# Patient Record
Sex: FEMALE | Race: BLACK OR AFRICAN AMERICAN | NOT HISPANIC OR LATINO | Employment: UNEMPLOYED | URBAN - METROPOLITAN AREA
[De-identification: names, ages, dates, MRNs, and addresses within clinical notes are randomized per-mention and may not be internally consistent; named-entity substitution may affect disease eponyms.]

---

## 2017-02-03 ENCOUNTER — OFFICE VISIT (OUTPATIENT)
Dept: URGENT CARE | Facility: CLINIC | Age: 72
End: 2017-02-03
Payer: MEDICARE

## 2017-02-03 ENCOUNTER — LAB VISIT (OUTPATIENT)
Dept: LAB | Facility: HOSPITAL | Age: 72
End: 2017-02-03
Attending: NURSE PRACTITIONER
Payer: MEDICARE

## 2017-02-03 VITALS
WEIGHT: 179.69 LBS | BODY MASS INDEX: 30.68 KG/M2 | OXYGEN SATURATION: 97 % | HEART RATE: 89 BPM | HEIGHT: 64 IN | DIASTOLIC BLOOD PRESSURE: 86 MMHG | TEMPERATURE: 98 F | SYSTOLIC BLOOD PRESSURE: 146 MMHG

## 2017-02-03 DIAGNOSIS — M79.604 BILATERAL LEG PAIN: ICD-10-CM

## 2017-02-03 DIAGNOSIS — M79.604 BILATERAL LEG PAIN: Primary | ICD-10-CM

## 2017-02-03 DIAGNOSIS — M79.605 BILATERAL LEG PAIN: ICD-10-CM

## 2017-02-03 DIAGNOSIS — M79.605 BILATERAL LEG PAIN: Primary | ICD-10-CM

## 2017-02-03 LAB
ALBUMIN SERPL BCP-MCNC: 3.7 G/DL
ALP SERPL-CCNC: 80 U/L
ALT SERPL W/O P-5'-P-CCNC: 15 U/L
ANION GAP SERPL CALC-SCNC: 11 MMOL/L
AST SERPL-CCNC: 26 U/L
BASOPHILS # BLD AUTO: 0.03 K/UL
BASOPHILS NFR BLD: 0.5 %
BILIRUB SERPL-MCNC: 0.4 MG/DL
BUN SERPL-MCNC: 33 MG/DL
CALCIUM SERPL-MCNC: 10.1 MG/DL
CHLORIDE SERPL-SCNC: 109 MMOL/L
CO2 SERPL-SCNC: 25 MMOL/L
CREAT SERPL-MCNC: 1 MG/DL
DIFFERENTIAL METHOD: ABNORMAL
EOSINOPHIL # BLD AUTO: 0.3 K/UL
EOSINOPHIL NFR BLD: 4.7 %
ERYTHROCYTE [DISTWIDTH] IN BLOOD BY AUTOMATED COUNT: 18.5 %
EST. GFR  (AFRICAN AMERICAN): >60 ML/MIN/1.73 M^2
EST. GFR  (NON AFRICAN AMERICAN): 57 ML/MIN/1.73 M^2
GLUCOSE SERPL-MCNC: 84 MG/DL
HCT VFR BLD AUTO: 41.5 %
HGB BLD-MCNC: 12.8 G/DL
LYMPHOCYTES # BLD AUTO: 1.9 K/UL
LYMPHOCYTES NFR BLD: 30.1 %
MCH RBC QN AUTO: 26.8 PG
MCHC RBC AUTO-ENTMCNC: 30.8 %
MCV RBC AUTO: 87 FL
MONOCYTES # BLD AUTO: 0.4 K/UL
MONOCYTES NFR BLD: 5.7 %
NEUTROPHILS # BLD AUTO: 3.6 K/UL
NEUTROPHILS NFR BLD: 59 %
PLATELET # BLD AUTO: 163 K/UL
PMV BLD AUTO: 11 FL
POTASSIUM SERPL-SCNC: 4.2 MMOL/L
PROT SERPL-MCNC: 7.1 G/DL
RBC # BLD AUTO: 4.77 M/UL
SODIUM SERPL-SCNC: 145 MMOL/L
WBC # BLD AUTO: 6.14 K/UL

## 2017-02-03 PROCEDURE — 80053 COMPREHEN METABOLIC PANEL: CPT | Mod: PO

## 2017-02-03 PROCEDURE — 99999 PR PBB SHADOW E&M-EST. PATIENT-LVL IV: CPT | Mod: PBBFAC,,, | Performed by: NURSE PRACTITIONER

## 2017-02-03 PROCEDURE — 99213 OFFICE O/P EST LOW 20 MIN: CPT | Mod: S$PBB,,, | Performed by: NURSE PRACTITIONER

## 2017-02-03 PROCEDURE — 85025 COMPLETE CBC W/AUTO DIFF WBC: CPT | Mod: PO

## 2017-02-03 NOTE — PROGRESS NOTES
Subjective:       Patient ID: Carlie Valdez is a 71 y.o. female.    Chief Complaint: Leg Pain    HPI Comments: Pt is a 71 year old female to clinic today with bilateral lower extremity pain that began 4-5 weeks ago. Pt states she was in hospital for lower extremity swelling. She states once swelling resolved, she had blisters on legs and she wants to make sure she does not have infection in her legs.     Leg Pain    The incident occurred more than 1 week ago (4-5 weeks). Injury mechanism: pt states she had blisters from swelling  The pain is present in the left leg and right leg. The quality of the pain is described as stabbing. The pain is at a severity of 10/10. The pain is severe. The pain has been fluctuating since onset. Associated symptoms include tingling. Pertinent negatives include no inability to bear weight, loss of motion, loss of sensation, muscle weakness or numbness. Nothing aggravates the symptoms. She has tried nothing for the symptoms.     Review of Systems   Constitutional: Negative for chills, fatigue and fever.   HENT: Negative for congestion and sore throat.    Eyes: Negative for pain.   Respiratory: Negative for cough, chest tightness, shortness of breath and wheezing.    Cardiovascular: Positive for leg swelling. Negative for chest pain and palpitations.   Gastrointestinal: Negative for abdominal pain, diarrhea, nausea and vomiting.   Genitourinary: Negative for dyspareunia.   Musculoskeletal: Negative for back pain, myalgias and neck pain.   Skin: Negative for rash.   Neurological: Positive for tingling. Negative for dizziness, light-headedness, numbness and headaches.       Objective:      Physical Exam   Constitutional: She is oriented to person, place, and time. She appears well-developed and well-nourished. No distress.   HENT:   Head: Normocephalic.   Right Ear: External ear normal.   Left Ear: External ear normal.   Nose: Nose normal.   Eyes: Pupils are equal, round, and  reactive to light.   Neck: Normal range of motion. Neck supple.   Cardiovascular: Normal rate, regular rhythm, S1 normal, S2 normal and normal heart sounds.  Exam reveals no gallop and no friction rub.    No murmur heard.  Pulses:       Dorsalis pedis pulses are 1+ on the right side, and 1+ on the left side.   Pulmonary/Chest: Effort normal and breath sounds normal. No accessory muscle usage. No apnea, no tachypnea and no bradypnea. No respiratory distress. She has no decreased breath sounds. She has no wheezes. She has no rhonchi. She has no rales.   Abdominal: Bowel sounds are normal.   Musculoskeletal: Normal range of motion. She exhibits edema and tenderness.        Right ankle: She exhibits swelling. She exhibits normal range of motion. No tenderness.        Left ankle: She exhibits swelling. She exhibits normal range of motion. No tenderness.        Right lower leg: She exhibits tenderness and edema. She exhibits no bony tenderness, no swelling, no deformity and no laceration.        Left lower leg: She exhibits tenderness and edema. She exhibits no bony tenderness, no swelling, no deformity and no laceration.   Lymphadenopathy:     She has no cervical adenopathy.   Neurological: She is alert and oriented to person, place, and time.   Skin: Skin is warm and dry. No rash noted. She is not diaphoretic.   Psychiatric: She has a normal mood and affect. Her speech is normal and behavior is normal.   Nursing note and vitals reviewed.      Assessment:       1. Bilateral leg pain        Plan:   Bilateral leg pain  -     CBC auto differential; Future; Expected date: 2/3/17  -     Comprehensive metabolic panel; Future; Expected date: 2/3/17      Recommend F/U with PCP to discuss lab results and further tx options.  Pt refused venous US to rule out DVT at this time.  Will notify of abnormal lab results.    Follow prescribed treatment plan as directed.  Stay hydrated and rest.  Report to ER if symptoms worsen.  Follow up  with PCP in 2-3 days or sooner if symptoms do not improve.

## 2017-02-03 NOTE — MR AVS SNAPSHOT
[facility]  [address] [place] [address]  Phone: [phone]  Fax: [phone]                  Carlie Valdez   2/3/2017 10:40 AM   Office Visit    Description:  Female : 1945   Provider:  Dougie Braun NP   Department:  [facility]           Reason for Visit     Leg Pain           Diagnoses this Visit        Comments    Bilateral leg pain    -  Primary            To Do List           Goals (5 Years of Data)     None      Follow-Up and Disposition     Return if symptoms worsen or fail to improve.      [facility]     [facility] -  Assistance  Registered nurses in the [facility] provide clinical advisement, health education, appointment booking, and other advisory services.  Call for this free service at [phone].             Medications           Message regarding Medications     Verify the changes and/or additions to your medication regime listed below are the same as discussed with your clinician today.  If any of these changes or additions are incorrect, please notify your healthcare provider.             Verify that the below list of medications is an accurate representation of the medications you are currently taking.  If none reported, the list may be blank. If incorrect, please contact your healthcare provider. Carry this list with you in case of emergency.           Current Medications     artificial tears,hypromellose, 0.3 % Gel 1 drop as needed.    bumetanide (BUMEX) 2 MG tablet Take 1 tablet (2 mg total) by mouth 2 (two) times daily. 1 Tablet Oral Every day    calcium-Mg-zinc-hc #122-vit D3 250-125-3.7-100 wx-fv-th-unit Tab Take 1 tablet by mouth Daily.    cholecalciferol, vitamin D3, 1,000 unit capsule Take 1 capsule (1,000 Units total) by mouth once daily.    gatifloxacin (ZYMAR) 0.5 % Drop drops Apply 1 drop to eye 2 (two) times daily. Start one day before surgery (right eye)    ketorolac 0.5%  "(ACULAR) 0.5 % Drop Place 1 drop into the right eye 4 (four) times daily. Eyedrops to start one day before surgery    nebivolol (BYSTOLIC) 2.5 MG Tab Take 10 mg by mouth once daily.    omeprazole (PRILOSEC) 20 MG capsule Take 1 capsule (20 mg total) by mouth once daily.    potassium chloride (KLOR-CON) 10 MEQ TbSR Take 2 tablets (20 mEq total) by mouth once daily. 1 Tablet Extended Release Oral Every day    prednisoLONE acetate (PRED FORTE) 1 % DrpS Place 1 drop into the right eye 4 (four) times daily. Start one day before surgery    rivaroxaban (XARELTO) 15 mg Tab Take 1 tablet (15 mg total) by mouth daily with dinner or evening meal.    diltiazem (CARDIZEM) 30 MG tablet Take 240 mg by mouth once daily.     fluticasone (FLONASE) 50 mcg/actuation nasal spray 2 sprays by Each Nare route once daily.    gabapentin (NEURONTIN) 100 MG capsule Take 100 mg by mouth 3 (three) times daily.    metoprolol tartrate (LOPRESSOR) 25 MG tablet Take 1 tablet (25 mg total) by mouth 2 (two) times daily.    rivaroxaban (XARELTO) 10 mg Tab Take 15 mg by mouth daily with dinner or evening meal.     sodium chloride 5% (AMALIA 128) 5 % ophthalmic solution Place 1 drop into both eyes 4 (four) times daily.           Clinical Reference Information           Your Vitals Were     BP Pulse Temp Height Weight SpO2    146/86 (BP Location: Right arm, Patient Position: Sitting) 89 97.5 °F (36.4 °C) 5' 4" (1.626 m) 81.5 kg (179 lb 10.8 oz) 97%    BMI                30.84 kg/m2          Blood Pressure          Most Recent Value    BP  (!)  146/86      Allergies as of 2/3/2017     Atorvastatin    Codeine    Digoxin    Diovan [Valsartan]    Eggs [Egg Derived]    Morphine    Naproxen    Peanut    Propofol    Sulfa (Sulfonamide Antibiotics)      Immunizations Administered on Date of Encounter - 2/3/2017     None      Orders Placed During Today's Visit     Future Labs/Procedures Expected by Expires    CBC auto differential  2/3/2017 4/4/2018    Comprehensive " metabolic panel  2/3/2017 4/4/2018      Instructions      Leg Swelling in Both Legs    Swelling of the feet, ankles, and legs is called edema. It is caused by excess fluid that has collected in the tissues. Extra fluid in the body settles in the lowest part because of gravity. This is why the legs and feet are most affected.  Some of the causes for edema include:  · Disease of the heart like congestive heart failure  · Standing or sitting for long periods of time  · Infection of the feet or legs  · Blood pooling in the veins of your legs (venous insufficiency)  · Dilated veins in your lower leg (varicose veins)  · Garters or other clothing that is tight on your legs. This will cause blood to pool in your legs because the clothing limits blood flow.  · Some medicines such as hormones like birth control pills, some blood pressure medicines like calcium channel blockers (amlodipine) and steroids, some antidepressants like MAO inhibitors and tricyclics  · Menstrual periods that cause you to retain fluids  · Many types of renal disease  · Liver failure or cirrhosis  · Pregnancy, some swelling is normal, but a sudden increase in leg swelling or weight gain can be a sign of a dangerous complication of pregnancy  · Poor nutrition  · Thyroid disease  Medical treatment will depend on what is causing the swelling in your legs. Your healthcare provider may prescribe water pills (diuretics) to get rid of the extra fluid.  Home care  Follow these guidelines when caring for yourself at home:  · Don't wear clothing like garters that is tight on your legs.  · Keep your legs up while lying or sitting.  · If infection, injury, or recent surgery is causing the swelling, stay off your legs as much as possible until symptoms get better.  · If your healthcare provider says that your leg swelling is caused by venous insufficiency or varicose veins, don't sit or  one place for long periods of time. Take breaks and walk about every  few hours. Brisk walking is a good exercise. It helps circulate the blood that has collected in your leg. Talk with your provider about using support stockings to stop daytime leg swelling.  · If your provider says that heart disease is causing your leg swelling, follow a low-salt diet to stop extra fluid from staying in your body. You may also need medicine.  Follow-up care  Follow up with your healthcare provider, or as advised.  When to seek medical advice  Call your healthcare provider right away if any of these occur:  · New shortness of breath or chest pain  · Shortness of breath or chest pain that gets worse  · Swelling in both legs or ankles that gets worse  · Swelling of the abdomen  · Redness, warmth, or swelling in one leg  · Fever of 100.4ºF (38ºC) or higher, or as directed by your healthcare provider  · Yellow color to your skin or eyes  · Rapid, unexplained weight gain  · Having to sleep upright or use an increased number of pillows  Date Last Reviewed: 3/31/2016  © 0347-2787 [employer]. [address] [place] [address]. All rights reserved. This information is not intended as a substitute for professional medical care. Always follow your healthcare professional's instructions.             Language Assistance Services     ATTENTION: Language assistance services are available, free of charge. Please call [phone].      ATENCIÓN: Si habla joshua, tiene a rodrigues disposición servicios gratuitos de asistencia lingüística. Llame al [phone].     [facility] Ý: N?u b?n nói Ti?ng Vi?t, có các d?ch v? h? tr? ngôn ng? mi?n phí dành cho b?n. G?i s? [phone].         [facility] complies with applicable Federal civil rights laws and does not discriminate on the basis of race, color, national origin, age, disability, or sex.

## 2017-02-03 NOTE — PATIENT INSTRUCTIONS
Leg Swelling in Both Legs    Swelling of the feet, ankles, and legs is called edema. It is caused by excess fluid that has collected in the tissues. Extra fluid in the body settles in the lowest part because of gravity. This is why the legs and feet are most affected.  Some of the causes for edema include:  · Disease of the heart like congestive heart failure  · Standing or sitting for long periods of time  · Infection of the feet or legs  · Blood pooling in the veins of your legs (venous insufficiency)  · Dilated veins in your lower leg (varicose veins)  · Garters or other clothing that is tight on your legs. This will cause blood to pool in your legs because the clothing limits blood flow.  · Some medicines such as hormones like birth control pills, some blood pressure medicines like calcium channel blockers (amlodipine) and steroids, some antidepressants like MAO inhibitors and tricyclics  · Menstrual periods that cause you to retain fluids  · Many types of renal disease  · Liver failure or cirrhosis  · Pregnancy, some swelling is normal, but a sudden increase in leg swelling or weight gain can be a sign of a dangerous complication of pregnancy  · Poor nutrition  · Thyroid disease  Medical treatment will depend on what is causing the swelling in your legs. Your healthcare provider may prescribe water pills (diuretics) to get rid of the extra fluid.  Home care  Follow these guidelines when caring for yourself at home:  · Don't wear clothing like garters that is tight on your legs.  · Keep your legs up while lying or sitting.  · If infection, injury, or recent surgery is causing the swelling, stay off your legs as much as possible until symptoms get better.  · If your healthcare provider says that your leg swelling is caused by venous insufficiency or varicose veins, don't sit or  one place for long periods of time. Take breaks and walk about every few hours. Brisk walking is a good exercise. It helps  circulate the blood that has collected in your leg. Talk with your provider about using support stockings to stop daytime leg swelling.  · If your provider says that heart disease is causing your leg swelling, follow a low-salt diet to stop extra fluid from staying in your body. You may also need medicine.  Follow-up care  Follow up with your healthcare provider, or as advised.  When to seek medical advice  Call your healthcare provider right away if any of these occur:  · New shortness of breath or chest pain  · Shortness of breath or chest pain that gets worse  · Swelling in both legs or ankles that gets worse  · Swelling of the abdomen  · Redness, warmth, or swelling in one leg  · Fever of 100.4ºF (38ºC) or higher, or as directed by your healthcare provider  · Yellow color to your skin or eyes  · Rapid, unexplained weight gain  · Having to sleep upright or use an increased number of pillows  Date Last Reviewed: 3/31/2016  © 2880-3819 [employer]. [address] [place] [address], [place] [address]. All rights reserved. This information is not intended as a substitute for professional medical care. Always follow your healthcare professional's instructions.

## 2017-02-28 ENCOUNTER — OFFICE VISIT (OUTPATIENT)
Dept: INTERNAL MEDICINE | Facility: CLINIC | Age: 72
End: 2017-02-28
Payer: MEDICARE

## 2017-02-28 VITALS
TEMPERATURE: 96 F | DIASTOLIC BLOOD PRESSURE: 106 MMHG | BODY MASS INDEX: 32.63 KG/M2 | HEIGHT: 64 IN | HEART RATE: 121 BPM | WEIGHT: 191.13 LBS | SYSTOLIC BLOOD PRESSURE: 152 MMHG

## 2017-02-28 DIAGNOSIS — Z12.31 ENCOUNTER FOR SCREENING MAMMOGRAM FOR MALIGNANT NEOPLASM OF BREAST: ICD-10-CM

## 2017-02-28 DIAGNOSIS — E55.9 VITAMIN D DEFICIENCY: Primary | ICD-10-CM

## 2017-02-28 DIAGNOSIS — I48.91 ATRIAL FIBRILLATION, UNSPECIFIED TYPE: Chronic | ICD-10-CM

## 2017-02-28 DIAGNOSIS — I10 ESSENTIAL HYPERTENSION: ICD-10-CM

## 2017-02-28 DIAGNOSIS — N18.30 CHRONIC KIDNEY DISEASE (CKD), STAGE III (MODERATE): ICD-10-CM

## 2017-02-28 DIAGNOSIS — Z29.9 PREVENTIVE MEASURE: ICD-10-CM

## 2017-02-28 DIAGNOSIS — M89.9 BONE DISORDER: ICD-10-CM

## 2017-02-28 PROCEDURE — 99999 PR PBB SHADOW E&M-EST. PATIENT-LVL IV: CPT | Mod: PBBFAC,,, | Performed by: INTERNAL MEDICINE

## 2017-02-28 PROCEDURE — 99214 OFFICE O/P EST MOD 30 MIN: CPT | Mod: PBBFAC,PO | Performed by: INTERNAL MEDICINE

## 2017-02-28 PROCEDURE — 99214 OFFICE O/P EST MOD 30 MIN: CPT | Mod: S$PBB,,, | Performed by: INTERNAL MEDICINE

## 2017-02-28 NOTE — MR AVS SNAPSHOT
[facility]  [address] [place] [address]  Phone: [phone]  Fax: [phone]                  Carlie Valdez   2017 9:40 AM   Office Visit    Description:  Female : 1945   Provider:  Johanna Dennison MD   Department:  [facility]           Reason for Visit     Follow-up           Diagnoses this Visit        Comments    Vitamin D deficiency    -  Primary     Chronic kidney disease (CKD), stage III (moderate)         Essential hypertension         Atrial fibrillation, unspecified type         Preventive measure         Encounter for screening mammogram for malignant neoplasm of breast         Bone disorder                To Do List           Future Appointments        Provider Department Dept Phone    2017 9:30 AM [facility] [id number] [facility] [phone]    2017 10:00 AM SUM BMD1 [facility] [phone]    2017 9:30 AM Leisa Andraed MD [place] [facility]/ -[phone]    2017 7:50 AM LABORATORY, [facility] [phone]    2017 10:00 AM Johanna Dennison MD [facility] [phone]      Goals (5 Years of Data)     None      Follow-Up and Disposition     Return in about 3 months (around 2017).    Follow-up and Disposition History      [facility]     [facility] -  Assistance  Registered nurses in the [facility] provide clinical advisement, health education, appointment booking, and other advisory services.  Call for this free service at [phone].             Medications           Message regarding Medications     Verify the changes and/or additions to your medication regime listed below are the same as discussed with your clinician today.  If any of these changes or additions are incorrect, please notify your healthcare provider.        STOP taking these medications     sodium chloride 5%  (AMALIA 128) 5 % ophthalmic solution Place 1 drop into both eyes 4 (four) times daily.    gabapentin (NEURONTIN) 100 MG capsule Take 100 mg by mouth 3 (three) times daily.           Verify that the below list of medications is an accurate representation of the medications you are currently taking.  If none reported, the list may be blank. If incorrect, please contact your healthcare provider. Carry this list with you in case of emergency.           Current Medications     artificial tears,hypromellose, 0.3 % Gel 1 drop as needed.    bumetanide (BUMEX) 2 MG tablet Take 1 tablet (2 mg total) by mouth 2 (two) times daily. 1 Tablet Oral Every day    calcium-Mg-zinc-hc #122-vit D3 250-125-3.7-100 qc-ml-oo-unit Tab Take 1 tablet by mouth Daily.    cholecalciferol, vitamin D3, 1,000 unit capsule Take 1 capsule (1,000 Units total) by mouth once daily.    diltiazem (CARDIZEM) 30 MG tablet Take 240 mg by mouth once daily.     fluticasone (FLONASE) 50 mcg/actuation nasal spray 2 sprays by Each Nare route once daily.    gatifloxacin (ZYMAR) 0.5 % Drop drops Apply 1 drop to eye 2 (two) times daily. Start one day before surgery (right eye)    ketorolac 0.5% (ACULAR) 0.5 % Drop Place 1 drop into the right eye 4 (four) times daily. Eyedrops to start one day before surgery    metoprolol tartrate (LOPRESSOR) 25 MG tablet Take 1 tablet (25 mg total) by mouth 2 (two) times daily.    nebivolol (BYSTOLIC) 2.5 MG Tab Take 10 mg by mouth once daily.    omeprazole (PRILOSEC) 20 MG capsule Take 1 capsule (20 mg total) by mouth once daily.    potassium chloride (KLOR-CON) 10 MEQ TbSR Take 2 tablets (20 mEq total) by mouth once daily. 1 Tablet Extended Release Oral Every day    prednisoLONE acetate (PRED FORTE) 1 % DrpS Place 1 drop into the right eye 4 (four) times daily. Start one day before surgery    rivaroxaban (XARELTO) 15 mg Tab Take 1 tablet (15 mg total) by mouth daily with dinner or evening meal.           Clinical Reference  Information           Your Vitals Were     BP                   152/106           Blood Pressure          Most Recent Value    BP  (!)  152/106      Allergies as of 2/28/2017     Atorvastatin    Codeine    Digoxin    Diovan [Valsartan]    Eggs [Egg Derived]    Morphine    Naproxen    Peanut    Propofol    Sulfa (Sulfonamide Antibiotics)      Immunizations Administered on Date of Encounter - 2/28/2017     None      Orders Placed During Today's Visit      Normal Orders This Visit    Ambulatory consult to Gynecology     Future Labs/Procedures Expected by Expires    CBC auto differential  2/28/2017 2/28/2018    Comprehensive metabolic panel  2/28/2017 2/28/2018    DXA Bone Density Spine And Hip_Axial Skeleton  2/28/2017 2/28/2018    Hepatitis C antibody  2/28/2017 4/29/2018    Lipid panel  2/28/2017 2/28/2018    Mammo Digital Screening Bilat with CAD  2/28/2017 2/28/2018    TSH  2/28/2017 2/28/2018    Vitamin D  As directed 2/28/2018      Language Assistance Services     ATTENTION: Language assistance services are available, free of charge. Please call [phone].      ATENCIÓN: Si habla joshua, tiene a rodrigues disposición servicios gratuitos de asistencia lingüística. Llame al [phone].     CHÚ Ý: N?u b?n nói Ti?ng Vi?t, có các d?ch v? h? tr? ngôn ng? mi?n phí dành cho b?n. G?i s? [phone].         [facility] complies with applicable Federal civil rights laws and does not discriminate on the basis of race, color, national origin, age, disability, or sex.

## 2017-02-28 NOTE — PROGRESS NOTES
"Subjective:       Patient ID: Carlie Valdez is a 71 y.o. female.    Chief Complaint: Follow-up    HPI Comments: Here for follow up of medical problems.  BP at home 120s/ 70s.  Taking vit D3 1K daily.  No f/c/sw/cough.  No cp/sob.  Occas palp with going into afib.  Takes flecainide prn.  BMs normal.  Dr. Baker following for LBP since fall 4/16.  Taking pain med per Dr. Macdonald, Pain Management.  Uses cane now.    Updated/annual due 4/17:  HM:  12/05 pneumo with bad rxn, 7/14 TDaP, 9/15 Cscope rep 7y, no BMD, 3/13 Gyn, 7/14 MMG, 12/16 Eye Dr. Cadet, Cards Dr. Hernández, Nephr Dr. Yan.            Review of Systems   Constitutional: Negative for chills, diaphoresis and fever.   Respiratory: Negative for cough and shortness of breath.    Cardiovascular: Negative for chest pain, palpitations and leg swelling.   Gastrointestinal: Negative for blood in stool, constipation, diarrhea, nausea and vomiting.   Genitourinary: Negative for dysuria, frequency and hematuria.   Psychiatric/Behavioral: The patient is not nervous/anxious.        Objective:   BP (!) 152/106  Pulse (!) 121  Temp (!) 95.8 °F (35.4 °C) (Tympanic)   Ht 5' 4" (1.626 m)  Wt 86.7 kg (191 lb 2.2 oz)  BMI 32.81 kg/m2    Physical Exam   Constitutional: She is oriented to person, place, and time. She appears well-developed.   HENT:   Mouth/Throat: Oropharynx is clear and moist.   Neck: Neck supple. Carotid bruit is not present. No thyroid mass present.   Cardiovascular: Normal rate, regular rhythm and intact distal pulses.  Exam reveals no gallop and no friction rub.    No murmur heard.  Pulmonary/Chest: Effort normal and breath sounds normal. She has no wheezes. She has no rales.   Abdominal: Soft. Bowel sounds are normal. She exhibits no mass. There is no hepatosplenomegaly. There is no tenderness.   Musculoskeletal: She exhibits edema (trace bilat.).   Lymphadenopathy:     She has no cervical adenopathy.   Neurological: She is alert and " oriented to person, place, and time.   Psychiatric: She has a normal mood and affect.     Results for DARNELL PALM (MRN [id number]) as of 2/28/2017 10:03   Ref. Range 11/13/2016 03:30 11/13/2016 06:50 11/13/2016 10:00 11/13/2016 11:25 2/3/2017 12:01   WBC Latest Ref Range: 3.90 - 12.70 K/uL 4.59    6.14   RBC Latest Ref Range: 4.00 - 5.40 M/uL 4.35    4.77   Hemoglobin Latest Ref Range: 12.0 - 16.0 g/dL 11.5 (L)    12.8   Hematocrit Latest Ref Range: 37.0 - 48.5 % 37.0    41.5   MCV Latest Ref Range: 82 - 98 fL 85    87   MCH Latest Ref Range: 27.0 - 31.0 pg 26.4 (L)    26.8 (L)   MCHC Latest Ref Range: 32.0 - 36.0 % 31.1 (L)    30.8 (L)   RDW Latest Ref Range: 11.5 - 14.5 % 17.0 (H)    18.5 (H)   Platelets Latest Ref Range: 150 - 350 K/uL 168    163   MPV Latest Ref Range: 9.2 - 12.9 fL 10.4    11.0   Gran% Latest Ref Range: 38.0 - 73.0 % 88.9 (H)    59.0   Gran # Latest Ref Range: 1.8 - 7.7 K/uL 4.1    3.6   Lymph% Latest Ref Range: 18.0 - 48.0 % 9.6 (L)    30.1   Lymph # Latest Ref Range: 1.0 - 4.8 K/uL 0.4 (L)    1.9   Mono% Latest Ref Range: 4.0 - 15.0 % 1.5 (L)    5.7   Mono # Latest Ref Range: 0.3 - 1.0 K/uL 0.1 (L)    0.4   Eosinophil% Latest Ref Range: 0.0 - 8.0 % 0.0    4.7   Eos # Latest Ref Range: 0.0 - 0.5 K/uL 0.0    0.3   Basophil% Latest Ref Range: 0.0 - 1.9 % 0.0    0.5   Baso # Latest Ref Range: 0.00 - 0.20 K/uL 0.00    0.03   Sodium Latest Ref Range: 136 - 145 mmol/L 147 (H)    145   Potassium Latest Ref Range: 3.5 - 5.1 mmol/L 2.9 (L)    4.2   Chloride Latest Ref Range: 95 - 110 mmol/L 109    109   CO2 Latest Ref Range: 23 - 29 mmol/L 24    25   Anion Gap Latest Ref Range: 8 - 16 mmol/L 14    11   BUN, Bld Latest Ref Range: 8 - 23 mg/dL 45 (H)    33 (H)   Creatinine Latest Ref Range: 0.5 - 1.4 mg/dL 1.3    1.0   eGFR if non African American Latest Ref Range: >60 mL/min/1.73 m^2 41 (A)    57 (A)   eGFR if African American Latest Ref Range: >60 mL/min/1.73 m^2 48 (A)    >60   Glucose  Latest Ref Range: 70 - 110 mg/dL 166 (H)    84   Calcium Latest Ref Range: 8.7 - 10.5 mg/dL 8.9    10.1   Alkaline Phosphatase Latest Ref Range: 55 - 135 U/L 80    80   Total Protein Latest Ref Range: 6.0 - 8.4 g/dL 6.4    7.1   Albumin Latest Ref Range: 3.5 - 5.2 g/dL 3.1 (L)    3.7   Total Bilirubin Latest Ref Range: 0.1 - 1.0 mg/dL 1.1 (H)    0.4   AST Latest Ref Range: 10 - 40 U/L 36    26   ALT Latest Ref Range: 10 - 44 U/L 31    15     Assessment:       1. Vitamin D deficiency    2. Chronic kidney disease (CKD), stage III (moderate)    3. Essential hypertension    4. Atrial fibrillation, unspecified type    5. Preventive measure    6. Encounter for screening mammogram for malignant neoplasm of breast     7. Bone disorder        Plan:       Carlie was seen today for follow-up.    Diagnoses and all orders for this visit:    Vitamin D deficiency- check lab.  -     Vitamin D; Future    Chronic kidney disease (CKD), stage III (moderate)- stable.    Essential hypertension- monitor at home for next visit.    Atrial fibrillation, unspecified type- stable on rx.    Preventive measure- will do in 3mo:  -     Comprehensive metabolic panel; Future  -     Lipid panel; Future  -     Mammo Digital Screening Bilat with CAD; Future  -     Ambulatory consult to Gynecology  -     CBC auto differential; Future  -     TSH; Future  -     Vitamin D; Future  -     Hepatitis C antibody; Future  -     DXA Bone Density Spine And Hip_Axial Skeleton; Future    RTC 3 mo.  MMG and gyn prior.

## 2017-03-25 ENCOUNTER — OFFICE VISIT (OUTPATIENT)
Dept: URGENT CARE | Facility: CLINIC | Age: 72
End: 2017-03-25
Payer: MEDICARE

## 2017-03-25 VITALS
SYSTOLIC BLOOD PRESSURE: 138 MMHG | TEMPERATURE: 99 F | OXYGEN SATURATION: 98 % | DIASTOLIC BLOOD PRESSURE: 80 MMHG | HEART RATE: 62 BPM | WEIGHT: 199.31 LBS | BODY MASS INDEX: 35.32 KG/M2 | RESPIRATION RATE: 18 BRPM | HEIGHT: 63 IN

## 2017-03-25 DIAGNOSIS — R22.0 FACIAL SWELLING: ICD-10-CM

## 2017-03-25 DIAGNOSIS — H65.93 BILATERAL NON-SUPPURATIVE OTITIS MEDIA: Primary | ICD-10-CM

## 2017-03-25 DIAGNOSIS — J32.0 MAXILLARY SINUSITIS, UNSPECIFIED CHRONICITY: ICD-10-CM

## 2017-03-25 PROCEDURE — 99214 OFFICE O/P EST MOD 30 MIN: CPT | Mod: PBBFAC,PO | Performed by: PHYSICIAN ASSISTANT

## 2017-03-25 PROCEDURE — 99999 PR PBB SHADOW E&M-EST. PATIENT-LVL IV: CPT | Mod: PBBFAC,,, | Performed by: PHYSICIAN ASSISTANT

## 2017-03-25 PROCEDURE — 99213 OFFICE O/P EST LOW 20 MIN: CPT | Mod: S$PBB,,, | Performed by: PHYSICIAN ASSISTANT

## 2017-03-25 RX ORDER — MONTELUKAST SODIUM 10 MG/1
10 TABLET ORAL NIGHTLY
Qty: 30 TABLET | Refills: 0 | Status: SHIPPED | OUTPATIENT
Start: 2017-03-25 | End: 2017-04-04

## 2017-03-25 RX ORDER — METHYLPREDNISOLONE 4 MG/1
TABLET ORAL
Qty: 1 PACKAGE | Refills: 0 | Status: SHIPPED | OUTPATIENT
Start: 2017-03-25 | End: 2017-04-04

## 2017-03-25 RX ORDER — AMOXICILLIN AND CLAVULANATE POTASSIUM 500; 125 MG/1; MG/1
1 TABLET, FILM COATED ORAL 2 TIMES DAILY
Qty: 20 TABLET | Refills: 0 | Status: SHIPPED | OUTPATIENT
Start: 2017-03-25 | End: 2017-04-04

## 2017-03-25 NOTE — PROGRESS NOTES
Subjective:      Patient ID: Carlie Valdez is a 71 y.o. female.    Chief Complaint: Cough; Nasal Congestion; and Sore Throat    Cough   This is a new problem. The current episode started 1 to 4 weeks ago. The problem has been gradually worsening. The problem occurs every few minutes. The cough is productive of purulent sputum. Associated symptoms include ear congestion, ear pain, nasal congestion, postnasal drip, rhinorrhea, a sore throat, shortness of breath and wheezing. Pertinent negatives include no chest pain, chills, fever, headaches, heartburn, hemoptysis, myalgias, rash, sweats or weight loss. The symptoms are aggravated by lying down. She has tried nothing for the symptoms. The treatment provided no relief. Her past medical history is significant for asthma and pneumonia. There is no history of bronchiectasis, bronchitis, COPD, emphysema or environmental allergies. CHF   Sore Throat    This is a new problem. The current episode started 1 to 4 weeks ago. The problem has been waxing and waning. Neither side of throat is experiencing more pain than the other. There has been no fever. The pain is at a severity of 2/10. The patient is experiencing no pain. Associated symptoms include congestion, coughing, ear pain, a plugged ear sensation and shortness of breath. Pertinent negatives include no abdominal pain, diarrhea, drooling, ear discharge, headaches, hoarse voice, neck pain, stridor, swollen glands, trouble swallowing or vomiting. She has had no exposure to strep or mono. She has tried nothing for the symptoms. The treatment provided no relief.       Review of Systems   Constitutional: Negative for chills, fever and weight loss.   HENT: Positive for congestion, ear pain, postnasal drip, rhinorrhea and sore throat. Negative for drooling, ear discharge, hoarse voice and trouble swallowing.    Respiratory: Positive for cough, shortness of breath and wheezing. Negative for hemoptysis and stridor.   "  Cardiovascular: Negative for chest pain.   Gastrointestinal: Negative for abdominal pain, diarrhea, heartburn and vomiting.   Musculoskeletal: Negative for myalgias and neck pain.   Skin: Negative for rash.   Allergic/Immunologic: Negative for environmental allergies.   Neurological: Negative for headaches.        Review of patient's allergies indicates:   Allergen Reactions    Atorvastatin      Other reaction(s): Muscle pain    Codeine      Other reaction(s): Unknown    Digoxin      Other reaction(s): Unknown    Diovan [valsartan] Other (See Comments)     Upset stomach, weakness    Eggs [egg derived]     Morphine      Other reaction(s): Unknown    Naproxen      Other reaction(s): Nausea    Peanut     Propofol      Other reaction(s): delirious    Sulfa (sulfonamide antibiotics)        Past Medical History:   Diagnosis Date    Asthma     Atrial fibrillation     Blood clot of vein in shoulder area     Cardiac defibrillator in place     Chronic knee pain     Diabetes mellitus type 2 without retinopathy 12/19/2016    Diaphragmatic hernia without obstruction and without gangrene 9/14/2015    GERD (gastroesophageal reflux disease)     Hypertension        Objective:   /80 (BP Location: Right arm, Patient Position: Sitting, BP Method: Manual)  Pulse 62  Temp 98.6 °F (37 °C) (Tympanic)   Resp 18  Ht 5' 3" (1.6 m)  Wt 90.4 kg (199 lb 4.7 oz)  SpO2 98%  BMI 35.3 kg/m2    Physical Exam   Constitutional: She is oriented to person, place, and time. She appears well-developed and well-nourished.   HENT:   Head: Normocephalic and atraumatic.   Right Ear: External ear normal.   Left Ear: External ear normal.   Nose: Nose normal.   Mouth/Throat: Oropharynx is clear and moist. No oropharyngeal exudate.   There is facial edema in the periorbital areas and maxillary facial area which is also tender to palpate.   Eyes: Conjunctivae and EOM are normal. Pupils are equal, round, and reactive to light. Right " eye exhibits no discharge. Left eye exhibits no discharge. No scleral icterus.   Neck: Normal range of motion. Neck supple. No JVD present. No tracheal deviation present. No thyromegaly present.   Cardiovascular: Normal rate, regular rhythm and intact distal pulses.  Exam reveals no gallop and no friction rub.    Murmur heard.  Pulmonary/Chest: No stridor. No respiratory distress. She has wheezes. She has no rales. She exhibits no tenderness.   Abdominal: Soft. Bowel sounds are normal. She exhibits no distension and no mass. There is no tenderness. There is no rebound and no guarding.   Musculoskeletal: Normal range of motion. She exhibits no edema or tenderness.   Lymphadenopathy:     She has no cervical adenopathy.   Neurological: She is alert and oriented to person, place, and time. She has normal reflexes. She displays normal reflexes. No cranial nerve deficit. She exhibits normal muscle tone. Coordination normal.   Skin: Skin is warm and dry. No rash noted. No erythema. No pallor.   Psychiatric: She has a normal mood and affect. Her behavior is normal. Judgment and thought content normal.   Nursing note and vitals reviewed.    Assessment:     1. Bilateral non-suppurative otitis media    2. Maxillary sinusitis, unspecified chronicity    3. Facial swelling       Plan:   Carlie Valdez is seen today for   1. Bilateral non-suppurative otitis media    2. Maxillary sinusitis, unspecified chronicity    3. Facial swelling      We have discussed the etiology and treatment options associated with the diagnosis as well as alternatives.  She was also advised that she should return home and take her Bumex as soon as possible to help alleviate some of the edema which in turn will help her breathing.  She was advised that if her symptoms were to persist or worsen she was to be seen in the emergency room.  She has elected the following treatments.     Bilateral non-suppurative otitis media  -     amoxicillin-clavulanate  500-125mg (AUGMENTIN) 500-125 mg Tab; Take 1 tablet (500 mg total) by mouth 2 (two) times daily.  Dispense: 20 tablet; Refill: 0  -     methylPREDNISolone (MEDROL DOSEPACK) 4 mg tablet; As directed  Dispense: 1 Package; Refill: 0  -     montelukast (SINGULAIR) 10 mg tablet; Take 1 tablet (10 mg total) by mouth every evening.  Dispense: 30 tablet; Refill: 0    Maxillary sinusitis, unspecified chronicity  -     amoxicillin-clavulanate 500-125mg (AUGMENTIN) 500-125 mg Tab; Take 1 tablet (500 mg total) by mouth 2 (two) times daily.  Dispense: 20 tablet; Refill: 0  -     methylPREDNISolone (MEDROL DOSEPACK) 4 mg tablet; As directed  Dispense: 1 Package; Refill: 0  -     montelukast (SINGULAIR) 10 mg tablet; Take 1 tablet (10 mg total) by mouth every evening.  Dispense: 30 tablet; Refill: 0    Facial swelling  -     amoxicillin-clavulanate 500-125mg (AUGMENTIN) 500-125 mg Tab; Take 1 tablet (500 mg total) by mouth 2 (two) times daily.  Dispense: 20 tablet; Refill: 0  -     methylPREDNISolone (MEDROL DOSEPACK) 4 mg tablet; As directed  Dispense: 1 Package; Refill: 0  -     montelukast (SINGULAIR) 10 mg tablet; Take 1 tablet (10 mg total) by mouth every evening.  Dispense: 30 tablet; Refill: 0    The patient ambulated from the clinic with the assistance of a cane and she was in no respiratory distress nor did she have to stop to rest.    The patient was explained the above plan and given opportunity to ask questions. She understands, chooses and consents to this plan and accepts all the risks, which include but are not limited to the risks mentioned above. She understands the alternative of having no testing, interventions or treatments at this time. She left content and without further questions.

## 2017-03-30 ENCOUNTER — HOSPITAL ENCOUNTER (EMERGENCY)
Facility: HOSPITAL | Age: 72
Discharge: HOME | End: 2017-03-31
Attending: EMERGENCY MEDICINE
Payer: MEDICARE

## 2017-03-30 DIAGNOSIS — I48.91 ATRIAL FIBRILLATION WITH RVR: Primary | ICD-10-CM

## 2017-03-30 DIAGNOSIS — J40 BRONCHITIS: ICD-10-CM

## 2017-03-30 DIAGNOSIS — R07.9 CHEST PAIN: ICD-10-CM

## 2017-03-30 LAB
ALBUMIN SERPL BCP-MCNC: 3.7 G/DL
ALP SERPL-CCNC: 82 U/L
ALT SERPL W/O P-5'-P-CCNC: 41 U/L
ANION GAP SERPL CALC-SCNC: 12 MMOL/L
APTT BLDCRRT: 28.8 SEC
AST SERPL-CCNC: 37 U/L
BACTERIA #/AREA URNS HPF: NORMAL /HPF
BASOPHILS # BLD AUTO: 0.05 K/UL
BASOPHILS NFR BLD: 0.6 %
BILIRUB SERPL-MCNC: 0.5 MG/DL
BILIRUB UR QL STRIP: NEGATIVE
BUN SERPL-MCNC: 41 MG/DL
CALCIUM SERPL-MCNC: 9.3 MG/DL
CHLORIDE SERPL-SCNC: 110 MMOL/L
CLARITY UR: CLEAR
CO2 SERPL-SCNC: 24 MMOL/L
COLOR UR: YELLOW
CREAT SERPL-MCNC: 1.1 MG/DL
DIFFERENTIAL METHOD: ABNORMAL
EOSINOPHIL # BLD AUTO: 0.1 K/UL
EOSINOPHIL NFR BLD: 1.8 %
ERYTHROCYTE [DISTWIDTH] IN BLOOD BY AUTOMATED COUNT: 18.3 %
EST. GFR  (AFRICAN AMERICAN): 58 ML/MIN/1.73 M^2
EST. GFR  (NON AFRICAN AMERICAN): 51 ML/MIN/1.73 M^2
GLUCOSE SERPL-MCNC: 127 MG/DL
GLUCOSE UR QL STRIP: NEGATIVE
HCT VFR BLD AUTO: 35.7 %
HGB BLD-MCNC: 11.1 G/DL
HGB UR QL STRIP: ABNORMAL
HYALINE CASTS #/AREA URNS LPF: 0 /LPF
INR PPP: 1.4
KETONES UR QL STRIP: NEGATIVE
LACTATE SERPL-SCNC: 1.7 MMOL/L
LEUKOCYTE ESTERASE UR QL STRIP: NEGATIVE
LIPASE SERPL-CCNC: 8 U/L
LYMPHOCYTES # BLD AUTO: 1.1 K/UL
LYMPHOCYTES NFR BLD: 13.6 %
MAGNESIUM SERPL-MCNC: 2 MG/DL
MCH RBC QN AUTO: 27.5 PG
MCHC RBC AUTO-ENTMCNC: 31.1 %
MCV RBC AUTO: 88 FL
MICROSCOPIC COMMENT: NORMAL
MONOCYTES # BLD AUTO: 0.7 K/UL
MONOCYTES NFR BLD: 9.6 %
NEUTROPHILS # BLD AUTO: 5.7 K/UL
NEUTROPHILS NFR BLD: 74.4 %
NITRITE UR QL STRIP: NEGATIVE
PH UR STRIP: 6 [PH] (ref 5–8)
PLATELET # BLD AUTO: 158 K/UL
PMV BLD AUTO: 10.5 FL
POTASSIUM SERPL-SCNC: 3.9 MMOL/L
PROT SERPL-MCNC: 6.9 G/DL
PROT UR QL STRIP: ABNORMAL
PROTHROMBIN TIME: 14.4 SEC
RBC # BLD AUTO: 4.04 M/UL
RBC #/AREA URNS HPF: 2 /HPF (ref 0–4)
SODIUM SERPL-SCNC: 146 MMOL/L
SP GR UR STRIP: 1.01 (ref 1–1.03)
TROPONIN I SERPL DL<=0.01 NG/ML-MCNC: 0.03 NG/ML
TROPONIN I SERPL DL<=0.01 NG/ML-MCNC: 0.03 NG/ML
URN SPEC COLLECT METH UR: ABNORMAL
UROBILINOGEN UR STRIP-ACNC: NEGATIVE EU/DL
WBC # BLD AUTO: 7.72 K/UL
WBC #/AREA URNS HPF: 2 /HPF (ref 0–5)

## 2017-03-30 PROCEDURE — 96372 THER/PROPH/DIAG INJ SC/IM: CPT

## 2017-03-30 PROCEDURE — 85610 PROTHROMBIN TIME: CPT

## 2017-03-30 PROCEDURE — 83735 ASSAY OF MAGNESIUM: CPT

## 2017-03-30 PROCEDURE — 63600175 PHARM REV CODE 636 W HCPCS: Performed by: EMERGENCY MEDICINE

## 2017-03-30 PROCEDURE — 94640 AIRWAY INHALATION TREATMENT: CPT

## 2017-03-30 PROCEDURE — 96376 TX/PRO/DX INJ SAME DRUG ADON: CPT

## 2017-03-30 PROCEDURE — 83605 ASSAY OF LACTIC ACID: CPT

## 2017-03-30 PROCEDURE — 93005 ELECTROCARDIOGRAM TRACING: CPT

## 2017-03-30 PROCEDURE — 96375 TX/PRO/DX INJ NEW DRUG ADDON: CPT

## 2017-03-30 PROCEDURE — 25000003 PHARM REV CODE 250: Performed by: EMERGENCY MEDICINE

## 2017-03-30 PROCEDURE — 93010 ELECTROCARDIOGRAM REPORT: CPT | Mod: ,,, | Performed by: INTERNAL MEDICINE

## 2017-03-30 PROCEDURE — 84484 ASSAY OF TROPONIN QUANT: CPT

## 2017-03-30 PROCEDURE — 85730 THROMBOPLASTIN TIME PARTIAL: CPT

## 2017-03-30 PROCEDURE — 25500020 PHARM REV CODE 255: Performed by: EMERGENCY MEDICINE

## 2017-03-30 PROCEDURE — 96374 THER/PROPH/DIAG INJ IV PUSH: CPT

## 2017-03-30 PROCEDURE — 84484 ASSAY OF TROPONIN QUANT: CPT | Mod: 91

## 2017-03-30 PROCEDURE — 85025 COMPLETE CBC W/AUTO DIFF WBC: CPT

## 2017-03-30 PROCEDURE — 99285 EMERGENCY DEPT VISIT HI MDM: CPT | Mod: 25

## 2017-03-30 PROCEDURE — 80053 COMPREHEN METABOLIC PANEL: CPT

## 2017-03-30 PROCEDURE — 81000 URINALYSIS NONAUTO W/SCOPE: CPT

## 2017-03-30 PROCEDURE — 83690 ASSAY OF LIPASE: CPT

## 2017-03-30 RX ORDER — LEVALBUTEROL INHALATION SOLUTION 0.63 MG/3ML
0.63 SOLUTION RESPIRATORY (INHALATION)
Status: COMPLETED | OUTPATIENT
Start: 2017-03-30 | End: 2017-03-30

## 2017-03-30 RX ORDER — DILTIAZEM HYDROCHLORIDE 5 MG/ML
20 INJECTION INTRAVENOUS
Status: COMPLETED | OUTPATIENT
Start: 2017-03-30 | End: 2017-03-30

## 2017-03-30 RX ORDER — ONDANSETRON 2 MG/ML
4 INJECTION INTRAMUSCULAR; INTRAVENOUS
Status: COMPLETED | OUTPATIENT
Start: 2017-03-30 | End: 2017-03-30

## 2017-03-30 RX ORDER — IPRATROPIUM BROMIDE AND ALBUTEROL SULFATE 2.5; .5 MG/3ML; MG/3ML
3 SOLUTION RESPIRATORY (INHALATION)
Status: DISCONTINUED | OUTPATIENT
Start: 2017-03-30 | End: 2017-03-30

## 2017-03-30 RX ORDER — DILTIAZEM HYDROCHLORIDE 5 MG/ML
10 INJECTION INTRAVENOUS
Status: COMPLETED | OUTPATIENT
Start: 2017-03-30 | End: 2017-03-30

## 2017-03-30 RX ORDER — DICYCLOMINE HYDROCHLORIDE 10 MG/ML
20 INJECTION INTRAMUSCULAR
Status: COMPLETED | OUTPATIENT
Start: 2017-03-30 | End: 2017-03-30

## 2017-03-30 RX ADMIN — LEVALBUTEROL 0.63 MG: 0.63 SOLUTION RESPIRATORY (INHALATION) at 07:03

## 2017-03-30 RX ADMIN — DILTIAZEM HYDROCHLORIDE 10 MG: 5 INJECTION INTRAVENOUS at 11:03

## 2017-03-30 RX ADMIN — DICYCLOMINE HYDROCHLORIDE 20 MG: 20 INJECTION, SOLUTION INTRAMUSCULAR at 07:03

## 2017-03-30 RX ADMIN — METHYLPREDNISOLONE SODIUM SUCCINATE 125 MG: 125 INJECTION, POWDER, FOR SOLUTION INTRAMUSCULAR; INTRAVENOUS at 07:03

## 2017-03-30 RX ADMIN — LEVALBUTEROL 0.63 MG: 0.63 SOLUTION RESPIRATORY (INHALATION) at 10:03

## 2017-03-30 RX ADMIN — DILTIAZEM HYDROCHLORIDE 20 MG: 5 INJECTION INTRAVENOUS at 07:03

## 2017-03-30 RX ADMIN — IOHEXOL 100 ML: 350 INJECTION, SOLUTION INTRAVENOUS at 08:03

## 2017-03-30 RX ADMIN — ONDANSETRON 4 MG: 2 INJECTION INTRAMUSCULAR; INTRAVENOUS at 07:03

## 2017-03-30 NOTE — ED AVS SNAPSHOT
[facility] [address] [place] [address]               Carlie Valdez   3/30/2017  6:40 PM   ED    Description:  Female : 1945   Department:  [facility]           Your Care was Coordinated By:     Provider Role From To    Kari Florentino MD Attending Provider 17 1843 17    Pavel Erwin MD Attending Provider 17 --      Reason for Visit     Abdominal Pain           Diagnoses this Visit        Comments    Atrial fibrillation with RVR    -  Primary     Chest pain         Bronchitis           ED Disposition     None           To Do List           Follow-up Information     Follow up with Johanna Guzman MD In 3 days.    Specialty:  Internal Medicine    Contact information:    [address] [facility] [address] [phone]          Follow up with Israel David MD In 2 weeks.    Specialties:  Cardiology, Cardiovascular Disease    Contact information:    [facility] [address] [phone]          Follow up with [facility].    Specialty:  Emergency Medicine    Why:  If symptoms worsen, Or worsening condition or any other major concern    Contact information:    [address] [place] [address] [phone]       These Medications        Disp Refills Start End    digoxin (LANOXIN) 125 mcg tablet 30 tablet 1 3/31/2017 3/31/2018    Take 1 tablet (125 mcg total) by mouth once daily. - Oral    Pharmacy: [id number] - [facility] [address] [facility] #: [phone]         [facility]     [facility] Care [facility] - 24/ Assistance  Unless otherwise directed by your provider, please contact [facility], our nurse care line that is available for 24/7 assistance.     Registered nurses in the [facility] provide: appointment scheduling, clinical advisement, health education,  and other advisory services.  Call: [phone] (toll free)               Medications           Message regarding Medications     Verify the changes and/or additions to your medication regime listed below are the same as discussed with your clinician today.  If any of these changes or additions are incorrect, please notify your healthcare provider.        START taking these NEW medications        Refills    digoxin (LANOXIN) 125 mcg tablet 1    Sig: Take 1 tablet (125 mcg total) by mouth once daily.    Class: Normal    Route: Oral      These medications were administered today        Dose Freq    dicyclomine injection 20 mg 20 mg ED 1 Time    Sig: Inject 2 mLs (20 mg total) into the muscle ED 1 Time.    Class: Normal    Route: Intramuscular    ondansetron injection 4 mg 4 mg ED 1 Time    Sig: Inject 4 mg into the vein ED 1 Time.    Class: Normal    Route: Intravenous    diltiaZEM injection 20 mg 20 mg ED 1 Time    Sig: Inject 4 mLs (20 mg total) into the vein ED 1 Time.    Class: Normal    Route: Intravenous    methylPREDNISolone sod suc(PF) 125 mg/2 mL injection 125 mg 125 mg ED 1 Time    Sig: Inject 125 mg into the vein ED 1 Time.    Class: Normal    Route: Intravenous    levalbuterol nebulizer solution 0.63 mg 0.63 mg ED 1 Time    Sig: Take 3 mLs (0.63 mg total) by nebulization ED 1 Time.    Class: Normal    Route: Nebulization    omnipaque 350 iohexol 100 mL 100 mL IMG once as needed    Sig: Inject 100 mLs into the vein ONCE PRN for contrast.    Class: Normal    Route: Intravenous    levalbuterol nebulizer solution 0.63 mg 0.63 mg ED 1 Time    Sig: Take 3 mLs (0.63 mg total) by nebulization ED 1 Time.    Class: Normal    Route: Nebulization    diltiaZEM injection 10 mg 10 mg ED 1 Time    Sig: Inject 2 mLs (10 mg total) into the vein ED 1 Time.    Class: Normal    Route: Intravenous    diltiaZEM injection 10 mg 10 mg ED 1 Time    Sig: Inject 2 mLs (10 mg total) into the vein ED 1 Time.    Class: Normal     Route: Intravenous      STOP taking these medications     metoprolol tartrate (LOPRESSOR) 25 MG tablet Take 1 tablet (25 mg total) by mouth 2 (two) times daily.    diltiazem (CARDIZEM) 30 MG tablet Take 240 mg by mouth once daily.            Verify that the below list of medications is an accurate representation of the medications you are currently taking.  If none reported, the list may be blank. If incorrect, please contact your healthcare provider. Carry this list with you in case of emergency.           Current Medications     amoxicillin-clavulanate 500-125mg (AUGMENTIN) 500-125 mg Tab Take 1 tablet (500 mg total) by mouth 2 (two) times daily.    artificial tears,hypromellose, 0.3 % Gel 1 drop as needed.    bumetanide (BUMEX) 2 MG tablet Take 1 tablet (2 mg total) by mouth 2 (two) times daily. 1 Tablet Oral Every day    calcium-Mg-zinc-hc #122-vit D3 250-125-3.7-100 ow-za-dr-unit Tab Take 1 tablet by mouth Daily.    cholecalciferol, vitamin D3, 1,000 unit capsule Take 1 capsule (1,000 Units total) by mouth once daily.    digoxin (LANOXIN) 125 mcg tablet Take 1 tablet (125 mcg total) by mouth once daily.    fluticasone (FLONASE) 50 mcg/actuation nasal spray 2 sprays by Each Nare route once daily.    gatifloxacin (ZYMAR) 0.5 % Drop drops Apply 1 drop to eye 2 (two) times daily. Start one day before surgery (right eye)    ketorolac 0.5% (ACULAR) 0.5 % Drop Place 1 drop into the right eye 4 (four) times daily. Eyedrops to start one day before surgery    methylPREDNISolone (MEDROL DOSEPACK) 4 mg tablet As directed    montelukast (SINGULAIR) 10 mg tablet Take 1 tablet (10 mg total) by mouth every evening.    nebivolol (BYSTOLIC) 2.5 MG Tab Take 10 mg by mouth once daily.    omeprazole (PRILOSEC) 20 MG capsule Take 1 capsule (20 mg total) by mouth once daily.    potassium chloride (KLOR-CON) 10 MEQ TbSR Take 2 tablets (20 mEq total) by mouth once daily. 1 Tablet Extended Release Oral Every day    prednisoLONE acetate  "(PRED FORTE) 1 % DrpS Place 1 drop into the right eye 4 (four) times daily. Start one day before surgery    rivaroxaban (XARELTO) 15 mg Tab Take 1 tablet (15 mg total) by mouth daily with dinner or evening meal.           Clinical Reference Information           Your Vitals Were     BP Pulse Temp Resp Height Weight    159/115 (BP Location: Right arm, Patient Position: Lying) 90 97.5 °F (36.4 °C) (Oral) 23 5' 4" (1.626 m) 83.9 kg (185 lb)    SpO2 BMI             96% 31.76 kg/m2         Allergies as of 3/31/2017        Reactions    Atorvastatin     Other reaction(s): Muscle pain    Codeine     Other reaction(s): Unknown    Digoxin     Other reaction(s): Unknown    Diovan [Valsartan] Other (See Comments)    Upset stomach, weakness    Eggs [Egg Derived]     Morphine     Other reaction(s): Unknown    Naproxen     Other reaction(s): Nausea    Peanut     Propofol     Other reaction(s): delirious    Sulfa (Sulfonamide Antibiotics)       Immunizations Administered on Date of Encounter - 3/31/2017     None      ED Micro, Lab, POCT     Start Ordered       Status Ordering Provider    03/30/17 2315 03/30/17 2307  Troponin I  STAT      Final result     03/30/17 1903 03/30/17 1903  Lactic acid, plasma  STAT      Final result     03/30/17 1902 03/30/17 1901  CBC W/ AUTO DIFFERENTIAL  STAT      Final result     03/30/17 1902 03/30/17 1901  Comp. Metabolic Panel  STAT      Final result     03/30/17 1902 03/30/17 1901  Lipase  Once      Final result     03/30/17 1902 03/30/17 1901  Urinalysis - Clean Catch  STAT      Final result     03/30/17 1902 03/30/17 1901  Troponin I  STAT      Final result     03/30/17 1902 03/30/17 1901  Protime-INR  STAT      Final result     03/30/17 1902 03/30/17 1901  PTT  STAT      Final result     03/30/17 1902 03/30/17 1901  Magnesium  STAT      Final result     03/30/17 1901 03/30/17 1901  Urinalysis Microscopic  Once      Final result       ED Imaging Orders     Start Ordered       Status Ordering " Provider    03/30/17 1902 03/30/17 1901  X-Ray Abdomen Flat And Erect  1 time imaging      Final result     03/30/17 1902 03/30/17 1901  X-ray chest AP portable  1 time imaging     Comments:  Abdominal pain    Final result     03/30/17 1902 03/30/17 1903  CTA Abdomen and Pelvis  1 time imaging      Final result         Discharge Instructions         Understanding Atrial Fibrillation    An arrhythmia is any problem with the speed or pattern of the heartbeat. Atrial fibrillation (AFib) is a common type of arrhythmia. It causes fast, chaotic electrical signals in the atria. This leads to poor functioning of the heart. It also affects how much blood your heart can pump out to the body.  Afib may occur once in a while and go away on its own. Or it may continue for longer periods and need treatment.  AFib can lead to serious problems, such as stroke. Your healthcare provider will need to monitor and manage it.  What happens during atrial fibrillation?   The heart has an electrical system that sends signals to control the heartbeat. As signals move through the heart, they tell the hearts upper chambers (atria) and lower chambers (ventricles) when to squeeze (contract) and relax. This lets blood move through the heart and out to the body and lungs.  With AFib, the atria receive abnormal signals. This causes them to contract in a fast and irregular way, and out of sync with the ventricles. When this happens, the atria also have a harder time moving blood into the ventricles. Blood may then pool in the atria, which increases the risk for blood clots and stroke. The ventricles also may contract too quickly and irregularly. As a result, they may not pump blood to the body and lungs as well as they should. This can weaken the heart muscle over time and cause heart failure.  What causes atrial fibrillation?  AFib is more common in older adults. It has many possible causes including:  · Coronary artery disease  · Heart valve  disease  · Heart attack  · Heart surgery  · High blood pressure  · Thyroid disease  · Diabetes  · Lung disease  · Sleep apnea  · Heavy alcohol use  In some cases of AFib, doctors do not know the cause.  What are the symptoms of atrial fibrillation?  AFib may or may not cause symptoms. If symptoms do occur, they may include:  · A fast, pounding, irregular heartbeat  · Shortness of breath  · Tiredness  · Dizziness or fainting  · Chest pain  How is atrial fibrillation treated?  Treatments for AFib can include any of the options below.  · Medicines. You may be prescribed:  ¨ Heart rate medicines to help slow down the heartbeat  ¨ Heart rhythm medicines to help the heart beat more regularly  ¨ Anti-clotting medicines to help reduce the risk for blood clots and stroke  · Electrical cardioversion. Your healthcare provider uses special pads or paddles to send one or more brief electrical shocks to the heart. This can help reset the heartbeat to normal.  · Ablation. Long, thin tubes called catheters are threaded through a blood vessel to the heart. There, the catheters send out hot or cold energy to the areas causing the abnormal signals. This energy destroys the problem tissue or cells. This improves the chances that your heart will stay in normal rhythm without using medicines. If your heart rate and rhythm cant be controlled, you may need ablation and a pacemaker. These will help control the heart rate and regularity of the heartbeat.  · Surgery. During surgery, your healthcare provider may use different methods to create scar tissue in the areas of the heart causing the abnormal signals. The scar tissue disrupts the abnormal signals and may stop AFib from occurring.  What are the complications of atrial fibrillation?  These can include:  · Blood clots  · Stroke  · Heart failure. This problem occurs when the heart muscle weakens so much that it can no longer pump blood well.  When should I call my healthcare  provider?  Call your healthcare provider right away if you have any of these:  · Symptoms that dont get better with treatment, or get worse  · New symptoms   Date Last Reviewed: 5/1/2016 © 2000-2016 [employer]. [address] [place] [address], [place] [address]. All rights reserved. This information is not intended as a substitute for professional medical care. Always follow your healthcare professional's instructions.          What Is Acute Bronchitis?  Acute or short-term bronchitis last for days or weeks. It occurs when the bronchial tubes (airways in the lungs) are irritated by a virus, bacteria, or allergen. This causes a cough that produces yellow or greenish mucus.  Inside healthy lungs    Air travels in and out of the lungs through the airways. The linings of these airways produce sticky mucus. This mucus traps particles that enter the lungs. Tiny structures called cilia then sweep the particles out of the airways.     Healthy airway: Airways are normally open. Air moves in and out easily.      Healthy cilia: Tiny, hairlike cilia sweep mucus and particles up and out of the airways.   Lungs with bronchitis  Bronchitis often occurs with a cold or the flu virus. The airways become inflamed (red and swollen). There is a deep hacking cough from the extra mucus. Other symptoms may include:  · Wheezing  · Chest discomfort  · Shortness of breath  · Mild fever  A second infection, this time due to bacteria, may then occur. And airways irritated by allergens or smoke are more likely to get infected.        Inflamed airway: Inflammation and extra mucus narrow the airway, causing shortness of breath.      Impaired cilia: Extra mucus impairs cilia, causing congestion and wheezing. Smoking makes the problem worse.   Making a diagnosis  A physical exam, health history, and certain tests help your healthcare provider make the diagnosis.  Health history  Your healthcare provider will ask you about your  symptoms.  The exam  Your provider listens to your chest for signs of congestion. He or she may also check your ears, nose, and throat.  Possible tests  · A sputum test for bacteria. This requires a sample of mucus from the lungs.  · A nasal or throat swab for bacterial infection.  · A chest X-ray if your healthcare provider thinks you have pneumonia.  · Tests to check for an underlying condition, such as allergies, asthma, or COPD. You may need to see a specialist for more lung function testing.  Treating a cough  The main treatment for bronchitis is easing symptoms. Avoiding smoke, allergens, and other things that trigger coughing can often help. If the infection is bacterial, you may be given antibiotics. During the illness, it's important to get plenty of sleep. To ease symptoms:  · Dont smoke, and avoid secondhand smoke.  · Use a humidifier, or breathe in steam from a hot shower. This may help loosen mucus.  · Drink a lot of water and juice. They can soothe the throat and may help thin mucus.  · Sit up or use extra pillows when in bed to help lessen coughing and congestion.  · Ask your provider about using cough medicine, pain and fever medicine, or a decongestant.  Antibiotics  Most cases of bronchitis are caused by cold or flu viruses. Antibiotics dont treat viral illness. Taking antibiotics when they are not needed increases your risk of getting an infection later that is antibiotic-resistant. Your provider will prescribe antibiotics if the infection is caused by bacteria. If they are prescribed:  · Take the medicine until it is used up, even if symptoms have improved. If you dont, the bronchitis may come back.  · Take them as directed. For instance, some medicines should be taken with food.  · Ask your provider or pharmacist what side effects are common, and what to do about them.  Follow-up care  You should see your provider again in 2 to 3 weeks. By this time, symptoms should have improved. An  infection that lasts longer may mean you have a more serious problem.  Prevention  · Avoid tobacco smoke. If you smoke, quit. Stay away from smoky places. Ask friends and family not to smoke around you, or in your home or car.  · Get checked for allergies.  · Ask your provider about getting a yearly flu shot, and pneumococcal or pneumonia shots.  · Wash your hands often. This helps reduce the chance of picking up viruses that cause colds and flu.  Call your healthcare provider if:  · Symptoms worsen, or new symptoms develop.  · Breathing problems worsen or  become severe.  · Symptoms dont get better within a week, or within 3 days of taking antibiotics.   Date Last Reviewed: 6/18/2014  © 6071-9113 [employer]. [address] [place] [address], [place] [address]. All rights reserved. This information is not intended as a substitute for professional medical care. Always follow your healthcare professional's instructions.          Discharge Instructions for Atrial Fibrillation  You have been diagnosed with an abnormal heart rhythm called atrial fibrillation. With this condition, your hearts 2 upper chambers quiver rather than squeeze the blood out in a normal pattern. This leads to an irregular and sometimes rapid heartbeat. Some people will develop associated symptoms such as a flip-flopping heartbeat, chest pain, lightheadedness, or shortness of breath. Other people may have no symptoms at all. Atrial fibrillation is serious because it affects the hearts ability to fill with blood as it should. Blood clots may form. This increases the risk for stroke. Untreated atrial fibrillation can also lead to heart failure. Atrial fibrillation can be controlled. With treatment, most people with atrial fibrillation lead normal lives.  Treatment options  Recommended treatment for atrial fibrillation depends on your age, symptoms, how long you have had atrial fibrillation, and other factors. You will have a complete  evaluation to find out if you have any abnormalities that caused your heart to go into atrial fibrillation. This might be blocked heart arteries or a thyroid problem. Your doctor will assess your particular case and discuss choices with you.  Treatment choices may include:  · Treating an underlying disorder that puts you at risk for atrial fibrillation. For example, correcting an abnormal thyroid or electrolyte problem, or treating a blocked heart artery.  · Restoring a normal heart rhythm with an electrical shock (cardioversion) or with an antiarrhythmic medicine (chemical cardioversion)  · Using medicine to control your heart rate in atrial fibrillation.  · Preventing the risk for blood clot and stroke using blood-thinning medicines. Your doctor will tell you what he or she recommends. Choices may include aspirin, clopidogrel, warfarin, dabigatran, rivaroxaban, apixaban, and edoxaban.  · Doing catheter ablation or a surgical maze procedure. These use different methods to destroy certain areas of heart tissue. This interrupts the electrical signals causing atrial fibrillation. One of these procedures may be a choice when medicines do not work, or as an alternative to long-term medicine.  · Other treatment choices may be recommended for you by your doctor.  Managing risk factors for stroke and preventing heart failure are important parts of any treatment plan for atrial fibrillation.  Home care  · Take your medicines exactly as directed. Dont skip doses.  · Work with your doctor to find the right medicines and doses for you.  · Learn to take your own pulse. Keep a record of your results. Ask your doctor which pulse rates mean that you need medical attention. Slowing your pulse is often the goal of treatment. Ask your doctor if its OK for you to use an automatic machine to check your pulse at home. Sometimes these machines dont count the pulse correctly when you have atrial fibrillation.  · Limit your intake of  coffee, tea, cola, and other beverages with caffeine. Talk with your doctor about whether you should eliminate caffeine.  · Avoid over-the-counter medicines that have caffeine in them.  · Let your doctor know what medicines you take, including prescription and over-the-counter medicines, as well as any supplements. They interfere with some medicines given for atrial fibrillation.  · Ask your doctor about whether you can drink alcohol. Some people need to avoid alcohol to better treat atrial fibrillation. If you are taking blood-thinner medicines, alcohol may interfere with them by increasing their effect.  · Never take stimulants such as amphetamines or cocaine. These drugs can speed up your heart rate and trigger atrial fibrillation.  Follow-up care  Follow up with your doctor, or as advised.     When should I call my healthcare provider  Call your healthcare provider right away if you have any of the following:  · Weakness  · Dizziness  · Fainting  · Fatigue  · Shortness of breath  · Chest pain with increased activity  · A change in the usual regularity of your heartbeat, or an unusually fast heartbeat   Date Last Reviewed: 4/23/2016  © 1960-0890 [employer]. [address] [place] [address]. All rights reserved. This information is not intended as a substitute for professional medical care. Always follow your healthcare professional's instructions.          Bronchitis, Viral (Adult)    You have a viral bronchitis. Bronchitis is inflammation and swelling of the lining of the lungs. This is often caused by an infection. Symptoms include a dry, hacking cough that is worse at night. The cough may bring up yellow-green mucus. You may also feel short of breath or wheeze. Other symptoms may include tiredness, chest discomfort, and chills.  Bronchitis that is caused by a virus is not treated with antibiotics. Instead, medicines may be given to help relieve symptoms. Symptoms can last up to 2 weeks,  although the cough may last much longer.  This illness is contagious during the first few days and is spread through the air by coughing and sneezing, or by direct contact (touching the sick person and then touching your own eyes, nose, or mouth).  Most viral illnesses resolve within 10 to 14 days with rest and simple home remedies, although they may sometimes last for several weeks.  Home care  · If symptoms are severe, rest at home for the first 2 to 3 days. When you go back to your usual activities, don't let yourself get too tired.  · Do not smoke. Also avoid being exposed to secondhand smoke.  · You may use over-the-counter medicine to control fever or pain, unless another pain medicine was prescribed. (Note: If you have chronic liver or kidney disease or have ever had a stomach ulcer or gastrointestinal bleeding, talk with your healthcare provider before using these medicines. Also talk to your provider if you are taking medicine to prevent blood clots.) Aspirin should never be given to anyone younger than 18 years of age who is ill with a viral infection or fever. It may cause severe liver or brain damage.  · Your appetite may be poor, so a light diet is fine. Avoid dehydration by drinking 6 to 8 glasses of fluids per day (such as water, soft drinks, sports drinks, juices, tea, or soup). Extra fluids will help loosen secretions in the nose and lungs.  · Over-the-counter cough, cold, and sore-throat medicines will not shorten the length of the illness, but they may help to reduce symptoms. (Note: Do not use decongestants if you have high blood pressure.)  Follow-up care  Follow up with your healthcare provider, or as advised. If you had an X-ray or ECG (electrocardiogram), a specialist will review it. You will be notified of any new findings that may affect your care.  Note: If you are age 65 or older, or if you have a chronic lung disease or condition that affects your immune system, or you smoke, talk to  your healthcare provider about having pneumococcal vaccinations and a yearly influenza vaccination (flu shot).  When to seek medical advice  Call your healthcare provider right away if any of these occur:  · Fever of 100.4°F (38°C) or higher  · Coughing up increased amounts of colored sputum  · Weakness, drowsiness, headache, facial pain, ear pain, or a stiff neck  Call 911, or get immediate medical care  Contact emergency services right away if any of these occur:  · Coughing up blood  · Worsening weakness, drowsiness, headache, or stiff neck  · Trouble breathing, wheezing, or pain with breathing  Date Last Reviewed: 9/13/2015 © 2000-2016 [employer]. [address] [place] [address], [place] [address]. All rights reserved. This information is not intended as a substitute for professional medical care. Always follow your healthcare professional's instructions.          Uncertain Causes of Chest Pain    Chest pain can happen for a number of reasons. Sometimes the cause can't be determined. If your condition does not seem serious, and your pain does not appear to be coming from your heart, your healthcare provider may recommend watching it closely. Sometimes the signs of a serious problem take more time to appear. Many problems not related to your heart can cause chest pain.These include:  · Musculoskeletal. Costochondritis, an inflammation of the tissues around the ribs that can occur from trauma or overuse injuries  · Respiratory. Pneumonia, pneumothorax, or pneumonitis (inflammation of the lining of the chest and lungs)  · Gastrointestinal. Esophageal reflux, heartburn, or gallbladder disease  · Anxiety and panic disorders  · Nerve compression and neuritis  · Miscellaneous problems such as aortic aneurysm or pulmonary embolism (a blood clot in the lungs)  Home care  After your visit, follow these recommendations:  · Rest today and avoid strenuous activity.  · Take any prescribed medicine as directed.  · Be  aware of any recurrent chest pain and notice any changes  Follow-up care  Follow up with your healthcare provider if you do not start to feel better within 24 hours, or as advised.  Call 911  Call 911 if any of these occur:  · A change in the type of pain: if it feels different, becomes more severe, lasts longer, or begins to spread into your shoulder, arm, neck, jaw or back  · Shortness of breath or increased pain with breathing  · Weakness, dizziness, or fainting  · Rapid heart beat  · Crushing sensation in your chest  When to seek medical advice  Call your healthcare provider right away if any of the following occur:  · Cough with dark colored sputum (phlegm) or blood  · Fever of 100.4ºF (38ºC) or higher, or as directed by your healthcare provider  · Swelling, pain or redness in one leg  · Shortness of breath  Date Last Reviewed: 12/30/2015  © 1010-6765 [employer]. [address] [place] [address]. All rights reserved. This information is not intended as a substitute for professional medical care. Always follow your healthcare professional's instructions.          Your Scheduled Appointments     Apr 25, 2017  9:30 AM CDT   Mammo Screening with [facility] [id number]   [facility]    [address] [facility] [address] [phone]            Apr 25, 2017 10:00 AM CDT   Bone Density with [facility] BMD1   [facility]    [address] [facility] [address]   [phone]            May 02, 2017  9:30 AM CDT   Consult with Leisa Andrade MD   [facility] ([facility]    [address] [facility] [id number] [phone]            May 23, 2017  7:50 AM CDT   Fasting Lab with [facility] ([facility]    [address] [facility] [address]   [phone]            May 30, 2017 10:00 AM CDT   Established Patient Visit with Johanna Dennison MD   [facility] -  Internal Medicine ([facility])    [address] [place] [address]   [phone]               [facility] complies with applicable Federal civil rights laws and does not discriminate on the basis of race, color, national origin, age, disability, or sex.        Language Assistance Services     ATTENTION: Language assistance services are available, free of charge. Please call [phone].      ATENCIÓN: Si habla español, tiene a rodrigues disposición servicios gratuitos de asistencia lingüística. Llame al [phone].     CHÚ Ý: N?u b?n nói Ti?ng Vi?t, có các d?ch v? h? tr? ngôn ng? mi?n phí dành cho b?n. G?i s? [phone].

## 2017-03-31 VITALS
SYSTOLIC BLOOD PRESSURE: 142 MMHG | OXYGEN SATURATION: 98 % | RESPIRATION RATE: 20 BRPM | HEIGHT: 64 IN | HEART RATE: 92 BPM | TEMPERATURE: 98 F | WEIGHT: 185 LBS | BODY MASS INDEX: 31.58 KG/M2 | DIASTOLIC BLOOD PRESSURE: 89 MMHG

## 2017-03-31 PROCEDURE — 25000003 PHARM REV CODE 250: Performed by: EMERGENCY MEDICINE

## 2017-03-31 RX ORDER — DILTIAZEM HYDROCHLORIDE 5 MG/ML
10 INJECTION INTRAVENOUS
Status: COMPLETED | OUTPATIENT
Start: 2017-03-31 | End: 2017-03-31

## 2017-03-31 RX ORDER — DIGOXIN 125 MCG
125 TABLET ORAL DAILY
Qty: 30 TABLET | Refills: 1 | Status: SHIPPED | OUTPATIENT
Start: 2017-03-31 | End: 2017-04-04

## 2017-03-31 RX ADMIN — DILTIAZEM HYDROCHLORIDE 10 MG: 5 INJECTION INTRAVENOUS at 12:03

## 2017-03-31 NOTE — ED PROVIDER NOTES
SCRIBE #1 NOTE: I, Angela Castano, am scribing for, and in the presence of, Kari Florentino MD. I have scribed the HPI, ROS, and PE.     SCRIBE #2 NOTE: I, Zohaib Salinas, am scribing for, and in the presence of,  Pavel Erwin MD. I have scribed the remaining portions of the note not scribed by Scribe #1.     History      Chief Complaint   Patient presents with    Abdominal Pain     pt is complainin of a stomach ache       Review of patient's allergies indicates:   Allergen Reactions    Atorvastatin      Other reaction(s): Muscle pain    Codeine      Other reaction(s): Unknown    Digoxin      Other reaction(s): Unknown    Diovan [valsartan] Other (See Comments)     Upset stomach, weakness    Eggs [egg derived]     Morphine      Other reaction(s): Unknown    Naproxen      Other reaction(s): Nausea    Peanut     Propofol      Other reaction(s): delirious    Sulfa (sulfonamide antibiotics)         HPI   HPI    3/30/2017, 6:54 PM   History obtained from the patient      History of Present Illness: Carlie Valdez is a 71 y.o. female patient, with a PMHx of CHF, AFib, DM, and HTN, who presents to the Emergency Department for abdominal pain which onset gradually yesterday. Pain is located to the diffuse abdomen. Sx have been constant and moderate in severity. No modifying factors noted. Pt notes that she has been having diarrhea intermittently for 3 months. Associated sx include dark stools. Pt is also complaining of a productive cough with clear sputum and SOB. Pt denies any fever, chills, diaphoresis, CP, calf pain, nausea, vomiting, abdominal distension, constipation, dysuria, hematuria, dizziness, or weakness/numbness. No further complaints at this time.       Arrival mode: Personal vehicle      PCP: Johanna Guzman MD       Past Medical History:  Past Medical History:   Diagnosis Date    Asthma     Atrial fibrillation     Blood clot of vein in shoulder area     Cardiac defibrillator  "in place     Chronic knee pain     Diabetes mellitus type 2 without retinopathy 2016    Diaphragmatic hernia without obstruction and without gangrene 2015    GERD (gastroesophageal reflux disease)     Hypertension        Past Surgical History:  Past Surgical History:   Procedure Laterality Date     SECTION      COLONOSCOPY      KNEE ARTHROSCOPY      UPPER GASTROINTESTINAL ENDOSCOPY           Family History:  Family History   Problem Relation Age of Onset    Hypertension Mother     Hypertension Father     Colon cancer Neg Hx     Stomach cancer Neg Hx        Social History:  Social History     Social History Main Topics    Smoking status: Never Smoker    Smokeless tobacco: Never Used    Alcohol use No    Drug use: No    Sexual activity: Yes     Partners: Male       ROS   Review of Systems   Constitutional: Negative for chills, diaphoresis and fever.   HENT: Negative for sore throat.    Respiratory: Positive for cough and shortness of breath.    Cardiovascular: Negative for chest pain.   Gastrointestinal: Positive for abdominal pain and diarrhea. Negative for constipation, nausea and vomiting.        (+) "dark stools"   Genitourinary: Negative for dysuria.   Musculoskeletal: Negative for back pain.        (-) calf pain   Skin: Negative for rash.   Neurological: Negative for dizziness, weakness, light-headedness, numbness and headaches.   Hematological: Does not bruise/bleed easily.     Physical Exam    Initial Vitals   BP Pulse Resp Temp SpO2   17 1819 17 1819 17 1819 17 1819 17 1819   159/78 132 16 97.5 °F (36.4 °C) 96 %      Physical Exam  Nursing Notes and Vital Signs Reviewed.  Constitutional: Patient is in no acute distress. Awake and alert. Well-developed and well-nourished.  Head: Atraumatic. Normocephalic.  Eyes: PERRL. EOM intact. Conjunctivae are not pale. No scleral icterus.  ENT: Mucous membranes are moist. Oropharynx is clear and symmetric. " "   Neck: Supple. Full ROM. No lymphadenopathy.  Cardiovascular: Tachycardic. Irregularly irregular rhythm. No murmurs, rubs, or gallops. Distal pulses are 2+ and symmetric.  Pulmonary/Chest: Tachypneic. Diffuse wheezing. No rales or rhonchi.    Abdominal: Soft and non-distended. Mid right sided abdominal tenderness. No rebound, guarding, or rigidity.   Musculoskeletal: Moves all extremities. No obvious deformities. 1+ BLE edema. No calf tenderness.  Skin: Warm and dry.  Neurological:  Alert, awake, and appropriate.  Normal speech.  No acute focal neurological deficits are appreciated.  Psychiatric: Normal affect. Good eye contact. Appropriate in content.    ED Course    Procedures  ED Vital Signs:  Vitals:    03/30/17 0000 03/30/17 1819 03/30/17 1909 03/30/17 1931   BP:  (!) 159/78     Pulse: (!) 117 (!) 132 100 75   Resp: 20 16 20 20   Temp:  97.5 °F (36.4 °C)     TempSrc:  Oral     SpO2:  96% (!) 94% 99%   Weight:  83.9 kg (185 lb)     Height:  5' 4" (1.626 m)      03/30/17 2003 03/30/17 2300 03/31/17 0034 03/31/17 0046   BP: (!) 137/93  (!) 159/115    Pulse: 71  92 (!) 122   Resp: (!) 22  (!) 29 (!) 29   Temp:       TempSrc:       SpO2: 98% 95% 99% 95%   Weight:       Height:        03/31/17 0102 03/31/17 0156   BP:  (!) 142/89   Pulse: 90 92   Resp: (!) 23 20   Temp:  98.4 °F (36.9 °C)   TempSrc:  Oral   SpO2: 96% 98%   Weight:     Height:         Abnormal Lab Results:  Labs Reviewed   CBC W/ AUTO DIFFERENTIAL - Abnormal; Notable for the following:        Result Value    Hemoglobin 11.1 (*)     Hematocrit 35.7 (*)     MCHC 31.1 (*)     RDW 18.3 (*)     Gran% 74.4 (*)     Lymph% 13.6 (*)     All other components within normal limits   COMPREHENSIVE METABOLIC PANEL - Abnormal; Notable for the following:     Sodium 146 (*)     Glucose 127 (*)     BUN, Bld 41 (*)     eGFR if  58 (*)     eGFR if non  51 (*)     All other components within normal limits   URINALYSIS - Abnormal; Notable " for the following:     Protein, UA 2+ (*)     Occult Blood UA Trace (*)     All other components within normal limits   TROPONIN I - Abnormal; Notable for the following:     Troponin I 0.033 (*)     All other components within normal limits   PROTIME-INR - Abnormal; Notable for the following:     Prothrombin Time 14.4 (*)     INR 1.4 (*)     All other components within normal limits   TROPONIN I - Abnormal; Notable for the following:     Troponin I 0.028 (*)     All other components within normal limits   LIPASE   APTT   MAGNESIUM   LACTIC ACID, PLASMA   URINALYSIS MICROSCOPIC        All Lab Results:  Results for orders placed or performed during the hospital encounter of 03/30/17   CBC W/ AUTO DIFFERENTIAL   Result Value Ref Range    WBC 7.72 3.90 - 12.70 K/uL    RBC 4.04 4.00 - 5.40 M/uL    Hemoglobin 11.1 (L) 12.0 - 16.0 g/dL    Hematocrit 35.7 (L) 37.0 - 48.5 %    MCV 88 82 - 98 fL    MCH 27.5 27.0 - 31.0 pg    MCHC 31.1 (L) 32.0 - 36.0 %    RDW 18.3 (H) 11.5 - 14.5 %    Platelets 158 150 - 350 K/uL    MPV 10.5 9.2 - 12.9 fL    Gran # 5.7 1.8 - 7.7 K/uL    Lymph # 1.1 1.0 - 4.8 K/uL    Mono # 0.7 0.3 - 1.0 K/uL    Eos # 0.1 0.0 - 0.5 K/uL    Baso # 0.05 0.00 - 0.20 K/uL    Gran% 74.4 (H) 38.0 - 73.0 %    Lymph% 13.6 (L) 18.0 - 48.0 %    Mono% 9.6 4.0 - 15.0 %    Eosinophil% 1.8 0.0 - 8.0 %    Basophil% 0.6 0.0 - 1.9 %    Differential Method Automated    Comp. Metabolic Panel   Result Value Ref Range    Sodium 146 (H) 136 - 145 mmol/L    Potassium 3.9 3.5 - 5.1 mmol/L    Chloride 110 95 - 110 mmol/L    CO2 24 23 - 29 mmol/L    Glucose 127 (H) 70 - 110 mg/dL    BUN, Bld 41 (H) 8 - 23 mg/dL    Creatinine 1.1 0.5 - 1.4 mg/dL    Calcium 9.3 8.7 - 10.5 mg/dL    Total Protein 6.9 6.0 - 8.4 g/dL    Albumin 3.7 3.5 - 5.2 g/dL    Total Bilirubin 0.5 0.1 - 1.0 mg/dL    Alkaline Phosphatase 82 55 - 135 U/L    AST 37 10 - 40 U/L    ALT 41 10 - 44 U/L    Anion Gap 12 8 - 16 mmol/L    eGFR if African American 58 (A) >60  mL/min/1.73 m^2    eGFR if non African American 51 (A) >60 mL/min/1.73 m^2   Lipase   Result Value Ref Range    Lipase 8 4 - 60 U/L   Urinalysis - Clean Catch   Result Value Ref Range    Specimen UA Urine, Clean Catch     Color, UA Yellow Yellow, Straw, Samra    Appearance, UA Clear Clear    pH, UA 6.0 5.0 - 8.0    Specific Gravity, UA 1.010 1.005 - 1.030    Protein, UA 2+ (A) Negative    Glucose, UA Negative Negative    Ketones, UA Negative Negative    Bilirubin (UA) Negative Negative    Occult Blood UA Trace (A) Negative    Nitrite, UA Negative Negative    Urobilinogen, UA Negative <2.0 EU/dL    Leukocytes, UA Negative Negative   Troponin I   Result Value Ref Range    Troponin I 0.033 (H) 0.000 - 0.026 ng/mL   Protime-INR   Result Value Ref Range    Prothrombin Time 14.4 (H) 9.0 - 12.5 sec    INR 1.4 (H) 0.8 - 1.2   PTT   Result Value Ref Range    aPTT 28.8 21.0 - 32.0 sec   Magnesium   Result Value Ref Range    Magnesium 2.0 1.6 - 2.6 mg/dL   Lactic acid, plasma   Result Value Ref Range    Lactate (Lactic Acid) 1.7 0.5 - 2.2 mmol/L   Urinalysis Microscopic   Result Value Ref Range    RBC, UA 2 0 - 4 /hpf    WBC, UA 2 0 - 5 /hpf    Bacteria, UA Occasional None-Occ /hpf    Hyaline Casts, UA 0 0-1/lpf /lpf    Microscopic Comment SEE COMMENT    Troponin I   Result Value Ref Range    Troponin I 0.028 (H) 0.000 - 0.026 ng/mL         Imaging Results:  Imaging Results         CTA Abdomen and Pelvis (Final result) Result time:  03/30/17 21:10:01    Final result by Atif Solis III, MD (03/30/17 21:10:01)    Impression:        1. Atheromatous changes along the aorta. There is what appears to be calcified plaque along the origin of the celiac artery and probably inferior mesenteric artery as well. Proximal celiac is not well delineated and may be narrowed.    2. Moderately severe degenerative change lumbosacral spine at L5-S1.    3. Moderate volume of stool in the colon.    4. Small right pleural effusion with  minimal fluid at the left base. Suspicion of mild congestion. Generalized cardiomegaly with probable element of right heart failure.    5. Suspect diverticulosis without gross evidence of diverticulitis. Detail grossly limited without oral contrast. No evidence of abdominal aortic aneurysm. No other significant findings          Electronically signed by: ATIF RUSSELL MD  Date:     03/30/17  Time:    21:10     Narrative:    CT angiogram of the abdomen and pelvis.    Clinical indication: Abdominal pain.    Standard and MIPS/3-D images submitted.    The heart size is enlarged. There is a small right pleural effusion with minimal fluid at the left base. There is mild adjacent infiltrate and/or atelectatic change. Cannot exclude mild congestion. There is no bowel obstruction. No free intraperitoneal air. Bony windows show degenerative change, greatest on the elbow cervical spine. No acute bony abnormality suggested.    The liver and spleen are not enlarged. Cannot exclude an element of right heart failure. There is no adrenal or pancreatic mass or gross enlargement. Detail is grossly limited however without oral contrast. There is no solid renal mass or obstruction. L stool in the colon. Suspect diverticula in the distal colon without gross evidence of diverticulitis. Bladder is unremarkable. No abnormality in the right lower quadrant that would suggest acute appendicitis although the appendix cannot be seen with certainty.    The abdominal aorta shows no evidence of aneurysm formation or dissection. There is calcification along the origin of the celiac artery with poor definition of the origin suggesting a plaque and perhaps mild narrowing. SMA is grossly patent. GLADIS is also adjacent to calcified plaque and is suboptimally seen. There is no evidence of iliac artery aneurysm.            X-Ray Abdomen Flat And Erect (Final result) Result time:  03/30/17 19:44:12    Final result by Atif Russell III, MD  (03/30/17 19:44:12)    Impression:     As above. Nonspecific gas pattern with no acute abnormality suggested.      Electronically signed by: ATIF RUSSELL MD  Date:     03/30/17  Time:    19:44     Narrative:    Abdomen, 2 views.    Clinical indication: Abdominal pain.    Nonspecific abdominal gas pattern without mass, obstruction, or free air. No acute abnormality is suggested. Pacing device/defibrillator noted incidentally.            X-ray chest AP portable (Final result) Result time:  03/30/17 19:45:36    Final result by Atif Russell III, MD (03/30/17 19:45:36)    Impression:     Grade megaly. Slight vascular prominence. No overt failure.      Electronically signed by: ATIF RUSSELL MD  Date:     03/30/17  Time:    19:45     Narrative:    Chest x-ray, single AP view.    Clinical indication: Abdominal pain. Chest pain.    Compared to November.    Permanent pacing device/defibrillator again noted in position. Cardiomegaly. There is mild coarsening of the interstitial markings which may be chronic in nature. There is slight vascular prominence but no overt congestion. No consolidation or effusion.             The EKG was ordered, reviewed, and independently interpreted by the ED provider.  Interpretation time: 18:34  Rate: 133 BPM  Rhythm: atrial fibrillation with RVR with occasionals atrial-paced complexes  Interpretation: LAD. LBBB. No STEMI.  When compared to EKG performed 11/12/16, there are no significant changes.           The Emergency Provider reviewed the vital signs and test results, which are outlined above.    ED Discussion     7:55 PM: Dr. Mcdowell transfers care of pt to Dr. Erwin, pending lab results.    ED Medication(s):  Medications   dicyclomine injection 20 mg (20 mg Intramuscular Given 3/30/17 1925)   ondansetron injection 4 mg (4 mg Intravenous Given 3/30/17 1919)   diltiaZEM injection 20 mg (20 mg Intravenous Given 3/30/17 1919)   methylPREDNISolone sod suc(PF) 125 mg/2  mL injection 125 mg (125 mg Intravenous Given 3/30/17 1919)   levalbuterol nebulizer solution 0.63 mg (0.63 mg Nebulization Given 3/30/17 1909)   omnipaque 350 iohexol 100 mL (100 mLs Intravenous Given 3/30/17 2053)   levalbuterol nebulizer solution 0.63 mg (0.63 mg Nebulization Given 3/30/17 2259)   diltiaZEM injection 10 mg (10 mg Intravenous Given 3/30/17 2336)   diltiaZEM injection 10 mg (10 mg Intravenous Given 3/31/17 0048)     10:36 PM: Re-evaluated pt. Pt is resting comfortably and is in no acute distress.  Pt states she is still suffering from some wheezing.  D/w pt all pertinent results. D/w pt any concerns expressed at this time. Answered all questions. Pt expresses understanding at this time.    1:10 AM: Dr. Erwin discussed the pt's case with Dr. David (Cardiology) who recommends putting pt on Lasix 40 mg daily and digoxin 125 mg daily. Have pt f/u with cardiology as an out pt in x2 weeks.     1:50 AM: Reassessed pt at this time. Pt is awake, alert, and in NAD. Pt states her condition has improved at this time. Discussed with pt all pertinent ED information and results. Discussed pt dx of chest pain and plan of tx. Gave pt all f/u and return to the ED instructions. All questions and concerns were addressed at this time. Pt expresses understanding of information and instructions, and is comfortable with plan to discharge. Pt is stable for discharge.    I discussed with patient and/or family/caretaker that evaluation in the ED does not suggest any emergent or life threatening medical conditions requiring immediate intervention beyond what was provided in the ED, and I believe patient is safe for discharge.  Regardless, an unremarkable evaluation in the ED does not preclude the development or presence of a serious of life threatening condition. As such, patient was instructed to return immediately for any worsening or change in current symptoms.    Pre-hypertension/Hypertension: The pt has been informed  that they may have pre-hypertension or hypertension based on a blood pressure reading in the ED. I recommend that the pt call the PCP listed on their discharge instructions or a physician of their choice this week to arrange f/u for further evaluation of possible pre-hypertension or hypertension.         Discharge Medication List as of 3/31/2017  1:49 AM      START taking these medications    Details   digoxin (LANOXIN) 125 mcg tablet Take 1 tablet (125 mcg total) by mouth once daily., Starting 3/31/2017, Until Sat 3/31/18, Normal             Follow-up Information     Follow up with Johanna Guzman MD In 3 days.    Specialty:  Internal Medicine    Contact information:    [address] [facility] [address] [phone]          Follow up with Israel David MD In 2 weeks.    Specialties:  Cardiology, Cardiovascular Disease    Contact information:    [address] [place] [address] [phone]          Follow up with [facility]    Specialty:  Emergency Medicine    Why:  If symptoms worsen, Or worsening condition or any other major concern    Contact information:    [address] [place] [address] [phone]            Medical Decision Making    Medical Decision Making:   Clinical Tests:   Lab Tests: Reviewed and Ordered  Radiological Study: Ordered and Reviewed  Medical Tests: Reviewed and Ordered           Scribe Attestation:   Scribe #1: I performed the above scribed service and the documentation accurately describes the services I performed. I attest to the accuracy of the note.    Attending:   Physician Attestation Statement for Scribe #1: I, Kari Florentino MD, personally performed the services described in this documentation, as scribed by Angela Castano, in my presence, and it is both accurate and complete.       Scribe Attestation:   Scribe #2: I performed the above scribed service and the documentation accurately describes the services I  performed. I attest to the accuracy of the note.    Attending Attestation:           Physician Attestation for Scribe:    Physician Attestation Statement for Scribe #2: I, Pavel Erwin MD, reviewed documentation, as scribed by Zohaib Salinas in my presence, and it is both accurate and complete. I also acknowledge and confirm the content of the note done by Marcusibe #1.          Clinical Impression       ICD-10-CM ICD-9-CM   1. Atrial fibrillation with RVR I48.91 427.31   2. Chest pain R07.9 786.50   3. Bronchitis J40 490       Disposition:   Disposition: Discharged  Condition: Stable         Pavel Erwin MD  03/31/17 6318

## 2017-03-31 NOTE — ED NOTES
Pt in NAD,VSS, RR equal and non labored. Pt remains in Afib. HR in the 90s after 10 mg of Cardizem. Pt wheezing bilaterally. No improvement after breathing treatment.  notified. Will continue to monitor pt.

## 2017-03-31 NOTE — DISCHARGE INSTRUCTIONS
Understanding Atrial Fibrillation    An arrhythmia is any problem with the speed or pattern of the heartbeat. Atrial fibrillation (AFib) is a common type of arrhythmia. It causes fast, chaotic electrical signals in the atria. This leads to poor functioning of the heart. It also affects how much blood your heart can pump out to the body.  Afib may occur once in a while and go away on its own. Or it may continue for longer periods and need treatment.  AFib can lead to serious problems, such as stroke. Your healthcare provider will need to monitor and manage it.  What happens during atrial fibrillation?   The heart has an electrical system that sends signals to control the heartbeat. As signals move through the heart, they tell the hearts upper chambers (atria) and lower chambers (ventricles) when to squeeze (contract) and relax. This lets blood move through the heart and out to the body and lungs.  With AFib, the atria receive abnormal signals. This causes them to contract in a fast and irregular way, and out of sync with the ventricles. When this happens, the atria also have a harder time moving blood into the ventricles. Blood may then pool in the atria, which increases the risk for blood clots and stroke. The ventricles also may contract too quickly and irregularly. As a result, they may not pump blood to the body and lungs as well as they should. This can weaken the heart muscle over time and cause heart failure.  What causes atrial fibrillation?  AFib is more common in older adults. It has many possible causes including:  · Coronary artery disease  · Heart valve disease  · Heart attack  · Heart surgery  · High blood pressure  · Thyroid disease  · Diabetes  · Lung disease  · Sleep apnea  · Heavy alcohol use  In some cases of AFib, doctors do not know the cause.  What are the symptoms of atrial fibrillation?  AFib may or may not cause symptoms. If symptoms do occur, they may include:  · A fast, pounding,  irregular heartbeat  · Shortness of breath  · Tiredness  · Dizziness or fainting  · Chest pain  How is atrial fibrillation treated?  Treatments for AFib can include any of the options below.  · Medicines. You may be prescribed:  ¨ Heart rate medicines to help slow down the heartbeat  ¨ Heart rhythm medicines to help the heart beat more regularly  ¨ Anti-clotting medicines to help reduce the risk for blood clots and stroke  · Electrical cardioversion. Your healthcare provider uses special pads or paddles to send one or more brief electrical shocks to the heart. This can help reset the heartbeat to normal.  · Ablation. Long, thin tubes called catheters are threaded through a blood vessel to the heart. There, the catheters send out hot or cold energy to the areas causing the abnormal signals. This energy destroys the problem tissue or cells. This improves the chances that your heart will stay in normal rhythm without using medicines. If your heart rate and rhythm cant be controlled, you may need ablation and a pacemaker. These will help control the heart rate and regularity of the heartbeat.  · Surgery. During surgery, your healthcare provider may use different methods to create scar tissue in the areas of the heart causing the abnormal signals. The scar tissue disrupts the abnormal signals and may stop AFib from occurring.  What are the complications of atrial fibrillation?  These can include:  · Blood clots  · Stroke  · Heart failure. This problem occurs when the heart muscle weakens so much that it can no longer pump blood well.  When should I call my healthcare provider?  Call your healthcare provider right away if you have any of these:  · Symptoms that dont get better with treatment, or get worse  · New symptoms   Date Last Reviewed: 5/1/2016  © 4044-1725 [employer]. [address] [place] [address], [place] [address]. All rights reserved. This information is not intended as a substitute for professional  medical care. Always follow your healthcare professional's instructions.          What Is Acute Bronchitis?  Acute or short-term bronchitis last for days or weeks. It occurs when the bronchial tubes (airways in the lungs) are irritated by a virus, bacteria, or allergen. This causes a cough that produces yellow or greenish mucus.  Inside healthy lungs    Air travels in and out of the lungs through the airways. The linings of these airways produce sticky mucus. This mucus traps particles that enter the lungs. Tiny structures called cilia then sweep the particles out of the airways.     Healthy airway: Airways are normally open. Air moves in and out easily.      Healthy cilia: Tiny, hairlike cilia sweep mucus and particles up and out of the airways.   Lungs with bronchitis  Bronchitis often occurs with a cold or the flu virus. The airways become inflamed (red and swollen). There is a deep hacking cough from the extra mucus. Other symptoms may include:  · Wheezing  · Chest discomfort  · Shortness of breath  · Mild fever  A second infection, this time due to bacteria, may then occur. And airways irritated by allergens or smoke are more likely to get infected.        Inflamed airway: Inflammation and extra mucus narrow the airway, causing shortness of breath.      Impaired cilia: Extra mucus impairs cilia, causing congestion and wheezing. Smoking makes the problem worse.   Making a diagnosis  A physical exam, health history, and certain tests help your healthcare provider make the diagnosis.  Health history  Your healthcare provider will ask you about your symptoms.  The exam  Your provider listens to your chest for signs of congestion. He or she may also check your ears, nose, and throat.  Possible tests  · A sputum test for bacteria. This requires a sample of mucus from the lungs.  · A nasal or throat swab for bacterial infection.  · A chest X-ray if your healthcare provider thinks you have pneumonia.  · Tests to  check for an underlying condition, such as allergies, asthma, or COPD. You may need to see a specialist for more lung function testing.  Treating a cough  The main treatment for bronchitis is easing symptoms. Avoiding smoke, allergens, and other things that trigger coughing can often help. If the infection is bacterial, you may be given antibiotics. During the illness, it's important to get plenty of sleep. To ease symptoms:  · Dont smoke, and avoid secondhand smoke.  · Use a humidifier, or breathe in steam from a hot shower. This may help loosen mucus.  · Drink a lot of water and juice. They can soothe the throat and may help thin mucus.  · Sit up or use extra pillows when in bed to help lessen coughing and congestion.  · Ask your provider about using cough medicine, pain and fever medicine, or a decongestant.  Antibiotics  Most cases of bronchitis are caused by cold or flu viruses. Antibiotics dont treat viral illness. Taking antibiotics when they are not needed increases your risk of getting an infection later that is antibiotic-resistant. Your provider will prescribe antibiotics if the infection is caused by bacteria. If they are prescribed:  · Take the medicine until it is used up, even if symptoms have improved. If you dont, the bronchitis may come back.  · Take them as directed. For instance, some medicines should be taken with food.  · Ask your provider or pharmacist what side effects are common, and what to do about them.  Follow-up care  You should see your provider again in 2 to 3 weeks. By this time, symptoms should have improved. An infection that lasts longer may mean you have a more serious problem.  Prevention  · Avoid tobacco smoke. If you smoke, quit. Stay away from smoky places. Ask friends and family not to smoke around you, or in your home or car.  · Get checked for allergies.  · Ask your provider about getting a yearly flu shot, and pneumococcal or pneumonia shots.  · Wash your hands often.  This helps reduce the chance of picking up viruses that cause colds and flu.  Call your healthcare provider if:  · Symptoms worsen, or new symptoms develop.  · Breathing problems worsen or  become severe.  · Symptoms dont get better within a week, or within 3 days of taking antibiotics.   Date Last Reviewed: 6/18/2014  © 6914-8948 [employer]. [address] [place] [address], [place] [address]. All rights reserved. This information is not intended as a substitute for professional medical care. Always follow your healthcare professional's instructions.          Discharge Instructions for Atrial Fibrillation  You have been diagnosed with an abnormal heart rhythm called atrial fibrillation. With this condition, your hearts 2 upper chambers quiver rather than squeeze the blood out in a normal pattern. This leads to an irregular and sometimes rapid heartbeat. Some people will develop associated symptoms such as a flip-flopping heartbeat, chest pain, lightheadedness, or shortness of breath. Other people may have no symptoms at all. Atrial fibrillation is serious because it affects the hearts ability to fill with blood as it should. Blood clots may form. This increases the risk for stroke. Untreated atrial fibrillation can also lead to heart failure. Atrial fibrillation can be controlled. With treatment, most people with atrial fibrillation lead normal lives.  Treatment options  Recommended treatment for atrial fibrillation depends on your age, symptoms, how long you have had atrial fibrillation, and other factors. You will have a complete evaluation to find out if you have any abnormalities that caused your heart to go into atrial fibrillation. This might be blocked heart arteries or a thyroid problem. Your doctor will assess your particular case and discuss choices with you.  Treatment choices may include:  · Treating an underlying disorder that puts you at risk for atrial fibrillation. For example, correcting an  abnormal thyroid or electrolyte problem, or treating a blocked heart artery.  · Restoring a normal heart rhythm with an electrical shock (cardioversion) or with an antiarrhythmic medicine (chemical cardioversion)  · Using medicine to control your heart rate in atrial fibrillation.  · Preventing the risk for blood clot and stroke using blood-thinning medicines. Your doctor will tell you what he or she recommends. Choices may include aspirin, clopidogrel, warfarin, dabigatran, rivaroxaban, apixaban, and edoxaban.  · Doing catheter ablation or a surgical maze procedure. These use different methods to destroy certain areas of heart tissue. This interrupts the electrical signals causing atrial fibrillation. One of these procedures may be a choice when medicines do not work, or as an alternative to long-term medicine.  · Other treatment choices may be recommended for you by your doctor.  Managing risk factors for stroke and preventing heart failure are important parts of any treatment plan for atrial fibrillation.  Home care  · Take your medicines exactly as directed. Dont skip doses.  · Work with your doctor to find the right medicines and doses for you.  · Learn to take your own pulse. Keep a record of your results. Ask your doctor which pulse rates mean that you need medical attention. Slowing your pulse is often the goal of treatment. Ask your doctor if its OK for you to use an automatic machine to check your pulse at home. Sometimes these machines dont count the pulse correctly when you have atrial fibrillation.  · Limit your intake of coffee, tea, cola, and other beverages with caffeine. Talk with your doctor about whether you should eliminate caffeine.  · Avoid over-the-counter medicines that have caffeine in them.  · Let your doctor know what medicines you take, including prescription and over-the-counter medicines, as well as any supplements. They interfere with some medicines given for atrial  fibrillation.  · Ask your doctor about whether you can drink alcohol. Some people need to avoid alcohol to better treat atrial fibrillation. If you are taking blood-thinner medicines, alcohol may interfere with them by increasing their effect.  · Never take stimulants such as amphetamines or cocaine. These drugs can speed up your heart rate and trigger atrial fibrillation.  Follow-up care  Follow up with your doctor, or as advised.     When should I call my healthcare provider  Call your healthcare provider right away if you have any of the following:  · Weakness  · Dizziness  · Fainting  · Fatigue  · Shortness of breath  · Chest pain with increased activity  · A change in the usual regularity of your heartbeat, or an unusually fast heartbeat   Date Last Reviewed: 4/23/2016 © 2000-2016 [employer]. [address] [place] [address], [place] [address]. All rights reserved. This information is not intended as a substitute for professional medical care. Always follow your healthcare professional's instructions.          Bronchitis, Viral (Adult)    You have a viral bronchitis. Bronchitis is inflammation and swelling of the lining of the lungs. This is often caused by an infection. Symptoms include a dry, hacking cough that is worse at night. The cough may bring up yellow-green mucus. You may also feel short of breath or wheeze. Other symptoms may include tiredness, chest discomfort, and chills.  Bronchitis that is caused by a virus is not treated with antibiotics. Instead, medicines may be given to help relieve symptoms. Symptoms can last up to 2 weeks, although the cough may last much longer.  This illness is contagious during the first few days and is spread through the air by coughing and sneezing, or by direct contact (touching the sick person and then touching your own eyes, nose, or mouth).  Most viral illnesses resolve within 10 to 14 days with rest and simple home remedies, although they may sometimes last  for several weeks.  Home care  · If symptoms are severe, rest at home for the first 2 to 3 days. When you go back to your usual activities, don't let yourself get too tired.  · Do not smoke. Also avoid being exposed to secondhand smoke.  · You may use over-the-counter medicine to control fever or pain, unless another pain medicine was prescribed. (Note: If you have chronic liver or kidney disease or have ever had a stomach ulcer or gastrointestinal bleeding, talk with your healthcare provider before using these medicines. Also talk to your provider if you are taking medicine to prevent blood clots.) Aspirin should never be given to anyone younger than 18 years of age who is ill with a viral infection or fever. It may cause severe liver or brain damage.  · Your appetite may be poor, so a light diet is fine. Avoid dehydration by drinking 6 to 8 glasses of fluids per day (such as water, soft drinks, sports drinks, juices, tea, or soup). Extra fluids will help loosen secretions in the nose and lungs.  · Over-the-counter cough, cold, and sore-throat medicines will not shorten the length of the illness, but they may help to reduce symptoms. (Note: Do not use decongestants if you have high blood pressure.)  Follow-up care  Follow up with your healthcare provider, or as advised. If you had an X-ray or ECG (electrocardiogram), a specialist will review it. You will be notified of any new findings that may affect your care.  Note: If you are age 65 or older, or if you have a chronic lung disease or condition that affects your immune system, or you smoke, talk to your healthcare provider about having pneumococcal vaccinations and a yearly influenza vaccination (flu shot).  When to seek medical advice  Call your healthcare provider right away if any of these occur:  · Fever of 100.4°F (38°C) or higher  · Coughing up increased amounts of colored sputum  · Weakness, drowsiness, headache, facial pain, ear pain, or a stiff  neck  Call 911, or get immediate medical care  Contact emergency services right away if any of these occur:  · Coughing up blood  · Worsening weakness, drowsiness, headache, or stiff neck  · Trouble breathing, wheezing, or pain with breathing  Date Last Reviewed: 9/13/2015 © 2000-2016 [employer]. [address] [place] [address]. All rights reserved. This information is not intended as a substitute for professional medical care. Always follow your healthcare professional's instructions.          Uncertain Causes of Chest Pain    Chest pain can happen for a number of reasons. Sometimes the cause can't be determined. If your condition does not seem serious, and your pain does not appear to be coming from your heart, your healthcare provider may recommend watching it closely. Sometimes the signs of a serious problem take more time to appear. Many problems not related to your heart can cause chest pain.These include:  · Musculoskeletal. Costochondritis, an inflammation of the tissues around the ribs that can occur from trauma or overuse injuries  · Respiratory. Pneumonia, pneumothorax, or pneumonitis (inflammation of the lining of the chest and lungs)  · Gastrointestinal. Esophageal reflux, heartburn, or gallbladder disease  · Anxiety and panic disorders  · Nerve compression and neuritis  · Miscellaneous problems such as aortic aneurysm or pulmonary embolism (a blood clot in the lungs)  Home care  After your visit, follow these recommendations:  · Rest today and avoid strenuous activity.  · Take any prescribed medicine as directed.  · Be aware of any recurrent chest pain and notice any changes  Follow-up care  Follow up with your healthcare provider if you do not start to feel better within 24 hours, or as advised.  Call 911  Call 911 if any of these occur:  · A change in the type of pain: if it feels different, becomes more severe, lasts longer, or begins to spread into your shoulder, arm,  neck, jaw or back  · Shortness of breath or increased pain with breathing  · Weakness, dizziness, or fainting  · Rapid heart beat  · Crushing sensation in your chest  When to seek medical advice  Call your healthcare provider right away if any of the following occur:  · Cough with dark colored sputum (phlegm) or blood  · Fever of 100.4ºF (38ºC) or higher, or as directed by your healthcare provider  · Swelling, pain or redness in one leg  · Shortness of breath  Date Last Reviewed: 12/30/2015  © 6445-8994 [employer]. [address] [place] [address]. All rights reserved. This information is not intended as a substitute for professional medical care. Always follow your healthcare professional's instructions.

## 2017-04-04 ENCOUNTER — OFFICE VISIT (OUTPATIENT)
Dept: OPHTHALMOLOGY | Facility: CLINIC | Age: 72
End: 2017-04-04
Payer: MEDICARE

## 2017-04-04 ENCOUNTER — OFFICE VISIT (OUTPATIENT)
Dept: OTOLARYNGOLOGY | Facility: CLINIC | Age: 72
End: 2017-04-04
Payer: MEDICARE

## 2017-04-04 VITALS — TEMPERATURE: 98 F | SYSTOLIC BLOOD PRESSURE: 133 MMHG | DIASTOLIC BLOOD PRESSURE: 84 MMHG | HEART RATE: 144 BPM

## 2017-04-04 DIAGNOSIS — R09.82 POSTNASAL DRIP: ICD-10-CM

## 2017-04-04 DIAGNOSIS — H57.13 PERIORBITAL PAIN, BILATERAL: Primary | ICD-10-CM

## 2017-04-04 DIAGNOSIS — J30.89 NON-SEASONAL ALLERGIC RHINITIS, UNSPECIFIED ALLERGIC RHINITIS TRIGGER: Primary | ICD-10-CM

## 2017-04-04 DIAGNOSIS — Z13.5 GLAUCOMA SCREENING: ICD-10-CM

## 2017-04-04 PROCEDURE — 92012 INTRM OPH EXAM EST PATIENT: CPT | Mod: S$PBB,,, | Performed by: OPTOMETRIST

## 2017-04-04 PROCEDURE — 99999 PR PBB SHADOW E&M-EST. PATIENT-LVL III: CPT | Mod: PBBFAC,,, | Performed by: ORTHOPAEDIC SURGERY

## 2017-04-04 PROCEDURE — 99999 PR PBB SHADOW E&M-EST. PATIENT-LVL III: CPT | Mod: PBBFAC,,, | Performed by: OPTOMETRIST

## 2017-04-04 PROCEDURE — 99203 OFFICE O/P NEW LOW 30 MIN: CPT | Mod: S$PBB,,, | Performed by: ORTHOPAEDIC SURGERY

## 2017-04-04 PROCEDURE — 99213 OFFICE O/P EST LOW 20 MIN: CPT | Mod: PBBFAC,27,PO | Performed by: ORTHOPAEDIC SURGERY

## 2017-04-04 RX ORDER — FLUTICASONE PROPIONATE 50 MCG
2 SPRAY, SUSPENSION (ML) NASAL DAILY
Qty: 1 BOTTLE | Refills: 12 | Status: SHIPPED | OUTPATIENT
Start: 2017-04-04

## 2017-04-04 NOTE — PROGRESS NOTES
Subjective:       Patient ID: Carlie Valdez is a 71 y.o. female.    Chief Complaint: Dry throat; Hoarse; Cough; and Ear Fullness    HPI Comments: Patient is a very pleasant 71 year old female here to see me today for the first time for evaluation of nasal congestion and a burning sensation in her throat. She does have a thick postnasal drainage, but does not blow her nose often.  She says that she had been on Flonase in the past and she did find it to be helpful, but she has not yet tried with this episode.  She has been taking Mucinex, only since yesterday.  She also describes a burning sensation in the back of her throat to her ear.  She has noted some hoarseness, and has been hoarse for the last several days.  She also has been SOB for several days, worse with exertion.  She denies any chest pain.    Review of Systems   Constitutional: Negative for chills, fatigue, fever and unexpected weight change.   HENT: Positive for congestion and postnasal drip. Negative for dental problem, ear discharge, ear pain, facial swelling, hearing loss, nosebleeds, rhinorrhea, sinus pressure, sneezing, sore throat, tinnitus, trouble swallowing and voice change.    Eyes: Negative for redness, itching and visual disturbance.   Respiratory: Positive for shortness of breath. Negative for cough (resolved), choking and wheezing.    Cardiovascular: Positive for palpitations (has atrial fibrillation). Negative for chest pain.   Gastrointestinal: Negative for abdominal pain.        No reflux.   Musculoskeletal: Negative for gait problem.   Skin: Negative for rash.   Neurological: Negative for dizziness, light-headedness and headaches.       Objective:      Physical Exam   Constitutional: She is oriented to person, place, and time. She appears well-developed and well-nourished. No distress.   HENT:   Head: Normocephalic and atraumatic.   Right Ear: Tympanic membrane, external ear and ear canal normal.   Left Ear: Tympanic  membrane, external ear and ear canal normal.   Nose: Mucosal edema and rhinorrhea present. No nasal deformity or septal deviation. No epistaxis. Right sinus exhibits no maxillary sinus tenderness and no frontal sinus tenderness. Left sinus exhibits no maxillary sinus tenderness and no frontal sinus tenderness.   Mouth/Throat: Uvula is midline, oropharynx is clear and moist and mucous membranes are normal. Mucous membranes are not pale and not dry. Dental caries present. No oropharyngeal exudate or posterior oropharyngeal erythema.   Eyes: Conjunctivae, EOM and lids are normal. Pupils are equal, round, and reactive to light. Right eye exhibits no chemosis. Left eye exhibits no chemosis. Right conjunctiva is not injected. Left conjunctiva is not injected. No scleral icterus. Right eye exhibits normal extraocular motion and no nystagmus. Left eye exhibits normal extraocular motion and no nystagmus.   Neck: Trachea normal and phonation normal. No tracheal tenderness present. No tracheal deviation present. No thyroid mass and no thyromegaly present.   Cardiovascular: Intact distal pulses.    Pulmonary/Chest: Effort normal. No stridor. No respiratory distress.   Abdominal: She exhibits no distension.   Lymphadenopathy:        Head (right side): No submental, no submandibular, no preauricular, no posterior auricular and no occipital adenopathy present.        Head (left side): No submental, no submandibular, no preauricular, no posterior auricular and no occipital adenopathy present.     She has no cervical adenopathy.   Neurological: She is alert and oriented to person, place, and time. No cranial nerve deficit.   Skin: Skin is warm and dry. No rash noted. No erythema.   Psychiatric: She has a normal mood and affect. Her behavior is normal.       Assessment:       1. Non-seasonal allergic rhinitis, unspecified allergic rhinitis trigger    2. Postnasal drip        Plan:       1.  AR:   The patient was given a prescription  for a steroid nasal spray, and we discussed in detail the proper mechanism of use directing the spray away from the nasal septum.  In addition, we also discussed that it will take two to three weeks of daily use to achieve maximal effectiveness.  The patient will please call in 2-3 weeks with their progress.   I would also recommend Mucinex 1-2 times daily as needed for thickened mucus. I would also recommend a humidifier at night.  We did discuss that the nasal spray is safe for her with her other medications and medical diagnoses.  2.  PND:  As above.

## 2017-04-04 NOTE — PROGRESS NOTES
HPI     Blurred Vision    Additional comments: x2wks           Eye Problem    Additional comments: excess tearing, swelling around eyes           Comments   Pt was last seen 12/19/16 by cpg; first time seeing slc. Here today for   swelling around eyes, excess tearing and blurred va that began about 2   weeks ago. Pt was having trouble getting over sinus infection around   December, started feeling pressure and swelling in face. About 2 weeks ago   eyes began to bother her. Not using any gtts. May need a refill on her   gtts.  Has not used Nabor 128 for months.  Pain in throat, in front of ears, and overall congestion.  Periorbital   swelling 2 days ago - better today.  NO DM  OLD BILAT. ION WITH PALE DISCS  NS OD  PCIOL OS  DRY EYES  Optic neuritis  --------------------------------------  NS (nuclear sclerosis), right   Fuch's endothelial dystrophy   Pseudophakia   Visual field constriction, bilateral   Dryness, eye, bilateral   ..old ION with vf loss  Fuchs  OD 2+ cortical and NS  Increased c:d  IOP 13 OD, 16 OS       Last edited by Tonia Luong, OD on 4/4/2017  2:27 PM. (History)            Assessment /Plan     For exam results, see Encounter Report.    Periorbital pain, bilateral    Glaucoma screening      Periorbital pain and mild lid swelling secondary to persistent sinus infection.  Intraocular pressure normal.  No evidence of conjunctivitis.  No treatment indicated.  Patient to see ENT today.

## 2017-04-04 NOTE — MR AVS SNAPSHOT
[facility]hthalmology  [address] [place] [address]  Phone: [phone]  Fax: [phone]                  Carlie Valdez   2017 1:45 PM   Office Visit    Description:  Female : 1945   Provider:  Tonia Luong, OD   Department:  [facility] - Ophthalmology           Reason for Visit     Blurred Vision     Eye Problem           Diagnoses this Visit        Comments    Periorbital pain, bilateral    -  Primary     Glaucoma screening                To Do List           Future Appointments        Provider Department Dept Phone    2017 3:00 PM Debbie Gar MD [facility] [phone]    2017 9:30 AM [facility] [id number] [facility] [phone]    2017 10:00 AM [facility] BMD1 [facility] [phone]    2017 9:30 AM Leisa Andrade MD [facility]/ -[phone]    2017 7:50 AM [facility] [phone]      Goals (5 Years of Data)     None      Follow-Up and Disposition     Return if symptoms worsen or fail to improve.      [facility]     [facility] Care [facility] - 24/ Assistance  Unless otherwise directed by your provider, please contact [facility], our nurse care line that is available for  assistance.     Registered nurses in the [facility] provide: appointment scheduling, clinical advisement, health education, and other advisory services.  Call: [phone] (toll free)               Medications           Message regarding Medications     Verify the changes and/or additions to your medication regime listed below are the same as discussed with your clinician today.  If any of these changes or additions are incorrect, please notify your healthcare provider.             Verify that the below list of medications is an accurate representation of the medications you are currently taking.  If none reported, the list may be blank. If incorrect, please  contact your healthcare provider. Carry this list with you in case of emergency.           Current Medications     amoxicillin-clavulanate 500-125mg (AUGMENTIN) 500-125 mg Tab Take 1 tablet (500 mg total) by mouth 2 (two) times daily.    artificial tears,hypromellose, 0.3 % Gel 1 drop as needed.    bumetanide (BUMEX) 2 MG tablet Take 1 tablet (2 mg total) by mouth 2 (two) times daily. 1 Tablet Oral Every day    calcium-Mg-zinc-hc #122-vit D3 250-125-3.7-100 dr-wx-mp-unit Tab Take 1 tablet by mouth Daily.    cholecalciferol, vitamin D3, 1,000 unit capsule Take 1 capsule (1,000 Units total) by mouth once daily.    digoxin (LANOXIN) 125 mcg tablet Take 1 tablet (125 mcg total) by mouth once daily.    fluticasone (FLONASE) 50 mcg/actuation nasal spray 2 sprays by Each Nare route once daily.    gatifloxacin (ZYMAR) 0.5 % Drop drops Apply 1 drop to eye 2 (two) times daily. Start one day before surgery (right eye)    ketorolac 0.5% (ACULAR) 0.5 % Drop Place 1 drop into the right eye 4 (four) times daily. Eyedrops to start one day before surgery    methylPREDNISolone (MEDROL DOSEPACK) 4 mg tablet As directed    montelukast (SINGULAIR) 10 mg tablet Take 1 tablet (10 mg total) by mouth every evening.    nebivolol (BYSTOLIC) 2.5 MG Tab Take 10 mg by mouth once daily.    omeprazole (PRILOSEC) 20 MG capsule Take 1 capsule (20 mg total) by mouth once daily.    potassium chloride (KLOR-CON) 10 MEQ TbSR Take 2 tablets (20 mEq total) by mouth once daily. 1 Tablet Extended Release Oral Every day    prednisoLONE acetate (PRED FORTE) 1 % DrpS Place 1 drop into the right eye 4 (four) times daily. Start one day before surgery    rivaroxaban (XARELTO) 15 mg Tab Take 1 tablet (15 mg total) by mouth daily with dinner or evening meal.           Clinical Reference Information           Allergies as of 4/4/2017     Atorvastatin    Codeine    Digoxin    Diovan [Valsartan]    Eggs [Egg Derived]    Morphine    Naproxen    Peanut    Propofol     Sulfa (Sulfonamide Antibiotics)      Immunizations Administered on Date of Encounter - 4/4/2017     None      Language Assistance Services     ATTENTION: Language assistance services are available, free of charge. Please call [phone].      ATENCIÓN: Si jackeline lewis, tiene a rodrigues disposición servicios gratuitos de asistencia lingüística. Llame al [phone].     CHÚ Ý: N?u b?n nói Ti?ng Vi?t, có các d?ch v? h? tr? ngôn ng? mi?n phí dành cho b?n. G?i s? [phone].         [facility] complies with applicable Federal civil rights laws and does not discriminate on the basis of race, color, national origin, age, disability, or sex.

## 2017-05-04 ENCOUNTER — OFFICE VISIT (OUTPATIENT)
Dept: PULMONOLOGY | Facility: CLINIC | Age: 72
End: 2017-05-04
Payer: MEDICARE

## 2017-05-04 ENCOUNTER — TELEPHONE (OUTPATIENT)
Dept: PULMONOLOGY | Facility: CLINIC | Age: 72
End: 2017-05-04

## 2017-05-04 ENCOUNTER — PROCEDURE VISIT (OUTPATIENT)
Dept: PULMONOLOGY | Facility: CLINIC | Age: 72
End: 2017-05-04
Payer: MEDICARE

## 2017-05-04 ENCOUNTER — HOSPITAL ENCOUNTER (OUTPATIENT)
Dept: RADIOLOGY | Facility: HOSPITAL | Age: 72
Discharge: HOME | End: 2017-05-04
Attending: NURSE PRACTITIONER
Payer: MEDICARE

## 2017-05-04 VITALS
WEIGHT: 203 LBS | OXYGEN SATURATION: 99 % | BODY MASS INDEX: 34.66 KG/M2 | DIASTOLIC BLOOD PRESSURE: 90 MMHG | RESPIRATION RATE: 16 BRPM | HEART RATE: 116 BPM | HEIGHT: 64 IN | SYSTOLIC BLOOD PRESSURE: 148 MMHG

## 2017-05-04 DIAGNOSIS — R05.9 COUGH: ICD-10-CM

## 2017-05-04 DIAGNOSIS — J45.51 SEVERE PERSISTENT ASTHMA WITH ACUTE EXACERBATION: Primary | ICD-10-CM

## 2017-05-04 DIAGNOSIS — R05.9 COUGH: Primary | ICD-10-CM

## 2017-05-04 DIAGNOSIS — R91.8 PULMONARY INFILTRATE IN RIGHT LUNG ON CHEST X-RAY: ICD-10-CM

## 2017-05-04 LAB
POST FEF 25 75: 0.59 L/S (ref 0.98–2.36)
POST FET 100: 7.74 S
POST FEV1 FVC: 78 %
POST FEV1: 0.73 L (ref 1.52–2.13)
POST FIF 50: 1.52 L/S
POST FVC: 0.93 L (ref 2–2.71)
POST PEF: 2.46 L/S (ref 3.78–5.8)
PRE FEF 25 75: 0.69 L/S (ref 0.98–2.36)
PRE FET 100: 7.69 S
PRE FEV1 FVC: 78 %
PRE FEV1: 0.8 L (ref 1.52–2.13)
PRE FIF 50: 1.19 L/S
PRE FVC: 1.03 L (ref 2–2.71)
PRE PEF: 2.46 L/S (ref 3.78–5.8)
PREDICTED FEV1 FVC: 75.72 % (ref 70.82–80.62)
PREDICTED FEV1: 1.83 L (ref 1.52–2.13)
PREDICTED FVC: 2.36 L (ref 2–2.71)
PROVOCATION PROTOCOL: ABNORMAL

## 2017-05-04 PROCEDURE — 99999 PR PBB SHADOW E&M-EST. PATIENT-LVL III: CPT | Mod: 25,PBBFAC,, | Performed by: NURSE PRACTITIONER

## 2017-05-04 PROCEDURE — 99213 OFFICE O/P EST LOW 20 MIN: CPT | Mod: 25,PBBFAC,PO | Performed by: NURSE PRACTITIONER

## 2017-05-04 PROCEDURE — 94640 AIRWAY INHALATION TREATMENT: CPT | Mod: PBBFAC,PO

## 2017-05-04 PROCEDURE — 71020 XR CHEST PA AND LATERAL: CPT | Mod: 26,,, | Performed by: RADIOLOGY

## 2017-05-04 PROCEDURE — 99214 OFFICE O/P EST MOD 30 MIN: CPT | Mod: 25,S$PBB,, | Performed by: NURSE PRACTITIONER

## 2017-05-04 PROCEDURE — 94060 EVALUATION OF WHEEZING: CPT | Mod: 26,S$PBB,, | Performed by: INTERNAL MEDICINE

## 2017-05-04 RX ORDER — ALBUTEROL SULFATE 0.83 MG/ML
2.5 SOLUTION RESPIRATORY (INHALATION) EVERY 4 HOURS PRN
Qty: 360 ML | Refills: 12 | Status: SHIPPED | OUTPATIENT
Start: 2017-05-04 | End: 2017-05-04 | Stop reason: ALTCHOICE

## 2017-05-04 RX ORDER — ALBUTEROL SULFATE 0.83 MG/ML
2.5 SOLUTION RESPIRATORY (INHALATION)
Status: COMPLETED | OUTPATIENT
Start: 2017-05-04 | End: 2017-05-04

## 2017-05-04 RX ORDER — BUDESONIDE AND FORMOTEROL FUMARATE DIHYDRATE 160; 4.5 UG/1; UG/1
2 AEROSOL RESPIRATORY (INHALATION) EVERY 12 HOURS
Qty: 1 INHALER | Refills: 12 | Status: SHIPPED | OUTPATIENT
Start: 2017-05-04 | End: 2018-01-01

## 2017-05-04 RX ORDER — NEBULIZER AND COMPRESSOR
1 EACH MISCELLANEOUS EVERY 4 HOURS PRN
Qty: 1 EACH | Refills: 0 | Status: SHIPPED | OUTPATIENT
Start: 2017-05-04 | End: 2018-05-04

## 2017-05-04 RX ORDER — IPRATROPIUM BROMIDE AND ALBUTEROL SULFATE 2.5; .5 MG/3ML; MG/3ML
3 SOLUTION RESPIRATORY (INHALATION) EVERY 6 HOURS PRN
Qty: 120 VIAL | Refills: 5 | Status: ON HOLD | OUTPATIENT
Start: 2017-05-04 | End: 2017-07-18

## 2017-05-04 RX ORDER — ALBUTEROL SULFATE 90 UG/1
2 AEROSOL, METERED RESPIRATORY (INHALATION) EVERY 4 HOURS PRN
Qty: 18 G | Refills: 11 | Status: SHIPPED | OUTPATIENT
Start: 2017-05-04 | End: 2018-03-09

## 2017-05-04 RX ORDER — PREDNISONE 20 MG/1
TABLET ORAL
Qty: 20 TABLET | Refills: 0 | Status: ON HOLD | OUTPATIENT
Start: 2017-05-04 | End: 2017-07-18

## 2017-05-04 RX ORDER — LEVOFLOXACIN 500 MG/1
500 TABLET, FILM COATED ORAL DAILY
Qty: 10 TABLET | Refills: 0 | Status: SHIPPED | OUTPATIENT
Start: 2017-05-04 | End: 2017-05-14

## 2017-05-04 RX ADMIN — ALBUTEROL SULFATE 2.5 MG: 2.5 SOLUTION INTRABRONCHIAL at 05:05

## 2017-05-04 NOTE — PROGRESS NOTES
Subjective:      Patient ID: Carlie Valdez is a 71 y.o. female.    I have reviewed the patient's medical history in detail and updated the computerized patient record.    Problem list has been reviewed.    she has been referred by [facility] for evaluation and management for   Chief Complaint   Patient presents with    Asthma       Chief Complaint: Asthma      HPI:  71-year-old female reports to pulmonary clinic for acute pulmonary visit for evaluation of wheezing off and on since 2017.    She reports she has seen her cardiologist Dr. Hernández 2 weeks ago and he advised that her heart was stable.    She has found her wheezing has waxed and waned over the past month slight worsening over the past 2 weeks.    She completed a course of Augmentin last month without significant improvement in her symptoms.  She finds that she has tightness of the upper chest with cough and wheezing sensation.  She had asthma as a child no significant flares as an adult.    She recalls trying to obtain a nebulizer that was prescribed by Dr. Meier in the past per her report and was unable to obtain the medication at her pharmacy.  She has no rescue inhaler or other pulmonary medication upon presenting to this visit.    She is having frequent daytime symptoms of wheezing and awakening at night at least once a night with sensation of wheezing over the past 2 weeks.      Previous Report Reviewed: lab reports, office notes and radiology reports.     Past Medical History: The following portions of the patient's history were reviewed and updated as appropriate:   She  has a past surgical history that includes Knee arthroscopy;  section; Colonoscopy; and Upper gastrointestinal endoscopy.  Her family history includes Hypertension in her father and mother. There is no history of Colon cancer or Stomach cancer.  She  reports that she has never smoked. She has never used smokeless tobacco. She reports that  she does not drink alcohol or use illicit drugs.  She has a current medication list which includes the following prescription(s): bumetanide, calcium-mg-zinc- #122-vit d3, cholecalciferol (vitamin d3), fluticasone, nebivolol, omeprazole, potassium chloride, rivaroxaban, albuterol, albuterol-ipratropium 2.5mg-0.5mg/3ml, budesonide-formoterol 160-4.5 mcg, inhalation spacing device, levofloxacin, nebulizer and compressor, and prednisone.  She is allergic to atorvastatin; codeine; digoxin; diovan [valsartan]; eggs [egg derived]; morphine; naproxen; peanut; propofol; and sulfa (sulfonamide antibiotics)..    Review of Systems   Constitutional: Positive for activity change (over the past 2 weeks with increased wheezing). Negative for fever, chills, weight loss, weight gain, appetite change, fatigue and night sweats.   HENT: Negative for postnasal drip, rhinorrhea, sinus pressure, voice change and congestion.    Eyes: Negative for redness and itching.   Respiratory: Positive for cough, chest tightness, shortness of breath, wheezing and asthma nighttime symptoms. Negative for snoring, sputum production, orthopnea, dyspnea on extertion, use of rescue inhaler and somnolence.    Cardiovascular: Negative for chest pain, palpitations and leg swelling.   Genitourinary: Negative for difficulty urinating and hematuria.   Endocrine: Negative for polydipsia, polyphagia, cold intolerance, heat intolerance and polyuria.    Musculoskeletal: Negative for arthralgias, gait problem, joint swelling and myalgias.   Skin: Negative.    Gastrointestinal: Negative for nausea, vomiting, abdominal pain and acid reflux.   Neurological: Negative for dizziness, weakness, light-headedness and headaches.   Hematological: Negative for adenopathy. No excessive bruising.   Psychiatric/Behavioral: Negative for sleep disturbance. The patient is not nervous/anxious.         Objective:     Physical Exam   Constitutional: She is oriented to person, place, and  "time. Vital signs are normal. She appears well-developed and well-nourished. She is active and cooperative.  Non-toxic appearance. She does not have a sickly appearance. She does not appear ill. No distress.   HENT:   Head: Normocephalic.   Right Ear: External ear normal.   Left Ear: External ear normal.   Nose: Nose normal.   Mouth/Throat: Oropharynx is clear and moist. No oropharyngeal exudate.   Eyes: Conjunctivae are normal.   Neck: Normal range of motion. Neck supple.   Cardiovascular: Normal rate, regular rhythm, normal heart sounds and intact distal pulses.    Pulmonary/Chest: Effort normal. She has decreased breath sounds (posterior bases). She has wheezes ( improved post albuterol, not resolved. pt declined in clinic duo neb, opts to take at home) in the right upper field and the left upper field. She has no rhonchi. She has no rales.   Abdominal: Soft. Bowel sounds are normal.   Musculoskeletal: Normal range of motion. She exhibits edema (trace bilateral).   Neurological: She is alert and oriented to person, place, and time.   Skin: Skin is warm and dry.   Psychiatric: She has a normal mood and affect. Her behavior is normal. Judgment and thought content normal.   Vitals reviewed.    Vitals:    05/04/17 1617   BP: (!) 148/90   Pulse: (!) 116   Resp: 16   SpO2: 99%   Weight: 92.1 kg (203 lb)   Height: 5' 4" (1.626 m)   PainSc:   4   PainLoc: Throat     Estimated body mass index is 34.84 kg/(m^2) as calculated from the following:    Height as of this encounter: 5' 4" (1.626 m).    Weight as of this encounter: 92.1 kg (203 lb).    Personal Diagnostic Review  Pulmonary function tests: 5/4/2017 FEV1: 0.80  (44 % predicted), FVC:  1.03 (44 % predicted), FEV1/FVC:  77 (102 % predicted).  Post bronchodilator: FEV1: 0.73  (40 % predicted), FVC:  0.93 (40 % predicted), FEV1/FVC:  78 (103 % predicted).   Combined restrictive obstructive defect. No bronchodilator response.     Chest xray 5/4/2017  2 view " chest  Comparison: 03/30/2017  Findings: There is some parenchymal opacification within the right lung base which may be representative of edema or infiltrate with atelectasis thought less likely.    The left hemithorax is generally clear.  The heart is enlarged.  A multilead pacing device is in place.    Assessment:     1. Severe persistent asthma with acute exacerbation    2. Pulmonary infiltrate in right lung on chest x-ray    Carlie was seen today for asthma.    Diagnoses and all orders for this visit:    Severe persistent asthma with acute exacerbation  -     Discontinue: albuterol (PROVENTIL) 2.5 mg /3 mL (0.083 %) nebulizer solution; Take 3 mLs (2.5 mg total) by nebulization every 4 (four) hours as needed for Shortness of Breath.  -     albuterol nebulizer solution 2.5 mg; Take 3 mLs (2.5 mg total) by nebulization one time.  -     nebulizer and compressor Tesha; 1 Device by Misc.(Non-Drug; Combo Route) route every 4 (four) hours as needed.  -     predniSONE (DELTASONE) 20 MG tablet; 3 daily x 3 days, 2 daily x 3 days, 1 daily x 3 days, 1/2 daily x 4 days.  -     inhalation spacing device; Use as directed for inhalation.  -     budesonide-formoterol 160-4.5 mcg (SYMBICORT) 160-4.5 mcg/actuation HFAA; Inhale 2 puffs into the lungs every 12 (twelve) hours. Wash out mouth after using  -     albuterol-ipratropium 2.5mg-0.5mg/3mL (DUO-NEB) 0.5 mg-3 mg(2.5 mg base)/3 mL nebulizer solution; Take 3 mLs by nebulization every 6 (six) hours as needed for Wheezing. Rescue  -     albuterol 90 mcg/actuation inhaler; Inhale 2 puffs into the lungs every 4 (four) hours as needed for Wheezing. Rescue    Pulmonary infiltrate in right lung on chest x-ray  -     levoFLOXacin (LEVAQUIN) 500 MG tablet; Take 1 tablet (500 mg total) by mouth once daily. 1 hour AC or 2 hours PC          Plan:     Discussed diagnosis, its evaluation, treatment and usual course. All questions answered.  Severe persistent asthma with acute  exacerbation  Stable improved wheezing after neb treatment in clinic  Begin prednisone taper, symbicort with chamber, duo neb 4 times a day, albuterol inhaler prn.   Follow up in 5-7 days  Report to nearest ER if not continuing to improve or new onset of symptoms or worsening.     Pulmonary infiltrate in right lung on chest x-ray  Begin treatment with Levaquin 500mg x 10 days.  Follow up next week for check on asthma flare.  Follow up in 4 weeks for repeat chest xray.     Patient provided instruction on inhaler use with chamber and instruction on neb treatment use. And importance for follow up.      Risk and benefits of steroid therapy were reviewed in detail and include, but not limited to, elevated blood sugars, joint avascular necrosis, necrosis of tissue or lipodystrophy or infection of the injection site, hallucinations, depression or worsening depression, insomnia, sweating, hyperactivity, tremors, suppression of one's own cortisone production, delayed wound healing and GI bleeding. The patient would like to proceed with steroid treatment knowing and verbally accepting the risks.     Return in about 1 week (around 5/11/2017) for asthma follow up.  Report to the nearest emergency room for any worsening or new onset of symptoms .

## 2017-05-04 NOTE — ASSESSMENT & PLAN NOTE
Begin treatment with Levaquin 500mg x 10 days.  Follow up next week for check on asthma flare.  Follow up in 4 weeks for repeat chest xray.

## 2017-05-04 NOTE — TELEPHONE ENCOUNTER
----- Message from Rebecca Magaña sent at 5/4/2017  8:11 AM CDT -----  Pt at [phone]//states she is having a problem with Upper respiratory//would like to have an appt with Dr Meier or need to know what to do//please call//thanks/[facility]

## 2017-05-04 NOTE — ASSESSMENT & PLAN NOTE
Stable improved wheezing after neb treatment in clinic  Begin prednisone taper, symbicort with chamber, duo neb 4 times a day, albuterol inhaler prn.   Follow up in 5-7 days  Report to nearest ER if not continuing to improve or new onset of symptoms or worsening.

## 2017-05-05 ENCOUNTER — TELEPHONE (OUTPATIENT)
Dept: PULMONOLOGY | Facility: CLINIC | Age: 72
End: 2017-05-05

## 2017-05-05 NOTE — TELEPHONE ENCOUNTER
----- Message from Bree Phippsza sent at 5/5/2017  9:26 AM CDT -----  Contact: self [phone]  She is calling to let you know that she is feeling better.  She also wants to know if she should continue taking the antibiotics? Thank you!

## 2017-05-05 NOTE — TELEPHONE ENCOUNTER
Patient called stated that she was feel better. I also notified patient that even though she is feeling better she should continue her medication. Patient verbally understand.

## 2017-05-16 ENCOUNTER — TELEPHONE (OUTPATIENT)
Dept: PULMONOLOGY | Facility: CLINIC | Age: 72
End: 2017-05-16

## 2017-05-18 ENCOUNTER — OFFICE VISIT (OUTPATIENT)
Dept: PULMONOLOGY | Facility: CLINIC | Age: 72
End: 2017-05-18
Payer: MEDICARE

## 2017-05-18 ENCOUNTER — HOSPITAL ENCOUNTER (OUTPATIENT)
Dept: RADIOLOGY | Facility: HOSPITAL | Age: 72
Discharge: HOME | End: 2017-05-18
Attending: NURSE PRACTITIONER
Payer: MEDICARE

## 2017-05-18 VITALS
OXYGEN SATURATION: 96 % | DIASTOLIC BLOOD PRESSURE: 88 MMHG | SYSTOLIC BLOOD PRESSURE: 140 MMHG | WEIGHT: 201.06 LBS | HEIGHT: 66 IN | BODY MASS INDEX: 32.31 KG/M2 | HEART RATE: 100 BPM | RESPIRATION RATE: 26 BRPM

## 2017-05-18 DIAGNOSIS — R91.8 PULMONARY INFILTRATE IN RIGHT LUNG ON CHEST X-RAY: ICD-10-CM

## 2017-05-18 DIAGNOSIS — J45.51 SEVERE PERSISTENT ASTHMA WITH ACUTE EXACERBATION: ICD-10-CM

## 2017-05-18 DIAGNOSIS — J45.50 UNCOMPLICATED SEVERE PERSISTENT ASTHMA: Primary | ICD-10-CM

## 2017-05-18 DIAGNOSIS — J01.80 OTHER ACUTE SINUSITIS: ICD-10-CM

## 2017-05-18 PROBLEM — J01.90 ACUTE SINUSITIS: Status: ACTIVE | Noted: 2017-05-18

## 2017-05-18 PROCEDURE — 70220 X-RAY EXAM OF SINUSES: CPT | Mod: 26,,, | Performed by: RADIOLOGY

## 2017-05-18 PROCEDURE — 99214 OFFICE O/P EST MOD 30 MIN: CPT | Mod: S$PBB,,, | Performed by: NURSE PRACTITIONER

## 2017-05-18 PROCEDURE — 99999 PR PBB SHADOW E&M-EST. PATIENT-LVL IV: CPT | Mod: PBBFAC,,, | Performed by: NURSE PRACTITIONER

## 2017-05-18 PROCEDURE — 70220 X-RAY EXAM OF SINUSES: CPT | Mod: TC,PO

## 2017-05-18 RX ORDER — MONTELUKAST SODIUM 10 MG/1
10 TABLET ORAL NIGHTLY
Qty: 90 TABLET | Refills: 4 | Status: SHIPPED | OUTPATIENT
Start: 2017-05-18 | End: 2017-08-16

## 2017-05-18 NOTE — MR AVS SNAPSHOT
[facility] - Pulmonary Services  [address] [facility] [place] [address]  Phone: [phone]  Fax: [phone]                  Carlie Valdez   2017 9:00 AM   Office Visit    Description:  Female : 1945   Provider:  Francine Wing NP   Department:  [facility]monary Services           Reason for Visit     Asthma           Diagnoses this Visit        Comments    Uncomplicated severe persistent asthma    -  Primary     Other acute sinusitis                To Do List           Future Appointments        Provider Department Dept Phone    2017 7:50 AM LAB[facility]ATORY, [facility] [phone]    2017 8:40 AM Francine Wing NP [facility] Pulmonary Services [phone]    2017 9:15 AM Leisa Andrade MD [facility]/ -[phone]    2017 10:00 AM Johanna Dennison MD [facility] - Internal Medicine [phone]      Goals (5 Years of Data)     None      Follow-Up and Disposition     Return in about 4 weeks (around 6/15/2017) for asthma fu and pul infiltrate w/review cxr. .       These Medications        Disp Refills Start End    montelukast (SINGULAIR) 10 mg tablet 90 tablet 4 2017    Take 1 tablet (10 mg total) by mouth every evening. - Oral    Pharmacy: [facility] [address] [place] [address] Ph #: [phone]         PURCHASE these Medications (No prescription required)        Start End    sodium bicarb-sodium chloride Pack 2017     Sig - Route: 1 packet by Nasal route 2 (two) times daily. - Nasal    Class: OTC      [facility]     [facility] Care [facility] - 24/ Assistance  Unless otherwise directed by your provider, please contact [facility], our nurse care line that is available for  assistance.     Registered nurses in the [facility] provide: appointment scheduling, clinical advisement, health education, and other advisory services.  Call: [phone]  (toll free)               Medications           Message regarding Medications     Verify the changes and/or additions to your medication regime listed below are the same as discussed with your clinician today.  If any of these changes or additions are incorrect, please notify your healthcare provider.        START taking these NEW medications        Refills    montelukast (SINGULAIR) 10 mg tablet 4    Sig: Take 1 tablet (10 mg total) by mouth every evening.    Class: Normal    Route: Oral    sodium bicarb-sodium chloride Pack 0    Si packet by Nasal route 2 (two) times daily.    Class: OTC    Route: Nasal           Verify that the below list of medications is an accurate representation of the medications you are currently taking.  If none reported, the list may be blank. If incorrect, please contact your healthcare provider. Carry this list with you in case of emergency.           Current Medications     albuterol 90 mcg/actuation inhaler Inhale 2 puffs into the lungs every 4 (four) hours as needed for Wheezing. Rescue    albuterol-ipratropium 2.5mg-0.5mg/3mL (DUO-NEB) 0.5 mg-3 mg(2.5 mg base)/3 mL nebulizer solution Take 3 mLs by nebulization every 6 (six) hours as needed for Wheezing. Rescue    budesonide-formoterol 160-4.5 mcg (SYMBICORT) 160-4.5 mcg/actuation HFAA Inhale 2 puffs into the lungs every 12 (twelve) hours. Wash out mouth after using    bumetanide (BUMEX) 2 MG tablet Take 1 tablet (2 mg total) by mouth 2 (two) times daily. 1 Tablet Oral Every day    calcium-Mg-zinc- #122-vit D3 250-125-3.7-100 jo-nu-cm-unit Tab Take 1 tablet by mouth Daily.    cholecalciferol, vitamin D3, 1,000 unit capsule Take 1 capsule (1,000 Units total) by mouth once daily.    fluticasone (FLONASE) 50 mcg/actuation nasal spray 2 sprays by Each Nare route once daily.    inhalation spacing device Use as directed for inhalation.    nebivolol (BYSTOLIC) 2.5 MG Tab Take 10 mg by mouth once daily.    nebulizer and compressor Tesha  "1 Device by Misc.(Non-Drug; Combo Route) route every 4 (four) hours as needed.    potassium chloride (KLOR-CON) 10 MEQ TbSR Take 2 tablets (20 mEq total) by mouth once daily. 1 Tablet Extended Release Oral Every day    rivaroxaban (XARELTO) 15 mg Tab Take 1 tablet (15 mg total) by mouth daily with dinner or evening meal.    montelukast (SINGULAIR) 10 mg tablet Take 1 tablet (10 mg total) by mouth every evening.    omeprazole (PRILOSEC) 20 MG capsule Take 1 capsule (20 mg total) by mouth once daily.    predniSONE (DELTASONE) 20 MG tablet 3 daily x 3 days, 2 daily x 3 days, 1 daily x 3 days, 1/2 daily x 4 days.    sodium bicarb-sodium chloride Pack 1 packet by Nasal route 2 (two) times daily.           Clinical Reference Information           Your Vitals Were     BP Pulse Resp Height Weight SpO2    140/88 (BP Location: Left arm, Patient Position: Sitting, BP Method: Manual) 100 26 5' 6" (1.676 m) 91.2 kg (201 lb 1 oz) 96%    BMI                32.45 kg/m2          Blood Pressure          Most Recent Value    BP  (!)  140/88      Allergies as of 5/18/2017     Atorvastatin    Codeine    Digoxin    Diovan [Valsartan]    Eggs [Egg Derived]    Morphine    Naproxen    Peanut    Propofol    Sulfa (Sulfonamide Antibiotics)      Immunizations Administered on Date of Encounter - 5/18/2017     None      Orders Placed During Today's Visit     Future Labs/Procedures Expected by Expires    X-Ray Sinuses Min 3 Views  5/18/2017 5/18/2018      [facility] Sign-Up     Activating your [facility] account is as easy as 1-2-3!     1) Visit [email or web address], select Sign Up Now, enter this activation code and your date of birth, then select Next.  Activation code not generated  Current Patient Portal Status: Account disabled      2) Create a username and password to use when you visit [facility] in the future and select a security question in case you lose your password and select Next.    3) Enter your e-mail address and click Sign " Up!    Additional Information  If you have questions, please e-mail [email or web address] or call [phone] to talk to our [facility] staff. Remember, [facility] is NOT to be used for urgent needs. For medical emergencies, dial 911.         Language Assistance Services     ATTENTION: Language assistance services are available, free of charge. Please call [phone].      ATENCIÓN: Si habla español, tiene a rodrigues disposición servicios gratuitos de asistencia lingüística. Llame al [phone].     CHÚ Ý: N?u b?n nói Ti?ng Vi?t, có các d?ch v? h? tr? ngôn ng? mi?n phí dành cho b?n. G?i s? [phone].         [facility] complies with applicable Federal civil rights laws and does not discriminate on the basis of race, color, national origin, age, disability, or sex.

## 2017-05-18 NOTE — ASSESSMENT & PLAN NOTE
Misplaced prednisone after 3 days of taper, advised complete prednisone taper,   Continue symbicort with chamber, duo neb 4 times a day, albuterol inhaler prn.   Report to nearest ER if not continuing to improve or new onset of symptoms or worsening.

## 2017-05-18 NOTE — ASSESSMENT & PLAN NOTE
inus xray today. cont flonase, begin singulair and saline deepika pot nasal rinse twice daily. fu w/ent.

## 2017-05-18 NOTE — PROGRESS NOTES
Subjective:      Patient ID: Carlie Valdez is a 71 y.o. female.    I have reviewed the patient's medical history in detail and updated the computerized patient record.    Problem list has been reviewed.    she has been referred by No ref. provider found for evaluation and management for   Chief Complaint   Patient presents with    Asthma       Chief Complaint: Asthma      HPI:  71-year-old female reports to pulmonary clinic for follow up visit as scheduled follow up visit after asthma flare visit on 2017, she had had wheezing off and on since 2017.    She reports improved since her last visit, but continues with mild intermittent wheezing.  She reports misplacing her prednisone bottle of med after day 3. She found bottle since, but did not resume treatment.   She also has separate complaint of sinus pressure and full of ears over past 3 weeks persisting.   She had evaluation with ENT on 2017 has not followed up since.   Cough is intermittent, non productive.     Previous Report Reviewed: lab reports, office notes and radiology reports.     Past Medical History: The following portions of the patient's history were reviewed and updated as appropriate:   She  has a past surgical history that includes Knee arthroscopy;  section; Colonoscopy; and Upper gastrointestinal endoscopy.  Her family history includes Hypertension in her father and mother. There is no history of Colon cancer or Stomach cancer.  She  reports that she has never smoked. She has never used smokeless tobacco. She reports that she does not drink alcohol or use illicit drugs.  She has a current medication list which includes the following prescription(s): albuterol, albuterol-ipratropium 2.5mg-0.5mg/3ml, budesonide-formoterol 160-4.5 mcg, bumetanide, calcium-mg-zinc- #122-vit d3, cholecalciferol (vitamin d3), fluticasone, inhalation spacing device, nebivolol, nebulizer and compressor, potassium chloride,  rivaroxaban, montelukast, omeprazole, prednisone, and sodium bicarb-sodium chloride.  She is allergic to atorvastatin; codeine; digoxin; diovan [valsartan]; eggs [egg derived]; morphine; naproxen; peanut; propofol; and sulfa (sulfonamide antibiotics)..    Review of Systems   Constitutional: Negative for fever, chills, weight loss, weight gain, activity change, appetite change, fatigue and night sweats.   HENT: Positive for postnasal drip, sinus pressure, sore throat, voice change and congestion. Negative for rhinorrhea.    Eyes: Negative for redness and itching.   Respiratory: Positive for cough, wheezing, asthma nighttime symptoms (taking one neb tx at bedtime) and use of rescue inhaler (twice a day). Negative for snoring, sputum production, chest tightness, shortness of breath, orthopnea, dyspnea on extertion and somnolence.    Cardiovascular: Negative for chest pain, palpitations and leg swelling.   Genitourinary: Negative for difficulty urinating and hematuria.   Endocrine: Negative for polydipsia, polyphagia, cold intolerance, heat intolerance and polyuria.    Musculoskeletal: Negative for arthralgias, gait problem, joint swelling and myalgias.   Skin: Negative.    Gastrointestinal: Negative for nausea, vomiting, abdominal pain and acid reflux.   Neurological: Negative for dizziness, weakness, light-headedness and headaches.   Hematological: Negative for adenopathy. No excessive bruising.   Psychiatric/Behavioral: Negative for sleep disturbance. The patient is not nervous/anxious.         Objective:     Physical Exam   Constitutional: She is oriented to person, place, and time. Vital signs are normal. She appears well-developed and well-nourished. She is active and cooperative.  Non-toxic appearance. She does not have a sickly appearance. She does not appear ill. No distress.   HENT:   Head: Normocephalic.   Right Ear: External ear normal.   Left Ear: External ear normal.   Nose: Nose normal.   Mouth/Throat:  "Oropharynx is clear and moist. No oropharyngeal exudate.   Eyes: Conjunctivae are normal.   Cardiovascular: Normal rate, regular rhythm, normal heart sounds and intact distal pulses.    Pulmonary/Chest: Effort normal. No stridor. She has decreased breath sounds (posterior bases). She has no wheezes. She has no rhonchi. She has no rales.   Abdominal: Soft.   Musculoskeletal: Normal range of motion. She exhibits edema (trace bilateral).   Lymphadenopathy:     She has no cervical adenopathy.   Neurological: She is alert and oriented to person, place, and time.   Skin: Skin is warm and dry.   Psychiatric: She has a normal mood and affect. Her behavior is normal. Judgment and thought content normal.   Vitals reviewed.    Vitals:    05/18/17 0940   BP: (!) 140/88   Pulse: 100   Resp: (!) 26   SpO2: 96%   Weight: 91.2 kg (201 lb 1 oz)   Height: 5' 6" (1.676 m)     Estimated body mass index is 32.45 kg/(m^2) as calculated from the following:    Height as of this encounter: 5' 6" (1.676 m).    Weight as of this encounter: 91.2 kg (201 lb 1 oz).    Personal Diagnostic Review  Sinus xray 5/18/2017  Sinuses appear symmetrically well-developed and aerated  For positioning.  No opacification or air-fluid level identified.  Midline bony septum.  Calcification identified along the midline falx.   Impression      No definite evidence of sinus disease.       Pulmonary function tests: 5/4/2017 FEV1: 0.80  (44 % predicted), FVC:  1.03 (44 % predicted), FEV1/FVC:  77 (102 % predicted).  Post bronchodilator: FEV1: 0.73  (40 % predicted), FVC:  0.93 (40 % predicted), FEV1/FVC:  78 (103 % predicted).   Combined restrictive obstructive defect. No bronchodilator response.     Chest xray 5/4/2017  2 view chest  Comparison: 03/30/2017  Findings: There is some parenchymal opacification within the right lung base which may be representative of edema or infiltrate with atelectasis thought less likely.    The left hemithorax is generally clear. "  The heart is enlarged.  A multilead pacing device is in place.    Assessment:     1. Uncomplicated severe persistent asthma    2. Other acute sinusitis    3. Pulmonary infiltrate in right lung on chest x-ray    4. Severe persistent asthma with acute exacerbation      Problem List Items Addressed This Visit     Acute sinusitis     inus xray today. cont flonase, begin singulair and saline deepika pot nasal rinse twice daily. fu w/ent.          Relevant Medications    montelukast (SINGULAIR) 10 mg tablet    sodium bicarb-sodium chloride Pack    Other Relevant Orders    X-Ray Sinuses Min 3 Views (Completed)    Pulmonary infiltrate in right lung on chest x-ray     Completed levaquin, symptomatically improved.  Follow up in 3-4 weeks for repeat xray.          Relevant Orders    X-Ray Chest PA And Lateral    Severe persistent asthma with acute exacerbation    Uncomplicated severe persistent asthma - Primary     Misplaced prednisone after 3 days of taper, advised complete prednisone taper,   Continue symbicort with chamber, duo neb 4 times a day, albuterol inhaler prn.   Report to nearest ER if not continuing to improve or new onset of symptoms or worsening.            Relevant Medications    montelukast (SINGULAIR) 10 mg tablet          Plan:     Discussed diagnosis, its evaluation, treatment and usual course. All questions answered.  Pulmonary infiltrate in right lung on chest x-ray  Completed levaquin, symptomatically improved.  Follow up in 3-4 weeks for repeat xray.     Acute sinusitis  inus xray today. cont flonase, begin singulair and saline deepika pot nasal rinse twice daily. fu w/ent.     Uncomplicated severe persistent asthma  Misplaced prednisone after 3 days of taper, advised complete prednisone taper,   Continue symbicort with chamber, duo neb 4 times a day, albuterol inhaler prn.   Report to nearest ER if not continuing to improve or new onset of symptoms or worsening.       Risk and benefits of steroid  therapy were reviewed in detail and include, but not limited to, elevated blood sugars, joint avascular necrosis, necrosis of tissue or lipodystrophy or infection of the injection site, hallucinations, depression or worsening depression, insomnia, sweating, hyperactivity, tremors, suppression of one's own cortisone production, delayed wound healing and GI bleeding. The patient would like to proceed with steroid treatment knowing and verbally accepting the risks.     Return in about 4 weeks (around 6/15/2017) for fu in 1 week for sleep evaluation. fu in 4 weeks for asthma fu and pul infiltrate w/review cxr. .

## 2017-05-24 ENCOUNTER — TELEPHONE (OUTPATIENT)
Dept: PULMONOLOGY | Facility: CLINIC | Age: 72
End: 2017-05-24

## 2017-06-27 ENCOUNTER — TELEPHONE (OUTPATIENT)
Dept: INTERNAL MEDICINE | Facility: CLINIC | Age: 72
End: 2017-06-27

## 2017-06-27 NOTE — TELEPHONE ENCOUNTER
----- Message from Tiana Josue sent at 6/27/2017 12:04 PM CDT -----  Contact: Haley Valdez/daughter  States her health has declined and she not sure if she has followed up with Dr Dennison. States she had a fall the week of Mother's Day in [employer] on [facility] and its been down every since, during the fall she had head trauma. Please call Haley José Miguel at [phone]. Thank you

## 2017-06-27 NOTE — TELEPHONE ENCOUNTER
----- Message from Lis Castillo sent at 6/27/2017  2:46 PM CDT -----  Contact: daughter/Haley  Daughter returning nurse call , please call pt @ [phone].

## 2017-07-15 ENCOUNTER — OFFICE VISIT (OUTPATIENT)
Dept: URGENT CARE | Facility: CLINIC | Age: 72
DRG: 190 | End: 2017-07-15
Payer: MEDICARE

## 2017-07-15 ENCOUNTER — HOSPITAL ENCOUNTER (INPATIENT)
Facility: HOSPITAL | Age: 72
LOS: 3 days | Discharge: HOME HEALTH | DRG: 190 | End: 2017-07-18
Attending: EMERGENCY MEDICINE | Admitting: INTERNAL MEDICINE
Payer: MEDICARE

## 2017-07-15 VITALS
SYSTOLIC BLOOD PRESSURE: 110 MMHG | HEART RATE: 116 BPM | TEMPERATURE: 99 F | WEIGHT: 218.5 LBS | BODY MASS INDEX: 35.26 KG/M2 | OXYGEN SATURATION: 96 % | DIASTOLIC BLOOD PRESSURE: 84 MMHG

## 2017-07-15 DIAGNOSIS — R60.0 BILATERAL LEG EDEMA: ICD-10-CM

## 2017-07-15 DIAGNOSIS — I48.91 ATRIAL FIBRILLATION, UNSPECIFIED TYPE: Chronic | ICD-10-CM

## 2017-07-15 DIAGNOSIS — K21.9 GASTROESOPHAGEAL REFLUX DISEASE WITHOUT ESOPHAGITIS: ICD-10-CM

## 2017-07-15 DIAGNOSIS — I73.9 PVD (PERIPHERAL VASCULAR DISEASE): ICD-10-CM

## 2017-07-15 DIAGNOSIS — J45.51 ASTHMA EXACERBATION, NON-ALLERGIC, SEVERE PERSISTENT: ICD-10-CM

## 2017-07-15 DIAGNOSIS — J45.909 CHILDHOOD ASTHMA WITHOUT COMPLICATION: ICD-10-CM

## 2017-07-15 DIAGNOSIS — I48.20 CHRONIC ATRIAL FIBRILLATION WITH RVR: Primary | ICD-10-CM

## 2017-07-15 DIAGNOSIS — Z79.01 CHRONIC ANTICOAGULATION: ICD-10-CM

## 2017-07-15 DIAGNOSIS — E55.9 VITAMIN D DEFICIENCY: ICD-10-CM

## 2017-07-15 DIAGNOSIS — Z91.148 NON COMPLIANCE W MEDICATION REGIMEN: ICD-10-CM

## 2017-07-15 DIAGNOSIS — J45.51 SEVERE PERSISTENT ASTHMA WITH ACUTE EXACERBATION: ICD-10-CM

## 2017-07-15 DIAGNOSIS — R79.89 ELEVATED TROPONIN: ICD-10-CM

## 2017-07-15 DIAGNOSIS — R93.3 ABNORMAL CT SCAN, GASTROINTESTINAL TRACT: ICD-10-CM

## 2017-07-15 DIAGNOSIS — I50.41 ACUTE COMBINED SYSTOLIC AND DIASTOLIC CONGESTIVE HEART FAILURE: ICD-10-CM

## 2017-07-15 DIAGNOSIS — I50.9 CHF (CONGESTIVE HEART FAILURE): ICD-10-CM

## 2017-07-15 DIAGNOSIS — J40 BRONCHITIS: ICD-10-CM

## 2017-07-15 DIAGNOSIS — I10 ESSENTIAL HYPERTENSION: ICD-10-CM

## 2017-07-15 DIAGNOSIS — R00.0 TACHYCARDIA: ICD-10-CM

## 2017-07-15 DIAGNOSIS — I44.7 LBBB (LEFT BUNDLE BRANCH BLOCK): ICD-10-CM

## 2017-07-15 DIAGNOSIS — Z95.810 CARDIAC DEFIBRILLATOR IN PLACE: Chronic | ICD-10-CM

## 2017-07-15 DIAGNOSIS — G89.29 CHRONIC KNEE PAIN, UNSPECIFIED LATERALITY: ICD-10-CM

## 2017-07-15 DIAGNOSIS — M25.569 CHRONIC KNEE PAIN, UNSPECIFIED LATERALITY: ICD-10-CM

## 2017-07-15 DIAGNOSIS — K44.9 DIAPHRAGMATIC HERNIA WITHOUT OBSTRUCTION AND WITHOUT GANGRENE: ICD-10-CM

## 2017-07-15 DIAGNOSIS — R06.02 SOB (SHORTNESS OF BREATH): Primary | ICD-10-CM

## 2017-07-15 DIAGNOSIS — E66.9 OBESITY, CLASS I, BMI 30-34.9: Chronic | ICD-10-CM

## 2017-07-15 PROBLEM — J01.90 ACUTE SINUSITIS: Status: RESOLVED | Noted: 2017-05-18 | Resolved: 2017-07-15

## 2017-07-15 LAB
ALBUMIN SERPL BCP-MCNC: 3.2 G/DL
ALP SERPL-CCNC: 94 U/L
ALT SERPL W/O P-5'-P-CCNC: 18 U/L
ANION GAP SERPL CALC-SCNC: 6 MMOL/L
APTT BLDCRRT: 28.5 SEC
AST SERPL-CCNC: 27 U/L
BACTERIA #/AREA URNS HPF: ABNORMAL /HPF
BASOPHILS # BLD AUTO: 0.02 K/UL
BASOPHILS NFR BLD: 0.4 %
BILIRUB SERPL-MCNC: 0.8 MG/DL
BILIRUB UR QL STRIP: NEGATIVE
BNP SERPL-MCNC: 587 PG/ML
BUN SERPL-MCNC: 22 MG/DL
CALCIUM SERPL-MCNC: 9.2 MG/DL
CHLORIDE SERPL-SCNC: 111 MMOL/L
CLARITY UR: CLEAR
CO2 SERPL-SCNC: 26 MMOL/L
COLOR UR: YELLOW
CREAT SERPL-MCNC: 0.9 MG/DL
DIFFERENTIAL METHOD: ABNORMAL
EOSINOPHIL # BLD AUTO: 0.3 K/UL
EOSINOPHIL NFR BLD: 5.8 %
ERYTHROCYTE [DISTWIDTH] IN BLOOD BY AUTOMATED COUNT: 19.1 %
EST. GFR  (AFRICAN AMERICAN): >60 ML/MIN/1.73 M^2
EST. GFR  (NON AFRICAN AMERICAN): >60 ML/MIN/1.73 M^2
GLUCOSE SERPL-MCNC: 86 MG/DL
GLUCOSE UR QL STRIP: NEGATIVE
HCT VFR BLD AUTO: 34.8 %
HGB BLD-MCNC: 10.6 G/DL
HGB UR QL STRIP: NEGATIVE
HYALINE CASTS #/AREA URNS LPF: 0 /LPF
INR PPP: 1.2
KETONES UR QL STRIP: NEGATIVE
LACTATE SERPL-SCNC: 1.2 MMOL/L
LEUKOCYTE ESTERASE UR QL STRIP: NEGATIVE
LYMPHOCYTES # BLD AUTO: 0.8 K/UL
LYMPHOCYTES NFR BLD: 16.3 %
MAGNESIUM SERPL-MCNC: 1.8 MG/DL
MCH RBC QN AUTO: 25.5 PG
MCHC RBC AUTO-ENTMCNC: 30.5 %
MCV RBC AUTO: 84 FL
MICROSCOPIC COMMENT: ABNORMAL
MONOCYTES # BLD AUTO: 0.6 K/UL
MONOCYTES NFR BLD: 13.1 %
NEUTROPHILS # BLD AUTO: 3 K/UL
NEUTROPHILS NFR BLD: 64.4 %
NITRITE UR QL STRIP: NEGATIVE
PH UR STRIP: 6 [PH] (ref 5–8)
PHOSPHATE SERPL-MCNC: 3.5 MG/DL
PLATELET # BLD AUTO: 117 K/UL
PMV BLD AUTO: 9.9 FL
POTASSIUM SERPL-SCNC: 4.3 MMOL/L
PROT SERPL-MCNC: 6.6 G/DL
PROT UR QL STRIP: ABNORMAL
PROTHROMBIN TIME: 12 SEC
RBC # BLD AUTO: 4.15 M/UL
RBC #/AREA URNS HPF: 0 /HPF (ref 0–4)
SODIUM SERPL-SCNC: 143 MMOL/L
SP GR UR STRIP: >=1.03 (ref 1–1.03)
SQUAMOUS #/AREA URNS HPF: 3 /HPF
TROPONIN I SERPL DL<=0.01 NG/ML-MCNC: 0.02 NG/ML
TROPONIN I SERPL DL<=0.01 NG/ML-MCNC: 0.04 NG/ML
URN SPEC COLLECT METH UR: ABNORMAL
UROBILINOGEN UR STRIP-ACNC: NEGATIVE EU/DL
WBC # BLD AUTO: 4.67 K/UL
WBC #/AREA URNS HPF: 8 /HPF (ref 0–5)

## 2017-07-15 PROCEDURE — 87040 BLOOD CULTURE FOR BACTERIA: CPT

## 2017-07-15 PROCEDURE — 25000242 PHARM REV CODE 250 ALT 637 W/ HCPCS: Performed by: NURSE PRACTITIONER

## 2017-07-15 PROCEDURE — 83880 ASSAY OF NATRIURETIC PEPTIDE: CPT

## 2017-07-15 PROCEDURE — 25000003 PHARM REV CODE 250: Performed by: EMERGENCY MEDICINE

## 2017-07-15 PROCEDURE — 96365 THER/PROPH/DIAG IV INF INIT: CPT

## 2017-07-15 PROCEDURE — 1159F MED LIST DOCD IN RCRD: CPT | Mod: ,,, | Performed by: FAMILY MEDICINE

## 2017-07-15 PROCEDURE — 99999 PR PBB SHADOW E&M-EST. PATIENT-LVL IV: CPT | Mod: PBBFAC,,, | Performed by: FAMILY MEDICINE

## 2017-07-15 PROCEDURE — 63600175 PHARM REV CODE 636 W HCPCS: Performed by: NURSE PRACTITIONER

## 2017-07-15 PROCEDURE — 83735 ASSAY OF MAGNESIUM: CPT

## 2017-07-15 PROCEDURE — 80061 LIPID PANEL: CPT

## 2017-07-15 PROCEDURE — 93010 ELECTROCARDIOGRAM REPORT: CPT | Mod: ,,, | Performed by: INTERNAL MEDICINE

## 2017-07-15 PROCEDURE — 85610 PROTHROMBIN TIME: CPT

## 2017-07-15 PROCEDURE — 84100 ASSAY OF PHOSPHORUS: CPT

## 2017-07-15 PROCEDURE — 84484 ASSAY OF TROPONIN QUANT: CPT

## 2017-07-15 PROCEDURE — 25000242 PHARM REV CODE 250 ALT 637 W/ HCPCS: Performed by: EMERGENCY MEDICINE

## 2017-07-15 PROCEDURE — 63600175 PHARM REV CODE 636 W HCPCS: Performed by: EMERGENCY MEDICINE

## 2017-07-15 PROCEDURE — 81000 URINALYSIS NONAUTO W/SCOPE: CPT

## 2017-07-15 PROCEDURE — 87070 CULTURE OTHR SPECIMN AEROBIC: CPT

## 2017-07-15 PROCEDURE — 80053 COMPREHEN METABOLIC PANEL: CPT

## 2017-07-15 PROCEDURE — 96375 TX/PRO/DX INJ NEW DRUG ADDON: CPT

## 2017-07-15 PROCEDURE — 87205 SMEAR GRAM STAIN: CPT

## 2017-07-15 PROCEDURE — 21400001 HC TELEMETRY ROOM

## 2017-07-15 PROCEDURE — 85730 THROMBOPLASTIN TIME PARTIAL: CPT

## 2017-07-15 PROCEDURE — 99215 OFFICE O/P EST HI 40 MIN: CPT | Mod: S$PBB,,, | Performed by: FAMILY MEDICINE

## 2017-07-15 PROCEDURE — 83605 ASSAY OF LACTIC ACID: CPT

## 2017-07-15 PROCEDURE — 36415 COLL VENOUS BLD VENIPUNCTURE: CPT

## 2017-07-15 PROCEDURE — 94640 AIRWAY INHALATION TREATMENT: CPT

## 2017-07-15 PROCEDURE — 85025 COMPLETE CBC W/AUTO DIFF WBC: CPT

## 2017-07-15 PROCEDURE — 96366 THER/PROPH/DIAG IV INF ADDON: CPT

## 2017-07-15 PROCEDURE — 99285 EMERGENCY DEPT VISIT HI MDM: CPT | Mod: 25,27

## 2017-07-15 PROCEDURE — 96368 THER/DIAG CONCURRENT INF: CPT

## 2017-07-15 PROCEDURE — 1126F AMNT PAIN NOTED NONE PRSNT: CPT | Mod: ,,, | Performed by: FAMILY MEDICINE

## 2017-07-15 PROCEDURE — 25000003 PHARM REV CODE 250: Performed by: NURSE PRACTITIONER

## 2017-07-15 RX ORDER — LISINOPRIL 5 MG/1
5 TABLET ORAL DAILY
Status: DISCONTINUED | OUTPATIENT
Start: 2017-07-16 | End: 2017-07-18 | Stop reason: HOSPADM

## 2017-07-15 RX ORDER — MOXIFLOXACIN HYDROCHLORIDE 400 MG/250ML
400 INJECTION, SOLUTION INTRAVENOUS
Status: COMPLETED | OUTPATIENT
Start: 2017-07-15 | End: 2017-07-15

## 2017-07-15 RX ORDER — DILTIAZEM HCL/D5W 125 MG/125
10 PLASTIC BAG, INJECTION (ML) INTRAVENOUS CONTINUOUS
Status: DISCONTINUED | OUTPATIENT
Start: 2017-07-15 | End: 2017-07-16

## 2017-07-15 RX ORDER — ARFORMOTEROL TARTRATE 15 UG/2ML
15 SOLUTION RESPIRATORY (INHALATION) 2 TIMES DAILY
Status: DISCONTINUED | OUTPATIENT
Start: 2017-07-15 | End: 2017-07-18 | Stop reason: HOSPADM

## 2017-07-15 RX ORDER — IPRATROPIUM BROMIDE AND ALBUTEROL SULFATE 2.5; .5 MG/3ML; MG/3ML
3 SOLUTION RESPIRATORY (INHALATION) EVERY 6 HOURS
Status: DISCONTINUED | OUTPATIENT
Start: 2017-07-15 | End: 2017-07-16

## 2017-07-15 RX ORDER — ONDANSETRON 8 MG/1
8 TABLET, ORALLY DISINTEGRATING ORAL EVERY 8 HOURS PRN
Status: DISCONTINUED | OUTPATIENT
Start: 2017-07-15 | End: 2017-07-18 | Stop reason: HOSPADM

## 2017-07-15 RX ORDER — POLYETHYLENE GLYCOL 3350 17 G/17G
17 POWDER, FOR SOLUTION ORAL DAILY
Status: DISCONTINUED | OUTPATIENT
Start: 2017-07-16 | End: 2017-07-18 | Stop reason: HOSPADM

## 2017-07-15 RX ORDER — ACETAMINOPHEN 325 MG/1
650 TABLET ORAL EVERY 4 HOURS PRN
Status: DISCONTINUED | OUTPATIENT
Start: 2017-07-15 | End: 2017-07-18 | Stop reason: HOSPADM

## 2017-07-15 RX ORDER — IPRATROPIUM BROMIDE AND ALBUTEROL SULFATE 2.5; .5 MG/3ML; MG/3ML
3 SOLUTION RESPIRATORY (INHALATION)
Status: DISPENSED | OUTPATIENT
Start: 2017-07-15 | End: 2017-07-15

## 2017-07-15 RX ORDER — DILTIAZEM HCL/D5W 125 MG/125
5 PLASTIC BAG, INJECTION (ML) INTRAVENOUS CONTINUOUS
Status: DISCONTINUED | OUTPATIENT
Start: 2017-07-15 | End: 2017-07-15

## 2017-07-15 RX ORDER — GUAIFENESIN 600 MG/1
600 TABLET, EXTENDED RELEASE ORAL 2 TIMES DAILY
Status: DISCONTINUED | OUTPATIENT
Start: 2017-07-15 | End: 2017-07-18 | Stop reason: HOSPADM

## 2017-07-15 RX ORDER — BUMETANIDE 0.25 MG/ML
2 INJECTION INTRAMUSCULAR; INTRAVENOUS 2 TIMES DAILY
Status: DISCONTINUED | OUTPATIENT
Start: 2017-07-15 | End: 2017-07-18 | Stop reason: HOSPADM

## 2017-07-15 RX ORDER — PANTOPRAZOLE SODIUM 40 MG/1
40 TABLET, DELAYED RELEASE ORAL DAILY
Status: DISCONTINUED | OUTPATIENT
Start: 2017-07-16 | End: 2017-07-18 | Stop reason: HOSPADM

## 2017-07-15 RX ORDER — DILTIAZEM HYDROCHLORIDE 5 MG/ML
20 INJECTION INTRAVENOUS
Status: COMPLETED | OUTPATIENT
Start: 2017-07-15 | End: 2017-07-15

## 2017-07-15 RX ORDER — NEBIVOLOL 10 MG/1
10 TABLET ORAL DAILY
Status: DISCONTINUED | OUTPATIENT
Start: 2017-07-16 | End: 2017-07-16

## 2017-07-15 RX ORDER — LEVALBUTEROL INHALATION SOLUTION 0.63 MG/3ML
0.63 SOLUTION RESPIRATORY (INHALATION)
Status: DISCONTINUED | OUTPATIENT
Start: 2017-07-15 | End: 2017-07-15

## 2017-07-15 RX ORDER — BUMETANIDE 1 MG/1
2 TABLET ORAL 2 TIMES DAILY
Status: DISCONTINUED | OUTPATIENT
Start: 2017-07-15 | End: 2017-07-15

## 2017-07-15 RX ORDER — POTASSIUM CHLORIDE 20 MEQ/1
20 TABLET, EXTENDED RELEASE ORAL DAILY
Status: DISCONTINUED | OUTPATIENT
Start: 2017-07-16 | End: 2017-07-18 | Stop reason: HOSPADM

## 2017-07-15 RX ADMIN — IPRATROPIUM BROMIDE AND ALBUTEROL SULFATE 3 ML: .5; 3 SOLUTION RESPIRATORY (INHALATION) at 12:07

## 2017-07-15 RX ADMIN — METHYLPREDNISOLONE SODIUM SUCCINATE 125 MG: 125 INJECTION, POWDER, FOR SOLUTION INTRAMUSCULAR; INTRAVENOUS at 12:07

## 2017-07-15 RX ADMIN — ARFORMOTEROL TARTRATE 15 MCG: 15 SOLUTION RESPIRATORY (INHALATION) at 07:07

## 2017-07-15 RX ADMIN — Medication 5 MG/HR: at 01:07

## 2017-07-15 RX ADMIN — BUMETANIDE 2 MG: 0.25 INJECTION INTRAMUSCULAR; INTRAVENOUS at 08:07

## 2017-07-15 RX ADMIN — IPRATROPIUM BROMIDE AND ALBUTEROL SULFATE 3 ML: .5; 3 SOLUTION RESPIRATORY (INHALATION) at 07:07

## 2017-07-15 RX ADMIN — RIVAROXABAN 20 MG: 20 TABLET, FILM COATED ORAL at 03:07

## 2017-07-15 RX ADMIN — DILTIAZEM HYDROCHLORIDE 20 MG: 5 INJECTION INTRAVENOUS at 12:07

## 2017-07-15 RX ADMIN — MOXIFLOXACIN HYDROCHLORIDE 400 MG: 400 INJECTION, SOLUTION INTRAVENOUS at 01:07

## 2017-07-15 RX ADMIN — METHYLPREDNISOLONE SODIUM SUCCINATE 80 MG: 125 INJECTION, POWDER, FOR SOLUTION INTRAMUSCULAR; INTRAVENOUS at 08:07

## 2017-07-15 RX ADMIN — PANTOPRAZOLE SODIUM 600 MG: 40 TABLET, DELAYED RELEASE ORAL at 08:07

## 2017-07-15 RX ADMIN — Medication 10 MG/HR: at 01:07

## 2017-07-15 NOTE — H&P
[facility] Hospital Medicine  History & Physical    Patient Name: Carlie Valdez  MRN: [id number]  Admission Date: 7/15/2017  Attending Physician: Subhash Ayala MD   Primary Care Provider: Johanna Guzman MD         Patient information was obtained from patient, spouse/SO, past medical records and ER records.     Subjective:     Principal Problem:Chronic atrial fibrillation with RVR    Chief Complaint:   Chief Complaint   Patient presents with    Cough     sent from clinic for workup. Reports cough-clear sputum, wheezing, sob last night, congestion.         HPI: 70 y/o female presents to ED with c/o SOB. PMH includes CHF, Asthma, A-fib, and HTN. Patient is non-compliant with medications, has not filled any medications since May, and has not filled heart medications since February. Associated symptoms include cough, clear sputum, and wheezing. Pt has tried nebulizer tx and inhalers with no relief. Denies any exacerbating or mitigating factors. Patient denies any fever, n/v/d, abd pain, HA, dizziness, weakness, lightheadedness, chills, leg swelling, and Cp. Pt was hospitalized at [facility] for same sxs three weeks ago and reports she believes she has bronchitis. No further complaints or concerns at this time. She will be admitted to Medicine for IV abx, steroids, cards consult and symptom management under the care of Hospital Medicine.       Past Medical History:   Diagnosis Date    Asthma     Atrial fibrillation     Blood clot of vein in shoulder area     Cardiac defibrillator in place     Chronic knee pain     Diabetes mellitus type 2 without retinopathy 2016    Diaphragmatic hernia without obstruction and without gangrene 2015    GERD (gastroesophageal reflux disease)     Hypertension        Past Surgical History:   Procedure Laterality Date     SECTION      COLONOSCOPY      KNEE ARTHROSCOPY      UPPER GASTROINTESTINAL ENDOSCOPY         Review of  patient's allergies indicates:   Allergen Reactions    Atorvastatin      Other reaction(s): Muscle pain    Codeine      Other reaction(s): Unknown    Digoxin      Other reaction(s): Unknown    Diovan [valsartan] Other (See Comments)     Upset stomach, weakness    Eggs [egg derived]     Morphine      Other reaction(s): Unknown    Naproxen      Other reaction(s): Nausea    Peanut     Propofol      Other reaction(s): delirious    Sulfa (sulfonamide antibiotics)        No current facility-administered medications on file prior to encounter.      Current Outpatient Prescriptions on File Prior to Encounter   Medication Sig    albuterol 90 mcg/actuation inhaler Inhale 2 puffs into the lungs every 4 (four) hours as needed for Wheezing. Rescue    albuterol-ipratropium 2.5mg-0.5mg/3mL (DUO-NEB) 0.5 mg-3 mg(2.5 mg base)/3 mL nebulizer solution Take 3 mLs by nebulization every 6 (six) hours as needed for Wheezing. Rescue    budesonide-formoterol 160-4.5 mcg (SYMBICORT) 160-4.5 mcg/actuation HFAA Inhale 2 puffs into the lungs every 12 (twelve) hours. Wash out mouth after using    bumetanide (BUMEX) 2 MG tablet Take 1 tablet (2 mg total) by mouth 2 (two) times daily. 1 Tablet Oral Every day (Patient taking differently: Take 4 mg by mouth 2 (two) times daily. 1 Tablet Oral Every day)    calcium-Mg-zinc- #122-vit D3 250-125-3.7-100 lg-lx-ct-unit Tab Take 1 tablet by mouth Daily.    cholecalciferol, vitamin D3, 1,000 unit capsule Take 1 capsule (1,000 Units total) by mouth once daily.    fluticasone (FLONASE) 50 mcg/actuation nasal spray 2 sprays by Each Nare route once daily.    inhalation spacing device Use as directed for inhalation.    montelukast (SINGULAIR) 10 mg tablet Take 1 tablet (10 mg total) by mouth every evening.    nebivolol (BYSTOLIC) 2.5 MG Tab Take 10 mg by mouth once daily.    nebulizer and compressor Tesha 1 Device by Misc.(Non-Drug; Combo Route) route every 4 (four) hours as needed.     omeprazole (PRILOSEC) 20 MG capsule Take 1 capsule (20 mg total) by mouth once daily.    potassium chloride (KLOR-CON) 10 MEQ TbSR Take 2 tablets (20 mEq total) by mouth once daily. 1 Tablet Extended Release Oral Every day    predniSONE (DELTASONE) 20 MG tablet 3 daily x 3 days, 2 daily x 3 days, 1 daily x 3 days, 1/2 daily x 4 days.    rivaroxaban (XARELTO) 15 mg Tab Take 1 tablet (15 mg total) by mouth daily with dinner or evening meal.    sodium bicarb-sodium chloride Pack 1 packet by Nasal route 2 (two) times daily.     Family History     Problem Relation (Age of Onset)    Hypertension Mother, Father        Social History Main Topics    Smoking status: Never Smoker    Smokeless tobacco: Never Used    Alcohol use No    Drug use: No    Sexual activity: Yes     Partners: Male     Review of Systems   Constitutional: Negative.    HENT: Negative.    Eyes: Negative.    Respiratory: Positive for cough, shortness of breath and wheezing.    Cardiovascular: Negative.  Negative for chest pain, palpitations and leg swelling.   Gastrointestinal: Negative.    Endocrine: Negative.    Genitourinary: Negative.    Musculoskeletal: Negative.    Skin: Negative.    Allergic/Immunologic: Negative.    Neurological: Negative.  Negative for dizziness and weakness.   Hematological: Negative.    Psychiatric/Behavioral: Negative.    All other systems reviewed and are negative.    Objective:     Vital Signs (Most Recent):  Temp: 98.1 °F (36.7 °C) (07/15/17 1503)  Pulse: 109 (07/15/17 1503)  Resp: (!) 28 (07/15/17 1503)  BP: 127/88 (07/15/17 1503)  SpO2: 100 % (07/15/17 1503) Vital Signs (24h Range):  Temp:  [98.1 °F (36.7 °C)-99.1 °F (37.3 °C)] 98.1 °F (36.7 °C)  Pulse:  [109-116] 109  Resp:  [17-28] 28  SpO2:  [96 %-100 %] 100 %  BP: (110-148)/() 127/88     Weight: 84.4 kg (186 lb)  Body mass index is 31.93 kg/m².    Physical Exam   Constitutional: She is oriented to person, place, and time. She appears well-developed and  well-nourished.   HENT:   Head: Normocephalic and atraumatic.   Nose: Nose normal.   Mouth/Throat: Oropharynx is clear and moist.   Eyes: EOM are normal. Pupils are equal, round, and reactive to light.   Neck: Normal range of motion. Neck supple. No JVD present.   Cardiovascular: Normal heart sounds and intact distal pulses.  An irregularly irregular rhythm present. Exam reveals no gallop and no friction rub.    No murmur heard.  Pulmonary/Chest: She is in respiratory distress (mild). She has wheezes (scattered wheezes throughout). She has rales (rales to bilat bases).   Abdominal: Soft. Bowel sounds are normal. There is no tenderness.   Musculoskeletal: Normal range of motion. She exhibits edema (1+ to BLE).   Neurological: She is alert and oriented to person, place, and time.   Skin: Skin is warm and dry. Capillary refill takes less than 2 seconds.   Psychiatric: She has a normal mood and affect. Her behavior is normal.   Nursing note and vitals reviewed.       Significant Labs:   Results for orders placed or performed during the hospital encounter of 07/15/17   CBC auto differential   Result Value Ref Range    WBC 4.67 3.90 - 12.70 K/uL    RBC 4.15 4.00 - 5.40 M/uL    Hemoglobin 10.6 (L) 12.0 - 16.0 g/dL    Hematocrit 34.8 (L) 37.0 - 48.5 %    MCV 84 82 - 98 fL    MCH 25.5 (L) 27.0 - 31.0 pg    MCHC 30.5 (L) 32.0 - 36.0 %    RDW 19.1 (H) 11.5 - 14.5 %    Platelets 117 (L) 150 - 350 K/uL    MPV 9.9 9.2 - 12.9 fL    Gran # 3.0 1.8 - 7.7 K/uL    Lymph # 0.8 (L) 1.0 - 4.8 K/uL    Mono # 0.6 0.3 - 1.0 K/uL    Eos # 0.3 0.0 - 0.5 K/uL    Baso # 0.02 0.00 - 0.20 K/uL    Gran% 64.4 38.0 - 73.0 %    Lymph% 16.3 (L) 18.0 - 48.0 %    Mono% 13.1 4.0 - 15.0 %    Eosinophil% 5.8 0.0 - 8.0 %    Basophil% 0.4 0.0 - 1.9 %    Differential Method Automated    Comprehensive metabolic panel   Result Value Ref Range    Sodium 143 136 - 145 mmol/L    Potassium 4.3 3.5 - 5.1 mmol/L    Chloride 111 (H) 95 - 110 mmol/L    CO2 26 23 - 29  mmol/L    Glucose 86 70 - 110 mg/dL    BUN, Bld 22 8 - 23 mg/dL    Creatinine 0.9 0.5 - 1.4 mg/dL    Calcium 9.2 8.7 - 10.5 mg/dL    Total Protein 6.6 6.0 - 8.4 g/dL    Albumin 3.2 (L) 3.5 - 5.2 g/dL    Total Bilirubin 0.8 0.1 - 1.0 mg/dL    Alkaline Phosphatase 94 55 - 135 U/L    AST 27 10 - 40 U/L    ALT 18 10 - 44 U/L    Anion Gap 6 (L) 8 - 16 mmol/L    eGFR if African American >60 >60 mL/min/1.73 m^2    eGFR if non African American >60 >60 mL/min/1.73 m^2   Troponin I   Result Value Ref Range    Troponin I 0.040 (H) 0.000 - 0.026 ng/mL   Brain natriuretic peptide   Result Value Ref Range     (H) 0 - 99 pg/mL   Protime-INR   Result Value Ref Range    Prothrombin Time 12.0 9.0 - 12.5 sec    INR 1.2 0.8 - 1.2   APTT   Result Value Ref Range    aPTT 28.5 21.0 - 32.0 sec   Lactic acid, plasma   Result Value Ref Range    Lactate (Lactic Acid) 1.2 0.5 - 2.2 mmol/L   Urinalysis Catheterized   Result Value Ref Range    Specimen UA Urine, Catheterized     Color, UA Yellow Yellow, Straw, Samra    Appearance, UA Clear Clear    pH, UA 6.0 5.0 - 8.0    Specific Gravity, UA >=1.030 (A) 1.005 - 1.030    Protein, UA 2+ (A) Negative    Glucose, UA Negative Negative    Ketones, UA Negative Negative    Bilirubin (UA) Negative Negative    Occult Blood UA Negative Negative    Nitrite, UA Negative Negative    Urobilinogen, UA Negative <2.0 EU/dL    Leukocytes, UA Negative Negative   Urinalysis Microscopic   Result Value Ref Range    RBC, UA 0 0 - 4 /hpf    WBC, UA 8 (H) 0 - 5 /hpf    Bacteria, UA Occasional None-Occ /hpf    Squam Epithel, UA 3 /hpf    Hyaline Casts, UA 0 0-1/lpf /lpf    Microscopic Comment SEE COMMENT      All pertinent labs within the past 24 hours have been reviewed.    Significant Imaging:   Imaging Results          X-Ray Chest AP Portable (Final result)  Result time 07/15/17 12:38:24    Final result by Atif Solis III, MD (07/15/17 12:38:24)                 Impression:     As  above      Electronically signed by: TIN RUSSELL MD  Date:     07/15/17  Time:    12:38              Narrative:    AP chest.        Clinical indication: Congestive heart failure.    Compared to May.    Heart size remains enlarged with a permanent pacing device/defibrillator again noted in position on the right. The lung fields are clear. Minimal vascular prominence without overt congestion/failure.                            I have reviewed all pertinent imaging results/findings within the past 24 hours.    Assessment/Plan:     * Chronic atrial fibrillation with RVR    Cardiology consulted  ACID  cardizem gtt  Cardiac monitoring  bystolic & Xarelto daily - pt last filled both in February - reports she has samples from cardiology        Severe persistent asthma with acute exacerbation    duonebs & Brovana  IV steroids  IV abx initiated in ED  Supplemental oxygen to maintain sats  Last refill of duonebs and symbicort in May            Bronchitis    IV steroids  duonebs and brovana  guaifenesin  Supplemental oxygen to maintain sats            CHF (congestive heart failure)    Last echo 11/2016 shows EF 25-30%  Echo pending  IV bumex, bystolic, lisinopril  Daily weights  Cardiac monitoring  Supplemental oxygen to maintain sats  Cardiology consulted            Non compliance w medication regimen    Patient is unable to verbalize her medications - when I called pharmacy, she has not filled any meds since May - last fill on xarelto and bystolic was February, 2017. Last fill on Bumex was December 2016. When Pt questioned, she reported she has samples and does not need the refills yet.    Patient has home health & 24 hour sitters - be sure we coordinate to facilitate medication compliance upon discharge  Consult to  for d/c planning          Cardiac defibrillator in place    Cardiology consulted  Pt reports she sees Dr. Hernández - unsure of last appt            Essential hypertension    Continue bystolic  - lisinopril added  Low sodium diet            VTE Risk Mitigation         Ordered     rivaroxaban tablet 20 mg  With dinner     Route:  Oral        07/15/17 1357     High Risk of VTE  Once      07/15/17 1459     Place sequential compression device  Until discontinued      07/15/17 1459     Reason for No Pharmacological VTE Prophylaxis  Once      07/15/17 1459        CHEO Horn-MAVERICK  Department of Hospital Medicine   [facility]

## 2017-07-15 NOTE — ASSESSMENT & PLAN NOTE
Last echo 11/2016 shows EF 25-30%  Echo pending  IV bumex, bystolic, lisinopril  Daily weights  Cardiac monitoring  Supplemental oxygen to maintain sats  Cardiology consulted

## 2017-07-15 NOTE — ASSESSMENT & PLAN NOTE
Cardiology consulted  ACID  cardizem gtt  Cardiac monitoring  bystolic & Xarelto daily - pt last filled both in February - reports she has samples from cardiology

## 2017-07-15 NOTE — ASSESSMENT & PLAN NOTE
Patient is unable to verbalize her medications - when I called pharmacy, she has not filled any meds since May - last fill on xarelto and bystolic was February, 2017. Last fill on Bumex was December 2016. When Pt questioned, she reported she has samples and does not need the refills yet.    Patient has home health & 24 hour sitters - be sure we coordinate to facilitate medication compliance upon discharge  Consult to  for d/c planning

## 2017-07-15 NOTE — SUBJECTIVE & OBJECTIVE
Past Medical History:   Diagnosis Date    Asthma     Atrial fibrillation     Blood clot of vein in shoulder area     Cardiac defibrillator in place     Chronic knee pain     Diabetes mellitus type 2 without retinopathy 2016    Diaphragmatic hernia without obstruction and without gangrene 2015    GERD (gastroesophageal reflux disease)     Hypertension        Past Surgical History:   Procedure Laterality Date     SECTION      COLONOSCOPY      KNEE ARTHROSCOPY      UPPER GASTROINTESTINAL ENDOSCOPY         Review of patient's allergies indicates:   Allergen Reactions    Atorvastatin      Other reaction(s): Muscle pain    Codeine      Other reaction(s): Unknown    Digoxin      Other reaction(s): Unknown    Diovan [valsartan] Other (See Comments)     Upset stomach, weakness    Eggs [egg derived]     Morphine      Other reaction(s): Unknown    Naproxen      Other reaction(s): Nausea    Peanut     Propofol      Other reaction(s): delirious    Sulfa (sulfonamide antibiotics)        No current facility-administered medications on file prior to encounter.      Current Outpatient Prescriptions on File Prior to Encounter   Medication Sig    albuterol 90 mcg/actuation inhaler Inhale 2 puffs into the lungs every 4 (four) hours as needed for Wheezing. Rescue    albuterol-ipratropium 2.5mg-0.5mg/3mL (DUO-NEB) 0.5 mg-3 mg(2.5 mg base)/3 mL nebulizer solution Take 3 mLs by nebulization every 6 (six) hours as needed for Wheezing. Rescue    budesonide-formoterol 160-4.5 mcg (SYMBICORT) 160-4.5 mcg/actuation HFAA Inhale 2 puffs into the lungs every 12 (twelve) hours. Wash out mouth after using    bumetanide (BUMEX) 2 MG tablet Take 1 tablet (2 mg total) by mouth 2 (two) times daily. 1 Tablet Oral Every day (Patient taking differently: Take 4 mg by mouth 2 (two) times daily. 1 Tablet Oral Every day)    calcium-Mg-zinc-hc #122-vit D3 250-125-3.7-100 ui-vv-ep-unit Tab Take 1 tablet by mouth  Daily.    cholecalciferol, vitamin D3, 1,000 unit capsule Take 1 capsule (1,000 Units total) by mouth once daily.    fluticasone (FLONASE) 50 mcg/actuation nasal spray 2 sprays by Each Nare route once daily.    inhalation spacing device Use as directed for inhalation.    montelukast (SINGULAIR) 10 mg tablet Take 1 tablet (10 mg total) by mouth every evening.    nebivolol (BYSTOLIC) 2.5 MG Tab Take 10 mg by mouth once daily.    nebulizer and compressor Tesha 1 Device by Misc.(Non-Drug; Combo Route) route every 4 (four) hours as needed.    omeprazole (PRILOSEC) 20 MG capsule Take 1 capsule (20 mg total) by mouth once daily.    potassium chloride (KLOR-CON) 10 MEQ TbSR Take 2 tablets (20 mEq total) by mouth once daily. 1 Tablet Extended Release Oral Every day    predniSONE (DELTASONE) 20 MG tablet 3 daily x 3 days, 2 daily x 3 days, 1 daily x 3 days, 1/2 daily x 4 days.    rivaroxaban (XARELTO) 15 mg Tab Take 1 tablet (15 mg total) by mouth daily with dinner or evening meal.    sodium bicarb-sodium chloride Pack 1 packet by Nasal route 2 (two) times daily.     Family History     Problem Relation (Age of Onset)    Hypertension Mother, Father        Social History Main Topics    Smoking status: Never Smoker    Smokeless tobacco: Never Used    Alcohol use No    Drug use: No    Sexual activity: Yes     Partners: Male     Review of Systems   Constitutional: Negative.    HENT: Negative.    Eyes: Negative.    Respiratory: Positive for cough, shortness of breath and wheezing.    Cardiovascular: Negative.  Negative for chest pain, palpitations and leg swelling.   Gastrointestinal: Negative.    Endocrine: Negative.    Genitourinary: Negative.    Musculoskeletal: Negative.    Skin: Negative.    Allergic/Immunologic: Negative.    Neurological: Negative.  Negative for dizziness and weakness.   Hematological: Negative.    Psychiatric/Behavioral: Negative.    All other systems reviewed and are negative.    Objective:      Vital Signs (Most Recent):  Temp: 98.1 °F (36.7 °C) (07/15/17 1503)  Pulse: 109 (07/15/17 1503)  Resp: (!) 28 (07/15/17 1503)  BP: 127/88 (07/15/17 1503)  SpO2: 100 % (07/15/17 1503) Vital Signs (24h Range):  Temp:  [98.1 °F (36.7 °C)-99.1 °F (37.3 °C)] 98.1 °F (36.7 °C)  Pulse:  [109-116] 109  Resp:  [17-28] 28  SpO2:  [96 %-100 %] 100 %  BP: (110-148)/() 127/88     Weight: 84.4 kg (186 lb)  Body mass index is 31.93 kg/m².    Physical Exam   Constitutional: She is oriented to person, place, and time. She appears well-developed and well-nourished.   HENT:   Head: Normocephalic and atraumatic.   Nose: Nose normal.   Mouth/Throat: Oropharynx is clear and moist.   Eyes: EOM are normal. Pupils are equal, round, and reactive to light.   Neck: Normal range of motion. Neck supple. No JVD present.   Cardiovascular: Normal heart sounds and intact distal pulses.  An irregularly irregular rhythm present. Exam reveals no gallop and no friction rub.    No murmur heard.  Pulmonary/Chest: She is in respiratory distress (mild). She has wheezes (scattered wheezes throughout). She has rales (rales to bilat bases).   Abdominal: Soft. Bowel sounds are normal. There is no tenderness.   Musculoskeletal: Normal range of motion. She exhibits edema (1+ to BLE).   Neurological: She is alert and oriented to person, place, and time.   Skin: Skin is warm and dry. Capillary refill takes less than 2 seconds.   Psychiatric: She has a normal mood and affect. Her behavior is normal.   Nursing note and vitals reviewed.       Significant Labs:   Results for orders placed or performed during the hospital encounter of 07/15/17   CBC auto differential   Result Value Ref Range    WBC 4.67 3.90 - 12.70 K/uL    RBC 4.15 4.00 - 5.40 M/uL    Hemoglobin 10.6 (L) 12.0 - 16.0 g/dL    Hematocrit 34.8 (L) 37.0 - 48.5 %    MCV 84 82 - 98 fL    MCH 25.5 (L) 27.0 - 31.0 pg    MCHC 30.5 (L) 32.0 - 36.0 %    RDW 19.1 (H) 11.5 - 14.5 %    Platelets 117 (L) 150  - 350 K/uL    MPV 9.9 9.2 - 12.9 fL    Gran # 3.0 1.8 - 7.7 K/uL    Lymph # 0.8 (L) 1.0 - 4.8 K/uL    Mono # 0.6 0.3 - 1.0 K/uL    Eos # 0.3 0.0 - 0.5 K/uL    Baso # 0.02 0.00 - 0.20 K/uL    Gran% 64.4 38.0 - 73.0 %    Lymph% 16.3 (L) 18.0 - 48.0 %    Mono% 13.1 4.0 - 15.0 %    Eosinophil% 5.8 0.0 - 8.0 %    Basophil% 0.4 0.0 - 1.9 %    Differential Method Automated    Comprehensive metabolic panel   Result Value Ref Range    Sodium 143 136 - 145 mmol/L    Potassium 4.3 3.5 - 5.1 mmol/L    Chloride 111 (H) 95 - 110 mmol/L    CO2 26 23 - 29 mmol/L    Glucose 86 70 - 110 mg/dL    BUN, Bld 22 8 - 23 mg/dL    Creatinine 0.9 0.5 - 1.4 mg/dL    Calcium 9.2 8.7 - 10.5 mg/dL    Total Protein 6.6 6.0 - 8.4 g/dL    Albumin 3.2 (L) 3.5 - 5.2 g/dL    Total Bilirubin 0.8 0.1 - 1.0 mg/dL    Alkaline Phosphatase 94 55 - 135 U/L    AST 27 10 - 40 U/L    ALT 18 10 - 44 U/L    Anion Gap 6 (L) 8 - 16 mmol/L    eGFR if African American >60 >60 mL/min/1.73 m^2    eGFR if non African American >60 >60 mL/min/1.73 m^2   Troponin I   Result Value Ref Range    Troponin I 0.040 (H) 0.000 - 0.026 ng/mL   Brain natriuretic peptide   Result Value Ref Range     (H) 0 - 99 pg/mL   Protime-INR   Result Value Ref Range    Prothrombin Time 12.0 9.0 - 12.5 sec    INR 1.2 0.8 - 1.2   APTT   Result Value Ref Range    aPTT 28.5 21.0 - 32.0 sec   Lactic acid, plasma   Result Value Ref Range    Lactate (Lactic Acid) 1.2 0.5 - 2.2 mmol/L   Urinalysis Catheterized   Result Value Ref Range    Specimen UA Urine, Catheterized     Color, UA Yellow Yellow, Straw, Samra    Appearance, UA Clear Clear    pH, UA 6.0 5.0 - 8.0    Specific Gravity, UA >=1.030 (A) 1.005 - 1.030    Protein, UA 2+ (A) Negative    Glucose, UA Negative Negative    Ketones, UA Negative Negative    Bilirubin (UA) Negative Negative    Occult Blood UA Negative Negative    Nitrite, UA Negative Negative    Urobilinogen, UA Negative <2.0 EU/dL    Leukocytes, UA Negative Negative    Urinalysis Microscopic   Result Value Ref Range    RBC, UA 0 0 - 4 /hpf    WBC, UA 8 (H) 0 - 5 /hpf    Bacteria, UA Occasional None-Occ /hpf    Squam Epithel, UA 3 /hpf    Hyaline Casts, UA 0 0-1/lpf /lpf    Microscopic Comment SEE COMMENT      All pertinent labs within the past 24 hours have been reviewed.    Significant Imaging:   Imaging Results          X-Ray Chest AP Portable (Final result)  Result time 07/15/17 12:38:24    Final result by Atif Solis III, MD (07/15/17 12:38:24)                 Impression:     As above      Electronically signed by: ATIF SOLIS MD  Date:     07/15/17  Time:    12:38              Narrative:    AP chest.        Clinical indication: Congestive heart failure.    Compared to May.    Heart size remains enlarged with a permanent pacing device/defibrillator again noted in position on the right. The lung fields are clear. Minimal vascular prominence without overt congestion/failure.                            I have reviewed all pertinent imaging results/findings within the past 24 hours.

## 2017-07-15 NOTE — ED PROVIDER NOTES
SCRIBE #1 NOTE: I, Lelia Chopra, am scribing for, and in the presence of, Kari Florentino MD. I have scribed the entire note.      History      Chief Complaint   Patient presents with    Cough     sent from clinic for workup. Reports cough-clear sputum, wheezing, sob last night, congestion.        Review of patient's allergies indicates:   Allergen Reactions    Atorvastatin      Other reaction(s): Muscle pain    Codeine      Other reaction(s): Unknown    Digoxin      Other reaction(s): Unknown    Diovan [valsartan] Other (See Comments)     Upset stomach, weakness    Eggs [egg derived]     Morphine      Other reaction(s): Unknown    Naproxen      Other reaction(s): Nausea    Peanut     Propofol      Other reaction(s): delirious    Sulfa (sulfonamide antibiotics)         HPI   HPI    7/15/2017, 12:04 PM   History obtained from the patient      History of Present Illness: Carlie Valdez is a 71 y.o. female patient with PMHx of Asthma who presents to the Emergency Department for productive cough. Symptoms are constant and moderate in severity. Pt notes rough, clear sputum with cough. Pt was sent to ED from [facility] clinic today for work up. No mitigating or exacerbating factors reported. Associated sxs include SOB. Patient denies any fever, n/v/d, abd pain, wheezing, HA, dizziness, weakness, lightheadedness, chills, leg swelling CP, and all other sxs at this time. Pt was hospitalized at [facility] for same sxs three weeks ago. No further complaints or concerns at this time.       Arrival mode: Personal vehicle    PCP: Johanna Guzman MD       Past Medical History:  Past Medical History:   Diagnosis Date    Asthma     Atrial fibrillation     Blood clot of vein in shoulder area     Cardiac defibrillator in place     Chronic knee pain     Diabetes mellitus type 2 without retinopathy 12/19/2016    Diaphragmatic hernia without obstruction and without gangrene 9/14/2015    GERD  (gastroesophageal reflux disease)     Hypertension        Past Surgical History:  Past Surgical History:   Procedure Laterality Date     SECTION      COLONOSCOPY      KNEE ARTHROSCOPY      UPPER GASTROINTESTINAL ENDOSCOPY           Family History:  Family History   Problem Relation Age of Onset    Hypertension Mother     Hypertension Father     Colon cancer Neg Hx     Stomach cancer Neg Hx        Social History:  Social History     Social History Main Topics    Smoking status: Never Smoker    Smokeless tobacco: Never Used    Alcohol use No    Drug use: No    Sexual activity: Yes     Partners: Male       ROS   Review of Systems   Constitutional: Negative for fever.   HENT: Negative for sore throat.    Respiratory: Positive for cough (productive) and shortness of breath. Negative for wheezing.    Cardiovascular: Negative for chest pain.   Gastrointestinal: Negative for abdominal pain, blood in stool, constipation, diarrhea, nausea and vomiting.   Genitourinary: Negative for decreased urine volume, difficulty urinating, dysuria, flank pain and hematuria.   Musculoskeletal: Negative for back pain.   Skin: Negative for rash.   Neurological: Negative for dizziness, weakness, light-headedness and headaches.   Hematological: Does not bruise/bleed easily.       Physical Exam      Initial Vitals [07/15/17 1158]   BP Pulse Resp Temp SpO2   (!) 145/90 (!) 115 20 98.2 °F (36.8 °C) 99 %      MAP       108.33          Physical Exam  Nursing Notes and Vital Signs Reviewed.  Constitutional: Patient is in mild distress. Well-developed and well-nourished.  Head: Atraumatic. Normocephalic.  Eyes: PERRL. EOM intact. Conjunctivae are not pale. No scleral icterus.  ENT: Mucous membranes are moist. Oropharynx is clear and symmetric.    Neck: Supple. Full ROM. No lymphadenopathy.  Cardiovascular: Tachycardia rate. Irregularly irregular rhythm. No murmurs, rubs, or gallops. Distal pulses are 2+ and  "symmetric.  Pulmonary/Chest: No respiratory distress. Diffuse bilateral wheezing and rhonchi. No rales.  Abdominal: Soft and non-distended.  There is no tenderness.  No rebound, guarding, or rigidity. Good bowel sounds.  Musculoskeletal: Moves all extremities. No obvious deformities. BLE trace edema. No calf tenderness.  Skin: Warm and dry.  Neurological:  Alert, awake, and appropriate.  Normal speech.  No acute focal neurological deficits are appreciated.  Psychiatric: Normal affect. Good eye contact. Appropriate in content.    ED Course    Procedures  ED Vital Signs:  Vitals:    07/15/17 1158 07/15/17 1216 07/15/17 1219 07/15/17 1224   BP: (!) 145/90      Pulse: (!) 115 (!) 114 (!) 115 (!) 114   Resp: 20 (!) 23 17 18   Temp: 98.2 °F (36.8 °C)      TempSrc: Oral      SpO2: 99% 98% 98% 97%   Weight: 84.4 kg (186 lb)      Height: 5' 4" (1.626 m)       07/15/17 1227 07/15/17 1229 07/15/17 1233 07/15/17 1236   BP: (!) 147/98  114/66    Pulse: (!) 114 (!) 114 (!) 114 (!) 114   Resp: (!) 22 19 (!) 24    Temp:       TempSrc:       SpO2: 100% 98% 99%    Weight:       Height:        07/15/17 1332 07/15/17 1345 07/15/17 1403   BP: (!) 139/101 (!) 146/88 (!) 148/70   Pulse: (!) 114 (!) 114 (!) 114   Resp: (!) 27 20 (!) 25   Temp:      TempSrc:      SpO2: 98% 98% 100%   Weight:      Height:          Abnormal Lab Results:  Labs Reviewed   CBC W/ AUTO DIFFERENTIAL - Abnormal; Notable for the following:        Result Value    Hemoglobin 10.6 (*)     Hematocrit 34.8 (*)     MCH 25.5 (*)     MCHC 30.5 (*)     RDW 19.1 (*)     Platelets 117 (*)     Lymph # 0.8 (*)     Lymph% 16.3 (*)     All other components within normal limits   COMPREHENSIVE METABOLIC PANEL - Abnormal; Notable for the following:     Chloride 111 (*)     Albumin 3.2 (*)     Anion Gap 6 (*)     All other components within normal limits   TROPONIN I - Abnormal; Notable for the following:     Troponin I 0.040 (*)     All other components within normal limits   B-TYPE " NATRIURETIC PEPTIDE - Abnormal; Notable for the following:      (*)     All other components within normal limits   URINALYSIS - Abnormal; Notable for the following:     Specific Gravity, UA >=1.030 (*)     Protein, UA 2+ (*)     All other components within normal limits   URINALYSIS MICROSCOPIC - Abnormal; Notable for the following:     WBC, UA 8 (*)     All other components within normal limits   CULTURE, BLOOD   CULTURE, BLOOD   CULTURE, RESPIRATORY   PROTIME-INR   APTT   LACTIC ACID, PLASMA        All Lab Results:  Results for orders placed or performed during the hospital encounter of 07/15/17   CBC auto differential   Result Value Ref Range    WBC 4.67 3.90 - 12.70 K/uL    RBC 4.15 4.00 - 5.40 M/uL    Hemoglobin 10.6 (L) 12.0 - 16.0 g/dL    Hematocrit 34.8 (L) 37.0 - 48.5 %    MCV 84 82 - 98 fL    MCH 25.5 (L) 27.0 - 31.0 pg    MCHC 30.5 (L) 32.0 - 36.0 %    RDW 19.1 (H) 11.5 - 14.5 %    Platelets 117 (L) 150 - 350 K/uL    MPV 9.9 9.2 - 12.9 fL    Gran # 3.0 1.8 - 7.7 K/uL    Lymph # 0.8 (L) 1.0 - 4.8 K/uL    Mono # 0.6 0.3 - 1.0 K/uL    Eos # 0.3 0.0 - 0.5 K/uL    Baso # 0.02 0.00 - 0.20 K/uL    Gran% 64.4 38.0 - 73.0 %    Lymph% 16.3 (L) 18.0 - 48.0 %    Mono% 13.1 4.0 - 15.0 %    Eosinophil% 5.8 0.0 - 8.0 %    Basophil% 0.4 0.0 - 1.9 %    Differential Method Automated    Comprehensive metabolic panel   Result Value Ref Range    Sodium 143 136 - 145 mmol/L    Potassium 4.3 3.5 - 5.1 mmol/L    Chloride 111 (H) 95 - 110 mmol/L    CO2 26 23 - 29 mmol/L    Glucose 86 70 - 110 mg/dL    BUN, Bld 22 8 - 23 mg/dL    Creatinine 0.9 0.5 - 1.4 mg/dL    Calcium 9.2 8.7 - 10.5 mg/dL    Total Protein 6.6 6.0 - 8.4 g/dL    Albumin 3.2 (L) 3.5 - 5.2 g/dL    Total Bilirubin 0.8 0.1 - 1.0 mg/dL    Alkaline Phosphatase 94 55 - 135 U/L    AST 27 10 - 40 U/L    ALT 18 10 - 44 U/L    Anion Gap 6 (L) 8 - 16 mmol/L    eGFR if African American >60 >60 mL/min/1.73 m^2    eGFR if non African American >60 >60 mL/min/1.73 m^2    Troponin I   Result Value Ref Range    Troponin I 0.040 (H) 0.000 - 0.026 ng/mL   Brain natriuretic peptide   Result Value Ref Range     (H) 0 - 99 pg/mL   Protime-INR   Result Value Ref Range    Prothrombin Time 12.0 9.0 - 12.5 sec    INR 1.2 0.8 - 1.2   APTT   Result Value Ref Range    aPTT 28.5 21.0 - 32.0 sec   Lactic acid, plasma   Result Value Ref Range    Lactate (Lactic Acid) 1.2 0.5 - 2.2 mmol/L   Urinalysis Catheterized   Result Value Ref Range    Specimen UA Urine, Catheterized     Color, UA Yellow Yellow, Straw, Samra    Appearance, UA Clear Clear    pH, UA 6.0 5.0 - 8.0    Specific Gravity, UA >=1.030 (A) 1.005 - 1.030    Protein, UA 2+ (A) Negative    Glucose, UA Negative Negative    Ketones, UA Negative Negative    Bilirubin (UA) Negative Negative    Occult Blood UA Negative Negative    Nitrite, UA Negative Negative    Urobilinogen, UA Negative <2.0 EU/dL    Leukocytes, UA Negative Negative   Urinalysis Microscopic   Result Value Ref Range    RBC, UA 0 0 - 4 /hpf    WBC, UA 8 (H) 0 - 5 /hpf    Bacteria, UA Occasional None-Occ /hpf    Squam Epithel, UA 3 /hpf    Hyaline Casts, UA 0 0-1/lpf /lpf    Microscopic Comment SEE COMMENT        Imaging Results:  Imaging Results          X-Ray Chest AP Portable (Final result)  Result time 07/15/17 12:38:24    Final result by Atif Solis III, MD (07/15/17 12:38:24)                 Impression:     As above      Electronically signed by: ATIF SOLIS MD  Date:     07/15/17  Time:    12:38              Narrative:    AP chest.        Clinical indication: Congestive heart failure.    Compared to May.    Heart size remains enlarged with a permanent pacing device/defibrillator again noted in position on the right. The lung fields are clear. Minimal vascular prominence without overt congestion/failure.                             The EKG was ordered, reviewed, and independently interpreted by the ED provider.  Interpretation time: 12:10  Rate: 114  BPM  Rhythm: Atrial flutter with 2:1 AV conduction  Interpretation: Left axis deviation. Nonspecific intraventricular block. Cannot rule out septal infarct. T wave abnormality. No STEMI.         The Emergency Provider reviewed the vital signs and test results, which are outlined above.    ED Discussion     1:45 PM: Re-evaluated pt. I have discussed test results, shared treatment plan, and the need for admission with patient and family at bedside. Pt and family express understanding at this time and agree with all information. All questions answered. Pt and family have no further questions or concerns at this time. Pt is ready for admit.    1:48 PM: Discussed case with RIGOBERTO Dodge ([facility]). Echo agrees with current care and management of pt and accepts admission.   Admitting Service: Hospital medicine   Admitting Physician: Dr. Ayala  Admit to: Telemetry    ED Medication(s):  Medications   albuterol-ipratropium 2.5mg-0.5mg/3mL nebulizer solution 3 mL (3 mLs Nebulization Given 7/15/17 1229)   moxifloxacin 400 mg/250 mL IVPB 400 mg (400 mg Intravenous New Bag 7/15/17 1352)   diltiaZEM 125 mg in dextrose 5% 125 mL infusion (non-titrating) (10 mg/hr Intravenous New Bag 7/15/17 1352)   rivaroxaban tablet 20 mg (not administered)   rivaroxaban tablet 20 mg (not administered)   methylPREDNISolone sod suc(PF) injection 125 mg (125 mg Intravenous Given 7/15/17 1223)   diltiaZEM injection 20 mg (20 mg Intravenous Given 7/15/17 1226)       New Prescriptions    No medications on file             Medical Decision Making    Medical Decision Making:   Clinical Tests:   Lab Tests: Reviewed and Ordered  Radiological Study: Reviewed and Ordered  Medical Tests: Reviewed and Ordered           Scribe Attestation:   Scribe #1: I performed the above scribed service and the documentation accurately describes the services I performed. I attest to the accuracy of the note.    Attending:   Physician Attestation Statement for Scribe  #1: I, Kari Florentino MD, personally performed the services described in this documentation, as scribed by Lelia Chopra, in my presence, and it is both accurate and complete.          Clinical Impression       ICD-10-CM ICD-9-CM   1. Chronic atrial fibrillation with RVR I48.2 427.31   2. Tachycardia R00.0 785.0   3. Asthma exacerbation, non-allergic, severe persistent J45.51 493.12   4. Bronchitis J40 490   5. Elevated troponin R74.8 790.6   6. Chronic anticoagulation Z79.01 V58.61       Disposition:   Disposition: Admitted  Condition: Fair         Kari Florentino MD  07/15/17 4168

## 2017-07-15 NOTE — ASSESSMENT & PLAN NOTE
Last echo 11/2016 shows EF 25-30%  Echo pending  IV bumex, bystolic, lisinopril  Daily weights  Cardiac diet  Cardiac monitoring  Supplemental oxygen to maintain sats  Cardiology consulted

## 2017-07-15 NOTE — HPI
70 y/o female presents to ED with c/o SOB. PMH includes CHF-EF 25%, Severe, persistent Asthma, A-fib, and HTN. Patient is non-compliant with medications, has not filled any medications since May, and has not filled heart medications since February. Associated symptoms include cough, clear sputum, and wheezing. Pt has tried nebulizer tx and inhalers with no relief. Denies any exacerbating or mitigating factors. Patient denies any fever, n/v/d, abd pain, HA, dizziness, weakness, lightheadedness, chills, leg swelling, and Cp. Pt was hospitalized at [facility] for same sxs three weeks ago and reports she believes she has bronchitis. No further complaints or concerns at this time. She will be admitted to Medicine for IV abx, steroids, cards consult and symptom management under the care of Hospital Medicine.

## 2017-07-15 NOTE — ASSESSMENT & PLAN NOTE
duonebs & Brovana  IV steroids  IV abx initiated in ED  Supplemental oxygen to maintain sats  Last refill of duonebs and symbicort in May

## 2017-07-15 NOTE — PROGRESS NOTES
Subjective:       Patient ID: Carlie Valdez is a 71 y.o. female.    Chief Complaint: Wheezing      HPI   Ms. Valdez is here today for complaints of congestion.   She has  A significant medical history of asthma.  Her sitter states she did not sleep well last night as she was coughing. She is followed by pulmonology at [facility] for her chronic asthma.   She states she is coughing constantly. She states she feels short of breath.   She states the cough is productive of clear sputum.   She was admitted 3 weeks ago for similar complaint.   She denies any fever, abdominal pain, nausea, vomiting, diarrhea.   She feels that she just need antibiotics.   Her sitter states she had a breathing treatment this morning.     Review of Systems   Respiratory: Positive for cough and wheezing.    Cardiovascular: Negative for chest pain.   Gastrointestinal: Negative for abdominal pain and nausea.       Medication List with Changes/Refills   Current Medications    ALBUTEROL 90 MCG/ACTUATION INHALER    Inhale 2 puffs into the lungs every 4 (four) hours as needed for Wheezing. Rescue    ALBUTEROL-IPRATROPIUM 2.5MG-0.5MG/3ML (DUO-NEB) 0.5 MG-3 MG(2.5 MG BASE)/3 ML NEBULIZER SOLUTION    Take 3 mLs by nebulization every 6 (six) hours as needed for Wheezing. Rescue    BUDESONIDE-FORMOTEROL 160-4.5 MCG (SYMBICORT) 160-4.5 MCG/ACTUATION HFAA    Inhale 2 puffs into the lungs every 12 (twelve) hours. Wash out mouth after using    BUMETANIDE (BUMEX) 2 MG TABLET    Take 1 tablet (2 mg total) by mouth 2 (two) times daily. 1 Tablet Oral Every day    CALCIUM-MG-ZINC-HC #122-VIT D3 250-125-3.7-100 OA-VQ-YV-UNIT TAB    Take 1 tablet by mouth Daily.    CHOLECALCIFEROL, VITAMIN D3, 1,000 UNIT CAPSULE    Take 1 capsule (1,000 Units total) by mouth once daily.    FLUTICASONE (FLONASE) 50 MCG/ACTUATION NASAL SPRAY    2 sprays by Each Nare route once daily.    INHALATION SPACING DEVICE    Use as directed for inhalation.    MONTELUKAST  (SINGULAIR) 10 MG TABLET    Take 1 tablet (10 mg total) by mouth every evening.    NEBIVOLOL (BYSTOLIC) 2.5 MG TAB    Take 10 mg by mouth once daily.    NEBULIZER AND COMPRESSOR CHADD    1 Device by Misc.(Non-Drug; Combo Route) route every 4 (four) hours as needed.    OMEPRAZOLE (PRILOSEC) 20 MG CAPSULE    Take 1 capsule (20 mg total) by mouth once daily.    POTASSIUM CHLORIDE (KLOR-CON) 10 MEQ TBSR    Take 2 tablets (20 mEq total) by mouth once daily. 1 Tablet Extended Release Oral Every day    PREDNISONE (DELTASONE) 20 MG TABLET    3 daily x 3 days, 2 daily x 3 days, 1 daily x 3 days, 1/2 daily x 4 days.    RIVAROXABAN (XARELTO) 15 MG TAB    Take 1 tablet (15 mg total) by mouth daily with dinner or evening meal.    SODIUM BICARB-SODIUM CHLORIDE PACK    1 packet by Nasal route 2 (two) times daily.       Patient Active Problem List   Diagnosis    Atrial fibrillation    GERD (gastroesophageal reflux disease)    Essential hypertension    Childhood asthma without complication    Cardiac defibrillator in place    Abnormal CT scan, gastrointestinal tract    Diaphragmatic hernia without obstruction and without gangrene    Chronic knee pain    Obesity, Class I, BMI 30-34.9    Vitamin D deficiency    PVD (peripheral vascular disease)    LBBB (left bundle branch block)    Bilateral leg edema    ARF (acute renal failure)    Pneumonia due to infectious organism    Cough    Chronic kidney disease (CKD), stage III (moderate)    Nuclear sclerosis of right eye    Pseudophakia of left eye    Severe persistent asthma with acute exacerbation    Pulmonary infiltrate in right lung on chest x-ray    Acute sinusitis    Uncomplicated severe persistent asthma         Objective:     Physical Exam   Constitutional: She is oriented to person, place, and time. She appears well-developed and well-nourished. No distress.   HENT:   Head: Normocephalic and atraumatic.   Right Ear: External ear normal.   Left Ear: External ear  normal.   Eyes: EOM are normal. Right eye exhibits no discharge. Left eye exhibits no discharge.   Cardiovascular:   Tachycardia   Irregular      Pulmonary/Chest: She has wheezes.   Coarse breath sounds   Musculoskeletal: She exhibits edema.   Neurological: She is alert and oriented to person, place, and time.   Skin: Skin is warm and dry. She is not diaphoretic. No erythema.   Psychiatric: She has a normal mood and affect.   Vitals reviewed.    Not able to complete a full sentence without appearing short of breath     Vitals:    07/15/17 1019   BP: 110/84   Pulse: (!) 116   Temp: 99.1 °F (37.3 °C)   02 sat 96%     Assessment/  PLAN     SOB (shortness of breath)  - due to tachycardia, wheezing and coarse breath sound , advised pt to go to ER for full workup and possible admission       Odette Lowe MD  [facility]

## 2017-07-16 LAB
ANION GAP SERPL CALC-SCNC: 12 MMOL/L
BASOPHILS # BLD AUTO: 0 K/UL
BASOPHILS NFR BLD: 0 %
BUN SERPL-MCNC: 29 MG/DL
CALCIUM SERPL-MCNC: 9.5 MG/DL
CHLORIDE SERPL-SCNC: 110 MMOL/L
CHOLEST/HDLC SERPL: 3.2 {RATIO}
CO2 SERPL-SCNC: 22 MMOL/L
CREAT SERPL-MCNC: 1.2 MG/DL
DIFFERENTIAL METHOD: ABNORMAL
EOSINOPHIL # BLD AUTO: 0 K/UL
EOSINOPHIL NFR BLD: 0 %
ERYTHROCYTE [DISTWIDTH] IN BLOOD BY AUTOMATED COUNT: 19 %
EST. GFR  (AFRICAN AMERICAN): 53 ML/MIN/1.73 M^2
EST. GFR  (NON AFRICAN AMERICAN): 46 ML/MIN/1.73 M^2
ESTIMATED PA SYSTOLIC PRESSURE: 48.87
GLUCOSE SERPL-MCNC: 163 MG/DL
HCT VFR BLD AUTO: 33.9 %
HDL/CHOLESTEROL RATIO: 30.9 %
HDLC SERPL-MCNC: 204 MG/DL
HDLC SERPL-MCNC: 63 MG/DL
HGB BLD-MCNC: 10.4 G/DL
LDLC SERPL CALC-MCNC: 130.2 MG/DL
LYMPHOCYTES # BLD AUTO: 0.4 K/UL
LYMPHOCYTES NFR BLD: 9.4 %
MCH RBC QN AUTO: 25.6 PG
MCHC RBC AUTO-ENTMCNC: 30.7 %
MCV RBC AUTO: 83 FL
MITRAL VALVE REGURGITATION: ABNORMAL
MONOCYTES # BLD AUTO: 0.1 K/UL
MONOCYTES NFR BLD: 2.7 %
NEUTROPHILS # BLD AUTO: 3.6 K/UL
NEUTROPHILS NFR BLD: 87.9 %
NONHDLC SERPL-MCNC: 141 MG/DL
PLATELET # BLD AUTO: 132 K/UL
PMV BLD AUTO: 10.2 FL
POTASSIUM SERPL-SCNC: 4.2 MMOL/L
RBC # BLD AUTO: 4.07 M/UL
RETIRED EF AND QEF - SEE NOTES: 25 (ref 55–65)
SODIUM SERPL-SCNC: 144 MMOL/L
TRICUSPID VALVE REGURGITATION: ABNORMAL
TRIGL SERPL-MCNC: 54 MG/DL
TROPONIN I SERPL DL<=0.01 NG/ML-MCNC: 0.02 NG/ML
WBC # BLD AUTO: 4.14 K/UL

## 2017-07-16 PROCEDURE — 97535 SELF CARE MNGMENT TRAINING: CPT

## 2017-07-16 PROCEDURE — 63600175 PHARM REV CODE 636 W HCPCS: Performed by: NURSE PRACTITIONER

## 2017-07-16 PROCEDURE — 85025 COMPLETE CBC W/AUTO DIFF WBC: CPT

## 2017-07-16 PROCEDURE — 99223 1ST HOSP IP/OBS HIGH 75: CPT | Mod: ,,, | Performed by: INTERNAL MEDICINE

## 2017-07-16 PROCEDURE — G8987 SELF CARE CURRENT STATUS: HCPCS | Mod: CJ

## 2017-07-16 PROCEDURE — G8988 SELF CARE GOAL STATUS: HCPCS | Mod: CI

## 2017-07-16 PROCEDURE — 63600175 PHARM REV CODE 636 W HCPCS: Performed by: EMERGENCY MEDICINE

## 2017-07-16 PROCEDURE — 97165 OT EVAL LOW COMPLEX 30 MIN: CPT

## 2017-07-16 PROCEDURE — 25000003 PHARM REV CODE 250: Performed by: EMERGENCY MEDICINE

## 2017-07-16 PROCEDURE — 93306 TTE W/DOPPLER COMPLETE: CPT

## 2017-07-16 PROCEDURE — 84484 ASSAY OF TROPONIN QUANT: CPT

## 2017-07-16 PROCEDURE — 36415 COLL VENOUS BLD VENIPUNCTURE: CPT

## 2017-07-16 PROCEDURE — 27000221 HC OXYGEN, UP TO 24 HOURS

## 2017-07-16 PROCEDURE — 25000003 PHARM REV CODE 250: Performed by: NURSE PRACTITIONER

## 2017-07-16 PROCEDURE — 93306 TTE W/DOPPLER COMPLETE: CPT | Mod: 26,,, | Performed by: INTERNAL MEDICINE

## 2017-07-16 PROCEDURE — 25000242 PHARM REV CODE 250 ALT 637 W/ HCPCS: Performed by: INTERNAL MEDICINE

## 2017-07-16 PROCEDURE — G8989 SELF CARE D/C STATUS: HCPCS | Mod: CI

## 2017-07-16 PROCEDURE — 80048 BASIC METABOLIC PNL TOTAL CA: CPT

## 2017-07-16 PROCEDURE — 21400001 HC TELEMETRY ROOM

## 2017-07-16 PROCEDURE — 94640 AIRWAY INHALATION TREATMENT: CPT

## 2017-07-16 RX ORDER — DILTIAZEM HCL/D5W 125 MG/125
5 PLASTIC BAG, INJECTION (ML) INTRAVENOUS CONTINUOUS
Status: DISCONTINUED | OUTPATIENT
Start: 2017-07-16 | End: 2017-07-16

## 2017-07-16 RX ORDER — CARVEDILOL 12.5 MG/1
12.5 TABLET ORAL 2 TIMES DAILY
Status: DISCONTINUED | OUTPATIENT
Start: 2017-07-16 | End: 2017-07-16

## 2017-07-16 RX ORDER — MOXIFLOXACIN HYDROCHLORIDE 400 MG/250ML
400 INJECTION, SOLUTION INTRAVENOUS
Status: DISCONTINUED | OUTPATIENT
Start: 2017-07-16 | End: 2017-07-18

## 2017-07-16 RX ORDER — BUDESONIDE 0.25 MG/2ML
0.25 INHALANT ORAL DAILY
Status: DISCONTINUED | OUTPATIENT
Start: 2017-07-17 | End: 2017-07-17

## 2017-07-16 RX ORDER — IPRATROPIUM BROMIDE AND ALBUTEROL SULFATE 2.5; .5 MG/3ML; MG/3ML
3 SOLUTION RESPIRATORY (INHALATION) EVERY 4 HOURS PRN
Status: DISCONTINUED | OUTPATIENT
Start: 2017-07-16 | End: 2017-07-17

## 2017-07-16 RX ORDER — CARVEDILOL 12.5 MG/1
12.5 TABLET ORAL 2 TIMES DAILY
Status: DISCONTINUED | OUTPATIENT
Start: 2017-07-16 | End: 2017-07-18 | Stop reason: HOSPADM

## 2017-07-16 RX ADMIN — POLYETHYLENE GLYCOL 3350 17 G: 17 POWDER, FOR SOLUTION ORAL at 08:07

## 2017-07-16 RX ADMIN — ARFORMOTEROL TARTRATE 15 MCG: 15 SOLUTION RESPIRATORY (INHALATION) at 07:07

## 2017-07-16 RX ADMIN — PANTOPRAZOLE SODIUM 600 MG: 40 TABLET, DELAYED RELEASE ORAL at 08:07

## 2017-07-16 RX ADMIN — METHYLPREDNISOLONE SODIUM SUCCINATE 80 MG: 125 INJECTION, POWDER, FOR SOLUTION INTRAMUSCULAR; INTRAVENOUS at 09:07

## 2017-07-16 RX ADMIN — PANTOPRAZOLE SODIUM 600 MG: 40 TABLET, DELAYED RELEASE ORAL at 09:07

## 2017-07-16 RX ADMIN — BUMETANIDE 2 MG: 0.25 INJECTION INTRAMUSCULAR; INTRAVENOUS at 09:07

## 2017-07-16 RX ADMIN — POTASSIUM CHLORIDE 20 MEQ: 1500 TABLET, EXTENDED RELEASE ORAL at 08:07

## 2017-07-16 RX ADMIN — MOXIFLOXACIN HYDROCHLORIDE 400 MG: 400 INJECTION, SOLUTION INTRAVENOUS at 04:07

## 2017-07-16 RX ADMIN — PANTOPRAZOLE SODIUM 40 MG: 40 TABLET, DELAYED RELEASE ORAL at 08:07

## 2017-07-16 RX ADMIN — IPRATROPIUM BROMIDE AND ALBUTEROL SULFATE 3 ML: .5; 3 SOLUTION RESPIRATORY (INHALATION) at 12:07

## 2017-07-16 RX ADMIN — METHYLPREDNISOLONE SODIUM SUCCINATE 80 MG: 125 INJECTION, POWDER, FOR SOLUTION INTRAMUSCULAR; INTRAVENOUS at 04:07

## 2017-07-16 RX ADMIN — Medication 10 MG/HR: at 12:07

## 2017-07-16 RX ADMIN — IPRATROPIUM BROMIDE AND ALBUTEROL SULFATE 3 ML: .5; 3 SOLUTION RESPIRATORY (INHALATION) at 06:07

## 2017-07-16 RX ADMIN — RIVAROXABAN 20 MG: 20 TABLET, FILM COATED ORAL at 04:07

## 2017-07-16 RX ADMIN — IPRATROPIUM BROMIDE AND ALBUTEROL SULFATE 3 ML: .5; 3 SOLUTION RESPIRATORY (INHALATION) at 07:07

## 2017-07-16 RX ADMIN — BUMETANIDE 2 MG: 0.25 INJECTION INTRAMUSCULAR; INTRAVENOUS at 08:07

## 2017-07-16 RX ADMIN — METHYLPREDNISOLONE SODIUM SUCCINATE 80 MG: 125 INJECTION, POWDER, FOR SOLUTION INTRAMUSCULAR; INTRAVENOUS at 05:07

## 2017-07-16 RX ADMIN — NEBIVOLOL HYDROCHLORIDE 10 MG: 10 TABLET ORAL at 08:07

## 2017-07-16 RX ADMIN — ARFORMOTEROL TARTRATE 15 MCG: 15 SOLUTION RESPIRATORY (INHALATION) at 08:07

## 2017-07-16 RX ADMIN — IPRATROPIUM BROMIDE AND ALBUTEROL SULFATE 3 ML: .5; 3 SOLUTION RESPIRATORY (INHALATION) at 01:07

## 2017-07-16 RX ADMIN — CARVEDILOL 12.5 MG: 12.5 TABLET, FILM COATED ORAL at 04:07

## 2017-07-16 NOTE — CONSULTS
Consult Note  Cardiology    Consult Requested By:  JANET Page  Reason for Consult:  A-fib     SUBJECTIVE:     History of Present Illness:  Patient is a 71 y.o. female with PMH of HTN, ? DM, ICD implant, CHF, A-fib and Asthma. Patient presented to the ED with complaints of worsening SOB and wheezing. Patient admitted with acute bronchitis. She is followed by Cardiologist, Dr. Hernández. Was seen by Dr. Bermeo in the past. She has chronic A-fib that is suboptimally controlled. Has chronic mild troponin leak. She has questionable compliance with medications. Pharmacy reports that patient hasn't filled her Bystolic or Xarelto since Feb 2017.  She states that she has gotten samples from her Cardiologist. She states that she stopped Digoxin due to nausea and stopped metoprolol due to diarrhea. She has no CNS complaints to suggest TIA or CVA. She has been started on Cardizem gtt for rate control. HR currently 100's.  not open to any medication changes to control her A-fib. Previous 2D echo shows chronic combined systolic and diastolic HF with EF of 25% and moderate MVR. She no acute EKG changes.     Review of patient's allergies indicates:   Allergen Reactions    Atorvastatin      Other reaction(s): Muscle pain    Codeine      Other reaction(s): Unknown    Digoxin      Other reaction(s): Unknown    Diovan [valsartan] Other (See Comments)     Upset stomach, weakness    Eggs [egg derived]     Morphine      Other reaction(s): Unknown    Naproxen      Other reaction(s): Nausea    Peanut     Propofol      Other reaction(s): delirious    Sulfa (sulfonamide antibiotics)        Past Medical History:   Diagnosis Date    Asthma     Atrial fibrillation     Blood clot of vein in shoulder area     Cardiac defibrillator in place     Chronic knee pain     Diabetes mellitus type 2 without retinopathy 12/19/2016    Diaphragmatic hernia without obstruction and without gangrene 9/14/2015    GERD  (gastroesophageal reflux disease)     Hypertension      Past Surgical History:   Procedure Laterality Date     SECTION      COLONOSCOPY      KNEE ARTHROSCOPY      UPPER GASTROINTESTINAL ENDOSCOPY       Family History   Problem Relation Age of Onset    Hypertension Mother     Hypertension Father     Colon cancer Neg Hx     Stomach cancer Neg Hx      Social History   Substance Use Topics    Smoking status: Never Smoker    Smokeless tobacco: Never Used    Alcohol use No        Review of Systems:  Constitutional: negative  Eyes: negative  Ears, nose, mouth, throat, and face: negative  Respiratory: negative  Cardiovascular: negative chest pain , palpitations, orthopnea, pnd, dizziness,   Gastrointestinal: negative  Genitourinary:negative  Hematologic/lymphatic: negative  Musculoskeletal:negative,   Neurological: negative  Behavioral/Psych: negative  Endocrine: negative  Allergic/Immunologic: negative    OBJECTIVE:     Vital Signs (Most Recent)  Temp: 98.3 °F (36.8 °C) (17)  Pulse: (!) 116 (17)  Resp: (!) 22 (17)  BP: (!) 134/93 (17)  SpO2: 98 % (17)    Vital Signs Range (Last 24H):  Temp:  [97.4 °F (36.3 °C)-99.1 °F (37.3 °C)]   Pulse:  [100-116]   Resp:  [17-28]   BP: (110-148)/()   SpO2:  [96 %-100 %]     Current Facility-Administered Medications   Medication    acetaminophen tablet 650 mg    albuterol-ipratropium 2.5mg-0.5mg/3mL nebulizer solution 3 mL    arformoterol nebulizer solution 15 mcg    bumetanide injection 2 mg    diltiaZEM 125 mg in dextrose 5% 125 mL infusion (non-titrating)    guaifenesin 12 hr tablet 600 mg    lisinopril tablet 5 mg    methylPREDNISolone sod suc(PF) injection 80 mg    nebivolol tablet 10 mg    ondansetron disintegrating tablet 8 mg    pantoprazole EC tablet 40 mg    polyethylene glycol packet 17 g    potassium chloride SA CR tablet 20 mEq    rivaroxaban tablet 20 mg     No current  facility-administered medications on file prior to encounter.      Current Outpatient Prescriptions on File Prior to Encounter   Medication Sig    albuterol 90 mcg/actuation inhaler Inhale 2 puffs into the lungs every 4 (four) hours as needed for Wheezing. Rescue    albuterol-ipratropium 2.5mg-0.5mg/3mL (DUO-NEB) 0.5 mg-3 mg(2.5 mg base)/3 mL nebulizer solution Take 3 mLs by nebulization every 6 (six) hours as needed for Wheezing. Rescue    budesonide-formoterol 160-4.5 mcg (SYMBICORT) 160-4.5 mcg/actuation HFAA Inhale 2 puffs into the lungs every 12 (twelve) hours. Wash out mouth after using    bumetanide (BUMEX) 2 MG tablet Take 1 tablet (2 mg total) by mouth 2 (two) times daily. 1 Tablet Oral Every day (Patient taking differently: Take 4 mg by mouth 2 (two) times daily. 1 Tablet Oral Every day)    calcium-Mg-zinc- #122-vit D3 250-125-3.7-100 ax-we-mf-unit Tab Take 1 tablet by mouth Daily.    cholecalciferol, vitamin D3, 1,000 unit capsule Take 1 capsule (1,000 Units total) by mouth once daily.    fluticasone (FLONASE) 50 mcg/actuation nasal spray 2 sprays by Each Nare route once daily.    inhalation spacing device Use as directed for inhalation.    montelukast (SINGULAIR) 10 mg tablet Take 1 tablet (10 mg total) by mouth every evening.    nebivolol (BYSTOLIC) 2.5 MG Tab Take 10 mg by mouth once daily.    nebulizer and compressor Tesha 1 Device by Misc.(Non-Drug; Combo Route) route every 4 (four) hours as needed.    omeprazole (PRILOSEC) 20 MG capsule Take 1 capsule (20 mg total) by mouth once daily.    potassium chloride (KLOR-CON) 10 MEQ TbSR Take 2 tablets (20 mEq total) by mouth once daily. 1 Tablet Extended Release Oral Every day    predniSONE (DELTASONE) 20 MG tablet 3 daily x 3 days, 2 daily x 3 days, 1 daily x 3 days, 1/2 daily x 4 days.    rivaroxaban (XARELTO) 15 mg Tab Take 1 tablet (15 mg total) by mouth daily with dinner or evening meal.    sodium bicarb-sodium chloride Pack 1 packet  by Nasal route 2 (two) times daily.       Physical Exam:  General appearance: alert, appears stated age and cooperative  Head: Normocephalic, without obvious abnormality, atraumatic  Eyes:  conjunctivae/corneas clear. PERRL, EOM's intact. Fundi benign.  Nose: no discharge  Throat: normal findings: tongue midline and normal  Neck: no adenopathy, no carotid bruit, no JVD, supple, symmetrical, trachea midline and thyroid not enlarged, symmetric, no tenderness/mass/nodules  Back:  no skin lesions, erythema, or scars  Lungs:  clear to auscultation bilaterally  Chest wall: no tenderness  Heart: regular rate and rhythm, S1, S2 normal, no murmur, click, rub or gallop  Abdomen: soft, non-tender; bowel sounds normal; no masses,  no organomegaly  Extremities: extremities normal, atraumatic, no cyanosis or edema  Pulses: 2+ and symmetric  Skin: Skin color, texture, turgor normal. No rashes or lesions  Neurologic: Grossly normal    Laboratory:  Chemistry:   Lab Results   Component Value Date     07/16/2017    K 4.2 07/16/2017     07/16/2017    CO2 22 (L) 07/16/2017    BUN 29 (H) 07/16/2017    CREATININE 1.2 07/16/2017    CALCIUM 9.5 07/16/2017     Cardiac Markers:   Lab Results   Component Value Date    TROPONINI 0.018 07/16/2017     Cardiac Markers (Last 3):   Lab Results   Component Value Date    TROPONINI 0.018 07/16/2017    TROPONINI 0.017 07/15/2017    TROPONINI 0.040 (H) 07/15/2017     CBC:   Lab Results   Component Value Date    WBC 4.14 07/16/2017    HGB 10.4 (L) 07/16/2017    HCT 33.9 (L) 07/16/2017    MCV 83 07/16/2017     (L) 07/16/2017     Lipids:   Lab Results   Component Value Date    CHOL 240 (H) 04/13/2016    TRIG 111 04/13/2016    HDL 53 04/13/2016     Coagulation:   Lab Results   Component Value Date    INR 1.2 07/15/2017    APTT 28.5 07/15/2017       Diagnostic Results:  ECG: Reviewed  X-Ray: Reviewed  US: Reviewed  CT: Reviewed  Echo: Reviewed      ASSESSMENT/PLAN:     Patient Active  Problem List   Diagnosis    Atrial fibrillation    GERD (gastroesophageal reflux disease)    Essential hypertension    Childhood asthma without complication    Cardiac defibrillator in place    Abnormal CT scan, gastrointestinal tract    Diaphragmatic hernia without obstruction and without gangrene    Chronic knee pain    Obesity, Class I, BMI 30-34.9    Vitamin D deficiency    PVD (peripheral vascular disease)    LBBB (left bundle branch block)    Bilateral leg edema    ARF (acute renal failure)    Pneumonia due to infectious organism    Cough    Chronic kidney disease (CKD), stage III (moderate)    Bronchitis    Nuclear sclerosis of right eye    Pseudophakia of left eye    Severe persistent asthma with acute exacerbation    Pulmonary infiltrate in right lung on chest x-ray    Uncomplicated severe persistent asthma    Chronic atrial fibrillation with RVR    CHF (congestive heart failure)    Non compliance w medication regimen        Plan:   Patient admitted with acute bronchitis and A-fib with RVR. Has active wheezing. Recommend continuing current medical management to treat URi with nebulizer and steroids. Consider abx. She has chronic combined CHF. No ICD firing.  She has mild troponin leak that is likely from CHF and/or current resp status. She has no acute EKG changes or evidence of ACS. Old ECGs reviewed, chronic A-fib is suboptimally controlled.  Patient and  don't want any medication changes to control A-fib. Would like recommendations from Dr. Hernández as an OP. Will continue current medical therapy. Will follow as needed.     Chart reviewed. Patient examined by Dr. Craig and agrees with plan that has been outlined.

## 2017-07-16 NOTE — SUBJECTIVE & OBJECTIVE
Interval History: Seen and examined at bedside. Hospital chart reviewed. No acute interval detrimental events noted. she reports that she  has slightly improved. Pateint and spouse refused medication changes/adjustments by cardiology.    Review of Systems   Constitutional: Negative.    HENT: Negative.    Eyes: Negative.    Respiratory: Positive for cough, shortness of breath and wheezing.    Cardiovascular: Negative.  Negative for chest pain, palpitations and leg swelling.   Gastrointestinal: Negative.    Endocrine: Negative.    Genitourinary: Negative.    Musculoskeletal: Negative.    Skin: Negative.    Allergic/Immunologic: Negative.    Neurological: Negative.  Negative for dizziness and weakness.   Hematological: Negative.    Psychiatric/Behavioral: Negative.    All other systems reviewed and are negative.    Objective:     Vital Signs (Most Recent):  Temp: 98.1 °F (36.7 °C) (07/16/17 1200)  Pulse: 78 (07/16/17 1222)  Resp: (!) 24 (07/16/17 1222)  BP: 128/84 (07/16/17 1200)  SpO2: 96 % (07/16/17 1222) Vital Signs (24h Range):  Temp:  [97.4 °F (36.3 °C)-98.3 °F (36.8 °C)] 98.1 °F (36.7 °C)  Pulse:  [] 78  Resp:  [20-28] 24  SpO2:  [96 %-100 %] 96 %  BP: (117-148)/() 128/84     Weight: 86.2 kg (190 lb)  Body mass index is 32.61 kg/m².    Intake/Output Summary (Last 24 hours) at 07/16/17 1313  Last data filed at 07/15/17 1800   Gross per 24 hour   Intake           774.16 ml   Output                0 ml   Net           774.16 ml      Physical Exam   Constitutional: She is oriented to person, place, and time. She appears well-developed and well-nourished.   HENT:   Head: Normocephalic and atraumatic.   Nose: Nose normal.   Mouth/Throat: Oropharynx is clear and moist.   Eyes: EOM are normal. Pupils are equal, round, and reactive to light.   Neck: Normal range of motion. Neck supple. No JVD present.   Cardiovascular: Normal heart sounds and intact distal pulses.  An irregularly irregular rhythm present. Exam  reveals no gallop and no friction rub.    No murmur heard.  Pulmonary/Chest: She is in respiratory distress (mild). She has wheezes (scattered wheezes throughout). She has rales (rales to bilat bases).   Abdominal: Soft. Bowel sounds are normal. There is no tenderness.   Musculoskeletal: Normal range of motion. She exhibits edema (1+ to BLE).   Neurological: She is alert and oriented to person, place, and time.   Skin: Skin is warm and dry. Capillary refill takes less than 2 seconds.   Psychiatric: She has a normal mood and affect. Her behavior is normal.   Nursing note and vitals reviewed.      Significant Labs: All pertinent labs within the past 24 hours have been reviewed.    Significant Imaging: I have reviewed all pertinent imaging results/findings within the past 24 hours.

## 2017-07-16 NOTE — PLAN OF CARE
Problem: Patient Care Overview  Goal: Plan of Care Review  Outcome: Ongoing (interventions implemented as appropriate)  Patient remains free from falls. Fall precautions remain in place. No c/o pain. Pt remains afib/aflutter on cardiac monitor, rate . Cardizem gtt infusing at 10mg/hr. VS are stable. No other c/o at this time. Call bell within reach. Reminded to call for assistance.

## 2017-07-16 NOTE — ASSESSMENT & PLAN NOTE
a.  Continue Avelox 400 mg PO QD X 5 days.                                       b.  Continue with LABA, SHARYN and ICS.          c.  Continue IV glucocorticoids                                          d.  Should discharge the patient on an extended dose of antibiotics and   steroids to taper over 2 weeks.

## 2017-07-16 NOTE — HOSPITAL COURSE
The pt was admitted to telemetry on IV abx, neb txs, IV Bumex, and IV steroids for decompensated systolic and diastolic CHF and severe Asthma exacerbation. Pt also had Afib with RVR. Pt was continued on Coreg and Xarelto. Care discussed with Cardiology and Pulmonary. Pt/spouse refused any medication changes to control A-fib. Would like recommendations from Dr. Hernández as an OP. Pt was very noncompliant with home medications- she has not filled any meds since May - last fill on xarelto was February, 2017. Last fill on Bumex was December 2016. Pt and family instructed on importance of home medication compliance. Pt and family provided CHF education. Pt became more confused during hospitalization- likely secondary to Dementia and IV steroids. IV Steroids were transitioned to prednisone taper. Patient has home health & 24 hour sitters. Home health was resumed. Home health reported that they are often not allowed to complete visits and/or not allowed in the house. Outpatient  will be consulted. The pt will be discharged on Coreg and Xarelto, Lisinopril and Bumex, oral Avelox, steroid taper and Neb txs. The pt was seen and examined today and determined to be stable for discharge.

## 2017-07-16 NOTE — NURSING
Pt received from ED via stretcher on Cardizem drip and heart monitor. Bedside report completed with Cathie NOBLES.

## 2017-07-16 NOTE — PT/OT/SLP EVAL
Occupational Therapy  Evaluation    Carlie Valdez   MRN: [id number]   Admitting Diagnosis: Bronchitis    OT Date of Treatment: 17   OT Start Time: 1142  OT Stop Time: 1205  OT Total Time (min): 23 min    Billable Minutes:  Evaluation  x 15 min  Self Care/Home Management  x 8 min    Diagnosis: Bronchitis   O.T. tx dx: debility and impaired balance/coordination    Past Medical History:   Diagnosis Date    Asthma     Atrial fibrillation     Blood clot of vein in shoulder area     Cardiac defibrillator in place     Chronic knee pain     Diabetes mellitus type 2 without retinopathy 2016    Diaphragmatic hernia without obstruction and without gangrene 2015    GERD (gastroesophageal reflux disease)     Hypertension       Past Surgical History:   Procedure Laterality Date     SECTION      COLONOSCOPY      KNEE ARTHROSCOPY      UPPER GASTROINTESTINAL ENDOSCOPY         Referring physician:   Date referred to OT:     General Precautions: Standard, fall, respiratory  Orthopedic Precautions: N/A  Braces:            Patient History:  Living Environment  Lives With: spouse  Living Arrangements: house  Home Layout: Able to live on 1st floor  Stair Railings at Home: none  Transportation Available: family or friend will provide  Living Environment Comment: PT LIVES WITH SPOUSE, REPORTS FUNCTIONALLY INDEP PTA, HAS SITTERS 12 HOURS/DAY X 7 DAYS TO ASSIST AS NEEDED. BATHING IS DONE AT [facility] DUE TO PT CAN'T STEP INTO HER JACCUZZI TUB.   Equipment Currently Used at Home: walker, rolling, cane, straight    Prior level of function:   Bed Mobility/Transfers: needs device and assist  Grooming: independent  Bathing: independent  Upper Body Dressing: independent  Lower Body Dressing: independent  Toileting: independent        Dominant hand: right    Subjective:  Communicated with pt, spouse, and lisa Oakley prior to session.    Chief Complaint: none   Patient/Family stated goals: to  return home    Pain/Comfort  Pain Rating 1: 0/10    Objective:  Patient found with: telemetry, oxygen, peripheral IV    Cognitive Exam:  Oriented to: Person, Place, Time and Situation  Follows Commands/attention: Follows one-step commands  Communication: clear/fluent  Memory:  No Deficits noted  Safety awareness/insight to disability: intact  Coping skills/emotional control: Appropriate to situation    Visual/perceptual:  Intact    Physical Exam:  Postural examination/scapula alignment: Rounded shoulder and Head forward  Skin integrity: Visible skin intact  Edema: Mild BLE    Sensation:   Intact    Upper Extremity Range of Motion:  Right Upper Extremity: Deficits: shoulder approp 80 flexion, elbow/wrist WFL  Left Upper Extremity: shoulder flexion approp 75 degree, elbow/wrist WFL    Upper Extremity Strength:  Right Upper Extremity: grossly shoulder 3/5, elbow/wrist 3+/5  Left Upper Extremity: shoulder grossly 3/5, elbow/wrist grossly 3+/5   Strength: WFL    Fine motor coordination:   Intact    Gross motor coordination: WFL    Functional Mobility:  Bed Mobility:       Transfers:  Sit <> Stand Assistance: Minimum Assistance  Sit <> Stand Assistive Device: No Assistive Device  Bed <> Chair Technique: Stand Pivot  Bed <> Chair Transfer Assistance: Minimum Assistance  Bed <> Chair Assistive Device: No Assistive Device    Functional Ambulation: fx ambulation without AD, min A 5ft x 2, minimal SOB noted with minimal exertion.     Activities of Daily Living:     Feeding adaptive equipment:   UE Dressing Level of Assistance: Stand by assistance  UE adaptive equipment:   LE Dressing Level of Assistance: Minimum assistance  LE adaptive equipment:                     Bathing adaptive equipment:     Balance:   Static Sit: FAIR+: Able to take MINIMAL challenges from all directions  Dynamic Sit: FAIR: Cannot move trunk without losing balance  Static Stand: POOR+: Needs MINIMAL assist to maintain  Dynamic stand: POOR:  "N/A    Therapeutic Activities and Exercises:      AM-PAC 6 CLICK ADL  How much help from another person does this patient currently need?  1 = Unable, Total/Dependent Assistance  2 = A lot, Maximum/Moderate Assistance  3 = A little, Minimum/Contact Guard/Supervision  4 = None, Modified [place]/Independent    Putting on and taking off regular lower body clothing? : 3  Bathing (including washing, rinsing, drying)?: 1 (NOT TESTED)  Toileting, which includes using toilet, bedpan, or urinal? : 1 (NOT TESTED)  Putting on and taking off regular upper body clothing?: 3  Taking care of personal grooming such as brushing teeth?: 4  Eating meals?: 4  Total Score: 16    AM-PAC Raw Score CMS "G-Code Modifier Level of Impairment Assistance   6 % Total / Unable   7 - 9 CM 80 - 100% Maximal Assist   10-14 CL 60 - 80% Moderate Assist   15 - 19 CK 40 - 60% Moderate Assist   20 - 22 CJ 20 - 40% Minimal Assist   23 CI 1-20% SBA / CGA   24 CH 0% Independent/ Mod I       Patient left up in chair with all lines intact, call button in reach and spouse present    Assessment:  Carlie Valdez is a 71 y.o. female with a medical diagnosis of Bronchitis and presents with impaired self care skills and fx mobility.    Rehab identified problem list/impairments: Rehab identified problem list/impairments: weakness, impaired endurance, gait instability, impaired self care skills, impaired balance, impaired functional mobilty, decreased coordination    Rehab potential is good.    Activity tolerance: Fair    Discharge recommendations: Discharge Facility/Level Of Care Needs: home health OT     Barriers to discharge: Barriers to Discharge: None    Equipment recommendations: bedside commode     GOALS:    Occupational Therapy Goals        Problem: Occupational Therapy Goal    Goal Priority Disciplines Outcome Interventions   Occupational Therapy Goal     OT, PT/OT     Description:  Goals to be met by: 7/23/17     Patient will " increase functional independence with ADLs by performing:    UE Dressing with Stand-by Assistance.  LE Dressing with Contact Guard Assistance.  Toileting from toilet with Contact Guard Assistance for hygiene and clothing management.   Toilet transfer to toilet with Contact Guard Assistance.  Upper extremity exercise program x10 reps per handout, with supervision for minimal resistive ex to BUE.                      PLAN:  Patient to be seen 3 x/week to address the above listed problems via therapeutic activities, therapeutic exercises  Plan of Care expires: 07/23/17  Plan of Care reviewed with: patient, spouse    OT G-codes  Functional Assessment Tool Used: FM-ADL  Score: 4  Functional Limitation: Self care  Self Care Current Status (): FERDINAND  Self Care Goal Status (): CELESTINO  Self Care Discharge Status (): CELESTINO Cox OT  07/16/2017

## 2017-07-16 NOTE — ASSESSMENT & PLAN NOTE
duonebs & Brovana  IV steroids  IV abx initiated in ED  Supplemental oxygen to maintain sats  Last refill of duonebs and symbicort in May  Pulmonology consulted

## 2017-07-16 NOTE — PLAN OF CARE
Problem: Patient Care Overview  Goal: Plan of Care Review  Outcome: Ongoing (interventions implemented as appropriate)  Pt has bilateral wheezes; tolerates txs well; o2 sat on nc 2l/m=98%; added prn txs.

## 2017-07-16 NOTE — SUBJECTIVE & OBJECTIVE
Interval History: Wheezing is improving slightly. Cardiology and pulmology saw patient today. Pt and spouse refused medication changes/adjustments by cards. No acute events overnight.    Review of Systems   Constitutional: Negative.    HENT: Negative.    Eyes: Negative.    Respiratory: Positive for cough, shortness of breath and wheezing.    Cardiovascular: Negative.  Negative for chest pain, palpitations and leg swelling.   Gastrointestinal: Negative.    Endocrine: Negative.    Genitourinary: Negative.    Musculoskeletal: Negative.    Skin: Negative.    Allergic/Immunologic: Negative.    Neurological: Negative.  Negative for dizziness and weakness.   Hematological: Negative.    Psychiatric/Behavioral: Negative.    All other systems reviewed and are negative.    Objective:     Vital Signs (Most Recent):  Temp: 98.1 °F (36.7 °C) (07/16/17 1200)  Pulse: 78 (07/16/17 1222)  Resp: (!) 24 (07/16/17 1222)  BP: 128/84 (07/16/17 1200)  SpO2: 96 % (07/16/17 1222) Vital Signs (24h Range):  Temp:  [97.4 °F (36.3 °C)-98.3 °F (36.8 °C)] 98.1 °F (36.7 °C)  Pulse:  [] 78  Resp:  [20-28] 24  SpO2:  [96 %-100 %] 96 %  BP: (117-148)/() 128/84     Weight: 86.2 kg (190 lb)  Body mass index is 32.61 kg/m².    Intake/Output Summary (Last 24 hours) at 07/16/17 1255  Last data filed at 07/15/17 1800   Gross per 24 hour   Intake           774.16 ml   Output                0 ml   Net           774.16 ml      Physical Exam   Constitutional: She is oriented to person, place, and time. She appears well-developed and well-nourished.   HENT:   Head: Normocephalic and atraumatic.   Nose: Nose normal.   Mouth/Throat: Oropharynx is clear and moist.   Eyes: EOM are normal. Pupils are equal, round, and reactive to light.   Neck: Normal range of motion. Neck supple. No JVD present.   Cardiovascular: Normal heart sounds and intact distal pulses.  An irregularly irregular rhythm present. Exam reveals no gallop and no friction rub.    No  murmur heard.  Pulmonary/Chest: She is in respiratory distress (mild). She has wheezes (scattered wheezes throughout). She has rales (rales to bilat bases).   Abdominal: Soft. Bowel sounds are normal. There is no tenderness.   Musculoskeletal: Normal range of motion. She exhibits edema (1+ to BLE).   Neurological: She is alert and oriented to person, place, and time.   Skin: Skin is warm and dry. Capillary refill takes less than 2 seconds.   Psychiatric: She has a normal mood and affect. Her behavior is normal.   Nursing note and vitals reviewed.      Significant Labs: All pertinent labs within the past 24 hours have been reviewed.    Significant Imaging: I have reviewed all pertinent imaging results/findings within the past 24 hours.

## 2017-07-16 NOTE — CONSULTS
[facility] -   Pulmonology  Consult Note    Patient Name: Carlie Valdez  MRN: [id number]  Admission Date: 7/15/2017  Hospital Length of Stay: 1 days  Code Status: Full Code  Attending Physician: Subhash Ayala MD  Primary Care Provider: Johanna Guzman MD   Principal Problem: Bronchitis    Inpatient consult to Pulmonology  Consult performed by: ESME RICHTER  Consult ordered by: KRISTY TORO  Reason for consult: Acute exacerbation of asthma.        Subjective:     HPI:  70 y/o female presents to ED with c/o SOB. PMH includes CHF, Asthma with COPD, A-fib, and HTN. Patient is non-compliant with medications, has not filled any medications since May, and has not filled heart medications since February. Associated symptoms include cough, clear sputum, and wheezing. Pt has tried nebulizer tx and inhalers with no relief. Denies any exacerbating or mitigating factors. Patient denies any fever, n/v/d, abd pain, HA, dizziness, weakness, lightheadedness, chills, leg swelling, and chest pain. Pt was hospitalized at [facility] for same sxs three weeks ago and reports she believes she has bronchitis. No further complaints or concerns at this time. She will be admitted to Medicine for IV abx, steroids, cards consult and symptom management under the care of Hospital Medicine.    Pulmonology has been consulted to assist in management of asthma    Past Medical History:   Diagnosis Date    Asthma     Atrial fibrillation     Blood clot of vein in shoulder area     Cardiac defibrillator in place     Chronic knee pain     Diabetes mellitus type 2 without retinopathy 2016    Diaphragmatic hernia without obstruction and without gangrene 2015    GERD (gastroesophageal reflux disease)     Hypertension      Past Surgical History:   Procedure Laterality Date     SECTION      COLONOSCOPY      KNEE ARTHROSCOPY      UPPER GASTROINTESTINAL ENDOSCOPY         Family History   Problem  Relation Age of Onset    Hypertension Mother     Hypertension Father     Colon cancer Neg Hx     Stomach cancer Neg Hx      Social History     Social History    Marital status:      Spouse name: N/A    Number of children: N/A    Years of education: N/A     Social History Main Topics    Smoking status: Never Smoker    Smokeless tobacco: Never Used    Alcohol use No    Drug use: No    Sexual activity: Yes     Partners: Male     Other Topics Concern    None     Social History Narrative    None     Review of patient's allergies indicates:   Allergen Reactions    Atorvastatin      Other reaction(s): Muscle pain    Codeine      Other reaction(s): Unknown    Digoxin      Other reaction(s): Unknown    Diovan [valsartan] Other (See Comments)     Upset stomach, weakness    Eggs [egg derived]     Morphine      Other reaction(s): Unknown    Naproxen      Other reaction(s): Nausea    Peanut     Propofol      Other reaction(s): delirious    Sulfa (sulfonamide antibiotics)      No current facility-administered medications on file prior to encounter.      Current Outpatient Prescriptions on File Prior to Encounter   Medication Sig Dispense Refill    albuterol 90 mcg/actuation inhaler Inhale 2 puffs into the lungs every 4 (four) hours as needed for Wheezing. Rescue 18 g 11    albuterol-ipratropium 2.5mg-0.5mg/3mL (DUO-NEB) 0.5 mg-3 mg(2.5 mg base)/3 mL nebulizer solution Take 3 mLs by nebulization every 6 (six) hours as needed for Wheezing. Rescue 120 vial 5    budesonide-formoterol 160-4.5 mcg (SYMBICORT) 160-4.5 mcg/actuation HFAA Inhale 2 puffs into the lungs every 12 (twelve) hours. Wash out mouth after using 1 Inhaler 12    bumetanide (BUMEX) 2 MG tablet Take 1 tablet (2 mg total) by mouth 2 (two) times daily. 1 Tablet Oral Every day (Patient taking differently: Take 4 mg by mouth 2 (two) times daily. 1 Tablet Oral Every day) 60 tablet 11    calcium-Mg-zinc-hc #122-vit D3 250-125-3.7-100  et-et-py-unit Tab Take 1 tablet by mouth Daily.      cholecalciferol, vitamin D3, 1,000 unit capsule Take 1 capsule (1,000 Units total) by mouth once daily. 100 capsule 0    fluticasone (FLONASE) 50 mcg/actuation nasal spray 2 sprays by Each Nare route once daily. 1 Bottle 12    inhalation spacing device Use as directed for inhalation. 1 Device 0    montelukast (SINGULAIR) 10 mg tablet Take 1 tablet (10 mg total) by mouth every evening. 90 tablet 4    nebivolol (BYSTOLIC) 2.5 MG Tab Take 10 mg by mouth once daily.      nebulizer and compressor Tesha 1 Device by Misc.(Non-Drug; Combo Route) route every 4 (four) hours as needed. 1 each 0    omeprazole (PRILOSEC) 20 MG capsule Take 1 capsule (20 mg total) by mouth once daily. 30 capsule 11    potassium chloride (KLOR-CON) 10 MEQ TbSR Take 2 tablets (20 mEq total) by mouth once daily. 1 Tablet Extended Release Oral Every day 60 tablet 0    predniSONE (DELTASONE) 20 MG tablet 3 daily x 3 days, 2 daily x 3 days, 1 daily x 3 days, 1/2 daily x 4 days. 20 tablet 0    rivaroxaban (XARELTO) 15 mg Tab Take 1 tablet (15 mg total) by mouth daily with dinner or evening meal. 60 tablet 0    sodium bicarb-sodium chloride Pack 1 packet by Nasal route 2 (two) times daily. 120 each 0     Interval History: Seen and examined at bedside. Hospital chart reviewed. No acute interval detrimental events noted. she reports that she  has slightly improved. Pateint and spouse refused medication changes/adjustments by cardiology.    Review of Systems   Constitutional: Negative.    HENT: Negative.    Eyes: Negative.    Respiratory: Positive for cough, shortness of breath and wheezing.    Cardiovascular: Negative.  Negative for chest pain, palpitations and leg swelling.   Gastrointestinal: Negative.    Endocrine: Negative.    Genitourinary: Negative.    Musculoskeletal: Negative.    Skin: Negative.    Allergic/Immunologic: Negative.    Neurological: Negative.  Negative for dizziness and  weakness.   Hematological: Negative.    Psychiatric/Behavioral: Negative.    All other systems reviewed and are negative.    Objective:     Vital Signs (Most Recent):  Temp: 98.1 °F (36.7 °C) (07/16/17 1200)  Pulse: 78 (07/16/17 1222)  Resp: (!) 24 (07/16/17 1222)  BP: 128/84 (07/16/17 1200)  SpO2: 96 % (07/16/17 1222) Vital Signs (24h Range):  Temp:  [97.4 °F (36.3 °C)-98.3 °F (36.8 °C)] 98.1 °F (36.7 °C)  Pulse:  [] 78  Resp:  [20-28] 24  SpO2:  [96 %-100 %] 96 %  BP: (117-148)/() 128/84     Weight: 86.2 kg (190 lb)  Body mass index is 32.61 kg/m².    Intake/Output Summary (Last 24 hours) at 07/16/17 1313  Last data filed at 07/15/17 1800   Gross per 24 hour   Intake           774.16 ml   Output                0 ml   Net           774.16 ml      Physical Exam   Constitutional: She is oriented to person, place, and time. She appears well-developed and well-nourished.   HENT:   Head: Normocephalic and atraumatic.   Nose: Nose normal.   Mouth/Throat: Oropharynx is clear and moist.   Eyes: EOM are normal. Pupils are equal, round, and reactive to light.   Neck: Normal range of motion. Neck supple. No JVD present.   Cardiovascular: Normal heart sounds and intact distal pulses.  An irregularly irregular rhythm present. Exam reveals no gallop and no friction rub.    No murmur heard.  Pulmonary/Chest: She is in respiratory distress (mild). She has wheezes (scattered wheezes throughout). She has rales (rales to bilat bases).   Abdominal: Soft. Bowel sounds are normal. There is no tenderness.   Musculoskeletal: Normal range of motion. She exhibits edema (1+ to BLE).   Neurological: She is alert and oriented to person, place, and time.   Skin: Skin is warm and dry. Capillary refill takes less than 2 seconds.   Psychiatric: She has a normal mood and affect. Her behavior is normal.   Nursing note and vitals reviewed.      Significant Labs: All pertinent labs within the past 24 hours have been  reviewed.    Significant Imaging: I have reviewed all pertinent imaging results/findings within the past 24 hours.    Assessment/Plan:     Severe persistent asthma with acute exacerbation        a.  Continue Avelox 400 mg PO QD X 5 days.                                       b.  Continue with LABA, SHARYN and ICS.          c.  Continue IV glucocorticoids                                          d.  Should discharge the patient on an extended dose of antibiotics and   steroids to taper over 2 weeks.          Counseling/Consultation:I discussed the case with Dr. Ayala. I discussed the patient's condition/prognosis with the family.          Thank you for your consult. I will follow-up with patient. Please contact us if you have any additional questions.     Bradley Amaral MD  Pulmonology  [facility] -

## 2017-07-16 NOTE — PROGRESS NOTES
[facility] Hospital Medicine  Progress Note    Patient Name: Carlie Valdez  MRN: [id number]  Patient Class: IP- Inpatient   Admission Date: 7/15/2017  Length of Stay: 1 days  Attending Physician: Subhash Ayala MD  Primary Care Provider: Johanna Guzman MD        Subjective:     Principal Problem:Bronchitis    HPI:  72 y/o female presents to ED with c/o SOB. PMH includes CHF, Asthma, A-fib, and HTN. Patient is non-compliant with medications, has not filled any medications since May, and has not filled heart medications since February. Associated symptoms include cough, clear sputum, and wheezing. Pt has tried nebulizer tx and inhalers with no relief. Denies any exacerbating or mitigating factors. Patient denies any fever, n/v/d, abd pain, HA, dizziness, weakness, lightheadedness, chills, leg swelling, and Cp. Pt was hospitalized at [facility] for same sxs three weeks ago and reports she believes she has bronchitis. No further complaints or concerns at this time. She will be admitted to Medicine for IV abx, steroids, cards consult and symptom management under the care of Hospital Medicine.       Hospital Course:  Admitted to telemetry and given IV abx, duonebs, brovana, and IV steroids. Cardiology and pulmonary consulted.     Interval History: Wheezing is improving slightly. Cardiology and pulmology saw patient today. Pt and spouse refused medication changes/adjustments by cards. No acute events overnight. 7/16: cardizem gtt decreased to 5, coreg 12.5mg PO BID initiated. Cardizem gtt to be stopped in AM after 2nd dose of Coreg. Okay for pulse to run 120-125 as this is pts baseline.    Review of Systems   Constitutional: Negative.    HENT: Negative.    Eyes: Negative.    Respiratory: Positive for cough, shortness of breath and wheezing.    Cardiovascular: Negative.  Negative for chest pain, palpitations and leg swelling.   Gastrointestinal: Negative.    Endocrine: Negative.     Genitourinary: Negative.    Musculoskeletal: Negative.    Skin: Negative.    Allergic/Immunologic: Negative.    Neurological: Negative.  Negative for dizziness and weakness.   Hematological: Negative.    Psychiatric/Behavioral: Negative.    All other systems reviewed and are negative.    Objective:     Vital Signs (Most Recent):  Temp: 98.1 °F (36.7 °C) (07/16/17 1200)  Pulse: 78 (07/16/17 1222)  Resp: (!) 24 (07/16/17 1222)  BP: 128/84 (07/16/17 1200)  SpO2: 96 % (07/16/17 1222) Vital Signs (24h Range):  Temp:  [97.4 °F (36.3 °C)-98.3 °F (36.8 °C)] 98.1 °F (36.7 °C)  Pulse:  [] 78  Resp:  [20-28] 24  SpO2:  [96 %-100 %] 96 %  BP: (117-148)/() 128/84     Weight: 86.2 kg (190 lb)  Body mass index is 32.61 kg/m².    Intake/Output Summary (Last 24 hours) at 07/16/17 1255  Last data filed at 07/15/17 1800   Gross per 24 hour   Intake           774.16 ml   Output                0 ml   Net           774.16 ml      Physical Exam   Constitutional: She is oriented to person, place, and time. She appears well-developed and well-nourished.   HENT:   Head: Normocephalic and atraumatic.   Nose: Nose normal.   Mouth/Throat: Oropharynx is clear and moist.   Eyes: EOM are normal. Pupils are equal, round, and reactive to light.   Neck: Normal range of motion. Neck supple. No JVD present.   Cardiovascular: Normal heart sounds and intact distal pulses.  An irregularly irregular rhythm present. Exam reveals no gallop and no friction rub.    No murmur heard.  Pulmonary/Chest: She is in respiratory distress (mild). She has wheezes (scattered wheezes throughout). She has rales (rales to bilat bases).   Abdominal: Soft. Bowel sounds are normal. There is no tenderness.   Musculoskeletal: Normal range of motion. She exhibits edema (1+ to BLE).   Neurological: She is alert and oriented to person, place, and time.   Skin: Skin is warm and dry. Capillary refill takes less than 2 seconds.   Psychiatric: She has a normal mood and  affect. Her behavior is normal.   Nursing note and vitals reviewed.      Significant Labs: All pertinent labs within the past 24 hours have been reviewed.    Significant Imaging: I have reviewed all pertinent imaging results/findings within the past 24 hours.    Assessment/Plan:      * Bronchitis    IV steroids  duonebs and brovana  guaifenesin  Supplemental oxygen to maintain sats              Chronic atrial fibrillation with RVR    Cardiology consulted  ACID  cardizem gtt  Cardiac monitoring  bystolic & Xarelto daily - pt last filled both in February - reports she has samples from cardiology          Severe persistent asthma with acute exacerbation    duonebs & Brovana  IV steroids  IV abx initiated in ED  Supplemental oxygen to maintain sats  Last refill of duonebs and symbicort in May  Pulmonology consulted          CHF (congestive heart failure)    Last echo 11/2016 shows EF 25-30%  Echo pending  IV bumex, bystolic, lisinopril  Daily weights  Cardiac diet  Cardiac monitoring  Supplemental oxygen to maintain sats  Cardiology consulted            Non compliance w medication regimen    Patient is unable to verbalize her medications - when I called pharmacy, she has not filled any meds since May - last fill on xarelto and bystolic was February, 2017. Last fill on Bumex was December 2016. When Pt questioned, she reported she has samples and does not need the refills yet.    Patient has home health & 24 hour sitters - be sure we coordinate to facilitate medication compliance upon discharge  Consult to  for d/c planning          Cardiac defibrillator in place    Cardiology consulted  Pt reports she sees Dr. Hernández - unsure of last appt            Essential hypertension    Continue bystolic - lisinopril added  Low sodium diet            VTE Risk Mitigation         Ordered     rivaroxaban tablet 20 mg  With dinner     Route:  Oral        07/15/17 1357     High Risk of VTE  Once      07/15/17 1459     Place  sequential compression device  Until discontinued      07/15/17 1459     Reason for No Pharmacological VTE Prophylaxis  Once      07/15/17 1459          CHEO Horn-C  Department of Hospital Medicine   [facility]

## 2017-07-16 NOTE — HPI
70 y/o female presents to ED with c/o SOB. PMH includes CHF, Asthma with COPD, A-fib, and HTN. Patient is non-compliant with medications, has not filled any medications since May, and has not filled heart medications since February. Associated symptoms include cough, clear sputum, and wheezing. Pt has tried nebulizer tx and inhalers with no relief. Denies any exacerbating or mitigating factors. Patient denies any fever, n/v/d, abd pain, HA, dizziness, weakness, lightheadedness, chills, leg swelling, and chest pain. Pt was hospitalized at [facility] for same sxs three weeks ago and reports she believes she has bronchitis. No further complaints or concerns at this time. She will be admitted to Medicine for IV abx, steroids, cards consult and symptom management under the care of Hospital Medicine.    Pulmonology has been consulted to assist in management of asthma

## 2017-07-17 PROBLEM — I50.41 ACUTE COMBINED SYSTOLIC AND DIASTOLIC CONGESTIVE HEART FAILURE: Status: ACTIVE | Noted: 2017-07-15

## 2017-07-17 LAB
ANION GAP SERPL CALC-SCNC: 14 MMOL/L
BACTERIA SPEC AEROBE CULT: NORMAL
BASOPHILS # BLD AUTO: 0.01 K/UL
BASOPHILS NFR BLD: 0.1 %
BNP SERPL-MCNC: 890 PG/ML
BUN SERPL-MCNC: 48 MG/DL
CALCIUM SERPL-MCNC: 9.7 MG/DL
CHLORIDE SERPL-SCNC: 108 MMOL/L
CO2 SERPL-SCNC: 20 MMOL/L
CREAT SERPL-MCNC: 1.4 MG/DL
DIFFERENTIAL METHOD: ABNORMAL
EOSINOPHIL # BLD AUTO: 0 K/UL
EOSINOPHIL NFR BLD: 0 %
ERYTHROCYTE [DISTWIDTH] IN BLOOD BY AUTOMATED COUNT: 19 %
EST. GFR  (AFRICAN AMERICAN): 44 ML/MIN/1.73 M^2
EST. GFR  (NON AFRICAN AMERICAN): 38 ML/MIN/1.73 M^2
GLUCOSE SERPL-MCNC: 120 MG/DL
GRAM STN SPEC: NORMAL
HCT VFR BLD AUTO: 34.2 %
HGB BLD-MCNC: 10.8 G/DL
LYMPHOCYTES # BLD AUTO: 0.6 K/UL
LYMPHOCYTES NFR BLD: 5.2 %
MCH RBC QN AUTO: 26 PG
MCHC RBC AUTO-ENTMCNC: 31.6 %
MCV RBC AUTO: 82 FL
MONOCYTES # BLD AUTO: 0.2 K/UL
MONOCYTES NFR BLD: 2.1 %
NEUTROPHILS # BLD AUTO: 9.8 K/UL
NEUTROPHILS NFR BLD: 92.6 %
PLATELET # BLD AUTO: 138 K/UL
PMV BLD AUTO: 10.4 FL
POTASSIUM SERPL-SCNC: 4.3 MMOL/L
RBC # BLD AUTO: 4.16 M/UL
SODIUM SERPL-SCNC: 142 MMOL/L
WBC # BLD AUTO: 10.58 K/UL

## 2017-07-17 PROCEDURE — 25000242 PHARM REV CODE 250 ALT 637 W/ HCPCS: Performed by: NURSE PRACTITIONER

## 2017-07-17 PROCEDURE — 25000242 PHARM REV CODE 250 ALT 637 W/ HCPCS: Performed by: INTERNAL MEDICINE

## 2017-07-17 PROCEDURE — 85025 COMPLETE CBC W/AUTO DIFF WBC: CPT

## 2017-07-17 PROCEDURE — 25000003 PHARM REV CODE 250: Performed by: NURSE PRACTITIONER

## 2017-07-17 PROCEDURE — 94640 AIRWAY INHALATION TREATMENT: CPT

## 2017-07-17 PROCEDURE — 63600175 PHARM REV CODE 636 W HCPCS: Performed by: NURSE PRACTITIONER

## 2017-07-17 PROCEDURE — 97110 THERAPEUTIC EXERCISES: CPT

## 2017-07-17 PROCEDURE — 83880 ASSAY OF NATRIURETIC PEPTIDE: CPT

## 2017-07-17 PROCEDURE — 99232 SBSQ HOSP IP/OBS MODERATE 35: CPT | Mod: ,,, | Performed by: INTERNAL MEDICINE

## 2017-07-17 PROCEDURE — 25000003 PHARM REV CODE 250: Performed by: INTERNAL MEDICINE

## 2017-07-17 PROCEDURE — 97162 PT EVAL MOD COMPLEX 30 MIN: CPT

## 2017-07-17 PROCEDURE — 21400001 HC TELEMETRY ROOM

## 2017-07-17 PROCEDURE — 36415 COLL VENOUS BLD VENIPUNCTURE: CPT

## 2017-07-17 PROCEDURE — 97116 GAIT TRAINING THERAPY: CPT

## 2017-07-17 PROCEDURE — 80048 BASIC METABOLIC PNL TOTAL CA: CPT

## 2017-07-17 PROCEDURE — 27000221 HC OXYGEN, UP TO 24 HOURS

## 2017-07-17 PROCEDURE — 97530 THERAPEUTIC ACTIVITIES: CPT

## 2017-07-17 PROCEDURE — 99233 SBSQ HOSP IP/OBS HIGH 50: CPT | Mod: ,,, | Performed by: INTERNAL MEDICINE

## 2017-07-17 RX ORDER — IPRATROPIUM BROMIDE AND ALBUTEROL SULFATE 2.5; .5 MG/3ML; MG/3ML
3 SOLUTION RESPIRATORY (INHALATION) EVERY 4 HOURS PRN
Status: DISCONTINUED | OUTPATIENT
Start: 2017-07-17 | End: 2017-07-18 | Stop reason: HOSPADM

## 2017-07-17 RX ORDER — BUDESONIDE 0.25 MG/2ML
0.25 INHALANT ORAL EVERY 12 HOURS
Status: DISCONTINUED | OUTPATIENT
Start: 2017-07-17 | End: 2017-07-18 | Stop reason: HOSPADM

## 2017-07-17 RX ORDER — SPIRONOLACTONE 25 MG/1
25 TABLET ORAL DAILY
Status: DISCONTINUED | OUTPATIENT
Start: 2017-07-18 | End: 2017-07-18 | Stop reason: HOSPADM

## 2017-07-17 RX ORDER — PREDNISONE 20 MG/1
40 TABLET ORAL DAILY
Status: DISCONTINUED | OUTPATIENT
Start: 2017-07-17 | End: 2017-07-18

## 2017-07-17 RX ORDER — ALPRAZOLAM 0.25 MG/1
0.25 TABLET ORAL 2 TIMES DAILY PRN
Status: DISCONTINUED | OUTPATIENT
Start: 2017-07-17 | End: 2017-07-18 | Stop reason: HOSPADM

## 2017-07-17 RX ORDER — IPRATROPIUM BROMIDE AND ALBUTEROL SULFATE 2.5; .5 MG/3ML; MG/3ML
3 SOLUTION RESPIRATORY (INHALATION) EVERY 6 HOURS
Status: DISCONTINUED | OUTPATIENT
Start: 2017-07-17 | End: 2017-07-18 | Stop reason: HOSPADM

## 2017-07-17 RX ADMIN — PANTOPRAZOLE SODIUM 600 MG: 40 TABLET, DELAYED RELEASE ORAL at 08:07

## 2017-07-17 RX ADMIN — MOXIFLOXACIN HYDROCHLORIDE 400 MG: 400 INJECTION, SOLUTION INTRAVENOUS at 01:07

## 2017-07-17 RX ADMIN — IPRATROPIUM BROMIDE AND ALBUTEROL SULFATE 3 ML: .5; 3 SOLUTION RESPIRATORY (INHALATION) at 11:07

## 2017-07-17 RX ADMIN — LISINOPRIL 5 MG: 5 TABLET ORAL at 08:07

## 2017-07-17 RX ADMIN — IPRATROPIUM BROMIDE AND ALBUTEROL SULFATE 3 ML: .5; 3 SOLUTION RESPIRATORY (INHALATION) at 04:07

## 2017-07-17 RX ADMIN — POTASSIUM CHLORIDE 20 MEQ: 1500 TABLET, EXTENDED RELEASE ORAL at 08:07

## 2017-07-17 RX ADMIN — BUMETANIDE 2 MG: 0.25 INJECTION INTRAMUSCULAR; INTRAVENOUS at 08:07

## 2017-07-17 RX ADMIN — PANTOPRAZOLE SODIUM 40 MG: 40 TABLET, DELAYED RELEASE ORAL at 08:07

## 2017-07-17 RX ADMIN — CARVEDILOL 12.5 MG: 12.5 TABLET, FILM COATED ORAL at 08:07

## 2017-07-17 RX ADMIN — PREDNISONE 40 MG: 20 TABLET ORAL at 11:07

## 2017-07-17 RX ADMIN — BUDESONIDE 0.25 MG: 0.25 SUSPENSION RESPIRATORY (INHALATION) at 08:07

## 2017-07-17 RX ADMIN — ALPRAZOLAM 0.25 MG: 0.25 TABLET ORAL at 11:07

## 2017-07-17 RX ADMIN — ARFORMOTEROL TARTRATE 15 MCG: 15 SOLUTION RESPIRATORY (INHALATION) at 08:07

## 2017-07-17 RX ADMIN — IPRATROPIUM BROMIDE AND ALBUTEROL SULFATE 3 ML: .5; 3 SOLUTION RESPIRATORY (INHALATION) at 12:07

## 2017-07-17 RX ADMIN — IPRATROPIUM BROMIDE AND ALBUTEROL SULFATE 3 ML: .5; 3 SOLUTION RESPIRATORY (INHALATION) at 08:07

## 2017-07-17 NOTE — ASSESSMENT & PLAN NOTE
Patient is unable to verbalize her medications - when pharmacy called, she has not filled any meds since May - last fill on xarelto and bystolic was February, 2017. Last fill on Bumex was December 2016. When pt questioned, she reported she has samples and does not need the refills yet.    Patient has home health & 24 hour sitters - be sure we coordinate to facilitate medication compliance upon discharge  Consult to  for d/c planning

## 2017-07-17 NOTE — PLAN OF CARE
Problem: Patient Care Overview  Goal: Plan of Care Review  Outcome: Ongoing (interventions implemented as appropriate)  Pt becoming more confused throughout shift. Solu-medrol discont. Xanax x1 administered. Pt resting upon reassessment. Daughter at bedside. Sitter and FPP in place. NADN. VSS. Will continue to monitor until report is given.

## 2017-07-17 NOTE — PROGRESS NOTES
Cardiology Progress Note        SUBJECTIVE:   Patient continues to have active wheezing and SOB.  Still in A-fib. Rate control sub-optimal. Patient and family were not interested in recommendations from Cardiology in regards to rate control and management of patient's cardiomyopathy. She was treated on yesterday withy steroids, but  became disoriented. She has no chest pain or angina. No CNS complaints to suggest TIA or CVA. No abnormal bleeding on Xarelto.       OBJECTIVE:     Vital Signs (Most Recent)  Temp: 98.8 °F (37.1 °C) (07/17/17 1230)  Pulse: (!) 115 (07/17/17 1230)  Resp: 20 (07/17/17 1230)  BP: 121/87 (07/17/17 1230)  SpO2: 99 % (07/17/17 1230)    Vital Signs Range (Last 24H):  Temp:  [97.5 °F (36.4 °C)-98.8 °F (37.1 °C)]   Pulse:  [110-122]   Resp:  [20-24]   BP: (121-146)/(71-97)   SpO2:  [98 %-100 %]     Intake/Output last 3 shifts:  I/O last 3 completed shifts:  In: 600 [P.O.:600]  Out: -     Intake/Output this shift:  I/O this shift:  In: 240 [P.O.:240]  Out: -     Review of patient's allergies indicates:   Allergen Reactions    Atorvastatin      Other reaction(s): Muscle pain    Codeine      Other reaction(s): Unknown    Digoxin      Other reaction(s): Unknown    Diovan [valsartan] Other (See Comments)     Upset stomach, weakness    Eggs [egg derived]     Morphine      Other reaction(s): Unknown    Naproxen      Other reaction(s): Nausea    Peanut     Propofol      Other reaction(s): delirious    Sulfa (sulfonamide antibiotics)        Current Facility-Administered Medications   Medication    acetaminophen tablet 650 mg    albuterol-ipratropium 2.5mg-0.5mg/3mL nebulizer solution 3 mL    alprazolam tablet 0.25 mg    arformoterol nebulizer solution 15 mcg    budesonide nebulizer solution 0.25 mg    bumetanide injection 2 mg    carvedilol tablet 12.5 mg    guaifenesin 12 hr tablet 600 mg    lisinopril tablet 5 mg    moxifloxacin 400 mg/250 mL IVPB 400 mg    ondansetron  disintegrating tablet 8 mg    pantoprazole EC tablet 40 mg    polyethylene glycol packet 17 g    potassium chloride SA CR tablet 20 mEq    predniSONE tablet 40 mg    rivaroxaban tablet 20 mg     No current facility-administered medications on file prior to encounter.      Current Outpatient Prescriptions on File Prior to Encounter   Medication Sig    albuterol 90 mcg/actuation inhaler Inhale 2 puffs into the lungs every 4 (four) hours as needed for Wheezing. Rescue    albuterol-ipratropium 2.5mg-0.5mg/3mL (DUO-NEB) 0.5 mg-3 mg(2.5 mg base)/3 mL nebulizer solution Take 3 mLs by nebulization every 6 (six) hours as needed for Wheezing. Rescue    budesonide-formoterol 160-4.5 mcg (SYMBICORT) 160-4.5 mcg/actuation HFAA Inhale 2 puffs into the lungs every 12 (twelve) hours. Wash out mouth after using    bumetanide (BUMEX) 2 MG tablet Take 1 tablet (2 mg total) by mouth 2 (two) times daily. 1 Tablet Oral Every day (Patient taking differently: Take 4 mg by mouth 2 (two) times daily. 1 Tablet Oral Every day)    calcium-Mg-zinc-hc #122-vit D3 250-125-3.7-100 mb-yo-fw-unit Tab Take 1 tablet by mouth Daily.    cholecalciferol, vitamin D3, 1,000 unit capsule Take 1 capsule (1,000 Units total) by mouth once daily.    fluticasone (FLONASE) 50 mcg/actuation nasal spray 2 sprays by Each Nare route once daily.    inhalation spacing device Use as directed for inhalation.    montelukast (SINGULAIR) 10 mg tablet Take 1 tablet (10 mg total) by mouth every evening.    nebivolol (BYSTOLIC) 2.5 MG Tab Take 10 mg by mouth once daily.    nebulizer and compressor Tesha 1 Device by Misc.(Non-Drug; Combo Route) route every 4 (four) hours as needed.    omeprazole (PRILOSEC) 20 MG capsule Take 1 capsule (20 mg total) by mouth once daily.    potassium chloride (KLOR-CON) 10 MEQ TbSR Take 2 tablets (20 mEq total) by mouth once daily. 1 Tablet Extended Release Oral Every day    predniSONE (DELTASONE) 20 MG tablet 3 daily x 3 days, 2  daily x 3 days, 1 daily x 3 days, 1/2 daily x 4 days.    rivaroxaban (XARELTO) 15 mg Tab Take 1 tablet (15 mg total) by mouth daily with dinner or evening meal.    sodium bicarb-sodium chloride Pack 1 packet by Nasal route 2 (two) times daily.       Physical Exam:  General appearance: alert, appears stated age and cooperative  Head: Normocephalic, without obvious abnormality, atraumatic  Neck: no adenopathy, no carotid bruit, no JVD, supple  Back:  no skin lesions, erythema, or scars  Lungs:  Wheezing to auscultation bilaterally  Chest wall: no tenderness. Left pocket ICD   Heart: irregular rate and rhythm, S1, S2 normal, no murmur, click, rub or gallop  Abdomen: soft, non-tender; bowel sounds normal; no masses,  no organomegaly  Extremities: extremities normal, atraumatic, no cyanosis or edema  Pulses: 2+ and symmetric  Skin: Skin color, texture, turgor normal. No rashes or lesions  Neurologic: Grossly normal    Laboratory:  Chemistry:   Lab Results   Component Value Date     07/17/2017    K 4.3 07/17/2017     07/17/2017    CO2 20 (L) 07/17/2017    BUN 48 (H) 07/17/2017    CREATININE 1.4 07/17/2017    CALCIUM 9.7 07/17/2017     Cardiac Markers:   Lab Results   Component Value Date    TROPONINI 0.018 07/16/2017     Cardiac Markers (Last 3):   Lab Results   Component Value Date    TROPONINI 0.018 07/16/2017    TROPONINI 0.017 07/15/2017    TROPONINI 0.040 (H) 07/15/2017     CBC:   Lab Results   Component Value Date    WBC 10.58 07/17/2017    HGB 10.8 (L) 07/17/2017    HCT 34.2 (L) 07/17/2017    MCV 82 07/17/2017     (L) 07/17/2017     Lipids:   Lab Results   Component Value Date    CHOL 204 (H) 07/15/2017    TRIG 54 07/15/2017    HDL 63 07/15/2017     Coagulation:   Lab Results   Component Value Date    INR 1.2 07/15/2017    APTT 28.5 07/15/2017       Diagnostic Results:  ECG: Reviewed  X-Ray: Reviewed  Echo: Reviewed      ASSESSMENT/PLAN:     Patient Active Problem List   Diagnosis    Atrial  fibrillation    GERD (gastroesophageal reflux disease)    Essential hypertension    Childhood asthma without complication    Cardiac defibrillator in place    Abnormal CT scan, gastrointestinal tract    Diaphragmatic hernia without obstruction and without gangrene    Chronic knee pain    Obesity, Class I, BMI 30-34.9    Vitamin D deficiency    PVD (peripheral vascular disease)    LBBB (left bundle branch block)    Bilateral leg edema    ARF (acute renal failure)    Pneumonia due to infectious organism    Cough    Chronic kidney disease (CKD), stage III (moderate)    Bronchitis    Nuclear sclerosis of right eye    Pseudophakia of left eye    Severe persistent asthma with acute exacerbation    Pulmonary infiltrate in right lung on chest x-ray    Uncomplicated severe persistent asthma    Chronic atrial fibrillation with RVR    Acute combined systolic and diastolic congestive heart failure    Non compliance w medication regimen        Plan:   Continue current medical management with Coreg and Lisinopril for management of CMPY. Recommend starting Aldaactone 25mg daily. She is diuresing well. Change IV Bumex to 2mg PO BID. Continue to anticoagulate with Xarelto.     Chart reviewed. Patient examined by Dr. Qureshi and agrees with plan that has been outlined.

## 2017-07-17 NOTE — PLAN OF CARE
"Problem: Patient Care Overview  Goal: Plan of Care Review  Outcome: Ongoing (interventions implemented as appropriate)  Patient remains free from falls. Fall precautions remain in place. No c/o pain. Pt remains confused to situation. Is able to answer questions appropriately, but has periods of confusion and forgetfulness.  and daughter at bedside state the patient had two "traumatic head injuries" recently and the confusion started after the injury occurred. Family is requesting a neurology consult. MD notified. VS are stable. No other c/o at this time. Call bell within reach. Reminded to call for assistance.       "

## 2017-07-17 NOTE — PT/OT/SLP PROGRESS
Physical Therapy  Treatment    Carlie Valdez   MRN: [id number]   Admitting Diagnosis: Severe persistent asthma with acute exacerbation    PT Received On: 07/17/17  PT Start Time: 0820     PT Stop Time: 0845    PT Total Time (min): 25 min       Billable Minutes:  Gait [id number] and Therapeutic Exercise 10    Treatment Type: Treatment  PT/PTA: PT             General Precautions: Standard, fall  Orthopedic Precautions:     Braces:           Subjective:  Communicated with NURSE AND EPIC CHART REVIEW  prior to session.   PT AGREED TO TX     Pain/Comfort  Pain Rating 1: 4/10  Location 1: back  Pain Rating Post-Intervention 1: 4/10    Objective:   Patient found with: peripheral IV, telemetry, oxygen    Functional Mobility:  Bed Mobility:   Rolling/Turning to Left: Stand by assistance  Scooting/Bridging: Stand by Assistance  Supine to Sit: Stand by Assistance  Sit to Supine: Stand by Assistance    Transfers:  Sit <> Stand Assistance: Minimum Assistance  Sit <> Stand Assistive Device: Rolling Walker  Bed <> Chair Technique: Stand Pivot  Bed <> Chair Assistance: Minimum Assistance  Bed <> Chair Assistive Device: Rolling Walker    Gait:   Gait Distance: PT GT TRAINED X 190' WITH RW AND O2 IN TOW. PT WITH DEC SAFETY WITH RW USE.   Assistance 1: Minimum assistance  Gait Assistive Device: Rolling walker  Gait Pattern: swing-to gait  Gait Deviation(s): decreased keesha      Therapeutic Activities and Exercises:  PT SEATED EOB WITH SBA. PT STOOD WITH RW AND MIN A FOR GT WITH 02 IN TOW. PT WITH INC WHEEZING. PT RETURNED TO RM SEATED EOB FOR B LE TE X 10 REPS OF AP ,TKE, AND MIP. PT SUP IN BED WITH SBA. PT LEFT WITH ALL NEEDS MET.      AM-PAC 6 CLICK MOBILITY  How much help from another person does this patient currently need?   1 = Unable, Total/Dependent Assistance  2 = A lot, Maximum/Moderate Assistance  3 = A little, Minimum/Contact Guard/Supervision  4 = None, Modified [place]/Independent    Turning over in  bed (including adjusting bedclothes, sheets and blankets)?: 3  Sitting down on and standing up from a chair with arms (e.g., wheelchair, bedside commode, etc.): 3  Moving from lying on back to sitting on the side of the bed?: 3  Moving to and from a bed to a chair (including a wheelchair)?: 3  Need to walk in hospital room?: 3  Climbing 3-5 steps with a railing?: 1 (NT)  Total Score: 16    AM-PAC Raw Score CMS G-Code Modifier Level of Impairment Assistance   6 % Total / Unable   7 - 9 CM 80 - 100% Maximal Assist   10 - 14 CL 60 - 80% Moderate Assist   15 - 19 CK 40 - 60% Moderate Assist   20 - 22 CJ 20 - 40% Minimal Assist   23 CI 1-20% SBA / CGA   24 CH 0% Independent/ Mod I     Patient left supine with call button in reach and bed alarm on.    Assessment:  PT PROGRESSING WITH GT TRAINING.     Rehab identified problem list/impairments: Rehab identified problem list/impairments: weakness, impaired endurance, gait instability, impaired functional mobilty, pain, decreased lower extremity function, impaired balance, impaired self care skills, impaired cognition    Rehab potential is good.    Activity tolerance: Good    Discharge recommendations: Discharge Facility/Level Of Care Needs: home health PT     Barriers to discharge: Barriers to Discharge: None    Equipment recommendations:       GOALS:    Physical Therapy Goals     Not on file                PLAN:    Patient to be seen    to address the above listed problems via gait training, therapeutic activities, therapeutic exercises  Plan of Care expires: 07/23/17  Plan of Care reviewed with: patient         Ladan Driscoll, PT  07/17/2017

## 2017-07-17 NOTE — PROGRESS NOTES
"Pt continues to try to get out of bed. Shouts at sitter, nurses, and  to "leave her alone". Racial derogatory remarks made to staff and family members. Patient also has periods of hallucinations stating "my chair is moving, put my chair back where it goes!".     Patient reoriented to room and situation. Sitter and  remains at bedside. Will continue to monitor.  "

## 2017-07-17 NOTE — SUBJECTIVE & OBJECTIVE
Review of Systems   Constitutional: Negative for appetite change, chills, diaphoresis, fatigue, fever and unexpected weight change.   HENT: Negative for congestion, nosebleeds, sinus pressure and sore throat.    Eyes: Negative for pain, discharge and visual disturbance.   Respiratory: Positive for shortness of breath and wheezing. Negative for cough, chest tightness and stridor.    Cardiovascular: Negative for chest pain, palpitations and leg swelling.   Gastrointestinal: Negative for abdominal distention, abdominal pain, blood in stool, constipation, diarrhea, nausea and vomiting.   Endocrine: Negative for cold intolerance and heat intolerance.   Genitourinary: Negative for difficulty urinating, dysuria, flank pain, frequency and urgency.   Musculoskeletal: Negative for arthralgias, back pain, joint swelling, myalgias, neck pain and neck stiffness.   Skin: Negative for rash and wound.   Allergic/Immunologic: Negative for food allergies and immunocompromised state.   Neurological: Negative for dizziness, seizures, syncope, facial asymmetry, speech difficulty, weakness, light-headedness, numbness and headaches.   Hematological: Negative for adenopathy.   Psychiatric/Behavioral: Negative for agitation, confusion and hallucinations.     Objective:     Vital Signs (Most Recent):  Temp: 97.6 °F (36.4 °C) (07/17/17 0701)  Pulse: (!) 112 (07/17/17 0813)  Resp: (!) 22 (07/17/17 0813)  BP: (!) 146/97 (07/17/17 0701)  SpO2: 98 % (07/17/17 0813) Vital Signs (24h Range):  Temp:  [97.5 °F (36.4 °C)-98.1 °F (36.7 °C)] 97.6 °F (36.4 °C)  Pulse:  [] 112  Resp:  [20-24] 22  SpO2:  [96 %-100 %] 98 %  BP: (125-146)/(71-97) 146/97     Weight: 86.2 kg (190 lb)  Body mass index is 32.61 kg/m².    Intake/Output Summary (Last 24 hours) at 07/17/17 1044  Last data filed at 07/17/17 0748   Gross per 24 hour   Intake              480 ml   Output                0 ml   Net              480 ml      Physical Exam   Constitutional: She  is oriented to person, place, and time. She appears well-developed and well-nourished. No distress.   HENT:   Head: Normocephalic and atraumatic.   Nose: Nose normal.   Mouth/Throat: Oropharynx is clear and moist.   Eyes: Conjunctivae and EOM are normal. No scleral icterus.   Neck: Normal range of motion. Neck supple. No tracheal deviation present.   Cardiovascular: Normal heart sounds and intact distal pulses.  Exam reveals no gallop and no friction rub.    No murmur heard.  Irregular rate and rhythm    Pulmonary/Chest: Effort normal. No stridor. No respiratory distress. She has wheezes. She has no rales. She exhibits no tenderness.   Abdominal: Soft. Bowel sounds are normal. She exhibits no distension and no mass. There is no tenderness. There is no rebound and no guarding.   Musculoskeletal: Normal range of motion. She exhibits edema (trace). She exhibits no tenderness or deformity.   Neurological: She is alert and oriented to person, place, and time. No cranial nerve deficit. She exhibits normal muscle tone. Coordination normal.   Skin: Skin is warm and dry. Capillary refill takes less than 2 seconds. No rash noted. She is not diaphoretic. No erythema. No pallor.   Psychiatric: She has a normal mood and affect. Her behavior is normal. Thought content normal.   Nursing note and vitals reviewed.      Significant Labs:   BMP:   Recent Labs  Lab 07/15/17  1550  07/17/17  0424   GLU  --   < > 120*   NA  --   < > 142   K  --   < > 4.3   CL  --   < > 108   CO2  --   < > 20*   BUN  --   < > 48*   CREATININE  --   < > 1.4   CALCIUM  --   < > 9.7   MG 1.8  --   --    < > = values in this interval not displayed.  CBC:   Recent Labs  Lab 07/15/17  1230 07/16/17  0524 07/17/17  0424   WBC 4.67 4.14 10.58   HGB 10.6* 10.4* 10.8*   HCT 34.8* 33.9* 34.2*   * 132* 138*     CMP:   Recent Labs  Lab 07/15/17  1230 07/16/17  0524 07/17/17  0424    144 142   K 4.3 4.2 4.3   * 110 108   CO2 26 22* 20*   GLU 86 163*  120*   BUN 22 29* 48*   CREATININE 0.9 1.2 1.4   CALCIUM 9.2 9.5 9.7   PROT 6.6  --   --    ALBUMIN 3.2*  --   --    BILITOT 0.8  --   --    ALKPHOS 94  --   --    AST 27  --   --    ALT 18  --   --    ANIONGAP 6* 12 14   EGFRNONAA >60 46* 38*     All pertinent labs within the past 24 hours have been reviewed.    Significant Imaging:   Imaging Results          X-Ray Chest AP Portable (Final result)  Result time 07/15/17 12:38:24    Final result by Atif Solis III, MD (07/15/17 12:38:24)                 Impression:     As above      Electronically signed by: ATIF SOLIS MD  Date:     07/15/17  Time:    12:38              Narrative:    AP chest.        Clinical indication: Congestive heart failure.    Compared to May.    Heart size remains enlarged with a permanent pacing device/defibrillator again noted in position on the right. The lung fields are clear. Minimal vascular prominence without overt congestion/failure.

## 2017-07-17 NOTE — PT/OT/SLP EVAL
Physical Therapy  Evaluation    Carlie Valdez   MRN: [id number]   Admitting Diagnosis: Severe persistent asthma with acute exacerbation    PT Received On: 17  PT Start Time: 1222     PT Stop Time: 1315    PT Total Time (min): 53 min       Billable Minutes:  Evaluation 15, Therapeutic Activity 25 and Therapeutic Exercise 13    Diagnosis: Severe persistent asthma with acute exacerbation    Past Medical History:   Diagnosis Date    Asthma     Atrial fibrillation     Blood clot of vein in shoulder area     Cardiac defibrillator in place     Chronic knee pain     Diabetes mellitus type 2 without retinopathy 2016    Diaphragmatic hernia without obstruction and without gangrene 2015    GERD (gastroesophageal reflux disease)     Hypertension       Past Surgical History:   Procedure Laterality Date     SECTION      COLONOSCOPY      KNEE ARTHROSCOPY      UPPER GASTROINTESTINAL ENDOSCOPY         Referring physician: BRAXTON   Date referred to PT: 17    General Precautions: Standard, fall  Orthopedic Precautions: N/A   Braces:              Patient History:  Lives With: spouse  Living Arrangements: house  Home Layout: Able to live on 1st floor  Stair Railings at Home: none  Transportation Available: family or friend will provide  Equipment Currently Used at Home: walker, rolling, cane, straight  DME owned (not currently used): rolling walker and single point cane    Previous Level of Function:  Ambulation Skills: needs device  Transfer Skills: independent    Subjective:  Communicated with SN JURADO prior to session.  Chief Complaint: PAIN AND WEAKNESS; INC FALL RISK  Patient goals: TO GO HOME AND DEC FALL RISK           Objective:         Cognitive Exam:  Oriented to: Person and Place    Follows Commands/attention: Easily distracted and Follows one-step commands  Communication: clear/fluent  Safety awareness/insight to disability: impaired    Physical Exam:  Postural  examination/scapula alignment: Rounded shoulder    Skin integrity: Visible skin intact  Edema: None noted    Sensation:   Intact    Upper Extremity Range of Motion:  Right Upper Extremity: WFL  Left Upper Extremity: WFL    Upper Extremity Strength:  Right Upper Extremity: Deficits: SEE OT NOTE  Left Upper Extremity: Deficits: SEE OT NOTE    Lower Extremity Range of Motion:  Right Lower Extremity: WFL  Left Lower Extremity: WFL    Lower Extremity Strength:  Right Lower Extremity: Deficits: 4-/5  Left Lower Extremity: Deficits: 4-/5     Fine motor coordination:  Intact    Gross motor coordination: WFL EXCEPT SUPINE TO SIT LOG-ROLLING TECHNIQUE    Functional Mobility:  Bed Mobility:  Rolling/Turning to Left: Stand by assistance  Scooting/Bridging: Stand by Assistance  Supine to Sit: Stand by Assistance  Sit to Supine: Stand by Assistance    Transfers:  Sit <> Stand Assistance: Minimum Assistance  Sit <> Stand Assistive Device: Rolling Walker  Bed <> Chair Technique: Stand Pivot  Bed <> Chair Assistance: Minimum Assistance  Bed <> Chair Assistive Device: Rolling Walker    Gait:   Gait Distance: PT GT TRAINED X 190' WITH RW AND O2 IN TOW. PT WITH DEC SAFETY WITH RW USE.   Assistance 1: Minimum assistance  Gait Assistive Device: Rolling walker  Gait Pattern: swing-to gait  Gait Deviation(s): decreased keesha    Balance: SIT BAL - FAIR + AND STAND BAL FAIR-/FAIR UE SUPPORT    Therapeutic Activities and Exercises:  P.T. EDUCATED Pt./CG ON PROPER BREATHING EXS/TECHNIQUE, POC/GOALS/INITIAL EX PROGRAM, AND ORTHOSTATIC HYPOTENSION PREC/SAFETY/POSTURE AND FALL PREVENTION    AM-PAC 6 CLICK MOBILITY  How much help from another person does this patient currently need?   1 = Unable, Total/Dependent Assistance  2 = A lot, Maximum/Moderate Assistance  3 = A little, Minimum/Contact Guard/Supervision  4 = None, Modified [place]/Independent          AM-PAC Raw Score CMS G-Code Modifier Level of Impairment Assistance   6 %  Total / Unable   7 - 9 CM 80 - 100% Maximal Assist   10 - 14 CL 60 - 80% Moderate Assist   15 - 19 CK 40 - 60% Moderate Assist   20 - 22 CJ 20 - 40% Minimal Assist   23 CI 1-20% SBA / CGA   24 CH 0% Independent/ Mod I     Patient left supine with all lines intact, SN notified and  present.    Assessment:   Carlie Valdez is a 71 y.o. female with a medical diagnosis of Severe persistent asthma with acute exacerbation and presents with WEAKNESS, FUNCTIONAL MOBILITY, PAIN, AND DEC ACTOL.    Rehab identified problem list/impairments: Rehab identified problem list/impairments: weakness, impaired endurance, impaired functional mobilty, impaired balance, gait instability, pain, decreased coordination, decreased safety awareness, impaired cognition    Rehab potential is good.    Activity tolerance: Fair    Discharge recommendations: Discharge Facility/Level Of Care Needs: home health PT, home health OT     Barriers to discharge:      Equipment recommendations: Equipment Needed After Discharge: bedside commode, bath bench     GOALS:    Physical Therapy Goals        Problem: Physical Therapy Goal    Goal Priority Disciplines Outcome Goal Variances Interventions   Physical Therapy Goal     PT/OT, PT      Description:  STG'S FOR P.T. 7/23/17  1. PATIENT WILL PERFORM SUPINE TO SIT MOD INDEP TO PREP OOB  2. PATIENT WILL PERFORM SIT TO STAND MOD INDEP WITH AD TO PREP AMB  3. PATIENT WILL AMB APPROP AD X 150FT MOD INDEP FOR SAFE HH MOBILITY  4. PATIENT/CG WILL BE INDEP WITH HEP TO IMPROVE ACT SIM                     PLAN:    Patient to be seen 5 x/week to address the above listed problems via gait training, therapeutic activities, therapeutic exercises  Plan of Care expires: 07/23/17  Plan of Care reviewed with: patient          Carlos Hill, PT  07/17/2017

## 2017-07-17 NOTE — PLAN OF CARE
CM met with patient at the bedside to assess for discharge needs.  Patient able to answer questions appropriately (with the exception of phone numbers which she could not verify)  but seemed confused at times.  She states that she lives at home with her  and he attends to all her needs.   She states that she gets her medications through Medicare and [employer].  CM placed pharmacy assistance order per consult for medication assistance.  CM provided a transitional care folder, information on pharmacy bedside delivery, information on advanced directives, and discharge planning begins on admission with contact information for CARLOS Alberto.    KARINE handed off to CARLOS Alberto.     07/17/17 3703   Discharge Assessment   Assessment Type Discharge Planning Assessment   Confirmed/corrected address and phone number on facesheet? Yes   Assessment information obtained from? Patient;Medical Record   Expected Length of Stay (days) (TBD)   Communicated expected length of stay with patient/caregiver yes   Prior to hospitilization cognitive status: Alert/Oriented   Prior to hospitalization functional status: Assistive Equipment   Current cognitive status: Unable to Assess   Current Functional Status: Assistive Equipment;Needs Assistance   Arrived From admitted as an inpatient;home or self-care   Lives With spouse   Able to Return to Prior Arrangements yes   Is patient able to care for self after discharge? Unable to determine at this time (comments)   How many people do you have in your home that can help with your care after discharge? 1   Who are your caregiver(s) and their phone number(s)? Dr Celso Valdez, spouse [phone]   Patient's perception of discharge disposition home or selfcare   Readmission Within The Last 30 Days no previous admission in last 30 days   Patient currently being followed by outpatient case management? No   Patient currently receives home health services? No   Does the patient currently use HME? Yes    Patient currently receives private duty nursing? No   Patient currently receives any other outside agency services? No   Equipment Currently Used at Home walker, rolling;cane, straight;other (see comments);shower chair;wheelchair  (nebulizer)   Do you have any problems affording any of your prescribed medications? No   Is the patient taking medications as prescribed? yes   Do you have any financial concerns preventing you from receiving the healthcare you need? No   Does the patient have transportation to healthcare appointments? Yes   Transportation Available family or friend will provide   On Dialysis? No   Does the patient receive services at the [facility]? No   Are there any open cases? No   Discharge Plan A Home with family;Home Health   Discharge Plan B Home with family   Patient/Family In Agreement With Plan yes

## 2017-07-17 NOTE — PLAN OF CARE
Problem: Patient Care Overview  Goal: Plan of Care Review  Outcome: Ongoing (interventions implemented as appropriate)  PT REQUIRES MIN A FOR GT X 190' WITH RW AND O2 IN TOW

## 2017-07-17 NOTE — PROGRESS NOTES
[facility] Hospital Medicine  Progress Note    Patient Name: Carlie Valdez  MRN: [id number]  Patient Class: IP- Inpatient   Admission Date: 7/15/2017  Length of Stay: 2 days  Attending Physician: Subhash Ayala MD  Primary Care Provider: Johanna Guzman MD        Subjective:     Principal Problem:Severe persistent asthma with acute exacerbation    HPI:  70 y/o female presents to ED with c/o SOB. PMH includes CHF, Asthma, A-fib, and HTN. Patient is non-compliant with medications, has not filled any medications since May, and has not filled heart medications since February. Associated symptoms include cough, clear sputum, and wheezing. Pt has tried nebulizer tx and inhalers with no relief. Denies any exacerbating or mitigating factors. Patient denies any fever, n/v/d, abd pain, HA, dizziness, weakness, lightheadedness, chills, leg swelling, and Cp. Pt was hospitalized at [facility] for same sxs three weeks ago and reports she believes she has bronchitis. No further complaints or concerns at this time. She will be admitted to Medicine for IV abx, steroids, cards consult and symptom management under the care of Hospital Medicine.       Hospital Course:  The pt was admitted to telemetry on IV abx, duonebs, brovana, IV Bumex, and IV steroids. Care discussed with Cardiology and pulmonary. Pt continues to wheeze, however, she was also very confused last night. Today, pt's confusion has resolved. Will restart neb txs due to persistent wheezing and change IV steroids to po.         Review of Systems   Constitutional: Negative for appetite change, chills, diaphoresis, fatigue, fever and unexpected weight change.   HENT: Negative for congestion, nosebleeds, sinus pressure and sore throat.    Eyes: Negative for pain, discharge and visual disturbance.   Respiratory: Positive for shortness of breath and wheezing. Negative for cough, chest tightness and stridor.    Cardiovascular: Negative for chest  pain, palpitations and leg swelling.   Gastrointestinal: Negative for abdominal distention, abdominal pain, blood in stool, constipation, diarrhea, nausea and vomiting.   Endocrine: Negative for cold intolerance and heat intolerance.   Genitourinary: Negative for difficulty urinating, dysuria, flank pain, frequency and urgency.   Musculoskeletal: Negative for arthralgias, back pain, joint swelling, myalgias, neck pain and neck stiffness.   Skin: Negative for rash and wound.   Allergic/Immunologic: Negative for food allergies and immunocompromised state.   Neurological: Negative for dizziness, seizures, syncope, facial asymmetry, speech difficulty, weakness, light-headedness, numbness and headaches.   Hematological: Negative for adenopathy.   Psychiatric/Behavioral: Negative for agitation, confusion and hallucinations.     Objective:     Vital Signs (Most Recent):  Temp: 97.6 °F (36.4 °C) (07/17/17 0701)  Pulse: (!) 112 (07/17/17 0813)  Resp: (!) 22 (07/17/17 0813)  BP: (!) 146/97 (07/17/17 0701)  SpO2: 98 % (07/17/17 0813) Vital Signs (24h Range):  Temp:  [97.5 °F (36.4 °C)-98.1 °F (36.7 °C)] 97.6 °F (36.4 °C)  Pulse:  [] 112  Resp:  [20-24] 22  SpO2:  [96 %-100 %] 98 %  BP: (125-146)/(71-97) 146/97     Weight: 86.2 kg (190 lb)  Body mass index is 32.61 kg/m².    Intake/Output Summary (Last 24 hours) at 07/17/17 1044  Last data filed at 07/17/17 0748   Gross per 24 hour   Intake              480 ml   Output                0 ml   Net              480 ml      Physical Exam   Constitutional: She is oriented to person, place, and time. She appears well-developed and well-nourished. No distress.   HENT:   Head: Normocephalic and atraumatic.   Nose: Nose normal.   Mouth/Throat: Oropharynx is clear and moist.   Eyes: Conjunctivae and EOM are normal. No scleral icterus.   Neck: Normal range of motion. Neck supple. No tracheal deviation present.   Cardiovascular: Normal heart sounds and intact distal pulses.  Exam  reveals no gallop and no friction rub.    No murmur heard.  Irregular rate and rhythm    Pulmonary/Chest: Effort normal. No stridor. No respiratory distress. She has wheezes. She has no rales. She exhibits no tenderness.   Abdominal: Soft. Bowel sounds are normal. She exhibits no distension and no mass. There is no tenderness. There is no rebound and no guarding.   Musculoskeletal: Normal range of motion. She exhibits edema (trace). She exhibits no tenderness or deformity.   Neurological: She is alert and oriented to person, place, and time. No cranial nerve deficit. She exhibits normal muscle tone. Coordination normal.   Skin: Skin is warm and dry. Capillary refill takes less than 2 seconds. No rash noted. She is not diaphoretic. No erythema. No pallor.   Psychiatric: She has a normal mood and affect. Her behavior is normal. Thought content normal.   Nursing note and vitals reviewed.      Significant Labs:   BMP:   Recent Labs  Lab 07/15/17  1550  07/17/17  0424   GLU  --   < > 120*   NA  --   < > 142   K  --   < > 4.3   CL  --   < > 108   CO2  --   < > 20*   BUN  --   < > 48*   CREATININE  --   < > 1.4   CALCIUM  --   < > 9.7   MG 1.8  --   --    < > = values in this interval not displayed.  CBC:   Recent Labs  Lab 07/15/17  1230 07/16/17  0524 07/17/17  0424   WBC 4.67 4.14 10.58   HGB 10.6* 10.4* 10.8*   HCT 34.8* 33.9* 34.2*   * 132* 138*     CMP:   Recent Labs  Lab 07/15/17  1230 07/16/17  0524 07/17/17  0424    144 142   K 4.3 4.2 4.3   * 110 108   CO2 26 22* 20*   GLU 86 163* 120*   BUN 22 29* 48*   CREATININE 0.9 1.2 1.4   CALCIUM 9.2 9.5 9.7   PROT 6.6  --   --    ALBUMIN 3.2*  --   --    BILITOT 0.8  --   --    ALKPHOS 94  --   --    AST 27  --   --    ALT 18  --   --    ANIONGAP 6* 12 14   EGFRNONAA >60 46* 38*     All pertinent labs within the past 24 hours have been reviewed.    Significant Imaging:   Imaging Results          X-Ray Chest AP Portable (Final result)  Result time  07/15/17 12:38:24    Final result by Atif Solis III, MD (07/15/17 12:38:24)                 Impression:     As above      Electronically signed by: ATIF SOLIS MD  Date:     07/15/17  Time:    12:38              Narrative:    AP chest.        Clinical indication: Congestive heart failure.    Compared to May.    Heart size remains enlarged with a permanent pacing device/defibrillator again noted in position on the right. The lung fields are clear. Minimal vascular prominence without overt congestion/failure.                            Assessment/Plan:      * Severe persistent asthma with acute exacerbation    Cont Avelox  Cont LABA, SHARYN, and ICS   Change IV steroids to po due to nighttime agitation/confusion  Supplemental oxygen to maintain sats  Care discussed with Pulmonology   Should discharge the patient on an extended dose of antibiotics and  steroids to taper over 2 weeks  Pt is noncompliant with medications- counseled           Acute combined systolic and diastolic congestive heart failure    EF 25-30%  Cont IV bumex, bystolic, lisinopril  Daily weights  Fluid restriction  I/O            Chronic atrial fibrillation with RVR    Care discussed with Cardiology   ACID  Cardiac monitoring  Cont Coreg & Xarelto daily - pt last filled both in February - reports she has samples from cardiology  Pt/spouse refused any medication changes to control A-fib. Would like recommendations from Dr. Hernández as an OP. Will continue current medical therapy           Non compliance w medication regimen    Patient is unable to verbalize her medications - when pharmacy called, she has not filled any meds since May - last fill on xarelto and bystolic was February, 2017. Last fill on Bumex was December 2016. When pt questioned, she reported she has samples and does not need the refills yet.    Patient has home health & 24 hour sitters - be sure we coordinate to facilitate medication compliance upon discharge  Consult to   for d/c planning          Bronchitis    IV steroids  duonebs and brovana  guaifenesin  Supplemental oxygen to maintain sats              Cardiac defibrillator in place    Cardiology consulted  Pt reports she sees Dr. Hernández - unsure of last appt            Essential hypertension    Continue Coreg, Bumex- lisinopril added  Low sodium diet            VTE Risk Mitigation         Ordered     rivaroxaban tablet 20 mg  With dinner     Route:  Oral        07/15/17 1357     High Risk of VTE  Once      07/15/17 1459     Place sequential compression device  Until discontinued      07/15/17 1459     Reason for No Pharmacological VTE Prophylaxis  Once      07/15/17 1459          Hilary Grimes NP  Department of Hospital Medicine   [facility]

## 2017-07-17 NOTE — PLAN OF CARE
Problem: Patient Care Overview  Goal: Plan of Care Review  Outcome: Ongoing (interventions implemented as appropriate)  Pt oob and figiting all day with audible wheezes. Neb treatments scheduled and prn with unchanged effects,

## 2017-07-17 NOTE — PROGRESS NOTES
Pt continues to move about in the bed incessantly. Audible wheezes heard with ambulation. Minimal air movement. Prn bronchodilator given. Pt denies SOB.

## 2017-07-17 NOTE — ASSESSMENT & PLAN NOTE
Cont Avelox  Cont LABA, SHARYN, and ICS   Change IV steroids to po due to nighttime agitation/confusion  Supplemental oxygen to maintain sats  Care discussed with Pulmonology   Should discharge the patient on an extended dose of antibiotics and  steroids to taper over 2 weeks  Pt is noncompliant with medications- counseled

## 2017-07-17 NOTE — PROGRESS NOTES
MD notified of patient increase in agitation. Refuses to stay in bed and continues to yell at staff and . It appears patient is disoriented to time, place, and situation, but insists she is not confused. Unable to be redirected or calmed down. Dr. Stout to bedside to assess patient. No new orders at this time. Sitter remains in hallway with door open to monitor patient as she will not allow anyone in the room. Will continue to monitor.

## 2017-07-17 NOTE — PLAN OF CARE
Problem: Patient Care Overview  Goal: Plan of Care Review  Outcome: Ongoing (interventions implemented as appropriate)  o2 sat on nc 2l/m=98%; tolerates txs well; bilateral wheezes continue.

## 2017-07-17 NOTE — PROGRESS NOTES
[facility] -   Pulmonology  Progress Note    Patient Name: Carlie Valdez  MRN: [id number]  Admission Date: 7/15/2017  Hospital Length of Stay: 2 days  Code Status: Full Code  Attending Provider: Subhash Ayala MD  Primary Care Provider: Johanna Guzman MD   Principal Problem: Severe persistent asthma with acute exacerbation    Subjective: still wheezing and SOB     Interval History: Still dyspnic with getting out of bed into chair. History of Congestive Heart failure . Now with paroxysmal nocturnal dyspnea and edema.  Developed side effects with steroids.  Now tapered to prednisone 40 mg/d.      Objective:     Vital Signs (Most Recent):  Temp: 98.8 °F (37.1 °C) (07/17/17 1230)  Pulse: (!) 115 (07/17/17 1230)  Resp: 20 (07/17/17 1230)  BP: 121/87 (07/17/17 1230)  SpO2: 99 % (07/17/17 1230) Vital Signs (24h Range):  Temp:  [97.5 °F (36.4 °C)-98.8 °F (37.1 °C)] 98.8 °F (37.1 °C)  Pulse:  [110-122] 115  Resp:  [20-24] 20  SpO2:  [98 %-100 %] 99 %  BP: (121-146)/(71-97) 121/87     Weight: 86.2 kg (190 lb)  Body mass index is 32.61 kg/m².      Intake/Output Summary (Last 24 hours) at 07/17/17 1508  Last data filed at 07/17/17 0748   Gross per 24 hour   Intake              240 ml   Output                0 ml   Net              240 ml       Physical Exam   Constitutional: She is oriented to person, place, and time. She appears well-developed and well-nourished.   HENT:   Head: Normocephalic and atraumatic.   Mouth/Throat: Oropharyngeal exudate present.   Eyes: Conjunctivae are normal. Pupils are equal, round, and reactive to light.   Neck: Neck supple. No JVD present. No tracheal deviation present. No thyromegaly present.   Cardiovascular: An irregularly irregular rhythm present. Tachycardia present.    Murmur heard.   Systolic murmur is present with a grade of 2/6   Pulmonary/Chest: Effort normal. No respiratory distress. She has decreased breath sounds. She has no wheezes. She has rales in  the right lower field and the left lower field. She exhibits no tenderness.   Abdominal: Soft. Bowel sounds are normal.   Musculoskeletal: Normal range of motion. She exhibits edema.   Lymphadenopathy:     She has no cervical adenopathy.   Neurological: She is alert and oriented to person, place, and time.   Skin: Skin is warm and dry.   Nursing note and vitals reviewed.      Vents:  Oxygen Concentration (%): 28 (07/17/17 1101)    Lines/Drains/Airways     Peripheral Intravenous Line                 Peripheral IV - Single Lumen 07/16/17 1704 Left Forearm less than 1 day                Significant Labs:    CBC/Anemia Profile:    Recent Labs  Lab 07/16/17  0524 07/17/17  0424   WBC 4.14 10.58   HGB 10.4* 10.8*   HCT 33.9* 34.2*   * 138*   MCV 83 82   RDW 19.0* 19.0*        Chemistries:    Recent Labs  Lab 07/15/17  1550 07/16/17  0524 07/17/17  0424   NA  --  144 142   K  --  4.2 4.3   CL  --  110 108   CO2  --  22* 20*   BUN  --  29* 48*   CREATININE  --  1.2 1.4   CALCIUM  --  9.5 9.7   MG 1.8  --   --    PHOS 3.5  --   --      Elevated BNP    Cardiac Markers: No results for input(s): CKMB, TROPONINT, MYOGLOBIN in the last 48 hours.  Troponin:   Recent Labs  Lab 07/15/17  1903 07/16/17  0002   TROPONINI 0.017 0.018     All pertinent labs within the past 24 hours have been reviewed.    Significant Imaging:  CXR: I have reviewed all pertinent results/findings within the past 24 hours and my personal findings are:  cardiomegaly and pulmonary edema    Assessment/Plan:     Active Diagnoses:    Diagnosis Date Noted POA    PRINCIPAL PROBLEM:  Severe persistent asthma with acute exacerbation [J45.51] 05/04/2017 Yes    Chronic atrial fibrillation with RVR [I48.2] 07/15/2017 Yes    Acute combined systolic and diastolic congestive heart failure [I50.41] 07/15/2017 Yes    Non compliance w medication regimen [Z91.14] 07/15/2017 Not Applicable    Cardiac defibrillator in place [Z95.810] 07/31/2015 Yes     Chronic     Essential hypertension [I10]  Yes      Problems Resolved During this Admission:    Diagnosis Date Noted Date Resolved POA       Continue diuresis, treatment for Congestive Heart failure  Taper off steroids     James Meier MD  Pulmonology  [facility]

## 2017-07-17 NOTE — PLAN OF CARE
Problem: Fall Risk (Adult)  Intervention: Safety Precautions  Bed alarm set. Pt requires frequent assistance in response to bed alarm. Pt did not demonstrate call light use when she needs to get OOB. No falls or injuries this shift.

## 2017-07-17 NOTE — PLAN OF CARE
Received verbal and written hand-off from [facility] Ella [facility]ANCE.    Discussed case with hospitalist, reviewed documentation in Epic and met with patient's daughter, Haley Valdez (Ph#: [phone]) in conference room on 2nd floor as [facility] bedside nurse attempting to assist patient in going to sleep.  Daughter indicates that patient was discharged from another local hospital about 3 weeks ago was discharged home with [facility] SN services.  Daughter states that patient was not moving around well upon most recent discharge and therefore family arranged 24-hour sitters for patient in addition to HH SN services.  Daughter indicates that prior to recent/previous hospital admission to another facility, patient was independent with ADLs and no cognitive issues.  Daughter indicates that after recent discharge from previous hospital, patient was initially not walking and soon after began walking at home with a rolling walker (which patient currently has at home).      Discussed with daughter PT's recommendations (HH with no HME needs identified) for patient.  Daughter states being receptive to HH SN, PT, OT services being arranged for patient post-hospitalization and wants to know whether patient may benefit from having a rollator.  Daughter indicates that they do not want [facility] resumed for patient and instead indicate HH preference as [facility].  Patient Preference Form signed reflecting HH preference as [facility]; and signed form placed in patient's blue folder.  Daughter further stating that 24/7 sitters will be resumed for patient upon discharge as well.    Contacted PT here who states that a rollator is not beneficial for patient post-hospitalization.  Daughter notified and provided with rationale (too easy to get away from patient).  Daughter verbalized understanding.  Provided daughter with this 's contact phone number should any other needs or concerns arise.    Requested NP  for hospitalist to write HH SN, PT, OT order.    D/C PLAN:  Home with family and 24/7 sitters, with [facility] SN, PT, OT via [facility]

## 2017-07-17 NOTE — ASSESSMENT & PLAN NOTE
Care discussed with Cardiology   ACID  Cardiac monitoring  Cont Coreg & Xarelto daily - pt last filled both in February - reports she has samples from cardiology  Pt/spouse refused any medication changes to control A-fib. Would like recommendations from Dr. Hernández as an OP. Will continue current medical therapy

## 2017-07-18 ENCOUNTER — TELEPHONE (OUTPATIENT)
Dept: PULMONOLOGY | Facility: CLINIC | Age: 72
End: 2017-07-18

## 2017-07-18 VITALS
HEIGHT: 64 IN | DIASTOLIC BLOOD PRESSURE: 93 MMHG | RESPIRATION RATE: 20 BRPM | OXYGEN SATURATION: 99 % | WEIGHT: 201.13 LBS | HEART RATE: 115 BPM | SYSTOLIC BLOOD PRESSURE: 154 MMHG | TEMPERATURE: 98 F | BODY MASS INDEX: 34.34 KG/M2

## 2017-07-18 DIAGNOSIS — J45.901 ASTHMA EXACERBATION: Primary | ICD-10-CM

## 2017-07-18 LAB
ANION GAP SERPL CALC-SCNC: 13 MMOL/L
BASOPHILS # BLD AUTO: 0.01 K/UL
BASOPHILS NFR BLD: 0.1 %
BUN SERPL-MCNC: 63 MG/DL
CALCIUM SERPL-MCNC: 9.5 MG/DL
CHLORIDE SERPL-SCNC: 107 MMOL/L
CO2 SERPL-SCNC: 25 MMOL/L
CREAT SERPL-MCNC: 1.5 MG/DL
DIFFERENTIAL METHOD: ABNORMAL
EOSINOPHIL # BLD AUTO: 0 K/UL
EOSINOPHIL NFR BLD: 0 %
ERYTHROCYTE [DISTWIDTH] IN BLOOD BY AUTOMATED COUNT: 18.8 %
EST. GFR  (AFRICAN AMERICAN): 40 ML/MIN/1.73 M^2
EST. GFR  (NON AFRICAN AMERICAN): 35 ML/MIN/1.73 M^2
GLUCOSE SERPL-MCNC: 88 MG/DL
HCT VFR BLD AUTO: 35.1 %
HGB BLD-MCNC: 10.8 G/DL
LYMPHOCYTES # BLD AUTO: 0.7 K/UL
LYMPHOCYTES NFR BLD: 6.9 %
MCH RBC QN AUTO: 25.2 PG
MCHC RBC AUTO-ENTMCNC: 30.8 %
MCV RBC AUTO: 82 FL
MONOCYTES # BLD AUTO: 0.4 K/UL
MONOCYTES NFR BLD: 4.3 %
NEUTROPHILS # BLD AUTO: 9 K/UL
NEUTROPHILS NFR BLD: 89 %
PLATELET # BLD AUTO: 158 K/UL
PMV BLD AUTO: 10.6 FL
POCT GLUCOSE: 86 MG/DL (ref 70–110)
POTASSIUM SERPL-SCNC: 4 MMOL/L
RBC # BLD AUTO: 4.29 M/UL
SODIUM SERPL-SCNC: 145 MMOL/L
WBC # BLD AUTO: 10.1 K/UL

## 2017-07-18 PROCEDURE — 80048 BASIC METABOLIC PNL TOTAL CA: CPT

## 2017-07-18 PROCEDURE — 36415 COLL VENOUS BLD VENIPUNCTURE: CPT

## 2017-07-18 PROCEDURE — 99233 SBSQ HOSP IP/OBS HIGH 50: CPT | Mod: ,,, | Performed by: INTERNAL MEDICINE

## 2017-07-18 PROCEDURE — 85025 COMPLETE CBC W/AUTO DIFF WBC: CPT

## 2017-07-18 PROCEDURE — 27000221 HC OXYGEN, UP TO 24 HOURS

## 2017-07-18 PROCEDURE — 25000003 PHARM REV CODE 250: Performed by: NURSE PRACTITIONER

## 2017-07-18 PROCEDURE — 63600175 PHARM REV CODE 636 W HCPCS: Performed by: NURSE PRACTITIONER

## 2017-07-18 PROCEDURE — 94640 AIRWAY INHALATION TREATMENT: CPT

## 2017-07-18 PROCEDURE — 25000242 PHARM REV CODE 250 ALT 637 W/ HCPCS: Performed by: NURSE PRACTITIONER

## 2017-07-18 RX ORDER — POLYETHYLENE GLYCOL 3350 17 G/17G
17 POWDER, FOR SOLUTION ORAL DAILY
Qty: 30 PACKET | Refills: 0 | Status: ON HOLD | OUTPATIENT
Start: 2017-07-18 | End: 2019-01-01 | Stop reason: SDUPTHER

## 2017-07-18 RX ORDER — LISINOPRIL 5 MG/1
5 TABLET ORAL DAILY
Qty: 30 TABLET | Refills: 0 | Status: SHIPPED | OUTPATIENT
Start: 2017-07-18 | End: 2018-03-09

## 2017-07-18 RX ORDER — MOXIFLOXACIN HYDROCHLORIDE 400 MG/1
400 TABLET ORAL DAILY
Qty: 3 TABLET | Refills: 0 | Status: SHIPPED | OUTPATIENT
Start: 2017-07-18 | End: 2017-07-21

## 2017-07-18 RX ORDER — PREDNISONE 10 MG/1
10 TABLET ORAL DAILY
Status: DISCONTINUED | OUTPATIENT
Start: 2017-07-18 | End: 2017-07-18 | Stop reason: HOSPADM

## 2017-07-18 RX ORDER — IPRATROPIUM BROMIDE AND ALBUTEROL SULFATE 2.5; .5 MG/3ML; MG/3ML
3 SOLUTION RESPIRATORY (INHALATION)
Qty: 120 VIAL | Refills: 5 | Status: ON HOLD | OUTPATIENT
Start: 2017-07-18 | End: 2018-01-01 | Stop reason: HOSPADM

## 2017-07-18 RX ORDER — PREDNISONE 20 MG/1
TABLET ORAL
Qty: 24 TABLET | Refills: 0 | Status: SHIPPED | OUTPATIENT
Start: 2017-07-18 | End: 2018-04-06

## 2017-07-18 RX ORDER — PANTOPRAZOLE SODIUM 40 MG/1
40 TABLET, DELAYED RELEASE ORAL DAILY
Qty: 30 TABLET | Refills: 0 | Status: SHIPPED | OUTPATIENT
Start: 2017-07-18 | End: 2017-08-28 | Stop reason: SDUPTHER

## 2017-07-18 RX ORDER — CARVEDILOL 12.5 MG/1
12.5 TABLET ORAL 2 TIMES DAILY
Qty: 60 TABLET | Refills: 0 | Status: SHIPPED | OUTPATIENT
Start: 2017-07-18 | End: 2018-03-09

## 2017-07-18 RX ORDER — GUAIFENESIN 600 MG/1
600 TABLET, EXTENDED RELEASE ORAL 2 TIMES DAILY
COMMUNITY
Start: 2017-07-18 | End: 2018-03-09

## 2017-07-18 RX ADMIN — BUDESONIDE 0.25 MG: 0.25 SUSPENSION RESPIRATORY (INHALATION) at 07:07

## 2017-07-18 RX ADMIN — IPRATROPIUM BROMIDE AND ALBUTEROL SULFATE 3 ML: .5; 3 SOLUTION RESPIRATORY (INHALATION) at 07:07

## 2017-07-18 RX ADMIN — ARFORMOTEROL TARTRATE 15 MCG: 15 SOLUTION RESPIRATORY (INHALATION) at 07:07

## 2017-07-18 NOTE — TELEPHONE ENCOUNTER
----- Message from Ginna Terrell LPN sent at 7/18/2017  4:16 PM CDT -----  Contact: Dr ValdezAmwqjlmn-[phone]      ----- Message -----  From: Abhijit Camargo  Sent: 7/18/2017   4:08 PM  To: Jesi KINSEY Staff    Pt retunring nurse call. Please call back at [phone].thx.

## 2017-07-18 NOTE — PT/OT/SLP PROGRESS
"Physical Therapy      Carlie Valdez  MRN: [id number]    PT MET IN  WITH [facility]. PT HALLUCINATING AND BEING COMBATIVE THIS AM. PER [facility]. PT REFUSED TO GET OOB. PT REPORTED , "I WANT THAT MAN OUT OF MY ROOM."      Ladan Driscoll, PT 7/18/2017      "

## 2017-07-18 NOTE — PROGRESS NOTES
[facility] -   Pulmonology  Progress Note    Patient Name: Carlie Valdez  MRN: [id number]  Admission Date: 7/15/2017  Hospital Length of Stay: 3 days  Code Status: Full Code  Attending Provider: Subhash Ayala MD  Primary Care Provider: Johanna Guzman MD   Principal Problem: Severe persistent asthma with acute exacerbation    Subjective: Confused, less dyspnea     Interval History: Dyspnea improved. History of Congestive Heart failure . Now with paroxysmal nocturnal dyspnea and edema. Developed side effects with steroids. Disorientated, intermittent hallucinations.  Steroids tapered      Objective:     Vital Signs (Most Recent):  Temp: 97.9 °F (36.6 °C) (07/18/17 0701)  Pulse: (!) 118 (07/18/17 0728)  Resp: (!) 24 (07/18/17 0728)  BP: (!) 143/87 (07/18/17 0701)  SpO2: (S)  (unable to obtain) (07/18/17 0723) Vital Signs (24h Range):  Temp:  [97.6 °F (36.4 °C)-98.8 °F (37.1 °C)] 97.9 °F (36.6 °C)  Pulse:  [110-118] 118  Resp:  [20-25] 24  SpO2:  [96 %-100 %] 99 %  BP: (121-143)/(86-99) 143/87     Weight: 91.2 kg (201 lb 1.6 oz)  Body mass index is 34.52 kg/m².      Intake/Output Summary (Last 24 hours) at 07/18/17 1121  Last data filed at 07/17/17 1700   Gross per 24 hour   Intake              240 ml   Output                0 ml   Net              240 ml       Physical Exam   Constitutional: She is oriented to person, place, and time. She appears well-developed and well-nourished.   HENT:   Head: Normocephalic and atraumatic.   Mouth/Throat: Oropharyngeal exudate present.   Eyes: Conjunctivae are normal. Pupils are equal, round, and reactive to light.   Neck: Neck supple. No JVD present. No tracheal deviation present. No thyromegaly present.   Cardiovascular: Normal rate and normal heart sounds.  An irregularly irregular rhythm present.   Pulmonary/Chest: Effort normal. No respiratory distress. She has decreased breath sounds. She has no wheezes. She has rales in the right lower field  and the left lower field. She exhibits no tenderness.   Abdominal: Soft. Bowel sounds are normal.   Musculoskeletal: Normal range of motion. She exhibits no edema.   Lymphadenopathy:     She has no cervical adenopathy.   Neurological: She is alert and oriented to person, place, and time.   Skin: Skin is warm and dry.   Nursing note and vitals reviewed.      Vents:  Oxygen Concentration (%): 28 (07/18/17 0723)    Lines/Drains/Airways          No matching active lines, drains, or airways          Significant Labs:    CBC/Anemia Profile:    Recent Labs  Lab 07/17/17 0424 07/18/17 0434   WBC 10.58 10.10   HGB 10.8* 10.8*   HCT 34.2* 35.1*   * 158   MCV 82 82   RDW 19.0* 18.8*        Chemistries:    Recent Labs  Lab 07/17/17 0424 07/18/17  0434    145   K 4.3 4.0    107   CO2 20* 25   BUN 48* 63*   CREATININE 1.4 1.5*   CALCIUM 9.7 9.5       BMP:   Recent Labs  Lab 07/18/17  0434   GLU 88      K 4.0      CO2 25   BUN 63*   CREATININE 1.5*   CALCIUM 9.5     CMP:   Recent Labs  Lab 07/17/17 0424 07/18/17  0434    145   K 4.3 4.0    107   CO2 20* 25   * 88   BUN 48* 63*   CREATININE 1.4 1.5*   CALCIUM 9.7 9.5   ANIONGAP 14 13   EGFRNONAA 38* 35*     Troponin: No results for input(s): TROPONINI in the last 48 hours.  All pertinent labs within the past 24 hours have been reviewed.    Significant Imaging:  CXR: I have reviewed all pertinent results/findings within the past 24 hours and my personal findings are:  cardiomegaly     Assessment/Plan:     Active Diagnoses:    Diagnosis Date Noted POA    PRINCIPAL PROBLEM:  Severe persistent asthma with acute exacerbation [J45.51] 05/04/2017 Yes    Chronic atrial fibrillation with RVR [I48.2] 07/15/2017 Yes    Acute combined systolic and diastolic congestive heart failure [I50.41] 07/15/2017 Yes    Non compliance w medication regimen [Z91.14] 07/15/2017 Not Applicable    Cardiac defibrillator in place [Z95.810] 07/31/2015 Yes      Chronic    Essential hypertension [I10]  Yes      Problems Resolved During this Admission:    Diagnosis Date Noted Date Resolved POA       Follow up with cardiologist Dr. Hernández  Rapid steroid taper  D/c torito Meier MD  Pulmonology  [facility]

## 2017-07-18 NOTE — TELEPHONE ENCOUNTER
Spoke with patient care giver Rolanda Walton and she stated that she will give me a call back when the other nurse comes back.

## 2017-07-18 NOTE — DISCHARGE SUMMARY
[facility] Hospital Medicine  Discharge Summary      Patient Name: Carlie Valdez  MRN: [id number]  Admission Date: 7/15/2017  Hospital Length of Stay: 3 days  Discharge Date and Time:  07/18/2017 2:31 PM  Attending Physician:Dr. Ayala   Discharging Provider: Hilary Grimes NP  Primary Care Provider: Johanna Guzman MD      HPI:   72 y/o female presents to ED with c/o SOB. PMH includes CHF-EF 25%, Severe, persistent Asthma, A-fib, and HTN. Patient is non-compliant with medications, has not filled any medications since May, and has not filled heart medications since February. Associated symptoms include cough, clear sputum, and wheezing. Pt has tried nebulizer tx and inhalers with no relief. Denies any exacerbating or mitigating factors. Patient denies any fever, n/v/d, abd pain, HA, dizziness, weakness, lightheadedness, chills, leg swelling, and Cp. Pt was hospitalized at [facility] for same sxs three weeks ago and reports she believes she has bronchitis. No further complaints or concerns at this time. She will be admitted to Medicine for IV abx, steroids, cards consult and symptom management under the care of Hospital Medicine.       * No surgery found *      Indwelling Lines/Drains at time of discharge:   Lines/Drains/Airways          No matching active lines, drains, or airways        Hospital Course:   The pt was admitted to telemetry on IV abx, neb txs, IV Bumex, and IV steroids for decompensated systolic and diastolic CHF and severe Asthma exacerbation. Pt also had Afib with RVR. Pt was continued on Coreg and Xarelto. Care discussed with Cardiology and Pulmonary. Pt/spouse refused any medication changes to control A-fib. Would like recommendations from Dr. Hernández as an OP. Pt was very noncompliant with home medications- she has not filled any meds since May - last fill on xarelto was February, 2017. Last fill on Bumex was December 2016. Pt and family instructed on importance of home  medication compliance. Pt and family provided CHF education. Pt became more confused during hospitalization- likely secondary to Dementia and IV steroids. IV Steroids were transitioned to prednisone taper. Patient has home health & 24 hour sitters. Home health was resumed. Home health reported that they are often not allowed to complete visits and/or not allowed in the house. Outpatient  will be consulted. The pt will be discharged on Coreg and Xarelto, Lisinopril and Bumex, oral Avelox, steroid taper and Neb txs. The pt was seen and examined today and determined to be stable for discharge.      Consults:   Consults         Status Ordering Provider     Inpatient consult to Cardiology  Once     Provider:  CHEO Iraheta    Completed KRISTY TORO     Inpatient consult to Cardiology  Once     Specialty:  Cardiology  Provider:  Joey Craig MD    Completed KRISTY TORO     Inpatient consult to Pulmonology  Once     Provider:  Bradley Amaral MD    Completed KRISTY TORO     Inpatient consult to Social Work  Once     Provider:  (Not yet assigned)    Completed KRISTY TORO          Significant Diagnostic Studies:   Imaging Results          X-Ray Chest AP Portable (Final result)  Result time 07/15/17 12:38:24    Final result by Tin Russell III, MD (07/15/17 12:38:24)                 Impression:     As above      Electronically signed by: TIN RUSSELL MD  Date:     07/15/17  Time:    12:38              Narrative:    AP chest.        Clinical indication: Congestive heart failure.    Compared to May.    Heart size remains enlarged with a permanent pacing device/defibrillator again noted in position on the right. The lung fields are clear. Minimal vascular prominence without overt congestion/failure.                              Pending Diagnostic Studies:     None        Final Active Diagnoses:    Diagnosis Date Noted POA    PRINCIPAL PROBLEM:  Severe  persistent asthma with acute exacerbation [J45.51] 05/04/2017 Yes    Acute combined systolic and diastolic congestive heart failure [I50.41] 07/15/2017 Yes    Chronic atrial fibrillation with RVR [I48.2] 07/15/2017 Yes    Non compliance w medication regimen [Z91.14] 07/15/2017 Not Applicable    Cardiac defibrillator in place [Z95.810] 07/31/2015 Yes     Chronic    Essential hypertension [I10]  Yes      Problems Resolved During this Admission:    Diagnosis Date Noted Date Resolved POA          Discharged Condition: stable    Disposition: Home-Health Care Svc    Follow Up:  Follow-up Information     [facility] - .    Specialty:  Home Health Services  Why:  Home Health:  SN, PT, OT  Contact information:  [address]  [place] [facility] [address] [phone]             Johanna Guzman MD In 3 days.    Specialty:  Internal Medicine  Contact information:  [address]  [place] [address] [phone]             Oswaldo Hernández MD. Call today.    Specialty:  Cardiology           [facility] - Pulmonary Services. Call today.    Specialty:  Pulmonology  Contact information:  [address] [facility] [address] [phone]  Additional information:  3rd Floor               Patient Instructions:     Ambulatory referral to Outpatient Case Management   Referral Priority: Routine Referral Type: Consultation   Referral Reason: Specialty Services Required    Number of Visits Requested: 1      Ambulatory consult to Pulmonology   Referral Priority: Routine Referral Type: Consultation   Referral Reason: Specialty Services Required    Requested Specialty: Pulmonary Disease    Number of Visits Requested: 1      Diet general   Order Specific Question Answer Comments   Na restriction, if any: 2gNa    Fluid restriction: Fluid - 1500mL    Additional restrictions: Cardiac (Low Na/Chol)      Activity as tolerated       Medications:  Reconciled Home Medications:   Discharge  Medication List as of 7/18/2017 10:12 AM      START taking these medications    Details   carvedilol (COREG) 12.5 MG tablet Take 1 tablet (12.5 mg total) by mouth 2 (two) times daily., Starting Tue 7/18/2017, Until Wed 7/18/2018, Normal      guaifenesin (MUCINEX) 600 mg 12 hr tablet Take 1 tablet (600 mg total) by mouth 2 (two) times daily., Starting Tue 7/18/2017, OTC      lisinopril (PRINIVIL,ZESTRIL) 5 MG tablet Take 1 tablet (5 mg total) by mouth once daily., Starting Tue 7/18/2017, Until Wed 7/18/2018, Normal      moxifloxacin (AVELOX) 400 mg tablet Take 1 tablet (400 mg total) by mouth once daily., Starting Tue 7/18/2017, Until Fri 7/21/2017, Normal      pantoprazole (PROTONIX) 40 MG tablet Take 1 tablet (40 mg total) by mouth once daily., Starting Tue 7/18/2017, Until Wed 7/18/2018, Normal      polyethylene glycol (GLYCOLAX) 17 gram PwPk Take 17 g by mouth once daily., Starting Tue 7/18/2017, Normal         CONTINUE these medications which have CHANGED    Details   albuterol-ipratropium 2.5mg-0.5mg/3mL (DUO-NEB) 0.5 mg-3 mg(2.5 mg base)/3 mL nebulizer solution Take 3 mLs by nebulization every 6 (six) hours while awake. Rescue, Starting Tue 7/18/2017, Until Wed 7/18/2018, Normal      predniSONE (DELTASONE) 20 MG tablet 3 daily x 3 days, 2 daily x 3 days, 1 daily x 3 days, 1/2 daily x 4 days., Normal         CONTINUE these medications which have NOT CHANGED    Details   albuterol 90 mcg/actuation inhaler Inhale 2 puffs into the lungs every 4 (four) hours as needed for Wheezing. Rescue, Starting 5/4/2017, Until Discontinued, Normal      budesonide-formoterol 160-4.5 mcg (SYMBICORT) 160-4.5 mcg/actuation HFAA Inhale 2 puffs into the lungs every 12 (twelve) hours. Wash out mouth after using, Starting 5/4/2017, Until Fri 5/4/18, Normal      bumetanide (BUMEX) 2 MG tablet Take 1 tablet (2 mg total) by mouth 2 (two) times daily. 1 Tablet Oral Every day, Starting 4/26/2016, Until Discontinued, Normal       calcium-Mg-zinc- #122-vit D3 250-125-3.7-100 zw-uq-bb-unit Tab Take 1 tablet by mouth Daily., Starting 6/12/2012, Until Discontinued, Historical Med      cholecalciferol, vitamin D3, 1,000 unit capsule Take 1 capsule (1,000 Units total) by mouth once daily., Starting 8/19/2014, Until Discontinued, Print      fluticasone (FLONASE) 50 mcg/actuation nasal spray 2 sprays by Each Nare route once daily., Starting 4/4/2017, Until Discontinued, Normal      inhalation spacing device Use as directed for inhalation., Normal      montelukast (SINGULAIR) 10 mg tablet Take 1 tablet (10 mg total) by mouth every evening., Starting 5/18/2017, Until Wed 8/16/17, Normal      nebulizer and compressor Tesha 1 Device by Misc.(Non-Drug; Combo Route) route every 4 (four) hours as needed., Starting 5/4/2017, Until Fri 5/4/18, Print      omeprazole (PRILOSEC) 20 MG capsule Take 1 capsule (20 mg total) by mouth once daily., Starting 4/30/2016, Until Thu 5/4/17, Print      rivaroxaban (XARELTO) 15 mg Tab Take 1 tablet (15 mg total) by mouth daily with dinner or evening meal., Starting 11/13/2016, Until Discontinued, Print      sodium bicarb-sodium chloride Pack 1 packet by Nasal route 2 (two) times daily., Starting 5/18/2017, Until Discontinued, OTC         STOP taking these medications       nebivolol (BYSTOLIC) 2.5 MG Tab Comments:   Reason for Stopping:         potassium chloride (KLOR-CON) 10 MEQ TbSR Comments:   Reason for Stopping:             Time spent on the discharge of patient: 45 minutes    Hilary Grimes NP  Department of Hospital Medicine  [facility]

## 2017-07-18 NOTE — PLAN OF CARE
Problem: Patient Care Overview  Goal: Plan of Care Review  Outcome: Ongoing (interventions implemented as appropriate)  Plan of care reviewed with patient. Pt only oriented to self. Attempting to get out of bed several times despite reorientation. Sitter at bedside. V/S stable. Patient on telemetry a fib rhythm noted. On 2 LNC. Bed alarm on, fall precautions in place, patient free from fall/injury. Patient has no questions at this time. Will continue to monitor.

## 2017-07-18 NOTE — TELEPHONE ENCOUNTER
Spoke with karin who is caring for the patient and she stated that she will give the office a call back if an appointment is needed.

## 2017-07-18 NOTE — PLAN OF CARE
CM gave copy of IMM to pt and explained content.  CM contacted pt daughter Haley Valdez via phone and explained content of IMM form to her.  She verbalized understanding and acknowledged form was explained to her.       07/18/17 1056   Medicare Message   Important Message from Medicare regarding Discharge Appeal Rights Given to patient/caregiver;Explained to patient/caregiver;Signed/date by patient/caregiver   Date IMM was signed 07/18/17   Time IMM was signed 0030

## 2017-07-18 NOTE — PLAN OF CARE
Patient discharging home today.  Met with patient and patient's  due to  stating that he wants patient to remain with [facility].  Patient argumentative.   contacted his daughter, Haley Valdez, and following conversation with her, indicates that he is in agreement with patient being discharged with  services via [facility].  Also, spoke with patient's daughter via phone and requested that she arrange for sitter to be present once patient returns home to assist  with [facility] getting [facility].  Notified Lu with Critical access hospital that family has selected a different HH agency for patient post-hospitalization.  Notified Janneth with [facility] that patient will need  visit for assessment of patient's home environment and possible assistance with alternative placement of patient.  Outpatient follow-up appointments scheduled for patient with PCP, Cardiologist, and Pulmonologist.  Contacted patient's daughter and notified her of dates and times of these appointments, as patient was discharged prior to these appointments being scheduled and noted on AVS.          D/C PLAN:  Home with ,  with [facility] SN, PT, OT, SW services and 24/7 sitters.         07/18/17 1522   Final Note   Assessment Type Final Discharge Note   Discharge Disposition Home-Health  ( SN, PT, OT, SW)   What phone number can be called within the next 1-3 days to see how you are doing after discharge? [phone]   Referral to Outpatient Case Management complete? n/a   Referral to / orders for Home Health Complete? Yes   Did you assess the readiness or willingness of the family or caregiver to support self management of care? Yes   Right Care Referral Info   Post Acute Recommendation Home-care   Referral Type  SN, PT, OT, SW   Facility Name [facility] [address], [place] [address]

## 2017-07-19 ENCOUNTER — TELEPHONE (OUTPATIENT)
Dept: INTERNAL MEDICINE | Facility: CLINIC | Age: 72
End: 2017-07-19

## 2017-07-19 ENCOUNTER — TELEPHONE (OUTPATIENT)
Dept: ADMINISTRATIVE | Facility: CLINIC | Age: 72
End: 2017-07-19

## 2017-07-19 ENCOUNTER — OUTPATIENT CASE MANAGEMENT (OUTPATIENT)
Dept: ADMINISTRATIVE | Facility: OTHER | Age: 72
End: 2017-07-19

## 2017-07-19 NOTE — TELEPHONE ENCOUNTER
[facility] requesting a w/c for pt.  Please write rx for lite weight wheel chair with removable arm rest and removable leg rest.  States that pt is immobile at this time.  Please let send back to fax to [facility]./elian

## 2017-07-19 NOTE — TELEPHONE ENCOUNTER
----- Message from Josh Camara sent at 7/19/2017  2:55 PM CDT -----  Contact: [facility])RN  Caller states pt isn't compliant with medication.Please give Jacquelyn a call at [phone]

## 2017-07-19 NOTE — TELEPHONE ENCOUNTER
"Shanthi, RN reported "Patient's  is refusing to administer any medications to patient. He said he will give her something after she visits Dr. Patel." There are no medications in the home besides Albuterol and Bumex, which he allowed RN to administer due to wheezing. However, Dr. Bateman office contacted RN stating Dr. Valdez had become angry about not receiving antibiotics and cancelled that appointment. RN stated that patient is not doing well and caregiver, pt.'s , is not properly caring for her. Xarelto has not been filled since February. Please advise.    Bring to [facility] this week or me next week if poss.  SM    "

## 2017-07-20 LAB
BACTERIA BLD CULT: NORMAL
BACTERIA BLD CULT: NORMAL

## 2017-07-21 ENCOUNTER — TELEPHONE (OUTPATIENT)
Dept: PEDIATRICS | Facility: CLINIC | Age: 72
End: 2017-07-21

## 2017-07-21 ENCOUNTER — TELEPHONE (OUTPATIENT)
Dept: INTERNAL MEDICINE | Facility: CLINIC | Age: 72
End: 2017-07-21

## 2017-07-21 NOTE — TELEPHONE ENCOUNTER
Called and spoke with patient. Patient verbalized she is ok and has taken her needed medications for the day. Her nurse is there with her now helping with her needs. Patient verbalized she wanted to let us know she is ok. I informed patient if she needed anything to call us back. Patient verbalized understanding.

## 2017-07-21 NOTE — TELEPHONE ENCOUNTER
I was told to call the patient and notify her that Beena Castro would like her to see a doctor in regards to the hospital f/u. Patient stated she was ok with that, but in the process of talking to her Mr. Valdez picked up the other phone line up and started to tell me the patient wasn't going anywhere and that she could not walk. The patient told her  that she can walk and he was not stopping her from coming to the doctor. Mr. Valdez began to over talk her and say she is not coming, she can't walk and how would she get there. Patient and Mr. Valdez started to argue back and forth about her coming to the doctor appointment. Mr. Valdez told me he will call the fire department and they will tell her she can't come. I ask Mr. Valdez to let me just talk to the patient because he was over talking her and he told me she was not competent and didn't understand. I told Mr. Valdez that she was and that I understood everything she was saying and that she wants to come to the doctor for care, but he was refusing to bring her. I also ask the patient if their was someone I could call that may be able to bring her in for her appointment, but Mr. Valdez took the phone from the patient and proceeded to tell me that he was going to file a civil rights report and take me to court. I told the Mr. Valdez that the call was being recorded and he stated he didn't care that he ws her . I told Mr. Valdez that I would document and report this conversation to the provider. Patient  told to do what I want but the patient was coming to the appointment and hung the phone up.

## 2017-07-27 ENCOUNTER — OFFICE VISIT (OUTPATIENT)
Dept: URGENT CARE | Facility: CLINIC | Age: 72
End: 2017-07-27
Payer: MEDICARE

## 2017-07-27 ENCOUNTER — HOSPITAL ENCOUNTER (OUTPATIENT)
Dept: RADIOLOGY | Facility: HOSPITAL | Age: 72
Discharge: HOME | End: 2017-07-27
Attending: NURSE PRACTITIONER
Payer: MEDICARE

## 2017-07-27 VITALS
BODY MASS INDEX: 34.59 KG/M2 | HEIGHT: 64 IN | TEMPERATURE: 99 F | SYSTOLIC BLOOD PRESSURE: 128 MMHG | HEART RATE: 119 BPM | DIASTOLIC BLOOD PRESSURE: 80 MMHG | WEIGHT: 202.63 LBS | OXYGEN SATURATION: 98 %

## 2017-07-27 DIAGNOSIS — R06.02 SHORTNESS OF BREATH: Primary | ICD-10-CM

## 2017-07-27 DIAGNOSIS — R06.02 SHORTNESS OF BREATH: ICD-10-CM

## 2017-07-27 PROCEDURE — 99214 OFFICE O/P EST MOD 30 MIN: CPT | Mod: S$PBB,,, | Performed by: NURSE PRACTITIONER

## 2017-07-27 PROCEDURE — 1159F MED LIST DOCD IN RCRD: CPT | Mod: ,,, | Performed by: NURSE PRACTITIONER

## 2017-07-27 PROCEDURE — 99999 PR PBB SHADOW E&M-EST. PATIENT-LVL V: CPT | Mod: PBBFAC,,, | Performed by: NURSE PRACTITIONER

## 2017-07-27 PROCEDURE — 71020 XR CHEST PA AND LATERAL: CPT | Mod: TC,PO

## 2017-07-27 PROCEDURE — 1125F AMNT PAIN NOTED PAIN PRSNT: CPT | Mod: ,,, | Performed by: NURSE PRACTITIONER

## 2017-07-27 PROCEDURE — 71020 XR CHEST PA AND LATERAL: CPT | Mod: 26,,, | Performed by: RADIOLOGY

## 2017-07-27 RX ORDER — LEVALBUTEROL INHALATION SOLUTION 1.25 MG/3ML
1.25 SOLUTION RESPIRATORY (INHALATION)
Status: COMPLETED | OUTPATIENT
Start: 2017-07-27 | End: 2017-07-27

## 2017-07-27 RX ORDER — PREDNISOLONE 15 MG/5ML
30 SOLUTION ORAL
Status: COMPLETED | OUTPATIENT
Start: 2017-07-27 | End: 2017-07-27

## 2017-07-27 RX ADMIN — PREDNISOLONE 30 MG: 15 SOLUTION ORAL at 04:07

## 2017-07-27 RX ADMIN — LEVALBUTEROL HYDROCHLORIDE 1.25 MG: 1.25 SOLUTION RESPIRATORY (INHALATION) at 04:07

## 2017-07-27 RX ADMIN — LEVALBUTEROL HYDROCHLORIDE 1.25 MG: 1.25 SOLUTION RESPIRATORY (INHALATION) at 06:07

## 2017-07-28 ENCOUNTER — OUTPATIENT CASE MANAGEMENT (OUTPATIENT)
Dept: ADMINISTRATIVE | Facility: OTHER | Age: 72
End: 2017-07-28

## 2017-07-28 NOTE — PROGRESS NOTES
7/28/17 - Phone contact with pt today for completion of Initial Screen for OPCM. She advised she is unable to complete screen at this time and requested a return call next week. Advised CM will attempt to call her back on Mon or Tues of next week per her request. Sophia Real RN

## 2017-07-31 ENCOUNTER — OUTPATIENT CASE MANAGEMENT (OUTPATIENT)
Dept: ADMINISTRATIVE | Facility: OTHER | Age: 72
End: 2017-07-31

## 2017-07-31 NOTE — PROGRESS NOTES
7/31/17 - Attempted phone contact with no answer. Voice mail left requesting return call. Will attempt to follow up at a later time. Sophia Real RN

## 2017-08-01 ENCOUNTER — OUTPATIENT CASE MANAGEMENT (OUTPATIENT)
Dept: ADMINISTRATIVE | Facility: OTHER | Age: 72
End: 2017-08-01

## 2017-08-01 NOTE — LETTER
August 1, 2017    Carlie Valdez  [address]  [place] [address]             [facility] [address] [place] [address] Dear Mrs Valdez,        I work with [facility] Outpatient Case Management Department. We received a referral to call you to discuss your medical history. This is a free service and is offered to [facility] patients who have recently been discharged from any of our facilities or who have complex medical conditions that may require the skill of a nurse to assist with management.    I am a Registered Nurse who specializes in connecting patients with available medical and financial resources as well as addressing any educational needs that may be identified.     The Outpatient Case Management Department can be reached at [phone] from 8:00 AM to 4:30 PM Monday thru Friday. [facility] also has a program where a nurse is available 24/7 to answer questions or provide medical advice. Their number is [phone]. Thank you.    Kindest Regards,    Sophia Real RN  [facility]  [phone]  [email or web address]

## 2017-08-28 ENCOUNTER — OFFICE VISIT (OUTPATIENT)
Dept: URGENT CARE | Facility: CLINIC | Age: 72
End: 2017-08-28
Payer: MEDICARE

## 2017-08-28 VITALS
WEIGHT: 219.44 LBS | DIASTOLIC BLOOD PRESSURE: 96 MMHG | HEART RATE: 79 BPM | SYSTOLIC BLOOD PRESSURE: 132 MMHG | OXYGEN SATURATION: 99 % | HEIGHT: 65 IN | BODY MASS INDEX: 36.56 KG/M2 | TEMPERATURE: 98 F

## 2017-08-28 DIAGNOSIS — K21.9 GASTROESOPHAGEAL REFLUX DISEASE, ESOPHAGITIS PRESENCE NOT SPECIFIED: ICD-10-CM

## 2017-08-28 DIAGNOSIS — H60.92 OTITIS EXTERNA OF LEFT EAR, UNSPECIFIED CHRONICITY, UNSPECIFIED TYPE: ICD-10-CM

## 2017-08-28 DIAGNOSIS — H66.92 LEFT OTITIS MEDIA, UNSPECIFIED CHRONICITY, UNSPECIFIED OTITIS MEDIA TYPE: Primary | ICD-10-CM

## 2017-08-28 PROCEDURE — 3075F SYST BP GE 130 - 139MM HG: CPT | Mod: ,,, | Performed by: NURSE PRACTITIONER

## 2017-08-28 PROCEDURE — 99213 OFFICE O/P EST LOW 20 MIN: CPT | Mod: S$PBB,,, | Performed by: NURSE PRACTITIONER

## 2017-08-28 PROCEDURE — 1125F AMNT PAIN NOTED PAIN PRSNT: CPT | Mod: ,,, | Performed by: NURSE PRACTITIONER

## 2017-08-28 PROCEDURE — 99215 OFFICE O/P EST HI 40 MIN: CPT | Mod: PBBFAC,PO | Performed by: NURSE PRACTITIONER

## 2017-08-28 PROCEDURE — 1159F MED LIST DOCD IN RCRD: CPT | Mod: ,,, | Performed by: NURSE PRACTITIONER

## 2017-08-28 PROCEDURE — 99999 PR PBB SHADOW E&M-EST. PATIENT-LVL V: CPT | Mod: PBBFAC,,, | Performed by: NURSE PRACTITIONER

## 2017-08-28 PROCEDURE — 3080F DIAST BP >= 90 MM HG: CPT | Mod: ,,, | Performed by: NURSE PRACTITIONER

## 2017-08-28 RX ORDER — PANTOPRAZOLE SODIUM 40 MG/1
40 TABLET, DELAYED RELEASE ORAL DAILY
Qty: 30 TABLET | Refills: 0 | Status: SHIPPED | OUTPATIENT
Start: 2017-08-28 | End: 2018-01-01

## 2017-08-28 RX ORDER — AMOXICILLIN 875 MG/1
875 TABLET, FILM COATED ORAL 2 TIMES DAILY
Qty: 20 TABLET | Refills: 0 | Status: SHIPPED | OUTPATIENT
Start: 2017-08-28 | End: 2017-09-07

## 2017-08-28 RX ORDER — OFLOXACIN 3 MG/ML
5 SOLUTION AURICULAR (OTIC) DAILY
Qty: 50 DROP | Refills: 0 | Status: SHIPPED | OUTPATIENT
Start: 2017-08-28 | End: 2017-09-07

## 2017-08-28 NOTE — PROGRESS NOTES
"Subjective:       Patient ID: Carlie Valdez is a 71 y.o. female.    Chief Complaint: Otalgia ("both ears feel dry, ear, neck, shoulder and back pain")    Pt is a 71 year old female to clinic today with complaints of bilateral otalgia and congestion that began 1 month ago. Also, request refill on protonix,      Otalgia    There is pain in both ears. This is a new problem. The current episode started more than 1 month ago. The problem occurs constantly. The problem has been unchanged. Maximum temperature: subjective. The pain is at a severity of 7/10. The pain is moderate. Associated symptoms include coughing. Pertinent negatives include no abdominal pain, diarrhea, ear discharge, headaches, hearing loss, neck pain, rash, rhinorrhea, sore throat or vomiting. She has tried nothing for the symptoms.     Review of Systems   Constitutional: Positive for fever. Negative for chills, diaphoresis and fatigue.   HENT: Positive for congestion and ear pain. Negative for ear discharge, hearing loss, postnasal drip, rhinorrhea, sinus pressure, sneezing, sore throat and trouble swallowing.    Eyes: Negative for pain.   Respiratory: Positive for cough and wheezing. Negative for chest tightness and shortness of breath.    Cardiovascular: Negative for chest pain and palpitations.   Gastrointestinal: Negative for abdominal pain, diarrhea, nausea and vomiting.   Genitourinary: Negative for dysuria.   Musculoskeletal: Negative for back pain, myalgias and neck pain.   Skin: Negative for rash.   Neurological: Negative for dizziness, light-headedness and headaches.       Objective:      Physical Exam   Constitutional: She is oriented to person, place, and time. She appears well-developed and well-nourished. No distress.   HENT:   Head: Normocephalic.   Right Ear: External ear and ear canal normal. No tenderness. Tympanic membrane is not erythematous and not bulging. A middle ear effusion is present.   Left Ear: External ear " and ear canal normal. There is tenderness. Tympanic membrane is erythematous and bulging. A middle ear effusion is present.   Nose: Mucosal edema present. No rhinorrhea. Right sinus exhibits maxillary sinus tenderness. Right sinus exhibits no frontal sinus tenderness. Left sinus exhibits maxillary sinus tenderness. Left sinus exhibits no frontal sinus tenderness.   Mouth/Throat: Uvula is midline, oropharynx is clear and moist and mucous membranes are normal. No oropharyngeal exudate, posterior oropharyngeal edema or posterior oropharyngeal erythema.   Eyes: Conjunctivae and EOM are normal. Pupils are equal, round, and reactive to light.   Neck: Normal range of motion. Neck supple.   Cardiovascular: Normal rate, regular rhythm and normal heart sounds.  Exam reveals no gallop and no friction rub.    No murmur heard.  Pulmonary/Chest: Effort normal. No accessory muscle usage. No apnea, no tachypnea and no bradypnea. No respiratory distress. She has no decreased breath sounds. She has wheezes in the right upper field, the right lower field, the left upper field and the left lower field. She has no rhonchi. She has no rales.   Lymphadenopathy:        Head (right side): No submental, no submandibular and no tonsillar adenopathy present.        Head (left side): No submental, no submandibular and no tonsillar adenopathy present.     She has no cervical adenopathy.   Neurological: She is alert and oriented to person, place, and time.   Skin: Skin is warm and dry. No rash noted. She is not diaphoretic.   Psychiatric: She has a normal mood and affect. Her speech is normal and behavior is normal.   Nursing note and vitals reviewed.      Assessment:       1. Left otitis media, unspecified chronicity, unspecified otitis media type    2. Otitis externa of left ear, unspecified chronicity, unspecified type    3. Gastroesophageal reflux disease, esophagitis presence not specified        Plan:   Left otitis media, unspecified  chronicity, unspecified otitis media type  -     amoxicillin (AMOXIL) 875 MG tablet; Take 1 tablet (875 mg total) by mouth 2 (two) times daily.  Dispense: 20 tablet; Refill: 0    Otitis externa of left ear, unspecified chronicity, unspecified type  -     ofloxacin (FLOXIN) 0.3 % otic solution; Place 5 drops into the left ear once daily.  Dispense: 50 drop; Refill: 0    Gastroesophageal reflux disease, esophagitis presence not specified  -     pantoprazole (PROTONIX) 40 MG tablet; Take 1 tablet (40 mg total) by mouth once daily.  Dispense: 30 tablet; Refill: 0      Pt states she has neb tx at home.     Antibiotic Therapy  Take antibiotics for entire course.  Do not save medications for later, all medications must be taken for full regimen.    Follow prescribed treatment plan as directed.  Stay hydrated and rest.  Report to ER if symptoms worsen.  Follow up with PCP in 2-3 days or sooner if symptoms do not improve.

## 2017-08-28 NOTE — PATIENT INSTRUCTIONS
Middle Ear Infection (Adult)  You have an infection of the middle ear, the space behind the eardrum. This is also called acute otitis media (AOM). Sometimes it is caused by the common cold. This is because congestion can block the internal passage (eustachian tube) that drains fluid from the middle ear. When the middle ear fills with fluid, bacteria can grow there and cause an infection. Oral antibiotics are used to treat this illness, not ear drops. Symptoms usually start to improve within 1 to 2 days of treatment.    Home care  The following are general care guidelines:  · Finish all of the antibiotic medicine given, even though you may feel better after the first few days.  · You may use over-the-counter medicine, such as acetaminophen or ibuprofen, to control pain and fever, unless something else was prescribed. If you have chronic liver or kidney disease or have ever had a stomach ulcer or gastrointestinal bleeding, talk with your healthcare provider before using these medicines. Do not give aspirin to anyone under 18 years of age who has a fever. It may cause severe illness or death.  Follow-up care  Follow up with your healthcare provider, or as advised, in 2 weeks if all symptoms have not gotten better, or if hearing doesn't go back to normal within 1 month.  When to seek medical advice  Call your healthcare provider right away if any of these occur:  · Ear pain gets worse or does not improve after 3 days of treatment  · Unusual drowsiness or confusion  · Neck pain, stiff neck, or headache  · Fluid or blood draining from the ear canal  · Fever of 100.4°F (38°C) or as advised   · Seizure  Date Last Reviewed: 6/1/2016  © 5739-0690 [employer]. [address] [place] [address], [place] [address]. All rights reserved. This information is not intended as a substitute for professional medical care. Always follow your healthcare professional's instructions.

## 2017-09-11 ENCOUNTER — TELEPHONE (OUTPATIENT)
Dept: INTERNAL MEDICINE | Facility: CLINIC | Age: 72
End: 2017-09-11

## 2017-09-11 DIAGNOSIS — R53.1 WEAKNESS: Primary | ICD-10-CM

## 2017-09-11 NOTE — TELEPHONE ENCOUNTER
----- Message from Supriya Reynolds sent at 9/11/2017 12:55 PM CDT -----  Contact: [facility]   Caller states that she want to see if pt can get orders for a  evaluation.  [phone]

## 2017-09-11 NOTE — TELEPHONE ENCOUNTER
PC with Consuelo @ [facility] .  Requesting an order for KEAGAN armstrong.  Pt is no longer able to afford private sitters and needs to be evaluated for what assistance she qualifies for.  Please advise./rpr    Please order.  SM

## 2017-09-22 ENCOUNTER — TELEPHONE (OUTPATIENT)
Dept: INTERNAL MEDICINE | Facility: CLINIC | Age: 72
End: 2017-09-22

## 2017-09-22 NOTE — TELEPHONE ENCOUNTER
----- Message from Debbie Jordan sent at 9/22/2017  9:09 AM CDT -----  Contact: aaron/daughter [phone]  States that she is calling to see if pt can continue with home health. Please call back at [phone]//thank you acc

## 2017-10-11 ENCOUNTER — TELEPHONE (OUTPATIENT)
Dept: INTERNAL MEDICINE | Facility: CLINIC | Age: 72
End: 2017-10-11

## 2017-10-11 NOTE — TELEPHONE ENCOUNTER
----- Message from Char Franco sent at 10/11/2017 10:15 AM CDT -----  Contact: Haley Augustus - daughter  Daughter wanted to let Dr Dennison know the the patient has been in the hospital at [facility], but they discharged her to [facility] on [address].  Call her at [phone].                                rodriguez

## 2017-12-01 ENCOUNTER — OFFICE VISIT (OUTPATIENT)
Dept: URGENT CARE | Facility: CLINIC | Age: 72
End: 2017-12-01
Payer: MEDICARE

## 2017-12-01 ENCOUNTER — HOSPITAL ENCOUNTER (OUTPATIENT)
Dept: RADIOLOGY | Facility: HOSPITAL | Age: 72
Discharge: HOME | End: 2017-12-01
Attending: PHYSICIAN ASSISTANT
Payer: MEDICARE

## 2017-12-01 VITALS
BODY MASS INDEX: 37.22 KG/M2 | SYSTOLIC BLOOD PRESSURE: 150 MMHG | HEART RATE: 122 BPM | DIASTOLIC BLOOD PRESSURE: 80 MMHG | OXYGEN SATURATION: 95 % | TEMPERATURE: 99 F | WEIGHT: 218 LBS | HEIGHT: 64 IN

## 2017-12-01 DIAGNOSIS — R06.02 SHORTNESS OF BREATH: ICD-10-CM

## 2017-12-01 DIAGNOSIS — I50.9 CONGESTIVE HEART FAILURE, UNSPECIFIED CONGESTIVE HEART FAILURE CHRONICITY, UNSPECIFIED CONGESTIVE HEART FAILURE TYPE: ICD-10-CM

## 2017-12-01 DIAGNOSIS — R06.02 SHORTNESS OF BREATH: Primary | ICD-10-CM

## 2017-12-01 PROCEDURE — 99214 OFFICE O/P EST MOD 30 MIN: CPT | Mod: PBBFAC,25,PO | Performed by: PHYSICIAN ASSISTANT

## 2017-12-01 PROCEDURE — 71020 XR CHEST PA AND LATERAL: CPT | Mod: 26,,, | Performed by: RADIOLOGY

## 2017-12-01 PROCEDURE — 99214 OFFICE O/P EST MOD 30 MIN: CPT | Mod: S$PBB,,, | Performed by: PHYSICIAN ASSISTANT

## 2017-12-01 PROCEDURE — 99999 PR PBB SHADOW E&M-EST. PATIENT-LVL IV: CPT | Mod: PBBFAC,,, | Performed by: PHYSICIAN ASSISTANT

## 2017-12-01 PROCEDURE — 71020 XR CHEST PA AND LATERAL: CPT | Mod: TC,PO

## 2017-12-01 NOTE — PROGRESS NOTES
"Subjective:      Patient ID: Carlie Valdez is a 72 y.o. female.    Chief Complaint: Sore Throat; Otalgia (Brayden. ear pain); and Shortness of Breath    Ms. Valdez is a 71yo female that presents to Urgent Care for shortness of breath, otalgia and cough.  Patient states the symptoms have been present on an off for several months.  Patient unclear as to when this current episode began.  She admits she has been in the hospital 3-4 times in the past few months d/t falls, pain from falls and CHF per patient.  She was last discharged last Sat (11/27).  Patient admits that her current symptoms were evaluated and she was sent home with medications.  Patient unsure of exact diagnosis or what medication she was given and has taken.  In her possession she has prescriptions for Azithromycin (completed), steroids (remaining) and Singulair (remaining).  Patient states she has also taken Sudafed and Tylenol.  Patient poor historian with limited knowledge of recent medical history and poor history of current illness.  Patient has not had any follow ups with PCP since hospitalizations.        Sore Throat    This is a new problem. The current episode started more than 1 month ago (since Sept). The problem has been waxing and waning. Associated symptoms include congestion, coughing (productive), ear pain (pain and "popping"), headaches and shortness of breath. Pertinent negatives include no abdominal pain, diarrhea or vomiting.   Otalgia    There is pain in both ears. The current episode started more than 1 month ago. The problem has been waxing and waning. Associated symptoms include coughing (productive), headaches and a sore throat. Pertinent negatives include no abdominal pain, diarrhea, rash, rhinorrhea or vomiting.     Review of Systems   Constitutional: Negative for chills, diaphoresis and fever.   HENT: Positive for congestion, ear pain (pain and "popping"), sinus pain, sinus pressure and sore throat. Negative for " "rhinorrhea.    Respiratory: Positive for cough (productive), shortness of breath and wheezing.    Gastrointestinal: Negative for abdominal pain, constipation, diarrhea, nausea and vomiting.   Skin: Negative for rash.   Neurological: Positive for headaches. Negative for dizziness and light-headedness.       Objective:   BP (!) 150/80 (BP Location: Right arm, Patient Position: Sitting, BP Method: Large (Manual))   Pulse (!) 122   Temp 98.7 °F (37.1 °C) (Tympanic)   Ht 5' 4" (1.626 m)   Wt 98.9 kg (218 lb)   SpO2 95%   BMI 37.42 kg/m²   Physical Exam   Constitutional: She appears well-developed and well-nourished. She does not appear ill. No distress.   HENT:   Head: Normocephalic and atraumatic.   Right Ear: Tympanic membrane and ear canal normal. Tympanic membrane is not erythematous. No middle ear effusion.   Left Ear: Tympanic membrane and ear canal normal. Tympanic membrane is not erythematous.  No middle ear effusion.   Nose: Nose normal. Right sinus exhibits no maxillary sinus tenderness and no frontal sinus tenderness. Left sinus exhibits no maxillary sinus tenderness and no frontal sinus tenderness.   Mouth/Throat: Uvula is midline and oropharynx is clear and moist.   Cardiovascular: Normal rate, regular rhythm and normal heart sounds.    No murmur heard.  Pulmonary/Chest: Breath sounds normal. Tachypnea noted. No respiratory distress. She has no decreased breath sounds. She has no wheezes. She has no rhonchi. She has no rales.   Patient with increased work of breathing   Skin: Skin is warm and dry. No rash noted. She is not diaphoretic.   Psychiatric: She has a normal mood and affect. Her speech is normal and behavior is normal. Thought content normal. She exhibits abnormal recent memory.     Assessment:      1. Shortness of breath    2. Congestive heart failure, unspecified congestive heart failure chronicity, unspecified congestive heart failure type       Plan:   Shortness of breath  -     X-Ray " Chest PA And Lateral; Future; Expected date: 12/01/2017    Congestive heart failure, unspecified congestive heart failure chronicity, unspecified congestive heart failure type    Discussed with patient x-ray results.   Explained based on x-ray, obtained history and clinical presentation, I feel as though continued monitoring and possible IV diuretic in the ER may be necessary.  Patient agrees and states her sister will bring her.     Addend: Upon leaving the room (while I was with another patient) patient told nurse she would like EMS transport and nurses called [facility].  Gave report to EMS upon arrival and patient was discharged in stable condition.

## 2018-01-01 ENCOUNTER — NURSE TRIAGE (OUTPATIENT)
Dept: ADMINISTRATIVE | Facility: CLINIC | Age: 73
End: 2018-01-01

## 2018-01-01 ENCOUNTER — TELEPHONE (OUTPATIENT)
Dept: INTERNAL MEDICINE | Facility: CLINIC | Age: 73
End: 2018-01-01

## 2018-01-01 ENCOUNTER — TELEPHONE (OUTPATIENT)
Dept: OPHTHALMOLOGY | Facility: CLINIC | Age: 73
End: 2018-01-01

## 2018-01-01 ENCOUNTER — ANESTHESIA EVENT (OUTPATIENT)
Dept: SURGERY | Facility: HOSPITAL | Age: 73
DRG: 004 | End: 2018-01-01
Payer: MEDICARE

## 2018-01-01 ENCOUNTER — OFFICE VISIT (OUTPATIENT)
Dept: OPHTHALMOLOGY | Facility: CLINIC | Age: 73
DRG: 308 | End: 2018-01-01
Payer: MEDICARE

## 2018-01-01 ENCOUNTER — TELEPHONE (OUTPATIENT)
Dept: SURGERY | Facility: CLINIC | Age: 73
End: 2018-01-01

## 2018-01-01 ENCOUNTER — ANESTHESIA (OUTPATIENT)
Dept: SURGERY | Facility: HOSPITAL | Age: 73
DRG: 004 | End: 2018-01-01
Payer: MEDICARE

## 2018-01-01 ENCOUNTER — HOSPITAL ENCOUNTER (INPATIENT)
Facility: HOSPITAL | Age: 73
LOS: 20 days | Discharge: NURSING, REHAB OR LONG-TERM CARE | DRG: 004 | End: 2018-07-20
Attending: EMERGENCY MEDICINE | Admitting: INTERNAL MEDICINE
Payer: MEDICARE

## 2018-01-01 ENCOUNTER — HOSPITAL ENCOUNTER (INPATIENT)
Facility: HOSPITAL | Age: 73
LOS: 9 days | Discharge: NURSING, REHAB OR LONG-TERM CARE | DRG: 870 | End: 2018-11-02
Attending: EMERGENCY MEDICINE | Admitting: INTERNAL MEDICINE
Payer: MEDICARE

## 2018-01-01 ENCOUNTER — HOSPITAL ENCOUNTER (EMERGENCY)
Facility: HOSPITAL | Age: 73
Discharge: HOME | End: 2018-05-27
Attending: INTERNAL MEDICINE
Payer: MEDICARE

## 2018-01-01 ENCOUNTER — TELEPHONE (OUTPATIENT)
Dept: PULMONOLOGY | Facility: CLINIC | Age: 73
End: 2018-01-01

## 2018-01-01 ENCOUNTER — HOSPITAL ENCOUNTER (INPATIENT)
Facility: HOSPITAL | Age: 73
LOS: 4 days | Discharge: NURSING, REHAB OR LONG-TERM CARE | DRG: 870 | End: 2018-10-22
Attending: FAMILY MEDICINE | Admitting: INTERNAL MEDICINE
Payer: MEDICARE

## 2018-01-01 ENCOUNTER — HOSPITAL ENCOUNTER (INPATIENT)
Facility: HOSPITAL | Age: 73
LOS: 6 days | Discharge: NURSING, REHAB OR LONG-TERM CARE | DRG: 308 | End: 2018-06-08
Attending: EMERGENCY MEDICINE | Admitting: INTERNAL MEDICINE
Payer: MEDICARE

## 2018-01-01 VITALS
DIASTOLIC BLOOD PRESSURE: 85 MMHG | OXYGEN SATURATION: 97 % | HEIGHT: 64 IN | TEMPERATURE: 98 F | WEIGHT: 210.31 LBS | BODY MASS INDEX: 35.9 KG/M2 | RESPIRATION RATE: 18 BRPM | HEART RATE: 114 BPM | SYSTOLIC BLOOD PRESSURE: 119 MMHG

## 2018-01-01 VITALS
RESPIRATION RATE: 17 BRPM | OXYGEN SATURATION: 100 % | WEIGHT: 167.56 LBS | HEIGHT: 70 IN | HEART RATE: 70 BPM | BODY MASS INDEX: 23.99 KG/M2 | TEMPERATURE: 98 F | SYSTOLIC BLOOD PRESSURE: 169 MMHG | DIASTOLIC BLOOD PRESSURE: 94 MMHG

## 2018-01-01 VITALS
RESPIRATION RATE: 24 BRPM | HEART RATE: 70 BPM | OXYGEN SATURATION: 97 % | TEMPERATURE: 98 F | WEIGHT: 188.06 LBS | BODY MASS INDEX: 32.11 KG/M2 | HEIGHT: 64 IN | SYSTOLIC BLOOD PRESSURE: 100 MMHG | DIASTOLIC BLOOD PRESSURE: 68 MMHG

## 2018-01-01 VITALS
RESPIRATION RATE: 5 BRPM | HEIGHT: 64 IN | TEMPERATURE: 98 F | SYSTOLIC BLOOD PRESSURE: 101 MMHG | DIASTOLIC BLOOD PRESSURE: 51 MMHG | HEART RATE: 70 BPM | OXYGEN SATURATION: 100 % | BODY MASS INDEX: 28.04 KG/M2 | WEIGHT: 164.25 LBS

## 2018-01-01 VITALS
SYSTOLIC BLOOD PRESSURE: 130 MMHG | HEART RATE: 69 BPM | DIASTOLIC BLOOD PRESSURE: 62 MMHG | BODY MASS INDEX: 29.37 KG/M2 | WEIGHT: 171.06 LBS | RESPIRATION RATE: 9 BRPM | OXYGEN SATURATION: 98 % | TEMPERATURE: 98 F

## 2018-01-01 DIAGNOSIS — T83.511A URINARY TRACT INFECTION ASSOCIATED WITH INDWELLING URETHRAL CATHETER, INITIAL ENCOUNTER: ICD-10-CM

## 2018-01-01 DIAGNOSIS — J90 PLEURAL EFFUSION ON RIGHT: ICD-10-CM

## 2018-01-01 DIAGNOSIS — G93.40 ENCEPHALOPATHY: ICD-10-CM

## 2018-01-01 DIAGNOSIS — R06.02 SHORTNESS OF BREATH: ICD-10-CM

## 2018-01-01 DIAGNOSIS — D69.6 THROMBOCYTOPENIA: ICD-10-CM

## 2018-01-01 DIAGNOSIS — L89.150 PRESSURE INJURY OF COCCYGEAL REGION, UNSTAGEABLE: Chronic | ICD-10-CM

## 2018-01-01 DIAGNOSIS — R04.2 HEMOPTYSIS: ICD-10-CM

## 2018-01-01 DIAGNOSIS — J96.02 ACUTE RESPIRATORY FAILURE WITH HYPOXIA AND HYPERCARBIA: ICD-10-CM

## 2018-01-01 DIAGNOSIS — E03.9 HYPOTHYROIDISM, UNSPECIFIED TYPE: Chronic | ICD-10-CM

## 2018-01-01 DIAGNOSIS — A41.9 SEPSIS, DUE TO UNSPECIFIED ORGANISM: Primary | ICD-10-CM

## 2018-01-01 DIAGNOSIS — E11.9 TYPE 2 DIABETES MELLITUS WITHOUT COMPLICATION: ICD-10-CM

## 2018-01-01 DIAGNOSIS — R23.8 SKIN BREAKDOWN: ICD-10-CM

## 2018-01-01 DIAGNOSIS — R09.89 LABILE BLOOD PRESSURE: ICD-10-CM

## 2018-01-01 DIAGNOSIS — R60.9 1+ PITTING EDEMA: ICD-10-CM

## 2018-01-01 DIAGNOSIS — R23.8 BLISTERS OF MULTIPLE SITES: ICD-10-CM

## 2018-01-01 DIAGNOSIS — Z99.2 TYPE 2 DIABETES MELLITUS WITH CHRONIC KIDNEY DISEASE ON CHRONIC DIALYSIS, WITHOUT LONG-TERM CURRENT USE OF INSULIN: ICD-10-CM

## 2018-01-01 DIAGNOSIS — E55.9 VITAMIN D DEFICIENCY: ICD-10-CM

## 2018-01-01 DIAGNOSIS — N30.01 ACUTE CYSTITIS WITH HEMATURIA: ICD-10-CM

## 2018-01-01 DIAGNOSIS — I50.9 CHF (CONGESTIVE HEART FAILURE): ICD-10-CM

## 2018-01-01 DIAGNOSIS — J90 BILATERAL PLEURAL EFFUSION: ICD-10-CM

## 2018-01-01 DIAGNOSIS — R79.89 ELEVATED TROPONIN: ICD-10-CM

## 2018-01-01 DIAGNOSIS — S80.822A: ICD-10-CM

## 2018-01-01 DIAGNOSIS — N39.0 URINARY TRACT INFECTION ASSOCIATED WITH INDWELLING URETHRAL CATHETER, INITIAL ENCOUNTER: ICD-10-CM

## 2018-01-01 DIAGNOSIS — J96.22 ACUTE ON CHRONIC RESPIRATORY FAILURE WITH HYPOXIA AND HYPERCAPNIA: ICD-10-CM

## 2018-01-01 DIAGNOSIS — E87.8 ELECTROLYTE IMBALANCE: ICD-10-CM

## 2018-01-01 DIAGNOSIS — H47.013: ICD-10-CM

## 2018-01-01 DIAGNOSIS — J45.50 UNCOMPLICATED SEVERE PERSISTENT ASTHMA: Chronic | ICD-10-CM

## 2018-01-01 DIAGNOSIS — R65.20 SEVERE SEPSIS: ICD-10-CM

## 2018-01-01 DIAGNOSIS — J98.11 ATELECTASIS OF LEFT LUNG: ICD-10-CM

## 2018-01-01 DIAGNOSIS — K59.00 CONSTIPATION: ICD-10-CM

## 2018-01-01 DIAGNOSIS — J18.9 PNEUMONIA OF LEFT LOWER LOBE DUE TO INFECTIOUS ORGANISM: ICD-10-CM

## 2018-01-01 DIAGNOSIS — A41.9 SEVERE SEPSIS: ICD-10-CM

## 2018-01-01 DIAGNOSIS — R41.82 ALTERED MENTAL STATUS: ICD-10-CM

## 2018-01-01 DIAGNOSIS — Z92.89 HISTORY OF EKG: ICD-10-CM

## 2018-01-01 DIAGNOSIS — E03.9 HYPOTHYROIDISM, UNSPECIFIED TYPE: ICD-10-CM

## 2018-01-01 DIAGNOSIS — R07.9 CHEST PAIN: ICD-10-CM

## 2018-01-01 DIAGNOSIS — J15.212 PNEUMONIA DUE TO METHICILLIN RESISTANT STAPHYLOCOCCUS AUREUS, UNSPECIFIED LATERALITY, UNSPECIFIED PART OF LUNG: ICD-10-CM

## 2018-01-01 DIAGNOSIS — E11.22 TYPE 2 DIABETES MELLITUS WITH CHRONIC KIDNEY DISEASE ON CHRONIC DIALYSIS, WITHOUT LONG-TERM CURRENT USE OF INSULIN: Chronic | ICD-10-CM

## 2018-01-01 DIAGNOSIS — I38 ENDOCARDITIS: ICD-10-CM

## 2018-01-01 DIAGNOSIS — I49.9 CARDIAC RHYTHM DISORDER OR DISTURBANCE OR CHANGE: ICD-10-CM

## 2018-01-01 DIAGNOSIS — E11.22 TYPE 2 DIABETES MELLITUS WITH CHRONIC KIDNEY DISEASE ON CHRONIC DIALYSIS, WITHOUT LONG-TERM CURRENT USE OF INSULIN: ICD-10-CM

## 2018-01-01 DIAGNOSIS — I95.9 HYPOTENSION, UNSPECIFIED HYPOTENSION TYPE: ICD-10-CM

## 2018-01-01 DIAGNOSIS — J96.90 RESPIRATORY FAILURE, UNSPECIFIED CHRONICITY, UNSPECIFIED WHETHER WITH HYPOXIA OR HYPERCAPNIA: Primary | ICD-10-CM

## 2018-01-01 DIAGNOSIS — Z99.2 TYPE 2 DIABETES MELLITUS WITH CHRONIC KIDNEY DISEASE ON CHRONIC DIALYSIS, WITHOUT LONG-TERM CURRENT USE OF INSULIN: Chronic | ICD-10-CM

## 2018-01-01 DIAGNOSIS — G36.0 NEUROMYELITIS OPTICA SPECTRUM DISORDER: ICD-10-CM

## 2018-01-01 DIAGNOSIS — I50.23 ACUTE ON CHRONIC SYSTOLIC HEART FAILURE: ICD-10-CM

## 2018-01-01 DIAGNOSIS — H47.013: Primary | ICD-10-CM

## 2018-01-01 DIAGNOSIS — R53.81 DEBILITY: ICD-10-CM

## 2018-01-01 DIAGNOSIS — I50.23 ACUTE ON CHRONIC SYSTOLIC CONGESTIVE HEART FAILURE: ICD-10-CM

## 2018-01-01 DIAGNOSIS — R79.89 ABNORMAL LIVER FUNCTION TESTS: ICD-10-CM

## 2018-01-01 DIAGNOSIS — H40.1133 PRIMARY OPEN ANGLE GLAUCOMA (POAG) OF BOTH EYES, SEVERE STAGE: ICD-10-CM

## 2018-01-01 DIAGNOSIS — D72.829 LEUKOCYTOSIS, UNSPECIFIED TYPE: ICD-10-CM

## 2018-01-01 DIAGNOSIS — N18.30 CHRONIC KIDNEY DISEASE (CKD), STAGE III (MODERATE): Chronic | ICD-10-CM

## 2018-01-01 DIAGNOSIS — I48.20 CHRONIC ATRIAL FIBRILLATION: Chronic | ICD-10-CM

## 2018-01-01 DIAGNOSIS — R53.1 WEAKNESS GENERALIZED: ICD-10-CM

## 2018-01-01 DIAGNOSIS — Z99.11 VENTILATOR DEPENDENCE: ICD-10-CM

## 2018-01-01 DIAGNOSIS — A41.9 SEPSIS: ICD-10-CM

## 2018-01-01 DIAGNOSIS — B95.62 MRSA BACTEREMIA: ICD-10-CM

## 2018-01-01 DIAGNOSIS — R60.1 ANASARCA: ICD-10-CM

## 2018-01-01 DIAGNOSIS — R29.898 SEVERE MUSCLE DECONDITIONING: ICD-10-CM

## 2018-01-01 DIAGNOSIS — L89.612 DECUBITUS ULCER OF RIGHT HEEL, STAGE 2: Primary | ICD-10-CM

## 2018-01-01 DIAGNOSIS — J96.01 ACUTE RESPIRATORY FAILURE WITH HYPOXIA AND HYPERCARBIA: ICD-10-CM

## 2018-01-01 DIAGNOSIS — R78.81 MRSA BACTEREMIA: ICD-10-CM

## 2018-01-01 DIAGNOSIS — I49.9 ARRHYTHMIA: ICD-10-CM

## 2018-01-01 DIAGNOSIS — N18.6 TYPE 2 DIABETES MELLITUS WITH CHRONIC KIDNEY DISEASE ON CHRONIC DIALYSIS, WITHOUT LONG-TERM CURRENT USE OF INSULIN: Chronic | ICD-10-CM

## 2018-01-01 DIAGNOSIS — T68.XXXA HYPOTHERMIA, INITIAL ENCOUNTER: ICD-10-CM

## 2018-01-01 DIAGNOSIS — I95.0 IDIOPATHIC HYPOTENSION: ICD-10-CM

## 2018-01-01 DIAGNOSIS — J96.21 ACUTE ON CHRONIC RESPIRATORY FAILURE WITH HYPOXIA AND HYPERCAPNIA: ICD-10-CM

## 2018-01-01 DIAGNOSIS — J96.90 RESPIRATORY FAILURE: ICD-10-CM

## 2018-01-01 DIAGNOSIS — I48.91 ATRIAL FIBRILLATION, UNSPECIFIED TYPE: Chronic | ICD-10-CM

## 2018-01-01 DIAGNOSIS — R06.02 SOB (SHORTNESS OF BREATH): Primary | ICD-10-CM

## 2018-01-01 DIAGNOSIS — H47.619 DISORDER OF VISUAL CORTEX ASSOCIATED WITH CORTICAL BLINDNESS, UNSPECIFIED LATERALITY: ICD-10-CM

## 2018-01-01 DIAGNOSIS — N18.6 TYPE 2 DIABETES MELLITUS WITH CHRONIC KIDNEY DISEASE ON CHRONIC DIALYSIS, WITHOUT LONG-TERM CURRENT USE OF INSULIN: ICD-10-CM

## 2018-01-01 DIAGNOSIS — L89.159 DECUBITUS ULCER OF SACRAL REGION, UNSPECIFIED ULCER STAGE: ICD-10-CM

## 2018-01-01 DIAGNOSIS — J95.09: ICD-10-CM

## 2018-01-01 DIAGNOSIS — I48.91 ATRIAL FIBRILLATION WITH RVR: Primary | ICD-10-CM

## 2018-01-01 LAB
25(OH)D3+25(OH)D2 SERPL-MCNC: 27 NG/ML
ABO + RH BLD: NORMAL
ABO + RH BLD: NORMAL
ACHR BIND AB SER-SCNC: 0.1 NMOL/L (ref 0–0.4)
ACHR BIND AB SER-SCNC: NORMAL NMOL/L
ACHR MOD AB/ACHR TOTAL SFR SER: 0 %
ACID FAST MOD KINY STN SPEC: NORMAL
ALBUMIN CSF-MCNC: 63.6 MG/DL
ALBUMIN SERPL BCP-MCNC: 2.1 G/DL
ALBUMIN SERPL BCP-MCNC: 2.1 G/DL
ALBUMIN SERPL BCP-MCNC: 2.3 G/DL
ALBUMIN SERPL BCP-MCNC: 2.3 G/DL
ALBUMIN SERPL BCP-MCNC: 2.4 G/DL
ALBUMIN SERPL BCP-MCNC: 2.6 G/DL
ALBUMIN SERPL BCP-MCNC: 2.7 G/DL
ALBUMIN SERPL BCP-MCNC: 2.9 G/DL
ALBUMIN SERPL BCP-MCNC: 2.9 G/DL
ALBUMIN SERPL BCP-MCNC: 3.7 G/DL
ALBUMIN SERPL BCP-MCNC: 3.7 G/DL
ALBUMIN SERPL BCP-MCNC: 4 G/DL
ALBUMIN SERPL BCP-MCNC: 4.1 G/DL
ALBUMIN SERPL BCP-MCNC: 4.2 G/DL
ALBUMIN SERPL-MCNC: 3140 MG/DL
ALLENS TEST: ABNORMAL
ALP SERPL-CCNC: 105 U/L
ALP SERPL-CCNC: 110 U/L
ALP SERPL-CCNC: 118 U/L
ALP SERPL-CCNC: 123 U/L
ALP SERPL-CCNC: 133 U/L
ALP SERPL-CCNC: 139 U/L
ALP SERPL-CCNC: 143 U/L
ALP SERPL-CCNC: 151 U/L
ALP SERPL-CCNC: 152 U/L
ALP SERPL-CCNC: 152 U/L
ALP SERPL-CCNC: 167 U/L
ALP SERPL-CCNC: 60 U/L
ALP SERPL-CCNC: 66 U/L
ALP SERPL-CCNC: 83 U/L
ALT SERPL W/O P-5'-P-CCNC: 11 U/L
ALT SERPL W/O P-5'-P-CCNC: 13 U/L
ALT SERPL W/O P-5'-P-CCNC: 13 U/L
ALT SERPL W/O P-5'-P-CCNC: 19 U/L
ALT SERPL W/O P-5'-P-CCNC: 20 U/L
ALT SERPL W/O P-5'-P-CCNC: 23 U/L
ALT SERPL W/O P-5'-P-CCNC: 26 U/L
ALT SERPL W/O P-5'-P-CCNC: 29 U/L
ALT SERPL W/O P-5'-P-CCNC: 30 U/L
ALT SERPL W/O P-5'-P-CCNC: 32 U/L
ALT SERPL W/O P-5'-P-CCNC: 36 U/L
ALT SERPL W/O P-5'-P-CCNC: 36 U/L
ALT SERPL W/O P-5'-P-CCNC: 45 U/L
ALT SERPL W/O P-5'-P-CCNC: 47 U/L
AMMONIA PLAS-SCNC: 47 UMOL/L
AMMONIA PLAS-SCNC: 61 UMOL/L
AMPA-R AB CBA, SERUM: NEGATIVE
AMPHIPHYSIN AB TITR SER: NEGATIVE TITER
ANION GAP SERPL CALC-SCNC: 10 MMOL/L
ANION GAP SERPL CALC-SCNC: 11 MMOL/L
ANION GAP SERPL CALC-SCNC: 12 MMOL/L
ANION GAP SERPL CALC-SCNC: 13 MMOL/L
ANION GAP SERPL CALC-SCNC: 14 MMOL/L
ANION GAP SERPL CALC-SCNC: 16 MMOL/L
ANION GAP SERPL CALC-SCNC: 5 MMOL/L
ANION GAP SERPL CALC-SCNC: 5 MMOL/L
ANION GAP SERPL CALC-SCNC: 6 MMOL/L
ANION GAP SERPL CALC-SCNC: 7 MMOL/L
ANION GAP SERPL CALC-SCNC: 8 MMOL/L
ANION GAP SERPL CALC-SCNC: 9 MMOL/L
ANISOCYTOSIS BLD QL SMEAR: SLIGHT
AORTIC VALVE REGURGITATION: ABNORMAL
AORTIC VALVE REGURGITATION: ABNORMAL
APPEARANCE FLD: NORMAL
APTT BLDCRRT: 27.3 SEC
APTT BLDCRRT: 36.5 SEC
APTT BLDCRRT: 45.3 SEC
AST SERPL-CCNC: 20 U/L
AST SERPL-CCNC: 21 U/L
AST SERPL-CCNC: 23 U/L
AST SERPL-CCNC: 25 U/L
AST SERPL-CCNC: 27 U/L
AST SERPL-CCNC: 29 U/L
AST SERPL-CCNC: 31 U/L
AST SERPL-CCNC: 33 U/L
AST SERPL-CCNC: 35 U/L
AST SERPL-CCNC: 50 U/L
AST SERPL-CCNC: 53 U/L
BACTERIA #/AREA URNS HPF: ABNORMAL /HPF
BACTERIA #/AREA URNS HPF: NORMAL /HPF
BACTERIA BLD CULT: NORMAL
BACTERIA CSF CULT: NO GROWTH
BACTERIA GENITAL QL WET PREP: ABNORMAL
BACTERIA SPEC AEROBE CULT: NORMAL
BACTERIA UR CULT: NORMAL
BASO STIPL BLD QL SMEAR: ABNORMAL
BASOPHILS # BLD AUTO: 0 K/UL
BASOPHILS # BLD AUTO: 0.01 K/UL
BASOPHILS # BLD AUTO: 0.02 K/UL
BASOPHILS # BLD AUTO: 0.03 K/UL
BASOPHILS # BLD AUTO: 0.04 K/UL
BASOPHILS # BLD AUTO: 0.04 K/UL
BASOPHILS # BLD AUTO: 0.05 K/UL
BASOPHILS # BLD AUTO: 0.05 K/UL
BASOPHILS # BLD AUTO: 0.09 K/UL
BASOPHILS # BLD AUTO: ABNORMAL K/UL
BASOPHILS NFR BLD: 0 %
BASOPHILS NFR BLD: 0.1 %
BASOPHILS NFR BLD: 0.2 %
BASOPHILS NFR BLD: 0.3 %
BASOPHILS NFR BLD: 0.4 %
BASOPHILS NFR BLD: 0.5 %
BASOPHILS NFR BLD: 0.7 %
BASOPHILS NFR BLD: 1 %
BILIRUB DIRECT SERPL-MCNC: 0.1 MG/DL
BILIRUB SERPL-MCNC: 0.2 MG/DL
BILIRUB SERPL-MCNC: 0.3 MG/DL
BILIRUB SERPL-MCNC: 0.4 MG/DL
BILIRUB SERPL-MCNC: 0.4 MG/DL
BILIRUB SERPL-MCNC: 0.5 MG/DL
BILIRUB SERPL-MCNC: 0.5 MG/DL
BILIRUB SERPL-MCNC: 0.6 MG/DL
BILIRUB SERPL-MCNC: 0.9 MG/DL
BILIRUB SERPL-MCNC: 1.3 MG/DL
BILIRUB SERPL-MCNC: 1.5 MG/DL
BILIRUB SERPL-MCNC: 1.6 MG/DL
BILIRUB UR QL STRIP: ABNORMAL
BILIRUB UR QL STRIP: NEGATIVE
BLD GP AB SCN CELLS X3 SERPL QL: NORMAL
BLD GP AB SCN CELLS X3 SERPL QL: NORMAL
BLD PROD TYP BPU: NORMAL
BLOOD UNIT EXPIRATION DATE: NORMAL
BLOOD UNIT TYPE CODE: 5100
BLOOD UNIT TYPE CODE: 5100
BLOOD UNIT TYPE CODE: 9500
BLOOD UNIT TYPE: NORMAL
BNP SERPL-MCNC: 1100 PG/ML
BNP SERPL-MCNC: 266 PG/ML
BNP SERPL-MCNC: 316 PG/ML
BNP SERPL-MCNC: 565 PG/ML
BNP SERPL-MCNC: 669 PG/ML
BNP SERPL-MCNC: 993 PG/ML
BODY FLD TYPE: NORMAL
BUN SERPL-MCNC: 13 MG/DL
BUN SERPL-MCNC: 19 MG/DL
BUN SERPL-MCNC: 24 MG/DL
BUN SERPL-MCNC: 25 MG/DL
BUN SERPL-MCNC: 26 MG/DL
BUN SERPL-MCNC: 28 MG/DL
BUN SERPL-MCNC: 29 MG/DL
BUN SERPL-MCNC: 29 MG/DL
BUN SERPL-MCNC: 30 MG/DL
BUN SERPL-MCNC: 31 MG/DL
BUN SERPL-MCNC: 32 MG/DL
BUN SERPL-MCNC: 32 MG/DL
BUN SERPL-MCNC: 33 MG/DL
BUN SERPL-MCNC: 35 MG/DL
BUN SERPL-MCNC: 36 MG/DL
BUN SERPL-MCNC: 37 MG/DL
BUN SERPL-MCNC: 38 MG/DL
BUN SERPL-MCNC: 38 MG/DL
BUN SERPL-MCNC: 39 MG/DL
BUN SERPL-MCNC: 40 MG/DL
BUN SERPL-MCNC: 42 MG/DL
BUN SERPL-MCNC: 44 MG/DL
BUN SERPL-MCNC: 46 MG/DL
BUN SERPL-MCNC: 49 MG/DL
BUN SERPL-MCNC: 49 MG/DL
BUN SERPL-MCNC: 51 MG/DL
BUN SERPL-MCNC: 58 MG/DL
BUN SERPL-MCNC: 64 MG/DL
BUN SERPL-MCNC: 77 MG/DL
BUN SERPL-MCNC: 86 MG/DL
BUN SERPL-MCNC: 94 MG/DL
BURR CELLS BLD QL SMEAR: ABNORMAL
C DIFF GDH STL QL: NEGATIVE
C DIFF TOX A+B STL QL IA: NEGATIVE
CALCIUM SERPL-MCNC: 10.2 MG/DL
CALCIUM SERPL-MCNC: 10.2 MG/DL
CALCIUM SERPL-MCNC: 10.5 MG/DL
CALCIUM SERPL-MCNC: 8.1 MG/DL
CALCIUM SERPL-MCNC: 8.2 MG/DL
CALCIUM SERPL-MCNC: 8.3 MG/DL
CALCIUM SERPL-MCNC: 8.4 MG/DL
CALCIUM SERPL-MCNC: 8.5 MG/DL
CALCIUM SERPL-MCNC: 8.5 MG/DL
CALCIUM SERPL-MCNC: 8.6 MG/DL
CALCIUM SERPL-MCNC: 8.7 MG/DL
CALCIUM SERPL-MCNC: 8.7 MG/DL
CALCIUM SERPL-MCNC: 8.8 MG/DL
CALCIUM SERPL-MCNC: 8.9 MG/DL
CALCIUM SERPL-MCNC: 9 MG/DL
CALCIUM SERPL-MCNC: 9.1 MG/DL
CALCIUM SERPL-MCNC: 9.2 MG/DL
CALCIUM SERPL-MCNC: 9.2 MG/DL
CALCIUM SERPL-MCNC: 9.3 MG/DL
CALCIUM SERPL-MCNC: 9.4 MG/DL
CALCIUM SERPL-MCNC: 9.4 MG/DL
CALCIUM SERPL-MCNC: 9.5 MG/DL
CALCIUM SERPL-MCNC: 9.5 MG/DL
CALCIUM SERPL-MCNC: 9.6 MG/DL
CALCIUM SERPL-MCNC: 9.7 MG/DL
CALCIUM SERPL-MCNC: 9.8 MG/DL
CALCIUM SERPL-MCNC: 9.8 MG/DL
CALCIUM SERPL-MCNC: 9.9 MG/DL
CALCIUM SERPL-MCNC: 9.9 MG/DL
CASPR2-IGG CBA: NEGATIVE
CHLORIDE SERPL-SCNC: 100 MMOL/L
CHLORIDE SERPL-SCNC: 100 MMOL/L
CHLORIDE SERPL-SCNC: 101 MMOL/L
CHLORIDE SERPL-SCNC: 102 MMOL/L
CHLORIDE SERPL-SCNC: 104 MMOL/L
CHLORIDE SERPL-SCNC: 105 MMOL/L
CHLORIDE SERPL-SCNC: 106 MMOL/L
CHLORIDE SERPL-SCNC: 107 MMOL/L
CHLORIDE SERPL-SCNC: 108 MMOL/L
CHLORIDE SERPL-SCNC: 108 MMOL/L
CHLORIDE SERPL-SCNC: 109 MMOL/L
CHLORIDE SERPL-SCNC: 111 MMOL/L
CHLORIDE SERPL-SCNC: 111 MMOL/L
CHLORIDE SERPL-SCNC: 94 MMOL/L
CHLORIDE SERPL-SCNC: 97 MMOL/L
CHLORIDE SERPL-SCNC: 99 MMOL/L
CHLORIDE SERPL-SCNC: 99 MMOL/L
CHOLEST SERPL-MCNC: 158 MG/DL
CHOLEST SERPL-MCNC: 166 MG/DL
CHOLEST/HDLC SERPL: 4.4 {RATIO}
CHOLEST/HDLC SERPL: 4.7 {RATIO}
CK MB SERPL-MCNC: 4.3 NG/ML
CK MB SERPL-RTO: 6.4 %
CK SERPL-CCNC: 28 U/L
CK SERPL-CCNC: 67 U/L
CK SERPL-CCNC: 67 U/L
CLARITY CSF: CLEAR
CLARITY UR: CLEAR
CLUE CELLS VAG QL WET PREP: ABNORMAL
CO2 SERPL-SCNC: 22 MMOL/L
CO2 SERPL-SCNC: 22 MMOL/L
CO2 SERPL-SCNC: 23 MMOL/L
CO2 SERPL-SCNC: 23 MMOL/L
CO2 SERPL-SCNC: 24 MMOL/L
CO2 SERPL-SCNC: 25 MMOL/L
CO2 SERPL-SCNC: 26 MMOL/L
CO2 SERPL-SCNC: 26 MMOL/L
CO2 SERPL-SCNC: 27 MMOL/L
CO2 SERPL-SCNC: 27 MMOL/L
CO2 SERPL-SCNC: 28 MMOL/L
CO2 SERPL-SCNC: 29 MMOL/L
CO2 SERPL-SCNC: 29 MMOL/L
CO2 SERPL-SCNC: 30 MMOL/L
CO2 SERPL-SCNC: 31 MMOL/L
CO2 SERPL-SCNC: 32 MMOL/L
CO2 SERPL-SCNC: 33 MMOL/L
CO2 SERPL-SCNC: 34 MMOL/L
CO2 SERPL-SCNC: 35 MMOL/L
CO2 SERPL-SCNC: 36 MMOL/L
CO2 SERPL-SCNC: 36 MMOL/L
CO2 SERPL-SCNC: 37 MMOL/L
CO2 SERPL-SCNC: 37 MMOL/L
CODING SYSTEM: NORMAL
COLOR CSF: COLORLESS
COLOR FLD: NORMAL
COLOR UR: YELLOW
CORTIS SERPL-MCNC: 13.9 UG/DL
CREAT SERPL-MCNC: 0.6 MG/DL
CREAT SERPL-MCNC: 0.6 MG/DL
CREAT SERPL-MCNC: 0.7 MG/DL
CREAT SERPL-MCNC: 0.8 MG/DL
CREAT SERPL-MCNC: 0.9 MG/DL
CREAT SERPL-MCNC: 1 MG/DL
CREAT SERPL-MCNC: 1.1 MG/DL
CREAT SERPL-MCNC: 1.3 MG/DL
CREAT SERPL-MCNC: 1.3 MG/DL
CREAT SERPL-MCNC: 1.4 MG/DL
CRP SERPL-MCNC: 65.12 MG/L
CV2 IGG TITR SER: NEGATIVE TITER
D DIMER PPP IA.FEU-MCNC: 2.44 MG/L FEU
DACRYOCYTES BLD QL SMEAR: ABNORMAL
DELSYS: ABNORMAL
DIASTOLIC DYSFUNCTION: YES
DIFFERENTIAL METHOD: ABNORMAL
DISPENSE STATUS: NORMAL
EOSINOPHIL # BLD AUTO: 0 K/UL
EOSINOPHIL # BLD AUTO: 0.1 K/UL
EOSINOPHIL # BLD AUTO: 0.2 K/UL
EOSINOPHIL # BLD AUTO: 0.3 K/UL
EOSINOPHIL # BLD AUTO: 0.4 K/UL
EOSINOPHIL # BLD AUTO: 0.5 K/UL
EOSINOPHIL # BLD AUTO: 0.6 K/UL
EOSINOPHIL # BLD AUTO: 0.8 K/UL
EOSINOPHIL # BLD AUTO: 0.8 K/UL
EOSINOPHIL # BLD AUTO: ABNORMAL K/UL
EOSINOPHIL NFR BLD: 0 %
EOSINOPHIL NFR BLD: 0.3 %
EOSINOPHIL NFR BLD: 0.5 %
EOSINOPHIL NFR BLD: 1 %
EOSINOPHIL NFR BLD: 1.1 %
EOSINOPHIL NFR BLD: 1.3 %
EOSINOPHIL NFR BLD: 1.4 %
EOSINOPHIL NFR BLD: 1.6 %
EOSINOPHIL NFR BLD: 1.6 %
EOSINOPHIL NFR BLD: 1.7 %
EOSINOPHIL NFR BLD: 1.9 %
EOSINOPHIL NFR BLD: 2 %
EOSINOPHIL NFR BLD: 2.1 %
EOSINOPHIL NFR BLD: 2.4 %
EOSINOPHIL NFR BLD: 2.4 %
EOSINOPHIL NFR BLD: 2.6 %
EOSINOPHIL NFR BLD: 2.8 %
EOSINOPHIL NFR BLD: 3 %
EOSINOPHIL NFR BLD: 3.1 %
EOSINOPHIL NFR BLD: 3.7 %
EOSINOPHIL NFR BLD: 3.9 %
EOSINOPHIL NFR BLD: 3.9 %
EOSINOPHIL NFR BLD: 4.2 %
EOSINOPHIL NFR BLD: 4.4 %
EOSINOPHIL NFR BLD: 4.8 %
EOSINOPHIL NFR BLD: 5 %
EOSINOPHIL NFR BLD: 5.1 %
EOSINOPHIL NFR BLD: 5.3 %
EOSINOPHIL NFR BLD: 6.4 %
EOSINOPHIL NFR BLD: 6.5 %
EOSINOPHIL NFR FLD MANUAL: 3 %
EP: 6
EP: 6
ERYTHROCYTE [DISTWIDTH] IN BLOOD BY AUTOMATED COUNT: 17 %
ERYTHROCYTE [DISTWIDTH] IN BLOOD BY AUTOMATED COUNT: 17.2 %
ERYTHROCYTE [DISTWIDTH] IN BLOOD BY AUTOMATED COUNT: 17.3 %
ERYTHROCYTE [DISTWIDTH] IN BLOOD BY AUTOMATED COUNT: 17.4 %
ERYTHROCYTE [DISTWIDTH] IN BLOOD BY AUTOMATED COUNT: 17.4 %
ERYTHROCYTE [DISTWIDTH] IN BLOOD BY AUTOMATED COUNT: 17.5 %
ERYTHROCYTE [DISTWIDTH] IN BLOOD BY AUTOMATED COUNT: 17.6 %
ERYTHROCYTE [DISTWIDTH] IN BLOOD BY AUTOMATED COUNT: 17.6 %
ERYTHROCYTE [DISTWIDTH] IN BLOOD BY AUTOMATED COUNT: 17.7 %
ERYTHROCYTE [DISTWIDTH] IN BLOOD BY AUTOMATED COUNT: 17.8 %
ERYTHROCYTE [DISTWIDTH] IN BLOOD BY AUTOMATED COUNT: 17.8 %
ERYTHROCYTE [DISTWIDTH] IN BLOOD BY AUTOMATED COUNT: 18.1 %
ERYTHROCYTE [DISTWIDTH] IN BLOOD BY AUTOMATED COUNT: 18.1 %
ERYTHROCYTE [DISTWIDTH] IN BLOOD BY AUTOMATED COUNT: 18.2 %
ERYTHROCYTE [DISTWIDTH] IN BLOOD BY AUTOMATED COUNT: 18.3 %
ERYTHROCYTE [DISTWIDTH] IN BLOOD BY AUTOMATED COUNT: 18.4 %
ERYTHROCYTE [DISTWIDTH] IN BLOOD BY AUTOMATED COUNT: 18.5 %
ERYTHROCYTE [DISTWIDTH] IN BLOOD BY AUTOMATED COUNT: 18.5 %
ERYTHROCYTE [DISTWIDTH] IN BLOOD BY AUTOMATED COUNT: 18.6 %
ERYTHROCYTE [DISTWIDTH] IN BLOOD BY AUTOMATED COUNT: 18.7 %
ERYTHROCYTE [DISTWIDTH] IN BLOOD BY AUTOMATED COUNT: 18.7 %
ERYTHROCYTE [DISTWIDTH] IN BLOOD BY AUTOMATED COUNT: 18.8 %
ERYTHROCYTE [DISTWIDTH] IN BLOOD BY AUTOMATED COUNT: 18.8 %
ERYTHROCYTE [DISTWIDTH] IN BLOOD BY AUTOMATED COUNT: 18.9 %
ERYTHROCYTE [DISTWIDTH] IN BLOOD BY AUTOMATED COUNT: 19.1 %
ERYTHROCYTE [DISTWIDTH] IN BLOOD BY AUTOMATED COUNT: 19.2 %
ERYTHROCYTE [DISTWIDTH] IN BLOOD BY AUTOMATED COUNT: 19.2 %
ERYTHROCYTE [DISTWIDTH] IN BLOOD BY AUTOMATED COUNT: 19.3 %
ERYTHROCYTE [DISTWIDTH] IN BLOOD BY AUTOMATED COUNT: 19.5 %
ERYTHROCYTE [DISTWIDTH] IN BLOOD BY AUTOMATED COUNT: 19.6 %
ERYTHROCYTE [DISTWIDTH] IN BLOOD BY AUTOMATED COUNT: 19.8 %
ERYTHROCYTE [DISTWIDTH] IN BLOOD BY AUTOMATED COUNT: 19.9 %
ERYTHROCYTE [SEDIMENTATION RATE] IN BLOOD BY WESTERGREN METHOD: 12 MM/H
ERYTHROCYTE [SEDIMENTATION RATE] IN BLOOD BY WESTERGREN METHOD: 14 MM/H
ERYTHROCYTE [SEDIMENTATION RATE] IN BLOOD BY WESTERGREN METHOD: 16 MM/H
ERYTHROCYTE [SEDIMENTATION RATE] IN BLOOD BY WESTERGREN METHOD: 16 MM/H
ERYTHROCYTE [SEDIMENTATION RATE] IN BLOOD BY WESTERGREN METHOD: 18 MM/H
ERYTHROCYTE [SEDIMENTATION RATE] IN BLOOD BY WESTERGREN METHOD: 20 MM/H
ERYTHROCYTE [SEDIMENTATION RATE] IN BLOOD BY WESTERGREN METHOD: 62 MM/HR
EST. GFR  (AFRICAN AMERICAN): 43 ML/MIN/1.73 M^2
EST. GFR  (AFRICAN AMERICAN): 47 ML/MIN/1.73 M^2
EST. GFR  (AFRICAN AMERICAN): 47 ML/MIN/1.73 M^2
EST. GFR  (AFRICAN AMERICAN): 58 ML/MIN/1.73 M^2
EST. GFR  (AFRICAN AMERICAN): >60 ML/MIN/1.73 M^2
EST. GFR  (NON AFRICAN AMERICAN): 38 ML/MIN/1.73 M^2
EST. GFR  (NON AFRICAN AMERICAN): 41 ML/MIN/1.73 M^2
EST. GFR  (NON AFRICAN AMERICAN): 41 ML/MIN/1.73 M^2
EST. GFR  (NON AFRICAN AMERICAN): 50 ML/MIN/1.73 M^2
EST. GFR  (NON AFRICAN AMERICAN): 56 ML/MIN/1.73 M^2
EST. GFR  (NON AFRICAN AMERICAN): >60 ML/MIN/1.73 M^2
ESTIMATED AVG GLUCOSE: 105 MG/DL
ESTIMATED AVG GLUCOSE: 94 MG/DL
ESTIMATED PA SYSTOLIC PRESSURE: 54.19
ESTIMATED PA SYSTOLIC PRESSURE: 59.91
FIBRINOGEN PPP-MCNC: 636 MG/DL
FILAMENT FUNGI VAG WET PREP-#/AREA: ABNORMAL
FIO2: 100
FIO2: 100
FIO2: 30
FIO2: 40
FIO2: 45
FIO2: 50
FIO2: 55
FIO2: 60
FIO2: 65
FIO2: 65
FIO2: 70
FIO2: 75
FLOW: 2.5
FUNGUS SPEC CULT: NORMAL
GABA-B-R AB CBA, SERUM: NEGATIVE
GAD65 AB SER-SCNC: 0 NMOL/L
GIANT PLATELETS BLD QL SMEAR: PRESENT
GIANT PLATELETS BLD QL SMEAR: PRESENT
GLIAL NUC TYPE 1 AB TITR SER: NEGATIVE TITER
GLUCOSE CSF-MCNC: 84 MG/DL
GLUCOSE FLD-MCNC: 115 MG/DL
GLUCOSE SERPL-MCNC: 102 MG/DL
GLUCOSE SERPL-MCNC: 102 MG/DL
GLUCOSE SERPL-MCNC: 103 MG/DL
GLUCOSE SERPL-MCNC: 104 MG/DL
GLUCOSE SERPL-MCNC: 105 MG/DL
GLUCOSE SERPL-MCNC: 106 MG/DL
GLUCOSE SERPL-MCNC: 108 MG/DL
GLUCOSE SERPL-MCNC: 108 MG/DL
GLUCOSE SERPL-MCNC: 111 MG/DL
GLUCOSE SERPL-MCNC: 113 MG/DL
GLUCOSE SERPL-MCNC: 113 MG/DL
GLUCOSE SERPL-MCNC: 120 MG/DL
GLUCOSE SERPL-MCNC: 122 MG/DL
GLUCOSE SERPL-MCNC: 123 MG/DL
GLUCOSE SERPL-MCNC: 124 MG/DL
GLUCOSE SERPL-MCNC: 131 MG/DL
GLUCOSE SERPL-MCNC: 142 MG/DL
GLUCOSE SERPL-MCNC: 143 MG/DL
GLUCOSE SERPL-MCNC: 152 MG/DL
GLUCOSE SERPL-MCNC: 153 MG/DL
GLUCOSE SERPL-MCNC: 155 MG/DL
GLUCOSE SERPL-MCNC: 336 MG/DL
GLUCOSE SERPL-MCNC: 68 MG/DL
GLUCOSE SERPL-MCNC: 75 MG/DL
GLUCOSE SERPL-MCNC: 76 MG/DL
GLUCOSE SERPL-MCNC: 84 MG/DL
GLUCOSE SERPL-MCNC: 85 MG/DL
GLUCOSE SERPL-MCNC: 85 MG/DL
GLUCOSE SERPL-MCNC: 86 MG/DL
GLUCOSE SERPL-MCNC: 87 MG/DL
GLUCOSE SERPL-MCNC: 87 MG/DL
GLUCOSE SERPL-MCNC: 88 MG/DL
GLUCOSE SERPL-MCNC: 91 MG/DL
GLUCOSE SERPL-MCNC: 93 MG/DL
GLUCOSE SERPL-MCNC: 95 MG/DL
GLUCOSE SERPL-MCNC: 97 MG/DL
GLUCOSE SERPL-MCNC: 97 MG/DL
GLUCOSE SERPL-MCNC: 99 MG/DL
GLUCOSE UR QL STRIP: NEGATIVE
GRAM STN SPEC: NORMAL
HBA1C MFR BLD HPLC: 4.9 %
HBA1C MFR BLD HPLC: 5.3 %
HBV SURFACE AB SER-ACNC: NEGATIVE M[IU]/ML
HCO3 UR-SCNC: 23.8 MMOL/L (ref 24–28)
HCO3 UR-SCNC: 24.4 MMOL/L (ref 24–28)
HCO3 UR-SCNC: 25.2 MMOL/L (ref 24–28)
HCO3 UR-SCNC: 26.3 MMOL/L (ref 24–28)
HCO3 UR-SCNC: 27.3 MMOL/L (ref 24–28)
HCO3 UR-SCNC: 29.1 MMOL/L (ref 24–28)
HCO3 UR-SCNC: 29.4 MMOL/L (ref 24–28)
HCO3 UR-SCNC: 30.6 MMOL/L (ref 24–28)
HCO3 UR-SCNC: 31.1 MMOL/L (ref 24–28)
HCO3 UR-SCNC: 31.7 MMOL/L (ref 24–28)
HCO3 UR-SCNC: 31.7 MMOL/L (ref 24–28)
HCO3 UR-SCNC: 32.6 MMOL/L (ref 24–28)
HCO3 UR-SCNC: 33.4 MMOL/L (ref 24–28)
HCO3 UR-SCNC: 34 MMOL/L (ref 24–28)
HCO3 UR-SCNC: 34.6 MMOL/L (ref 24–28)
HCO3 UR-SCNC: 36.5 MMOL/L (ref 24–28)
HCO3 UR-SCNC: 37.5 MMOL/L (ref 24–28)
HCO3 UR-SCNC: 38.5 MMOL/L (ref 24–28)
HCO3 UR-SCNC: 38.6 MMOL/L (ref 24–28)
HCO3 UR-SCNC: 39 MMOL/L (ref 24–28)
HCO3 UR-SCNC: 39.1 MMOL/L (ref 24–28)
HCO3 UR-SCNC: 39.6 MMOL/L (ref 24–28)
HCO3 UR-SCNC: 40.3 MMOL/L (ref 24–28)
HCO3 UR-SCNC: 40.7 MMOL/L (ref 24–28)
HCO3 UR-SCNC: 40.8 MMOL/L (ref 24–28)
HCO3 UR-SCNC: 40.9 MMOL/L (ref 24–28)
HCO3 UR-SCNC: 41.5 MMOL/L (ref 24–28)
HCO3 UR-SCNC: 41.7 MMOL/L (ref 24–28)
HCO3 UR-SCNC: 42.2 MMOL/L (ref 24–28)
HCT VFR BLD AUTO: 21 %
HCT VFR BLD AUTO: 24.1 %
HCT VFR BLD AUTO: 24.3 %
HCT VFR BLD AUTO: 25.2 %
HCT VFR BLD AUTO: 25.4 %
HCT VFR BLD AUTO: 25.5 %
HCT VFR BLD AUTO: 26.7 %
HCT VFR BLD AUTO: 26.8 %
HCT VFR BLD AUTO: 26.8 %
HCT VFR BLD AUTO: 27.4 %
HCT VFR BLD AUTO: 27.5 %
HCT VFR BLD AUTO: 27.7 %
HCT VFR BLD AUTO: 27.8 %
HCT VFR BLD AUTO: 27.8 %
HCT VFR BLD AUTO: 28.2 %
HCT VFR BLD AUTO: 28.5 %
HCT VFR BLD AUTO: 28.7 %
HCT VFR BLD AUTO: 29 %
HCT VFR BLD AUTO: 30 %
HCT VFR BLD AUTO: 30.3 %
HCT VFR BLD AUTO: 32.5 %
HCT VFR BLD AUTO: 32.6 %
HCT VFR BLD AUTO: 32.9 %
HCT VFR BLD AUTO: 33 %
HCT VFR BLD AUTO: 34.7 %
HCT VFR BLD AUTO: 34.9 %
HCT VFR BLD AUTO: 35.2 %
HCT VFR BLD AUTO: 35.4 %
HCT VFR BLD AUTO: 35.4 %
HCT VFR BLD AUTO: 35.6 %
HCT VFR BLD AUTO: 35.6 %
HCT VFR BLD AUTO: 35.7 %
HCT VFR BLD AUTO: 35.8 %
HCT VFR BLD AUTO: 35.9 %
HCT VFR BLD AUTO: 36.4 %
HCT VFR BLD AUTO: 37.4 %
HCT VFR BLD AUTO: 38.1 %
HCT VFR BLD AUTO: 38.4 %
HCT VFR BLD AUTO: 39 %
HCT VFR BLD AUTO: 39.7 %
HCV AB SERPL QL IA: NEGATIVE
HDLC SERPL-MCNC: 35 MG/DL
HDLC SERPL-MCNC: 36 MG/DL
HDLC SERPL: 21.1 %
HDLC SERPL: 22.8 %
HDV AB SER QL: NEGATIVE
HEPATITIS A ANTIBODY, IGG: NEGATIVE
HGB BLD-MCNC: 10 G/DL
HGB BLD-MCNC: 10 G/DL
HGB BLD-MCNC: 10.1 G/DL
HGB BLD-MCNC: 10.1 G/DL
HGB BLD-MCNC: 10.6 G/DL
HGB BLD-MCNC: 10.7 G/DL
HGB BLD-MCNC: 10.7 G/DL
HGB BLD-MCNC: 10.8 G/DL
HGB BLD-MCNC: 10.9 G/DL
HGB BLD-MCNC: 11 G/DL
HGB BLD-MCNC: 11.1 G/DL
HGB BLD-MCNC: 11.1 G/DL
HGB BLD-MCNC: 11.2 G/DL
HGB BLD-MCNC: 11.4 G/DL
HGB BLD-MCNC: 11.4 G/DL
HGB BLD-MCNC: 11.5 G/DL
HGB BLD-MCNC: 12 G/DL
HGB BLD-MCNC: 12.1 G/DL
HGB BLD-MCNC: 6.7 G/DL
HGB BLD-MCNC: 7.6 G/DL
HGB BLD-MCNC: 7.6 G/DL
HGB BLD-MCNC: 8 G/DL
HGB BLD-MCNC: 8.1 G/DL
HGB BLD-MCNC: 8.1 G/DL
HGB BLD-MCNC: 8.2 G/DL
HGB BLD-MCNC: 8.4 G/DL
HGB BLD-MCNC: 8.4 G/DL
HGB BLD-MCNC: 8.5 G/DL
HGB BLD-MCNC: 8.5 G/DL
HGB BLD-MCNC: 8.6 G/DL
HGB BLD-MCNC: 8.7 G/DL
HGB BLD-MCNC: 8.7 G/DL
HGB BLD-MCNC: 8.8 G/DL
HGB BLD-MCNC: 8.9 G/DL
HGB BLD-MCNC: 9.1 G/DL
HGB BLD-MCNC: 9.2 G/DL
HGB BLD-MCNC: 9.2 G/DL
HGB BLD-MCNC: 9.4 G/DL
HGB UR QL STRIP: ABNORMAL
HGB UR QL STRIP: NEGATIVE
HIV1+2 IGG SERPL QL IA.RAPID: NEGATIVE
HU1 AB TITR SER: NEGATIVE TITER
HU2 AB TITR SER IF: NEGATIVE TITER
HU3 AB TITR SER: NEGATIVE TITER
HYALINE CASTS #/AREA URNS LPF: 0 /LPF
HYALINE CASTS #/AREA URNS LPF: 6 /LPF
HYPOCHROMIA BLD QL SMEAR: ABNORMAL
IGG CSF-MCNC: 8.9 MG/DL
IGG SERPL-MCNC: 1060 MG/DL
IGG SYNTH RATE SER+CSF CALC-MRATE: 0 MG/24 H
IGG/ALB CLEAR SER+CSF-RTO: 0.41
IGG/ALB CSF: 0.14 {RATIO}
IGG/ALB SER: 0.34 {RATIO}
IMMUNOLOGIST REVIEW: NORMAL
INFLUENZA A, MOLECULAR: NEGATIVE
INFLUENZA B, MOLECULAR: NEGATIVE
INR PPP: 1
INR PPP: 1.2
INR PPP: 1.3
INR PPP: 1.3
IP: 12
IP: 16
IP: 18
IP: 20
IP: 24
IT: 0.86
IT: 0.86
IT: 0.92
IT: 0.96
IT: 0.96
IT: 1
IT: 1.2
KETONES UR QL STRIP: ABNORMAL
KETONES UR QL STRIP: ABNORMAL
KETONES UR QL STRIP: NEGATIVE
LACTATE SERPL-SCNC: 1.1 MMOL/L
LACTATE SERPL-SCNC: 1.8 MMOL/L
LACTATE SERPL-SCNC: 2.6 MMOL/L
LDLC SERPL CALC-MCNC: 103.4 MG/DL
LDLC SERPL CALC-MCNC: 112.2 MG/DL
LEUKOCYTE ESTERASE UR QL STRIP: ABNORMAL
LEUKOCYTE ESTERASE UR QL STRIP: NEGATIVE
LEUKOCYTE ESTERASE UR QL STRIP: NEGATIVE
LGI1-IGG CBA: NEGATIVE
LYMPHOCYTES # BLD AUTO: 0.3 K/UL
LYMPHOCYTES # BLD AUTO: 0.3 K/UL
LYMPHOCYTES # BLD AUTO: 0.4 K/UL
LYMPHOCYTES # BLD AUTO: 0.5 K/UL
LYMPHOCYTES # BLD AUTO: 0.6 K/UL
LYMPHOCYTES # BLD AUTO: 0.6 K/UL
LYMPHOCYTES # BLD AUTO: 0.7 K/UL
LYMPHOCYTES # BLD AUTO: 0.8 K/UL
LYMPHOCYTES # BLD AUTO: 0.9 K/UL
LYMPHOCYTES # BLD AUTO: 1 K/UL
LYMPHOCYTES # BLD AUTO: 1.1 K/UL
LYMPHOCYTES # BLD AUTO: 1.2 K/UL
LYMPHOCYTES # BLD AUTO: 1.3 K/UL
LYMPHOCYTES # BLD AUTO: 1.4 K/UL
LYMPHOCYTES # BLD AUTO: 1.5 K/UL
LYMPHOCYTES # BLD AUTO: 1.6 K/UL
LYMPHOCYTES # BLD AUTO: 1.7 K/UL
LYMPHOCYTES # BLD AUTO: ABNORMAL K/UL
LYMPHOCYTES NFR BLD: 11.3 %
LYMPHOCYTES NFR BLD: 11.4 %
LYMPHOCYTES NFR BLD: 12 %
LYMPHOCYTES NFR BLD: 12.1 %
LYMPHOCYTES NFR BLD: 12.6 %
LYMPHOCYTES NFR BLD: 12.8 %
LYMPHOCYTES NFR BLD: 13.3 %
LYMPHOCYTES NFR BLD: 13.8 %
LYMPHOCYTES NFR BLD: 14.3 %
LYMPHOCYTES NFR BLD: 14.5 %
LYMPHOCYTES NFR BLD: 16.3 %
LYMPHOCYTES NFR BLD: 16.8 %
LYMPHOCYTES NFR BLD: 18.6 %
LYMPHOCYTES NFR BLD: 20 %
LYMPHOCYTES NFR BLD: 21.2 %
LYMPHOCYTES NFR BLD: 3.6 %
LYMPHOCYTES NFR BLD: 3.7 %
LYMPHOCYTES NFR BLD: 3.9 %
LYMPHOCYTES NFR BLD: 4.2 %
LYMPHOCYTES NFR BLD: 4.5 %
LYMPHOCYTES NFR BLD: 4.5 %
LYMPHOCYTES NFR BLD: 4.6 %
LYMPHOCYTES NFR BLD: 4.7 %
LYMPHOCYTES NFR BLD: 5.5 %
LYMPHOCYTES NFR BLD: 5.9 %
LYMPHOCYTES NFR BLD: 5.9 %
LYMPHOCYTES NFR BLD: 6 %
LYMPHOCYTES NFR BLD: 6.2 %
LYMPHOCYTES NFR BLD: 6.4 %
LYMPHOCYTES NFR BLD: 6.7 %
LYMPHOCYTES NFR BLD: 6.7 %
LYMPHOCYTES NFR BLD: 6.8 %
LYMPHOCYTES NFR BLD: 7.5 %
LYMPHOCYTES NFR BLD: 8.5 %
LYMPHOCYTES NFR BLD: 8.8 %
LYMPHOCYTES NFR BLD: 8.9 %
LYMPHOCYTES NFR BLD: 9.1 %
LYMPHOCYTES NFR BLD: 9.3 %
LYMPHOCYTES NFR BLD: 9.4 %
LYMPHOCYTES NFR BLD: 9.7 %
LYMPHOCYTES NFR CSF MANUAL: 36 %
LYMPHOCYTES NFR FLD MANUAL: 10 %
Lab: ABNORMAL TITER
MAGNESIUM SERPL-MCNC: 1.6 MG/DL
MAGNESIUM SERPL-MCNC: 1.6 MG/DL
MAGNESIUM SERPL-MCNC: 1.7 MG/DL
MAGNESIUM SERPL-MCNC: 1.8 MG/DL
MAGNESIUM SERPL-MCNC: 1.9 MG/DL
MAGNESIUM SERPL-MCNC: 2 MG/DL
MAGNESIUM SERPL-MCNC: 2.1 MG/DL
MAGNESIUM SERPL-MCNC: 2.1 MG/DL
MAGNESIUM SERPL-MCNC: 2.2 MG/DL
MAGNESIUM SERPL-MCNC: 2.3 MG/DL
MAGNESIUM SERPL-MCNC: 2.3 MG/DL
MAGNESIUM SERPL-MCNC: 2.4 MG/DL
MAGNESIUM SERPL-MCNC: 2.5 MG/DL
MAGNESIUM SERPL-MCNC: 2.7 MG/DL
MCH RBC QN AUTO: 25.2 PG
MCH RBC QN AUTO: 25.3 PG
MCH RBC QN AUTO: 25.3 PG
MCH RBC QN AUTO: 25.4 PG
MCH RBC QN AUTO: 25.5 PG
MCH RBC QN AUTO: 25.5 PG
MCH RBC QN AUTO: 25.6 PG
MCH RBC QN AUTO: 25.7 PG
MCH RBC QN AUTO: 25.8 PG
MCH RBC QN AUTO: 25.9 PG
MCH RBC QN AUTO: 26 PG
MCH RBC QN AUTO: 26.1 PG
MCH RBC QN AUTO: 26.2 PG
MCH RBC QN AUTO: 26.2 PG
MCH RBC QN AUTO: 26.5 PG
MCH RBC QN AUTO: 26.5 PG
MCH RBC QN AUTO: 26.8 PG
MCH RBC QN AUTO: 26.9 PG
MCH RBC QN AUTO: 27 PG
MCH RBC QN AUTO: 27.2 PG
MCH RBC QN AUTO: 27.3 PG
MCH RBC QN AUTO: 27.4 PG
MCH RBC QN AUTO: 27.4 PG
MCH RBC QN AUTO: 28.2 PG
MCHC RBC AUTO-ENTMCNC: 29.2 G/DL
MCHC RBC AUTO-ENTMCNC: 29.6 G/DL
MCHC RBC AUTO-ENTMCNC: 29.7 G/DL
MCHC RBC AUTO-ENTMCNC: 29.9 G/DL
MCHC RBC AUTO-ENTMCNC: 29.9 G/DL
MCHC RBC AUTO-ENTMCNC: 30 G/DL
MCHC RBC AUTO-ENTMCNC: 30.1 G/DL
MCHC RBC AUTO-ENTMCNC: 30.2 G/DL
MCHC RBC AUTO-ENTMCNC: 30.3 G/DL
MCHC RBC AUTO-ENTMCNC: 30.3 G/DL
MCHC RBC AUTO-ENTMCNC: 30.4 G/DL
MCHC RBC AUTO-ENTMCNC: 30.5 G/DL
MCHC RBC AUTO-ENTMCNC: 30.6 G/DL
MCHC RBC AUTO-ENTMCNC: 30.6 G/DL
MCHC RBC AUTO-ENTMCNC: 30.7 G/DL
MCHC RBC AUTO-ENTMCNC: 30.8 G/DL
MCHC RBC AUTO-ENTMCNC: 30.9 G/DL
MCHC RBC AUTO-ENTMCNC: 30.9 G/DL
MCHC RBC AUTO-ENTMCNC: 31.3 G/DL
MCHC RBC AUTO-ENTMCNC: 31.3 G/DL
MCHC RBC AUTO-ENTMCNC: 31.4 G/DL
MCHC RBC AUTO-ENTMCNC: 31.5 G/DL
MCHC RBC AUTO-ENTMCNC: 31.7 G/DL
MCHC RBC AUTO-ENTMCNC: 31.8 G/DL
MCHC RBC AUTO-ENTMCNC: 31.9 G/DL
MCHC RBC AUTO-ENTMCNC: 32.1 G/DL
MCHC RBC AUTO-ENTMCNC: 32.3 G/DL
MCHC RBC AUTO-ENTMCNC: 32.3 G/DL
MCHC RBC AUTO-ENTMCNC: 32.4 G/DL
MCHC RBC AUTO-ENTMCNC: 32.6 G/DL
MCHC RBC AUTO-ENTMCNC: 32.6 G/DL
MCV RBC AUTO: 80 FL
MCV RBC AUTO: 81 FL
MCV RBC AUTO: 82 FL
MCV RBC AUTO: 83 FL
MCV RBC AUTO: 84 FL
MCV RBC AUTO: 85 FL
MCV RBC AUTO: 86 FL
MCV RBC AUTO: 86 FL
MCV RBC AUTO: 87 FL
MCV RBC AUTO: 88 FL
MCV RBC AUTO: 89 FL
MCV RBC AUTO: 90 FL
METAMYELOCYTES NFR BLD MANUAL: 14 %
MICROSCOPIC COMMENT: ABNORMAL
MICROSCOPIC COMMENT: NORMAL
MITRAL VALVE MOBILITY: NORMAL
MITRAL VALVE REGURGITATION: ABNORMAL
MITRAL VALVE REGURGITATION: ABNORMAL
MODE: ABNORMAL
MONOCYTES # BLD AUTO: 0.1 K/UL
MONOCYTES # BLD AUTO: 0.2 K/UL
MONOCYTES # BLD AUTO: 0.3 K/UL
MONOCYTES # BLD AUTO: 0.4 K/UL
MONOCYTES # BLD AUTO: 0.5 K/UL
MONOCYTES # BLD AUTO: 0.6 K/UL
MONOCYTES # BLD AUTO: 0.6 K/UL
MONOCYTES # BLD AUTO: 0.7 K/UL
MONOCYTES # BLD AUTO: 0.7 K/UL
MONOCYTES # BLD AUTO: 0.8 K/UL
MONOCYTES # BLD AUTO: 0.9 K/UL
MONOCYTES # BLD AUTO: 1 K/UL
MONOCYTES # BLD AUTO: 1.1 K/UL
MONOCYTES # BLD AUTO: 1.2 K/UL
MONOCYTES # BLD AUTO: 1.2 K/UL
MONOCYTES # BLD AUTO: 1.3 K/UL
MONOCYTES # BLD AUTO: ABNORMAL K/UL
MONOCYTES NFR BLD: 0 %
MONOCYTES NFR BLD: 0.8 %
MONOCYTES NFR BLD: 0.9 %
MONOCYTES NFR BLD: 1.6 %
MONOCYTES NFR BLD: 1.7 %
MONOCYTES NFR BLD: 1.9 %
MONOCYTES NFR BLD: 12.1 %
MONOCYTES NFR BLD: 2.1 %
MONOCYTES NFR BLD: 2.6 %
MONOCYTES NFR BLD: 2.6 %
MONOCYTES NFR BLD: 2.7 %
MONOCYTES NFR BLD: 2.7 %
MONOCYTES NFR BLD: 3.2 %
MONOCYTES NFR BLD: 3.3 %
MONOCYTES NFR BLD: 3.5 %
MONOCYTES NFR BLD: 3.6 %
MONOCYTES NFR BLD: 3.7 %
MONOCYTES NFR BLD: 4.2 %
MONOCYTES NFR BLD: 4.4 %
MONOCYTES NFR BLD: 4.4 %
MONOCYTES NFR BLD: 4.5 %
MONOCYTES NFR BLD: 4.6 %
MONOCYTES NFR BLD: 4.6 %
MONOCYTES NFR BLD: 4.7 %
MONOCYTES NFR BLD: 4.8 %
MONOCYTES NFR BLD: 5 %
MONOCYTES NFR BLD: 5.6 %
MONOCYTES NFR BLD: 5.7 %
MONOCYTES NFR BLD: 6 %
MONOCYTES NFR BLD: 6 %
MONOCYTES NFR BLD: 6.4 %
MONOCYTES NFR BLD: 6.6 %
MONOCYTES NFR BLD: 6.7 %
MONOCYTES NFR BLD: 6.9 %
MONOCYTES NFR BLD: 6.9 %
MONOCYTES NFR BLD: 7.2 %
MONOCYTES NFR BLD: 7.2 %
MONOCYTES NFR BLD: 7.6 %
MONOCYTES NFR BLD: 7.9 %
MONOCYTES NFR BLD: 9.4 %
MONOS+MACROS NFR CSF MANUAL: 41 %
MONOS+MACROS NFR FLD MANUAL: 43 %
MYCOBACTERIUM SPEC QL CULT: NORMAL
MYELOCYTES NFR BLD MANUAL: 2 %
NACHR AB SER-SCNC: 0 NMOL/L
NEUTROPHILS # BLD AUTO: 10 K/UL
NEUTROPHILS # BLD AUTO: 10.2 K/UL
NEUTROPHILS # BLD AUTO: 10.3 K/UL
NEUTROPHILS # BLD AUTO: 10.5 K/UL
NEUTROPHILS # BLD AUTO: 11.1 K/UL
NEUTROPHILS # BLD AUTO: 12.2 K/UL
NEUTROPHILS # BLD AUTO: 12.8 K/UL
NEUTROPHILS # BLD AUTO: 13 K/UL
NEUTROPHILS # BLD AUTO: 13.3 K/UL
NEUTROPHILS # BLD AUTO: 14.1 K/UL
NEUTROPHILS # BLD AUTO: 14.1 K/UL
NEUTROPHILS # BLD AUTO: 14.9 K/UL
NEUTROPHILS # BLD AUTO: 16.3 K/UL
NEUTROPHILS # BLD AUTO: 16.5 K/UL
NEUTROPHILS # BLD AUTO: 16.7 K/UL
NEUTROPHILS # BLD AUTO: 3.8 K/UL
NEUTROPHILS # BLD AUTO: 3.8 K/UL
NEUTROPHILS # BLD AUTO: 4 K/UL
NEUTROPHILS # BLD AUTO: 4.2 K/UL
NEUTROPHILS # BLD AUTO: 5.2 K/UL
NEUTROPHILS # BLD AUTO: 5.4 K/UL
NEUTROPHILS # BLD AUTO: 5.5 K/UL
NEUTROPHILS # BLD AUTO: 5.5 K/UL
NEUTROPHILS # BLD AUTO: 5.8 K/UL
NEUTROPHILS # BLD AUTO: 6 K/UL
NEUTROPHILS # BLD AUTO: 6.5 K/UL
NEUTROPHILS # BLD AUTO: 6.6 K/UL
NEUTROPHILS # BLD AUTO: 6.6 K/UL
NEUTROPHILS # BLD AUTO: 6.7 K/UL
NEUTROPHILS # BLD AUTO: 6.8 K/UL
NEUTROPHILS # BLD AUTO: 6.9 K/UL
NEUTROPHILS # BLD AUTO: 7.5 K/UL
NEUTROPHILS # BLD AUTO: 8 K/UL
NEUTROPHILS # BLD AUTO: 8.4 K/UL
NEUTROPHILS # BLD AUTO: 8.7 K/UL
NEUTROPHILS # BLD AUTO: 9.6 K/UL
NEUTROPHILS # BLD AUTO: 9.9 K/UL
NEUTROPHILS NFR BLD: 66.9 %
NEUTROPHILS NFR BLD: 67.9 %
NEUTROPHILS NFR BLD: 68.9 %
NEUTROPHILS NFR BLD: 69.3 %
NEUTROPHILS NFR BLD: 69.8 %
NEUTROPHILS NFR BLD: 74 %
NEUTROPHILS NFR BLD: 74.6 %
NEUTROPHILS NFR BLD: 74.7 %
NEUTROPHILS NFR BLD: 74.9 %
NEUTROPHILS NFR BLD: 77.8 %
NEUTROPHILS NFR BLD: 78.1 %
NEUTROPHILS NFR BLD: 8 %
NEUTROPHILS NFR BLD: 80.9 %
NEUTROPHILS NFR BLD: 82.7 %
NEUTROPHILS NFR BLD: 83.2 %
NEUTROPHILS NFR BLD: 83.4 %
NEUTROPHILS NFR BLD: 83.8 %
NEUTROPHILS NFR BLD: 84 %
NEUTROPHILS NFR BLD: 84.5 %
NEUTROPHILS NFR BLD: 84.5 %
NEUTROPHILS NFR BLD: 85.6 %
NEUTROPHILS NFR BLD: 86.2 %
NEUTROPHILS NFR BLD: 86.6 %
NEUTROPHILS NFR BLD: 86.8 %
NEUTROPHILS NFR BLD: 87.5 %
NEUTROPHILS NFR BLD: 87.7 %
NEUTROPHILS NFR BLD: 87.9 %
NEUTROPHILS NFR BLD: 88 %
NEUTROPHILS NFR BLD: 88.1 %
NEUTROPHILS NFR BLD: 88.4 %
NEUTROPHILS NFR BLD: 88.5 %
NEUTROPHILS NFR BLD: 88.9 %
NEUTROPHILS NFR BLD: 88.9 %
NEUTROPHILS NFR BLD: 90.2 %
NEUTROPHILS NFR BLD: 90.5 %
NEUTROPHILS NFR BLD: 90.6 %
NEUTROPHILS NFR BLD: 91.2 %
NEUTROPHILS NFR BLD: 91.7 %
NEUTROPHILS NFR BLD: 91.9 %
NEUTROPHILS NFR BLD: 92.4 %
NEUTROPHILS NFR BLD: 92.9 %
NEUTROPHILS NFR BLD: 95.3 %
NEUTROPHILS NFR CSF MANUAL: 23 %
NEUTROPHILS NFR FLD MANUAL: 44 %
NEUTS BAND NFR BLD MANUAL: 69 %
NITRITE UR QL STRIP: NEGATIVE
NITRITE UR QL STRIP: POSITIVE
NITRITE UR QL STRIP: POSITIVE
NMDA-R AB CBA, SERUM: NEGATIVE
NMO/AQP4 FACS,CSF: NORMAL
NMO/AQP4 FACS,S: NORMAL
NMO/AQP4 FACS,S: NORMAL
NONHDLC SERPL-MCNC: 122 MG/DL
NONHDLC SERPL-MCNC: 131 MG/DL
NUM UNITS TRANS PACKED RBC: NORMAL
OLIGOCLONAL BANDS CSF ELPH-IMP: 0 BANDS
OLIGOCLONAL BANDS CSF ELPH-IMP: 6 BANDS
OLIGOCLONAL BANDS SERPL: 6 BANDS
OVALOCYTES BLD QL SMEAR: ABNORMAL
PATH INTERP FLD-IMP: NORMAL
PATH INTERP FLD-IMP: NORMAL
PATH REV BLD -IMP: NORMAL
PAVAL REFLEX TEST ADDED: NORMAL
PCA-1 AB TITR SER: NEGATIVE TITER
PCA-2 AB TITR SER: NEGATIVE TITER
PCA-TR AB TITR SER: NEGATIVE TITER
PCO2 BLDA: 117.6 MMHG (ref 35–45)
PCO2 BLDA: 32.2 MMHG (ref 35–45)
PCO2 BLDA: 33.5 MMHG (ref 35–45)
PCO2 BLDA: 34.4 MMHG (ref 35–45)
PCO2 BLDA: 34.4 MMHG (ref 35–45)
PCO2 BLDA: 36.3 MMHG (ref 35–45)
PCO2 BLDA: 36.5 MMHG (ref 35–45)
PCO2 BLDA: 37.4 MMHG (ref 35–45)
PCO2 BLDA: 38.3 MMHG (ref 35–45)
PCO2 BLDA: 43 MMHG (ref 35–45)
PCO2 BLDA: 43 MMHG (ref 35–45)
PCO2 BLDA: 44.7 MMHG (ref 35–45)
PCO2 BLDA: 45.5 MMHG (ref 35–45)
PCO2 BLDA: 47.2 MMHG (ref 35–45)
PCO2 BLDA: 47.5 MMHG (ref 35–45)
PCO2 BLDA: 48.5 MMHG (ref 35–45)
PCO2 BLDA: 50.5 MMHG (ref 35–45)
PCO2 BLDA: 51.7 MMHG (ref 35–45)
PCO2 BLDA: 52.8 MMHG (ref 35–45)
PCO2 BLDA: 53.2 MMHG (ref 35–45)
PCO2 BLDA: 56.2 MMHG (ref 35–45)
PCO2 BLDA: 56.3 MMHG (ref 35–45)
PCO2 BLDA: 56.4 MMHG (ref 35–45)
PCO2 BLDA: 56.5 MMHG (ref 35–45)
PCO2 BLDA: 57.3 MMHG (ref 35–45)
PCO2 BLDA: 59.4 MMHG (ref 35–45)
PCO2 BLDA: 62.5 MMHG (ref 35–45)
PCO2 BLDA: 68.3 MMHG (ref 35–45)
PCO2 BLDA: 68.6 MMHG (ref 35–45)
PCO2 BLDA: 69.4 MMHG (ref 35–45)
PCO2 BLDA: 69.9 MMHG (ref 35–45)
PEEP: 10
PEEP: 12
PEEP: 15
PEEP: 18
PEEP: 5
PEEP: 7
PEEP: 7
PH SMN: 7.13 [PH] (ref 7.35–7.45)
PH SMN: 7.29 [PH] (ref 7.35–7.45)
PH SMN: 7.35 [PH] (ref 7.35–7.45)
PH SMN: 7.35 [PH] (ref 7.35–7.45)
PH SMN: 7.38 [PH] (ref 7.35–7.45)
PH SMN: 7.4 [PH] (ref 7.35–7.45)
PH SMN: 7.41 [PH] (ref 7.35–7.45)
PH SMN: 7.42 [PH] (ref 7.35–7.45)
PH SMN: 7.42 [PH] (ref 7.35–7.45)
PH SMN: 7.43 [PH] (ref 7.35–7.45)
PH SMN: 7.44 [PH] (ref 7.35–7.45)
PH SMN: 7.45 [PH] (ref 7.35–7.45)
PH SMN: 7.46 [PH] (ref 7.35–7.45)
PH SMN: 7.47 [PH] (ref 7.35–7.45)
PH SMN: 7.49 [PH] (ref 7.35–7.45)
PH SMN: 7.5 [PH] (ref 7.35–7.45)
PH SMN: 7.51 [PH] (ref 7.35–7.45)
PH SMN: 7.57 [PH] (ref 7.35–7.45)
PH SMN: 7.57 [PH] (ref 7.35–7.45)
PH SMN: 7.61 [PH] (ref 7.35–7.45)
PH SMN: 7.61 [PH] (ref 7.35–7.45)
PH UR STRIP: 5 [PH] (ref 5–8)
PH UR STRIP: 5 [PH] (ref 5–8)
PH UR STRIP: 6 [PH] (ref 5–8)
PH UR STRIP: 7 [PH] (ref 5–8)
PHOSPHATE SERPL-MCNC: 1.4 MG/DL
PHOSPHATE SERPL-MCNC: 1.8 MG/DL
PHOSPHATE SERPL-MCNC: 1.9 MG/DL
PHOSPHATE SERPL-MCNC: 2.1 MG/DL
PHOSPHATE SERPL-MCNC: 2.2 MG/DL
PHOSPHATE SERPL-MCNC: 2.3 MG/DL
PHOSPHATE SERPL-MCNC: 2.4 MG/DL
PHOSPHATE SERPL-MCNC: 2.4 MG/DL
PHOSPHATE SERPL-MCNC: 2.5 MG/DL
PHOSPHATE SERPL-MCNC: 2.6 MG/DL
PHOSPHATE SERPL-MCNC: 2.7 MG/DL
PHOSPHATE SERPL-MCNC: 2.8 MG/DL
PHOSPHATE SERPL-MCNC: 2.8 MG/DL
PHOSPHATE SERPL-MCNC: 2.9 MG/DL
PHOSPHATE SERPL-MCNC: 3 MG/DL
PHOSPHATE SERPL-MCNC: 3 MG/DL
PHOSPHATE SERPL-MCNC: 3.2 MG/DL
PHOSPHATE SERPL-MCNC: 3.6 MG/DL
PHOSPHATE SERPL-MCNC: 3.7 MG/DL
PHOSPHATE SERPL-MCNC: 3.9 MG/DL
PHOSPHATE SERPL-MCNC: 3.9 MG/DL
PHOSPHATE SERPL-MCNC: 4.1 MG/DL
PIP: 12
PIP: 17
PIP: 30
PIP: 31
PLATELET # BLD AUTO: 100 K/UL
PLATELET # BLD AUTO: 104 K/UL
PLATELET # BLD AUTO: 104 K/UL
PLATELET # BLD AUTO: 105 K/UL
PLATELET # BLD AUTO: 112 K/UL
PLATELET # BLD AUTO: 124 K/UL
PLATELET # BLD AUTO: 125 K/UL
PLATELET # BLD AUTO: 128 K/UL
PLATELET # BLD AUTO: 142 K/UL
PLATELET # BLD AUTO: 144 K/UL
PLATELET # BLD AUTO: 155 K/UL
PLATELET # BLD AUTO: 156 K/UL
PLATELET # BLD AUTO: 175 K/UL
PLATELET # BLD AUTO: 203 K/UL
PLATELET # BLD AUTO: 248 K/UL
PLATELET # BLD AUTO: 255 K/UL
PLATELET # BLD AUTO: 262 K/UL
PLATELET # BLD AUTO: 271 K/UL
PLATELET # BLD AUTO: 288 K/UL
PLATELET # BLD AUTO: 298 K/UL
PLATELET # BLD AUTO: 306 K/UL
PLATELET # BLD AUTO: 310 K/UL
PLATELET # BLD AUTO: 319 K/UL
PLATELET # BLD AUTO: 338 K/UL
PLATELET # BLD AUTO: 344 K/UL
PLATELET # BLD AUTO: 359 K/UL
PLATELET # BLD AUTO: 371 K/UL
PLATELET # BLD AUTO: 38 K/UL
PLATELET # BLD AUTO: 386 K/UL
PLATELET # BLD AUTO: 389 K/UL
PLATELET # BLD AUTO: 405 K/UL
PLATELET # BLD AUTO: 422 K/UL
PLATELET # BLD AUTO: 439 K/UL
PLATELET # BLD AUTO: 454 K/UL
PLATELET # BLD AUTO: 51 K/UL
PLATELET # BLD AUTO: 64 K/UL
PLATELET # BLD AUTO: 69 K/UL
PLATELET # BLD AUTO: 71 K/UL
PLATELET # BLD AUTO: 74 K/UL
PLATELET # BLD AUTO: 92 K/UL
PLATELET # BLD AUTO: 92 K/UL
PLATELET # BLD AUTO: 95 K/UL
PLATELET BLD QL SMEAR: ABNORMAL
PMV BLD AUTO: 10 FL
PMV BLD AUTO: 10.2 FL
PMV BLD AUTO: 10.3 FL
PMV BLD AUTO: 10.6 FL
PMV BLD AUTO: 10.8 FL
PMV BLD AUTO: 10.8 FL
PMV BLD AUTO: 10.9 FL
PMV BLD AUTO: 11.1 FL
PMV BLD AUTO: 11.2 FL
PMV BLD AUTO: 11.3 FL
PMV BLD AUTO: 11.3 FL
PMV BLD AUTO: 11.4 FL
PMV BLD AUTO: 11.6 FL
PMV BLD AUTO: 11.6 FL
PMV BLD AUTO: 11.7 FL
PMV BLD AUTO: 8.8 FL
PMV BLD AUTO: 8.9 FL
PMV BLD AUTO: 9 FL
PMV BLD AUTO: 9 FL
PMV BLD AUTO: 9.1 FL
PMV BLD AUTO: 9.2 FL
PMV BLD AUTO: 9.2 FL
PMV BLD AUTO: 9.3 FL
PMV BLD AUTO: 9.4 FL
PMV BLD AUTO: 9.6 FL
PMV BLD AUTO: 9.6 FL
PMV BLD AUTO: 9.7 FL
PMV BLD AUTO: 9.7 FL
PMV BLD AUTO: 9.8 FL
PMV BLD AUTO: ABNORMAL FL
PO2 BLDA: 101 MMHG (ref 80–100)
PO2 BLDA: 104 MMHG (ref 80–100)
PO2 BLDA: 105 MMHG (ref 80–100)
PO2 BLDA: 114 MMHG (ref 80–100)
PO2 BLDA: 114 MMHG (ref 80–100)
PO2 BLDA: 121 MMHG (ref 80–100)
PO2 BLDA: 121 MMHG (ref 80–100)
PO2 BLDA: 131 MMHG (ref 80–100)
PO2 BLDA: 144 MMHG (ref 80–100)
PO2 BLDA: 148 MMHG (ref 80–100)
PO2 BLDA: 175 MMHG (ref 80–100)
PO2 BLDA: 187 MMHG (ref 80–100)
PO2 BLDA: 211 MMHG (ref 80–100)
PO2 BLDA: 42 MMHG (ref 80–100)
PO2 BLDA: 48 MMHG (ref 80–100)
PO2 BLDA: 53 MMHG (ref 80–100)
PO2 BLDA: 56 MMHG (ref 80–100)
PO2 BLDA: 57 MMHG (ref 80–100)
PO2 BLDA: 66 MMHG (ref 80–100)
PO2 BLDA: 68 MMHG (ref 80–100)
PO2 BLDA: 69 MMHG (ref 80–100)
PO2 BLDA: 72 MMHG (ref 80–100)
PO2 BLDA: 74 MMHG (ref 80–100)
PO2 BLDA: 75 MMHG (ref 80–100)
PO2 BLDA: 79 MMHG (ref 80–100)
PO2 BLDA: 80 MMHG (ref 80–100)
PO2 BLDA: 86 MMHG (ref 80–100)
PO2 BLDA: 88 MMHG (ref 80–100)
PO2 BLDA: 90 MMHG (ref 80–100)
PO2 BLDA: 95 MMHG (ref 80–100)
PO2 BLDA: 96 MMHG (ref 80–100)
POC BE: -1 MMOL/L
POC BE: 0 MMOL/L
POC BE: 1 MMOL/L
POC BE: 1 MMOL/L
POC BE: 10 MMOL/L
POC BE: 10 MMOL/L
POC BE: 11 MMOL/L
POC BE: 11 MMOL/L
POC BE: 12 MMOL/L
POC BE: 13 MMOL/L
POC BE: 13 MMOL/L
POC BE: 14 MMOL/L
POC BE: 14 MMOL/L
POC BE: 15 MMOL/L
POC BE: 16 MMOL/L
POC BE: 17 MMOL/L
POC BE: 19 MMOL/L
POC BE: 2 MMOL/L
POC BE: 6 MMOL/L
POC BE: 6 MMOL/L
POC BE: 7 MMOL/L
POC BE: 7 MMOL/L
POC BE: 8 MMOL/L
POC BE: 8 MMOL/L
POC SATURATED O2: 100 % (ref 95–100)
POC SATURATED O2: 70 % (ref 95–100)
POC SATURATED O2: 85 % (ref 95–100)
POC SATURATED O2: 89 % (ref 95–100)
POC SATURATED O2: 90 % (ref 95–100)
POC SATURATED O2: 91 % (ref 95–100)
POC SATURATED O2: 91 % (ref 95–100)
POC SATURATED O2: 94 % (ref 95–100)
POC SATURATED O2: 95 % (ref 95–100)
POC SATURATED O2: 96 % (ref 95–100)
POC SATURATED O2: 96 % (ref 95–100)
POC SATURATED O2: 97 % (ref 95–100)
POC SATURATED O2: 98 % (ref 95–100)
POC SATURATED O2: 99 % (ref 95–100)
POCT GLUCOSE: 100 MG/DL (ref 70–110)
POCT GLUCOSE: 100 MG/DL (ref 70–110)
POCT GLUCOSE: 101 MG/DL (ref 70–110)
POCT GLUCOSE: 101 MG/DL (ref 70–110)
POCT GLUCOSE: 102 MG/DL (ref 70–110)
POCT GLUCOSE: 103 MG/DL (ref 70–110)
POCT GLUCOSE: 104 MG/DL (ref 70–110)
POCT GLUCOSE: 104 MG/DL (ref 70–110)
POCT GLUCOSE: 105 MG/DL (ref 70–110)
POCT GLUCOSE: 105 MG/DL (ref 70–110)
POCT GLUCOSE: 106 MG/DL (ref 70–110)
POCT GLUCOSE: 107 MG/DL (ref 70–110)
POCT GLUCOSE: 108 MG/DL (ref 70–110)
POCT GLUCOSE: 108 MG/DL (ref 70–110)
POCT GLUCOSE: 109 MG/DL (ref 70–110)
POCT GLUCOSE: 109 MG/DL (ref 70–110)
POCT GLUCOSE: 110 MG/DL (ref 70–110)
POCT GLUCOSE: 110 MG/DL (ref 70–110)
POCT GLUCOSE: 112 MG/DL (ref 70–110)
POCT GLUCOSE: 113 MG/DL (ref 70–110)
POCT GLUCOSE: 113 MG/DL (ref 70–110)
POCT GLUCOSE: 115 MG/DL (ref 70–110)
POCT GLUCOSE: 116 MG/DL (ref 70–110)
POCT GLUCOSE: 117 MG/DL (ref 70–110)
POCT GLUCOSE: 118 MG/DL (ref 70–110)
POCT GLUCOSE: 121 MG/DL (ref 70–110)
POCT GLUCOSE: 122 MG/DL (ref 70–110)
POCT GLUCOSE: 122 MG/DL (ref 70–110)
POCT GLUCOSE: 123 MG/DL (ref 70–110)
POCT GLUCOSE: 125 MG/DL (ref 70–110)
POCT GLUCOSE: 127 MG/DL (ref 70–110)
POCT GLUCOSE: 128 MG/DL (ref 70–110)
POCT GLUCOSE: 129 MG/DL (ref 70–110)
POCT GLUCOSE: 130 MG/DL (ref 70–110)
POCT GLUCOSE: 131 MG/DL (ref 70–110)
POCT GLUCOSE: 132 MG/DL (ref 70–110)
POCT GLUCOSE: 133 MG/DL (ref 70–110)
POCT GLUCOSE: 134 MG/DL (ref 70–110)
POCT GLUCOSE: 134 MG/DL (ref 70–110)
POCT GLUCOSE: 136 MG/DL (ref 70–110)
POCT GLUCOSE: 136 MG/DL (ref 70–110)
POCT GLUCOSE: 137 MG/DL (ref 70–110)
POCT GLUCOSE: 137 MG/DL (ref 70–110)
POCT GLUCOSE: 138 MG/DL (ref 70–110)
POCT GLUCOSE: 138 MG/DL (ref 70–110)
POCT GLUCOSE: 139 MG/DL (ref 70–110)
POCT GLUCOSE: 139 MG/DL (ref 70–110)
POCT GLUCOSE: 140 MG/DL (ref 70–110)
POCT GLUCOSE: 142 MG/DL (ref 70–110)
POCT GLUCOSE: 143 MG/DL (ref 70–110)
POCT GLUCOSE: 144 MG/DL (ref 70–110)
POCT GLUCOSE: 153 MG/DL (ref 70–110)
POCT GLUCOSE: 158 MG/DL (ref 70–110)
POCT GLUCOSE: 162 MG/DL (ref 70–110)
POCT GLUCOSE: 168 MG/DL (ref 70–110)
POCT GLUCOSE: 171 MG/DL (ref 70–110)
POCT GLUCOSE: 200 MG/DL (ref 70–110)
POCT GLUCOSE: 202 MG/DL (ref 70–110)
POCT GLUCOSE: 209 MG/DL (ref 70–110)
POCT GLUCOSE: 259 MG/DL (ref 70–110)
POCT GLUCOSE: 49 MG/DL (ref 70–110)
POCT GLUCOSE: 54 MG/DL (ref 70–110)
POCT GLUCOSE: 60 MG/DL (ref 70–110)
POCT GLUCOSE: 65 MG/DL (ref 70–110)
POCT GLUCOSE: 68 MG/DL (ref 70–110)
POCT GLUCOSE: 69 MG/DL (ref 70–110)
POCT GLUCOSE: 70 MG/DL (ref 70–110)
POCT GLUCOSE: 70 MG/DL (ref 70–110)
POCT GLUCOSE: 71 MG/DL (ref 70–110)
POCT GLUCOSE: 75 MG/DL (ref 70–110)
POCT GLUCOSE: 76 MG/DL (ref 70–110)
POCT GLUCOSE: 79 MG/DL (ref 70–110)
POCT GLUCOSE: 79 MG/DL (ref 70–110)
POCT GLUCOSE: 80 MG/DL (ref 70–110)
POCT GLUCOSE: 82 MG/DL (ref 70–110)
POCT GLUCOSE: 83 MG/DL (ref 70–110)
POCT GLUCOSE: 83 MG/DL (ref 70–110)
POCT GLUCOSE: 84 MG/DL (ref 70–110)
POCT GLUCOSE: 84 MG/DL (ref 70–110)
POCT GLUCOSE: 85 MG/DL (ref 70–110)
POCT GLUCOSE: 87 MG/DL (ref 70–110)
POCT GLUCOSE: 88 MG/DL (ref 70–110)
POCT GLUCOSE: 88 MG/DL (ref 70–110)
POCT GLUCOSE: 89 MG/DL (ref 70–110)
POCT GLUCOSE: 89 MG/DL (ref 70–110)
POCT GLUCOSE: 90 MG/DL (ref 70–110)
POCT GLUCOSE: 91 MG/DL (ref 70–110)
POCT GLUCOSE: 91 MG/DL (ref 70–110)
POCT GLUCOSE: 92 MG/DL (ref 70–110)
POCT GLUCOSE: 92 MG/DL (ref 70–110)
POCT GLUCOSE: 93 MG/DL (ref 70–110)
POCT GLUCOSE: 93 MG/DL (ref 70–110)
POCT GLUCOSE: 94 MG/DL (ref 70–110)
POCT GLUCOSE: 95 MG/DL (ref 70–110)
POCT GLUCOSE: 96 MG/DL (ref 70–110)
POCT GLUCOSE: 97 MG/DL (ref 70–110)
POCT GLUCOSE: 98 MG/DL (ref 70–110)
POCT GLUCOSE: 99 MG/DL (ref 70–110)
POIKILOCYTOSIS BLD QL SMEAR: ABNORMAL
POIKILOCYTOSIS BLD QL SMEAR: SLIGHT
POLYCHROMASIA BLD QL SMEAR: ABNORMAL
POTASSIUM SERPL-SCNC: 3.1 MMOL/L
POTASSIUM SERPL-SCNC: 3.2 MMOL/L
POTASSIUM SERPL-SCNC: 3.2 MMOL/L
POTASSIUM SERPL-SCNC: 3.3 MMOL/L
POTASSIUM SERPL-SCNC: 3.4 MMOL/L
POTASSIUM SERPL-SCNC: 3.5 MMOL/L
POTASSIUM SERPL-SCNC: 3.6 MMOL/L
POTASSIUM SERPL-SCNC: 3.6 MMOL/L
POTASSIUM SERPL-SCNC: 3.7 MMOL/L
POTASSIUM SERPL-SCNC: 3.8 MMOL/L
POTASSIUM SERPL-SCNC: 3.9 MMOL/L
POTASSIUM SERPL-SCNC: 4 MMOL/L
POTASSIUM SERPL-SCNC: 4.1 MMOL/L
POTASSIUM SERPL-SCNC: 4.2 MMOL/L
POTASSIUM SERPL-SCNC: 4.2 MMOL/L
POTASSIUM SERPL-SCNC: 4.3 MMOL/L
POTASSIUM SERPL-SCNC: 4.4 MMOL/L
POTASSIUM SERPL-SCNC: 4.5 MMOL/L
POTASSIUM SERPL-SCNC: 4.7 MMOL/L
POTASSIUM SERPL-SCNC: 4.7 MMOL/L
POTASSIUM SERPL-SCNC: 4.9 MMOL/L
POTASSIUM SERPL-SCNC: 5.2 MMOL/L
PROT CSF-MCNC: 123 MG/DL
PROT FLD-MCNC: 2.3 G/DL
PROT SERPL-MCNC: 4.8 G/DL
PROT SERPL-MCNC: 4.9 G/DL
PROT SERPL-MCNC: 4.9 G/DL
PROT SERPL-MCNC: 5.5 G/DL
PROT SERPL-MCNC: 5.6 G/DL
PROT SERPL-MCNC: 5.7 G/DL
PROT SERPL-MCNC: 5.7 G/DL
PROT SERPL-MCNC: 6.1 G/DL
PROT SERPL-MCNC: 6.2 G/DL
PROT SERPL-MCNC: 6.6 G/DL
PROT SERPL-MCNC: 6.6 G/DL
PROT SERPL-MCNC: 6.8 G/DL
PROT SERPL-MCNC: 7.3 G/DL
PROT SERPL-MCNC: 7.5 G/DL
PROT UR QL STRIP: ABNORMAL
PROT UR QL STRIP: ABNORMAL
PROT UR QL STRIP: NEGATIVE
PROTHROMBIN TIME: 10.6 SEC
PROTHROMBIN TIME: 12.2 SEC
PROTHROMBIN TIME: 13.1 SEC
PROTHROMBIN TIME: 13.7 SEC
RBC # BLD AUTO: 2.38 M/UL
RBC # BLD AUTO: 2.81 M/UL
RBC # BLD AUTO: 2.99 M/UL
RBC # BLD AUTO: 3.02 M/UL
RBC # BLD AUTO: 3.07 M/UL
RBC # BLD AUTO: 3.09 M/UL
RBC # BLD AUTO: 3.12 M/UL
RBC # BLD AUTO: 3.13 M/UL
RBC # BLD AUTO: 3.14 M/UL
RBC # BLD AUTO: 3.15 M/UL
RBC # BLD AUTO: 3.16 M/UL
RBC # BLD AUTO: 3.16 M/UL
RBC # BLD AUTO: 3.17 M/UL
RBC # BLD AUTO: 3.19 M/UL
RBC # BLD AUTO: 3.2 M/UL
RBC # BLD AUTO: 3.22 M/UL
RBC # BLD AUTO: 3.25 M/UL
RBC # BLD AUTO: 3.28 M/UL
RBC # BLD AUTO: 3.37 M/UL
RBC # BLD AUTO: 3.47 M/UL
RBC # BLD AUTO: 3.47 M/UL
RBC # BLD AUTO: 3.65 M/UL
RBC # BLD AUTO: 3.92 M/UL
RBC # BLD AUTO: 3.94 M/UL
RBC # BLD AUTO: 3.95 M/UL
RBC # BLD AUTO: 3.98 M/UL
RBC # BLD AUTO: 4.12 M/UL
RBC # BLD AUTO: 4.18 M/UL
RBC # BLD AUTO: 4.2 M/UL
RBC # BLD AUTO: 4.23 M/UL
RBC # BLD AUTO: 4.24 M/UL
RBC # BLD AUTO: 4.26 M/UL
RBC # BLD AUTO: 4.27 M/UL
RBC # BLD AUTO: 4.29 M/UL
RBC # BLD AUTO: 4.29 M/UL
RBC # BLD AUTO: 4.31 M/UL
RBC # BLD AUTO: 4.36 M/UL
RBC # BLD AUTO: 4.39 M/UL
RBC # BLD AUTO: 4.42 M/UL
RBC # BLD AUTO: 4.42 M/UL
RBC # BLD AUTO: 4.68 M/UL
RBC # BLD AUTO: 4.68 M/UL
RBC # CSF: 31 /CU MM
RBC #/AREA URNS HPF: 0 /HPF (ref 0–4)
RBC #/AREA URNS HPF: 0 /HPF (ref 0–4)
RBC #/AREA URNS HPF: 1 /HPF (ref 0–4)
RBC #/AREA URNS HPF: 2 /HPF (ref 0–4)
RBC #/AREA URNS HPF: >100 /HPF (ref 0–4)
RETIRED EF AND QEF - SEE NOTES: 35 (ref 55–65)
RETIRED EF AND QEF - SEE NOTES: 40 (ref 55–65)
SAMPLE: ABNORMAL
SCHISTOCYTES BLD QL SMEAR: PRESENT
SITE: ABNORMAL
SODIUM SERPL-SCNC: 132 MMOL/L
SODIUM SERPL-SCNC: 134 MMOL/L
SODIUM SERPL-SCNC: 134 MMOL/L
SODIUM SERPL-SCNC: 135 MMOL/L
SODIUM SERPL-SCNC: 141 MMOL/L
SODIUM SERPL-SCNC: 142 MMOL/L
SODIUM SERPL-SCNC: 143 MMOL/L
SODIUM SERPL-SCNC: 144 MMOL/L
SODIUM SERPL-SCNC: 145 MMOL/L
SODIUM SERPL-SCNC: 146 MMOL/L
SODIUM SERPL-SCNC: 147 MMOL/L
SODIUM SERPL-SCNC: 149 MMOL/L
SODIUM SERPL-SCNC: 152 MMOL/L
SODIUM SERPL-SCNC: 152 MMOL/L
SP GR UR STRIP: 1.01 (ref 1–1.03)
SP GR UR STRIP: 1.02 (ref 1–1.03)
SP GR UR STRIP: 1.02 (ref 1–1.03)
SP GR UR STRIP: >=1.03 (ref 1–1.03)
SPECIMEN SOURCE: ABNORMAL
SPECIMEN SOURCE: NORMAL
SPECIMEN VOL CSF: 4 ML
SPHEROCYTES BLD QL SMEAR: ABNORMAL
SQUAMOUS #/AREA URNS HPF: 0 /HPF
SQUAMOUS #/AREA URNS HPF: 2 /HPF
SQUAMOUS #/AREA URNS HPF: 25 /HPF
SQUAMOUS #/AREA URNS HPF: 48 /HPF
STOMATOCYTES BLD QL SMEAR: PRESENT
STRIA MUS AB TITR SER: NEGATIVE TITER
T PALLIDUM AB SER QL IF: NORMAL
T VAGINALIS GENITAL QL WET PREP: ABNORMAL
T3FREE SERPL-MCNC: 1 PG/ML
T3FREE SERPL-MCNC: 1.2 PG/ML
T4 FREE SERPL-MCNC: 0.89 NG/DL
T4 FREE SERPL-MCNC: 1.24 NG/DL
TARGETS BLD QL SMEAR: ABNORMAL
THYROPEROXIDASE IGG SERPL-ACNC: <6 IU/ML
TRICUSPID VALVE REGURGITATION: ABNORMAL
TRICUSPID VALVE REGURGITATION: ABNORMAL
TRIGL SERPL-MCNC: 93 MG/DL
TRIGL SERPL-MCNC: 94 MG/DL
TROPONIN I SERPL DL<=0.01 NG/ML-MCNC: 0.03 NG/ML
TROPONIN I SERPL DL<=0.01 NG/ML-MCNC: 0.04 NG/ML
TROPONIN I SERPL DL<=0.01 NG/ML-MCNC: 0.05 NG/ML
TROPONIN I SERPL DL<=0.01 NG/ML-MCNC: 0.06 NG/ML
TROPONIN I SERPL DL<=0.01 NG/ML-MCNC: 0.06 NG/ML
TROPONIN I SERPL DL<=0.01 NG/ML-MCNC: 0.07 NG/ML
TROPONIN I SERPL DL<=0.01 NG/ML-MCNC: 0.09 NG/ML
TROPONIN I SERPL DL<=0.01 NG/ML-MCNC: 0.17 NG/ML
TSH SERPL DL<=0.005 MIU/L-ACNC: 0.27 UIU/ML
TSH SERPL DL<=0.005 MIU/L-ACNC: 0.67 UIU/ML
TSH SERPL DL<=0.005 MIU/L-ACNC: 1.26 UIU/ML
TSH SERPL DL<=0.005 MIU/L-ACNC: 1.26 UIU/ML
URN SPEC COLLECT METH UR: ABNORMAL
URN SPEC COLLECT METH UR: NORMAL
UROBILINOGEN UR STRIP-ACNC: 1 EU/DL
UROBILINOGEN UR STRIP-ACNC: NEGATIVE EU/DL
VANCOMYCIN SERPL-MCNC: 12.9 UG/ML
VANCOMYCIN SERPL-MCNC: 16.8 UG/ML
VANCOMYCIN SERPL-MCNC: 17.5 UG/ML
VANCOMYCIN SERPL-MCNC: 17.5 UG/ML
VANCOMYCIN SERPL-MCNC: 19.2 UG/ML
VANCOMYCIN SERPL-MCNC: 19.3 UG/ML
VANCOMYCIN SERPL-MCNC: 20.2 UG/ML
VANCOMYCIN SERPL-MCNC: 20.8 UG/ML
VANCOMYCIN SERPL-MCNC: 21.7 UG/ML
VANCOMYCIN SERPL-MCNC: 24.4 UG/ML
VANCOMYCIN SERPL-MCNC: 25.7 UG/ML
VANCOMYCIN SERPL-MCNC: 28.6 UG/ML
VANCOMYCIN TROUGH SERPL-MCNC: 13.6 UG/ML
VANCOMYCIN TROUGH SERPL-MCNC: 36.4 UG/ML
VGCC-N BIND AB SER-SCNC: 0 NMOL/L
VGCC-N BIND AB SER-SCNC: NORMAL PMOL/L
VGCC-P/Q BIND AB SER-SCNC: 0 NMOL/L
VGKC AB SER-SCNC: 0 NMOL/L
VIT B12 SERPL-MCNC: 1441 PG/ML
VT: 350
VT: 350
VT: 380
VT: 400
VT: 450
VT: 500
VT: 500
WBC # BLD AUTO: 10.03 K/UL
WBC # BLD AUTO: 10.92 K/UL
WBC # BLD AUTO: 10.93 K/UL
WBC # BLD AUTO: 11.34 K/UL
WBC # BLD AUTO: 11.45 K/UL
WBC # BLD AUTO: 11.51 K/UL
WBC # BLD AUTO: 11.53 K/UL
WBC # BLD AUTO: 11.79 K/UL
WBC # BLD AUTO: 12.14 K/UL
WBC # BLD AUTO: 12.68 K/UL
WBC # BLD AUTO: 13.17 K/UL
WBC # BLD AUTO: 13.24 K/UL
WBC # BLD AUTO: 14.55 K/UL
WBC # BLD AUTO: 15.16 K/UL
WBC # BLD AUTO: 15.2 K/UL
WBC # BLD AUTO: 16.03 K/UL
WBC # BLD AUTO: 16.03 K/UL
WBC # BLD AUTO: 17.1 K/UL
WBC # BLD AUTO: 18.75 K/UL
WBC # BLD AUTO: 18.81 K/UL
WBC # BLD AUTO: 19.1 K/UL
WBC # BLD AUTO: 4.2 K/UL
WBC # BLD AUTO: 4.4 K/UL
WBC # BLD AUTO: 4.81 K/UL
WBC # BLD AUTO: 5.16 K/UL
WBC # BLD AUTO: 5.21 K/UL
WBC # BLD AUTO: 5.76 K/UL
WBC # BLD AUTO: 5.96 K/UL
WBC # BLD AUTO: 6.74 K/UL
WBC # BLD AUTO: 6.93 K/UL
WBC # BLD AUTO: 7.45 K/UL
WBC # BLD AUTO: 7.68 K/UL
WBC # BLD AUTO: 7.78 K/UL
WBC # BLD AUTO: 7.9 K/UL
WBC # BLD AUTO: 7.96 K/UL
WBC # BLD AUTO: 7.97 K/UL
WBC # BLD AUTO: 8.43 K/UL
WBC # BLD AUTO: 8.75 K/UL
WBC # BLD AUTO: 8.94 K/UL
WBC # BLD AUTO: 9.06 K/UL
WBC # BLD AUTO: 9.77 K/UL
WBC # BLD AUTO: 9.92 K/UL
WBC # CSF: 38 /CU MM
WBC # FLD: 1391 /CU MM
WBC #/AREA URNS HPF: 1 /HPF (ref 0–5)
WBC #/AREA URNS HPF: 23 /HPF (ref 0–5)
WBC #/AREA URNS HPF: 4 /HPF (ref 0–5)
WBC #/AREA URNS HPF: >100 /HPF (ref 0–5)
WBC #/AREA URNS HPF: >100 /HPF (ref 0–5)
WBC #/AREA VAG WET PREP: ABNORMAL
WBC CLUMPS URNS QL MICRO: ABNORMAL
WNV RNA CSF QL NAA+PROBE: NEGATIVE
YEAST GENITAL QL WET PREP: ABNORMAL
YEAST URNS QL MICRO: ABNORMAL

## 2018-01-01 PROCEDURE — 83519 RIA NONANTIBODY: CPT | Mod: 59

## 2018-01-01 PROCEDURE — 80053 COMPREHEN METABOLIC PANEL: CPT

## 2018-01-01 PROCEDURE — 94668 MNPJ CHEST WALL SBSQ: CPT

## 2018-01-01 PROCEDURE — 83735 ASSAY OF MAGNESIUM: CPT

## 2018-01-01 PROCEDURE — 25000003 PHARM REV CODE 250: Performed by: PATHOLOGY

## 2018-01-01 PROCEDURE — 25000003 PHARM REV CODE 250: Performed by: NURSE PRACTITIONER

## 2018-01-01 PROCEDURE — 99291 CRITICAL CARE FIRST HOUR: CPT | Mod: ,,, | Performed by: INTERNAL MEDICINE

## 2018-01-01 PROCEDURE — 80202 ASSAY OF VANCOMYCIN: CPT

## 2018-01-01 PROCEDURE — 99232 SBSQ HOSP IP/OBS MODERATE 35: CPT | Mod: ,,, | Performed by: INTERNAL MEDICINE

## 2018-01-01 PROCEDURE — 20000000 HC ICU ROOM

## 2018-01-01 PROCEDURE — 83036 HEMOGLOBIN GLYCOSYLATED A1C: CPT

## 2018-01-01 PROCEDURE — 94003 VENT MGMT INPAT SUBQ DAY: CPT

## 2018-01-01 PROCEDURE — 6A551Z3 PHERESIS OF PLASMA, MULTIPLE: ICD-10-PCS | Performed by: PATHOLOGY

## 2018-01-01 PROCEDURE — 84443 ASSAY THYROID STIM HORMONE: CPT

## 2018-01-01 PROCEDURE — 93005 ELECTROCARDIOGRAM TRACING: CPT

## 2018-01-01 PROCEDURE — S0028 INJECTION, FAMOTIDINE, 20 MG: HCPCS | Performed by: NURSE PRACTITIONER

## 2018-01-01 PROCEDURE — 99231 SBSQ HOSP IP/OBS SF/LOW 25: CPT | Mod: ,,, | Performed by: SURGERY

## 2018-01-01 PROCEDURE — 94640 AIRWAY INHALATION TREATMENT: CPT

## 2018-01-01 PROCEDURE — 80048 BASIC METABOLIC PNL TOTAL CA: CPT

## 2018-01-01 PROCEDURE — 80202 ASSAY OF VANCOMYCIN: CPT | Mod: 91

## 2018-01-01 PROCEDURE — 87088 URINE BACTERIA CULTURE: CPT

## 2018-01-01 PROCEDURE — 97530 THERAPEUTIC ACTIVITIES: CPT

## 2018-01-01 PROCEDURE — 85025 COMPLETE CBC W/AUTO DIFF WBC: CPT

## 2018-01-01 PROCEDURE — 36415 COLL VENOUS BLD VENIPUNCTURE: CPT

## 2018-01-01 PROCEDURE — 99900035 HC TECH TIME PER 15 MIN (STAT)

## 2018-01-01 PROCEDURE — 63600175 PHARM REV CODE 636 W HCPCS: Performed by: NURSE PRACTITIONER

## 2018-01-01 PROCEDURE — 85379 FIBRIN DEGRADATION QUANT: CPT

## 2018-01-01 PROCEDURE — 31502 CHANGE OF WINDPIPE AIRWAY: CPT | Mod: ,,, | Performed by: SURGERY

## 2018-01-01 PROCEDURE — 36514 APHERESIS PLASMA: CPT

## 2018-01-01 PROCEDURE — 97802 MEDICAL NUTRITION INDIV IN: CPT

## 2018-01-01 PROCEDURE — 89051 BODY FLUID CELL COUNT: CPT

## 2018-01-01 PROCEDURE — 0JH63XZ INSERTION OF TUNNELED VASCULAR ACCESS DEVICE INTO CHEST SUBCUTANEOUS TISSUE AND FASCIA, PERCUTANEOUS APPROACH: ICD-10-PCS | Performed by: SURGERY

## 2018-01-01 PROCEDURE — 87040 BLOOD CULTURE FOR BACTERIA: CPT

## 2018-01-01 PROCEDURE — 97167 OT EVAL HIGH COMPLEX 60 MIN: CPT

## 2018-01-01 PROCEDURE — 25000003 PHARM REV CODE 250: Performed by: INTERNAL MEDICINE

## 2018-01-01 PROCEDURE — 96365 THER/PROPH/DIAG IV INF INIT: CPT

## 2018-01-01 PROCEDURE — 25000003 PHARM REV CODE 250: Performed by: PSYCHIATRY & NEUROLOGY

## 2018-01-01 PROCEDURE — 63600175 PHARM REV CODE 636 W HCPCS: Performed by: INTERNAL MEDICINE

## 2018-01-01 PROCEDURE — 63600175 PHARM REV CODE 636 W HCPCS: Performed by: PATHOLOGY

## 2018-01-01 PROCEDURE — 99900026 HC AIRWAY MAINTENANCE (STAT)

## 2018-01-01 PROCEDURE — 99291 CRITICAL CARE FIRST HOUR: CPT | Mod: 25,,, | Performed by: INTERNAL MEDICINE

## 2018-01-01 PROCEDURE — 93010 ELECTROCARDIOGRAM REPORT: CPT | Mod: ,,, | Performed by: INTERNAL MEDICINE

## 2018-01-01 PROCEDURE — 82550 ASSAY OF CK (CPK): CPT

## 2018-01-01 PROCEDURE — 85651 RBC SED RATE NONAUTOMATED: CPT

## 2018-01-01 PROCEDURE — 27200966 HC CLOSED SUCTION SYSTEM

## 2018-01-01 PROCEDURE — 84100 ASSAY OF PHOSPHORUS: CPT

## 2018-01-01 PROCEDURE — 99285 EMERGENCY DEPT VISIT HI MDM: CPT | Mod: 25

## 2018-01-01 PROCEDURE — G8981 BODY POS CURRENT STATUS: HCPCS | Mod: CM

## 2018-01-01 PROCEDURE — 21400001 HC TELEMETRY ROOM

## 2018-01-01 PROCEDURE — 99291 CRITICAL CARE FIRST HOUR: CPT | Mod: ,,, | Performed by: NURSE PRACTITIONER

## 2018-01-01 PROCEDURE — 82803 BLOOD GASES ANY COMBINATION: CPT

## 2018-01-01 PROCEDURE — 85060 BLOOD SMEAR INTERPRETATION: CPT | Mod: ,,, | Performed by: PATHOLOGY

## 2018-01-01 PROCEDURE — 25500020 PHARM REV CODE 255: Performed by: INTERNAL MEDICINE

## 2018-01-01 PROCEDURE — G8988 SELF CARE GOAL STATUS: HCPCS | Mod: CL

## 2018-01-01 PROCEDURE — 99291 CRITICAL CARE FIRST HOUR: CPT | Mod: ,,, | Performed by: PSYCHIATRY & NEUROLOGY

## 2018-01-01 PROCEDURE — 83880 ASSAY OF NATRIURETIC PEPTIDE: CPT

## 2018-01-01 PROCEDURE — 25000003 PHARM REV CODE 250: Performed by: ANESTHESIOLOGY

## 2018-01-01 PROCEDURE — 36600 WITHDRAWAL OF ARTERIAL BLOOD: CPT

## 2018-01-01 PROCEDURE — 63600175 PHARM REV CODE 636 W HCPCS: Performed by: FAMILY MEDICINE

## 2018-01-01 PROCEDURE — C9113 INJ PANTOPRAZOLE SODIUM, VIA: HCPCS | Performed by: INTERNAL MEDICINE

## 2018-01-01 PROCEDURE — 99900025 HC BRONCHOSCOPY-ASST (STAT)

## 2018-01-01 PROCEDURE — 87086 URINE CULTURE/COLONY COUNT: CPT

## 2018-01-01 PROCEDURE — 86706 HEP B SURFACE ANTIBODY: CPT

## 2018-01-01 PROCEDURE — P9045 ALBUMIN (HUMAN), 5%, 250 ML: HCPCS | Mod: JG | Performed by: PATHOLOGY

## 2018-01-01 PROCEDURE — 82607 VITAMIN B-12: CPT

## 2018-01-01 PROCEDURE — 84481 FREE ASSAY (FT-3): CPT

## 2018-01-01 PROCEDURE — 96365 THER/PROPH/DIAG IV INF INIT: CPT | Mod: 59

## 2018-01-01 PROCEDURE — 25000242 PHARM REV CODE 250 ALT 637 W/ HCPCS: Performed by: NURSE PRACTITIONER

## 2018-01-01 PROCEDURE — 36514 APHERESIS PLASMA: CPT | Mod: ,,, | Performed by: PATHOLOGY

## 2018-01-01 PROCEDURE — 36430 TRANSFUSION BLD/BLD COMPNT: CPT

## 2018-01-01 PROCEDURE — 87116 MYCOBACTERIA CULTURE: CPT

## 2018-01-01 PROCEDURE — 27201112

## 2018-01-01 PROCEDURE — 25000003 PHARM REV CODE 250: Performed by: EMERGENCY MEDICINE

## 2018-01-01 PROCEDURE — 84439 ASSAY OF FREE THYROXINE: CPT

## 2018-01-01 PROCEDURE — 92610 EVALUATE SWALLOWING FUNCTION: CPT

## 2018-01-01 PROCEDURE — 96361 HYDRATE IV INFUSION ADD-ON: CPT

## 2018-01-01 PROCEDURE — 87070 CULTURE OTHR SPECIMN AEROBIC: CPT

## 2018-01-01 PROCEDURE — 25000003 PHARM REV CODE 250: Performed by: PHYSICIAN ASSISTANT

## 2018-01-01 PROCEDURE — 82962 GLUCOSE BLOOD TEST: CPT

## 2018-01-01 PROCEDURE — 25000242 PHARM REV CODE 250 ALT 637 W/ HCPCS: Performed by: INTERNAL MEDICINE

## 2018-01-01 PROCEDURE — 85730 THROMBOPLASTIN TIME PARTIAL: CPT

## 2018-01-01 PROCEDURE — 009U3ZX DRAINAGE OF SPINAL CANAL, PERCUTANEOUS APPROACH, DIAGNOSTIC: ICD-10-PCS | Performed by: RADIOLOGY

## 2018-01-01 PROCEDURE — 96366 THER/PROPH/DIAG IV INF ADDON: CPT | Mod: 59

## 2018-01-01 PROCEDURE — 85610 PROTHROMBIN TIME: CPT

## 2018-01-01 PROCEDURE — 25000003 PHARM REV CODE 250: Performed by: FAMILY MEDICINE

## 2018-01-01 PROCEDURE — 84157 ASSAY OF PROTEIN OTHER: CPT

## 2018-01-01 PROCEDURE — 88112 CYTOPATH CELL ENHANCE TECH: CPT | Performed by: PATHOLOGY

## 2018-01-01 PROCEDURE — 02HV33Z INSERTION OF INFUSION DEVICE INTO SUPERIOR VENA CAVA, PERCUTANEOUS APPROACH: ICD-10-PCS | Performed by: INTERNAL MEDICINE

## 2018-01-01 PROCEDURE — 99233 SBSQ HOSP IP/OBS HIGH 50: CPT | Mod: ,,, | Performed by: INTERNAL MEDICINE

## 2018-01-01 PROCEDURE — 80061 LIPID PANEL: CPT

## 2018-01-01 PROCEDURE — 11000001 HC ACUTE MED/SURG PRIVATE ROOM

## 2018-01-01 PROCEDURE — 99212 OFFICE O/P EST SF 10 MIN: CPT | Mod: PBBFAC,PO | Performed by: OPHTHALMOLOGY

## 2018-01-01 PROCEDURE — 87186 SC STD MICRODIL/AGAR DIL: CPT

## 2018-01-01 PROCEDURE — 87186 SC STD MICRODIL/AGAR DIL: CPT | Mod: 59

## 2018-01-01 PROCEDURE — P9016 RBC LEUKOCYTES REDUCED: HCPCS

## 2018-01-01 PROCEDURE — 93306 TTE W/DOPPLER COMPLETE: CPT

## 2018-01-01 PROCEDURE — 86692 HEPATITIS DELTA AGENT ANTBDY: CPT

## 2018-01-01 PROCEDURE — 99233 SBSQ HOSP IP/OBS HIGH 50: CPT | Mod: ,,, | Performed by: PSYCHIATRY & NEUROLOGY

## 2018-01-01 PROCEDURE — 83605 ASSAY OF LACTIC ACID: CPT

## 2018-01-01 PROCEDURE — G8982 BODY POS GOAL STATUS: HCPCS | Mod: CL

## 2018-01-01 PROCEDURE — 0W993ZZ DRAINAGE OF RIGHT PLEURAL CAVITY, PERCUTANEOUS APPROACH: ICD-10-PCS | Performed by: INTERNAL MEDICINE

## 2018-01-01 PROCEDURE — 87015 SPECIMEN INFECT AGNT CONCNTJ: CPT

## 2018-01-01 PROCEDURE — 94660 CPAP INITIATION&MGMT: CPT

## 2018-01-01 PROCEDURE — 94002 VENT MGMT INPAT INIT DAY: CPT

## 2018-01-01 PROCEDURE — G8987 SELF CARE CURRENT STATUS: HCPCS | Mod: CM

## 2018-01-01 PROCEDURE — 96374 THER/PROPH/DIAG INJ IV PUSH: CPT

## 2018-01-01 PROCEDURE — 25000003 PHARM REV CODE 250

## 2018-01-01 PROCEDURE — 84484 ASSAY OF TROPONIN QUANT: CPT

## 2018-01-01 PROCEDURE — 5A1955Z RESPIRATORY VENTILATION, GREATER THAN 96 CONSECUTIVE HOURS: ICD-10-PCS | Performed by: INTERNAL MEDICINE

## 2018-01-01 PROCEDURE — 97535 SELF CARE MNGMENT TRAINING: CPT

## 2018-01-01 PROCEDURE — 86255 FLUORESCENT ANTIBODY SCREEN: CPT

## 2018-01-01 PROCEDURE — 84484 ASSAY OF TROPONIN QUANT: CPT | Mod: 91

## 2018-01-01 PROCEDURE — 86850 RBC ANTIBODY SCREEN: CPT

## 2018-01-01 PROCEDURE — 83520 IMMUNOASSAY QUANT NOS NONAB: CPT

## 2018-01-01 PROCEDURE — 36556 INSERT NON-TUNNEL CV CATH: CPT | Mod: ,,, | Performed by: NURSE PRACTITIONER

## 2018-01-01 PROCEDURE — 82945 GLUCOSE OTHER FLUID: CPT

## 2018-01-01 PROCEDURE — 97803 MED NUTRITION INDIV SUBSEQ: CPT

## 2018-01-01 PROCEDURE — 94761 N-INVAS EAR/PLS OXIMETRY MLT: CPT

## 2018-01-01 PROCEDURE — 81000 URINALYSIS NONAUTO W/SCOPE: CPT

## 2018-01-01 PROCEDURE — 99223 1ST HOSP IP/OBS HIGH 75: CPT | Mod: ,,, | Performed by: INTERNAL MEDICINE

## 2018-01-01 PROCEDURE — 86255 FLUORESCENT ANTIBODY SCREEN: CPT | Mod: 59

## 2018-01-01 PROCEDURE — 63600175 PHARM REV CODE 636 W HCPCS: Performed by: EMERGENCY MEDICINE

## 2018-01-01 PROCEDURE — 05HY33Z INSERTION OF INFUSION DEVICE INTO UPPER VEIN, PERCUTANEOUS APPROACH: ICD-10-PCS | Performed by: FAMILY MEDICINE

## 2018-01-01 PROCEDURE — 25000003 PHARM REV CODE 250: Performed by: NURSE ANESTHETIST, CERTIFIED REGISTERED

## 2018-01-01 PROCEDURE — 93306 TTE W/DOPPLER COMPLETE: CPT | Mod: 26,,, | Performed by: INTERNAL MEDICINE

## 2018-01-01 PROCEDURE — 94010 BREATHING CAPACITY TEST: CPT

## 2018-01-01 PROCEDURE — 82533 TOTAL CORTISOL: CPT

## 2018-01-01 PROCEDURE — S0171 BUMETANIDE 0.5 MG: HCPCS | Performed by: EMERGENCY MEDICINE

## 2018-01-01 PROCEDURE — S0073 INJECTION, AZTREONAM, 500 MG: HCPCS | Performed by: NURSE PRACTITIONER

## 2018-01-01 PROCEDURE — 86920 COMPATIBILITY TEST SPIN: CPT

## 2018-01-01 PROCEDURE — 99221 1ST HOSP IP/OBS SF/LOW 40: CPT | Mod: ,,, | Performed by: SURGERY

## 2018-01-01 PROCEDURE — 0B113F4 BYPASS TRACHEA TO CUTANEOUS WITH TRACHEOSTOMY DEVICE, PERCUTANEOUS APPROACH: ICD-10-PCS | Performed by: SURGERY

## 2018-01-01 PROCEDURE — 82306 VITAMIN D 25 HYDROXY: CPT

## 2018-01-01 PROCEDURE — 87502 INFLUENZA DNA AMP PROBE: CPT

## 2018-01-01 PROCEDURE — 99291 CRITICAL CARE FIRST HOUR: CPT | Mod: 25

## 2018-01-01 PROCEDURE — 99999 PR PBB SHADOW E&M-EST. PATIENT-LVL II: CPT | Mod: PBBFAC,,, | Performed by: OPHTHALMOLOGY

## 2018-01-01 PROCEDURE — 36569 INSJ PICC 5 YR+ W/O IMAGING: CPT

## 2018-01-01 PROCEDURE — 27800903 OPTIME MED/SURG SUP & DEVICES OTHER IMPLANTS: Performed by: SURGERY

## 2018-01-01 PROCEDURE — 5A1955Z RESPIRATORY VENTILATION, GREATER THAN 96 CONSECUTIVE HOURS: ICD-10-PCS | Performed by: FAMILY MEDICINE

## 2018-01-01 PROCEDURE — C1751 CATH, INF, PER/CENT/MIDLINE: HCPCS

## 2018-01-01 PROCEDURE — 80500 PR  LAB PATHOLOGY CONSULT-LTD: CPT | Mod: ,,, | Performed by: PATHOLOGY

## 2018-01-01 PROCEDURE — 37000009 HC ANESTHESIA EA ADD 15 MINS: Performed by: SURGERY

## 2018-01-01 PROCEDURE — 93010 ELECTROCARDIOGRAM REPORT: CPT | Mod: 76,,, | Performed by: INTERNAL MEDICINE

## 2018-01-01 PROCEDURE — G8982 BODY POS GOAL STATUS: HCPCS | Mod: CM

## 2018-01-01 PROCEDURE — 43246 EGD PLACE GASTROSTOMY TUBE: CPT | Mod: 51,,, | Performed by: SURGERY

## 2018-01-01 PROCEDURE — 86376 MICROSOMAL ANTIBODY EACH: CPT

## 2018-01-01 PROCEDURE — 0BH18EZ INSERTION OF ENDOTRACHEAL AIRWAY INTO TRACHEA, VIA NATURAL OR ARTIFICIAL OPENING ENDOSCOPIC: ICD-10-PCS | Performed by: INTERNAL MEDICINE

## 2018-01-01 PROCEDURE — 99292 CRITICAL CARE ADDL 30 MIN: CPT | Mod: ,,, | Performed by: NURSE PRACTITIONER

## 2018-01-01 PROCEDURE — 85384 FIBRINOGEN ACTIVITY: CPT

## 2018-01-01 PROCEDURE — 36000707: Performed by: SURGERY

## 2018-01-01 PROCEDURE — 0BC78ZZ EXTIRPATION OF MATTER FROM LEFT MAIN BRONCHUS, VIA NATURAL OR ARTIFICIAL OPENING ENDOSCOPIC: ICD-10-PCS | Performed by: INTERNAL MEDICINE

## 2018-01-01 PROCEDURE — 82784 ASSAY IGA/IGD/IGG/IGM EACH: CPT

## 2018-01-01 PROCEDURE — 87205 SMEAR GRAM STAIN: CPT

## 2018-01-01 PROCEDURE — 0BJ08ZZ INSPECTION OF TRACHEOBRONCHIAL TREE, VIA NATURAL OR ARTIFICIAL OPENING ENDOSCOPIC: ICD-10-PCS | Performed by: SURGERY

## 2018-01-01 PROCEDURE — 37000008 HC ANESTHESIA 1ST 15 MINUTES: Performed by: SURGERY

## 2018-01-01 PROCEDURE — 88305 TISSUE EXAM BY PATHOLOGIST: CPT | Mod: 26,,, | Performed by: PATHOLOGY

## 2018-01-01 PROCEDURE — 87040 BLOOD CULTURE FOR BACTERIA: CPT | Mod: 59

## 2018-01-01 PROCEDURE — G0378 HOSPITAL OBSERVATION PER HR: HCPCS

## 2018-01-01 PROCEDURE — 87324 CLOSTRIDIUM AG IA: CPT

## 2018-01-01 PROCEDURE — P9612 CATHETERIZE FOR URINE SPEC: HCPCS

## 2018-01-01 PROCEDURE — 31500 INSERT EMERGENCY AIRWAY: CPT | Mod: ,,, | Performed by: INTERNAL MEDICINE

## 2018-01-01 PROCEDURE — 87206 SMEAR FLUORESCENT/ACID STAI: CPT

## 2018-01-01 PROCEDURE — 27000190 HC CPAP FULL FACE MASK W/VALVE

## 2018-01-01 PROCEDURE — 31600 PLANNED TRACHEOSTOMY: CPT | Mod: ,,, | Performed by: SURGERY

## 2018-01-01 PROCEDURE — 31622 DX BRONCHOSCOPE/WASH: CPT

## 2018-01-01 PROCEDURE — 96366 THER/PROPH/DIAG IV INF ADDON: CPT

## 2018-01-01 PROCEDURE — 85007 BL SMEAR W/DIFF WBC COUNT: CPT

## 2018-01-01 PROCEDURE — 88112 CYTOPATH CELL ENHANCE TECH: CPT | Mod: 26,,, | Performed by: PATHOLOGY

## 2018-01-01 PROCEDURE — 87077 CULTURE AEROBIC IDENTIFY: CPT

## 2018-01-01 PROCEDURE — 99233 SBSQ HOSP IP/OBS HIGH 50: CPT | Mod: 25,,, | Performed by: SURGERY

## 2018-01-01 PROCEDURE — 63600175 PHARM REV CODE 636 W HCPCS: Mod: JG | Performed by: PATHOLOGY

## 2018-01-01 PROCEDURE — 87106 FUNGI IDENTIFICATION YEAST: CPT

## 2018-01-01 PROCEDURE — 87798 DETECT AGENT NOS DNA AMP: CPT

## 2018-01-01 PROCEDURE — 82553 CREATINE MB FRACTION: CPT

## 2018-01-01 PROCEDURE — 95816 EEG AWAKE AND DROWSY: CPT

## 2018-01-01 PROCEDURE — G8981 BODY POS CURRENT STATUS: HCPCS | Mod: CN

## 2018-01-01 PROCEDURE — 27201423 OPTIME MED/SURG SUP & DEVICES STERILE SUPPLY: Performed by: SURGERY

## 2018-01-01 PROCEDURE — 96375 TX/PRO/DX INJ NEW DRUG ADDON: CPT

## 2018-01-01 PROCEDURE — 96367 TX/PROPH/DG ADDL SEQ IV INF: CPT

## 2018-01-01 PROCEDURE — 31622 DX BRONCHOSCOPE/WASH: CPT | Mod: ,,, | Performed by: INTERNAL MEDICINE

## 2018-01-01 PROCEDURE — 99292 CRITICAL CARE ADDL 30 MIN: CPT | Mod: 25,,, | Performed by: INTERNAL MEDICINE

## 2018-01-01 PROCEDURE — 25000003 PHARM REV CODE 250: Performed by: STUDENT IN AN ORGANIZED HEALTH CARE EDUCATION/TRAINING PROGRAM

## 2018-01-01 PROCEDURE — 92014 COMPRE OPH EXAM EST PT 1/>: CPT | Mod: S$PBB,,, | Performed by: OPHTHALMOLOGY

## 2018-01-01 PROCEDURE — 36000706: Performed by: SURGERY

## 2018-01-01 PROCEDURE — 80048 BASIC METABOLIC PNL TOTAL CA: CPT | Mod: 91

## 2018-01-01 PROCEDURE — 51702 INSERT TEMP BLADDER CATH: CPT

## 2018-01-01 PROCEDURE — 99223 1ST HOSP IP/OBS HIGH 75: CPT | Mod: ,,, | Performed by: PSYCHIATRY & NEUROLOGY

## 2018-01-01 PROCEDURE — S0171 BUMETANIDE 0.5 MG: HCPCS | Performed by: NURSE PRACTITIONER

## 2018-01-01 PROCEDURE — 0B21XFZ CHANGE TRACHEOSTOMY DEVICE IN TRACHEA, EXTERNAL APPROACH: ICD-10-PCS | Performed by: SURGERY

## 2018-01-01 PROCEDURE — 27000221 HC OXYGEN, UP TO 24 HOURS

## 2018-01-01 PROCEDURE — P9045 ALBUMIN (HUMAN), 5%, 250 ML: HCPCS | Mod: JG | Performed by: INTERNAL MEDICINE

## 2018-01-01 PROCEDURE — 97162 PT EVAL MOD COMPLEX 30 MIN: CPT

## 2018-01-01 PROCEDURE — 88305 TISSUE EXAM BY PATHOLOGIST: CPT | Performed by: PATHOLOGY

## 2018-01-01 PROCEDURE — 86256 FLUORESCENT ANTIBODY TITER: CPT

## 2018-01-01 PROCEDURE — 82140 ASSAY OF AMMONIA: CPT

## 2018-01-01 PROCEDURE — 36620 INSERTION CATHETER ARTERY: CPT

## 2018-01-01 PROCEDURE — 80076 HEPATIC FUNCTION PANEL: CPT

## 2018-01-01 PROCEDURE — 25000242 PHARM REV CODE 250 ALT 637 W/ HCPCS: Performed by: FAMILY MEDICINE

## 2018-01-01 PROCEDURE — 36620 INSERTION CATHETER ARTERY: CPT | Mod: ,,, | Performed by: NURSE PRACTITIONER

## 2018-01-01 PROCEDURE — 97163 PT EVAL HIGH COMPLEX 45 MIN: CPT

## 2018-01-01 PROCEDURE — 37799 UNLISTED PX VASCULAR SURGERY: CPT

## 2018-01-01 PROCEDURE — 99024 POSTOP FOLLOW-UP VISIT: CPT | Mod: POP,,, | Performed by: SURGERY

## 2018-01-01 PROCEDURE — 32555 ASPIRATE PLEURA W/ IMAGING: CPT

## 2018-01-01 PROCEDURE — 36556 INSERT NON-TUNNEL CV CATH: CPT

## 2018-01-01 PROCEDURE — 81003 URINALYSIS AUTO W/O SCOPE: CPT

## 2018-01-01 PROCEDURE — 99499 UNLISTED E&M SERVICE: CPT | Mod: ,,, | Performed by: PSYCHIATRY & NEUROLOGY

## 2018-01-01 PROCEDURE — 86790 VIRUS ANTIBODY NOS: CPT

## 2018-01-01 PROCEDURE — 0DH63UZ INSERTION OF FEEDING DEVICE INTO STOMACH, PERCUTANEOUS APPROACH: ICD-10-PCS | Performed by: SURGERY

## 2018-01-01 PROCEDURE — 86141 C-REACTIVE PROTEIN HS: CPT

## 2018-01-01 PROCEDURE — 84132 ASSAY OF SERUM POTASSIUM: CPT

## 2018-01-01 PROCEDURE — G8987 SELF CARE CURRENT STATUS: HCPCS | Mod: CN

## 2018-01-01 PROCEDURE — A4216 STERILE WATER/SALINE, 10 ML: HCPCS | Performed by: ANESTHESIOLOGY

## 2018-01-01 PROCEDURE — 99222 1ST HOSP IP/OBS MODERATE 55: CPT | Mod: ,,, | Performed by: PHYSICIAN ASSISTANT

## 2018-01-01 PROCEDURE — 96375 TX/PRO/DX INJ NEW DRUG ADDON: CPT | Mod: 59

## 2018-01-01 PROCEDURE — 86780 TREPONEMA PALLIDUM: CPT

## 2018-01-01 PROCEDURE — 32555 ASPIRATE PLEURA W/ IMAGING: CPT | Mod: RT,,, | Performed by: INTERNAL MEDICINE

## 2018-01-01 PROCEDURE — 63600175 PHARM REV CODE 636 W HCPCS: Performed by: NURSE ANESTHETIST, CERTIFIED REGISTERED

## 2018-01-01 PROCEDURE — 02HV33Z INSERTION OF INFUSION DEVICE INTO SUPERIOR VENA CAVA, PERCUTANEOUS APPROACH: ICD-10-PCS | Performed by: SURGERY

## 2018-01-01 PROCEDURE — 87102 FUNGUS ISOLATION CULTURE: CPT

## 2018-01-01 PROCEDURE — 36592 COLLECT BLOOD FROM PICC: CPT

## 2018-01-01 PROCEDURE — 86703 HIV-1/HIV-2 1 RESULT ANTBDY: CPT

## 2018-01-01 PROCEDURE — 31720 CLEARANCE OF AIRWAYS: CPT

## 2018-01-01 PROCEDURE — 85027 COMPLETE CBC AUTOMATED: CPT

## 2018-01-01 PROCEDURE — 87210 SMEAR WET MOUNT SALINE/INK: CPT

## 2018-01-01 PROCEDURE — 63600175 PHARM REV CODE 636 W HCPCS

## 2018-01-01 PROCEDURE — 86803 HEPATITIS C AB TEST: CPT

## 2018-01-01 PROCEDURE — 36556 INSERT NON-TUNNEL CV CATH: CPT | Mod: 52

## 2018-01-01 PROCEDURE — 94667 MNPJ CHEST WALL 1ST: CPT

## 2018-01-01 DEVICE — TRAY TRACH BLUE RHINO 8: Type: IMPLANTABLE DEVICE | Site: NECK | Status: FUNCTIONAL

## 2018-01-01 RX ORDER — FUROSEMIDE 10 MG/ML
40 INJECTION INTRAMUSCULAR; INTRAVENOUS 2 TIMES DAILY
Status: DISCONTINUED | OUTPATIENT
Start: 2018-01-01 | End: 2018-01-01

## 2018-01-01 RX ORDER — DILTIAZEM HYDROCHLORIDE 5 MG/ML
10 INJECTION INTRAVENOUS
Status: DISCONTINUED | OUTPATIENT
Start: 2018-01-01 | End: 2018-01-01

## 2018-01-01 RX ORDER — NOREPINEPHRINE BITARTRATE/D5W 4MG/250ML
PLASTIC BAG, INJECTION (ML) INTRAVENOUS
Status: COMPLETED
Start: 2018-01-01 | End: 2018-01-01

## 2018-01-01 RX ORDER — IPRATROPIUM BROMIDE AND ALBUTEROL SULFATE 2.5; .5 MG/3ML; MG/3ML
3 SOLUTION RESPIRATORY (INHALATION)
Status: COMPLETED | OUTPATIENT
Start: 2018-01-01 | End: 2018-01-01

## 2018-01-01 RX ORDER — MEPERIDINE HYDROCHLORIDE 50 MG/ML
12.5 INJECTION INTRAMUSCULAR; INTRAVENOUS; SUBCUTANEOUS ONCE AS NEEDED
Status: ACTIVE | OUTPATIENT
Start: 2018-01-01 | End: 2018-01-01

## 2018-01-01 RX ORDER — FUROSEMIDE 10 MG/ML
40 INJECTION INTRAMUSCULAR; INTRAVENOUS 2 TIMES DAILY
Status: DISCONTINUED | OUTPATIENT
Start: 2018-01-01 | End: 2018-01-01 | Stop reason: HOSPADM

## 2018-01-01 RX ORDER — ACETAMINOPHEN 650 MG/1
650 SUPPOSITORY RECTAL EVERY 6 HOURS PRN
Status: DISCONTINUED | OUTPATIENT
Start: 2018-01-01 | End: 2018-01-01 | Stop reason: SDUPTHER

## 2018-01-01 RX ORDER — GLUCAGON 1 MG
1 KIT INJECTION
Status: DISCONTINUED | OUTPATIENT
Start: 2018-01-01 | End: 2018-01-01 | Stop reason: HOSPADM

## 2018-01-01 RX ORDER — MIDODRINE HYDROCHLORIDE 5 MG/1
5 TABLET ORAL 3 TIMES DAILY
Status: DISCONTINUED | OUTPATIENT
Start: 2018-01-01 | End: 2018-01-01 | Stop reason: HOSPADM

## 2018-01-01 RX ORDER — HYDROCODONE BITARTRATE AND ACETAMINOPHEN 500; 5 MG/1; MG/1
TABLET ORAL
Status: DISCONTINUED | OUTPATIENT
Start: 2018-01-01 | End: 2018-01-01

## 2018-01-01 RX ORDER — FORMOTEROL FUMARATE DIHYDRATE 20 UG/2ML
20 SOLUTION RESPIRATORY (INHALATION) EVERY 12 HOURS
Status: DISCONTINUED | OUTPATIENT
Start: 2018-01-01 | End: 2018-01-01 | Stop reason: CLARIF

## 2018-01-01 RX ORDER — FUROSEMIDE 40 MG/1
40 TABLET ORAL 2 TIMES DAILY
Status: DISCONTINUED | OUTPATIENT
Start: 2018-01-01 | End: 2018-01-01 | Stop reason: HOSPADM

## 2018-01-01 RX ORDER — NOREPINEPHRINE BITARTRATE/D5W 4MG/250ML
0.02 PLASTIC BAG, INJECTION (ML) INTRAVENOUS CONTINUOUS
Qty: 1 ML | Refills: 1 | Status: SHIPPED | OUTPATIENT
Start: 2018-01-01 | End: 2018-01-01

## 2018-01-01 RX ORDER — ZINC SULFATE 50(220)MG
220 CAPSULE ORAL DAILY
Status: DISCONTINUED | OUTPATIENT
Start: 2018-01-01 | End: 2018-01-01 | Stop reason: HOSPADM

## 2018-01-01 RX ORDER — SODIUM CHLORIDE 9 MG/ML
INJECTION, SOLUTION INTRAVENOUS CONTINUOUS
Status: ACTIVE | OUTPATIENT
Start: 2018-01-01 | End: 2018-01-01

## 2018-01-01 RX ORDER — FAMOTIDINE 10 MG/ML
20 INJECTION INTRAVENOUS DAILY
Status: DISCONTINUED | OUTPATIENT
Start: 2018-01-01 | End: 2018-01-01

## 2018-01-01 RX ORDER — POTASSIUM CHLORIDE 20 MEQ/15ML
40 SOLUTION ORAL ONCE
Status: COMPLETED | OUTPATIENT
Start: 2018-01-01 | End: 2018-01-01

## 2018-01-01 RX ORDER — IPRATROPIUM BROMIDE AND ALBUTEROL SULFATE 2.5; .5 MG/3ML; MG/3ML
3 SOLUTION RESPIRATORY (INHALATION) EVERY 6 HOURS
Status: DISCONTINUED | OUTPATIENT
Start: 2018-01-01 | End: 2018-01-01 | Stop reason: HOSPADM

## 2018-01-01 RX ORDER — MYCOPHENOLATE MOFETIL 500 MG/1
500 TABLET ORAL 2 TIMES DAILY
Status: DISCONTINUED | OUTPATIENT
Start: 2018-01-01 | End: 2018-01-01

## 2018-01-01 RX ORDER — FAMOTIDINE 20 MG/1
20 TABLET, FILM COATED ORAL 2 TIMES DAILY
Status: DISCONTINUED | OUTPATIENT
Start: 2018-01-01 | End: 2018-01-01 | Stop reason: HOSPADM

## 2018-01-01 RX ORDER — MIDODRINE HYDROCHLORIDE 5 MG/1
10 TABLET ORAL 3 TIMES DAILY
Status: DISCONTINUED | OUTPATIENT
Start: 2018-01-01 | End: 2018-01-01 | Stop reason: HOSPADM

## 2018-01-01 RX ORDER — MIDAZOLAM HYDROCHLORIDE 1 MG/ML
INJECTION, SOLUTION INTRAMUSCULAR; INTRAVENOUS
Status: DISCONTINUED | OUTPATIENT
Start: 2018-01-01 | End: 2018-01-01

## 2018-01-01 RX ORDER — BISACODYL 10 MG
10 SUPPOSITORY, RECTAL RECTAL DAILY PRN
Status: DISCONTINUED | OUTPATIENT
Start: 2018-01-01 | End: 2018-01-01 | Stop reason: HOSPADM

## 2018-01-01 RX ORDER — IBUPROFEN 200 MG
24 TABLET ORAL
Status: DISCONTINUED | OUTPATIENT
Start: 2018-01-01 | End: 2018-01-01 | Stop reason: HOSPADM

## 2018-01-01 RX ORDER — HEPARIN SODIUM 5000 [USP'U]/ML
5000 INJECTION, SOLUTION INTRAVENOUS; SUBCUTANEOUS EVERY 8 HOURS
Status: DISPENSED | OUTPATIENT
Start: 2018-01-01 | End: 2018-01-01

## 2018-01-01 RX ORDER — METOPROLOL TARTRATE 1 MG/ML
2.5 INJECTION, SOLUTION INTRAVENOUS ONCE
Status: COMPLETED | OUTPATIENT
Start: 2018-01-01 | End: 2018-01-01

## 2018-01-01 RX ORDER — BACITRACIN 500 [USP'U]/G
OINTMENT TOPICAL DAILY
Status: ON HOLD | COMMUNITY
End: 2019-01-01 | Stop reason: HOSPADM

## 2018-01-01 RX ORDER — FENTANYL CITRATE 50 UG/ML
25 INJECTION, SOLUTION INTRAMUSCULAR; INTRAVENOUS EVERY 5 MIN PRN
Status: DISCONTINUED | OUTPATIENT
Start: 2018-01-01 | End: 2018-01-01

## 2018-01-01 RX ORDER — CISATRACURIUM BESYLATE 2 MG/ML
INJECTION, SOLUTION INTRAVENOUS
Status: DISCONTINUED | OUTPATIENT
Start: 2018-01-01 | End: 2018-01-01

## 2018-01-01 RX ORDER — PANTOPRAZOLE SODIUM 40 MG/1
40 TABLET, DELAYED RELEASE ORAL DAILY
Status: DISCONTINUED | OUTPATIENT
Start: 2018-01-01 | End: 2018-01-01

## 2018-01-01 RX ORDER — HEPARIN SODIUM 1000 [USP'U]/ML
3800 INJECTION INTRAVENOUS; SUBCUTANEOUS
Status: DISCONTINUED | OUTPATIENT
Start: 2018-01-01 | End: 2018-01-01 | Stop reason: HOSPADM

## 2018-01-01 RX ORDER — ACETAMINOPHEN 650 MG/20.3ML
650 LIQUID ORAL EVERY 6 HOURS PRN
Status: DISCONTINUED | OUTPATIENT
Start: 2018-01-01 | End: 2018-01-01 | Stop reason: HOSPADM

## 2018-01-01 RX ORDER — POTASSIUM CHLORIDE 20 MEQ/15ML
40 SOLUTION ORAL
Status: COMPLETED | OUTPATIENT
Start: 2018-01-01 | End: 2018-01-01

## 2018-01-01 RX ORDER — IBUPROFEN 200 MG
24 TABLET ORAL
Status: DISCONTINUED | OUTPATIENT
Start: 2018-01-01 | End: 2018-01-01

## 2018-01-01 RX ORDER — PHENYLEPHRINE HCL IN 0.9% NACL 40MG/250ML
0.5 PLASTIC BAG, INJECTION (ML) INTRAVENOUS CONTINUOUS
Status: DISCONTINUED | OUTPATIENT
Start: 2018-01-01 | End: 2018-01-01

## 2018-01-01 RX ORDER — MYCOPHENOLATE MOFETIL 250 MG/1
500 CAPSULE ORAL 2 TIMES DAILY
Status: DISCONTINUED | OUTPATIENT
Start: 2018-01-01 | End: 2018-01-01 | Stop reason: HOSPADM

## 2018-01-01 RX ORDER — TRAMADOL HYDROCHLORIDE 50 MG/1
50 TABLET ORAL EVERY 6 HOURS PRN
Status: DISCONTINUED | OUTPATIENT
Start: 2018-01-01 | End: 2018-01-01 | Stop reason: HOSPADM

## 2018-01-01 RX ORDER — BRIMONIDINE TARTRATE 2 MG/ML
1 SOLUTION/ DROPS OPHTHALMIC EVERY 12 HOURS
Status: DISCONTINUED | OUTPATIENT
Start: 2018-01-01 | End: 2018-01-01 | Stop reason: HOSPADM

## 2018-01-01 RX ORDER — LANOLIN ALCOHOL/MO/W.PET/CERES
400 CREAM (GRAM) TOPICAL 2 TIMES DAILY
COMMUNITY

## 2018-01-01 RX ORDER — POTASSIUM CHLORIDE, DEXTROSE MONOHYDRATE 150; 5 MG/100ML; G/100ML
INJECTION, SOLUTION INTRAVENOUS CONTINUOUS
Status: DISCONTINUED | OUTPATIENT
Start: 2018-01-01 | End: 2018-01-01

## 2018-01-01 RX ORDER — ALBUMIN HUMAN 50 G/1000ML
175 SOLUTION INTRAVENOUS ONCE
Status: COMPLETED | OUTPATIENT
Start: 2018-01-01 | End: 2018-01-01

## 2018-01-01 RX ORDER — FUROSEMIDE 40 MG/1
40 TABLET ORAL 2 TIMES DAILY
Qty: 60 TABLET | Refills: 11 | Status: SHIPPED | OUTPATIENT
Start: 2018-01-01 | End: 2018-01-01 | Stop reason: HOSPADM

## 2018-01-01 RX ORDER — CIPROFLOXACIN 500 MG/1
500 TABLET ORAL EVERY 12 HOURS
Status: DISCONTINUED | OUTPATIENT
Start: 2018-01-01 | End: 2018-01-01

## 2018-01-01 RX ORDER — FUROSEMIDE 10 MG/ML
40 INJECTION INTRAMUSCULAR; INTRAVENOUS DAILY
Status: DISCONTINUED | OUTPATIENT
Start: 2018-01-01 | End: 2018-01-01

## 2018-01-01 RX ORDER — BUMETANIDE 0.25 MG/ML
2 INJECTION INTRAMUSCULAR; INTRAVENOUS DAILY
Status: DISCONTINUED | OUTPATIENT
Start: 2018-01-01 | End: 2018-01-01

## 2018-01-01 RX ORDER — LISINOPRIL 5 MG/1
5 TABLET ORAL DAILY
Status: DISCONTINUED | OUTPATIENT
Start: 2018-01-01 | End: 2018-01-01

## 2018-01-01 RX ORDER — FUROSEMIDE 10 MG/ML
20 INJECTION INTRAMUSCULAR; INTRAVENOUS ONCE
Status: COMPLETED | OUTPATIENT
Start: 2018-01-01 | End: 2018-01-01

## 2018-01-01 RX ORDER — TRAMADOL HYDROCHLORIDE 50 MG/1
50 TABLET ORAL
COMMUNITY
End: 2018-01-01

## 2018-01-01 RX ORDER — ONDANSETRON 2 MG/ML
4 INJECTION INTRAMUSCULAR; INTRAVENOUS DAILY PRN
Status: DISCONTINUED | OUTPATIENT
Start: 2018-01-01 | End: 2018-01-01 | Stop reason: HOSPADM

## 2018-01-01 RX ORDER — VANCOMYCIN HCL IN 5 % DEXTROSE 1G/250ML
PLASTIC BAG, INJECTION (ML) INTRAVENOUS
Status: ON HOLD
Start: 2018-01-01 | End: 2018-01-01 | Stop reason: HOSPADM

## 2018-01-01 RX ORDER — INSULIN ASPART 100 [IU]/ML
0-5 INJECTION, SOLUTION INTRAVENOUS; SUBCUTANEOUS
Status: DISCONTINUED | OUTPATIENT
Start: 2018-01-01 | End: 2018-01-01

## 2018-01-01 RX ORDER — METOPROLOL SUCCINATE 50 MG/1
100 TABLET, EXTENDED RELEASE ORAL DAILY
Status: DISCONTINUED | OUTPATIENT
Start: 2018-01-01 | End: 2018-01-01

## 2018-01-01 RX ORDER — ONDANSETRON 4 MG/1
4 TABLET, FILM COATED ORAL EVERY 6 HOURS PRN
COMMUNITY

## 2018-01-01 RX ORDER — METOPROLOL SUCCINATE 50 MG/1
100 TABLET, EXTENDED RELEASE ORAL 2 TIMES DAILY
Status: DISCONTINUED | OUTPATIENT
Start: 2018-01-01 | End: 2018-01-01

## 2018-01-01 RX ORDER — PREDNISONE 20 MG/1
20 TABLET ORAL DAILY
Status: ON HOLD | COMMUNITY
End: 2018-01-01 | Stop reason: HOSPADM

## 2018-01-01 RX ORDER — SODIUM CHLORIDE 0.9 % (FLUSH) 0.9 %
3 SYRINGE (ML) INJECTION EVERY 8 HOURS
Status: DISCONTINUED | OUTPATIENT
Start: 2018-01-01 | End: 2018-01-01 | Stop reason: HOSPADM

## 2018-01-01 RX ORDER — ACETYLCYSTEINE 100 MG/ML
4 SOLUTION ORAL; RESPIRATORY (INHALATION) EVERY 12 HOURS
Status: DISCONTINUED | OUTPATIENT
Start: 2018-01-01 | End: 2018-01-01 | Stop reason: HOSPADM

## 2018-01-01 RX ORDER — IBUPROFEN 200 MG
16 TABLET ORAL
Status: DISCONTINUED | OUTPATIENT
Start: 2018-01-01 | End: 2018-01-01

## 2018-01-01 RX ORDER — ACETAMINOPHEN 10 MG/ML
INJECTION, SOLUTION INTRAVENOUS
Status: DISCONTINUED | OUTPATIENT
Start: 2018-01-01 | End: 2018-01-01

## 2018-01-01 RX ORDER — PANTOPRAZOLE SODIUM 40 MG/1
40 TABLET, DELAYED RELEASE ORAL DAILY
COMMUNITY

## 2018-01-01 RX ORDER — ACETAMINOPHEN 325 MG/1
650 TABLET ORAL EVERY 6 HOURS PRN
Status: DISCONTINUED | OUTPATIENT
Start: 2018-01-01 | End: 2018-01-01

## 2018-01-01 RX ORDER — FUROSEMIDE 20 MG/1
20 TABLET ORAL 2 TIMES DAILY
Status: ON HOLD | COMMUNITY
End: 2019-01-01 | Stop reason: HOSPADM

## 2018-01-01 RX ORDER — IPRATROPIUM BROMIDE AND ALBUTEROL SULFATE 2.5; .5 MG/3ML; MG/3ML
3 SOLUTION RESPIRATORY (INHALATION) EVERY 4 HOURS PRN
Status: DISCONTINUED | OUTPATIENT
Start: 2018-01-01 | End: 2018-01-01 | Stop reason: HOSPADM

## 2018-01-01 RX ORDER — AMIODARONE HYDROCHLORIDE 200 MG/1
200 TABLET ORAL 2 TIMES DAILY
Qty: 60 TABLET | Refills: 1 | Status: SHIPPED | OUTPATIENT
Start: 2018-01-01 | End: 2019-06-08

## 2018-01-01 RX ORDER — ONDANSETRON 2 MG/ML
4 INJECTION INTRAMUSCULAR; INTRAVENOUS EVERY 12 HOURS PRN
Status: DISCONTINUED | OUTPATIENT
Start: 2018-01-01 | End: 2018-01-01 | Stop reason: HOSPADM

## 2018-01-01 RX ORDER — ACETAMINOPHEN 325 MG/1
650 TABLET ORAL EVERY 8 HOURS PRN
Status: DISCONTINUED | OUTPATIENT
Start: 2018-01-01 | End: 2018-01-01 | Stop reason: HOSPADM

## 2018-01-01 RX ORDER — POTASSIUM CHLORIDE 20 MEQ/1
20 TABLET, EXTENDED RELEASE ORAL 2 TIMES DAILY
Status: DISCONTINUED | OUTPATIENT
Start: 2018-01-01 | End: 2018-01-01

## 2018-01-01 RX ORDER — BACLOFEN 10 MG/1
10 TABLET ORAL 3 TIMES DAILY
Status: DISCONTINUED | OUTPATIENT
Start: 2018-01-01 | End: 2018-01-01

## 2018-01-01 RX ORDER — IPRATROPIUM BROMIDE AND ALBUTEROL SULFATE 2.5; .5 MG/3ML; MG/3ML
3 SOLUTION RESPIRATORY (INHALATION) EVERY 4 HOURS PRN
Qty: 1 BOX | Refills: 0 | Status: SHIPPED | OUTPATIENT
Start: 2018-01-01 | End: 2019-01-01

## 2018-01-01 RX ORDER — BACLOFEN 10 MG/1
10 TABLET ORAL
COMMUNITY
Start: 2018-01-01 | End: 2018-01-01

## 2018-01-01 RX ORDER — METOPROLOL SUCCINATE 25 MG/1
25 TABLET, EXTENDED RELEASE ORAL 2 TIMES DAILY
Status: ON HOLD | COMMUNITY
End: 2018-01-01 | Stop reason: HOSPADM

## 2018-01-01 RX ORDER — ACETYLCYSTEINE 100 MG/ML
4 SOLUTION ORAL; RESPIRATORY (INHALATION) EVERY 12 HOURS
Status: DISCONTINUED | OUTPATIENT
Start: 2018-01-01 | End: 2018-01-01

## 2018-01-01 RX ORDER — NOREPINEPHRINE BITARTRATE/D5W 4MG/250ML
0.04 PLASTIC BAG, INJECTION (ML) INTRAVENOUS CONTINUOUS
Status: DISCONTINUED | OUTPATIENT
Start: 2018-01-01 | End: 2018-01-01

## 2018-01-01 RX ORDER — POTASSIUM CHLORIDE 14.9 MG/ML
40 INJECTION INTRAVENOUS ONCE
Status: COMPLETED | OUTPATIENT
Start: 2018-01-01 | End: 2018-01-01

## 2018-01-01 RX ORDER — MIDODRINE HYDROCHLORIDE 5 MG/1
5 TABLET ORAL 3 TIMES DAILY
Qty: 90 TABLET | Refills: 11 | Status: SHIPPED | OUTPATIENT
Start: 2018-01-01 | End: 2019-07-19

## 2018-01-01 RX ORDER — IBUPROFEN 200 MG
16 TABLET ORAL
Status: DISCONTINUED | OUTPATIENT
Start: 2018-01-01 | End: 2018-01-01 | Stop reason: HOSPADM

## 2018-01-01 RX ORDER — SODIUM CHLORIDE 9 MG/ML
INJECTION, SOLUTION INTRAVENOUS CONTINUOUS
Status: DISCONTINUED | OUTPATIENT
Start: 2018-01-01 | End: 2018-01-01

## 2018-01-01 RX ORDER — LANOLIN ALCOHOL/MO/W.PET/CERES
400 CREAM (GRAM) TOPICAL 2 TIMES DAILY
Status: DISCONTINUED | OUTPATIENT
Start: 2018-01-01 | End: 2018-01-01

## 2018-01-01 RX ORDER — LANOLIN ALCOHOL/MO/W.PET/CERES
400 CREAM (GRAM) TOPICAL DAILY
Status: DISCONTINUED | OUTPATIENT
Start: 2018-01-01 | End: 2018-01-01 | Stop reason: HOSPADM

## 2018-01-01 RX ORDER — VANCOMYCIN HCL IN 5 % DEXTROSE 1G/250ML
1000 PLASTIC BAG, INJECTION (ML) INTRAVENOUS
Status: DISCONTINUED | OUTPATIENT
Start: 2018-01-01 | End: 2018-01-01

## 2018-01-01 RX ORDER — HYDRALAZINE HYDROCHLORIDE 20 MG/ML
10 INJECTION INTRAMUSCULAR; INTRAVENOUS EVERY 8 HOURS PRN
Status: DISCONTINUED | OUTPATIENT
Start: 2018-01-01 | End: 2018-01-01 | Stop reason: SDUPTHER

## 2018-01-01 RX ORDER — MYCOPHENOLATE MOFETIL 200 MG/ML
500 POWDER, FOR SUSPENSION ORAL 2 TIMES DAILY
Status: DISCONTINUED | OUTPATIENT
Start: 2018-01-01 | End: 2018-01-01

## 2018-01-01 RX ORDER — DILTIAZEM HCL 1 MG/ML
7.5 INJECTION, SOLUTION INTRAVENOUS CONTINUOUS
Status: DISCONTINUED | OUTPATIENT
Start: 2018-01-01 | End: 2018-01-01

## 2018-01-01 RX ORDER — BACLOFEN 10 MG/1
10 TABLET ORAL 3 TIMES DAILY PRN
COMMUNITY

## 2018-01-01 RX ORDER — ERGOCALCIFEROL 1.25 MG/1
50000 CAPSULE ORAL
Qty: 1 CAPSULE | Refills: 1 | Status: SHIPPED | OUTPATIENT
Start: 2018-01-01

## 2018-01-01 RX ORDER — CIPROFLOXACIN 500 MG/1
500 TABLET ORAL EVERY 12 HOURS
Start: 2018-01-01 | End: 2018-01-01

## 2018-01-01 RX ORDER — FUROSEMIDE 20 MG/1
20 TABLET ORAL 2 TIMES DAILY
Status: DISCONTINUED | OUTPATIENT
Start: 2018-01-01 | End: 2018-01-01

## 2018-01-01 RX ORDER — POLYETHYLENE GLYCOL 3350 17 G/17G
17 POWDER, FOR SOLUTION ORAL DAILY
Status: DISCONTINUED | OUTPATIENT
Start: 2018-01-01 | End: 2018-01-01 | Stop reason: HOSPADM

## 2018-01-01 RX ORDER — BRIMONIDINE TARTRATE AND TIMOLOL MALEATE 2; 5 MG/ML; MG/ML
1 SOLUTION OPHTHALMIC EVERY 12 HOURS
Qty: 5 ML | Refills: 6 | Status: SHIPPED | OUTPATIENT
Start: 2018-01-01 | End: 2018-01-01 | Stop reason: SDUPTHER

## 2018-01-01 RX ORDER — BACLOFEN 10 MG/1
10 TABLET ORAL 3 TIMES DAILY PRN
Status: DISCONTINUED | OUTPATIENT
Start: 2018-01-01 | End: 2018-01-01 | Stop reason: HOSPADM

## 2018-01-01 RX ORDER — AMIODARONE HYDROCHLORIDE 200 MG/1
200 TABLET ORAL 2 TIMES DAILY
Status: DISCONTINUED | OUTPATIENT
Start: 2018-01-01 | End: 2018-01-01 | Stop reason: HOSPADM

## 2018-01-01 RX ORDER — NOREPINEPHRINE BITARTRATE/D5W 4MG/250ML
0.02 PLASTIC BAG, INJECTION (ML) INTRAVENOUS CONTINUOUS
Status: DISCONTINUED | OUTPATIENT
Start: 2018-01-01 | End: 2018-01-01

## 2018-01-01 RX ORDER — SODIUM CHLORIDE 0.9 % (FLUSH) 0.9 %
5 SYRINGE (ML) INJECTION
Status: DISCONTINUED | OUTPATIENT
Start: 2018-01-01 | End: 2018-01-01 | Stop reason: HOSPADM

## 2018-01-01 RX ORDER — LEVOTHYROXINE SODIUM 100 UG/1
100 TABLET ORAL
Status: DISCONTINUED | OUTPATIENT
Start: 2018-01-01 | End: 2018-01-01 | Stop reason: HOSPADM

## 2018-01-01 RX ORDER — TRAMADOL HYDROCHLORIDE 50 MG/1
50 TABLET ORAL EVERY 6 HOURS PRN
COMMUNITY

## 2018-01-01 RX ORDER — MYCOPHENOLATE MOFETIL 250 MG/1
500 CAPSULE ORAL 2 TIMES DAILY
Qty: 120 CAPSULE | Refills: 11 | Status: SHIPPED | OUTPATIENT
Start: 2018-01-01 | End: 2019-07-20

## 2018-01-01 RX ORDER — POTASSIUM CHLORIDE 20 MEQ/15ML
60 SOLUTION ORAL ONCE
Status: COMPLETED | OUTPATIENT
Start: 2018-01-01 | End: 2018-01-01

## 2018-01-01 RX ORDER — METOPROLOL TARTRATE 1 MG/ML
5 INJECTION, SOLUTION INTRAVENOUS ONCE
Status: DISCONTINUED | OUTPATIENT
Start: 2018-01-01 | End: 2018-01-01

## 2018-01-01 RX ORDER — IPRATROPIUM BROMIDE AND ALBUTEROL SULFATE 2.5; .5 MG/3ML; MG/3ML
3 SOLUTION RESPIRATORY (INHALATION) EVERY 6 HOURS PRN
Status: DISCONTINUED | OUTPATIENT
Start: 2018-01-01 | End: 2018-01-01

## 2018-01-01 RX ORDER — BUDESONIDE 0.5 MG/2ML
0.5 INHALANT ORAL EVERY 12 HOURS
Status: DISCONTINUED | OUTPATIENT
Start: 2018-01-01 | End: 2018-01-01 | Stop reason: HOSPADM

## 2018-01-01 RX ORDER — POTASSIUM CHLORIDE 7.45 MG/ML
10 INJECTION INTRAVENOUS
Status: DISCONTINUED | OUTPATIENT
Start: 2018-01-01 | End: 2018-01-01

## 2018-01-01 RX ORDER — METOPROLOL TARTRATE 1 MG/ML
5 INJECTION, SOLUTION INTRAVENOUS ONCE
Status: COMPLETED | OUTPATIENT
Start: 2018-01-01 | End: 2018-01-01

## 2018-01-01 RX ORDER — HYDROCODONE BITARTRATE AND ACETAMINOPHEN 500; 5 MG/1; MG/1
TABLET ORAL
Status: ACTIVE | OUTPATIENT
Start: 2018-01-01 | End: 2018-01-01

## 2018-01-01 RX ORDER — SODIUM CHLORIDE FOR INHALATION 3 %
4 VIAL, NEBULIZER (ML) INHALATION EVERY 12 HOURS
Status: DISCONTINUED | OUTPATIENT
Start: 2018-01-01 | End: 2018-01-01 | Stop reason: HOSPADM

## 2018-01-01 RX ORDER — MEROPENEM AND SODIUM CHLORIDE 500 MG/50ML
500 INJECTION, SOLUTION INTRAVENOUS
Status: DISCONTINUED | OUTPATIENT
Start: 2018-01-01 | End: 2018-01-01

## 2018-01-01 RX ORDER — LIDOCAINE HYDROCHLORIDE 10 MG/ML
1 INJECTION, SOLUTION EPIDURAL; INFILTRATION; INTRACAUDAL; PERINEURAL ONCE
Status: DISCONTINUED | OUTPATIENT
Start: 2018-01-01 | End: 2018-01-01

## 2018-01-01 RX ORDER — MIDODRINE HYDROCHLORIDE 5 MG/1
5 TABLET ORAL 3 TIMES DAILY
Status: DISCONTINUED | OUTPATIENT
Start: 2018-01-01 | End: 2018-01-01

## 2018-01-01 RX ORDER — BUMETANIDE 0.25 MG/ML
2 INJECTION INTRAMUSCULAR; INTRAVENOUS ONCE
Status: COMPLETED | OUTPATIENT
Start: 2018-01-01 | End: 2018-01-01

## 2018-01-01 RX ORDER — LINEZOLID 600 MG/1
600 TABLET, FILM COATED ORAL EVERY 12 HOURS
Status: DISCONTINUED | OUTPATIENT
Start: 2018-01-01 | End: 2018-01-01 | Stop reason: HOSPADM

## 2018-01-01 RX ORDER — VANCOMYCIN HCL IN 5 % DEXTROSE 1G/250ML
1000 PLASTIC BAG, INJECTION (ML) INTRAVENOUS
Status: DISCONTINUED | OUTPATIENT
Start: 2018-01-01 | End: 2018-01-01 | Stop reason: HOSPADM

## 2018-01-01 RX ORDER — CLONIDINE 0.1 MG/24H
PATCH, EXTENDED RELEASE TRANSDERMAL
COMMUNITY
Start: 2018-01-01 | End: 2018-01-01

## 2018-01-01 RX ORDER — LACTULOSE 10 G/15ML
10 SOLUTION ORAL 2 TIMES DAILY PRN
Status: DISCONTINUED | OUTPATIENT
Start: 2018-01-01 | End: 2018-01-01 | Stop reason: HOSPADM

## 2018-01-01 RX ORDER — METOPROLOL SUCCINATE 50 MG/1
50 TABLET, EXTENDED RELEASE ORAL DAILY
Status: DISCONTINUED | OUTPATIENT
Start: 2018-01-01 | End: 2018-01-01

## 2018-01-01 RX ORDER — POTASSIUM CHLORIDE 14.9 MG/ML
20 INJECTION INTRAVENOUS ONCE
Status: COMPLETED | OUTPATIENT
Start: 2018-01-01 | End: 2018-01-01

## 2018-01-01 RX ORDER — METOPROLOL TARTRATE 1 MG/ML
2.5 INJECTION, SOLUTION INTRAVENOUS EVERY 6 HOURS
Status: DISCONTINUED | OUTPATIENT
Start: 2018-01-01 | End: 2018-01-01

## 2018-01-01 RX ORDER — GLUCAGON 1 MG
1 KIT INJECTION
Status: DISCONTINUED | OUTPATIENT
Start: 2018-01-01 | End: 2018-01-01

## 2018-01-01 RX ORDER — FAMOTIDINE 10 MG/ML
20 INJECTION INTRAVENOUS 2 TIMES DAILY
Status: DISCONTINUED | OUTPATIENT
Start: 2018-01-01 | End: 2018-01-01

## 2018-01-01 RX ORDER — NITROGLYCERIN 0.4 MG/1
0.4 TABLET SUBLINGUAL EVERY 5 MIN PRN
Status: DISCONTINUED | OUTPATIENT
Start: 2018-01-01 | End: 2018-01-01 | Stop reason: HOSPADM

## 2018-01-01 RX ORDER — BUMETANIDE 1 MG/1
2 TABLET ORAL 2 TIMES DAILY
Status: DISCONTINUED | OUTPATIENT
Start: 2018-01-01 | End: 2018-01-01

## 2018-01-01 RX ORDER — METOPROLOL SUCCINATE 50 MG/1
50 TABLET, EXTENDED RELEASE ORAL 2 TIMES DAILY
Status: DISCONTINUED | OUTPATIENT
Start: 2018-01-01 | End: 2018-01-01 | Stop reason: HOSPADM

## 2018-01-01 RX ORDER — POTASSIUM CHLORIDE 20 MEQ/15ML
20 SOLUTION ORAL ONCE
Status: COMPLETED | OUTPATIENT
Start: 2018-01-01 | End: 2018-01-01

## 2018-01-01 RX ORDER — BRIMONIDINE TARTRATE AND TIMOLOL MALEATE 2; 5 MG/ML; MG/ML
1 SOLUTION OPHTHALMIC EVERY 12 HOURS
Qty: 5 ML | Refills: 6 | Status: SHIPPED | OUTPATIENT
Start: 2018-01-01 | End: 2019-06-28

## 2018-01-01 RX ORDER — METOPROLOL SUCCINATE 50 MG/1
50 TABLET, EXTENDED RELEASE ORAL 2 TIMES DAILY
Status: DISCONTINUED | OUTPATIENT
Start: 2018-01-01 | End: 2018-01-01 | Stop reason: ALTCHOICE

## 2018-01-01 RX ORDER — ALPRAZOLAM 0.25 MG/1
0.25 TABLET ORAL ONCE
Status: COMPLETED | OUTPATIENT
Start: 2018-01-01 | End: 2018-01-01

## 2018-01-01 RX ORDER — LISINOPRIL 2.5 MG/1
2.5 TABLET ORAL DAILY
Status: DISCONTINUED | OUTPATIENT
Start: 2018-01-01 | End: 2018-01-01

## 2018-01-01 RX ORDER — AMIODARONE HYDROCHLORIDE 100 MG/1
100 TABLET ORAL 2 TIMES DAILY
Status: DISCONTINUED | OUTPATIENT
Start: 2018-01-01 | End: 2018-01-01 | Stop reason: HOSPADM

## 2018-01-01 RX ORDER — MULTIVITAMIN
1 TABLET ORAL DAILY
COMMUNITY

## 2018-01-01 RX ORDER — SILVER SULFADIAZINE 10 G/1000G
CREAM TOPICAL 2 TIMES DAILY
Qty: 50 G | Refills: 0 | Status: SHIPPED | OUTPATIENT
Start: 2018-01-01 | End: 2018-01-01

## 2018-01-01 RX ORDER — PANTOPRAZOLE SODIUM 40 MG/10ML
40 INJECTION, POWDER, LYOPHILIZED, FOR SOLUTION INTRAVENOUS DAILY
Status: DISCONTINUED | OUTPATIENT
Start: 2018-01-01 | End: 2018-01-01

## 2018-01-01 RX ORDER — LIDOCAINE HYDROCHLORIDE 10 MG/ML
1 INJECTION INFILTRATION; PERINEURAL ONCE
Status: DISCONTINUED | OUTPATIENT
Start: 2018-01-01 | End: 2018-01-01 | Stop reason: HOSPADM

## 2018-01-01 RX ORDER — SODIUM CHLORIDE, SODIUM LACTATE, POTASSIUM CHLORIDE, CALCIUM CHLORIDE 600; 310; 30; 20 MG/100ML; MG/100ML; MG/100ML; MG/100ML
INJECTION, SOLUTION INTRAVENOUS CONTINUOUS PRN
Status: DISCONTINUED | OUTPATIENT
Start: 2018-01-01 | End: 2018-01-01

## 2018-01-01 RX ORDER — ACETAMINOPHEN 650 MG/20.3ML
650 LIQUID ORAL EVERY 4 HOURS PRN
Status: DISCONTINUED | OUTPATIENT
Start: 2018-01-01 | End: 2018-01-01 | Stop reason: HOSPADM

## 2018-01-01 RX ORDER — METOPROLOL SUCCINATE 50 MG/1
50 TABLET, EXTENDED RELEASE ORAL 2 TIMES DAILY
Qty: 60 TABLET | Refills: 1 | Status: SHIPPED | OUTPATIENT
Start: 2018-01-01 | End: 2018-01-01

## 2018-01-01 RX ORDER — FENTANYL CITRATE 50 UG/ML
INJECTION, SOLUTION INTRAMUSCULAR; INTRAVENOUS
Status: DISCONTINUED | OUTPATIENT
Start: 2018-01-01 | End: 2018-01-01

## 2018-01-01 RX ORDER — DEXTROSE MONOHYDRATE 50 MG/ML
INJECTION, SOLUTION INTRAVENOUS CONTINUOUS
Status: DISCONTINUED | OUTPATIENT
Start: 2018-01-01 | End: 2018-01-01

## 2018-01-01 RX ORDER — FENTANYL CITRAT/DEXTROSE 5%/PF 100 MCG/10
PATIENT CONTROLLED ANALGESIA SYRINGE INTRAVENOUS CONTINUOUS
Status: DISCONTINUED | OUTPATIENT
Start: 2018-01-01 | End: 2018-01-01

## 2018-01-01 RX ORDER — CIPROFLOXACIN 500 MG/1
500 TABLET ORAL EVERY 12 HOURS
Status: DISCONTINUED | OUTPATIENT
Start: 2018-01-01 | End: 2018-01-01 | Stop reason: HOSPADM

## 2018-01-01 RX ORDER — ONDANSETRON 2 MG/ML
4 INJECTION INTRAMUSCULAR; INTRAVENOUS EVERY 8 HOURS PRN
Status: DISCONTINUED | OUTPATIENT
Start: 2018-01-01 | End: 2018-01-01 | Stop reason: HOSPADM

## 2018-01-01 RX ORDER — METOPROLOL SUCCINATE 50 MG/1
50 TABLET, EXTENDED RELEASE ORAL 2 TIMES DAILY
Status: DISCONTINUED | OUTPATIENT
Start: 2018-01-01 | End: 2018-01-01

## 2018-01-01 RX ORDER — CHLORHEXIDINE GLUCONATE ORAL RINSE 1.2 MG/ML
15 SOLUTION DENTAL 2 TIMES DAILY
Status: DISCONTINUED | OUTPATIENT
Start: 2018-01-01 | End: 2018-01-01 | Stop reason: HOSPADM

## 2018-01-01 RX ORDER — BRIMONIDINE TARTRATE AND TIMOLOL MALEATE 2; 5 MG/ML; MG/ML
1 SOLUTION OPHTHALMIC EVERY 12 HOURS
Status: DISCONTINUED | OUTPATIENT
Start: 2018-01-01 | End: 2018-01-01

## 2018-01-01 RX ORDER — LACTULOSE 10 G/15ML
10 SOLUTION ORAL 2 TIMES DAILY PRN
Qty: 300 ML | Refills: 0 | Status: ON HOLD | OUTPATIENT
Start: 2018-01-01 | End: 2018-01-01 | Stop reason: HOSPADM

## 2018-01-01 RX ORDER — METHYLPREDNISOLONE SOD SUCC 125 MG
250 VIAL (EA) INJECTION EVERY 6 HOURS
Status: DISCONTINUED | OUTPATIENT
Start: 2018-01-01 | End: 2018-01-01

## 2018-01-01 RX ORDER — SODIUM CHLORIDE, SODIUM LACTATE, POTASSIUM CHLORIDE, CALCIUM CHLORIDE 600; 310; 30; 20 MG/100ML; MG/100ML; MG/100ML; MG/100ML
INJECTION, SOLUTION INTRAVENOUS CONTINUOUS
Status: DISCONTINUED | OUTPATIENT
Start: 2018-01-01 | End: 2018-01-01

## 2018-01-01 RX ORDER — ARFORMOTEROL TARTRATE 15 UG/2ML
15 SOLUTION RESPIRATORY (INHALATION) EVERY 12 HOURS
Status: DISCONTINUED | OUTPATIENT
Start: 2018-01-01 | End: 2018-01-01 | Stop reason: HOSPADM

## 2018-01-01 RX ORDER — SODIUM CHLORIDE 0.9 % (FLUSH) 0.9 %
3 SYRINGE (ML) INJECTION
Status: DISCONTINUED | OUTPATIENT
Start: 2018-01-01 | End: 2018-01-01 | Stop reason: HOSPADM

## 2018-01-01 RX ORDER — METOPROLOL TARTRATE 1 MG/ML
5 INJECTION, SOLUTION INTRAVENOUS EVERY 6 HOURS
Status: DISCONTINUED | OUTPATIENT
Start: 2018-01-01 | End: 2018-01-01 | Stop reason: HOSPADM

## 2018-01-01 RX ORDER — ACETAMINOPHEN 500 MG
500 TABLET ORAL ONCE
Status: COMPLETED | OUTPATIENT
Start: 2018-01-01 | End: 2018-01-01

## 2018-01-01 RX ORDER — FUROSEMIDE 10 MG/ML
40 INJECTION INTRAMUSCULAR; INTRAVENOUS ONCE
Status: COMPLETED | OUTPATIENT
Start: 2018-01-01 | End: 2018-01-01

## 2018-01-01 RX ORDER — METOPROLOL TARTRATE 25 MG/1
25 TABLET, FILM COATED ORAL 2 TIMES DAILY
Status: DISCONTINUED | OUTPATIENT
Start: 2018-01-01 | End: 2018-01-01

## 2018-01-01 RX ORDER — DIPHENHYDRAMINE HYDROCHLORIDE 50 MG/ML
25 INJECTION INTRAMUSCULAR; INTRAVENOUS EVERY 6 HOURS PRN
Status: DISCONTINUED | OUTPATIENT
Start: 2018-01-01 | End: 2018-01-01 | Stop reason: HOSPADM

## 2018-01-01 RX ORDER — LACTULOSE 10 G/15ML
20 SOLUTION ORAL
Status: COMPLETED | OUTPATIENT
Start: 2018-01-01 | End: 2018-01-01

## 2018-01-01 RX ORDER — TIMOLOL MALEATE 5 MG/ML
1 SOLUTION/ DROPS OPHTHALMIC 2 TIMES DAILY
Status: DISCONTINUED | OUTPATIENT
Start: 2018-01-01 | End: 2018-01-01 | Stop reason: HOSPADM

## 2018-01-01 RX ORDER — ONDANSETRON 4 MG/1
4 TABLET, FILM COATED ORAL EVERY 6 HOURS PRN
Status: DISCONTINUED | OUTPATIENT
Start: 2018-01-01 | End: 2018-01-01

## 2018-01-01 RX ORDER — ALBUMIN HUMAN 50 G/1000ML
175 SOLUTION INTRAVENOUS ONCE
Status: DISCONTINUED | OUTPATIENT
Start: 2018-01-01 | End: 2018-01-01

## 2018-01-01 RX ORDER — METOPROLOL TARTRATE 25 MG/1
25 TABLET, FILM COATED ORAL ONCE
Status: COMPLETED | OUTPATIENT
Start: 2018-01-01 | End: 2018-01-01

## 2018-01-01 RX ORDER — DILTIAZEM HYDROCHLORIDE 300 MG/1
300 CAPSULE, COATED, EXTENDED RELEASE ORAL DAILY
Status: ON HOLD | COMMUNITY
End: 2018-01-01 | Stop reason: HOSPADM

## 2018-01-01 RX ORDER — HEPARIN SODIUM 1000 [USP'U]/ML
4000 INJECTION, SOLUTION INTRAVENOUS; SUBCUTANEOUS
Status: DISCONTINUED | OUTPATIENT
Start: 2018-01-01 | End: 2018-01-01 | Stop reason: HOSPADM

## 2018-01-01 RX ORDER — INSULIN ASPART 100 [IU]/ML
0-5 INJECTION, SOLUTION INTRAVENOUS; SUBCUTANEOUS
Status: DISCONTINUED | OUTPATIENT
Start: 2018-01-01 | End: 2018-01-01 | Stop reason: HOSPADM

## 2018-01-01 RX ORDER — INSULIN ASPART 100 [IU]/ML
0-5 INJECTION, SOLUTION INTRAVENOUS; SUBCUTANEOUS EVERY 6 HOURS PRN
Status: DISCONTINUED | OUTPATIENT
Start: 2018-01-01 | End: 2018-01-01

## 2018-01-01 RX ORDER — BUMETANIDE 0.25 MG/ML
2 INJECTION INTRAMUSCULAR; INTRAVENOUS 2 TIMES DAILY
Status: DISCONTINUED | OUTPATIENT
Start: 2018-01-01 | End: 2018-01-01

## 2018-01-01 RX ORDER — ERGOCALCIFEROL 1.25 MG/1
50000 CAPSULE ORAL
Status: DISCONTINUED | OUTPATIENT
Start: 2018-01-01 | End: 2018-01-01 | Stop reason: HOSPADM

## 2018-01-01 RX ORDER — CIPROFLOXACIN 2 MG/ML
400 INJECTION, SOLUTION INTRAVENOUS
Status: DISCONTINUED | OUTPATIENT
Start: 2018-01-01 | End: 2018-01-01

## 2018-01-01 RX ORDER — CIPROFLOXACIN 500 MG/1
TABLET ORAL
Refills: 0 | Status: ON HOLD | COMMUNITY
Start: 2018-01-01 | End: 2018-01-01 | Stop reason: HOSPADM

## 2018-01-01 RX ORDER — SODIUM CHLORIDE 9 MG/ML
1000 INJECTION, SOLUTION INTRAVENOUS
Status: COMPLETED | OUTPATIENT
Start: 2018-01-01 | End: 2018-01-01

## 2018-01-01 RX ORDER — ZINC SULFATE 50(220)MG
220 CAPSULE ORAL DAILY
COMMUNITY
Start: 2018-01-01 | End: 2019-05-20

## 2018-01-01 RX ORDER — FUROSEMIDE 10 MG/ML
20 INJECTION INTRAMUSCULAR; INTRAVENOUS ONCE
Status: DISCONTINUED | OUTPATIENT
Start: 2018-01-01 | End: 2018-01-01

## 2018-01-01 RX ORDER — BUDESONIDE 0.5 MG/2ML
0.5 INHALANT ORAL EVERY 12 HOURS
Qty: 2 ML | Refills: 2 | Status: SHIPPED | OUTPATIENT
Start: 2018-01-01 | End: 2019-01-01

## 2018-01-01 RX ORDER — FUROSEMIDE 10 MG/ML
40 INJECTION INTRAMUSCULAR; INTRAVENOUS DAILY
Status: DISCONTINUED | OUTPATIENT
Start: 2018-01-01 | End: 2018-01-01 | Stop reason: HOSPADM

## 2018-01-01 RX ORDER — IPRATROPIUM BROMIDE AND ALBUTEROL SULFATE 2.5; .5 MG/3ML; MG/3ML
3 SOLUTION RESPIRATORY (INHALATION) EVERY 6 HOURS PRN
Status: DISCONTINUED | OUTPATIENT
Start: 2018-01-01 | End: 2018-01-01 | Stop reason: HOSPADM

## 2018-01-01 RX ORDER — LINEZOLID 600 MG/1
600 TABLET, FILM COATED ORAL EVERY 12 HOURS
Qty: 7 TABLET | Refills: 0 | Status: ON HOLD | OUTPATIENT
Start: 2018-01-01 | End: 2019-01-01 | Stop reason: HOSPADM

## 2018-01-01 RX ORDER — POTASSIUM CHLORIDE 29.8 MG/ML
40 INJECTION INTRAVENOUS ONCE
Status: COMPLETED | OUTPATIENT
Start: 2018-01-01 | End: 2018-01-01

## 2018-01-01 RX ORDER — CIPROFLOXACIN 500 MG/1
500 TABLET ORAL 2 TIMES DAILY
Qty: 20 TABLET | Refills: 0 | Status: SHIPPED | OUTPATIENT
Start: 2018-01-01 | End: 2018-01-01

## 2018-01-01 RX ORDER — BRIMONIDINE TARTRATE AND TIMOLOL MALEATE 2; 5 MG/ML; MG/ML
1 SOLUTION OPHTHALMIC EVERY 12 HOURS
Status: DISCONTINUED | OUTPATIENT
Start: 2018-01-01 | End: 2018-01-01 | Stop reason: CLARIF

## 2018-01-01 RX ORDER — INSULIN ASPART 100 [IU]/ML
1-10 INJECTION, SOLUTION INTRAVENOUS; SUBCUTANEOUS EVERY 6 HOURS PRN
Status: DISCONTINUED | OUTPATIENT
Start: 2018-01-01 | End: 2018-01-01

## 2018-01-01 RX ORDER — HYDRALAZINE HYDROCHLORIDE 20 MG/ML
10 INJECTION INTRAMUSCULAR; INTRAVENOUS
Status: DISCONTINUED | OUTPATIENT
Start: 2018-01-01 | End: 2018-01-01 | Stop reason: SDUPTHER

## 2018-01-01 RX ORDER — HYDRALAZINE HYDROCHLORIDE 20 MG/ML
10 INJECTION INTRAMUSCULAR; INTRAVENOUS EVERY 6 HOURS PRN
Status: DISCONTINUED | OUTPATIENT
Start: 2018-01-01 | End: 2018-01-01 | Stop reason: HOSPADM

## 2018-01-01 RX ADMIN — BUDESONIDE 0.5 MG: 0.5 SUSPENSION RESPIRATORY (INHALATION) at 07:10

## 2018-01-01 RX ADMIN — SODIUM CHLORIDE 30 MG/ML INHALATION SOLUTION 4 ML: 30 SOLUTION INHALANT at 07:10

## 2018-01-01 RX ADMIN — AMIODARONE HYDROCHLORIDE 0.5 MG/MIN: 1.8 INJECTION, SOLUTION INTRAVENOUS at 06:06

## 2018-01-01 RX ADMIN — PIPERACILLIN SODIUM AND TAZOBACTAM SODIUM 4.5 G: 4; .5 INJECTION, POWDER, LYOPHILIZED, FOR SOLUTION INTRAVENOUS at 09:11

## 2018-01-01 RX ADMIN — METHYLPREDNISOLONE SODIUM SUCCINATE 250 MG: 125 INJECTION, POWDER, FOR SOLUTION INTRAMUSCULAR; INTRAVENOUS at 11:07

## 2018-01-01 RX ADMIN — HEPARIN SODIUM 5000 UNITS: 5000 INJECTION, SOLUTION INTRAVENOUS; SUBCUTANEOUS at 09:07

## 2018-01-01 RX ADMIN — PANTOPRAZOLE SODIUM 40 MG: 40 TABLET, DELAYED RELEASE ORAL at 09:07

## 2018-01-01 RX ADMIN — MIDODRINE HYDROCHLORIDE 10 MG: 5 TABLET ORAL at 09:10

## 2018-01-01 RX ADMIN — ZINC SULFATE 220 MG (50 MG) CAPSULE 220 MG: CAPSULE at 08:10

## 2018-01-01 RX ADMIN — AMIODARONE HYDROCHLORIDE 200 MG: 200 TABLET ORAL at 09:07

## 2018-01-01 RX ADMIN — PIPERACILLIN SODIUM AND TAZOBACTAM SODIUM 4.5 G: 4; .5 INJECTION, POWDER, LYOPHILIZED, FOR SOLUTION INTRAVENOUS at 11:10

## 2018-01-01 RX ADMIN — IPRATROPIUM BROMIDE AND ALBUTEROL SULFATE 3 ML: .5; 3 SOLUTION RESPIRATORY (INHALATION) at 12:10

## 2018-01-01 RX ADMIN — FUROSEMIDE 40 MG: 40 TABLET ORAL at 07:07

## 2018-01-01 RX ADMIN — ARFORMOTEROL TARTRATE 15 MCG: 15 SOLUTION RESPIRATORY (INHALATION) at 07:10

## 2018-01-01 RX ADMIN — BRIMONIDINE TARTRATE 1 DROP: 2 SOLUTION OPHTHALMIC at 09:07

## 2018-01-01 RX ADMIN — LEVOTHYROXINE SODIUM 100 MCG: 100 TABLET ORAL at 05:10

## 2018-01-01 RX ADMIN — LEVOTHYROXINE SODIUM 100 MCG: 100 TABLET ORAL at 05:07

## 2018-01-01 RX ADMIN — AMIODARONE HYDROCHLORIDE 200 MG: 200 TABLET ORAL at 08:11

## 2018-01-01 RX ADMIN — POTASSIUM CHLORIDE 20 MEQ: 200 INJECTION, SOLUTION INTRAVENOUS at 09:07

## 2018-01-01 RX ADMIN — MAGNESIUM OXIDE TAB 400 MG (241.3 MG ELEMENTAL MG) 400 MG: 400 (241.3 MG) TAB at 09:10

## 2018-01-01 RX ADMIN — TIMOLOL MALEATE 1 DROP: 5 SOLUTION OPHTHALMIC at 09:07

## 2018-01-01 RX ADMIN — BACLOFEN 5 MG: 10 TABLET ORAL at 09:10

## 2018-01-01 RX ADMIN — IPRATROPIUM BROMIDE AND ALBUTEROL SULFATE 3 ML: .5; 3 SOLUTION RESPIRATORY (INHALATION) at 12:07

## 2018-01-01 RX ADMIN — IPRATROPIUM BROMIDE AND ALBUTEROL SULFATE 3 ML: .5; 3 SOLUTION RESPIRATORY (INHALATION) at 07:11

## 2018-01-01 RX ADMIN — FAMOTIDINE 20 MG: 20 TABLET ORAL at 08:10

## 2018-01-01 RX ADMIN — MIDODRINE HYDROCHLORIDE 5 MG: 5 TABLET ORAL at 09:10

## 2018-01-01 RX ADMIN — TIMOLOL MALEATE 1 DROP: 5 SOLUTION/ DROPS OPHTHALMIC at 09:10

## 2018-01-01 RX ADMIN — METHYLPREDNISOLONE SODIUM SUCCINATE 250 MG: 125 INJECTION, POWDER, FOR SOLUTION INTRAMUSCULAR; INTRAVENOUS at 05:07

## 2018-01-01 RX ADMIN — LISINOPRIL 5 MG: 5 TABLET ORAL at 09:07

## 2018-01-01 RX ADMIN — MIDODRINE HYDROCHLORIDE 5 MG: 5 TABLET ORAL at 04:10

## 2018-01-01 RX ADMIN — IPRATROPIUM BROMIDE AND ALBUTEROL SULFATE 3 ML: .5; 3 SOLUTION RESPIRATORY (INHALATION) at 07:10

## 2018-01-01 RX ADMIN — IPRATROPIUM BROMIDE AND ALBUTEROL SULFATE 3 ML: .5; 3 SOLUTION RESPIRATORY (INHALATION) at 07:07

## 2018-01-01 RX ADMIN — CIPROFLOXACIN 400 MG: 2 INJECTION, SOLUTION INTRAVENOUS at 12:10

## 2018-01-01 RX ADMIN — METHYLPREDNISOLONE SODIUM SUCCINATE 80 MG: 40 INJECTION, POWDER, FOR SOLUTION INTRAMUSCULAR; INTRAVENOUS at 03:07

## 2018-01-01 RX ADMIN — HYDRALAZINE HYDROCHLORIDE 10 MG: 20 INJECTION INTRAMUSCULAR; INTRAVENOUS at 05:07

## 2018-01-01 RX ADMIN — IPRATROPIUM BROMIDE AND ALBUTEROL SULFATE 3 ML: .5; 3 SOLUTION RESPIRATORY (INHALATION) at 10:10

## 2018-01-01 RX ADMIN — Medication 0.02 MCG/KG/MIN: at 01:10

## 2018-01-01 RX ADMIN — BUMETANIDE 2 MG: 1 TABLET ORAL at 09:07

## 2018-01-01 RX ADMIN — IPRATROPIUM BROMIDE AND ALBUTEROL SULFATE 3 ML: .5; 3 SOLUTION RESPIRATORY (INHALATION) at 01:10

## 2018-01-01 RX ADMIN — HEPARIN SODIUM 5000 UNITS: 5000 INJECTION, SOLUTION INTRAVENOUS; SUBCUTANEOUS at 03:07

## 2018-01-01 RX ADMIN — BRIMONIDINE TARTRATE 1 DROP: 2 SOLUTION OPHTHALMIC at 08:10

## 2018-01-01 RX ADMIN — AMIODARONE HYDROCHLORIDE 100 MG: 100 TABLET ORAL at 09:10

## 2018-01-01 RX ADMIN — BUDESONIDE 0.5 MG: 0.5 SUSPENSION RESPIRATORY (INHALATION) at 07:11

## 2018-01-01 RX ADMIN — ACETAMINOPHEN 650 MG: 325 TABLET, FILM COATED ORAL at 08:06

## 2018-01-01 RX ADMIN — MEROPENEM AND SODIUM CHLORIDE 500 MG: 500 INJECTION, SOLUTION INTRAVENOUS at 12:10

## 2018-01-01 RX ADMIN — POTASSIUM CHLORIDE 20 MEQ: 1500 TABLET, EXTENDED RELEASE ORAL at 09:07

## 2018-01-01 RX ADMIN — MEROPENEM AND SODIUM CHLORIDE 500 MG: 500 INJECTION, SOLUTION INTRAVENOUS at 06:10

## 2018-01-01 RX ADMIN — SODIUM CHLORIDE 250 ML: 0.9 INJECTION, SOLUTION INTRAVENOUS at 04:07

## 2018-01-01 RX ADMIN — AMIODARONE HYDROCHLORIDE 200 MG: 200 TABLET ORAL at 08:10

## 2018-01-01 RX ADMIN — BRIMONIDINE TARTRATE 1 DROP: 2 SOLUTION OPHTHALMIC at 10:11

## 2018-01-01 RX ADMIN — TIMOLOL MALEATE 1 DROP: 5 SOLUTION OPHTHALMIC at 08:07

## 2018-01-01 RX ADMIN — MIDODRINE HYDROCHLORIDE 10 MG: 5 TABLET ORAL at 08:10

## 2018-01-01 RX ADMIN — MEROPENEM AND SODIUM CHLORIDE 500 MG: 500 INJECTION, SOLUTION INTRAVENOUS at 03:10

## 2018-01-01 RX ADMIN — ACETYLCYSTEINE 4 ML: 100 INHALANT RESPIRATORY (INHALATION) at 07:11

## 2018-01-01 RX ADMIN — ZINC SULFATE 220 MG (50 MG) CAPSULE 220 MG: CAPSULE at 09:10

## 2018-01-01 RX ADMIN — FUROSEMIDE 20 MG: 20 TABLET ORAL at 11:07

## 2018-01-01 RX ADMIN — ACETYLCYSTEINE 4 ML: 100 SOLUTION ORAL; RESPIRATORY (INHALATION) at 07:10

## 2018-01-01 RX ADMIN — FENTANYL CITRATE 25 MCG: 50 INJECTION, SOLUTION INTRAMUSCULAR; INTRAVENOUS at 05:07

## 2018-01-01 RX ADMIN — SODIUM CHLORIDE, SODIUM LACTATE, POTASSIUM CHLORIDE, AND CALCIUM CHLORIDE: .6; .31; .03; .02 INJECTION, SOLUTION INTRAVENOUS at 03:07

## 2018-01-01 RX ADMIN — CHLORHEXIDINE GLUCONATE 15 ML: 1.2 RINSE ORAL at 09:07

## 2018-01-01 RX ADMIN — MIDODRINE HYDROCHLORIDE 5 MG: 5 TABLET ORAL at 08:10

## 2018-01-01 RX ADMIN — POTASSIUM CHLORIDE 40 MEQ: 14.9 INJECTION, SOLUTION INTRAVENOUS at 07:10

## 2018-01-01 RX ADMIN — AMIODARONE HYDROCHLORIDE 100 MG: 100 TABLET ORAL at 03:10

## 2018-01-01 RX ADMIN — IPRATROPIUM BROMIDE AND ALBUTEROL SULFATE 3 ML: .5; 3 SOLUTION RESPIRATORY (INHALATION) at 01:07

## 2018-01-01 RX ADMIN — POTASSIUM CHLORIDE 20 MEQ: 1500 TABLET, EXTENDED RELEASE ORAL at 08:07

## 2018-01-01 RX ADMIN — METHYLPREDNISOLONE SODIUM SUCCINATE 250 MG: 125 INJECTION, POWDER, FOR SOLUTION INTRAMUSCULAR; INTRAVENOUS at 12:07

## 2018-01-01 RX ADMIN — METOPROLOL SUCCINATE 50 MG: 50 TABLET, EXTENDED RELEASE ORAL at 09:07

## 2018-01-01 RX ADMIN — ALPRAZOLAM 0.25 MG: 0.25 TABLET ORAL at 09:07

## 2018-01-01 RX ADMIN — FUROSEMIDE 40 MG: 40 TABLET ORAL at 06:07

## 2018-01-01 RX ADMIN — HEPARIN SODIUM 3800 UNITS: 1000 INJECTION, SOLUTION INTRAVENOUS; SUBCUTANEOUS at 04:07

## 2018-01-01 RX ADMIN — FAMOTIDINE 20 MG: 10 INJECTION INTRAVENOUS at 09:10

## 2018-01-01 RX ADMIN — AMIODARONE HYDROCHLORIDE 200 MG: 200 TABLET ORAL at 09:06

## 2018-01-01 RX ADMIN — CHLORHEXIDINE GLUCONATE 15 ML: 1.2 RINSE ORAL at 08:07

## 2018-01-01 RX ADMIN — AMIODARONE HYDROCHLORIDE 200 MG: 200 TABLET ORAL at 08:07

## 2018-01-01 RX ADMIN — METOPROLOL TARTRATE 2.5 MG: 5 INJECTION, SOLUTION INTRAVENOUS at 09:07

## 2018-01-01 RX ADMIN — FUROSEMIDE 40 MG: 10 INJECTION, SOLUTION INTRAMUSCULAR; INTRAVENOUS at 09:07

## 2018-01-01 RX ADMIN — BRIMONIDINE TARTRATE 1 DROP: 2 SOLUTION OPHTHALMIC at 09:10

## 2018-01-01 RX ADMIN — BACLOFEN 10 MG: 10 TABLET ORAL at 08:07

## 2018-01-01 RX ADMIN — LISINOPRIL 5 MG: 5 TABLET ORAL at 08:07

## 2018-01-01 RX ADMIN — IPRATROPIUM BROMIDE AND ALBUTEROL SULFATE 3 ML: .5; 3 SOLUTION RESPIRATORY (INHALATION) at 12:11

## 2018-01-01 RX ADMIN — CALCIUM GLUCONATE 2000 MG: 98 INJECTION, SOLUTION INTRAVENOUS at 03:07

## 2018-01-01 RX ADMIN — HEPARIN SODIUM 3800 UNITS: 1000 INJECTION, SOLUTION INTRAVENOUS; SUBCUTANEOUS at 11:07

## 2018-01-01 RX ADMIN — BACLOFEN 5 MG: 10 TABLET ORAL at 10:06

## 2018-01-01 RX ADMIN — MIDODRINE HYDROCHLORIDE 5 MG: 5 TABLET ORAL at 02:10

## 2018-01-01 RX ADMIN — ACETAMINOPHEN 500 MG: 500 TABLET, FILM COATED ORAL at 11:10

## 2018-01-01 RX ADMIN — DEXTROSE: 5 SOLUTION INTRAVENOUS at 05:07

## 2018-01-01 RX ADMIN — MAGNESIUM OXIDE TAB 400 MG (241.3 MG ELEMENTAL MG) 400 MG: 400 (241.3 MG) TAB at 08:10

## 2018-01-01 RX ADMIN — POTASSIUM CHLORIDE 40 MEQ: 20 SOLUTION ORAL at 07:07

## 2018-01-01 RX ADMIN — FENTANYL CITRATE 25 MCG: 50 INJECTION, SOLUTION INTRAMUSCULAR; INTRAVENOUS at 03:07

## 2018-01-01 RX ADMIN — LEVOTHYROXINE SODIUM 100 MCG: 100 TABLET ORAL at 06:07

## 2018-01-01 RX ADMIN — DEXTROSE 40 MG: 50 INJECTION, SOLUTION INTRAVENOUS at 09:07

## 2018-01-01 RX ADMIN — METOPROLOL SUCCINATE 50 MG: 50 TABLET, EXTENDED RELEASE ORAL at 11:07

## 2018-01-01 RX ADMIN — METHYLPREDNISOLONE SODIUM SUCCINATE 80 MG: 40 INJECTION, POWDER, FOR SOLUTION INTRAMUSCULAR; INTRAVENOUS at 05:07

## 2018-01-01 RX ADMIN — ERGOCALCIFEROL 50000 UNITS: 1.25 CAPSULE ORAL at 03:07

## 2018-01-01 RX ADMIN — LISINOPRIL 2.5 MG: 2.5 TABLET ORAL at 05:06

## 2018-01-01 RX ADMIN — CEFTRIAXONE 1 G: 1 INJECTION, SOLUTION INTRAVENOUS at 02:07

## 2018-01-01 RX ADMIN — MIDAZOLAM 2 MG: 1 INJECTION INTRAMUSCULAR; INTRAVENOUS at 03:07

## 2018-01-01 RX ADMIN — METOPROLOL SUCCINATE 12.5 MG: 25 TABLET, EXTENDED RELEASE ORAL at 07:06

## 2018-01-01 RX ADMIN — CHLORHEXIDINE GLUCONATE 15 ML: 1.2 RINSE ORAL at 11:07

## 2018-01-01 RX ADMIN — BACLOFEN 5 MG: 10 TABLET ORAL at 08:11

## 2018-01-01 RX ADMIN — SODIUM CHLORIDE, PRESERVATIVE FREE 3 ML: 5 INJECTION INTRAVENOUS at 02:07

## 2018-01-01 RX ADMIN — ZINC SULFATE 220 MG (50 MG) CAPSULE 220 MG: CAPSULE at 10:10

## 2018-01-01 RX ADMIN — ACETYLCYSTEINE 4 ML: 100 INHALANT RESPIRATORY (INHALATION) at 07:10

## 2018-01-01 RX ADMIN — BRIMONIDINE TARTRATE 1 DROP: 2 SOLUTION OPHTHALMIC at 08:07

## 2018-01-01 RX ADMIN — ARFORMOTEROL TARTRATE 15 MCG: 15 SOLUTION RESPIRATORY (INHALATION) at 07:11

## 2018-01-01 RX ADMIN — BACLOFEN 10 MG: 10 TABLET ORAL at 09:07

## 2018-01-01 RX ADMIN — BACLOFEN 5 MG: 10 TABLET ORAL at 08:10

## 2018-01-01 RX ADMIN — AMIODARONE HYDROCHLORIDE 200 MG: 200 TABLET ORAL at 11:07

## 2018-01-01 RX ADMIN — FUROSEMIDE 40 MG: 10 INJECTION, SOLUTION INTRAMUSCULAR; INTRAVENOUS at 10:10

## 2018-01-01 RX ADMIN — LACTULOSE 20 G: 20 SOLUTION ORAL at 05:05

## 2018-01-01 RX ADMIN — FUROSEMIDE 40 MG: 10 INJECTION, SOLUTION INTRAMUSCULAR; INTRAVENOUS at 08:10

## 2018-01-01 RX ADMIN — RIVAROXABAN 15 MG: 15 TABLET, FILM COATED ORAL at 04:06

## 2018-01-01 RX ADMIN — MIDODRINE HYDROCHLORIDE 10 MG: 5 TABLET ORAL at 02:10

## 2018-01-01 RX ADMIN — VANCOMYCIN HYDROCHLORIDE 750 MG: 750 INJECTION, POWDER, LYOPHILIZED, FOR SOLUTION INTRAVENOUS at 04:10

## 2018-01-01 RX ADMIN — METOPROLOL SUCCINATE 50 MG: 50 TABLET, EXTENDED RELEASE ORAL at 08:07

## 2018-01-01 RX ADMIN — SODIUM CHLORIDE 1000 ML: 0.9 INJECTION, SOLUTION INTRAVENOUS at 11:06

## 2018-01-01 RX ADMIN — MIDODRINE HYDROCHLORIDE 5 MG: 5 TABLET ORAL at 09:07

## 2018-01-01 RX ADMIN — HEPARIN SODIUM 5000 UNITS: 5000 INJECTION, SOLUTION INTRAVENOUS; SUBCUTANEOUS at 05:07

## 2018-01-01 RX ADMIN — CEFAZOLIN 2 G: 1 INJECTION, POWDER, FOR SOLUTION INTRAMUSCULAR; INTRAVENOUS at 04:07

## 2018-01-01 RX ADMIN — SODIUM CHLORIDE 1000 ML: 0.9 INJECTION, SOLUTION INTRAVENOUS at 10:07

## 2018-01-01 RX ADMIN — RIVAROXABAN 15 MG: 15 TABLET, FILM COATED ORAL at 08:10

## 2018-01-01 RX ADMIN — ACETAMINOPHEN 650 MG: 160 SOLUTION ORAL at 06:11

## 2018-01-01 RX ADMIN — SODIUM CHLORIDE 30 MG/ML INHALATION SOLUTION 4 ML: 30 SOLUTION INHALANT at 07:11

## 2018-01-01 RX ADMIN — SODIUM CHLORIDE, SODIUM LACTATE, POTASSIUM CHLORIDE, AND CALCIUM CHLORIDE: .6; .31; .03; .02 INJECTION, SOLUTION INTRAVENOUS at 04:07

## 2018-01-01 RX ADMIN — METOPROLOL SUCCINATE 50 MG: 50 TABLET, EXTENDED RELEASE ORAL at 11:06

## 2018-01-01 RX ADMIN — FUROSEMIDE 40 MG: 10 INJECTION, SOLUTION INTRAMUSCULAR; INTRAVENOUS at 05:11

## 2018-01-01 RX ADMIN — BACLOFEN 10 MG: 10 TABLET ORAL at 05:06

## 2018-01-01 RX ADMIN — HEPARIN SODIUM 5000 UNITS: 5000 INJECTION, SOLUTION INTRAVENOUS; SUBCUTANEOUS at 02:07

## 2018-01-01 RX ADMIN — METOPROLOL TARTRATE 2.5 MG: 5 INJECTION, SOLUTION INTRAVENOUS at 11:07

## 2018-01-01 RX ADMIN — Medication 16 G: at 11:06

## 2018-01-01 RX ADMIN — METOPROLOL SUCCINATE 12.5 MG: 25 TABLET, EXTENDED RELEASE ORAL at 06:06

## 2018-01-01 RX ADMIN — FENTANYL CITRATE 25 MCG: 50 INJECTION, SOLUTION INTRAMUSCULAR; INTRAVENOUS at 04:07

## 2018-01-01 RX ADMIN — ALBUMIN HUMAN 175 G: 0.05 INJECTION, SOLUTION INTRAVENOUS at 09:07

## 2018-01-01 RX ADMIN — AMIODARONE HYDROCHLORIDE 200 MG: 200 TABLET ORAL at 09:10

## 2018-01-01 RX ADMIN — BUMETANIDE 2 MG: 0.25 INJECTION INTRAMUSCULAR; INTRAVENOUS at 09:06

## 2018-01-01 RX ADMIN — FUROSEMIDE 40 MG: 10 INJECTION, SOLUTION INTRAMUSCULAR; INTRAVENOUS at 05:10

## 2018-01-01 RX ADMIN — POTASSIUM CHLORIDE 40 MEQ: 20 SOLUTION ORAL at 08:10

## 2018-01-01 RX ADMIN — LEVOTHYROXINE SODIUM 100 MCG: 100 TABLET ORAL at 07:07

## 2018-01-01 RX ADMIN — IPRATROPIUM BROMIDE AND ALBUTEROL SULFATE 3 ML: .5; 3 SOLUTION RESPIRATORY (INHALATION) at 08:10

## 2018-01-01 RX ADMIN — IPRATROPIUM BROMIDE AND ALBUTEROL SULFATE 3 ML: .5; 3 SOLUTION RESPIRATORY (INHALATION) at 08:07

## 2018-01-01 RX ADMIN — LEVOTHYROXINE SODIUM 100 MCG: 100 TABLET ORAL at 05:11

## 2018-01-01 RX ADMIN — CIPROFLOXACIN 400 MG: 2 INJECTION, SOLUTION INTRAVENOUS at 11:10

## 2018-01-01 RX ADMIN — IPRATROPIUM BROMIDE AND ALBUTEROL SULFATE 3 ML: .5; 3 SOLUTION RESPIRATORY (INHALATION) at 10:07

## 2018-01-01 RX ADMIN — RIVAROXABAN 15 MG: 15 TABLET, FILM COATED ORAL at 09:10

## 2018-01-01 RX ADMIN — FUROSEMIDE 40 MG: 10 INJECTION, SOLUTION INTRAMUSCULAR; INTRAVENOUS at 06:10

## 2018-01-01 RX ADMIN — TIMOLOL MALEATE 1 DROP: 5 SOLUTION/ DROPS OPHTHALMIC at 03:10

## 2018-01-01 RX ADMIN — ACETAMINOPHEN 650 MG: 325 TABLET ORAL at 01:07

## 2018-01-01 RX ADMIN — ALBUMIN HUMAN 175 G: 0.05 INJECTION, SOLUTION INTRAVENOUS at 03:07

## 2018-01-01 RX ADMIN — CEFTRIAXONE 1 G: 1 INJECTION, SOLUTION INTRAVENOUS at 03:07

## 2018-01-01 RX ADMIN — AMIODARONE HYDROCHLORIDE 0.5 MG/MIN: 1.8 INJECTION, SOLUTION INTRAVENOUS at 10:06

## 2018-01-01 RX ADMIN — POTASSIUM CHLORIDE 40 MEQ: 400 INJECTION, SOLUTION INTRAVENOUS at 08:07

## 2018-01-01 RX ADMIN — PANTOPRAZOLE SODIUM 40 MG: 40 TABLET, DELAYED RELEASE ORAL at 08:07

## 2018-01-01 RX ADMIN — DEXTROSE: 5 SOLUTION INTRAVENOUS at 01:07

## 2018-01-01 RX ADMIN — BUMETANIDE 2 MG: 1 TABLET ORAL at 08:07

## 2018-01-01 RX ADMIN — METHYLPREDNISOLONE SODIUM SUCCINATE 250 MG: 125 INJECTION, POWDER, FOR SOLUTION INTRAMUSCULAR; INTRAVENOUS at 06:07

## 2018-01-01 RX ADMIN — MICAFUNGIN SODIUM 100 MG: 20 INJECTION, POWDER, LYOPHILIZED, FOR SOLUTION INTRAVENOUS at 02:11

## 2018-01-01 RX ADMIN — AMIODARONE HYDROCHLORIDE 200 MG: 200 TABLET ORAL at 08:06

## 2018-01-01 RX ADMIN — POTASSIUM CHLORIDE 20 MEQ: 20 SOLUTION ORAL at 08:07

## 2018-01-01 RX ADMIN — POLYETHYLENE GLYCOL (3350) 17 G: 17 POWDER, FOR SOLUTION ORAL at 08:06

## 2018-01-01 RX ADMIN — TIMOLOL MALEATE 1 DROP: 5 SOLUTION/ DROPS OPHTHALMIC at 08:10

## 2018-01-01 RX ADMIN — FUROSEMIDE 40 MG: 10 INJECTION, SOLUTION INTRAMUSCULAR; INTRAVENOUS at 06:07

## 2018-01-01 RX ADMIN — DEXTROSE 40 MG: 50 INJECTION, SOLUTION INTRAVENOUS at 08:07

## 2018-01-01 RX ADMIN — BRIMONIDINE TARTRATE 1 DROP: 2 SOLUTION OPHTHALMIC at 08:11

## 2018-01-01 RX ADMIN — POTASSIUM CHLORIDE 10 MEQ: 7.46 INJECTION, SOLUTION INTRAVENOUS at 06:07

## 2018-01-01 RX ADMIN — Medication 0.06 MCG/KG/MIN: at 09:07

## 2018-01-01 RX ADMIN — FAMOTIDINE 20 MG: 20 TABLET ORAL at 08:11

## 2018-01-01 RX ADMIN — VANCOMYCIN HYDROCHLORIDE 1000 MG: 1 INJECTION, POWDER, LYOPHILIZED, FOR SOLUTION INTRAVENOUS at 03:10

## 2018-01-01 RX ADMIN — TRAMADOL HYDROCHLORIDE 50 MG: 50 TABLET, FILM COATED ORAL at 10:10

## 2018-01-01 RX ADMIN — BACLOFEN 10 MG: 10 TABLET ORAL at 03:10

## 2018-01-01 RX ADMIN — HYDRALAZINE HYDROCHLORIDE 5 MG: 20 INJECTION INTRAMUSCULAR; INTRAVENOUS at 06:07

## 2018-01-01 RX ADMIN — LEVOTHYROXINE SODIUM 100 MCG: 100 TABLET ORAL at 06:10

## 2018-01-01 RX ADMIN — MYCOPHENOLATE MOFETIL 500 MG: 250 CAPSULE ORAL at 11:07

## 2018-01-01 RX ADMIN — METOPROLOL TARTRATE 5 MG: 5 INJECTION, SOLUTION INTRAVENOUS at 12:07

## 2018-01-01 RX ADMIN — CALCIUM GLUCONATE 2000 MG: 98 INJECTION, SOLUTION INTRAVENOUS at 09:07

## 2018-01-01 RX ADMIN — PIPERACILLIN AND TAZOBACTAM 4.5 G: 4; .5 INJECTION, POWDER, LYOPHILIZED, FOR SOLUTION INTRAVENOUS; PARENTERAL at 05:10

## 2018-01-01 RX ADMIN — INSULIN ASPART 2 UNITS: 100 INJECTION, SOLUTION INTRAVENOUS; SUBCUTANEOUS at 01:07

## 2018-01-01 RX ADMIN — LORAZEPAM 4 MG: 2 INJECTION INTRAMUSCULAR; INTRAVENOUS at 06:11

## 2018-01-01 RX ADMIN — IPRATROPIUM BROMIDE AND ALBUTEROL SULFATE 3 ML: .5; 3 SOLUTION RESPIRATORY (INHALATION) at 03:07

## 2018-01-01 RX ADMIN — FUROSEMIDE 40 MG: 40 TABLET ORAL at 09:07

## 2018-01-01 RX ADMIN — MIDODRINE HYDROCHLORIDE 10 MG: 5 TABLET ORAL at 03:10

## 2018-01-01 RX ADMIN — CIPROFLOXACIN 400 MG: 2 INJECTION, SOLUTION INTRAVENOUS at 10:10

## 2018-01-01 RX ADMIN — HEPARIN SODIUM 5000 UNITS: 5000 INJECTION, SOLUTION INTRAVENOUS; SUBCUTANEOUS at 01:07

## 2018-01-01 RX ADMIN — TRAMADOL HYDROCHLORIDE 50 MG: 50 TABLET, FILM COATED ORAL at 09:10

## 2018-01-01 RX ADMIN — INSULIN ASPART 5 UNITS: 100 INJECTION, SOLUTION INTRAVENOUS; SUBCUTANEOUS at 11:07

## 2018-01-01 RX ADMIN — Medication 5 MG/HR: at 11:06

## 2018-01-01 RX ADMIN — MIDODRINE HYDROCHLORIDE 5 MG: 5 TABLET ORAL at 03:10

## 2018-01-01 RX ADMIN — BUMETANIDE 2 MG: 0.25 INJECTION INTRAMUSCULAR; INTRAVENOUS at 09:07

## 2018-01-01 RX ADMIN — HYDRALAZINE HYDROCHLORIDE 10 MG: 20 INJECTION INTRAMUSCULAR; INTRAVENOUS at 04:07

## 2018-01-01 RX ADMIN — FUROSEMIDE 40 MG: 10 INJECTION, SOLUTION INTRAMUSCULAR; INTRAVENOUS at 08:11

## 2018-01-01 RX ADMIN — MAGNESIUM OXIDE TAB 400 MG (241.3 MG ELEMENTAL MG) 400 MG: 400 (241.3 MG) TAB at 08:11

## 2018-01-01 RX ADMIN — DEXTROSE 40 MG: 50 INJECTION, SOLUTION INTRAVENOUS at 11:07

## 2018-01-01 RX ADMIN — ACETAZOLAMIDE 250 MG: 500 INJECTION, POWDER, LYOPHILIZED, FOR SOLUTION INTRAVENOUS at 04:07

## 2018-01-01 RX ADMIN — FUROSEMIDE 20 MG: 10 INJECTION, SOLUTION INTRAVENOUS at 12:07

## 2018-01-01 RX ADMIN — METOPROLOL SUCCINATE 50 MG: 50 TABLET, EXTENDED RELEASE ORAL at 09:06

## 2018-01-01 RX ADMIN — PIPERACILLIN AND TAZOBACTAM 4.5 G: 4; .5 INJECTION, POWDER, LYOPHILIZED, FOR SOLUTION INTRAVENOUS; PARENTERAL at 09:10

## 2018-01-01 RX ADMIN — DEXTROSE MONOHYDRATE 12.5 G: 25 INJECTION, SOLUTION INTRAVENOUS at 12:10

## 2018-01-01 RX ADMIN — SODIUM CHLORIDE: 0.9 INJECTION, SOLUTION INTRAVENOUS at 01:10

## 2018-01-01 RX ADMIN — Medication 0.02 MCG/KG/MIN: at 04:10

## 2018-01-01 RX ADMIN — FUROSEMIDE 40 MG: 10 INJECTION, SOLUTION INTRAMUSCULAR; INTRAVENOUS at 07:07

## 2018-01-01 RX ADMIN — RIVAROXABAN 15 MG: 15 TABLET, FILM COATED ORAL at 05:06

## 2018-01-01 RX ADMIN — PIPERACILLIN SODIUM AND TAZOBACTAM SODIUM 4.5 G: 4; .5 INJECTION, POWDER, LYOPHILIZED, FOR SOLUTION INTRAVENOUS at 12:11

## 2018-01-01 RX ADMIN — CISATRACURIUM BESYLATE 6 MG: 2 INJECTION INTRAVENOUS at 03:07

## 2018-01-01 RX ADMIN — FUROSEMIDE 40 MG: 10 INJECTION, SOLUTION INTRAMUSCULAR; INTRAVENOUS at 08:07

## 2018-01-01 RX ADMIN — FAMOTIDINE 20 MG: 20 TABLET ORAL at 09:10

## 2018-01-01 RX ADMIN — ACETAZOLAMIDE 500 MG: 500 INJECTION, POWDER, LYOPHILIZED, FOR SOLUTION INTRAVENOUS at 11:10

## 2018-01-01 RX ADMIN — POTASSIUM CHLORIDE 40 MEQ: 20 SOLUTION ORAL at 01:10

## 2018-01-01 RX ADMIN — CEFTRIAXONE 1 G: 1 INJECTION, SOLUTION INTRAVENOUS at 06:07

## 2018-01-01 RX ADMIN — Medication 0.02 MCG/KG/MIN: at 04:07

## 2018-01-01 RX ADMIN — SODIUM CHLORIDE 250 ML: 0.9 INJECTION, SOLUTION INTRAVENOUS at 12:07

## 2018-01-01 RX ADMIN — VANCOMYCIN HYDROCHLORIDE 500 MG: 500 INJECTION, POWDER, LYOPHILIZED, FOR SOLUTION INTRAVENOUS at 03:10

## 2018-01-01 RX ADMIN — HYDRALAZINE HYDROCHLORIDE 10 MG: 20 INJECTION INTRAMUSCULAR; INTRAVENOUS at 01:07

## 2018-01-01 RX ADMIN — SODIUM CHLORIDE 30 MG/ML INHALATION SOLUTION 4 ML: 30 SOLUTION INHALANT at 08:10

## 2018-01-01 RX ADMIN — HEPARIN SODIUM 5000 UNITS: 5000 INJECTION, SOLUTION INTRAVENOUS; SUBCUTANEOUS at 06:07

## 2018-01-01 RX ADMIN — FUROSEMIDE 40 MG: 10 INJECTION, SOLUTION INTRAMUSCULAR; INTRAVENOUS at 05:07

## 2018-01-01 RX ADMIN — AMIODARONE HYDROCHLORIDE 100 MG: 100 TABLET ORAL at 08:10

## 2018-01-01 RX ADMIN — ACETAMINOPHEN 650 MG: 650 SOLUTION ORAL at 08:07

## 2018-01-01 RX ADMIN — POTASSIUM CHLORIDE 40 MEQ: 20 SOLUTION ORAL at 09:10

## 2018-01-01 RX ADMIN — HEPARIN SODIUM 4000 UNITS: 1000 INJECTION, SOLUTION INTRAVENOUS; SUBCUTANEOUS at 09:07

## 2018-01-01 RX ADMIN — PIPERACILLIN SODIUM AND TAZOBACTAM SODIUM 4.5 G: 4; .5 INJECTION, POWDER, LYOPHILIZED, FOR SOLUTION INTRAVENOUS at 03:11

## 2018-01-01 RX ADMIN — ERGOCALCIFEROL 50000 UNITS: 1.25 CAPSULE ORAL at 09:07

## 2018-01-01 RX ADMIN — MIDODRINE HYDROCHLORIDE 5 MG: 5 TABLET ORAL at 10:07

## 2018-01-01 RX ADMIN — METOPROLOL TARTRATE 25 MG: 25 TABLET ORAL at 09:07

## 2018-01-01 RX ADMIN — PIPERACILLIN SODIUM AND TAZOBACTAM SODIUM 4.5 G: 4; .5 INJECTION, POWDER, LYOPHILIZED, FOR SOLUTION INTRAVENOUS at 04:10

## 2018-01-01 RX ADMIN — PANTOPRAZOLE SODIUM 40 MG: 40 TABLET, DELAYED RELEASE ORAL at 05:06

## 2018-01-01 RX ADMIN — DEXTROSE MONOHYDRATE 12.5 G: 25 INJECTION, SOLUTION INTRAVENOUS at 10:10

## 2018-01-01 RX ADMIN — CALCIUM GLUCONATE 2000 MG: 94 INJECTION, SOLUTION INTRAVENOUS at 10:07

## 2018-01-01 RX ADMIN — FUROSEMIDE 40 MG: 10 INJECTION, SOLUTION INTRAMUSCULAR; INTRAVENOUS at 11:07

## 2018-01-01 RX ADMIN — BRIMONIDINE TARTRATE 1 DROP: 2 SOLUTION OPHTHALMIC at 12:10

## 2018-01-01 RX ADMIN — INSULIN ASPART 2 UNITS: 100 INJECTION, SOLUTION INTRAVENOUS; SUBCUTANEOUS at 11:07

## 2018-01-01 RX ADMIN — MIDODRINE HYDROCHLORIDE 5 MG: 5 TABLET ORAL at 08:07

## 2018-01-01 RX ADMIN — CEFTRIAXONE 1 G: 1 INJECTION, SOLUTION INTRAVENOUS at 05:07

## 2018-01-01 RX ADMIN — SODIUM CHLORIDE 30 MG/ML INHALATION SOLUTION: 30 SOLUTION INHALANT at 07:10

## 2018-01-01 RX ADMIN — METOPROLOL TARTRATE 2.5 MG: 5 INJECTION, SOLUTION INTRAVENOUS at 07:07

## 2018-01-01 RX ADMIN — FUROSEMIDE 20 MG: 10 INJECTION, SOLUTION INTRAMUSCULAR; INTRAVENOUS at 11:10

## 2018-01-01 RX ADMIN — MEROPENEM AND SODIUM CHLORIDE 500 MG: 500 INJECTION, SOLUTION INTRAVENOUS at 11:10

## 2018-01-01 RX ADMIN — FUROSEMIDE 40 MG: 40 TABLET ORAL at 11:07

## 2018-01-01 RX ADMIN — SODIUM CHLORIDE 1000 ML: 0.9 INJECTION, SOLUTION INTRAVENOUS at 09:10

## 2018-01-01 RX ADMIN — DEXTROSE 40 MG: 50 INJECTION, SOLUTION INTRAVENOUS at 06:07

## 2018-01-01 RX ADMIN — MIDODRINE HYDROCHLORIDE 5 MG: 5 TABLET ORAL at 04:07

## 2018-01-01 RX ADMIN — Medication 0.08 MCG/KG/MIN: at 11:07

## 2018-01-01 RX ADMIN — ACETAZOLAMIDE 250 MG: 500 INJECTION, POWDER, LYOPHILIZED, FOR SOLUTION INTRAVENOUS at 10:10

## 2018-01-01 RX ADMIN — BUMETANIDE 2 MG: 0.25 INJECTION INTRAMUSCULAR; INTRAVENOUS at 12:07

## 2018-01-01 RX ADMIN — FUROSEMIDE 20 MG: 10 INJECTION, SOLUTION INTRAVENOUS at 09:10

## 2018-01-01 RX ADMIN — INSULIN DETEMIR 10 UNITS: 100 INJECTION, SOLUTION SUBCUTANEOUS at 05:10

## 2018-01-01 RX ADMIN — BACLOFEN 10 MG: 10 TABLET ORAL at 09:10

## 2018-01-01 RX ADMIN — POTASSIUM PHOSPHATE, MONOBASIC AND POTASSIUM PHOSPHATE, DIBASIC 15 MMOL: 224; 236 INJECTION, SOLUTION, CONCENTRATE INTRAVENOUS at 06:07

## 2018-01-01 RX ADMIN — SODIUM PHOSPHATE, MONOBASIC, MONOHYDRATE 15 MMOL: 276; 142 INJECTION, SOLUTION INTRAVENOUS at 11:07

## 2018-01-01 RX ADMIN — TRAMADOL HYDROCHLORIDE 50 MG: 50 TABLET, FILM COATED ORAL at 08:10

## 2018-01-01 RX ADMIN — PIPERACILLIN SODIUM AND TAZOBACTAM SODIUM 4.5 G: 4; .5 INJECTION, POWDER, LYOPHILIZED, FOR SOLUTION INTRAVENOUS at 07:10

## 2018-01-01 RX ADMIN — DEXTROSE MONOHYDRATE 12.5 G: 25 INJECTION, SOLUTION INTRAVENOUS at 06:10

## 2018-01-01 RX ADMIN — MIDAZOLAM 2 MG: 1 INJECTION INTRAMUSCULAR; INTRAVENOUS at 05:07

## 2018-01-01 RX ADMIN — Medication 0.02 MCG/KG/MIN: at 11:07

## 2018-01-01 RX ADMIN — SODIUM CHLORIDE 500 ML: 0.45 INJECTION, SOLUTION INTRAVENOUS at 09:07

## 2018-01-01 RX ADMIN — PIPERACILLIN SODIUM AND TAZOBACTAM SODIUM 4.5 G: 4; .5 INJECTION, POWDER, LYOPHILIZED, FOR SOLUTION INTRAVENOUS at 03:10

## 2018-01-01 RX ADMIN — INSULIN ASPART 2 UNITS: 100 INJECTION, SOLUTION INTRAVENOUS; SUBCUTANEOUS at 05:07

## 2018-01-01 RX ADMIN — MAGNESIUM OXIDE TAB 400 MG (241.3 MG ELEMENTAL MG) 400 MG: 400 (241.3 MG) TAB at 10:11

## 2018-01-01 RX ADMIN — VANCOMYCIN HYDROCHLORIDE 1000 MG: 1 INJECTION, POWDER, LYOPHILIZED, FOR SOLUTION INTRAVENOUS at 10:10

## 2018-01-01 RX ADMIN — TRAMADOL HYDROCHLORIDE 50 MG: 50 TABLET, COATED ORAL at 12:07

## 2018-01-01 RX ADMIN — PIPERACILLIN SODIUM AND TAZOBACTAM SODIUM 4.5 G: 4; .5 INJECTION, POWDER, LYOPHILIZED, FOR SOLUTION INTRAVENOUS at 09:10

## 2018-01-01 RX ADMIN — CIPROFLOXACIN HYDROCHLORIDE 500 MG: 500 TABLET, FILM COATED ORAL at 10:10

## 2018-01-01 RX ADMIN — AMIODARONE HYDROCHLORIDE 1 MG/MIN: 1.8 INJECTION, SOLUTION INTRAVENOUS at 01:06

## 2018-01-01 RX ADMIN — ZINC SULFATE 220 MG (50 MG) CAPSULE 220 MG: CAPSULE at 08:11

## 2018-01-01 RX ADMIN — BACLOFEN 10 MG: 10 TABLET ORAL at 10:10

## 2018-01-01 RX ADMIN — LINEZOLID 600 MG: 600 TABLET, FILM COATED ORAL at 08:11

## 2018-01-01 RX ADMIN — MICAFUNGIN SODIUM 100 MG: 20 INJECTION, POWDER, LYOPHILIZED, FOR SOLUTION INTRAVENOUS at 03:10

## 2018-01-01 RX ADMIN — ACETAMINOPHEN 650 MG: 650 SOLUTION ORAL at 04:07

## 2018-01-01 RX ADMIN — SODIUM CHLORIDE: 0.9 INJECTION, SOLUTION INTRAVENOUS at 10:10

## 2018-01-01 RX ADMIN — PIPERACILLIN SODIUM AND TAZOBACTAM SODIUM 4.5 G: 4; .5 INJECTION, POWDER, LYOPHILIZED, FOR SOLUTION INTRAVENOUS at 12:10

## 2018-01-01 RX ADMIN — SODIUM CHLORIDE 1000 ML: 0.9 INJECTION, SOLUTION INTRAVENOUS at 07:10

## 2018-01-01 RX ADMIN — POTASSIUM CHLORIDE 40 MEQ: 20 SOLUTION ORAL at 08:11

## 2018-01-01 RX ADMIN — SODIUM CHLORIDE, PRESERVATIVE FREE 3 ML: 5 INJECTION INTRAVENOUS at 09:07

## 2018-01-01 RX ADMIN — Medication 2000 MG: at 04:10

## 2018-01-01 RX ADMIN — FUROSEMIDE 40 MG: 40 TABLET ORAL at 05:07

## 2018-01-01 RX ADMIN — LISINOPRIL 5 MG: 5 TABLET ORAL at 11:07

## 2018-01-01 RX ADMIN — POLYETHYLENE GLYCOL 3350 17 G: 17 POWDER, FOR SOLUTION ORAL at 09:10

## 2018-01-01 RX ADMIN — VANCOMYCIN HYDROCHLORIDE 1750 MG: 1 INJECTION, POWDER, LYOPHILIZED, FOR SOLUTION INTRAVENOUS at 08:10

## 2018-01-01 RX ADMIN — HYDRALAZINE HYDROCHLORIDE 10 MG: 20 INJECTION INTRAMUSCULAR; INTRAVENOUS at 03:07

## 2018-01-01 RX ADMIN — CEFTRIAXONE 1 G: 1 INJECTION, SOLUTION INTRAVENOUS at 05:06

## 2018-01-01 RX ADMIN — INSULIN DETEMIR 10 UNITS: 100 INJECTION, SOLUTION SUBCUTANEOUS at 08:07

## 2018-01-01 RX ADMIN — MIDODRINE HYDROCHLORIDE 5 MG: 5 TABLET ORAL at 06:10

## 2018-01-01 RX ADMIN — IOHEXOL 30 ML: 350 INJECTION, SOLUTION INTRAVENOUS at 12:10

## 2018-01-01 RX ADMIN — FAMOTIDINE 20 MG: 20 TABLET ORAL at 11:10

## 2018-01-01 RX ADMIN — FUROSEMIDE 20 MG: 10 INJECTION, SOLUTION INTRAMUSCULAR; INTRAVENOUS at 03:10

## 2018-01-01 RX ADMIN — VANCOMYCIN HYDROCHLORIDE 1000 MG: 1 INJECTION, POWDER, LYOPHILIZED, FOR SOLUTION INTRAVENOUS at 12:10

## 2018-01-01 RX ADMIN — FUROSEMIDE 20 MG: 20 TABLET ORAL at 06:07

## 2018-01-01 RX ADMIN — BACLOFEN 5 MG: 10 TABLET ORAL at 10:11

## 2018-01-01 RX ADMIN — SODIUM CHLORIDE 1412 ML: 0.9 INJECTION, SOLUTION INTRAVENOUS at 10:10

## 2018-01-01 RX ADMIN — HYDRALAZINE HYDROCHLORIDE 5 MG: 20 INJECTION INTRAMUSCULAR; INTRAVENOUS at 01:07

## 2018-01-01 RX ADMIN — HEPARIN SODIUM 5000 UNITS: 5000 INJECTION, SOLUTION INTRAVENOUS; SUBCUTANEOUS at 11:07

## 2018-01-01 RX ADMIN — MICAFUNGIN SODIUM 100 MG: 20 INJECTION, POWDER, LYOPHILIZED, FOR SOLUTION INTRAVENOUS at 02:10

## 2018-01-01 RX ADMIN — SODIUM CHLORIDE 500 ML: 0.9 INJECTION, SOLUTION INTRAVENOUS at 07:07

## 2018-01-01 RX ADMIN — RIVAROXABAN 15 MG: 15 TABLET, FILM COATED ORAL at 05:07

## 2018-01-01 RX ADMIN — MEROPENEM AND SODIUM CHLORIDE 500 MG: 500 INJECTION, SOLUTION INTRAVENOUS at 07:10

## 2018-01-01 RX ADMIN — METOPROLOL SUCCINATE 100 MG: 50 TABLET, EXTENDED RELEASE ORAL at 08:06

## 2018-01-01 RX ADMIN — TRAMADOL HYDROCHLORIDE 50 MG: 50 TABLET, FILM COATED ORAL at 04:10

## 2018-01-01 RX ADMIN — PIPERACILLIN SODIUM AND TAZOBACTAM SODIUM 4.5 G: 4; .5 INJECTION, POWDER, LYOPHILIZED, FOR SOLUTION INTRAVENOUS at 08:11

## 2018-01-01 RX ADMIN — SODIUM CHLORIDE, PRESERVATIVE FREE 3 ML: 5 INJECTION INTRAVENOUS at 05:07

## 2018-01-01 RX ADMIN — SODIUM CHLORIDE, PRESERVATIVE FREE 3 ML: 5 INJECTION INTRAVENOUS at 10:07

## 2018-01-01 RX ADMIN — PIPERACILLIN AND TAZOBACTAM 4.5 G: 4; .5 INJECTION, POWDER, LYOPHILIZED, FOR SOLUTION INTRAVENOUS; PARENTERAL at 01:10

## 2018-01-01 RX ADMIN — ZINC SULFATE 220 MG (50 MG) CAPSULE 220 MG: CAPSULE at 12:10

## 2018-01-01 RX ADMIN — SODIUM CHLORIDE, SODIUM LACTATE, POTASSIUM CHLORIDE, AND CALCIUM CHLORIDE 250 ML: .6; .31; .03; .02 INJECTION, SOLUTION INTRAVENOUS at 09:10

## 2018-01-01 RX ADMIN — BACLOFEN 10 MG: 10 TABLET ORAL at 08:10

## 2018-01-01 RX ADMIN — FAMOTIDINE 20 MG: 20 TABLET ORAL at 10:11

## 2018-01-01 RX ADMIN — POTASSIUM CHLORIDE 40 MEQ: 20 SOLUTION ORAL at 11:10

## 2018-01-01 RX ADMIN — METOPROLOL TARTRATE 5 MG: 5 INJECTION, SOLUTION INTRAVENOUS at 06:07

## 2018-01-01 RX ADMIN — MIDODRINE HYDROCHLORIDE 5 MG: 5 TABLET ORAL at 02:07

## 2018-01-01 RX ADMIN — FUROSEMIDE 40 MG: 10 INJECTION, SOLUTION INTRAMUSCULAR; INTRAVENOUS at 09:10

## 2018-01-01 RX ADMIN — AMIODARONE HYDROCHLORIDE 200 MG: 200 TABLET ORAL at 10:06

## 2018-01-01 RX ADMIN — AMIODARONE HYDROCHLORIDE 200 MG: 200 TABLET ORAL at 11:10

## 2018-01-01 RX ADMIN — VANCOMYCIN HYDROCHLORIDE 1000 MG: 1 INJECTION, POWDER, LYOPHILIZED, FOR SOLUTION INTRAVENOUS at 01:10

## 2018-01-01 RX ADMIN — TRAMADOL HYDROCHLORIDE 50 MG: 50 TABLET, FILM COATED ORAL at 01:10

## 2018-01-01 RX ADMIN — AMIODARONE HYDROCHLORIDE 200 MG: 200 TABLET ORAL at 01:06

## 2018-01-01 RX ADMIN — IOHEXOL 75 ML: 350 INJECTION, SOLUTION INTRAVENOUS at 03:10

## 2018-01-01 RX ADMIN — LISINOPRIL 2.5 MG: 2.5 TABLET ORAL at 08:07

## 2018-01-01 RX ADMIN — PIPERACILLIN SODIUM AND TAZOBACTAM SODIUM 4.5 G: 4; .5 INJECTION, POWDER, LYOPHILIZED, FOR SOLUTION INTRAVENOUS at 08:10

## 2018-01-01 RX ADMIN — SODIUM CHLORIDE 125 ML/HR: 0.9 INJECTION, SOLUTION INTRAVENOUS at 11:10

## 2018-01-01 RX ADMIN — SODIUM PHOSPHATE, MONOBASIC, MONOHYDRATE 30 MMOL: 276; 142 INJECTION, SOLUTION INTRAVENOUS at 10:07

## 2018-01-01 RX ADMIN — POTASSIUM CHLORIDE 60 MEQ: 20 SOLUTION ORAL at 09:10

## 2018-01-01 RX ADMIN — BRIMONIDINE TARTRATE 1 DROP: 2 SOLUTION OPHTHALMIC at 03:10

## 2018-01-01 RX ADMIN — MYCOPHENOLATE MOFETIL 500 MG: 250 CAPSULE ORAL at 09:07

## 2018-01-01 RX ADMIN — METOPROLOL TARTRATE 2.5 MG: 5 INJECTION, SOLUTION INTRAVENOUS at 05:07

## 2018-01-01 RX ADMIN — POTASSIUM CHLORIDE: 149 INJECTION, SOLUTION, CONCENTRATE INTRAVENOUS at 06:07

## 2018-01-01 RX ADMIN — POLYETHYLENE GLYCOL 3350 17 G: 17 POWDER, FOR SOLUTION ORAL at 08:10

## 2018-01-01 RX ADMIN — RIVAROXABAN 15 MG: 15 TABLET, FILM COATED ORAL at 06:06

## 2018-01-01 RX ADMIN — SODIUM CHLORIDE, PRESERVATIVE FREE 3 ML: 5 INJECTION INTRAVENOUS at 06:07

## 2018-01-01 RX ADMIN — CEFTRIAXONE 2 G: 2 INJECTION, POWDER, FOR SOLUTION INTRAMUSCULAR; INTRAVENOUS at 10:10

## 2018-01-01 RX ADMIN — RIVAROXABAN 15 MG: 15 TABLET, FILM COATED ORAL at 08:11

## 2018-01-01 RX ADMIN — AMIODARONE HYDROCHLORIDE 200 MG: 200 TABLET ORAL at 10:11

## 2018-01-01 RX ADMIN — FUROSEMIDE 20 MG: 20 TABLET ORAL at 09:07

## 2018-01-01 RX ADMIN — MAGNESIUM OXIDE TAB 400 MG (241.3 MG ELEMENTAL MG) 400 MG: 400 (241.3 MG) TAB at 11:10

## 2018-01-01 RX ADMIN — SODIUM CHLORIDE 500 ML: 0.9 INJECTION, SOLUTION INTRAVENOUS at 01:06

## 2018-01-01 RX ADMIN — SODIUM CHLORIDE, SODIUM LACTATE, POTASSIUM CHLORIDE, AND CALCIUM CHLORIDE: .6; .31; .03; .02 INJECTION, SOLUTION INTRAVENOUS at 10:10

## 2018-01-01 RX ADMIN — METHYLPREDNISOLONE SODIUM SUCCINATE 80 MG: 40 INJECTION, POWDER, FOR SOLUTION INTRAMUSCULAR; INTRAVENOUS at 09:07

## 2018-01-01 RX ADMIN — METOPROLOL TARTRATE 5 MG: 5 INJECTION, SOLUTION INTRAVENOUS at 09:06

## 2018-01-01 RX ADMIN — VANCOMYCIN HYDROCHLORIDE 1250 MG: 10 INJECTION, POWDER, LYOPHILIZED, FOR SOLUTION INTRAVENOUS at 02:10

## 2018-01-01 RX ADMIN — ACETAMINOPHEN 650 MG: 325 TABLET, FILM COATED ORAL at 11:06

## 2018-01-01 RX ADMIN — Medication 25 MCG/HR: at 12:07

## 2018-01-01 RX ADMIN — ZINC SULFATE 220 MG (50 MG) CAPSULE 220 MG: CAPSULE at 10:11

## 2018-01-01 RX ADMIN — ALBUMIN HUMAN 175 G: 0.05 INJECTION, SOLUTION INTRAVENOUS at 10:07

## 2018-01-01 RX ADMIN — SODIUM CHLORIDE: 0.9 INJECTION, SOLUTION INTRAVENOUS at 03:10

## 2018-01-01 RX ADMIN — MEROPENEM AND SODIUM CHLORIDE 500 MG: 500 INJECTION, SOLUTION INTRAVENOUS at 04:10

## 2018-01-01 RX ADMIN — ACETAMINOPHEN 1000 MG: 10 INJECTION, SOLUTION INTRAVENOUS at 04:07

## 2018-01-01 RX ADMIN — SODIUM CHLORIDE 500 ML: 0.9 INJECTION, SOLUTION INTRAVENOUS at 07:10

## 2018-01-01 RX ADMIN — SODIUM CHLORIDE 500 ML: 0.9 INJECTION, SOLUTION INTRAVENOUS at 08:07

## 2018-01-01 RX ADMIN — BACLOFEN 10 MG: 10 TABLET ORAL at 05:07

## 2018-01-01 RX ADMIN — AMIODARONE HYDROCHLORIDE 150 MG: 1.5 INJECTION, SOLUTION INTRAVENOUS at 01:06

## 2018-01-01 RX ADMIN — ALUMINUM HYDROXIDE, MAGNESIUM HYDROXIDE, AND SIMETHICONE: 200; 200; 20 SUSPENSION ORAL at 07:06

## 2018-01-01 RX ADMIN — METOPROLOL TARTRATE 5 MG: 5 INJECTION, SOLUTION INTRAVENOUS at 05:07

## 2018-01-01 RX ADMIN — PIPERACILLIN AND TAZOBACTAM 4.5 G: 4; .5 INJECTION, POWDER, LYOPHILIZED, FOR SOLUTION INTRAVENOUS; PARENTERAL at 07:10

## 2018-01-15 DIAGNOSIS — K21.9 GASTROESOPHAGEAL REFLUX DISEASE, ESOPHAGITIS PRESENCE NOT SPECIFIED: ICD-10-CM

## 2018-01-15 RX ORDER — PANTOPRAZOLE SODIUM 40 MG/1
TABLET, DELAYED RELEASE ORAL
Qty: 30 TABLET | Refills: 0 | OUTPATIENT
Start: 2018-01-15

## 2018-03-09 ENCOUNTER — OFFICE VISIT (OUTPATIENT)
Dept: OPHTHALMOLOGY | Facility: CLINIC | Age: 73
End: 2018-03-09
Payer: MEDICARE

## 2018-03-09 DIAGNOSIS — H25.013 CORTICAL AGE-RELATED CATARACT OF BOTH EYES: ICD-10-CM

## 2018-03-09 DIAGNOSIS — E11.9 TYPE 2 DIABETES MELLITUS WITHOUT COMPLICATION, WITHOUT LONG-TERM CURRENT USE OF INSULIN: Primary | ICD-10-CM

## 2018-03-09 PROBLEM — R65.10 SIRS (SYSTEMIC INFLAMMATORY RESPONSE SYNDROME): Status: ACTIVE | Noted: 2017-09-23

## 2018-03-09 PROCEDURE — 92014 COMPRE OPH EXAM EST PT 1/>: CPT | Mod: S$PBB,,, | Performed by: OPHTHALMOLOGY

## 2018-03-09 PROCEDURE — 99212 OFFICE O/P EST SF 10 MIN: CPT | Mod: PBBFAC,PO | Performed by: OPHTHALMOLOGY

## 2018-03-09 PROCEDURE — 99999 PR PBB SHADOW E&M-EST. PATIENT-LVL II: CPT | Mod: PBBFAC,,, | Performed by: OPHTHALMOLOGY

## 2018-03-09 RX ORDER — MONTELUKAST SODIUM 10 MG/1
10 TABLET ORAL DAILY
COMMUNITY

## 2018-03-09 RX ORDER — ALBUTEROL SULFATE 90 UG/1
2 AEROSOL, METERED RESPIRATORY (INHALATION) EVERY 4 HOURS PRN
COMMUNITY
Start: 2017-05-04 | End: 2019-01-01

## 2018-03-09 RX ORDER — POTASSIUM CHLORIDE 20 MEQ/1
20 TABLET, EXTENDED RELEASE ORAL DAILY
Status: ON HOLD | COMMUNITY
Start: 2017-12-21 | End: 2018-01-01 | Stop reason: HOSPADM

## 2018-03-09 RX ORDER — LEVOTHYROXINE SODIUM 100 UG/1
100 TABLET ORAL
COMMUNITY

## 2018-03-09 RX ORDER — HYDROCHLOROTHIAZIDE 12.5 MG/1
12.5 CAPSULE ORAL
COMMUNITY
End: 2018-01-01

## 2018-03-09 NOTE — PROGRESS NOTES
"    ===============================  03/09/2018   Carlie Valdez,   72 y.o. female   Last visit [facility]: :10/25/2016   Last visit eye dept. Visit date not found  [facility]:  Uncorrected distance visual acuity was 20/400 in the right eye and CF at 3' in the left eye.  Tonometry     Tonometry (Applanation, 9:49 AM)       Right Left    Pressure 16 16               Not recorded         Not recorded        Chief Complaint   Patient presents with    OPTIC NEURITIS     YEARLY EXAM    Dry Eye        HPI     OPTIC NEURITIS    Additional comments: YEARLY EXAM           Comments   "Dr Valdez's wife" (  is [facility] cat pt)    NO DM  OLD BILAT. ION WITH PALE DISCS  NS OD  PCIOL OS  DRY EYES  Optic neuritis  --------------------------------------  NS (nuclear sclerosis), right   Fuch's endothelial dystrophy   Pseudophakia   Visual field constriction, bilateral   Dryness, eye, bilateral   ..old ION with vf loss  Fuchs  OD 2+ cortical and NS  Increased c:d  IOP 13 OD, 16 OS       Last edited by Alisha Arriaga on 3/9/2018  9:32 AM. (History)          ________________  3/9/2018  Problem List Items Addressed This Visit        Eye/Vision problems    Cortical age-related cataract of both eyes    Type 2 diabetes mellitus without complication, without long-term current use of insulin - Primary        bilat optic neurtitis   bc va od today 20/80-  Prev rec cat ext - no not occur  rec CE OD with Dr. Cadet  Guarded prognosis  No DR    .       ===========================    "

## 2018-03-13 ENCOUNTER — TELEPHONE (OUTPATIENT)
Dept: INTERNAL MEDICINE | Facility: CLINIC | Age: 73
End: 2018-03-13

## 2018-03-13 NOTE — TELEPHONE ENCOUNTER
S/w pt.  Scheduled appt for evaluation of being housebound for the [facility] form that was left at the clinic./rpr

## 2018-03-13 NOTE — TELEPHONE ENCOUNTER
----- Message from Julianna Meza sent at 3/13/2018  2:47 PM CDT -----  Contact: PT   Pt request call back to see if her paperwork is ready for pick. ..[phone] (home)

## 2018-03-16 ENCOUNTER — TELEPHONE (OUTPATIENT)
Dept: INTERNAL MEDICINE | Facility: CLINIC | Age: 73
End: 2018-03-16

## 2018-03-16 DIAGNOSIS — E11.9 TYPE 2 DIABETES MELLITUS WITHOUT COMPLICATION: ICD-10-CM

## 2018-03-16 NOTE — TELEPHONE ENCOUNTER
----- Message from Amy Salinas sent at 3/16/2018  1:05 PM CDT -----  pls r/s appt, transportation issues..[phone] ([place])

## 2018-03-19 ENCOUNTER — OFFICE VISIT (OUTPATIENT)
Dept: OPHTHALMOLOGY | Facility: CLINIC | Age: 73
End: 2018-03-19
Payer: MEDICARE

## 2018-03-19 DIAGNOSIS — H10.11 ALLERGIC CONJUNCTIVITIS OF RIGHT EYE: Primary | ICD-10-CM

## 2018-03-19 PROCEDURE — 99999 PR PBB SHADOW E&M-EST. PATIENT-LVL II: CPT | Mod: PBBFAC,,, | Performed by: OPHTHALMOLOGY

## 2018-03-19 PROCEDURE — 99212 OFFICE O/P EST SF 10 MIN: CPT | Mod: S$PBB,,, | Performed by: OPHTHALMOLOGY

## 2018-03-19 PROCEDURE — 99212 OFFICE O/P EST SF 10 MIN: CPT | Mod: PBBFAC,PO | Performed by: OPHTHALMOLOGY

## 2018-03-19 RX ORDER — TOBRAMYCIN AND DEXAMETHASONE 3; 1 MG/ML; MG/ML
1 SUSPENSION/ DROPS OPHTHALMIC 3 TIMES DAILY
Qty: 5 ML | Refills: 0 | Status: SHIPPED | OUTPATIENT
Start: 2018-03-19 | End: 2018-03-26

## 2018-03-19 NOTE — PROGRESS NOTES
===============================  03/19/2018   Carlie Valdez,   72 y.o. female   Last visit [facility]: :3/9/2018   Last visit eye dept. 3/9/2018  [facility]:  Uncorrected distance visual acuity was 20/400 in the right eye and CF at 3' in the left eye.  Tonometry     Tonometry (Applanation, 12:19 PM)       Right Left    Pressure 15 15               Not recorded        Manifest Refraction     Manifest Refraction       Sphere Cylinder [place] Dist [facility]    Right +1.00 +0.75 176 20/40    Left -0.75 +0.75 050 NI              Chief Complaint   Patient presents with    Eye Pain     pt states that her od has been red and painful for 10 or more days and also when she was in the hospital last january her left eye was hurting and a dr there treated it.        HPI     Eye Pain    Additional comments: pt states that her od has been red and painful for   10 or more days and also when she was in the hospital last january her   left eye was hurting and a dr there treated it.           Comments   OLD BILAT. ION WITH PALE DISCS  NS OD  PCIOL OS  DRY EYES  Optic neuritis         Last edited by LATRELL Araiza MD on 3/19/2018 12:15 PM. (History)          ________________  3/19/2018  Problem List Items Addressed This Visit     None      Visit Diagnoses     Allergic conjunctivitis of right eye    -  Primary    Relevant Medications    tobramycin-dexamethasone 0.3-0.1% (TOBRADEX) 0.3-0.1 % DrpS      urgent care today    Irritant allergic conjunctivitis OD x 1 week  tobradex tid x 1 week OD    [employer] [address] 18 OU  Full d&V     ============================

## 2018-03-26 ENCOUNTER — TELEPHONE (OUTPATIENT)
Dept: INTERNAL MEDICINE | Facility: CLINIC | Age: 73
End: 2018-03-26

## 2018-03-26 NOTE — TELEPHONE ENCOUNTER
----- Message from Rebecca Magaña sent at 3/26/2018  4:01 PM CDT -----  Pt at [phone]//states she is calling regarding a form that needs to be filled out for her//her appt with Dr Dennison is not until 4-6-18//and she is needing it before that date//she can have someone else fill it out//so she would like for your office to mail it back to her//please call/thanks/sanjuanita

## 2018-04-05 PROBLEM — E11.29 TYPE 2 DIABETES MELLITUS WITH KIDNEY COMPLICATION, WITHOUT LONG-TERM CURRENT USE OF INSULIN: Status: ACTIVE | Noted: 2018-03-09

## 2018-04-05 PROBLEM — E11.69 HYPERLIPIDEMIA ASSOCIATED WITH TYPE 2 DIABETES MELLITUS: Status: ACTIVE | Noted: 2018-04-05

## 2018-04-05 PROBLEM — E78.5 HYPERLIPIDEMIA ASSOCIATED WITH TYPE 2 DIABETES MELLITUS: Status: ACTIVE | Noted: 2018-04-05

## 2018-04-05 NOTE — PROGRESS NOTES
"Subjective:      Patient ID: Carlie Valdez is a 72 y.o. female.    Chief Complaint: Evaluation for [facility] status for [facility]  Here for f/u medical problems and preventive exam.  Energy ok.  Hospitalized in the past fall for CHF/pneumonia.  Has had 2 falls, once 2016, then 8/17.  Now with chronic LBP- Seeing Pain Dr. Macdonald for back pain.  No f/c/sw/cough.  No cp/sob/palp.  BMs normal.  Urine normal.  Not taking vit D but says she was supposed to be taking.    HM:  12/05 pneumo with bad rxn, 7/14 TDaP, 9/15 Cscope rep 7y, no BMD, 3/13 Gyn, 7/14 MMG, 12/16 Eye Dr. Cadet, Cards Dr. Hernández, Nephr Dr. Yan, Pain Dr. Macdonald.      Review of Systems   Constitutional: Negative for appetite change, chills, diaphoresis and fever.   HENT: Negative for congestion, ear pain, rhinorrhea, sinus pressure and sore throat.    Respiratory: Negative for cough, chest tightness and shortness of breath.    Cardiovascular: Negative for chest pain and palpitations.   Gastrointestinal: Negative for blood in stool, constipation, diarrhea, nausea and vomiting.   Genitourinary: Negative for dysuria, frequency, hematuria, menstrual problem, urgency and vaginal discharge.   Musculoskeletal: Negative for arthralgias.   Skin: Negative for rash.   Neurological: Negative for dizziness and headaches.   Psychiatric/Behavioral: Negative for sleep disturbance. The patient is not nervous/anxious.          Objective:   /75   Pulse 69   Temp 96.7 °F (35.9 °C) (Tympanic)   Ht 5' 4" (1.626 m)   Wt 83.5 kg (184 lb 1.4 oz)   SpO2 98%   BMI 31.60 kg/m²     Physical Exam   Constitutional: She is oriented to person, place, and time. She appears well-developed and well-nourished.   HENT:   Right Ear: External ear normal. Tympanic membrane is not injected.   Left Ear: External ear normal. Tympanic membrane is not injected.   Mouth/Throat: Oropharynx is clear and moist.   Eyes: Conjunctivae are normal.   Neck: Normal range of motion. " Neck supple. No thyromegaly present.   Cardiovascular: Normal rate, regular rhythm and intact distal pulses.  Exam reveals no gallop and no friction rub.    No murmur heard.  Pulmonary/Chest: Effort normal and breath sounds normal. She has no wheezes. She has no rales. She exhibits no mass. Right breast exhibits no mass, no nipple discharge, no skin change and no tenderness. Left breast exhibits no mass, no nipple discharge, no skin change and no tenderness.   Right breast induration surrounding nipple about 2cm.   Abdominal: Soft. Bowel sounds are normal. She exhibits no mass. There is no tenderness.   Musculoskeletal: She exhibits edema (trace left pretib, 1+ right pretib.).   Lymphadenopathy:     She has no cervical adenopathy.        Right axillary: No lateral adenopathy present.        Left axillary: No lateral adenopathy present.  Neurological: She is alert and oriented to person, place, and time.   Skin: Skin is warm. No rash noted.   Psychiatric: She has a normal mood and affect.           Assessment:       1. Essential hypertension    2. Uncomplicated severe persistent asthma    3. Hyperlipidemia associated with type 2 diabetes mellitus    4. PVD (peripheral vascular disease)    5. Gastroesophageal reflux disease without esophagitis    6. Chronic atrial fibrillation with RVR    7. Acute combined systolic and diastolic congestive heart failure    8. Encounter for preventive health examination    9. Vitamin D deficiency    10. Encounter for screening mammogram for malignant neoplasm of breast     11. Asymptomatic postmenopausal state          Plan:     Essential hypertension- stable at home.    Uncomplicated severe persistent asthma-s table on inhalers.    Hyperlipidemia associated with type 2 diabetes mellitus- check lab.    Gastroesophageal reflux disease without esophagitis- stable on PPI.    PVD (peripheral vascular disease), Chronic atrial fibrillation with RVR, Acute combined systolic and diastolic  congestive heart failure- per Cards.    Encounter for preventive health examination- tests now:  -     CBC auto differential; Future; Expected date: 04/06/2018  -     Comprehensive metabolic panel; Future; Expected date: 04/06/2018  -     Lipid panel; Future; Expected date: 04/06/2018  -     TSH; Future; Expected date: 04/06/2018  -     Vitamin D; Future  -     Mammo Digital Screening Bilat with CAD; Future; Expected date: 04/06/2018  -     DXA Bone Density Spine And Hip; Future; Expected date: 04/06/2018  -     Hepatitis C antibody; Future; Expected date: 04/06/2018    Vitamin D deficiency- check lab. Now.  -     Vitamin D; Future

## 2018-04-06 ENCOUNTER — LAB VISIT (OUTPATIENT)
Dept: LAB | Facility: HOSPITAL | Age: 73
End: 2018-04-06
Attending: INTERNAL MEDICINE
Payer: MEDICARE

## 2018-04-06 ENCOUNTER — OFFICE VISIT (OUTPATIENT)
Dept: INTERNAL MEDICINE | Facility: CLINIC | Age: 73
End: 2018-04-06
Payer: MEDICARE

## 2018-04-06 VITALS
WEIGHT: 184.06 LBS | HEART RATE: 69 BPM | BODY MASS INDEX: 31.42 KG/M2 | DIASTOLIC BLOOD PRESSURE: 75 MMHG | TEMPERATURE: 97 F | SYSTOLIC BLOOD PRESSURE: 125 MMHG | HEIGHT: 64 IN | OXYGEN SATURATION: 98 %

## 2018-04-06 DIAGNOSIS — E11.9 TYPE 2 DIABETES MELLITUS WITHOUT COMPLICATION: ICD-10-CM

## 2018-04-06 DIAGNOSIS — E78.5 HYPERLIPIDEMIA ASSOCIATED WITH TYPE 2 DIABETES MELLITUS: ICD-10-CM

## 2018-04-06 DIAGNOSIS — E11.69 HYPERLIPIDEMIA ASSOCIATED WITH TYPE 2 DIABETES MELLITUS: ICD-10-CM

## 2018-04-06 DIAGNOSIS — K21.9 GASTROESOPHAGEAL REFLUX DISEASE WITHOUT ESOPHAGITIS: ICD-10-CM

## 2018-04-06 DIAGNOSIS — E55.9 VITAMIN D DEFICIENCY: ICD-10-CM

## 2018-04-06 DIAGNOSIS — Z12.31 ENCOUNTER FOR SCREENING MAMMOGRAM FOR MALIGNANT NEOPLASM OF BREAST: ICD-10-CM

## 2018-04-06 DIAGNOSIS — I48.20 CHRONIC ATRIAL FIBRILLATION WITH RVR: ICD-10-CM

## 2018-04-06 DIAGNOSIS — I73.9 PVD (PERIPHERAL VASCULAR DISEASE): ICD-10-CM

## 2018-04-06 DIAGNOSIS — I10 ESSENTIAL HYPERTENSION: Primary | ICD-10-CM

## 2018-04-06 DIAGNOSIS — Z00.00 ENCOUNTER FOR PREVENTIVE HEALTH EXAMINATION: ICD-10-CM

## 2018-04-06 DIAGNOSIS — I50.41 ACUTE COMBINED SYSTOLIC AND DIASTOLIC CONGESTIVE HEART FAILURE: ICD-10-CM

## 2018-04-06 DIAGNOSIS — I10 ESSENTIAL HYPERTENSION: ICD-10-CM

## 2018-04-06 DIAGNOSIS — J45.50 UNCOMPLICATED SEVERE PERSISTENT ASTHMA: ICD-10-CM

## 2018-04-06 DIAGNOSIS — Z78.0 ASYMPTOMATIC POSTMENOPAUSAL STATE: ICD-10-CM

## 2018-04-06 LAB
25(OH)D3+25(OH)D2 SERPL-MCNC: 23 NG/ML
ALBUMIN SERPL BCP-MCNC: 3.5 G/DL
ALP SERPL-CCNC: 142 U/L
ALT SERPL W/O P-5'-P-CCNC: 17 U/L
ANION GAP SERPL CALC-SCNC: 9 MMOL/L
AST SERPL-CCNC: 24 U/L
BASOPHILS # BLD AUTO: 0.05 K/UL
BASOPHILS NFR BLD: 0.7 %
BILIRUB SERPL-MCNC: 0.6 MG/DL
BUN SERPL-MCNC: 20 MG/DL
CALCIUM SERPL-MCNC: 9.5 MG/DL
CHLORIDE SERPL-SCNC: 111 MMOL/L
CHOLEST SERPL-MCNC: 207 MG/DL
CHOLEST/HDLC SERPL: 3.6 {RATIO}
CO2 SERPL-SCNC: 27 MMOL/L
CREAT SERPL-MCNC: 0.7 MG/DL
DIFFERENTIAL METHOD: ABNORMAL
EOSINOPHIL # BLD AUTO: 0.3 K/UL
EOSINOPHIL NFR BLD: 4.2 %
ERYTHROCYTE [DISTWIDTH] IN BLOOD BY AUTOMATED COUNT: 18.7 %
EST. GFR  (AFRICAN AMERICAN): >60 ML/MIN/1.73 M^2
EST. GFR  (NON AFRICAN AMERICAN): >60 ML/MIN/1.73 M^2
ESTIMATED AVG GLUCOSE: 100 MG/DL
GLUCOSE SERPL-MCNC: 78 MG/DL
HBA1C MFR BLD HPLC: 5.1 %
HCT VFR BLD AUTO: 32.6 %
HDLC SERPL-MCNC: 58 MG/DL
HDLC SERPL: 28 %
HGB BLD-MCNC: 9.4 G/DL
IMM GRANULOCYTES # BLD AUTO: 0.03 K/UL
IMM GRANULOCYTES NFR BLD AUTO: 0.4 %
LDLC SERPL CALC-MCNC: 135.4 MG/DL
LYMPHOCYTES # BLD AUTO: 1.1 K/UL
LYMPHOCYTES NFR BLD: 16.1 %
MCH RBC QN AUTO: 24.7 PG
MCHC RBC AUTO-ENTMCNC: 28.8 G/DL
MCV RBC AUTO: 86 FL
MONOCYTES # BLD AUTO: 0.7 K/UL
MONOCYTES NFR BLD: 10.3 %
NEUTROPHILS # BLD AUTO: 4.6 K/UL
NEUTROPHILS NFR BLD: 68.3 %
NONHDLC SERPL-MCNC: 149 MG/DL
NRBC BLD-RTO: 0 /100 WBC
PLATELET # BLD AUTO: 279 K/UL
PMV BLD AUTO: 9.6 FL
POTASSIUM SERPL-SCNC: 3.7 MMOL/L
PROT SERPL-MCNC: 7.2 G/DL
RBC # BLD AUTO: 3.8 M/UL
SODIUM SERPL-SCNC: 147 MMOL/L
TRIGL SERPL-MCNC: 68 MG/DL
TSH SERPL DL<=0.005 MIU/L-ACNC: 0.73 UIU/ML
WBC # BLD AUTO: 6.72 K/UL

## 2018-04-06 PROCEDURE — 83036 HEMOGLOBIN GLYCOSYLATED A1C: CPT

## 2018-04-06 PROCEDURE — 84443 ASSAY THYROID STIM HORMONE: CPT

## 2018-04-06 PROCEDURE — 85025 COMPLETE CBC W/AUTO DIFF WBC: CPT

## 2018-04-06 PROCEDURE — 80061 LIPID PANEL: CPT

## 2018-04-06 PROCEDURE — 80053 COMPREHEN METABOLIC PANEL: CPT

## 2018-04-06 PROCEDURE — 82306 VITAMIN D 25 HYDROXY: CPT

## 2018-04-06 PROCEDURE — 99213 OFFICE O/P EST LOW 20 MIN: CPT | Mod: PBBFAC,PO | Performed by: INTERNAL MEDICINE

## 2018-04-06 PROCEDURE — 86803 HEPATITIS C AB TEST: CPT

## 2018-04-06 PROCEDURE — 99214 OFFICE O/P EST MOD 30 MIN: CPT | Mod: S$PBB,,, | Performed by: INTERNAL MEDICINE

## 2018-04-06 PROCEDURE — 36415 COLL VENOUS BLD VENIPUNCTURE: CPT | Mod: PO

## 2018-04-06 PROCEDURE — 99999 PR PBB SHADOW E&M-EST. PATIENT-LVL III: CPT | Mod: PBBFAC,,, | Performed by: INTERNAL MEDICINE

## 2018-04-09 LAB — HCV AB SERPL QL IA: NEGATIVE

## 2018-04-11 ENCOUNTER — TELEPHONE (OUTPATIENT)
Dept: INTERNAL MEDICINE | Facility: CLINIC | Age: 73
End: 2018-04-11

## 2018-04-11 NOTE — TELEPHONE ENCOUNTER
----- Message from Johanna Dennison MD sent at 4/9/2018  2:54 PM CDT -----  Please inform pt that labs look good overall except vitamin D level is low.  Please start daily D3 1000units.  SM

## 2018-04-11 NOTE — TELEPHONE ENCOUNTER
Informed pt that labs look good overall except vitamin D level is low.  Please start daily D3 1000units.  Pt verbalized understanding./rpr

## 2018-05-09 NOTE — TELEPHONE ENCOUNTER
----- Message from Lis Castillo sent at 5/9/2018  2:41 PM CDT -----  Contact: daughter/Abhijeet  Please call pt daughter @ [phone] regarding order for .

## 2018-05-21 NOTE — TELEPHONE ENCOUNTER
----- Message from Yeni Ramon sent at 5/21/2018 11:48 AM CDT -----  Contact: MARCOS   Made the first available hospital appt for pt next week but would like to see if pt can be worked into schedule this week.  Please call pt at [phone]

## 2018-05-23 NOTE — TELEPHONE ENCOUNTER
----- Message from Marilyn Rodolfo sent at 5/23/2018 12:51 PM CDT -----  Contact: Rivas/edwina  Daughter stated that EMS is at the patient house and she refusing to leave with them and they advise her to call patient PCP for orders to take her anyway to the hospital, Please call her at   [phone]. She stated that she is not with patient she is in [place] and the patient is at home.    Thanks  Td

## 2018-05-23 NOTE — TELEPHONE ENCOUNTER
Pt daughter stated pt refused to go to the hospital and want PCP to write a order for pt to be evaluated.  Pt daughter aware pt can refuse to go to the ER via EMS regarding pt rights.  Pt daughter aware if she would like pt to see her PCP I can make her a appt.  Pt daughter declined and stated she will contact pt spouse regarding pt condition.  freddie

## 2018-05-27 NOTE — ED NOTES
Patient and patient's home sitter c/o abdominal pain, constipation, and some vaginal discharge. Patient reports symptoms have been present greater than one week.    Patient moved to ED room 13 via ambulance, patient assisted onto stretcher and changed into a gown. Patient placed on cardiac monitor, continuous pulse oximetry and automatic blood pressure cuff. Bed placed in low locked position, side rails up x 2, call light is within reach of patient or family, orientation to room and explanation of wait provided to family and patient, alarms set and turned on for monitor and pulse ox, awaiting MD evaluation and orders, will continue to monitor.    Patient identifies self as Carlie Valdez      LOC: The patient is awake, alert and aware of environment with an appropriate affect, the patient is oriented x 3 and speaking appropriately. Patient is blind.  APPEARANCE: Patient resting comfortably and in no acute distress, patient is clean and well groomed, patient's clothing is properly fastened.  SKIN: The skin is warm and dry, color consistent with ethnicity, patient has normal skin turgor and moist mucus membranes.  MUSCULOSKELETAL: Patient moving upper extremities well, no obvious swelling noted. Patient reports inability to move bilateral legs after last hospitalization when she was instructed not to get out of bed alone. No deformities noted.  RESPIRATORY: Airway is open and patent, respirations are spontaneous, patient has a normal effort and rate, no accessory muscle use noted.  CARDIAC: Patient has a normal rate and rhythm, no periphreal edema noted, capillary refill < 3 seconds.  ABDOMEN: Soft and non tender to palpation, no distention noted.  NEUROLOGIC: PERRL, 3 mm bilaterally, eyes open spontaneously, behavior appropriate to situation, follows commands, facial expression symmetrical, bilateral hand grasp equal and even, purposeful motor response noted, normal sensation in all extremities when  touched with a finger. Patient reports she has been losing her vision in bilateral eyes since Mother's Day.

## 2018-05-27 NOTE — ED PROVIDER NOTES
SCRIBE #1 NOTE: I, Yola Cruz, am scribing for, and in the presence of, Huy Fields MD. I have scribed the entire note.      History      Chief Complaint   Patient presents with    Abdominal Pain     RLQ pain. EMS reports pt has been transported several ED for misc complaints over past 2 wks.        Review of patient's allergies indicates:   Allergen Reactions    Morphine Hives     Other reaction(s): Unknown  Other reaction(s): Unknown    Atorvastatin      Other reaction(s): Muscle pain    Codeine      Other reaction(s): Unknown    Digoxin      Other reaction(s): Unknown    Diovan [valsartan] Other (See Comments)     Upset stomach, weakness    Eggs [egg derived]     Naproxen      Other reaction(s): Nausea    Peanut     Propofol      Other reaction(s): delirious    Sulfa (sulfonamide antibiotics)         HPI   HPI    5/27/2018, 12:59 PM   History obtained from the patient and sitter  HPI limited, pt is a poor historian      History of Present Illness: Carlie Valdez is a 72 y.o. female patient with PMHx of Afib, DM, GERD, HTN, who presents to the Emergency Department for lower abd pain which onset gradually today. Symptoms are constant and moderate in severity. No mitigating or exacerbating factors reported. Associated sxs include constipation x2-3days, decreased urine output, and vaginal discharge. Patient/sitter denies any fever, chills, n/v/d, hematochezia, melena, dysuria, hematuria, frequency, vaginal bleeding, SOB, CP, cough, and all other sxs at this time. Prior Tx includes Dulcolax today. No further complaints or concerns at this time.       Arrival mode: EMS    PCP: Johanna Guzman MD       Past Medical History:  Past Medical History:   Diagnosis Date    Asthma     Atrial fibrillation     Blood clot of vein in shoulder area     Cardiac defibrillator in place     Chronic knee pain     Diabetes mellitus type 2 without retinopathy 12/19/2016    Diaphragmatic  hernia without obstruction and without gangrene 2015    GERD (gastroesophageal reflux disease)     Hypertension        Past Surgical History:  Past Surgical History:   Procedure Laterality Date    CARDIAC DEFIBRILLATOR PLACEMENT      , .    CATARACT EXTRACTION       SECTION      COLONOSCOPY      ashley Macdonald    KNEE ARTHROSCOPY      UPPER GASTROINTESTINAL ENDOSCOPY           Family History:  Family History   Problem Relation Age of Onset    Hypertension Mother     Hypertension Father     Colon cancer Neg Hx     Stomach cancer Neg Hx        Social History:  Social History     Social History Main Topics    Smoking status: Never Smoker    Smokeless tobacco: Never Used    Alcohol use No    Drug use: No    Sexual activity: Yes     Partners: Male       ROS   Review of Systems   Constitutional: Negative for chills and fever.   HENT: Negative for sore throat.    Respiratory: Negative for cough and shortness of breath.    Cardiovascular: Negative for chest pain.   Gastrointestinal: Positive for abdominal pain and constipation. Negative for blood in stool, diarrhea, nausea and vomiting.   Genitourinary: Positive for decreased urine volume and vaginal discharge. Negative for dysuria, frequency and hematuria.   Musculoskeletal: Negative for back pain.   Skin: Negative for rash.   Neurological: Negative for weakness.   Hematological: Does not bruise/bleed easily.     Physical Exam      Initial Vitals   BP Pulse Resp Temp SpO2   18 1258 18 1253 18 1253 18 1253 18 1253   136/61 108 18 98.4 °F (36.9 °C) 100 %      MAP       18 1258       86          Physical Exam  Nursing Notes and Vital Signs Reviewed.  Constitutional: Patient is in no acute distress. Well-developed and well-nourished.  Head: Atraumatic. Normocephalic.  Eyes: PERRL. EOM intact. Conjunctivae are not pale. No scleral icterus. Cataract to R eye. Evidence of previous surgery to L  eye.  ENT: Mucous membranes are moist. Oropharynx is clear and symmetric.    Neck: Supple. Full ROM. No lymphadenopathy.  Cardiovascular: Regular rate. Regular rhythm. Grade 2 systolic murmur. No rubs or gallops.   Pulmonary/Chest: No respiratory distress. Clear to auscultation bilaterally. No wheezing or rales.  Abdominal: Soft and non-distended.  There is no tenderness.  No rebound, guarding, or rigidity. Good bowel sounds.  Genitourinary: Hyperpigmented lesions to groin.   Pelvic: A female chaperone was present for this examination. Nl external inspection. No lesions or abnormalities were visible on the labia majora or minora. Unable to visualize cervix. There is no blood in the vaginal vault. No discharge.   Musculoskeletal: Moves all extremities. No obvious deformities. No calf tenderness.  Right Knee:  No obvious deformity. There is swelling.  There is no tenderness.  No increased warmth, erythema, induration or fluctuance. DP pulses are 1+ bilaterally. Bilateral feet are warm. Normal capillary refill.   Skin: Warm and dry. 1.5 cm blister to medial aspect of R thigh. Stage 2 decubitus ulcer to R heel.   Neurological:  Alert, awake, and appropriate.  Normal speech.  No acute focal neurological deficits are appreciated.  Psychiatric: Normal affect. Good eye contact. Appropriate in content.    ED Course    Procedures  ED Vital Signs:  Vitals:    05/27/18 1253 05/27/18 1258 05/27/18 1259 05/27/18 1300   BP:  136/61  (!) 129/58   Pulse: 108   74   Resp: 18   (!) 23   Temp: 98.4 °F (36.9 °C)      TempSrc: Oral      SpO2: 100%      Weight:   77.6 kg (171 lb 1.2 oz)     05/27/18 1330 05/27/18 1410 05/27/18 1430 05/27/18 1500   BP: (!) 120/55 139/65 (!) 146/69 (!) 116/58   Pulse: 70 69 69 69   Resp: 20 17 (!) 22 (!) 21   Temp:       TempSrc:       SpO2:  95% 99% 99%   Weight:        05/27/18 1533   BP: (!) 131/94   Pulse: 69   Resp: 20   Temp:    TempSrc:    SpO2: 97%   Weight:        Abnormal Lab Results:  Labs  Reviewed   CBC W/ AUTO DIFFERENTIAL - Abnormal; Notable for the following:        Result Value    MCV 81 (*)     MCH 25.6 (*)     MCHC 31.5 (*)     RDW 17.7 (*)     Gran # (ANC) 10.3 (*)     Gran% 80.9 (*)     Lymph% 9.7 (*)     All other components within normal limits   COMPREHENSIVE METABOLIC PANEL - Abnormal; Notable for the following:     BUN, Bld 51 (*)     Albumin 2.9 (*)     Alkaline Phosphatase 167 (*)     AST 50 (*)     ALT 47 (*)     All other components within normal limits   URINALYSIS - Abnormal; Notable for the following:     Occult Blood UA 1+ (*)     Nitrite, UA Positive (*)     Leukocytes, UA 3+ (*)     All other components within normal limits   VAGINAL SCREEN - Abnormal; Notable for the following:     WBC - Vaginal Screen Rare (*)     Bacteria - Vaginal Screen Rare (*)     All other components within normal limits   URINALYSIS MICROSCOPIC - Abnormal; Notable for the following:     WBC, UA >100 (*)     WBC Clumps, UA Many (*)     Bacteria, UA Few (*)     All other components within normal limits        All Lab Results:  Results for orders placed or performed during the hospital encounter of 05/27/18   CBC auto differential   Result Value Ref Range    WBC 12.68 3.90 - 12.70 K/uL    RBC 4.68 4.00 - 5.40 M/uL    Hemoglobin 12.0 12.0 - 16.0 g/dL    Hematocrit 38.1 37.0 - 48.5 %    MCV 81 (L) 82 - 98 fL    MCH 25.6 (L) 27.0 - 31.0 pg    MCHC 31.5 (L) 32.0 - 36.0 g/dL    RDW 17.7 (H) 11.5 - 14.5 %    Platelets 262 150 - 350 K/uL    MPV 9.4 9.2 - 12.9 fL    Gran # (ANC) 10.3 (H) 1.8 - 7.7 K/uL    Lymph # 1.2 1.0 - 4.8 K/uL    Mono # 0.9 0.3 - 1.0 K/uL    Eos # 0.3 0.0 - 0.5 K/uL    Baso # 0.01 0.00 - 0.20 K/uL    Gran% 80.9 (H) 38.0 - 73.0 %    Lymph% 9.7 (L) 18.0 - 48.0 %    Mono% 6.9 4.0 - 15.0 %    Eosinophil% 2.4 0.0 - 8.0 %    Basophil% 0.1 0.0 - 1.9 %    Differential Method Automated    Comprehensive metabolic panel   Result Value Ref Range    Sodium 142 136 - 145 mmol/L    Potassium 4.2 3.5 - 5.1  mmol/L    Chloride 108 95 - 110 mmol/L    CO2 24 23 - 29 mmol/L    Glucose 88 70 - 110 mg/dL    BUN, Bld 51 (H) 8 - 23 mg/dL    Creatinine 0.9 0.5 - 1.4 mg/dL    Calcium 9.7 8.7 - 10.5 mg/dL    Total Protein 6.8 6.0 - 8.4 g/dL    Albumin 2.9 (L) 3.5 - 5.2 g/dL    Total Bilirubin 0.5 0.1 - 1.0 mg/dL    Alkaline Phosphatase 167 (H) 55 - 135 U/L    AST 50 (H) 10 - 40 U/L    ALT 47 (H) 10 - 44 U/L    Anion Gap 10 8 - 16 mmol/L    eGFR if African American >60 >60 mL/min/1.73 m^2    eGFR if non African American >60 >60 mL/min/1.73 m^2   Urinalysis Catheterized   Result Value Ref Range    Specimen UA Urine, Catheterized     Color, UA Yellow Yellow, Straw, Samra    Appearance, UA Clear Clear    pH, UA 6.0 5.0 - 8.0    Specific Gravity, UA 1.010 1.005 - 1.030    Protein, UA Negative Negative    Glucose, UA Negative Negative    Ketones, UA Negative Negative    Bilirubin (UA) Negative Negative    Occult Blood UA 1+ (A) Negative    Nitrite, UA Positive (A) Negative    Urobilinogen, UA Negative <2.0 EU/dL    Leukocytes, UA 3+ (A) Negative   Vaginal Screen Vagina   Result Value Ref Range    Trichomonas None None    Clue Cells, Wet Prep None None    Budding Yeast None None    Fungal Hyphae None None    WBC - Vaginal Screen Rare (A) None    Bacteria - Vaginal Screen Rare (A) None    Wet Prep Source Vagina None   Urinalysis Microscopic   Result Value Ref Range    RBC, UA 0 0 - 4 /hpf    WBC, UA >100 (H) 0 - 5 /hpf    WBC Clumps, UA Many (A) None-Rare    Bacteria, UA Few (A) None-Occ /hpf    Microscopic Comment SEE COMMENT        Imaging Results:  Imaging Results          X-Ray Abdomen AP 1 View (KUB) (Final result)  Result time 05/27/18 14:27:11    Final result by Subhash Singletary III, MD (05/27/18 14:27:11)                 Impression:      Significant constipation.      Electronically signed by: Subhash Singletary MD  Date:    05/27/2018  Time:    14:27             Narrative:    EXAMINATION:  XR ABDOMEN AP 1 VIEW    CLINICAL  HISTORY:  Constipation, unspecified    TECHNIQUE:  Single AP View of the abdomen was performed.    COMPARISON:  03/30/2017    FINDINGS:  There is a large amount of retained stool within a distended colon.  No significant small bowel dilation is identified.  No free air is identified.                                        The Emergency Provider reviewed the vital signs and test results, which are outlined above.    ED Discussion     3:43 PM: Reassessed pt at this time.  Pt states her condition has improved at this time. Discussed with pt/sitter all pertinent ED information and results. Discussed pt dx and plan of tx. Gave pt/sitter all f/u and return to the ED instructions. All questions and concerns were addressed at this time. Pt/sitter expresses understanding of information and instructions, and is comfortable with plan to discharge. Pt is stable for discharge.    I discussed with patient and/or family/caretaker that evaluation in the ED does not suggest any emergent or life threatening medical conditions requiring immediate intervention beyond what was provided in the ED, and I believe patient is safe for discharge.  Regardless, an unremarkable evaluation in the ED does not preclude the development or presence of a serious of life threatening condition. As such, patient was instructed to return immediately for any worsening or change in current symptoms.      ED Medication(s):  Medications   lactulose 20 gram/30 mL solution Soln 20 g (not administered)       New Prescriptions    CIPROFLOXACIN HCL (CIPRO) 500 MG TABLET    Take 1 tablet (500 mg total) by mouth 2 (two) times daily.    LACTULOSE (CHRONULAC) 20 GRAM/30 ML SOLN    Take 15 mLs (10 g total) by mouth 2 (two) times daily as needed (Constipation).    SILVER SULFADIAZINE 1% (SILVADENE) 1 % CREAM    Apply topically 2 (two) times daily. Apply to bliter areas BID             Medical Decision Making    Medical Decision Making:   Clinical Tests:   Lab Tests:  Ordered and Reviewed  Radiological Study: Reviewed and Ordered           Scribe Attestation:   Scribe #1: I performed the above scribed service and the documentation accurately describes the services I performed. I attest to the accuracy of the note.    Attending:   Physician Attestation Statement for Scribe #1: I, Huy Fields MD, personally performed the services described in this documentation, as scribed by Yola Cruz, in my presence, and it is both accurate and complete.          Clinical Impression       ICD-10-CM ICD-9-CM   1. Decubitus ulcer of right heel, stage 2 L89.612 707.07     707.22   2. Constipation K59.00 564.00   3. Friction blister of left leg, initial encounter S80.822A 916.2   4. Acute cystitis with hematuria N30.01 595.0       Disposition:   Disposition: Discharged  Condition: Stable         Huy Fields MD  05/27/18 [id number]

## 2018-05-27 NOTE — ED NOTES
[facility] contacted for patient transport home. Patient made aware. Patient's sitter left ER to meet ambulance at home.

## 2018-05-28 NOTE — ED NOTES
Discharge instructions explained to patient. Patient verbalizes understanding.  [facility] at bedside for transport.

## 2018-05-28 NOTE — TELEPHONE ENCOUNTER
----- Message from Viktoria Monroe sent at 5/28/2018  8:56 AM CDT -----  Contact: pt daughter-Haley De Leon state the patient was recently discharged from the hospital and state she need to give an update. Please adv/call [phone].//cw

## 2018-05-28 NOTE — TELEPHONE ENCOUNTER
S/w pt.  States she isn't sure if she will have transportation to appt 5/29 would like to be seen next week for hosp f/u.  Scheduled appt next week.  Instructed pt to call back tomorrow to confirm or cancel appt when she finds out about transportation.  Verbalized understanding./rpr

## 2018-05-28 NOTE — TELEPHONE ENCOUNTER
----- Message from Debbie Jordan sent at 5/28/2018  8:38 AM CDT -----  Contact: self [phone]  States that she needs to reschedule her appt for tomorrow to next week. States that she does not have transportation and would like to be worked in for an appt. Please call back at [phone]//thank you acc

## 2018-05-29 NOTE — TELEPHONE ENCOUNTER
----- Message from Marilyn Reynolds sent at 5/29/2018 11:59 AM CDT -----  Contact: Haley/daughter   Patient needs to reschedule, Please call her at [phone].    Thanks  Td

## 2018-05-30 NOTE — TELEPHONE ENCOUNTER
"Called to the number listed on [facility], [phone], it was out of service, so I attempted to reach the patient on other listed numbers,  was very rude, when I called him Mr. Valdez, he said, "No I aint no Mr. Valdez, I'm Dr. 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 Michael on the phone, pt's care giver, who explained that the ambulance had already been called to bring her to the hospital, for open sores on her feet.  Reason for Disposition   Already left for the hospital/clinic.    Protocols used: ST NO CONTACT OR DUPLICATE CONTACT CALL-A-AH    "

## 2018-05-31 NOTE — TELEPHONE ENCOUNTER
----- Message from Rachael Pastor sent at 5/30/2018  5:03 PM CDT -----  Contact: Pt's daughter (Arin)   Pt's daughter is returning a call they missed, in regards to an appt.    Pt's daughter can be reached at [phone].    Thank you     I spoke with Patient daughter and rescheduled her appt.  She has missed 2 appt. This week.  Daughter stated they have a transportation service for her and they will bring her in tomorrow morning.

## 2018-06-01 PROBLEM — I50.23 ACUTE ON CHRONIC SYSTOLIC CONGESTIVE HEART FAILURE: Status: ACTIVE | Noted: 2017-09-23

## 2018-06-01 PROBLEM — I95.0 IDIOPATHIC HYPOTENSION: Status: ACTIVE | Noted: 2018-01-01

## 2018-06-01 PROBLEM — H40.1133 PRIMARY OPEN ANGLE GLAUCOMA (POAG) OF BOTH EYES, SEVERE STAGE: Status: ACTIVE | Noted: 2018-01-01

## 2018-06-01 PROBLEM — H47.013: Status: ACTIVE | Noted: 2018-01-01

## 2018-06-01 PROBLEM — I16.1 HYPERTENSIVE EMERGENCY: Status: ACTIVE | Noted: 2018-01-01

## 2018-06-01 PROBLEM — I63.9 CEREBROVASCULAR ACCIDENT (CVA) DUE TO EMBOLISM: Status: ACTIVE | Noted: 2018-01-01

## 2018-06-01 PROBLEM — H47.619 DISORDER OF VISUAL CORTEX ASSOCIATED WITH CORTICAL BLINDNESS: Status: ACTIVE | Noted: 2018-01-01

## 2018-06-01 NOTE — PROGRESS NOTES
"    ===============================  06/01/2018   Carlie Valdez,   72 y.o. female   Last visit [facility]: :3/19/2018   Last visit eye dept. 3/19/2018  [facility]:  Uncorrected distance visual acuity was LP in the right eye and LP in the left eye.  Tonometry     Tonometry (Applanation, 11:00 AM)       Right Left    Pressure 21 21          Tonometry #2 (Applanation, 11:39 AM)       Right Left    Pressure 19 19               Not recorded         Not recorded        Chief Complaint   Patient presents with    Diabetes     pt states that she lost vision in both eyes, started right before mothers day. She went to the ER and they ran alot of test and could not find out why she lost vision in her eyes.                                                         HPI     Diabetes    Additional comments: pt states that she lost vision in both eyes, started   right before mothers day. She went to the ER and they ran alot of test and   could not find out why she lost vision in her eyes.                                                              Comments   "Dr Valdez's wife" (  is [facility] cat pt)    States that she lost vision recently while in hospital    OLD BILAT. ION WITH PALE DISCS and vf constriction  NS OD  PCIOL OS  Fuchs  DRY EYES  Optic neuritis         Last edited by LATRELL Araiza MD on 6/1/2018 11:04 AM. 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 - Primary    Relevant Medications    brimonidine-timolol (COMBIGAN) 0.2-0.5 % Drop    Idiopathic hypotension          .Recently hospitalized for hypertensive urgency  Hospitalized May 9 neuro eval, retina eval, CT negative  Exam " negative, known previous optic neuritis  Was 20/400 and FC, no LP OU    Bilateral NAAION  Worse after hypertensive crisis seen in ER  Recent ESR 26  No temporal arteritis symptoms (right ear paining)  No disc edema, no retinal hemorrhage, no macular hemorrhage  Differential  Myasthenia,   Episodic multifocal neurologic motor deficits  Previous negative w/u for MS   Patient reports episodes of very low BP    Also, COAG, IOP ok, would like to see around 16  She has not been taking drops  Recommend Combigan OU BID    To me NAAION worsening secondary to low BP - visual cortical ischemia  Discussed with patient and caretaker today  I recommend she return to Dr. Amaya - I will call him  rtc with me in 1 mo to recheck  She has my home phone number and has been instructed to call right  Away with any worsening           ===========================

## 2018-06-02 PROBLEM — I48.91 ATRIAL FIBRILLATION WITH RVR: Status: ACTIVE | Noted: 2018-01-01

## 2018-06-02 NOTE — ED NOTES
Provider entered room, pt heart rate increased. Pt appeared agitated and anxious, heart rate increased to 120s-130s. Will continue to monitor heart rate

## 2018-06-02 NOTE — SUBJECTIVE & OBJECTIVE
Past Medical History:   Diagnosis Date    Arthritis     Asthma     Atrial fibrillation     Blood clot of vein in shoulder area     Cardiac defibrillator in place     Chronic knee pain     Diabetes mellitus type 2 without retinopathy 2016    Diaphragmatic hernia without obstruction and without gangrene 2015    GERD (gastroesophageal reflux disease)     Hypertension     Primary open angle glaucoma (POAG) of both eyes, severe stage 2018       Past Surgical History:   Procedure Laterality Date    CARDIAC DEFIBRILLATOR PLACEMENT      , .    CATARACT EXTRACTION       SECTION      COLONOSCOPY      ashley      Dr. Macdonald    KNEE ARTHROSCOPY      UPPER GASTROINTESTINAL ENDOSCOPY         Review of patient's allergies indicates:   Allergen Reactions    Morphine Hives     Other reaction(s): Unknown  Other reaction(s): Unknown    Atorvastatin      Other reaction(s): Muscle pain    Codeine      Other reaction(s): Unknown    Digoxin      Other reaction(s): Unknown    Diovan [valsartan] Other (See Comments)     Upset stomach, weakness    Eggs [egg derived]     Naproxen      Other reaction(s): Nausea    Peanut     Propofol      Other reaction(s): delirious    Sulfa (sulfonamide antibiotics)        No current facility-administered medications on file prior to encounter.      Current Outpatient Prescriptions on File Prior to Encounter   Medication Sig    albuterol 90 mcg/actuation inhaler Inhale into the lungs.    albuterol-ipratropium 2.5mg-0.5mg/3mL (DUO-NEB) 0.5 mg-3 mg(2.5 mg base)/3 mL nebulizer solution Take 3 mLs by nebulization every 6 (six) hours while awake. Rescue    brimonidine-timolol (COMBIGAN) 0.2-0.5 % Drop Place 1 drop into both eyes every 12 (twelve) hours.    bumetanide (BUMEX) 2 MG tablet Take 1 tablet (2 mg total) by mouth 2 (two) times daily. 1 Tablet Oral Every day (Patient taking differently: Take 4 mg by mouth 2 (two) times daily. 1 Tablet Oral Every  day)    ciprofloxacin HCl (CIPRO) 500 MG tablet Take 1 tablet (500 mg total) by mouth 2 (two) times daily.    fluticasone (FLONASE) 50 mcg/actuation nasal spray 2 sprays by Each Nare route once daily.    lactulose (CHRONULAC) 20 gram/30 mL Soln Take 15 mLs (10 g total) by mouth 2 (two) times daily as needed (Constipation).    levothyroxine (SYNTHROID) 100 MCG tablet Take 100 mcg by mouth.    montelukast (SINGULAIR) 10 mg tablet Take 10 mg by mouth.    multivitamin (THERAGRAN) per tablet Take by mouth.    polyethylene glycol (GLYCOLAX) 17 gram PwPk Take 17 g by mouth once daily.    potassium chloride SA (K-DUR,KLOR-CON) 20 MEQ tablet Take 20 mEq by mouth.    rivaroxaban (XARELTO) 15 mg Tab Take 15 mg by mouth.    silver sulfADIAZINE 1% (SILVADENE) 1 % cream Apply topically 2 (two) times daily. Apply to bliter areas BID    sodium bicarb-sodium chloride Pack 1 packet by Nasal route 2 (two) times daily.    zinc sulfate (ZINCATE) 220 (50) mg capsule Take 220 mg by mouth.     Family History     Problem Relation (Age of Onset)    Hypertension Mother, Father        Social History Main Topics    Smoking status: Never Smoker    Smokeless tobacco: Never Used    Alcohol use No    Drug use: No    Sexual activity: Yes     Partners: Male     Review of Systems   Constitutional: Positive for fatigue.   HENT: Negative.    Eyes: Positive for visual disturbance.   Respiratory: Negative.  Negative for cough and shortness of breath.    Cardiovascular: Positive for palpitations. Negative for chest pain.   Gastrointestinal: Negative.    Endocrine: Negative.    Genitourinary: Negative.    Musculoskeletal: Negative.    Skin: Negative.    Allergic/Immunologic: Negative.    Neurological: Negative.    Hematological: Negative.    Psychiatric/Behavioral: Negative.    All other systems reviewed and are negative.    Objective:     Vital Signs (Most Recent):  Temp: 97.8 °F (36.6 °C) (06/02/18 1024)  Pulse: (!) 115 (06/02/18  1359)  Resp: 18 (06/02/18 1359)  BP: 117/87 (06/02/18 1348)  SpO2: 97 % (06/02/18 1359) Vital Signs (24h Range):  Temp:  [97.8 °F (36.6 °C)] 97.8 °F (36.6 °C)  Pulse:  [] 115  Resp:  [15-22] 18  SpO2:  [95 %-99 %] 97 %  BP: ()/(55-87) 117/87     Weight: 74.8 kg (165 lb)  Body mass index is 28.32 kg/m².    Physical Exam   Constitutional: She is oriented to person, place, and time. She appears well-developed and well-nourished.   HENT:   Head: Normocephalic and atraumatic.   Eyes: Conjunctivae and EOM are normal. Pupils are equal, round, and reactive to light. No scleral icterus.   Blind from NA-AION   Neck: Neck supple. No JVD present. No thyromegaly present.   Cardiovascular: Intact distal pulses.  Exam reveals no gallop and no friction rub.    Murmur heard.  Irregular regular rhtym, A. Fib on RVR   Pulmonary/Chest: Effort normal and breath sounds normal.   Abdominal: Soft. Bowel sounds are normal. She exhibits no distension and no mass. There is no tenderness.   Musculoskeletal: Normal range of motion.   Neurological: She is alert and oriented to person, place, and time. She has normal reflexes. No cranial nerve deficit. Coordination normal.   Skin: No rash noted. No erythema.   Psychiatric: She has a normal mood and affect. Her behavior is normal.   Nursing note and vitals reviewed.        CRANIAL NERVES     CN III, IV, VI   Pupils are equal, round, and reactive to light.  Extraocular motions are normal.        Significant Labs: All pertinent labs within the past 24 hours have been reviewed.    Significant Imaging: I have reviewed and interpreted all pertinent imaging results/findings within the past 24 hours.

## 2018-06-02 NOTE — H&P
[facility] Hospital Medicine  History & Physical    Patient Name: Carlie Valdez  MRN: [id number]  Admission Date: 2018  Attending Physician: Rod Carrion MD   Primary Care Provider: Johanna Guzman MD         Patient information was obtained from patient and ER records.     Subjective:     Principal Problem: A. Fib with RVR    Chief Complaint:   Chief Complaint   Patient presents with    Fatigue        HPI: 79 yo F with PMHx of combined systolic/diastolic CHF, s/p pacemaker [id number], Chronic A. Fib on Xarelto, Non Arteritic Ischemic Optic Neuropathy (NA-AION); who presents to the Emergency Department for weakness which onset gradually today.  Patient was sent to rehab after recent discharge for episode of HTN emergency.  Was hypotensive at her rehab facility,  BP medication was held, developed RVR.  Patient's main complaint is fatigue.  Denies any CP, dyspnea, cough, /GI complaints.  No further complaints or concerns at this time            Past Medical History:   Diagnosis Date    Arthritis     Asthma     Atrial fibrillation     Blood clot of vein in shoulder area     Cardiac defibrillator in place     Chronic knee pain     Diabetes mellitus type 2 without retinopathy 2016    Diaphragmatic hernia without obstruction and without gangrene 2015    GERD (gastroesophageal reflux disease)     Hypertension     Primary open angle glaucoma (POAG) of both eyes, severe stage 2018       Past Surgical History:   Procedure Laterality Date    CARDIAC DEFIBRILLATOR PLACEMENT      , .    CATARACT EXTRACTION       SECTION      COLONOSCOPY      ashley      Dr. Macdonald    KNEE ARTHROSCOPY      UPPER GASTROINTESTINAL ENDOSCOPY         Review of patient's allergies indicates:   Allergen Reactions    Morphine Hives     Other reaction(s): Unknown  Other reaction(s): Unknown    Atorvastatin      Other reaction(s): Muscle pain    Codeine      Other  reaction(s): Unknown    Digoxin      Other reaction(s): Unknown    Diovan [valsartan] Other (See Comments)     Upset stomach, weakness    Eggs [egg derived]     Naproxen      Other reaction(s): Nausea    Peanut     Propofol      Other reaction(s): delirious    Sulfa (sulfonamide antibiotics)        No current facility-administered medications on file prior to encounter.      Current Outpatient Prescriptions on File Prior to Encounter   Medication Sig    albuterol 90 mcg/actuation inhaler Inhale into the lungs.    albuterol-ipratropium 2.5mg-0.5mg/3mL (DUO-NEB) 0.5 mg-3 mg(2.5 mg base)/3 mL nebulizer solution Take 3 mLs by nebulization every 6 (six) hours while awake. Rescue    brimonidine-timolol (COMBIGAN) 0.2-0.5 % Drop Place 1 drop into both eyes every 12 (twelve) hours.    bumetanide (BUMEX) 2 MG tablet Take 1 tablet (2 mg total) by mouth 2 (two) times daily. 1 Tablet Oral Every day (Patient taking differently: Take 4 mg by mouth 2 (two) times daily. 1 Tablet Oral Every day)    ciprofloxacin HCl (CIPRO) 500 MG tablet Take 1 tablet (500 mg total) by mouth 2 (two) times daily.    fluticasone (FLONASE) 50 mcg/actuation nasal spray 2 sprays by Each Nare route once daily.    lactulose (CHRONULAC) 20 gram/30 mL Soln Take 15 mLs (10 g total) by mouth 2 (two) times daily as needed (Constipation).    levothyroxine (SYNTHROID) 100 MCG tablet Take 100 mcg by mouth.    montelukast (SINGULAIR) 10 mg tablet Take 10 mg by mouth.    multivitamin (THERAGRAN) per tablet Take by mouth.    polyethylene glycol (GLYCOLAX) 17 gram PwPk Take 17 g by mouth once daily.    potassium chloride SA (K-DUR,KLOR-CON) 20 MEQ tablet Take 20 mEq by mouth.    rivaroxaban (XARELTO) 15 mg Tab Take 15 mg by mouth.    silver sulfADIAZINE 1% (SILVADENE) 1 % cream Apply topically 2 (two) times daily. Apply to bliter areas BID    sodium bicarb-sodium chloride Pack 1 packet by Nasal route 2 (two) times daily.    zinc sulfate  (ZINCATE) 220 (50) mg capsule Take 220 mg by mouth.     Family History     Problem Relation (Age of Onset)    Hypertension Mother, Father        Social History Main Topics    Smoking status: Never Smoker    Smokeless tobacco: Never Used    Alcohol use No    Drug use: No    Sexual activity: Yes     Partners: Male     Review of Systems   Constitutional: Positive for fatigue.   HENT: Negative.    Eyes: Positive for visual disturbance.   Respiratory: Negative.  Negative for cough and shortness of breath.    Cardiovascular: Positive for palpitations. Negative for chest pain.   Gastrointestinal: Negative.    Endocrine: Negative.    Genitourinary: Negative.    Musculoskeletal: Negative.    Skin: Negative.    Allergic/Immunologic: Negative.    Neurological: Negative.    Hematological: Negative.    Psychiatric/Behavioral: Negative.    All other systems reviewed and are negative.    Objective:     Vital Signs (Most Recent):  Temp: 97.8 °F (36.6 °C) (06/02/18 1024)  Pulse: (!) 115 (06/02/18 1359)  Resp: 18 (06/02/18 1359)  BP: 117/87 (06/02/18 1348)  SpO2: 97 % (06/02/18 1359) Vital Signs (24h Range):  Temp:  [97.8 °F (36.6 °C)] 97.8 °F (36.6 °C)  Pulse:  [] 115  Resp:  [15-22] 18  SpO2:  [95 %-99 %] 97 %  BP: ()/(55-87) 117/87     Weight: 74.8 kg (165 lb)  Body mass index is 28.32 kg/m².    Physical Exam   Constitutional: She is oriented to person, place, and time. She appears well-developed and well-nourished.   HENT:   Head: Normocephalic and atraumatic.   Eyes: Conjunctivae and EOM are normal. Pupils are equal, round, and reactive to light. No scleral icterus.   Blind from NA-AION   Neck: Neck supple. No JVD present. No thyromegaly present.   Cardiovascular: Intact distal pulses.  Exam reveals no gallop and no friction rub.    Murmur heard.  Irregular regular rhtym, A. Fib on RVR   Pulmonary/Chest: Effort normal and breath sounds normal.   Abdominal: Soft. Bowel sounds are normal. She exhibits no  distension and no mass. There is no tenderness.   Musculoskeletal: Normal range of motion.   Neurological: She is alert and oriented to person, place, and time. She has normal reflexes. No cranial nerve deficit. Coordination normal.   Skin: No rash noted. No erythema.   Psychiatric: She has a normal mood and affect. Her behavior is normal.   Nursing note and vitals reviewed.        CRANIAL NERVES     CN III, IV, VI   Pupils are equal, round, and reactive to light.  Extraocular motions are normal.        Significant Labs: All pertinent labs within the past 24 hours have been reviewed.    Significant Imaging: I have reviewed and interpreted all pertinent imaging results/findings within the past 24 hours.    Assessment/Plan:     Atrial fibrillation with RVR    - patient with recent HTN emergency (mid may), medications adjusted  - noted hypotensive at rehab facility, medications held.  Then developed RVR  - Currently on Cardizem drip.    - telemetry monitoring  - TTE in AM  - Increased troponin probably demand ischemia.  Will trend troponin  - will switch to PO Cardizem when clinically stable  - will resume rest of home medications          VTE Risk Mitigation     None      On AC with Xarelto.       Rod Carrion MD  Department of Hospital Medicine   [facility]

## 2018-06-02 NOTE — HOSPITAL COURSE
Carlie Valdez is a 72 year old female placed in Observation with complaints of weakness as well as findings of hypotension and atrial fibrillation with RVR. The patient was initially started on a Cardizem drip which was weaned off due to the patient's significant history of CHF. Oral metoprolol was started, but the patient's RVR persisted. Cardiology was consulted and the patient was started on an Amiodarone drip and transitioned to an inpatient admission. She continued to have hypotension requiring fluid boluses. She reports weakness, but states that she does not want to return to the rehab facility that transferred her here. She states that she wants to go home with home health. Nursing reported systolic BP 78 this morning. Pt asymptomatic. Pt denies chest pain, SOB, dizziness, abdominal pain, N/V/D. Repeat manual /68 from left arm. AM medications including Amiodarone and Metoprolol held. Discussed with Cardiology -- will reduce Metoprolol to 50 mg PO BID and continue PO Amiodarone. Right sided weakness noted upon assessment. Pt reports she has been having right side weakness for the past 3 weeks, right side weakness unchanged per pt. Pt denies hx of CVA. CT head without contrast obtained negative for acute findings, chronic microvascular ischemic changes. Unable to obtain MRI due to PPM. PT/OT following. PT recommended SNF placement. Discussed case with Dr. Licona (Neurology) -- did not recommend repeating CT due to timing of symptoms (3 weeks ago), would have shown on original CT. Recommended obtaining carotid US and getting lipid panel. Carotid US showed no significant stenosis. Lipid panel reviewed. Currently has no chest pain. SOB improved. A-fib with controlled rate. Metoprolol decreased on 06/06/18 to 50 mg BID to avoid hypotension. Pt refusing SNF but is willing to go to Rehab facility -- she states either [facility] or [facility]. Consult placed to social work for rehab  placement. [facility] was declined at [facility]. [facility] wanted a full neurological workup, inclusive of an LP, to evaluate right sided weakness prior to acceptance. Pt was at [facility] 3 weeks ago when right side weakness and right eye blindness started. During that admission she was found to have persistent weakness in the right upper extremity and also bilateral lower extremities. She does have a history of a CVA seen on multiple CTs in the past but no acute changes at that time. [facility] was consulted during stay at [facility] and recommended lumbar puncture with reservation. Patient refused lumbar puncture at that time and she was discharged home with home health due to her refusal to go to SNF. Discussed case with [facility], Dr. Mancuso, who did not recommend an LP at this time due to subacute symptoms. [facility] informed and declined patient for acceptance. Pt states she is willing to go back to [facility]. She has been accepted back to [facility]. [facility] here to pick patient up, she did not want to go. I phoned her daughter, Haley, of her refusal to go to rehab after agreeing to go back. Informed Haley that her mother is medically stable for discharge and if she did not want to go to rehab she would be discharged home with home health. Social work discussed appeal process. She stated that she did not want to appeal the discharge. Pt has agreed to go to [facility] after speaking with her daughter. Hemdynmically stable. Pt to follow up with PCP within 3-5 days after discharge for hospital follow up. She will also follow up with Dr. Hernández (Cardiology) within 1 week after discharge for hospital follow up. This patient was seen and examined on the date of discharge and determined suitable for discharge.

## 2018-06-02 NOTE — ED PROVIDER NOTES
SCRIBE #1 NOTE: I, Corinne Mack, am scribing for, and in the presence of, Gamaliel Claros MD. I have scribed the entire note.      History      Chief Complaint   Patient presents with    Headache       Review of patient's allergies indicates:   Allergen Reactions    Morphine Hives     Other reaction(s): Unknown  Other reaction(s): Unknown    Atorvastatin      Other reaction(s): Muscle pain    Codeine      Other reaction(s): Unknown    Digoxin      Other reaction(s): Unknown    Diovan [valsartan] Other (See Comments)     Upset stomach, weakness    Eggs [egg derived]     Naproxen      Other reaction(s): Nausea    Peanut     Propofol      Other reaction(s): delirious    Sulfa (sulfonamide antibiotics)         HPI   HPI    6/2/2018, 10:20 AM   History obtained from the patient      History of Present Illness: Carlie Valdez is a 72 y.o. female patient with PMHx of Afib, DM, HTN, and GERD who presents to the Emergency Department for weakness which onset gradually today. Per [facility], pt was hypotensive at her rehab facility and they called the ambulance. Pt was in Afib when [facility] arrived on scene. Pt c/o fatigue. Symptoms are constant and moderate in severity. No mitigating or exacerbating factors reported. No other associated sxs reported. Patient denies any fever, chills, CP, SOB, N/V/D, back pain, neck pain, dizziness, and all other sxs at this time. No prior Tx reported. No further complaints or concerns at this time.         Arrival mode: [facility]    PCP: Johanna Guzman MD       Past Medical History:  Past Medical History:   Diagnosis Date    Arthritis     Asthma     Atrial fibrillation     Blood clot of vein in shoulder area     Cardiac defibrillator in place     Chronic knee pain     Diabetes mellitus type 2 without retinopathy 12/19/2016    Diaphragmatic hernia without obstruction and without gangrene 9/14/2015    GERD (gastroesophageal reflux disease)     Hypertension     Primary  open angle glaucoma (POAG) of both eyes, severe stage 2018       Past Surgical History:  Past Surgical History:   Procedure Laterality Date    CARDIAC DEFIBRILLATOR PLACEMENT      , .    CATARACT EXTRACTION       SECTION      COLONOSCOPY      ashley      Dr. Macdonald    KNEE ARTHROSCOPY      UPPER GASTROINTESTINAL ENDOSCOPY           Family History:  Family History   Problem Relation Age of Onset    Hypertension Mother     Hypertension Father     Colon cancer Neg Hx     Stomach cancer Neg Hx        Social History:  Social History     Social History Main Topics    Smoking status: Never Smoker    Smokeless tobacco: Never Used    Alcohol use No    Drug use: No    Sexual activity: Yes     Partners: Male       ROS   Review of Systems   Constitutional: Positive for fatigue. Negative for chills and fever.   Respiratory: Negative for cough and shortness of breath.    Cardiovascular: Negative for chest pain and leg swelling.        (+) hypotension   Gastrointestinal: Negative for abdominal pain, diarrhea, nausea and vomiting.   Musculoskeletal: Negative for back pain, neck pain and neck stiffness.   Skin: Negative for rash and wound.   Neurological: Positive for headaches. Negative for dizziness and light-headedness.   All other systems reviewed and are negative.    Physical Exam      Initial Vitals [18 1024]   BP Pulse Resp Temp SpO2   132/77 (!) 120 16 97.8 °F (36.6 °C) 98 %      MAP       95.33          Physical Exam  Nursing Notes and Vital Signs Reviewed.  Constitutional: Patient is in no apparent distress. Well-developed and well-nourished.  Head: Atraumatic. Normocephalic.  Eyes: PERRL. EOM intact. Conjunctivae are not pale. No scleral icterus.  ENT: Mucous membranes are moist. Oropharynx is clear and symmetric.    Neck: Supple. Full ROM. No lymphadenopathy.  Cardiovascular: Regular rate. Regular rhythm. No murmurs, rubs, or gallops. Distal pulses are 2+ and  "symmetric.  Pulmonary/Chest: No respiratory distress. Clear to auscultation bilaterally. No wheezing or rales.  Abdominal: Soft and non-distended.  There is no tenderness.  No rebound, guarding, or rigidity.   Musculoskeletal: No obvious deformities. No edema.   Skin: Warm and dry.  Neurological:  Alert, awake, and appropriate.  Normal speech. 5/5 strength to the LUE. 1/5 strength to the RUE. 2/5 strength to the BLE. No acute focal neurological deficits are appreciated.  Psychiatric: Normal affect. Good eye contact. Appropriate in content.    ED Course    Procedures  ED Vital Signs:  Vitals:    06/02/18 1024 06/02/18 1108 06/02/18 1142 06/02/18 1148   BP: 132/77 (!) 92/55 114/76 (!) 92/57   Pulse: (!) 120 (!) 144 (!) 143 (!) 141   Resp: 16 16 18 (!) 22   Temp: 97.8 °F (36.6 °C)      TempSrc: Oral      SpO2: 98% 97% 98% 99%   Weight: 74.8 kg (165 lb)      Height: 5' 4" (1.626 m)       06/02/18 1152 06/02/18 1158 06/02/18 1202 06/02/18 1207   BP: 97/73 92/71 101/84 118/71   Pulse: (!) 139 (!) 138 100 85   Resp: (!) 22 18 16 20   Temp:       TempSrc:       SpO2: 99% 99% 98% 97%   Weight:       Height:        06/02/18 1228 06/02/18 1237   BP: 123/68 130/67   Pulse: 88 92   Resp: 17 15   Temp:     TempSrc:     SpO2: 95% 95%   Weight:     Height:         Abnormal Lab Results:  Labs Reviewed   CBC W/ AUTO DIFFERENTIAL - Abnormal; Notable for the following:        Result Value    Hemoglobin 11.1 (*)     Hematocrit 35.4 (*)     MCV 81 (*)     MCH 25.3 (*)     MCHC 31.4 (*)     RDW 17.2 (*)     Gran # (ANC) 10.0 (*)     Gran% 86.8 (*)     Lymph% 8.8 (*)     All other components within normal limits   COMPREHENSIVE METABOLIC PANEL - Abnormal; Notable for the following:     BUN, Bld 46 (*)     Albumin 2.6 (*)     Alkaline Phosphatase 152 (*)     AST 53 (*)     ALT 45 (*)     All other components within normal limits   B-TYPE NATRIURETIC PEPTIDE - Abnormal; Notable for the following:      (*)     All other components " within normal limits   TROPONIN I - Abnormal; Notable for the following:     Troponin I 0.057 (*)     All other components within normal limits   CK   URINALYSIS        All Lab Results:  Results for orders placed or performed during the hospital encounter of 06/02/18   CBC auto differential   Result Value Ref Range    WBC 11.51 3.90 - 12.70 K/uL    RBC 4.39 4.00 - 5.40 M/uL    Hemoglobin 11.1 (L) 12.0 - 16.0 g/dL    Hematocrit 35.4 (L) 37.0 - 48.5 %    MCV 81 (L) 82 - 98 fL    MCH 25.3 (L) 27.0 - 31.0 pg    MCHC 31.4 (L) 32.0 - 36.0 g/dL    RDW 17.2 (H) 11.5 - 14.5 %    Platelets 306 150 - 350 K/uL    MPV 9.7 9.2 - 12.9 fL    Gran # (ANC) 10.0 (H) 1.8 - 7.7 K/uL    Lymph # 1.0 1.0 - 4.8 K/uL    Mono # 0.5 0.3 - 1.0 K/uL    Eos # 0.0 0.0 - 0.5 K/uL    Baso # 0.00 0.00 - 0.20 K/uL    Gran% 86.8 (H) 38.0 - 73.0 %    Lymph% 8.8 (L) 18.0 - 48.0 %    Mono% 4.4 4.0 - 15.0 %    Eosinophil% 0.0 0.0 - 8.0 %    Basophil% 0.0 0.0 - 1.9 %    Differential Method Automated    Comprehensive metabolic panel   Result Value Ref Range    Sodium 143 136 - 145 mmol/L    Potassium 3.5 3.5 - 5.1 mmol/L    Chloride 107 95 - 110 mmol/L    CO2 24 23 - 29 mmol/L    Glucose 108 70 - 110 mg/dL    BUN, Bld 46 (H) 8 - 23 mg/dL    Creatinine 0.9 0.5 - 1.4 mg/dL    Calcium 8.9 8.7 - 10.5 mg/dL    Total Protein 6.1 6.0 - 8.4 g/dL    Albumin 2.6 (L) 3.5 - 5.2 g/dL    Total Bilirubin 0.2 0.1 - 1.0 mg/dL    Alkaline Phosphatase 152 (H) 55 - 135 U/L    AST 53 (H) 10 - 40 U/L    ALT 45 (H) 10 - 44 U/L    Anion Gap 12 8 - 16 mmol/L    eGFR if African American >60 >60 mL/min/1.73 m^2    eGFR if non African American >60 >60 mL/min/1.73 m^2   Brain natriuretic peptide   Result Value Ref Range     (H) 0 - 99 pg/mL   CK   Result Value Ref Range    CPK 28 20 - 180 U/L   Troponin I   Result Value Ref Range    Troponin I 0.057 (H) 0.000 - 0.026 ng/mL         Imaging Results:  Imaging Results          X-Ray Chest AP Portable (Final result)  Result time  06/02/18 11:06:06    Final result by Philip Damian Jr., MD (06/02/18 11:06:06)                 Impression:      Cardiomegaly.      Electronically signed by: Philip Damian Jr., MD  Date:    06/02/2018  Time:    11:06             Narrative:    EXAMINATION:  XR CHEST AP PORTABLE    CLINICAL HISTORY:  weakness;    COMPARISON:  12/01/2017.    FINDINGS:  The lungs are clear. The cardiac silhouette remains enlarged.  No effusion or pneumothorax.  Calcified aortic knob.  Degenerative changes both shoulders.  The remaining osseous structures and soft tissues are within normal limits.  Pacemaker remains in place.                                 The EKG was ordered, reviewed, and independently interpreted by the ED provider.  Interpretation time: 1031  Rate: 117 BPM  Rhythm: atrial fibrillation and RVR  Interpretation: R superior axis deviation. Nonspecific intraventricular block. No STEMI.             The Emergency Provider reviewed the vital signs and test results, which are outlined above.    ED Discussion     12:18 PM: Re-evaluated pt. I have discussed test results, shared treatment plan, and the need for admission with patient and family at bedside. Pt and family express understanding at this time and agree with all information. All questions answered. Pt and family have no further questions or concerns at this time. Pt is ready for admit.    12:48 PM: Discussed case with Hilary Grimes NP (Hospital Medicine). Dr. Carrion agrees with current care and management of pt and accepts admission.   Admitting Service: Hospital medicine   Admitting Physician: Dr. Carrion  Admit to: Telemetry        ED Medication(s):  Medications   diltiaZEM 125 mg in D5W 125 mL infusion (5 mg/hr Intravenous New Bag 6/2/18 1146)   ondansetron injection 4 mg (not administered)   promethazine (PHENERGAN) 6.25 mg in dextrose 5 % 50 mL IVPB (not administered)   acetaminophen tablet 650 mg (not administered)   sodium chloride 0.9% bolus 1,000 mL (1,000  mLs Intravenous New Bag 6/2/18 1108)       New Prescriptions    No medications on file             Medical Decision Making    Medical Decision Making:   Clinical Tests:   Lab Tests: Ordered and Reviewed  Radiological Study: Ordered and Reviewed  Medical Tests: Ordered and Reviewed           Scribe Attestation:   Scribe #1: I performed the above scribed service and the documentation accurately describes the services I performed. I attest to the accuracy of the note.    Attending:   Physician Attestation Statement for Scribe #1: I, Gamaliel Claros MD, personally performed the services described in this documentation, as scribed by Corinne Mack, in my presence, and it is both accurate and complete.          Clinical Impression       ICD-10-CM ICD-9-CM   1. Atrial fibrillation with RVR I48.91 427.31   2. Elevated troponin R74.8 790.6       Disposition:   Disposition: Admitted  Condition: Fair           Gamaliel Claros MD  06/02/18 6302

## 2018-06-02 NOTE — ASSESSMENT & PLAN NOTE
- patient with recent HTN emergency (mid may), medications adjusted  - noted hypotensive at rehab facility, medications held.  Then developed RVR  - Currently on Cardizem drip.    - telemetry monitoring  - TTE in AM  - Increased troponin probably demand ischemia.  Will trend troponin  - will titrate to PO Cardizem when clinically stable  - will resume rest of home medications

## 2018-06-02 NOTE — HPI
Carlie Valdez is a 78 year old female with combined systolic and diastolic CHF, pacemaker placement, chronic atrial fibrillation on Xarelto, non arteritic ischemic optic neuropathy (NA-AION) who presented to the Emergency Department with complaints of weakness which onset gradually today. The patient was sent to a rehab facility in [place] after a recent hospital discharge following an episode of HTN emergency. The patient was reportedly hypotensive at her rehab facility. Blood pressure medications were held and the patient developed atrial fibrillation with RVR. The patient's main complaint is fatigue. She denies any chest pain, dyspnea, cough,  complaints and GI complaints. No further complaints or concerns at this time

## 2018-06-03 PROBLEM — E03.9 HYPOTHYROIDISM: Status: ACTIVE | Noted: 2018-01-01

## 2018-06-03 PROBLEM — I50.42 CHRONIC COMBINED SYSTOLIC AND DIASTOLIC CONGESTIVE HEART FAILURE: Status: ACTIVE | Noted: 2017-07-15

## 2018-06-03 NOTE — HPI
This is a 77 yo F with PMHx of chronic CHF EF 25-30%, s/p ICD Chronic A. Fib on Xarelto, Non Arteritic Ischemic Optic Neuropathy (NA-AION); who presents to the Emergency Department for weakness which onset gradually today.  Patient was sent to rehab after recent discharge for episode of HTN emergency.  Was hypotensive at her rehab facility,  BP medication was held, developed RVR.  Patient's main complaint is fatigue.  Denies any CP, dyspnea, cough, /GI complaints.   Cardiology consulted for rapid AF.   Discussed she had side effects in past with metoprolol causing diarrhea and also could not tolerate cardizem. Has not been on amiodarone in the past or cannot remember being on it.

## 2018-06-03 NOTE — SUBJECTIVE & OBJECTIVE
Past Medical History:   Diagnosis Date    Arthritis     Asthma     Atrial fibrillation     Blood clot of vein in shoulder area     Cardiac defibrillator in place     Chronic knee pain     Diabetes mellitus type 2 without retinopathy 2016    Diaphragmatic hernia without obstruction and without gangrene 2015    GERD (gastroesophageal reflux disease)     Hypertension     Primary open angle glaucoma (POAG) of both eyes, severe stage 2018       Past Surgical History:   Procedure Laterality Date    CARDIAC DEFIBRILLATOR PLACEMENT      , .    CATARACT EXTRACTION       SECTION      COLONOSCOPY      ashley      Dr. Macdonald    KNEE ARTHROSCOPY      UPPER GASTROINTESTINAL ENDOSCOPY         Review of patient's allergies indicates:   Allergen Reactions    Morphine Hives     Other reaction(s): Unknown  Other reaction(s): Unknown    Atorvastatin      Other reaction(s): Muscle pain    Codeine      Other reaction(s): Unknown    Digoxin      Other reaction(s): Unknown    Diovan [valsartan] Other (See Comments)     Upset stomach, weakness    Eggs [egg derived]     Naproxen      Other reaction(s): Nausea    Peanut     Propofol      Other reaction(s): delirious    Sulfa (sulfonamide antibiotics)        No current facility-administered medications on file prior to encounter.      Current Outpatient Prescriptions on File Prior to Encounter   Medication Sig    albuterol 90 mcg/actuation inhaler Inhale into the lungs.    albuterol-ipratropium 2.5mg-0.5mg/3mL (DUO-NEB) 0.5 mg-3 mg(2.5 mg base)/3 mL nebulizer solution Take 3 mLs by nebulization every 6 (six) hours while awake. Rescue    brimonidine-timolol (COMBIGAN) 0.2-0.5 % Drop Place 1 drop into both eyes every 12 (twelve) hours.    lactulose (CHRONULAC) 20 gram/30 mL Soln Take 15 mLs (10 g total) by mouth 2 (two) times daily as needed (Constipation).    levothyroxine (SYNTHROID) 100 MCG tablet Take 100 mcg by mouth.     montelukast (SINGULAIR) 10 mg tablet Take 10 mg by mouth.    multivitamin (THERAGRAN) per tablet Take by mouth.    polyethylene glycol (GLYCOLAX) 17 gram PwPk Take 17 g by mouth once daily.    potassium chloride SA (K-DUR,KLOR-CON) 20 MEQ tablet Take 20 mEq by mouth.    rivaroxaban (XARELTO) 15 mg Tab Take 15 mg by mouth.    sodium bicarb-sodium chloride Pack 1 packet by Nasal route 2 (two) times daily.    zinc sulfate (ZINCATE) 220 (50) mg capsule Take 220 mg by mouth.    bumetanide (BUMEX) 2 MG tablet Take 1 tablet (2 mg total) by mouth 2 (two) times daily. 1 Tablet Oral Every day (Patient taking differently: Take 4 mg by mouth 2 (two) times daily. 1 Tablet Oral Every day)    fluticasone (FLONASE) 50 mcg/actuation nasal spray 2 sprays by Each Nare route once daily.    silver sulfADIAZINE 1% (SILVADENE) 1 % cream Apply topically 2 (two) times daily. Apply to bliter areas BID     Family History     Problem Relation (Age of Onset)    Hypertension Mother, Father        Social History Main Topics    Smoking status: Never Smoker    Smokeless tobacco: Never Used    Alcohol use No    Drug use: No    Sexual activity: Yes     Partners: Male     Review of Systems   Constitution: Positive for weakness and malaise/fatigue.   Eyes: Negative.    Cardiovascular: Positive for irregular heartbeat and palpitations.   Endocrine: Negative.    Hematologic/Lymphatic: Negative.    Skin: Negative.    Musculoskeletal: Negative.    Gastrointestinal: Negative.    Genitourinary: Negative.    Psychiatric/Behavioral: Negative.      Objective:     Vital Signs (Most Recent):  Temp: 97.5 °F (36.4 °C) (06/03/18 1116)  Pulse: 80 (06/03/18 1413)  Resp: 17 (06/03/18 1308)  BP: 107/68 (06/03/18 1413)  SpO2: 99 % (06/03/18 1413) Vital Signs (24h Range):  Temp:  [96.2 °F (35.7 °C)-98.3 °F (36.8 °C)] 97.5 °F (36.4 °C)  Pulse:  [] 80  Resp:  [16-22] 17  SpO2:  [95 %-99 %] 99 %  BP: ()/(55-90) 107/68     Weight: 74.3 kg (163 lb  12.8 oz)  Body mass index is 28.12 kg/m².    SpO2: 99 %  O2 Device (Oxygen Therapy): room air      Intake/Output Summary (Last 24 hours) at 06/03/18 1416  Last data filed at 06/03/18 0835   Gross per 24 hour   Intake              120 ml   Output                0 ml   Net              120 ml       Lines/Drains/Airways     Pressure Ulcer                 Pressure Injury 06/02/18 1657 Right Buttocks Stage 2 less than 1 day         Pressure Injury 06/02/18 1659 Left Buttocks Stage 2 less than 1 day         Pressure Injury 06/02/18 1659 Right anterior Thigh Stage 2 less than 1 day         Pressure Injury 06/02/18 1902 Right Heel Deep tissue injury less than 1 day          Peripheral Intravenous Line                 Peripheral IV - Single Lumen 06/02/18 1032 Left Forearm 1 day         Peripheral IV - Single Lumen 06/03/18 1330 Left Forearm less than 1 day                Physical Exam   Constitutional: She is oriented to person, place, and time. She appears well-developed and well-nourished. No distress.   HENT:   Head: Normocephalic and atraumatic.   Nose: Nose normal.   Mouth/Throat: Oropharynx is clear and moist.   Eyes: Conjunctivae and EOM are normal. No scleral icterus.   Neck: Normal range of motion. Neck supple. No JVD present. No thyromegaly present.   Cardiovascular: S1 normal and S2 normal.  An irregularly irregular rhythm present. Tachycardia present.  Exam reveals no gallop, no S3, no S4 and no friction rub.    Murmur heard.  Pulmonary/Chest: Effort normal and breath sounds normal. No stridor. No respiratory distress. She has no wheezes. She has no rales. She exhibits no tenderness.   Abdominal: Soft. Bowel sounds are normal. She exhibits no distension and no mass. There is no tenderness. There is no rebound.   Genitourinary:   Genitourinary Comments: Deferred   Musculoskeletal: Normal range of motion. She exhibits no edema, tenderness or deformity.   Lymphadenopathy:     She has no cervical adenopathy.    Neurological: She is alert and oriented to person, place, and time. She exhibits normal muscle tone. Coordination normal.   Skin: Skin is warm and dry. No rash noted. She is not diaphoretic. No erythema. No pallor.   Psychiatric: She has a normal mood and affect. Her behavior is normal. Judgment and thought content normal.   Nursing note and vitals reviewed.      Significant Labs:   Troponin   Recent Labs  Lab 06/02/18  1043 06/03/18  0016 06/03/18  0732   TROPONINI 0.057* 0.091* 0.075*   , All pertinent lab results from the last 24 hours have been reviewed. and   Recent Lab Results       06/03/18  0732 06/03/18  0016 06/02/18  1755      Aortic Valve Regurgitation   MILD     EF   35(A)     Mitral Valve Mobility   NORMAL     Mitral Valve Regurgitation   MILD TO MODERATE     Est. PA Systolic Pressure   54.19(A)     Troponin I 0.075  Comment:  The reference interval for Troponin I represents the 99th percentile   cutoff   for our facility and is consistent with 3rd generation assay   performance.  (H) 0.091  Comment:  The reference interval for Troponin I represents the 99th percentile   cutoff   for our facility and is consistent with 3rd generation assay   performance.  (H)      Tricuspid Valve Regurgitation   MODERATE TO SEVERE(A)           Significant Imaging: Echocardiogram:   2D echo with color flow doppler:   Results for orders placed or performed during the hospital encounter of 06/02/18   2D echo with color flow doppler   Result Value Ref Range    EF 35 (A) 55 - 65    Mitral Valve Regurgitation MILD TO MODERATE     Aortic Valve Regurgitation MILD     Est. PA Systolic Pressure 54.19 (A)     Mitral Valve Mobility NORMAL     Tricuspid Valve Regurgitation MODERATE TO SEVERE (A)     and X-Ray: CXR: X-Ray Chest 1 View (CXR): No results found for this visit on 06/02/18.

## 2018-06-03 NOTE — HOSPITAL COURSE
6/3/18 - Admitted with rapid AFib, started on Amio drip    6/4/18- A-fib rate still not controlled on Amio gtt. BP stable on current medical management. Has no CNS complaints since admission to suggest TIA or CVA. Complains of pain to BLE and burning.     6/5/18:  She seems to feel a bit better today.  A fib rate variable, but some improvement.  Had some heartburn earlier this am, now resolved.  No typical angina sxs.  Denies dyspnea.  Has deconditioning, some inability to move legs for some time now.  Labs stable.  Troponin leak stable.  Brief NSVT overnight, ICD did not fire.  BP variable.    6/6/18- Patient states that she feels good this morning. Has no chest pain. SOB improved. A-fib with controlled rate on tele. Metoprolol decreased on yesterday to 50mg BID to avoid hypotension. Denies any CNS complaints to suggest TIA or  CVA. No abnormal bleeding on OAC.

## 2018-06-03 NOTE — NURSING
New pt to [facility] from ER. Pt with several wounds to buttocks and thighs. Diltiazem drip discontinued. MD saw pt. Afib on the tele monitor, 80-90's. Turned every two hours, will continue to monitor.

## 2018-06-03 NOTE — SUBJECTIVE & OBJECTIVE
Interval History: The patient continue to complain of fatigue. Atrial fibrillation with RVR noted.     Review of Systems   Constitutional: Positive for fatigue. Negative for appetite change, chills, diaphoresis and fever.   HENT: Negative.  Negative for congestion, ear pain, mouth sores, sore throat and trouble swallowing.    Eyes: Positive for visual disturbance. Negative for pain.   Respiratory: Negative.  Negative for cough, chest tightness and shortness of breath.    Cardiovascular: Positive for palpitations. Negative for chest pain and leg swelling.   Gastrointestinal: Negative.  Negative for abdominal pain, constipation and nausea.   Endocrine: Negative.  Negative for cold intolerance, heat intolerance, polydipsia and polyuria.   Genitourinary: Negative.  Negative for dysuria, frequency and hematuria.   Musculoskeletal: Negative.  Negative for arthralgias, back pain, myalgias and neck pain.   Skin: Negative.  Negative for pallor, rash and wound.   Allergic/Immunologic: Negative.  Negative for environmental allergies and immunocompromised state.   Neurological: Negative.  Negative for dizziness, seizures, syncope, weakness, numbness and headaches.   Hematological: Negative.  Negative for adenopathy. Does not bruise/bleed easily.   Psychiatric/Behavioral: Negative.  Negative for agitation, confusion and sleep disturbance.   All other systems reviewed and are negative.    Objective:     Vital Signs (Most Recent):  Temp: 97.5 °F (36.4 °C) (06/03/18 1116)  Pulse: 89 (06/03/18 1431)  Resp: 18 (06/03/18 1431)  BP: 112/71 (06/03/18 1431)  SpO2: 99 % (06/03/18 1431) Vital Signs (24h Range):  Temp:  [96.2 °F (35.7 °C)-98.3 °F (36.8 °C)] 97.5 °F (36.4 °C)  Pulse:  [] 89  Resp:  [16-21] 18  SpO2:  [96 %-99 %] 99 %  BP: ()/(55-90) 112/71     Weight: 74.3 kg (163 lb 12.8 oz)  Body mass index is 28.12 kg/m².    Intake/Output Summary (Last 24 hours) at 06/03/18 1504  Last data filed at 06/03/18 1353   Gross per 24  hour   Intake              960 ml   Output                0 ml   Net              960 ml      Physical Exam   Constitutional: She is oriented to person, place, and time. She appears well-developed and well-nourished. No distress.   HENT:   Head: Normocephalic and atraumatic.   Eyes: Conjunctivae are normal.   Patient is blind.   Neck: Neck supple. No JVD present.   Cardiovascular: Normal heart sounds.  An irregularly irregular rhythm present. Tachycardia present.    Pulmonary/Chest: Effort normal and breath sounds normal.   Abdominal: Soft. Bowel sounds are normal. She exhibits no distension. There is no tenderness.   Musculoskeletal: Normal range of motion.   Neurological: She is alert and oriented to person, place, and time.   Skin: Skin is warm and dry.   Psychiatric: She has a normal mood and affect. Her behavior is normal. Thought content normal.   Nursing note and vitals reviewed.      Significant Labs:   CBC:   Recent Labs  Lab 06/02/18  1043   WBC 11.51   HGB 11.1*   HCT 35.4*        CMP:   Recent Labs  Lab 06/02/18  1043      K 3.5      CO2 24      BUN 46*   CREATININE 0.9   CALCIUM 8.9   PROT 6.1   ALBUMIN 2.6*   BILITOT 0.2   ALKPHOS 152*   AST 53*   ALT 45*   ANIONGAP 12   EGFRNONAA >60     Troponin:   Recent Labs  Lab 06/02/18  1043 06/03/18  0016 06/03/18  0732   TROPONINI 0.057* 0.091* 0.075*     All pertinent labs within the past 24 hours have been reviewed.    Significant Imaging: I have reviewed all pertinent imaging results/findings within the past 24 hours.

## 2018-06-03 NOTE — PLAN OF CARE
Problem: Patient Care Overview  Goal: Plan of Care Review  Outcome: Ongoing (interventions implemented as appropriate)  Pt stable resting through shift. A fib 's on cardiac monitor.PIV intact. Pt has multiple wounds and is incontinent to bowel and bladder. Pt being changed frequently and Q2 Hour turn schedule to help prevent further breakdown. Heels floated.Pillows in use to help with support and relieving pressure to the wounds. Wound care down and barrier creams applied. Wound care has also been consulted.  Bed in low position, nonskid socks, personal items and call light within reach. Remains free from injury or falls this shift. Instructed to call for assistance. POC reviewed with pt, pt verbalized understanding.

## 2018-06-03 NOTE — PLAN OF CARE
Problem: Patient Care Overview  Goal: Plan of Care Review  Outcome: Outcome(s) achieved Date Met: 06/03/18  Pt AAO x4.  VSS.  Pt remained afebrile throughout this shift.   IV fluids administered per order.   Pt remained free of falls this shift.   Pt no complaints of pain this shift.  Plan of care reviewed. Patient verbalizes understanding.   Pain meds administered as ordered.   Pt moving/turing every 2 hours. WOC consult for wounds.   Patient afib on monitor, amiodarone drip started.  Bed low, side rails up x 2, wheels locked, call light in reach.   Patient instructed to call for assistance.   Hourly rounding completed.   Will continue to monitor.

## 2018-06-03 NOTE — ASSESSMENT & PLAN NOTE
Increase Toprol  NO cardizem due to systolic dysfunction  Started Amiodarone for rate/rhythm control  Elevated troponin due to demand ischemia in setting of rapid AF  Cont Xarelto  Keep K > 4, Mg > 2

## 2018-06-03 NOTE — ASSESSMENT & PLAN NOTE
-Appears compensated.   -Continue metoprolol.   -Bumex on hold due to hypotension.   -No ACE or ARB due to hypotension.   -Monitor strict intake and output as well as daily weights.

## 2018-06-03 NOTE — NURSING
DTI to bilateral heels, pillows under heels and between knees and pressure points. Wound consult ordered.

## 2018-06-03 NOTE — PT/OT/SLP EVAL
Physical Therapy Evaluation    Patient Name:  Carlie Valdez   MRN:  [id number]    Recommendations:     Discharge Recommendations:  nursing facility, skilled   Discharge Equipment Recommendations:     Barriers to discharge: Decreased caregiver support and NOT A SANITARY LIVING ENVIRONMENT    Assessment:     Carlie Valdez is a 72 y.o. female admitted with a medical diagnosis of Atrial fibrillation with RVR.  She presents with the following impairments/functional limitations:  impaired endurance, impaired functional mobilty, visual deficits, decreased upper extremity function, decreased coordination, decreased safety awareness, decreased ROM, impaired balance, weakness, impaired self care skills, gait instability, impaired sensation, impaired cognition, abnormal tone, edema.    Rehab Prognosis:  FAIR; patient would benefit from acute skilled PT services to address these deficits and reach maximum level of function.      Recent Surgery: * No surgery found *      Plan:     During this hospitalization, patient to be seen 5 x/week to address the above listed problems via therapeutic activities, therapeutic exercises, therapeutic groups, gait training  · Plan of Care Expires:  06/10/18   Plan of Care Reviewed with: patient    Subjective     Communicated with MICHELINE prior to session.  Patient found LYING SUPINE WITH HOB ELEVATED AND SLEEPY; SON-IN-LAW PRESENT upon PT entry to room, agreeable to evaluation.      Chief Complaint: WEAKNESS IN LEGS AND R ARM  Patient comments/goals: TO WALK  Pain/Comfort:  · Pain Rating 1: 0/10    Patients cultural, spiritual, Lutheran conflicts given the current situation:      Living Environment:  WAS LIVING WITH  BUT NOT A SAFE SITUATION; WENT TO IP REHAB THEN AFIB/LOW BP SENT HERE  Prior to admission, patients level of function was PT. CLAIMS IN April 2018 AMB WITH RW.  Patient has the following equipment:  .  DME owned (not currently used): none.  Upon  discharge, patient will have assistance from FAMILY  AND NOT JUST  B/C HE CAN'T ASSIST HER ON HIS OWN..    Objective:     Patient found with: peripheral IV, telemetry, oxygen     General Precautions: Standard, fall   Orthopedic Precautions:N/A   Braces: N/A     Exams:  · B LE 1+/5 AT MOST - INC FLEXOR TONE IN B LE; MIN DEC ROM R/T INC TONE/DEC FLEXIBILITY    Functional Mobility:  · BED MOBILITY - TOTAL A X 1-2  · T/F - TOTAL A - PARTIAL STAND AT BEST - KNEES REMAINED BENT - L>R    AM-PAC 6 CLICK MOBILITY  Total Score:8       Therapeutic Activities and Exercises:   P.T. INSTRUCTED PATIENT ON POC, ROLE OF P.T. IN ACUTE CARE, D/C REC FOR CONTINUED P.T. IN REHAB. PATIENT WILL REFUSE BUT HOME IS NOT A SAFE OPTION AT THIS POINT - MSW NEEDS TO SPEAK WITH CHILDREN RE: D/C DESTINATION    Patient left HOB elevated with all lines intact, call button in reach and NURSE notified.    GOALS:    Physical Therapy Goals        Problem: Physical Therapy Goal    Goal Priority Disciplines Outcome Goal Variances Interventions   Physical Therapy Goal     PT/OT, PT      Description:  LTG'S FOR PT BY 6/10/18  1. PATIENT WILL PERFORM SUP TO/FROM SIT MAX X 1  2. PATIENT WILL PERFORM SQUAT PIVOT TRANSFER TO CHAIR MAX X 1  3. PATIENT WILL PERFORM B LE AAROM/ISOMETRICS MIN A X 20 REPS                    History:     Past Medical History:   Diagnosis Date    Arthritis     Asthma     Atrial fibrillation     Blood clot of vein in shoulder area     Cardiac defibrillator in place     Chronic knee pain     Diabetes mellitus type 2 without retinopathy 2016    Diaphragmatic hernia without obstruction and without gangrene 2015    GERD (gastroesophageal reflux disease)     Hypertension     Primary open angle glaucoma (POAG) of both eyes, severe stage 2018       Past Surgical History:   Procedure Laterality Date    CARDIAC DEFIBRILLATOR PLACEMENT      , .    CATARACT EXTRACTION       SECTION       COLONOSCOPY      ashley Macdonald    KNEE ARTHROSCOPY      UPPER GASTROINTESTINAL ENDOSCOPY         Clinical Decision Making:     History  Co-morbidities and personal factors that may impact the plan of care Examination  Body Structures and Functions, activity limitations and participation restrictions that may impact the plan of care Clinical Presentation   Decision Making/ Complexity Score   Co-morbidities:   [] Time since onset of injury / illness / exacerbation  [] Status of current condition  []Patient's cognitive status and safety concerns    [] Multiple Medical Problems (see med hx)  Personal Factors:   [] Patient's age  [] Prior Level of function   [] Patient's home situation (environment and family support)  [] Patient's level of motivation  [] Expected progression of patient      HISTORY:(criteria)    [] 48210 - no personal factors/history    [] 66319 - has 1-2 personal factor/comorbidity     [] 43987 - has >3 personal factor/comorbidity     Body Regions:  [] Objective examination findings  [] Head     []  Neck  [] Trunk   [] Upper Extremity  [] Lower Extremity    Body Systems:  [] For communication ability, affect, cognition, language, and learning style: the assessment of the ability to make needs known, consciousness, orientation (person, place, and time), expected emotional /behavioral responses, and learning preferences (eg, learning barriers, education  needs)  [] For the neuromuscular system: a general assessment of gross coordinated movement (eg, balance, gait, locomotion, transfers, and transitions) and motor function  (motor control and motor learning)  [] For the musculoskeletal system: the assessment of gross symmetry, gross range of motion, gross strength, height, and weight  [] For the integumentary system: the assessment of pliability(texture), presence of scar formation, skin color, and skin integrity  [] For cardiovascular/pulmonary system: the assessment of heart rate, respiratory  rate, blood pressure, and edema     Activity limitations:    [] Patient's cognitive status and saf ety concerns          [] Status of current condition      [] Weight bearing restriction  [] Cardiopulmunary Restriction    Participation Restrictions:   [] Goals and goal agreement with the patient     [] Rehab potential (prognosis) and probable outcome      Examination of Body System: (criteria)    [] 23969 - addressing 1-2 elements    [] 51128 - addressing a total of 3 or more elements     [] 95414 -  Addressing a total of 4 or more elements         Clinical Presentation: (criteria)  Choose one     On examination of body system using standardized tests and measures patient presents with (CHOOSE ONE) elements from any of the following: body structures and functions, activity limitations, and/or participation restrictions.  Leading to a clinical presentation that is considered (CHOOSE ONE)                              Clinical Decision Making  (Eval Complexity):  Choose One     Time Tracking:     PT Received On: 06/03/18  PT Start Time: 1315     PT Stop Time: 1345  PT Total Time (min): 30 min     Billable Minutes: Evaluation 15 and Therapeutic Activity 10      Carlos Hill, PT  06/03/2018

## 2018-06-03 NOTE — CONSULTS
[facility] -   Cardiology  Consult Note    Patient Name: Carlie Valdez  MRN: [id number]  Admission Date: 6/2/2018  Hospital Length of Stay: 1 days  Code Status: Full Code   Attending Provider: Rod Hoang MD   Consulting Provider: Robinson Freed Md, MD  Primary Care Physician: Jhoanna Guzman MD  Principal Problem:Atrial fibrillation with RVR    Patient information was obtained from patient, past medical records and ER records.     Inpatient consult to Cardiology  Consult performed by: ROBINSON FREED  Consult ordered by: ROD HOANG  Reason for consult: Afib v RVR        Subjective:     Chief Complaint:  Weakness, AF v RVR     HPI:   This is a 79 yo F with PMHx of chronic CHF EF 25-30%, s/p ICD Chronic A. Fib on Xarelto, Non Arteritic Ischemic Optic Neuropathy (NA-AION); who presents to the Emergency Department for weakness which onset gradually today.  Patient was sent to rehab after recent discharge for episode of HTN emergency.  Was hypotensive at her rehab facility,  BP medication was held, developed RVR.  Patient's main complaint is fatigue.  Denies any CP, dyspnea, cough, /GI complaints.   Cardiology consulted for rapid AF.   Discussed she had side effects in past with metoprolol causing diarrhea and also could not tolerate cardizem. Has not been on amiodarone in the past or cannot remember being on it.     Past Medical History:   Diagnosis Date    Arthritis     Asthma     Atrial fibrillation     Blood clot of vein in shoulder area     Cardiac defibrillator in place     Chronic knee pain     Diabetes mellitus type 2 without retinopathy 12/19/2016    Diaphragmatic hernia without obstruction and without gangrene 9/14/2015    GERD (gastroesophageal reflux disease)     Hypertension     Primary open angle glaucoma (POAG) of both eyes, severe stage 6/1/2018       Past Surgical History:   Procedure Laterality Date    CARDIAC DEFIBRILLATOR PLACEMENT      2004, 2012.     CATARACT EXTRACTION       SECTION      COLONOSCOPY      ashley Macdonald    KNEE ARTHROSCOPY      UPPER GASTROINTESTINAL ENDOSCOPY         Review of patient's allergies indicates:   Allergen Reactions    Morphine Hives     Other reaction(s): Unknown  Other reaction(s): Unknown    Atorvastatin      Other reaction(s): Muscle pain    Codeine      Other reaction(s): Unknown    Digoxin      Other reaction(s): Unknown    Diovan [valsartan] Other (See Comments)     Upset stomach, weakness    Eggs [egg derived]     Naproxen      Other reaction(s): Nausea    Peanut     Propofol      Other reaction(s): delirious    Sulfa (sulfonamide antibiotics)        No current facility-administered medications on file prior to encounter.      Current Outpatient Prescriptions on File Prior to Encounter   Medication Sig    albuterol 90 mcg/actuation inhaler Inhale into the lungs.    albuterol-ipratropium 2.5mg-0.5mg/3mL (DUO-NEB) 0.5 mg-3 mg(2.5 mg base)/3 mL nebulizer solution Take 3 mLs by nebulization every 6 (six) hours while awake. Rescue    brimonidine-timolol (COMBIGAN) 0.2-0.5 % Drop Place 1 drop into both eyes every 12 (twelve) hours.    lactulose (CHRONULAC) 20 gram/30 mL Soln Take 15 mLs (10 g total) by mouth 2 (two) times daily as needed (Constipation).    levothyroxine (SYNTHROID) 100 MCG tablet Take 100 mcg by mouth.    montelukast (SINGULAIR) 10 mg tablet Take 10 mg by mouth.    multivitamin (THERAGRAN) per tablet Take by mouth.    polyethylene glycol (GLYCOLAX) 17 gram PwPk Take 17 g by mouth once daily.    potassium chloride SA (K-DUR,KLOR-CON) 20 MEQ tablet Take 20 mEq by mouth.    rivaroxaban (XARELTO) 15 mg Tab Take 15 mg by mouth.    sodium bicarb-sodium chloride Pack 1 packet by Nasal route 2 (two) times daily.    zinc sulfate (ZINCATE) 220 (50) mg capsule Take 220 mg by mouth.    bumetanide (BUMEX) 2 MG tablet Take 1 tablet (2 mg total) by mouth 2 (two) times daily. 1 Tablet Oral  Every day (Patient taking differently: Take 4 mg by mouth 2 (two) times daily. 1 Tablet Oral Every day)    fluticasone (FLONASE) 50 mcg/actuation nasal spray 2 sprays by Each Nare route once daily.    silver sulfADIAZINE 1% (SILVADENE) 1 % cream Apply topically 2 (two) times daily. Apply to bliter areas BID     Family History     Problem Relation (Age of Onset)    Hypertension Mother, Father        Social History Main Topics    Smoking status: Never Smoker    Smokeless tobacco: Never Used    Alcohol use No    Drug use: No    Sexual activity: Yes     Partners: Male     Review of Systems   Constitution: Positive for weakness and malaise/fatigue.   Eyes: Negative.    Cardiovascular: Positive for irregular heartbeat and palpitations.   Endocrine: Negative.    Hematologic/Lymphatic: Negative.    Skin: Negative.    Musculoskeletal: Negative.    Gastrointestinal: Negative.    Genitourinary: Negative.    Psychiatric/Behavioral: Negative.      Objective:     Vital Signs (Most Recent):  Temp: 97.5 °F (36.4 °C) (06/03/18 1116)  Pulse: 80 (06/03/18 1413)  Resp: 17 (06/03/18 1308)  BP: 107/68 (06/03/18 1413)  SpO2: 99 % (06/03/18 1413) Vital Signs (24h Range):  Temp:  [96.2 °F (35.7 °C)-98.3 °F (36.8 °C)] 97.5 °F (36.4 °C)  Pulse:  [] 80  Resp:  [16-22] 17  SpO2:  [95 %-99 %] 99 %  BP: ()/(55-90) 107/68     Weight: 74.3 kg (163 lb 12.8 oz)  Body mass index is 28.12 kg/m².    SpO2: 99 %  O2 Device (Oxygen Therapy): room air      Intake/Output Summary (Last 24 hours) at 06/03/18 1416  Last data filed at 06/03/18 0835   Gross per 24 hour   Intake              120 ml   Output                0 ml   Net              120 ml       Lines/Drains/Airways     Pressure Ulcer                 Pressure Injury 06/02/18 1657 Right Buttocks Stage 2 less than 1 day         Pressure Injury 06/02/18 1659 Left Buttocks Stage 2 less than 1 day         Pressure Injury 06/02/18 1659 Right anterior Thigh Stage 2 less than 1 day          Pressure Injury 06/02/18 1902 Right Heel Deep tissue injury less than 1 day          Peripheral Intravenous Line                 Peripheral IV - Single Lumen 06/02/18 1032 Left Forearm 1 day         Peripheral IV - Single Lumen 06/03/18 1330 Left Forearm less than 1 day                Physical Exam   Constitutional: She is oriented to person, place, and time. She appears well-developed and well-nourished. No distress.   HENT:   Head: Normocephalic and atraumatic.   Nose: Nose normal.   Mouth/Throat: Oropharynx is clear and moist.   Eyes: Conjunctivae and EOM are normal. No scleral icterus.   Neck: Normal range of motion. Neck supple. No JVD present. No thyromegaly present.   Cardiovascular: S1 normal and S2 normal.  An irregularly irregular rhythm present. Tachycardia present.  Exam reveals no gallop, no S3, no S4 and no friction rub.    Murmur heard.  Pulmonary/Chest: Effort normal and breath sounds normal. No stridor. No respiratory distress. She has no wheezes. She has no rales. She exhibits no tenderness.   Abdominal: Soft. Bowel sounds are normal. She exhibits no distension and no mass. There is no tenderness. There is no rebound.   Genitourinary:   Genitourinary Comments: Deferred   Musculoskeletal: Normal range of motion. She exhibits no edema, tenderness or deformity.   Lymphadenopathy:     She has no cervical adenopathy.   Neurological: She is alert and oriented to person, place, and time. She exhibits normal muscle tone. Coordination normal.   Skin: Skin is warm and dry. No rash noted. She is not diaphoretic. No erythema. No pallor.   Psychiatric: She has a normal mood and affect. Her behavior is normal. Judgment and thought content normal.   Nursing note and vitals reviewed.      Significant Labs:   Troponin   Recent Labs  Lab 06/02/18  1043 06/03/18  0016 06/03/18  0732   TROPONINI 0.057* 0.091* 0.075*   , All pertinent lab results from the last 24 hours have been reviewed. and   Recent Lab Results        06/03/18  0732 06/03/18  0016 06/02/18  1755      Aortic Valve Regurgitation   MILD     EF   35(A)     Mitral Valve Mobility   NORMAL     Mitral Valve Regurgitation   MILD TO MODERATE     Est. PA Systolic Pressure   54.19(A)     Troponin I 0.075  Comment:  The reference interval for Troponin I represents the 99th percentile   cutoff   for our facility and is consistent with 3rd generation assay   performance.  (H) 0.091  Comment:  The reference interval for Troponin I represents the 99th percentile   cutoff   for our facility and is consistent with 3rd generation assay   performance.  (H)      Tricuspid Valve Regurgitation   MODERATE TO SEVERE(A)           Significant Imaging: Echocardiogram:   2D echo with color flow doppler:   Results for orders placed or performed during the hospital encounter of 06/02/18   2D echo with color flow doppler   Result Value Ref Range    EF 35 (A) 55 - 65    Mitral Valve Regurgitation MILD TO MODERATE     Aortic Valve Regurgitation MILD     Est. PA Systolic Pressure 54.19 (A)     Mitral Valve Mobility NORMAL     Tricuspid Valve Regurgitation MODERATE TO SEVERE (A)     and X-Ray: CXR: X-Ray Chest 1 View (CXR): No results found for this visit on 06/02/18.    Assessment and Plan:     * Atrial fibrillation with RVR    Increase Toprol  NO cardizem due to systolic dysfunction  Started Amiodarone for rate/rhythm control  Elevated troponin due to demand ischemia in setting of rapid AF  Cont Xarelto  Keep K > 4, Mg > 2          Acute combined systolic and diastolic congestive heart failure    Due to AF v RVR  Rate control  Low salt diet/fluid restrict         Hypertension associated with diabetes    Hold BP meds due to hypotension            VTE Risk Mitigation         Ordered     rivaroxaban tablet 15 mg  With dinner      06/02/18 5661          Thank you for your consult. I will follow-up with patient. Please contact us if you have any additional questions.    Renny Delarosa Md, MD  Cardiology    [facility] -

## 2018-06-03 NOTE — PROGRESS NOTES
[facility] Hospital Medicine  Progress Note    Patient Name: Carlie Valdez  MRN: [id number]  Patient Class: IP- Inpatient   Admission Date: 6/2/2018  Length of Stay: 1 days  Attending Physician: Rod Carrion MD  Primary Care Provider: Johanna Guzman MD        Subjective:     Principal Problem:Atrial fibrillation with RVR    HPI:  Carlie Valdez is a 78 year old female with combined systolic and diastolic CHF, pacemaker placement, chronic atrial fibrillation on Xarelto, non arteritic ischemic optic neuropathy (NA-AION) who presented to the Emergency Department with complaints of weakness which onset gradually today. The patient was sent to a rehab facility in [place] after a recent hospital discharge following an episode of HTN emergency. The patient was reportedly hypotensive at her rehab facility. Blood pressure medications were held and the patient developed atrial fibrillation with RVR. The patient's main complaint is fatigue. She denies any chest pain, dyspnea, cough,  complaints and GI complaints. No further complaints or concerns at this time            Hospital Course:  The patient was placed in Observation with complaints of weakness as well as findings of hypotension and atrial fibrillation with RVR. The patient was initially started on a Cardizem drip which was weaned off due to the patient's significant history of CHF. Oral metoprolol was started, but the patient's RVR persisted. Cardiology was consulted and the patient was started on an Amiodarone drip and transitioned to an inpatient admission. She continues to have hypotension requiring fluid boluses. She reports weakness, but states that she does not want to return to the SNF facility that transferred her here. She states that she wants to go home with home health.       Interval History: The patient continue to complain of fatigue. Atrial fibrillation with RVR noted.     Review of Systems   Constitutional:  Positive for fatigue. Negative for appetite change, chills, diaphoresis and fever.   HENT: Negative.  Negative for congestion, ear pain, mouth sores, sore throat and trouble swallowing.    Eyes: Positive for visual disturbance. Negative for pain.   Respiratory: Negative.  Negative for cough, chest tightness and shortness of breath.    Cardiovascular: Positive for palpitations. Negative for chest pain and leg swelling.   Gastrointestinal: Negative.  Negative for abdominal pain, constipation and nausea.   Endocrine: Negative.  Negative for cold intolerance, heat intolerance, polydipsia and polyuria.   Genitourinary: Negative.  Negative for dysuria, frequency and hematuria.   Musculoskeletal: Negative.  Negative for arthralgias, back pain, myalgias and neck pain.   Skin: Negative.  Negative for pallor, rash and wound.   Allergic/Immunologic: Negative.  Negative for environmental allergies and immunocompromised state.   Neurological: Negative.  Negative for dizziness, seizures, syncope, weakness, numbness and headaches.   Hematological: Negative.  Negative for adenopathy. Does not bruise/bleed easily.   Psychiatric/Behavioral: Negative.  Negative for agitation, confusion and sleep disturbance.   All other systems reviewed and are negative.    Objective:     Vital Signs (Most Recent):  Temp: 97.5 °F (36.4 °C) (06/03/18 1116)  Pulse: 89 (06/03/18 1431)  Resp: 18 (06/03/18 1431)  BP: 112/71 (06/03/18 1431)  SpO2: 99 % (06/03/18 1431) Vital Signs (24h Range):  Temp:  [96.2 °F (35.7 °C)-98.3 °F (36.8 °C)] 97.5 °F (36.4 °C)  Pulse:  [] 89  Resp:  [16-21] 18  SpO2:  [96 %-99 %] 99 %  BP: ()/(55-90) 112/71     Weight: 74.3 kg (163 lb 12.8 oz)  Body mass index is 28.12 kg/m².    Intake/Output Summary (Last 24 hours) at 06/03/18 1504  Last data filed at 06/03/18 1353   Gross per 24 hour   Intake              960 ml   Output                0 ml   Net              960 ml      Physical Exam   Constitutional: She is  oriented to person, place, and time. She appears well-developed and well-nourished. No distress.   HENT:   Head: Normocephalic and atraumatic.   Eyes: Conjunctivae are normal.   Patient is blind.   Neck: Neck supple. No JVD present.   Cardiovascular: Normal heart sounds.  An irregularly irregular rhythm present. Tachycardia present.    Pulmonary/Chest: Effort normal and breath sounds normal.   Abdominal: Soft. Bowel sounds are normal. She exhibits no distension. There is no tenderness.   Musculoskeletal: Normal range of motion.   Neurological: She is alert and oriented to person, place, and time.   Skin: Skin is warm and dry.   Psychiatric: She has a normal mood and affect. Her behavior is normal. Thought content normal.   Nursing note and vitals reviewed.      Significant Labs:   CBC:   Recent Labs  Lab 06/02/18  1043   WBC 11.51   HGB 11.1*   HCT 35.4*        CMP:   Recent Labs  Lab 06/02/18  1043      K 3.5      CO2 24      BUN 46*   CREATININE 0.9   CALCIUM 8.9   PROT 6.1   ALBUMIN 2.6*   BILITOT 0.2   ALKPHOS 152*   AST 53*   ALT 45*   ANIONGAP 12   EGFRNONAA >60     Troponin:   Recent Labs  Lab 06/02/18  1043 06/03/18  0016 06/03/18  0732   TROPONINI 0.057* 0.091* 0.075*     All pertinent labs within the past 24 hours have been reviewed.    Significant Imaging: I have reviewed all pertinent imaging results/findings within the past 24 hours.    Assessment/Plan:      * Atrial fibrillation with RVR    -Continue oral metoprolol and Xarelto.   -Amiodarone drip per Cardiology.   -Monitor.         Elevated troponin    ACS ruled out as there was no upward trend.              Type 2 diabetes mellitus with kidney complication, without long-term current use of insulin    -NISS.   -Check Hemoglobin A1C.   -ADA diet.           Chronic combined systolic and diastolic congestive heart failure    -Appears compensated.   -Continue metoprolol.   -Bumex on hold due to hypotension.   -No ACE or ARB due  to hypotension.   -Monitor strict intake and output as well as daily weights.           Chronic kidney disease (CKD), stage III (moderate)    -Creatinine at baseline.   -Monitor.           Hypothyroidism    -Continue Synthroid.   -Check TSH due to RVR.           VTE Risk Mitigation         Ordered     rivaroxaban tablet 15 mg  With dinner      06/02/18 7901              KADI Koenig  Department of Hospital Medicine   [facility]

## 2018-06-03 NOTE — PROGRESS NOTES
Dr. Funk has been told that the pts HR is creeping back up from low 100's  And jumping up to 140s. Order to give metoprolol early.

## 2018-06-04 PROBLEM — L89.202: Status: ACTIVE | Noted: 2018-01-01

## 2018-06-04 PROBLEM — L89.323: Status: ACTIVE | Noted: 2018-01-01

## 2018-06-04 PROBLEM — T14.8XXA DEEP TISSUE INJURY: Status: ACTIVE | Noted: 2018-01-01

## 2018-06-04 NOTE — ASSESSMENT & PLAN NOTE
-Troponin leak likely secondary to demand ischemia given her uncontrolled A-fib   -continue current medical management

## 2018-06-04 NOTE — CONSULTS
06/04/18 1020   Handoff Report   Given To GIULIANO Barbosa   Pain/Comfort Assessments   Pain Assessment Performed Yes       Number Scale   Presence of Pain denies   Skin   Skin WDL ex   Skin Temperature warm   Skin Moisture Moist   Skin Elasticity Sluggish   Beka Risk Assessment   Sensory Perception 3-->slightly limited   Moisture 2-->very moist   Activity 2-->chairfast   Mobility 2-->very limited   Nutrition 2-->probably inadequate   Friction and Shear 1-->problem   Beka Score 12       Pressure Injury 06/02/18 1657 Right Buttocks Stage 2   Date First Assessed/Time First Assessed: 06/02/18 1657   Pressure Injury Present on Admission: yes  Side: Right  Location: Buttocks  Is this injury device related?: No  Staging: Stage 2   Staging 2  (healing - scar tissue)   Dressing Appearance Dry;Intact   Drainage Amount None   Appearance [place]   Periwound Area Dry;Intact   Care Applied:;Skin Barrier       Pressure Injury 06/02/18 1659 Left proximal Ischial tuberosity Stage 3   Date First Assessed/Time First Assessed: 06/02/18 1659   Pressure Injury Present on Admission: yes  Side: Left  Orientation: proximal  Location: Ischial tuberosity  Is this injury device related?: (c) Yes  Staging: Stage 3   Wound Image    Staging 3   Dressing Appearance Moist drainage   Drainage Amount Scant   Drainage Characteristics/Odor Serous   Appearance Red;Yellow;Slough;Moist   Tissue loss description Full thickness   Red (%), Wound Tissue Color 50 %   Yellow (%), Wound Tissue Color 50 %   Periwound Area Dry;Intact   Wound Edges Irregular   Wound Length (cm) 3 cm   Wound Width (cm) 4 cm   Wound Depth (cm) 0.2 cm   Wound Volume (cm^3) 2.4 cm^3   Care Cleansed with:;Soap and water;Applied:;Skin Barrier   Dressing Other (see comments)  (TRIAD )   Periwound Care Cleansed with pH balanced cleanser;Dry periwound area maintained;Topical treatment applied  (TRIAD)       Pressure Injury 06/02/18 1659 Right anterior Thigh Stage 2   Date First Assessed/Time  First Assessed: 06/02/18 1659   Pressure Injury Present on Admission: yes  Side: Right  Orientation: anterior  Location: Thigh  Staging: Stage 2   Staging 2  (healing)   Dressing Appearance Dry;Intact   Drainage Amount None   Appearance Red   Tissue loss description Partial thickness   Red (%), Wound Tissue Color 100 %   Periwound Area Dry;Intact   Wound Edges Open   Wound Length (cm) 1.5 cm   Wound Width (cm) 1.5 cm   Care Cleansed with:;Sterile normal saline;Applied:;Skin Barrier   Dressing Foam   Dressing Change Due 06/05/18       Pressure Injury 06/02/18 1902 Right medial Heel Deep tissue injury   Date First Assessed/Time First Assessed: 06/02/18 1902   Pressure Injury Present on Admission: yes  Side: Right  Orientation: medial  Location: Heel  Staging: Deep tissue injury   Staging D   Dressing Appearance Open to air   Drainage Amount None   Appearance Maroon;Purple   Periwound Area Dry;Intact   Wound Length (cm) 4 cm   Wound Width (cm) 5 cm   Care Cleansed with:;Sterile normal saline;Applied:;Skin Barrier   Periwound Care Cleansed with pH balanced cleanser;Dry periwound area maintained;Moisturizer applied;Skin barrier film applied       Pressure Injury 06/02/18 Right lateral Heel Deep tissue injury   Date First Assessed: 06/02/18   Pressure Injury Present on Admission: yes  Side: Right  Orientation: lateral  Location: Heel  Staging: Deep tissue injury   Staging D   Dressing Appearance Open to air   Drainage Amount None   Appearance Purple;Blistered;Dry   Periwound Area Dry;Intact   Wound Length (cm) 3 cm   Wound Width (cm) 4 cm   Care Cleansed with:;Sterile normal saline;Applied:;Skin Barrier       Pressure Injury 06/02/18 Left distal Ischial tuberosity Stage 3   Date First Assessed: 06/02/18   Pressure Injury Present on Admission: yes  Side: Left  Orientation: distal  Location: Ischial tuberosity  Is this injury device related?: (c) Yes  Staging: Stage 3   Wound Image (See picture attached to proximal ischial  "LDA)   Staging 3   Dressing Appearance Moist drainage   Drainage Amount Scant   Drainage Characteristics/Odor Serous   Appearance Red;Yellow;Slough;Moist   Tissue loss description Full thickness   Red (%), Wound Tissue Color 50 %   Yellow (%), Wound Tissue Color 50 %   Periwound Area Dry;Intact   Wound Edges Irregular   Wound Length (cm) 3 cm   Wound Width (cm) 2 cm   Wound Depth (cm) 0.2 cm   Wound Volume (cm^3) 1.2 cm^3   Care Cleansed with:;Soap and water;Applied:;Skin Barrier  (TRIAD)   Dressing Other (see comments)  (TRIAD hydrophillic wound dressing paste)   Periwound Care Cleansed with pH balanced cleanser;Dry periwound area maintained;Topical treatment applied  (TRIAD)   Skin Interventions   Pressure Reduction Devices Pressure-redistributing mattress utilized   Pressure Reduction Techniques frequent weight shift encouraged;heels elevated off bed;positioned off wounds;pressure points protected   Skin Protection Adhesive use limited;Incontinence pads;Skin sealant/moisture barrier   Positioning   Body Position side-lying, left   Positioning/Transfer Devices pillows;applied;wedge       Consulted on this 73 y/o F patient due to multiple present on admission pressure injuries. Patient admitted from "rehab facility". She is wheelchair bound, but does not have a cushion/pad for the chair.  She has PMH significant for AFib, AICD, DM, GERD, HTN, BLE contractures, and BUE weakness. Patient c/o pain to right heel.   Right medial heel DTI noted measuring 4x5cm with maroon and purple discoloration. Painted with cavilon. Right lateral heel DTI noted measuring 3x4cm with purple firm discoloration over resolving/dry firm intact blister. Painted with cavilon.  Left heel intact with dry flaky skin and mottling noted to foot, no pressure injury.  Medial Right thigh stage 2 pressure injury noted measuring 1.5x1.5cm with moist red wound bed, healing.  Patient reports if from Flores catheter that is no longer present.  Cleansed " with saline and patted dry. Painted with cavilon and foam dressing applied.  Patient then turned to left side.  Brief removed, soiled with urine. Liz care provided with easi cleanse foam wipes.  Right buttock healing stage 2 pressure injugy noted with pink scar tissue, resurfacing.  Painted with cavilon. Patient then turned to right side. Sacrum and coccyx intact. Foam dressing removed from left lower buttock, soiled with stool and urine. Left Ischium Stage 3 pressure injuries x2 noted. Proximal measures 3x4cm with moist red and yellow wound bed with some slough. Distal measures 3x2cm with  Moist red and yellow wound bed with some slough.  Cleansed both with easi cleanse foam wipes and patted dry.  TRIAD hydrophillic wound dressing paste applied in nickel thick layer to coat affected areas and surrounding skin.  Discussed findings with patient including stage 3 pressure injuries to ischial region resulting from pressure from wheelchair.  Discussed with patient need for cushion/pad for wheelchair. Will order specialty ANGIE Immersion bed for patient while hospitalized.  Please see below for wound care recommendations:    Left Ischial Tuberosity Stage 3 pressure injury x2:  1. Cleanse with easi cleanse foam wipes after each episode of incontinence  2. Pat dry  3. Apply Nickel thick layer of TRIAD hydrophillic wound dressing paste (at bedside or from wound care) BID and prn  4. Obtain foam wedge from materials management to assist with maintaining proper position changes at least q 2 hours and document actual position in EPIC q 2 hours    Right Medial and Lateral Heel DTIs:  1. Paint with Cavilon every shift  2. Elevate heels off mattress on 2 separate pillows placed lengthwise under each leg supporting the leg from knee to ankle.  Document in EPIC flow sheet every 2 hours.    Right medial thigh Stage 2 pressure injury:  1. Cleanse with saline  2. Pat dry  3. Apply foam dressing  4. Change every 3 days      Skin Care  "Precautions / Pressure Injury Prevention:  1. Follow "Guidelines for Prevention of Pressure Ulcers in At Risk Patients"  These guidelines can be found on the [facility] Intr[employer] by searching "Wound Care / Ostomy Resources"  2. Document wound assessment in [facility] using guidelines in Girma's "Assessment : Wound" procedure  3. Limit the amount of linen/underpad between patient and mattress surface to ONE fitted sheet and ONE covidien underpad - NO DIAPERS  4. Obtain Easi Cleans Foam Wipes for providing milagros care - avoid the use of wash cloths to areas affected by IAD.  5. Apply Clear Barrier Ointment to perineal / perirectal areas in a thin even layer to clean dry skin BID and after each episode of pericare  6. Apply sween 24 moisturizer cream to all dry skin after daily bath and prn  7. Obtain foam wedge from materials management to assist with maintaining proper position changes at least q 2hours and document actual position in EPIC q 2hours  8. Elevate heels off mattress on 2 separate pillows placed lengthwise under each leg supporting the leg from knee to ankle.  Document in EPIC flow sheet every 2 hours.  9. .Do NOT elevate HOB greater than 30 degrees unless contraindicated.  10. Remove SCD/Plexi Pulses/TESSA's every 12 hours for 30 minutes and assess skin underneath these devices for breakdown          "

## 2018-06-04 NOTE — ASSESSMENT & PLAN NOTE
-Likely due to chronic  AF and now with RVR  -2D echo shows moderately depressed left ventricular systolic function (EF 35-40%) and low normal right ventricular systolic function with AR and MR  -Will increase dose of Toprol to 100mg BID for rate control  -BP unable to tolerate addition of ACEI  -Heart healthy diet  -Limit fluid intake 50-60 oz   -Will need to follow up with her primary Cardiologist after DC

## 2018-06-04 NOTE — PROGRESS NOTES
Clinical Pharmacy Progress Note: Discharge Medication Education     Patient was counseled by pharmacist on new medication amiodarone and its indications, side effects, and reasons for taking this medication.   Patient was given educational drug card/handout.   Patient/caregiver expressed understanding and had no further questions.  Offered bedside delivery of medications to patient. She accepted.      Thank you for allowing us to participate in this patient's care.    Holly Baker, PharmD 6/4/2018 11:13 AM

## 2018-06-04 NOTE — PROGRESS NOTES
Clinical Pharmacy Progress Note: Telemetry Pharmacist Rounding     Patient was counseled by pharmacist on the telemetry pharmacist availability to discuss new medications, side effects, and reasons for changes in medication during admission.   Asked if patient had any medication questions at this time.   Instructed patient to use call light with any questions or concerns to discuss with pharmacy during the admission.   Offered bedside medication delivery to patient.   Patient expressed understanding and had no further questions.    Thank you for allowing us to participate in this patient's care.    Holly Baker, PharmD 6/4/2018 11:14 AM

## 2018-06-04 NOTE — PT/OT/SLP PROGRESS
Physical Therapy  Treatment    Carlie Valdez   MRN: [id number]   Admitting Diagnosis: Atrial fibrillation with RVR    PT Received On: 06/04/18  PT Start Time: 1110     PT Stop Time: 1135    PT Total Time (min): 25 min       Billable Minutes:  Therapeutic Activity 25    Treatment Type: Treatment  PT/PTA: PTA     PTA Visit Number: 1       General Precautions: Standard, fall  Orthopedic Precautions: N/A   Braces: N/A         Subjective:  Communicated with NURSE, ESME AND DUNIA prior to session.  PATIENT AGREE TO TX NOW.    Pain/Comfort  Pain Rating 1: 5/10  Location - Side 1: Right  Location - Orientation 1: lower  Location 1: leg  Pain Addressed 1: Pre-medicate for activity, Reposition, Distraction, Cessation of Activity  Pain Rating Post-Intervention 1: 5/10    Objective:   Patient found with: peripheral IV, oxygen    Functional Mobility:  Bed Mobility:    SUPINE TO SIT, SIT TO SUPINE AT MAX ASSIST X2    Transfers:   SHORTSEATED EOB ACTIVITY WITH  FOCUS ON MIDLINE CONTROL, WEIGHTBEARING THROUGHOUT UPPER AND LOWER EXTREMITIESS AT MAX ASSIST X2.    Gait:    UN ABLE AT PRESENT TIME.    Stairs:  N/A    Balance:   Static Sit: 0: Needs MAXIMAL assist to maintain sitting without back support  Dynamic Sit: POOR: N/A      Therapeutic Activities and Exercises:  MADDI LE EXERCISES PROM , SHORTSEATED BAL ANCE ACITIVITY EOB AT MAX ASSIST X2, MADDI HEEL S FLOATES AS WELL AS PILLOWS POSITIONED BETWEEN KNEES AND ANKLES FOR SKIN BREAKDOWN PREVENTION.    AM-PAC 6 CLICK MOBILITY  How much help from another person does this patient currently need?   1 = Unable, Total/Dependent Assistance  2 = A lot, Maximum/Moderate Assistance  3 = A little, Minimum/Contact Guard/Supervision  4 = None, Modified [place]/Independent    Turning over in bed (including adjusting bedclothes, sheets and blankets)?: 2  Sitting down on and standing up from a chair with arms (e.g., wheelchair, bedside commode, etc.): 2  Moving from lying on back to  sitting on the side of the bed?: 2  Moving to and from a bed to a chair (including a wheelchair)?: 2  Need to walk in hospital room?: 1  Climbing 3-5 steps with a railing?: 1  Total Score: 10    AM-PAC Raw Score CMS G-Code Modifier Level of Impairment Assistance   6 % Total / Unable   7 - 9 CM 80 - 100% Maximal Assist   10 - 14 CL 60 - 80% Moderate Assist   15 - 19 CK 40 - 60% Moderate Assist   20 - 22 CJ 20 - 40% Minimal Assist   23 CI 1-20% SBA / CGA   24 CH 0% Independent/ Mod I     Patient left left sidelying with all lines intact, call button in reach and bed alarm on.    Assessment:  Carlie Valdez is a 72 y.o. female with a medical diagnosis of Atrial fibrillation with RVR .    Rehab identified problem list/impairments: Rehab identified problem list/impairments: weakness, impaired endurance, impaired sensation, impaired self care skills, impaired balance, visual deficits, decreased coordination, decreased lower extremity function, decreased safety awareness, decreased upper extremity function, pain    Rehab potential is good.    Activity tolerance: Fair    Discharge recommendations: Discharge Facility/Level Of Care Needs: nursing facility, skilled     Barriers to discharge:      Equipment recommendations:       GOALS:    Physical Therapy Goals        Problem: Physical Therapy Goal    Goal Priority Disciplines Outcome Goal Variances Interventions   Physical Therapy Goal     PT/OT, PT Ongoing (interventions implemented as appropriate)     Description:  LTG'S FOR PT BY 6/10/18  1. PATIENT WILL PERFORM SUP TO/FROM SIT MAX X 1  2. PATIENT WILL PERFORM SQUAT PIVOT TRANSFER TO CHAIR MAX X 1  3. PATIENT WILL PERFORM B LE AAROM/ISOMETRICS MIN A X 20 REPS                    PLAN:    Patient to be seen 5 x/week  to address the above listed problems via therapeutic activities, therapeutic exercises  Plan of Care expires: 06/04/18  Plan of Care reviewed with: patient         Rebecca Simpson,  PTA  06/04/2018

## 2018-06-04 NOTE — SUBJECTIVE & OBJECTIVE
Interval History: Patient still in A-Fib on amiodarone gtt. Cardiology following. Will need rate control before dc. Pt will dc home with HH when stable.     Review of Systems   Constitutional: Positive for fatigue. Negative for appetite change, chills, diaphoresis and fever.   HENT: Negative.  Negative for congestion, ear pain, mouth sores, sore throat and trouble swallowing.    Eyes: Positive for visual disturbance. Negative for pain.   Respiratory: Negative.  Negative for cough, chest tightness and shortness of breath.    Cardiovascular: Positive for palpitations. Negative for chest pain and leg swelling.   Gastrointestinal: Negative.  Negative for abdominal pain, constipation and nausea.   Endocrine: Negative.  Negative for cold intolerance, heat intolerance, polydipsia and polyuria.   Genitourinary: Negative.  Negative for dysuria, frequency and hematuria.   Musculoskeletal: Negative.  Negative for arthralgias, back pain, myalgias and neck pain.   Skin: Negative.  Negative for pallor, rash and wound.   Allergic/Immunologic: Negative.  Negative for environmental allergies and immunocompromised state.   Neurological: Negative.  Negative for dizziness, seizures, syncope, weakness, numbness and headaches.   Hematological: Negative.  Negative for adenopathy. Does not bruise/bleed easily.   Psychiatric/Behavioral: Negative.  Negative for agitation, confusion and sleep disturbance.   All other systems reviewed and are negative.    Objective:     Vital Signs (Most Recent):  Temp: 97.8 °F (36.6 °C) (06/04/18 1513)  Pulse: 75 (06/04/18 1513)  Resp: 16 (06/04/18 0839)  BP: (!) 101/57 (06/04/18 1513)  SpO2: (!) 94 % (06/04/18 1513) Vital Signs (24h Range):  Temp:  [97.4 °F (36.3 °C)-97.9 °F (36.6 °C)] 97.8 °F (36.6 °C)  Pulse:  [] 75  Resp:  [16-20] 16  SpO2:  [94 %-99 %] 94 %  BP: ()/(52-97) 101/57     Weight: 95.4 kg (210 lb 5.1 oz)  Body mass index is 36.1 kg/m².    Intake/Output Summary (Last 24 hours) at  06/04/18 1558  Last data filed at 06/04/18 0800   Gross per 24 hour   Intake              240 ml   Output                0 ml   Net              240 ml      Physical Exam   Constitutional: She is oriented to person, place, and time. She appears well-developed and well-nourished. No distress.   HENT:   Head: Normocephalic and atraumatic.   Nose: Nose normal.   Mouth/Throat: Oropharynx is clear and moist.   Eyes: Conjunctivae are normal.   Patient is blind.   Neck: Normal range of motion. Neck supple. No JVD present.   Cardiovascular: Normal heart sounds.  An irregularly irregular rhythm present.   Pulmonary/Chest: Effort normal and breath sounds normal. No respiratory distress. She has no wheezes.   Abdominal: Soft. Bowel sounds are normal. She exhibits no distension. There is no tenderness.   Genitourinary:   Genitourinary Comments: deferred   Musculoskeletal: Normal range of motion.   Neurological: She is alert and oriented to person, place, and time.   Skin: Skin is warm and dry. Capillary refill takes 2 to 3 seconds.   Psychiatric: She has a normal mood and affect. Her behavior is normal. Thought content normal.   Nursing note and vitals reviewed.      Significant Labs:   Recent Lab Results       06/04/18  1046 06/04/18  0556 06/04/18  0426 06/03/18  2112 06/03/18  1645      Anion Gap   11       Baso #   0.01       Basophil%   0.1       BUN, Bld   38(H)       Calcium   8.3(L)       Chloride   108       CO2   23       Creatinine   0.9       Differential Method   Automated       eGFR if    >60       eGFR if non    >60  Comment:  Calculation used to obtain the estimated glomerular filtration  rate (eGFR) is the CKD-EPI equation.          Eos #   0.4       Eosinophil%   4.8       Estimated Avg Glucose          Glucose   87       Gran # (ANC)   5.4       Gran%   67.9       Hematocrit   35.4(L)       Hemoglobin   10.8(L)       Hemoglobin A1C          Lymph #   1.7       Lymph%   21.2        Magnesium   1.8       MCH   25.2(L)       MCHC   30.5(L)       MCV   83       Mono #   0.5       Mono%   6.0       MPV   9.7       Phosphorus   2.3(L)       Platelets   310       POCT Glucose 107 85  90 102     Potassium   3.5       RBC   4.29       RDW   17.4(H)       Sodium   142       TSH          WBC   7.96                   06/03/18  1626      Anion Gap      Baso #      Basophil%      BUN, Bld      Calcium      Chloride      CO2      Creatinine      Differential Method      eGFR if       eGFR if non       Eos #      Eosinophil%      Estimated Avg Glucose 105     Glucose      Gran # (ANC)      Gran%      Hematocrit      Hemoglobin      Hemoglobin A1C 5.3  Comment:  ADA Screening Guidelines:  5.7-6.4%  Consistent with prediabetes  >or=6.5%  Consistent with diabetes  High levels of fetal hemoglobin interfere with the HbA1C  assay. Heterozygous hemoglobin variants (HbS, HgC, etc)do  not significantly interfere with this assay.   However, presence of multiple variants may affect accuracy.       Lymph #      Lymph%      Magnesium      MCH      MCHC      MCV      Mono #      Mono%      MPV      Phosphorus      Platelets      POCT Glucose      Potassium      RBC      RDW      Sodium      TSH 1.265     WBC          All pertinent labs within the past 24 hours have been reviewed.    Significant Imaging: I have reviewed all pertinent imaging results/findings within the past 24 hours.

## 2018-06-04 NOTE — PLAN OF CARE
Problem: Patient Care Overview  Goal: Plan of Care Review  Plan of care reviewed with patient and daughter at bedside. All questions answered. Patient states she has been blind for the past three weeks. Spoke with daughter and she believes it has to do with patient's floaters and cataracts. No complaints throughout the night. aquacel placed on patient's left buttocks. piv intact infusing amiodarone at ordered rate. Patient turned q2 hours. A-fib on the tele monitor. Will continue to monitor.

## 2018-06-04 NOTE — PLAN OF CARE
"Met with patient. Patient requested [facility]. She stated that she had been sent to [facility] when [facility] refused to see her without 24 hour care in the home. She asked that I call her  and her daughter, Francine, cardiologist in [place], [phone]. Phoned , he stated that he did not want her to return to [facility], asked that she come home. Requested that their daughter, Haley, be called instead. She is an oncologist and not as busy as Francine, the cardiac surgeon. He verified her number is [phone]. Phoned daughter, discussed recommendation of 24 hour care. She stated that her mother had had [facility] then [facility]. Both "dropped" her without providing the items ordered by the doctor according to daughter. She stated that she has a wheelchair [facility] by [facility] which is broken and that the hospital bed ordered was never delivered. Emailed list of sitter agencies to the daughter at [email or web address].   Family requests that post DC needs be ordered through the [facility].   Provided Discharge Planning Begins upon Admission, Discharge Planning Packet including Advance Directive Admission, [facility] Delivery information and contact information for . Explained role of case management in the transition of care.      [facility] [id number] [address] [facility]  [address] [facility]  [place] [address]  Phone: [phone] Fax: [phone]    [facility] [address] [place] [address] [address]  [address] [facility] [address] [facility] [address] [address]  [address]  [place] [address]  Phone: [phone] Fax: [phone]    [id number] - [place] - [address]  [address] [place] [address]  Phone: [phone] Fax: [phone]    Johanna Guzman MD  Payor: MEDICARE / Plan: MEDICARE PART A & B / Product Type: Government /       " 06/04/18 1046   Discharge Assessment   Assessment Type Discharge Planning Assessment   Confirmed/corrected address and phone number on facesheet? Yes   Assessment information obtained from? Patient;Medical Record;Other  (Spouse and daughter via phone)   Expected Length of Stay (days) (TBD)   Prior to hospitilization cognitive status: Alert/Oriented   Prior to hospitalization functional status: Wheelchair Bound;Completely Dependent   Current cognitive status: Alert/Oriented   Current Functional Status: Wheelchair Bound;Completely Dependent   Facility Arrived From: ([facility])   Lives With spouse   Able to Return to Prior Arrangements unable to determine at this time (comments)   Is patient able to care for self after discharge? No   Who are your caregiver(s) and their phone number(s)? (Needs 24 hour care)   Patient's perception of discharge disposition home health;other (comments)  (24 hour care)   Readmission Within The Last 30 Days no previous admission in last 30 days   Patient currently being followed by outpatient case management? No  (Attempted to admit her to outpatient CM, could not reach by phone)   Equipment Currently Used at Home wheelchair;bedside commode  (Per daughter, wheelchair is broken. It [facility]. She has a bedside commode and a hospital bed was ordered but never delivered. )   Do you have any problems affording any of your prescribed medications? TBD   Is the patient taking medications as prescribed? yes   Discharge Plan A Private Duty Nursing;Home Health   Discharge Plan B Skilled Nursing Facility   Patient/Family In Agreement With Plan no

## 2018-06-04 NOTE — PROGRESS NOTES
Patient seen and examined. She reports she is concerned that her cardiologist does not know what changes we are making to her medications. I assured her she will follow up with her Cardiologist once she is discharged. She said that made her feel better. She also told me she wanted to have HH with 24/7 sitters upon discharge. Case Management notified of pt requests.

## 2018-06-04 NOTE — PROGRESS NOTES
[facility] -   Cardiology  Progress Note    Patient Name: Carlie Valdez  MRN: [id number]  Admission Date: 6/2/2018  Hospital Length of Stay: 2 days  Code Status: Full Code   Attending Physician: Philip Ceja MD   Primary Care Physician: Johanna Guzman MD  Expected Discharge Date:   Principal Problem:Atrial fibrillation with RVR    Subjective:   Brief HPI:  This is a 79 yo F with PMHx of chronic CHF EF 25-30%, s/p ICD Chronic A. Fib on Xarelto, Non Arteritic Ischemic Optic Neuropathy (NA-AION); who presents to the Emergency Department for weakness which onset gradually today.  Patient was sent to rehab after recent discharge for episode of HTN emergency.  Was hypotensive at her rehab facility,  BP medication was held, developed RVR.  Patient's main complaint is fatigue.  Denies any CP, dyspnea, cough, /GI complaints.   Cardiology consulted for rapid AF.   Discussed she had side effects in past with metoprolol causing diarrhea and also could not tolerate cardizem. Has not been on amiodarone in the past or cannot remember being on it.     Hospital Course:   6/3/18 - Admitted with rapid AFib, started on Amio drip    6/4/18- A-fib rate still not controlled on Amio gtt. BP stable on current medical management. Has no CNS complaints since admission to suggest TIA or CVA. Complains of pain to BLE and burning.         Review of Systems   Constitution: Positive for weakness and malaise/fatigue. Negative for diaphoresis, weight gain and weight loss.   HENT: Negative for congestion and nosebleeds.    Cardiovascular: Positive for irregular heartbeat. Negative for chest pain, claudication, cyanosis, dyspnea on exertion, leg swelling, near-syncope, orthopnea, palpitations, paroxysmal nocturnal dyspnea and syncope.   Respiratory: Negative for cough, hemoptysis, shortness of breath, sleep disturbances due to breathing, snoring, sputum production and wheezing.    Hematologic/Lymphatic: Negative for  bleeding problem. Does not bruise/bleed easily.   Skin: Negative for rash.   Musculoskeletal: Negative for arthritis, back pain, falls, joint pain, muscle cramps and muscle weakness.   Gastrointestinal: Negative for abdominal pain, constipation, diarrhea, heartburn, hematemesis, hematochezia, melena and nausea.   Genitourinary: Negative for dysuria, hematuria and nocturia.   Neurological: Negative for excessive daytime sleepiness, dizziness, headaches, light-headedness, loss of balance, numbness and vertigo.     Objective:     Vital Signs (Most Recent):  Temp: 97.6 °F (36.4 °C) (06/04/18 1146)  Pulse: (!) 117 (06/04/18 1146)  Resp: 16 (06/04/18 0839)  BP: 106/68 (06/04/18 1146)  SpO2: 97 % (06/04/18 1146) Vital Signs (24h Range):  Temp:  [97.4 °F (36.3 °C)-97.9 °F (36.6 °C)] 97.6 °F (36.4 °C)  Pulse:  [] 117  Resp:  [16-20] 16  SpO2:  [96 %-99 %] 97 %  BP: ()/(52-97) 106/68     Weight: 95.4 kg (210 lb 5.1 oz)  Body mass index is 36.1 kg/m².     SpO2: 97 %  O2 Device (Oxygen Therapy): room air      Intake/Output Summary (Last 24 hours) at 06/04/18 1221  Last data filed at 06/03/18 1555   Gross per 24 hour   Intake           927.14 ml   Output                0 ml   Net           927.14 ml       Lines/Drains/Airways     Pressure Ulcer                 Pressure Injury 06/02/18 1657 Right Buttocks Stage 2 1 day         Pressure Injury 06/02/18 1659 Left Buttocks Stage 2 1 day         Pressure Injury 06/02/18 1659 Right anterior Thigh Stage 2 1 day         Pressure Injury 06/02/18 1902 Right Heel Deep tissue injury 1 day          Peripheral Intravenous Line                 Peripheral IV - Single Lumen 06/02/18 1032 Left Forearm 2 days         Peripheral IV - Single Lumen 06/03/18 1330 Left Forearm less than 1 day                Physical Exam   Constitutional: She is oriented to person, place, and time. She appears well-developed and well-nourished.   Neck: Neck supple. No JVD present.   Cardiovascular: Normal  pulses.  An irregularly irregular rhythm present. Tachycardia present.  Exam reveals no friction rub.    Murmur heard.  High-pitched blowing holosystolic murmur is present  at the apex  High-pitched blowing decrescendo early diastolic murmur is present  at the upper right sternal border radiating to the apex  Pulmonary/Chest: Effort normal and breath sounds normal. No respiratory distress. She has no wheezes. She has no rales.   Right device pocket WNL    Abdominal: Soft. Bowel sounds are normal. She exhibits no distension.   Musculoskeletal: She exhibits deformity ( BLE contracted ). She exhibits no edema or tenderness.   Neurological: She is alert and oriented to person, place, and time.   Skin: Skin is warm and dry. No rash noted.   Psychiatric: She has a normal mood and affect. Her behavior is normal.   Nursing note and vitals reviewed.      Significant Labs:   All pertinent lab results from the last 24 hours have been reviewed. and   Recent Lab Results       06/04/18  1046 06/04/18  0556 06/04/18  0426 06/03/18  2112 06/03/18  1645      Anion Gap   11       Baso #   0.01       Basophil%   0.1       BUN, Bld   38(H)       Calcium   8.3(L)       Chloride   108       CO2   23       Creatinine   0.9       Differential Method   Automated       eGFR if    >60       eGFR if non    >60  Comment:  Calculation used to obtain the estimated glomerular filtration  rate (eGFR) is the CKD-EPI equation.          Eos #   0.4       Eosinophil%   4.8       Estimated Avg Glucose          Glucose   87       Gran # (ANC)   5.4       Gran%   67.9       Hematocrit   35.4(L)       Hemoglobin   10.8(L)       Hemoglobin A1C          Lymph #   1.7       Lymph%   21.2       Magnesium   1.8       MCH   25.2(L)       MCHC   30.5(L)       MCV   83       Mono #   0.5       Mono%   6.0       MPV   9.7       Phosphorus   2.3(L)       Platelets   310       POCT Glucose 107 85  90 102     Potassium   3.5       RBC    4.29       RDW   17.4(H)       Sodium   142       TSH          WBC   7.96                   06/03/18  1626      Anion Gap      Baso #      Basophil%      BUN, Bld      Calcium      Chloride      CO2      Creatinine      Differential Method      eGFR if       eGFR if non       Eos #      Eosinophil%      Estimated Avg Glucose 105     Glucose      Gran # (ANC)      Gran%      Hematocrit      Hemoglobin      Hemoglobin A1C 5.3  Comment:  ADA Screening Guidelines:  5.7-6.4%  Consistent with prediabetes  >or=6.5%  Consistent with diabetes  High levels of fetal hemoglobin interfere with the HbA1C  assay. Heterozygous hemoglobin variants (HbS, HgC, etc)do  not significantly interfere with this assay.   However, presence of multiple variants may affect accuracy.       Lymph #      Lymph%      Magnesium      MCH      MCHC      MCV      Mono #      Mono%      MPV      Phosphorus      Platelets      POCT Glucose      Potassium      RBC      RDW      Sodium      TSH 1.265     WBC            Significant Imaging: Echocardiogram:   2D echo with color flow doppler:   Results for orders placed or performed during the hospital encounter of 06/02/18   2D echo with color flow doppler   Result Value Ref Range    EF 35 (A) 55 - 65    Mitral Valve Regurgitation MILD TO MODERATE     Aortic Valve Regurgitation MILD     Est. PA Systolic Pressure 54.19 (A)     Mitral Valve Mobility NORMAL     Tricuspid Valve Regurgitation MODERATE TO SEVERE (A)    .     Assessment and Plan:       * Atrial fibrillation with RVR    -Increase Toprol to 100mg BID  -Will hold off on cardizem due to systolic dysfunction  -Switch IV Amio to PO 200mg BID  -TSH will need to be monitored   -Will consider Digoxin if still having issues with rate control   -Cont Xarelto for CVA prophylaxis           Chronic combined systolic and diastolic congestive heart failure    -Likely due to chronic  AF and now with RVR  -2D echo shows moderately  depressed left ventricular systolic function (EF 35-40%) and low normal right ventricular systolic function with AR and MR  -Will increase dose of Toprol to 100mg BID for rate control  -BP unable to tolerate addition of ACEI  -Heart healthy diet  -Limit fluid intake 50-60 oz   -Will need to follow up with her primary Cardiologist after DC                 Elevated troponin    -Troponin leak likely secondary to demand ischemia given her uncontrolled A-fib   -continue current medical management         Hypertension associated with diabetes    Hold BP meds due to hypotension            VTE Risk Mitigation         Ordered     rivaroxaban tablet 15 mg  With dinner      06/02/18 9729        Chart reviewed. Patient examined by Dr. Craig and agrees with plan that has been outlined.     CHEO Iraheta  Cardiology  [facility]

## 2018-06-04 NOTE — SUBJECTIVE & OBJECTIVE
Review of Systems   Constitution: Positive for weakness and malaise/fatigue. Negative for diaphoresis, weight gain and weight loss.   HENT: Negative for congestion and nosebleeds.    Cardiovascular: Positive for irregular heartbeat. Negative for chest pain, claudication, cyanosis, dyspnea on exertion, leg swelling, near-syncope, orthopnea, palpitations, paroxysmal nocturnal dyspnea and syncope.   Respiratory: Negative for cough, hemoptysis, shortness of breath, sleep disturbances due to breathing, snoring, sputum production and wheezing.    Hematologic/Lymphatic: Negative for bleeding problem. Does not bruise/bleed easily.   Skin: Negative for rash.   Musculoskeletal: Negative for arthritis, back pain, falls, joint pain, muscle cramps and muscle weakness.   Gastrointestinal: Negative for abdominal pain, constipation, diarrhea, heartburn, hematemesis, hematochezia, melena and nausea.   Genitourinary: Negative for dysuria, hematuria and nocturia.   Neurological: Negative for excessive daytime sleepiness, dizziness, headaches, light-headedness, loss of balance, numbness and vertigo.     Objective:     Vital Signs (Most Recent):  Temp: 97.6 °F (36.4 °C) (06/04/18 1146)  Pulse: (!) 117 (06/04/18 1146)  Resp: 16 (06/04/18 0839)  BP: 106/68 (06/04/18 1146)  SpO2: 97 % (06/04/18 1146) Vital Signs (24h Range):  Temp:  [97.4 °F (36.3 °C)-97.9 °F (36.6 °C)] 97.6 °F (36.4 °C)  Pulse:  [] 117  Resp:  [16-20] 16  SpO2:  [96 %-99 %] 97 %  BP: ()/(52-97) 106/68     Weight: 95.4 kg (210 lb 5.1 oz)  Body mass index is 36.1 kg/m².     SpO2: 97 %  O2 Device (Oxygen Therapy): room air      Intake/Output Summary (Last 24 hours) at 06/04/18 1221  Last data filed at 06/03/18 1555   Gross per 24 hour   Intake           927.14 ml   Output                0 ml   Net           927.14 ml       Lines/Drains/Airways     Pressure Ulcer                 Pressure Injury 06/02/18 1657 Right Buttocks Stage 2 1 day         Pressure Injury  06/02/18 1659 Left Buttocks Stage 2 1 day         Pressure Injury 06/02/18 1659 Right anterior Thigh Stage 2 1 day         Pressure Injury 06/02/18 1902 Right Heel Deep tissue injury 1 day          Peripheral Intravenous Line                 Peripheral IV - Single Lumen 06/02/18 1032 Left Forearm 2 days         Peripheral IV - Single Lumen 06/03/18 1330 Left Forearm less than 1 day                Physical Exam   Constitutional: She is oriented to person, place, and time. She appears well-developed and well-nourished.   Neck: Neck supple. No JVD present.   Cardiovascular: Normal pulses.  An irregularly irregular rhythm present. Tachycardia present.  Exam reveals no friction rub.    Murmur heard.  High-pitched blowing holosystolic murmur is present  at the apex  High-pitched blowing decrescendo early diastolic murmur is present  at the upper right sternal border radiating to the apex  Pulmonary/Chest: Effort normal and breath sounds normal. No respiratory distress. She has no wheezes. She has no rales.   Right device pocket WNL    Abdominal: Soft. Bowel sounds are normal. She exhibits no distension.   Musculoskeletal: She exhibits deformity ( BLE contracted ). She exhibits no edema or tenderness.   Neurological: She is alert and oriented to person, place, and time.   Skin: Skin is warm and dry. No rash noted.   Psychiatric: She has a normal mood and affect. Her behavior is normal.   Nursing note and vitals reviewed.      Significant Labs:   All pertinent lab results from the last 24 hours have been reviewed. and   Recent Lab Results       06/04/18  1046 06/04/18  0556 06/04/18  0426 06/03/18  2112 06/03/18  1645      Anion Gap   11       Baso #   0.01       Basophil%   0.1       BUN, Bld   38(H)       Calcium   8.3(L)       Chloride   108       CO2   23       Creatinine   0.9       Differential Method   Automated       eGFR if    >60       eGFR if non    >60  Comment:  Calculation used  to obtain the estimated glomerular filtration  rate (eGFR) is the CKD-EPI equation.          Eos #   0.4       Eosinophil%   4.8       Estimated Avg Glucose          Glucose   87       Gran # (ANC)   5.4       Gran%   67.9       Hematocrit   35.4(L)       Hemoglobin   10.8(L)       Hemoglobin A1C          Lymph #   1.7       Lymph%   21.2       Magnesium   1.8       MCH   25.2(L)       MCHC   30.5(L)       MCV   83       Mono #   0.5       Mono%   6.0       MPV   9.7       Phosphorus   2.3(L)       Platelets   310       POCT Glucose 107 85  90 102     Potassium   3.5       RBC   4.29       RDW   17.4(H)       Sodium   142       TSH          WBC   7.96                   06/03/18  1626      Anion Gap      Baso #      Basophil%      BUN, Bld      Calcium      Chloride      CO2      Creatinine      Differential Method      eGFR if       eGFR if non       Eos #      Eosinophil%      Estimated Avg Glucose 105     Glucose      Gran # (ANC)      Gran%      Hematocrit      Hemoglobin      Hemoglobin A1C 5.3  Comment:  ADA Screening Guidelines:  5.7-6.4%  Consistent with prediabetes  >or=6.5%  Consistent with diabetes  High levels of fetal hemoglobin interfere with the HbA1C  assay. Heterozygous hemoglobin variants (HbS, HgC, etc)do  not significantly interfere with this assay.   However, presence of multiple variants may affect accuracy.       Lymph #      Lymph%      Magnesium      MCH      MCHC      MCV      Mono #      Mono%      MPV      Phosphorus      Platelets      POCT Glucose      Potassium      RBC      RDW      Sodium      TSH 1.265     WBC            Significant Imaging: Echocardiogram:   2D echo with color flow doppler:   Results for orders placed or performed during the hospital encounter of 06/02/18   2D echo with color flow doppler   Result Value Ref Range    EF 35 (A) 55 - 65    Mitral Valve Regurgitation MILD TO MODERATE     Aortic Valve Regurgitation MILD     Est. PA  Systolic Pressure 54.19 (A)     Mitral Valve Mobility NORMAL     Tricuspid Valve Regurgitation MODERATE TO SEVERE (A)    .

## 2018-06-04 NOTE — ASSESSMENT & PLAN NOTE
-Continue oral metoprolol and Xarelto.   -Amiodarone drip being transitioned to PO amiodarone  --cardiac monitoring

## 2018-06-04 NOTE — PLAN OF CARE
Problem: Physical Therapy Goal  Goal: Physical Therapy Goal  LTG'S FOR PT BY 6/10/18  1. PATIENT WILL PERFORM SUP TO/FROM SIT MAX X 1  2. PATIENT WILL PERFORM SQUAT PIVOT TRANSFER TO CHAIR MAX X 1  3. PATIENT WILL PERFORM B LE AAROM/ISOMETRICS MIN A X 20 REPS   Outcome: Ongoing (interventions implemented as appropriate)  PATIENT WITH GREAT EFFORTS FOR ALL ACTIVITIES, SHORTSEATED BALANCE ACTIVITY AT MAX ASSIST X2 , FOCUS ON MIDLINE CONTROL, WEIGHTBEARING THROUGH OUT UPPER AND LOWER EXTREMITIES AT MAX ASSISTX2.

## 2018-06-04 NOTE — PROGRESS NOTES
[facility] Hospital Medicine  Progress Note    Patient Name: Carlie Valdez  MRN: [id number]  Patient Class: IP- Inpatient   Admission Date: 6/2/2018  Length of Stay: 2 days  Attending Physician: Philip Ceja MD  Primary Care Provider: Johanna Guzman MD        Subjective:     Principal Problem:Atrial fibrillation with RVR    HPI:  Carlie Valdez is a 78 year old female with combined systolic and diastolic CHF, pacemaker placement, chronic atrial fibrillation on Xarelto, non arteritic ischemic optic neuropathy (NA-AION) who presented to the Emergency Department with complaints of weakness which onset gradually today. The patient was sent to a rehab facility in [place] after a recent hospital discharge following an episode of HTN emergency. The patient was reportedly hypotensive at her rehab facility. Blood pressure medications were held and the patient developed atrial fibrillation with RVR. The patient's main complaint is fatigue. She denies any chest pain, dyspnea, cough,  complaints and GI complaints. No further complaints or concerns at this time        Hospital Course:  The patient was placed in Observation with complaints of weakness as well as findings of hypotension and atrial fibrillation with RVR. The patient was initially started on a Cardizem drip which was weaned off due to the patient's significant history of CHF. Oral metoprolol was started, but the patient's RVR persisted. Cardiology was consulted and the patient was started on an Amiodarone drip and transitioned to an inpatient admission. She continues to have hypotension requiring fluid boluses. She reports weakness, but states that she does not want to return to the SNF facility that transferred her here. She states that she wants to go home with home health.       Interval History: Patient still in A-Fib on amiodarone gtt. Cardiology following. Will need rate control before dc. Pt will dc home with HH when  stable.     Review of Systems   Constitutional: Positive for fatigue. Negative for appetite change, chills, diaphoresis and fever.   HENT: Negative.  Negative for congestion, ear pain, mouth sores, sore throat and trouble swallowing.    Eyes: Positive for visual disturbance. Negative for pain.   Respiratory: Negative.  Negative for cough, chest tightness and shortness of breath.    Cardiovascular: Positive for palpitations. Negative for chest pain and leg swelling.   Gastrointestinal: Negative.  Negative for abdominal pain, constipation and nausea.   Endocrine: Negative.  Negative for cold intolerance, heat intolerance, polydipsia and polyuria.   Genitourinary: Negative.  Negative for dysuria, frequency and hematuria.   Musculoskeletal: Negative.  Negative for arthralgias, back pain, myalgias and neck pain.   Skin: Negative.  Negative for pallor, rash and wound.   Allergic/Immunologic: Negative.  Negative for environmental allergies and immunocompromised state.   Neurological: Negative.  Negative for dizziness, seizures, syncope, weakness, numbness and headaches.   Hematological: Negative.  Negative for adenopathy. Does not bruise/bleed easily.   Psychiatric/Behavioral: Negative.  Negative for agitation, confusion and sleep disturbance.   All other systems reviewed and are negative.    Objective:     Vital Signs (Most Recent):  Temp: 97.8 °F (36.6 °C) (06/04/18 1513)  Pulse: 75 (06/04/18 1513)  Resp: 16 (06/04/18 0839)  BP: (!) 101/57 (06/04/18 1513)  SpO2: (!) 94 % (06/04/18 1513) Vital Signs (24h Range):  Temp:  [97.4 °F (36.3 °C)-97.9 °F (36.6 °C)] 97.8 °F (36.6 °C)  Pulse:  [] 75  Resp:  [16-20] 16  SpO2:  [94 %-99 %] 94 %  BP: ()/(52-97) 101/57     Weight: 95.4 kg (210 lb 5.1 oz)  Body mass index is 36.1 kg/m².    Intake/Output Summary (Last 24 hours) at 06/04/18 1558  Last data filed at 06/04/18 0800   Gross per 24 hour   Intake              240 ml   Output                0 ml   Net               240 ml      Physical Exam   Constitutional: She is oriented to person, place, and time. She appears well-developed and well-nourished. No distress.   HENT:   Head: Normocephalic and atraumatic.   Nose: Nose normal.   Mouth/Throat: Oropharynx is clear and moist.   Eyes: Conjunctivae are normal.   Patient is blind.   Neck: Normal range of motion. Neck supple. No JVD present.   Cardiovascular: Normal heart sounds.  An irregularly irregular rhythm present.   Pulmonary/Chest: Effort normal and breath sounds normal. No respiratory distress. She has no wheezes.   Abdominal: Soft. Bowel sounds are normal. She exhibits no distension. There is no tenderness.   Genitourinary:   Genitourinary Comments: deferred   Musculoskeletal: Normal range of motion.   Neurological: She is alert and oriented to person, place, and time.   Skin: Skin is warm and dry. Capillary refill takes 2 to 3 seconds.   Psychiatric: She has a normal mood and affect. Her behavior is normal. Thought content normal.   Nursing note and vitals reviewed.      Significant Labs:   Recent Lab Results       06/04/18  1046 06/04/18  0556 06/04/18  0426 06/03/18  2112 06/03/18  1645      Anion Gap   11       Baso #   0.01       Basophil%   0.1       BUN, Bld   38(H)       Calcium   8.3(L)       Chloride   108       CO2   23       Creatinine   0.9       Differential Method   Automated       eGFR if    >60       eGFR if non    >60  Comment:  Calculation used to obtain the estimated glomerular filtration  rate (eGFR) is the CKD-EPI equation.          Eos #   0.4       Eosinophil%   4.8       Estimated Avg Glucose          Glucose   87       Gran # (ANC)   5.4       Gran%   67.9       Hematocrit   35.4(L)       Hemoglobin   10.8(L)       Hemoglobin A1C          Lymph #   1.7       Lymph%   21.2       Magnesium   1.8       MCH   25.2(L)       MCHC   30.5(L)       MCV   83       Mono #   0.5       Mono%   6.0       MPV   9.7       Phosphorus    2.3(L)       Platelets   310       POCT Glucose 107 85  90 102     Potassium   3.5       RBC   4.29       RDW   17.4(H)       Sodium   142       TSH          WBC   7.96                   06/03/18  1626      Anion Gap      Baso #      Basophil%      BUN, Bld      Calcium      Chloride      CO2      Creatinine      Differential Method      eGFR if       eGFR if non       Eos #      Eosinophil%      Estimated Avg Glucose 105     Glucose      Gran # (ANC)      Gran%      Hematocrit      Hemoglobin      Hemoglobin A1C 5.3  Comment:  ADA Screening Guidelines:  5.7-6.4%  Consistent with prediabetes  >or=6.5%  Consistent with diabetes  High levels of fetal hemoglobin interfere with the HbA1C  assay. Heterozygous hemoglobin variants (HbS, HgC, etc)do  not significantly interfere with this assay.   However, presence of multiple variants may affect accuracy.       Lymph #      Lymph%      Magnesium      MCH      MCHC      MCV      Mono #      Mono%      MPV      Phosphorus      Platelets      POCT Glucose      Potassium      RBC      RDW      Sodium      TSH 1.265     WBC          All pertinent labs within the past 24 hours have been reviewed.    Significant Imaging: I have reviewed all pertinent imaging results/findings within the past 24 hours.    Assessment/Plan:      * Atrial fibrillation with RVR    -Continue oral metoprolol and Xarelto.   -Amiodarone drip being transitioned to PO amiodarone  --cardiac monitoring        Chronic combined systolic and diastolic congestive heart failure    -Appears compensated.   -Continue metoprolol.   -Bumex on hold due to hypotension.   -No ACE or ARB due to hypotension.   -Monitor strict intake and output as well as daily weights.           Hypothyroidism    -Continue Synthroid.   --TSH 1.265        Type 2 diabetes mellitus with kidney complication, without long-term current use of insulin    -NISS.   -Check Hemoglobin A1C.   -ADA diet.           Chronic  kidney disease (CKD), stage III (moderate)    -Creatinine at baseline.   -Monitor.           Elevated troponin    ACS ruled out as there was no upward trend.                VTE Risk Mitigation         Ordered     rivaroxaban tablet 15 mg  With dinner      06/02/18 9645              JANET Horn  Department of Hospital Medicine   [facility]

## 2018-06-04 NOTE — ASSESSMENT & PLAN NOTE
-Increase Toprol to 100mg BID  -Will hold off on cardizem due to systolic dysfunction  -Switch IV Amio to PO 200mg BID  -TSH will need to be monitored   -Will consider Digoxin if still having issues with rate control   -Cont Xarelto for CVA prophylaxis

## 2018-06-04 NOTE — NURSING
10 run beat of [facility], notified by monitor tech. KADI Parker notified. No new orders. Will continue to monitor.

## 2018-06-04 NOTE — PLAN OF CARE
Problem: Patient Care Overview  Goal: Plan of Care Review  Outcome: Ongoing (interventions implemented as appropriate)  Pt AAO x4.  VSS. Pt remained afebrile. Medications administered as ordered. Toprol 100mg changed to bid. Amio drip stopped. Amio 200mg po started. Pt. Had 10 run of vtach. Blood sugar did not need coverage. Wound care consulted and instructions added into [facility] for wounds. New bed ordered for pt. due to wounds. Plan of care reviewed with patient. Patient verbalizes understanding. Bed low, side rails up x2, wheels locked, call light within reach. Pt. Instructed to call for assitance. Resting at this time. Will continue to monitor.

## 2018-06-05 PROBLEM — I47.29 NSVT (NONSUSTAINED VENTRICULAR TACHYCARDIA): Status: ACTIVE | Noted: 2018-01-01

## 2018-06-05 PROBLEM — R07.89 ATYPICAL CHEST PAIN: Status: ACTIVE | Noted: 2018-01-01

## 2018-06-05 PROBLEM — R53.1 RIGHT SIDED WEAKNESS: Status: ACTIVE | Noted: 2018-01-01

## 2018-06-05 NOTE — PROGRESS NOTES
[facility]   Cardiology  Progress Note    Patient Name: Carlie Valdez  MRN: [id number]  Admission Date: 6/2/2018  Hospital Length of Stay: 3 days  Code Status: Full Code   Attending Physician: Philip Ceja MD   Primary Care Physician: Johanna Guzman MD  Expected Discharge Date:   Principal Problem:Atrial fibrillation with RVR    Subjective:   HPI:  This is a 79 yo F with PMHx of chronic CHF EF 25-30%, s/p ICD Chronic A. Fib on Xarelto, Non Arteritic Ischemic Optic Neuropathy (NA-AION); who presents to the Emergency Department for weakness which onset gradually today.  Patient was sent to rehab after recent discharge for episode of HTN emergency.  Was hypotensive at her rehab facility,  BP medication was held, developed RVR.  Patient's main complaint is fatigue.  Denies any CP, dyspnea, cough, /GI complaints.   Cardiology consulted for rapid AF.   Discussed she had side effects in past with metoprolol causing diarrhea and also could not tolerate cardizem. Has not been on amiodarone in the past or cannot remember being on it.   Hospital Course:   6/3/18 - Admitted with rapid AFib, started on Amio drip    6/4/18- A-fib rate still not controlled on Amio gtt. BP stable on current medical management. Has no CNS complaints since admission to suggest TIA or CVA. Complains of pain to BLE and burning.     6/5/18:  She seems to feel a bit better today.  A fib rate variable, but some improvement.  Had some heartburn earlier this am, now resolved.  No typical angina sxs.  Denies dyspnea.  Has deconditioning, some inability to move legs for some time now.  Labs stable.  Troponin leak stable.  Brief NSVT overnight, ICD did not fire.  BP variable.        Review of Systems   Constitution: Positive for weakness and malaise/fatigue.   HENT: Negative.    Eyes: Negative.    Cardiovascular: Positive for leg swelling.   Respiratory: Negative.    Endocrine: Negative.    Hematologic/Lymphatic: Negative.     Skin: Negative.    Musculoskeletal: Positive for arthritis and joint pain.   Gastrointestinal: Negative.    Genitourinary: Negative.    Psychiatric/Behavioral: Negative.    Allergic/Immunologic: Negative.      Objective:     Vital Signs (Most Recent):  Temp: 96.3 °F (35.7 °C) (06/05/18 0731)  Pulse: 99 (06/05/18 0731)  Resp: 18 (06/05/18 0731)  BP: 100/68 (06/05/18 0847)  SpO2: 95 % (06/05/18 0731) Vital Signs (24h Range):  Temp:  [96.3 °F (35.7 °C)-97.9 °F (36.6 °C)] 96.3 °F (35.7 °C)  Pulse:  [] 99  Resp:  [18-20] 18  SpO2:  [94 %-99 %] 95 %  BP: ()/() 100/68     Weight: 95.4 kg (210 lb 5.1 oz)  Body mass index is 36.1 kg/m².     SpO2: 95 %  O2 Device (Oxygen Therapy): room air      Intake/Output Summary (Last 24 hours) at 06/05/18 1035  Last data filed at 06/05/18 0600   Gross per 24 hour   Intake           883.58 ml   Output                0 ml   Net           883.58 ml       Lines/Drains/Airways     Pressure Ulcer                 Pressure Injury 06/02/18 Left distal Ischial tuberosity Stage 3 3 days         Pressure Injury 06/02/18 Right lateral Heel Deep tissue injury 3 days         Pressure Injury 06/02/18 1657 Right Buttocks Stage 2 2 days         Pressure Injury 06/02/18 1659 Left proximal Ischial tuberosity Stage 3 2 days         Pressure Injury 06/02/18 1659 Right anterior Thigh Stage 2 2 days         Pressure Injury 06/02/18 1902 Right medial Heel Deep tissue injury 2 days          Peripheral Intravenous Line                 Peripheral IV - Single Lumen 06/02/18 1032 Left Forearm 3 days         Peripheral IV - Single Lumen 06/03/18 1330 Left Forearm 1 day                Physical Exam   Constitutional: She is oriented to person, place, and time. Vital signs are normal. She appears well-developed and well-nourished. She is active and cooperative. She does not have a sickly appearance. She does not appear ill. No distress.   HENT:   Head: Normocephalic.   Neck: Neck supple. Normal  carotid pulses, no hepatojugular reflux and no JVD present. Carotid bruit is not present. No thyromegaly present.   Cardiovascular: Normal rate, S1 normal, S2 normal, normal heart sounds and normal pulses.  An irregularly irregular rhythm present. PMI is not displaced.  Exam reveals no gallop and no friction rub.    No murmur heard.  Pulses:       Radial pulses are 2+ on the right side, and 2+ on the left side.   Pulmonary/Chest: Effort normal and breath sounds normal. She has no wheezes. She has no rales.   Abdominal: Soft. Normal appearance, normal aorta and bowel sounds are normal. She exhibits no pulsatile liver, no abdominal bruit, no ascites and no mass. There is no splenomegaly or hepatomegaly. There is no tenderness.   Musculoskeletal: She exhibits edema.   Lymphadenopathy:     She has no cervical adenopathy.   Neurological: She is alert and oriented to person, place, and time.   Skin: Skin is warm. She is not diaphoretic.   Psychiatric: She has a normal mood and affect. Her behavior is normal.   Nursing note and vitals reviewed.      Significant Labs:   CMP   Recent Labs  Lab 06/04/18  0426 06/05/18  0519    142   K 3.5 4.0    106   CO2 23 30*   GLU 87 85   BUN 38* 40*   CREATININE 0.9 0.9   CALCIUM 8.3* 8.2*   ANIONGAP 11 6*   ESTGFRAFRICA >60 >60   EGFRNONAA >60 >60   , CBC   Recent Labs  Lab 06/04/18  0426   WBC 7.96   HGB 10.8*   HCT 35.4*       and Troponin   Recent Labs  Lab 06/05/18  0519   TROPONINI 0.056*       Significant Imaging: Echocardiogram:   2D echo with color flow doppler:   Results for orders placed or performed during the hospital encounter of 06/02/18   2D echo with color flow doppler   Result Value Ref Range    EF 35 (A) 55 - 65    Mitral Valve Regurgitation MILD TO MODERATE     Aortic Valve Regurgitation MILD     Est. PA Systolic Pressure 54.19 (A)     Mitral Valve Mobility NORMAL     Tricuspid Valve Regurgitation MODERATE TO SEVERE (A)      Assessment and Plan:      CHRONIC A FIB WITH SUBOPTIMAL CONTROLLED RATE FOR SOME TIME.  CHRONIC CHF   ICD  AMIODARONE ADDED TO HELP WITH HR CONTROL + BRIEF NSVT  TOPROL INCREASED YESTERDAY  MG BID FOR CHF, A FIB.  ATYPICAL CP SXS, STABLE.  MONITOR.  TOPROL INCREASED IN CASE IT IS ATYPICAL ANGINA.  TROPONIN LEAK DUE TO SUPPLY/DEMAND ISCHEMIA WITH CHF, A FIB W RVR.  DIURESE AS NEEDED.  CONTINUE XARELTO FOR CVA PROTECTION.   SHE HAS A LOT OF NONCARDIAC ISSUES, DEFER TO HOSPITAL MED.    F/U WITH HER PRIMARY CARDIOLOGIST, DR. CABRALES, ASAP AFTER DISCHARGE FOR REEVALUATION.      * Atrial fibrillation with RVR      See management plan detailed above.         NSVT (nonsustained ventricular tachycardia)    See management plan detailed above.         Atypical chest pain    See management plan detailed above.         Chronic combined systolic and diastolic congestive heart failure         See management plan detailed above.         Chronic kidney disease (CKD), stage III (moderate)    See management plan detailed above.         Elevated troponin    -Troponin leak likely secondary to demand ischemia given her uncontrolled A-fib   -continue current medical management         Cardiac defibrillator in place    See management plan detailed above.         Hypertension associated with diabetes    Hold BP meds due to hypotension            VTE Risk Mitigation         Ordered     rivaroxaban tablet 15 mg  With dinner      06/02/18 6884          Joey Craig MD  Cardiology  [facility]

## 2018-06-05 NOTE — PT/OT/SLP EVAL
Occupational Therapy   Evaluation    Name: Carlie Valdez  MRN: [id number]  Admitting Diagnosis:  Atrial fibrillation with RVR      Recommendations:     Discharge Recommendations: LTACH (long term acute care hospital), nursing facility, skilled  Discharge Equipment Recommendations:  walker, rolling, shower chair, bedside commode, bath bench  Barriers to discharge:  None    History:     Occupational Profile:  Living Environment: lives with spouse in 1 story house with no steps  Previous level of function: (i) with adl's and mod (i) with functional mobility  Roles and Routines: occupational therapy  Equipment Owned:  bedside commode, wheelchair, walker, rolling, shower chair  Assistance upon Discharge:     Past Medical History:   Diagnosis Date    Arthritis     Asthma     Atrial fibrillation     Blood clot of vein in shoulder area     Cardiac defibrillator in place     Chronic knee pain     Diabetes mellitus type 2 without retinopathy 2016    Diaphragmatic hernia without obstruction and without gangrene 2015    GERD (gastroesophageal reflux disease)     Hypertension     Primary open angle glaucoma (POAG) of both eyes, severe stage 2018       Past Surgical History:   Procedure Laterality Date    CARDIAC DEFIBRILLATOR PLACEMENT      , .    CATARACT EXTRACTION       SECTION      COLONOSCOPY      ashley      Dr. Macdonald    KNEE ARTHROSCOPY      UPPER GASTROINTESTINAL ENDOSCOPY         Subjective     Chief Complaint: impaired adl's, r side weakness, impaired t/f's  Patient/Family stated goals:   Communicated with: nurse and epic chart review prior to session.  Pain/Comfort:  · Pain Rating 1: 0/10  · Location - Side 1: Right  · Location - Orientation 1: upper    Patients cultural, spiritual, Congregational conflicts given the current situation:      Objective:     Patient found with: peripheral IV, oxygen    General Precautions: Standard, fall   Orthopedic Precautions:N/A    Braces: N/A     Occupational Performance:    Bed Mobility:    · Patient completed Rolling/Turning to Left with  maximal assistance and 2 persons  · Patient completed Rolling/Turning to Right with maximal assistance and 2 persons  · Patient completed Scooting/Bridging with maximal assistance and x2 persons  · Patient completed Supine to Sit with maximal assistance and 2 persons  · Patient completed Sit to Supine with maximal assistance and 2 persons      Activities of Daily Living:  · UB Dressing: total assistance 1  · LB Dressing: total assistance 1    Cognitive/Visual Perceptual:  Cognitive/Psychosocial Skills:     -       Oriented to: Person, Place, Time and Situation   -       Follows Commands/attention:Follows one-step commands  -       Communication: clear/fluent  -       Memory: No Deficits noted  -       Safety awareness/insight to disability: impaired   Visual/Perceptual:      -impaired    Physical Exam:  Upper Extremity Range of Motion:     -       Right Upper Extremity: Deficits: aarom wfl  -       Left Upper Extremity: WFL  Upper Extremity Strength:    -       Right Upper Extremity: Deficits: mmt: 1/5 grossly  -       Left Upper Extremity: Deficits: mmt: 3/5 grossly   Strength:    -       Right Upper Extremity: Deficits: mmt: 1/5 grossly  -       Left Upper Extremity: Deficits: mmt: 3/5 grossly    Patient left HOB elevated with all lines intact, call button in reach, nurse Margarita notified and PTA present    AMPA 6 Click:  [facility] Total Score: 7    Treatment & Education:    Education:    Assessment:     Carlie Valdez is a 72 y.o. female with a medical diagnosis of Atrial fibrillation with RVR.  She presents with performance deficits affecting function are weakness, impaired functional mobilty, decreased safety awareness, impaired endurance, gait instability, impaired balance, impaired self care skills, visual deficits, decreased lower extremity function.      Rehab Prognosis:  Fair+;  "patient would benefit from acute skilled OT services to address these deficits and reach maximum level of function.         Clinical Decision Making:     3.  OT High:  "Pt evaluation falls under high complexity for evaluation category due to 5+ performance deficits identified with comprehensive assessments and significant modifications/assistance required. An expanded review of history and occupational profile completed in addition to expanded review of physical, cognitive and psychosocial history. Several treatment options considered in care."     Plan:     Patient to be seen 3 x/week to address the above listed problems via self-care/home management, therapeutic activities, therapeutic exercises  · Plan of Care Expires: 06/12/18  · Plan of Care Reviewed with: patient    This Plan of care has been discussed with the patient who was involved in its development and understands and is in agreement with the identified goals and treatment plan    GOALS:    Occupational Therapy Goals     Not on file                Time Tracking:     OT Date of Treatment: 06/05/18  OT Start Time: 1000  OT Stop Time: 1025  OT Total Time (min): 25 min    Billable Minutes:Evaluation 10 minutes  Therapeutic Activity 15 minutes    Ramandeep Sousa OT  6/5/2018    "

## 2018-06-05 NOTE — CONSULTS
"  [facility] -   Adult Nutrition  Consult Note    SUMMARY     Recommendations    Recommendation/Intervention: 1. Continue current diet. 2. Add boost plus TID & beneprotein TID for wound healing. 3. Discussed importance of adequate intake and sources of protein in the diet. 4. Will continue to monitor.   Goals: Meet > 85 % EEN/EPN while admitted  Nutrition Goal Status: new  Communication of RD Recs:  (POC, sticky note)    Reason for Assessment    Reason for Assessment: consult  Dx:  1. Atrial fibrillation with RVR    2. Elevated troponin    3. CHF (congestive heart failure)      Past Medical History:   Diagnosis Date    Arthritis     Asthma     Atrial fibrillation     Blood clot of vein in shoulder area     Cardiac defibrillator in place     Chronic knee pain     Diabetes mellitus type 2 without retinopathy 12/19/2016    Diaphragmatic hernia without obstruction and without gangrene 9/14/2015    GERD (gastroesophageal reflux disease)     Hypertension     Primary open angle glaucoma (POAG) of both eyes, severe stage 6/1/2018       General Information Comments: Patient reports PO intake at lunch ~ 0 %. PO intake at breakfast ~ 100 %. Patient agreed to try oral supplements.   Nutrition Discharge Planning: diabetic low Na diet     Nutrition Risk Screen    Nutrition Risk Screen: no indicators present    Nutrition/Diet History    Do you have any cultural, spiritual, Sabianism conflicts, given your current situation?: none     Anthropometrics    Temp: 97.9 °F (36.6 °C)  Height Method: Stated  Height: 5' 4" (162.6 cm)  Height (inches): 64 in  Weight Method: Bed Scale  Weight: 95.4 kg (210 lb 5.1 oz)  Weight (lb): 210.32 lb  Ideal Body Weight (IBW), Female: 120 lb  % Ideal Body Weight, Female (lb): 175.27 lb  BMI (Calculated): 36.2  BMI Grade: 35 - 39.9 - obesity - grade II       Lab/Procedures/Meds    Pertinent Labs Reviewed: reviewed  BMP  Lab Results   Component Value Date     06/05/2018    K " 4.0 06/05/2018     06/05/2018    CO2 30 (H) 06/05/2018    BUN 40 (H) 06/05/2018    CREATININE 0.9 06/05/2018    CALCIUM 8.2 (L) 06/05/2018    ANIONGAP 6 (L) 06/05/2018    ESTGFRAFRICA >60 06/05/2018    EGFRNONAA >60 06/05/2018     Lab Results   Component Value Date    CALCIUM 8.2 (L) 06/05/2018    PHOS 2.1 (L) 06/05/2018     Lab Results   Component Value Date    ALBUMIN 2.6 (L) 06/02/2018       Recent Labs  Lab 06/05/18  1050   POCTGLUCOSE 83     Lab Results   Component Value Date    HGBA1C 5.3 06/03/2018       Pertinent Medications Reviewed: reviewed    Physical Findings/Assessment    Overall Physical Appearance: obese  Oral/Mouth Cavity:  (WDL)  Skin:  (Multiple pressure injuries stage2 & 3)    Estimated/Assessed Needs    Weight Used For Calorie Calculations: 95.4 kg (210 lb 5.1 oz)  Energy Calorie Requirements (kcal): 1811  Energy Need Method: [place]-St Jeor (x1.25)  Protein Requirements: 114 - 143 g/day  Weight Used For Protein Calculations: 95.4 kg (210 lb 5.1 oz)     Fluid Need Method: RDA Method (or per MD)  RDA Method (mL): 1811  CHO Requirement: 50 % EEN      Nutrition Prescription Ordered    Current Diet Order: Diabetic 2000 kcal, Low Na    Evaluation of Received Nutrient/Fluid Intake          Intake/Output Summary (Last 24 hours) at 06/05/18 1534  Last data filed at 06/05/18 0830   Gross per 24 hour   Intake           883.58 ml   Output                0 ml   Net           883.58 ml       % Intake of Estimated Energy Needs: 0 - 25 %  % Meal Intake: 0 - 25 %    Nutrition Risk      2xweekly    Assessment and Plan    Left ischial pressure sore, stage III    Contributing Nutrition Diagnosis  Increased nutrient needs - protein     Related to (etiology):   Wound healing     Signs and Symptoms (as evidenced by):   EPN    Interventions/Recommendations (treatment strategy):  See above    Nutrition Diagnosis Status:   New               Monitor and Evaluation    Food and Nutrient Intake: energy intake, food  and beverage intake  Food and Nutrient Adminstration: diet order  Anthropometric Measurements: weight  Biochemical Data, Medical Tests and Procedures: electrolyte and renal panel, glucose/endocrine profile  Nutrition-Focused Physical Findings: overall appearance, skin     Nutrition Follow-Up    RD Follow-up?: Yes (2xweekly)

## 2018-06-05 NOTE — SUBJECTIVE & OBJECTIVE
Review of Systems   Constitution: Positive for weakness and malaise/fatigue.   HENT: Negative.    Eyes: Negative.    Cardiovascular: Positive for leg swelling.   Respiratory: Negative.    Endocrine: Negative.    Hematologic/Lymphatic: Negative.    Skin: Negative.    Musculoskeletal: Positive for arthritis and joint pain.   Gastrointestinal: Negative.    Genitourinary: Negative.    Psychiatric/Behavioral: Negative.    Allergic/Immunologic: Negative.      Objective:     Vital Signs (Most Recent):  Temp: 96.3 °F (35.7 °C) (06/05/18 0731)  Pulse: 99 (06/05/18 0731)  Resp: 18 (06/05/18 0731)  BP: 100/68 (06/05/18 0847)  SpO2: 95 % (06/05/18 0731) Vital Signs (24h Range):  Temp:  [96.3 °F (35.7 °C)-97.9 °F (36.6 °C)] 96.3 °F (35.7 °C)  Pulse:  [] 99  Resp:  [18-20] 18  SpO2:  [94 %-99 %] 95 %  BP: ()/() 100/68     Weight: 95.4 kg (210 lb 5.1 oz)  Body mass index is 36.1 kg/m².     SpO2: 95 %  O2 Device (Oxygen Therapy): room air      Intake/Output Summary (Last 24 hours) at 06/05/18 1035  Last data filed at 06/05/18 0600   Gross per 24 hour   Intake           883.58 ml   Output                0 ml   Net           883.58 ml       Lines/Drains/Airways     Pressure Ulcer                 Pressure Injury 06/02/18 Left distal Ischial tuberosity Stage 3 3 days         Pressure Injury 06/02/18 Right lateral Heel Deep tissue injury 3 days         Pressure Injury 06/02/18 1657 Right Buttocks Stage 2 2 days         Pressure Injury 06/02/18 1659 Left proximal Ischial tuberosity Stage 3 2 days         Pressure Injury 06/02/18 1659 Right anterior Thigh Stage 2 2 days         Pressure Injury 06/02/18 1902 Right medial Heel Deep tissue injury 2 days          Peripheral Intravenous Line                 Peripheral IV - Single Lumen 06/02/18 1032 Left Forearm 3 days         Peripheral IV - Single Lumen 06/03/18 1330 Left Forearm 1 day                Physical Exam   Constitutional: She is oriented to person, place, and  time. Vital signs are normal. She appears well-developed and well-nourished. She is active and cooperative. She does not have a sickly appearance. She does not appear ill. No distress.   HENT:   Head: Normocephalic.   Neck: Neck supple. Normal carotid pulses, no hepatojugular reflux and no JVD present. Carotid bruit is not present. No thyromegaly present.   Cardiovascular: Normal rate, S1 normal, S2 normal, normal heart sounds and normal pulses.  An irregularly irregular rhythm present. PMI is not displaced.  Exam reveals no gallop and no friction rub.    No murmur heard.  Pulses:       Radial pulses are 2+ on the right side, and 2+ on the left side.   Pulmonary/Chest: Effort normal and breath sounds normal. She has no wheezes. She has no rales.   Abdominal: Soft. Normal appearance, normal aorta and bowel sounds are normal. She exhibits no pulsatile liver, no abdominal bruit, no ascites and no mass. There is no splenomegaly or hepatomegaly. There is no tenderness.   Musculoskeletal: She exhibits edema.   Lymphadenopathy:     She has no cervical adenopathy.   Neurological: She is alert and oriented to person, place, and time.   Skin: Skin is warm. She is not diaphoretic.   Psychiatric: She has a normal mood and affect. Her behavior is normal.   Nursing note and vitals reviewed.      Significant Labs:   CMP   Recent Labs  Lab 06/04/18 0426 06/05/18  0519    142   K 3.5 4.0    106   CO2 23 30*   GLU 87 85   BUN 38* 40*   CREATININE 0.9 0.9   CALCIUM 8.3* 8.2*   ANIONGAP 11 6*   ESTGFRAFRICA >60 >60   EGFRNONAA >60 >60   , CBC   Recent Labs  Lab 06/04/18  0426   WBC 7.96   HGB 10.8*   HCT 35.4*       and Troponin   Recent Labs  Lab 06/05/18  0519   TROPONINI 0.056*       Significant Imaging: Echocardiogram:   2D echo with color flow doppler:   Results for orders placed or performed during the hospital encounter of 06/02/18   2D echo with color flow doppler   Result Value Ref Range    EF 35 (A) 55 -  65    Mitral Valve Regurgitation MILD TO MODERATE     Aortic Valve Regurgitation MILD     Est. PA Systolic Pressure 54.19 (A)     Mitral Valve Mobility NORMAL     Tricuspid Valve Regurgitation MODERATE TO SEVERE (A)

## 2018-06-05 NOTE — CARE UPDATE
Contacted Dr. Hernández's office regarding patient's admission here at [facility]. Recommendation made for patient to follow up in 1 week after DC. They also mentioned that patient didn't have follow up from [facility] scheduled and is aware that she needs to be seen in clinic.  Per office staff,  they will reach out to patient and schedule appt.

## 2018-06-05 NOTE — PLAN OF CARE
Problem: Patient Care Overview  Goal: Plan of Care Review  Outcome: Ongoing (interventions implemented as appropriate)  Recommendations     Recommendation/Intervention: 1. Continue current diet. 2. Add boost plus TID & beneprotein TID for wound healing. 3. Discussed importance of adequate intake and sources of protein in the diet. 4. Will continue to monitor.   Goals: Meet > 85 % EEN/EPN while admitted  Nutrition Goal Status: new  Communication of RD Recs:  (POC, sticky note)

## 2018-06-05 NOTE — ASSESSMENT & PLAN NOTE
- Currently appears compensated  - Continue Metoprolol at lower dose of 50 mg PO BID  - Continue to hold Bumex due to hypotension.   - Monitor strict intake and output as well as daily weights

## 2018-06-05 NOTE — SUBJECTIVE & OBJECTIVE
Review of Systems   Constitutional: Positive for fatigue. Negative for appetite change, chills, diaphoresis and fever.   HENT: Negative.  Negative for congestion, ear pain, mouth sores, sore throat and trouble swallowing.    Eyes: Negative for pain and visual disturbance.   Respiratory: Negative.  Negative for cough, chest tightness and shortness of breath.    Cardiovascular: Negative for chest pain, palpitations and leg swelling.   Gastrointestinal: Negative.  Negative for abdominal pain, constipation and nausea.   Endocrine: Negative.  Negative for cold intolerance, heat intolerance, polydipsia and polyuria.   Genitourinary: Negative.  Negative for dysuria, frequency and hematuria.   Musculoskeletal: Negative.  Negative for arthralgias, back pain, myalgias and neck pain.   Skin: Negative.  Negative for pallor, rash and wound.   Allergic/Immunologic: Negative.  Negative for environmental allergies and immunocompromised state.   Neurological: Negative.  Negative for dizziness, seizures, syncope, weakness, numbness and headaches.   Hematological: Negative.  Negative for adenopathy. Does not bruise/bleed easily.   Psychiatric/Behavioral: Negative.  Negative for agitation, confusion and sleep disturbance.   All other systems reviewed and are negative.    Objective:     Vital Signs (Most Recent):  Temp: 97.7 °F (36.5 °C) (06/05/18 1106)  Pulse: (!) 117 (06/05/18 1106)  Resp: 18 (06/05/18 1106)  BP: 108/63 (06/05/18 1106)  SpO2: 98 % (06/05/18 1106) Vital Signs (24h Range):  Temp:  [96.3 °F (35.7 °C)-97.9 °F (36.6 °C)] 97.7 °F (36.5 °C)  Pulse:  [] 117  Resp:  [18-20] 18  SpO2:  [94 %-99 %] 98 %  BP: ()/() 108/63     Weight: 95.4 kg (210 lb 5.1 oz)  Body mass index is 36.1 kg/m².    Intake/Output Summary (Last 24 hours) at 06/05/18 1342  Last data filed at 06/05/18 0830   Gross per 24 hour   Intake           883.58 ml   Output                0 ml   Net           883.58 ml      Physical Exam    Constitutional: She is oriented to person, place, and time. She appears well-developed and well-nourished. No distress.   HENT:   Head: Normocephalic and atraumatic.   Eyes: EOM are normal.   Neck: Neck supple.   Cardiovascular: An irregularly irregular rhythm present.   A-fib, rate controlled    Pulmonary/Chest: Effort normal and breath sounds normal. No respiratory distress.   Abdominal: Soft. Bowel sounds are normal. She exhibits no distension. There is no tenderness. There is no rebound and no guarding.   Genitourinary:   Genitourinary Comments: Deferred   Musculoskeletal: She exhibits no edema, tenderness or deformity.   Neurological: She is alert and oriented to person, place, and time. No sensory deficit.   Skin: Skin is warm and dry. Capillary refill takes less than 2 seconds.   Psychiatric: She has a normal mood and affect. Her behavior is normal. Judgment and thought content normal.   Nursing note and vitals reviewed.      Significant Labs:   BMP:   Recent Labs  Lab 06/05/18  0519   GLU 85      K 4.0      CO2 30*   BUN 40*   CREATININE 0.9   CALCIUM 8.2*   MG 1.6     Magnesium:   Recent Labs  Lab 06/04/18  0426 06/05/18  0519   MG 1.8 1.6     POCT Glucose:   Recent Labs  Lab 06/04/18  2054 06/05/18  0604 06/05/18  1050   POCTGLUCOSE 94 85 83     Troponin:   Recent Labs  Lab 06/05/18  0519   TROPONINI 0.056*       Significant Imaging:   Imaging Results          X-Ray Chest AP Portable (Final result)  Result time 06/02/18 11:06:06    Final result by Philip Damian Jr., MD (06/02/18 11:06:06)                 Impression:      Cardiomegaly.      Electronically signed by: Philip Damian Jr., MD  Date:    06/02/2018  Time:    11:06             Narrative:    EXAMINATION:  XR CHEST AP PORTABLE    CLINICAL HISTORY:  weakness;    COMPARISON:  12/01/2017.    FINDINGS:  The lungs are clear. The cardiac silhouette remains enlarged.  No effusion or pneumothorax.  Calcified aortic knob.  Degenerative  changes both shoulders.  The remaining osseous structures and soft tissues are within normal limits.  Pacemaker remains in place.

## 2018-06-05 NOTE — PLAN OF CARE
Problem: Physical Therapy Goal  Goal: Physical Therapy Goal  LTG'S FOR PT BY 6/10/18  1. PATIENT WILL PERFORM SUP TO/FROM SIT MAX X 1  2. PATIENT WILL PERFORM SQUAT PIVOT TRANSFER TO CHAIR MAX X 1  3. PATIENT WILL PERFORM B LE AAROM/ISOMETRICS MIN A X 20 REPS   Outcome: Ongoing (interventions implemented as appropriate)  PATIENT WITH GREAT EFFORTS ALL ACTIVITY , SHORTSEATED EOB FOR BALANCE ACTIVITYAT MAX ASSIST X2 TO DEPENDENT X2., WEIGHTBEARING THROUGHOUT UE AND LE.

## 2018-06-05 NOTE — ASSESSMENT & PLAN NOTE
- Pt reports having right side weakness for the past 3 weeks  - CT head with no acute findings  - Unable to obtain MRI due to PPM  - May repeat CT head in 24 hrs and consult Neurology for further input

## 2018-06-05 NOTE — PROGRESS NOTES
Patient called call light saying she was uncomfortable. Myself and another nurse lifted patient up in bed, then raised head to sit her straight up because she asked to sit up as if in a chair. Patient getting angry and stating that all she does is lay around. PT in room now with a chair and lift. Will try to get patient up in chair for a little while. Patient unable to assist with any help at all.

## 2018-06-05 NOTE — PLAN OF CARE
Problem: Patient Care Overview  Goal: Plan of Care Review  Outcome: Ongoing (interventions implemented as appropriate)  Patient remains free of falls and safety precautions maintained. Afebrile. Pain controlled. Specialty bed in use, with wedge as well. Wound care done, dressing changed on thigh today and due every three days. Monitoring BP closely. A-fib. Will continue to monitor. 12 hour chart check.

## 2018-06-05 NOTE — PT/OT/SLP PROGRESS
Physical Therapy  Treatment    Carlie Valdez   MRN: [id number]   Admitting Diagnosis: Atrial fibrillation with RVR    PT Received On: 06/05/18  PT Start Time: 1330     PT Stop Time: 1355    PT Total Time (min): 25 min       Billable Minutes:  Therapeutic Activity 25    Treatment Type: Treatment  PT/PTA: PTA     PTA Visit Number: 2       General Precautions: Standard, fall  Orthopedic Precautions: N/A   Braces: N/A         Subjective:  Communicated with NURSE, NEHA AND DUNIA  prior to session.  PATIENT AGREE TO TX NOW.    Pain/Comfort  Pain Rating 1: 0/10    Objective:   Patient found with: peripheral IV, oxygen    Functional Mobility:  Bed Mobility:    SUPINE TO SIT , SIT TO SUPINE AT MAX TO DEPENDENT X2.    Transfers:   UNABLE TO T/F OOB , PATIENT REQUIRING TOLIETING NEEDS DUE TO B/M AT PRESENT TIME., ASSISTED WITH BED MOBILITY AT MAX TO DEPENDENT X2.    Gait:    UNABLE AT PRESENT TIME.    Stairs:N/A    Balance:   Static Sit: 0: Needs MAXIMAL assist to maintain sitting without back support  Dynamic Sit: POOR: N/A  Static Stand: 0: Needs MAXIMAL assist to maintain   Dynamic stand: N/A    Therapeutic Activities and Exercises:  SHORTSEATED BALANCE ACITIVITY AT MAX TO DEPENDENT X2 , BED MOBILITY AT MAX TO DEPENDENT X2. WEIGHTBEARING ACTIVITY UE AND LE.    AM-PAC 6 CLICK MOBILITY  How much help from another person does this patient currently need?   1 = Unable, Total/Dependent Assistance  2 = A lot, Maximum/Moderate Assistance  3 = A little, Minimum/Contact Guard/Supervision  4 = None, Modified [place]/Independent    Turning over in bed (including adjusting bedclothes, sheets and blankets)?: 2  Sitting down on and standing up from a chair with arms (e.g., wheelchair, bedside commode, etc.): 2  Moving from lying on back to sitting on the side of the bed?: 2  Moving to and from a bed to a chair (including a wheelchair)?: 2  Need to walk in hospital room?: 1  Climbing 3-5 steps with a railing?: 1  Total  Score: 10    AM-PAC Raw Score CMS G-Code Modifier Level of Impairment Assistance   6 % Total / Unable   7 - 9 CM 80 - 100% Maximal Assist   10 - 14 CL 60 - 80% Moderate Assist   15 - 19 CK 40 - 60% Moderate Assist   20 - 22 CJ 20 - 40% Minimal Assist   23 CI 1-20% SBA / CGA   24 CH 0% Independent/ Mod I     Patient left supine with all lines intact, call button in reach and bed alarm on.    Assessment:  Carlie Valdez is a 72 y.o. female with a medical diagnosis of Atrial fibrillation with RVR .    Rehab identified problem list/impairments: Rehab identified problem list/impairments: weakness, impaired self care skills, impaired balance, decreased coordination, decreased upper extremity function, decreased lower extremity function, impaired sensation, gait instability, impaired functional mobilty, impaired endurance    Rehab potential is good.    Activity tolerance: Fair    Discharge recommendations: Discharge Facility/Level Of Care Needs: LTACH (long term acute care hospital), nursing facility, skilled     Barriers to discharge:      Equipment recommendations: Equipment Needed After Discharge: walker, rolling     GOALS:    Physical Therapy Goals        Problem: Physical Therapy Goal    Goal Priority Disciplines Outcome Goal Variances Interventions   Physical Therapy Goal     PT/OT, PT Ongoing (interventions implemented as appropriate)     Description:  LTG'S FOR PT BY 6/10/18  1. PATIENT WILL PERFORM SUP TO/FROM SIT MAX X 1  2. PATIENT WILL PERFORM SQUAT PIVOT TRANSFER TO CHAIR MAX X 1  3. PATIENT WILL PERFORM B LE AAROM/ISOMETRICS MIN A X 20 REPS                    PLAN:    Patient to be seen 5 x/week  to address the above listed problems via gait training, therapeutic activities, therapeutic exercises  Plan of Care expires: 06/10/18  Plan of Care reviewed with: patient         Rebecca Simpson, PTA  06/05/2018

## 2018-06-05 NOTE — ASSESSMENT & PLAN NOTE
- Cardiology following  - Continue Xarelto for CVA prophylaxis  - Decreased Metoprolol to 50 gm PO BID due to hypotension this AM  - Continue Amiodarone  - Telemetry monitoring

## 2018-06-05 NOTE — PROGRESS NOTES
[facility] Hospital Medicine  Progress Note    Patient Name: Carlie Valdez  MRN: [id number]  Patient Class: IP- Inpatient   Admission Date: 6/2/2018  Length of Stay: 3 days  Attending Physician: Philip Ceja MD  Primary Care Provider: Johanna Guzman MD        Subjective:     Principal Problem:Atrial fibrillation with RVR    HPI:  Carlie Valdez is a 78 year old female with combined systolic and diastolic CHF, pacemaker placement, chronic atrial fibrillation on Xarelto, non arteritic ischemic optic neuropathy (NA-AION) who presented to the Emergency Department with complaints of weakness which onset gradually today. The patient was sent to a rehab facility in [place] after a recent hospital discharge following an episode of HTN emergency. The patient was reportedly hypotensive at her rehab facility. Blood pressure medications were held and the patient developed atrial fibrillation with RVR. The patient's main complaint is fatigue. She denies any chest pain, dyspnea, cough,  complaints and GI complaints. No further complaints or concerns at this time            Hospital Course:  The patient was placed in Observation with complaints of weakness as well as findings of hypotension and atrial fibrillation with RVR. The patient was initially started on a Cardizem drip which was weaned off due to the patient's significant history of CHF. Oral metoprolol was started, but the patient's RVR persisted. Cardiology was consulted and the patient was started on an Amiodarone drip and transitioned to an inpatient admission. She continues to have hypotension requiring fluid boluses. She reports weakness, but states that she does not want to return to the SNF facility that transferred her here. She states that she wants to go home with home health. Nursing reported systolic BP 78 this morning. Pt asymptomatic. Pt denies chest pain, SOB, dizziness, abdominal pain, N/V/D. Repeat manual /68  from left arm. AM medications including Amiodarone and Metoprolol held. Discussed with Cardiology -- will reduce Metoprolol to 50 mg PO BID and continue PO Amiodarone. Right sided weakness noted upon assessment. Pt reports she has been having right side weakness for the past 3 weeks. Pt denies hx of CVA. CT head obtained negative. Unable to obtain MRI due to PPM. May repeat CT in 24 hrs and consult Neurology if symptoms worsen. PT/OT following. PT recommended SNF placement.       Review of Systems   Constitutional: Positive for fatigue. Negative for appetite change, chills, diaphoresis and fever.   HENT: Negative.  Negative for congestion, ear pain, mouth sores, sore throat and trouble swallowing.    Eyes: Negative for pain and visual disturbance.   Respiratory: Negative.  Negative for cough, chest tightness and shortness of breath.    Cardiovascular: Negative for chest pain, palpitations and leg swelling.   Gastrointestinal: Negative.  Negative for abdominal pain, constipation and nausea.   Endocrine: Negative.  Negative for cold intolerance, heat intolerance, polydipsia and polyuria.   Genitourinary: Negative.  Negative for dysuria, frequency and hematuria.   Musculoskeletal: Negative.  Negative for arthralgias, back pain, myalgias and neck pain.   Skin: Negative.  Negative for pallor, rash and wound.   Allergic/Immunologic: Negative.  Negative for environmental allergies and immunocompromised state.   Neurological: Negative.  Negative for dizziness, seizures, syncope, weakness, numbness and headaches.   Hematological: Negative.  Negative for adenopathy. Does not bruise/bleed easily.   Psychiatric/Behavioral: Negative.  Negative for agitation, confusion and sleep disturbance.   All other systems reviewed and are negative.    Objective:     Vital Signs (Most Recent):  Temp: 97.7 °F (36.5 °C) (06/05/18 1106)  Pulse: (!) 117 (06/05/18 1106)  Resp: 18 (06/05/18 1106)  BP: 108/63 (06/05/18 1106)  SpO2: 98 % (06/05/18  1106) Vital Signs (24h Range):  Temp:  [96.3 °F (35.7 °C)-97.9 °F (36.6 °C)] 97.7 °F (36.5 °C)  Pulse:  [] 117  Resp:  [18-20] 18  SpO2:  [94 %-99 %] 98 %  BP: ()/() 108/63     Weight: 95.4 kg (210 lb 5.1 oz)  Body mass index is 36.1 kg/m².    Intake/Output Summary (Last 24 hours) at 06/05/18 1342  Last data filed at 06/05/18 0830   Gross per 24 hour   Intake           883.58 ml   Output                0 ml   Net           883.58 ml      Physical Exam   Constitutional: She is oriented to person, place, and time. She appears well-developed and well-nourished. No distress.   HENT:   Head: Normocephalic and atraumatic.   Eyes: EOM are normal.   Neck: Neck supple.   Cardiovascular: An irregularly irregular rhythm present.   A-fib, rate controlled    Pulmonary/Chest: Effort normal and breath sounds normal. No respiratory distress.   Abdominal: Soft. Bowel sounds are normal. She exhibits no distension. There is no tenderness. There is no rebound and no guarding.   Genitourinary:   Genitourinary Comments: Deferred   Musculoskeletal: She exhibits no edema, tenderness or deformity.   Neurological: She is alert and oriented to person, place, and time. No sensory deficit.   Skin: Skin is warm and dry. Capillary refill takes less than 2 seconds.   Psychiatric: She has a normal mood and affect. Her behavior is normal. Judgment and thought content normal.   Nursing note and vitals reviewed.      Significant Labs:   BMP:   Recent Labs  Lab 06/05/18 0519   GLU 85      K 4.0      CO2 30*   BUN 40*   CREATININE 0.9   CALCIUM 8.2*   MG 1.6     Magnesium:   Recent Labs  Lab 06/04/18  0426 06/05/18  0519   MG 1.8 1.6     POCT Glucose:   Recent Labs  Lab 06/04/18  2054 06/05/18  0604 06/05/18  1050   POCTGLUCOSE 94 85 83     Troponin:   Recent Labs  Lab 06/05/18 0519   TROPONINI 0.056*       Significant Imaging:   Imaging Results          X-Ray Chest AP Portable (Final result)  Result time 06/02/18  11:06:06    Final result by Philip Damian Jr., MD (06/02/18 11:06:06)                 Impression:      Cardiomegaly.      Electronically signed by: Philip Damian Jr., MD  Date:    06/02/2018  Time:    11:06             Narrative:    EXAMINATION:  XR CHEST AP PORTABLE    CLINICAL HISTORY:  weakness;    COMPARISON:  12/01/2017.    FINDINGS:  The lungs are clear. The cardiac silhouette remains enlarged.  No effusion or pneumothorax.  Calcified aortic knob.  Degenerative changes both shoulders.  The remaining osseous structures and soft tissues are within normal limits.  Pacemaker remains in place.                              Assessment/Plan:      * Atrial fibrillation with RVR    - Cardiology following  - Continue Xarelto for CVA prophylaxis  - Decreased Metoprolol to 50 gm PO BID due to hypotension this AM  - Continue Amiodarone  - Telemetry monitoring        Right sided weakness    - Pt reports having right side weakness for the past 3 weeks  - CT head with no acute findings  - Unable to obtain MRI due to PPM  - May repeat CT head in 24 hrs and consult Neurology for further input          Hypothyroidism    - TSH 1.265  - Continue Synthroid         Type 2 diabetes mellitus with kidney complication, without long-term current use of insulin    - HgbA1c on 06/03/18 -- 5.3 -- controlled  - Accuchecks and low dose SSI          Chronic combined systolic and diastolic congestive heart failure    - Currently appears compensated  - Continue Metoprolol at lower dose of 50 mg PO BID  - Continue to hold Bumex due to hypotension.   - Monitor strict intake and output as well as daily weights           Chronic kidney disease (CKD), stage III (moderate)    - Creatinine at baseline  - Will continue to monitor           Elevated troponin    - ACS ruled out as there was no upward trend  - Cardiology following               VTE Risk Mitigation         Ordered     rivaroxaban tablet 15 mg  With dinner      06/02/18 4970               Vanesa Artis, FNP  Department of Hospital Medicine   [facility]

## 2018-06-05 NOTE — ASSESSMENT & PLAN NOTE
Contributing Nutrition Diagnosis  Increased nutrient needs - protein     Related to (etiology):   Wound healing     Signs and Symptoms (as evidenced by):   EPN    Interventions/Recommendations (treatment strategy):  See above    Nutrition Diagnosis Status:   New

## 2018-06-05 NOTE — PROGRESS NOTES
Met with patient. Reviewed contacts with family members. Discussed concerns about her safety at home. She restated her desire to go home. She stated that she had been cleaning her bedroom to make room for her hospital bed but has decided that she will set up her bed in the living room.

## 2018-06-05 NOTE — PLAN OF CARE
Problem: Patient Care Overview  Goal: Plan of Care Review  Outcome: Ongoing (interventions implemented as appropriate)  POC discussed w/patient, verbalized understanding.  A-fib on monitor. Awake and alert. VSS.  Incontinent X 2.  IVs intact.   Weight assistance provide q 2 hours.   Wound care provided as needed to bottom.  Patient states she is blind, all belonging patient is in need of are in reach.   Fall precautions in place, call bell in reach, bed in low and locked positon.   Patient remains free from falls/injuries.    Patient denies needs at this time.   Will continue to monitor.

## 2018-06-05 NOTE — PT/OT/SLP PROGRESS
"Occupational Therapy  Treatment    Carlie Valdez   MRN: [id number]   Admitting Diagnosis: Atrial fibrillation with RVR    OT Date of Treatment: 06/05/18   OT Start Time: 1325  OT Stop Time: 1355  OT Total Time (min): 30 min    Billable Minutes:  Therapeutic Activity 30 minutes    General Precautions: Standard, fall  Orthopedic Precautions: N/A  Braces: N/A         Subjective:  Communicated with nurse Avila and epic chart review prior to session.    Pain/Comfort  Pain Rating 1: 0/10  Location - Side 1: Right  Location - Orientation 1: upper    Objective:  Patient found with: peripheral IV, oxygen     Functional Mobility:  Bed Mobility:       Transfers:        Functional Ambulation: kel[place]    Activities of Daily Living:     Feeding adaptive equipment: na     UE adaptive equipment: na  LE adaptive equipment: na                    Bathing adaptive equipment: na    Balance:   Static Sit: POOR: Needs MODERATE assist to maintain  Dynamic Sit: poor-  Static Stand: 0: Needs MAXIMAL assist to maintain   Dynamic stand: 0: N/A    Therapeutic Activities and Exercises:  Pt seen in room with nursing staff. Pt req max a with supine <>sit and req max a with scooting eob. Pt req  Max a x 3 with several standing activities... Pt unable to tolerate  Standing activity.     AM-PAC 6 CLICK ADL   How much help from another person does this patient currently need?   1 = Unable, Total/Dependent Assistance  2 = A lot, Maximum/Moderate Assistance  3 = A little, Minimum/Contact Guard/Supervision  4 = None, Modified [place]/Independent    Putting on and taking off regular lower body clothing? : 1  Bathing (including washing, rinsing, drying)?: 1  Toileting, which includes using toilet, bedpan, or urinal? : 1  Putting on and taking off regular upper body clothing?: 1  Taking care of personal grooming such as brushing teeth?: 2  Eating meals?: 2  Total Score: 8     AM-PAC Raw Score CMS "G-Code Modifier Level of Impairment " Assistance   6 % Total / Unable   7 - 8 CM 80 - 100% Maximal Assist   9-13 CL 60 - 80% Moderate Assist   14 - 19 CK 40 - 60% Moderate Assist   20 - 22 CJ 20 - 40% Minimal Assist   23 CI 1-20% SBA / CGA   24 CH 0% Independent/ Mod I       Patient left HOB elevated with all lines intact, call button in reach, nurse notified and PTA present    ASSESSMENT:  Carlie Valdez is a 72 y.o. female with a medical diagnosis of Atrial fibrillation with RVR and presents with debility and generalized weakness.    Rehab identified problem list/impairments: Rehab identified problem list/impairments: weakness, impaired self care skills, impaired balance, decreased safety awareness, impaired endurance, impaired functional mobilty, gait instability, decreased lower extremity function    Rehab potential is good.    Activity tolerance: Fair    Discharge recommendations: Discharge Facility/Level Of Care Needs: LTACH (long term acute care hospital), nursing facility, skilled     Barriers to discharge: Barriers to Discharge: None    Equipment recommendations: walker, rolling     GOALS:    Occupational Therapy Goals     Not on file                Plan:  Patient to be seen 3 x/week to address the above listed problems via self-care/home management, therapeutic activities, therapeutic exercises  Plan of Care expires: 06/12/18  Plan of Care reviewed with: patient    OT G-codes  Functional Assessment Tool Used: [place] am pac  Score: 7  Functional Limitation: Self care  Self Care Current Status (): KARINE  Self Care Goal Status (): STIVEN Sousa OT  06/05/2018

## 2018-06-06 NOTE — ASSESSMENT & PLAN NOTE
- Currently appears compensated  - Continue Metoprolol at lower dose of 50 mg PO BID  - Continue to hold Bumex due to hypotension   - Monitor strict intake and output as well as daily weights

## 2018-06-06 NOTE — ASSESSMENT & PLAN NOTE
-Continue Toprol to 50mg BID  -Will hold off on cardizem due to systolic dysfunction  -continueAmio 200mg PO BID  -TSH will need to be monitored as an OP  -Cont Xarelto for CVA prophylaxis

## 2018-06-06 NOTE — PLAN OF CARE
Problem: Physical Therapy Goal  Goal: Physical Therapy Goal  LTG'S FOR PT BY 6/10/18  1. PATIENT WILL PERFORM SUP TO/FROM SIT MAX X 1  2. PATIENT WILL PERFORM SQUAT PIVOT TRANSFER TO CHAIR MAX X 1  3. PATIENT WILL PERFORM B LE AAROM/ISOMETRICS MIN A X 20 REPS   Outcome: Ongoing (interventions implemented as appropriate)  PATIENT WITH GREAT EFFORTS FOR SHORTSEATED BALANCE ACTIVITY AS WELL AS OOB TO CARDIAC CHAIR T/FS AT MAX TO DEPENDENT X2 .

## 2018-06-06 NOTE — PROGRESS NOTES
[facility] -   Cardiology  Progress Note    Patient Name: Carlie Valdez  MRN: [id number]  Admission Date: 6/2/2018  Hospital Length of Stay: 4 days  Code Status: Full Code   Attending Physician: Denae Infante MD   Primary Care Physician: Johanna Guzman MD  Expected Discharge Date:   Principal Problem:Atrial fibrillation with RVR    Subjective:   Brief HPI:    This is a 77 yo F with PMHx of chronic CHF EF 25-30%, s/p ICD Chronic A. Fib on Xarelto, Non Arteritic Ischemic Optic Neuropathy (NA-AION); who presents to the Emergency Department for weakness which onset gradually today.  Patient was sent to rehab after recent discharge for episode of HTN emergency.  Was hypotensive at her rehab facility,  BP medication was held, developed RVR.  Patient's main complaint is fatigue.  Denies any CP, dyspnea, cough, /GI complaints.   Cardiology consulted for rapid AF.   Discussed she had side effects in past with metoprolol causing diarrhea and also could not tolerate cardizem. Has not been on amiodarone in the past or cannot remember being on it.     Hospital Course:   6/3/18 - Admitted with rapid AFib, started on Amio drip    6/4/18- A-fib rate still not controlled on Amio gtt. BP stable on current medical management. Has no CNS complaints since admission to suggest TIA or CVA. Complains of pain to BLE and burning.     6/5/18:  She seems to feel a bit better today.  A fib rate variable, but some improvement.  Had some heartburn earlier this am, now resolved.  No typical angina sxs.  Denies dyspnea.  Has deconditioning, some inability to move legs for some time now.  Labs stable.  Troponin leak stable.  Brief NSVT overnight, ICD did not fire.  BP variable.    6/6/18- Patient states that she feels good this morning. Has no chest pain. SOB improved. A-fib with controlled rate on tele. Metoprolol decreased on yesterday to 50mg BID to avoid hypotension. Denies any CNS complaints to suggest TIA or   CVA. No abnormal bleeding on OAC.       Review of Systems   Constitution: Positive for weakness and malaise/fatigue. Negative for diaphoresis, weight gain and weight loss.   HENT: Negative for congestion and nosebleeds.    Cardiovascular: Positive for irregular heartbeat. Negative for chest pain, claudication, cyanosis, dyspnea on exertion, leg swelling, near-syncope, orthopnea, palpitations, paroxysmal nocturnal dyspnea and syncope.   Respiratory: Negative for cough, hemoptysis, shortness of breath, sleep disturbances due to breathing, snoring, sputum production and wheezing.    Hematologic/Lymphatic: Negative for bleeding problem. Does not bruise/bleed easily.   Skin: Negative for rash.   Musculoskeletal: Positive for joint pain. Negative for arthritis, back pain, falls, muscle cramps and muscle weakness.   Gastrointestinal: Negative for abdominal pain, constipation, diarrhea, heartburn, hematemesis, hematochezia, melena and nausea.   Genitourinary: Negative for dysuria, hematuria and nocturia.   Neurological: Negative for excessive daytime sleepiness, dizziness, headaches, light-headedness, loss of balance, numbness and vertigo.     Objective:     Vital Signs (Most Recent):  Temp: 98.2 °F (36.8 °C) (06/06/18 1240)  Pulse: 85 (06/06/18 1240)  Resp: 18 (06/06/18 0705)  BP: 100/60 (06/06/18 0705)  SpO2: 100 % (06/06/18 1240) Vital Signs (24h Range):  Temp:  [97.4 °F (36.3 °C)-98.2 °F (36.8 °C)] 98.2 °F (36.8 °C)  Pulse:  [] 85  Resp:  [18] 18  SpO2:  [96 %-100 %] 100 %  BP: ()/(51-92) 100/60     Weight: 95.4 kg (210 lb 5.1 oz)  Body mass index is 36.1 kg/m².     SpO2: 100 %  O2 Device (Oxygen Therapy): room air      Intake/Output Summary (Last 24 hours) at 06/06/18 1348  Last data filed at 06/06/18 0849   Gross per 24 hour   Intake              360 ml   Output                0 ml   Net              360 ml       Lines/Drains/Airways     Pressure Ulcer                 Pressure Injury 06/02/18 Left distal  Ischial tuberosity Stage 3 4 days         Pressure Injury 06/02/18 Right lateral Heel Deep tissue injury 4 days         Pressure Injury 06/02/18 1659 Left proximal Ischial tuberosity Stage 3 3 days         Pressure Injury 06/02/18 1659 Right anterior Thigh Stage 2 3 days         Pressure Injury 06/02/18 1902 Right medial Heel Deep tissue injury 3 days          Peripheral Intravenous Line                 Peripheral IV - Single Lumen 06/02/18 1032 Left Forearm 4 days         Peripheral IV - Single Lumen 06/03/18 1330 Left Forearm 3 days                Physical Exam   Constitutional: She is oriented to person, place, and time. Vital signs are normal. She appears well-developed and well-nourished. She is active and cooperative. She does not have a sickly appearance. She does not appear ill. No distress.   HENT:   Head: Normocephalic.   Neck: Neck supple. Normal carotid pulses, no hepatojugular reflux and no JVD present. Carotid bruit is not present. No thyromegaly present.   Cardiovascular: Normal rate, S1 normal, S2 normal, normal heart sounds and normal pulses.  An irregularly irregular rhythm present. PMI is not displaced.  Exam reveals no gallop and no friction rub.    No murmur heard.  Pulses:       Radial pulses are 2+ on the right side, and 2+ on the left side.   Pulmonary/Chest: Effort normal and breath sounds normal. She has no wheezes. She has no rales.   Abdominal: Soft. Normal appearance, normal aorta and bowel sounds are normal. She exhibits no pulsatile liver, no abdominal bruit, no ascites and no mass. There is no splenomegaly or hepatomegaly. There is no tenderness.   Musculoskeletal: She exhibits edema.   Lymphadenopathy:     She has no cervical adenopathy.   Neurological: She is alert and oriented to person, place, and time.   Skin: Skin is warm. She is not diaphoretic.   Psychiatric: She has a normal mood and affect. Her behavior is normal.   Nursing note and vitals reviewed.      Significant Labs:    All pertinent lab results from the last 24 hours have been reviewed. and   Recent Lab Results       06/06/18  1135 06/06/18  0525 06/06/18  0454 06/05/18  2145 06/05/18  1743      Anion Gap   11       Baso #   0.02       Basophil%   0.3       BUN, Bld   39(H)       Calcium   8.7       Chloride   106       CO2   27       Creatinine   0.9       Differential Method   Automated       eGFR if    >60       eGFR if non    >60  Comment:  Calculation used to obtain the estimated glomerular filtration  rate (eGFR) is the CKD-EPI equation.          Eos #   0.5       Eosinophil%   6.5       Glucose   76       Gran # (ANC)   5.5       Gran%   68.9       Hematocrit   35.6(L)       Hemoglobin   10.9(L)       Lymph #   1.5       Lymph%   18.6       Magnesium   1.8       MCH   25.4(L)       MCHC   30.6(L)       MCV   83       Mono #   0.5       Mono%   5.7       MPV   9.6       Phosphorus   2.7       Platelets   338       POCT Glucose 99 76  90 93     Potassium   3.7       RBC   4.29       RDW   17.6(H)       Sodium   144       WBC   7.90                       Significant Imaging: Echocardiogram:   2D echo with color flow doppler:   Results for orders placed or performed during the hospital encounter of 06/02/18   2D echo with color flow doppler   Result Value Ref Range    EF 35 (A) 55 - 65    Mitral Valve Regurgitation MILD TO MODERATE     Aortic Valve Regurgitation MILD     Est. PA Systolic Pressure 54.19 (A)     Mitral Valve Mobility NORMAL     Tricuspid Valve Regurgitation MODERATE TO SEVERE (A)      Assessment and Plan:       * Atrial fibrillation with RVR    -Continue Toprol to 50mg BID  -Will hold off on cardizem due to systolic dysfunction  -continueAmio 200mg PO BID  -TSH will need to be monitored as an OP  -Cont Xarelto for CVA prophylaxis         NSVT (nonsustained ventricular tachycardia)    -No recurrent NSVT on tele   -Continue current dose of BB  -Keep K+ >4.0 and Mg > 2.0          Atypical chest pain    See management plan detailed above.         Chronic combined systolic and diastolic congestive heart failure    -Likely due to chronic AF. Rate currently controlled   -2D echo shows moderately depressed left ventricular systolic function (EF 35-40%) and low normal right ventricular systolic function with AR and MR  -continue  Toprol to 50mg BID for rate control  -BP unable to tolerate addition of ACEI  -Heart healthy diet  -Limit fluid intake 50-60 oz   -Will need to follow up with her primary Cardiologist after DC  -Would definitely benefit from Rehab            Chronic kidney disease (CKD), stage III (moderate)    See management plan detailed above.         Elevated troponin    -Troponin leak likely secondary to demand ischemia given her uncontrolled A-fib   -continue current medical management         Cardiac defibrillator in place    Has no ICD firing         Hypertension associated with diabetes    Hold BP meds due to hypotension            VTE Risk Mitigation         Ordered     rivaroxaban tablet 15 mg  With dinner      06/02/18 6361      Chart reviewed. Patient examined by Dr. Craig and agrees with plan that has been outlined.       CHEO Iraheta  Cardiology  [facility] - [place]

## 2018-06-06 NOTE — PT/OT/SLP PROGRESS
Physical Therapy  Treatment    Carlie Valdez   MRN: [id number]   Admitting Diagnosis: Atrial fibrillation with RVR    PT Received On: 06/06/18  PT Start Time: 1030     PT Stop Time: 1055    PT Total Time (min): 25 min       Billable Minutes:  Therapeutic Activity 25    Treatment Type: Treatment  PT/PTA: PTA     PTA Visit Number: 3       General Precautions: Standard, fall  Orthopedic Precautions: N/A   Braces: N/A         Subjective:  Communicated with NURSE, ANCELMO AND DUNIA prior to session.  PATIENT AGREE TO TX AND WANTS TO DO AS MUCH AS SHE CAN TOWARD HER RECOVERY.    Pain/Comfort  Pain Rating 1: 0/10    Objective:   Patient found with: peripheral IV    Functional Mobility:  Bed Mobility:      SUPINE TO SIT , SIT TO SUPINE AT MAX TO DEPENDENT X2  Transfers:   SHORTSEATED BALANCE ACTIVITY AT EOB, FORWARD FLEXION AT THE TRUNK, MIDLINE CONTROL WITH UTR, WEIGHTBEARING THROUGHOUT UE AND LE SHORTSEATED ALL AT MAX TO DEPENDENT X1 TO 2 ASSIST FOR SAFETY.    Gait:    UN ABLE HOWEVER DID A DEPENDENT T/F STAND PIVOT TO CARDIAC CHAIR X2 ASSIST.    Stairs:  N/A    Balance:   Static Sit: 0: Needs MAXIMAL assist to maintain sitting without back support  Dynamic Sit: POOR: N/A  Static Stand: 0: Needs MAXIMAL assist to maintain   Dynamic stand:MAX TO DEPENDENT FOR STAND PIVOT T/F    Therapeutic Activities and Exercises:  SHORTSEATED BALANCE ACTIVITY, UTR, FORWARD FLEXION AT THE TRUNK, MIDLINE CONTROL, T/FS OOB TO B/S CHAIR     AM-PAC 6 CLICK MOBILITY  How much help from another person does this patient currently need?   1 = Unable, Total/Dependent Assistance  2 = A lot, Maximum/Moderate Assistance  3 = A little, Minimum/Contact Guard/Supervision  4 = None, Modified [place]/Independent    Turning over in bed (including adjusting bedclothes, sheets and blankets)?: 2  Sitting down on and standing up from a chair with arms (e.g., wheelchair, bedside commode, etc.): 2  Moving from lying on back to sitting on the  side of the bed?: 2  Moving to and from a bed to a chair (including a wheelchair)?: 2  Need to walk in hospital room?: 1  Climbing 3-5 steps with a railing?: 1  Total Score: 10    AM-PAC Raw Score CMS G-Code Modifier Level of Impairment Assistance   6 % Total / Unable   7 - 9 CM 80 - 100% Maximal Assist   10 - 14 CL 60 - 80% Moderate Assist   15 - 19 CK 40 - 60% Moderate Assist   20 - 22 CJ 20 - 40% Minimal Assist   23 CI 1-20% SBA / CGA   24 CH 0% Independent/ Mod I     Patient left up in chair with all lines intact and call button in reach, NURSE NOTIFIED.    Assessment:  Carlie Valdez is a 72 y.o. female with a medical diagnosis of Atrial fibrillation with RVR ..    Rehab identified problem list/impairments: Rehab identified problem list/impairments: weakness, gait instability, decreased upper extremity function, decreased lower extremity function, impaired balance, impaired endurance, impaired sensation, visual deficits, impaired self care skills, impaired functional mobilty, decreased coordination    Rehab potential is excellent.    Activity tolerance: Good    Discharge recommendations: Discharge Facility/Level Of Care Needs: LTACH (long term acute care hospital), nursing facility, skilled     Barriers to discharge:      Equipment recommendations: Equipment Needed After Discharge: walker, rolling     GOALS:    Physical Therapy Goals        Problem: Physical Therapy Goal    Goal Priority Disciplines Outcome Goal Variances Interventions   Physical Therapy Goal     PT/OT, PT Ongoing (interventions implemented as appropriate)     Description:  LTG'S FOR PT BY 6/10/18  1. PATIENT WILL PERFORM SUP TO/FROM SIT MAX X 1  2. PATIENT WILL PERFORM SQUAT PIVOT TRANSFER TO CHAIR MAX X 1  3. PATIENT WILL PERFORM B LE AAROM/ISOMETRICS MIN A X 20 REPS                    PLAN:    Patient to be seen 5 x/week  to address the above listed problems via therapeutic activities, therapeutic exercises,  neuromuscular re-education  Plan of Care expires: 06/20/18  Plan of Care reviewed with: patient         Rebecca Simpson, PTA  06/06/2018

## 2018-06-06 NOTE — ASSESSMENT & PLAN NOTE
- Cardiology following  - Continue Xarelto for CVA prophylaxis  - Decreased Metoprolol to 50 gm PO BID due to hypotension on yesterday -- BP has improved  - Continue Amiodarone  - Telemetry monitoring

## 2018-06-06 NOTE — CONSULTS
Sent referral through [employer] to [facility] and [facility]. Phoned CASSANDRA Solis. She stated that she is familiar with the patient and has been in touch with her daughters. Will evaluate for placement.   Informed patient. Patient repeated that she is willing to go to [facility] or [facility]. She re-stated that her ultimate goal is to go home and will not return to [facility] or go to a nursing home.

## 2018-06-06 NOTE — SUBJECTIVE & OBJECTIVE
Review of Systems   Constitutional: Positive for activity change and fatigue. Negative for appetite change, chills, diaphoresis and fever.   HENT: Negative.  Negative for congestion, ear pain, mouth sores, sore throat and trouble swallowing.    Eyes: Negative for pain and visual disturbance.   Respiratory: Negative.  Negative for cough, chest tightness and shortness of breath.    Cardiovascular: Negative for chest pain, palpitations and leg swelling.   Gastrointestinal: Negative.  Negative for abdominal pain, constipation and nausea.   Endocrine: Negative.  Negative for cold intolerance, heat intolerance, polydipsia and polyuria.   Genitourinary: Negative.  Negative for dysuria, frequency and hematuria.   Musculoskeletal: Negative.  Negative for arthralgias, back pain, myalgias and neck pain.   Skin: Negative.  Negative for pallor, rash and wound.   Allergic/Immunologic: Negative.  Negative for environmental allergies and immunocompromised state.   Neurological: Positive for weakness. Negative for dizziness, seizures, syncope, numbness and headaches.   Hematological: Negative.  Negative for adenopathy. Does not bruise/bleed easily.   Psychiatric/Behavioral: Negative.  Negative for agitation, confusion and sleep disturbance.   All other systems reviewed and are negative.    Objective:     Vital Signs (Most Recent):  Temp: 98.2 °F (36.8 °C) (06/06/18 1240)  Pulse: 85 (06/06/18 1240)  Resp: 18 (06/06/18 0705)  BP: 100/60 (06/06/18 0705)  SpO2: 100 % (06/06/18 1240) Vital Signs (24h Range):  Temp:  [97.4 °F (36.3 °C)-98.2 °F (36.8 °C)] 98.2 °F (36.8 °C)  Pulse:  [] 85  Resp:  [18] 18  SpO2:  [96 %-100 %] 100 %  BP: ()/(51-92) 100/60     Weight: 95.4 kg (210 lb 5.1 oz)  Body mass index is 36.1 kg/m².    Intake/Output Summary (Last 24 hours) at 06/06/18 1447  Last data filed at 06/06/18 0849   Gross per 24 hour   Intake              360 ml   Output                0 ml   Net              360 ml      Physical  Exam   Constitutional: She is oriented to person, place, and time. She appears well-developed and well-nourished. No distress.   HENT:   Head: Normocephalic and atraumatic.   Eyes: EOM are normal.   Neck: Neck supple.   Cardiovascular: An irregularly irregular rhythm present.   A-fib, rate controlled    Pulmonary/Chest: Effort normal and breath sounds normal. No respiratory distress.   Abdominal: Soft. Bowel sounds are normal. She exhibits no distension. There is no tenderness. There is no rebound and no guarding.   Genitourinary:   Genitourinary Comments: Deferred   Musculoskeletal: She exhibits no edema, tenderness or deformity.   Right side weakness noted   Neurological: She is alert and oriented to person, place, and time. No sensory deficit.   Skin: Skin is warm and dry. Capillary refill takes less than 2 seconds.   Psychiatric: She has a normal mood and affect. Her behavior is normal. Judgment and thought content normal.   Nursing note and vitals reviewed.      Significant Labs:   BMP:   Recent Labs  Lab 06/06/18  0454   GLU 76      K 3.7      CO2 27   BUN 39*   CREATININE 0.9   CALCIUM 8.7   MG 1.8     CBC:   Recent Labs  Lab 06/06/18  0454   WBC 7.90   HGB 10.9*   HCT 35.6*        Magnesium:   Recent Labs  Lab 06/05/18  0519 06/06/18  0454   MG 1.6 1.8     POCT Glucose:   Recent Labs  Lab 06/05/18  2145 06/06/18  0525 06/06/18  1135   POCTGLUCOSE 90 76 99       Significant Imaging:   Imaging Results          X-Ray Chest AP Portable (Final result)  Result time 06/02/18 11:06:06    Final result by Philip Damian Jr., MD (06/02/18 11:06:06)                 Impression:      Cardiomegaly.      Electronically signed by: Philip Damian Jr., MD  Date:    06/02/2018  Time:    11:06             Narrative:    EXAMINATION:  XR CHEST AP PORTABLE    CLINICAL HISTORY:  weakness;    COMPARISON:  12/01/2017.    FINDINGS:  The lungs are clear. The cardiac silhouette remains enlarged.  No effusion or  pneumothorax.  Calcified aortic knob.  Degenerative changes both shoulders.  The remaining osseous structures and soft tissues are within normal limits.  Pacemaker remains in place.

## 2018-06-06 NOTE — PLAN OF CARE
Problem: Patient Care Overview  Goal: Plan of Care Review  Outcome: Ongoing (interventions implemented as appropriate)  No acute events overnight. Pt has specialty bed in use as well as wedge, turned q 2. Heels elevated. Dressing to right thigh CDI. Wound care performed as ordered. BP stable. Right sided weakness present, pt states no change. Cbg WNL A-fib on the monitor. Chart reviewed. Will monitor.

## 2018-06-06 NOTE — ASSESSMENT & PLAN NOTE
-Likely due to chronic AF. Rate currently controlled   -2D echo shows moderately depressed left ventricular systolic function (EF 35-40%) and low normal right ventricular systolic function with AR and MR  -continue  Toprol to 50mg BID for rate control  -BP unable to tolerate addition of ACEI  -Heart healthy diet  -Limit fluid intake 50-60 oz   -Will need to follow up with her primary Cardiologist after DC  -Would definitely benefit from Rehab

## 2018-06-06 NOTE — PT/OT/SLP PROGRESS
"Occupational Therapy  Treatment    Carlie Valdez   MRN: [id number]   Admitting Diagnosis: Atrial fibrillation with RVR    OT Date of Treatment: 06/06/18   OT Start Time: 1025  OT Stop Time: 1105  OT Total Time (min): 40 min    Billable Minutes:  Self Care/Home Management 10 minutes and Therapeutic Activity 30 minutes    General Precautions: Standard, fall  Orthopedic Precautions: N/A  Braces: N/A         Subjective:  Communicated with nurse and epic chart review prior to session.    Pain/Comfort  Pain Rating 1: 0/10    Objective:  Patient found with: peripheral IV     Functional Mobility:  Bed Mobility:     Max a  Transfers:      Max a x2  Functional Ambulation: na    Activities of Daily Living:     Feeding adaptive equipment: na     UE adaptive equipment: na     LE adaptive equipment: na          grooming/ hygiene max a seated in bed side chair via l ue           Bathing adaptive equipment: na    Balance:   Static Sit: POOR+: Needs MINIMAL assist to maintain unsupported and sba supported chair  Dynamic Sit: POOR: N/A  Static Stand: 0: Needs MAXIMAL assist to maintain   Dynamic stand: 0: N/A    Therapeutic Activities and Exercises:  Pt seen in bed supine with hob. Pt req max a with supine>sit and max a x 2 with  AM-PAC 6 CLICK ADL   How much help from another person does this patient currently need?   1 = Unable, Total/Dependent Assistance  2 = A lot, Maximum/Moderate Assistance  3 = A little, Minimum/Contact Guard/Supervision  4 = None, Modified [place]/Independent    Putting on and taking off regular lower body clothing? : 1  Bathing (including washing, rinsing, drying)?: 1  Toileting, which includes using toilet, bedpan, or urinal? : 1  Putting on and taking off regular upper body clothing?: 1  Taking care of personal grooming such as brushing teeth?: 2  Eating meals?: 2  Total Score: 8     AM-PAC Raw Score CMS "G-Code Modifier Level of Impairment Assistance   6 % Total / Unable   7 - 8 CM " 80 - 100% Maximal Assist   9-13 CL 60 - 80% Moderate Assist   14 - 19 CK 40 - 60% Moderate Assist   20 - 22 CJ 20 - 40% Minimal Assist   23 CI 1-20% SBA / CGA   24 CH 0% Independent/ Mod I       Patient left supine with all lines intact, call button in reach and nurse notified    ASSESSMENT:  Carlie Valdez is a 72 y.o. female with a medical diagnosis of Atrial fibrillation with RVR and presents with debility and generalized weakness. Pt will continue to benefit from skilled O.T..    Rehab identified problem list/impairments: Rehab identified problem list/impairments: weakness, impaired self care skills, impaired balance, impaired endurance, impaired functional mobilty, decreased safety awareness, pain, gait instability, decreased lower extremity function    Rehab potential is good.    Activity tolerance: Fair    Discharge recommendations: Discharge Facility/Level Of Care Needs: LTACH (long term acute care hospital), nursing facility, skilled     Barriers to discharge: Barriers to Discharge: None    Equipment recommendations: walker, rolling     GOALS:    Occupational Therapy Goals        Problem: Occupational Therapy Goal    Goal Priority Disciplines Outcome Interventions   Occupational Therapy Goal     OT, PT/OT     Description:  Goals to be met by: 6-12-18      Patient will increase functional independence with ADLs by performing:    UE Dressing with Moderate Assistance.  LE Dressing with Moderate Assistance.  Toilet transfer to bedside commode with Moderate Assistance.  Upper extremity exercise program x15 reps.                    Plan:  Patient to be seen 3 x/week to address the above listed problems via self-care/home management, therapeutic activities, therapeutic exercises  Plan of Care expires: 06/12/18  Plan of Care reviewed with: patient         Ramandeep Sousa, OT  06/06/2018

## 2018-06-06 NOTE — ASSESSMENT & PLAN NOTE
- Pt reports having right side weakness for the past 3 weeks  - CT head with no acute findings  - Unable to obtain MRI due to PPM  - Discussed case with Dr. Licona (neurology) -- did not recommend repeating CT due to timing of symptoms (3 weeks), would have shown on original CT. Recommended obtaining carotid US and getting lipid panel  - Carotid US showed no significant stenosis. Lipid panel pending  - Possible Rehab placement. Consult placed for social work

## 2018-06-06 NOTE — PROGRESS NOTES
[facility] Hospital Medicine  Progress Note    Patient Name: Carlie Valdez  MRN: [id number]  Patient Class: IP- Inpatient   Admission Date: 6/2/2018  Length of Stay: 4 days  Attending Physician: Denae Infante MD  Primary Care Provider: Johanna Guzman MD        Subjective:     Principal Problem:Atrial fibrillation with RVR    HPI:  Carlie Valdez is a 78 year old female with combined systolic and diastolic CHF, pacemaker placement, chronic atrial fibrillation on Xarelto, non arteritic ischemic optic neuropathy (NA-AION) who presented to the Emergency Department with complaints of weakness which onset gradually today. The patient was sent to a rehab facility in [place] after a recent hospital discharge following an episode of HTN emergency. The patient was reportedly hypotensive at her rehab facility. Blood pressure medications were held and the patient developed atrial fibrillation with RVR. The patient's main complaint is fatigue. She denies any chest pain, dyspnea, cough,  complaints and GI complaints. No further complaints or concerns at this time            Hospital Course:  The patient was placed in Observation with complaints of weakness as well as findings of hypotension and atrial fibrillation with RVR. The patient was initially started on a Cardizem drip which was weaned off due to the patient's significant history of CHF. Oral metoprolol was started, but the patient's RVR persisted. Cardiology was consulted and the patient was started on an Amiodarone drip and transitioned to an inpatient admission. She continues to have hypotension requiring fluid boluses. She reports weakness, but states that she does not want to return to the SNF facility that transferred her here. She states that she wants to go home with home health. Nursing reported systolic BP 78 this morning. Pt asymptomatic. Pt denies chest pain, SOB, dizziness, abdominal pain, N/V/D. Repeat manual BP  100/68 from left arm. AM medications including Amiodarone and Metoprolol held. Discussed with Cardiology -- will reduce Metoprolol to 50 mg PO BID and continue PO Amiodarone. Right sided weakness noted upon assessment. Pt reports she has been having right side weakness for the past 3 weeks. Pt denies hx of CVA. CT head obtained negative. Unable to obtain MRI due to PPM. May repeat CT in 24 hrs and consult Neurology if symptoms worsen. PT/OT following. PT recommended SNF placement. Discussed case with Dr. Licona (Neurology) -- did not recommend repeating CT due to timing of symptoms (3 weeks), would have shown on original CT. Recommended obtaining carotid US and getting lipid panel. Carotid US showed no significant stenosis. Lipid panel pending. Currently has no chest pain. SOB improved. A-fib with controlled rate. Metoprolol decreased on yesterday to 50 mg BID to avoid hypotension. Pt reports she is not going to SNF but is willing to go to Rehab facility -- she states either [facility] or [facility]. Consult placed to social work for rehab placement.       Review of Systems   Constitutional: Positive for activity change and fatigue. Negative for appetite change, chills, diaphoresis and fever.   HENT: Negative.  Negative for congestion, ear pain, mouth sores, sore throat and trouble swallowing.    Eyes: Negative for pain and visual disturbance.   Respiratory: Negative.  Negative for cough, chest tightness and shortness of breath.    Cardiovascular: Negative for chest pain, palpitations and leg swelling.   Gastrointestinal: Negative.  Negative for abdominal pain, constipation and nausea.   Endocrine: Negative.  Negative for cold intolerance, heat intolerance, polydipsia and polyuria.   Genitourinary: Negative.  Negative for dysuria, frequency and hematuria.   Musculoskeletal: Negative.  Negative for arthralgias, back pain, myalgias and neck pain.   Skin: Negative.  Negative for pallor, rash and wound.    Allergic/Immunologic: Negative.  Negative for environmental allergies and immunocompromised state.   Neurological: Positive for weakness. Negative for dizziness, seizures, syncope, numbness and headaches.   Hematological: Negative.  Negative for adenopathy. Does not bruise/bleed easily.   Psychiatric/Behavioral: Negative.  Negative for agitation, confusion and sleep disturbance.   All other systems reviewed and are negative.    Objective:     Vital Signs (Most Recent):  Temp: 98.2 °F (36.8 °C) (06/06/18 1240)  Pulse: 85 (06/06/18 1240)  Resp: 18 (06/06/18 0705)  BP: 100/60 (06/06/18 0705)  SpO2: 100 % (06/06/18 1240) Vital Signs (24h Range):  Temp:  [97.4 °F (36.3 °C)-98.2 °F (36.8 °C)] 98.2 °F (36.8 °C)  Pulse:  [] 85  Resp:  [18] 18  SpO2:  [96 %-100 %] 100 %  BP: ()/(51-92) 100/60     Weight: 95.4 kg (210 lb 5.1 oz)  Body mass index is 36.1 kg/m².    Intake/Output Summary (Last 24 hours) at 06/06/18 1447  Last data filed at 06/06/18 0849   Gross per 24 hour   Intake              360 ml   Output                0 ml   Net              360 ml      Physical Exam   Constitutional: She is oriented to person, place, and time. She appears well-developed and well-nourished. No distress.   HENT:   Head: Normocephalic and atraumatic.   Eyes: EOM are normal.   Neck: Neck supple.   Cardiovascular: An irregularly irregular rhythm present.   A-fib, rate controlled    Pulmonary/Chest: Effort normal and breath sounds normal. No respiratory distress.   Abdominal: Soft. Bowel sounds are normal. She exhibits no distension. There is no tenderness. There is no rebound and no guarding.   Genitourinary:   Genitourinary Comments: Deferred   Musculoskeletal: She exhibits no edema, tenderness or deformity.   Right side weakness noted   Neurological: She is alert and oriented to person, place, and time. No sensory deficit.   Skin: Skin is warm and dry. Capillary refill takes less than 2 seconds.   Psychiatric: She has a normal  mood and affect. Her behavior is normal. Judgment and thought content normal.   Nursing note and vitals reviewed.      Significant Labs:   BMP:   Recent Labs  Lab 06/06/18  0454   GLU 76      K 3.7      CO2 27   BUN 39*   CREATININE 0.9   CALCIUM 8.7   MG 1.8     CBC:   Recent Labs  Lab 06/06/18  0454   WBC 7.90   HGB 10.9*   HCT 35.6*        Magnesium:   Recent Labs  Lab 06/05/18  0519 06/06/18  0454   MG 1.6 1.8     POCT Glucose:   Recent Labs  Lab 06/05/18  2145 06/06/18  0525 06/06/18  1135   POCTGLUCOSE 90 76 99       Significant Imaging:   Imaging Results          X-Ray Chest AP Portable (Final result)  Result time 06/02/18 11:06:06    Final result by Philip Damian Jr., MD (06/02/18 11:06:06)                 Impression:      Cardiomegaly.      Electronically signed by: Philip Damian Jr., MD  Date:    06/02/2018  Time:    11:06             Narrative:    EXAMINATION:  XR CHEST AP PORTABLE    CLINICAL HISTORY:  weakness;    COMPARISON:  12/01/2017.    FINDINGS:  The lungs are clear. The cardiac silhouette remains enlarged.  No effusion or pneumothorax.  Calcified aortic knob.  Degenerative changes both shoulders.  The remaining osseous structures and soft tissues are within normal limits.  Pacemaker remains in place.                              Assessment/Plan:      * Atrial fibrillation with RVR    - Cardiology following  - Continue Xarelto for CVA prophylaxis  - Decreased Metoprolol to 50 gm PO BID due to hypotension on yesterday -- BP has improved  - Continue Amiodarone  - Telemetry monitoring        Right sided weakness    - Pt reports having right side weakness for the past 3 weeks  - CT head with no acute findings  - Unable to obtain MRI due to PPM  - Discussed case with Dr. Licona (neurology) -- did not recommend repeating CT due to timing of symptoms (3 weeks), would have shown on original CT. Recommended obtaining carotid US and getting lipid panel  - Carotid US showed no  significant stenosis. Lipid panel pending  - Possible Rehab placement. Consult placed for social work            Pressure ulcer of thigh, stage 2    - Wound care following          Left ischial pressure sore, stage III    - Wound care following          Deep tissue injury    - Wound care following          Hypothyroidism    - TSH 1.265  - Continue Synthroid         Type 2 diabetes mellitus with kidney complication, without long-term current use of insulin    - HgbA1c on 06/03/18 -- 5.3 -- controlled  - Accuchecks and low dose SSI          Chronic combined systolic and diastolic congestive heart failure    - Currently appears compensated  - Continue Metoprolol at lower dose of 50 mg PO BID  - Continue to hold Bumex due to hypotension   - Monitor strict intake and output as well as daily weights           Chronic kidney disease (CKD), stage III (moderate)    - Creatinine at baseline  - Will continue to monitor           Elevated troponin    - ACS ruled out as there was no upward trend  - Cardiology following               VTE Risk Mitigation         Ordered     rivaroxaban tablet 15 mg  With dinner      06/02/18 8004              CHEO Houston  Department of Hospital Medicine   [facility]

## 2018-06-06 NOTE — SUBJECTIVE & OBJECTIVE
Review of Systems   Constitution: Positive for weakness and malaise/fatigue. Negative for diaphoresis, weight gain and weight loss.   HENT: Negative for congestion and nosebleeds.    Cardiovascular: Positive for irregular heartbeat. Negative for chest pain, claudication, cyanosis, dyspnea on exertion, leg swelling, near-syncope, orthopnea, palpitations, paroxysmal nocturnal dyspnea and syncope.   Respiratory: Negative for cough, hemoptysis, shortness of breath, sleep disturbances due to breathing, snoring, sputum production and wheezing.    Hematologic/Lymphatic: Negative for bleeding problem. Does not bruise/bleed easily.   Skin: Negative for rash.   Musculoskeletal: Positive for joint pain. Negative for arthritis, back pain, falls, muscle cramps and muscle weakness.   Gastrointestinal: Negative for abdominal pain, constipation, diarrhea, heartburn, hematemesis, hematochezia, melena and nausea.   Genitourinary: Negative for dysuria, hematuria and nocturia.   Neurological: Negative for excessive daytime sleepiness, dizziness, headaches, light-headedness, loss of balance, numbness and vertigo.     Objective:     Vital Signs (Most Recent):  Temp: 98.2 °F (36.8 °C) (06/06/18 1240)  Pulse: 85 (06/06/18 1240)  Resp: 18 (06/06/18 0705)  BP: 100/60 (06/06/18 0705)  SpO2: 100 % (06/06/18 1240) Vital Signs (24h Range):  Temp:  [97.4 °F (36.3 °C)-98.2 °F (36.8 °C)] 98.2 °F (36.8 °C)  Pulse:  [] 85  Resp:  [18] 18  SpO2:  [96 %-100 %] 100 %  BP: ()/(51-92) 100/60     Weight: 95.4 kg (210 lb 5.1 oz)  Body mass index is 36.1 kg/m².     SpO2: 100 %  O2 Device (Oxygen Therapy): room air      Intake/Output Summary (Last 24 hours) at 06/06/18 1348  Last data filed at 06/06/18 0849   Gross per 24 hour   Intake              360 ml   Output                0 ml   Net              360 ml       Lines/Drains/Airways     Pressure Ulcer                 Pressure Injury 06/02/18 Left distal Ischial tuberosity Stage 3 4 days          Pressure Injury 06/02/18 Right lateral Heel Deep tissue injury 4 days         Pressure Injury 06/02/18 1659 Left proximal Ischial tuberosity Stage 3 3 days         Pressure Injury 06/02/18 1659 Right anterior Thigh Stage 2 3 days         Pressure Injury 06/02/18 1902 Right medial Heel Deep tissue injury 3 days          Peripheral Intravenous Line                 Peripheral IV - Single Lumen 06/02/18 1032 Left Forearm 4 days         Peripheral IV - Single Lumen 06/03/18 1330 Left Forearm 3 days                Physical Exam   Constitutional: She is oriented to person, place, and time. Vital signs are normal. She appears well-developed and well-nourished. She is active and cooperative. She does not have a sickly appearance. She does not appear ill. No distress.   HENT:   Head: Normocephalic.   Neck: Neck supple. Normal carotid pulses, no hepatojugular reflux and no JVD present. Carotid bruit is not present. No thyromegaly present.   Cardiovascular: Normal rate, S1 normal, S2 normal, normal heart sounds and normal pulses.  An irregularly irregular rhythm present. PMI is not displaced.  Exam reveals no gallop and no friction rub.    No murmur heard.  Pulses:       Radial pulses are 2+ on the right side, and 2+ on the left side.   Pulmonary/Chest: Effort normal and breath sounds normal. She has no wheezes. She has no rales.   Abdominal: Soft. Normal appearance, normal aorta and bowel sounds are normal. She exhibits no pulsatile liver, no abdominal bruit, no ascites and no mass. There is no splenomegaly or hepatomegaly. There is no tenderness.   Musculoskeletal: She exhibits edema.   Lymphadenopathy:     She has no cervical adenopathy.   Neurological: She is alert and oriented to person, place, and time.   Skin: Skin is warm. She is not diaphoretic.   Psychiatric: She has a normal mood and affect. Her behavior is normal.   Nursing note and vitals reviewed.      Significant Labs:   All pertinent lab results from the last  24 hours have been reviewed. and   Recent Lab Results       06/06/18  1135 06/06/18  0525 06/06/18  0454 06/05/18  2145 06/05/18  1743      Anion Gap   11       Baso #   0.02       Basophil%   0.3       BUN, Bld   39(H)       Calcium   8.7       Chloride   106       CO2   27       Creatinine   0.9       Differential Method   Automated       eGFR if    >60       eGFR if non    >60  Comment:  Calculation used to obtain the estimated glomerular filtration  rate (eGFR) is the CKD-EPI equation.          Eos #   0.5       Eosinophil%   6.5       Glucose   76       Gran # (ANC)   5.5       Gran%   68.9       Hematocrit   35.6(L)       Hemoglobin   10.9(L)       Lymph #   1.5       Lymph%   18.6       Magnesium   1.8       MCH   25.4(L)       MCHC   30.6(L)       MCV   83       Mono #   0.5       Mono%   5.7       MPV   9.6       Phosphorus   2.7       Platelets   338       POCT Glucose 99 76  90 93     Potassium   3.7       RBC   4.29       RDW   17.6(H)       Sodium   144       WBC   7.90                       Significant Imaging: Echocardiogram:   2D echo with color flow doppler:   Results for orders placed or performed during the hospital encounter of 06/02/18   2D echo with color flow doppler   Result Value Ref Range    EF 35 (A) 55 - 65    Mitral Valve Regurgitation MILD TO MODERATE     Aortic Valve Regurgitation MILD     Est. PA Systolic Pressure 54.19 (A)     Mitral Valve Mobility NORMAL     Tricuspid Valve Regurgitation MODERATE TO SEVERE (A)

## 2018-06-06 NOTE — PROGRESS NOTES
06/06/18 0930   Handoff Report   Given To CARON Giles   Pain/Comfort Assessments   Pain Assessment Performed Yes       Number Scale   Presence of Pain denies   Skin   Skin WDL ex   Skin Temperature warm   Skin Moisture Dry   Specialty Bed/Overlay Low air loss  (SW Immerse)   Bed Support Surface Assessed   Beka Risk Assessment   Sensory Perception 3-->slightly limited   Moisture 3-->occasionally moist   Activity 2-->chairfast   Mobility 2-->very limited   Nutrition 3-->adequate   Friction and Shear 1-->problem   Beka Score 14       Pressure Injury 06/02/18 1659 Left proximal Ischial tuberosity Stage 3   Date First Assessed/Time First Assessed: 06/02/18 1659   Pressure Injury Present on Admission: yes  Side: Left  Orientation: proximal  Location: Ischial tuberosity  Is this injury device related?: (c) Yes  Staging: Stage 3   Staging 3   Dressing Appearance Open to air   Drainage Amount None   Appearance Pink;Yellow;Moist   Tissue loss description Full thickness   Red (%), Wound Tissue Color 60 %   Yellow (%), Wound Tissue Color 40 %   Periwound Area Dry;Intact   Wound Edges Open   Care Cleansed with:;Soap and water;Applied:;Skin Barrier   Dressing Other (see comments)  (TRIAD)       Pressure Injury 06/02/18 1659 Right anterior Thigh Stage 2   Date First Assessed/Time First Assessed: 06/02/18 1659   Pressure Injury Present on Admission: yes  Side: Right  Orientation: anterior  Location: Thigh  Staging: Stage 2   Staging 2   Dressing Appearance Dry;Intact   Drainage Amount None   Appearance Tan;Dry   Tissue loss description Partial thickness   Periwound Area Dry;Intact   Dressing Foam   Dressing Change Due 06/08/18       Pressure Injury 06/02/18 1902 Right medial Heel Deep tissue injury   Date First Assessed/Time First Assessed: 06/02/18 1902   Pressure Injury Present on Admission: yes  Side: Right  Orientation: medial  Location: Heel  Staging: Deep tissue injury   Staging D   Dressing Appearance Open to air    Drainage Amount None   Appearance Maroon;Black   Care Applied:;Skin Barrier       Pressure Injury 06/02/18 Right lateral Heel Deep tissue injury   Date First Assessed: 06/02/18   Pressure Injury Present on Admission: yes  Side: Right  Orientation: lateral  Location: Heel  Staging: Deep tissue injury   Staging D   Dressing Appearance Open to air   Drainage Amount None   Appearance Maroon;Purple   Periwound Area Dry;Intact   Care Applied:;Skin Barrier       Pressure Injury 06/02/18 Left distal Ischial tuberosity Stage 3   Date First Assessed: 06/02/18   Pressure Injury Present on Admission: yes  Side: Left  Orientation: distal  Location: Ischial tuberosity  Is this injury device related?: (c) Yes  Staging: Stage 3   Staging 3   Dressing Appearance Open to air   Drainage Amount None   Appearance Pink;Yellow;Moist   Tissue loss description Full thickness   Red (%), Wound Tissue Color 60 %   Yellow (%), Wound Tissue Color 40 %   Periwound Area Dry;Intact   Wound Edges Open   Care Cleansed with:;Soap and water;Applied:;Skin Barrier   Dressing Other (see comments)  (TRIAD)   Skin Interventions   Pressure Reduction Devices Specialty bed utilized;Foam padding utilized   Pressure Reduction Techniques frequent weight shift encouraged;heels elevated off bed;positioned off wounds;pressure points protected;rest period provided between sit times   Skin Protection Adhesive use limited;Incontinence pads;Skin sealant/moisture barrier;Tubing/devices free from skin contact     Follow up visit with Ms. Valdez for multiple present on admission pressure injuries.  Right heel medial and lateral DTIs again noted. Floated off mattress with pillow currently.  Patient is on SW Pulsate ANGIE bed.  Turned to left side and left ischial stage 3 pressure injuries x2 assessed. Sites and clean with decreased slough from initial assessement.  Patient was just bathed per CNA. After drying, Nickel thick layer of TRIAD hydrophillic wound dressing paste  applied to affected areas.  Will continue to follow.

## 2018-06-07 NOTE — PT/OT/SLP PROGRESS
Physical Therapy  Treatment    Carlie Valdez   MRN: [id number]   Admitting Diagnosis: Atrial fibrillation with RVR    PT Received On: 06/07/18  PT Start Time: 1101     PT Stop Time: 1124    PT Total Time (min): 23 min       Billable Minutes:  Therapeutic Activity 23 mins    Treatment Type: Treatment  PT/PTA: PT     PTA Visit Number: 3       General Precautions: Standard, fall  Orthopedic Precautions: N/A   Braces: N/A    Do you have any cultural, spiritual, Shinto conflicts, given your current situation?: none    Subjective:  Communicated with EPIC and nurse (Stephanie) prior to session.  Patient agreeable to getting up in bedside chair.    Pain/Comfort  Pain Rating 1: 0/10    Objective:   Patient found with: peripheral IV    Functional Mobility:  Bed Mobility:    Max assist for rolling L/R; dependent for seated scooting to edge of bed.    Transfers:   Dep x 2 transfer bed <-> cardiac chair (with manual use of alon sling).    Gait:    Unable     Balance:   Static Sit: POOR: Needs MODERATE assist to maintain  Dynamic Sit: POOR: N/A  Static Stand: n/a  Dynamic stand: 0: N/A     Therapeutic Activities and Exercises:  Patient participated in bed mobility and transfer training to best of her ability.  Got patient up in cardiac chair; however, she had a bowel movement and had to be returned to the bed to be cleaned.      AM-PAC 6 CLICK MOBILITY  How much help from another person does this patient currently need?   1 = Unable, Total/Dependent Assistance  2 = A lot, Maximum/Moderate Assistance  3 = A little, Minimum/Contact Guard/Supervision  4 = None, Modified [place]/Independent    Turning over in bed (including adjusting bedclothes, sheets and blankets)?: 2  Sitting down on and standing up from a chair with arms (e.g., wheelchair, bedside commode, etc.): 1  Moving from lying on back to sitting on the side of the bed?: 2  Moving to and from a bed to a chair (including a wheelchair)?: 2  Need to walk in  hospital room?: 1  Climbing 3-5 steps with a railing?: 1  Total Score: 9    AM-PAC Raw Score CMS G-Code Modifier Level of Impairment Assistance   6 % Total / Unable   7 - 9 CM 80 - 100% Maximal Assist   10 - 14 CL 60 - 80% Moderate Assist   15 - 19 CK 40 - 60% Moderate Assist   20 - 22 CJ 20 - 40% Minimal Assist   23 CI 1-20% SBA / CGA   24 CH 0% Independent/ Mod I     Patient left supine with all lines intact, call button in reach and nurse's aide present.    Assessment:  Unable to stay up in cardiac chair today due to bowel movement and needing to be cleaned.  Good participation in bed mobility.    Rehab identified problem list/impairments: Rehab identified problem list/impairments: weakness, impaired endurance, impaired self care skills, impaired functional mobilty, impaired balance, decreased lower extremity function, decreased safety awareness, decreased coordination    Rehab potential is fair.    Activity tolerance: Fair    Discharge recommendations: Discharge Facility/Level Of Care Needs: LTACH (long term acute care hospital), nursing facility, skilled     Barriers to discharge:      Equipment recommendations:       GOALS:    Physical Therapy Goals        Problem: Physical Therapy Goal    Goal Priority Disciplines Outcome Goal Variances Interventions   Physical Therapy Goal     PT/OT, PT Ongoing (interventions implemented as appropriate)     Description:  LTG'S FOR PT BY 6/10/18  1. PATIENT WILL PERFORM SUP TO/FROM SIT MAX X 1  2. PATIENT WILL PERFORM SQUAT PIVOT TRANSFER TO CHAIR MAX X 1  3. PATIENT WILL PERFORM B LE AAROM/ISOMETRICS MIN A X 20 REPS                    PLAN:    Patient to be seen 5 x/week  to address the above listed problems via gait training, therapeutic activities, therapeutic exercises, neuromuscular re-education  Plan of Care expires: 06/20/18  Plan of Care reviewed with: patient         Rosalia Flores, PT, DPT  06/07/2018

## 2018-06-07 NOTE — ASSESSMENT & PLAN NOTE
- Pt reports having right side weakness for the past 3 weeks  - CT head with no acute findings  - Unable to obtain MRI due to PPM  - Discussed case with Dr. Licona (neurology) -- did not recommend repeating CT due to timing of symptoms (3 weeks), would have shown on original CT. Recommended obtaining carotid US and getting lipid panel  - Carotid US showed no significant stenosis. Lipid panel reviewed  - Has been accepted back to [facility] upon discharge

## 2018-06-07 NOTE — PROGRESS NOTES
[facility] Hospital Medicine  Progress Note    Patient Name: Carlie Valdez  MRN: [id number]  Patient Class: IP- Inpatient   Admission Date: 6/2/2018  Length of Stay: 5 days  Attending Physician: Denae Infante MD  Primary Care Provider: Johanna Guzman MD        Subjective:     Principal Problem:Atrial fibrillation with RVR    HPI:  Carlie Valdez is a 78 year old female with combined systolic and diastolic CHF, pacemaker placement, chronic atrial fibrillation on Xarelto, non arteritic ischemic optic neuropathy (NA-AION) who presented to the Emergency Department with complaints of weakness which onset gradually today. The patient was sent to a rehab facility in [place] after a recent hospital discharge following an episode of HTN emergency. The patient was reportedly hypotensive at her rehab facility. Blood pressure medications were held and the patient developed atrial fibrillation with RVR. The patient's main complaint is fatigue. She denies any chest pain, dyspnea, cough,  complaints and GI complaints. No further complaints or concerns at this time            Hospital Course:  The patient was placed in Observation with complaints of weakness as well as findings of hypotension and atrial fibrillation with RVR. The patient was initially started on a Cardizem drip which was weaned off due to the patient's significant history of CHF. Oral metoprolol was started, but the patient's RVR persisted. Cardiology was consulted and the patient was started on an Amiodarone drip and transitioned to an inpatient admission. She continues to have hypotension requiring fluid boluses. She reports weakness, but states that she does not want to return to the SNF facility that transferred her here. She states that she wants to go home with home health. Nursing reported systolic BP 78 this morning. Pt asymptomatic. Pt denies chest pain, SOB, dizziness, abdominal pain, N/V/D. Repeat manual BP  100/68 from left arm. AM medications including Amiodarone and Metoprolol held. Discussed with Cardiology -- will reduce Metoprolol to 50 mg PO BID and continue PO Amiodarone. Right sided weakness noted upon assessment. Pt reports she has been having right side weakness for the past 3 weeks. Pt denies hx of CVA. CT head obtained negative. Unable to obtain MRI due to PPM. May repeat CT in 24 hrs and consult Neurology if symptoms worsen. PT/OT following. PT recommended SNF placement. Discussed case with Dr. Licona (Neurology) -- did not recommend repeating CT due to timing of symptoms (3 weeks), would have shown on original CT. Recommended obtaining carotid US and getting lipid panel. Carotid US showed no significant stenosis. Lipid panel pending. Currently has no chest pain. SOB improved. A-fib with controlled rate. Metoprolol decreased on 06/06/18 to 50 mg BID to avoid hypotension. Pt reports she is not going to SNF but is willing to go to Rehab facility -- she states either [facility] or [facility]. Consult placed to social work for rehab placement. Pt was declined at [facility] and [facility] for rehab placement. Pt has been accepted back to [facility]. Hemdynmically stable. Remains in A-fib but rate controlled. Likely discharge in AM.     Interval History: Pt seen and examined. Pt currently has no complaints at this time. Pt informed that [facility] and [facility] declined rehab placement. Pt states she is willing to go back to [facility]. [facility] has accepted back, will likely discharge in AM.     Review of Systems   Constitutional: Positive for activity change and fatigue. Negative for appetite change, chills, diaphoresis and fever.   HENT: Negative.  Negative for congestion, ear pain, mouth sores, sore throat and trouble swallowing.    Eyes: Negative for pain and visual disturbance.   Respiratory: Negative.  Negative for cough, chest tightness and shortness of breath.     Cardiovascular: Negative for chest pain, palpitations and leg swelling.   Gastrointestinal: Negative.  Negative for abdominal pain, constipation and nausea.   Endocrine: Negative.  Negative for cold intolerance, heat intolerance, polydipsia and polyuria.   Genitourinary: Negative.  Negative for dysuria, frequency and hematuria.   Musculoskeletal: Negative.  Negative for arthralgias, back pain, myalgias and neck pain.   Skin: Negative.  Negative for pallor, rash and wound.   Allergic/Immunologic: Negative.  Negative for environmental allergies and immunocompromised state.   Neurological: Positive for weakness. Negative for dizziness, seizures, syncope, numbness and headaches.   Hematological: Negative.  Negative for adenopathy. Does not bruise/bleed easily.   Psychiatric/Behavioral: Negative.  Negative for agitation, confusion and sleep disturbance.   All other systems reviewed and are negative.    Objective:     Vital Signs (Most Recent):  Temp: 97.5 °F (36.4 °C) (06/07/18 1623)  Pulse: 90 (06/07/18 1623)  Resp: 17 (06/07/18 1623)  BP: (!) 123/96 (06/07/18 1623)  SpO2: 97 % (06/07/18 1623) Vital Signs (24h Range):  Temp:  [96.1 °F (35.6 °C)-98.4 °F (36.9 °C)] 97.5 °F (36.4 °C)  Pulse:  [] 90  Resp:  [16-18] 17  SpO2:  [93 %-98 %] 97 %  BP: ()/(62-96) 123/96     Weight: 95.4 kg (210 lb 5.1 oz)  Body mass index is 36.1 kg/m².    Intake/Output Summary (Last 24 hours) at 06/07/18 1649  Last data filed at 06/07/18 0600   Gross per 24 hour   Intake              120 ml   Output                0 ml   Net              120 ml      Physical Exam   Constitutional: She is oriented to person, place, and time. She appears well-developed and well-nourished. No distress.   HENT:   Head: Normocephalic and atraumatic.   Eyes: EOM are normal.   Neck: Neck supple.   Cardiovascular: An irregularly irregular rhythm present.   A-fib, rate controlled    Pulmonary/Chest: Effort normal and breath sounds normal. No respiratory  distress.   Abdominal: Soft. Bowel sounds are normal. She exhibits no distension. There is no tenderness. There is no rebound and no guarding.   Genitourinary:   Genitourinary Comments: Deferred   Musculoskeletal: She exhibits no edema, tenderness or deformity.   Right side weakness noted   Neurological: She is alert and oriented to person, place, and time. No sensory deficit.   Skin: Skin is warm and dry. Capillary refill takes less than 2 seconds.   Psychiatric: She has a normal mood and affect. Her behavior is normal. Judgment and thought content normal.   Nursing note and vitals reviewed.      Significant Labs:   BMP:   Recent Labs  Lab 06/07/18  0438   GLU 84      K 3.6      CO2 25   BUN 36*   CREATININE 0.9   CALCIUM 8.7   MG 1.7     CBC:   Recent Labs  Lab 06/06/18  0454 06/07/18  0438   WBC 7.90 7.78   HGB 10.9* 10.1*   HCT 35.6* 32.9*    319     Magnesium:   Recent Labs  Lab 06/06/18  0454 06/07/18  0438   MG 1.8 1.7     POCT Glucose:   Recent Labs  Lab 06/07/18  0612 06/07/18  1143 06/07/18  1416   POCTGLUCOSE 80 68* 89       Significant Imaging:   Imaging Results          X-Ray Chest AP Portable (Final result)  Result time 06/02/18 11:06:06    Final result by Philip Damian Jr., MD (06/02/18 11:06:06)                 Impression:      Cardiomegaly.      Electronically signed by: Philip Damian Jr., MD  Date:    06/02/2018  Time:    11:06             Narrative:    EXAMINATION:  XR CHEST AP PORTABLE    CLINICAL HISTORY:  weakness;    COMPARISON:  12/01/2017.    FINDINGS:  The lungs are clear. The cardiac silhouette remains enlarged.  No effusion or pneumothorax.  Calcified aortic knob.  Degenerative changes both shoulders.  The remaining osseous structures and soft tissues are within normal limits.  Pacemaker remains in place.                              Assessment/Plan:      * Atrial fibrillation with RVR    - Cardiology following  - Continue Xarelto for CVA prophylaxis  - Decreased  Metoprolol to 50 gm PO BID due to hypotension -- BP has improved  - Continue Amiodarone  - Telemetry monitoring        Right sided weakness    - Pt reports having right side weakness for the past 3 weeks  - CT head with no acute findings  - Unable to obtain MRI due to PPM  - Discussed case with Dr. Licona (neurology) -- did not recommend repeating CT due to timing of symptoms (3 weeks), would have shown on original CT. Recommended obtaining carotid US and getting lipid panel  - Carotid US showed no significant stenosis. Lipid panel reviewed  - Has been accepted back to [facility] upon discharge            Pressure ulcer of thigh, stage 2    - Wound care following          Left ischial pressure sore, stage III    - Wound care following          Deep tissue injury    - Wound care following          Hypothyroidism    - TSH 1.265  - Continue Synthroid         Type 2 diabetes mellitus with kidney complication, without long-term current use of insulin    - HgbA1c on 06/03/18 -- 5.3 -- controlled  - Accuchecks and low dose SSI          Chronic combined systolic and diastolic congestive heart failure    - Currently appears compensated  - Continue Metoprolol at lower dose of 50 mg PO BID  - Continue to hold Bumex due to hypotension   - Monitor strict intake and output as well as daily weights           Chronic kidney disease (CKD), stage III (moderate)    - Creatinine at baseline  - Will continue to monitor           Elevated troponin    - ACS ruled out as there was no upward trend  - Cardiology following               VTE Risk Mitigation         Ordered     rivaroxaban tablet 15 mg  With dinner      06/02/18 1529              CHEO Houston  Department of Hospital Medicine   [facility]

## 2018-06-07 NOTE — SUBJECTIVE & OBJECTIVE
Interval History: Pt seen and examined. Pt currently has no complaints at this time. Pt informed that [facility] and [facility] declined rehab placement. Pt states she is willing to go back to [facility]. [facility] has accepted back, will likely discharge in AM.     Review of Systems   Constitutional: Positive for activity change and fatigue. Negative for appetite change, chills, diaphoresis and fever.   HENT: Negative.  Negative for congestion, ear pain, mouth sores, sore throat and trouble swallowing.    Eyes: Negative for pain and visual disturbance.   Respiratory: Negative.  Negative for cough, chest tightness and shortness of breath.    Cardiovascular: Negative for chest pain, palpitations and leg swelling.   Gastrointestinal: Negative.  Negative for abdominal pain, constipation and nausea.   Endocrine: Negative.  Negative for cold intolerance, heat intolerance, polydipsia and polyuria.   Genitourinary: Negative.  Negative for dysuria, frequency and hematuria.   Musculoskeletal: Negative.  Negative for arthralgias, back pain, myalgias and neck pain.   Skin: Negative.  Negative for pallor, rash and wound.   Allergic/Immunologic: Negative.  Negative for environmental allergies and immunocompromised state.   Neurological: Positive for weakness. Negative for dizziness, seizures, syncope, numbness and headaches.   Hematological: Negative.  Negative for adenopathy. Does not bruise/bleed easily.   Psychiatric/Behavioral: Negative.  Negative for agitation, confusion and sleep disturbance.   All other systems reviewed and are negative.    Objective:     Vital Signs (Most Recent):  Temp: 97.5 °F (36.4 °C) (06/07/18 1623)  Pulse: 90 (06/07/18 1623)  Resp: 17 (06/07/18 1623)  BP: (!) 123/96 (06/07/18 1623)  SpO2: 97 % (06/07/18 1623) Vital Signs (24h Range):  Temp:  [96.1 °F (35.6 °C)-98.4 °F (36.9 °C)] 97.5 °F (36.4 °C)  Pulse:  [] 90  Resp:  [16-18] 17  SpO2:  [93 %-98 %] 97 %  BP: ()/(62-96)  123/96     Weight: 95.4 kg (210 lb 5.1 oz)  Body mass index is 36.1 kg/m².    Intake/Output Summary (Last 24 hours) at 06/07/18 1649  Last data filed at 06/07/18 0600   Gross per 24 hour   Intake              120 ml   Output                0 ml   Net              120 ml      Physical Exam   Constitutional: She is oriented to person, place, and time. She appears well-developed and well-nourished. No distress.   HENT:   Head: Normocephalic and atraumatic.   Eyes: EOM are normal.   Neck: Neck supple.   Cardiovascular: An irregularly irregular rhythm present.   A-fib, rate controlled    Pulmonary/Chest: Effort normal and breath sounds normal. No respiratory distress.   Abdominal: Soft. Bowel sounds are normal. She exhibits no distension. There is no tenderness. There is no rebound and no guarding.   Genitourinary:   Genitourinary Comments: Deferred   Musculoskeletal: She exhibits no edema, tenderness or deformity.   Right side weakness noted   Neurological: She is alert and oriented to person, place, and time. No sensory deficit.   Skin: Skin is warm and dry. Capillary refill takes less than 2 seconds.   Psychiatric: She has a normal mood and affect. Her behavior is normal. Judgment and thought content normal.   Nursing note and vitals reviewed.      Significant Labs:   BMP:   Recent Labs  Lab 06/07/18  0438   GLU 84      K 3.6      CO2 25   BUN 36*   CREATININE 0.9   CALCIUM 8.7   MG 1.7     CBC:   Recent Labs  Lab 06/06/18  0454 06/07/18  0438   WBC 7.90 7.78   HGB 10.9* 10.1*   HCT 35.6* 32.9*    319     Magnesium:   Recent Labs  Lab 06/06/18  0454 06/07/18  0438   MG 1.8 1.7     POCT Glucose:   Recent Labs  Lab 06/07/18  0612 06/07/18  1143 06/07/18  1416   POCTGLUCOSE 80 68* 89       Significant Imaging:   Imaging Results          X-Ray Chest AP Portable (Final result)  Result time 06/02/18 11:06:06    Final result by Philip Damian Jr., MD (06/02/18 11:06:06)                 Impression:       Cardiomegaly.      Electronically signed by: Philip Damian Jr., MD  Date:    06/02/2018  Time:    11:06             Narrative:    EXAMINATION:  XR CHEST AP PORTABLE    CLINICAL HISTORY:  weakness;    COMPARISON:  12/01/2017.    FINDINGS:  The lungs are clear. The cardiac silhouette remains enlarged.  No effusion or pneumothorax.  Calcified aortic knob.  Degenerative changes both shoulders.  The remaining osseous structures and soft tissues are within normal limits.  Pacemaker remains in place.

## 2018-06-07 NOTE — PT/OT/SLP PROGRESS
"Occupational Therapy  Treatment    Carlie Valdez   MRN: [id number]   Admitting Diagnosis: Atrial fibrillation with RVR    OT Date of Treatment: 06/07/18   OT Start Time: 1045  OT Stop Time: 1110  OT Total Time (min): 25 min    Billable Minutes:  Therapeutic Activity 25 minutes    General Precautions: Standard, fall  Orthopedic Precautions: N/A  Braces: N/A         Subjective:  Communicated with javon Álvarez and epic chart review prior to session.    Pain/Comfort  Pain Rating 1: 0/10    Objective:  Patient found with: telemetry     Functional Mobility:  Bed Mobility:       Transfers:        Functional Ambulation: na.     Activities of Daily Living:     Feeding adaptive equipment: na     UE adaptive equipment: mod a     LE adaptive equipment: max a                     Bathing adaptive equipment: na    Balance:   Static Sit: POOR+: Needs MINIMAL assist to maintain  Dynamic Sit: POOR: N/A  Static Stand: 0: Needs MAXIMAL assist to maintain   Dynamic stand: 0: N/A    Therapeutic Activities and Exercises:  Pt req max a x 2 with supine<>sit and total a with bed<>bed side chair. Pt req max a with hand hygiene. Pt unable too tolerate sitting bed side chair due to toilet    AM-PAC 6 CLICK ADL   How much help from another person does this patient currently need?   1 = Unable, Total/Dependent Assistance  2 = A lot, Maximum/Moderate Assistance  3 = A little, Minimum/Contact Guard/Supervision  4 = None, Modified Barry/Independent    Putting on and taking off regular lower body clothing? : 1  Bathing (including washing, rinsing, drying)?: 1  Toileting, which includes using toilet, bedpan, or urinal? : 1  Putting on and taking off regular upper body clothing?: 1  Taking care of personal grooming such as brushing teeth?: 2  Eating meals?: 2  Total Score: 8     AM-PAC Raw Score CMS "G-Code Modifier Level of Impairment Assistance   6 % Total / Unable   7 - 8 CM 80 - 100% Maximal Assist   9-13 CL 60 - 80% " Moderate Assist   14 - 19 CK 40 - 60% Moderate Assist   20 - 22 CJ 20 - 40% Minimal Assist   23 CI 1-20% SBA / CGA   24 CH 0% Independent/ Mod I       Patient left HOB elevated with all lines intact, call button in reach, nurse/ cna Preeti notified and CNA Preeti present    ASSESSMENT:  Carlie Valdez is a 72 y.o. female with a medical diagnosis of Atrial fibrillation with RVR and presents with debility and generalized weakness.    Rehab identified problem list/impairments: Rehab identified problem list/impairments: weakness, impaired self care skills, impaired balance, decreased safety awareness, decreased ROM, impaired endurance, impaired functional mobilty, decreased upper extremity function, gait instability, decreased lower extremity function    Rehab potential is good.    Activity tolerance: Fair    Discharge recommendations: Discharge Facility/Level Of Care Needs: nursing facility, skilled, LTACH (long term acute care hospital)     Barriers to discharge: Barriers to Discharge: None    Equipment recommendations: walker, rolling     GOALS:    Occupational Therapy Goals        Problem: Occupational Therapy Goal    Goal Priority Disciplines Outcome Interventions   Occupational Therapy Goal     OT, PT/OT Ongoing (interventions implemented as appropriate)    Description:  Goals to be met by: 6-12-18      Patient will increase functional independence with ADLs by performing:    UE Dressing with Moderate Assistance.  LE Dressing with Moderate Assistance.  Toilet transfer to bedside commode with Moderate Assistance.  Upper extremity exercise program x15 reps.                    Plan:  Patient to be seen 3 x/week to address the above listed problems via self-care/home management, therapeutic activities, therapeutic exercises  Plan of Care expires: 06/12/18  Plan of Care reviewed with: patient         Ramandeep Sousa, OT  06/07/2018

## 2018-06-07 NOTE — PLAN OF CARE
Problem: Patient Care Overview  Goal: Plan of Care Review  Outcome: Ongoing (interventions implemented as appropriate)   Afib on monitor with heart rate between .  Reports having periodic spasms in legs, arms, and stomach. Repositioned using wedge every 2 hours; heels elevated off mattress using pillows.  Remains free of injury. Fall precautions maintained with bed low and wheels locked. Chart review for 24 hours completed. Will continue to monitor till discharge.

## 2018-06-07 NOTE — PROGRESS NOTES
Patient declined admission at [facility], unable to participate in the rehab program.   CASSANDRA Solis, met with patient and staffed patient with her MDs. Patient known to them. Recommended further neurological workup prior to admission. CASSANDRA Solis, spoke with RIGOBERTO Alexis. Patient declined admission without further neurological workup.   RIGOBERTO Alexis phoned Haley, daughter, [phone]. Discussed need for SNF. Concerns about safety of patient at home.   CARLOS spoke with Haley, daughter. She reviewed email sent earlier. She stated that she had contacted sitter agencies and they stated that they do not assist with VA application. The VA aid and attendance would pay $153 per month. Informed her of the scope of home health services and the /sitter services would be self pay. She stated that she would talk to her regarding recommendations.   1615  Per RIGOBERTO Alexis, patient has agreed to return to [facility]. Phoned Franchesca Del Valle, [phone]. Informed her that patient's anticipated discharge is tomorrow. She stated that they will be able to accept her back to their facility. She will call in the morning to confirm time.

## 2018-06-07 NOTE — ASSESSMENT & PLAN NOTE
- Cardiology following  - Continue Xarelto for CVA prophylaxis  - Decreased Metoprolol to 50 gm PO BID due to hypotension -- BP has improved  - Continue Amiodarone  - Telemetry monitoring

## 2018-06-08 NOTE — PLAN OF CARE
Problem: Patient Care Overview  Goal: Plan of Care Review  Outcome: Ongoing (interventions implemented as appropriate)  Recommendations     Recommendation/Intervention: 1. Continue current diet. 2. Add beneprotein BID & boost plus 1 can daily for wound healing. 3.Will continue to encourage PO intake  Goals: Meet > 85 % EEN/EPN while admitted  Nutrition Goal Status: Progressing towards goal   Communication of RD Recs:  Reviewed with RN

## 2018-06-08 NOTE — PT/OT/SLP PROGRESS
Physical Therapy  Treatment    Carlie Valdez   MRN: [id number]   Admitting Diagnosis: Atrial fibrillation with RVR    PT Received On: 06/08/18  PT Start Time: 1130     PT Stop Time: 1155    PT Total Time (min): 25 min       Billable Minutes:  Therapeutic Activity 25    Treatment Type: Treatment  PT/PTA: PTA     PTA Visit Number: 4       General Precautions: Standard, fall  Orthopedic Precautions: N/A   Braces: N/A    Do you have any cultural, spiritual, Hindu conflicts, given your current situation?: none    Subjective:  Communicated with NURSE, JARRELL AND DUNIA  prior to session.  PATIENT AGREE TO TX NOW. PATIENT REPORT SHE WANTS TO DO EVERYTHING SHE CAN TO GET BETTER.    Pain/Comfort  Pain Rating 1: 3/10  Location - Side 1: Right  Location - Orientation 1: lower  Location 1: hip  Pain Addressed 1: Pre-medicate for activity, Reposition, Distraction, Cessation of Activity  Pain Rating Post-Intervention 1: 3/10    Objective:   Patient found with: telemetry. PATIENT LYING SUPINE UPON PTA ENTRY TO ROOM FOR TX.     Functional Mobility:  Bed Mobility:    SUPINE TO SIT AT DEPENDENT X1TO 2 FOR SAFETY.    Transfers:   SHORTSEATED EOB BALANCE ACTIVITY WITH FORWARD FLEXION AT THE TRUNK, WEIGHTBEARING THROUGHOUT MADDI UE/LE , STRETCHING MADDI LE AT DEPENDENT,  CGA X1 TO 2 ASSIST FOR SHORT SEATED BALANCE ACTVIITY AFTER STABILIZATION.    Gait:    UNABLE AT PRESENT TIME.    Stairs:  N/A    Balance:   Static Sit: 0: Needs MAXIMAL assist to maintain sitting without back support  Dynamic Sit: POOR: N/A  Static Stand: 0: Needs MAXIMAL assist to maintain   Dynamic :MAX TO DEPENDENT    Therapeutic Activities and Exercises:  T/FS TO SHORTSEATED EOB , WEIGHTBEARING MADDI UE/LE , STRETCHING MADDI LE , BED MOBILITY AT DEPENDENT X2.    AM-PAC 6 CLICK MOBILITY  How much help from another person does this patient currently need?   1 = Unable, Total/Dependent Assistance  2 = A lot, Maximum/Moderate Assistance  3 = A little,  Minimum/Contact Guard/Supervision  4 = None, Modified [place]/Independent    Turning over in bed (including adjusting bedclothes, sheets and blankets)?: 2  Sitting down on and standing up from a chair with arms (e.g., wheelchair, bedside commode, etc.): 2  Moving from lying on back to sitting on the side of the bed?: 2  Moving to and from a bed to a chair (including a wheelchair)?: 2  Need to walk in hospital room?: 1  Climbing 3-5 steps with a railing?: 1  Total Score: 10    AM-PAC Raw Score CMS G-Code Modifier Level of Impairment Assistance   6 % Total / Unable   7 - 9 CM 80 - 100% Maximal Assist   10 - 14 CL 60 - 80% Moderate Assist   15 - 19 CK 40 - 60% Moderate Assist   20 - 22 CJ 20 - 40% Minimal Assist   23 CI 1-20% SBA / CGA   24 CH 0% Independent/ Mod I     Patient left supine with all lines intact and call button in reach.    Assessment:  Carlie Valdez is a 72 y.o. female with a medical diagnosis of Atrial fibrillation with RVR .    Rehab identified problem list/impairments: Rehab identified problem list/impairments: weakness, decreased upper extremity function, decreased lower extremity function, impaired balance, impaired endurance, impaired sensation, visual deficits, impaired self care skills, impaired functional mobilty, decreased coordination, abnormal tone, decreased ROM, impaired coordination    Rehab potential is excellent.    Activity tolerance: Good    Discharge recommendations: Discharge Facility/Level Of Care Needs: nursing facility, skilled, LTACH (long term acute care hospital)     Barriers to discharge:      Equipment recommendations: Equipment Needed After Discharge: walker, rolling     GOALS:    Physical Therapy Goals        Problem: Physical Therapy Goal    Goal Priority Disciplines Outcome Goal Variances Interventions   Physical Therapy Goal     PT/OT, PT Ongoing (interventions implemented as appropriate)     Description:  LTG'S FOR PT BY 6/10/18  1. PATIENT  WILL PERFORM SUP TO/FROM SIT MAX X 1  2. PATIENT WILL PERFORM SQUAT PIVOT TRANSFER TO CHAIR MAX X 1  3. PATIENT WILL PERFORM B LE AAROM/ISOMETRICS MIN A X 20 REPS                    PLAN:    Patient to be seen 5 x/week  to address the above listed problems via gait training, therapeutic activities, therapeutic exercises  Plan of Care expires: 06/20/18  Plan of Care reviewed with: patient         Rebecca Simpson, PTA  06/08/2018

## 2018-06-08 NOTE — PROGRESS NOTES
Patient calling again for results and to report she is lightheaded and fatigued.   Received report from Stephanie, and I agree with her assessment.  Continue to monitor.

## 2018-06-08 NOTE — PLAN OF CARE
Problem: Physical Therapy Goal  Goal: Physical Therapy Goal  LTG'S FOR PT BY 6/10/18  1. PATIENT WILL PERFORM SUP TO/FROM SIT MAX X 1  2. PATIENT WILL PERFORM SQUAT PIVOT TRANSFER TO CHAIR MAX X 1  3. PATIENT WILL PERFORM B LE AAROM/ISOMETRICS MIN A X 20 REPS   Outcome: Ongoing (interventions implemented as appropriate)  PATIENT AGREEABLE TO TX . PERFORM SHORTSEATED EOB WEIGHTBEARING ACTIVITY AT B/S THROUGHOUT MADDI UE/LE. UPPER TRUNK ROTATION, FORWARD FLEXION AT THE TRUNK ALL AT MAX TO DEPENDENT X2 FOR SAFETY.

## 2018-06-08 NOTE — NURSING
Went over discharge instructions with patient.   Stressed importance of making and keeping all follow ups.   Patient verbalized understanding and has no questions in regards to discharge.  IV removed, catheter intact.  Telemetry box removed.  Patient taken down via wheelchair with [employer] employee.

## 2018-06-08 NOTE — PROGRESS NOTES
"  [facility] -   Adult Nutrition  Progress Note    Consult SUMMARY     Recommendations    Recommendation/Intervention: 1. Continue current diet. 2. Add beneprotein BID & boost plus 1 can daily for wound healing. 3.Will continue to encourage PO intake  Goals: Meet > 85 % EEN/EPN while admitted  Nutrition Goal Status: Progressing towards goal   Communication of RD Recs:  Reviewed with RN     Reason for Assessment    Reason for Assessment: RD follow up   Dx:  1. Atrial fibrillation with RVR    2. Elevated troponin    3. CHF (congestive heart failure)    4. Chest pain      Past Medical History:   Diagnosis Date    Arthritis     Asthma     Atrial fibrillation     Blood clot of vein in shoulder area     Cardiac defibrillator in place     Chronic knee pain     Diabetes mellitus type 2 without retinopathy 12/19/2016    Diaphragmatic hernia without obstruction and without gangrene 9/14/2015    GERD (gastroesophageal reflux disease)     Hypertension     Primary open angle glaucoma (POAG) of both eyes, severe stage 6/1/2018       General Information Comments: Patient tolerating diet. Patient reports fair intake (PO intake ~ 80 % today). Reviewed nutrition recs with RN today.   Nutrition Discharge Planning: diabetic low Na diet     Nutrition Risk Screen    Nutrition Risk Screen: no indicators present    Nutrition/Diet History    Do you have any cultural, spiritual, Mosque conflicts, given your current situation?: none    Anthropometrics    Temp: 98 °F (36.7 °C)  Height Method: Stated  Height: 5' 4" (162.6 cm)  Height (inches): 64 in  Weight Method: Bed Scale  Weight: 95.4 kg (210 lb 5.1 oz)  Weight (lb): 210.32 lb  Ideal Body Weight (IBW), Female: 120 lb  % Ideal Body Weight, Female (lb): 175.27 lb  BMI (Calculated): 36.2  BMI Grade: 35 - 39.9 - obesity - grade II       Lab/Procedures/Meds    Pertinent Labs Reviewed: reviewed  BMP  Lab Results   Component Value Date     06/08/2018    K 3.9 " 06/08/2018     (H) 06/08/2018    CO2 25 06/08/2018    BUN 31 (H) 06/08/2018    CREATININE 0.9 06/08/2018    CALCIUM 8.8 06/08/2018    ANIONGAP 8 06/08/2018    ESTGFRAFRICA >60 06/08/2018    EGFRNONAA >60 06/08/2018     Lab Results   Component Value Date    CALCIUM 8.8 06/08/2018    PHOS 3.0 06/08/2018     Lab Results   Component Value Date    ALBUMIN 2.6 (L) 06/02/2018       Recent Labs  Lab 06/08/18  1200   POCTGLUCOSE 101     Lab Results   Component Value Date    HGBA1C 5.3 06/03/2018       Pertinent Medications Reviewed: reviewed    Physical Findings/Assessment    Overall Physical Appearance: obese  Oral/Mouth Cavity:  (WDL)  Skin:  (Multiple pressure injuries stage2 & 3)    Estimated/Assessed Needs    Weight Used For Calorie Calculations: 95.4 kg (210 lb 5.1 oz)  Energy Calorie Requirements (kcal): 1811  Energy Need Method: [place]-St Jeor (x1.25)  Protein Requirements: 114 - 143 g/day  Weight Used For Protein Calculations: 95.4 kg (210 lb 5.1 oz)     Fluid Need Method: RDA Method (or per MD)  RDA Method (mL): 1811  CHO Requirement: 50 % EEN      Nutrition Prescription Ordered    Current Diet Order: Diabetic 2000 kcal, Low Na    Evaluation of Received Nutrient/Fluid Intake        No intake or output data in the 24 hours ending 06/08/18 1419    % Intake of Estimated Energy Needs: 75 - 100 %  % Meal Intake: 75 - 100 %    Nutrition Risk      1xweekly    Assessment and Plan    Left ischial pressure sore, stage III    Contributing Nutrition Diagnosis  Increased nutrient needs - protein     Related to (etiology):   Wound healing     Signs and Symptoms (as evidenced by):   EPN    Interventions/Recommendations (treatment strategy):  See above    Nutrition Diagnosis Status:   Continues               Monitor and Evaluation    Food and Nutrient Intake: energy intake, food and beverage intake  Food and Nutrient Adminstration: diet order  Anthropometric Measurements: weight  Biochemical Data, Medical Tests and  Procedures: electrolyte and renal panel, glucose/endocrine profile  Nutrition-Focused Physical Findings: overall appearance, skin     Nutrition Follow-Up    RD Follow-up?: Yes (1xweekly)

## 2018-06-08 NOTE — PLAN OF CARE
Problem: Patient Care Overview  Goal: Plan of Care Review  Outcome: Ongoing (interventions implemented as appropriate)  Assessment complete per flow sheet   AAO3 name date and situation  Vital signs stable   C/o leg spasm pain   Tolerated prn medication; patient states moderated relief   Safety measures performed; Patient round assessed   Blood glucose monitoring; insulin therapy tolerated   Afib controlled on the monitor   Will continue to monitor for any signs or symptoms of vital decline

## 2018-06-08 NOTE — PLAN OF CARE
[address]  [place], [address] [place], [place] [address]  Phone [phone]   Fax [phone]      OFFICE VISIT: 3/16/2018    Ashton Aase- : 1975    Chief Complaint:  Beny Ortiz is a 43 Problem: Occupational Therapy Goal  Goal: Occupational Therapy Goal  Goals to be met by: 6-12-18      Patient will increase functional independence with ADLs by performing:    UE Dressing with Moderate Assistance.  LE Dressing with Moderate Assistance.  Toilet transfer to bedside commode with Moderate Assistance.  Upper extremity exercise program x15 reps.   Outcome: Ongoing (interventions implemented as appropriate)  PT PERFORMED SUPINE<> MAX A X2. SEATED EOB, LE DRESSING TOTAL ASSISTANCE. WITH BILATERAL LE'S BLOCKED, PT PERFORM DYNAMIC SITTING BALANCE WITH MOD-MAX A X2. SEVERAL REST BREAKS NEEDED DUE TO PATIENT FATIGUE, PROVIDED WITH POSTERIOR SUPPORT WITH PILLOWS, ALONG WITH BILATERAL UE SUPPORT WITH PILLOW. R WEDGE PROVIDED FOR RUE FOR WEIGHT BEARING. PROM COMPLETED TO R HAND TO PREVENT STIFFNESS. PT RETURNED TO SUPINE, WITH TOTAL A SCOOT TO HOB. BED RAILS UP WITH BELONGINGS WITHIN REACH, STAFF PRESENT.       Nose: Nose normal.   Mouth/Throat: Oropharynx is clear and moist.   Neck: Normal range of motion. Cardiovascular: Normal rate and regular rhythm. Pulmonary/Chest: Effort normal and breath sounds normal. No respiratory distress. Abdominal: Soft. Bowel sounds are normal.   Neurological: She is alert. Skin: Skin is dry. Vitals reviewed. ASSESSMENT      ICD-10-CM ICD-9-CM    1. Breast nodule N63.0 793.89 HE DIGITAL SCREEN W CAD BILATERAL   2. Gastroesophageal reflux disease, esophagitis presence not specified K21.9 530.81 omeprazole (PRILOSEC) 40 MG delayed release capsule         PLAN    Orders Placed This Encounter   Procedures    HE DIGITAL SCREEN W CAD BILATERAL        Return if symptoms worsen or fail to improve. Patient Instructions       Patient Education        Breast Lumps: Care Instructions  Your Care Instructions  Breast lumps are common, especially in women between ages 27 and 48. Many women's breasts feel lumpy and tender before their menstrual period. Women also may have lumps when they are breastfeeding. Breast lumps may go away after menopause. All new breast lumps in women after menopause should be checked by a doctor. Although lumps may be normal for you, it is important to have your doctor check any lump or thickness that is not like the rest of your breast to make sure it is not cancer. A lump may be larger, harder, or different from the rest of your breast tissue. Follow-up care is a key part of your treatment and safety. Be sure to make and go to all appointments, and call your doctor if you are having problems. It's also a good idea to know your test results and keep a list of the medicines you take. How can you care for yourself at home? · Make an appointment to have a mammogram and other follow-up visits as recommended by your doctor. When should you call for help?   Watch closely for changes in your health, and be sure to contact your doctor if:  ? · You do not

## 2018-06-08 NOTE — PT/OT/SLP PROGRESS
Occupational Therapy  Treatment    Carlie Valdez   MRN: [id number]   Admitting Diagnosis: Atrial fibrillation with RVR    OT Date of Treatment: 06/08/18   OT Start Time: 1120  OT Stop Time: 1150  OT Total Time (min): 30 min    Billable Minutes:  Therapeutic Activity 30    General Precautions: Standard, fall  Orthopedic Precautions: N/A  Braces: N/A         Subjective:  Communicated with NURSE prior to session.    Pain/Comfort  Pain Rating 1: 3/10  Location - Side 1: Right  Location - Orientation 1: lower  Location 1: hip  Pain Addressed 1: Pre-medicate for activity, Reposition, Cessation of Activity  Pain Rating Post-Intervention 1: 3/10    Objective:  Patient found with: peripheral IV, telemetry     Functional Mobility:  Bed Mobility:       Transfers:        Functional Ambulation: DID NOT OCCUR    Balance:   Static Sit: 0: Needs MAXIMAL assist to maintain sitting without back support  Dynamic Sit: POOR: N/A    Therapeutic Activities and Exercises:  PT PERFORMED SUPINE<> MAX A X2. SEATED EOB, LE DRESSING TOTAL ASSISTANCE. WITH BILATERAL LE'S BLOCKED, PT PERFORM DYNAMIC SITTING BALANCE WITH MOD-MAX A X2. SEVERAL REST BREAKS NEEDED DUE TO PATIENT FATIGUE, PROVIDED WITH POSTERIOR SUPPORT WITH PILLOWS, ALONG WITH BILATERAL UE SUPPORT WITH PILLOW. R WEDGE PROVIDED FOR RUE FOR WEIGHT BEARING. PROM COMPLETED TO R HAND TO PREVENT STIFFNESS. PT RETURNED TO SUPINE, WITH TOTAL A SCOOT TO HOB. BED RAILS UP WITH BELONGINGS WITHIN REACH, STAFF PRESENT.    AM-PAC 6 CLICK ADL   How much help from another person does this patient currently need?   1 = Unable, Total/Dependent Assistance  2 = A lot, Maximum/Moderate Assistance  3 = A little, Minimum/Contact Guard/Supervision  4 = None, Modified [place]/Independent    Putting on and taking off regular lower body clothing? : 1  Bathing (including washing, rinsing, drying)?: 1  Toileting, which includes using toilet, bedpan, or urinal? : 1  Putting on and taking off  "regular upper body clothing?: 1  Taking care of personal grooming such as brushing teeth?: 2  Eating meals?: 2  Total Score: 8     AM-PAC Raw Score CMS "G-Code Modifier Level of Impairment Assistance   6 % Total / Unable   7 - 8 CM 80 - 100% Maximal Assist   9-13 CL 60 - 80% Moderate Assist   14 - 19 CK 40 - 60% Moderate Assist   20 - 22 CJ 20 - 40% Minimal Assist   23 CI 1-20% SBA / CGA   24 CH 0% Independent/ Mod I       Patient left supine with all lines intact, call button in reach, bed alarm on and NURSING notified    ASSESSMENT:  Carlie Valdez is a 72 y.o. female with a medical diagnosis of Atrial fibrillation with RVR and presents with DEBILITY, IMPAIRED ADLS, FUNCTIONAL MOBILITY, VISUAL DEFICITS, LEFT SIDED HEMIPARESIS, IMPAIRED SITTING BALANCE AND SAFETY AWARENESS. PT WILL BENEFIT FROM CONTINUED SKILLED OT SERVICES TO INCREASE FUNCTIONAL MOBILITY AND INDEPENDENCE.    Rehab identified problem list/impairments: Rehab identified problem list/impairments: weakness, impaired endurance, gait instability, visual deficits, impaired functional mobilty, impaired self care skills, impaired balance, decreased coordination, decreased safety awareness, decreased upper extremity function, decreased lower extremity function, abnormal tone, impaired fine motor, decreased ROM    Rehab potential is fair.    Activity tolerance: Fair    Discharge recommendations: Discharge Facility/Level Of Care Needs: LTACH (long term acute care hospital), nursing facility, skilled     Barriers to discharge: Barriers to Discharge: None    Equipment recommendations: walker, rolling     GOALS:    Occupational Therapy Goals        Problem: Occupational Therapy Goal    Goal Priority Disciplines Outcome Interventions   Occupational Therapy Goal     OT, PT/OT Ongoing (interventions implemented as appropriate)    Description:  Goals to be met by: 6-12-18      Patient will increase functional independence with ADLs by " performing:    UE Dressing with Moderate Assistance.  LE Dressing with Moderate Assistance.  Toilet transfer to bedside commode with Moderate Assistance.  Upper extremity exercise program x15 reps.                    Plan:  Patient to be seen 3 x/week to address the above listed problems via self-care/home management, therapeutic activities, therapeutic exercises  Plan of Care expires: 06/12/18  Plan of Care reviewed with: patient    OT G-codes  Functional Assessment Tool Used: [place] AM-PAC  Score: 8  Functional Limitation: Self care  Self Care Current Status (): KARINE  Self Care Goal Status (): STIVEN Singlet[place] OT  06/08/2018

## 2018-06-08 NOTE — PLAN OF CARE
06/08/18 1624   Medicare Message   Important Message from Medicare regarding Discharge Appeal Rights Explained to patient/caregiver;Other (comments)   Date IMM was signed 06/08/18   Time IMM was signed 1615   Met with patient. Patient refusing to go to [facility]. She talked to two of her daughters. Discussed appeal process. She stated that she did not want to appeal the discharge. She stated that she feels like she has to return to [facility]. She asked that the IMM be placed in her discharge packet. IMM placed in the discharge packet.

## 2018-06-08 NOTE — PLAN OF CARE
Problem: Patient Care Overview  Goal: Plan of Care Review  Outcome: Ongoing (interventions implemented as appropriate)  Plan of care discussed with patient, and patient verbalized understanding.  Patient remained AOx4, room air, and A fib on the monitor.  Patient turned q 2hrs, and purple foam wedged used.  Patient remained free of falls, accidents, and trama during the day shift.  Bed is in the lowest position and call light is within reach - Continue to monitor.

## 2018-06-09 NOTE — DISCHARGE SUMMARY
[facility] Hospital Medicine  Discharge Summary      Patient Name: Carlie Valdez  MRN: [id number]  Admission Date: 6/2/2018  Hospital Length of Stay: 6 days  Discharge Date and Time: 6/8/2018  4:30 PM  Attending Physician: No att. providers found   Discharging Provider: CHEO Houston  Primary Care Provider: Johanna Guzman MD      HPI:   Carlie Valdez is a 78 year old female with combined systolic and diastolic CHF, pacemaker placement, chronic atrial fibrillation on Xarelto, non arteritic ischemic optic neuropathy (NA-AION) who presented to the Emergency Department with complaints of weakness which onset gradually today. The patient was sent to a rehab facility in [place] after a recent hospital discharge following an episode of HTN emergency. The patient was reportedly hypotensive at her rehab facility. Blood pressure medications were held and the patient developed atrial fibrillation with RVR. The patient's main complaint is fatigue. She denies any chest pain, dyspnea, cough,  complaints and GI complaints. No further complaints or concerns at this time            * No surgery found *      Hospital Course:   Carlie Valdez is a 72 year old female placed in Observation with complaints of weakness as well as findings of hypotension and atrial fibrillation with RVR. The patient was initially started on a Cardizem drip which was weaned off due to the patient's significant history of CHF. Oral metoprolol was started, but the patient's RVR persisted. Cardiology was consulted and the patient was started on an Amiodarone drip and transitioned to an inpatient admission. She continued to have hypotension requiring fluid boluses. She reports weakness, but states that she does not want to return to the rehab facility that transferred her here. She states that she wants to go home with home health. Nursing reported systolic BP 78 this morning. Pt asymptomatic. Pt denies  chest pain, SOB, dizziness, abdominal pain, N/V/D. Repeat manual /68 from left arm. AM medications including Amiodarone and Metoprolol held. Discussed with Cardiology -- will reduce Metoprolol to 50 mg PO BID and continue PO Amiodarone. Right sided weakness noted upon assessment. Pt reports she has been having right side weakness for the past 3 weeks, right side weakness unchanged per pt. Pt denies hx of CVA. CT head without contrast obtained negative for acute findings, chronic microvascular ischemic changes. Unable to obtain MRI due to PPM. PT/OT following. PT recommended SNF placement. Discussed case with Dr. Licona (Neurology) -- did not recommend repeating CT due to timing of symptoms (3 weeks ago), would have shown on original CT. Recommended obtaining carotid US and getting lipid panel. Carotid US showed no significant stenosis. Lipid panel reviewed. Currently has no chest pain. SOB improved. A-fib with controlled rate. Metoprolol decreased on 06/06/18 to 50 mg BID to avoid hypotension. Pt refusing SNF but is willing to go to Rehab facility -- she states either [facility] or [facility]. Consult placed to social work for rehab placement. Pt was declined at [facility]. [facility] wanted a full neurological workup, inclusive of an LP, to evaluate right sided weakness prior to acceptance. Pt was at [facility] 3 weeks ago when right side weakness and right eye blindness started. During that admission she was found to have persistent weakness in the right upper extremity and also bilateral lower extremities. She does have a history of a CVA seen on multiple CTs in the past but no acute changes at that time. [facility] neurology was consulted during stay at [facility] and recommended lumbar puncture with reservation. Patient refused lumbar puncture at that time and she was discharged home with home health due to her refusal to go to SNF. Discussed case with [facility], Dr. Mancuso, who did  not recommend an LP at this time due to subacute symptoms. [facility] informed and declined patient for acceptance. Pt states she is willing to go back to [facility]. She has been accepted back to [facility]. [facility] here to pick patient up, she did not want to go. I phoned her daughter, Haley, of her refusal to go to rehab after agreeing to go back. Informed Haley that her mother is medically stable for discharge and if she did not want to go to rehab she would be discharged home with home health. Social work discussed appeal process. She stated that she did not want to appeal the discharge. Pt has agreed to go to [facility] after speaking with her daughter. Hemdynmically stable. Pt to follow up with PCP within 3-5 days after discharge for hospital follow up. She will also follow up with Dr. Hernández (Cardiology) within 1 week after discharge for hospital follow up. This patient was seen and examined on the date of discharge and determined suitable for discharge.        Consults:   Consults         Status Ordering Provider     Inpatient consult to Cardiology  Once     Provider:  Renny Delarosa MD    Completed BOWEN HOANG     Inpatient consult to Registered Dietitian/Nutritionist  Once     Provider:  (Not yet assigned)    Completed BOBBY ORTIZ     Inpatient consult to Social Work  Once     Provider:  (Not yet assigned)    Completed FRANCISCO LOPEZ          Final Active Diagnoses:    Diagnosis Date Noted POA    PRINCIPAL PROBLEM:  Atrial fibrillation with RVR [I48.91] 06/02/2018 Yes    Atypical chest pain [R07.89] 06/05/2018 Yes    NSVT (nonsustained ventricular tachycardia) [I47.2] 06/05/2018 Yes    Right sided weakness [R53.1] 06/05/2018 Yes    Deep tissue injury [T14.8XXA] 06/04/2018 Yes    Left ischial pressure sore, stage III [L89.323] 06/04/2018 Yes    Pressure ulcer of thigh, stage 2 [L89.202] 06/04/2018 Yes    Hypothyroidism [E03.9] 06/03/2018 Yes    Type 2 diabetes mellitus  with kidney complication, without long-term current use of insulin [E11.29] 03/09/2018 Yes    Chronic combined systolic and diastolic congestive heart failure [I50.42] 07/15/2017 Yes    Elevated troponin [R74.8] 11/12/2016 Yes    Chronic kidney disease (CKD), stage III (moderate) [N18.3] 11/12/2016 Yes    Cardiac defibrillator in place [Z95.810] 07/31/2015 Yes     Chronic      Problems Resolved During this Admission:    Diagnosis Date Noted Date Resolved POA       Discharged Condition: stable    Disposition: Rehab Facility    Follow Up:  Follow-up Information     Johanna Guzman MD. Schedule an appointment as soon as possible for a visit in 3 days.    Specialty:  Internal Medicine  Why:  hospital follow up   Contact information:  [address]  [place] [address] [phone]             Oswaldo Hernández MD. Schedule an appointment as soon as possible for a visit in 1 week.    Specialty:  Cardiology  Why:  hospital follow up  Contact information:  [address] [id number] [phone]             Philip Araiza MD. Schedule an appointment as soon as possible for a visit in 2 weeks.    Specialties:  Ophthalmology, Surgery  Why:  pt reports she had an appt but was hospitalized during appt. needs to reschedule  Contact information:  [address] [address] [phone]                 Patient Instructions:     Activity as tolerated     Notify your health care provider if you experience any of the following:  increased confusion or weakness     Notify your health care provider if you experience any of the following:  persistent dizziness, light-headedness, or visual disturbances     Notify your health care provider if you experience any of the following:  difficulty breathing or increased cough         Significant Diagnostic Studies: Labs:   BMP:   Recent Labs  Lab 06/07/18  0438 06/08/18  0543   GLU 84 85    144   K 3.6 3.9    111*   CO2 25 25   BUN 36* 31*    CREATININE 0.9 0.9   CALCIUM 8.7 8.8   MG 1.7 1.6    and CBC   Recent Labs  Lab 06/07/18  0438 06/08/18  0543   WBC 7.78 5.96   HGB 10.1* 10.0*   HCT 32.9* 32.5*    288     Radiology:   Imaging Results          X-Ray Chest AP Portable (Final result)  Result time 06/02/18 11:06:06    Final result by Philip Damian Jr., MD (06/02/18 11:06:06)                 Impression:      Cardiomegaly.      Electronically signed by: Philip Damian Jr., MD  Date:    06/02/2018  Time:    11:06             Narrative:    EXAMINATION:  XR CHEST AP PORTABLE    CLINICAL HISTORY:  weakness;    COMPARISON:  12/01/2017.    FINDINGS:  The lungs are clear. The cardiac silhouette remains enlarged.  No effusion or pneumothorax.  Calcified aortic knob.  Degenerative changes both shoulders.  The remaining osseous structures and soft tissues are within normal limits.  Pacemaker remains in place.                              Cardiac Graphics: Echocardiogram:   2D echo with color flow doppler:   Results for orders placed or performed during the hospital encounter of 06/02/18   2D echo with color flow doppler   Result Value Ref Range    EF 35 (A) 55 - 65    Mitral Valve Regurgitation MILD TO MODERATE     Aortic Valve Regurgitation MILD     Est. PA Systolic Pressure 54.19 (A)     Mitral Valve Mobility NORMAL     Tricuspid Valve Regurgitation MODERATE TO SEVERE (A)        Pending Diagnostic Studies:     None         Medications:  Reconciled Home Medications:      Medication List      START taking these medications    amiodarone 200 MG Tab  Commonly known as:  PACERONE  Take 1 tablet (200 mg total) by mouth 2 (two) times daily.     metoprolol succinate 50 MG 24 hr tablet  Commonly known as:  TOPROL-XL  Take 1 tablet (50 mg total) by mouth 2 (two) times daily.        CHANGE how you take these medications    bumetanide 2 MG tablet  Commonly known as:  BUMEX  Take 1 tablet (2 mg total) by mouth 2 (two) times daily. 1 Tablet Oral Every day  What  changed:  · how much to take  · additional instructions        CONTINUE taking these medications    albuterol 90 mcg/actuation inhaler  Inhale into the lungs.     albuterol-ipratropium 2.5 mg-0.5 mg/3 mL nebulizer solution  Commonly known as:  DUO-NEB  Take 3 mLs by nebulization every 6 (six) hours while awake. Rescue     brimonidine-timolol 0.2-0.5 % Drop  Commonly known as:  COMBIGAN  Place 1 drop into both eyes every 12 (twelve) hours.     fluticasone 50 mcg/actuation nasal spray  Commonly known as:  FLONASE  2 sprays by Each Nare route once daily.     levothyroxine 100 MCG tablet  Commonly known as:  SYNTHROID  Take 100 mcg by mouth.     montelukast 10 mg tablet  Commonly known as:  SINGULAIR  Take 10 mg by mouth.     multivitamin per tablet  Commonly known as:  THERAGRAN  Take by mouth.     polyethylene glycol 17 gram Pwpk  Commonly known as:  GLYCOLAX  Take 17 g by mouth once daily.     potassium chloride SA 20 MEQ tablet  Commonly known as:  K-DUR,KLOR-CON  Take 20 mEq by mouth.     rivaroxaban 15 mg Tab  Commonly known as:  XARELTO  Take 15 mg by mouth.     silver sulfADIAZINE 1% 1 % cream  Commonly known as:  SILVADENE  Apply topically 2 (two) times daily. Apply to bliter areas BID     sodium bicarb-sodium chloride Pack  1 packet by Nasal route 2 (two) times daily.     zinc sulfate 220 (50) mg capsule  Commonly known as:  ZINCATE  Take 220 mg by mouth.        STOP taking these medications    ciprofloxacin HCl 500 MG tablet  Commonly known as:  CIPRO     lactulose 20 gram/30 mL Soln  Commonly known as:  CHRONULAC            Indwelling Lines/Drains at time of discharge:   Lines/Drains/Airways     Pressure Ulcer                 Pressure Injury 06/02/18 1659 Left proximal Ischial tuberosity Stage 3 6 days         Pressure Injury 06/02/18 1659 Right anterior Thigh Stage 2 6 days         Pressure Injury 06/02/18 1902 Right medial Heel Deep tissue injury 6 days         Pressure Injury 06/02/18 Left distal Ischial  tuberosity Stage 3 6 days         Pressure Injury 06/02/18 Right lateral Heel Deep tissue injury 6 days                Time spent on the discharge of patient: 35 minutes  Patient was seen and examined on the date of discharge and determined to be suitable for discharge.         CHEO Houston  Department of Hospital Medicine  [facility]

## 2018-06-11 NOTE — PHYSICIAN QUERY
PT Name: Carlie Valdez  MR #: [id number]     Physician Query Form - Documentation Clarification      CDS/: Yesenia Blue               Contact information: [email or web address]      This form is a permanent document in the medical record.     Query Date: June 11, 2018    By submitting this query, we are merely seeking further clarification of documentation. Please utilize your independent clinical judgment when addressing the question(s) below.    The Medical record reflects the following:    Supporting Clinical Findings Location in Medical Record       Chronic combined systolic and diastolic congestive heart failure    - Currently appears compensated  - Continue Metoprolol at lower dose of 50 mg PO BID  - Continue to hold Bumex due to hypotension   - Monitor strict intake and output as well as daily weights         Acute combined systolic and diastolic congestive heart failure    Due to AF v RVR  Rate control  Low salt diet/fluid restrict      Progress notes, Cardiology notes                           Cardiology Consult                                                                             Doctor, Please specify diagnosis or diagnoses associated with above clinical findings.    Provider Use Only      [    ]  Chronic combined systolic and diastolic congestive heart failure  [ x   ] Acute on  Chronic combined systolic and diastolic congestive heart failure  [    ] Other:_________________                                                                                                           [  ] Clinically undetermined

## 2018-06-28 NOTE — TELEPHONE ENCOUNTER
----- Message from Randa Monroe sent at 6/28/2018  1:58 PM CDT -----  Contact: Haley/daughter   Please call pt daughter @ [phone] regarding last eye drops, also have questions about pt glasses.

## 2018-06-28 NOTE — TELEPHONE ENCOUNTER
Spoke with patient's daughter. She is requesting combigan refill be sent to [facility] on [address]. I am routing request to [facility] in [facility]'s absence.     She also has questions about glasses she saw on TV for low vision patients or blind patients and wonders if her mother is a candidate. I have printed the message and put on Kalpana's desk to ask Dr. Araiza. Pt's daughter knows to expect another call about this on Friday or Monday.     Aide

## 2018-06-30 PROBLEM — I50.23 ACUTE ON CHRONIC SYSTOLIC HEART FAILURE: Status: ACTIVE | Noted: 2018-01-01

## 2018-06-30 PROBLEM — R23.8 SKIN BREAKDOWN: Status: ACTIVE | Noted: 2018-01-01

## 2018-06-30 PROBLEM — N39.0 UTI (URINARY TRACT INFECTION): Status: ACTIVE | Noted: 2018-01-01

## 2018-06-30 PROBLEM — R53.81 DEBILITY: Status: ACTIVE | Noted: 2018-01-01

## 2018-06-30 NOTE — ED NOTES
Discharge medications and instructions requested from [facility] ([phone]).  Spoke to Franky RN charge nurse, states she will fax records to ED.

## 2018-06-30 NOTE — PLAN OF CARE
Problem: Pressure Ulcer Risk (Beka Scale) (Adult,Obstetrics,Pediatric)  Intervention: Turn/Reposition Often  Patient has multiple skin tears and blisters to left side back and right lateral torso. Aquacel drsngs in place from ED. Left buttock pressure injury cleaned with soap and water and dressed with hydrocolloid drsng. Rt heel pressure injury aquacel drsng. Pt tolerated q2hr turn.

## 2018-06-30 NOTE — ASSESSMENT & PLAN NOTE
- Likely secondary to demand ischemia and uncontrolled HTN  - Initial troponin 0.025>>0.028  - Serial troponin pending

## 2018-06-30 NOTE — ASSESSMENT & PLAN NOTE
- Telemetry  - BNP 1100  - CXR with vascular congestion with small bilateral effusion  - Mild BLE edema upon assessment but diminished breath sounds noted  - 2D ECHO on 06/02/18 with EF 35% and pulmonary HTN  - Pt received Bumex 2 mg IVP x 1 in ED  - Bumex 2 mg IVP BID  - Continue Metoprolol  - Strict I&Os  - Daily weights  - May need to consult Cardiology if no improvement with initial diuresis

## 2018-06-30 NOTE — PROGRESS NOTES
I have seen the patient, reviewed the Advanced providers assessment and plan. I have personally interviewed and examined the patient at bedside and agree with the findings. Patient with acute on chronic systolic heart failure exacerbation secondary to lapse in medication . Reviewing notes this has happened before. Continue cardizem for a flutter rate control. Continue toprol XL for same and CHF. Start lisinopril 2.5mg daily. Had CKD previously but this has now improved. WIll need to have K/Cr monitored once discharged. IV diuresis       Atif Joiner   Hospitalist Staff

## 2018-06-30 NOTE — H&P
[facility] Hospital Medicine  History & Physical    Patient Name: Carlie Valdez  MRN: [id number]  Admission Date: 2018  Attending Physician: Subhash Ayala MD   Primary Care Provider: Johanna Guzman MD         Patient information was obtained from patient and ER records.     Subjective:     Principal Problem:Acute on chronic systolic heart failure    Chief Complaint:   Chief Complaint   Patient presents with    Chest Pain        HPI: Carlie Valdez is a 72 year old female with a PMHx of HTN, GERD, DM, A-fib, Asthma, and Defibrillator who presented from home with c/o chest pain. Located to right side with no radiation. Associated symptoms: SOB. Pt and sitters at bedside report she was recently discharged from [facility] and pt hasn't had her home medications since being discharged. ED workup revealed: Hgb/Hct 11.2/34.7, Alk phos 152, BNP 1100, troponin 0.028. UA (+) nitrites. CXR with central vascular congestion with small bilateral effusion. Primary cardiologist is Dr. Hernández.  Pt admitted to Telemetry for Acute on chronic CHF exacerbation and UTI.     Past Medical History:   Diagnosis Date    Arthritis     Asthma     Atrial fibrillation     Blood clot of vein in shoulder area     Cardiac defibrillator in place     Chronic knee pain     Diabetes mellitus type 2 without retinopathy 2016    Diaphragmatic hernia without obstruction and without gangrene 2015    GERD (gastroesophageal reflux disease)     Hypertension     Primary open angle glaucoma (POAG) of both eyes, severe stage 2018       Past Surgical History:   Procedure Laterality Date    CARDIAC DEFIBRILLATOR PLACEMENT      , .    CATARACT EXTRACTION       SECTION      COLONOSCOPY      ashley      Dr. Macdonald    KNEE ARTHROSCOPY      UPPER GASTROINTESTINAL ENDOSCOPY         Review of patient's allergies indicates:   Allergen Reactions    Morphine Hives     Atorvastatin      Other reaction(s): Muscle pain    Clonidine     Codeine      Other reaction(s): Unknown    Digoxin      Other reaction(s): Unknown    Diovan [valsartan] Other (See Comments)     Upset stomach, weakness    Eggs [egg derived]     Metoprolol Diarrhea    Naproxen      Other reaction(s): Nausea    Peanut     Propofol      Other reaction(s): delirious    Sulfa (sulfonamide antibiotics)        No current facility-administered medications on file prior to encounter.      Current Outpatient Prescriptions on File Prior to Encounter   Medication Sig    albuterol 90 mcg/actuation inhaler Inhale 1-2 puffs into the lungs every 6 (six) hours as needed.     albuterol-ipratropium 2.5mg-0.5mg/3mL (DUO-NEB) 0.5 mg-3 mg(2.5 mg base)/3 mL nebulizer solution Take 3 mLs by nebulization every 6 (six) hours while awake. Rescue    amiodarone (PACERONE) 200 MG Tab Take 1 tablet (200 mg total) by mouth 2 (two) times daily. (Patient taking differently: Take 100 mg by mouth 2 (two) times daily. )    brimonidine-timolol (COMBIGAN) 0.2-0.5 % Drop Place 1 drop into both eyes every 12 (twelve) hours.    bumetanide (BUMEX) 2 MG tablet Take 1 tablet (2 mg total) by mouth 2 (two) times daily. 1 Tablet Oral Every day (Patient taking differently: Take 1 mg by mouth 2 (two) times daily. Hold for SBP less than or equal to 100.)    fluticasone (FLONASE) 50 mcg/actuation nasal spray 2 sprays by Each Nare route once daily. (Patient taking differently: 2 sprays by Each Nare route daily as needed. )    levothyroxine (SYNTHROID) 100 MCG tablet Take 100 mcg by mouth before breakfast.     montelukast (SINGULAIR) 10 mg tablet Take 10 mg by mouth once daily.     multivitamin (THERAGRAN) per tablet Take 1 tablet by mouth once daily.     polyethylene glycol (GLYCOLAX) 17 gram PwPk Take 17 g by mouth once daily. (Patient taking differently: Take 17 g by mouth daily as needed. )    potassium chloride SA (K-DUR,KLOR-CON) 20 MEQ  tablet Take 20 mEq by mouth once daily.     rivaroxaban (XARELTO) 15 mg Tab Take 15 mg by mouth every evening.     silver sulfADIAZINE 1% (SILVADENE) 1 % cream Apply topically 2 (two) times daily. Apply to bliter areas BID (Patient taking differently: Apply topically 2 (two) times daily. Apply to blistered areas BID.)    sodium bicarb-sodium chloride Pack 1 packet by Nasal route 2 (two) times daily. (Patient taking differently: 1 packet by Nasal route 2 (two) times daily as needed. )    zinc sulfate (ZINCATE) 220 (50) mg capsule Take 220 mg by mouth once daily.     [DISCONTINUED] metoprolol succinate (TOPROL-XL) 50 MG 24 hr tablet Take 1 tablet (50 mg total) by mouth 2 (two) times daily.     Family History     Problem Relation (Age of Onset)    Hypertension Mother, Father        Social History Main Topics    Smoking status: Never Smoker    Smokeless tobacco: Never Used    Alcohol use No    Drug use: No    Sexual activity: Yes     Partners: Male     Review of Systems   Constitutional: Positive for activity change. Negative for chills and fatigue.   HENT: Negative.    Eyes: Negative.    Respiratory: Positive for shortness of breath. Negative for apnea, cough, choking, chest tightness, wheezing and stridor.    Cardiovascular: Positive for chest pain. Negative for palpitations and leg swelling.   Gastrointestinal: Negative for abdominal pain, constipation, diarrhea, nausea and vomiting.   Endocrine: Negative.    Genitourinary: Negative.    Skin: Negative.    Allergic/Immunologic: Negative.    Neurological: Negative for dizziness, tremors, seizures, syncope, facial asymmetry, speech difficulty, weakness, light-headedness, numbness and headaches.   Hematological: Negative.    Psychiatric/Behavioral: Negative.      Objective:     Vital Signs (Most Recent):  Temp: 98.4 °F (36.9 °C) (06/30/18 0522)  Pulse: 69 (06/30/18 1100)  Resp: 20 (06/30/18 1100)  BP: (!) 182/102 (06/30/18 1100)  SpO2: 100 % (06/30/18 1000)  Vital Signs (24h Range):  Temp:  [98.4 °F (36.9 °C)] 98.4 °F (36.9 °C)  Pulse:  [69-97] 69  Resp:  [18-21] 20  SpO2:  [96 %-100 %] 100 %  BP: (175-230)/() 182/102     Weight: 95.3 kg (210 lb)  Body mass index is 36.05 kg/m².    Physical Exam   Constitutional: She is oriented to person, place, and time. She appears well-developed. She is cooperative. She is easily aroused. No distress.   HENT:   Head: Normocephalic and atraumatic.   Right eye blindness   Eyes: EOM are normal.   Neck: Neck supple.   Cardiovascular: Normal rate, regular rhythm, normal heart sounds and intact distal pulses.    No murmur heard.  Mild BLE edema  Defibrillator   Pulmonary/Chest: Effort normal. No respiratory distress. She has decreased breath sounds in the right upper field, the right middle field, the right lower field, the left upper field and the left lower field. She has no wheezes. She has no rales. She exhibits no tenderness.   Abdominal: Soft. Bowel sounds are normal. She exhibits no distension. There is no tenderness. There is no rebound and no guarding.   Genitourinary:   Genitourinary Comments: Deferred   Musculoskeletal: She exhibits edema.   Right side weakness, hx of CVA  BLE contractures   Neurological: She is alert, oriented to person, place, and time and easily aroused. No sensory deficit.   Skin: Skin is warm and dry. Capillary refill takes less than 2 seconds.   Psychiatric: She has a normal mood and affect. Her behavior is normal. Judgment and thought content normal.   Nursing note and vitals reviewed.        CRANIAL NERVES     CN III, IV, VI   Extraocular motions are normal.        Significant Labs:   CBC:   Recent Labs  Lab 06/30/18  0608   WBC 9.92   HGB 11.2*   HCT 34.7*        CMP:   Recent Labs  Lab 06/30/18  0608      K 3.8      CO2 25      BUN 13   CREATININE 0.7   CALCIUM 9.6   PROT 6.6   ALBUMIN 2.3*   BILITOT 0.5   ALKPHOS 152*   AST 21   ALT 11   ANIONGAP 11   EGFRNONAA >60      Troponin:   Recent Labs  Lab 06/30/18  0608 06/30/18  0835   TROPONINI 0.025 0.028*     Urine Studies:   Recent Labs  Lab 06/30/18  0802   COLORU Yellow   APPEARANCEUA Clear   PHUR 7.0   SPECGRAV 1.015   PROTEINUA Negative   GLUCUA Negative   KETONESU Negative   BILIRUBINUA Negative   OCCULTUA Negative   NITRITE Positive*   UROBILINOGEN 1.0   LEUKOCYTESUR Negative   RBCUA 0   WBCUA 1   BACTERIA Rare   SQUAMEPITHEL 0     BNP:  1,100    Significant Imaging:   Imaging Results          X-Ray Chest 1 View (Final result)  Result time 06/30/18 08:50:50    Final result by Philip Damian Jr., MD (06/30/18 08:50:50)                 Impression:      CHF exacerbation.      Electronically signed by: Philip Damian Jr., MD  Date:    06/30/2018  Time:    08:50             Narrative:    EXAMINATION:  XR CHEST 1 VIEW    CLINICAL HISTORY:  Chest pain, unspecified    COMPARISON:  06/02/2018    FINDINGS:  Pacemaker remains in place right chest wall.  The heart remains enlarged.  Central vascular congestion with small bilateral effusions.  No pneumothorax.                              Assessment/Plan:     * Acute on chronic systolic heart failure    - Telemetry  - BNP 1100  - CXR with vascular congestion with small bilateral effusion  - Mild BLE edema upon assessment but diminished breath sounds noted  - 2D ECHO on 06/02/18 with EF 35% and pulmonary HTN  - Pt received Bumex 2 mg IVP x 1 in ED  - Bumex 2 mg IVP daily  - Continue Metoprolol  - Strict I&Os  - Daily weights  - May need to consult Cardiology if no improvement with initial diuresis          Skin breakdown    - Multiple areas of skin breakdown  - Inpatient consult to wound care          UTI (urinary tract infection)    - UA (+) nitrites  - Urine culture pending  - Start IV Rocephin          Debility    - Currently has [facility] (PT/OT/nurse)  - ED nurse reported pt very unkempt upon arrival to ED. Pt has 2 sitters while at home. Live with   - Pt has  declined NH placement in the past  - Will consult social work for discharge planning -- will need to resume home health upon discharge. Add aide and SW to home health  - Continue PT/OT while inpatient        Hypothyroidism    - TSH about a month ago -- 1.265  - Continue Levothyroxine          Chronic kidney disease (CKD), stage III (moderate)    - Currently stable  - Monitor kidney function while on diuretics          Elevated troponin    - Likely secondary to demand ischemia and uncontrolled HTN  - Initial troponin 0.025>>0.028  - Serial troponin pending          Hypertension associated with diabetes    - Hypertensive upon arrival to ED. Mostly likely due to not receiving BP medications in the past week  - Resume home medications  - Apresoline prn    DM  - HgbA1c on 06/03/18 -- 5.3 -- controlled  - Accuchecks and low dose SSI          Atrial fibrillation    - Currently rate controlled  - Continue Xarelto and Amiodarone            VTE Risk Mitigation         Ordered     rivaroxaban tablet 15 mg  Nightly      06/30/18 1203             CHEO Houston  Department of Hospital Medicine   [facility]

## 2018-06-30 NOTE — ASSESSMENT & PLAN NOTE
- Telemetry  - BNP 1100  - CXR with vascular congestion with small bilateral effusion  - Mild BLE edema upon assessment but diminished breath sounds noted  - 2D ECHO on 06/02/18 with EF 35% and pulmonary HTN  - Pt received Bumex 2 mg IVP x 1 in ED  - Bumex 2 mg IVP daily  - Continue Metoprolol  - Strict I&Os  - Daily weights  Cardiology consult due to recurrent CHF exacerbations

## 2018-06-30 NOTE — PT/OT/SLP EVAL
Occupational Therapy   Evaluation    Name: Carlie Valdez  MRN: [id number]  Admitting Diagnosis:  Acute on chronic systolic heart failure      Recommendations:     Discharge Recommendations: nursing facility, skilled  Discharge Equipment Recommendations:  none  Barriers to discharge:  Decreased caregiver support    History:     Occupational Profile:  Living Environment: lives with spouse in 1 story house with no steps  Previous level of function:(i) s/p mother's day;  Max a  since then  Roles and Routines: occupational therapy  Equipment Owned:  walker, rolling, bedside commode, hospital bed, bath bench, wheelchair, lift device  Assistance upon Discharge:     Past Medical History:   Diagnosis Date    Arthritis     Asthma     Atrial fibrillation     Blood clot of vein in shoulder area     Cardiac defibrillator in place     Chronic knee pain     Diabetes mellitus type 2 without retinopathy 2016    Diaphragmatic hernia without obstruction and without gangrene 2015    GERD (gastroesophageal reflux disease)     Hypertension     Primary open angle glaucoma (POAG) of both eyes, severe stage 2018       Past Surgical History:   Procedure Laterality Date    CARDIAC DEFIBRILLATOR PLACEMENT      , .    CATARACT EXTRACTION       SECTION      COLONOSCOPY      ashley      Dr. Macdonald    KNEE ARTHROSCOPY      UPPER GASTROINTESTINAL ENDOSCOPY         Subjective     Chief Complaint: debility and generalized weakness  Patient/Family stated goals:   Communicated with: nurse Oakley and epic chart review prior to session.  Pain/Comfort:  · Location - Side 1: Bilateral  · Location - Orientation 1: upper  · Location 1: head    Patients cultural, spiritual, Gnosticist conflicts given the current situation:      Objective:     Patient found with: telemetry    General Precautions: Standard, blind, fall   Orthopedic Precautions:N/A   Braces: N/A     Occupational Performance:    Bed  Mobility:    · Patient completed Rolling/Turning to Left with  total assistance  · Patient completed Rolling/Turning to Right with total assistance  · Patient completed Scooting/Bridging with total assistance  · Patient completed Supine to Sit with total assistance  · Patient completed Sit to Supine with total assistance    Activities of Daily Living:  · UB Dressing: total assistance x1  · LB Dressing: total assistance x1  · Toileting: total assistance x1    Cognitive/Visual Perceptual:  Cognitive/Psychosocial Skills:     -       Oriented to: Person, Place, Time and Situation   -       Follows Commands/attention:Follows one-step commands  -       Communication: clear/fluent  -       Memory: unable to access  -       Safety awareness/insight to disability: intact   Visual/Perceptual:      -impaired. blind    Physical Exam:  Upper Extremity Range of Motion:     -       Right Upper Extremity: aarom wfl  -       Left Upper Extremity: aarom wfl  Upper Extremity Strength:    -       Right Upper Extremity: deficits: mmt-1/5 grossly  -       Left Upper Extremity: Deficits: mmt: 1/5 grossly   Strength:    -       Right Upper Extremity: Deficits: mmt: -1/5 grossly  -       Left Upper Extremity: Deficits: mmt: 1/5 grossly    Patient left left sidelying with all lines intact, call button in reach and nurse  indio notified    [facility] 6 Click:  [facility] Total Score: 6    Treatment & Education:    Education:    Assessment:     Carlie Valdez is a 72 y.o. female with a medical diagnosis of Acute on chronic systolic heart failure.  She presents with performance deficits affecting function are weakness, impaired functional mobilty, impaired balance, decreased upper extremity function, decreased safety awareness, impaired self care skills, gait instability, decreased lower extremity function, decreased coordination.      Rehab Prognosis:  Fair+; patient would benefit from acute skilled OT services to address these  "deficits and reach maximum level of function.         Clinical Decision Making:     3.  OT High:  "Pt evaluation falls under high complexity for evaluation category due to 5+ performance deficits identified with comprehensive assessments and significant modifications/assistance required. An expanded review of history and occupational profile completed in addition to expanded review of physical, cognitive and psychosocial history. Several treatment options considered in care."     Plan:     Patient to be seen 3 x/week to address the above listed problems via self-care/home management, therapeutic activities, therapeutic exercises  · Plan of Care Expires: 07/07/18  · Plan of Care Reviewed with: patient    This Plan of care has been discussed with the patient who was involved in its development and understands and is in agreement with the identified goals and treatment plan    GOALS:    Occupational Therapy Goals        Problem: Occupational Therapy Goal    Goal Priority Disciplines Outcome Interventions   Occupational Therapy Goal     OT, PT/OT     Description:  Goals to be met by: 7-7-18    Patient will increase functional independence with ADLs by performing:    UE Dressing with Mod Assistance.  LE Dressing with Moderate Assistance.  Supine to sit with mod Assistance.  Upper extremity aarom exercise program x10 reps                        Time Tracking:     OT Date of Treatment: 06/30/18  OT Start Time: 1240  OT Stop Time: 1310  OT Total Time (min): 30 min    Billable Minutes:Evaluation 15 minutes  Therapeutic Activity 15 minutes    Ramandeep Sousa OT  6/30/2018    "

## 2018-06-30 NOTE — ED NOTES
Dr. Cabral notified pt does not have IV access at this time and pt is difficult venous access.  Dr. Cabral states pt needs IV access.  Will attempt to obtain IV access.

## 2018-06-30 NOTE — PT/OT/SLP EVAL
"Physical Therapy Evaluation    Patient Name:  Carlie Valdez   MRN:  [id number]    Recommendations:     Discharge Recommendations:  nursing facility, skilled   Discharge Equipment Recommendations: none   Barriers to discharge: None    Assessment:     Carlie Valdez is a 72 y.o. female admitted with a medical diagnosis of Acute on chronic systolic heart failure.  She presents with the following impairments/functional limitations:  weakness, impaired endurance, impaired sensation, impaired self care skills, impaired functional mobilty, impaired balance, gait instability, visual deficits, decreased upper extremity function, decreased lower extremity function, decreased safety awareness, pain, decreased coordination, decreased ROM.    Rehab Prognosis:  FAIR; patient would benefit from acute skilled PT services to address these deficits and reach maximum level of function.      Recent Surgery: * No surgery found *      Plan:     During this hospitalization, patient to be seen 5 x/week to address the above listed problems via gait training, therapeutic activities, therapeutic exercises  · Plan of Care Expires:  07/07/18   Plan of Care Reviewed with: patient    Subjective     Communicated with [facility] prior to session.  Patient found right side-lying upon PT entry to room, agreeable to evaluation.      Chief Complaint: Pain all over  Patient comments/goals: To go home  Pain/Comfort:  · Pain Rating 1: 5/10  · Location - Side 1: Bilateral  · Location 1:  ("all over")  · Pain Addressed 1: Reposition, Distraction, Cessation of Activity    Patients cultural, spiritual, Sabianist conflicts given the current situation: none    Living Environment:  Patient lives home with her spouse in a one-story house with no stairs/steps.  Prior to admission, patients level of function was assist with transfers (lift device) and ADL.  Patient stated that she hasn't walked since May and primarily uses a W/C for mobility. "  Patient has the following equipment: walker, rolling, bedside commode, hospital bed, lift device, wheelchair, bath bench.  DME owned (not currently used): none.  Upon discharge, patient will have assistance from ???.    Objective:     Patient found with: peripheral IV, telemetry     General Precautions: Standard, fall, blind   Orthopedic Precautions:N/A   Braces: N/A     Exams:  · Cognitive Exam:  Patient is oriented to Person, Place and Situation and follows 100% of one-step commands   · Gross Motor Coordination:  decreased  · Postural Exam:  Patient presented with the following abnormalities:    · -       Rounded shoulders  · -       Forward head  · -       Posterior pelvic tilt  · Sensation:    · -       Impaired  light/touch BLE/UE  · RLE ROM: Tightness B gastroc, hamstrings, hip flexors  · RLE Strength: 0/5 to 1/5  · LLE ROM: Tightness gastroc, hamstrings, hip flexors  · LLE Strength: 0/5 to 1/5    Functional Mobility:  · Bed Mobility:     · Rolling Left:  total assistance and of 2 persons  · Scooting: total assistance and of 2 persons  · Supine to Sit: total assistance and of 2 persons  · Sit to Supine: total assistance and of 2 persons  · Balance: Poor sitting balance    AM-PAC 6 CLICK MOBILITY  Total Score:6       Patient left left sidelying with all lines intact, call button in reach and bed alarm on.    GOALS:    Physical Therapy Goals        Problem: Physical Therapy Goal    Goal Priority Disciplines Outcome Goal Variances Interventions   Physical Therapy Goal     PT/OT, PT      Description:  LTGs to be met within 7 days (7/7/18):  1.  Patient will perform bed mobility with max assist  2.  Patient will sit EOB x 10 minutes with Fair sitting balance   3.  Patient will perform BLE therapeutic exercises active-assist x 10 reps in all planes                    History:     Past Medical History:   Diagnosis Date    Arthritis     Asthma     Atrial fibrillation     Blood clot of vein in shoulder area      Cardiac defibrillator in place     Chronic knee pain     Diabetes mellitus type 2 without retinopathy 2016    Diaphragmatic hernia without obstruction and without gangrene 2015    GERD (gastroesophageal reflux disease)     Hypertension     Primary open angle glaucoma (POAG) of both eyes, severe stage 2018       Past Surgical History:   Procedure Laterality Date    CARDIAC DEFIBRILLATOR PLACEMENT      , .    CATARACT EXTRACTION       SECTION      COLONOSCOPY      ashley      Dr. Macdonald    KNEE ARTHROSCOPY      UPPER GASTROINTESTINAL ENDOSCOPY         Clinical Decision Making:     History  Co-morbidities and personal factors that may impact the plan of care Examination  Body Structures and Functions, activity limitations and participation restrictions that may impact the plan of care Clinical Presentation   Decision Making/ Complexity Score   Co-morbidities:   [] Time since onset of injury / illness / exacerbation  [] Status of current condition  []Patient's cognitive status and safety concerns    [] Multiple Medical Problems (see med hx)  Personal Factors:   [] Patient's age  [] Prior Level of function   [] Patient's home situation (environment and family support)  [] Patient's level of motivation  [] Expected progression of patient      HISTORY:(criteria)    [] 35334 - no personal factors/history    [] 81908 - has 1-2 personal factor/comorbidity     [] 33998 - has >3 personal factor/comorbidity     Body Regions:  [] Objective examination findings  [] Head     []  Neck  [] Trunk   [] Upper Extremity  [] Lower Extremity    Body Systems:  [] For communication ability, affect, cognition, language, and learning style: the assessment of the ability to make needs known, consciousness, orientation (person, place, and time), expected emotional /behavioral responses, and learning preferences (eg, learning barriers, education  needs)  [] For the neuromuscular system: a general assessment  of gross coordinated movement (eg, balance, gait, locomotion, transfers, and transitions) and motor function  (motor control and motor learning)  [] For the musculoskeletal system: the assessment of gross symmetry, gross range of motion, gross strength, height, and weight  [] For the integumentary system: the assessment of pliability(texture), presence of scar formation, skin color, and skin integrity  [] For cardiovascular/pulmonary system: the assessment of heart rate, respiratory rate, blood pressure, and edema     Activity limitations:    [] Patient's cognitive status and saf ety concerns          [] Status of current condition      [] Weight bearing restriction  [] Cardiopulmunary Restriction    Participation Restrictions:   [] Goals and goal agreement with the patient     [] Rehab potential (prognosis) and probable outcome      Examination of Body System: (criteria)    [] 80433 - addressing 1-2 elements    [] 17328 - addressing a total of 3 or more elements     [] 46045 -  Addressing a total of 4 or more elements         Clinical Presentation: (criteria)  Choose one     On examination of body system using standardized tests and measures patient presents with (CHOOSE ONE) elements from any of the following: body structures and functions, activity limitations, and/or participation restrictions.  Leading to a clinical presentation that is considered (CHOOSE ONE)                              Clinical Decision Making  (Eval Complexity):  Choose One     Time Tracking:     PT Received On: 06/30/18  PT Start Time: 1255     PT Stop Time: 1314  PT Total Time (min): 19 min     Billable Minutes: Evaluation 19 mins      Rosalia Flores, PT, DPT  06/30/2018

## 2018-06-30 NOTE — SUBJECTIVE & OBJECTIVE
Past Medical History:   Diagnosis Date    Arthritis     Asthma     Atrial fibrillation     Blood clot of vein in shoulder area     Cardiac defibrillator in place     Chronic knee pain     Diabetes mellitus type 2 without retinopathy 2016    Diaphragmatic hernia without obstruction and without gangrene 2015    GERD (gastroesophageal reflux disease)     Hypertension     Primary open angle glaucoma (POAG) of both eyes, severe stage 2018       Past Surgical History:   Procedure Laterality Date    CARDIAC DEFIBRILLATOR PLACEMENT      , .    CATARACT EXTRACTION       SECTION      COLONOSCOPY      ashley      Dr. Macdonald    KNEE ARTHROSCOPY      UPPER GASTROINTESTINAL ENDOSCOPY         Review of patient's allergies indicates:   Allergen Reactions    Morphine Hives    Atorvastatin      Other reaction(s): Muscle pain    Clonidine     Codeine      Other reaction(s): Unknown    Digoxin      Other reaction(s): Unknown    Diovan [valsartan] Other (See Comments)     Upset stomach, weakness    Eggs [egg derived]     Metoprolol Diarrhea    Naproxen      Other reaction(s): Nausea    Peanut     Propofol      Other reaction(s): delirious    Sulfa (sulfonamide antibiotics)        No current facility-administered medications on file prior to encounter.      Current Outpatient Prescriptions on File Prior to Encounter   Medication Sig    albuterol 90 mcg/actuation inhaler Inhale 1-2 puffs into the lungs every 6 (six) hours as needed.     albuterol-ipratropium 2.5mg-0.5mg/3mL (DUO-NEB) 0.5 mg-3 mg(2.5 mg base)/3 mL nebulizer solution Take 3 mLs by nebulization every 6 (six) hours while awake. Rescue    amiodarone (PACERONE) 200 MG Tab Take 1 tablet (200 mg total) by mouth 2 (two) times daily. (Patient taking differently: Take 100 mg by mouth 2 (two) times daily. )    brimonidine-timolol (COMBIGAN) 0.2-0.5 % Drop Place 1 drop into both eyes every 12 (twelve) hours.    bumetanide  (BUMEX) 2 MG tablet Take 1 tablet (2 mg total) by mouth 2 (two) times daily. 1 Tablet Oral Every day (Patient taking differently: Take 1 mg by mouth 2 (two) times daily. Hold for SBP less than or equal to 100.)    fluticasone (FLONASE) 50 mcg/actuation nasal spray 2 sprays by Each Nare route once daily. (Patient taking differently: 2 sprays by Each Nare route daily as needed. )    levothyroxine (SYNTHROID) 100 MCG tablet Take 100 mcg by mouth before breakfast.     montelukast (SINGULAIR) 10 mg tablet Take 10 mg by mouth once daily.     multivitamin (THERAGRAN) per tablet Take 1 tablet by mouth once daily.     polyethylene glycol (GLYCOLAX) 17 gram PwPk Take 17 g by mouth once daily. (Patient taking differently: Take 17 g by mouth daily as needed. )    potassium chloride SA (K-DUR,KLOR-CON) 20 MEQ tablet Take 20 mEq by mouth once daily.     rivaroxaban (XARELTO) 15 mg Tab Take 15 mg by mouth every evening.     silver sulfADIAZINE 1% (SILVADENE) 1 % cream Apply topically 2 (two) times daily. Apply to bliter areas BID (Patient taking differently: Apply topically 2 (two) times daily. Apply to blistered areas BID.)    sodium bicarb-sodium chloride Pack 1 packet by Nasal route 2 (two) times daily. (Patient taking differently: 1 packet by Nasal route 2 (two) times daily as needed. )    zinc sulfate (ZINCATE) 220 (50) mg capsule Take 220 mg by mouth once daily.     [DISCONTINUED] metoprolol succinate (TOPROL-XL) 50 MG 24 hr tablet Take 1 tablet (50 mg total) by mouth 2 (two) times daily.     Family History     Problem Relation (Age of Onset)    Hypertension Mother, Father        Social History Main Topics    Smoking status: Never Smoker    Smokeless tobacco: Never Used    Alcohol use No    Drug use: No    Sexual activity: Yes     Partners: Male     Review of Systems   Constitutional: Positive for activity change. Negative for chills and fatigue.   HENT: Negative.    Eyes: Negative.    Respiratory: Positive  for shortness of breath. Negative for apnea, cough, choking, chest tightness, wheezing and stridor.    Cardiovascular: Positive for chest pain. Negative for palpitations and leg swelling.   Gastrointestinal: Negative for abdominal pain, constipation, diarrhea, nausea and vomiting.   Endocrine: Negative.    Genitourinary: Negative.    Skin: Negative.    Allergic/Immunologic: Negative.    Neurological: Negative for dizziness, tremors, seizures, syncope, facial asymmetry, speech difficulty, weakness, light-headedness, numbness and headaches.   Hematological: Negative.    Psychiatric/Behavioral: Negative.      Objective:     Vital Signs (Most Recent):  Temp: 98.4 °F (36.9 °C) (06/30/18 0522)  Pulse: 69 (06/30/18 1100)  Resp: 20 (06/30/18 1100)  BP: (!) 182/102 (06/30/18 1100)  SpO2: 100 % (06/30/18 1000) Vital Signs (24h Range):  Temp:  [98.4 °F (36.9 °C)] 98.4 °F (36.9 °C)  Pulse:  [69-97] 69  Resp:  [18-21] 20  SpO2:  [96 %-100 %] 100 %  BP: (175-230)/() 182/102     Weight: 95.3 kg (210 lb)  Body mass index is 36.05 kg/m².    Physical Exam   Constitutional: She is oriented to person, place, and time. She appears well-developed. She is cooperative. She is easily aroused. No distress.   HENT:   Head: Normocephalic and atraumatic.   Right eye blindness   Eyes: EOM are normal.   Neck: Neck supple.   Cardiovascular: Normal rate, regular rhythm, normal heart sounds and intact distal pulses.    No murmur heard.  Mild BLE edema  Defibrillator   Pulmonary/Chest: Effort normal. No respiratory distress. She has decreased breath sounds in the right upper field, the right middle field, the right lower field, the left upper field and the left lower field. She has no wheezes. She has no rales. She exhibits no tenderness.   Abdominal: Soft. Bowel sounds are normal. She exhibits no distension. There is no tenderness. There is no rebound and no guarding.   Genitourinary:   Genitourinary Comments: Deferred   Musculoskeletal: She  exhibits edema.   Right side weakness, hx of CVA  BLE contractures   Neurological: She is alert, oriented to person, place, and time and easily aroused. No sensory deficit.   Skin: Skin is warm and dry. Capillary refill takes less than 2 seconds.   Psychiatric: She has a normal mood and affect. Her behavior is normal. Judgment and thought content normal.   Nursing note and vitals reviewed.        CRANIAL NERVES     CN III, IV, VI   Extraocular motions are normal.        Significant Labs:   CBC:   Recent Labs  Lab 06/30/18  0608   WBC 9.92   HGB 11.2*   HCT 34.7*        CMP:   Recent Labs  Lab 06/30/18  0608      K 3.8      CO2 25      BUN 13   CREATININE 0.7   CALCIUM 9.6   PROT 6.6   ALBUMIN 2.3*   BILITOT 0.5   ALKPHOS 152*   AST 21   ALT 11   ANIONGAP 11   EGFRNONAA >60     Troponin:   Recent Labs  Lab 06/30/18  0608 06/30/18  0835   TROPONINI 0.025 0.028*     Urine Studies:   Recent Labs  Lab 06/30/18  0802   COLORU Yellow   APPEARANCEUA Clear   PHUR 7.0   SPECGRAV 1.015   PROTEINUA Negative   GLUCUA Negative   KETONESU Negative   BILIRUBINUA Negative   OCCULTUA Negative   NITRITE Positive*   UROBILINOGEN 1.0   LEUKOCYTESUR Negative   RBCUA 0   WBCUA 1   BACTERIA Rare   SQUAMEPITHEL 0     BNP:  1,100    Significant Imaging:   Imaging Results          X-Ray Chest 1 View (Final result)  Result time 06/30/18 08:50:50    Final result by Philip Damian Jr., MD (06/30/18 08:50:50)                 Impression:      CHF exacerbation.      Electronically signed by: Philip Damian Jr., MD  Date:    06/30/2018  Time:    08:50             Narrative:    EXAMINATION:  XR CHEST 1 VIEW    CLINICAL HISTORY:  Chest pain, unspecified    COMPARISON:  06/02/2018    FINDINGS:  Pacemaker remains in place right chest wall.  The heart remains enlarged.  Central vascular congestion with small bilateral effusions.  No pneumothorax.

## 2018-06-30 NOTE — ED PROVIDER NOTES
SCRIBE #1 NOTE: I, Ernestina Deleon, am scribing for, and in the presence of, Remington Winters MD. I have scribed the HPI, ROS, and PEx.     SCRIBE #2 NOTE: I, Yola Cruz, am scribing for, and in the presence of,  Lindsay Cabral DO. I have scribed the remaining portions of the note not scribed by Scribe #1.     History      Chief Complaint   Patient presents with    Chest Pain       Review of patient's allergies indicates:   Allergen Reactions    Morphine Hives     Other reaction(s): Unknown  Other reaction(s): Unknown    Atorvastatin      Other reaction(s): Muscle pain    Codeine      Other reaction(s): Unknown    Digoxin      Other reaction(s): Unknown    Diovan [valsartan] Other (See Comments)     Upset stomach, weakness    Eggs [egg derived]     Naproxen      Other reaction(s): Nausea    Peanut     Propofol      Other reaction(s): delirious    Sulfa (sulfonamide antibiotics)         HPI   HPI    6/30/2018, 5:49 AM   History obtained from the patient      History of Present Illness: Carlie Valdez is a 72 y.o. female patient with a PMHx of HTN who presents to the Emergency Department for R-sided CP which onset gradually last night. Symptoms are constant and moderate in severity.  No mitigating or exacerbating factors reported. No associated sxs reported. Patient denies any SOB, diaphoresis, palpitations, extremity weakness/numbness, leg pain/swelling, dizziness, cough, n/v, and all other sxs at this time. No further complaints or concerns at this time.       Arrival mode: Ambulance Service    PCP: Johanna Guzman MD       Past Medical History:  Past Medical History:   Diagnosis Date    Arthritis     Asthma     Atrial fibrillation     Blood clot of vein in shoulder area     Cardiac defibrillator in place     Chronic knee pain     Diabetes mellitus type 2 without retinopathy 12/19/2016    Diaphragmatic hernia without obstruction and without gangrene 9/14/2015    GERD  (gastroesophageal reflux disease)     Hypertension     Primary open angle glaucoma (POAG) of both eyes, severe stage 2018       Past Surgical History:  Past Surgical History:   Procedure Laterality Date    CARDIAC DEFIBRILLATOR PLACEMENT      , .    CATARACT EXTRACTION       SECTION      COLONOSCOPY      ashley Macdonald    KNEE ARTHROSCOPY      UPPER GASTROINTESTINAL ENDOSCOPY           Family History:  Family History   Problem Relation Age of Onset    Hypertension Mother     Hypertension Father     Colon cancer Neg Hx     Stomach cancer Neg Hx        Social History:  Social History     Social History Main Topics    Smoking status: Never Smoker    Smokeless tobacco: Never Used    Alcohol use No    Drug use: No    Sexual activity: Yes     Partners: Male       ROS   Review of Systems   Constitutional: Negative for chills, diaphoresis and fever.   HENT: Negative for sore throat.    Respiratory: Negative for cough and shortness of breath.    Cardiovascular: Positive for chest pain. Negative for palpitations and leg swelling.   Gastrointestinal: Negative for abdominal pain, nausea and vomiting.   Genitourinary: Negative for dysuria.   Musculoskeletal: Negative for back pain.        - calf pain   Skin: Negative for rash.   Neurological: Negative for dizziness, weakness and numbness.   Hematological: Does not bruise/bleed easily.   All other systems reviewed and are negative.      Physical Exam      Initial Vitals   BP Pulse Resp Temp SpO2   18 0506 18 0506 18 0506 18 0522 18 0506   (!) 186/110 97 18 98.4 °F (36.9 °C) 96 %      MAP       --                 Physical Exam  Nursing Notes and Vital Signs Reviewed.  Constitutional: Patient is in no acute distress. Chronically ill-appearing.  Head: Atraumatic. Normocephalic.  Eyes: PERRL. EOM intact. Conjunctivae are not pale. No scleral icterus.  ENT: Mucous membranes are moist. Oropharynx is clear and  symmetric.    Neck: Supple. Full ROM. No lymphadenopathy.  Cardiovascular: Regular rate. Regular rhythm. No murmurs, rubs, or gallops. Distal pulses are 2+ and symmetric.  Pulmonary/Chest: No respiratory distress. Clear to auscultation bilaterally. No wheezing or rales.  Abdominal: Soft and non-distended.  There is no tenderness.  No rebound, guarding, or rigidity.   Musculoskeletal:  Lower extremity contractures. No edema. No calf tenderness.  Skin: Warm and dry.  Neurological:  Alert, awake, and appropriate.  Normal speech.  No acute focal neurological deficits are appreciated.  Psychiatric: Normal affect. Good eye contact. Appropriate in content.    Right Buttock      Right axilla      Right Axilla    Sacral region.      Left Flank            ED Course    Procedures  ED Vital Signs:  Vitals:    06/30/18 0522 06/30/18 0700 06/30/18 0800 06/30/18 0815   BP:   (!) 230/110 (!) 207/112   Pulse:  70 79 74   Resp:  20  20   Temp: 98.4 °F (36.9 °C)      TempSrc: Oral      SpO2:  98% 99% 100%   Weight: 95.3 kg (210 lb)      Height:        06/30/18 0900 06/30/18 0913 06/30/18 0914 06/30/18 0915   BP:  (!) 197/96 (!) 188/98    Pulse: 69 69 69 69   Resp: 19 20 (!) 21 18   Temp:       TempSrc:       SpO2: 100% 100% 100% 100%   Weight:       Height:        06/30/18 0916 06/30/18 0930 06/30/18 1000 06/30/18 1100   BP: (!) 189/98 (!) 175/88 (!) 178/98 (!) 182/102   Pulse: 69 69 72 69   Resp: 20 20 20 20   Temp:       TempSrc:       SpO2: 100% 100% 100%    Weight:       Height:        06/30/18 1145 06/30/18 1214 06/30/18 1407   BP:  (!) 174/112 (!) 149/82   Pulse: 60 91 70   Resp:  20    Temp:  97.2 °F (36.2 °C) 98.2 °F (36.8 °C)   TempSrc:  Oral Oral   SpO2:  98% 97%   Weight:      Height:          Abnormal Lab Results:  Labs Reviewed   CBC W/ AUTO DIFFERENTIAL - Abnormal; Notable for the following:        Result Value    Hemoglobin 11.2 (*)     Hematocrit 34.7 (*)     MCV 81 (*)     MCH 26.2 (*)     RDW 17.4 (*)     Mono # 1.2  (*)     Lymph% 14.3 (*)     All other components within normal limits   COMPREHENSIVE METABOLIC PANEL - Abnormal; Notable for the following:     Albumin 2.3 (*)     Alkaline Phosphatase 152 (*)     All other components within normal limits   B-TYPE NATRIURETIC PEPTIDE - Abnormal; Notable for the following:     BNP 1,100 (*)     All other components within normal limits   URINALYSIS - Abnormal; Notable for the following:     Nitrite, UA Positive (*)     All other components within normal limits   TROPONIN I - Abnormal; Notable for the following:     Troponin I 0.028 (*)     All other components within normal limits   TROPONIN I   URINALYSIS MICROSCOPIC   POCT GLUCOSE MONITORING CONTINUOUS        All Lab Results:  Results for orders placed or performed during the hospital encounter of 06/30/18   CBC auto differential   Result Value Ref Range    WBC 9.92 3.90 - 12.70 K/uL    RBC 4.27 4.00 - 5.40 M/uL    Hemoglobin 11.2 (L) 12.0 - 16.0 g/dL    Hematocrit 34.7 (L) 37.0 - 48.5 %    MCV 81 (L) 82 - 98 fL    MCH 26.2 (L) 27.0 - 31.0 pg    MCHC 32.3 32.0 - 36.0 g/dL    RDW 17.4 (H) 11.5 - 14.5 %    Platelets 298 150 - 350 K/uL    MPV 9.2 9.2 - 12.9 fL    Gran # (ANC) 6.9 1.8 - 7.7 K/uL    Lymph # 1.4 1.0 - 4.8 K/uL    Mono # 1.2 (H) 0.3 - 1.0 K/uL    Eos # 0.4 0.0 - 0.5 K/uL    Baso # 0.04 0.00 - 0.20 K/uL    Gran% 69.3 38.0 - 73.0 %    Lymph% 14.3 (L) 18.0 - 48.0 %    Mono% 12.1 4.0 - 15.0 %    Eosinophil% 3.9 0.0 - 8.0 %    Basophil% 0.4 0.0 - 1.9 %    Differential Method Automated    Comprehensive metabolic panel   Result Value Ref Range    Sodium 142 136 - 145 mmol/L    Potassium 3.8 3.5 - 5.1 mmol/L    Chloride 106 95 - 110 mmol/L    CO2 25 23 - 29 mmol/L    Glucose 104 70 - 110 mg/dL    BUN, Bld 13 8 - 23 mg/dL    Creatinine 0.7 0.5 - 1.4 mg/dL    Calcium 9.6 8.7 - 10.5 mg/dL    Total Protein 6.6 6.0 - 8.4 g/dL    Albumin 2.3 (L) 3.5 - 5.2 g/dL    Total Bilirubin 0.5 0.1 - 1.0 mg/dL    Alkaline Phosphatase 152 (H) 55 -  135 U/L    AST 21 10 - 40 U/L    ALT 11 10 - 44 U/L    Anion Gap 11 8 - 16 mmol/L    eGFR if African American >60 >60 mL/min/1.73 m^2    eGFR if non African American >60 >60 mL/min/1.73 m^2   Troponin I #1   Result Value Ref Range    Troponin I 0.025 0.000 - 0.026 ng/mL   B-Type natriuretic peptide (BNP)   Result Value Ref Range    BNP 1,100 (H) 0 - 99 pg/mL   Urinalysis   Result Value Ref Range    Specimen UA Urine, Catheterized     Color, UA Yellow Yellow, Straw, Samra    Appearance, UA Clear Clear    pH, UA 7.0 5.0 - 8.0    Specific Gravity, UA 1.015 1.005 - 1.030    Protein, UA Negative Negative    Glucose, UA Negative Negative    Ketones, UA Negative Negative    Bilirubin (UA) Negative Negative    Occult Blood UA Negative Negative    Nitrite, UA Positive (A) Negative    Urobilinogen, UA 1.0 <2.0 EU/dL    Leukocytes, UA Negative Negative   Troponin I #2   Result Value Ref Range    Troponin I 0.028 (H) 0.000 - 0.026 ng/mL   Urinalysis Microscopic   Result Value Ref Range    RBC, UA 0 0 - 4 /hpf    WBC, UA 1 0 - 5 /hpf    Bacteria, UA Rare None-Occ /hpf    Squam Epithel, UA 0 /hpf    Microscopic Comment SEE COMMENT    Troponin I   Result Value Ref Range    Troponin I 0.033 (H) 0.000 - 0.026 ng/mL   POCT glucose   Result Value Ref Range    POCT Glucose 92 70 - 110 mg/dL       Imaging Results:  Imaging Results          X-Ray Chest 1 View (Final result)  Result time 06/30/18 08:50:50    Final result by Philip Damian Jr., MD (06/30/18 08:50:50)                 Impression:      CHF exacerbation.      Electronically signed by: Philip Damian Jr., MD  Date:    06/30/2018  Time:    08:50             Narrative:    EXAMINATION:  XR CHEST 1 VIEW    CLINICAL HISTORY:  Chest pain, unspecified    COMPARISON:  06/02/2018    FINDINGS:  Pacemaker remains in place right chest wall.  The heart remains enlarged.  Central vascular congestion with small bilateral effusions.  No pneumothorax.                               The EKG was  ordered, reviewed, and independently interpreted by the ED provider.  Interpretation time: 0518  Rate: 70 BPM  Rhythm: Atrial-paced rhythm  Interpretation: Left axis deviation. LBBB. No STEMI.             The Emergency Provider reviewed the vital signs and test results, which are outlined above.    ED Discussion     6:02 AM: Dr. Winters transfers care of pt to Dr. Cabral, pending lab/imaging results.    6:44 AM: Evaluated pt. She reports she has a history of muscle spasms. She reports this AM she began having muscle spasms in her R chest wall. She rates her pain a 10/10 in severity currently. She reports she normally has muscle spasms in her extremities. She also reports SOB and sore throat. She is not on home O2. She has home health and is bed-bound. She denies any fever, chills, cough, congestion, n/v, BLE pain, dizziness, extremity weakness/numbness, dysuria, hematuria, frequency, and all other sxs. She is on Xarelto. She states they are not sure why her muscles are weakening. Pt is blind and denies double vision prior to the vision loss. D/w pt all pertinent results. D/w pt any concerns expressed at this time. Answered all questions. Pt expresses understanding at this time.  Vital signs and nursing notes reviewed.  Constitutional: Patient is in no acute distress. Awake and alert. Well-developed. Appears tired. Smells of urine.   Head: Atraumatic. Normocephalic.  Eyes: Blind. EOM intact. Conjunctivae nl. No scleral icterus.  ENT: Mucous membranes are moist. Oropharynx is clear.  Neck: No lymphadenopathy.  Cardiovascular: Regular rate and rhythm. No murmurs, rubs, or gallops. Distal pulses are 2+ and symmetric .  Pulmonary/Chest: No respiratory distress. Clear to auscultation bilaterally. No wheezing, rales, or rhonchi.  Abdominal: Soft. No TTP. No masses  Musculoskeletal: Generalized muscle weakness. Edema to bilateral hands. Anasarca.   Skin: Warm and dry. See pictures.  Neurological: Awake and alert. No  acute focal neurological deficits are appreciated.  Psychiatric: Normal affect. Appropriate in content.    9:02 AM: Discussed case with Vanesa Artis NP ([facility]). Dr. Ayala agrees with current care and management of pt and accepts admission.   Admitting Service: Hospital medicine   Admitting Physician: Dr. Ayala  Admit to: Telemetry     9:09 AM: Re-evaluated pt. I have discussed test results, shared treatment plan, and the need for admission with patient Pt expresses understanding at this time and agree with all information. All questions answered. Pt has no further questions or concerns at this time. Pt is ready for admit.    11:09 AM: RN spoke with pt's daughter on the phone. Pt uses [facility]. Daughter reports pt was discharged from [facility] 1 week ago without medications or any prescriptions for her medications. She has only been taking Baclofen and Tramadol x1 week.    ED Medication(s):  Medications   pantoprazole EC tablet 40 mg (not administered)   baclofen tablet 10 mg (not administered)   rivaroxaban tablet 15 mg (not administered)   levothyroxine tablet 100 mcg (not administered)   amiodarone tablet 200 mg (not administered)   glucose chewable tablet 16 g (not administered)   glucose chewable tablet 24 g (not administered)   dextrose 50% injection 12.5 g (not administered)   dextrose 50% injection 25 g (not administered)   glucagon (human recombinant) injection 1 mg (not administered)   insulin aspart U-100 pen 0-5 Units (not administered)   albuterol-ipratropium 2.5 mg-0.5 mg/3 mL nebulizer solution 3 mL (not administered)   metoprolol succinate (TOPROL-XL) 24 hr tablet 50 mg (not administered)   hydrALAZINE injection 10 mg (not administered)   cefTRIAXone (ROCEPHIN) 1 g in dextrose 5 % 50 mL IVPB (not administered)   bumetanide injection 2 mg (not administered)   brimonidine 0.2% ophthalmic solution 1 drop (not administered)     And   timolol maleate 0.5% ophthalmic solution 1  drop (not administered)   lisinopril tablet 2.5 mg (not administered)   baclofen split tablet 5 mg (5 mg Oral Given 6/30/18 1026)   bumetanide injection 2 mg (2 mg Intravenous Given 6/30/18 0912)       Current Discharge Medication List                Medical Decision Making    Medical Decision Making:   Clinical Tests:   Lab Tests: Ordered and Reviewed  Radiological Study: Reviewed and Ordered  Medical Tests: Reviewed and Ordered           Scribe Attestation:   Scribe #1: I performed the above scribed service and the documentation accurately describes the services I performed. I attest to the accuracy of the note.    Attending:   Physician Attestation Statement for Scribe #1: I, Remington Winters MD, personally performed the services described in this documentation, as scribed by Ernestina Deleon, in my presence, and it is both accurate and complete.       Scribe Attestation:   Scribe #2: I performed the above scribed service and the documentation accurately describes the services I performed. I attest to the accuracy of the note.    Attending Attestation:           Physician Attestation for Scribe:    Physician Attestation Statement for Scribe #2: I, Lindsay Cabral DO, reviewed documentation, as scribed by Yola Cruz in my presence, and it is both accurate and complete. I also acknowledge and confirm the content of the note done by Scribe #1.          Clinical Impression       ICD-10-CM ICD-9-CM   1. Acute on chronic systolic heart failure I50.23 428.23   2. Chest pain R07.9 786.50   3. Anasarca R60.1 782.3   4. Bilateral pleural effusion J90 511.9   5. Decubitus ulcer of sacral region, unspecified ulcer stage L89.159 707.03     707.20   6. Blisters of multiple sites R23.8 919.2       Disposition:   Disposition: Admitted  Condition: Fair         Lindsay Cabral DO  06/30/18 3536

## 2018-06-30 NOTE — ASSESSMENT & PLAN NOTE
- Currently has [facility] (PT/OT/nurse)  - ED nurse reported pt very unkempt upon arrival to ED. Pt has 2 sitters while at home. Live with   - Pt has declined NH placement in the past  - Will consult social work for discharge planning -- will need to resume home health upon discharge. Add aide and SW to home health  - Continue PT/OT while inpatient

## 2018-06-30 NOTE — NURSING
Pt received from ED pt transferred by Remy Barker. on Heart monitor via stretcher. Bedside report completed with Remy Barker. Heart monitor #[id number].

## 2018-06-30 NOTE — HPI
Carlie Valdez is a 72 year old female with a PMHx of HTN, GERD, DM, A-fib, Asthma, and Defibrillator who presented from home with c/o chest pain. Located to right side with no radiation. Associated symptoms: SOB. Pt and sitters at bedside report she was recently discharged from [facility] and pt hasn't had her home medications since being discharged. ED workup revealed: Hgb/Hct 11.2/34.7, Alk phos 152, BNP 1100, troponin 0.028. UA (+) nitrites. CXR with central vascular congestion with small bilateral effusion. Primary cardiologist is Dr. Hernández.  Pt admitted to Telemetry for Acute on chronic CHF exacerbation and UTI.

## 2018-06-30 NOTE — ASSESSMENT & PLAN NOTE
- Hypertensive upon arrival to ED. Mostly likely due to not receiving BP medications in the past week  - Resume home medications  - Apresoline prn    DM  - HgbA1c on 06/03/18 -- 5.3 -- controlled  - Accuchecks and low dose SSI

## 2018-07-01 NOTE — PLAN OF CARE
Problem: Patient Care Overview  Goal: Plan of Care Review  Patient currently requires total assist x 2 for bed mobility and total assist for sitting balance at EOB.   Patient is SR on the monitor. Education and plan of care provided and explained. All alarms are on and audible, will continue to monitor.

## 2018-07-01 NOTE — HOSPITAL COURSE
Pt admitted for decompensated heart failure and UTI. Diuresis with Bumex and IV Rocephin in progress. Pt with physical debility and PT/OT consulted. Dr. Joiner had lengthy discussion with daughter, Haley, and patient in reference to generalized weakness and proper disposition. SNF placement was agreed however, when  approach regarding SNF selection patient refused. Introduce Hospice to patient and advised she was not a candidate for Rehab due to her extent of weakness. Pt stated she would discharge home with sitters and home health services. 7/2/18 - Dr. Joiner has spoken with pt's daughters, Haley and Rosy, regarding the mother's condition. She has diuresed and diuretics changed to oral. She was medically stable for discharge. SNF placement pending. PT/OT in progress. 7/3/18 - It was noted that pt has refused participation in PT/OT. She appears grossly more weak in the BUE/BLE extremities. Family notified of condition. Granddaughter at bedside mentioned similar symptoms prior to admission. Neurology consult placed, ESR/CRP pending, CT Head and C spine pending. At 4th day, CT of Head and C spine found no concerning findings. ESR and CRP elevated although no concern for vasculitis. Neurology saw the patient and recommended LP to rule out GBs or CIDP. Xarelto placed on hold and LP by IR is pending as Xarelto needs to be on hold for 3 days. Speech therapy evaluated the patient and noted inconsistent dysphonia. The patient will voice normally then start mouthing words. Diet recommendations:  Mechanical soft, Thin. On 6th day, pt noted to have acute onset of metabolic encephalopathy. Pt more somnolent, respiration shallow. She would arouse with sternal rub but return to lethargy. Repeat CT of Head was negative, ammonia slightly elevated at 61 and ABG showed acidosis with hypoxic/hypercapneic respiratory failure.   07/06/18 - presently on Bipap for respiratory acidosis , Lumbar puncture done  today 06/07/18 07/07- Diagnosed with NMO at [facility] Solumedrol  1000 mg/day for 5 days per neurology   07/08- possible aspiration overnight , follow up CXR new right pleural effusion , continue Bipap support   07/09 patient was intubated in morning continue to have respiratory distress and no improvement in neurological status   07/10  Started on plasma exchange series for nmo . As per neurology recommendation .  No improvement in mental status . Family updated on plan   07/11  Patient had first cycle of plasma exchange yesterday . Now can follow simple commands .continue to be on vent and with plasma exchange . Hold bp meds due to hypotension   07/12  Patient having plasma exchange. Plan for  trach and peg family and patient agreed   07/13  Patient was seen and examined today . . In morning patient was responsive as per family following commands . Later in the day patient unintentionally received fentanyl . She is currently not responsive intubated on vent. Neurology did stat eeg . Will continue monitor and assess patient once fentanyl wears off   07/13  Patient getting plasma pheresis for nmo . Patient hypotensive was given bolus . She was hypertensive last night received hydralazine . Waiting on labs for nmo antibodies   07/15  Patient showed improvement in the mental status . Getting last plasma pheresis tomorrow and trach/peg on Tuesday. Case management for ltac placement   07/16  plasmaphersis done today . Last day . Waiting on trach and peg . Plan for ltac placement . Will wait on neurology recommendation regarding any immunosuppressive needed for nmo  07/17   Patient have one more cycle  Of plasmapheresis original plan was to do 5 cycles(standard of care for NMO) but extra cycle was added because patient showed improvement in her mental status . Now she can follow commands .she understand the plan for trach and peg than discharge to LTAC. Waiting on surgery possible trach and peg tomorrow . She also have right  sided pleural effusion on diuretics   07/19/18  S/P  peg tube and Trach    await  Placement     07/20/18  Stable for discharge to SNF

## 2018-07-01 NOTE — PLAN OF CARE
Problem: Patient Care Overview  Goal: Individualization & Mutuality  Pt call light (soft touch light) in place during hourly rounding.   Pt frequently asks for family an sitters to be at bedside with patient.  Daughter Haley called 3 times today and spoke with patient. Patient requested that her daughter Haley have her daughter Alisha (long distance) to call her room because she would like her at her bedside.  Patient states she is worried about the decline in her physical mobility recently and would like anti anxiety medication to treat her anxiety. Notified Tonia Gates NP. Once time dose of Xanax ordered.

## 2018-07-01 NOTE — SUBJECTIVE & OBJECTIVE
Past Medical History:   Diagnosis Date    Arthritis     Asthma     Atrial fibrillation     Blood clot of vein in shoulder area     Cardiac defibrillator in place     Chronic knee pain     Diabetes mellitus type 2 without retinopathy 2016    Diaphragmatic hernia without obstruction and without gangrene 2015    GERD (gastroesophageal reflux disease)     Hypertension     Primary open angle glaucoma (POAG) of both eyes, severe stage 2018       Past Surgical History:   Procedure Laterality Date    CARDIAC DEFIBRILLATOR PLACEMENT      , .    CATARACT EXTRACTION       SECTION      COLONOSCOPY      ashley      Dr. Macdonald    KNEE ARTHROSCOPY      UPPER GASTROINTESTINAL ENDOSCOPY         Review of patient's allergies indicates:   Allergen Reactions    Morphine Hives    Atorvastatin      Other reaction(s): Muscle pain    Clonidine     Codeine      Other reaction(s): Unknown    Digoxin      Other reaction(s): Unknown    Diovan [valsartan] Other (See Comments)     Upset stomach, weakness    Eggs [egg derived]     Metoprolol Diarrhea    Naproxen      Other reaction(s): Nausea    Peanut     Propofol      Other reaction(s): delirious    Sulfa (sulfonamide antibiotics)        No current facility-administered medications on file prior to encounter.      Current Outpatient Prescriptions on File Prior to Encounter   Medication Sig    albuterol 90 mcg/actuation inhaler Inhale 1-2 puffs into the lungs every 6 (six) hours as needed.     albuterol-ipratropium 2.5mg-0.5mg/3mL (DUO-NEB) 0.5 mg-3 mg(2.5 mg base)/3 mL nebulizer solution Take 3 mLs by nebulization every 6 (six) hours while awake. Rescue    amiodarone (PACERONE) 200 MG Tab Take 1 tablet (200 mg total) by mouth 2 (two) times daily. (Patient taking differently: Take 100 mg by mouth 2 (two) times daily. )    brimonidine-timolol (COMBIGAN) 0.2-0.5 % Drop Place 1 drop into both eyes every 12 (twelve) hours.    bumetanide  (BUMEX) 2 MG tablet Take 1 tablet (2 mg total) by mouth 2 (two) times daily. 1 Tablet Oral Every day (Patient taking differently: Take 1 mg by mouth 2 (two) times daily. Hold for SBP less than or equal to 100.)    fluticasone (FLONASE) 50 mcg/actuation nasal spray 2 sprays by Each Nare route once daily. (Patient taking differently: 2 sprays by Each Nare route daily as needed. )    levothyroxine (SYNTHROID) 100 MCG tablet Take 100 mcg by mouth before breakfast.     montelukast (SINGULAIR) 10 mg tablet Take 10 mg by mouth once daily.     multivitamin (THERAGRAN) per tablet Take 1 tablet by mouth once daily.     polyethylene glycol (GLYCOLAX) 17 gram PwPk Take 17 g by mouth once daily. (Patient taking differently: Take 17 g by mouth daily as needed. )    potassium chloride SA (K-DUR,KLOR-CON) 20 MEQ tablet Take 20 mEq by mouth once daily.     rivaroxaban (XARELTO) 15 mg Tab Take 15 mg by mouth every evening.     silver sulfADIAZINE 1% (SILVADENE) 1 % cream Apply topically 2 (two) times daily. Apply to bliter areas BID (Patient taking differently: Apply topically 2 (two) times daily. Apply to blistered areas BID.)    sodium bicarb-sodium chloride Pack 1 packet by Nasal route 2 (two) times daily. (Patient taking differently: 1 packet by Nasal route 2 (two) times daily as needed. )    zinc sulfate (ZINCATE) 220 (50) mg capsule Take 220 mg by mouth once daily.      Family History     Problem Relation (Age of Onset)    Hypertension Mother, Father        Social History Main Topics    Smoking status: Never Smoker    Smokeless tobacco: Never Used    Alcohol use No    Drug use: No    Sexual activity: Yes     Partners: Male     Review of Systems   Constitution: Positive for weakness and malaise/fatigue.   HENT: Negative for hearing loss and hoarse voice.    Eyes: Negative for visual disturbance.        Legally blind   Cardiovascular: Positive for chest pain (right sided chest pain) and dyspnea on exertion.  Negative for claudication, irregular heartbeat, leg swelling, near-syncope, orthopnea, palpitations, paroxysmal nocturnal dyspnea and syncope.   Respiratory: Positive for shortness of breath. Negative for cough, hemoptysis, sleep disturbances due to breathing, snoring and wheezing.    Endocrine: Negative for cold intolerance and heat intolerance.   Hematologic/Lymphatic: Bruises/bleeds easily.   Skin: Negative for color change, dry skin and nail changes.   Musculoskeletal: Positive for arthritis, back pain, joint pain, muscle weakness, myalgias and stiffness.   Gastrointestinal: Negative for bloating, abdominal pain, constipation, nausea and vomiting.   Genitourinary: Negative for dysuria, flank pain, hematuria and hesitancy.   Neurological: Negative for headaches, light-headedness, loss of balance, numbness and paresthesias.   Psychiatric/Behavioral: Negative for altered mental status.   Allergic/Immunologic: Negative for environmental allergies.     Objective:     Vital Signs (Most Recent):  Temp: 96.1 °F (35.6 °C) (07/01/18 0845)  Pulse: 69 (07/01/18 1029)  Resp: 18 (07/01/18 0845)  BP: 130/68 (07/01/18 1029)  SpO2: 100 % (07/01/18 0845) Vital Signs (24h Range):  Temp:  [96.1 °F (35.6 °C)-98.6 °F (37 °C)] 96.1 °F (35.6 °C)  Pulse:  [] 69  Resp:  [16-20] 18  SpO2:  [96 %-100 %] 100 %  BP: (119-185)/() 130/68     Weight: 95.3 kg (210 lb)  Body mass index is 36.05 kg/m².    SpO2: 100 %  O2 Device (Oxygen Therapy): nasal cannula      Intake/Output Summary (Last 24 hours) at 07/01/18 1147  Last data filed at 06/30/18 1727   Gross per 24 hour   Intake              910 ml   Output                0 ml   Net              910 ml       Lines/Drains/Airways     Pressure Ulcer                 Pressure Injury 06/02/18 Left distal Ischial tuberosity Stage 3 29 days         Pressure Injury 06/02/18 Right lateral Heel Deep tissue injury 29 days         Pressure Injury 06/02/18 1659 Left proximal Ischial tuberosity  Stage 3 28 days         Pressure Injury 06/02/18 1659 Right anterior Thigh Stage 2 28 days         Pressure Injury 06/02/18 1902 Right medial Heel Deep tissue injury 28 days          Peripheral Intravenous Line                 Peripheral IV - Single Lumen 06/30/18 0850 Left Antecubital 1 day                Physical Exam   Constitutional: She is oriented to person, place, and time. She appears well-developed and well-nourished. She appears lethargic. She has a sickly appearance. She appears ill. No distress.   HENT:   Head: Normocephalic and atraumatic.   Eyes:   Legally blind   Neck: Normal range of motion and full passive range of motion without pain. Neck supple. JVD present.   Cardiovascular: Normal rate, regular rhythm, S1 normal, S2 normal and intact distal pulses.  PMI is not displaced.  Exam reveals no distant heart sounds.    Murmur heard.  High-pitched blowing decrescendo early diastolic murmur is present with a grade of 2/6  at the upper right sternal border radiating to the apex  Pulses:       Radial pulses are 2+ on the right side, and 2+ on the left side.   Pulmonary/Chest: Effort normal and breath sounds normal. No respiratory distress. She has no wheezes.   Coarse lung sounds bilaterally     Abdominal: Soft. Bowel sounds are normal. She exhibits no distension. There is no tenderness.   Obese   Musculoskeletal: Normal range of motion. She exhibits edema (trace BLE).        Right ankle: She exhibits swelling.        Left ankle: She exhibits swelling.   Neurological: She is oriented to person, place, and time. She appears lethargic.   Skin: Skin is warm and dry. She is not diaphoretic. No cyanosis. Nails show no clubbing.   Nursing note and vitals reviewed.      Significant Labs:   All pertinent lab results from the last 24 hours have been reviewed. and   Recent Lab Results       07/01/18  0546 07/01/18  0443 06/30/18  2104 06/30/18  2025 06/30/18  1641      Anion Gap  13        Baso #  0.03         Basophil%  0.3        BUN, Bld  19        Calcium  9.8        Chloride  105        CO2  24        Creatinine  0.7        Differential Method  Automated        eGFR if   >60        eGFR if non   >60  Comment:  Calculation used to obtain the estimated glomerular filtration  rate (eGFR) is the CKD-EPI equation.           Eos #  0.4        Eosinophil%  3.7        Glucose  103        Gran # (ANC)  8.7(H)        Gran%  74.0(H)        Hematocrit  35.9(L)        Hemoglobin  11.4(L)        Lymph #  1.5        Lymph%  12.6(L)        MCH  26.1(L)        MCHC  31.8(L)        MCV  82        Mono #  1.1(H)        Mono%  9.4        MPV  9.3        Platelets  359(H)        POCT Glucose 112(H)  122(H)  92     Potassium  3.8        RBC  4.36        RDW  17.5(H)        Sodium  142        Troponin I    0.033  Comment:  The reference interval for Troponin I represents the 99th percentile   cutoff   for our facility and is consistent with 3rd generation assay   performance.  (H)      TSH  0.670        WBC  11.79                    06/30/18  1505      Anion Gap      Baso #      Basophil%      BUN, Bld      Calcium      Chloride      CO2      Creatinine      Differential Method      eGFR if       eGFR if non       Eos #      Eosinophil%      Glucose      Gran # (ANC)      Gran%      Hematocrit      Hemoglobin      Lymph #      Lymph%      MCH      MCHC      MCV      Mono #      Mono%      MPV      Platelets      POCT Glucose      Potassium      RBC      RDW      Sodium      Troponin I 0.033  Comment:  The reference interval for Troponin I represents the 99th percentile   cutoff   for our facility and is consistent with 3rd generation assay   performance.  (H)     TSH      WBC            Significant Imaging: Echocardiogram:   2D echo with color flow doppler:   Results for orders placed or performed during the hospital encounter of 06/02/18   2D echo with color flow doppler    Result Value Ref Range    EF 35 (A) 55 - 65    Mitral Valve Regurgitation MILD TO MODERATE     Aortic Valve Regurgitation MILD     Est. PA Systolic Pressure 54.19 (A)     Mitral Valve Mobility NORMAL     Tricuspid Valve Regurgitation MODERATE TO SEVERE (A)     and X-Ray: CXR: X-Ray Chest 1 View (CXR):   Results for orders placed or performed during the hospital encounter of 06/30/18   X-Ray Chest 1 View    Narrative    EXAMINATION:  XR CHEST 1 VIEW    CLINICAL HISTORY:  Chest pain, unspecified    COMPARISON:  06/02/2018    FINDINGS:  Pacemaker remains in place right chest wall.  The heart remains enlarged.  Central vascular congestion with small bilateral effusions.  No pneumothorax.      Impression    CHF exacerbation.      Electronically signed by: Philip Damian Jr., MD  Date:    06/30/2018  Time:    08:50    and X-Ray Chest PA and Lateral (CXR): No results found for this visit on 06/30/18.

## 2018-07-01 NOTE — PLAN OF CARE
Sw met with pt's dtr at bedside to complete assessment. Dtr reports pt has [facility] but she is not satisfied with their services. Sw reports she can bring her a list of other companies. Lucy will also provide dtr with a SNF list to choose a provider. Lucy will f/u    Nelia Retana LMSW  07/01/2018

## 2018-07-01 NOTE — PLAN OF CARE
Problem: Pain, Acute (Adult)  Goal: Acceptable Pain Control/Comfort Level  Patient will demonstrate the desired outcomes by discharge/transition of care.  Pt c/o rt shoulder and neck pain when readjusting in bed. Pain level 0/10 when at rest.  Baclofen and Tylenol alternated to control pain.

## 2018-07-01 NOTE — HPI
Mrs. Valdez is a 72 year old female with a PMHx of chronic a-fib, HFrEF EF 40%, s/p AICD,  HTN, GERD, DM2, asthma who presents to [facility] ED for evaluation of right sided chest pain. She reports shortness of breath along with the chest pain. She states that the chest pain lasted for a long time but did decrease with deep breathing. She is a patient of Dr. Hernández. ED workup revealed H/H 11/34, BNP 1100, Initial troponin 0.028, UA + nitrites. CXR central vascular congestion with small bilateral effusion. Hospital medicine called for admission, admitted to telemetry unit. Cardiology consulted to assist with management of Heart Failure. Patient seen and examined at bedside. She reports that she is very weak at this time and is unable to perform any regular activities as she previously was able to up until about 2 months ago. She lives at home with her  and has sitters to assist with her care. She is legally blind. Reports that she had episode of right sided chest pain with shortness of breath which improved after quite some time with deep breathing. Denies any radiation of chest pain. Patient continues to complain of intermittent episodes of right sided chest pain. Continue Xarelto, ACEI, BB, Amiodarone, IV Bumex. Further recs to follow.

## 2018-07-01 NOTE — CARE UPDATE
Patient's skin cleansed old dressing removed cleansing of blisters preformed. New dressing applied to back and sacrum/lower thigh at this time patient tolerated well.

## 2018-07-01 NOTE — PROGRESS NOTES
[facility] Hospital Medicine  Progress Note    Patient Name: Carlie Valdez  MRN: [id number]  Patient Class: IP- Inpatient   Admission Date: 6/30/2018  Length of Stay: 1 days  Attending Physician: Subhash Ayala MD  Primary Care Provider: Johanna Guzman MD        Subjective:     Principal Problem:Acute on chronic systolic heart failure    HPI:  Carlie Valdez is a 72 year old female with a PMHx of HTN, GERD, DM, A-fib, Asthma, and Defibrillator who presented from home with c/o chest pain. Located to right side with no radiation. Associated symptoms: SOB. Pt and sitters at bedside report she was recently discharged from [facility] and pt hasn't had her home medications since being discharged. ED workup revealed: Hgb/Hct 11.2/34.7, Alk phos 152, BNP 1100, troponin 0.028. UA (+) nitrites. CXR with central vascular congestion with small bilateral effusion. Primary cardiologist is Dr. Hernández.  Pt admitted to Telemetry for Acute on chronic CHF exacerbation and UTI.     Hospital Course:  Pt admitted for decompensated heart failure and UTI. Diuresis with Bumex and IV Rocephin in progress. Pt with physical debility and PT/OT consulted. Dr. Joiner had lengthy discussion with daughter, Haley, and patient in reference to generalized weakness and proper disposition. SNF placement was agreed however, when  approached regarding SNF selection patient refused. Introduced Hospice to patient and advised she was not a candidate for Rehab due to her extent of weakness. Pt stated she would discharge home with sitters and home health services.         Review of Systems   Constitutional: Positive for activity change. Negative for chills and fatigue.   HENT: Negative.    Eyes: Negative.    Respiratory: Positive for shortness of breath. Negative for apnea, cough, choking, chest tightness, wheezing and stridor.    Cardiovascular: Positive for chest pain. Negative for  palpitations and leg swelling.   Gastrointestinal: Negative for abdominal pain, constipation, diarrhea, nausea and vomiting.   Endocrine: Negative.    Genitourinary: Negative.    Skin: Negative.    Allergic/Immunologic: Negative.    Neurological: Negative for dizziness, tremors, seizures, syncope, facial asymmetry, speech difficulty, weakness, light-headedness, numbness and headaches.   Hematological: Negative.    Psychiatric/Behavioral: Negative.      Objective:     Vital Signs (Most Recent):  Temp: 96.2 °F (35.7 °C) (07/01/18 1230)  Pulse: 70 (07/01/18 1230)  Resp: 20 (07/01/18 1230)  BP: (!) 166/81 (07/01/18 1230)  SpO2: (!) 93 % (07/01/18 1230) Vital Signs (24h Range):  Temp:  [96.1 °F (35.6 °C)-98.6 °F (37 °C)] 96.2 °F (35.7 °C)  Pulse:  [] 70  Resp:  [16-20] 20  SpO2:  [93 %-100 %] 93 %  BP: (119-185)/() 166/81     Weight: 95.3 kg (210 lb)  Body mass index is 36.05 kg/m².    Intake/Output Summary (Last 24 hours) at 07/01/18 1426  Last data filed at 06/30/18 1727   Gross per 24 hour   Intake              610 ml   Output                0 ml   Net              610 ml      Physical Exam   Constitutional: She is oriented to person, place, and time. She appears well-developed. She is cooperative. She is easily aroused. No distress.   HENT:   Head: Normocephalic and atraumatic.   Right eye blindness   Eyes: Conjunctivae are normal.   Neck: Normal range of motion. Neck supple.   Cardiovascular: Normal rate, regular rhythm and normal heart sounds.    No murmur heard.  Mild BLE edema  Defibrillator   Pulmonary/Chest: Effort normal. No respiratory distress. She has decreased breath sounds in the right upper field, the right middle field, the right lower field, the left upper field and the left lower field. She has no wheezes. She has no rales. She exhibits no tenderness.   Abdominal: Soft. Bowel sounds are normal. She exhibits no distension. There is no tenderness. There is no rebound and no guarding.    Genitourinary:   Genitourinary Comments: Deferred   Musculoskeletal: She exhibits edema.   Right side weakness, hx of CVA  BLE contractures   Neurological: She is alert, oriented to person, place, and time and easily aroused. No sensory deficit.   Skin: Skin is warm and dry. Capillary refill takes less than 2 seconds.   Pt has multiple areas of skin breakdown including right heel and left back area. Dressings intact.    Psychiatric: She has a normal mood and affect. Her behavior is normal. Judgment and thought content normal.   Nursing note and vitals reviewed.      Significant Labs:   CBC:   Recent Labs  Lab 06/30/18  0608 07/01/18  0443   WBC 9.92 11.79   HGB 11.2* 11.4*   HCT 34.7* 35.9*    359*     CMP:   Recent Labs  Lab 06/30/18  0608 07/01/18  0443    142   K 3.8 3.8    105   CO2 25 24    103   BUN 13 19   CREATININE 0.7 0.7   CALCIUM 9.6 9.8   PROT 6.6  --    ALBUMIN 2.3*  --    BILITOT 0.5  --    ALKPHOS 152*  --    AST 21  --    ALT 11  --    ANIONGAP 11 13   EGFRNONAA >60 >60     All pertinent labs within the past 24 hours have been reviewed.    Significant Imaging: I have reviewed all pertinent imaging results/findings within the past 24 hours.    Assessment/Plan:      * Acute on chronic systolic heart failure    - Telemetry  - BNP 1100  - CXR with vascular congestion with small bilateral effusion  - Mild BLE edema upon assessment but diminished breath sounds noted  - 2D ECHO on 06/02/18 with EF 35% and pulmonary HTN  - Pt received Bumex 2 mg IVP x 1 in ED  - Bumex 2 mg IVP daily  - Continue Metoprolol  - Strict I&Os  - Daily weights  Cardiology consult due to recurrent CHF exacerbations          Skin breakdown    - Multiple areas of skin breakdown  - Inpatient consult to wound care          UTI (urinary tract infection)    - UA (+) nitrites  - Urine culture pending  - Start IV Rocephin          Debility    - Currently has [facility] (PT/OT/nurse)  - ED nurse reported  pt very unkempt upon arrival to ED. Pt has 2 sitters while at home. Live with   - Pt has declined NH placement in the past  - Will consult social work for discharge planning -- will need to resume home health upon discharge. Add aide and SW to home health  - Continue PT/OT while inpatient        Hypothyroidism    - TSH about a month ago -- 1.265  - Continue Levothyroxine          Chronic kidney disease (CKD), stage III (moderate)    - Currently stable  - Monitor kidney function while on diuretics          Elevated troponin    - Likely secondary to demand ischemia and uncontrolled HTN  - Initial troponin 0.025>>0.028>0.033  Results are flat            Hypertension associated with diabetes    - Hypertensive upon arrival to ED. Mostly likely due to not receiving BP medications in the past week  - continue diltiazem, Toprol XL  - Apresoline prn    DM  - HgbA1c on 06/03/18 -- 5.3 -- controlled  - Accuchecks and low dose SSI          Atrial fibrillation    - Currently rate controlled  Telemetry monitoring  - Continue Xarelto and Amiodarone            VTE Risk Mitigation         Ordered     rivaroxaban tablet 15 mg  With dinner      06/30/18 1203              Tonia Gates NP  Department of Hospital Medicine   [facility]

## 2018-07-01 NOTE — PROGRESS NOTES
Spoke to patients daughter Haley . Patient is nearing medical optimization for heart failure for discharge. I discussed current admission , assessment and therapy plan for Ms. Carlie Valdez with Haley. Presently, PT/Occupational Therapy is recommending inpatient rehabilitation.  Haley stated she is reluctant to consider rehab, instead favoring discharge home. I explained that this was not medically safe. Ms. Valdez lives at home with her  , who has medical issues of his own and would be unable to provide care or assistance.  Ms De Leon stated she would like Ms. Valdez to go to the [facility] for Rehab. When she was seen 6/18 here for CHF, [facility] had requested an LP for consideration. Dr. Licona then did not feel LP was appropriate given sub acuity/chronicity of symptoms , and further patient refused. Haley stated she would be willing to consider other facilities.

## 2018-07-01 NOTE — ASSESSMENT & PLAN NOTE
-Troponin 0.025>0.028>0.033  -Admitted due to right sided chest pain with shortness of breath  -Denies chest pain at this time but reports intermittent episodes at rest  -Continue to trend troponin, repeat pending.

## 2018-07-01 NOTE — ASSESSMENT & PLAN NOTE
-BP could use some improvement  -Increase Lisinopril to 5 mg daily  -Continue BB, Amiodarone  Monitor BP trend

## 2018-07-01 NOTE — PLAN OF CARE
Problem: Physical Therapy Goal  Goal: Physical Therapy Goal  LTGs to be met within 7 days (7/7/18):  1.  Patient will perform bed mobility with max assist  2.  Patient will sit EOB x 10 minutes with Fair sitting balance   3.  Patient will perform BLE therapeutic exercises active-assist x 10 reps in all planes   Outcome: Ongoing (interventions implemented as appropriate)  No progress made today due to not being seen. Checked with patient 2x and was unavailable each time

## 2018-07-01 NOTE — NURSING
Patient c/o rt neck and shoulder pain. Baclofen not available for administration until after 1400hr. Dr. Joiner and Tonia FRANKLIN notified. Tylenol ordered PRN.

## 2018-07-01 NOTE — PROGRESS NOTES
Sw met with patient and dtr at bedside. Pt was awake at time of visit. Sw explained to pt she discussed SNF placements with her dtr. Pt reports she does not want to go to SNF. Pt asked could she go to [facility] for Rehab. Sw explained the SNF vs Rehab. Pt reports she has sitters around the clock and she will prefer to return home if she cannot go to Rehab. Sw informed NP of pt's wishes. Sw will continue to f/u.    Pt's dtr called and asked Sw could [facility] go to [facility]. Sw reported that she would not qualify for [facility]. Pt's dtr, Haley reports pt have bed sores and Sw reports that would not qualify pt. Haley is upset due to pt's decline. She reports pt was previously at [facility] and they dc pt without her medications. Dtr reports pt was more independent a couple of weeks prior to this admission. Dtr reports it was hard for pt to even feed herself on today. Lucy explained to dtr that pt's needs have changed and appeared weak when she visited with her. Sw encouraged dtr to look at SNF list because NP and therapist are recommending SNF at this time. Sw explained pt would not be able to handle 4 hours daily at rehab. No further needs at this time.    Nelia Retana LMSW  07/01/2018

## 2018-07-01 NOTE — ASSESSMENT & PLAN NOTE
- Likely secondary to demand ischemia and uncontrolled HTN  - Initial troponin 0.025>>0.028>0.033  Results are flat

## 2018-07-01 NOTE — SUBJECTIVE & OBJECTIVE
Review of Systems   Constitutional: Positive for activity change. Negative for chills and fatigue.   HENT: Negative.    Eyes: Negative.    Respiratory: Positive for shortness of breath. Negative for apnea, cough, choking, chest tightness, wheezing and stridor.    Cardiovascular: Positive for chest pain. Negative for palpitations and leg swelling.   Gastrointestinal: Negative for abdominal pain, constipation, diarrhea, nausea and vomiting.   Endocrine: Negative.    Genitourinary: Negative.    Skin: Negative.    Allergic/Immunologic: Negative.    Neurological: Negative for dizziness, tremors, seizures, syncope, facial asymmetry, speech difficulty, weakness, light-headedness, numbness and headaches.   Hematological: Negative.    Psychiatric/Behavioral: Negative.      Objective:     Vital Signs (Most Recent):  Temp: 96.2 °F (35.7 °C) (07/01/18 1230)  Pulse: 70 (07/01/18 1230)  Resp: 20 (07/01/18 1230)  BP: (!) 166/81 (07/01/18 1230)  SpO2: (!) 93 % (07/01/18 1230) Vital Signs (24h Range):  Temp:  [96.1 °F (35.6 °C)-98.6 °F (37 °C)] 96.2 °F (35.7 °C)  Pulse:  [] 70  Resp:  [16-20] 20  SpO2:  [93 %-100 %] 93 %  BP: (119-185)/() 166/81     Weight: 95.3 kg (210 lb)  Body mass index is 36.05 kg/m².    Intake/Output Summary (Last 24 hours) at 07/01/18 1426  Last data filed at 06/30/18 1727   Gross per 24 hour   Intake              610 ml   Output                0 ml   Net              610 ml      Physical Exam   Constitutional: She is oriented to person, place, and time. She appears well-developed. She is cooperative. She is easily aroused. No distress.   HENT:   Head: Normocephalic and atraumatic.   Right eye blindness   Eyes: Conjunctivae are normal.   Neck: Normal range of motion. Neck supple.   Cardiovascular: Normal rate, regular rhythm and normal heart sounds.    No murmur heard.  Mild BLE edema  Defibrillator   Pulmonary/Chest: Effort normal. No respiratory distress. She has decreased breath sounds in  the right upper field, the right middle field, the right lower field, the left upper field and the left lower field. She has no wheezes. She has no rales. She exhibits no tenderness.   Abdominal: Soft. Bowel sounds are normal. She exhibits no distension. There is no tenderness. There is no rebound and no guarding.   Genitourinary:   Genitourinary Comments: Deferred   Musculoskeletal: She exhibits edema.   Right side weakness, hx of CVA  BLE contractures   Neurological: She is alert, oriented to person, place, and time and easily aroused. No sensory deficit.   Skin: Skin is warm and dry. Capillary refill takes less than 2 seconds.   Pt has multiple areas of skin breakdown including right heel and left back area. Dressings intact.    Psychiatric: She has a normal mood and affect. Her behavior is normal. Judgment and thought content normal.   Nursing note and vitals reviewed.      Significant Labs:   CBC:   Recent Labs  Lab 06/30/18  0608 07/01/18  0443   WBC 9.92 11.79   HGB 11.2* 11.4*   HCT 34.7* 35.9*    359*     CMP:   Recent Labs  Lab 06/30/18  0608 07/01/18  0443    142   K 3.8 3.8    105   CO2 25 24    103   BUN 13 19   CREATININE 0.7 0.7   CALCIUM 9.6 9.8   PROT 6.6  --    ALBUMIN 2.3*  --    BILITOT 0.5  --    ALKPHOS 152*  --    AST 21  --    ALT 11  --    ANIONGAP 11 13   EGFRNONAA >60 >60     All pertinent labs within the past 24 hours have been reviewed.    Significant Imaging: I have reviewed all pertinent imaging results/findings within the past 24 hours.

## 2018-07-01 NOTE — ASSESSMENT & PLAN NOTE
-Patient admitted due to right sided chest pain.  -Admitted to not restarting her medications after discharge from Rehab facility.  -Continue IV Bumex daily and monitor diuresis response  -Continue ASA, BB, ACEI, IV Bumex, Amiodarone, Xarelto.   -Daily weights  -DASH diet with 1. 5 L fluid restriction  -Strict intake and output.   -She appears debilitated upon exam today, would benefit from PT/OT while inpatient  -Most recent LVEF 6/2018 EF 35-40%, low normal right ventricular systolic function.   -s/p AICD for SCD prevention

## 2018-07-01 NOTE — ASSESSMENT & PLAN NOTE
- Hypertensive upon arrival to ED. Mostly likely due to not receiving BP medications in the past week  - continue diltiazem, Toprol XL  - Apresoline prn    DM  - HgbA1c on 06/03/18 -- 5.3 -- controlled  - Accuchecks and low dose SSI

## 2018-07-01 NOTE — CONSULTS
[facility]  Cardiology  Consult Note    Patient Name: Carlie Valdez  MRN: [id number]  Admission Date: 6/30/2018  Hospital Length of Stay: 1 days  Code Status: Full Code   Attending Provider: Subhash Ayala MD   Consulting Provider: Francisco Crowder NP  Primary Care Physician: Johanna Guzman MD  Principal Problem:Acute on chronic systolic heart failure    Patient information was obtained from patient, past medical records and ER records.     Inpatient consult to Cardiology  Consult performed by: FRANCISCO CROWDER  Consult ordered by: CATALINO GALLOWAY        Subjective:     Chief Complaint:  Right sided chest pain     HPI:   Mrs. Valdez is a 72 year old female with a PMHx of chronic a-fib, HFrEF EF 40%, s/p AICD,  HTN, GERD, DM2, asthma who presents to [facility] ED for evaluation of right sided chest pain. She reports shortness of breath along with the chest pain. She states that the chest pain lasted for a long time but did decrease with deep breathing. She is a patient of Dr. Hernández. ED workup revealed H/H 11/34, BNP 1100, Initial troponin 0.028, UA + nitrites. CXR central vascular congestion with small bilateral effusion. Hospital medicine called for admission, admitted to telemetry unit. Cardiology consulted to assist with management of Heart Failure. Patient seen and examined at bedside. She reports that she is very weak at this time and is unable to perform any regular activities as she previously was able to up until about 2 months ago. She lives at home with her  and has sitters to assist with her care. She is legally blind. Reports that she had episode of right sided chest pain with shortness of breath which improved after quite some time with deep breathing. Denies any radiation of chest pain. Patient continues to complain of intermittent episodes of right sided chest pain. Continue Xarelto, ACEI, BB, Amiodarone, IV Bumex. Further recs to follow.           Past Medical  History:   Diagnosis Date    Arthritis     Asthma     Atrial fibrillation     Blood clot of vein in shoulder area     Cardiac defibrillator in place     Chronic knee pain     Diabetes mellitus type 2 without retinopathy 2016    Diaphragmatic hernia without obstruction and without gangrene 2015    GERD (gastroesophageal reflux disease)     Hypertension     Primary open angle glaucoma (POAG) of both eyes, severe stage 2018       Past Surgical History:   Procedure Laterality Date    CARDIAC DEFIBRILLATOR PLACEMENT      , .    CATARACT EXTRACTION       SECTION      COLONOSCOPY      ashley      Dr. Macdonald    KNEE ARTHROSCOPY      UPPER GASTROINTESTINAL ENDOSCOPY         Review of patient's allergies indicates:   Allergen Reactions    Morphine Hives    Atorvastatin      Other reaction(s): Muscle pain    Clonidine     Codeine      Other reaction(s): Unknown    Digoxin      Other reaction(s): Unknown    Diovan [valsartan] Other (See Comments)     Upset stomach, weakness    Eggs [egg derived]     Metoprolol Diarrhea    Naproxen      Other reaction(s): Nausea    Peanut     Propofol      Other reaction(s): delirious    Sulfa (sulfonamide antibiotics)        No current facility-administered medications on file prior to encounter.      Current Outpatient Prescriptions on File Prior to Encounter   Medication Sig    albuterol 90 mcg/actuation inhaler Inhale 1-2 puffs into the lungs every 6 (six) hours as needed.     albuterol-ipratropium 2.5mg-0.5mg/3mL (DUO-NEB) 0.5 mg-3 mg(2.5 mg base)/3 mL nebulizer solution Take 3 mLs by nebulization every 6 (six) hours while awake. Rescue    amiodarone (PACERONE) 200 MG Tab Take 1 tablet (200 mg total) by mouth 2 (two) times daily. (Patient taking differently: Take 100 mg by mouth 2 (two) times daily. )    brimonidine-timolol (COMBIGAN) 0.2-0.5 % Drop Place 1 drop into both eyes every 12 (twelve) hours.    bumetanide (BUMEX) 2 MG  tablet Take 1 tablet (2 mg total) by mouth 2 (two) times daily. 1 Tablet Oral Every day (Patient taking differently: Take 1 mg by mouth 2 (two) times daily. Hold for SBP less than or equal to 100.)    fluticasone (FLONASE) 50 mcg/actuation nasal spray 2 sprays by Each Nare route once daily. (Patient taking differently: 2 sprays by Each Nare route daily as needed. )    levothyroxine (SYNTHROID) 100 MCG tablet Take 100 mcg by mouth before breakfast.     montelukast (SINGULAIR) 10 mg tablet Take 10 mg by mouth once daily.     multivitamin (THERAGRAN) per tablet Take 1 tablet by mouth once daily.     polyethylene glycol (GLYCOLAX) 17 gram PwPk Take 17 g by mouth once daily. (Patient taking differently: Take 17 g by mouth daily as needed. )    potassium chloride SA (K-DUR,KLOR-CON) 20 MEQ tablet Take 20 mEq by mouth once daily.     rivaroxaban (XARELTO) 15 mg Tab Take 15 mg by mouth every evening.     silver sulfADIAZINE 1% (SILVADENE) 1 % cream Apply topically 2 (two) times daily. Apply to bliter areas BID (Patient taking differently: Apply topically 2 (two) times daily. Apply to blistered areas BID.)    sodium bicarb-sodium chloride Pack 1 packet by Nasal route 2 (two) times daily. (Patient taking differently: 1 packet by Nasal route 2 (two) times daily as needed. )    zinc sulfate (ZINCATE) 220 (50) mg capsule Take 220 mg by mouth once daily.      Family History     Problem Relation (Age of Onset)    Hypertension Mother, Father        Social History Main Topics    Smoking status: Never Smoker    Smokeless tobacco: Never Used    Alcohol use No    Drug use: No    Sexual activity: Yes     Partners: Male     Review of Systems   Constitution: Positive for weakness and malaise/fatigue.   HENT: Negative for hearing loss and hoarse voice.    Eyes: Negative for visual disturbance.        Legally blind   Cardiovascular: Positive for chest pain (right sided chest pain) and dyspnea on exertion. Negative for  claudication, irregular heartbeat, leg swelling, near-syncope, orthopnea, palpitations, paroxysmal nocturnal dyspnea and syncope.   Respiratory: Positive for shortness of breath. Negative for cough, hemoptysis, sleep disturbances due to breathing, snoring and wheezing.    Endocrine: Negative for cold intolerance and heat intolerance.   Hematologic/Lymphatic: Bruises/bleeds easily.   Skin: Negative for color change, dry skin and nail changes.   Musculoskeletal: Positive for arthritis, back pain, joint pain, muscle weakness, myalgias and stiffness.   Gastrointestinal: Negative for bloating, abdominal pain, constipation, nausea and vomiting.   Genitourinary: Negative for dysuria, flank pain, hematuria and hesitancy.   Neurological: Negative for headaches, light-headedness, loss of balance, numbness and paresthesias.   Psychiatric/Behavioral: Negative for altered mental status.   Allergic/Immunologic: Negative for environmental allergies.     Objective:     Vital Signs (Most Recent):  Temp: 96.1 °F (35.6 °C) (07/01/18 0845)  Pulse: 69 (07/01/18 1029)  Resp: 18 (07/01/18 0845)  BP: 130/68 (07/01/18 1029)  SpO2: 100 % (07/01/18 0845) Vital Signs (24h Range):  Temp:  [96.1 °F (35.6 °C)-98.6 °F (37 °C)] 96.1 °F (35.6 °C)  Pulse:  [] 69  Resp:  [16-20] 18  SpO2:  [96 %-100 %] 100 %  BP: (119-185)/() 130/68     Weight: 95.3 kg (210 lb)  Body mass index is 36.05 kg/m².    SpO2: 100 %  O2 Device (Oxygen Therapy): nasal cannula      Intake/Output Summary (Last 24 hours) at 07/01/18 1147  Last data filed at 06/30/18 1727   Gross per 24 hour   Intake              910 ml   Output                0 ml   Net              910 ml       Lines/Drains/Airways     Pressure Ulcer                 Pressure Injury 06/02/18 Left distal Ischial tuberosity Stage 3 29 days         Pressure Injury 06/02/18 Right lateral Heel Deep tissue injury 29 days         Pressure Injury 06/02/18 1659 Left proximal Ischial tuberosity Stage 3 28 days          Pressure Injury 06/02/18 1659 Right anterior Thigh Stage 2 28 days         Pressure Injury 06/02/18 1902 Right medial Heel Deep tissue injury 28 days          Peripheral Intravenous Line                 Peripheral IV - Single Lumen 06/30/18 0850 Left Antecubital 1 day                Physical Exam   Constitutional: She is oriented to person, place, and time. She appears well-developed and well-nourished. She appears lethargic. She has a sickly appearance. She appears ill. No distress.   HENT:   Head: Normocephalic and atraumatic.   Eyes:   Legally blind   Neck: Normal range of motion and full passive range of motion without pain. Neck supple. JVD present.   Cardiovascular: Normal rate, regular rhythm, S1 normal, S2 normal and intact distal pulses.  PMI is not displaced.  Exam reveals no distant heart sounds.    Murmur heard.  High-pitched blowing decrescendo early diastolic murmur is present with a grade of 2/6  at the upper right sternal border radiating to the apex  Pulses:       Radial pulses are 2+ on the right side, and 2+ on the left side.   Pulmonary/Chest: Effort normal and breath sounds normal. No respiratory distress. She has no wheezes.   Coarse lung sounds bilaterally     Abdominal: Soft. Bowel sounds are normal. She exhibits no distension. There is no tenderness.   Obese   Musculoskeletal: Normal range of motion. She exhibits edema (trace BLE).        Right ankle: She exhibits swelling.        Left ankle: She exhibits swelling.   Neurological: She is oriented to person, place, and time. She appears lethargic.   Skin: Skin is warm and dry. She is not diaphoretic. No cyanosis. Nails show no clubbing.   Nursing note and vitals reviewed.      Significant Labs:   All pertinent lab results from the last 24 hours have been reviewed. and   Recent Lab Results       07/01/18  0546 07/01/18  0443 06/30/18  2104 06/30/18  2025 06/30/18  1641      Anion Gap  13        Baso #  0.03        Basophil%  0.3         BUN, Bld  19        Calcium  9.8        Chloride  105        CO2  24        Creatinine  0.7        Differential Method  Automated        eGFR if   >60        eGFR if non   >60  Comment:  Calculation used to obtain the estimated glomerular filtration  rate (eGFR) is the CKD-EPI equation.           Eos #  0.4        Eosinophil%  3.7        Glucose  103        Gran # (ANC)  8.7(H)        Gran%  74.0(H)        Hematocrit  35.9(L)        Hemoglobin  11.4(L)        Lymph #  1.5        Lymph%  12.6(L)        MCH  26.1(L)        MCHC  31.8(L)        MCV  82        Mono #  1.1(H)        Mono%  9.4        MPV  9.3        Platelets  359(H)        POCT Glucose 112(H)  122(H)  92     Potassium  3.8        RBC  4.36        RDW  17.5(H)        Sodium  142        Troponin I    0.033  Comment:  The reference interval for Troponin I represents the 99th percentile   cutoff   for our facility and is consistent with 3rd generation assay   performance.  (H)      TSH  0.670        WBC  11.79                    06/30/18  1505      Anion Gap      Baso #      Basophil%      BUN, Bld      Calcium      Chloride      CO2      Creatinine      Differential Method      eGFR if       eGFR if non       Eos #      Eosinophil%      Glucose      Gran # (ANC)      Gran%      Hematocrit      Hemoglobin      Lymph #      Lymph%      MCH      MCHC      MCV      Mono #      Mono%      MPV      Platelets      POCT Glucose      Potassium      RBC      RDW      Sodium      Troponin I 0.033  Comment:  The reference interval for Troponin I represents the 99th percentile   cutoff   for our facility and is consistent with 3rd generation assay   performance.  (H)     TSH      WBC            Significant Imaging: Echocardiogram:   2D echo with color flow doppler:   Results for orders placed or performed during the hospital encounter of 06/02/18   2D echo with color flow doppler   Result Value Ref Range     EF 35 (A) 55 - 65    Mitral Valve Regurgitation MILD TO MODERATE     Aortic Valve Regurgitation MILD     Est. PA Systolic Pressure 54.19 (A)     Mitral Valve Mobility NORMAL     Tricuspid Valve Regurgitation MODERATE TO SEVERE (A)     and X-Ray: CXR: X-Ray Chest 1 View (CXR):   Results for orders placed or performed during the hospital encounter of 06/30/18   X-Ray Chest 1 View    Narrative    EXAMINATION:  XR CHEST 1 VIEW    CLINICAL HISTORY:  Chest pain, unspecified    COMPARISON:  06/02/2018    FINDINGS:  Pacemaker remains in place right chest wall.  The heart remains enlarged.  Central vascular congestion with small bilateral effusions.  No pneumothorax.      Impression    CHF exacerbation.      Electronically signed by: Philip Damian Jr., MD  Date:    06/30/2018  Time:    08:50    and X-Ray Chest PA and Lateral (CXR): No results found for this visit on 06/30/18.    Assessment and Plan:     * Acute on chronic systolic heart failure    -Patient admitted due to right sided chest pain.  -Admitted to not restarting her medications after discharge from Rehab facility.  -Continue IV Bumex daily and monitor diuresis response  -Continue ASA, BB, ACEI, IV Bumex, Amiodarone, Xarelto.   -Daily weights  -DASH diet with 1. 5 L fluid restriction  -Strict intake and output.   -She appears debilitated upon exam today, would benefit from PT/OT while inpatient  -Most recent LVEF 6/2018 EF 35-40%, low normal right ventricular systolic function.   -s/p AICD for SCD prevention        Debility    -per primary team  -continue PT/OT        Elevated troponin    -Troponin 0.025>0.028>0.033  -Admitted due to right sided chest pain with shortness of breath  -Denies chest pain at this time but reports intermittent episodes at rest  -Continue to trend troponin, repeat pending.         Hypertension associated with diabetes    -BP could use some improvement  -Increase Lisinopril to 5 mg daily  -Continue BB, Amiodarone  Monitor BP trend         Atrial fibrillation    -A-paced on monitor   -Continue Xarelto, Amiodarone, BB            VTE Risk Mitigation         Ordered     rivaroxaban tablet 15 mg  With dinner      06/30/18 1816          Thank you for your consult. I will follow-up with patient. Please contact us if you have any additional questions.    Vanesa Durham NP  Cardiology   [facility]

## 2018-07-01 NOTE — PT/OT/SLP PROGRESS
Physical Therapy      Patient Name:  Carlie Valdez   MRN:  [id number]    Patient not seen today secondary to  . Will follow-up tomorrow.    Beny Foster, PTA

## 2018-07-01 NOTE — PROGRESS NOTES
I have seen the patient, reviewed the [facility] providers assessment and plan. I have personally interviewed and examined the patient at bedside and agree with the findings.       Atif Joiner   Hospitalist Staff

## 2018-07-02 NOTE — PLAN OF CARE
Choice form obtained by Marisol for 1. [facility] 2. [facility] 3. [facility].  Referrals initiated in right care by Marisol.

## 2018-07-02 NOTE — ASSESSMENT & PLAN NOTE
-Troponin 0.025>0.028>0.033>0.045  -Likely secondary to demand ischemia from volume overload  -Repeat pending  -2D echo showed EF of 35%

## 2018-07-02 NOTE — PROGRESS NOTES
Updated patients daughter, Costa regarding assessment and plan. Medically stable, awaiting placement

## 2018-07-02 NOTE — ASSESSMENT & PLAN NOTE
-Patient admitted with decompensated systolic CHF and atypical chest pain  -Improving with diuresis, continue IV Bumex  -Continue ASA< BB, ACEI  -DASH diet with 1. 5 L fluid restriction  -Strict intake and output.   -Most recent LVEF 6/2018 EF 35-40%, low normal RV systolic function

## 2018-07-02 NOTE — ASSESSMENT & PLAN NOTE
- Telemetry  - BNP 1100  - CXR with vascular congestion with small bilateral effusion  - Mild BLE edema upon assessment but diminished breath sounds noted  - 2D ECHO on 06/02/18 with EF 35% and pulmonary HTN  - Pt received Bumex 2 mg IVP x 1 in ED  - Bumex 2 mg IVP daily====> changed to Bumex 2 mg twice daily  - Continue Metoprolol  - Strict I&Os  - Daily weights  Cardiology consult due to recurrent CHF exacerbations

## 2018-07-02 NOTE — PLAN OF CARE
Met with patient and granddaughter, patient unable to remember last visit discharge. Granddaughter reported she did not know either.      07/02/18 1537   Readmission Questionnaire   What do you believe caused you to be sick enough to be re-admitted? unknown   How often do you need to have someone help you when you read instructions, pamphlets, or other written material from your doctor or pharmacy? Always   Do you have problems taking your medications as prescribed? No   Do you have any problems affording any of  your prescribed medications? No   Lives With spouse   Living Arrangements house   Does the patient have family/friends to help with healtcare needs after discharge? yes   Who are your caregiver(s) and their phone number(s)? Haley Valdez [phone]   Does your caregiver provide all the help you need? Yes   Are you currently feeling confused? No   Are you currently having problems thinking? No   Are you currently having memory problems? No   Have you felt down, depressed, or hopeless? Unable to Assess   Have you felt little interest or pleasure in doing things? Unable to assess   In the last 7 days, my sleep quality was: poor

## 2018-07-02 NOTE — PT/OT/SLP PROGRESS
"Physical Therapy  Treatment    Carlie Valdez   MRN: [id number]   Admitting Diagnosis: Acute on chronic systolic heart failure    PT Received On: 07/02/18  PT Start Time: 0840     PT Stop Time: 0905    PT Total Time (min): 25 min       Billable Minutes:  Therapeutic Activity 25    Treatment Type: Treatment  PT/PTA: PT         General Precautions: Standard, fall, vision impaired  Orthopedic Precautions: N/A   Braces: N/A    Subjective:  Communicated with NURSE ADRIEN prior to session.  PT WITH NO PARTICIPATION DESPITE MAX ENCOURAGEMENT, APPEARS TO BE SELF-LIMITING, C/O SPASMS  Pain/Comfort  Pain Rating 1: 5/10  Location - Side 1: Right  Location - Orientation 1: upper  Location 1: shoulder    Objective:   Patient found with: peripheral IV, telemetry    Functional Mobility:  Bed Mobility:   TOTAL ASSIST X 2 STAFF FOR LATERAL ROLLING, NO ASSIST DESPITE ENCOURAGEMENT, PT REPORTS "I CAN'T, I NEED YOU TO DO IT"    Transfers:    Gait:     Balance:   Static Sit:   Dynamic Sit:   Static Stand:   Dynamic stand:      Therapeutic Activities and Exercises:  PT TOLERATED SUPINE GENTLE PROM TO BLE'S IN ALL AVAILABLE PLANES OF MOVEMENT WITH REST X 5 REPS, PT DECLINED TO ASSIST IN ROM AS WELL EVEN THOUGH SLIGHTLY MOVING LEGS NOT ON COMMAND.  PT LEFT IN SIDELYING POSITIONED WITH PILLOWS    AM-PAC 6 CLICK MOBILITY  How much help from another person does this patient currently need?   1 = Unable, Total/Dependent Assistance  2 = A lot, Maximum/Moderate Assistance  3 = A little, Minimum/Contact Guard/Supervision  4 = None, Modified Davis/Independent    Turning over in bed (including adjusting bedclothes, sheets and blankets)?: 1  Sitting down on and standing up from a chair with arms (e.g., wheelchair, bedside commode, etc.): 1  Moving from lying on back to sitting on the side of the bed?: 1  Moving to and from a bed to a chair (including a wheelchair)?: 1  Need to walk in hospital room?: 1  Climbing 3-5 steps with a " railing?: 1  Basic Mobility Total Score: 6    AM-PAC Raw Score CMS G-Code Modifier Level of Impairment Assistance   6 % Total / Unable   7 - 9 CM 80 - 100% Maximal Assist   10 - 14 CL 60 - 80% Moderate Assist   15 - 19 CK 40 - 60% Moderate Assist   20 - 22 CJ 20 - 40% Minimal Assist   23 CI 1-20% SBA / CGA   24 CH 0% Independent/ Mod I     Patient left left sidelying with all lines intact, call button in reach, NURSE notified and FAMILY present.    Assessment:  Carlie Valdez is a 72 y.o. female with a medical diagnosis of Acute on chronic systolic heart failure   PT IS NOT A CANDIDATE FOR INPATIENT SKILLED P.T. DUE TO DEPENDENT LEVEL OF FUNCTION, PT MAY BENEFIT FROM [facility] FOR PROM BLE    Rehab identified problem list/impairments:     Rehab potential is POOR    Activity tolerance: POOR    Discharge recommendations: Discharge Facility/Level Of Care Needs: nursing facility, basic OR HOME WITH 24 HOUR CARE FROM FAMILY    Barriers to discharge:     Equipment recommendations: Equipment Needed After Discharge: none     GOALS:    Physical Therapy Goals        Problem: Physical Therapy Goal    Goal Priority Disciplines Outcome Goal Variances Interventions   Physical Therapy Goal     PT/OT, PT Unable to achieve outcome(s) by discharge     Description:  LTGs to be met within 7 days (7/7/18):  1.  Patient will perform bed mobility with max assist  2.  Patient will sit EOB x 10 minutes with Fair sitting balance   3.  Patient will perform BLE therapeutic exercises active-assist x 10 reps in all planes                  PLAN:    D/C PT FROM P.T. SERVICES TO [facility]     Ayana Davis, PT  07/02/2018

## 2018-07-02 NOTE — PLAN OF CARE
KARINE spoke to [facility] to call [facility] @[phone].  Per [facility], they have a locet on file from June 21st and proper protocol should be followed.      @[phone] Level I faxed to [facility] @[phone]

## 2018-07-02 NOTE — PROGRESS NOTES
Clinical Pharmacy Progress Note: Telemetry Pharmacist Rounding     Patient was counseled by pharmacist on the telemetry pharmacist availability to discuss new medications, side effects, and reasons for changes in medication during admission.   Asked if patient had any medication questions at this time.   Instructed patient to use call light with any questions or concerns to discuss with pharmacy during the admission.   Offered bedside medication delivery to patient.   Patient expressed understanding and had no further questions.    Thank you for allowing us to participate in this patient's care.    Holly Baker, PharmD 7/2/2018 2:06 PM

## 2018-07-02 NOTE — PLAN OF CARE
Problem: Physical Therapy Goal  Goal: Physical Therapy Goal  LTGs to be met within 7 days (7/7/18):  1.  Patient will perform bed mobility with max assist  2.  Patient will sit EOB x 10 minutes with Fair sitting balance   3.  Patient will perform BLE therapeutic exercises active-assist x 10 reps in all planes   Outcome: Unable to achieve outcome(s) by discharge  D/C PT FROM P.T. SERVICES DUE TO DEPENDENT LEVEL OF FUNCTION, PT MAY BENEFIT FROM [facility] FOR PROM TO BLE

## 2018-07-02 NOTE — PROGRESS NOTES
[facility] Hospital Medicine  Progress Note    Patient Name: Carlie Valdez  MRN: [id number]  Patient Class: IP- Inpatient   Admission Date: 6/30/2018  Length of Stay: 2 days  Attending Physician: Subhash Ayala MD  Primary Care Provider: Johanna Guzman MD        Subjective:     Principal Problem:Acute on chronic systolic heart failure    HPI:  Carlie Valdez is a 72 year old female with a PMHx of HTN, GERD, DM, A-fib, Asthma, and Defibrillator who presented from home with c/o chest pain. Located to right side with no radiation. Associated symptoms: SOB. Pt and sitters at bedside report she was recently discharged from [facility] and pt hasn't had her home medications since being discharged. ED workup revealed: Hgb/Hct 11.2/34.7, Alk phos 152, BNP 1100, troponin 0.028. UA (+) nitrites. CXR with central vascular congestion with small bilateral effusion. Primary cardiologist is Dr. Hernández.  Pt admitted to Telemetry for Acute on chronic CHF exacerbation and UTI.     Hospital Course:  Pt admitted for decompensated heart failure and UTI. Diuresis with Bumex and IV Rocephin in progress. Pt with physical debility and PT/OT consulted. Dr. Joiner had lengthy discussion with daughter, Haley, and patient in reference to generalized weakness and proper disposition. SNF placement was agreed however, when  approach regarding SNF selection patient refused. Introduce Hospice to patient and advised she was not a candidate for Rehab due to her extent of weakness. Pt stated she would discharge home with sitters and home health services. 7/2/18 - Dr. Joiner has spoken with pt's daughters, Haley and Rosy, regarding the mother's condition. She has diuresed and diuretics changed to oral. She was medically stable for discharge. SNF placement pending. PT/OT in progress.         Review of Systems   Constitutional: Positive for activity change. Negative for chills and  fatigue.   HENT: Negative.    Eyes: Negative.    Respiratory: Positive for shortness of breath. Negative for apnea, cough, choking, chest tightness, wheezing and stridor.    Cardiovascular: Positive for chest pain. Negative for palpitations and leg swelling.   Gastrointestinal: Negative for abdominal pain, constipation, diarrhea, nausea and vomiting.   Endocrine: Negative.    Genitourinary: Negative.    Skin: Negative.    Allergic/Immunologic: Negative.    Neurological: Negative for dizziness, tremors, seizures, syncope, facial asymmetry, speech difficulty, weakness, light-headedness, numbness and headaches.   Hematological: Negative.    Psychiatric/Behavioral: Negative.      Objective:     Vital Signs (Most Recent):  Temp: 97.7 °F (36.5 °C) (07/02/18 1155)  Pulse: 85 (07/02/18 1155)  Resp: (!) 24 (07/02/18 0724)  BP: 126/63 (07/02/18 1155)  SpO2: 99 % (07/02/18 1155) Vital Signs (24h Range):  Temp:  [97.5 °F (36.4 °C)-98.6 °F (37 °C)] 97.7 °F (36.5 °C)  Pulse:  [65-85] 85  Resp:  [16-24] 24  SpO2:  [96 %-99 %] 99 %  BP: ()/(55-87) 126/63     Weight: 95.3 kg (210 lb)  Body mass index is 36.05 kg/m².  No intake or output data in the 24 hours ending 07/02/18 1552   Physical Exam   Constitutional: She is oriented to person, place, and time. She appears well-developed. She is cooperative. She is easily aroused. No distress.   HENT:   Head: Normocephalic and atraumatic.   Right eye blindness   Eyes: Conjunctivae are normal.   Neck: Normal range of motion. Neck supple.   Cardiovascular: Normal rate, regular rhythm and normal heart sounds.    No murmur heard.  Mild BLE edema  Defibrillator   Pulmonary/Chest: Effort normal. No respiratory distress. She has decreased breath sounds in the right upper field, the right middle field, the right lower field, the left upper field and the left lower field. She has no wheezes. She has no rales. She exhibits no tenderness.   Abdominal: Soft. Bowel sounds are normal. She exhibits  no distension. There is no tenderness. There is no rebound and no guarding.   Genitourinary:   Genitourinary Comments: Deferred   Musculoskeletal: She exhibits edema.   Right side weakness, hx of CVA  BLE contractures   Neurological: She is alert, oriented to person, place, and time and easily aroused. No sensory deficit.   Skin: Skin is warm and dry. Capillary refill takes less than 2 seconds.   Pt has multiple areas of skin breakdown including right heel and left back area. Dressings intact.    Psychiatric: She has a normal mood and affect. Her behavior is normal. Judgment and thought content normal.   Nursing note and vitals reviewed.      Significant Labs:   CBC:   Recent Labs  Lab 07/01/18  0443   WBC 11.79   HGB 11.4*   HCT 35.9*   *     CMP:   Recent Labs  Lab 07/01/18  0443      K 3.8      CO2 24      BUN 19   CREATININE 0.7   CALCIUM 9.8   ANIONGAP 13   EGFRNONAA >60     All pertinent labs within the past 24 hours have been reviewed.    Significant Imaging: I have reviewed all pertinent imaging results/findings within the past 24 hours.    Assessment/Plan:      * Acute on chronic systolic heart failure    - Telemetry  - BNP 1100  - CXR with vascular congestion with small bilateral effusion  - Mild BLE edema upon assessment but diminished breath sounds noted  - 2D ECHO on 06/02/18 with EF 35% and pulmonary HTN  - Pt received Bumex 2 mg IVP x 1 in ED  - Bumex 2 mg IVP daily====> changed to Bumex 2 mg twice daily  - Continue Metoprolol  - Strict I&Os  - Daily weights  Cardiology consult due to recurrent CHF exacerbations          Skin breakdown    - Multiple areas of skin breakdown  - Inpatient consult to wound care          UTI (urinary tract infection)    - UA (+) nitrites    - Urine culture pending    COAGULASE-NEGATIVE STAPHYLOCOCCUS SPECIES   50,000 - 99,999 cfu/ml   Susceptibility testing not routinely performed.     IV Rocephin          Debility    - Currently has [facility] (PT/OT/nurse)  - ED nurse reported pt very unkempt upon arrival to ED. Pt has 2 sitters while at home. Live with   - Pt has declined NH placement in the past  - Will consult social work for discharge planning -- will need to resume home health upon discharge. Add aide and SW to home health vs SNF placement  - Continue PT/OT while inpatient        Hypothyroidism    - TSH about a month ago -- 1.265  - Continue Levothyroxine          Chronic kidney disease (CKD), stage III (moderate)    - Currently stable  - Monitor kidney function while on diuretics          Elevated troponin    - Likely secondary to demand ischemia and uncontrolled HTN  - Initial troponin 0.025>>0.028>0.033  Results are flat            Hypertension associated with diabetes    - Hypertensive upon arrival to ED. Mostly likely due to not receiving BP medications in the past week  - continue diltiazem, Toprol XL  - Apresoline prn    DM  - HgbA1c on 06/03/18 -- 5.3 -- controlled  - Accuchecks and low dose SSI          Atrial fibrillation    - Currently rate controlled  Telemetry monitoring  - Continue Xarelto and Amiodarone            VTE Risk Mitigation         Ordered     rivaroxaban tablet 15 mg  With dinner      06/30/18 1203              Tonia Gates NP  Department of Hospital Medicine   [facility]

## 2018-07-02 NOTE — PLAN OF CARE
Problem: Patient Care Overview  Goal: Plan of Care Review  Patient currently requires total assist x 2 for bed mobility and total assist for sitting balance at EOB.   Outcome: Ongoing (interventions implemented as appropriate)  Fall precautions maintained, pt free from falls/injuries  PT repositions and ambulates w/ assist  Pt has no c/o pain  Pt receiving iv abx  POC and medications reviewed with pt, pt verbalized understanding.  Side rails x 2 up, bed locked and low.  No signs/symptoms of acute distress.  Chart check done. Will cont to monitor.

## 2018-07-02 NOTE — SUBJECTIVE & OBJECTIVE
Review of Systems   Constitution: Positive for weakness and malaise/fatigue.   HENT: Negative.    Eyes:        Blind     Cardiovascular: Positive for dyspnea on exertion and leg swelling.   Respiratory: Positive for shortness of breath.    Endocrine: Negative.    Hematologic/Lymphatic: Negative.    Skin: Negative.    Musculoskeletal: Positive for arthritis, joint pain and myalgias.   Gastrointestinal: Negative.    Genitourinary: Negative.    Psychiatric/Behavioral: Negative.    Allergic/Immunologic: Negative.      Objective:     Vital Signs (Most Recent):  Temp: 97.5 °F (36.4 °C) (07/02/18 0724)  Pulse: 70 (07/02/18 0724)  Resp: (!) 24 (07/02/18 0724)  BP: (!) 149/75 (07/02/18 0724)  SpO2: 97 % (07/02/18 0724) Vital Signs (24h Range):  Temp:  [96.2 °F (35.7 °C)-98.6 °F (37 °C)] 97.5 °F (36.4 °C)  Pulse:  [61-71] 70  Resp:  [16-24] 24  SpO2:  [93 %-97 %] 97 %  BP: ()/(55-87) 149/75     Weight: 95.3 kg (210 lb)  Body mass index is 36.05 kg/m².     SpO2: 97 %  O2 Device (Oxygen Therapy): room air      Intake/Output Summary (Last 24 hours) at 07/02/18 0913  Last data filed at 07/01/18 1452   Gross per 24 hour   Intake               50 ml   Output                0 ml   Net               50 ml       Lines/Drains/Airways     Pressure Ulcer                 Pressure Injury 06/02/18 Left distal Ischial tuberosity Stage 3 30 days         Pressure Injury 06/02/18 Right lateral Heel Deep tissue injury 30 days         Pressure Injury 06/02/18 1659 Left proximal Ischial tuberosity Stage 3 29 days         Pressure Injury 06/02/18 1659 Right anterior Thigh Stage 2 29 days         Pressure Injury 06/02/18 1902 Right medial Heel Deep tissue injury 29 days          Peripheral Intravenous Line                 Peripheral IV - Single Lumen 06/30/18 0850 Left Antecubital 2 days                Physical Exam   Constitutional: She is oriented to person, place, and time. No distress.   Ill appearing   HENT:   Head: Normocephalic and  atraumatic.   Eyes: Pupils are equal, round, and reactive to light. Right eye exhibits no discharge. Left eye exhibits no discharge.   Legally blind     Neck: Neck supple. JVD present.   Cardiovascular: Normal rate, regular rhythm, S1 normal and S2 normal.    Murmur heard.  High-pitched blowing decrescendo early diastolic murmur is present  at the upper right sternal border radiating to the apex  Pulmonary/Chest: Effort normal. No respiratory distress.   +coarse bibasilar BS  AICD site well-healed   Abdominal: Soft. Bowel sounds are normal. She exhibits no distension. There is no tenderness. There is no rebound.   Musculoskeletal: She exhibits edema (BLE).   Neurological: She is alert and oriented to person, place, and time.   Skin: Skin is warm and dry. She is not diaphoretic.   Psychiatric: She has a normal mood and affect. Her behavior is normal. Thought content normal.   Nursing note and vitals reviewed.      Significant Labs:   CMP   Recent Labs  Lab 07/01/18  0443      K 3.8      CO2 24      BUN 19   CREATININE 0.7   CALCIUM 9.8   ANIONGAP 13   ESTGFRAFRICA >60   EGFRNONAA >60   , CBC   Recent Labs  Lab 07/01/18  0443   WBC 11.79   HGB 11.4*   HCT 35.9*   *   , Troponin   Recent Labs  Lab 06/30/18  1505 06/30/18  2025 07/01/18  1226   TROPONINI 0.033* 0.033* 0.045*    and All pertinent lab results from the last 24 hours have been reviewed.    Significant Imaging: Echocardiogram:   2D echo with color flow doppler:   Results for orders placed or performed during the hospital encounter of 06/02/18   2D echo with color flow doppler   Result Value Ref Range    EF 35 (A) 55 - 65    Mitral Valve Regurgitation MILD TO MODERATE     Aortic Valve Regurgitation MILD     Est. PA Systolic Pressure 54.19 (A)     Mitral Valve Mobility NORMAL     Tricuspid Valve Regurgitation MODERATE TO SEVERE (A)    , EKG: Reviewed and X-Ray: CXR: X-Ray Chest 1 View (CXR):   Results for orders placed or performed  during the hospital encounter of 06/30/18   X-Ray Chest 1 View    Narrative    EXAMINATION:  XR CHEST 1 VIEW    CLINICAL HISTORY:  Chest pain, unspecified    COMPARISON:  06/02/2018    FINDINGS:  Pacemaker remains in place right chest wall.  The heart remains enlarged.  Central vascular congestion with small bilateral effusions.  No pneumothorax.      Impression    CHF exacerbation.      Electronically signed by: Philip Damian Jr., MD  Date:    06/30/2018  Time:    08:50    and X-Ray Chest PA and Lateral (CXR): No results found for this visit on 06/30/18.

## 2018-07-02 NOTE — PROGRESS NOTES
[facility]  Cardiology  Progress Note    Patient Name: Carlie Valdez  MRN: [id number]  Admission Date: 6/30/2018  Hospital Length of Stay: 2 days  Code Status: Full Code   Attending Physician: Subhash Ayala MD   Primary Care Physician: Johanna Guzman MD  Expected Discharge Date:   Principal Problem:Acute on chronic systolic heart failure    Subjective:   HPI:  Mrs. Valdez is a 72 year old female with a PMHx of chronic a-fib, HFrEF EF 40%, s/p AICD,  HTN, GERD, DM2, asthma who presents to [facility] ED for evaluation of right sided chest pain. She reports shortness of breath along with the chest pain. She states that the chest pain lasted for a long time but did decrease with deep breathing. She is a patient of Dr. Hernández. ED workup revealed H/H 11/34, BNP 1100, Initial troponin 0.028, UA + nitrites. CXR central vascular congestion with small bilateral effusion. Hospital medicine called for admission, admitted to telemetry unit. Cardiology consulted to assist with management of Heart Failure. Patient seen and examined at bedside. She reports that she is very weak at this time and is unable to perform any regular activities as she previously was able to up until about 2 months ago. She lives at home with her  and has sitters to assist with her care. She is legally blind. Reports that she had episode of right sided chest pain with shortness of breath which improved after quite some time with deep breathing. Denies any radiation of chest pain. Patient continues to complain of intermittent episodes of right sided chest pain. Continue Xarelto, ACEI, BB, Amiodarone, IV Bumex. Further recs to follow.         Hospital Course:   7/2/18-Patient seen and examined today. Sitting up in bed. Still complains of weakness/fatigue as well as right arm/shoulder pain. Still SOB compared to baseline. No chest pain. Labs pending.         Review of Systems   Constitution: Positive for weakness and  malaise/fatigue.   HENT: Negative.    Eyes:        Blind     Cardiovascular: Positive for dyspnea on exertion and leg swelling.   Respiratory: Positive for shortness of breath.    Endocrine: Negative.    Hematologic/Lymphatic: Negative.    Skin: Negative.    Musculoskeletal: Positive for arthritis, joint pain and myalgias.   Gastrointestinal: Negative.    Genitourinary: Negative.    Psychiatric/Behavioral: Negative.    Allergic/Immunologic: Negative.      Objective:     Vital Signs (Most Recent):  Temp: 97.5 °F (36.4 °C) (07/02/18 0724)  Pulse: 70 (07/02/18 0724)  Resp: (!) 24 (07/02/18 0724)  BP: (!) 149/75 (07/02/18 0724)  SpO2: 97 % (07/02/18 0724) Vital Signs (24h Range):  Temp:  [96.2 °F (35.7 °C)-98.6 °F (37 °C)] 97.5 °F (36.4 °C)  Pulse:  [61-71] 70  Resp:  [16-24] 24  SpO2:  [93 %-97 %] 97 %  BP: ()/(55-87) 149/75     Weight: 95.3 kg (210 lb)  Body mass index is 36.05 kg/m².     SpO2: 97 %  O2 Device (Oxygen Therapy): room air      Intake/Output Summary (Last 24 hours) at 07/02/18 0913  Last data filed at 07/01/18 1452   Gross per 24 hour   Intake               50 ml   Output                0 ml   Net               50 ml       Lines/Drains/Airways     Pressure Ulcer                 Pressure Injury 06/02/18 Left distal Ischial tuberosity Stage 3 30 days         Pressure Injury 06/02/18 Right lateral Heel Deep tissue injury 30 days         Pressure Injury 06/02/18 1659 Left proximal Ischial tuberosity Stage 3 29 days         Pressure Injury 06/02/18 1659 Right anterior Thigh Stage 2 29 days         Pressure Injury 06/02/18 1902 Right medial Heel Deep tissue injury 29 days          Peripheral Intravenous Line                 Peripheral IV - Single Lumen 06/30/18 0850 Left Antecubital 2 days                Physical Exam   Constitutional: She is oriented to person, place, and time. No distress.   Ill appearing   HENT:   Head: Normocephalic and atraumatic.   Eyes: Pupils are equal, round, and reactive to  light. Right eye exhibits no discharge. Left eye exhibits no discharge.   Legally blind     Neck: Neck supple. JVD present.   Cardiovascular: Normal rate, regular rhythm, S1 normal and S2 normal.    Murmur heard.  High-pitched blowing decrescendo early diastolic murmur is present  at the upper right sternal border radiating to the apex  Pulmonary/Chest: Effort normal. No respiratory distress.     AICD site well-healed   Abdominal: Soft. Bowel sounds are normal. She exhibits no distension. There is no tenderness. There is no rebound.   Musculoskeletal: She exhibits edema (BLE), trace   Neurological: She is alert and oriented to person, place, and time.   Skin: Skin is warm and dry. She is not diaphoretic.   Psychiatric: She has a normal mood and affect. Her behavior is normal. Thought content normal.   Nursing note and vitals reviewed.      Significant Labs:   CMP   Recent Labs  Lab 07/01/18  0443      K 3.8      CO2 24      BUN 19   CREATININE 0.7   CALCIUM 9.8   ANIONGAP 13   ESTGFRAFRICA >60   EGFRNONAA >60   , CBC   Recent Labs  Lab 07/01/18  0443   WBC 11.79   HGB 11.4*   HCT 35.9*   *   , Troponin   Recent Labs  Lab 06/30/18  1505 06/30/18  2025 07/01/18  1226   TROPONINI 0.033* 0.033* 0.045*    and All pertinent lab results from the last 24 hours have been reviewed.    Significant Imaging: Echocardiogram:   2D echo with color flow doppler:   Results for orders placed or performed during the hospital encounter of 06/02/18   2D echo with color flow doppler   Result Value Ref Range    EF 35 (A) 55 - 65    Mitral Valve Regurgitation MILD TO MODERATE     Aortic Valve Regurgitation MILD     Est. PA Systolic Pressure 54.19 (A)     Mitral Valve Mobility NORMAL     Tricuspid Valve Regurgitation MODERATE TO SEVERE (A)    , EKG: Reviewed and X-Ray: CXR: X-Ray Chest 1 View (CXR):   Results for orders placed or performed during the hospital encounter of 06/30/18   X-Ray Chest 1 View    Narrative     EXAMINATION:  XR CHEST 1 VIEW    CLINICAL HISTORY:  Chest pain, unspecified    COMPARISON:  06/02/2018    FINDINGS:  Pacemaker remains in place right chest wall.  The heart remains enlarged.  Central vascular congestion with small bilateral effusions.  No pneumothorax.      Impression    CHF exacerbation.      Electronically signed by: Philip Damian Jr., MD  Date:    06/30/2018  Time:    08:50    and X-Ray Chest PA and Lateral (CXR): No results found for this visit on 06/30/18.    Assessment and Plan:   Patient who presents with decompensated CHF. Clinically improving with IV diuresis. BNP much lower, can switch to po. Continue other meds. Elevated troponin likely secondary to demand ischemia, remains flat.. Needs aggressive PT/OT and SNF placement given debility.     * Acute on chronic systolic heart failure    -Patient admitted with decompensated systolic CHF and atypical chest pain  -Improving with diuresis,, switch to po Bumex soon  -Continue ASA< BB, ACEI  -DASH diet with 1. 5 L fluid restriction  -Strict intake and output.   -Most recent LVEF 6/2018 EF 35-40%, low normal RV systolic function        Debility    -Per primary team  -PT/OT  -Awaiting SNF placement        Elevated troponin    -Troponin 0.025>0.028>0.033>0.045  -Likely secondary to demand ischemia from volume overload  -Repeat pending  -2D echo showed EF of 35%        Hypertension associated with diabetes    -Continue ACEI, BB        Atrial fibrillation    -A-paced on monitor   -Continue Xarelto, Amiodarone, BB            VTE Risk Mitigation         Ordered     rivaroxaban tablet 15 mg  With dinner      06/30/18 1203          Carolyn Arana PA-C  Cardiology  [facility]     Chart reviewed. Dr. Craig examined patient and agrees with plan as outlined above.

## 2018-07-02 NOTE — PLAN OF CARE
Problem: Patient Care Overview  Goal: Plan of Care Review  Patient currently requires total assist x 2 for bed mobility and total assist for sitting balance at EOB.   Outcome: Ongoing (interventions implemented as appropriate)  Pt alert and oriented. POC reviewed. Daughter at bedside. BG monitored no correction does needed. Turn Q 2. Daily weight, strict I and O's. Pt remained free of falls during shift, dressing to back, left knee.and buttocks CDI. Bed alarm set , call light in reach, room free of clutter, side rails  up x2, pt on telemetry monitor SR , will continue to monitor.

## 2018-07-02 NOTE — PROGRESS NOTES
Spoke to patients daughter Rosy by phone . She asked to be updated on patients constriction. I explained that she was here for acute on chronic systolic heart failure. She has had many admissions over the last several months for the same .Girish stated she wasn't aware of many of these admissions. She agrees that she should likely go to SNF vs rehab . Also agrees Neuro/Spine rehab is not ideal. She had no further questions, I explained I would be available to discuss care at any time during this week

## 2018-07-02 NOTE — CONSULTS
07/02/18 1005   Skin   Skin WDL ex   Skin Temperature warm   Skin Moisture Dry   Skin Integrity Blister(s)   Beka Risk Assessment   Sensory Perception 2-->very limited   Moisture 2-->very moist   Activity 1-->bedfast   Mobility 2-->very limited   Nutrition 3-->adequate   Friction and Shear 1-->problem   Beka Score 11       Pressure Injury 06/02/18 1659 Left proximal Ischial tuberosity Stage 3   Date First Assessed/Time First Assessed: 06/02/18 1659   Pressure Injury Present on Admission: yes  Side: Left  Orientation: proximal  Location: Ischial tuberosity  Is this injury device related?: (c) Yes  Staging: Stage 3   Staging 3  (healing)   Dressing Appearance Dry;Intact   Drainage Amount None   Appearance Red;Moist   Tissue loss description Full thickness   Periwound Area Dry;Intact;Pink;Scar tissue   Wound Edges Open   Wound Length (cm) 3 cm   Wound Width (cm) 3 cm   Care Cleansed with:;Sterile normal saline;Applied:;Skin Barrier   Dressing Hydrocolloid   Periwound Care Skin barrier film applied;Dry periwound area maintained;Absorptive dressing applied;Cleansed with pH balanced cleanser   Dressing Change Due 07/07/18       Pressure Injury 06/02/18 Right lateral Heel Stage 3   Date First Assessed: 06/02/18   Pressure Injury Present on Admission: yes  Side: Right  Orientation: lateral  Location: Heel  Staging: Stage 3   Staging 3   Dressing Appearance Dry;Intact   Drainage Amount Scant   Drainage Characteristics/Odor Serous   Appearance Red;Yellow;Moist;Slough   Tissue loss description Full thickness   Periwound Area Dry;Intact   Wound Length (cm) 2 cm   Wound Width (cm) 2 cm   Care Cleansed with:;Sterile normal saline;Applied:;Skin Barrier   Dressing Foam   Dressing Change Due 07/05/18       Pressure Injury 06/02/18 1902 Right medial Heel Unstageable   Date First Assessed/Time First Assessed: 06/02/18 1902   Pressure Injury Present on Admission: yes  Side: Right  Orientation: medial  Location: Heel  Staging:  Unstageable   Staging U   Dressing Appearance Dry;Intact   Drainage Amount Small   Drainage Characteristics/Odor Serous   Appearance Black;Eschar;Red;Moist   Tissue loss description Full thickness   Black (%), Wound Tissue Color 70 %   Red (%), Wound Tissue Color 30 %   Periwound Area Dry;Intact   Wound Length (cm) 4 cm   Wound Width (cm) 6 cm   Care Cleansed with:;Sterile normal saline;Applied:;Skin Barrier   Dressing Foam   Dressing Change Due 07/05/18       Pressure Injury 06/30/18 Left lateral Hip Stage 2   Date First Assessed: 06/30/18   Pressure Injury Present on Admission: yes  Side: Left  Orientation: lateral  Location: (c) Hip  Is this injury device related?: No  Staging: Stage 2   Staging 2   Dressing Appearance Dry;Intact   Drainage Amount None   Appearance Red;Moist;Slough   Tissue loss description Partial thickness   Wound Edges Open   Care Cleansed with:;Sterile normal saline;Applied:;Skin Barrier   Dressing Foam   Dressing Change Due 07/05/18       Pressure Injury 06/30/18 Right lateral Buttocks Stage 2   Date First Assessed: 06/30/18   Pressure Injury Present on Admission: yes  Side: Right  Orientation: lateral  Location: Buttocks  Staging: Stage 2   Staging 2   Dressing Appearance Dry;Intact   Drainage Amount None   Appearance Red;Moist;Blistered   Tissue loss description Partial thickness   Wound Edges Open   Wound Length (cm) 1 cm   Wound Width (cm) 1 cm   Care Cleansed with:;Sterile normal saline;Applied:;Skin Barrier   Dressing Foam   Skin Interventions   Pressure Reduction Devices Pressure-redistributing mattress utilized;Foam padding utilized;Heel offloading device utilized   Pressure Reduction Techniques frequent weight shift encouraged;heels elevated off bed;positioned off wounds;pressure points protected   Skin Protection Adhesive use limited;Hydrocolloids;Incontinence pads;Skin-to-skin areas padded;Tubing/devices free from skin contact   Safety Interventions   Patient Rounds visualized  patient;toileting offered;placement of personal items at bedside;ID band on;clutter free environment maintained;call light in reach;bed in low position;bed wheels locked   Safety Promotion/Fall Prevention assistive device/personal item within reach;Fall Risk reviewed with patient/family;nonskid shoes/socks when out of bed;side rails raised x 2   Daily Care   Activity Management activity adjusted per tolerance*   Positioning   Body Position side-lying, right     Consulted on this 71 y/o F patient due to multiple present on admission pressure injuries. Patient was recently seen by this service in the past month for similar wounds.  She was discharged to rehab, then home with . Per family member, she was stuck in chair at home that was too small because alon lift and hospital bed were never delivered.  She developed multiple stage 2 pressure injuries to right buttock and left hip and flank as intact serous fluid filled blisters from this chair, that have now opened.  Wounds are clean, partial thickness open blisters. Foam dressings applied to cover all.  Right heel had 2 large DTIs on last admission, now evolvinge.  Right lateral heel DTI has evolved into POA stage 3 pressure injury with moist red and yellow wound bed measuring 2x2cm.  Right posterior/lateral DTI has evolved into unstageable pressure injury with black eschar that is open on lateral side with red moist wound bed.  Cleansed all with saline and patted dry.  Painted milagros wound with cavilon, and foam dressing applied to cover wounds, and heel elevated with pillows.  Left heel intact.  Patient turned with max assist to right side.  Sacrum and coccygeal regions intact with no breakdown.  Incontinence pad saturated with urine, and incontinence care provided with easi cleanse foam wipes and aptted dry. Thin layer critic aid clear barrier applied.  Left ischium duoderm removed.  Healing stage 3 noted to this site with moist red wound bed and surrounding pink  scar tissue noted. Cleansed with saline, paitned with cavilon, and new duoderm applied to cover.  Patient then positioned on right side with wedge and pillows. Tolerated wound care well.  Will order specialty low air loss pulsate mattress for patient.     Left Ischial Stage 3 pressure injury:  1. Cleanse with saline  2. Pat dry  3. Paint with cavilon  4. Apply duoderm  5. Change DuoDERM (hydrocolloid) dressing every 5 days (PRN if edges roll)    Left flank and right buttock stage 2 pressure injuries:  1. Cleanse with saline  2. Pat dry  3. Paint with cavilon  4. Apply foam dressings  5. Change every 3 days    Right heel Stage 3 and unstageable rpessure injuries:  1. Cleanse with saline  2. Pat dry  3. Paint with cavilon  4. Apply Sacral foam dressing, slit sides to form to heel  5. Change every 3 days  6. Keep heel floated

## 2018-07-02 NOTE — ASSESSMENT & PLAN NOTE
- UA (+) nitrites    - Urine culture pending    COAGULASE-NEGATIVE STAPHYLOCOCCUS SPECIES   50,000 - 99,999 cfu/ml   Susceptibility testing not routinely performed.     IV Rocephin

## 2018-07-02 NOTE — ASSESSMENT & PLAN NOTE
- Currently has [facility] (PT/OT/nurse)  - ED nurse reported pt very unkempt upon arrival to ED. Pt has 2 sitters while at home. Live with   - Pt has declined NH placement in the past  - Will consult social work for discharge planning -- will need to resume home health upon discharge. Add aide and SW to home health vs SNF placement  - Continue PT/OT while inpatient

## 2018-07-02 NOTE — SUBJECTIVE & OBJECTIVE
Review of Systems   Constitutional: Positive for activity change. Negative for chills and fatigue.   HENT: Negative.    Eyes: Negative.    Respiratory: Positive for shortness of breath. Negative for apnea, cough, choking, chest tightness, wheezing and stridor.    Cardiovascular: Positive for chest pain. Negative for palpitations and leg swelling.   Gastrointestinal: Negative for abdominal pain, constipation, diarrhea, nausea and vomiting.   Endocrine: Negative.    Genitourinary: Negative.    Skin: Negative.    Allergic/Immunologic: Negative.    Neurological: Negative for dizziness, tremors, seizures, syncope, facial asymmetry, speech difficulty, weakness, light-headedness, numbness and headaches.   Hematological: Negative.    Psychiatric/Behavioral: Negative.      Objective:     Vital Signs (Most Recent):  Temp: 97.7 °F (36.5 °C) (07/02/18 1155)  Pulse: 85 (07/02/18 1155)  Resp: (!) 24 (07/02/18 0724)  BP: 126/63 (07/02/18 1155)  SpO2: 99 % (07/02/18 1155) Vital Signs (24h Range):  Temp:  [97.5 °F (36.4 °C)-98.6 °F (37 °C)] 97.7 °F (36.5 °C)  Pulse:  [65-85] 85  Resp:  [16-24] 24  SpO2:  [96 %-99 %] 99 %  BP: ()/(55-87) 126/63     Weight: 95.3 kg (210 lb)  Body mass index is 36.05 kg/m².  No intake or output data in the 24 hours ending 07/02/18 1552   Physical Exam   Constitutional: She is oriented to person, place, and time. She appears well-developed. She is cooperative. She is easily aroused. No distress.   HENT:   Head: Normocephalic and atraumatic.   Right eye blindness   Eyes: Conjunctivae are normal.   Neck: Normal range of motion. Neck supple.   Cardiovascular: Normal rate, regular rhythm and normal heart sounds.    No murmur heard.  Mild BLE edema  Defibrillator   Pulmonary/Chest: Effort normal. No respiratory distress. She has decreased breath sounds in the right upper field, the right middle field, the right lower field, the left upper field and the left lower field. She has no wheezes. She has  no rales. She exhibits no tenderness.   Abdominal: Soft. Bowel sounds are normal. She exhibits no distension. There is no tenderness. There is no rebound and no guarding.   Genitourinary:   Genitourinary Comments: Deferred   Musculoskeletal: She exhibits edema.   Right side weakness, hx of CVA  BLE contractures   Neurological: She is alert, oriented to person, place, and time and easily aroused. No sensory deficit.   Skin: Skin is warm and dry. Capillary refill takes less than 2 seconds.   Pt has multiple areas of skin breakdown including right heel and left back area. Dressings intact.    Psychiatric: She has a normal mood and affect. Her behavior is normal. Judgment and thought content normal.   Nursing note and vitals reviewed.      Significant Labs:   CBC:   Recent Labs  Lab 07/01/18  0443   WBC 11.79   HGB 11.4*   HCT 35.9*   *     CMP:   Recent Labs  Lab 07/01/18  0443      K 3.8      CO2 24      BUN 19   CREATININE 0.7   CALCIUM 9.8   ANIONGAP 13   EGFRNONAA >60     All pertinent labs within the past 24 hours have been reviewed.    Significant Imaging: I have reviewed all pertinent imaging results/findings within the past 24 hours.

## 2018-07-02 NOTE — HOSPITAL COURSE
7/2/18-Patient seen and examined today. Sitting up in bed. Still complains of weakness/fatigue as well as right arm/shoulder pain. Still SOB compared to baseline. No chest pain. Labs pending.

## 2018-07-02 NOTE — PLAN OF CARE
07/01/18 1230   Discharge Assessment   Assessment Type Discharge Planning Assessment   Confirmed/corrected address and phone number on facesheet? Yes   Assessment information obtained from? Caregiver  (Pt's daughter, Haley)   Prior to hospitilization cognitive status: Alert/Oriented  (Pt was sleep at time of visit)   Prior to hospitalization functional status: Needs Assistance   Current cognitive status: Unable to Assess   Current Functional Status: Needs Assistance;Partially Dependent   Lives With spouse   Able to Return to Prior Arrangements unable to determine at this time (comments)   Is patient able to care for self after discharge? No   Who are your caregiver(s) and their phone number(s)? margarita Haley Valdez [phone]   Readmission Within The Last 30 Days no previous admission in last 30 days   Patient currently being followed by outpatient case management? No   Patient currently receives any other outside agency services? No   Equipment Currently Used at Home walker, rolling;hospital bed;bedside commode;lift device  (Dtr request rollator.)   Do you have any problems affording any of your prescribed medications? No   Is the patient taking medications as prescribed? yes   Does the patient have transportation home? Yes   Transportation Available car   Discharge Plan A Skilled Nursing Facility   Patient/Family In Agreement With Plan yes

## 2018-07-03 PROBLEM — R29.898 SEVERE MUSCLE DECONDITIONING: Status: ACTIVE | Noted: 2018-01-01

## 2018-07-03 NOTE — PLAN OF CARE
07/03/18 1431   Medicare Message   Important Message from Medicare regarding Discharge Appeal Rights Given to patient/caregiver;Explained to patient/caregiver;Signed/date by patient/caregiver   Date IMM was signed 07/03/18   Time IMM was signed [id number]

## 2018-07-03 NOTE — PROGRESS NOTES
[facility] Hospital Medicine  Progress Note    Patient Name: Carlie Valdez  MRN: [id number]  Patient Class: IP- Inpatient   Admission Date: 6/30/2018  Length of Stay: 3 days  Attending Physician: Subhash Ayala MD  Primary Care Provider: Johanna Guzman MD        Subjective:     Principal Problem:Acute on chronic systolic heart failure    HPI:  Carlie Valdez is a 72 year old female with a PMHx of HTN, GERD, DM, A-fib, Asthma, and Defibrillator who presented from home with c/o chest pain. Located to right side with no radiation. Associated symptoms: SOB. Pt and sitters at bedside report she was recently discharged from [facility] and pt hasn't had her home medications since being discharged. ED workup revealed: Hgb/Hct 11.2/34.7, Alk phos 152, BNP 1100, troponin 0.028. UA (+) nitrites. CXR with central vascular congestion with small bilateral effusion. Primary cardiologist is Dr. Hernández.  Pt admitted to Telemetry for Acute on chronic CHF exacerbation and UTI.     Hospital Course:  Pt admitted for decompensated heart failure and UTI. Diuresis with Bumex and IV Rocephin in progress. Pt with physical debility and PT/OT consulted. Dr. Joiner had lengthy discussion with daughter, Haley, and patient in reference to generalized weakness and proper disposition. SNF placement was agreed however, when  approach regarding SNF selection patient refused. Introduce Hospice to patient and advised she was not a candidate for Rehab due to her extent of weakness. Pt stated she would discharge home with sitters and home health services. 7/2/18 - Dr. Joiner has spoken with pt's daughters, Haley and Rosy, regarding the mother's condition. She has diuresed and diuretics changed to oral. She was medically stable for discharge. SNF placement pending. PT/OT in progress. 7/3/18 - It was noted that pt has refused participation in PT/OT. She appears grossly more weak in  the BUE/BLE extremities. Family notified of condition. Granddaughter at bedside mentioned similar symptoms prior to admission. Neurology consult placed, ESR/CRP pending, CT Head and C spine pending.     Interval History: Pt constantly talking and saying she has difficulty speaking. Overnight, she had secretions in her throat that required suctioning. Minimal withdrawal of arms when noxious stimulus performed. Granddaughter, Humaira, at bedside mentioned same symptoms at home however, pt is stating she is more weak. Neurology consult placed. ESR/CRP, CT of Head and CT C spine pending.     Review of Systems   Constitutional: Positive for activity change. Negative for chills and fatigue.   HENT: Negative.    Eyes: Negative.    Respiratory: Positive for shortness of breath. Negative for apnea, cough, choking, chest tightness, wheezing and stridor.    Cardiovascular: Positive for chest pain. Negative for palpitations and leg swelling.   Gastrointestinal: Negative for abdominal pain, constipation, diarrhea, nausea and vomiting.   Endocrine: Negative.    Genitourinary: Negative.    Skin: Negative.    Allergic/Immunologic: Negative.    Neurological: Negative for dizziness, tremors, seizures, syncope, facial asymmetry, speech difficulty, weakness, light-headedness, numbness and headaches.   Hematological: Negative.    Psychiatric/Behavioral: Negative.      Objective:     Vital Signs (Most Recent):  Temp: 97.7 °F (36.5 °C) (07/03/18 0341)  Pulse: 69 (07/03/18 0904)  Resp: 16 (07/03/18 0904)  BP: 111/67 (07/03/18 0904)  SpO2: 95 % (07/03/18 0904) Vital Signs (24h Range):  Temp:  [96.3 °F (35.7 °C)-98.7 °F (37.1 °C)] 97.7 °F (36.5 °C)  Pulse:  [69-85] 69  Resp:  [16-18] 16  SpO2:  [95 %-100 %] 95 %  BP: (107-145)/(61-81) 111/67     Weight: 95.3 kg (210 lb)  Body mass index is 36.05 kg/m².    Intake/Output Summary (Last 24 hours) at 07/03/18 1032  Last data filed at 07/02/18 1726   Gross per 24 hour   Intake              410 ml    Output                0 ml   Net              410 ml      Physical Exam   Constitutional: She is oriented to person, place, and time. She appears well-developed. She is cooperative. She is easily aroused. No distress.   HENT:   Head: Normocephalic and atraumatic.   Right eye blindness   Eyes: Conjunctivae are normal.   Neck: Normal range of motion. Neck supple.   Cardiovascular: Normal rate, regular rhythm and normal heart sounds.    No murmur heard.  Mild BLE edema  Defibrillator   Pulmonary/Chest: Effort normal. No respiratory distress. She has decreased breath sounds in the right upper field, the right middle field, the right lower field, the left upper field and the left lower field. She has no wheezes. She has no rales. She exhibits no tenderness.   Abdominal: Soft. Bowel sounds are normal. She exhibits no distension. There is no tenderness. There is no rebound and no guarding.   Genitourinary:   Genitourinary Comments: Deferred   Musculoskeletal: She exhibits edema.   Pt with chronic right sided weakness.  +1/+5 weakness to BUE and BLE. Sensation reported intact. Minimal flexion of left arm with noxious stimulus     Neurological: She is alert, oriented to person, place, and time and easily aroused. No sensory deficit.   Skin: Skin is warm and dry. Capillary refill takes less than 2 seconds.   Pt has multiple areas of skin breakdown including right heel and left back area. Dressings intact.    Psychiatric: Thought content normal. Her mood appears anxious. She is slowed. She expresses inappropriate judgment.   Nursing note and vitals reviewed.      Significant Labs:   CBC:   Recent Labs  Lab 07/03/18  0512   WBC 9.77   HGB 12.1   HCT 39.7   *     CMP:   Recent Labs  Lab 07/03/18  0512      K 3.4*      CO2 29      BUN 25*   CREATININE 0.8   CALCIUM 9.9   PROT 6.6   ALBUMIN 2.4*   BILITOT 0.2   ALKPHOS 139*   AST 20   ALT 13   ANIONGAP 12   EGFRNONAA >60     All pertinent labs within  the past 24 hours have been reviewed.    Significant Imaging: I have reviewed all pertinent imaging results/findings within the past 24 hours.    Assessment/Plan:      * Acute on chronic systolic heart failure    Compensated as of 7/3/18   Telemetry  - BNP 1100  - CXR with vascular congestion with small bilateral effusion  - Mild BLE edema upon assessment but diminished breath sounds noted  - 2D ECHO on 06/02/18 with EF 35% and pulmonary HTN  - Pt received Bumex 2 mg IVP x 1 in ED  - Bumex 2 mg IVP daily====> changed to Bumex 2 mg twice daily 7/2/18  - Continue Metoprolol  - Strict I&Os  - Daily weights  Cardiology consult due to recurrent CHF exacerbations          Skin breakdown    - Multiple areas of skin breakdown  - Inpatient consult to wound care          UTI (urinary tract infection)    - UA (+) nitrites    - Urine culture pending    COAGULASE-NEGATIVE STAPHYLOCOCCUS SPECIES   50,000 - 99,999 cfu/ml   Susceptibility testing not routinely performed.     IV Rocephin - started 6/30/18          Severe muscle deconditioning    - Currently has [facility] (PT/OT/nurse)  - Pt has declined NH placement in the past  - Will consult social work for discharge planning -- will need to resume home health upon discharge. Add aide and SW to home health vs SNF placement  - Continue PT/OT while inpatient - 7/2/18 pt refused PT/OT  7/3/18 - BUE/BLE weakness present, sensation intact. Neurology consult, CT Head/C spine pending, ESR/CRP        Hypothyroidism    - TSH about a month ago -- 1.265  - Continue Levothyroxine          Chronic kidney disease (CKD), stage III (moderate)    - Currently stable  - Monitor kidney function while on diuretics          Hypertension associated with diabetes    Controlled  - continue Toprol XL and lisinopril  - Apresoline prn    DM  - HgbA1c on 06/03/18 -- 5.3 -- controlled  - Accuchecks and low dose SSI          Atrial fibrillation    - Currently rate controlled  Telemetry monitoring  -  Continue Xarelto and Amiodarone            VTE Risk Mitigation         Ordered     rivaroxaban tablet 15 mg  With dinner      06/30/18 1204              Tonia Gates NP  Department of Hospital Medicine   [facility]

## 2018-07-03 NOTE — PROGRESS NOTES
Unable to get axillary or oral temp. Room cold. Pt with only thin top sheet on. Pt covered. Warmed room. Dr. Joiner notified. Will get bear hugger sitter at bedside. Pt suction

## 2018-07-03 NOTE — PLAN OF CARE
Problem: Patient Care Overview  Goal: Plan of Care Review  Patient currently requires total assist x 2 for bed mobility and total assist for sitting balance at EOB.   Outcome: Ongoing (interventions implemented as appropriate)  Assessment complete per flow-sheet  AAO3 name date and situation   Vital signs stable   Patient rounds assessed; Safety measures performed   No c/o pain or discomfort   Granddaughter located at the bedside   Will continue to monitor for any signs or symptoms of vital decline

## 2018-07-03 NOTE — PROGRESS NOTES
Patient seen and examined on rounds. Her grand daughter , Humaira is at bedside. Ms. Carlie Ramirez states that she continues to feel weak. She is weaker grossly to me today than she had been.   UE 1/5 B/L   LE 1/5 B/L   Patient able to appreciate nociceptive stimuli bilaterally to triceps/nailbed bilaterally        Ms Gerardo notes that she has felt weak since prior to admission and that her symptoms are not new. Today she is unable to move her arms. She is able to appreciate stimuli in her hands and arms bilaterally. Ms Gerardo believes this is how she was prior to admit, but patient states she feels weaker than she had been .     Will check Stat CT Head/Neck. Consult neurology.       Spoke to Ms. De Leon at [phone] and updated of assessment and plan     Called VIKTOR Gallegos at [phone] (number provided by Ms De Leon) but was unable to reach , left message on voicemail

## 2018-07-03 NOTE — PROGRESS NOTES
Received message from patients nurse , daughter (unclear who) asked that patients primary cardiologist , Dr Hernández be notified of her condition . I called the number provided [phone] and spoke to coordinator. Dr. Hernández not available today, but will be contacted by either physician or advanced practice provider in clinic.     Presently compensated chronic biventricular failure on OMT     Toprol xl mg BID   Lisinopril 5mg   Bumex 2/2     Seen by inpatient by cardiology this admission and they are following     Will discuss with both daughters when I hear back from patients primary cardiologist.

## 2018-07-03 NOTE — PROGRESS NOTES
Discussed with neuro . Plan for LP by IR in [place] . Discussed care with Haley [phone] and . Rosy at [phone]. Hold Xarelto (indication afib, no bridge)

## 2018-07-03 NOTE — CONSULTS
[facility]   Neurology  Consult Note    Patient Name: Carlie Valdez  MRN: [id number]  Admission Date: 2018  Hospital Length of Stay: 3 days  Code Status: Full Code   Attending Provider: Subhash Ayala MD   Consulting Provider: Mario Daniels MD  Primary Care Physician: Johanna Guzman MD  Principal Problem:Acute on chronic systolic heart failure    Consults  Subjective:     Chief Complaint:  Generalized weakness     HPI: The patient is unable to give history.  The record review indicates the patient was admitted with complaint of chest pain, but also, because of generalized weakness.  Apparently the patient had previously been in an inpatient rehab center for the same complaint several weeks ago.  Since admit, the patient has apparently steadily weakened, unable to participate with PT/OT.  She has shown only meager movement (withdrawal) of feet and lower legs, but does not grasp or move fingers.  She has not had respiratory distress or ptosis.  Despite vision impairment, she is able to move eyes on command.  She denies to me neck or low back pain, but then states she cannot feel tactile or pain stimulus to either hand, all fingers.  So far, there is no evidence of respiratory distress.  The family in the room indicates that patient has not complained of pain.    Past Medical History:   Diagnosis Date    Arthritis     Asthma     Atrial fibrillation     Blood clot of vein in shoulder area     Cardiac defibrillator in place     Chronic knee pain     Diabetes mellitus type 2 without retinopathy 2016    Diaphragmatic hernia without obstruction and without gangrene 2015    GERD (gastroesophageal reflux disease)     Hypertension     Primary open angle glaucoma (POAG) of both eyes, severe stage 2018       Past Surgical History:   Procedure Laterality Date    CARDIAC DEFIBRILLATOR PLACEMENT      , .    CATARACT EXTRACTION       SECTION       COLONOSCOPY      ashley Macdonald    KNEE ARTHROSCOPY      UPPER GASTROINTESTINAL ENDOSCOPY         Review of patient's allergies indicates:   Allergen Reactions    Morphine Hives    Atorvastatin      Other reaction(s): Muscle pain    Clonidine     Codeine      Other reaction(s): Unknown    Digoxin      Other reaction(s): Unknown    Diovan [valsartan] Other (See Comments)     Upset stomach, weakness    Eggs [egg derived]     Metoprolol Diarrhea    Naproxen      Other reaction(s): Nausea    Peanut     Propofol      Other reaction(s): delirious    Sulfa (sulfonamide antibiotics)        Current Neurological Medications: See list below    No current facility-administered medications on file prior to encounter.      Current Outpatient Prescriptions on File Prior to Encounter   Medication Sig    albuterol 90 mcg/actuation inhaler Inhale 1-2 puffs into the lungs every 6 (six) hours as needed.     albuterol-ipratropium 2.5mg-0.5mg/3mL (DUO-NEB) 0.5 mg-3 mg(2.5 mg base)/3 mL nebulizer solution Take 3 mLs by nebulization every 6 (six) hours while awake. Rescue    amiodarone (PACERONE) 200 MG Tab Take 1 tablet (200 mg total) by mouth 2 (two) times daily. (Patient taking differently: Take 100 mg by mouth 2 (two) times daily. )    brimonidine-timolol (COMBIGAN) 0.2-0.5 % Drop Place 1 drop into both eyes every 12 (twelve) hours.    bumetanide (BUMEX) 2 MG tablet Take 1 tablet (2 mg total) by mouth 2 (two) times daily. 1 Tablet Oral Every day (Patient taking differently: Take 1 mg by mouth 2 (two) times daily. Hold for SBP less than or equal to 100.)    fluticasone (FLONASE) 50 mcg/actuation nasal spray 2 sprays by Each Nare route once daily. (Patient taking differently: 2 sprays by Each Nare route daily as needed. )    levothyroxine (SYNTHROID) 100 MCG tablet Take 100 mcg by mouth before breakfast.     montelukast (SINGULAIR) 10 mg tablet Take 10 mg by mouth once daily.     multivitamin (THERAGRAN)  per tablet Take 1 tablet by mouth once daily.     polyethylene glycol (GLYCOLAX) 17 gram PwPk Take 17 g by mouth once daily. (Patient taking differently: Take 17 g by mouth daily as needed. )    potassium chloride SA (K-DUR,KLOR-CON) 20 MEQ tablet Take 20 mEq by mouth once daily.     rivaroxaban (XARELTO) 15 mg Tab Take 15 mg by mouth every evening.     silver sulfADIAZINE 1% (SILVADENE) 1 % cream Apply topically 2 (two) times daily. Apply to bliter areas BID (Patient taking differently: Apply topically 2 (two) times daily. Apply to blistered areas BID.)    sodium bicarb-sodium chloride Pack 1 packet by Nasal route 2 (two) times daily. (Patient taking differently: 1 packet by Nasal route 2 (two) times daily as needed. )    zinc sulfate (ZINCATE) 220 (50) mg capsule Take 220 mg by mouth once daily.       Family History     Problem Relation (Age of Onset)    Hypertension Mother, Father        Social History Main Topics    Smoking status: Never Smoker    Smokeless tobacco: Never Used    Alcohol use No    Drug use: No    Sexual activity: Yes     Partners: Male     Review of Systems   ROS is not possible, as patient is whispering and is inconsistent in following any commands.  Answers to questions are not trustworthy.    Objective:     Vital Signs (Most Recent):  Temp: 97.4 °F (36.3 °C) (07/03/18 1515)  Pulse: 69 (07/03/18 1515)  Resp: 16 (07/03/18 0904)  BP: 137/72 (07/03/18 1515)  SpO2: 100 % (07/03/18 1515) Vital Signs (24h Range):  Temp:  [94.4 °F (34.7 °C)-98.7 °F (37.1 °C)] 97.4 °F (36.3 °C)  Pulse:  [69-70] 69  Resp:  [16-18] 16  SpO2:  [95 %-100 %] 100 %  BP: (107-157)/(61-84) 137/72     Weight: 95.3 kg (210 lb)  Body mass index is 36.05 kg/m².    Physical Exam   APPEARANCE: Well nourished, elderly woman, eyes open, whispering answers to all questions, but inconsistent in her responses.    HEAD: Normocephalic, atraumatic.  EYES: PERRL. EOMI. The patient was able to conjugately deviate eyes to the right  and to the left on command, but did not attempt to follow a visual target.  Non-icteric sclerae.    EARS: TM's intact.    NOSE: Mucosa pink. Airway clear.  MOUTH & THROAT: Mucous membranes moist.  NECK: Supple. No bruits.  CHEST: Lungs clear to auscultation.  CARDIOVASCULAR: Regular rhythm without significant murmurs.  MUSCULOSKELETAL:  No bony deformity seen.   NEUROLOGIC:   Mental Status:  Orientation not assessed due to lack of good communication skills.  The patient is attentive to the environment   Cranial Nerves: II-XII grossly intact. Fundoscopic exam is normal.  No hemorrhage, exudate or papilledema is present. The extraocular muscles are intact in the cardinal directions of gaze.  No ptosis is present. Facial features are symmetrical.  Speech is done by inaubable whisper.  She did protrude her tongue in the midline.  Gait and Station:  Patient is bed confined.  Motor:  The patient does not move either upper or lower extremity to command.  There is withdrawal from plantar stimulation, however.  Sensory:  The patient did not respond to nail bed pressure of finger tips on both hands.  She did perceive the tuning fork on her clavicle, but stated that she could not feel vibration in the extremities.  Cerebellar:    No involuntary movements or tremor seen.  Reflexes:  Stretch reflexes are trace at both knees, but absent elsewhere.  No clonus.  Plantar stimulation is flexor bilaterally but is accompanied by very active flexion withdrawal.      Significant Labs:   CBC:   Recent Labs  Lab 07/03/18  0512   WBC 9.77   HGB 12.1   HCT 39.7   *     CMP:   Recent Labs  Lab 07/03/18  0512         K 3.4*      CO2 29   BUN 25*   CREATININE 0.8   CALCIUM 9.9   PROT 6.6   ALBUMIN 2.4*   BILITOT 0.2   ALKPHOS 139*   AST 20   ALT 13   ANIONGAP 12   EGFRNONAA >60     All pertinent lab results from the past 24 hours have been reviewed.    Significant Imaging: I have reviewed all pertinent imaging  results/findings within the past 24 hours.    Assessment and Plan:     Active Diagnoses:    Diagnosis Date Noted POA    PRINCIPAL PROBLEM:  Acute on chronic systolic heart failure [I50.23] 06/30/2018 Yes    Severe muscle deconditioning [R29.898] 06/30/2018 Yes    UTI (urinary tract infection) [N39.0] 06/30/2018 Yes    Skin breakdown [L90.9] 06/30/2018 Yes    Hypothyroidism [E03.9] 06/03/2018 Yes    Chronic kidney disease (CKD), stage III (moderate) [N18.3] 11/12/2016 Yes    Hypertension associated with diabetes [E11.59, I10]  Yes    Atrial fibrillation [I48.91] 08/14/2013 Yes     Chronic      Problems Resolved During this Admission:    Diagnosis Date Noted Date Resolved POA    Elevated troponin [R74.8] 11/12/2016 07/03/2018 Yes       VTE Risk Mitigation         Ordered     rivaroxaban tablet 15 mg  With dinner      06/30/18 1203       I would recommend lumbar puncture.  Could this be GBS or even CIDP?    Thank you for your consult. I will follow-up with patient. Please contact us if you have any additional questions.    Mario Daniels MD  Neurology  [facility]

## 2018-07-03 NOTE — PROGRESS NOTES
Dr. Joiner notified of axillary temp of96. Bear hugger still on. No distress noted at this time. Sitter at bedside.

## 2018-07-03 NOTE — ASSESSMENT & PLAN NOTE
Compensated as of 7/3/18   Telemetry  - BNP 1100  - CXR with vascular congestion with small bilateral effusion  - Mild BLE edema upon assessment but diminished breath sounds noted  - 2D ECHO on 06/02/18 with EF 35% and pulmonary HTN  - Pt received Bumex 2 mg IVP x 1 in ED  - Bumex 2 mg IVP daily====> changed to Bumex 2 mg twice daily 7/2/18  - Continue Metoprolol  - Strict I&Os  - Daily weights  Cardiology consult due to recurrent CHF exacerbations

## 2018-07-03 NOTE — ASSESSMENT & PLAN NOTE
- UA (+) nitrites    - Urine culture pending    COAGULASE-NEGATIVE STAPHYLOCOCCUS SPECIES   50,000 - 99,999 cfu/ml   Susceptibility testing not routinely performed.     IV Rocephin - started 6/30/18

## 2018-07-03 NOTE — PROGRESS NOTES
Pt refuses to take oral potassium, .requested iv. States she does not think she can swallow it even if broken in half. Notified dr. Joiner.

## 2018-07-03 NOTE — SUBJECTIVE & OBJECTIVE
Interval History: Pt constantly talking and saying she has difficulty speaking. Overnight, she had secretions in her throat that required suctioning. Minimal withdrawal of arms when noxious stimulus performed. Granddaughter, Humaira, at bedside mentioned same symptoms at home however, pt is stating she is more weak. Neurology consult placed. ESR/CRP, CT of Head and CT C spine pending.     Review of Systems   Constitutional: Positive for activity change. Negative for chills and fatigue.   HENT: Negative.    Eyes: Negative.    Respiratory: Positive for shortness of breath. Negative for apnea, cough, choking, chest tightness, wheezing and stridor.    Cardiovascular: Positive for chest pain. Negative for palpitations and leg swelling.   Gastrointestinal: Negative for abdominal pain, constipation, diarrhea, nausea and vomiting.   Endocrine: Negative.    Genitourinary: Negative.    Skin: Negative.    Allergic/Immunologic: Negative.    Neurological: Negative for dizziness, tremors, seizures, syncope, facial asymmetry, speech difficulty, weakness, light-headedness, numbness and headaches.   Hematological: Negative.    Psychiatric/Behavioral: Negative.      Objective:     Vital Signs (Most Recent):  Temp: 97.7 °F (36.5 °C) (07/03/18 0341)  Pulse: 69 (07/03/18 0904)  Resp: 16 (07/03/18 0904)  BP: 111/67 (07/03/18 0904)  SpO2: 95 % (07/03/18 0904) Vital Signs (24h Range):  Temp:  [96.3 °F (35.7 °C)-98.7 °F (37.1 °C)] 97.7 °F (36.5 °C)  Pulse:  [69-85] 69  Resp:  [16-18] 16  SpO2:  [95 %-100 %] 95 %  BP: (107-145)/(61-81) 111/67     Weight: 95.3 kg (210 lb)  Body mass index is 36.05 kg/m².    Intake/Output Summary (Last 24 hours) at 07/03/18 1032  Last data filed at 07/02/18 1726   Gross per 24 hour   Intake              410 ml   Output                0 ml   Net              410 ml      Physical Exam   Constitutional: She is oriented to person, place, and time. She appears well-developed. She is cooperative. She is easily  aroused. No distress.   HENT:   Head: Normocephalic and atraumatic.   Right eye blindness   Eyes: Conjunctivae are normal.   Neck: Normal range of motion. Neck supple.   Cardiovascular: Normal rate, regular rhythm and normal heart sounds.    No murmur heard.  Mild BLE edema  Defibrillator   Pulmonary/Chest: Effort normal. No respiratory distress. She has decreased breath sounds in the right upper field, the right middle field, the right lower field, the left upper field and the left lower field. She has no wheezes. She has no rales. She exhibits no tenderness.   Abdominal: Soft. Bowel sounds are normal. She exhibits no distension. There is no tenderness. There is no rebound and no guarding.   Genitourinary:   Genitourinary Comments: Deferred   Musculoskeletal: She exhibits edema.   Pt with chronic right sided weakness.  +1/+5 weakness to BUE and BLE. Sensation reported intact. Minimal flexion of left arm with noxious stimulus     Neurological: She is alert, oriented to person, place, and time and easily aroused. No sensory deficit.   Skin: Skin is warm and dry. Capillary refill takes less than 2 seconds.   Pt has multiple areas of skin breakdown including right heel and left back area. Dressings intact.    Psychiatric: Thought content normal. Her mood appears anxious. She is slowed. She expresses inappropriate judgment.   Nursing note and vitals reviewed.      Significant Labs:   CBC:   Recent Labs  Lab 07/03/18  0512   WBC 9.77   HGB 12.1   HCT 39.7   *     CMP:   Recent Labs  Lab 07/03/18  0512      K 3.4*      CO2 29      BUN 25*   CREATININE 0.8   CALCIUM 9.9   PROT 6.6   ALBUMIN 2.4*   BILITOT 0.2   ALKPHOS 139*   AST 20   ALT 13   ANIONGAP 12   EGFRNONAA >60     All pertinent labs within the past 24 hours have been reviewed.    Significant Imaging: I have reviewed all pertinent imaging results/findings within the past 24 hours.

## 2018-07-03 NOTE — ASSESSMENT & PLAN NOTE
- Currently has [facility] (PT/OT/nurse)  - Pt has declined NH placement in the past  - Will consult social work for discharge planning -- will need to resume home health upon discharge. Add aide and SW to home health vs SNF placement  - Continue PT/OT while inpatient - 7/2/18 pt refused PT/OT  7/3/18 - BUE/BLE weakness present, sensation intact. Neurology consult, CT Head/C spine pending, ESR/CRP

## 2018-07-03 NOTE — PROGRESS NOTES
Pt had CT. Tolerated. Cleansed and positioned on side. Caregiver at bedside. No changes in status. Awaiting neuro

## 2018-07-03 NOTE — ASSESSMENT & PLAN NOTE
Controlled  - continue Toprol XL and lisinopril  - Apresoline prn    DM  - HgbA1c on 06/03/18 -- 5.3 -- controlled  - Accuchecks and low dose SSI

## 2018-07-03 NOTE — PT/OT/SLP PROGRESS
Occupational Therapy      Patient Name:  Carlie Valdez   MRN:  [id number]    S: PT AGREEABLE TO THERAPY SESSION. PT REPORTED DUE TO EXETREME WEAKNESS UNABLE TO PARTICIPATE WITH THERAPY SESSION  O: PT FOUND SUP[INE IN BED  WITH NO O2/ PT REQ TOTAL A X WITH ROLLING L<>R PT REQ MAX A AND MAX INSTRUCTIONS  A: PT LIMITED IN MOVEMENT AND RECOMMEN BASIC NURSING HOME  P: CONTINUE WITH POC  Ramandeep Sousa, OT  7/2/2018   [id number]  2 TA

## 2018-07-03 NOTE — PROGRESS NOTES
Bear hugger applied. Pt instructed on reason for bear huggar. Oral suction. Pt complain of not being able to move arms. C/o loss of sensation. Dr. Joiner aware

## 2018-07-03 NOTE — PLAN OF CARE
Problem: Patient Care Overview  Goal: Plan of Care Review  Patient currently requires total assist x 2 for bed mobility and total assist for sitting balance at EOB.   Outcome: Ongoing (interventions implemented as appropriate)  Dx acute on chronic chf  Poc. Instructed on dbc 10 x q1h wa, instructed on turning q2h. Instructed on pressures sores. Instructed on good nutrition. Instructed on fall risk and precautions. Instructed on moving and repositiioning. Instructed on checking blood sugars. Bear huggar for low temp, CT of head and neck. Will monitor. No falls. Sitter at bedside. No distress noted.

## 2018-07-03 NOTE — PROGRESS NOTES
Pt daughter called. Wanted primary cardiologist dr. Hernández notified of  Heart medications she is on and condition. Notified  notified and given dr. bliss number.

## 2018-07-04 NOTE — PLAN OF CARE
Problem: Patient Care Overview  Goal: Individualization & Mutuality  Outcome: Ongoing (interventions implemented as appropriate)  Pt AAO  SR on tele   LP scheduled for tomorrow or Friday. Friday per IR.  BG AC and HS   PT/OT/ST   Family at bedside this shift

## 2018-07-04 NOTE — PROGRESS NOTES
[facility] Hospital Medicine  Progress Note    Patient Name: Carlie Valdez  MRN: [id number]  Patient Class: IP- Inpatient   Admission Date: 6/30/2018  Length of Stay: 4 days  Attending Physician: Subhash Ayala MD  Primary Care Provider: Johanna Guzman MD        Subjective:     Principal Problem:Acute on chronic systolic heart failure    HPI:  Carlie Valdez is a 72 year old female with a PMHx of HTN, GERD, DM, A-fib, Asthma, and Defibrillator who presented from home with c/o chest pain. Located to right side with no radiation. Associated symptoms: SOB. Pt and sitters at bedside report she was recently discharged from [facility] and pt hasn't had her home medications since being discharged. ED workup revealed: Hgb/Hct 11.2/34.7, Alk phos 152, BNP 1100, troponin 0.028. UA (+) nitrites. CXR with central vascular congestion with small bilateral effusion. Primary cardiologist is Dr. Hernández.  Pt admitted to Telemetry for Acute on chronic CHF exacerbation and UTI.     Hospital Course:  Pt admitted for decompensated heart failure and UTI. Diuresis with Bumex and IV Rocephin in progress. Pt with physical debility and PT/OT consulted. Dr. Joiner had lengthy discussion with daughter, Haley, and patient in reference to generalized weakness and proper disposition. SNF placement was agreed however, when  approach regarding SNF selection patient refused. Introduce Hospice to patient and advised she was not a candidate for Rehab due to her extent of weakness. Pt stated she would discharge home with sitters and home health services. 7/2/18 - Dr. Joiner has spoken with pt's daughters, Haley and Rosy, regarding the mother's condition. She has diuresed and diuretics changed to oral. She was medically stable for discharge. SNF placement pending. PT/OT in progress. 7/3/18 - It was noted that pt has refused participation in PT/OT. She appears grossly more weak in  the BUE/BLE extremities. Family notified of condition. Granddaughter at bedside mentioned similar symptoms prior to admission. Neurology consult placed, ESR/CRP pending, CT Head and C spine pending. At 4th day, CT of Head and C spine found no concerning findings. ESR and CRP elevated although no concern for vasculitis. Neurology saw the patient and recommended LP to rule out GBs or CIDP. Xarelto placed on hold and LP by IR is pending as Xarelto needs to be on hold for 3 days. Speech therapy evaluated the patient and noted inconsistent dysphonia. The patient will voice normally then start mouthing words.     Interval History: Plan of care discussed with sitter at bedside.     Review of Systems   Constitutional: Positive for activity change. Negative for chills and fatigue.   HENT: Negative.    Eyes: Negative.    Respiratory: Negative for apnea, cough, choking, chest tightness, shortness of breath, wheezing and stridor.    Cardiovascular: Negative for chest pain, palpitations and leg swelling.   Gastrointestinal: Negative for abdominal pain, constipation, diarrhea, nausea and vomiting.   Endocrine: Negative.    Genitourinary: Negative.    Skin: Negative.    Allergic/Immunologic: Negative.    Neurological: Positive for weakness. Negative for dizziness, tremors, seizures, syncope, facial asymmetry, speech difficulty, light-headedness, numbness and headaches.   Hematological: Negative.    Psychiatric/Behavioral: Negative.      Objective:     Vital Signs (Most Recent):  Temp: 98.2 °F (36.8 °C) (07/04/18 1154)  Pulse: 70 (07/04/18 1154)  Resp: 20 (07/04/18 1032)  BP: (!) 143/78 (07/04/18 0812)  SpO2: 99 % (07/04/18 1154) Vital Signs (24h Range):  Temp:  [96 °F (35.6 °C)-98.8 °F (37.1 °C)] 98.2 °F (36.8 °C)  Pulse:  [64-79] 70  Resp:  [18-20] 20  SpO2:  [91 %-100 %] 99 %  BP: ()/(51-78) 143/78     Weight: 70 kg (154 lb 5.2 oz)  Body mass index is 26.49 kg/m².    Intake/Output Summary (Last 24 hours) at 07/04/18  1200  Last data filed at 07/03/18 1230   Gross per 24 hour   Intake              120 ml   Output                0 ml   Net              120 ml      Physical Exam   Constitutional: She is oriented to person, place, and time. She appears well-developed. She is cooperative. She is easily aroused. No distress.   HENT:   Head: Normocephalic and atraumatic.   Right eye blindness   Eyes: Conjunctivae are normal.   Neck: Normal range of motion. Neck supple.   Cardiovascular: Normal rate, regular rhythm and normal heart sounds.    No murmur heard.  Mild BLE edema  Defibrillator   Pulmonary/Chest: Effort normal. No respiratory distress. She has decreased breath sounds in the right upper field, the right middle field, the right lower field, the left upper field and the left lower field. She has no wheezes. She has no rales. She exhibits no tenderness.   Abdominal: Soft. Bowel sounds are normal. She exhibits no distension. There is no tenderness. There is no rebound and no guarding.   Genitourinary:   Genitourinary Comments: Deferred   Musculoskeletal: She exhibits edema.   Pt with chronic right sided weakness.  +1/+5 weakness to BUE and BLE. Sensation reported intact. Minimal flexion of left arm with noxious stimulus     Neurological: She is alert, oriented to person, place, and time and easily aroused. No sensory deficit.   Skin: Skin is warm and dry. Capillary refill takes less than 2 seconds.   Pt has multiple areas of skin breakdown including right heel and left back area. Dressings intact.    Psychiatric: Thought content normal. Her mood appears not anxious. Her affect is blunt. She is slowed. She expresses inappropriate judgment.   Nursing note and vitals reviewed.      Significant Labs:   CBC:   Recent Labs  Lab 07/03/18 0512 07/04/18 0429   WBC 9.77 10.03   HGB 12.1 10.7*   HCT 39.7 35.6*   * 422*     CMP:   Recent Labs  Lab 07/03/18 0512 07/04/18 0429    142   K 3.4* 3.5    102   CO2 29 30*     95   BUN 25* 28*   CREATININE 0.8 1.0   CALCIUM 9.9 9.3   PROT 6.6 6.2   ALBUMIN 2.4* 2.3*   BILITOT 0.2 0.2   ALKPHOS 139* 133   AST 20 21   ALT 13 13   ANIONGAP 12 10   EGFRNONAA >60 56*     All pertinent labs within the past 24 hours have been reviewed.    Significant Imaging: I have reviewed all pertinent imaging results/findings within the past 24 hours.    Assessment/Plan:      * Acute on chronic systolic heart failure    Compensated as of 7/3/18   Telemetry  - BNP 1100  - CXR with vascular congestion with small bilateral effusion  - Mild BLE edema upon assessment but diminished breath sounds noted  - 2D ECHO on 06/02/18 with EF 35% and pulmonary HTN  - Pt received Bumex 2 mg IVP x 1 in ED  - Bumex 2 mg IVP daily====> changed to Bumex 2 mg twice daily 7/2/18  - Continue Metoprolol  - Strict I&Os  - Daily weights  Cardiology consult due to recurrent CHF exacerbations          Skin breakdown    Pt lying in specialty bed   Multiple areas of skin breakdown  - Inpatient consult to wound care          UTI (urinary tract infection)    - UA (+) nitrites    - Urine culture pending    COAGULASE-NEGATIVE STAPHYLOCOCCUS SPECIES   50,000 - 99,999 cfu/ml   Susceptibility testing not routinely performed.     IV Rocephin - started 6/30/18          Severe muscle deconditioning    - Currently has [facility] (PT/OT/nurse)  - Pt has declined NH placement in the past  - Will consult social work for discharge planning -- will need to resume home health upon discharge. Add aide and SW to home health vs SNF placement  - Continue PT/OT while inpatient - 7/2/18 pt refused PT/OT  PT/OT has signed off as patient does not participate  7/4/18 - Neurology following, need LP to rule out GBS or CIDP. CT of head and C spine negative for concerning findings. ESR/sed rate mildly elevated however, no concerns for vasculitis. LP pending as Xarelto needs to be held x 3 days.           Hypothyroidism    - TSH about a month ago --  1.265  - Continue Levothyroxine          Chronic kidney disease (CKD), stage III (moderate)    - Currently stable  - Monitor kidney function while on diuretics          Hypertension associated with diabetes    Controlled  - continue Toprol XL and lisinopril  - Apresoline prn    DM  - HgbA1c on 06/03/18 -- 5.3 -- controlled  - Accuchecks and low dose SSI          Atrial fibrillation    - Currently rate controlled  Telemetry monitoring  - Continue Xarelto and Amiodarone            VTE Risk Mitigation     None              Tonia Gates NP  Department of Hospital Medicine   [facility]

## 2018-07-04 NOTE — SUBJECTIVE & OBJECTIVE
Interval History: Plan of care discussed with sitter at bedside.     Review of Systems   Constitutional: Positive for activity change. Negative for chills and fatigue.   HENT: Negative.    Eyes: Negative.    Respiratory: Negative for apnea, cough, choking, chest tightness, shortness of breath, wheezing and stridor.    Cardiovascular: Negative for chest pain, palpitations and leg swelling.   Gastrointestinal: Negative for abdominal pain, constipation, diarrhea, nausea and vomiting.   Endocrine: Negative.    Genitourinary: Negative.    Skin: Negative.    Allergic/Immunologic: Negative.    Neurological: Positive for weakness. Negative for dizziness, tremors, seizures, syncope, facial asymmetry, speech difficulty, light-headedness, numbness and headaches.   Hematological: Negative.    Psychiatric/Behavioral: Negative.      Objective:     Vital Signs (Most Recent):  Temp: 98.2 °F (36.8 °C) (07/04/18 1154)  Pulse: 70 (07/04/18 1154)  Resp: 20 (07/04/18 1032)  BP: (!) 143/78 (07/04/18 0812)  SpO2: 99 % (07/04/18 1154) Vital Signs (24h Range):  Temp:  [96 °F (35.6 °C)-98.8 °F (37.1 °C)] 98.2 °F (36.8 °C)  Pulse:  [64-79] 70  Resp:  [18-20] 20  SpO2:  [91 %-100 %] 99 %  BP: ()/(51-78) 143/78     Weight: 70 kg (154 lb 5.2 oz)  Body mass index is 26.49 kg/m².    Intake/Output Summary (Last 24 hours) at 07/04/18 1200  Last data filed at 07/03/18 1230   Gross per 24 hour   Intake              120 ml   Output                0 ml   Net              120 ml      Physical Exam   Constitutional: She is oriented to person, place, and time. She appears well-developed. She is cooperative. She is easily aroused. No distress.   HENT:   Head: Normocephalic and atraumatic.   Right eye blindness   Eyes: Conjunctivae are normal.   Neck: Normal range of motion. Neck supple.   Cardiovascular: Normal rate, regular rhythm and normal heart sounds.    No murmur heard.  Mild BLE edema  Defibrillator   Pulmonary/Chest: Effort normal. No  respiratory distress. She has decreased breath sounds in the right upper field, the right middle field, the right lower field, the left upper field and the left lower field. She has no wheezes. She has no rales. She exhibits no tenderness.   Abdominal: Soft. Bowel sounds are normal. She exhibits no distension. There is no tenderness. There is no rebound and no guarding.   Genitourinary:   Genitourinary Comments: Deferred   Musculoskeletal: She exhibits edema.   Pt with chronic right sided weakness.  +1/+5 weakness to BUE and BLE. Sensation reported intact. Minimal flexion of left arm with noxious stimulus     Neurological: She is alert, oriented to person, place, and time and easily aroused. No sensory deficit.   Skin: Skin is warm and dry. Capillary refill takes less than 2 seconds.   Pt has multiple areas of skin breakdown including right heel and left back area. Dressings intact.    Psychiatric: Thought content normal. Her mood appears not anxious. Her affect is blunt. She is slowed. She expresses inappropriate judgment.   Nursing note and vitals reviewed.      Significant Labs:   CBC:   Recent Labs  Lab 07/03/18  0512 07/04/18  0429   WBC 9.77 10.03   HGB 12.1 10.7*   HCT 39.7 35.6*   * 422*     CMP:   Recent Labs  Lab 07/03/18  0512 07/04/18  0429    142   K 3.4* 3.5    102   CO2 29 30*    95   BUN 25* 28*   CREATININE 0.8 1.0   CALCIUM 9.9 9.3   PROT 6.6 6.2   ALBUMIN 2.4* 2.3*   BILITOT 0.2 0.2   ALKPHOS 139* 133   AST 20 21   ALT 13 13   ANIONGAP 12 10   EGFRNONAA >60 56*     All pertinent labs within the past 24 hours have been reviewed.    Significant Imaging: I have reviewed all pertinent imaging results/findings within the past 24 hours.

## 2018-07-04 NOTE — PROGRESS NOTES
[facility] has no one on call this evening but will tomorrow at 0700 am.   Voicemail request left for [facility] at this time requesting a return call to tele unit.

## 2018-07-04 NOTE — PROGRESS NOTES
Per pts request for pastoral care this nurse paged [facility]  [phone] at this time. Requested call back to telemetry. Awaiting return call.

## 2018-07-04 NOTE — PROGRESS NOTES
Patient seen at bedside with Haley Valdez. Ms. Carlie Valdez stated she wanted to be transferred to [facility] . I asked her why she voiced this requested, and stated that it was a [facility]. I explained that I would call a  for her to speak with, she was agreeable. She then stated she was willing to stay.  During this time she was able to phonate clearly, at the same level that both myself and Ms. De Leon were speaking. I reviewed plan of care with patient and family, and they are in agreement to wait for LP tomorrow. I encouraged both Ms. Sexton and Ms Haley Valdez to work with PT/OT/SLP so that she retain strengh. They are agreeable . Ms. De Leon related that Ms. Sexton had not tolerated lisinopril in the past. Will stop for now. [facility]

## 2018-07-04 NOTE — PROGRESS NOTES
Return call from [facility] at this time. The  indicated he would be in [place] for the next 3 days and that we should utilize  [facility] [phone] or [facility], [phone]

## 2018-07-04 NOTE — PLAN OF CARE
Problem: Patient Care Overview  Goal: Plan of Care Review  Patient currently requires total assist x 2 for bed mobility and total assist for sitting balance at EOB.   Outcome: Ongoing (interventions implemented as appropriate)  The patient has been sinus rhythm on the monitor. Pt has sitter at bedside. Pt has had an uneventful night and is resting quietly, will continue to monitor.

## 2018-07-04 NOTE — PROGRESS NOTES
I have seen the patient, reviewed the [facility] providers assessment and plan. I have personally interviewed and examined the patient at bedside and agree with the findings. Euvolemic  And compensated from CHF perspective. Weakness noted.. Multiple providers and caregiver as well as family (Humaira )  note inconsistent exam. Will continue eval and workup for this weakness.  Neuro following with plan for LP tomorrow.      Atif Joiner   Hospitalist Staff

## 2018-07-04 NOTE — PROGRESS NOTES
Dr loera informed by NP of pts and daughters request to transfer to [facility] to speak to them at bedside

## 2018-07-04 NOTE — PT/OT/SLP EVAL
Speech Language Pathology Evaluation  Bedside Swallow    Patient Name:  Carlie Valdez   MRN:  [id number]  Admitting Diagnosis: Acute on chronic systolic heart failure    Recommendations:                 General Recommendations:  voice therapy  Diet recommendations:  Mechanical soft, Thin   Aspiration Precautions: Assistance with meals, HOB to 90 degrees and Remain upright 30 minutes post meal   General Precautions: Standard, vision impaired  Communication strategies:  go to room if call light pushed    History:     Past Medical History:   Diagnosis Date    Arthritis     Asthma     Atrial fibrillation     Blood clot of vein in shoulder area     Cardiac defibrillator in place     Chronic knee pain     Diabetes mellitus type 2 without retinopathy 2016    Diaphragmatic hernia without obstruction and without gangrene 2015    GERD (gastroesophageal reflux disease)     Hypertension     Primary open angle glaucoma (POAG) of both eyes, severe stage 2018       Past Surgical History:   Procedure Laterality Date    CARDIAC DEFIBRILLATOR PLACEMENT      , .    CATARACT EXTRACTION       SECTION      COLONOSCOPY      ashley      Dr. Macdonald    KNEE ARTHROSCOPY      UPPER GASTROINTESTINAL ENDOSCOPY         Social History: Patient lives at home with sitters, however plans for SNF/nursing home once D/C.      Chest X-Rays: Lung volumes are decreased with mild bronchovascular prominence.  Slightly more prominent on the right than left.  Overall improvement compared to the earlier film.    Prior diet: Regular with thin liquids    Subjective     Pt was seen at bedside with a sitter present.  Per chart and sitter the pt has experienced progressive muscle weakness and her voice is at a whisper.  Patient goals: to be able to speak    Pain/Comfort:  · Pain Rating 1: 0/10    Objective:     Oral Musculature Evaluation  · Oral Musculature: WFL  · Dentition: present and adequate    Bedside  Swallow Eval:   Consistencies Assessed:  · Thin liquids, puree, and solids      Oral Phase:   · WFL    Pharyngeal Phase:   · no overt clinical signs/symptoms of aspiration  · no overt clinical signs/symptoms of pharyngeal dysphagia      Assessment:     Carlie Valdez is a 72 y.o. female with an SLP diagnosis of Dysphonia.  She presents with a safe, functional swallow for intake of all consistencies.  Vocal quality assessed with pt exhibiting inconsistent dysphonia.  Most of the eval pt was speaking at a whisper, however when pt was laid back so ST and tech could reposition pt in bed she was able to produce voicing at a low volume.  Pt states that her voice has been like this for the past few days.  She answers all questions appropriately and participates in conversation.  There is no etiology at this time  for pt's dysphonia.  ST will provide voice therapy twice a week to improve vocal quality.       Goals:    SLP Goals        Problem: SLP Goal    Goal Priority Disciplines Outcome   SLP Goal     SLP    Description:  1.  Pt will complete vocal fold abduction/adduction ex's with min A                     Plan:     · Patient to be seen:  2 x/week   · Plan of Care expires:  07/11/18  · Plan of Care reviewed with:  patient (sitter present in room)   · SLP Follow-Up:  Yes       Discharge recommendations:  nursing facility, skilled   Barriers to Discharge:  None    Time Tracking:     SLP Treatment Date:   07/04/18  Speech Start Time:  1000  Speech Stop Time:  1035     Speech Total Time (min):  35 min    Billable Minutes: Eval Swallow and Oral Function 35    Carol Ann Osborn CCC-SLP  07/04/2018

## 2018-07-04 NOTE — PROGRESS NOTES
Father carolynn from [facility] states he will see the pt tomorrow if he can. He states he has two masses and a  to attend.  Otherwise he will see the pt on Friday

## 2018-07-04 NOTE — ASSESSMENT & PLAN NOTE
- Currently has [facility] (PT/OT/nurse)  - Pt has declined NH placement in the past  - Will consult social work for discharge planning -- will need to resume home health upon discharge. Add aide and SW to home health vs SNF placement  - Continue PT/OT while inpatient - 7/2/18 pt refused PT/OT  PT/OT has signed off as patient does not participate  7/4/18 - Neurology following, need LP to rule out GBS or CIDP. CT of head and C spine negative for concerning findings. ESR/sed rate mildly elevated however, no concerns for vasculitis. LP pending as Xarelto needs to be held x 3 days.

## 2018-07-05 PROBLEM — G93.41 ENCEPHALOPATHY, METABOLIC: Status: ACTIVE | Noted: 2018-01-01

## 2018-07-05 PROBLEM — L89.613 PRESSURE ULCER OF RIGHT HEEL, STAGE 3: Status: ACTIVE | Noted: 2018-01-01

## 2018-07-05 PROBLEM — J96.02 ACUTE RESPIRATORY FAILURE WITH HYPOXIA AND HYPERCARBIA: Status: ACTIVE | Noted: 2018-01-01

## 2018-07-05 PROBLEM — J96.01 ACUTE RESPIRATORY FAILURE WITH HYPOXIA AND HYPERCARBIA: Status: ACTIVE | Noted: 2018-01-01

## 2018-07-05 PROBLEM — L89.312 PRESSURE ULCER OF RIGHT BUTTOCK, STAGE 2: Status: ACTIVE | Noted: 2018-01-01

## 2018-07-05 NOTE — HOSPITAL COURSE
7/5 Transferred to ICU. Noninvasive Positive Pressure Ventilation initiated , may need intubation  7/6 Improved ventilation with Noninvasive Positive Pressure Ventilation but now generalized weakness. Steroids started for respiratory failure. Lumbar puncture later today.  7/7 Respiratory status stable. Labile blood pressure. Started on high does steroids  7/8 worsening respiraoty status with elevated  PCO2  7/9 patient seen and examined, chest x-ray and ABG reviewed, daughter Haley was called, intubated for respiratory distress and tachypnea.  7/10 patient seen and examined, chest x-ray and ABG reviewed.  She had a Vas-Cath placed overnight.  Off high-dose steroid.  Plasmapheresis today.  7/11 patient seen and examined.  Chest x-ray and ABG reviewed.  Plasma exchange day 2.  Tolerating enteral feeding.  Hypotensive, started on Levophed drip.  7/12 seen and examined. Intubated, sedated with fentanyl gtt. No pressors . Mental status improved sp Plasmapheresis x 2 days. Tolerating enteral feeding.   7/13 patient seen and examined, remains intubated, on fentanyl drip for sedation.  Following commands attempting to move extremities.  7/14 patient seen and examined, remains intubated, fentanyl drip for sedation.  Following commands.  Unable to move extremities.  Plasmapheresis today.  ABG and chest x-ray reviewed.  7/15 - Resting on vent and low dose Levophed infusion without sedation comfortable and in no distress.  Chen[place] TF and nods head to questions fully awake.  No neuromuscular improvement.  7/16 - still resting on vent and on low dose Levophed due to persistently labile BP.  Chencho TF and receiving 5th Plasmaphoresis now.  She acknowledges her vision is improved but no improvement in muscle strength of extrem.   7/17 - Had 6th plasmaphoresis today and reportedly no more planned.  No neuro change last 24 hours on exam.  chencho TF.  Still on low dose Levophed infusion.  Plan Trach and PEG in AM.  7/18 - resting on vent  without distress intubated.  BP still labile on low dose Levophed infusion.  [place]air TF.  Increased right pleural effusion.  Planning Trach/PEG.  Completed Plasmaphoresis.  Occasional non-sustained A-Fib this AM.  NIF undetectable.    07/19: SP PEG, repeat CXR and US chest doesn't show effusion: thoracentesis cancelled

## 2018-07-05 NOTE — ASSESSMENT & PLAN NOTE
- UA (+) nitrites    - Urine culture pending    COAGULASE-NEGATIVE STAPHYLOCOCCUS SPECIES   50,000 - 99,999 cfu/ml   Susceptibility testing not routinely performed.     IV Rocephin - started 6/30/18, Rocephin stopped 7/5/18

## 2018-07-05 NOTE — PROGRESS NOTES
[facility] Hospital Medicine  Progress Note    Patient Name: Carlie Valdez  MRN: [id number]  Patient Class: IP- Inpatient   Admission Date: 6/30/2018  Length of Stay: 5 days  Attending Physician: Denae Infante MD  Primary Care Provider: Johanna Guzman MD        Subjective:     Principal Problem:Acute respiratory failure with hypoxia and hypercarbia    HPI:  Carlie Valdez is a 72 year old female with a PMHx of HTN, GERD, DM, A-fib, Asthma, and Defibrillator who presented from home with c/o chest pain. Located to right side with no radiation. Associated symptoms: SOB. Pt and sitters at bedside report she was recently discharged from [facility] and pt hasn't had her home medications since being discharged. ED workup revealed: Hgb/Hct 11.2/34.7, Alk phos 152, BNP 1100, troponin 0.028. UA (+) nitrites. CXR with central vascular congestion with small bilateral effusion. Primary cardiologist is Dr. Hernández.  Pt admitted to Telemetry for Acute on chronic CHF exacerbation and UTI.     Hospital Course:  Pt admitted for decompensated heart failure and UTI. Diuresis with Bumex and IV Rocephin in progress. Pt with physical debility and PT/OT consulted. Dr. Joiner had lengthy discussion with daughter, Haley, and patient in reference to generalized weakness and proper disposition. SNF placement was agreed however, when  approach regarding SNF selection patient refused. Introduce Hospice to patient and advised she was not a candidate for Rehab due to her extent of weakness. Pt stated she would discharge home with sitters and home health services. 7/2/18 - Dr. Joiner has spoken with pt's daughters, Haley and Rosy, regarding the mother's condition. She has diuresed and diuretics changed to oral. She was medically stable for discharge. SNF placement pending. PT/OT in progress. 7/3/18 - It was noted that pt has refused participation in PT/OT. She appears grossly  more weak in the BUE/BLE extremities. Family notified of condition. Granddaughter at bedside mentioned similar symptoms prior to admission. Neurology consult placed, ESR/CRP pending, CT Head and C spine pending. At 4th day, CT of Head and C spine found no concerning findings. ESR and CRP elevated although no concern for vasculitis. Neurology saw the patient and recommended LP to rule out GBs or CIDP. Xarelto placed on hold and LP by IR is pending as Xarelto needs to be on hold for 3 days. Speech therapy evaluated the patient and noted inconsistent dysphonia. The patient will voice normally then start mouthing words. Diet recommendations:  Mechanical soft, Thin. On 6th day, pt noted to have acute onset of metabolic encephalopathy. Pt more somnolent, respiration shallow. She would arouse with sternal rub but return to lethargy. Repeat CT of Head was negative, ammonia slightly elevated at 61 and ABG showed acidosis with hypoxic/hypercapneic respiratory failure. Transferred to ICU.         Review of Systems   Constitutional: Positive for activity change. Negative for chills and fatigue.   HENT: Negative.    Eyes: Negative.    Respiratory: Negative for apnea, cough, choking, chest tightness, shortness of breath, wheezing and stridor.    Cardiovascular: Negative for chest pain, palpitations and leg swelling.   Gastrointestinal: Negative for abdominal pain, constipation, diarrhea, nausea and vomiting.   Endocrine: Negative.    Genitourinary: Negative.    Skin: Negative.    Allergic/Immunologic: Negative.    Neurological: Positive for weakness. Negative for dizziness, tremors, seizures, syncope, facial asymmetry, speech difficulty, light-headedness, numbness and headaches.   Hematological: Negative.    Psychiatric/Behavioral: Negative.      Objective:     Vital Signs (Most Recent):  Temp: 98.2 °F (36.8 °C) (07/05/18 1050)  Pulse: 70 (07/05/18 1303)  Resp: (!) 23 (07/05/18 1303)  BP: 124/72 (07/05/18 1100)  SpO2: 100 %  (07/05/18 1303) Vital Signs (24h Range):  Temp:  [94.2 °F (34.6 °C)-98.7 °F (37.1 °C)] 98.2 °F (36.8 °C)  Pulse:  [68-74] 70  Resp:  [18-23] 23  SpO2:  [97 %-100 %] 100 %  BP: ()/(59-83) 124/72     Weight: 70 kg (154 lb 5.2 oz)  Body mass index is 26.49 kg/m².    Intake/Output Summary (Last 24 hours) at 07/05/18 1343  Last data filed at 07/05/18 1133   Gross per 24 hour   Intake               50 ml   Output              275 ml   Net             -225 ml      Physical Exam   Constitutional: She appears well-developed. She appears lethargic. She is cooperative. She is easily aroused. No distress.   HENT:   Head: Normocephalic and atraumatic.   Right eye blindness   Eyes: Conjunctivae are normal.   Neck: Normal range of motion. Neck supple.   Cardiovascular: Normal rate, regular rhythm and normal heart sounds.    No murmur heard.  Mild BLE edema  Defibrillator   Pulmonary/Chest: Effort normal. No respiratory distress. She has decreased breath sounds in the right upper field, the right middle field, the right lower field, the left upper field and the left lower field. She has no wheezes. She has no rales. She exhibits no tenderness.   Respirations shallow   Abdominal: Soft. Bowel sounds are normal. She exhibits no distension. There is no tenderness. There is no rebound and no guarding.   Genitourinary:   Genitourinary Comments: Deferred   Musculoskeletal: She exhibits edema.   Pt with chronic right sided weakness.  No spontaneous movement to BUE and BLE.   Pt somnolent and aroused briefly with sternal rub     Neurological: She is easily aroused. She appears lethargic. No sensory deficit.   Skin: Skin is warm and dry. Capillary refill takes less than 2 seconds.   Pt has multiple areas of skin breakdown including right heel and left back area. Dressings intact.    Psychiatric: Her mood appears not anxious.   Somnolent   Nursing note and vitals reviewed.      Significant Labs:   CBC:   Recent Labs  Lab 07/04/18  0150  07/05/18  0523   WBC 10.03 11.53   HGB 10.7* 11.4*   HCT 35.6* 39.0   * 454*     CMP:   Recent Labs  Lab 07/04/18  0429 07/05/18  0523    145   K 3.5 4.7    101   CO2 30* 31*   GLU 95 142*   BUN 28* 32*   CREATININE 1.0 0.9   CALCIUM 9.3 10.2   PROT 6.2 7.3   ALBUMIN 2.3* 2.7*   BILITOT 0.2 0.4   ALKPHOS 133 143*   AST 21 27   ALT 13 19   ANIONGAP 10 13   EGFRNONAA 56* >60     All pertinent labs within the past 24 hours have been reviewed.    Significant Imaging: I have reviewed all pertinent imaging results/findings within the past 24 hours.    Assessment/Plan:      * Acute respiratory failure with hypoxia and hypercarbia    ABG reflected hypoxic/hypercapneic respiratory failure  ICU care  Pulmonology/Critical Care following  NIPPV in process          Encephalopathy, metabolic    Transfer to ICU  Neurology following  LP scheduled for this afternoon  Neuro checks            Acute on chronic systolic heart failure    Compensated as of 7/3/18   Telemetry  - BNP 1100  - CXR with vascular congestion with small bilateral effusion  - Mild BLE edema upon assessment but diminished breath sounds noted  - 2D ECHO on 06/02/18 with EF 35% and pulmonary HTN  - Pt received Bumex 2 mg IVP x 1 in ED  - Bumex 2 mg IVP daily====> changed to Bumex 2 mg twice daily 7/2/18  - Continue Metoprolol  - Strict I&Os  - Daily weights  Cardiology consult due to recurrent CHF exacerbations          Skin breakdown    Pt lying in specialty bed   Multiple areas of skin breakdown  - Inpatient consult to wound care          UTI (urinary tract infection)    - UA (+) nitrites    - Urine culture pending    COAGULASE-NEGATIVE STAPHYLOCOCCUS SPECIES   50,000 - 99,999 cfu/ml   Susceptibility testing not routinely performed.     IV Rocephin - started 6/30/18, Rocephin stopped 7/5/18          Severe muscle deconditioning    - Currently has [facility] (PT/OT/nurse)  - Pt has declined NH placement in the past  - Will consult social  work for discharge planning -- will need to resume home health upon discharge. Add aide and SW to home health vs SNF placement  - Continue PT/OT while inpatient - 7/2/18 pt refused PT/OT  PT/OT has signed off as patient does not participate  7/4/18 - Neurology following, need LP to rule out GBS or CIDP. CT of head and C spine negative for concerning findings. ESR/sed rate mildly elevated however, no concerns for vasculitis. LP pending as Xarelto needs to be held x 3 days.           Left ischial pressure sore, stage III              Hypothyroidism    - TSH about a month ago -- 1.265  - Continue Levothyroxine          Chronic kidney disease (CKD), stage III (moderate)    - Currently stable  - Monitor kidney function while on diuretics          Hypertension associated with diabetes    Controlled  - continue Toprol XL and lisinopril  - Apresoline prn  Lisinopril discontinued 7/4/18 - daughter stated it caused respiratory problems    DM  - HgbA1c on 06/03/18 -- 5.3 -- controlled  - Accuchecks and low dose SSI          Atrial fibrillation    - Currently rate controlled  Telemetry monitoring  - Continue Xarelto and Amiodarone            VTE Risk Mitigation         Ordered     heparin (porcine) injection 5,000 Units  Every 8 hours      07/04/18 1209          Critical care time spent on the evaluation and treatment of severe organ dysfunction, review of pertinent labs and imaging studies, discussions with consulting providers and discussions with patient/family: > 45 minutes.    Tonia Gates NP  Department of Hospital Medicine   [facility]

## 2018-07-05 NOTE — ASSESSMENT & PLAN NOTE
ABG reflected hypoxic/hypercapneic respiratory failure  ICU care  Pulmonology/Critical Care following  NIPPV in process

## 2018-07-05 NOTE — PROGRESS NOTES
Rectal temp is 94.2 F. Notified Sari Lomeli NP. Pt is on bear hugger. Will recheck temp in an hour.

## 2018-07-05 NOTE — PHYSICIAN QUERY
PT Name: Carlie Valdez  MR #: [id number]     Physician Query Form - Documentation Clarification      CDS/: Vanesa Carnes                Contact information:[email or web address]    This form is a permanent document in the medical record.     Query Date: July 5, 2018    By submitting this query, we are merely seeking further clarification of documentation. Please utilize your independent clinical judgment when addressing the question(s) below.    The Medical record reflects the following:    Supporting Clinical Findings Location in Medical Record   Patient turned with max assist to right side.  Sacrum and coccygeal regions intact with no breakdown.     Incontinence pad saturated with urine, and incontinence care  provided with easi cleanse foam wipes and aptted dry. Thin layer critic aid clear barrier applied.      Skin: Skin is warm and dry.   Stage II sacral decub      Wound care nurse note 7-2        Critical Care Medicine note 7-5                                                                                    Doctor, Please specify diagnosis or diagnoses associated with above clinical findings.    Provider Use Only      [  x  ]  Agree with wound care nurse patient do not have a sacral wound      [    ]  Disagree with wound care nurse and patient has a stage 2 sacral decubitus      [    ]  Other;please specify                                                                                                                 [  ] Clinically undetermined

## 2018-07-05 NOTE — HPI
72 year old female with a PMHx of p[revious cerebral vascular accident with right sided weakness and aphasia, HTN, GERD, DM, A-fib, Cardiac Asthma, and Defibrillator who presented from home with c/o chest pain to Emergency Room on 6/30/2018 folllwing recent discharge from [facility]. Admitted for acute on chronic systolic Congestive Heart failure and progressively worsened. Noted to have worsening mental status and shortness of breath and transferrrd to ICU for hypercapnic respiratory failure. Placed on Noninvasive Positive Pressure Ventilation

## 2018-07-05 NOTE — PROGRESS NOTES
Pulmonary / Critical Care Progress Note    Called to patient bedside for review of ABG.  Continue with Noninvasive Positive Pressure Ventilation    Patient evaluated at the bedside. Chart reviewed and patient examined. Progress  discussed with critical care nurse and nursing update given.  While at the patient's bedside hemodynamic effects of medications monitored. Dosage of medications adjusted.         Family updated.    Critical care time was exclusive of separately billable procedures and treating other patients.     Critical care was time spent personally by me on the following activities: development of treatment plan with patient or surrogate, discussions with consultants, evaluation of patient's response to treatment, examination of patient, obtaining history from patient or surrogate, ordering and performing treatments and interventions, ordering and review of laboratory studies, ordering and personal review of radiographic studies, pulse oximetry, Cardiac rhythm strips reviewed, re-evaluation of patient's condition, review of old charts        James Meier MD  Pulmonary / Critical Care Medicine

## 2018-07-05 NOTE — CONSULTS
[facility] -   Critical Care Medicine  Consult Note    Patient Name: Carlie Valdez  MRN: [id number]  Admission Date: 2018  Hospital Length of Stay: 5 days  Code Status: Full Code  Attending Physician: Denae Infante MD   Primary Care Provider: Johanna Guzman MD   Principal Problem: Acute on chronic systolic heart failure      Subjective:     HPI:  72 year old female with a PMHx of p[revious cerebral vascular accident with right sided weakness and aphasia, HTN, GERD, DM, A-fib, Cardiac Asthma, and Defibrillator who presented from home with c/o chest pain to Emergency Room on 2018 folllwing recent discharge from [facility]. Admitted for acute on chronic systolic Congestive Heart failure and progressively worsened. Noted to have worsening mental status and shortness of breath and transferrrd to ICU for hypercapnic respiratory failure. Placed on Noninvasive Positive Pressure Ventilation     Hospital/ICU Course:   Transferred to ICU. Noninvasive Positive Pressure Ventilation initiated , may need intubation    Past Medical History:   Diagnosis Date    Arthritis     Asthma     Atrial fibrillation     Blood clot of vein in shoulder area     Cardiac defibrillator in place     Chronic knee pain     Diabetes mellitus type 2 without retinopathy 2016    Diaphragmatic hernia without obstruction and without gangrene 2015    GERD (gastroesophageal reflux disease)     Hypertension     Primary open angle glaucoma (POAG) of both eyes, severe stage 2018       Past Surgical History:   Procedure Laterality Date    CARDIAC DEFIBRILLATOR PLACEMENT      , .    CATARACT EXTRACTION       SECTION      COLONOSCOPY      ashley Macdonald    KNEE ARTHROSCOPY      UPPER GASTROINTESTINAL ENDOSCOPY         Review of patient's allergies indicates:   Allergen Reactions    Morphine Hives    Atorvastatin      Other reaction(s): Muscle pain    Clonidine      Codeine      Other reaction(s): Unknown    Digoxin      Other reaction(s): Unknown    Diovan [valsartan] Other (See Comments)     Upset stomach, weakness    Eggs [egg derived]     Metoprolol Diarrhea    Naproxen      Other reaction(s): Nausea    Peanut     Propofol      Other reaction(s): delirious    Sulfa (sulfonamide antibiotics)        Family History     Problem Relation (Age of Onset)    Hypertension Mother, Father        Social History Main Topics    Smoking status: Never Smoker    Smokeless tobacco: Never Used    Alcohol use No    Drug use: No    Sexual activity: Yes     Partners: Male         Review of Systems   Unable to perform ROS: Mental status change     Objective:     Vital Signs (Most Recent):  Temp: 98.7 °F (37.1 °C) (07/05/18 0820)  Pulse: 69 (07/05/18 0900)  Resp: 20 (07/05/18 0820)  BP: (!) 154/70 (07/05/18 0820)  SpO2: 99 % (07/05/18 0820) Vital Signs (24h Range):  Temp:  [94.2 °F (34.6 °C)-98.7 °F (37.1 °C)] 98.7 °F (37.1 °C)  Pulse:  [68-79] 69  Resp:  [18-20] 20  SpO2:  [97 %-100 %] 99 %  BP: (121-154)/(59-83) 154/70     Weight: 70 kg (154 lb 5.2 oz)  Body mass index is 26.49 kg/m².      Intake/Output Summary (Last 24 hours) at 07/05/18 1017  Last data filed at 07/04/18 1531   Gross per 24 hour   Intake              290 ml   Output                0 ml   Net              290 ml       Physical Exam   Constitutional: She appears well-developed and well-nourished.   HENT:   Head: Normocephalic.   Eyes: EOM are normal. Pupils are equal, round, and reactive to light.   Neck: Normal range of motion. JVD present.   Cardiovascular: An irregularly irregular rhythm present. Tachycardia present.  PMI is displaced.    Murmur heard.   Systolic murmur is present with a grade of 2/6   Pulmonary/Chest: She has decreased breath sounds in the right lower field and the left lower field. She has rales in the right lower field and the left lower field.   Musculoskeletal: She exhibits edema.    Neurological: She displays abnormal reflex. She exhibits abnormal muscle tone. Coordination abnormal.   Responds to pain , intermittently responds to commands   Skin: Skin is warm and dry.   Stage II sacral decub   Nursing note and vitals reviewed.      Vents:  Oxygen Concentration (%): 28 (07/05/18 0744)    Lines/Drains/Airways     Pressure Ulcer                 Pressure Injury 06/02/18 Right lateral Heel Stage 3 33 days         Pressure Injury 06/02/18 1659 Left proximal Ischial tuberosity Stage 3 32 days         Pressure Injury 06/02/18 1902 Right medial Heel Unstageable 32 days         Pressure Injury 06/30/18 Left lateral Hip Stage 2 5 days         Pressure Injury 06/30/18 Right lateral Buttocks Stage 2 5 days          Peripheral Intravenous Line                 Peripheral IV - Single Lumen 06/30/18 0850 Left Antecubital 5 days                Significant Labs:    CBC/Anemia Profile:    Recent Labs  Lab 07/04/18 0429 07/05/18  0523   WBC 10.03 11.53   HGB 10.7* 11.4*   HCT 35.6* 39.0   * 454*   MCV 85 88   RDW 17.6* 17.3*        Chemistries:    Recent Labs  Lab 07/04/18 0429 07/05/18  0523    145   K 3.5 4.7    101   CO2 30* 31*   BUN 28* 32*   CREATININE 1.0 0.9   CALCIUM 9.3 10.2   ALBUMIN 2.3* 2.7*   PROT 6.2 7.3   BILITOT 0.2 0.4   ALKPHOS 133 143*   ALT 13 19   AST 21 27       ABGs:   Recent Labs  Lab 07/05/18  0945   PH 7.135*   PCO2 117.6*   HCO3 39.6*   POCSATURATED 99   BE 11     BMP:   Recent Labs  Lab 07/05/18  0523   *      K 4.7      CO2 31*   BUN 32*   CREATININE 0.9   CALCIUM 10.2     CMP:   Recent Labs  Lab 07/04/18 0429 07/05/18  0523    145   K 3.5 4.7    101   CO2 30* 31*   GLU 95 142*   BUN 28* 32*   CREATININE 1.0 0.9   CALCIUM 9.3 10.2   PROT 6.2 7.3   ALBUMIN 2.3* 2.7*   BILITOT 0.2 0.4   ALKPHOS 133 143*   AST 21 27   ALT 13 19   ANIONGAP 10 13   EGFRNONAA 56* >60     All pertinent labs within the past 24 hours have been  reviewed.    Significant Imaging:   I have reviewed all pertinent imaging results/findings within the past 24 hours.  I have reviewed and interpreted all pertinent imaging results/findings within the past 24 hours.    Assessment/Plan:     Derm   Skin breakdown    7/5 Wound care nurse        Pulmonary   Acute respiratory failure with hypoxia and hypercarbia    7/5 Noninvasive Positive Pressure Ventilation , jet nebs and oxygen        Cardiac/Vascular   * Acute on chronic systolic heart failure    7/5 start IV lasix            Critical Care Daily Checklist:    A: Awake: RASS Goal/Actual Goal:    Actual:     B: Spontaneous Breathing Trial Performed?     C: SAT & SBT Coordinated?  na                      D: Delirium: CAM-ICU     E: Early Mobility Performed? No   F: Feeding Goal:    Status:     Current Diet Order   Procedures    Diet Low Sodium, 2gm      AS: Analgesia/Sedation Not needed   T: Thromboembolic Prophylaxis heparin   H: HOB > 300 Yes   U: Stress Ulcer Prophylaxis (if needed) yes   G: Glucose Control adequate   B: Bowel Function Stool Occurrence: 1   I: Indwelling Catheter (Lines & Flores) Necessity needed   D: De-escalation of Antimicrobials/Pharmacotherapies Na - review urine cultures    Plan for the day/ETD Noninvasive Positive Pressure Ventilation , possible intubation    Code Status:  Family/Goals of Care: Full Code  Not avialbale      Critical Care Time: 45 minutes  Critical secondary to Patient has a condition that poses threat to life and bodily function: Severe Respiratory Distress     Critical care was time spent personally by me on the following activities: development of treatment plan with patient or surrogate and bedside caregivers, discussions with consultants, evaluation of patient's response to treatment, examination of patient, ordering and performing treatments and interventions, ordering and review of laboratory studies, ordering and review of radiographic studies, pulse oximetry,  re-evaluation of patient's condition. This critical care time did not overlap with that of any other provider or involve time for any procedures.    Thank you for your consult. I will follow-up with patient. Please contact us if you have any additional questions.     James Meier MD  Critical Care Medicine  [facility]

## 2018-07-05 NOTE — EICU
Flores cath placed by bedside RN.  Urinary return noted.  UA collected and sent to lab per MD order.

## 2018-07-05 NOTE — PHYSICIAN QUERY
PT Name: Carlie Valdez  MR #: [id number]    Physician Query Form -Integumentary  Clarification     CDS/: Vanesa Carnes                 Contact information:[email or web address]    This form is a permanent document in the medical record.     Query Date: July 5, 2018    By submitting this query, we are merely seeking further clarification of documentation. Please utilize your independent clinical judgment when addressing the question(s) below.    The Medical record contains the following:   Indicator  Supporting Clinical Findings Location in Medical Record    Redness     x Decubitus, Pressure Ulcer, etc. Left Ischial Stage 3 pressure injury    Left flank and right buttock stage 2 pressure injuries:    Wound care nurse note 7-2   x Deep Tissue Injury Right lateral heel DTI has evolved into POA stage 3 pressure injury with moist red and yellow 2 x2cm.     Right posterior/lateral DTI has evolved into unstageable pressure injury with black eschar that is open on lateral side with red moist wound bed.    Wound care nurse note 7-2    Wound Care Consult      Medication     x Treatment Wounds are clean, partial thickness open blisters. Foam dressings applied to cover all.    Cleansed all with saline and patted dry.  Painted milagros wound with cavilon, and  foam dressing applied to cover wounds, and heel elevated with pillows.     Wound care nurse 7-2   x Other Consulted on this 71 y/o F patient due to multiple present on admission pressure injuries. Patient was recently seen by this service in the past month for similar wounds.    Wound care nurse 7-2     National Pressure Ulcer Advisory Panel (2007) Pressure Ulcer Definitions & Stages:    Stage I:  Intact skin with non-blanchable redness of a localized area usually over a bony prominence.   Stage II:  Partial thickness loss of dermis presenting as a shallow open ulcer with a red pink wound bed, without slough.   Stage III: Full thickness  tissue loss. Subcutaneous fat may be visible but bone, tendon or muscle is not exposed. Slough may be present but does not obscure the depth of tissue loss. May include undermining and tunneling.   Stage IV: Full thickness tissue loss with exposed bone, tendon or muscle. Slough or eschar may be present on some parts of the wound bed. Often include undermining and tunneling.   Unstageable:   Full thickness tissue loss in which the base of the ulcer is covered by slough and/or eschar in the wound bed. Until enough slough and/or eschar is removed to expose the base of the wound, the true depth, and therefore stage, cannot be determined.   Deep Tissue Injury: Purple or maroon localized area of discolored intact skin or blood-filled blister due to damage of underlying soft tissue from  pressure and/or shear.     Provider, please specify the diagnosis or diagnoses associated with above clinical findings.      [ x ] Decubitus (Pressure) Ulcer / Deep Tissue Injury (please specify site, laterality & stage)    Site Laterality Stage   [ x ]Buttock [ x ] Right            [  ]Left Stage II   [ x ]Heel [ x ] Right            [  ]Left Stage III   [  ]Hip [  ] Right            [  ]Left    [  ]Other Site (please specify):          [  ] Other Integumentary Diagnosis (please specify): ___________________________________  [  ] Clinically Undetermined    Please document in your progress notes daily for the duration of treatment until resolved and include in your discharge summary.

## 2018-07-05 NOTE — ASSESSMENT & PLAN NOTE
Controlled  - continue Toprol XL and lisinopril  - Apresoline prn  Lisinopril discontinued 7/4/18 - daughter stated it caused respiratory problems    DM  - HgbA1c on 06/03/18 -- 5.3 -- controlled  - Accuchecks and low dose SSI

## 2018-07-05 NOTE — ASSESSMENT & PLAN NOTE
- Currently has [facility] (PT/OT/nurse)  - Pt has declined NH placement in the past  - Will consult social work for discharge planning -- will need to resume home health upon discharge. Add aide and SW to home health vs SNF placement  - Continue PT/OT while inpatient - 7/2/18 pt refused PT/OT  PT/OT has signed off as patient does not participate  7/4/18 - Neurology following, need LP to rule out GBS or CIDP. CT of head and C spine negative for concerning findings. ESR/sed rate mildly elevated however, no concerns for vasculitis. LP pending as Xarelto needs to be held x 3 days.

## 2018-07-05 NOTE — PROGRESS NOTES
Progress Note  Neurological ICU    Admit Date: 6/30/2018   LOS: 5 days     SUBJECTIVE:     Follow-up For:  Generalized weakness, respiratory failure.  The patient has developed respiratory failure, and has been moved to ICU on Bipap.  She continues to exhibit her generalized weakness, but has occasional, random bilateral leg movement.  Articulation is impaired due to Bipap mask.    Continuous Infusions:  Scheduled Meds:   albuterol-ipratropium  3 mL Nebulization Q6H    amiodarone  200 mg Oral BID    brimonidine 0.2%  1 drop Both Eyes Q12H    And    timolol maleate 0.5%  1 drop Both Eyes BID    cefTRIAXone (ROCEPHIN) IVPB  1 g Intravenous Q24H    furosemide  40 mg Intravenous BID    heparin (porcine)  5,000 Units Subcutaneous Q8H    levothyroxine  100 mcg Oral Before breakfast    metoprolol succinate  50 mg Oral BID    pantoprazole  40 mg Oral Daily    potassium chloride SA  20 mEq Oral BID     PRN Meds:acetaminophen, albuterol-ipratropium, baclofen, dextrose 50%, dextrose 50%, glucagon (human recombinant), glucose, glucose, hydrALAZINE, insulin aspart U-100    Review of patient's allergies indicates:   Allergen Reactions    Morphine Hives    Atorvastatin      Other reaction(s): Muscle pain    Clonidine     Codeine      Other reaction(s): Unknown    Digoxin      Other reaction(s): Unknown    Diovan [valsartan] Other (See Comments)     Upset stomach, weakness    Eggs [egg derived]     Metoprolol Diarrhea    Naproxen      Other reaction(s): Nausea    Peanut     Propofol      Other reaction(s): delirious    Sulfa (sulfonamide antibiotics)        OBJECTIVE:     Vital Signs (Most Recent)  Temp: 98.2 °F (36.8 °C) (07/05/18 1050)  Pulse: 70 (07/05/18 1303)  Resp: (!) 23 (07/05/18 1303)  BP: 124/72 (07/05/18 1100)  SpO2: 100 % (07/05/18 1303)    Vital Signs Range (Last 24H):  Temp:  [94.2 °F (34.6 °C)-98.7 °F (37.1 °C)]   Pulse:  [68-74]   Resp:  [18-23]   BP: ()/(59-83)   SpO2:  [97 %-100 %]      I & O (Last 24H):  Intake/Output Summary (Last 24 hours) at 07/05/18 1307  Last data filed at 07/05/18 1133   Gross per 24 hour   Intake               50 ml   Output              275 ml   Net             -225 ml     Ventilator Data (Last 24H):     Oxygen Concentration (%):  [28-30] 30    Hemodynamic Parameters (Last 24H):       Physical Exam:  General: appears acutely ill, moderate distress  Head: normocephalic, atraumatic  Neck: supple, symmetrical, trachea midline, no JVD  Lungs:  On bipap with bilateral rales.  Heart: regular rate and rhythm  Neurologic: Mental status: alertness: lethargic  Motor:Does not squeeze hands to command.  Does not move feet, legs to command, but there is occasional movement in both lower legs and feet, repositioning the feet.  Reflexes: Stretch reflexes are absent in arms/legs.  She does have bilateral Babinski signs, however.   Sensory:  The patient did not respond to pain stimulus, fingers or lower extremities.  The movement described does not appear to be a withdrawal response.    Lines/Drains:       Peripheral IV - Single Lumen 06/30/18 0850 Left Antecubital (Active)   Site Assessment Intact;Dry;Clean 7/5/2018  9:00 AM   Line Status Saline locked 7/5/2018  9:00 AM   Dressing Status Intact;Dry;Clean 7/5/2018  9:00 AM   Dressing Intervention New dressing 6/30/2018  8:50 AM   Number of days: 5            Urethral Catheter 07/05/18 1114 (Active)   Output (mL) 275 mL 7/5/2018 11:33 AM   Number of days: 0       Laboratory (Last 24H):  CBC:    Recent Labs  Lab 07/05/18 0523   WBC 11.53   HGB 11.4*   HCT 39.0   *     CMP:    Recent Labs  Lab 07/05/18 0523   CALCIUM 10.2   ALBUMIN 2.7*   PROT 7.3      K 4.7   CO2 31*      BUN 32*   CREATININE 0.9   ALKPHOS 143*   ALT 19   AST 27   BILITOT 0.4     BMP:   Recent Labs  Lab 07/05/18 0523   *      K 4.7      CO2 31*   BUN 32*   CREATININE 0.9   CALCIUM 10.2     ABGs:   Recent Labs  Lab 07/05/18  0945    PH 7.135*   PCO2 117.6*   HCO3 39.6*   POCSATURATED 99   BE 11     Labs within the past 24 hours have been reviewed.    Chest X-Ray: cardiomegaly    Diagnostic Results:  Serial CT scans of the brain are reviewed and do not seem to show any new findings.  There is the older stroke and microvascular changes which are unchanged.    ASSESSMENT/PLAN:     Preventive Measures: Stress Ulcer: continue prophyllaxis, DVT: continue prophyllaxis, Reposition: as frequently as needed.        Plan:  Neuro: Still would like to see results of LP, when it can be done without undue risk to the patient.  GBS is still in the differential, but current change appears to be more respiratory failure.    Counseling/Consultation:Discussed with ICU personnel.    Critical Care Time greater than: 45 Minutes

## 2018-07-05 NOTE — PLAN OF CARE
Problem: Patient Care Overview  Goal: Plan of Care Review  Patient currently requires total assist x 2 for bed mobility and total assist for sitting balance at EOB.   Outcome: Ongoing (interventions implemented as appropriate)  The patient has been sinus rhythm on the monitor. TMIN was 94.2 rectal. Temp increased with bear hugger and blankets. Pt is resting quietly, will continue to monitor.

## 2018-07-05 NOTE — NURSING
Physical therapy called nurse to room. Pt breathing shallow and lethargic. VSS. O2 at 99 percent on 2L. NP called to bedside. Blood sugar 132. Stat ABGs done. Dr notified. Patient placed on bipap. Stat Ct ordered. Will cont to monitor

## 2018-07-05 NOTE — SIGNIFICANT EVENT
Called to room by nurse as patient noted to have status change in reference to mentation    Seen and examined by myself and Dr. Infante    Pt more somnolent with shallow respirations. She arouses briefly with eyes more open then returns to lethargic behavior.  No verbalization, no spontaneous movement of extremities    Vital signs stable  STAT CT of Head pending  ABG pending  Ammonia pending  Blood glucose 132    Daughter, Haley, at bedside and aware and situation. Spoke about plan of care.

## 2018-07-05 NOTE — NURSING
Pt and daughter refusing LP at this time. Spoke with radiology, stated will be rescheduled for tomorrow.

## 2018-07-05 NOTE — PHYSICIAN QUERY
PT Name: Carlie Valdez  MR #: [id number]    Physician Query Form - Consultant Diagnosis Clarification     CDS/: Vanesa Carnes                Contact information:[email or web address]  This form is a permanent document in the medical record.     Query Date: July 5, 2018      By submitting this query, we are merely seeking further clarification of documentation.  Please utilize your independent clinical judgment when addressing the question(s) below.      The Medical record contains the following:   Diagnosis Supporting Clinical Information Location in Medical Record   Acute respiratory failure with hypoxia and hypercarbia  Admitted for acute on chronic systolic Congestive Heart failure and progressively worsened. Noted to have worsening mental status and shortness  of breath and transferrrd to ICU for hypercapnic respiratory failure. Placed on Noninvasive Positive Pressure Ventilation     Severe Respiratory Distress     So far, there is no evidence of respiratory distress.    RR 18-23  2lnc   Bipap    ABG  HJ2=155  EEK8=808.6 Critical Care Medicine note 7-5                  Neurology consult note 7-3     Vs record 7-3 to 7-5  Vs record 7-3 to 7-4  Vs record 7-5    ABG 7-5             Do you agree with the Consultants diagnosis of Acute respiratory failure with hypoxia and hypercarbia?                 [  x ]   Yes               [   ]   Yes, but it resolved prior to my assessment of the patient               [   ]   No                [   ]   Clinically undetermined               [   ]   Other/Clarification of findings: ___________________________________________

## 2018-07-05 NOTE — NURSING
Pt transferred from tele- unresponsive to verbal commands- spontaneous movements to lower ext but none to upper- upper ext flaccid- pt incontinent of urine and stool- pt cleaned- linens changed- multiple mepilex in place- stage 3 to left heel noted- large duoderm to left ischial/buttock area- skin tears to right buttocks and back- bipap in use- spont respirations- pt on specialty bed- atrial paced on cardiac monitor- dr ireland at bedside to evaluate pt

## 2018-07-05 NOTE — SUBJECTIVE & OBJECTIVE
Past Medical History:   Diagnosis Date    Arthritis     Asthma     Atrial fibrillation     Blood clot of vein in shoulder area     Cardiac defibrillator in place     Chronic knee pain     Diabetes mellitus type 2 without retinopathy 2016    Diaphragmatic hernia without obstruction and without gangrene 2015    GERD (gastroesophageal reflux disease)     Hypertension     Primary open angle glaucoma (POAG) of both eyes, severe stage 2018       Past Surgical History:   Procedure Laterality Date    CARDIAC DEFIBRILLATOR PLACEMENT      , .    CATARACT EXTRACTION       SECTION      COLONOSCOPY      ashley      Dr. Macdonald    KNEE ARTHROSCOPY      UPPER GASTROINTESTINAL ENDOSCOPY         Review of patient's allergies indicates:   Allergen Reactions    Morphine Hives    Atorvastatin      Other reaction(s): Muscle pain    Clonidine     Codeine      Other reaction(s): Unknown    Digoxin      Other reaction(s): Unknown    Diovan [valsartan] Other (See Comments)     Upset stomach, weakness    Eggs [egg derived]     Metoprolol Diarrhea    Naproxen      Other reaction(s): Nausea    Peanut     Propofol      Other reaction(s): delirious    Sulfa (sulfonamide antibiotics)        Family History     Problem Relation (Age of Onset)    Hypertension Mother, Father        Social History Main Topics    Smoking status: Never Smoker    Smokeless tobacco: Never Used    Alcohol use No    Drug use: No    Sexual activity: Yes     Partners: Male         Review of Systems   Unable to perform ROS: Mental status change     Objective:     Vital Signs (Most Recent):  Temp: 98.7 °F (37.1 °C) (18 08)  Pulse: 69 (18 0900)  Resp: 20 (18 08)  BP: (!) 154/70 (18 0820)  SpO2: 99 % (18 08) Vital Signs (24h Range):  Temp:  [94.2 °F (34.6 °C)-98.7 °F (37.1 °C)] 98.7 °F (37.1 °C)  Pulse:  [68-79] 69  Resp:  [18-20] 20  SpO2:  [97 %-100 %] 99 %  BP: (121-154)/(59-83)  154/70     Weight: 70 kg (154 lb 5.2 oz)  Body mass index is 26.49 kg/m².      Intake/Output Summary (Last 24 hours) at 07/05/18 1017  Last data filed at 07/04/18 1531   Gross per 24 hour   Intake              290 ml   Output                0 ml   Net              290 ml       Physical Exam   Constitutional: She appears well-developed and well-nourished.   HENT:   Head: Normocephalic.   Eyes: EOM are normal. Pupils are equal, round, and reactive to light.   Neck: Normal range of motion. JVD present.   Cardiovascular: An irregularly irregular rhythm present. Tachycardia present.  PMI is displaced.    Murmur heard.   Systolic murmur is present with a grade of 2/6   Pulmonary/Chest: She has decreased breath sounds in the right lower field and the left lower field. She has rales in the right lower field and the left lower field.   Musculoskeletal: She exhibits edema.   Neurological: She displays abnormal reflex. She exhibits abnormal muscle tone. Coordination abnormal.   Responds to pain , intermittently responds to commands   Skin: Skin is warm and dry.   Stage II sacral decub   Nursing note and vitals reviewed.      Vents:  Oxygen Concentration (%): 28 (07/05/18 0744)    Lines/Drains/Airways     Pressure Ulcer                 Pressure Injury 06/02/18 Right lateral Heel Stage 3 33 days         Pressure Injury 06/02/18 1659 Left proximal Ischial tuberosity Stage 3 32 days         Pressure Injury 06/02/18 1902 Right medial Heel Unstageable 32 days         Pressure Injury 06/30/18 Left lateral Hip Stage 2 5 days         Pressure Injury 06/30/18 Right lateral Buttocks Stage 2 5 days          Peripheral Intravenous Line                 Peripheral IV - Single Lumen 06/30/18 0850 Left Antecubital 5 days                Significant Labs:    CBC/Anemia Profile:    Recent Labs  Lab 07/04/18  0429 07/05/18  0523   WBC 10.03 11.53   HGB 10.7* 11.4*   HCT 35.6* 39.0   * 454*   MCV 85 88   RDW 17.6* 17.3*         Chemistries:    Recent Labs  Lab 07/04/18 0429 07/05/18  0523    145   K 3.5 4.7    101   CO2 30* 31*   BUN 28* 32*   CREATININE 1.0 0.9   CALCIUM 9.3 10.2   ALBUMIN 2.3* 2.7*   PROT 6.2 7.3   BILITOT 0.2 0.4   ALKPHOS 133 143*   ALT 13 19   AST 21 27       ABGs:   Recent Labs  Lab 07/05/18  0945   PH 7.135*   PCO2 117.6*   HCO3 39.6*   POCSATURATED 99   BE 11     BMP:   Recent Labs  Lab 07/05/18  0523   *      K 4.7      CO2 31*   BUN 32*   CREATININE 0.9   CALCIUM 10.2     CMP:   Recent Labs  Lab 07/04/18 0429 07/05/18  0523    145   K 3.5 4.7    101   CO2 30* 31*   GLU 95 142*   BUN 28* 32*   CREATININE 1.0 0.9   CALCIUM 9.3 10.2   PROT 6.2 7.3   ALBUMIN 2.3* 2.7*   BILITOT 0.2 0.4   ALKPHOS 133 143*   AST 21 27   ALT 13 19   ANIONGAP 10 13   EGFRNONAA 56* >60     All pertinent labs within the past 24 hours have been reviewed.    Significant Imaging:   I have reviewed all pertinent imaging results/findings within the past 24 hours.  I have reviewed and interpreted all pertinent imaging results/findings within the past 24 hours.

## 2018-07-05 NOTE — SUBJECTIVE & OBJECTIVE
Review of Systems   Constitutional: Positive for activity change. Negative for chills and fatigue.   HENT: Negative.    Eyes: Negative.    Respiratory: Negative for apnea, cough, choking, chest tightness, shortness of breath, wheezing and stridor.    Cardiovascular: Negative for chest pain, palpitations and leg swelling.   Gastrointestinal: Negative for abdominal pain, constipation, diarrhea, nausea and vomiting.   Endocrine: Negative.    Genitourinary: Negative.    Skin: Negative.    Allergic/Immunologic: Negative.    Neurological: Positive for weakness. Negative for dizziness, tremors, seizures, syncope, facial asymmetry, speech difficulty, light-headedness, numbness and headaches.   Hematological: Negative.    Psychiatric/Behavioral: Negative.      Objective:     Vital Signs (Most Recent):  Temp: 98.2 °F (36.8 °C) (07/05/18 1050)  Pulse: 70 (07/05/18 1303)  Resp: (!) 23 (07/05/18 1303)  BP: 124/72 (07/05/18 1100)  SpO2: 100 % (07/05/18 1303) Vital Signs (24h Range):  Temp:  [94.2 °F (34.6 °C)-98.7 °F (37.1 °C)] 98.2 °F (36.8 °C)  Pulse:  [68-74] 70  Resp:  [18-23] 23  SpO2:  [97 %-100 %] 100 %  BP: ()/(59-83) 124/72     Weight: 70 kg (154 lb 5.2 oz)  Body mass index is 26.49 kg/m².    Intake/Output Summary (Last 24 hours) at 07/05/18 1343  Last data filed at 07/05/18 1133   Gross per 24 hour   Intake               50 ml   Output              275 ml   Net             -225 ml      Physical Exam   Constitutional: She appears well-developed. She appears lethargic. She is cooperative. She is easily aroused. No distress.   HENT:   Head: Normocephalic and atraumatic.   Right eye blindness   Eyes: Conjunctivae are normal.   Neck: Normal range of motion. Neck supple.   Cardiovascular: Normal rate, regular rhythm and normal heart sounds.    No murmur heard.  Mild BLE edema  Defibrillator   Pulmonary/Chest: Effort normal. No respiratory distress. She has decreased breath sounds in the right upper field, the right  middle field, the right lower field, the left upper field and the left lower field. She has no wheezes. She has no rales. She exhibits no tenderness.   Respirations shallow   Abdominal: Soft. Bowel sounds are normal. She exhibits no distension. There is no tenderness. There is no rebound and no guarding.   Genitourinary:   Genitourinary Comments: Deferred   Musculoskeletal: She exhibits edema.   Pt with chronic right sided weakness.  No spontaneous movement to BUE and BLE.   Pt somnolent and aroused briefly with sternal rub     Neurological: She is easily aroused. She appears lethargic. No sensory deficit.   Skin: Skin is warm and dry. Capillary refill takes less than 2 seconds.   Pt has multiple areas of skin breakdown including right heel and left back area. Dressings intact.    Psychiatric: Her mood appears not anxious.   Somnolent   Nursing note and vitals reviewed.      Significant Labs:   CBC:   Recent Labs  Lab 07/04/18  0429 07/05/18  0523   WBC 10.03 11.53   HGB 10.7* 11.4*   HCT 35.6* 39.0   * 454*     CMP:   Recent Labs  Lab 07/04/18  0429 07/05/18  0523    145   K 3.5 4.7    101   CO2 30* 31*   GLU 95 142*   BUN 28* 32*   CREATININE 1.0 0.9   CALCIUM 9.3 10.2   PROT 6.2 7.3   ALBUMIN 2.3* 2.7*   BILITOT 0.2 0.4   ALKPHOS 133 143*   AST 21 27   ALT 13 19   ANIONGAP 10 13   EGFRNONAA 56* >60     All pertinent labs within the past 24 hours have been reviewed.    Significant Imaging: I have reviewed all pertinent imaging results/findings within the past 24 hours.

## 2018-07-05 NOTE — PLAN OF CARE
Problem: Patient Care Overview  Goal: Plan of Care Review  Patient currently requires total assist x 2 for bed mobility and total assist for sitting balance at EOB.   Outcome: Ongoing (interventions implemented as appropriate)  Patient has her respiratory distress in her ABG result put her on bipap 18/8/ 30 % and moved to ICU on her bipap.

## 2018-07-05 NOTE — PLAN OF CARE
KARINE spoke with the patient's DTR - Haley. During our family conference she stated the patient wanted to be transferred due to Orthodox and she wanted to be care for at [facility] because of having her doctors there as well. She was seeing Dr. Dann Macdonald ( pain management). She was walking and taking care of herself in May 2018. She has been sickly but able to care for herself. 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  KARINE explained what it would be a patient choice to transferred. All occurring expenses, for transferring and accepting doctor , would be on the patient / family (out of pocket) since we can provide the same care here at [facility].  She verbalized understanding. Haley ( DTR  is  Research Coord for the oncology dept at [employer]) voiced concern about her mother being transferred to ICU. She stated her mother was taken off Lisinopril in 2015 due to resp issues that it caused her. She now noticed she is back on it. She also stated her mother she has taken steroids . She wanted to know why this was not given to her since  she was having a hard time breathing and this has helped her in the past.  She stated they were to do a LP but she received Xarelto and now is receiving Heparin. How will she receive LP with the blood thinners? Her concern is getting her mother to the next phase of care - Rehab.Her last hospital stay in May , she was denied rehab because of the LP not being done to give them a definitive DX for Rehab....???.  She went to [facility] and d/c on 6/22/18 without meds. It took 1 1/2 weeks for the prescriptions to be called to the pharmacy. This meant the patient was without meds for this length of time per the DTR states.  KARINE discussed d/c plans. She stated her mother wishes to go to rehab not SNF. She has been to [facility]. SN[facility] - [facility]. DTR has visited several NH based SNF and refuses to let her mother go to any of those due to the smell/odor of NH. They have paid CG in which is  paid at there expense - out of pocket . She has family support and some are paid to care for both parents . Spouse is 84 y/o and mother is 72. Spouse is retired from [employer]. Patient does get spousal benefits of $152/month for assistance with pd cg for personal care. She stated the patient refuses to got to NH. She stated they will keep her at home as long as possible. CM asked for other family support beside her. DTR stated she is the youngest of 4. She has 3 sister. Alisha lives in [place] and will be here tomorrow. She is a Cardiologist at [employer]. She has another sister who is not as involved  but will keep reaching out to her. She does know what is going on but has not provided any hand on care as of yet. She had a brother who is  and dies from Myasthenia Gravis Disease. She has a host of cousins as well that provide assistance at home for her parents.  CM to f/u for safe transition of care.      18 1226   Discharge Reassessment   Assessment Type Discharge Planning Reassessment   Provided patient/caregiver education on the expected discharge date and the discharge plan No   Do you have any problems affording any of your prescribed medications? No   Discharge Plan A Rehab   Discharge Plan B Skilled Nursing Facility   Patient choice form signed by patient/caregiver N/A   Can the patient answer the patient profile reliably? Yes, cognitively intact   How does the patient rate their overall health at the present time? Fair   Describe the patient's ability to walk at the present time. Does not walk or unable to take any steps at all   How often would a person be available to care for the patient? Whenever needed   Number of comorbid conditions (as recorded on the chart) Five or more   During the past month, has the patient often been bothered by feeling down, depressed or hopeless? No   During the past month, has the patient often been bothered by little interest or pleasure in doing things?  No

## 2018-07-05 NOTE — NURSING
Pt transferred to ICU per orders with RT on BIPAP. Lethargic. Shallow breathing. Family notified. Bedside report given. Will cont to monitor

## 2018-07-06 PROBLEM — R53.1 WEAKNESS GENERALIZED: Status: ACTIVE | Noted: 2018-01-01

## 2018-07-06 PROBLEM — G36.0 NEUROMYELITIS OPTICA SPECTRUM DISORDER: Status: ACTIVE | Noted: 2018-01-01

## 2018-07-06 PROBLEM — R79.89 INCREASED AMMONIA LEVEL: Status: ACTIVE | Noted: 2018-01-01

## 2018-07-06 NOTE — PROGRESS NOTES
[facility] Hospital Medicine  Progress Note    Patient Name: Carlie Valdez  MRN: [id number]  Patient Class: IP- Inpatient   Admission Date: 6/30/2018  Length of Stay: 6 days  Attending Physician: Denae Infante MD  Primary Care Provider: Johanna Guzman MD        Subjective:     Principal Problem:Acute respiratory failure with hypoxia and hypercarbia    HPI:  Carlie Valdez is a 72 year old female with a PMHx of HTN, GERD, DM, A-fib, Asthma, and Defibrillator who presented from home with c/o chest pain. Located to right side with no radiation. Associated symptoms: SOB. Pt and sitters at bedside report she was recently discharged from [facility] and pt hasn't had her home medications since being discharged. ED workup revealed: Hgb/Hct 11.2/34.7, Alk phos 152, BNP 1100, troponin 0.028. UA (+) nitrites. CXR with central vascular congestion with small bilateral effusion. Primary cardiologist is Dr. Hernández.  Pt admitted to Telemetry for Acute on chronic CHF exacerbation and UTI.     Hospital Course:  Pt admitted for decompensated heart failure and UTI. Diuresis with Bumex and IV Rocephin in progress. Pt with physical debility and PT/OT consulted. Dr. Joiner had lengthy discussion with daughter, Haley, and patient in reference to generalized weakness and proper disposition. SNF placement was agreed however, when  approach regarding SNF selection patient refused. Introduce Hospice to patient and advised she was not a candidate for Rehab due to her extent of weakness. Pt stated she would discharge home with sitters and home health services. 7/2/18 - Dr. Joiner has spoken with pt's daughters, Haley and Rosy, regarding the mother's condition. She has diuresed and diuretics changed to oral. She was medically stable for discharge. SNF placement pending. PT/OT in progress. 7/3/18 - It was noted that pt has refused participation in PT/OT. She appears grossly  more weak in the BUE/BLE extremities. Family notified of condition. Granddaughter at bedside mentioned similar symptoms prior to admission. Neurology consult placed, ESR/CRP pending, CT Head and C spine pending. At 4th day, CT of Head and C spine found no concerning findings. ESR and CRP elevated although no concern for vasculitis. Neurology saw the patient and recommended LP to rule out GBs or CIDP. Xarelto placed on hold and LP by IR is pending as Xarelto needs to be on hold for 3 days. Speech therapy evaluated the patient and noted inconsistent dysphonia. The patient will voice normally then start mouthing words. Diet recommendations:  Mechanical soft, Thin. On 6th day, pt noted to have acute onset of metabolic encephalopathy. Pt more somnolent, respiration shallow. She would arouse with sternal rub but return to lethargy. Repeat CT of Head was negative, ammonia slightly elevated at 61 and ABG showed acidosis with hypoxic/hypercapneic respiratory failure.   06/07/18 - presently on Bipap for respiratory acidosis , Lumbar puncture done today 06/07/18    Interval History:     Review of Systems   Unable to perform ROS: Other   Constitutional: Negative.    HENT: Negative.    Eyes: Negative.    Respiratory: Positive for shortness of breath.    Cardiovascular: Negative.    Gastrointestinal: Negative.    Endocrine: Negative.    Genitourinary: Negative.    Musculoskeletal: Negative.    Skin: Negative.    Allergic/Immunologic: Negative.    Neurological: Negative.    Hematological: Negative.    Psychiatric/Behavioral: Negative.      Objective:     Vital Signs (Most Recent):  Temp: 97.4 °F (36.3 °C) (07/06/18 1515)  Pulse: 70 (07/06/18 1630)  Resp: 19 (07/06/18 1630)  BP: (!) 189/102 (prn bp meds given) (07/06/18 1630)  SpO2: 100 % (07/06/18 1630) Vital Signs (24h Range):  Temp:  [94.4 °F (34.7 °C)-99.6 °F (37.6 °C)] 97.4 °F (36.3 °C)  Pulse:  [68-72] 70  Resp:  [0-31] 19  SpO2:  [97 %-100 %] 100 %  BP: ()/()  189/102     Weight: 70 kg (154 lb 5.2 oz)  Body mass index is 26.49 kg/m².    Intake/Output Summary (Last 24 hours) at 07/06/18 1659  Last data filed at 07/06/18 1600   Gross per 24 hour   Intake              560 ml   Output             1485 ml   Net             -925 ml      Physical Exam    Significant Labs:   BMP:   Recent Labs  Lab 07/06/18  0402   *   *   K 4.1      CO2 33*   BUN 37*   CREATININE 0.8   CALCIUM 10.5   MG 1.7     CBC:   Recent Labs  Lab 07/05/18  0523 07/06/18  0402   WBC 11.53 10.93   HGB 11.4* 11.5*   HCT 39.0 38.4   * 439*       Significant Imaging: I have reviewed all pertinent imaging results/findings within the past 24 hours.    Assessment/Plan:      * Acute respiratory failure with hypoxia and hypercarbia    ABG reflected hypoxic/hypercapneic respiratory failure  ICU care  Pulmonology/Critical Care following  Bipap        Encephalopathy, metabolic    Transfer to ICU  Neurology following  LP scheduled for this afternoon  Neuro checks            Skin breakdown    Pt lying in specialty bed   Multiple areas of skin breakdown  - Inpatient consult to wound care          UTI (urinary tract infection)    - UA (+) nitrites    - Urine culture pending    COAGULASE-NEGATIVE STAPHYLOCOCCUS SPECIES   50,000 - 99,999 cfu/ml   Susceptibility testing not routinely performed.     IV Rocephin - started 6/30/18, Rocephin stopped 7/5/18          Severe muscle deconditioning    - Currently has [facility] (PT/OT/nurse)  - Pt has declined NH placement in the past  - Continue PT/OT while inpatient - 7/2/18 pt refused PT/OT  PT/OT has signed off as patient does not participate          Acute on chronic systolic heart failure    Compensated as of 7/3/18   Telemetry  - BNP 1100  - CXR with vascular congestion with small bilateral effusion  - Mild BLE edema upon assessment but diminished breath sounds noted  - 2D ECHO on 06/02/18 with EF 35% and pulmonary HTN  - Pt received Bumex 2 mg  IVP x 1 in ED  - Bumex 2 mg IVP daily====> changed to Bumex 2 mg twice daily 7/2/18  - Continue Metoprolol  - Strict I&Os  - Daily weights  Cardiology consult due to recurrent CHF exacerbations          Left ischial pressure sore, stage III              Hypothyroidism    - TSH about a month ago -- 1.265  - Continue Levothyroxine          Chronic kidney disease (CKD), stage III (moderate)    - Currently stable  - Monitor kidney function while on diuretics          Hypertension associated with diabetes    Controlled  - continue Toprol XL and lisinopril  - Apresoline prn  Lisinopril discontinued 7/4/18 - daughter stated it caused respiratory problems    DM  - HgbA1c on 06/03/18 -- 5.3 -- controlled  - Accuchecks and low dose SSI          Atrial fibrillation    - Currently rate controlled  Telemetry monitoring  - Continue Xarelto and Amiodarone            VTE Risk Mitigation         Ordered     heparin (porcine) injection 5,000 Units  Every 8 hours      07/04/18 1209          Critical care time spent on the evaluation and treatment of severe organ dysfunction, review of pertinent labs and imaging studies, discussions with consulting providers and discussions with patient/family: 40 minutes.    Denae Infante MD  Department of Hospital Medicine   [facility]

## 2018-07-06 NOTE — PROGRESS NOTES
Patient BP elevated.  I asked patient if she was hurting.  She shook head yes. I asked her if pain was in her legs.  She shook head yes.  Called [facility] and spoke with MD Ramos.  He stated he will put order in for tylenol and to give another 10mg of hydralazine.  Will wait for orders and continue to monitor

## 2018-07-06 NOTE — EICU
Camera Jose:  Discussed with bed side RN.  Now hypertensive and c/o pain in legs.  Received yesterday lasix.  Could have hypokalemia.  Sat 100%.  Awake.  HR 70, .  Did receive 10 mg hydralazine at 1 am.  Was hypotensive on beginning of shift.  Received saline bolus.    Plan:  - Tylenol 650 mg per rectally once  - IV hydralazine 10 mg stat  - follow BMP in am with mag level.

## 2018-07-06 NOTE — OP NOTE
Sterile technique was performed in the lower back, lidocaine was used as a local anesthetic.  OP 16 cm of h2o, 11 ccs of clear csf to lab for eval.  Pt tolerated the procedure well without immediate complications.  Please see radiologist report for details. F/u with PCP and/or ordering physician.

## 2018-07-06 NOTE — PROGRESS NOTES
F/U visit with Ms. Valdez this morning. Daughter present at bedside.  Right heel stage 3 pressure injury again noted.  Dressing removed, and milagros wound noted to be mildly macerated. Cleansed with saline and patted dry. Intact milagros wound skin painted with cavilon. Aquacel AG extra cut to size and applied to cover open wound bed, and secured with sacral foam dressing slit on sides to form to heel.  Patient turned to left side.  Right ischial healing stage 3 pressure injury again noted with duoderm intact.  Sacrum and coccyx intact.  Right buttock stage 2 pressure injury again noted as intact serous fluid filled blister with foam dressing intact.  Wounds are healing well.  Daughter updated.  Will follow.

## 2018-07-06 NOTE — PROGRESS NOTES
[facility] -   Critical Care Medicine  Progress Note    Patient Name: Carlie Valdez  MRN: [id number]  Admission Date: 2018  Hospital Length of Stay: 6 days  Code Status: Full Code  Attending Provider: Denae Infante MD  Primary Care Provider: Johanna Guzman MD   Principal Problem: Acute respiratory failure with hypoxia and hypercarbia    Subjective: upper extremity weakness     HPI:  72 year old female with a PMHx of p[revious cerebral vascular accident with right sided weakness and aphasia, HTN, GERD, DM, A-fib, Cardiac Asthma, and Defibrillator who presented from home with c/o chest pain to Emergency Room on 2018 folllwing recent discharge from [facility]. Admitted for acute on chronic systolic Congestive Heart failure and progressively worsened. Noted to have worsening mental status and shortness of breath and transferrrd to ICU for hypercapnic respiratory failure. Placed on Noninvasive Positive Pressure Ventilation     Hospital/ICU Course:   Transferred to ICU. Noninvasive Positive Pressure Ventilation initiated , may need intubation   Improved ventilation with Noninvasive Positive Pressure Ventilation but now generalized weakness. Steroids started for respiratory failure. Lumbar puncture later today.    Past Medical History:   Diagnosis Date    Arthritis     Asthma     Atrial fibrillation     Blood clot of vein in shoulder area     Cardiac defibrillator in place     Chronic knee pain     Diabetes mellitus type 2 without retinopathy 2016    Diaphragmatic hernia without obstruction and without gangrene 2015    GERD (gastroesophageal reflux disease)     Hypertension     Primary open angle glaucoma (POAG) of both eyes, severe stage 2018       Past Surgical History:   Procedure Laterality Date    CARDIAC DEFIBRILLATOR PLACEMENT      , .    CATARACT EXTRACTION       SECTION      COLONOSCOPY      ashley      Dr. Torres LINK  ARTHROSCOPY      UPPER GASTROINTESTINAL ENDOSCOPY         Review of patient's allergies indicates:   Allergen Reactions    Morphine Hives    Atorvastatin      Other reaction(s): Muscle pain    Clonidine     Codeine      Other reaction(s): Unknown    Digoxin      Other reaction(s): Unknown    Diovan [valsartan] Other (See Comments)     Upset stomach, weakness    Eggs [egg derived]     Metoprolol Diarrhea    Naproxen      Other reaction(s): Nausea    Peanut     Propofol      Other reaction(s): delirious    Sulfa (sulfonamide antibiotics)        Family History     Problem Relation (Age of Onset)    Hypertension Mother, Father        Social History Main Topics    Smoking status: Never Smoker    Smokeless tobacco: Never Used    Alcohol use No    Drug use: No    Sexual activity: Yes     Partners: Male         Review of Systems   Unable to perform ROS: Mental status change     Objective:     Vital Signs (Most Recent):  Temp: 97.8 °F (36.6 °C) (07/06/18 0545)  Pulse: 72 (07/06/18 0900)  Resp: (!) 29 (07/06/18 0900)  BP: (!) 207/80 (am meds given) (07/06/18 0900)  SpO2: 100 % (07/06/18 0900) Vital Signs (24h Range):  Temp:  [94.4 °F (34.7 °C)-99.6 °F (37.6 °C)] 97.8 °F (36.6 °C)  Pulse:  [68-72] 72  Resp:  [0-31] 29  SpO2:  [97 %-100 %] 100 %  BP: ()/() 207/80     Weight: 70 kg (154 lb 5.2 oz)  Body mass index is 26.49 kg/m².      Intake/Output Summary (Last 24 hours) at 07/06/18 1007  Last data filed at 07/06/18 0900   Gross per 24 hour   Intake              560 ml   Output             2530 ml   Net            -1970 ml       Physical Exam   Constitutional: She appears well-developed and well-nourished.   HENT:   Head: Normocephalic.   Eyes: EOM are normal. Pupils are equal, round, and reactive to light.   Neck: Normal range of motion. JVD present.   Cardiovascular: An irregularly irregular rhythm present. Tachycardia present.  PMI is displaced.    Murmur heard.   Systolic murmur is present with  a grade of 2/6   Pulmonary/Chest: She has decreased breath sounds in the right lower field and the left lower field. She has rales in the right lower field and the left lower field.   Musculoskeletal: She exhibits edema.   Neurological: She displays abnormal reflex. She exhibits abnormal muscle tone. Coordination abnormal.   Responds to pain , intermittently responds to commands, lethargic   Skin: Skin is warm and dry.   Stage II sacral decub   Nursing note and vitals reviewed.      Vents:  Oxygen Concentration (%): 30 (07/06/18 0719)    Lines/Drains/Airways     Drain                 Urethral Catheter 07/05/18 1114 less than 1 day          Pressure Ulcer                 Pressure Injury 06/02/18 Right lateral Heel Stage 3 34 days         Pressure Injury 06/02/18 1659 Left proximal Ischial tuberosity Stage 3 33 days         Pressure Injury 06/02/18 1902 Right medial Heel Unstageable 33 days         Pressure Injury 06/30/18 Left lateral Hip Stage 2 6 days         Pressure Injury 06/30/18 Right lateral Buttocks Stage 2 6 days          Peripheral Intravenous Line                 Peripheral IV - Single Lumen 06/30/18 0850 Left Antecubital 6 days                Significant Labs:    CBC/Anemia Profile:    Recent Labs  Lab 07/05/18  0523 07/06/18  0402   WBC 11.53 10.93   HGB 11.4* 11.5*   HCT 39.0 38.4   * 439*   MCV 88 87   RDW 17.3* 17.0*        Chemistries:    Recent Labs  Lab 07/05/18  0523 07/06/18  0402    147*   K 4.7 4.1    102   CO2 31* 33*   BUN 32* 37*   CREATININE 0.9 0.8   CALCIUM 10.2 10.5   ALBUMIN 2.7*  --    PROT 7.3  --    BILITOT 0.4  --    ALKPHOS 143*  --    ALT 19  --    AST 27  --    MG  --  1.7       ABGs:     Recent Labs  Lab 07/05/18  1513   PH 7.384   PCO2 68.3*   HCO3 40.8*   POCSATURATED 98   BE 16     BMP:     Recent Labs  Lab 07/06/18  0402   *   *   K 4.1      CO2 33*   BUN 37*   CREATININE 0.8   CALCIUM 10.5   MG 1.7     CMP:     Recent Labs  Lab  07/05/18  0523 07/06/18  0402    147*   K 4.7 4.1    102   CO2 31* 33*   * 124*   BUN 32* 37*   CREATININE 0.9 0.8   CALCIUM 10.2 10.5   PROT 7.3  --    ALBUMIN 2.7*  --    BILITOT 0.4  --    ALKPHOS 143*  --    AST 27  --    ALT 19  --    ANIONGAP 13 12   EGFRNONAA >60 >60     All pertinent labs within the past 24 hours have been reviewed.      FROM [facility]:  CSF cell count with differential, Tube # 1   6/18/2017  [facility]  Component Name Value Ref Range   APPEARANCE FLUID Bloody (A) Clear    WBC CSF 10 (H) 0 - 5 /mm3   RBC CSF 7,800 (H) 0 - 9 /mm3   SEGS CSF 0 0 - 6 %   LYMPHS CSF 67 40 - 80 %   MONOS CSF 33 15 - 45 %   TUBES RECEIVED 2     TUBE USED 2   Comment: Tube 2 clearer than tube 1     VOLUME FLUID 1.5 mL   Specimen   Cerebrospinal Fluid - Lumbar Puncture       NMO/AQP4 FACS Titer, S REFLEX   5/20/2018  [facility]  Component Name Value Ref Range   NMO/AQP4 FACS Titer, S Positive 1:44196 (H)   Comment:  This autoantibody supports the diagnosis of neuromyelitis   optica or a neuromyelitis optica spectrum disorder.   Seropositivity predicts high risk for relapse of myelitis,   optic neuritis or both.    -------------------ADDITIONAL INFORMATION-------------------  This test was developed and its performance characteristics   determined by [facility] in a manner consistent with CLIA   requirements. This test has not been cleared or approved by   the U.S. Food and Drug Administration.    Test Performed by:  [facility] - [address] [address]                  Multiple Sclerosis Profile    6/17/2017  [facility]  Specimen   Other   Result Narrative   The following orders were created for panel order Multiple Sclerosis Profile.  Procedure                               Abnormality          "Status                     ---------                               -----------         ------                     MS Profile[[id number]]                    Abnormal            Final result               MS Profile Blood[[id number]]                                  Final result                 Please view results for these tests on the individual orders.     Meningitis/Encephalitis Panel, Tube #4       6/18/2017  [facility]  Component Name Value Ref Range   L Monocytogenes Not Detected Not Detected    N Meningitidis Not Detected Not Detected    Strep. agalactiae Not Detected Not Detected    Strep. Pneumoniae Not Detected Not Detected    Cytomegalovirus Not Detected Not Detected    Enterovirus Not Detected Not Detected    HSV-1 Not Detected Not Detected    HSV-2 Not Detected Not Detected    Human Herpes Virus 6 Not Detected Not Detected    Human parechovirus Not Detected Not Detected    Varicella Zoster Virus Not Detected Not Detected    Crypto. neoformins/gattii Not Detected Not Detected    E Coli K1 Not Detected Not Detected    H Influenzae Not Detected Not Detected    Specimen   Cerebrospinal Fluid - lumbar     ADRENAL, OTHER  Component Name    Hospital Encounter on 6/14/2017  [facility]")' href="[email or web address]/">6/21/2017       Specimen: Blood - Vein")'>7.4       Heavy Metals, Blood    6/20/2017  [facility]  Component Name Value Ref Range   Arsenic, B <1   Comment:    -------------------ADDITIONAL INFORMATION-------------------  This test was developed and its performance characteristics   determined by [facility] in a manner consistent with CLIA   requirements. This test has not been cleared or approved by   the U.S. Food and Drug Administration. 0 - 12 ng/mL    Lead, B 1.5   Comment:    -------------------ADDITIONAL " INFORMATION-------------------  Testing performed by Inductively Coupled Plasma-Mass   Spectrometry (ICP-MS).  This test was developed and its performance characteristics   determined by [facility] in a manner consistent with CLIA   requirements. This test has not been cleared or approved by   the U.S. Food and Drug Administration. 0.0 - 4.9 mcg/dL    Cadmium, B 0.9   Comment:    -------------------ADDITIONAL INFORMATION-------------------  This test was developed and its performance characteristics   determined by [facility] in a manner consistent with CLIA   requirements. This test has not been cleared or approved by   the U.S. Food and Drug Administration. 0.0 - 4.9 ng/mL   Mercury, B 4   Comment:    -------------------ADDITIONAL INFORMATION-------------------  This test was developed and its performance characteristics   determined by [facility] in a manner consistent with CLIA   requirements. This test has not been cleared or approved by   the U.S. Food and Drug Administration. 0 - 9 ng/mL   Venous/Capillary Venous   Comment:    Test Performed by:  [facility] [place] [address]     Specimen   Blood - Vein         Biotinidase Level   6/20/2017  [facility]  Component Name Value Ref Range   Reason For Referral Not provided     Biotinidase, S 9.8 3.5 - 13.8 U/L   Interpretation SEE COMMENTS   Comment:  In this sample, biotinidase activity is normal. These   results indicate this individual is not affected with   biotinidase deficiency (OMIM 388475).    -------------------ADDITIONAL INFORMATION-------------------  Colorimetric Enzyme Assay  This test was developed and its performance characteristics   determined by [facility] in a manner consistent with CLIA   requirements. This test has not been cleared or approved by   the U.S. Food and Drug Administration.     Reviewed By SEE COMMENTS   Comment:  RESULT:  Jayro Naranjo M.D.    Test Performed by:  [facility] - [address], [place] [address]     Paraneoplastic Autoantibody Evaluation, Serum    6/20/2017  [facility]  Component Name Value Ref Range   Interpretive Comments SEE COMMENTS   Comment:  No informative autoantibodies were detected in the   Paraneoplastic Evaluation. However, a negative result does   not exclude neurological autoimmunity with or without   associated neoplasia. Sensitivity and specificity of   antibody testing are enhanced by testing both serum and   CSF.     MARK-1, S Negative <1:240 titer    Reflex Added None.   Comment:    -------------------ADDITIONAL INFORMATION-------------------  This test was developed and its performance characteristics   determined by [facility] in a manner consistent with CLIA   requirements. This test has not been cleared or approved by   the U.S. Food and Drug Administration.     MARK-2, S Negative   Comment:    -------------------ADDITIONAL INFORMATION-------------------  This test was developed and its performance characteristics   determined by [facility] in a manner consistent with CLIA   requirements. This test has not been cleared or approved by   the U.S. Food and Drug Administration. <1:240 titer    MARK-3, S Negative   Comment:    -------------------ADDITIONAL INFORMATION-------------------  This test was developed and its performance characteristics   determined by [facility] in a manner consistent with CLIA   requirements. This test has not been cleared or approved by   the U.S. Food and Drug Administration. <1:240 titer    AGNA-1, S Negative   Comment:    -------------------ADDITIONAL INFORMATION-------------------  This test was developed and its performance characteristics   determined by [facility] in a manner consistent with CLIA   requirements. This test has not been cleared or approved by   the U.S. Food  and Drug Administration. <1:240 titer    PCA-1, S Negative   Comment:    -------------------ADDITIONAL INFORMATION-------------------  This test was developed and its performance characteristics   determined by [facility] in a manner consistent with CLIA   requirements. This test has not been cleared or approved by   the U.S. Food and Drug Administration. <1:240 titer    PCA-2, S Negative   Comment:    -------------------ADDITIONAL INFORMATION-------------------  This test was developed and its performance characteristics   determined by [facility] in a manner consistent with CLIA   requirements. This test has not been cleared or approved by   the U.S. Food and Drug Administration. <1:240 titer    PCA-Tr, S Negative   Comment:    -------------------ADDITIONAL INFORMATION-------------------  This test was developed and its performance characteristics   determined by [facility] in a manner consistent with CLIA   requirements. This test has not been cleared or approved by   the U.S. Food and Drug Administration. <1:240 titer    Amphiphysin Ab, S Negative   Comment:    -------------------ADDITIONAL INFORMATION-------------------  This test was developed and its performance characteristics   determined by [facility] in a manner consistent with CLIA   requirements. This test has not been cleared or approved by   the U.S. Food and Drug Administration. <1:240 titer    CRMP-5-IgG, S Negative   Comment:    -------------------ADDITIONAL INFORMATION-------------------  This test was developed and its performance characteristics   determined by [facility] in a manner consistent with CLIA   requirements. This test has not been cleared or approved by   the U.S. Food and Drug Administration. <1:240 titer    Striational (Striated Muscle) Ab, S Negative   Comment:    -------------------ADDITIONAL INFORMATION-------------------  This test was developed and its performance characteristics   determined by [facility] in a manner  consistent with CLIA   requirements. This test has not been cleared or approved by   the U.S. Food and Drug Administration. <1:120 titer    P/Q-Type Calcium Channel Ab 0   Comment:    -------------------ADDITIONAL INFORMATION-------------------  This test was developed and its performance characteristics   determined by [facility] in a manner consistent with CLIA   requirements. This test has not been cleared or approved by   the U.S. Food and Drug Administration. <=0.02 nmol/L   N-Type Calcium Channel Ab 0   Comment:    -------------------ADDITIONAL INFORMATION-------------------  This test was developed and its performance characteristics   determined by [facility] in a manner consistent with CLIA   requirements. This test has not been cleared or approved by   the U.S. Food and Drug Administration. <=0.03 nmol/L   ACh Receptor (Muscle) Binding Ab 0   Comment:    -------------------ADDITIONAL INFORMATION-------------------  This test was developed and its performance characteristics   determined by [facility] in a manner consistent with CLIA   requirements. This test has not been cleared or approved by   the U.S. Food and Drug Administration. <=0.02 nmol/L   AChR Ganglionic Neuronal Ab, S 0   Comment:    -------------------ADDITIONAL INFORMATION-------------------  This test was developed and its performance characteristics   determined by [facility] in a manner consistent with CLIA   requirements. This test has not been cleared or approved by   the U.S. Food and Drug Administration. <=0.02 nmol/L   Neuronal (V-G) K+ Channel Ab, S 0   Comment:    -------------------ADDITIONAL INFORMATION-------------------  This test was developed and its performance characteristics   determined by [facility] in a manner consistent with CLIA   requirements. This test has not been cleared or approved by   the U.S. Food and Drug Administration.    Test Performed by:  [facility]  [address] [place] [address] <=0.02 nmol/L   Specimen   Blood - Vein     MS Profile    6/18/2017  [facility]  Component Name Value Ref Range   CSF Bands 3 bands    CSF Oligo Bands Interpretation 0   Comment:  The oligoclonal band assay detected 3 or fewer unique IgG   bands in the CSF. This is a negative result. <4 bands    Serum Bands 3 bands    IgG Index, CSF 0.63 <=0.85    IgG, CSF 7.5   Comment:    -------------------ADDITIONAL INFORMATION-------------------  This test has been modified from the 's   instructions. Its performance characteristics were   determined by [facility] in a manner consistent with   CLIA requirements. This test has not been cleared or   approved by the U.S. Food and Drug Administration. <=8.1 mg/dL   Albumin, CSF 33.6 (H)   Comment:    -------------------ADDITIONAL INFORMATION-------------------  This test has been modified from the 's   instructions. Its performance characteristics were   determined by [facility] in a manner consistent with   CLIA requirements. This test has not been cleared or   approved by the U.S. Food and Drug Administration. <=27.0 mg/dL   IgG/Albumin, CSF 0.22 (H) <=0.21    Synthesis Rate, CSF 9.83 <=12 mg/24 h    IgG, S 1,070 767 - 1590 mg/dL   Albumin, S 3,020 (L) 3200 - 4800 mg/dL   IgG/Albumin, S 0.35   Comment:    Test Performed by:  [facility]  [address] [place] [address] <=0.40    Specimen   Cerebrospinal Fluid - CSF         Significant Imaging:   I have reviewed all pertinent imaging results/findings within the past 24 hours.  I have reviewed and interpreted all pertinent imaging results/findings within the past 24 hours.     US Abdomen Limited   1/26/2018  [facility]  Result Impression     Distended hepatic veins suggestive of elevated right heart pressures. Bidirectional flow in the portal vein.    Result Narrative   EXAM:  US ABDOMEN LIMITED     CLINICAL INDICATION:     ;     epigastric pain    COMPARISON: None.    FINDINGS: Transverse and longitudinal images of the right upper quadrant were obtained.    The gallbladder contains no evidence of stones, wall thickening, or pericholecystic fluid.  The common duct is normal in caliber measuring 0.5 cm. The liver measures 15.3 cm in the mid right clavicular line.  The parenchyma is homogeneous . Hepatic veins are significantly distended suggestive of elevated right heart pressures. The main portal vein is patent with bidirectional flow. The pancreas is obscured by bowel gas.    The right kidney measures 11 cm in length. There is no hydronephrosis.    Small volume of ascites.   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, S REFLEX    7/6 - Lumbar puncture today , neurology to evaluate        Derm   Skin breakdown    7/5 Wound care nurse        Pulmonary   * Acute respiratory failure with hypoxia and hypercarbia    7/5 Noninvasive Positive Pressure Ventilation , jet nebs and oxygen  7/6 IV steroids added        Cardiac/Vascular   Acute on chronic systolic heart failure    7/5 start IV lasix  7/6 continue IV lasix        Renal/   UTI (urinary tract infection)    7/6 - resolved - antibiotics stopped           Critical Care Daily Checklist:    A: Awake: RASS Goal/Actual Goal:    Actual: Guerrero Agitation Sedation Scale (RASS): Alert and calm   B: Spontaneous Breathing Trial Performed?     C: SAT & SBT Coordinated?  na                      D: Delirium: CAM-ICU Overall CAM-ICU: Negative   E: Early Mobility Performed? No   F: Feeding Goal:    Status:     Current Diet Order   Procedures    Diet Low Sodium, 2gm      AS: Analgesia/Sedation adequate   T:  Thromboembolic Prophylaxis yes   H: HOB > 300 Yes   U: Stress Ulcer Prophylaxis (if needed) yes   G: Glucose Control adequate   B: Bowel Function Stool Occurrence: 1   I: Indwelling Catheter (Lines & Flores) Necessity needed   D: De-escalation of Antimicrobials/Pharmacotherapies UTI treated    Plan for the day/ETD Lumbar puncture    Code Status:  Family/Goals of Care: Full Code  discussed     Critical Care Time: 60 minutes  Critical secondary to Patient has a condition that poses threat to life and bodily function: Severe Respiratory Distress and altered mental status and wweakness      Critical care was time spent personally by me on the following activities: development of treatment plan with patient or surrogate and bedside caregivers, discussions with consultants, evaluation of patient's response to treatment, examination of patient, ordering and performing treatments and interventions, ordering and review of laboratory studies, ordering and review of radiographic studies, pulse oximetry, re-evaluation of patient's condition. This critical care time did not overlap with that of any other provider or involve time for any procedures.     James Meier MD  Critical Care Medicine  [facility] -

## 2018-07-06 NOTE — ASSESSMENT & PLAN NOTE
- Currently has [facility] (PT/OT/nurse)  - Pt has declined NH placement in the past  - Continue PT/OT while inpatient - 7/2/18 pt refused PT/OT  PT/OT has signed off as patient does not participate

## 2018-07-06 NOTE — PLAN OF CARE
Problem: Patient Care Overview  Goal: Plan of Care Review  Patient currently requires total assist x 2 for bed mobility and total assist for sitting balance at EOB.   Outcome: Ongoing (interventions implemented as appropriate)  Vitals signs closely monitored. Fall precautions in place. Patient turned every two hours. Pain assessed every two hours. Safety promoted. Infection risks reduced. Pt went to LP today. Becomes drowsy when bipap is off. Np Jeremiah is aware. Wound care nurse assessed wounds today.

## 2018-07-06 NOTE — ASSESSMENT & PLAN NOTE
ABG reflected hypoxic/hypercapneic respiratory failure  ICU care  Pulmonology/Critical Care following  Bipap

## 2018-07-06 NOTE — PROGRESS NOTES
Unable to obtain oral temperature on patient. Rectal temp is 94.4  Bear hugger placed.  Will continue to monitor patients temperature

## 2018-07-06 NOTE — EICU
Notified and discussed by bed side RN regarding hypotension.  Notes, labs, meds , CxR film reviewed.  Admitted for CHF/UTI/muscle wasting.    Shifted to ICU for Type 2 resp failure and encephalopathy from co2 narcosis.  Seen by Neuro.  LP suggested for ? CDIP vs GBS.    BNP low.  CxR: effusion rt> left.  Received IV lasix with a lit or UOP. Now decreasing uop and MAP:    Camera Eval:  BP 64/32, MAP 41.   Tolerating BiPAP.  HR 79 on metoprolol/amiodarone.  Awake, protecting airways.    A/P  1. Hypotension from diuresis for effusion and CHF.  But cxr, fio2 30% ( sats 97%)  and low BNP suggest possibly type 2 failure from myopathy     - saline 500 ml bolus once for now  - consider levophed if MAP still low.  - hold beta blocker.  Ok to continue amiodarone ( hx of a fib rate controled).   - encephalopathy and co2 has improved since admitted to ICU.     21:51  MAP got better after bolus.

## 2018-07-06 NOTE — PLAN OF CARE
Problem: Patient Care Overview  Goal: Plan of Care Review  Patient currently requires total assist x 2 for bed mobility and total assist for sitting balance at EOB.   Outcome: Ongoing (interventions implemented as appropriate)  Patient Alert. Patient can not voice words but can mouth some. Nods and gestures appropriately. Swallows without problem. BUE flaccid. BLE have some spontaneous movements. Patient complained of pain to legs on shift but refused to take tylenol.  Bear hugger had to be placed on patient for rectal temp of 94.4.  Bear hugger turned off at 0300 when oral temp was 99.6.  Remained on Bipap all shift.  Tolerated well.  Patient is A-paced on the monitor at a rate of 70.  BP at start of shift dropped to the 60s.  500cc NS bolus administered and patient responded well to the fluid bolus. BP started to become elevated on shift around 2300 when temperature was 94.4.  At 0115 PRN hydralazine administered after rectal temperature read 97.6 no response to hydralazine.  Notified EICU.  MD Ramos advised to give another dose of hydralazine and administer tylenol for patients pain.  Tylenol not administered due to patient refusing and previous low body temp. MD Ramos notified of BP not responding to second dose of hydralazine.  Will not write orders for narcotics.  Patient had one large BM on shift.  Adequate UOP noted on shift. Repositioning patient Q2H to prevent skin breakdown.  POC reviewed with family.

## 2018-07-06 NOTE — SUBJECTIVE & OBJECTIVE
Interval History:     Review of Systems   Unable to perform ROS: Other   Constitutional: Negative.    HENT: Negative.    Eyes: Negative.    Respiratory: Positive for shortness of breath.    Cardiovascular: Negative.    Gastrointestinal: Negative.    Endocrine: Negative.    Genitourinary: Negative.    Musculoskeletal: Negative.    Skin: Negative.    Allergic/Immunologic: Negative.    Neurological: Negative.    Hematological: Negative.    Psychiatric/Behavioral: Negative.      Objective:     Vital Signs (Most Recent):  Temp: 97.4 °F (36.3 °C) (07/06/18 1515)  Pulse: 70 (07/06/18 1630)  Resp: 19 (07/06/18 1630)  BP: (!) 189/102 (prn bp meds given) (07/06/18 1630)  SpO2: 100 % (07/06/18 1630) Vital Signs (24h Range):  Temp:  [94.4 °F (34.7 °C)-99.6 °F (37.6 °C)] 97.4 °F (36.3 °C)  Pulse:  [68-72] 70  Resp:  [0-31] 19  SpO2:  [97 %-100 %] 100 %  BP: ()/() 189/102     Weight: 70 kg (154 lb 5.2 oz)  Body mass index is 26.49 kg/m².    Intake/Output Summary (Last 24 hours) at 07/06/18 1659  Last data filed at 07/06/18 1600   Gross per 24 hour   Intake              560 ml   Output             1485 ml   Net             -925 ml      Physical Exam    Significant Labs:   BMP:   Recent Labs  Lab 07/06/18  0402   *   *   K 4.1      CO2 33*   BUN 37*   CREATININE 0.8   CALCIUM 10.5   MG 1.7     CBC:   Recent Labs  Lab 07/05/18  0523 07/06/18  0402   WBC 11.53 10.93   HGB 11.4* 11.5*   HCT 39.0 38.4   * 439*       Significant Imaging: I have reviewed all pertinent imaging results/findings within the past 24 hours.

## 2018-07-06 NOTE — SUBJECTIVE & OBJECTIVE
Past Medical History:   Diagnosis Date    Arthritis     Asthma     Atrial fibrillation     Blood clot of vein in shoulder area     Cardiac defibrillator in place     Chronic knee pain     Diabetes mellitus type 2 without retinopathy 2016    Diaphragmatic hernia without obstruction and without gangrene 2015    GERD (gastroesophageal reflux disease)     Hypertension     Primary open angle glaucoma (POAG) of both eyes, severe stage 2018       Past Surgical History:   Procedure Laterality Date    CARDIAC DEFIBRILLATOR PLACEMENT      , .    CATARACT EXTRACTION       SECTION      COLONOSCOPY      ashley      Dr. Macdonald    KNEE ARTHROSCOPY      UPPER GASTROINTESTINAL ENDOSCOPY         Review of patient's allergies indicates:   Allergen Reactions    Morphine Hives    Atorvastatin      Other reaction(s): Muscle pain    Clonidine     Codeine      Other reaction(s): Unknown    Digoxin      Other reaction(s): Unknown    Diovan [valsartan] Other (See Comments)     Upset stomach, weakness    Eggs [egg derived]     Metoprolol Diarrhea    Naproxen      Other reaction(s): Nausea    Peanut     Propofol      Other reaction(s): delirious    Sulfa (sulfonamide antibiotics)        Family History     Problem Relation (Age of Onset)    Hypertension Mother, Father        Social History Main Topics    Smoking status: Never Smoker    Smokeless tobacco: Never Used    Alcohol use No    Drug use: No    Sexual activity: Yes     Partners: Male         Review of Systems   Unable to perform ROS: Mental status change     Objective:     Vital Signs (Most Recent):  Temp: 97.8 °F (36.6 °C) (18 0545)  Pulse: 72 (18 0900)  Resp: (!) 29 (18 0900)  BP: (!) 207/80 (am meds given) (18 0900)  SpO2: 100 % (18 09) Vital Signs (24h Range):  Temp:  [94.4 °F (34.7 °C)-99.6 °F (37.6 °C)] 97.8 °F (36.6 °C)  Pulse:  [68-72] 72  Resp:  [0-31] 29  SpO2:  [97 %-100 %] 100 %  BP:  ()/() 207/80     Weight: 70 kg (154 lb 5.2 oz)  Body mass index is 26.49 kg/m².      Intake/Output Summary (Last 24 hours) at 07/06/18 1007  Last data filed at 07/06/18 0900   Gross per 24 hour   Intake              560 ml   Output             2530 ml   Net            -1970 ml       Physical Exam   Constitutional: She appears well-developed and well-nourished.   HENT:   Head: Normocephalic.   Eyes: EOM are normal. Pupils are equal, round, and reactive to light.   Neck: Normal range of motion. JVD present.   Cardiovascular: An irregularly irregular rhythm present. Tachycardia present.  PMI is displaced.    Murmur heard.   Systolic murmur is present with a grade of 2/6   Pulmonary/Chest: She has decreased breath sounds in the right lower field and the left lower field. She has rales in the right lower field and the left lower field.   Musculoskeletal: She exhibits edema.   Neurological: She displays abnormal reflex. She exhibits abnormal muscle tone. Coordination abnormal.   Responds to pain , intermittently responds to commands, lethargic   Skin: Skin is warm and dry.   Stage II sacral decub   Nursing note and vitals reviewed.      Vents:  Oxygen Concentration (%): 30 (07/06/18 0719)    Lines/Drains/Airways     Drain                 Urethral Catheter 07/05/18 1114 less than 1 day          Pressure Ulcer                 Pressure Injury 06/02/18 Right lateral Heel Stage 3 34 days         Pressure Injury 06/02/18 1659 Left proximal Ischial tuberosity Stage 3 33 days         Pressure Injury 06/02/18 1902 Right medial Heel Unstageable 33 days         Pressure Injury 06/30/18 Left lateral Hip Stage 2 6 days         Pressure Injury 06/30/18 Right lateral Buttocks Stage 2 6 days          Peripheral Intravenous Line                 Peripheral IV - Single Lumen 06/30/18 0850 Left Antecubital 6 days                Significant Labs:    CBC/Anemia Profile:    Recent Labs  Lab 07/05/18  0523 07/06/18  0402   WBC 11.53  10.93   HGB 11.4* 11.5*   HCT 39.0 38.4   * 439*   MCV 88 87   RDW 17.3* 17.0*        Chemistries:    Recent Labs  Lab 07/05/18  0523 07/06/18  0402    147*   K 4.7 4.1    102   CO2 31* 33*   BUN 32* 37*   CREATININE 0.9 0.8   CALCIUM 10.2 10.5   ALBUMIN 2.7*  --    PROT 7.3  --    BILITOT 0.4  --    ALKPHOS 143*  --    ALT 19  --    AST 27  --    MG  --  1.7       ABGs:     Recent Labs  Lab 07/05/18  1513   PH 7.384   PCO2 68.3*   HCO3 40.8*   POCSATURATED 98   BE 16     BMP:     Recent Labs  Lab 07/06/18  0402   *   *   K 4.1      CO2 33*   BUN 37*   CREATININE 0.8   CALCIUM 10.5   MG 1.7     CMP:     Recent Labs  Lab 07/05/18 0523 07/06/18  0402    147*   K 4.7 4.1    102   CO2 31* 33*   * 124*   BUN 32* 37*   CREATININE 0.9 0.8   CALCIUM 10.2 10.5   PROT 7.3  --    ALBUMIN 2.7*  --    BILITOT 0.4  --    ALKPHOS 143*  --    AST 27  --    ALT 19  --    ANIONGAP 13 12   EGFRNONAA >60 >60     All pertinent labs within the past 24 hours have been reviewed.      FROM [facility]:  CSF cell count with differential, Tube # 16/18/2017  [facility]  Component Name Value Ref Range   APPEARANCE FLUID Bloody (A) Clear    WBC CSF 10 (H) 0 - 5 /mm3   RBC CSF 7,800 (H) 0 - 9 /mm3   SEGS CSF 0 0 - 6 %   LYMPHS CSF 67 40 - 80 %   MONOS CSF 33 15 - 45 %   TUBES RECEIVED 2     TUBE USED 2   Comment: Tube 2 clearer than tube 1     VOLUME FLUID 1.5 mL   Specimen   Cerebrospinal Fluid - Lumbar Puncture       NMO/AQP4 FACS Titer, S REFLEX   5/20/2018  [facility]  Component Name Value Ref Range   NMO/AQP4 FACS Titer, S Positive 1:57006 (H)   Comment:  This autoantibody supports the diagnosis of neuromyelitis   optica or a neuromyelitis optica spectrum disorder.   Seropositivity predicts high risk for relapse of myelitis,   optic neuritis or  both.    -------------------ADDITIONAL INFORMATION-------------------  This test was developed and its performance characteristics   determined by [facility] in a manner consistent with CLIA   requirements. This test has not been cleared or approved by   the U.S. Food and Drug Administration.    Test Performed by:  [facility] - [address] [address]                  Multiple Sclerosis Profile6/17/2017  [facility]  Specimen   Other   Result Narrative   The following orders were created for panel order Multiple Sclerosis Profile.  Procedure                               Abnormality         Status                     ---------                               -----------         ------                     MS Profile[[id number]]                    Abnormal            Final result               MS Profile Blood[[id number]]                                  Final result                 Please view results for these tests on the individual orders.     Meningitis/Encephalitis Panel, Tube #4       6/18/2017  [facility]  Component Name Value Ref Range   L Monocytogenes Not Detected Not Detected    N Meningitidis Not Detected Not Detected    Strep. agalactiae Not Detected Not Detected    Strep. Pneumoniae Not Detected Not Detected    Cytomegalovirus Not Detected Not Detected    Enterovirus Not Detected Not Detected    HSV-1 Not Detected Not Detected    HSV-2 Not Detected Not Detected    Human Herpes Virus 6 Not Detected Not Detected    Human parechovirus Not Detected Not Detected    Varicella Zoster Virus Not Detected Not Detected    Crypto. neoformins/gattii Not Detected Not Detected    E Coli K1 Not Detected Not Detected    H Influenzae Not Detected Not Detected    Specimen   Cerebrospinal Fluid - lumbar     ADRENAL, OTHER  Component Name    Hospital Encounter on 6/14/2017  [facility]  "[facility]")' href="[email or web address]/21/2017       Specimen: Blood - Vein")'>7.4       Heavy Metals, Blood6/20/2017  [facility]  Component Name Value Ref Range   Arsenic, B <1   Comment:    -------------------ADDITIONAL INFORMATION-------------------  This test was developed and its performance characteristics   determined by [facility] in a manner consistent with CLIA   requirements. This test has not been cleared or approved by   the U.S. Food and Drug Administration. 0 - 12 ng/mL    Lead, B 1.5   Comment:    -------------------ADDITIONAL INFORMATION-------------------  Testing performed by Inductively Coupled Plasma-Mass   Spectrometry (ICP-MS).  This test was developed and its performance characteristics   determined by [facility] in a manner consistent with CLIA   requirements. This test has not been cleared or approved by   the U.S. Food and Drug Administration. 0.0 - 4.9 mcg/dL    Cadmium, B 0.9   Comment:    -------------------ADDITIONAL INFORMATION-------------------  This test was developed and its performance characteristics   determined by [facility] in a manner consistent with CLIA   requirements. This test has not been cleared or approved by   the U.S. Food and Drug Administration. 0.0 - 4.9 ng/mL   Mercury, B 4   Comment:    -------------------ADDITIONAL INFORMATION-------------------  This test was developed and its performance characteristics   determined by [facility] in a manner consistent with CLIA   requirements. This test has not been cleared or approved by   the U.S. Food and Drug Administration. 0 - 9 ng/mL   Venous/Capillary Venous   Comment:    Test Performed by:  [address] [address]     Specimen   Blood - Vein         Biotinidase Level6/20/2017  [facility]  Component Name " Value Ref Range   Reason For Referral Not provided     Biotinidase, S 9.8 3.5 - 13.8 U/L   Interpretation SEE COMMENTS   Comment:  In this sample, biotinidase activity is normal. These   results indicate this individual is not affected with   biotinidase deficiency (OMIM 866799).    -------------------ADDITIONAL INFORMATION-------------------  Colorimetric Enzyme Assay  This test was developed and its performance characteristics   determined by [facility] in a manner consistent with CLIA   requirements. This test has not been cleared or approved by   the U.S. Food and Drug Administration.     Reviewed By SEE COMMENTS   Comment:  RESULT: Jayro Naranjo M.D.    Test Performed by:  [address] [address]     Paraneoplastic Autoantibody Evaluation, Serum6/20/2017  [facility]  Component Name Value Ref Range   Interpretive Comments SEE COMMENTS   Comment:  No informative autoantibodies were detected in the   Paraneoplastic Evaluation. However, a negative result does   not exclude neurological autoimmunity with or without   associated neoplasia. Sensitivity and specificity of   antibody testing are enhanced by testing both serum and   CSF.     MARK-1, S Negative <1:240 titer    Reflex Added None.   Comment:    -------------------ADDITIONAL INFORMATION-------------------  This test was developed and its performance characteristics   determined by [facility] in a manner consistent with CLIA   requirements. This test has not been cleared or approved by   the U.S. Food and Drug Administration.     MARK-2, S Negative   Comment:    -------------------ADDITIONAL INFORMATION-------------------  This test was developed and its performance characteristics   determined by [facility] in a manner consistent with CLIA   requirements. This test has not been cleared or approved by   the U.S. Food and Drug Administration.  <1:240 titer    MARK-3, S Negative   Comment:    -------------------ADDITIONAL INFORMATION-------------------  This test was developed and its performance characteristics   determined by [facility] in a manner consistent with CLIA   requirements. This test has not been cleared or approved by   the U.S. Food and Drug Administration. <1:240 titer    AGNA-1, S Negative   Comment:    -------------------ADDITIONAL INFORMATION-------------------  This test was developed and its performance characteristics   determined by [facility] in a manner consistent with CLIA   requirements. This test has not been cleared or approved by   the U.S. Food and Drug Administration. <1:240 titer    PCA-1, S Negative   Comment:    -------------------ADDITIONAL INFORMATION-------------------  This test was developed and its performance characteristics   determined by [facility] in a manner consistent with CLIA   requirements. This test has not been cleared or approved by   the U.S. Food and Drug Administration. <1:240 titer    PCA-2, S Negative   Comment:    -------------------ADDITIONAL INFORMATION-------------------  This test was developed and its performance characteristics   determined by [facility] in a manner consistent with CLIA   requirements. This test has not been cleared or approved by   the U.S. Food and Drug Administration. <1:240 titer    PCA-Tr, S Negative   Comment:    -------------------ADDITIONAL INFORMATION-------------------  This test was developed and its performance characteristics   determined by [facility] in a manner consistent with CLIA   requirements. This test has not been cleared or approved by   the U.S. Food and Drug Administration. <1:240 titer    Amphiphysin Ab, S Negative   Comment:    -------------------ADDITIONAL INFORMATION-------------------  This test was developed and its performance characteristics   determined by [facility] in a manner consistent with CLIA   requirements. This test has not  been cleared or approved by   the U.S. Food and Drug Administration. <1:240 titer    CRMP-5-IgG, S Negative   Comment:    -------------------ADDITIONAL INFORMATION-------------------  This test was developed and its performance characteristics   determined by [facility] in a manner consistent with CLIA   requirements. This test has not been cleared or approved by   the U.S. Food and Drug Administration. <1:240 titer    Striational (Striated Muscle) Ab, S Negative   Comment:    -------------------ADDITIONAL INFORMATION-------------------  This test was developed and its performance characteristics   determined by [facility] in a manner consistent with CLIA   requirements. This test has not been cleared or approved by   the U.S. Food and Drug Administration. <1:120 titer    P/Q-Type Calcium Channel Ab 0   Comment:    -------------------ADDITIONAL INFORMATION-------------------  This test was developed and its performance characteristics   determined by [facility] in a manner consistent with CLIA   requirements. This test has not been cleared or approved by   the U.S. Food and Drug Administration. <=0.02 nmol/L   N-Type Calcium Channel Ab 0   Comment:    -------------------ADDITIONAL INFORMATION-------------------  This test was developed and its performance characteristics   determined by [facility] in a manner consistent with CLIA   requirements. This test has not been cleared or approved by   the U.S. Food and Drug Administration. <=0.03 nmol/L   ACh Receptor (Muscle) Binding Ab 0   Comment:    -------------------ADDITIONAL INFORMATION-------------------  This test was developed and its performance characteristics   determined by [facility] in a manner consistent with CLIA   requirements. This test has not been cleared or approved by   the U.S. Food and Drug Administration. <=0.02 nmol/L   AChR Ganglionic Neuronal Ab, S 0   Comment:    -------------------ADDITIONAL INFORMATION-------------------  This test was  developed and its performance characteristics   determined by [facility] in a manner consistent with CLIA   requirements. This test has not been cleared or approved by   the U.S. Food and Drug Administration. <=0.02 nmol/L   Neuronal (V-G) K+ Channel Ab, S 0   Comment:    -------------------ADDITIONAL INFORMATION-------------------  This test was developed and its performance characteristics   determined by [facility] in a manner consistent with CLIA   requirements. This test has not been cleared or approved by   the U.S. Food and Drug Administration.    Test Performed by:  [facility] - [address] [address] <=0.02 nmol/L   Specimen   Blood - Vein     MS Profile6/18/2017  [facility]  Component Name Value Ref Range   CSF Bands 3 bands    CSF Oligo Bands Interpretation 0   Comment:  The oligoclonal band assay detected 3 or fewer unique IgG   bands in the CSF. This is a negative result. <4 bands    Serum Bands 3 bands    IgG Index, CSF 0.63 <=0.85    IgG, CSF 7.5   Comment:    -------------------ADDITIONAL INFORMATION-------------------  This test has been modified from the 's   instructions. Its performance characteristics were   determined by [facility] in a manner consistent with   CLIA requirements. This test has not been cleared or   approved by the U.S. Food and Drug Administration. <=8.1 mg/dL   Albumin, CSF 33.6 (H)   Comment:    -------------------ADDITIONAL INFORMATION-------------------  This test has been modified from the 's   instructions. Its performance characteristics were   determined by [facility] in a manner consistent with   CLIA requirements. This test has not been cleared or   approved by the U.S. Food and Drug Administration. <=27.0 mg/dL   IgG/Albumin, CSF 0.22 (H) <=0.21    Synthesis Rate, CSF 9.83 <=12 mg/24 h    IgG, S 1,070 767 - 1590 mg/dL   Albumin, S 3,020 (L) 3200 -  4800 mg/dL   IgG/Albumin, S 0.35   Comment:    Test Performed by:  [facility]  [address] [place] [address] <=0.40    Specimen   Cerebrospinal Fluid - CSF         Significant Imaging:   I have reviewed all pertinent imaging results/findings within the past 24 hours.  I have reviewed and interpreted all pertinent imaging results/findings within the past 24 hours.     US Abdomen Limited1/26/2018  [facility]  Result Impression     Distended hepatic veins suggestive of elevated right heart pressures. Bidirectional flow in the portal vein.   Result Narrative   EXAM:  US ABDOMEN LIMITED     CLINICAL INDICATION:     ;     epigastric pain    COMPARISON: None.    FINDINGS: Transverse and longitudinal images of the right upper quadrant were obtained.    The gallbladder contains no evidence of stones, wall thickening, or pericholecystic fluid.  The common duct is normal in caliber measuring 0.5 cm. The liver measures 15.3 cm in the mid right clavicular line.  The parenchyma is homogeneous . Hepatic veins are significantly distended suggestive of elevated right heart pressures. The main portal vein is patent with bidirectional flow. The pancreas is obscured by bowel gas.    The right kidney measures 11 cm in length. There is no hydronephrosis.    Small volume of ascites.   Status Results Details

## 2018-07-06 NOTE — PROGRESS NOTES
Notified MD Ramos about BP still being elevated after 2nd dose of hydralazine. MD Ramos placed another order for hydralazine to be given Q1H for 2 doses for SBP >180.  Will continue to monitor

## 2018-07-06 NOTE — PROGRESS NOTES
Patient BP has dropped to 64/32 with a MAP 41.  Notified EICU.  Discussed admit diagnosis and medications received on day shift.  Patient currently on BiPAP and responds to voice and nods and gestures appropriately.  Daughter at bedside.  MD Ramos advised to administer a 500 NS bolus and to hold beta blocker.  Ok to administer amiodarone.  Will administer bolus and continue to monitor closely.     2115: Patient responded to fluid bolus.  Current /58.  Will continue to monitor closely.

## 2018-07-07 NOTE — SUBJECTIVE & OBJECTIVE
Interval History:     Review of Systems   Unable to perform ROS: Other   Constitutional: Negative.    HENT: Negative.    Eyes: Negative.    Respiratory: Positive for shortness of breath.    Cardiovascular: Negative.    Gastrointestinal: Negative.    Endocrine: Negative.    Genitourinary: Negative.    Musculoskeletal: Negative.    Skin: Negative.    Allergic/Immunologic: Negative.    Neurological: Negative.    Hematological: Negative.    Psychiatric/Behavioral: Negative.      Objective:     Vital Signs (Most Recent):  Temp: 97.4 °F (36.3 °C) (07/07/18 1200)  Pulse: 71 (07/07/18 1438)  Resp: 20 (07/07/18 1438)  BP: 106/62 (07/07/18 1438)  SpO2: 99 % (07/07/18 1438) Vital Signs (24h Range):  Temp:  [97.4 °F (36.3 °C)-97.8 °F (36.6 °C)] 97.4 °F (36.3 °C)  Pulse:  [69-71] 71  Resp:  [15-30] 20  SpO2:  [92 %-100 %] 99 %  BP: ()/() 106/62     Weight: 71 kg (156 lb 8.4 oz)  Body mass index is 26.87 kg/m².    Intake/Output Summary (Last 24 hours) at 07/07/18 1520  Last data filed at 07/07/18 1400   Gross per 24 hour   Intake             1250 ml   Output              735 ml   Net              515 ml      Physical Exam      Significant Labs:   BMP:     Recent Labs  Lab 07/07/18  0421   *   *   K 4.4      CO2 32*   BUN 49*   CREATININE 1.0   CALCIUM 10.2   MG 1.9     CBC:     Recent Labs  Lab 07/06/18  0402 07/07/18  0421   WBC 10.93 14.55*   HGB 11.5* 11.1*   HCT 38.4 37.4   * 405*       Significant Imaging: I have reviewed all pertinent imaging results/findings within the past 24 hours.

## 2018-07-07 NOTE — NURSING
EICU contacted about hypotension. Order to monitor for 30 min and start Federico-synephrine if needed to keep MAP >60.

## 2018-07-07 NOTE — PLAN OF CARE
Problem: Patient Care Overview  Goal: Plan of Care Review  Patient currently requires total assist x 2 for bed mobility and total assist for sitting balance at EOB.   Outcome: Ongoing (interventions implemented as appropriate)  Pt hypotensive early in shift. 500cc bolus given. Federico-synephrine never started. Pt now hypertensive but within limits. Will continue to monitor. UO was oliguric but has improved to adequate throughout the shift. Pt was drowsy and put on BIPAP around 7pm. She has remained on BIPAP all night and is now awake and responding verbally. Pt could not feel touch in any of her extremities nor was she withdrawing from pain. Now she feels touch and withdraws from pain in LUE, LLE, and RLE. She is able to nod her head appropriately, but no purposeful movement below the shoulders. VSS. Afebrile. Family and pt updated on POC. All questions and concerns addressed.

## 2018-07-07 NOTE — PLAN OF CARE
Problem: Patient Care Overview  Goal: Plan of Care Review  Patient currently requires total assist x 2 for bed mobility and total assist for sitting balance at EOB.   Outcome: Ongoing (interventions implemented as appropriate)  Vitals signs closely monitored. Fall precautions in place. Patient turned every two hours. Pain assessed every two hours. Safety promoted. Infection risks reduced. Bipap was taken of for meals. Pt becomes very drowsy off bipap. Two 250 mL NS blouses were given for two separate incidences of hypotension

## 2018-07-07 NOTE — SUBJECTIVE & OBJECTIVE
Past Medical History:   Diagnosis Date    Arthritis     Asthma     Atrial fibrillation     Blood clot of vein in shoulder area     Cardiac defibrillator in place     Chronic knee pain     Diabetes mellitus type 2 without retinopathy 2016    Diaphragmatic hernia without obstruction and without gangrene 2015    GERD (gastroesophageal reflux disease)     Hypertension     Primary open angle glaucoma (POAG) of both eyes, severe stage 2018       Past Surgical History:   Procedure Laterality Date    CARDIAC DEFIBRILLATOR PLACEMENT      , .    CATARACT EXTRACTION       SECTION      COLONOSCOPY      ashley      Dr. Macdonald    KNEE ARTHROSCOPY      UPPER GASTROINTESTINAL ENDOSCOPY         Review of patient's allergies indicates:   Allergen Reactions    Morphine Hives    Atorvastatin      Other reaction(s): Muscle pain    Clonidine     Codeine      Other reaction(s): Unknown    Digoxin      Other reaction(s): Unknown    Diovan [valsartan] Other (See Comments)     Upset stomach, weakness    Eggs [egg derived]     Metoprolol Diarrhea    Naproxen      Other reaction(s): Nausea    Peanut     Propofol      Other reaction(s): delirious    Sulfa (sulfonamide antibiotics)        Family History     Problem Relation (Age of Onset)    Hypertension Mother, Father        Social History Main Topics    Smoking status: Never Smoker    Smokeless tobacco: Never Used    Alcohol use No    Drug use: No    Sexual activity: Yes     Partners: Male         Review of Systems   Unable to perform ROS: Mental status change     Objective:     Vital Signs (Most Recent):  Temp: 97.8 °F (36.6 °C) (18 0730)  Pulse: 70 (18 1249)  Resp: (!) 22 (18 1249)  BP: (!) 67/41 (18 1130)  SpO2: 99 % (18 1249) Vital Signs (24h Range):  Temp:  [97.4 °F (36.3 °C)-97.8 °F (36.6 °C)] 97.8 °F (36.6 °C)  Pulse:  [69-71] 70  Resp:  [15-30] 22  SpO2:  [92 %-100 %] 99 %  BP: ()/()  67/41     Weight: 71 kg (156 lb 8.4 oz)  Body mass index is 26.87 kg/m².      Intake/Output Summary (Last 24 hours) at 07/07/18 1254  Last data filed at 07/07/18 1000   Gross per 24 hour   Intake             1000 ml   Output              710 ml   Net              290 ml       Physical Exam   Constitutional: She appears well-developed and well-nourished.   HENT:   Head: Normocephalic.   Eyes: EOM are normal. Pupils are equal, round, and reactive to light.   Neck: Normal range of motion. JVD present.   Cardiovascular: An irregularly irregular rhythm present. Tachycardia present.  PMI is displaced.    Murmur heard.   Systolic murmur is present with a grade of 2/6   Pulmonary/Chest: She has decreased breath sounds in the right lower field and the left lower field. She has rales in the right lower field and the left lower field.   Musculoskeletal: She exhibits edema.   Neurological: She displays abnormal reflex. She exhibits abnormal muscle tone. Coordination abnormal.   Responds to pain , intermittently responds to commands, lethargic   Skin: Skin is warm and dry.   Stage II sacral decub   Nursing note and vitals reviewed.      Vents:  Oxygen Concentration (%): 30 (07/07/18 1249)    Lines/Drains/Airways     Drain                 Urethral Catheter 07/05/18 1114 2 days          Pressure Ulcer                 Pressure Injury 06/02/18 Right lateral Heel Stage 3 35 days         Pressure Injury 06/02/18 1659 Left proximal Ischial tuberosity Stage 3 34 days         Pressure Injury 06/02/18 1902 Right medial Heel Unstageable 34 days         Pressure Injury 06/30/18 Left lateral Hip Stage 2 7 days         Pressure Injury 06/30/18 Right lateral Buttocks Stage 2 7 days          Peripheral Intravenous Line                 Peripheral IV - Single Lumen 06/30/18 0850 Left Antecubital 7 days         Peripheral IV - Single Lumen 07/06/18 2315 Right Forearm less than 1 day                Significant Labs:    CBC/Anemia Profile:    Recent  Labs  Lab 07/06/18  0402 07/07/18  0421   WBC 10.93 14.55*   HGB 11.5* 11.1*   HCT 38.4 37.4   * 405*   MCV 87 87   RDW 17.0* 17.8*        Chemistries:    Recent Labs  Lab 07/06/18  0402 07/06/18  1149 07/07/18  0421   *  --  146*   K 4.1  --  4.4     --  102   CO2 33*  --  32*   BUN 37*  --  49*   CREATININE 0.8  --  1.0   CALCIUM 10.5  --  10.2   ALBUMIN  --  2.9*  --    PROT  --  7.5  --    BILITOT  --  0.2  --    ALKPHOS  --  151*  --    ALT  --  20  --    AST  --  25  --    MG 1.7  --  1.9   PHOS  --   --  2.1*       ABGs:     Recent Labs  Lab 07/05/18  1513   PH 7.384   PCO2 68.3*   HCO3 40.8*   POCSATURATED 98   BE 16     BMP:     Recent Labs  Lab 07/07/18  0421   *   *   K 4.4      CO2 32*   BUN 49*   CREATININE 1.0   CALCIUM 10.2   MG 1.9     CMP:     Recent Labs  Lab 07/06/18  0402 07/06/18  1149 07/07/18  0421   *  --  146*   K 4.1  --  4.4     --  102   CO2 33*  --  32*   *  --  153*   BUN 37*  --  49*   CREATININE 0.8  --  1.0   CALCIUM 10.5  --  10.2   PROT  --  7.5  --    ALBUMIN  --  2.9*  --    BILITOT  --  0.2  --    ALKPHOS  --  151*  --    AST  --  25  --    ALT  --  20  --    ANIONGAP 12  --  12   EGFRNONAA >60  --  56*     All pertinent labs within the past 24 hours have been reviewed.      FROM [facility]:  CSF cell count with differential, Tube # 16/18/2017  [facility]  Component Name Value Ref Range   APPEARANCE FLUID Bloody (A) Clear    WBC CSF 10 (H) 0 - 5 /mm3   RBC CSF 7,800 (H) 0 - 9 /mm3   SEGS CSF 0 0 - 6 %   LYMPHS CSF 67 40 - 80 %   MONOS CSF 33 15 - 45 %   TUBES RECEIVED 2     TUBE USED 2   Comment: Tube 2 clearer than tube 1     VOLUME FLUID 1.5 mL   Specimen   Cerebrospinal Fluid - Lumbar Puncture       NMO/AQP4 FACS Titer, S REFLEX   5/20/2018  [facility]  Component Name Value Ref Range   NMO/AQP4 FACS Titer, S  Positive 1:71432 (H)   Comment:  This autoantibody supports the diagnosis of neuromyelitis   optica or a neuromyelitis optica spectrum disorder.   Seropositivity predicts high risk for relapse of myelitis,   optic neuritis or both.    -------------------ADDITIONAL INFORMATION-------------------  This test was developed and its performance characteristics   determined by [facility] in a manner consistent with CLIA   requirements. This test has not been cleared or approved by   the U.S. Food and Drug Administration.    Test Performed by:  [facility] - [address] [address]                  Multiple Sclerosis Profile6/17/2017  [facility]  Specimen   Other   Result Narrative   The following orders were created for panel order Multiple Sclerosis Profile.  Procedure                               Abnormality         Status                     ---------                               -----------         ------                     MS Profile[[id number]]                    Abnormal            Final result               MS Profile Blood[[id number]]                                  Final result                 Please view results for these tests on the individual orders.     Meningitis/Encephalitis Panel, Tube #4       6/18/2017  [facility]  Component Name Value Ref Range   L Monocytogenes Not Detected Not Detected    N Meningitidis Not Detected Not Detected    Strep. agalactiae Not Detected Not Detected    Strep. Pneumoniae Not Detected Not Detected    Cytomegalovirus Not Detected Not Detected    Enterovirus Not Detected Not Detected    HSV-1 Not Detected Not Detected    HSV-2 Not Detected Not Detected    Human Herpes Virus 6 Not Detected Not Detected    Human parechovirus Not Detected Not Detected    Varicella Zoster Virus Not Detected Not Detected    Crypto. neoformins/gattii Not Detected Not  "Detected    E Coli K1 Not Detected Not Detected    H Influenzae Not Detected Not Detected    Specimen   Cerebrospinal Fluid - lumbar     ADRENAL, OTHER  Component Name    Hospital Encounter on 6/14/2017  [facility]")' href="[email or web address]/">6/21/2017       Specimen: Blood - Vein")'>7.4       Heavy Metals, Blood6/20/2017  [facility]  Component Name Value Ref Range   Arsenic, B <1   Comment:    -------------------ADDITIONAL INFORMATION-------------------  This test was developed and its performance characteristics   determined by [facility] in a manner consistent with CLIA   requirements. This test has not been cleared or approved by   the U.S. Food and Drug Administration. 0 - 12 ng/mL    Lead, B 1.5   Comment:    -------------------ADDITIONAL INFORMATION-------------------  Testing performed by Inductively Coupled Plasma-Mass   Spectrometry (ICP-MS).  This test was developed and its performance characteristics   determined by [facility] in a manner consistent with CLIA   requirements. This test has not been cleared or approved by   the U.S. Food and Drug Administration. 0.0 - 4.9 mcg/dL    Cadmium, B 0.9   Comment:    -------------------ADDITIONAL INFORMATION-------------------  This test was developed and its performance characteristics   determined by [facility] in a manner consistent with CLIA   requirements. This test has not been cleared or approved by   the U.S. Food and Drug Administration. 0.0 - 4.9 ng/mL   Mercury, B 4   Comment:    -------------------ADDITIONAL INFORMATION-------------------  This test was developed and its performance characteristics   determined by [facility] in a manner consistent with CLIA   requirements. This test has not been cleared or approved by   the U.S. Food and Drug Administration. 0 - 9 ng/mL   Venous/Capillary Venous   Comment:    Test Performed " by:  [facility]  [address] [place] [address]     Specimen   Blood - Vein         Biotinidase Level6/20/2017  [facility]  Component Name Value Ref Range   Reason For Referral Not provided     Biotinidase, S 9.8 3.5 - 13.8 U/L   Interpretation SEE COMMENTS   Comment:  In this sample, biotinidase activity is normal. These   results indicate this individual is not affected with   biotinidase deficiency (OMIM 283650).    -------------------ADDITIONAL INFORMATION-------------------  Colorimetric Enzyme Assay  This test was developed and its performance characteristics   determined by [facility] in a manner consistent with CLIA   requirements. This test has not been cleared or approved by   the U.S. Food and Drug Administration.     Reviewed By SEE COMMENTS   Comment:  RESULT: Jayro Naranjo M.D.    Test Performed by:  [facility]  [address] [place] [address]     Paraneoplastic Autoantibody Evaluation, Serum6/20/2017  [facility]  Component Name Value Ref Range   Interpretive Comments SEE COMMENTS   Comment:  No informative autoantibodies were detected in the   Paraneoplastic Evaluation. However, a negative result does   not exclude neurological autoimmunity with or without   associated neoplasia. Sensitivity and specificity of   antibody testing are enhanced by testing both serum and   CSF.     MARK-1, S Negative <1:240 titer    Reflex Added None.   Comment:    -------------------ADDITIONAL INFORMATION-------------------  This test was developed and its performance characteristics   determined by [facility] in a manner consistent with CLIA   requirements. This test has not been cleared or approved by   the U.S. Food and Drug Administration.     MARK-2, S Negative   Comment:    -------------------ADDITIONAL  INFORMATION-------------------  This test was developed and its performance characteristics   determined by [facility] in a manner consistent with CLIA   requirements. This test has not been cleared or approved by   the U.S. Food and Drug Administration. <1:240 titer    MARK-3, S Negative   Comment:    -------------------ADDITIONAL INFORMATION-------------------  This test was developed and its performance characteristics   determined by [facility] in a manner consistent with CLIA   requirements. This test has not been cleared or approved by   the U.S. Food and Drug Administration. <1:240 titer    AGNA-1, S Negative   Comment:    -------------------ADDITIONAL INFORMATION-------------------  This test was developed and its performance characteristics   determined by [facility] in a manner consistent with CLIA   requirements. This test has not been cleared or approved by   the U.S. Food and Drug Administration. <1:240 titer    PCA-1, S Negative   Comment:    -------------------ADDITIONAL INFORMATION-------------------  This test was developed and its performance characteristics   determined by [facility] in a manner consistent with CLIA   requirements. This test has not been cleared or approved by   the U.S. Food and Drug Administration. <1:240 titer    PCA-2, S Negative   Comment:    -------------------ADDITIONAL INFORMATION-------------------  This test was developed and its performance characteristics   determined by [facility] in a manner consistent with CLIA   requirements. This test has not been cleared or approved by   the U.S. Food and Drug Administration. <1:240 titer    PCA-Tr, S Negative   Comment:    -------------------ADDITIONAL INFORMATION-------------------  This test was developed and its performance characteristics   determined by [facility] in a manner consistent with CLIA   requirements. This test has not been cleared or approved by   the U.S. Food and Drug Administration. <1:240 titer     Amphiphysin Ab, S Negative   Comment:    -------------------ADDITIONAL INFORMATION-------------------  This test was developed and its performance characteristics   determined by [facility] in a manner consistent with CLIA   requirements. This test has not been cleared or approved by   the U.S. Food and Drug Administration. <1:240 titer    CRMP-5-IgG, S Negative   Comment:    -------------------ADDITIONAL INFORMATION-------------------  This test was developed and its performance characteristics   determined by [facility] in a manner consistent with CLIA   requirements. This test has not been cleared or approved by   the U.S. Food and Drug Administration. <1:240 titer    Striational (Striated Muscle) Ab, S Negative   Comment:    -------------------ADDITIONAL INFORMATION-------------------  This test was developed and its performance characteristics   determined by [facility] in a manner consistent with CLIA   requirements. This test has not been cleared or approved by   the U.S. Food and Drug Administration. <1:120 titer    P/Q-Type Calcium Channel Ab 0   Comment:    -------------------ADDITIONAL INFORMATION-------------------  This test was developed and its performance characteristics   determined by [facility] in a manner consistent with CLIA   requirements. This test has not been cleared or approved by   the U.S. Food and Drug Administration. <=0.02 nmol/L   N-Type Calcium Channel Ab 0   Comment:    -------------------ADDITIONAL INFORMATION-------------------  This test was developed and its performance characteristics   determined by [facility] in a manner consistent with CLIA   requirements. This test has not been cleared or approved by   the U.S. Food and Drug Administration. <=0.03 nmol/L   ACh Receptor (Muscle) Binding Ab 0   Comment:    -------------------ADDITIONAL INFORMATION-------------------  This test was developed and its performance characteristics   determined by [facility] in a manner  consistent with CLIA   requirements. This test has not been cleared or approved by   the U.S. Food and Drug Administration. <=0.02 nmol/L   AChR Ganglionic Neuronal Ab, S 0   Comment:    -------------------ADDITIONAL INFORMATION-------------------  This test was developed and its performance characteristics   determined by [facility] in a manner consistent with CLIA   requirements. This test has not been cleared or approved by   the U.S. Food and Drug Administration. <=0.02 nmol/L   Neuronal (V-G) K+ Channel Ab, S 0   Comment:    -------------------ADDITIONAL INFORMATION-------------------  This test was developed and its performance characteristics   determined by [facility] in a manner consistent with CLIA   requirements. This test has not been cleared or approved by   the U.S. Food and Drug Administration.    Test Performed by:  [address] [address] <=0.02 nmol/L   Specimen   Blood - Vein     MS Profile6/18/2017  [facility]  Component Name Value Ref Range   CSF Bands 3 bands    CSF Oligo Bands Interpretation 0   Comment:  The oligoclonal band assay detected 3 or fewer unique IgG   bands in the CSF. This is a negative result. <4 bands    Serum Bands 3 bands    IgG Index, CSF 0.63 <=0.85    IgG, CSF 7.5   Comment:    -------------------ADDITIONAL INFORMATION-------------------  This test has been modified from the 's   instructions. Its performance characteristics were   determined by [facility] in a manner consistent with   CLIA requirements. This test has not been cleared or   approved by the U.S. Food and Drug Administration. <=8.1 mg/dL   Albumin, CSF 33.6 (H)   Comment:    -------------------ADDITIONAL INFORMATION-------------------  This test has been modified from the 's   instructions. Its performance characteristics were   determined by [facility] in a manner consistent  with   CLIA requirements. This test has not been cleared or   approved by the U.S. Food and Drug Administration. <=27.0 mg/dL   IgG/Albumin, CSF 0.22 (H) <=0.21    Synthesis Rate, CSF 9.83 <=12 mg/24 h    IgG, S 1,070 767 - 1590 mg/dL   Albumin, S 3,020 (L) 3200 - 4800 mg/dL   IgG/Albumin, S 0.35   Comment:    Test Performed by:  [address] [address] <=0.40    Specimen   Cerebrospinal Fluid - CSF         Significant Imaging:   I have reviewed all pertinent imaging results/findings within the past 24 hours.  I have reviewed and interpreted all pertinent imaging results/findings within the past 24 hours.     US Abdomen Limited1/26/2018  [facility]  Result Impression     Distended hepatic veins suggestive of elevated right heart pressures. Bidirectional flow in the portal vein.   Result Narrative   EXAM:  US ABDOMEN LIMITED     CLINICAL INDICATION:     ;     epigastric pain    COMPARISON: None.    FINDINGS: Transverse and longitudinal images of the right upper quadrant were obtained.    The gallbladder contains no evidence of stones, wall thickening, or pericholecystic fluid.  The common duct is normal in caliber measuring 0.5 cm. The liver measures 15.3 cm in the mid right clavicular line.  The parenchyma is homogeneous . Hepatic veins are significantly distended suggestive of elevated right heart pressures. The main portal vein is patent with bidirectional flow. The pancreas is obscured by bowel gas.    The right kidney measures 11 cm in length. There is no hydronephrosis.    Small volume of ascites.   Status Results Details

## 2018-07-07 NOTE — ASSESSMENT & PLAN NOTE
7/5 Noninvasive Positive Pressure Ventilation , jet nebs and oxygen  7/6 IV steroids added  7/7 continue Noninvasive Positive Pressure Ventilation at night and prn during day

## 2018-07-07 NOTE — PROGRESS NOTES
[facility]  Critical Care Medicine  Progress Note    Patient Name: Carlie Valdez  MRN: [id number]  Admission Date: 6/30/2018  Hospital Length of Stay: 7 days  Code Status: Full Code  Attending Provider: Denae Infante MD  Primary Care Provider: Johanna Guzman MD   Principal Problem: Acute respiratory failure with hypoxia and hypercarbia    Subjective:     HPI:  72 year old female with a PMHx of p[revious cerebral vascular accident with right sided weakness and aphasia, HTN, GERD, DM, A-fib, Cardiac Asthma, and Defibrillator who presented from home with c/o chest pain to Emergency Room on 6/30/2018 folllwing recent discharge from [facility]. Admitted for acute on chronic systolic Congestive Heart failure and progressively worsened. Noted to have worsening mental status and shortness of breath and transferrrd to ICU for hypercapnic respiratory failure. Placed on Noninvasive Positive Pressure Ventilation     Hospital/ICU Course:  7/5 Transferred to ICU. Noninvasive Positive Pressure Ventilation initiated , may need intubation  7/6 Improved ventilation with Noninvasive Positive Pressure Ventilation but now generalized weakness. Steroids started for respiratory failure. Lumbar puncture later today.  7/7 Respiratory status stable. Labile blood pressure. Started on high does steroids    Past Medical History:   Diagnosis Date    Arthritis     Asthma     Atrial fibrillation     Blood clot of vein in shoulder area     Cardiac defibrillator in place     Chronic knee pain     Diabetes mellitus type 2 without retinopathy 12/19/2016    Diaphragmatic hernia without obstruction and without gangrene 9/14/2015    GERD (gastroesophageal reflux disease)     Hypertension     Primary open angle glaucoma (POAG) of both eyes, severe stage 6/1/2018       Past Surgical History:   Procedure Laterality Date    CARDIAC DEFIBRILLATOR PLACEMENT      2004, 2012.    CATARACT EXTRACTION        SECTION      COLONOSCOPY      ashley Macdonald    KNEE ARTHROSCOPY      UPPER GASTROINTESTINAL ENDOSCOPY         Review of patient's allergies indicates:   Allergen Reactions    Morphine Hives    Atorvastatin      Other reaction(s): Muscle pain    Clonidine     Codeine      Other reaction(s): Unknown    Digoxin      Other reaction(s): Unknown    Diovan [valsartan] Other (See Comments)     Upset stomach, weakness    Eggs [egg derived]     Metoprolol Diarrhea    Naproxen      Other reaction(s): Nausea    Peanut     Propofol      Other reaction(s): delirious    Sulfa (sulfonamide antibiotics)        Family History     Problem Relation (Age of Onset)    Hypertension Mother, Father        Social History Main Topics    Smoking status: Never Smoker    Smokeless tobacco: Never Used    Alcohol use No    Drug use: No    Sexual activity: Yes     Partners: Male         Review of Systems   Unable to perform ROS: Mental status change     Objective:     Vital Signs (Most Recent):  Temp: 97.8 °F (36.6 °C) (18 0730)  Pulse: 70 (18 1249)  Resp: (!) 22 (18 1249)  BP: (!) 67/41 (18 1130)  SpO2: 99 % (18 1249) Vital Signs (24h Range):  Temp:  [97.4 °F (36.3 °C)-97.8 °F (36.6 °C)] 97.8 °F (36.6 °C)  Pulse:  [69-71] 70  Resp:  [15-30] 22  SpO2:  [92 %-100 %] 99 %  BP: ()/() 67/41     Weight: 71 kg (156 lb 8.4 oz)  Body mass index is 26.87 kg/m².      Intake/Output Summary (Last 24 hours) at 18 1254  Last data filed at 18 1000   Gross per 24 hour   Intake             1000 ml   Output              710 ml   Net              290 ml       Physical Exam   Constitutional: She appears well-developed and well-nourished.   HENT:   Head: Normocephalic.   Eyes: EOM are normal. Pupils are equal, round, and reactive to light.   Neck: Normal range of motion. JVD present.   Cardiovascular: An irregularly irregular rhythm present. Tachycardia present.  PMI is displaced.     Murmur heard.   Systolic murmur is present with a grade of 2/6   Pulmonary/Chest: She has decreased breath sounds in the right lower field and the left lower field. She has rales in the right lower field and the left lower field.   Musculoskeletal: She exhibits edema.   Neurological: She displays abnormal reflex. She exhibits abnormal muscle tone. Coordination abnormal.   Responds to pain , intermittently responds to commands, lethargic   Skin: Skin is warm and dry.   Stage II sacral decub   Nursing note and vitals reviewed.      Vents:  Oxygen Concentration (%): 30 (07/07/18 1249)    Lines/Drains/Airways     Drain                 Urethral Catheter 07/05/18 1114 2 days          Pressure Ulcer                 Pressure Injury 06/02/18 Right lateral Heel Stage 3 35 days         Pressure Injury 06/02/18 1659 Left proximal Ischial tuberosity Stage 3 34 days         Pressure Injury 06/02/18 1902 Right medial Heel Unstageable 34 days         Pressure Injury 06/30/18 Left lateral Hip Stage 2 7 days         Pressure Injury 06/30/18 Right lateral Buttocks Stage 2 7 days          Peripheral Intravenous Line                 Peripheral IV - Single Lumen 06/30/18 0850 Left Antecubital 7 days         Peripheral IV - Single Lumen 07/06/18 2315 Right Forearm less than 1 day                Significant Labs:    CBC/Anemia Profile:    Recent Labs  Lab 07/06/18  0402 07/07/18  0421   WBC 10.93 14.55*   HGB 11.5* 11.1*   HCT 38.4 37.4   * 405*   MCV 87 87   RDW 17.0* 17.8*        Chemistries:    Recent Labs  Lab 07/06/18  0402 07/06/18  1149 07/07/18  0421   *  --  146*   K 4.1  --  4.4     --  102   CO2 33*  --  32*   BUN 37*  --  49*   CREATININE 0.8  --  1.0   CALCIUM 10.5  --  10.2   ALBUMIN  --  2.9*  --    PROT  --  7.5  --    BILITOT  --  0.2  --    ALKPHOS  --  151*  --    ALT  --  20  --    AST  --  25  --    MG 1.7  --  1.9   PHOS  --   --  2.1*       ABGs:     Recent Labs  Lab 07/05/18  1513   PH 7.384    PCO2 68.3*   HCO3 40.8*   POCSATURATED 98   BE 16     BMP:     Recent Labs  Lab 07/07/18  0421   *   *   K 4.4      CO2 32*   BUN 49*   CREATININE 1.0   CALCIUM 10.2   MG 1.9     CMP:     Recent Labs  Lab 07/06/18  0402 07/06/18  1149 07/07/18  0421   *  --  146*   K 4.1  --  4.4     --  102   CO2 33*  --  32*   *  --  153*   BUN 37*  --  49*   CREATININE 0.8  --  1.0   CALCIUM 10.5  --  10.2   PROT  --  7.5  --    ALBUMIN  --  2.9*  --    BILITOT  --  0.2  --    ALKPHOS  --  151*  --    AST  --  25  --    ALT  --  20  --    ANIONGAP 12  --  12   EGFRNONAA >60  --  56*     All pertinent labs within the past 24 hours have been reviewed.      FROM [facility]:  CSF cell count with differential, Tube # 16/18/2017  [facility]  Component Name Value Ref Range   APPEARANCE FLUID Bloody (A) Clear    WBC CSF 10 (H) 0 - 5 /mm3   RBC CSF 7,800 (H) 0 - 9 /mm3   SEGS CSF 0 0 - 6 %   LYMPHS CSF 67 40 - 80 %   MONOS CSF 33 15 - 45 %   TUBES RECEIVED 2     TUBE USED 2   Comment: Tube 2 clearer than tube 1     VOLUME FLUID 1.5 mL   Specimen   Cerebrospinal Fluid - Lumbar Puncture       NMO/AQP4 FACS Titer, S REFLEX   5/20/2018  [facility]  Component Name Value Ref Range   NMO/AQP4 FACS Titer, S Positive 1:67130 (H)   Comment:  This autoantibody supports the diagnosis of neuromyelitis   optica or a neuromyelitis optica spectrum disorder.   Seropositivity predicts high risk for relapse of myelitis,   optic neuritis or both.    -------------------ADDITIONAL INFORMATION-------------------  This test was developed and its performance characteristics   determined by [facility] in a manner consistent with CLIA   requirements. This test has not been cleared or approved by   the U.S. Food and Drug Administration.    Test Performed by:  [facility]  [address]  "[place] [address]                  Multiple Sclerosis Profile6/17/2017  [facility]  Specimen   Other   Result Narrative   The following orders were created for panel order Multiple Sclerosis Profile.  Procedure                               Abnormality         Status                     ---------                               -----------         ------                     MS Profile[[id number]]                    Abnormal            Final result               MS Profile Blood[[id number]]                                  Final result                 Please view results for these tests on the individual orders.     Meningitis/Encephalitis Panel, Tube #4       6/18/2017  [facility]  Component Name Value Ref Range   L Monocytogenes Not Detected Not Detected    N Meningitidis Not Detected Not Detected    Strep. agalactiae Not Detected Not Detected    Strep. Pneumoniae Not Detected Not Detected    Cytomegalovirus Not Detected Not Detected    Enterovirus Not Detected Not Detected    HSV-1 Not Detected Not Detected    HSV-2 Not Detected Not Detected    Human Herpes Virus 6 Not Detected Not Detected    Human parechovirus Not Detected Not Detected    Varicella Zoster Virus Not Detected Not Detected    Crypto. neoformins/gattii Not Detected Not Detected    E Coli K1 Not Detected Not Detected    H Influenzae Not Detected Not Detected    Specimen   Cerebrospinal Fluid - lumbar     ADRENAL, OTHER  Component Name    Hospital Encounter on 6/14/2017  [facility]")' href="[email or web address]/">6/21/2017       Specimen: Blood - Vein")'>7.4       Heavy Metals, Blood6/20/2017  [facility]  Component Name Value Ref Range   Arsenic, B <1   Comment:    -------------------ADDITIONAL INFORMATION-------------------  This test was " developed and its performance characteristics   determined by [facility] in a manner consistent with CLIA   requirements. This test has not been cleared or approved by   the U.S. Food and Drug Administration. 0 - 12 ng/mL    Lead, B 1.5   Comment:    -------------------ADDITIONAL INFORMATION-------------------  Testing performed by Inductively Coupled Plasma-Mass   Spectrometry (ICP-MS).  This test was developed and its performance characteristics   determined by [facility] in a manner consistent with CLIA   requirements. This test has not been cleared or approved by   the U.S. Food and Drug Administration. 0.0 - 4.9 mcg/dL    Cadmium, B 0.9   Comment:    -------------------ADDITIONAL INFORMATION-------------------  This test was developed and its performance characteristics   determined by [facility] in a manner consistent with CLIA   requirements. This test has not been cleared or approved by   the U.S. Food and Drug Administration. 0.0 - 4.9 ng/mL   Mercury, B 4   Comment:    -------------------ADDITIONAL INFORMATION-------------------  This test was developed and its performance characteristics   determined by [facility] in a manner consistent with CLIA   requirements. This test has not been cleared or approved by   the U.S. Food and Drug Administration. 0 - 9 ng/mL   Venous/Capillary Venous   Comment:    Test Performed by:  [facility] [place] [address] [address]     Specimen   Blood - Vein         Biotinidase Level6/20/2017  [facility]  Component Name Value Ref Range   Reason For Referral Not provided     Biotinidase, S 9.8 3.5 - 13.8 U/L   Interpretation SEE COMMENTS   Comment:  In this sample, biotinidase activity is normal. These   results indicate this individual is not affected with   biotinidase deficiency (OMIM 388024).    -------------------ADDITIONAL INFORMATION-------------------  Colorimetric Enzyme  Assay  This test was developed and its performance characteristics   determined by [facility] in a manner consistent with CLIA   requirements. This test has not been cleared or approved by   the U.S. Food and Drug Administration.     Reviewed By SEE COMMENTS   Comment:  RESULT: Jayro Naranjo M.D.    Test Performed by:  [facility] - [address] [address]     Paraneoplastic Autoantibody Evaluation, Serum6/20/2017  [facility]  Component Name Value Ref Range   Interpretive Comments SEE COMMENTS   Comment:  No informative autoantibodies were detected in the   Paraneoplastic Evaluation. However, a negative result does   not exclude neurological autoimmunity with or without   associated neoplasia. Sensitivity and specificity of   antibody testing are enhanced by testing both serum and   CSF.     MARK-1, S Negative <1:240 titer    Reflex Added None.   Comment:    -------------------ADDITIONAL INFORMATION-------------------  This test was developed and its performance characteristics   determined by [facility] in a manner consistent with CLIA   requirements. This test has not been cleared or approved by   the U.S. Food and Drug Administration.     MARK-2, S Negative   Comment:    -------------------ADDITIONAL INFORMATION-------------------  This test was developed and its performance characteristics   determined by [facility] in a manner consistent with CLIA   requirements. This test has not been cleared or approved by   the U.S. Food and Drug Administration. <1:240 titer    MARK-3, S Negative   Comment:    -------------------ADDITIONAL INFORMATION-------------------  This test was developed and its performance characteristics   determined by [facility] in a manner consistent with CLIA   requirements. This test has not been cleared or approved by   the U.S. Food and Drug Administration. <1:240 titer    AGNA-1, S Negative    Comment:    -------------------ADDITIONAL INFORMATION-------------------  This test was developed and its performance characteristics   determined by [facility] in a manner consistent with CLIA   requirements. This test has not been cleared or approved by   the U.S. Food and Drug Administration. <1:240 titer    PCA-1, S Negative   Comment:    -------------------ADDITIONAL INFORMATION-------------------  This test was developed and its performance characteristics   determined by [facility] in a manner consistent with CLIA   requirements. This test has not been cleared or approved by   the U.S. Food and Drug Administration. <1:240 titer    PCA-2, S Negative   Comment:    -------------------ADDITIONAL INFORMATION-------------------  This test was developed and its performance characteristics   determined by [facility] in a manner consistent with CLIA   requirements. This test has not been cleared or approved by   the U.S. Food and Drug Administration. <1:240 titer    PCA-Tr, S Negative   Comment:    -------------------ADDITIONAL INFORMATION-------------------  This test was developed and its performance characteristics   determined by [facility] in a manner consistent with CLIA   requirements. This test has not been cleared or approved by   the U.S. Food and Drug Administration. <1:240 titer    Amphiphysin Ab, S Negative   Comment:    -------------------ADDITIONAL INFORMATION-------------------  This test was developed and its performance characteristics   determined by [facility] in a manner consistent with CLIA   requirements. This test has not been cleared or approved by   the U.S. Food and Drug Administration. <1:240 titer    CRMP-5-IgG, S Negative   Comment:    -------------------ADDITIONAL INFORMATION-------------------  This test was developed and its performance characteristics   determined by [facility] in a manner consistent with CLIA   requirements. This test has not been cleared or approved by   the  U.S. Food and Drug Administration. <1:240 titer    Striational (Striated Muscle) Ab, S Negative   Comment:    -------------------ADDITIONAL INFORMATION-------------------  This test was developed and its performance characteristics   determined by [facility] in a manner consistent with CLIA   requirements. This test has not been cleared or approved by   the U.S. Food and Drug Administration. <1:120 titer    P/Q-Type Calcium Channel Ab 0   Comment:    -------------------ADDITIONAL INFORMATION-------------------  This test was developed and its performance characteristics   determined by [facility] in a manner consistent with CLIA   requirements. This test has not been cleared or approved by   the U.S. Food and Drug Administration. <=0.02 nmol/L   N-Type Calcium Channel Ab 0   Comment:    -------------------ADDITIONAL INFORMATION-------------------  This test was developed and its performance characteristics   determined by [facility] in a manner consistent with CLIA   requirements. This test has not been cleared or approved by   the U.S. Food and Drug Administration. <=0.03 nmol/L   ACh Receptor (Muscle) Binding Ab 0   Comment:    -------------------ADDITIONAL INFORMATION-------------------  This test was developed and its performance characteristics   determined by [facility] in a manner consistent with CLIA   requirements. This test has not been cleared or approved by   the U.S. Food and Drug Administration. <=0.02 nmol/L   AChR Ganglionic Neuronal Ab, S 0   Comment:    -------------------ADDITIONAL INFORMATION-------------------  This test was developed and its performance characteristics   determined by [facility] in a manner consistent with CLIA   requirements. This test has not been cleared or approved by   the U.S. Food and Drug Administration. <=0.02 nmol/L   Neuronal (V-G) K+ Channel Ab, S 0   Comment:    -------------------ADDITIONAL INFORMATION-------------------  This test was developed and its  performance characteristics   determined by [facility] in a manner consistent with CLIA   requirements. This test has not been cleared or approved by   the U.S. Food and Drug Administration.    Test Performed by:  [facility] [employer] [facility]  [address] [place] [address] <=0.02 nmol/L   Specimen   Blood - Vein     MS Profile6/18/2017  [facility]  Component Name Value Ref Range   CSF Bands 3 bands    CSF Oligo Bands Interpretation 0   Comment:  The oligoclonal band assay detected 3 or fewer unique IgG   bands in the CSF. This is a negative result. <4 bands    Serum Bands 3 bands    IgG Index, CSF 0.63 <=0.85    IgG, CSF 7.5   Comment:    -------------------ADDITIONAL INFORMATION-------------------  This test has been modified from the 's   instructions. Its performance characteristics were   determined by [facility] in a manner consistent with   CLIA requirements. This test has not been cleared or   approved by the U.S. Food and Drug Administration. <=8.1 mg/dL   Albumin, CSF 33.6 (H)   Comment:    -------------------ADDITIONAL INFORMATION-------------------  This test has been modified from the 's   instructions. Its performance characteristics were   determined by [facility] in a manner consistent with   CLIA requirements. This test has not been cleared or   approved by the U.S. Food and Drug Administration. <=27.0 mg/dL   IgG/Albumin, CSF 0.22 (H) <=0.21    Synthesis Rate, CSF 9.83 <=12 mg/24 h    IgG, S 1,070 767 - 1590 mg/dL   Albumin, S 3,020 (L) 3200 - 4800 mg/dL   IgG/Albumin, S 0.35   Comment:    Test Performed by:  [facility] [employer] [facility]  [address] [place] [address] <=0.40    Specimen   Cerebrospinal Fluid - CSF         Significant Imaging:   I have reviewed all pertinent imaging results/findings within the past 24 hours.  I have reviewed and interpreted all pertinent  imaging results/findings within the past 24 hours.     US Abdomen Limited1/26/2018  [facility]  Result Impression     Distended hepatic veins suggestive of elevated right heart pressures. Bidirectional flow in the portal vein.   Result Narrative   EXAM:  US ABDOMEN LIMITED     CLINICAL INDICATION:     ;     epigastric pain    COMPARISON: None.    FINDINGS: Transverse and longitudinal images of the right upper quadrant were obtained.    The gallbladder contains no evidence of stones, wall thickening, or pericholecystic fluid.  The common duct is normal in caliber measuring 0.5 cm. The liver measures 15.3 cm in the mid right clavicular line.  The parenchyma is homogeneous . Hepatic veins are significantly distended suggestive of elevated right heart pressures. The main portal vein is patent with bidirectional flow. The pancreas is obscured by bowel gas.    The right kidney measures 11 cm in length. There is no hydronephrosis.    Small volume of ascites.   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, S REFLEX    7/6 - Lumbar puncture today , neurology to evaluate  7/7 High dose steroids as per neurology        Derm   Skin breakdown    7/5 Wound care nurse        Pulmonary   * Acute respiratory failure with hypoxia and hypercarbia    7/5 Noninvasive Positive Pressure Ventilation , jet nebs and oxygen  7/6 IV steroids added  7/7 continue Noninvasive Positive Pressure Ventilation at night and prn during day        Cardiac/Vascular   Acute on chronic systolic heart failure    7/5 start IV lasix  7/6 continue IV lasix        Renal/   UTI (urinary tract infection)    7/6 - resolved - antibiotics stopped        Other   Increased ammonia level    7/7 Continue to follow            Critical Care Daily Checklist:    A: Awake: RASS Goal/Actual Goal:    Actual: Guerrero Agitation Sedation Scale (RASS): Alert and calm   B: Spontaneous Breathing Trial Performed?     C: SAT & SBT Coordinated?  na                      D: Delirium: CAM-ICU Overall CAM-ICU: Positive   E: Early Mobility Performed? No   F: Feeding Goal:    Status:     Current Diet Order   Procedures    Diet Low Sodium, 2gm      AS: Analgesia/Sedation adequate   T: Thromboembolic Prophylaxis yes   H: HOB > 300 Yes   U: Stress Ulcer Prophylaxis (if needed) yes   G: Glucose Control good   B: Bowel Function Stool Occurrence: 1   I: Indwelling Catheter (Lines & Flores) Necessity Needed    D: De-escalation of Antimicrobials/Pharmacotherapies na    Plan for the day/ETD High dose steroids  - monitor response    Code Status:  Family/Goals of Care: Full Code  discussed     Critical Care Time: 45 minutes  Critical secondary to Patient has a condition that poses threat to life and bodily function: Severe Respiratory Distress, Muscular weakness      Critical care was time spent personally by me on the following activities: development of treatment plan with patient or surrogate and bedside caregivers, discussions with consultants, evaluation of patient's response to treatment, examination of patient, ordering and performing treatments and interventions, ordering and review of laboratory studies, ordering and review of radiographic studies, pulse oximetry, re-evaluation of patient's condition. This critical care time did not overlap with that of any other provider or involve time for any procedures.     James Meier MD  Critical Care Medicine  [facility]

## 2018-07-07 NOTE — ASSESSMENT & PLAN NOTE
Compensated as of 7/3/18  - BNP 1100  - CXR with vascular congestion with small bilateral effusion  - Mild BLE edema upon assessment but diminished breath sounds noted  - 2D ECHO on 06/02/18 with EF 35% and pulmonary HTN  - Pt received Bumex 2 mg IVP x 1 in ED  - Bumex 2 mg IVP daily====> changed to Bumex 2 mg twice daily 7/2/18  - Continue Metoprolol

## 2018-07-07 NOTE — PROGRESS NOTES
[facility] Hospital Medicine  Progress Note    Patient Name: Carlie Valdez  MRN: [id number]  Patient Class: IP- Inpatient   Admission Date: 6/30/2018  Length of Stay: 7 days  Attending Physician: Denae Infante MD  Primary Care Provider: Johanna Guzman MD        Subjective:     Principal Problem:Acute respiratory failure with hypoxia and hypercarbia    HPI:  Carlie Valdez is a 72 year old female with a PMHx of HTN, GERD, DM, A-fib, Asthma, and Defibrillator who presented from home with c/o chest pain. Located to right side with no radiation. Associated symptoms: SOB. Pt and sitters at bedside report she was recently discharged from [facility] and pt hasn't had her home medications since being discharged. ED workup revealed: Hgb/Hct 11.2/34.7, Alk phos 152, BNP 1100, troponin 0.028. UA (+) nitrites. CXR with central vascular congestion with small bilateral effusion. Primary cardiologist is Dr. Hernández.  Pt admitted to Telemetry for Acute on chronic CHF exacerbation and UTI.     Hospital Course:  Pt admitted for decompensated heart failure and UTI. Diuresis with Bumex and IV Rocephin in progress. Pt with physical debility and PT/OT consulted. Dr. Joiner had lengthy discussion with daughter, Haley, and patient in reference to generalized weakness and proper disposition. SNF placement was agreed however, when  approach regarding SNF selection patient refused. Introduce Hospice to patient and advised she was not a candidate for Rehab due to her extent of weakness. Pt stated she would discharge home with sitters and home health services. 7/2/18 - Dr. Joiner has spoken with pt's daughters, Haley and Rosy, regarding the mother's condition. She has diuresed and diuretics changed to oral. She was medically stable for discharge. SNF placement pending. PT/OT in progress. 7/3/18 - It was noted that pt has refused participation in PT/OT. She appears grossly  more weak in the BUE/BLE extremities. Family notified of condition. Granddaughter at bedside mentioned similar symptoms prior to admission. Neurology consult placed, ESR/CRP pending, CT Head and C spine pending. At 4th day, CT of Head and C spine found no concerning findings. ESR and CRP elevated although no concern for vasculitis. Neurology saw the patient and recommended LP to rule out GBs or CIDP. Xarelto placed on hold and LP by IR is pending as Xarelto needs to be on hold for 3 days. Speech therapy evaluated the patient and noted inconsistent dysphonia. The patient will voice normally then start mouthing words. Diet recommendations:  Mechanical soft, Thin. On 6th day, pt noted to have acute onset of metabolic encephalopathy. Pt more somnolent, respiration shallow. She would arouse with sternal rub but return to lethargy. Repeat CT of Head was negative, ammonia slightly elevated at 61 and ABG showed acidosis with hypoxic/hypercapneic respiratory failure.   07/06/18 - presently on Bipap for respiratory acidosis , Lumbar puncture done today 06/07/18 07/07- Diagnosed with NMO at [facility] Solumedrol  1000 mg/day for 5 days per neurology     Interval History:     Review of Systems   Unable to perform ROS: Other   Constitutional: Negative.    HENT: Negative.    Eyes: Negative.    Respiratory: Positive for shortness of breath.    Cardiovascular: Negative.    Gastrointestinal: Negative.    Endocrine: Negative.    Genitourinary: Negative.    Musculoskeletal: Negative.    Skin: Negative.    Allergic/Immunologic: Negative.    Neurological: Negative.    Hematological: Negative.    Psychiatric/Behavioral: Negative.      Objective:     Vital Signs (Most Recent):  Temp: 97.4 °F (36.3 °C) (07/07/18 1200)  Pulse: 71 (07/07/18 1438)  Resp: 20 (07/07/18 1438)  BP: 106/62 (07/07/18 1438)  SpO2: 99 % (07/07/18 1438) Vital Signs (24h Range):  Temp:  [97.4 °F (36.3 °C)-97.8 °F (36.6 °C)] 97.4 °F (36.3 °C)  Pulse:  [69-71] 71  Resp:   [15-30] 20  SpO2:  [92 %-100 %] 99 %  BP: ()/() 106/62     Weight: 71 kg (156 lb 8.4 oz)  Body mass index is 26.87 kg/m².    Intake/Output Summary (Last 24 hours) at 07/07/18 1520  Last data filed at 07/07/18 1400   Gross per 24 hour   Intake             1250 ml   Output              735 ml   Net              515 ml      Physical Exam      Significant Labs:   BMP:     Recent Labs  Lab 07/07/18  0421   *   *   K 4.4      CO2 32*   BUN 49*   CREATININE 1.0   CALCIUM 10.2   MG 1.9     CBC:     Recent Labs  Lab 07/06/18  0402 07/07/18  0421   WBC 10.93 14.55*   HGB 11.5* 11.1*   HCT 38.4 37.4   * 405*       Significant Imaging: I have reviewed all pertinent imaging results/findings within the past 24 hours.    Assessment/Plan:      * Acute respiratory failure with hypoxia and hypercarbia    ABG reflected hypoxic/hypercapneic respiratory failure  ICU care  Pulmonology/Critical Care following  Bipap        Encephalopathy, metabolic    Transfer to ICU  Neurology following  LP scheduled for this afternoon  Neuro checks            Skin breakdown    Pt lying in specialty bed   Multiple areas of skin breakdown  - Inpatient consult to wound care          UTI (urinary tract infection)    - UA (+) nitrites    - Urine culture pending    COAGULASE-NEGATIVE STAPHYLOCOCCUS SPECIES   50,000 - 99,999 cfu/ml   Susceptibility testing not routinely performed.     IV Rocephin - started 6/30/18, Rocephin stopped 7/5/18          Severe muscle deconditioning    - Currently has [facility] (PT/OT/nurse)  - secondary to NMO        Acute on chronic systolic heart failure    Compensated as of 7/3/18  - BNP 1100  - CXR with vascular congestion with small bilateral effusion  - Mild BLE edema upon assessment but diminished breath sounds noted  - 2D ECHO on 06/02/18 with EF 35% and pulmonary HTN  - Pt received Bumex 2 mg IVP x 1 in ED  - Bumex 2 mg IVP daily====> changed to Bumex 2 mg twice daily 7/2/18  -  Continue Metoprolol          Left ischial pressure sore, stage III    Wound care           Hypothyroidism    - TSH about a month ago -- 1.265  - Continue Levothyroxine          Chronic kidney disease (CKD), stage III (moderate)    - Currently stable  - Monitor kidney function while on diuretics          Hypertension associated with diabetes    Controlled  - continue Toprol XL and lisinopril  - Apresoline prn  Lisinopril discontinued 7/4/18 - daughter stated it caused respiratory problems    DM  - HgbA1c on 06/03/18 -- 5.3 -- controlled  - Accuchecks and low dose SSI          Atrial fibrillation    - Currently rate controlled  Telemetry monitoring  - Continue Xarelto and Amiodarone            VTE Risk Mitigation         Ordered     heparin (porcine) injection 5,000 Units  Every 8 hours      07/04/18 1209          Critical care time spent on the evaluation and treatment of severe organ dysfunction, review of pertinent labs and imaging studies, discussions with consulting providers and discussions with patient/family: 40 minutes.    Denae Infante MD  Department of Hospital Medicine   [facility]

## 2018-07-07 NOTE — PROGRESS NOTES
Progress Note  Neurological ICU    Admit Date: 6/30/2018   LOS: 7 days     SUBJECTIVE:     Follow-up For:  Blindness, general weakness of both arms and legs with respiratory failure.  Extensive review of all medical records available in Epic was done by Dr. 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  Apparently that study returned to the record after the patient had been discharged from [facility].  During that hospitalization, solumedrol 500 mg BID had been given for three days, but was discontinued because of agitation and restlessness.  The steroid was given because of a presumed diagnosis of optic neuritis.  With the diagnosis now established as NMO, she will be started on SoluMedrol, 1000 mg/day for 5 days.  If this seems ineffective, she will need pheresis, followed by immunosuppression, probably Cellcept.    Continuous Infusions:   phenylephrine       Scheduled Meds:   albuterol-ipratropium  3 mL Nebulization Q6H    amiodarone  200 mg Oral BID    brimonidine 0.2%  1 drop Both Eyes Q12H    And    timolol maleate 0.5%  1 drop Both Eyes BID    furosemide  40 mg Intravenous Daily    heparin (porcine)  5,000 Units Subcutaneous Q8H    levothyroxine  100 mcg Oral Before breakfast    methylPREDNISolone sodium succinate  250 mg Intravenous Q6H    metoprolol succinate  50 mg Oral BID    pantoprazole  40 mg Oral Daily    potassium chloride SA  20 mEq Oral BID     PRN Meds:acetaminophen, acetaminophen, albuterol-ipratropium, baclofen, dextrose 50%, dextrose 50%, glucagon (human recombinant), glucose, glucose, hydrALAZINE, hydrALAZINE, insulin aspart U-100    Review of patient's allergies indicates:   Allergen Reactions    Morphine Hives    Atorvastatin      Other reaction(s): Muscle pain    Clonidine     Codeine      Other reaction(s): Unknown    Digoxin      Other reaction(s): Unknown    Diovan [valsartan] Other (See Comments)     Upset  stomach, weakness    Eggs [egg derived]     Metoprolol Diarrhea    Naproxen      Other reaction(s): Nausea    Peanut     Propofol      Other reaction(s): delirious    Sulfa (sulfonamide antibiotics)        OBJECTIVE:     Vital Signs (Most Recent)  Temp: 97.7 °F (36.5 °C) (07/07/18 0305)  Pulse: 70 (07/07/18 0716)  Resp: (!) 30 (07/07/18 0716)  BP: (!) 185/92 (07/07/18 0630)  SpO2: 99 % (07/07/18 0716)    Vital Signs Range (Last 24H):  Temp:  [97.4 °F (36.3 °C)-98.4 °F (36.9 °C)]   Pulse:  [69-71]   Resp:  [15-30]   BP: ()/()   SpO2:  [92 %-100 %]     I & O (Last 24H):  Intake/Output Summary (Last 24 hours) at 07/07/18 0904  Last data filed at 07/07/18 0600   Gross per 24 hour   Intake             1000 ml   Output              735 ml   Net              265 ml     Ventilator Data (Last 24H):     Oxygen Concentration (%):  [30] 30    Hemodynamic Parameters (Last 24H):       Physical Exam:  General: appears acutely ill  Head: normocephalic, atraumatic  Neck: supple, symmetrical, trachea midline, no JVD  Lungs:  labored breathing and rales bilaterally  Heart: regular rate and rhythm  Abdomen: soft, non-tender non-distented; bowel sounds normal; no masses,  no organomegaly  Neurologic: Mental status: alertness: lethargic  Sensory: Does not respond to pain in either foot or either hand.  Previously, had intact vibratory sensation at the clavicle.  Motor:No voluntary movment of either hand or either leg/foot.  Reflexes: Absent stretch reflexes.  Bilateral Babinski    Lines/Drains:       Peripheral IV - Single Lumen 06/30/18 0850 Left Antecubital (Active)   Site Assessment Clean;Dry;Intact;No redness;No swelling 7/7/2018  3:05 AM   Line Status Flushed;Infusing 7/7/2018  3:05 AM   Dressing Status Clean;Dry;Intact 7/7/2018  3:05 AM   Dressing Intervention New dressing 6/30/2018  8:50 AM   Reason Not Rotated Not due 7/5/2018  3:05 PM   Number of days: 7            Peripheral IV - Single Lumen 07/06/18 0685  "Right Forearm (Active)   Site Assessment Clean;Dry;Intact;No redness;No swelling 7/7/2018  3:05 AM   Line Status Saline locked 7/7/2018  3:05 AM   Dressing Status Clean;Dry;Intact 7/7/2018  3:05 AM   Dressing Intervention New dressing 7/6/2018 11:15 PM   Dressing Change Due 07/09/18 7/6/2018 11:15 PM   Reason Not Rotated Not due 7/6/2018 11:15 PM   Number of days: 0            Urethral Catheter 07/05/18 1114 (Active)   Site Assessment Clean;Intact 7/7/2018  3:05 AM   Collection Container Urimeter 7/7/2018  3:05 AM   Securement Method secured to top of thigh w/ adhesive device 7/7/2018  3:05 AM   Catheter Care Performed yes 7/7/2018  3:05 AM   Reason for Continuing Urinary Catheterization Critically ill in ICU requiring intensive monitoring 7/7/2018  3:05 AM   CAUTI Prevention Bundle StatLock in place w 1" slack;Intact seal between catheter & drainage tubing;Drainage bag off the floor;No dependent loops or kinks;Drainage bag not overfilled (<2/3 full);Drainage bag below bladder 7/7/2018  3:05 AM   Output (mL) 32 mL 7/7/2018  6:00 AM   Number of days: 1       Laboratory (Last 24H):  CBC:    Recent Labs  Lab 07/07/18 0421   WBC 14.55*   HGB 11.1*   HCT 37.4   *     CMP:    Recent Labs  Lab 07/06/18  1149 07/07/18  0421   CALCIUM  --  10.2   ALBUMIN 2.9*  --    PROT 7.5  --    NA  --  146*   K  --  4.4   CO2  --  32*   CL  --  102   BUN  --  49*   CREATININE  --  1.0   ALKPHOS 151*  --    ALT 20  --    AST 25  --    BILITOT 0.2  --      BMP:   Recent Labs  Lab 07/07/18  0421   *   *   K 4.4      CO2 32*   BUN 49*   CREATININE 1.0   CALCIUM 10.2   MG 1.9     Labs within the past 24 hours have been reviewed.    Chest X-Ray: CXR pending at time of rounds.    Diagnostic Results:  No Further  CSF shows pleocytosis with lymphs and elevated protein.    ASSESSMENT/PLAN:     Preventive Measures: Nutrition: Goal: tolerate tube feedings, Stress Ulcer: continue prophyllaxis, DVT: continue prophyllaxis, " Physical Therapy: Continue PROM of both arms and legs with attempts at active exercise          Plan:  Neuro: High dose steroid for 5 days.  If no change seen, she will then need pheresis, followed by active immunosuppression.    Counseling/Consultation:I discussed the case with Dr. Meier and with family.    Critical Care Time greater than: 1 Hour 15 Minutes

## 2018-07-07 NOTE — PT/OT/SLP PROGRESS
Speech Language Pathology      Carlie Amos Feliberto Valdez  MRN: [id number]    Patient not seen today secondary to Not applicable, Other (Comment) (bipap). Will follow-up at a later time/date.    Lauren Adam, [place]-SLP

## 2018-07-08 NOTE — PROGRESS NOTES
Progress Note  Neurological ICU    Admit Date: 6/30/2018   LOS: 8 days     SUBJECTIVE:     Follow-up For:  NMO.  The patient is now on solumedrol 1000 mg/day, which will continue for full 5 days, then will need immunomodulation chronically.  If no improvement in strength, will then need pheresis before starting medications such as Cellcept, etc.  The patient has been offered pureed diet, but has been coughing on occasion. Continues on bipap, but has been able to get off the bipap intermittently.  Nurses have not seen any voluntary movements of the arms or legs, although reflex movement of the legs is occasionally seen (?mass action reflex?).  Review of medical records again shows the positive serum NMO antibody was obtained in May, 2018.  CSF at [facility] was in 2017 with multiple studies for MS, which were all negative, including lack of oligoclonal bands and increased IgG synthesis at that time.    Continuous Infusions:   phenylephrine       Scheduled Meds:   albuterol-ipratropium  3 mL Nebulization Q6H    amiodarone  200 mg Oral BID    brimonidine 0.2%  1 drop Both Eyes Q12H    And    timolol maleate 0.5%  1 drop Both Eyes BID    furosemide  40 mg Intravenous Daily    heparin (porcine)  5,000 Units Subcutaneous Q8H    levothyroxine  100 mcg Oral Before breakfast    methylPREDNISolone sodium succinate  250 mg Intravenous Q6H    metoprolol succinate  50 mg Oral BID    pantoprazole  40 mg Oral Daily     PRN Meds:acetaminophen, acetaminophen, albuterol-ipratropium, baclofen, dextrose 50%, dextrose 50%, glucagon (human recombinant), glucose, glucose, hydrALAZINE, hydrALAZINE, insulin aspart U-100    Review of patient's allergies indicates:   Allergen Reactions    Morphine Hives    Atorvastatin      Other reaction(s): Muscle pain    Clonidine     Codeine      Other reaction(s): Unknown    Digoxin      Other reaction(s): Unknown    Diovan [valsartan] Other (See Comments)     Upset stomach, weakness    Eggs  [egg derived]     Metoprolol Diarrhea    Naproxen      Other reaction(s): Nausea    Peanut     Propofol      Other reaction(s): delirious    Sulfa (sulfonamide antibiotics)        OBJECTIVE:     Vital Signs (Most Recent)  Temp: 98.2 °F (36.8 °C) (07/08/18 0305)  Pulse: 70 (07/08/18 0715)  Resp: (!) 29 (07/08/18 0715)  BP: 137/70 (07/08/18 0605)  SpO2: 97 % (07/08/18 0715)    Vital Signs Range (Last 24H):  Temp:  [97.2 °F (36.2 °C)-98.4 °F (36.9 °C)]   Pulse:  [69-71]   Resp:  [11-29]   BP: ()/(34-86)   SpO2:  [93 %-100 %]     I & O (Last 24H):  Intake/Output Summary (Last 24 hours) at 07/08/18 0817  Last data filed at 07/08/18 0600   Gross per 24 hour   Intake              620 ml   Output              849 ml   Net             -229 ml     Ventilator Data (Last 24H):     Oxygen Concentration (%):  [3-40] 40    Hemodynamic Parameters (Last 24H):       Physical Exam:  General: appears acutely ill, moderate distress  Eyes:  pupils responsive  Neck: supple, symmetrical, trachea midline, no JVD  Lungs:  rales bibasilar  Heart: regular rate and rhythm  Abdomen: soft, non-tender non-distented; bowel sounds normal; no masses,  no organomegaly  Neurologic: Mental status: alertness: lethargic  Cranial nerves: II: pupils direct pupil reaction to light bilaterally, consensual pupil reaction to light bilaterally, III,IV,VI: extraocular muscles extra-ocular motions intact, V,VII: corneal reflex present bilaterally  Sensory: Does not withdraw from nail bed pressure in either hand or in either foot.  Motor:Flaccid tone in both arms and legs, but does have occasional symmetrical flexion withdrawal, which occurs spontaneously, but not to pain stimulus.  Reflexes: Absent stretch reflexes, both arms and both legs.  Has bilateral Babinski    Lines/Drains:       Peripheral IV - Single Lumen 06/30/18 0850 Left Antecubital (Active)   Site Assessment Clean;Dry;Intact;No redness;No swelling 7/8/2018  3:05 AM   Line Status Infusing  "7/8/2018  3:05 AM   Dressing Status Clean;Dry;Intact 7/8/2018  3:05 AM   Dressing Intervention Dressing changed 7/8/2018  3:05 AM   Dressing Change Due 07/15/18 7/8/2018  3:05 AM   Reason Not Rotated Not due 7/5/2018  3:05 PM   Number of days: 7            Peripheral IV - Single Lumen 07/06/18 2315 Right Forearm (Active)   Site Assessment Clean;Dry;Intact;No redness;No swelling 7/8/2018  3:05 AM   Line Status Saline locked 7/8/2018  3:05 AM   Dressing Status Clean;Dry;Intact 7/8/2018  3:05 AM   Dressing Intervention New dressing 7/6/2018 11:15 PM   Dressing Change Due 07/09/18 7/6/2018 11:15 PM   Reason Not Rotated Not due 7/6/2018 11:15 PM   Number of days: 1            Urethral Catheter 07/05/18 1114 (Active)   Site Assessment Clean;Intact 7/8/2018  3:05 AM   Collection Container Urimeter 7/8/2018  3:05 AM   Securement Method secured to top of thigh w/ adhesive device 7/8/2018  3:05 AM   Catheter Care Performed yes 7/8/2018  3:05 AM   Reason for Continuing Urinary Catheterization Critically ill in ICU requiring intensive monitoring 7/8/2018  3:05 AM   CAUTI Prevention Bundle StatLock in place w 1" slack;Intact seal between catheter & drainage tubing;Drainage bag off the floor;No dependent loops or kinks;Drainage bag not overfilled (<2/3 full);Drainage bag below bladder 7/8/2018  3:05 AM   Output (mL) 60 mL 7/8/2018  6:00 AM   Number of days: 2       Laboratory (Last 24H):  CBC:    Recent Labs  Lab 07/08/18 0338   WBC 13.17*   HGB 10.6*   HCT 35.8*   *     CMP:    Recent Labs  Lab 07/08/18 0338   CALCIUM 9.8      K 5.2*   CO2 32*      BUN 64*   CREATININE 1.1     BMP:   Recent Labs  Lab 07/08/18 0338   *      K 5.2*      CO2 32*   BUN 64*   CREATININE 1.1   CALCIUM 9.8   MG 2.2     ABGs:   Recent Labs  Lab 07/05/18  1513   PH 7.384   PCO2 68.3*   HCO3 40.8*   POCSATURATED 98   BE 16     Labs within the past 24 hours have been reviewed.    Chest X-Ray: " cardiomegaly    Diagnostic Results:  No Further    ASSESSMENT/PLAN:     Preventive Measures: Nutrition: Goal: tolerate pureed diet, Stress Ulcer: continue prophyllaxis, DVT: continue prophyllaxis, Physical Therapy: Continue PROM of arms and legs.        Plan:  Neuro: Continue the solumedrol 1000 mg/day for full 5 days.  If no obvious improvement in strength, movement, then will need pheresis.    Counseling/Consultation:I discussed the patient's condition/prognosis with the family.    Critical Care Time greater than: 1 Hour

## 2018-07-08 NOTE — PLAN OF CARE
Problem: Patient Care Overview  Goal: Plan of Care Review  Patient currently requires total assist x 2 for bed mobility and total assist for sitting balance at EOB.   Outcome: Ongoing (interventions implemented as appropriate)  Pt remained on BiPAP most of night. Becomes very drowsy and confused if off of BiPAP >30 min. Tolerated pureed diet last night, but choked on water this morning. After choking, SpO2 dropped to 76%. Pt's SpO2 came up quickly once BiPAP was put back on at 100% FiO2. FiO2 weaned to 40% within the hour. Pt now NPO. Other VSS. Afebrile. UO adequate. Pt still claiming to feel touch in all extremities, but only sometimes withdrew from pain. Still no purposeful movement below shoulders. Family and pt updated on POC. All questions and concerns addressed.

## 2018-07-08 NOTE — SUBJECTIVE & OBJECTIVE
Interval History:     Review of Systems   Unable to perform ROS: Other   Constitutional: Negative.    HENT: Negative.    Eyes: Negative.    Respiratory: Positive for shortness of breath.    Cardiovascular: Negative.    Gastrointestinal: Negative.    Endocrine: Negative.    Genitourinary: Negative.    Musculoskeletal: Negative.    Skin: Negative.    Allergic/Immunologic: Negative.    Neurological: Negative.    Hematological: Negative.    Psychiatric/Behavioral: Negative.      Objective:     Vital Signs (Most Recent):  Temp: 97.4 °F (36.3 °C) (07/08/18 1215)  Pulse: 70 (07/08/18 1305)  Resp: 16 (07/08/18 1300)  BP: 130/62 (07/08/18 1300)  SpO2: 100 % (07/08/18 1300) Vital Signs (24h Range):  Temp:  [97.2 °F (36.2 °C)-98.4 °F (36.9 °C)] 97.4 °F (36.3 °C)  Pulse:  [69-71] 70  Resp:  [11-32] 16  SpO2:  [93 %-100 %] 100 %  BP: ()/(34-85) 130/62     Weight: 73 kg (160 lb 15 oz)  Body mass index is 27.62 kg/m².    Intake/Output Summary (Last 24 hours) at 07/08/18 1331  Last data filed at 07/08/18 1300   Gross per 24 hour   Intake              250 ml   Output             1364 ml   Net            -1114 ml      Physical Exam      Significant Labs:   BMP:     Recent Labs  Lab 07/08/18  0338 07/08/18  1139   *  --      --    K 5.2* 4.9     --    CO2 32*  --    BUN 64*  --    CREATININE 1.1  --    CALCIUM 9.8  --    MG 2.2  --      CBC:     Recent Labs  Lab 07/07/18  0421 07/08/18  0338   WBC 14.55* 13.17*   HGB 11.1* 10.6*   HCT 37.4 35.8*   * 389*       Significant Imaging: I have reviewed all pertinent imaging results/findings within the past 24 hours.

## 2018-07-08 NOTE — PROGRESS NOTES
[facility] Hospital Medicine  Progress Note    Patient Name: Carlie Valdez  MRN: [id number]  Patient Class: IP- Inpatient   Admission Date: 6/30/2018  Length of Stay: 8 days  Attending Physician: Denae Infante MD  Primary Care Provider: Johanna Guzman MD        Subjective:     Principal Problem:Acute respiratory failure with hypoxia and hypercarbia    HPI:  Carlie Valdez is a 72 year old female with a PMHx of HTN, GERD, DM, A-fib, Asthma, and Defibrillator who presented from home with c/o chest pain. Located to right side with no radiation. Associated symptoms: SOB. Pt and sitters at bedside report she was recently discharged from [facility] and pt hasn't had her home medications since being discharged. ED workup revealed: Hgb/Hct 11.2/34.7, Alk phos 152, BNP 1100, troponin 0.028. UA (+) nitrites. CXR with central vascular congestion with small bilateral effusion. Primary cardiologist is Dr. Hernández.  Pt admitted to Telemetry for Acute on chronic CHF exacerbation and UTI.     Hospital Course:  Pt admitted for decompensated heart failure and UTI. Diuresis with Bumex and IV Rocephin in progress. Pt with physical debility and PT/OT consulted. Dr. Joiner had lengthy discussion with daughter, Haley, and patient in reference to generalized weakness and proper disposition. SNF placement was agreed however, when  approach regarding SNF selection patient refused. Introduce Hospice to patient and advised she was not a candidate for Rehab due to her extent of weakness. Pt stated she would discharge home with sitters and home health services. 7/2/18 - Dr. Joiner has spoken with pt's daughters, Haley and Rosy, regarding the mother's condition. She has diuresed and diuretics changed to oral. She was medically stable for discharge. SNF placement pending. PT/OT in progress. 7/3/18 - It was noted that pt has refused participation in PT/OT. She appears grossly  more weak in the BUE/BLE extremities. Family notified of condition. Granddaughter at bedside mentioned similar symptoms prior to admission. Neurology consult placed, ESR/CRP pending, CT Head and C spine pending. At 4th day, CT of Head and C spine found no concerning findings. ESR and CRP elevated although no concern for vasculitis. Neurology saw the patient and recommended LP to rule out GBs or CIDP. Xarelto placed on hold and LP by IR is pending as Xarelto needs to be on hold for 3 days. Speech therapy evaluated the patient and noted inconsistent dysphonia. The patient will voice normally then start mouthing words. Diet recommendations:  Mechanical soft, Thin. On 6th day, pt noted to have acute onset of metabolic encephalopathy. Pt more somnolent, respiration shallow. She would arouse with sternal rub but return to lethargy. Repeat CT of Head was negative, ammonia slightly elevated at 61 and ABG showed acidosis with hypoxic/hypercapneic respiratory failure.   07/06/18 - presently on Bipap for respiratory acidosis , Lumbar puncture done today 06/07/18 07/07- Diagnosed with NMO at [facility]  1000 mg/day for 5 days per neurology   07/08- possible aspiration overnight , follow up CXR new right pleural effusion , continue Bipap support     Interval History:     Review of Systems   Unable to perform ROS: Other   Constitutional: Negative.    HENT: Negative.    Eyes: Negative.    Respiratory: Positive for shortness of breath.    Cardiovascular: Negative.    Gastrointestinal: Negative.    Endocrine: Negative.    Genitourinary: Negative.    Musculoskeletal: Negative.    Skin: Negative.    Allergic/Immunologic: Negative.    Neurological: Negative.    Hematological: Negative.    Psychiatric/Behavioral: Negative.      Objective:     Vital Signs (Most Recent):  Temp: 97.4 °F (36.3 °C) (07/08/18 1215)  Pulse: 70 (07/08/18 1305)  Resp: 16 (07/08/18 1300)  BP: 130/62 (07/08/18 1300)  SpO2: 100 % (07/08/18 1300) Vital Signs  (24h Range):  Temp:  [97.2 °F (36.2 °C)-98.4 °F (36.9 °C)] 97.4 °F (36.3 °C)  Pulse:  [69-71] 70  Resp:  [11-32] 16  SpO2:  [93 %-100 %] 100 %  BP: ()/(34-85) 130/62     Weight: 73 kg (160 lb 15 oz)  Body mass index is 27.62 kg/m².    Intake/Output Summary (Last 24 hours) at 07/08/18 1331  Last data filed at 07/08/18 1300   Gross per 24 hour   Intake              250 ml   Output             1364 ml   Net            -1114 ml      Physical Exam      Significant Labs:   BMP:     Recent Labs  Lab 07/08/18  0338 07/08/18  1139   *  --      --    K 5.2* 4.9     --    CO2 32*  --    BUN 64*  --    CREATININE 1.1  --    CALCIUM 9.8  --    MG 2.2  --      CBC:     Recent Labs  Lab 07/07/18  0421 07/08/18  0338   WBC 14.55* 13.17*   HGB 11.1* 10.6*   HCT 37.4 35.8*   * 389*       Significant Imaging: I have reviewed all pertinent imaging results/findings within the past 24 hours.    Assessment/Plan:      * Acute respiratory failure with hypoxia and hypercarbia    ABG reflected hypoxic/hypercapneic respiratory failure  ICU care  Pulmonology/Critical Care following  Bipap        Encephalopathy, metabolic    Transfer to ICU  Neurology following  LP scheduled for this afternoon  Neuro checks            Skin breakdown    Pt lying in specialty bed   Multiple areas of skin breakdown  - Inpatient consult to wound care          UTI (urinary tract infection)    - UA (+) nitrites    - Urine culture pending    COAGULASE-NEGATIVE STAPHYLOCOCCUS SPECIES   50,000 - 99,999 cfu/ml   Susceptibility testing not routinely performed.     IV Rocephin - started 6/30/18, Rocephin stopped 7/5/18          Severe muscle deconditioning    - Currently has [facility] (PT/OT/nurse)  - secondary to NMO        Acute on chronic systolic heart failure    Compensated as of 7/3/18  - BNP 1100  - CXR with vascular congestion with small bilateral effusion  - Mild BLE edema upon assessment but diminished breath sounds noted  -  2D ECHO on 06/02/18 with EF 35% and pulmonary HTN  - Pt received Bumex 2 mg IVP x 1 in ED  - Bumex 2 mg IVP daily====> changed to Bumex 2 mg twice daily 7/2/18  - Continue Metoprolol          Left ischial pressure sore, stage III    Wound care           Hypothyroidism    - TSH about a month ago -- 1.265  - Continue Levothyroxine          Chronic kidney disease (CKD), stage III (moderate)    - Currently stable  - Monitor kidney function while on diuretics          Hypertension associated with diabetes    Controlled  - continue Toprol XL and lisinopril  - Apresoline prn  Lisinopril discontinued 7/4/18 - daughter stated it caused respiratory problems    DM  - HgbA1c on 06/03/18 -- 5.3 -- controlled  - Accuchecks and low dose SSI          Atrial fibrillation    - Currently rate controlled  Telemetry monitoring  - Continue Xarelto and Amiodarone            VTE Risk Mitigation         Ordered     heparin (porcine) injection 5,000 Units  Every 8 hours      07/04/18 1209          Critical care time spent on the evaluation and treatment of severe organ dysfunction, review of pertinent labs and imaging studies, discussions with consulting providers and discussions with patient/family: 40 minutes.    Denae Infante MD  Department of Hospital Medicine   [facility]

## 2018-07-08 NOTE — ASSESSMENT & PLAN NOTE
7/5 start IV lasix  7/6 continue IV lasix  7/8 worsening Chest X Ray with pleural effusion on the right , increase lasix to BID

## 2018-07-08 NOTE — SUBJECTIVE & OBJECTIVE
Past Medical History:   Diagnosis Date    Arthritis     Asthma     Atrial fibrillation     Blood clot of vein in shoulder area     Cardiac defibrillator in place     Chronic knee pain     Diabetes mellitus type 2 without retinopathy 2016    Diaphragmatic hernia without obstruction and without gangrene 2015    GERD (gastroesophageal reflux disease)     Hypertension     Primary open angle glaucoma (POAG) of both eyes, severe stage 2018       Past Surgical History:   Procedure Laterality Date    CARDIAC DEFIBRILLATOR PLACEMENT      , .    CATARACT EXTRACTION       SECTION      COLONOSCOPY      ashley      Dr. Macdonald    KNEE ARTHROSCOPY      UPPER GASTROINTESTINAL ENDOSCOPY         Review of patient's allergies indicates:   Allergen Reactions    Morphine Hives    Atorvastatin      Other reaction(s): Muscle pain    Clonidine     Codeine      Other reaction(s): Unknown    Digoxin      Other reaction(s): Unknown    Diovan [valsartan] Other (See Comments)     Upset stomach, weakness    Eggs [egg derived]     Metoprolol Diarrhea    Naproxen      Other reaction(s): Nausea    Peanut     Propofol      Other reaction(s): delirious    Sulfa (sulfonamide antibiotics)        Family History     Problem Relation (Age of Onset)    Hypertension Mother, Father        Social History Main Topics    Smoking status: Never Smoker    Smokeless tobacco: Never Used    Alcohol use No    Drug use: No    Sexual activity: Yes     Partners: Male         Review of Systems   Unable to perform ROS: Mental status change     Objective:     Vital Signs (Most Recent):  Temp: 97.8 °F (36.6 °C) (18 0730)  Pulse: 69 (18 1119)  Resp: (!) 31 (18 0935)  BP: (!) 155/75 (18 0935)  SpO2: 96 % (18 0935) Vital Signs (24h Range):  Temp:  [97.2 °F (36.2 °C)-98.4 °F (36.9 °C)] 97.8 °F (36.6 °C)  Pulse:  [69-71] 69  Resp:  [11-31] 31  SpO2:  [93 %-100 %] 96 %  BP: ()/(34-85)  155/75     Weight: 73 kg (160 lb 15 oz)  Body mass index is 27.62 kg/m².      Intake/Output Summary (Last 24 hours) at 07/08/18 1211  Last data filed at 07/08/18 1119   Gross per 24 hour   Intake              250 ml   Output             1294 ml   Net            -1044 ml       Physical Exam   Constitutional: She appears well-developed and well-nourished.   HENT:   Head: Normocephalic.   Eyes: EOM are normal. Pupils are equal, round, and reactive to light.   Neck: Normal range of motion. JVD present.   Cardiovascular: An irregularly irregular rhythm present. Tachycardia present.  PMI is displaced.    Murmur heard.   Systolic murmur is present with a grade of 2/6   Pulmonary/Chest: She has decreased breath sounds in the right lower field and the left lower field. She has rales in the right lower field and the left lower field.   Musculoskeletal: She exhibits edema.   Neurological: She displays abnormal reflex. She exhibits abnormal muscle tone. Coordination abnormal.   Responds to pain , intermittently responds to commands, lethargic   Skin: Skin is warm and dry.   Stage II sacral decub   Nursing note and vitals reviewed.      Vents:  Oxygen Concentration (%): (S) 50 (07/08/18 0935)    Lines/Drains/Airways     Drain                 Urethral Catheter 07/05/18 1114 3 days          Pressure Ulcer                 Pressure Injury 06/02/18 Right lateral Heel Stage 3 36 days         Pressure Injury 06/02/18 1659 Left proximal Ischial tuberosity Stage 3 35 days         Pressure Injury 06/02/18 1902 Right medial Heel Unstageable 35 days         Pressure Injury 06/30/18 Left lateral Hip Stage 2 8 days         Pressure Injury 06/30/18 Right lateral Buttocks Stage 2 8 days          Peripheral Intravenous Line                 Peripheral IV - Single Lumen 06/30/18 0850 Left Antecubital 8 days         Peripheral IV - Single Lumen 07/06/18 2315 Right Forearm 1 day                Significant Labs:    CBC/Anemia Profile:    Recent  Labs  Lab 07/07/18  0421 07/08/18  0338   WBC 14.55* 13.17*   HGB 11.1* 10.6*   HCT 37.4 35.8*   * 389*   MCV 87 87   RDW 17.8* 18.3*        Chemistries:    Recent Labs  Lab 07/07/18  0421 07/08/18 0338 07/08/18  1139   * 145  --    K 4.4 5.2* 4.9    104  --    CO2 32* 32*  --    BUN 49* 64*  --    CREATININE 1.0 1.1  --    CALCIUM 10.2 9.8  --    MG 1.9 2.2  --    PHOS 2.1* 2.5*  --        ABGs:     Recent Labs  Lab 07/08/18  0929   PH 7.349*   PCO2 69.9*   HCO3 38.5*   POCSATURATED 91*   BE 13     BMP:     Recent Labs  Lab 07/08/18 0338 07/08/18  1139   *  --      --    K 5.2* 4.9     --    CO2 32*  --    BUN 64*  --    CREATININE 1.1  --    CALCIUM 9.8  --    MG 2.2  --      CMP:     Recent Labs  Lab 07/07/18  0421 07/08/18  0338 07/08/18  1139   * 145  --    K 4.4 5.2* 4.9    104  --    CO2 32* 32*  --    * 122*  --    BUN 49* 64*  --    CREATININE 1.0 1.1  --    CALCIUM 10.2 9.8  --    ANIONGAP 12 9  --    EGFRNONAA 56* 50*  --      All pertinent labs within the past 24 hours have been reviewed.      FROM [facility]:  CSF cell count with differential, Tube # 16/18/2017  [facility]  Component Name Value Ref Range   APPEARANCE FLUID Bloody (A) Clear    WBC CSF 10 (H) 0 - 5 /mm3   RBC CSF 7,800 (H) 0 - 9 /mm3   SEGS CSF 0 0 - 6 %   LYMPHS CSF 67 40 - 80 %   MONOS CSF 33 15 - 45 %   TUBES RECEIVED 2     TUBE USED 2   Comment: Tube 2 clearer than tube 1     VOLUME FLUID 1.5 mL   Specimen   Cerebrospinal Fluid - Lumbar Puncture       NMO/AQP4 FACS Titer, S REFLEX   5/20/2018  [facility]  Component Name Value Ref Range   NMO/AQP4 FACS Titer, S Positive 1:50137 (H)   Comment:  This autoantibody supports the diagnosis of neuromyelitis   optica or a neuromyelitis optica spectrum disorder.   Seropositivity predicts high risk for relapse of myelitis,    optic neuritis or both.    -------------------ADDITIONAL INFORMATION-------------------  This test was developed and its performance characteristics   determined by [facility] in a manner consistent with CLIA   requirements. This test has not been cleared or approved by   the U.S. Food and Drug Administration.    Test Performed by:  [facility] - [address] [address]                  Multiple Sclerosis Profile6/17/2017  [facility]  Specimen   Other   Result Narrative   The following orders were created for panel order Multiple Sclerosis Profile.  Procedure                               Abnormality         Status                     ---------                               -----------         ------                     MS Profile[[id number]]                    Abnormal            Final result               MS Profile Blood[[id number]]                                  Final result                 Please view results for these tests on the individual orders.     Meningitis/Encephalitis Panel, Tube #4       6[facility]18/2017  [facility]  Component Name Value Ref Range   L Monocytogenes Not Detected Not Detected    N Meningitidis Not Detected Not Detected    Strep. agalactiae Not Detected Not Detected    Strep. Pneumoniae Not Detected Not Detected    Cytomegalovirus Not Detected Not Detected    Enterovirus Not Detected Not Detected    HSV-1 Not Detected Not Detected    HSV-2 Not Detected Not Detected    Human Herpes Virus 6 Not Detected Not Detected    Human parechovirus Not Detected Not Detected    Varicella Zoster Virus Not Detected Not Detected    Crypto. neoformins/gattii Not Detected Not Detected    E Coli K1 Not Detected Not Detected    H Influenzae Not Detected Not Detected    Specimen   Cerebrospinal Fluid - lumbar     ADRENAL, OTHER  Component Name    Hospital Encounter on  "6/14/2017  [facility]")' href="[email or web address]/">6/21/2017       Specimen: Blood - Vein")'>7.4       Heavy Metals, Blood6/20/2017  [facility]  Component Name Value Ref Range   Arsenic, B <1   Comment:    -------------------ADDITIONAL INFORMATION-------------------  This test was developed and its performance characteristics   determined by [facility] in a manner consistent with CLIA   requirements. This test has not been cleared or approved by   the U.S. Food and Drug Administration. 0 - 12 ng/mL    Lead, B 1.5   Comment:    -------------------ADDITIONAL INFORMATION-------------------  Testing performed by Inductively Coupled Plasma-Mass   Spectrometry (ICP-MS).  This test was developed and its performance characteristics   determined by [facility] in a manner consistent with CLIA   requirements. This test has not been cleared or approved by   the U.S. Food and Drug Administration. 0.0 - 4.9 mcg/dL    Cadmium, B 0.9   Comment:    -------------------ADDITIONAL INFORMATION-------------------  This test was developed and its performance characteristics   determined by [facility] in a manner consistent with CLIA   requirements. This test has not been cleared or approved by   the U.S. Food and Drug Administration. 0.0 - 4.9 ng/mL   Mercury, B 4   Comment:    -------------------ADDITIONAL INFORMATION-------------------  This test was developed and its performance characteristics   determined by [facility] in a manner consistent with CLIA   requirements. This test has not been cleared or approved by   the U.S. Food and Drug Administration. 0 - 9 ng/mL   Venous/Capillary Venous   Comment:    Test Performed by:  [facility] [address]  [address] [place] [id number]     Specimen   Blood - Vein         Biotinidase Level6/20/2017  [facility]  Component Name Value Ref Range   Reason For Referral Not provided     Biotinidase, S 9.8 3.5 - 13.8 U/L   Interpretation SEE COMMENTS   Comment:  In this sample, biotinidase activity is normal. These   results indicate this individual is not affected with   biotinidase deficiency (OMIM 752156).    -------------------ADDITIONAL INFORMATION-------------------  Colorimetric Enzyme Assay  This test was developed and its performance characteristics   determined by [facility] in a manner consistent with CLIA   requirements. This test has not been cleared or approved by   the U.S. Food and Drug Administration.     Reviewed By SEE COMMENTS   Comment:  RESULT: Jayro Naranjo M.D.    Test Performed by:  [address] [address]     Paraneoplastic Autoantibody Evaluation, Serum6/20/2017  [facility]  Component Name Value Ref Range   Interpretive Comments SEE COMMENTS   Comment:  No informative autoantibodies were detected in the   Paraneoplastic Evaluation. However, a negative result does   not exclude neurological autoimmunity with or without   associated neoplasia. Sensitivity and specificity of   antibody testing are enhanced by testing both serum and   CSF.     MARK-1, S Negative <1:240 titer    Reflex Added None.   Comment:    -------------------ADDITIONAL INFORMATION-------------------  This test was developed and its performance characteristics   determined by [facility] in a manner consistent with CLIA   requirements. This test has not been cleared or approved by   the U.S. Food and Drug Administration.     MARK-2, S Negative   Comment:    -------------------ADDITIONAL INFORMATION-------------------  This test was developed and its performance characteristics   determined by [facility] in a manner consistent with CLIA   requirements. This test has not been cleared or approved by   the U.S.  Food and Drug Administration. <1:240 titer    MARK-3, S Negative   Comment:    -------------------ADDITIONAL INFORMATION-------------------  This test was developed and its performance characteristics   determined by [facility] in a manner consistent with CLIA   requirements. This test has not been cleared or approved by   the U.S. Food and Drug Administration. <1:240 titer    AGNA-1, S Negative   Comment:    -------------------ADDITIONAL INFORMATION-------------------  This test was developed and its performance characteristics   determined by [facility] in a manner consistent with CLIA   requirements. This test has not been cleared or approved by   the U.S. Food and Drug Administration. <1:240 titer    PCA-1, S Negative   Comment:    -------------------ADDITIONAL INFORMATION-------------------  This test was developed and its performance characteristics   determined by [facility] in a manner consistent with CLIA   requirements. This test has not been cleared or approved by   the U.S. Food and Drug Administration. <1:240 titer    PCA-2, S Negative   Comment:    -------------------ADDITIONAL INFORMATION-------------------  This test was developed and its performance characteristics   determined by [facility] in a manner consistent with CLIA   requirements. This test has not been cleared or approved by   the U.S. Food and Drug Administration. <1:240 titer    PCA-Tr, S Negative   Comment:    -------------------ADDITIONAL INFORMATION-------------------  This test was developed and its performance characteristics   determined by [facility] in a manner consistent with CLIA   requirements. This test has not been cleared or approved by   the U.S. Food and Drug Administration. <1:240 titer    Amphiphysin Ab, S Negative   Comment:    -------------------ADDITIONAL INFORMATION-------------------  This test was developed and its performance characteristics   determined by [facility] in a manner consistent with CLIA    requirements. This test has not been cleared or approved by   the U.S. Food and Drug Administration. <1:240 titer    CRMP-5-IgG, S Negative   Comment:    -------------------ADDITIONAL INFORMATION-------------------  This test was developed and its performance characteristics   determined by [facility] in a manner consistent with CLIA   requirements. This test has not been cleared or approved by   the U.S. Food and Drug Administration. <1:240 titer    Striational (Striated Muscle) Ab, S Negative   Comment:    -------------------ADDITIONAL INFORMATION-------------------  This test was developed and its performance characteristics   determined by [facility] in a manner consistent with CLIA   requirements. This test has not been cleared or approved by   the U.S. Food and Drug Administration. <1:120 titer    P/Q-Type Calcium Channel Ab 0   Comment:    -------------------ADDITIONAL INFORMATION-------------------  This test was developed and its performance characteristics   determined by [facility] in a manner consistent with CLIA   requirements. This test has not been cleared or approved by   the U.S. Food and Drug Administration. <=0.02 nmol/L   N-Type Calcium Channel Ab 0   Comment:    -------------------ADDITIONAL INFORMATION-------------------  This test was developed and its performance characteristics   determined by [facility] in a manner consistent with CLIA   requirements. This test has not been cleared or approved by   the U.S. Food and Drug Administration. <=0.03 nmol/L   ACh Receptor (Muscle) Binding Ab 0   Comment:    -------------------ADDITIONAL INFORMATION-------------------  This test was developed and its performance characteristics   determined by [facility] in a manner consistent with CLIA   requirements. This test has not been cleared or approved by   the U.S. Food and Drug Administration. <=0.02 nmol/L   AChR Ganglionic Neuronal Ab, S 0   Comment:    -------------------ADDITIONAL  INFORMATION-------------------  This test was developed and its performance characteristics   determined by [facility] in a manner consistent with CLIA   requirements. This test has not been cleared or approved by   the U.S. Food and Drug Administration. <=0.02 nmol/L   Neuronal (V-G) K+ Channel Ab, S 0   Comment:    -------------------ADDITIONAL INFORMATION-------------------  This test was developed and its performance characteristics   determined by [facility] in a manner consistent with CLIA   requirements. This test has not been cleared or approved by   the U.S. Food and Drug Administration.    Test Performed by:  [address] [address] <=0.02 nmol/L   Specimen   Blood - Vein     MS Profile6/18/2017  [facility]  Component Name Value Ref Range   CSF Bands 3 bands    CSF Oligo Bands Interpretation 0   Comment:  The oligoclonal band assay detected 3 or fewer unique IgG   bands in the CSF. This is a negative result. <4 bands    Serum Bands 3 bands    IgG Index, CSF 0.63 <=0.85    IgG, CSF 7.5   Comment:    -------------------ADDITIONAL INFORMATION-------------------  This test has been modified from the 's   instructions. Its performance characteristics were   determined by [facility] in a manner consistent with   CLIA requirements. This test has not been cleared or   approved by the U.S. Food and Drug Administration. <=8.1 mg/dL   Albumin, CSF 33.6 (H)   Comment:    -------------------ADDITIONAL INFORMATION-------------------  This test has been modified from the 's   instructions. Its performance characteristics were   determined by [facility] in a manner consistent with   CLIA requirements. This test has not been cleared or   approved by the U.S. Food and Drug Administration. <=27.0 mg/dL   IgG/Albumin, CSF 0.22 (H) <=0.21    Synthesis Rate, CSF 9.83 <=12 mg/24 h    IgG, S 1,070  767 - 1590 mg/dL   Albumin, S 3,020 (L) 3200 - 4800 mg/dL   IgG/Albumin, S 0.35   Comment:    Test Performed by:  [facility] - [address] [address] <=0.40    Specimen   Cerebrospinal Fluid - CSF         Significant Imaging:   I have reviewed all pertinent imaging results/findings within the past 24 hours.  I have reviewed and interpreted all pertinent imaging results/findings within the past 24 hours.     US Abdomen Limited1/26/2018  [facility]  Result Impression     Distended hepatic veins suggestive of elevated right heart pressures. Bidirectional flow in the portal vein.   Result Narrative   EXAM:  US ABDOMEN LIMITED     CLINICAL INDICATION:     ;     epigastric pain    COMPARISON: None.    FINDINGS: Transverse and longitudinal images of the right upper quadrant were obtained.    The gallbladder contains no evidence of stones, wall thickening, or pericholecystic fluid.  The common duct is normal in caliber measuring 0.5 cm. The liver measures 15.3 cm in the mid right clavicular line.  The parenchyma is homogeneous . Hepatic veins are significantly distended suggestive of elevated right heart pressures. The main portal vein is patent with bidirectional flow. The pancreas is obscured by bowel gas.    The right kidney measures 11 cm in length. There is no hydronephrosis.    Small volume of ascites.   Status Results Details

## 2018-07-08 NOTE — ASSESSMENT & PLAN NOTE
7/6 pCO2 normalized and patient improved but still not alert, awaiting lumbar puncture  7/8 worsening mental status  - no longer feeding herself

## 2018-07-08 NOTE — PLAN OF CARE
Problem: Patient Care Overview  Goal: Plan of Care Review  Patient currently requires total assist x 2 for bed mobility and total assist for sitting balance at EOB.   Outcome: Ongoing (interventions implemented as appropriate)  Plan of care reviewed with patient. Patient stable. Patient oriented to self. Drowsy and lethargic throughout the day, arouses to stimuli. Patient free from falls fall precautions in place. Flores to gravity per orders. NPO diet. Patient switched from Bipap to AVAPS at 0930. Oxygen sats . Nonverbal pain indicators absent. Blood glucose monitoring. Turn every two hours in bed. Specialty bed. Seen by physical therapy. IV Lasix. IV steroids. Paced on monitor with bundle branch block. Will cont to monitor.

## 2018-07-08 NOTE — PROGRESS NOTES
[facility]  Critical Care Medicine  Progress Note    Patient Name: Carlie Valdez  MRN: [id number]  Admission Date: 6/30/2018  Hospital Length of Stay: 8 days  Code Status: Full Code  Attending Provider: Denae Infante MD  Primary Care Provider: Johanna Guzman MD   Principal Problem: Acute respiratory failure with hypoxia and hypercarbia    Subjective:     HPI:  72 year old female with a PMHx of p[revious cerebral vascular accident with right sided weakness and aphasia, HTN, GERD, DM, A-fib, Cardiac Asthma, and Defibrillator who presented from home with c/o chest pain to Emergency Room on 6/30/2018 folllwing recent discharge from [facility]. Admitted for acute on chronic systolic Congestive Heart failure and progressively worsened. Noted to have worsening mental status and shortness of breath and transferrrd to ICU for hypercapnic respiratory failure. Placed on Noninvasive Positive Pressure Ventilation     Hospital/ICU Course:  7/5 Transferred to ICU. Noninvasive Positive Pressure Ventilation initiated , may need intubation  7/6 Improved ventilation with Noninvasive Positive Pressure Ventilation but now generalized weakness. Steroids started for respiratory failure. Lumbar puncture later today.  7/7 Respiratory status stable. Labile blood pressure. Started on high does steroids  7/8 worsening respiraoty status with elevated  pCO2    Past Medical History:   Diagnosis Date    Arthritis     Asthma     Atrial fibrillation     Blood clot of vein in shoulder area     Cardiac defibrillator in place     Chronic knee pain     Diabetes mellitus type 2 without retinopathy 12/19/2016    Diaphragmatic hernia without obstruction and without gangrene 9/14/2015    GERD (gastroesophageal reflux disease)     Hypertension     Primary open angle glaucoma (POAG) of both eyes, severe stage 6/1/2018       Past Surgical History:   Procedure Laterality Date    CARDIAC DEFIBRILLATOR PLACEMENT       , .    CATARACT EXTRACTION       SECTION      COLONOSCOPY      ashley Macdonald    KNEE ARTHROSCOPY      UPPER GASTROINTESTINAL ENDOSCOPY         Review of patient's allergies indicates:   Allergen Reactions    Morphine Hives    Atorvastatin      Other reaction(s): Muscle pain    Clonidine     Codeine      Other reaction(s): Unknown    Digoxin      Other reaction(s): Unknown    Diovan [valsartan] Other (See Comments)     Upset stomach, weakness    Eggs [egg derived]     Metoprolol Diarrhea    Naproxen      Other reaction(s): Nausea    Peanut     Propofol      Other reaction(s): delirious    Sulfa (sulfonamide antibiotics)        Family History     Problem Relation (Age of Onset)    Hypertension Mother, Father        Social History Main Topics    Smoking status: Never Smoker    Smokeless tobacco: Never Used    Alcohol use No    Drug use: No    Sexual activity: Yes     Partners: Male         Review of Systems   Unable to perform ROS: Mental status change     Objective:     Vital Signs (Most Recent):  Temp: 97.8 °F (36.6 °C) (18 0730)  Pulse: 69 (18 1119)  Resp: (!) 31 (18 0935)  BP: (!) 155/75 (18 0935)  SpO2: 96 % (18 0935) Vital Signs (24h Range):  Temp:  [97.2 °F (36.2 °C)-98.4 °F (36.9 °C)] 97.8 °F (36.6 °C)  Pulse:  [69-71] 69  Resp:  [11-31] 31  SpO2:  [93 %-100 %] 96 %  BP: ()/(34-85) 155/75     Weight: 73 kg (160 lb 15 oz)  Body mass index is 27.62 kg/m².      Intake/Output Summary (Last 24 hours) at 18 1211  Last data filed at 18 1119   Gross per 24 hour   Intake              250 ml   Output             1294 ml   Net            -1044 ml       Physical Exam   Constitutional: She appears well-developed and well-nourished.   HENT:   Head: Normocephalic.   Eyes: EOM are normal. Pupils are equal, round, and reactive to light.   Neck: Normal range of motion. JVD present.   Cardiovascular: An irregularly irregular rhythm  present. Tachycardia present.  PMI is displaced.    Murmur heard.   Systolic murmur is present with a grade of 2/6   Pulmonary/Chest: She has decreased breath sounds in the right lower field and the left lower field. She has rales in the right lower field and the left lower field.   Musculoskeletal: She exhibits edema.   Neurological: She displays abnormal reflex. She exhibits abnormal muscle tone. Coordination abnormal.   Responds to pain , intermittently responds to commands, lethargic   Skin: Skin is warm and dry.   Stage II sacral decub   Nursing note and vitals reviewed.      Vents:  Oxygen Concentration (%): (S) 50 (07/08/18 0935)    Lines/Drains/Airways     Drain                 Urethral Catheter 07/05/18 1114 3 days          Pressure Ulcer                 Pressure Injury 06/02/18 Right lateral Heel Stage 3 36 days         Pressure Injury 06/02/18 1659 Left proximal Ischial tuberosity Stage 3 35 days         Pressure Injury 06/02/18 1902 Right medial Heel Unstageable 35 days         Pressure Injury 06/30/18 Left lateral Hip Stage 2 8 days         Pressure Injury 06/30/18 Right lateral Buttocks Stage 2 8 days          Peripheral Intravenous Line                 Peripheral IV - Single Lumen 06/30/18 0850 Left Antecubital 8 days         Peripheral IV - Single Lumen 07/06/18 2315 Right Forearm 1 day                Significant Labs:    CBC/Anemia Profile:    Recent Labs  Lab 07/07/18  0421 07/08/18  0338   WBC 14.55* 13.17*   HGB 11.1* 10.6*   HCT 37.4 35.8*   * 389*   MCV 87 87   RDW 17.8* 18.3*        Chemistries:    Recent Labs  Lab 07/07/18  0421 07/08/18  0338 07/08/18  1139   * 145  --    K 4.4 5.2* 4.9    104  --    CO2 32* 32*  --    BUN 49* 64*  --    CREATININE 1.0 1.1  --    CALCIUM 10.2 9.8  --    MG 1.9 2.2  --    PHOS 2.1* 2.5*  --        ABGs:     Recent Labs  Lab 07/08/18  0929   PH 7.349*   PCO2 69.9*   HCO3 38.5*   POCSATURATED 91*   BE 13     BMP:     Recent Labs  Lab  07/08/18  0338 07/08/18  1139   *  --      --    K 5.2* 4.9     --    CO2 32*  --    BUN 64*  --    CREATININE 1.1  --    CALCIUM 9.8  --    MG 2.2  --      CMP:     Recent Labs  Lab 07/07/18  0421 07/08/18  0338 07/08/18  1139   * 145  --    K 4.4 5.2* 4.9    104  --    CO2 32* 32*  --    * 122*  --    BUN 49* 64*  --    CREATININE 1.0 1.1  --    CALCIUM 10.2 9.8  --    ANIONGAP 12 9  --    EGFRNONAA 56* 50*  --      All pertinent labs within the past 24 hours have been reviewed.      FROM [facility]:  CSF cell count with differential, Tube # 16/18/2017  [employer] [facility]  Component Name Value Ref Range   APPEARANCE FLUID Bloody (A) Clear    WBC CSF 10 (H) 0 - 5 /mm3   RBC CSF 7,800 (H) 0 - 9 /mm3   SEGS CSF 0 0 - 6 %   LYMPHS CSF 67 40 - 80 %   MONOS CSF 33 15 - 45 %   TUBES RECEIVED 2     TUBE USED 2   Comment: Tube 2 clearer than tube 1     VOLUME FLUID 1.5 mL   Specimen   Cerebrospinal Fluid - Lumbar Puncture       NMO/AQP4 FACS Titer, S REFLEX   5/20/2018  [employer] [facility]  Component Name Value Ref Range   NMO/AQP4 FACS Titer, S Positive 1:42421 (H)   Comment:  This autoantibody supports the diagnosis of neuromyelitis   optica or a neuromyelitis optica spectrum disorder.   Seropositivity predicts high risk for relapse of myelitis,   optic neuritis or both.    -------------------ADDITIONAL INFORMATION-------------------  This test was developed and its performance characteristics   determined by [facility] in a manner consistent with CLIA   requirements. This test has not been cleared or approved by   the U.S. Food and Drug Administration.    Test Performed by:  [facility] - [address] [address]                  Multiple Sclerosis Profile6/17/2017  [employer] [facility]  Specimen   Other  "  Result Narrative   The following orders were created for panel order Multiple Sclerosis Profile.  Procedure                               Abnormality         Status                     ---------                               -----------         ------                     MS Profile[[id number]]                    Abnormal            Final result               MS Profile Blood[[id number]]                                  Final result                 Please view results for these tests on the individual orders.     Meningitis/Encephalitis Panel, Tube #4       6/18/2017  [facility]  Component Name Value Ref Range   L Monocytogenes Not Detected Not Detected    N Meningitidis Not Detected Not Detected    Strep. agalactiae Not Detected Not Detected    Strep. Pneumoniae Not Detected Not Detected    Cytomegalovirus Not Detected Not Detected    Enterovirus Not Detected Not Detected    HSV-1 Not Detected Not Detected    HSV-2 Not Detected Not Detected    Human Herpes Virus 6 Not Detected Not Detected    Human parechovirus Not Detected Not Detected    Varicella Zoster Virus Not Detected Not Detected    Crypto. neoformins/gattii Not Detected Not Detected    E Coli K1 Not Detected Not Detected    H Influenzae Not Detected Not Detected    Specimen   Cerebrospinal Fluid - lumbar     ADRENAL, OTHER  Component Name    Hospital Encounter on 6/14/2017  [facility]")' href="[email or web address]/21/2017       Specimen: Blood - Vein")'>7.4       Heavy Metals, Blood6/20/2017  [facility]  Component Name Value Ref Range   Arsenic, B <1   Comment:    -------------------ADDITIONAL INFORMATION-------------------  This test was developed and its performance characteristics   determined by [facility] in a manner consistent with CLIA   requirements. This test has not been cleared or " approved by   the U.S. Food and Drug Administration. 0 - 12 ng/mL    Lead, B 1.5   Comment:    -------------------ADDITIONAL INFORMATION-------------------  Testing performed by Inductively Coupled Plasma-Mass   Spectrometry (ICP-MS).  This test was developed and its performance characteristics   determined by [facility] in a manner consistent with CLIA   requirements. This test has not been cleared or approved by   the U.S. Food and Drug Administration. 0.0 - 4.9 mcg/dL    Cadmium, B 0.9   Comment:    -------------------ADDITIONAL INFORMATION-------------------  This test was developed and its performance characteristics   determined by [facility] in a manner consistent with CLIA   requirements. This test has not been cleared or approved by   the U.S. Food and Drug Administration. 0.0 - 4.9 ng/mL   Mercury, B 4   Comment:    -------------------ADDITIONAL INFORMATION-------------------  This test was developed and its performance characteristics   determined by [facility] in a manner consistent with CLIA   requirements. This test has not been cleared or approved by   the U.S. Food and Drug Administration. 0 - 9 ng/mL   Venous/Capillary Venous   Comment:    Test Performed by:  [facility] [place] [address]     Specimen   Blood - Vein         Biotinidase Level6/20/2017  [facility]  Component Name Value Ref Range   Reason For Referral Not provided     Biotinidase, S 9.8 3.5 - 13.8 U/L   Interpretation SEE COMMENTS   Comment:  In this sample, biotinidase activity is normal. These   results indicate this individual is not affected with   biotinidase deficiency (OMIM 602864).    -------------------ADDITIONAL INFORMATION-------------------  Colorimetric Enzyme Assay  This test was developed and its performance characteristics   determined by [facility] in a manner consistent with CLIA   requirements. This test has not  been cleared or approved by   the U.S. Food and Drug Administration.     Reviewed By SEE COMMENTS   Comment:  RESULT: Jayro Naranjo M.D.    Test Performed by:  [facility] - [address] [address]     Paraneoplastic Autoantibody Evaluation, Serum6/20/2017  [facility]  Component Name Value Ref Range   Interpretive Comments SEE COMMENTS   Comment:  No informative autoantibodies were detected in the   Paraneoplastic Evaluation. However, a negative result does   not exclude neurological autoimmunity with or without   associated neoplasia. Sensitivity and specificity of   antibody testing are enhanced by testing both serum and   CSF.     MARK-1, S Negative <1:240 titer    Reflex Added None.   Comment:    -------------------ADDITIONAL INFORMATION-------------------  This test was developed and its performance characteristics   determined by [facility] in a manner consistent with CLIA   requirements. This test has not been cleared or approved by   the U.S. Food and Drug Administration.     MARK-2, S Negative   Comment:    -------------------ADDITIONAL INFORMATION-------------------  This test was developed and its performance characteristics   determined by [facility] in a manner consistent with CLIA   requirements. This test has not been cleared or approved by   the U.S. Food and Drug Administration. <1:240 titer    MARK-3, S Negative   Comment:    -------------------ADDITIONAL INFORMATION-------------------  This test was developed and its performance characteristics   determined by [facility] in a manner consistent with CLIA   requirements. This test has not been cleared or approved by   the U.S. Food and Drug Administration. <1:240 titer    AGNA-1, S Negative   Comment:    -------------------ADDITIONAL INFORMATION-------------------  This test was developed and its performance characteristics   determined by [facility]  in a manner consistent with CLIA   requirements. This test has not been cleared or approved by   the U.S. Food and Drug Administration. <1:240 titer    PCA-1, S Negative   Comment:    -------------------ADDITIONAL INFORMATION-------------------  This test was developed and its performance characteristics   determined by [facility] in a manner consistent with CLIA   requirements. This test has not been cleared or approved by   the U.S. Food and Drug Administration. <1:240 titer    PCA-2, S Negative   Comment:    -------------------ADDITIONAL INFORMATION-------------------  This test was developed and its performance characteristics   determined by [facility] in a manner consistent with CLIA   requirements. This test has not been cleared or approved by   the U.S. Food and Drug Administration. <1:240 titer    PCA-Tr, S Negative   Comment:    -------------------ADDITIONAL INFORMATION-------------------  This test was developed and its performance characteristics   determined by [facility] in a manner consistent with CLIA   requirements. This test has not been cleared or approved by   the U.S. Food and Drug Administration. <1:240 titer    Amphiphysin Ab, S Negative   Comment:    -------------------ADDITIONAL INFORMATION-------------------  This test was developed and its performance characteristics   determined by [facility] in a manner consistent with CLIA   requirements. This test has not been cleared or approved by   the U.S. Food and Drug Administration. <1:240 titer    CRMP-5-IgG, S Negative   Comment:    -------------------ADDITIONAL INFORMATION-------------------  This test was developed and its performance characteristics   determined by [facility] in a manner consistent with CLIA   requirements. This test has not been cleared or approved by   the U.S. Food and Drug Administration. <1:240 titer    Striational (Striated Muscle) Ab, S Negative   Comment:    -------------------ADDITIONAL  INFORMATION-------------------  This test was developed and its performance characteristics   determined by [facility] in a manner consistent with CLIA   requirements. This test has not been cleared or approved by   the U.S. Food and Drug Administration. <1:120 titer    P/Q-Type Calcium Channel Ab 0   Comment:    -------------------ADDITIONAL INFORMATION-------------------  This test was developed and its performance characteristics   determined by [facility] in a manner consistent with CLIA   requirements. This test has not been cleared or approved by   the U.S. Food and Drug Administration. <=0.02 nmol/L   N-Type Calcium Channel Ab 0   Comment:    -------------------ADDITIONAL INFORMATION-------------------  This test was developed and its performance characteristics   determined by [facility] in a manner consistent with CLIA   requirements. This test has not been cleared or approved by   the U.S. Food and Drug Administration. <=0.03 nmol/L   ACh Receptor (Muscle) Binding Ab 0   Comment:    -------------------ADDITIONAL INFORMATION-------------------  This test was developed and its performance characteristics   determined by [facility] in a manner consistent with CLIA   requirements. This test has not been cleared or approved by   the U.S. Food and Drug Administration. <=0.02 nmol/L   AChR Ganglionic Neuronal Ab, S 0   Comment:    -------------------ADDITIONAL INFORMATION-------------------  This test was developed and its performance characteristics   determined by [facility] in a manner consistent with CLIA   requirements. This test has not been cleared or approved by   the U.S. Food and Drug Administration. <=0.02 nmol/L   Neuronal (V-G) K+ Channel Ab, S 0   Comment:    -------------------ADDITIONAL INFORMATION-------------------  This test was developed and its performance characteristics   determined by [facility] in a manner consistent with CLIA   requirements. This test has not been cleared or  approved by   the U.S. Food and Drug Administration.    Test Performed by:  [facility]  [address] [place] [address] <=0.02 nmol/L   Specimen   Blood - Vein     MS Profile6/18/2017  [facility]  Component Name Value Ref Range   CSF Bands 3 bands    CSF Oligo Bands Interpretation 0   Comment:  The oligoclonal band assay detected 3 or fewer unique IgG   bands in the CSF. This is a negative result. <4 bands    Serum Bands 3 bands    IgG Index, CSF 0.63 <=0.85    IgG, CSF 7.5   Comment:    -------------------ADDITIONAL INFORMATION-------------------  This test has been modified from the 's   instructions. Its performance characteristics were   determined by [facility] in a manner consistent with   CLIA requirements. This test has not been cleared or   approved by the U.S. Food and Drug Administration. <=8.1 mg/dL   Albumin, CSF 33.6 (H)   Comment:    -------------------ADDITIONAL INFORMATION-------------------  This test has been modified from the 's   instructions. Its performance characteristics were   determined by [facility] in a manner consistent with   CLIA requirements. This test has not been cleared or   approved by the U.S. Food and Drug Administration. <=27.0 mg/dL   IgG/Albumin, CSF 0.22 (H) <=0.21    Synthesis Rate, CSF 9.83 <=12 mg/24 h    IgG, S 1,070 767 - 1590 mg/dL   Albumin, S 3,020 (L) 3200 - 4800 mg/dL   IgG/Albumin, S 0.35   Comment:    Test Performed by:  [facility]  [address] [place] [address] <=0.40    Specimen   Cerebrospinal Fluid - CSF         Significant Imaging:   I have reviewed all pertinent imaging results/findings within the past 24 hours.  I have reviewed and interpreted all pertinent imaging results/findings within the past 24 hours.     US Abdomen Limited1/26/2018  [facility]  Result  Impression     Distended hepatic veins suggestive of elevated right heart pressures. Bidirectional flow in the portal vein.   Result Narrative   EXAM:  US ABDOMEN LIMITED     CLINICAL INDICATION:     ;     epigastric pain    COMPARISON: None.    FINDINGS: Transverse and longitudinal images of the right upper quadrant were obtained.    The gallbladder contains no evidence of stones, wall thickening, or pericholecystic fluid.  The common duct is normal in caliber measuring 0.5 cm. The liver measures 15.3 cm in the mid right clavicular line.  The parenchyma is homogeneous . Hepatic veins are significantly distended suggestive of elevated right heart pressures. The main portal vein is patent with bidirectional flow. The pancreas is obscured by bowel gas.    The right kidney measures 11 cm in length. There is no hydronephrosis.    Small volume of ascites.   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, S REFLEX    7/6 - Lumbar puncture today , neurology to evaluate  7/7 High dose steroids as per neurology        Derm   Skin breakdown    7/5 Wound care nurse        Pulmonary   * Acute respiratory failure with hypoxia and hypercarbia    7/5 Noninvasive Positive Pressure Ventilation , jet nebs and oxygen  7/6 IV steroids added  7/7 continue Noninvasive Positive Pressure Ventilation at night and prn during day  7/8 elevated  PCO2 - change ventilation mode        Cardiac/Vascular   Acute on chronic systolic heart failure    7/5 start IV lasix  7/6 continue IV lasix  7/8 worsening Chest X Ray with pleural effusion on the right , increase lasix to BID        Renal/   UTI (urinary tract infection)    7/6 - resolved - antibiotics stopped        Other   Increased  ammonia level    7/7 Continue to follow  7/8 normal today           Critical Care Daily Checklist:    A: Awake: RASS Goal/Actual Goal:    Actual: Guerrero Agitation Sedation Scale (RASS): Alert and calm   B: Spontaneous Breathing Trial Performed?     C: SAT & SBT Coordinated?  na                      D: Delirium: CAM-ICU Overall CAM-ICU: Negative   E: Early Mobility Performed? Yes   F: Feeding Goal:    Status:     Current Diet Order   Procedures    Diet NPO      AS: Analgesia/Sedation adequate   T: Thromboembolic Prophylaxis yes   H: HOB > 300 Yes   U: Stress Ulcer Prophylaxis (if needed) yes   G: Glucose Control adequate   B: Bowel Function Stool Occurrence: 1   I: Indwelling Catheter (Lines & Flores) Necessity needed   D: De-escalation of Antimicrobials/Pharmacotherapies na    Plan for the day/ETD Blow off pCO2, increase lasix     Code Status:  Family/Goals of Care: Full Code  Discussed  They still want full code     Critical Care Time: 45 minutes  Critical secondary to Patient has a condition that poses threat to life and bodily function: Severe Respiratory Distress  Patient has an abrupt change in neurologic status: Weakness and Altered Mental Status     Critical care was time spent personally by me on the following activities: development of treatment plan with patient or surrogate and bedside caregivers, discussions with consultants, evaluation of patient's response to treatment, examination of patient, ordering and performing treatments and interventions, ordering and review of laboratory studies, ordering and review of radiographic studies, pulse oximetry, re-evaluation of patient's condition. This critical care time did not overlap with that of any other provider or involve time for any procedures.     James Meier MD  Critical Care Medicine  [facility]

## 2018-07-08 NOTE — ASSESSMENT & PLAN NOTE
7/5 Noninvasive Positive Pressure Ventilation , jet nebs and oxygen  7/6 IV steroids added  7/7 continue Noninvasive Positive Pressure Ventilation at night and prn during day  7/8 elevated  PCO2 - change ventilation mode

## 2018-07-08 NOTE — ASSESSMENT & PLAN NOTE
- Currently has [facility] (PT/OT/nurse)  - secondary to NMO

## 2018-07-09 PROBLEM — G93.40 ENCEPHALOPATHY: Status: ACTIVE | Noted: 2018-01-01

## 2018-07-09 PROBLEM — J96.90 RESPIRATORY FAILURE: Status: ACTIVE | Noted: 2018-01-01

## 2018-07-09 PROBLEM — J96.02 ACUTE HYPERCAPNIC RESPIRATORY FAILURE: Status: ACTIVE | Noted: 2018-01-01

## 2018-07-09 NOTE — SUBJECTIVE & OBJECTIVE
Interval History: Review of Systems   Unable to perform ROS: Critical illness       Objective:     Vital Signs (Most Recent):  Temp: 97.6 °F (36.4 °C) (07/09/18 0700)  Pulse: 70 (07/09/18 1530)  Resp: 18 (07/09/18 1530)  BP: (!) 165/82 (07/09/18 1530)  SpO2: 97 % (07/09/18 1530) Vital Signs (24h Range):  Temp:  [97.2 °F (36.2 °C)-98.4 °F (36.9 °C)] 97.6 °F (36.4 °C)  Pulse:  [69-72] 70  Resp:  [9-27] 18  SpO2:  [95 %-100 %] 97 %  BP: (112-173)/(53-90) 165/82     Weight: 71 kg (156 lb 8.4 oz)  Body mass index is 26.87 kg/m².      Intake/Output Summary (Last 24 hours) at 07/09/18 1723  Last data filed at 07/09/18 0600   Gross per 24 hour   Intake                0 ml   Output             1175 ml   Net            -1175 ml       Physical Exam   Constitutional: She appears well-developed.   Ill appearing    HENT:   + ETT    Eyes: Pupils are equal, round, and reactive to light.   Neck: Neck supple.   Cardiovascular: Normal rate.    Pulmonary/Chest: Effort normal.   BS decreased mildly bilaterally    Abdominal: Soft. There is no tenderness.   Genitourinary:   Genitourinary Comments: Flores    Musculoskeletal: She exhibits edema.   Neurological:   Awake , not following commands , open eyes , after intubation sedated wih fentanyl gtt    Skin: Skin is warm.       Vents:  Vent Mode: A/C (07/09/18 1530)  Ventilator Initiated: Yes (07/09/18 1022)  Set Rate: 18 bmp (07/09/18 1530)  Vt Set: 400 mL (07/09/18 1530)  Pressure Support: 0 cmH20 (07/09/18 1530)  PEEP/CPAP: 5 cmH20 (07/09/18 1530)  Oxygen Concentration (%): 65 (07/09/18 1530)  Peak Airway Pressure: 25 cmH2O (07/09/18 1530)  Plateau Pressure: 0 cmH20 (07/09/18 1530)  Total Ve: 7.11 mL (07/09/18 1530)  F/VT Ratio<105 (RSBI): (!) 45.11 (07/09/18 1530)    Lines/Drains/Airways     Drain                 Urethral Catheter 07/05/18 1114 4 days          Airway                 Airway - Non-Surgical 07/09/18 1005 Endotracheal Tube less than 1 day          Pressure Ulcer                  Pressure Injury 06/02/18 1659 Left proximal Ischial tuberosity Stage 3 37 days         Pressure Injury 06/02/18 Right lateral Heel Stage 3 37 days         Pressure Injury 06/02/18 1902 Right medial Heel Unstageable 36 days         Pressure Injury 06/30/18 Left lateral Hip Stage 2 9 days         Pressure Injury 06/30/18 Right lateral Buttocks Stage 2 9 days          Peripheral Intravenous Line                 Peripheral IV - Single Lumen 06/30/18 0850 Left Antecubital 9 days         Peripheral IV - Single Lumen 07/06/18 2315 Right Forearm 2 days                Significant Labs:    CBC/Anemia Profile:    Recent Labs  Lab 07/08/18  0338 07/09/18  0338   WBC 13.17* 9.06   HGB 10.6* 10.9*   HCT 35.8* 36.4*   * 386*   MCV 87 86   RDW 18.3* 18.3*     Chemistries:    Recent Labs  Lab 07/08/18  0338 07/08/18  1139 07/09/18  0338     --  149*   K 5.2* 4.9 4.7     --  105   CO2 32*  --  35*   BUN 64*  --  77*   CREATININE 1.1  --  1.1   CALCIUM 9.8  --  9.9   MG 2.2  --  2.4   PHOS 2.5*  --  3.6     Significant Imaging:  CXR: I have reviewed all pertinent results/findings within the past 24 hours and my personal findings are:  post intubation , no acute findings . ETT , OGT in good position.

## 2018-07-09 NOTE — ASSESSMENT & PLAN NOTE
- Currently rate controlled  Telemetry monitoring  - Continue Xarelto and Amiodarone  -xarelto on hold.LP done yesterday will resume it once ok with neurology and ir

## 2018-07-09 NOTE — PROGRESS NOTES
[facility] Hospital Medicine  Progress Note    Patient Name: Carlie Valdez  MRN: [id number]  Patient Class: IP- Inpatient   Admission Date: 6/30/2018  Length of Stay: 9 days  Attending Physician: Raymundo Vidal MD  Primary Care Provider: Johanna Guzman MD        Subjective:     Principal Problem:Acute respiratory failure with hypoxia and hypercarbia    HPI:  Carlie Valdez is a 72 year old female with a PMHx of HTN, GERD, DM, A-fib, Asthma, and Defibrillator who presented from home with c/o chest pain. Located to right side with no radiation. Associated symptoms: SOB. Pt and sitters at bedside report she was recently discharged from [facility] and pt hasn't had her home medications since being discharged. ED workup revealed: Hgb/Hct 11.2/34.7, Alk phos 152, BNP 1100, troponin 0.028. UA (+) nitrites. CXR with central vascular congestion with small bilateral effusion. Primary cardiologist is Dr. Hernández.  Pt admitted to Telemetry for Acute on chronic CHF exacerbation and UTI.     Hospital Course:  Pt admitted for decompensated heart failure and UTI. Diuresis with Bumex and IV Rocephin in progress. Pt with physical debility and PT/OT consulted. Dr. Joiner had lengthy discussion with daughter, Haley, and patient in reference to generalized weakness and proper disposition. SNF placement was agreed however, when  approach regarding SNF selection patient refused. Introduce Hospice to patient and advised she was not a candidate for Rehab due to her extent of weakness. Pt stated she would discharge home with sitters and home health services. 7/2/18 - Dr. Joiner has spoken with pt's daughters, Haley and Rosy, regarding the mother's condition. She has diuresed and diuretics changed to oral. She was medically stable for discharge. SNF placement pending. PT/OT in progress. 7/3/18 - It was noted that pt has refused participation in PT/OT. She appears  grossly more weak in the BUE/BLE extremities. Family notified of condition. Granddaughter at bedside mentioned similar symptoms prior to admission. Neurology consult placed, ESR/CRP pending, CT Head and C spine pending. At 4th day, CT of Head and C spine found no concerning findings. ESR and CRP elevated although no concern for vasculitis. Neurology saw the patient and recommended LP to rule out GBs or CIDP. Xarelto placed on hold and LP by IR is pending as Xarelto needs to be on hold for 3 days. Speech therapy evaluated the patient and noted inconsistent dysphonia. The patient will voice normally then start mouthing words. Diet recommendations:  Mechanical soft, Thin. On 6th day, pt noted to have acute onset of metabolic encephalopathy. Pt more somnolent, respiration shallow. She would arouse with sternal rub but return to lethargy. Repeat CT of Head was negative, ammonia slightly elevated at 61 and ABG showed acidosis with hypoxic/hypercapneic respiratory failure.   07/06/18 - presently on Bipap for respiratory acidosis , Lumbar puncture done today 06/07/18 07/07- Diagnosed with NMO at [facility] Sol[facility]  1000 mg/day for 5 days per neurology   07/08- possible aspiration overnight , follow up CXR new right pleural effusion , continue Bipap support   07/09 patient was intubated in morning continue to have respiratory distress and no improvement in neurological status         Review of Systems   Unable to perform ROS: Mental status change     Objective:     Vital Signs (Most Recent):  Temp: 97.6 °F (36.4 °C) (07/09/18 0700)  Pulse: 70 (07/09/18 1040)  Resp: 18 (07/09/18 1040)  BP: (!) 143/64 (07/09/18 0700)  SpO2: 97 % (07/09/18 1040) Vital Signs (24h Range):  Temp:  [97.2 °F (36.2 °C)-98.4 °F (36.9 °C)] 97.6 °F (36.4 °C)  Pulse:  [69-72] 70  Resp:  [9-27] 18  SpO2:  [91 %-100 %] 97 %  BP: (112-173)/(53-90) 143/64     Weight: 71 kg (156 lb 8.4 oz)  Body mass index is 26.87 kg/m².    Intake/Output Summary (Last 24 hours)  at 07/09/18 1153  Last data filed at 07/09/18 0600   Gross per 24 hour   Intake                0 ml   Output             1525 ml   Net            -1525 ml      Physical Exam   Constitutional: She appears well-developed and well-nourished.   HENT:   Head: Normocephalic.   Eyes: EOM are normal. Pupils are equal, round, and reactive to light.   Neck: Normal range of motion. JVD present.   Cardiovascular: An irregularly irregular rhythm present. Tachycardia present.  PMI is displaced.    Murmur heard.   Systolic murmur is present with a grade of 2/6   Pulmonary/Chest: She has decreased breath sounds in the right lower field and the left lower field. She has rales in the right lower field and the left lower field.   Musculoskeletal: She exhibits edema.   Neurological: She displays abnormal reflex. She exhibits abnormal muscle tone. Coordination abnormal.    Does not Responds to pain , spontaneous eye movements    Skin: Skin is warm and dry.   Stage II sacral decub   Nursing note and vitals reviewed.      Significant Labs: All pertinent labs within the past 24 hours have been reviewed.    Significant Imaging: I have reviewed all pertinent imaging results/findings within the past 24 hours.    Assessment/Plan:      * Acute respiratory failure with hypoxia and hypercarbia    ABG reflected hypoxic/hypercapneic respiratory failure  ICU care  Pulmonology/Critical Care following  Bipap for more than 24 hours with no improvement . 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     IV Rocephin - started 6/30/18, Rocephin stopped 7/5/18          Severe muscle deconditioning    - Currently has [facility] (PT/OT/nurse)  - secondary to NMO        Acute on chronic systolic heart failure    Compensated as of 7/3/18  - BNP 1100  - CXR with vascular congestion with small bilateral effusion  - Mild BLE edema upon assessment but diminished breath sounds noted  - 2D ECHO on 06/02/18 with EF 35% and pulmonary HTN  - Pt received Bumex 2 mg IVP x 1 in ED  - Bumex 2 mg IVP daily====> changed to Bumex 2 mg twice daily 7/2/18  - Continue Metoprolol          Left ischial pressure sore, stage III    Wound care           Hypothyroidism    - TSH about a month ago -- 1.265  - Continue Levothyroxine          Chronic kidney disease (CKD), stage III (moderate)    - Currently stable  - Monitor kidney function while on diuretics          Hypertension associated with diabetes    Controlled  - continue Toprol XL and lisinopril  - Apresoline prn  Lisinopril discontinued 7/4/18 - daughter stated it caused respiratory problems    DM  - HgbA1c on 06/03/18 -- 5.3 -- controlled  - Accuchecks and low dose SSI          Atrial fibrillation    - Currently rate controlled  Telemetry monitoring  - Continue Xarelto and Amiodarone  -xarelto on hold.LP done yesterday will resume it once ok with neurology and ir             VTE Risk Mitigation         Ordered     heparin (porcine) injection 5,000 Units  Every 8 hours      07/04/18 1209          Critical care time spent on the evaluation and treatment of severe organ dysfunction, review of pertinent labs and imaging studies, discussions with consulting providers and discussions with patient/family: 40 minutes.    Raymundo Vidal MD  Department of Hospital Medicine   [facility]

## 2018-07-09 NOTE — PLAN OF CARE
Problem: Patient Care Overview  Goal: Plan of Care Review  Patient currently requires total assist x 2 for bed mobility and total assist for sitting balance at EOB.   Outcome: Ongoing (interventions implemented as appropriate)  Recommendations    Recommendation/Intervention:   1. While intubated, recommend TF ~ Nutren 1.5 @ 50 mL/hr goal rate w/ 1 pk Beneprotein once daily. Provides 1825 kcal, 87 g protein, 917 mL free water per 24 hrs  2. 30 mL water flushes q4hrs for tube patency, or per MD/NP.   3. Check residuals q4hrs. Hold TF if >500 cc's.   4. When medically able, ADAT to Diabetic diet. Resume oral supplements.   5. Will continue to monitor.    Goals: Diet advancement or initiate nutrition support by next RD visit  Nutrition Goal Status: new  Communication of RD Recs:  (POC review, sticky note)

## 2018-07-09 NOTE — ASSESSMENT & PLAN NOTE
ABG reflected hypoxic/hypercapneic respiratory failure  ICU care  Pulmonology/Critical Care following  Bipap for more than 24 hours with no improvement . No improvement in mental status   Intubated

## 2018-07-09 NOTE — PLAN OF CARE
Patient discussed for d/c planning. Patient was intubated today      07/09/18 1200   Discharge Reassessment   Assessment Type Discharge Planning Reassessment   Provided patient/caregiver education on the expected discharge date and the discharge plan No   Do you have any problems affording any of your prescribed medications? No   Discharge Plan A Long-term acute care facility (LTAC)   Discharge Plan B Skilled Nursing Facility   Patient choice form signed by patient/caregiver N/A   Can the patient answer the patient profile reliably? No, cognitively impaired   How does the patient rate their overall health at the present time? Fair   Describe the patient's ability to walk at the present time. Does not walk or unable to take any steps at all   Number of comorbid conditions (as recorded on the chart) Five or more   During the past month, has the patient often been bothered by feeling down, depressed or hopeless? No   During the past month, has the patient often been bothered by little interest or pleasure in doing things? No

## 2018-07-09 NOTE — PROGRESS NOTES
INPATIENT   NEURO  CRITICAL CARE PROGRESS NOTE    Carlie Valdez   72 y.o. female  DATE 7/9/2018  Sign out from Dr. Daniels        Patient known to have positive Serum NMO in 5/2018,   However, work up done for MS has been negative,  No oligoclonal bands, normal IgG index, MRI of the brain and or spine cannot be done because of the defibrillator. Sarcoidosis and Paraneoplastic has been ruled out.  CSF was repeated here and NMOantibody is pending.  Patient has been receiving IV solumedrol with no significant change, she has respiratory issues this am and ws intubated, she is on fentanyl drip.   Patient has no movement, does not vocalize, she has been able to track intermittently but is not consistent.   She is afebrile .                Melissa Coma Scale (GCS)    Score   EYE OPENING   Spontaneous 4   Response to verbal command 3   Response to pain 2   No eye opening 1             VERBAL response   Oriented 5   Confused 4   Inappropriate words 3   Incomprehensible sounds 2   No verbal response 1       MOTOR response   Obeys commands 6   Localizing response to pain 5   Withdrawal response to pain 4   Flexion to pain 3   Extension to pain 2   No motor response 1   Total  5   The GCS is scored between 3 and 15, 3 being the worst and 15 the best. It is composed of three parameters: best eye response (E), best verbal response (V), and best motor response (M). The components of the GCS should be recorded individually; for example, [id number] results in a GCS score of 9. A score of 13 or higher correlates with mild brain injury, a score of 9 to 12 correlates with moderate injury, and a score of 8 or less represents severe brain injury.  Past Medical History:   Diagnosis Date    Arthritis     Asthma     Atrial fibrillation     Blood clot of vein in shoulder area     Cardiac defibrillator in place     Chronic knee pain     Diabetes mellitus type 2 without retinopathy 12/19/2016    Diaphragmatic hernia without  obstruction and without gangrene 2015    GERD (gastroesophageal reflux disease)     Hypertension     Primary open angle glaucoma (POAG) of both eyes, severe stage 2018     Past Surgical History:   Procedure Laterality Date    CARDIAC DEFIBRILLATOR PLACEMENT      , .    CATARACT EXTRACTION       SECTION      COLONOSCOPY      ashley      Dr. Macdonald    KNEE ARTHROSCOPY      UPPER GASTROINTESTINAL ENDOSCOPY       Family History   Problem Relation Age of Onset    Hypertension Mother     Hypertension Father     Colon cancer Neg Hx     Stomach cancer Neg Hx      Social History   Substance Use Topics    Smoking status: Never Smoker    Smokeless tobacco: Never Used    Alcohol use No       Review of patient's allergies indicates:   Allergen Reactions    Morphine Hives    Atorvastatin      Other reaction(s): Muscle pain    Clonidine     Codeine      Other reaction(s): Unknown    Digoxin      Other reaction(s): Unknown    Diovan [valsartan] Other (See Comments)     Upset stomach, weakness    Eggs [egg derived]     Metoprolol Diarrhea    Naproxen      Other reaction(s): Nausea    Peanut     Propofol      Other reaction(s): delirious    Sulfa (sulfonamide antibiotics)         Scheduled Meds:   albuterol-ipratropium  3 mL Nebulization Q6H    amiodarone  200 mg Oral BID    brimonidine 0.2%  1 drop Both Eyes Q12H    And    timolol maleate 0.5%  1 drop Both Eyes BID    chlorhexidine  15 mL Mouth/Throat BID    furosemide  40 mg Intravenous BID    heparin (porcine)  5,000 Units Subcutaneous Q8H    levothyroxine  100 mcg Oral Before breakfast    methylPREDNISolone sodium succinate  250 mg Intravenous Q6H    metoprolol succinate  50 mg Oral BID     Continuous Infusions:   fentanyl       PRN Meds:acetaminophen, albuterol-ipratropium, baclofen, dextrose 50%, dextrose 50%, glucagon (human recombinant), glucose, glucose, hydrALAZINE, insulin aspart U-100,  lorazepam             OBJECTIVE:     Vital Signs (Most Recent)  Temp: 97.6 °F (36.4 °C) (07/09/18 0700)  Pulse: 70 (07/09/18 1332)  Resp: 18 (07/09/18 1332)  BP: (!) 143/64 (07/09/18 0700)  SpO2: 95 % (07/09/18 1332)     Vital Signs Range (Last 24H):  Temp:  [97.2 °F (36.2 °C)-98.4 °F (36.9 °C)]   Pulse:  [69-72]   Resp:  [9-27]   BP: (112-173)/(53-90)   SpO2:  [91 %-100 %]     Physical Exam:  General:  Intubated and with mild sedation  HEENT:   Acephalic, Atraumatic,   Pupils isocoria, reactive, corneal reactive -OU, no gaze preferenc  Neck: supple, no JVD, no Carotid bruit,  torticollis,  Left lateral   Lungs: CTA,  No rhonchi, no rales  Heart: Regular Rate and rhythm, no murmurs, rubs and or gallops  Abdomen, Soft, non tender, non distended, no abdominal  bruit, bowel sounds present  Extremities: edema,   Skin: No rash, no ecchymoses,         NEURO    Mental Status:   Could be influenced by fentanyl drip  Awake, intermittently tracks, no verbal response, does not withdraw to painful stimuli      SPEECH:   No verbal response    CRANIAL NERVES:  Pupils isocoria, Reactive  Corneal reactive  No gaze preference  No facial asymmetry  Does not withdraw to facial pain  GAG and cough weak      Motor:   No movement spontaneous and or to painful stimulation  Tone; Flaccid  Areflexia through out in UE and LE  Plantars: Extensors      SENSORY: no         CEREBELLAR: Unable to evaluate because of lack of participation    ROMBERG and  GAIT:  Deferred  EXTRAPYRAMIDALS:  None    Laboratory:  Lab Results   Component Value Date    WBC 9.06 07/09/2018    HGB 10.9 (L) 07/09/2018    HCT 36.4 (L) 07/09/2018     (H) 07/09/2018    CHOL 166 06/06/2018    TRIG 94 06/06/2018    HDL 35 (L) 06/06/2018    ALT 20 07/06/2018    AST 25 07/06/2018     (H) 07/09/2018    K 4.7 07/09/2018     07/09/2018    CREATININE 1.1 07/09/2018    BUN 77 (H) 07/09/2018    CO2 35 (H) 07/09/2018    TSH 0.269 (L) 07/06/2018    INR 1.0 07/05/2018     HGBA1C 5.3 06/03/2018     Results for orders placed or performed during the hospital encounter of 06/30/18   Urine culture **CANNOT BE ORDERED STAT**   Result Value Ref Range    Urine Culture, Routine       COAGULASE-NEGATIVE STAPHYLOCOCCUS SPECIES  50,000 - 99,999 cfu/ml  Susceptibility testing not routinely performed.     Blood culture   Result Value Ref Range    Blood Culture, Routine No growth after 5 days.    Blood culture   Result Value Ref Range    Blood Culture, Routine No growth after 5 days.    CSF culture   Result Value Ref Range    CSF CULTURE No Growth to date     Gram Stain Result No organisms seen Few WBC's    CBC auto differential   Result Value Ref Range    WBC 9.92 3.90 - 12.70 K/uL    RBC 4.27 4.00 - 5.40 M/uL    Hemoglobin 11.2 (L) 12.0 - 16.0 g/dL    Hematocrit 34.7 (L) 37.0 - 48.5 %    MCV 81 (L) 82 - 98 fL    MCH 26.2 (L) 27.0 - 31.0 pg    MCHC 32.3 32.0 - 36.0 g/dL    RDW 17.4 (H) 11.5 - 14.5 %    Platelets 298 150 - 350 K/uL    MPV 9.2 9.2 - 12.9 fL    Gran # (ANC) 6.9 1.8 - 7.7 K/uL    Lymph # 1.4 1.0 - 4.8 K/uL    Mono # 1.2 (H) 0.3 - 1.0 K/uL    Eos # 0.4 0.0 - 0.5 K/uL    Baso # 0.04 0.00 - 0.20 K/uL    Gran% 69.3 38.0 - 73.0 %    Lymph% 14.3 (L) 18.0 - 48.0 %    Mono% 12.1 4.0 - 15.0 %    Eosinophil% 3.9 0.0 - 8.0 %    Basophil% 0.4 0.0 - 1.9 %    Differential Method Automated    Comprehensive metabolic panel   Result Value Ref Range    Sodium 142 136 - 145 mmol/L    Potassium 3.8 3.5 - 5.1 mmol/L    Chloride 106 95 - 110 mmol/L    CO2 25 23 - 29 mmol/L    Glucose 104 70 - 110 mg/dL    BUN, Bld 13 8 - 23 mg/dL    Creatinine 0.7 0.5 - 1.4 mg/dL    Calcium 9.6 8.7 - 10.5 mg/dL    Total Protein 6.6 6.0 - 8.4 g/dL    Albumin 2.3 (L) 3.5 - 5.2 g/dL    Total Bilirubin 0.5 0.1 - 1.0 mg/dL    Alkaline Phosphatase 152 (H) 55 - 135 U/L    AST 21 10 - 40 U/L    ALT 11 10 - 44 U/L    Anion Gap 11 8 - 16 mmol/L    eGFR if African American >60 >60 mL/min/1.73 m^2    eGFR if non African American  >60 >60 mL/min/1.73 m^2   Troponin I #1   Result Value Ref Range    Troponin I 0.025 0.000 - 0.026 ng/mL   B-Type natriuretic peptide (BNP)   Result Value Ref Range    BNP 1,100 (H) 0 - 99 pg/mL   Urinalysis   Result Value Ref Range    Specimen UA Urine, Catheterized     Color, UA Yellow Yellow, Straw, Samra    Appearance, UA Clear Clear    pH, UA 7.0 5.0 - 8.0    Specific Gravity, UA 1.015 1.005 - 1.030    Protein, UA Negative Negative    Glucose, UA Negative Negative    Ketones, UA Negative Negative    Bilirubin (UA) Negative Negative    Occult Blood UA Negative Negative    Nitrite, UA Positive (A) Negative    Urobilinogen, UA 1.0 <2.0 EU/dL    Leukocytes, UA Negative Negative   Troponin I #2   Result Value Ref Range    Troponin I 0.028 (H) 0.000 - 0.026 ng/mL   Urinalysis Microscopic   Result Value Ref Range    RBC, UA 0 0 - 4 /hpf    WBC, UA 1 0 - 5 /hpf    Bacteria, UA Rare None-Occ /hpf    Squam Epithel, UA 0 /hpf    Microscopic Comment SEE COMMENT    Troponin I   Result Value Ref Range    Troponin I 0.033 (H) 0.000 - 0.026 ng/mL   Troponin I   Result Value Ref Range    Troponin I 0.033 (H) 0.000 - 0.026 ng/mL   CBC auto differential   Result Value Ref Range    WBC 11.79 3.90 - 12.70 K/uL    RBC 4.36 4.00 - 5.40 M/uL    Hemoglobin 11.4 (L) 12.0 - 16.0 g/dL    Hematocrit 35.9 (L) 37.0 - 48.5 %    MCV 82 82 - 98 fL    MCH 26.1 (L) 27.0 - 31.0 pg    MCHC 31.8 (L) 32.0 - 36.0 g/dL    RDW 17.5 (H) 11.5 - 14.5 %    Platelets 359 (H) 150 - 350 K/uL    MPV 9.3 9.2 - 12.9 fL    Gran # (ANC) 8.7 (H) 1.8 - 7.7 K/uL    Lymph # 1.5 1.0 - 4.8 K/uL    Mono # 1.1 (H) 0.3 - 1.0 K/uL    Eos # 0.4 0.0 - 0.5 K/uL    Baso # 0.03 0.00 - 0.20 K/uL    Gran% 74.0 (H) 38.0 - 73.0 %    Lymph% 12.6 (L) 18.0 - 48.0 %    Mono% 9.4 4.0 - 15.0 %    Eosinophil% 3.7 0.0 - 8.0 %    Basophil% 0.3 0.0 - 1.9 %    Differential Method Automated    Basic metabolic panel   Result Value Ref Range    Sodium 142 136 - 145 mmol/L    Potassium 3.8 3.5 -  5.1 mmol/L    Chloride 105 95 - 110 mmol/L    CO2 24 23 - 29 mmol/L    Glucose 103 70 - 110 mg/dL    BUN, Bld 19 8 - 23 mg/dL    Creatinine 0.7 0.5 - 1.4 mg/dL    Calcium 9.8 8.7 - 10.5 mg/dL    Anion Gap 13 8 - 16 mmol/L    eGFR if African American >60 >60 mL/min/1.73 m^2    eGFR if non African American >60 >60 mL/min/1.73 m^2   TSH   Result Value Ref Range    TSH 0.670 0.400 - 4.000 uIU/mL   Troponin I   Result Value Ref Range    Troponin I 0.045 (H) 0.000 - 0.026 ng/mL   Brain natriuretic peptide   Result Value Ref Range     (H) 0 - 99 pg/mL   Troponin I   Result Value Ref Range    Troponin I 0.033 (H) 0.000 - 0.026 ng/mL   CBC auto differential   Result Value Ref Range    WBC 9.77 3.90 - 12.70 K/uL    RBC 4.68 4.00 - 5.40 M/uL    Hemoglobin 12.1 12.0 - 16.0 g/dL    Hematocrit 39.7 37.0 - 48.5 %    MCV 85 82 - 98 fL    MCH 25.9 (L) 27.0 - 31.0 pg    MCHC 30.5 (L) 32.0 - 36.0 g/dL    RDW 17.8 (H) 11.5 - 14.5 %    Platelets 371 (H) 150 - 350 K/uL    MPV 8.9 (L) 9.2 - 12.9 fL    Gran # (ANC) 6.8 1.8 - 7.7 K/uL    Lymph # 1.6 1.0 - 4.8 K/uL    Mono # 0.8 0.3 - 1.0 K/uL    Eos # 0.5 0.0 - 0.5 K/uL    Baso # 0.05 0.00 - 0.20 K/uL    Gran% 69.8 38.0 - 73.0 %    Lymph% 16.8 (L) 18.0 - 48.0 %    Mono% 7.9 4.0 - 15.0 %    Eosinophil% 5.0 0.0 - 8.0 %    Basophil% 0.5 0.0 - 1.9 %    Differential Method Automated    Comprehensive metabolic panel   Result Value Ref Range    Sodium 145 136 - 145 mmol/L    Potassium 3.4 (L) 3.5 - 5.1 mmol/L    Chloride 104 95 - 110 mmol/L    CO2 29 23 - 29 mmol/L    Glucose 102 70 - 110 mg/dL    BUN, Bld 25 (H) 8 - 23 mg/dL    Creatinine 0.8 0.5 - 1.4 mg/dL    Calcium 9.9 8.7 - 10.5 mg/dL    Total Protein 6.6 6.0 - 8.4 g/dL    Albumin 2.4 (L) 3.5 - 5.2 g/dL    Total Bilirubin 0.2 0.1 - 1.0 mg/dL    Alkaline Phosphatase 139 (H) 55 - 135 U/L    AST 20 10 - 40 U/L    ALT 13 10 - 44 U/L    Anion Gap 12 8 - 16 mmol/L    eGFR if African American >60 >60 mL/min/1.73 m^2    eGFR if non African  American >60 >60 mL/min/1.73 m^2   Sedimentation rate, manual   Result Value Ref Range    Sed Rate 62 (H) 0 - 20 mm/Hr   High sensitivity CRP   Result Value Ref Range    CRP, High Sensitivity 65.12 (H) 0.00 - 3.19 mg/L   Urinalysis   Result Value Ref Range    Specimen UA Urine, Clean Catch     Color, UA Yellow Yellow, Straw, Samra    Appearance, UA Clear Clear    pH, UA 6.0 5.0 - 8.0    Specific Gravity, UA 1.015 1.005 - 1.030    Protein, UA Negative Negative    Glucose, UA Negative Negative    Ketones, UA Negative Negative    Bilirubin (UA) Negative Negative    Occult Blood UA Negative Negative    Nitrite, UA Negative Negative    Urobilinogen, UA Negative <2.0 EU/dL    Leukocytes, UA Trace (A) Negative   CSF cell count with differential   Result Value Ref Range    Heme Aliquot 4.0 mL    Appearance, CSF Clear Clear    Color, CSF Colorless Colorless    WBC, CSF 38 (H) 0 - 5 /cu mm    RBC, CSF 31 (A) 0 /cu mm    Segmented Neutrophils, CSF 23 (H) 0 - 6 %    Lymphs, CSF 36 (L) 40 - 80 %    Mono/Macrophage, CSF 41 15 - 45 %   Glucose, CSF   Result Value Ref Range    Glucose, CSF 84 (H) 40 - 70 mg/dL   Protein, CSF   Result Value Ref Range    Protein,  (H) 15 - 40 mg/dL   Urinalysis Microscopic   Result Value Ref Range    RBC, UA 1 0 - 4 /hpf    WBC, UA 4 0 - 5 /hpf    Bacteria, UA Occasional None-Occ /hpf    Yeast, UA Occasional (A) None    Squam Epithel, UA 25 /hpf    Microscopic Comment SEE COMMENT    CBC auto differential   Result Value Ref Range    WBC 10.03 3.90 - 12.70 K/uL    RBC 4.20 4.00 - 5.40 M/uL    Hemoglobin 10.7 (L) 12.0 - 16.0 g/dL    Hematocrit 35.6 (L) 37.0 - 48.5 %    MCV 85 82 - 98 fL    MCH 25.5 (L) 27.0 - 31.0 pg    MCHC 30.1 (L) 32.0 - 36.0 g/dL    RDW 17.6 (H) 11.5 - 14.5 %    Platelets 422 (H) 150 - 350 K/uL    MPV 9.1 (L) 9.2 - 12.9 fL    Gran # (ANC) 7.5 1.8 - 7.7 K/uL    Lymph # 1.5 1.0 - 4.8 K/uL    Mono # 0.7 0.3 - 1.0 K/uL    Eos # 0.4 0.0 - 0.5 K/uL    Baso # 0.04 0.00 - 0.20 K/uL     Gran% 74.6 (H) 38.0 - 73.0 %    Lymph% 14.5 (L) 18.0 - 48.0 %    Mono% 6.6 4.0 - 15.0 %    Eosinophil% 3.9 0.0 - 8.0 %    Basophil% 0.4 0.0 - 1.9 %    Differential Method Automated    Comprehensive metabolic panel   Result Value Ref Range    Sodium 142 136 - 145 mmol/L    Potassium 3.5 3.5 - 5.1 mmol/L    Chloride 102 95 - 110 mmol/L    CO2 30 (H) 23 - 29 mmol/L    Glucose 95 70 - 110 mg/dL    BUN, Bld 28 (H) 8 - 23 mg/dL    Creatinine 1.0 0.5 - 1.4 mg/dL    Calcium 9.3 8.7 - 10.5 mg/dL    Total Protein 6.2 6.0 - 8.4 g/dL    Albumin 2.3 (L) 3.5 - 5.2 g/dL    Total Bilirubin 0.2 0.1 - 1.0 mg/dL    Alkaline Phosphatase 133 55 - 135 U/L    AST 21 10 - 40 U/L    ALT 13 10 - 44 U/L    Anion Gap 10 8 - 16 mmol/L    eGFR if African American >60 >60 mL/min/1.73 m^2    eGFR if non African American 56 (A) >60 mL/min/1.73 m^2   CBC auto differential   Result Value Ref Range    WBC 11.53 3.90 - 12.70 K/uL    RBC 4.42 4.00 - 5.40 M/uL    Hemoglobin 11.4 (L) 12.0 - 16.0 g/dL    Hematocrit 39.0 37.0 - 48.5 %    MCV 88 82 - 98 fL    MCH 25.8 (L) 27.0 - 31.0 pg    MCHC 29.2 (L) 32.0 - 36.0 g/dL    RDW 17.3 (H) 11.5 - 14.5 %    Platelets 454 (H) 150 - 350 K/uL    MPV 9.0 (L) 9.2 - 12.9 fL    Gran # (ANC) 9.9 (H) 1.8 - 7.7 K/uL    Lymph # 1.1 1.0 - 4.8 K/uL    Mono # 0.5 0.3 - 1.0 K/uL    Eos # 0.1 0.0 - 0.5 K/uL    Baso # 0.03 0.00 - 0.20 K/uL    Gran% 85.6 (H) 38.0 - 73.0 %    Lymph% 9.4 (L) 18.0 - 48.0 %    Mono% 4.2 4.0 - 15.0 %    Eosinophil% 0.5 0.0 - 8.0 %    Basophil% 0.3 0.0 - 1.9 %    Differential Method Automated    Comprehensive metabolic panel   Result Value Ref Range    Sodium 145 136 - 145 mmol/L    Potassium 4.7 3.5 - 5.1 mmol/L    Chloride 101 95 - 110 mmol/L    CO2 31 (H) 23 - 29 mmol/L    Glucose 142 (H) 70 - 110 mg/dL    BUN, Bld 32 (H) 8 - 23 mg/dL    Creatinine 0.9 0.5 - 1.4 mg/dL    Calcium 10.2 8.7 - 10.5 mg/dL    Total Protein 7.3 6.0 - 8.4 g/dL    Albumin 2.7 (L) 3.5 - 5.2 g/dL    Total Bilirubin 0.4 0.1 -  1.0 mg/dL    Alkaline Phosphatase 143 (H) 55 - 135 U/L    AST 27 10 - 40 U/L    ALT 19 10 - 44 U/L    Anion Gap 13 8 - 16 mmol/L    eGFR if African American >60 >60 mL/min/1.73 m^2    eGFR if non African American >60 >60 mL/min/1.73 m^2   APTT   Result Value Ref Range    aPTT 27.3 21.0 - 32.0 sec   Protime-INR   Result Value Ref Range    Prothrombin Time 10.6 9.0 - 12.5 sec    INR 1.0 0.8 - 1.2   Ammonia   Result Value Ref Range    Ammonia 61 (H) 10 - 50 umol/L   Urinalysis   Result Value Ref Range    Specimen UA Urine, Catheterized     Color, UA Yellow Yellow, Straw, Samra    Appearance, UA Clear Clear    pH, UA 6.0 5.0 - 8.0    Specific Gravity, UA 1.020 1.005 - 1.030    Protein, UA Negative Negative    Glucose, UA Negative Negative    Ketones, UA Negative Negative    Bilirubin (UA) Negative Negative    Occult Blood UA Negative Negative    Nitrite, UA Negative Negative    Urobilinogen, UA Negative <2.0 EU/dL    Leukocytes, UA Negative Negative   Basic metabolic panel   Result Value Ref Range    Sodium 147 (H) 136 - 145 mmol/L    Potassium 4.1 3.5 - 5.1 mmol/L    Chloride 102 95 - 110 mmol/L    CO2 33 (H) 23 - 29 mmol/L    Glucose 124 (H) 70 - 110 mg/dL    BUN, Bld 37 (H) 8 - 23 mg/dL    Creatinine 0.8 0.5 - 1.4 mg/dL    Calcium 10.5 8.7 - 10.5 mg/dL    Anion Gap 12 8 - 16 mmol/L    eGFR if African American >60 >60 mL/min/1.73 m^2    eGFR if non African American >60 >60 mL/min/1.73 m^2   CBC auto differential   Result Value Ref Range    WBC 10.93 3.90 - 12.70 K/uL    RBC 4.42 4.00 - 5.40 M/uL    Hemoglobin 11.5 (L) 12.0 - 16.0 g/dL    Hematocrit 38.4 37.0 - 48.5 %    MCV 87 82 - 98 fL    MCH 26.0 (L) 27.0 - 31.0 pg    MCHC 29.9 (L) 32.0 - 36.0 g/dL    RDW 17.0 (H) 11.5 - 14.5 %    Platelets 439 (H) 150 - 350 K/uL    MPV 8.8 (L) 9.2 - 12.9 fL    Gran # (ANC) 9.9 (H) 1.8 - 7.7 K/uL    Lymph # 0.9 (L) 1.0 - 4.8 K/uL    Mono # 0.1 (L) 0.3 - 1.0 K/uL    Eos # 0.0 0.0 - 0.5 K/uL    Baso # 0.01 0.00 - 0.20 K/uL    Gran%  90.6 (H) 38.0 - 73.0 %    Lymph% 8.5 (L) 18.0 - 48.0 %    Mono% 0.8 (L) 4.0 - 15.0 %    Eosinophil% 0.0 0.0 - 8.0 %    Basophil% 0.1 0.0 - 1.9 %    Differential Method Automated    Magnesium   Result Value Ref Range    Magnesium 1.7 1.6 - 2.6 mg/dL   T3, free   Result Value Ref Range    T3, Free 1.0 (L) 2.3 - 4.2 pg/mL   T4, free   Result Value Ref Range    Free T4 1.24 0.71 - 1.51 ng/dL   TSH   Result Value Ref Range    TSH 0.269 (L) 0.400 - 4.000 uIU/mL   Hepatic function panel   Result Value Ref Range    Total Protein 7.5 6.0 - 8.4 g/dL    Albumin 2.9 (L) 3.5 - 5.2 g/dL    Total Bilirubin 0.2 0.1 - 1.0 mg/dL    Bilirubin, Direct 0.1 0.1 - 0.3 mg/dL    AST 25 10 - 40 U/L    ALT 20 10 - 44 U/L    Alkaline Phosphatase 151 (H) 55 - 135 U/L   Rapid HIV   Result Value Ref Range    HIV Rapid Testing Negative Negative   Hepatitis C antibody   Result Value Ref Range    Hepatitis C Ab Negative    Hepatitis B surface antibody   Result Value Ref Range    Hep B S Ab Negative    Hepatitis A antibody, IgG   Result Value Ref Range    Hepatitis A Antibody IgG Negative    Phosphorus   Result Value Ref Range    Phosphorus 2.1 (L) 2.7 - 4.5 mg/dL   Magnesium   Result Value Ref Range    Magnesium 1.9 1.6 - 2.6 mg/dL   CBC auto differential   Result Value Ref Range    WBC 14.55 (H) 3.90 - 12.70 K/uL    RBC 4.31 4.00 - 5.40 M/uL    Hemoglobin 11.1 (L) 12.0 - 16.0 g/dL    Hematocrit 37.4 37.0 - 48.5 %    MCV 87 82 - 98 fL    MCH 25.8 (L) 27.0 - 31.0 pg    MCHC 29.7 (L) 32.0 - 36.0 g/dL    RDW 17.8 (H) 11.5 - 14.5 %    Platelets 405 (H) 150 - 350 K/uL    MPV 8.9 (L) 9.2 - 12.9 fL    Gran # (ANC) 13.3 (H) 1.8 - 7.7 K/uL    Lymph # 1.0 1.0 - 4.8 K/uL    Mono # 0.2 (L) 0.3 - 1.0 K/uL    Eos # 0.0 0.0 - 0.5 K/uL    Baso # 0.00 0.00 - 0.20 K/uL    Gran% 91.7 (H) 38.0 - 73.0 %    Lymph% 6.7 (L) 18.0 - 48.0 %    Mono% 1.6 (L) 4.0 - 15.0 %    Eosinophil% 0.0 0.0 - 8.0 %    Basophil% 0.0 0.0 - 1.9 %    Differential Method Automated    Basic  metabolic panel   Result Value Ref Range    Sodium 146 (H) 136 - 145 mmol/L    Potassium 4.4 3.5 - 5.1 mmol/L    Chloride 102 95 - 110 mmol/L    CO2 32 (H) 23 - 29 mmol/L    Glucose 153 (H) 70 - 110 mg/dL    BUN, Bld 49 (H) 8 - 23 mg/dL    Creatinine 1.0 0.5 - 1.4 mg/dL    Calcium 10.2 8.7 - 10.5 mg/dL    Anion Gap 12 8 - 16 mmol/L    eGFR if African American >60 >60 mL/min/1.73 m^2    eGFR if non African American 56 (A) >60 mL/min/1.73 m^2   Phosphorus   Result Value Ref Range    Phosphorus 2.5 (L) 2.7 - 4.5 mg/dL   Magnesium   Result Value Ref Range    Magnesium 2.2 1.6 - 2.6 mg/dL   CBC auto differential   Result Value Ref Range    WBC 13.17 (H) 3.90 - 12.70 K/uL    RBC 4.12 4.00 - 5.40 M/uL    Hemoglobin 10.6 (L) 12.0 - 16.0 g/dL    Hematocrit 35.8 (L) 37.0 - 48.5 %    MCV 87 82 - 98 fL    MCH 25.7 (L) 27.0 - 31.0 pg    MCHC 29.6 (L) 32.0 - 36.0 g/dL    RDW 18.3 (H) 11.5 - 14.5 %    Platelets 389 (H) 150 - 350 K/uL    MPV 8.9 (L) 9.2 - 12.9 fL    Gran # (ANC) 12.2 (H) 1.8 - 7.7 K/uL    Lymph # 0.7 (L) 1.0 - 4.8 K/uL    Mono # 0.3 0.3 - 1.0 K/uL    Eos # 0.0 0.0 - 0.5 K/uL    Baso # 0.00 0.00 - 0.20 K/uL    Gran% 92.4 (H) 38.0 - 73.0 %    Lymph% 5.5 (L) 18.0 - 48.0 %    Mono% 2.1 (L) 4.0 - 15.0 %    Eosinophil% 0.0 0.0 - 8.0 %    Basophil% 0.0 0.0 - 1.9 %    Differential Method Automated    Basic metabolic panel   Result Value Ref Range    Sodium 145 136 - 145 mmol/L    Potassium 5.2 (H) 3.5 - 5.1 mmol/L    Chloride 104 95 - 110 mmol/L    CO2 32 (H) 23 - 29 mmol/L    Glucose 122 (H) 70 - 110 mg/dL    BUN, Bld 64 (H) 8 - 23 mg/dL    Creatinine 1.1 0.5 - 1.4 mg/dL    Calcium 9.8 8.7 - 10.5 mg/dL    Anion Gap 9 8 - 16 mmol/L    eGFR if African American 58 (A) >60 mL/min/1.73 m^2    eGFR if non African American 50 (A) >60 mL/min/1.73 m^2   Ammonia   Result Value Ref Range    Ammonia 47 10 - 50 umol/L   Potassium   Result Value Ref Range    Potassium 4.9 3.5 - 5.1 mmol/L   Phosphorus   Result Value Ref Range     Phosphorus 3.6 2.7 - 4.5 mg/dL   Magnesium   Result Value Ref Range    Magnesium 2.4 1.6 - 2.6 mg/dL   CBC auto differential   Result Value Ref Range    WBC 9.06 3.90 - 12.70 K/uL    RBC 4.23 4.00 - 5.40 M/uL    Hemoglobin 10.9 (L) 12.0 - 16.0 g/dL    Hematocrit 36.4 (L) 37.0 - 48.5 %    MCV 86 82 - 98 fL    MCH 25.8 (L) 27.0 - 31.0 pg    MCHC 29.9 (L) 32.0 - 36.0 g/dL    RDW 18.3 (H) 11.5 - 14.5 %    Platelets 386 (H) 150 - 350 K/uL    MPV 8.9 (L) 9.2 - 12.9 fL    Gran # (ANC) 8.4 (H) 1.8 - 7.7 K/uL    Lymph # 0.4 (L) 1.0 - 4.8 K/uL    Mono # 0.2 (L) 0.3 - 1.0 K/uL    Eos # 0.0 0.0 - 0.5 K/uL    Baso # 0.00 0.00 - 0.20 K/uL    Gran% 92.9 (H) 38.0 - 73.0 %    Lymph% 4.5 (L) 18.0 - 48.0 %    Mono% 2.6 (L) 4.0 - 15.0 %    Eosinophil% 0.0 0.0 - 8.0 %    Basophil% 0.0 0.0 - 1.9 %    Differential Method Automated    Basic metabolic panel   Result Value Ref Range    Sodium 149 (H) 136 - 145 mmol/L    Potassium 4.7 3.5 - 5.1 mmol/L    Chloride 105 95 - 110 mmol/L    CO2 35 (H) 23 - 29 mmol/L    Glucose 120 (H) 70 - 110 mg/dL    BUN, Bld 77 (H) 8 - 23 mg/dL    Creatinine 1.1 0.5 - 1.4 mg/dL    Calcium 9.9 8.7 - 10.5 mg/dL    Anion Gap 9 8 - 16 mmol/L    eGFR if African American 58 (A) >60 mL/min/1.73 m^2    eGFR if non African American 50 (A) >60 mL/min/1.73 m^2   Brain natriuretic peptide   Result Value Ref Range     (H) 0 - 99 pg/mL   Pathologist Review of Laboratory Test   Result Value Ref Range    Pathologist Review, Body Fluid REVIEWED    POCT glucose   Result Value Ref Range    POCT Glucose 92 70 - 110 mg/dL   POCT glucose   Result Value Ref Range    POCT Glucose 122 (H) 70 - 110 mg/dL   POCT glucose   Result Value Ref Range    POCT Glucose 112 (H) 70 - 110 mg/dL   POCT glucose   Result Value Ref Range    POCT Glucose 88 70 - 110 mg/dL   POCT glucose   Result Value Ref Range    POCT Glucose 105 70 - 110 mg/dL   POCT glucose   Result Value Ref Range    POCT Glucose 110 70 - 110 mg/dL   POCT glucose   Result  Value Ref Range    POCT Glucose 95 70 - 110 mg/dL   POCT glucose   Result Value Ref Range    POCT Glucose 90 70 - 110 mg/dL   POCT glucose   Result Value Ref Range    POCT Glucose 102 70 - 110 mg/dL   POCT glucose   Result Value Ref Range    POCT Glucose 94 70 - 110 mg/dL   POCT glucose   Result Value Ref Range    POCT Glucose 139 (H) 70 - 110 mg/dL   POCT glucose   Result Value Ref Range    POCT Glucose 121 (H) 70 - 110 mg/dL   POCT glucose   Result Value Ref Range    POCT Glucose 142 (H) 70 - 110 mg/dL   POCT glucose   Result Value Ref Range    POCT Glucose 108 70 - 110 mg/dL   POCT glucose   Result Value Ref Range    POCT Glucose 103 70 - 110 mg/dL   POCT glucose   Result Value Ref Range    POCT Glucose 107 70 - 110 mg/dL   POCT glucose   Result Value Ref Range    POCT Glucose 137 (H) 70 - 110 mg/dL   POCT glucose   Result Value Ref Range    POCT Glucose 130 (H) 70 - 110 mg/dL   POCT glucose   Result Value Ref Range    POCT Glucose 132 (H) 70 - 110 mg/dL   ISTAT PROCEDURE   Result Value Ref Range    POC PH 7.135 (LL) 7.35 - 7.45    POC PCO2 117.6 (HH) 35 - 45 mmHg    POC PO2 187 (H) 80 - 100 mmHg    POC HCO3 39.6 (H) 24 - 28 mmol/L    POC BE 11 -2 to 2 mmol/L    POC SATURATED O2 99 95 - 100 %    Sample ARTERIAL     Site RR     Allens Test Pass     DelSys Nasal Can     Mode SPONT     Flow 2.5     FiO2 30    POCT glucose   Result Value Ref Range    POCT Glucose 112 (H) 70 - 110 mg/dL   ISTAT PROCEDURE   Result Value Ref Range    POC PH 7.384 7.35 - 7.45    POC PCO2 68.3 (HH) 35 - 45 mmHg    POC PO2 114 (H) 80 - 100 mmHg    POC HCO3 40.8 (H) 24 - 28 mmol/L    POC BE 16 -2 to 2 mmol/L    POC SATURATED O2 98 95 - 100 %    Rate 20     Sample ARTERIAL     Site LR     Allens Test Pass     DelSys CPAP/BiPAP     Mode BiPAP     FiO2 30     IP 18     EP 6    POCT glucose   Result Value Ref Range    POCT Glucose 113 (H) 70 - 110 mg/dL   POCT glucose   Result Value Ref Range    POCT Glucose 200 (H) 70 - 110 mg/dL   POCT  glucose   Result Value Ref Range    POCT Glucose 113 (H) 70 - 110 mg/dL   POCT glucose   Result Value Ref Range    POCT Glucose 139 (H) 70 - 110 mg/dL   POCT glucose   Result Value Ref Range    POCT Glucose 127 (H) 70 - 110 mg/dL   POCT glucose   Result Value Ref Range    POCT Glucose 122 (H) 70 - 110 mg/dL   POCT glucose   Result Value Ref Range    POCT Glucose 138 (H) 70 - 110 mg/dL   POCT glucose   Result Value Ref Range    POCT Glucose 259 (H) 70 - 110 mg/dL   POCT glucose   Result Value Ref Range    POCT Glucose 116 (H) 70 - 110 mg/dL   POCT glucose   Result Value Ref Range    POCT Glucose 118 (H) 70 - 110 mg/dL   POCT glucose   Result Value Ref Range    POCT Glucose 134 (H) 70 - 110 mg/dL   ISTAT PROCEDURE   Result Value Ref Range    POC PH 7.349 (L) 7.35 - 7.45    POC PCO2 69.9 (HH) 35 - 45 mmHg    POC PO2 66 (L) 80 - 100 mmHg    POC HCO3 38.5 (H) 24 - 28 mmol/L    POC BE 13 -2 to 2 mmol/L    POC SATURATED O2 91 (L) 95 - 100 %    Rate 20     Sample ARTERIAL     Site LR     Allens Test Pass     DelSys CPAP/BiPAP     Mode BiPAP     FiO2 40     IP 18     EP 6    POCT glucose   Result Value Ref Range    POCT Glucose 136 (H) 70 - 110 mg/dL   POCT glucose   Result Value Ref Range    POCT Glucose 131 (H) 70 - 110 mg/dL   POCT glucose   Result Value Ref Range    POCT Glucose 140 (H) 70 - 110 mg/dL   ISTAT PROCEDURE   Result Value Ref Range    POC PH 7.377 7.35 - 7.45    POC PCO2 69.4 (HH) 35 - 45 mmHg    POC PO2 104 (H) 80 - 100 mmHg    POC HCO3 40.7 (H) 24 - 28 mmol/L    POC BE 16 -2 to 2 mmol/L    POC SATURATED O2 98 95 - 100 %    Rate 20     Sample ARTERIAL     Site RR     Allens Test Pass     DelSys CPAP/BiPAP     Mode AVAPS     Vt 500     PiP 12     FiO2 40    POCT glucose   Result Value Ref Range    POCT Glucose 117 (H) 70 - 110 mg/dL   ISTAT PROCEDURE   Result Value Ref Range    POC PH 7.383 7.35 - 7.45    POC PCO2 68.6 (HH) 35 - 45 mmHg    POC PO2 95 80 - 100 mmHg    POC HCO3 40.8 (H) 24 - 28 mmol/L    POC BE  16 -2 to 2 mmol/L    POC SATURATED O2 97 95 - 100 %    Rate 20     Sample ARTERIAL     Site RR     Allens Test Pass     DelSys CPAP/BiPAP     Mode AVAPS     Vt 500     PiP 17     FiO2 40    ISTAT PROCEDURE   Result Value Ref Range    POC PH 7.508 (H) 7.35 - 7.45    POC PCO2 47.2 (H) 35 - 45 mmHg    POC PO2 53 (LL) 80 - 100 mmHg    POC HCO3 37.5 (H) 24 - 28 mmol/L    POC BE 14 -2 to 2 mmol/L    POC SATURATED O2 90 (L) 95 - 100 %    Rate 18     Sample ARTERIAL     Site LR     Allens Test Pass     DelSys Adult Vent     Mode AC/PRVC     Vt 450     PEEP 5     FiO2 50    POCT glucose   Result Value Ref Range    POCT Glucose 129 (H) 70 - 110 mg/dL         Diagnostic Results:CT scan films   ( All images reviewed Independently)   Imaging Results          X-Ray Chest 1 View (Final result)  Result time 06/30/18 08:50:50    Final result by Philip Damian Jr., MD (06/30/18 08:50:50)                 Impression:      CHF exacerbation.      Electronically signed by: Philip Damian Jr., MD  Date:    06/30/2018  Time:    08:50             Narrative:    EXAMINATION:  XR CHEST 1 VIEW    CLINICAL HISTORY:  Chest pain, unspecified    COMPARISON:  06/02/2018    FINDINGS:  Pacemaker remains in place right chest wall.  The heart remains enlarged.  Central vascular congestion with small bilateral effusions.  No pneumothorax.                                07/09/2018    Assessment and Recommendations:    Patient with Positive serum antibody NMO. MRI of the brain and spine cannot be done because of the pace maker  Differential diagnosis:  1) Demyelinating disease  2) Neuromuscular  3) Autoimmune Encephalopathy  4) CIDP      Recommendations:  1) Plasmapheresis   2) Autoimmune encephalopathy  Work up  3) NMO antibody pending  4) MG work up      Family Meeting:    Called Alisha Valdez patient's daughter, she mentions she is a cardiologist in [facility]  Called her on [phone]    Discussed about the history of the patient,  pertinent findings,   2:30- 2:45 pm          60 minutes of critical care provided, including reviewing charts from the [facility] MD., Ph.D., MS

## 2018-07-09 NOTE — PROCEDURES
"Carlie Valdez is a 72 y.o. female patient.    Temp: 97.6 °F (36.4 °C) (18 0700)  Pulse: 71 (18 1023)  Resp: 19 (18 1023)  BP: (!) 143/64 (18 0700)  SpO2: 97 % (18 1023)  Weight: 71 kg (156 lb 8.4 oz) (18 0555)  Height: 5' 4" (162.6 cm) (18 1936)       Intubation  Date/Time: 2018 10:24 AM  Location procedure was performed: [facility] INTENSIVE CARE UNIT  Performed by: JOHN PRIDE  Authorized by: JOHN PRIDE   Assisting provider: [facility] ICU  Pre-operative diagnosis: resp failure   Post-operative diagnosis: resp failure   Consent Done: Yes  Consent: Verbal consent obtained.  Risks and benefits: risks, benefits and alternatives were discussed  Consent given by: brien De Leon   Required items: required blood products, implants, devices, and special equipment available  Patient identity confirmed: MRN and   Time out: Immediately prior to procedure a "time out" was called to verify the correct patient, procedure, equipment, support staff and site/side marked as required.  Indications: respiratory failure  Description of findings: whitish oropharyngeal secretions    Intubation method: direct  Patient status: sedated  Preoxygenation: BVM  Sedatives: propofol  Paralytic: succinylcholine  Laryngoscope size: Mac 4  Tube size: 7.5 mm  Tube type: cuffed  Number of attempts: 2  Cricoid pressure: yes  Cords visualized: yes  Post-procedure assessment: CO2 detector  Breath sounds: equal  Cuff inflated: yes  ETT to lip: 24 cm  Tube secured with: adhesive tape  Chest x-ray interpreted by me.  Chest x-ray findings: endotracheal tube in appropriate position  Patient tolerance: Patient tolerated the procedure well with no immediate complications  Significant surgical tasks conducted by the assistant(s): RSI   Complications: No  Estimated blood loss (mL): 0  Specimens: No  Implants: No          John Pirde  2018  "

## 2018-07-09 NOTE — CONSULTS
"  [facility] -   Adult Nutrition  Consult Note    SUMMARY     Recommendations    Recommendation/Intervention:   1. While intubated, recommend TF ~ Nutren 1.5 @ 50 mL/hr goal rate w/ 1 pk Beneprotein once daily. Provides 1825 kcal, 87 g protein, 917 mL free water per 24 hrs  2. 30 mL water flushes q4hrs for tube patency, or per MD/NP.   3. Check residuals q4hrs. Hold TF if >500 cc's.   4. When medically able, ADAT to Diabetic diet. Resume oral supplements.   5. Will continue to monitor.    Goals: Diet advancement or initiate nutrition support by next RD visit  Nutrition Goal Status: new  Communication of RD Recs:  (POC review, sticky note)    Reason for Assessment    Reason for Assessment: consult ("enteral feeding")  Dx:  1. Respiratory failure, unspecified chronicity, unspecified whether with hypoxia or hypercapnia    2. Chest pain    3. Acute on chronic systolic heart failure    4. Anasarca    5. Bilateral pleural effusion    6. Decubitus ulcer of sacral region, unspecified ulcer stage    7. Blisters of multiple sites    8. Atrial fibrillation, unspecified type    9. Severe muscle deconditioning    10. Skin breakdown    11. Acute respiratory failure with hypoxia and hypercarbia    12. Abnormal liver function tests      Past Medical History:   Diagnosis Date    Arthritis     Asthma     Atrial fibrillation     Blood clot of vein in shoulder area     Cardiac defibrillator in place     Chronic knee pain     Diabetes mellitus type 2 without retinopathy 12/19/2016    Diaphragmatic hernia without obstruction and without gangrene 9/14/2015    GERD (gastroesophageal reflux disease)     Hypertension     Primary open angle glaucoma (POAG) of both eyes, severe stage 6/1/2018       General Information Comments: Pt currently in ICU, intubated this AM. Per ICU rounds, pt has neuromyelitis optica. Neurological status has not changed, pt receiving steroids for NMO. Respiratory status not improving. " "Multiple pressure ulcers noted    Nutrition Risk Screen    Nutrition Risk Screen: no indicators present    Nutrition/Diet History    Do you have any cultural, spiritual, Anglican conflicts, given your current situation?: none    Anthropometrics    Temp: 97.6 °F (36.4 °C)  Height Method: Stated  Height: 5' 4" (162.6 cm)  Height (inches): 64 in  Weight Method: Bed Scale  Weight: 71 kg (156 lb 8.4 oz)  Weight (lb): 156.53 lb  Ideal Body Weight (IBW), Female: 120 lb  % Ideal Body Weight, Female (lb): 130.44 lb  BMI (Calculated): 26.9  BMI Grade: 25 - 29.9 - overweight       Lab/Procedures/Meds    Pertinent Labs Reviewed: reviewed  BMP  Lab Results   Component Value Date     (H) 07/09/2018    K 4.7 07/09/2018     07/09/2018    CO2 35 (H) 07/09/2018    BUN 77 (H) 07/09/2018    CREATININE 1.1 07/09/2018    CALCIUM 9.9 07/09/2018    ANIONGAP 9 07/09/2018    ESTGFRAFRICA 58 (A) 07/09/2018    EGFRNONAA 50 (A) 07/09/2018     Lab Results   Component Value Date    CALCIUM 9.9 07/09/2018    PHOS 3.6 07/09/2018     Lab Results   Component Value Date    ALBUMIN 2.9 (L) 07/06/2018       Recent Labs  Lab 07/09/18  0554   POCTGLUCOSE 117*     Lab Results   Component Value Date    HGBA1C 5.3 06/03/2018     Pertinent Medications Reviewed: reviewed  Scheduled Meds:   albuterol-ipratropium  3 mL Nebulization Q6H    amiodarone  200 mg Oral BID    brimonidine 0.2%  1 drop Both Eyes Q12H    And    timolol maleate 0.5%  1 drop Both Eyes BID    chlorhexidine  15 mL Mouth/Throat BID    furosemide  40 mg Intravenous BID    heparin (porcine)  5,000 Units Subcutaneous Q8H    levothyroxine  100 mcg Oral Before breakfast    methylPREDNISolone sodium succinate  250 mg Intravenous Q6H    metoprolol succinate  50 mg Oral BID     Continuous Infusions:   fentanyl       PRN Meds:.acetaminophen, albuterol-ipratropium, baclofen, dextrose 50%, dextrose 50%, glucagon (human recombinant), glucose, glucose, hydrALAZINE, insulin aspart " U-100, lorazepam    Physical Findings/Assessment    Overall Physical Appearance: overweight, on ventilator support  Oral/Mouth Cavity: tooth/teeth missing  Skin:      -Stage 2 pressure ulcers to L lateral hip, R lateral buttocks     -Stage 3 pressure ulcers noted to L proximal ischial tuberosity, R lateral heel     -Unstageable pressure ulcer noted to R medial heel     -Beka=11    Estimated/Assessed Needs    Weight Used For Calorie Calculations: 71 kg (156 lb 8.4 oz)  Energy Calorie Requirements (kcal): 1327 kcal/day  Energy Need Method: [facility]  Protein Requirements:  gm/day  Weight Used For Protein Calculations: 71 kg (156 lb 8.4 oz) (x1.2-2 g/kg)     Fluid Need Method: RDA Method (or per MD)  RDA Method (mL): 1327  CHO Requirement: 50% EEN      Nutrition Prescription Ordered    Current Diet Order: NPO    Evaluation of Received Nutrient/Fluid Intake    Intake/Output Summary (Last 24 hours) at 07/09/18 1307  Last data filed at 07/09/18 0600   Gross per 24 hour   Intake                0 ml   Output             1420 ml   Net            -1420 ml        % Intake of Estimated Energy Needs: 0 - 25 %  % Meal Intake: NPO    Nutrition Risk    Level of Risk/Frequency of Follow-up:  (F/u x2 weekly)     Assessment and Plan    Nutrition Problem  Inadequate oral intake    Related to (etiology):   On mechanical ventilation    Signs and Symptoms (as evidenced by):   Current diet order (NPO)    Interventions/Recommendations (treatment strategy):  See above    Nutrition Diagnosis Status:   New        Monitor and Evaluation    Food and Nutrient Intake: energy intake, enteral nutrition intake  Food and Nutrient Adminstration: enteral and parenteral nutrition administration  Anthropometric Measurements: weight  Biochemical Data, Medical Tests and Procedures: electrolyte and renal panel, glucose/endocrine profile, gastrointestinal profile  Nutrition-Focused Physical Findings: overall appearance, skin     Nutrition  Follow-Up    RD Follow-up?: Yes x2 weekly

## 2018-07-09 NOTE — PLAN OF CARE
Problem: Patient Care Overview  Goal: Plan of Care Review  Patient currently requires total assist x 2 for bed mobility and total assist for sitting balance at EOB.   Outcome: Ongoing (interventions implemented as appropriate)  No acute events overnight. No change in neuro/neuromuscular status. Pt tolerating AVAPS. VSS. Afebrile. UO adequate. Blood sugars within limits. Pt turned Q2H with pressure points protected. POC reviewed with pt and family. All questions and concerns addressed.

## 2018-07-09 NOTE — ASSESSMENT & PLAN NOTE
7/5 start IV lasix  7/6 continue IV lasix  7/8 worsening Chest X Ray with pleural effusion on the right , increase lasix to BID  7/9 negative 2.9 liters . Cont IV Lasix 40 mg q 12 hrs .

## 2018-07-09 NOTE — PROGRESS NOTES
Pt in bed, opens eyes to stimuli, does not track, does not follow commands, does not withdraw to painful stimuli.  Pt sats 95% on 40% fiO2, AVAPS settings.  Mask on, no air leaks noted.  BRIDGER Daniels NP at bedside.

## 2018-07-09 NOTE — SUBJECTIVE & OBJECTIVE
Review of Systems   Unable to perform ROS: Mental status change     Objective:     Vital Signs (Most Recent):  Temp: 97.6 °F (36.4 °C) (07/09/18 0700)  Pulse: 70 (07/09/18 1040)  Resp: 18 (07/09/18 1040)  BP: (!) 143/64 (07/09/18 0700)  SpO2: 97 % (07/09/18 1040) Vital Signs (24h Range):  Temp:  [97.2 °F (36.2 °C)-98.4 °F (36.9 °C)] 97.6 °F (36.4 °C)  Pulse:  [69-72] 70  Resp:  [9-27] 18  SpO2:  [91 %-100 %] 97 %  BP: (112-173)/(53-90) 143/64     Weight: 71 kg (156 lb 8.4 oz)  Body mass index is 26.87 kg/m².    Intake/Output Summary (Last 24 hours) at 07/09/18 1153  Last data filed at 07/09/18 0600   Gross per 24 hour   Intake                0 ml   Output             1525 ml   Net            -1525 ml      Physical Exam   Constitutional: She appears well-developed and well-nourished.   HENT:   Head: Normocephalic.   Eyes: EOM are normal. Pupils are equal, round, and reactive to light.   Neck: Normal range of motion. JVD present.   Cardiovascular: An irregularly irregular rhythm present. Tachycardia present.  PMI is displaced.    Murmur heard.   Systolic murmur is present with a grade of 2/6   Pulmonary/Chest: She has decreased breath sounds in the right lower field and the left lower field. She has rales in the right lower field and the left lower field.   Musculoskeletal: She exhibits edema.   Neurological: She displays abnormal reflex. She exhibits abnormal muscle tone. Coordination abnormal.    Does not Responds to pain , spontaneous eye movements    Skin: Skin is warm and dry.   Stage II sacral decub   Nursing note and vitals reviewed.      Significant Labs: All pertinent labs within the past 24 hours have been reviewed.    Significant Imaging: I have reviewed all pertinent imaging results/findings within the past 24 hours.

## 2018-07-09 NOTE — ASSESSMENT & PLAN NOTE
7/5 Noninvasive Positive Pressure Ventilation , jet nebs and oxygen  7/6 IV steroids added  7/7 continue Noninvasive Positive Pressure Ventilation at night and prn during day  7/8 elevated  PCO2 - change ventilation mode  7/9 O2/ MV/ Pulm toilet . Daily ABG , CXR

## 2018-07-09 NOTE — ASSESSMENT & PLAN NOTE
7/6 - Lumbar puncture today , neurology to evaluate  7/7 High dose steroids as per neurology  7/9 on high dose steroids . Neurology following needs Plasmapheresis. Discussed it with Neurology, he arranged for vas cath placement.

## 2018-07-09 NOTE — PROGRESS NOTES
[facility] -   Critical Care Medicine  Progress Note    Patient Name: Carlie Valdez  MRN: [id number]  Admission Date: 6/30/2018  Hospital Length of Stay: 9 days  Code Status: Full Code  Attending Provider: Raymundo Vidal MD  Primary Care Provider: Johanna Guzman MD   Principal Problem: Acute respiratory failure with hypoxia and hypercarbia    Subjective:     HPI:  72 year old female with a PMHx of p[revious cerebral vascular accident with right sided weakness and aphasia, HTN, GERD, DM, A-fib, Cardiac Asthma, and Defibrillator who presented from home with c/o chest pain to Emergency Room on 6/30/2018 folllwing recent discharge from [facility]. Admitted for acute on chronic systolic Congestive Heart failure and progressively worsened. Noted to have worsening mental status and shortness of breath and transferrrd to ICU for hypercapnic respiratory failure. Placed on Noninvasive Positive Pressure Ventilation     Hospital/ICU Course:  7/5 Transferred to ICU. Noninvasive Positive Pressure Ventilation initiated , may need intubation  7/6 Improved ventilation with Noninvasive Positive Pressure Ventilation but now generalized weakness. Steroids started for respiratory failure. Lumbar puncture later today.  7/7 Respiratory status stable. Labile blood pressure. Started on high does steroids  7/8 worsening respiraoty status with elevated  PCO2  7/9 patient seen and examined, chest x-ray and ABG reviewed, daughter Haley was called, intubated for respiratory distress and tachypnea.    Interval History: Review of Systems   Unable to perform ROS: Critical illness       Objective:     Vital Signs (Most Recent):  Temp: 97.6 °F (36.4 °C) (07/09/18 0700)  Pulse: 70 (07/09/18 1530)  Resp: 18 (07/09/18 1530)  BP: (!) 165/82 (07/09/18 1530)  SpO2: 97 % (07/09/18 1530) Vital Signs (24h Range):  Temp:  [97.2 °F (36.2 °C)-98.4 °F (36.9 °C)] 97.6 °F (36.4 °C)  Pulse:  [69-72] 70  Resp:  [9-27]  18  SpO2:  [95 %-100 %] 97 %  BP: (112-173)/(53-90) 165/82     Weight: 71 kg (156 lb 8.4 oz)  Body mass index is 26.87 kg/m².      Intake/Output Summary (Last 24 hours) at 07/09/18 1723  Last data filed at 07/09/18 0600   Gross per 24 hour   Intake                0 ml   Output             1175 ml   Net            -1175 ml       Physical Exam   Constitutional: She appears well-developed.   Ill appearing    HENT:   + ETT    Eyes: Pupils are equal, round, and reactive to light.   Neck: Neck supple.   Cardiovascular: Normal rate.    Pulmonary/Chest: Effort normal.   BS decreased mildly bilaterally    Abdominal: Soft. There is no tenderness.   Genitourinary:   Genitourinary Comments: Flores    Musculoskeletal: She exhibits edema.   Neurological:   Awake , not following commands , open eyes , after intubation sedated wih fentanyl gtt    Skin: Skin is warm.       Vents:  Vent Mode: A/C (07/09/18 1530)  Ventilator Initiated: Yes (07/09/18 1022)  Set Rate: 18 bmp (07/09/18 1530)  Vt Set: 400 mL (07/09/18 1530)  Pressure Support: 0 cmH20 (07/09/18 1530)  PEEP/CPAP: 5 cmH20 (07/09/18 1530)  Oxygen Concentration (%): 65 (07/09/18 1530)  Peak Airway Pressure: 25 cmH2O (07/09/18 1530)  Plateau Pressure: 0 cmH20 (07/09/18 1530)  Total Ve: 7.11 mL (07/09/18 1530)  F/VT Ratio<105 (RSBI): (!) 45.11 (07/09/18 1530)    Lines/Drains/Airways     Drain                 Urethral Catheter 07/05/18 1114 4 days          Airway                 Airway - Non-Surgical 07/09/18 1005 Endotracheal Tube less than 1 day          Pressure Ulcer                 Pressure Injury 06/02/18 1659 Left proximal Ischial tuberosity Stage 3 37 days         Pressure Injury 06/02/18 Right lateral Heel Stage 3 37 days         Pressure Injury 06/02/18 1902 Right medial Heel Unstageable 36 days         Pressure Injury 06/30/18 Left lateral Hip Stage 2 9 days         Pressure Injury 06/30/18 Right lateral Buttocks Stage 2 9 days          Peripheral Intravenous Line                  Peripheral IV - Single Lumen 06/30/18 0850 Left Antecubital 9 days         Peripheral IV - Single Lumen 07/06/18 2315 Right Forearm 2 days                Significant Labs:    CBC/Anemia Profile:    Recent Labs  Lab 07/08/18  0338 07/09/18  0338   WBC 13.17* 9.06   HGB 10.6* 10.9*   HCT 35.8* 36.4*   * 386*   MCV 87 86   RDW 18.3* 18.3*     Chemistries:    Recent Labs  Lab 07/08/18  0338 07/08/18  1139 07/09/18  0338     --  149*   K 5.2* 4.9 4.7     --  105   CO2 32*  --  35*   BUN 64*  --  77*   CREATININE 1.1  --  1.1   CALCIUM 9.8  --  9.9   MG 2.2  --  2.4   PHOS 2.5*  --  3.6     Significant Imaging:  CXR: I have reviewed all pertinent results/findings within the past 24 hours and my personal findings are:  post intubation , no acute findings . ETT , OGT in good position.       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 Neurology following needs Plasmapheresis. Discussed it with Neurology, he arranged for vas cath placement.         Encephalopathy    7/6 pCO2 normalized and patient improved but still not alert, awaiting lumbar puncture  7/8 worsening mental status  - no longer feeding herself        Derm   Skin breakdown    7/5 Wound care nurse        Pulmonary   * Acute respiratory failure with hypoxia and hypercarbia    7/5 Noninvasive Positive Pressure Ventilation , jet nebs and oxygen  7/6 IV steroids added  7/7 continue Noninvasive Positive Pressure Ventilation at night and prn during day  7/8 elevated  PCO2 - change ventilation mode  7/9 O2/ MV/ Pulm toilet . Daily ABG , CXR        Acute hypercapnic respiratory failure    7/9 neuromuscular resp failure sp intubation. O2/ MV/ Pulm toilet . Daily ABG , CXR .        Cardiac/Vascular   Acute on chronic systolic heart failure    7/5 start IV  lasix  7/6 continue IV lasix  7/8 worsening Chest X Ray with pleural effusion on the right , increase lasix to BID  7/9 negative 2.9 liters . Cont IV Lasix 40 mg q 12 hrs .         Renal/   UTI (urinary tract infection)    7/6 - resolved - antibiotics stopped        Other   Increased ammonia level    7/7 Continue to follow  7/8 normal today           Critical Care Daily Checklist:    A: Awake: RASS Goal/Actual Goal: RASS Goal: -2-->light sedation  Actual: Guerrero Agitation Sedation Scale (RASS): Alert and calm   B: Spontaneous Breathing Trial Performed?     C: SAT & SBT Coordinated?  NA                      D: Delirium: CAM-ICU Overall CAM-ICU: Negative   E: Early Mobility Performed? Yes. BEDREST    F: Feeding Goal: Goals: Diet advancement or initiate nutrition support by next RD visit  Status: Nutrition Goal Status: new   Current Diet Order   Procedures    Diet NPO      AS: Analgesia/Sedation RASS-2 . Fentanyl gtt , Ativan prn    T: Thromboembolic Prophylaxis Hep sc    H: HOB > 300 Yes   U: Stress Ulcer Prophylaxis (if needed) PPI    G: Glucose Control FS every 6 hrs + Insulin sc    B: Bowel Function Stool Occurrence: 0   I: Indwelling Catheter (Lines & Flores) Necessity Y   D: De-escalation of Antimicrobials/Pharmacotherapies No abx     Plan for the day/ETD Y    Code Status:  Family/Goals of Care: Full Code  Y     Critical Care Time: 35 minutes  Critical secondary to Patient has a condition that poses threat to life and bodily function: RESP FAILURE       Critical care was time spent personally by me on the following activities: development of treatment plan with patient or surrogate and bedside caregivers, discussions with consultants, evaluation of patient's response to treatment, examination of patient, ordering and performing treatments and interventions, ordering and review of laboratory studies, ordering and review of radiographic studies, pulse oximetry, re-evaluation of patient's condition. This  critical care time did not overlap with that of any other provider or involve time for any procedures.     John White MD  Critical Care Medicine  [facility] [place]

## 2018-07-10 NOTE — PLAN OF CARE
Problem: Patient Care Overview  Goal: Plan of Care Review  Patient currently requires total assist x 2 for bed mobility and total assist for sitting balance at EOB.   Outcome: Ongoing (interventions implemented as appropriate)  Pt tolerated intubation.  Fentanyl drip started per Dr. Louise's orders.  Pt VSS today.  Dr. Hackett at bedside this afternoon, decided with Dr. Vidal that plasmapheresis is the best line of treatment for pt.  Dr. Vidal consulted Vascular surgery and spoke with pheresis team.  Orders to call pheresis team at [phone] once vascath has been inserted.

## 2018-07-10 NOTE — ASSESSMENT & PLAN NOTE
7/9 neuromuscular resp failure sp intubation. O2/ MV/ Pulm toilet . Daily ABG , CXR .  7/10 daily ABG, chest x-ray.  Will discontinue Lasix.  Part of the respiratory acidosis is compensating for metabolic alkalosis.  Keep respiratory rate at 14.  Tidal volume 380 mL.

## 2018-07-10 NOTE — ASSESSMENT & PLAN NOTE
Compensated as of 7/3/18  - BNP 1100  - CXR with vascular congestion with small bilateral effusion  - Mild BLE edema upon assessment but diminished breath sounds noted  - 2D ECHO on 06/02/18 with EF 35% and pulmonary HTN  - Pt received Bumex 2 mg IVP x 1 in ED  - Bumex 2 mg IVP daily====> changed to Bumex 2 mg twice daily 7/2/18  - Continue Metoprolol  7/10  Compensated

## 2018-07-10 NOTE — PROGRESS NOTES
Dr. Vidal at bedside.  Dr. Vidal states he has spoken with vascular surgery and plasmapheresis team.

## 2018-07-10 NOTE — CONSULTS
[facility] -   Transfusion Medicine  Consult Note    Patient Name: Carlie Valdez  MRN: [id number]  Admission Date: 6/30/2018  Hospital Length of Stay: 9 days  Attending Physician: Santino Vidal MD  Primary Care Provider: Johanna Guzman MD     Inpatient consult to [facility]  Consult performed by: MARCELINO MCKINNEY  Consult ordered by: SANTINO VDIAL  Reason for consult: NMOSD  Assessment/Recommendations: PLEX x 5        Subjective:     Principal Problem:Acute respiratory failure with hypoxia and hypercarbia    History of Present Illness: Mrs. Valdez is a 72 year old female with a PMHx of HTN, DM, A-fib, Asthma, and Defibrillator who initially presented from home with acute on chronic CHF exacerbation on 6/30, then began developing generalized weakness and respiratory failure several days later with a nondiagnostic LP, Head/Spine CT negative for any acute findings, and elevated ESR/CRP.  Per chart, patient's outside medical records reveal recent history of antibody-positive NMO at [facility] and was subsequently given 5-day course of IV Solumedrol (per Neurology recs) without any improvement.  Today, patient's neurologic status has worsened (e.g., has no movement, does not vocalize, she has been able to track intermittently) and was intubated for ongoing respiratory issues.  Neurology has requested a plasma exchange series for further management of patient's NMOSD.    PMH and PSH reviewed 07/09/2018 and relevant items addressed in HPI.    Review of patient's allergies indicates:   Allergen Reactions    Morphine Hives    Atorvastatin      Other reaction(s): Muscle pain    Clonidine     Codeine      Other reaction(s): Unknown    Digoxin      Other reaction(s): Unknown    Diovan [valsartan] Other (See Comments)     Upset stomach, weakness    Eggs [egg derived]     Metoprolol Diarrhea    Naproxen      Other reaction(s): Nausea    Peanut     Propofol       Other reaction(s): delirious    Sulfa (sulfonamide antibiotics)        Current Facility-Administered Medications:     acetaminophen tablet 650 mg, 650 mg, Oral, Q6H PRN, Subhash Ayala MD    albuterol-ipratropium 2.5 mg-0.5 mg/3 mL nebulizer solution 3 mL, 3 mL, Nebulization, Q4H PRN, CHEO Biswas, 3 mL at 07/04/18 1525    albuterol-ipratropium 2.5 mg-0.5 mg/3 mL nebulizer solution 3 mL, 3 mL, Nebulization, Q6H, James Meier MD, 3 mL at 07/09/18 1922    amiodarone tablet 200 mg, 200 mg, Oral, BID, CHEO Biswas, 200 mg at 07/09/18 2150    baclofen tablet 10 mg, 10 mg, Oral, TID PRN, CHEO Biswas, 10 mg at 07/02/18 0947    brimonidine 0.2% ophthalmic solution 1 drop, 1 drop, Both Eyes, Q12H, 1 drop at 07/09/18 2151 **AND** timolol maleate 0.5% ophthalmic solution 1 drop, 1 drop, Both Eyes, BID, Subhash Ayala MD, 1 drop at 07/09/18 2151    chlorhexidine 0.12 % solution 15 mL, 15 mL, Mouth/Throat, BID, John White MD, 15 mL at 07/09/18 2150    dextrose 50% injection 12.5 g, 12.5 g, Intravenous, PRN, CHEO Biswas    dextrose 50% injection 25 g, 25 g, Intravenous, PRN, CHEO Biswas    fentaNYL 2500 mcg in D5W 250 mL infusion premix (titrating) (conc: 10 mcg/mL), , Intravenous, Continuous, John White MD, Last Rate: 2.5 mL/hr at 07/09/18 1800    furosemide injection 40 mg, 40 mg, Intravenous, BID, Jaems Meier MD, 40 mg at 07/09/18 1826    glucagon (human recombinant) injection 1 mg, 1 mg, Intramuscular, PRN, CHEO Biswas    glucose chewable tablet 16 g, 16 g, Oral, PRN, Vanesa Artis, CHEO    glucose chewable tablet 24 g, 24 g, Oral, PRN, Vanesa Artis, CHEO    heparin (porcine) injection 4,000 Units, 4,000 Units, Intravenous, PRN, Raymundo Vidal MD, 4,000 Units at 07/09/18 2151    heparin (porcine) injection 5,000 Units, 5,000 Units, Subcutaneous, Q8H, Tonia Gates NP, 5,000 Units at 07/09/18 2150    hydrALAZINE  injection 10 mg, 10 mg, Intravenous, Q6H PRN, Phoenix Ramos MD, 5 mg at 07/07/18 0615    insulin aspart U-100 pen 0-5 Units, 0-5 Units, Subcutaneous, QID (AC + HS) PRN, Vanesa Artis, CHEO, 5 Units at 07/07/18 1115    levothyroxine tablet 100 mcg, 100 mcg, Oral, Before breakfast, Vanesa Artis, JOSEP, 100 mcg at 07/07/18 0614    lorazepam (ATIVAN) injection 4 mg, 4 mg, Intravenous, Q4H PRN, John White MD    methylPREDNISolone sodium succinate injection 250 mg, 250 mg, Intravenous, Q6H, Vnaesa Daniels NP, 250 mg at 07/09/18 1826    metoprolol succinate (TOPROL-XL) 24 hr tablet 50 mg, 50 mg, Oral, BID, JOSE BiswasP, 50 mg at 07/09/18 2150    pantoprazole 40 mg in dextrose 5 % 100 mL IVPB (over 15 minutes) (ready to mix system), 40 mg, Intravenous, Daily, John White MD, 40 mg at 07/09/18 1826     All medications reviewed 07/09/2018 and ace inhibitors not identified.    Family History     Problem Relation (Age of Onset)    Hypertension Mother, Father        Social History Main Topics    Smoking status: Never Smoker    Smokeless tobacco: Never Used    Alcohol use No    Drug use: No    Sexual activity: Yes     Partners: Male     Review of Systems   Unable to perform ROS: Other     Objective:     Vital Signs (Most Recent):  Temp: 98.2 °F (36.8 °C) (07/09/18 1515)  Pulse: 70 (07/09/18 2120)  Resp: 13 (07/09/18 2120)  BP: (!) 143/78 (07/09/18 1900)  SpO2: 97 % (07/09/18 2120) Vital Signs (24h Range):  Temp:  [97.6 °F (36.4 °C)-98.4 °F (36.9 °C)] 98.2 °F (36.8 °C)  Pulse:  [70-72] 70  Resp:  [0-28] 13  SpO2:  [93 %-100 %] 97 %  BP: (112-178)/(53-94) 143/78     Weight: 71 kg (156 lb 8.4 oz)    Physical Exam   Nursing note and vitals reviewed.      Significant Labs:   BMP:   Recent Labs  Lab 07/09/18  0338   *   *   K 4.7      CO2 35*   BUN 77*   CREATININE 1.1   CALCIUM 9.9   MG 2.4     CBC:   Recent Labs  Lab 07/08/18  0338 07/09/18  0338   WBC 13.17* 9.06   HGB 10.6*  10.9*   HCT 35.8* 36.4*   * 386*     Coagulation: No results for input(s): PT, INR, APTT in the last 48 hours.    Assessment/Plan:     NEUROMYELITIS OPTICA SPECTRUM DISORDERS carries a Category II Grade 1B indication for therapeutic plasma exchange via the 2016 Journal of Clinical Apheresis Guidelines (Nahomy MARTINEZ et al. Journal of Clinical Apheresis 2016; 31:149-162.)  The majority of studies performed five procedures on average for acute exacerbation.    The TPE plan is as follows:    Number of Procedures: 5  Schedule: 7/10, 7/11, 7/13, 7/14, 7/16  Access: RIJ dialysis-grade catheter placed on 7/9.  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, S REFLEX [G36.0] 07/06/2018 Yes    Pressure ulcer of right heel, stage 3 [L89.613] 07/05/2018 Yes    Pressure ulcer of right buttock, stage 2 [L89.312] 07/05/2018 Yes    Encephalopathy [G93.40] 07/05/2018 No    Acute on chronic systolic heart failure [I50.23] 06/30/2018 Yes    Severe muscle deconditioning [R29.898] 06/30/2018 Yes    UTI (urinary tract infection) [N39.0] 06/30/2018 Yes    Skin breakdown [L90.9] 06/30/2018 Yes    Left ischial pressure sore, stage III [L89.323] 06/04/2018 Yes    Hypothyroidism [E03.9] 06/03/2018 Yes    Chronic kidney disease (CKD), stage III (moderate) [N18.3] 11/12/2016 Yes    Hypertension associated with diabetes [E11.59, I10]  Yes    Atrial fibrillation [I48.91] 08/14/2013 Yes     Chronic      Problems Resolved During this Admission:    Diagnosis Date Noted Date Resolved  POA    Elevated troponin [R74.8] 11/12/2016 07/03/2018 Yes       Rob Carter MD  Transfusion Medicine  [facility] -    Medical Director  Section of Transfusion Medicine & Blood Donor Services  Department of Pathology and Laboratory Medicine  [facility]  [phone] (Blood Bank)  07/09/2018

## 2018-07-10 NOTE — ASSESSMENT & PLAN NOTE
- UA (+) nitrites    - Urine culture pending    COAGULASE-NEGATIVE STAPHYLOCOCCUS SPECIES   50,000 - 99,999 cfu/ml   Susceptibility testing not routinely performed.     IV Rocephin - started 6/30/18, Rocephin stopped 7/5/18  7/10  Completed course

## 2018-07-10 NOTE — PROGRESS NOTES
Dr. White at bedside.  Pt does not follow commands, nor does she move extremities to painful stimuli.  Pt getting more tachypneic and seems to be having more trouble moving air even with mask on AVAPS settings.  Dr. White plans to intubate pt.

## 2018-07-10 NOTE — NURSING
Therapeutic plasma exchange initiated today. Patient tolerated well. VSS. No complications noted. Pt remains ventilated and sedated. Will cont to monitor

## 2018-07-10 NOTE — PROCEDURES
[facility] -   Transfusion Medicine  Procedure Note    SUMMARY   Therapeutic Plasma Exchange (Apheresis)  Date/Time: 7/10/2018 12:54 PM  Performed by: MARCELINO CARTER.  Authorized by: MARCELINO CARTER         Date of Procedure: 7/10/2018     Procedure: Plasma Exchange    Provider: Marcelino Carter MD     Assisting Provider: None    Pre-Procedure Diagnosis: Acute respiratory failure with hypoxia and hypercarbia    Post-Procedure Diagnosis: Acute respiratory failure with hypoxia and hypercarbia    Follow-up Assessment: Mrs. Valdez is a 72 year old female with a PMHx of HTN, DM, A-fib, Asthma, and Defibrillator who initially presented from home with acute on chronic CHF exacerbation on 6/30, then began developing generalized weakness and respiratory failure several days later with a nondiagnostic LP, Head/Spine CT negative for any acute findings, and elevated ESR/CRP.  Per chart, patient's outside medical records reveal recent history of antibody-positive NMO at [facility] and was subsequently given 5-day course of IV Solumedrol (per Neurology recs) without any improvement.  Today, patient's neurologic status has worsened (e.g., has no movement, does not vocalize, she has been able to track intermittently) and was intubated for ongoing respiratory issues.  Neurology has requested a plasma exchange series for further management of patient's NMOSD.    NEUROMYELITIS OPTICA SPECTRUM DISORDERS carries a Category II Grade 1B indication for therapeutic plasma exchange via the 2016 Journal of Clinical Apheresis Guidelines (Corea J et al. Journal of Clinical Apheresis 2016; 31:149-162.)    Interval History: Today's procedure (#1 of 5) was well tolerated and without complications.  Next treatment scheduled for tomorrow, 7/11.      Pertinent Laboratory Data:   Complete Blood Count:   Lab Results   Component Value Date    HGB 10.9 (L) 07/10/2018    HCT 35.7 (L) 07/10/2018     07/10/2018    WBC 8.75  07/10/2018     Basic Metabolic Panel:   Lab Results   Component Value Date     (H) 07/10/2018    K 3.9 07/10/2018     07/10/2018    CO2 35 (H) 07/10/2018     (H) 07/10/2018    BUN 94 (H) 07/10/2018    CREATININE 1.3 07/10/2018    CALCIUM 9.5 07/10/2018    ANIONGAP 11 07/10/2018    ESTGFRAFRICA 47 (A) 07/10/2018    EGFRNONAA 41 (A) 07/10/2018       Pertinent Medications:     Current Facility-Administered Medications:     0.9%  NaCl infusion (for blood administration), , Intravenous, PRN, Rob Carter MD    0.9%  NaCl infusion (for blood administration), , Intravenous, PRN, Rob Carter MD    acetaminophen tablet 650 mg, 650 mg, Oral, Q6H PRN, Subhash Ayala MD    [START ON 7/11/2018] albumin human 5% bottle 175 g, 175 g, Intravenous, Once, Raymundo Vidal MD    albuterol-ipratropium 2.5 mg-0.5 mg/3 mL nebulizer solution 3 mL, 3 mL, Nebulization, Q4H PRN, CHEO Biswas, 3 mL at 07/04/18 1525    albuterol-ipratropium 2.5 mg-0.5 mg/3 mL nebulizer solution 3 mL, 3 mL, Nebulization, Q6H, James Meier MD, 3 mL at 07/10/18 0758    amiodarone tablet 200 mg, 200 mg, Oral, BID, CHEO Biswas, Stopped at 07/10/18 0813    baclofen tablet 10 mg, 10 mg, Oral, TID PRN, CHEO Biswas, 10 mg at 07/02/18 0947    brimonidine 0.2% ophthalmic solution 1 drop, 1 drop, Both Eyes, Q12H, 1 drop at 07/10/18 0813 **AND** timolol maleate 0.5% ophthalmic solution 1 drop, 1 drop, Both Eyes, BID, Subhash Ayala MD, 1 drop at 07/10/18 0814    [START ON 7/11/2018] calcium gluconate 2,000 mg in sodium chloride 0.9% 100 mL IVPB, 2,000 mg, Intravenous, Once, Rob Carter MD    chlorhexidine 0.12 % solution 15 mL, 15 mL, Mouth/Throat, BID, John White MD, 15 mL at 07/10/18 0812    dextrose 50% injection 12.5 g, 12.5 g, Intravenous, PRN, Vanesa Artis, JOSEP    dextrose 50% injection 25 g, 25 g, Intravenous, PRN, Vanesa Artis, JOSEP    fentaNYL 2500 mcg in D5W 250 mL  infusion premix (titrating) (conc: 10 mcg/mL), , Intravenous, Continuous, John White MD, Last Rate: 2.5 mL/hr at 07/10/18 1100    glucagon (human recombinant) injection 1 mg, 1 mg, Intramuscular, PRN, CHEO Biswas    glucose chewable tablet 16 g, 16 g, Oral, PRN, CHEO Biswas    glucose chewable tablet 24 g, 24 g, Oral, PRN, JOSE BiswasP    heparin (porcine) injection 4,000 Units, 4,000 Units, Intravenous, PRN, Raymundo Vidal MD, 4,000 Units at 07/09/18 2151    heparin (porcine) injection 5,000 Units, 5,000 Units, Subcutaneous, Q8H, Tonia Gates NP, 5,000 Units at 07/10/18 0516    heparin, porcine (PF) injection 3,800 Units, 3,800 Units, Intravenous, PRN, Rob Carter MD, 3,800 Units at 07/10/18 1136    hydrALAZINE injection 10 mg, 10 mg, Intravenous, Q6H PRN, Phoenix Ramos MD, 5 mg at 07/07/18 0615    insulin aspart U-100 pen 0-5 Units, 0-5 Units, Subcutaneous, QID (AC + HS) PRN, CHEO Biswas, 2 Units at 07/10/18 1138    levothyroxine tablet 100 mcg, 100 mcg, Oral, Before breakfast, CHEO Biswas, 100 mcg at 07/10/18 0516    lorazepam (ATIVAN) injection 4 mg, 4 mg, Intravenous, Q4H PRN, John White MD    methylPREDNISolone sodium succinate injection 250 mg, 250 mg, Intravenous, Q6H, Vanesa Daniels NP, 250 mg at 07/10/18 1137    metoprolol succinate (TOPROL-XL) 24 hr tablet 50 mg, 50 mg, Oral, BID, CHEO Biswas, Stopped at 07/10/18 0812    pantoprazole 40 mg in dextrose 5 % 100 mL IVPB (over 15 minutes) (ready to mix system), 40 mg, Intravenous, Daily, John White MD, 40 mg at 07/10/18 0812    Review of patient's allergies indicates:   Allergen Reactions    Morphine Hives    Atorvastatin      Other reaction(s): Muscle pain    Clonidine     Codeine      Other reaction(s): Unknown    Digoxin      Other reaction(s): Unknown    Diovan [valsartan] Other (See Comments)     Upset stomach, weakness    Eggs [egg derived]      Metoprolol Diarrhea    Naproxen      Other reaction(s): Nausea    Peanut     Propofol      Other reaction(s): delirious    Sulfa (sulfonamide antibiotics)        Anesthesia: None     Technical Procedures Used: Plasma Exchange: Volume exchanged - 3.5 Liters; Replacement fluid - Albumin; Number of procedures 1; Date of next procedure 7/11.    Description of the Findings of the Procedure:     Please see Apheresis Nurse flowsheet for details.    The patient was evaluated and all clinical and laboratory data relevant to the treatment was reviewed, and a decision was made to proceed with the Apheresis procedure.    I was available to the clinical staff throughout the procedure.    Significant Surgical Tasks Conducted by the Assistant(s): Not applicable  Complications: None  Estimated Blood Loss (EBL): Minimal  Implants: None   Specimens: None    Rob Carter M.D., M.S., [facility]  Medical Director  Section of Transfusion Medicine & Blood Donor Services  Department of Pathology and Laboratory Medicine  [facility]  [phone] (Blood Bank)  07/10/2018

## 2018-07-10 NOTE — PLAN OF CARE
Problem: Patient Care Overview  Goal: Plan of Care Review  Patient currently requires total assist x 2 for bed mobility and total assist for sitting balance at EOB.   Outcome: Ongoing (interventions implemented as appropriate)  Plan of care reviewed with patient and family. Daughter at bedside Patient stable. Patient intubated and sedate. Patient open eyes to voice, follow minor commands. Can close eyes and open mouth when asked. Withdraws from pain in lower extremities. Flaccid in upper extremities. Tube feeding at goal rate 50 ml/hr . patient tolerating well. Bowel movement today. Flores to gravity per orders. First treatment of therapeutic  Plasma exchange, patent tolerated well. Patient free from falls fall precautions in place. Turn every two hours. Patient on specialty bed. Lower extremities elevated. Wound care done per orders. VSS. Afebrile. Paced rhythm, with LBBB. Blood glucose monitoring, covered via sliding scale. Will cont to monitor.

## 2018-07-10 NOTE — PLAN OF CARE
Problem: Patient Care Overview  Goal: Plan of Care Review  Patient currently requires total assist x 2 for bed mobility and total assist for sitting balance at EOB.   Outcome: Ongoing (interventions implemented as appropriate)  Patient remains intubated and sedated. Patient opens eyes to voice, does not track, not following commands. Patient does not withdrawal to painful stimuli in BUE/BLE. Patient withdrawals to noxious stimuli to eyes and mouth. Tube feeding initiated, patient tolerating well. Vascular consulted, [facility] vas cath placed at approximately 2100 on 7/9/18 to initiate plasmapheresis today, patient tolerated vas cath insertion procedure well. Daughter at bedside. Patient remains on specialty low air loss bed, heel off loading device maintained. Patient turned every two hours, pillows and wedge in use. Dressing to right heel, left posterior hip and right buttock clean, dry and intact. VSS. TMAX 99.4. UOP adequate. Plan of care discussed with patient and daughter. Will continue to monitor.

## 2018-07-10 NOTE — ASSESSMENT & PLAN NOTE
7/6 - Lumbar puncture today , neurology to evaluate  7/7 High dose steroids as per neurology  7/9 on high dose steroids . Neurology following needs Plasmapheresis. Discussed it with Neurology, he arranged for vas cath placement.   7/10.  Off high-dose steroid.  Status post Vas-Cath placement yesterday.  Plasmapheresis for 5 days.  Neurology follow-up

## 2018-07-10 NOTE — ASSESSMENT & PLAN NOTE
Continue with steroids no improvement will consider pheresis as per neurlogy\  7/10  Plasma exchange series

## 2018-07-10 NOTE — SUBJECTIVE & OBJECTIVE
Review of Systems   Unable to perform ROS: Mental status change     Objective:     Vital Signs (Most Recent):  Temp: 98.6 °F (37 °C) (07/10/18 0730)  Pulse: 70 (07/10/18 1030)  Resp: (!) 29 (07/10/18 1030)  BP: 131/81 (07/10/18 1030)  SpO2: 99 % (07/10/18 1030) Vital Signs (24h Range):  Temp:  [98.2 °F (36.8 °C)-99.4 °F (37.4 °C)] 98.6 °F (37 °C)  Pulse:  [69-72] 70  Resp:  [0-50] 29  SpO2:  [93 %-100 %] 99 %  BP: ()/(35-89) 131/81     Weight: 69 kg (152 lb 1.9 oz)  Body mass index is 26.11 kg/m².    Intake/Output Summary (Last 24 hours) at 07/10/18 1116  Last data filed at 07/10/18 1000   Gross per 24 hour   Intake           912.67 ml   Output             1970 ml   Net         -1057.33 ml      Physical Exam   Constitutional: She appears well-developed.   Ill appearing    HENT:   + ETT    Eyes: Pupils are equal, round, and reactive to light.   Neck: Neck supple.   Cardiovascular: Normal rate.    Pulmonary/Chest: Effort normal.   BS decreased mildly bilaterally    Abdominal: Soft. There is no tenderness.   Genitourinary:   Genitourinary Comments: Flores    Musculoskeletal: She exhibits edema.   Neurological:   Awake , not following commands , open eyes , after intubation sedated wih fentanyl gtt    Skin: Skin is warm.   Nursing note and vitals reviewed.      Significant Labs: All pertinent labs within the past 24 hours have been reviewed.    Significant Imaging: I have reviewed all pertinent imaging results/findings within the past 24 hours.

## 2018-07-10 NOTE — PROCEDURES
"Procedures       Date of procedure: 07/09/18    Surgeon: Tiny Hsu MD    Assistant: none    Pre-operative diagnosis:  1.  Neuromyelitis optica requiring plasmaphoresis  2.  Acute respiratory failure    Post-operative diagnosis:   1.  Neuromyelitis optica requiring plasmaphoresis  2.  Acute respiratory failure    Procedure:  Insertion of right tunneled hemodialysis catheter with ultrasound guidance    Complications: none    Specimens: none    History of present illness:  The patient has a history of acute respiratory failure and worsening mental status.  She was found to have neuromyelitis and now is seen in need of urgent plasmaphoresis and therefore we were consulted for placement of a tunneled plasmaphoresis catheter.    Procedure in detail:  In the ICU the patient was prepped and draped in sterile fashion in the supine position with her neck exposed and an ultrasound was used to visualize the right jugular vein which was widely patent.  1% lidocaine was used to anesthetize the skin and subcutaneous tissue overlying the vein after which was accessed using a micropuncture needle followed by wire and catheter in standard fashion.  Through this an 0.035" J-wire was advanced into the superior vena cava after which 1% lidocaine was also used to anesthetize the skin and subcutaneous tissue overlying the right chest wall.  The catheter was then tunneled from the chest wall incision out through the neck after which an introducer dilator sheath was placed over the wire until it was in the superior vena cava.  At this point the catheter was placed through the sheath into the central circulation and advanced fully.  It was then flushed and aspirated without difficulties and locked using heparinized saline.  It was sutured in place using 2-0 Prolene and the neck incision was also closed using a 2-0 Prolene after which sterile caps and dressings were applied.      The patient tolerated the procedure well there were no " complications.  The catheter is cleared for use for plasmaphoresis.

## 2018-07-10 NOTE — ASSESSMENT & PLAN NOTE
ABG reflected hypoxic/hypercapneic respiratory failure  ICU care  Pulmonology/Critical Care following  Bipap for more than 24 hours with no improvement . No improvement in mental status   Intubated   7/10  Intubated on vent support

## 2018-07-10 NOTE — PROGRESS NOTES
[facility]  Critical Care Medicine  Progress Note    Patient Name: Carlie Valdez  MRN: [id number]  Admission Date: 6/30/2018  Hospital Length of Stay: 10 days  Code Status: Full Code  Attending Provider: Raymundo Vidal MD  Primary Care Provider: Johanna Guzman MD   Principal Problem: Respiratory failure    Subjective:     HPI:  72 year old female with a PMHx of p[revious cerebral vascular accident with right sided weakness and aphasia, HTN, GERD, DM, A-fib, Cardiac Asthma, and Defibrillator who presented from home with c/o chest pain to Emergency Room on 6/30/2018 folllwing recent discharge from [facility]. Admitted for acute on chronic systolic Congestive Heart failure and progressively worsened. Noted to have worsening mental status and shortness of breath and transferrrd to ICU for hypercapnic respiratory failure. Placed on Noninvasive Positive Pressure Ventilation     Hospital/ICU Course:  7/5 Transferred to ICU. Noninvasive Positive Pressure Ventilation initiated , may need intubation  7/6 Improved ventilation with Noninvasive Positive Pressure Ventilation but now generalized weakness. Steroids started for respiratory failure. Lumbar puncture later today.  7/7 Respiratory status stable. Labile blood pressure. Started on high does steroids  7/8 worsening respiraoty status with elevated  PCO2  7/9 patient seen and examined, chest x-ray and ABG reviewed, daughter Haley was called, intubated for respiratory distress and tachypnea.  7/10 patient seen and examined, chest x-ray and ABG reviewed.  She had a Vas-Cath placed overnight.  Off high-dose steroid.  Plasmapheresis today.      Interval History:   Review of Systems   Unable to perform ROS: Intubated       Objective:     Vital Signs (Most Recent):  Temp: 97.8 °F (36.6 °C) (07/10/18 1534)  Pulse: 70 (07/10/18 1815)  Resp: 11 (07/10/18 1815)  BP: 115/65 (07/10/18 1815)  SpO2: 100 % (07/10/18 1815) Vital Signs (24h  Range):  Temp:  [97.6 °F (36.4 °C)-99.4 °F (37.4 °C)] 97.8 °F (36.6 °C)  Pulse:  [69-74] 70  Resp:  [11-45] 11  SpO2:  [95 %-100 %] 100 %  BP: ()/() 115/65     Weight: 69 kg (152 lb 1.9 oz)  Body mass index is 26.11 kg/m².      Intake/Output Summary (Last 24 hours) at 07/10/18 1849  Last data filed at 07/10/18 1800   Gross per 24 hour   Intake           1822.5 ml   Output             1905 ml   Net            -82.5 ml       Physical Exam   Constitutional: She appears well-developed.   Ill appearing    HENT:   + ETT    Eyes: Pupils are equal, round, and reactive to light.   Neck: Neck supple.   Cardiovascular: Normal rate.    Pulmonary/Chest: Effort normal.   BS decreased mildly bilaterally   Right chest Vas-Cath   Abdominal: Soft. There is no tenderness.   Genitourinary:   Genitourinary Comments: Flores    Musculoskeletal: She exhibits no tenderness.   Neurological:   Awake , not following commands , open eyes , after intubation sedated wih fentanyl gtt    Skin: Skin is warm.       Vents:  Vent Mode: A/C (07/10/18 1740)  Ventilator Initiated: Yes (07/09/18 1022)  Set Rate: 14 bmp (07/10/18 1740)  Vt Set: 380 mL (07/10/18 1740)  Pressure Support: 0 cmH20 (07/10/18 1740)  PEEP/CPAP: 5 cmH20 (07/10/18 1740)  Oxygen Concentration (%): 55 (07/10/18 1815)  Peak Airway Pressure: 27 cmH2O (07/10/18 1740)  Plateau Pressure: 18 cmH20 (07/10/18 1740)  Total Ve: 5.44 mL (07/10/18 1740)  F/VT Ratio<105 (RSBI): (!) 46.51 (07/10/18 1740)    Lines/Drains/Airways     Central Venous Catheter Line                 Hemodialysis Catheter 07/09/18 2100 right internal jugular less than 1 day          Drain                 Urethral Catheter 07/05/18 1114 5 days         NG/OG Tube 07/09/18 orogastric Left mouth 1 day          Airway                 Airway - Non-Surgical 07/09/18 1005 Endotracheal Tube 1 day          Pressure Ulcer                 Pressure Injury 06/02/18 1659 Left proximal Ischial tuberosity Stage 3 38 days          Pressure Injury 06/02/18 Right lateral Heel Stage 3 38 days         Pressure Injury 06/02/18 1902 Right medial Heel Unstageable 37 days         Pressure Injury 06/30/18 Right lateral Buttocks Stage 2 10 days          Peripheral Intravenous Line                 Peripheral IV - Single Lumen 07/06/18 2315 Right Forearm 3 days         Peripheral IV - Single Lumen 07/09/18 0710 Right Antecubital 1 day                Significant Labs:    CBC/Anemia Profile:    Recent Labs  Lab 07/09/18  0338 07/09/18  1449 07/10/18  0418   WBC 9.06  --  8.75   HGB 10.9*  --  10.9*   HCT 36.4*  --  35.7*   *  --  344   MCV 86  --  84   RDW 18.3*  --  18.7*   [id number]  --  1441*  --      Chemistries:    Recent Labs  Lab 07/09/18 0338 07/10/18  0418   * 152*   K 4.7 3.9    106   CO2 35* 35*   BUN 77* 94*   CREATININE 1.1 1.3   CALCIUM 9.9 9.5   MG 2.4 2.7*   PHOS 3.6 2.9    Blood culture and CSF culture negative  Significant Imaging:  CXR: I have reviewed all pertinent results/findings within the past 24 hours and my personal findings are:  ET tube, OG tube, Vas-Cath in place.  No acute findings.      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 Neurology following needs Plasmapheresis. Discussed it with Neurology, he arranged for vas cath placement.   7/10.  Off high-dose steroid.  Status post Vas-Cath placement yesterday.  Plasmapheresis for 5 days.  Neurology follow-up        Encephalopathy    7/6 pCO2 normalized and patient improved but still not alert, awaiting lumbar puncture  7/8 worsening mental status  - no longer feeding herself        Derm   Skin breakdown    7/5 Wound care nurse        Pulmonary   * Respiratory failure    7/5 Noninvasive Positive Pressure Ventilation , jet nebs and oxygen  7/6 IV steroids added  7/7  continue Noninvasive Positive Pressure Ventilation at night and prn during day  7/8 elevated  PCO2 - change ventilation mode  7/9 O2/ MV/ Pulm toilet . Daily ABG , CXR        Acute hypercapnic respiratory failure    7/9 neuromuscular resp failure sp intubation. O2/ MV/ Pulm toilet . Daily ABG , CXR .  7/10 daily ABG, chest x-ray.  Will discontinue Lasix.  Part of the respiratory acidosis is compensating for metabolic alkalosis.  Keep respiratory rate at 14.  Tidal volume 380 mL.        Cardiac/Vascular   Acute on chronic systolic heart failure    7/5 start IV lasix  7/6 continue IV lasix  7/8 worsening Chest X Ray with pleural effusion on the right , increase lasix to BID  7/9 negative 2.9 liters . Cont IV Lasix 40 mg q 12 hrs .   7/10 -3 0.5 L since admission.  Improving oxygen requirements.  Creatinine in and serum bicarb increasing.  Will discontinue Lasix.        Renal/   UTI (urinary tract infection)    7/6 - resolved - antibiotics stopped        Other   Increased ammonia level    7/7 Continue to follow  7/8 normal today           Critical Care Daily Checklist:    A: Awake: RASS Goal/Actual Goal: RASS Goal: -2-->light sedation  Actual: Guerrero Agitation Sedation Scale (RASS): Alert and calm   B: Spontaneous Breathing Trial Performed?     C: SAT & SBT Coordinated?  Not applicable today.                    D: Delirium: CAM-ICU Overall CAM-ICU: Negative   E: Early Mobility Performed? Yes.  Bedrest.  Intubated.   F: Feeding Goal: Goals: Diet advancement or initiate nutrition support by next RD visit  Status: Nutrition Goal Status: new   Current Diet Order   No orders of the defined types were placed in this encounter.      AS: Analgesia/Sedation Fentanyl drip.  Ativan p.r.n.   T: Thromboembolic Prophylaxis Heparin subcutaneous 5000 units q.8 hours   H: HOB > 300 Yes   U: Stress Ulcer Prophylaxis (if needed) IV PROTONIX   G: Glucose Control FINGERSTICK Q.6 HOURS   B: Bowel Function Stool Occurrence: 1   I:  Indwelling Catheter (Lines & Voss) Necessity KEEP VOSS   D: De-escalation of Antimicrobials/Pharmacotherapies No antibiotic    Plan for the day/ETD Y    Code Status:  Family/Goals of Care: Full Code  Y.  SPOKE WITH DAUGHTER PETER AT BEDSIDE.     Critical Care Time: 35 minutes  Critical secondary to Patient has a condition that poses threat to life and bodily function:  RESPIRATORY FAILURE DUE TO NEUROMUSCULAR DISEASE      Critical care was time spent personally by me on the following activities: development of treatment plan with patient or surrogate and bedside caregivers, discussions with consultants, evaluation of patient's response to treatment, examination of patient, ordering and performing treatments and interventions, ordering and review of laboratory studies, ordering and review of radiographic studies, pulse oximetry, re-evaluation of patient's condition. This critical care time did not overlap with that of any other provider or involve time for any procedures.     John White MD  Critical Care Medicine  [facility]

## 2018-07-10 NOTE — SUBJECTIVE & OBJECTIVE
Interval History:   Review of Systems   Unable to perform ROS: Intubated       Objective:     Vital Signs (Most Recent):  Temp: 97.8 °F (36.6 °C) (07/10/18 1534)  Pulse: 70 (07/10/18 1815)  Resp: 11 (07/10/18 1815)  BP: 115/65 (07/10/18 1815)  SpO2: 100 % (07/10/18 1815) Vital Signs (24h Range):  Temp:  [97.6 °F (36.4 °C)-99.4 °F (37.4 °C)] 97.8 °F (36.6 °C)  Pulse:  [69-74] 70  Resp:  [11-45] 11  SpO2:  [95 %-100 %] 100 %  BP: ()/() 115/65     Weight: 69 kg (152 lb 1.9 oz)  Body mass index is 26.11 kg/m².      Intake/Output Summary (Last 24 hours) at 07/10/18 1849  Last data filed at 07/10/18 1800   Gross per 24 hour   Intake           1822.5 ml   Output             1905 ml   Net            -82.5 ml       Physical Exam   Constitutional: She appears well-developed.   Ill appearing    HENT:   + ETT    Eyes: Pupils are equal, round, and reactive to light.   Neck: Neck supple.   Cardiovascular: Normal rate.    Pulmonary/Chest: Effort normal.   BS decreased mildly bilaterally   Right chest Vas-Cath   Abdominal: Soft. There is no tenderness.   Genitourinary:   Genitourinary Comments: Flores    Musculoskeletal: She exhibits no tenderness.   Neurological:   Awake , not following commands , open eyes , after intubation sedated wih fentanyl gtt    Skin: Skin is warm.       Vents:  Vent Mode: A/C (07/10/18 1740)  Ventilator Initiated: Yes (07/09/18 1022)  Set Rate: 14 bmp (07/10/18 1740)  Vt Set: 380 mL (07/10/18 1740)  Pressure Support: 0 cmH20 (07/10/18 1740)  PEEP/CPAP: 5 cmH20 (07/10/18 1740)  Oxygen Concentration (%): 55 (07/10/18 1815)  Peak Airway Pressure: 27 cmH2O (07/10/18 1740)  Plateau Pressure: 18 cmH20 (07/10/18 1740)  Total Ve: 5.44 mL (07/10/18 1740)  F/VT Ratio<105 (RSBI): (!) 46.51 (07/10/18 1740)    Lines/Drains/Airways     Central Venous Catheter Line                 Hemodialysis Catheter 07/09/18 2100 right internal jugular less than 1 day          Drain                 Urethral Catheter  07/05/18 1114 5 days         NG/OG Tube 07/09/18 orogastric Left mouth 1 day          Airway                 Airway - Non-Surgical 07/09/18 1005 Endotracheal Tube 1 day          Pressure Ulcer                 Pressure Injury 06/02/18 1659 Left proximal Ischial tuberosity Stage 3 38 days         Pressure Injury 06/02/18 Right lateral Heel Stage 3 38 days         Pressure Injury 06/02/18 1902 Right medial Heel Unstageable 37 days         Pressure Injury 06/30/18 Right lateral Buttocks Stage 2 10 days          Peripheral Intravenous Line                 Peripheral IV - Single Lumen 07/06/18 2315 Right Forearm 3 days         Peripheral IV - Single Lumen 07/09/18 0710 Right Antecubital 1 day                Significant Labs:    CBC/Anemia Profile:    Recent Labs  Lab 07/09/18  0338 07/09/18  1449 07/10/18  0418   WBC 9.06  --  8.75   HGB 10.9*  --  10.9*   HCT 36.4*  --  35.7*   *  --  344   MCV 86  --  84   RDW 18.3*  --  18.7*   [id number]  --  1441*  --      Chemistries:    Recent Labs  Lab 07/09/18  0338 07/10/18  0418   * 152*   K 4.7 3.9    106   CO2 35* 35*   BUN 77* 94*   CREATININE 1.1 1.3   CALCIUM 9.9 9.5   MG 2.4 2.7*   PHOS 3.6 2.9    Blood culture and CSF culture negative  Significant Imaging:  CXR: I have reviewed all pertinent results/findings within the past 24 hours and my personal findings are:  ET tube, OG tube, Vas-Cath in place.  No acute findings.

## 2018-07-10 NOTE — PROGRESS NOTES
[facility] Hospital Medicine  Progress Note    Patient Name: Carlie Valdez  MRN: [id number]  Patient Class: IP- Inpatient   Admission Date: 6/30/2018  Length of Stay: 10 days  Attending Physician: Raymundo Vidal MD  Primary Care Provider: Johanna Guzman MD        Subjective:     Principal Problem:Acute respiratory failure with hypoxia and hypercarbia    HPI:  Carlie Valdez is a 72 year old female with a PMHx of HTN, GERD, DM, A-fib, Asthma, and Defibrillator who presented from home with c/o chest pain. Located to right side with no radiation. Associated symptoms: SOB. Pt and sitters at bedside report she was recently discharged from [facility] and pt hasn't had her home medications since being discharged. ED workup revealed: Hgb/Hct 11.2/34.7, Alk phos 152, BNP 1100, troponin 0.028. UA (+) nitrites. CXR with central vascular congestion with small bilateral effusion. Primary cardiologist is Dr. Hernández.  Pt admitted to Telemetry for Acute on chronic CHF exacerbation and UTI.     Hospital Course:  Pt admitted for decompensated heart failure and UTI. Diuresis with Bumex and IV Rocephin in progress. Pt with physical debility and PT/OT consulted. Dr. Joiner had lengthy discussion with daughter, Haley, and patient in reference to generalized weakness and proper disposition. SNF placement was agreed however, when  approach regarding SNF selection patient refused. Introduce Hospice to patient and advised she was not a candidate for Rehab due to her extent of weakness. Pt stated she would discharge home with sitters and home health services. 7/2/18 - Dr. Joiner has spoken with pt's daughters, Haley and Rosy, regarding the mother's condition. She has diuresed and diuretics changed to oral. She was medically stable for discharge. SNF placement pending. PT/OT in progress. 7/3/18 - It was noted that pt has refused participation in PT/OT. She appears  grossly more weak in the BUE/BLE extremities. Family notified of condition. Granddaughter at bedside mentioned similar symptoms prior to admission. Neurology consult placed, ESR/CRP pending, CT Head and C spine pending. At 4th day, CT of Head and C spine found no concerning findings. ESR and CRP elevated although no concern for vasculitis. Neurology saw the patient and recommended LP to rule out GBs or CIDP. Xarelto placed on hold and LP by IR is pending as Xarelto needs to be on hold for 3 days. Speech therapy evaluated the patient and noted inconsistent dysphonia. The patient will voice normally then start mouthing words. Diet recommendations:  Mechanical soft, Thin. On 6th day, pt noted to have acute onset of metabolic encephalopathy. Pt more somnolent, respiration shallow. She would arouse with sternal rub but return to lethargy. Repeat CT of Head was negative, ammonia slightly elevated at 61 and ABG showed acidosis with hypoxic/hypercapneic respiratory failure.   07/06/18 - presently on Bipap for respiratory acidosis , Lumbar puncture done today 06/07/18 07/07- Diagnosed with NMO at [facility] Solumedrol  1000 mg/day for 5 days per neurology   07/08- possible aspiration overnight , follow up CXR new right pleural effusion , continue Bipap support   07/09 patient was intubated in morning continue to have respiratory distress and no improvement in neurological status   07/10  Started on plasma exchange series for nmo . As per neurology recommendation .  No improvement in mental status . Family updated on plan         Review of Systems   Unable to perform ROS: Mental status change     Objective:     Vital Signs (Most Recent):  Temp: 98.6 °F (37 °C) (07/10/18 0730)  Pulse: 70 (07/10/18 1030)  Resp: (!) 29 (07/10/18 1030)  BP: 131/81 (07/10/18 1030)  SpO2: 99 % (07/10/18 1030) Vital Signs (24h Range):  Temp:  [98.2 °F (36.8 °C)-99.4 °F (37.4 °C)] 98.6 °F (37 °C)  Pulse:  [69-72] 70  Resp:  [0-50] 29  SpO2:  [93 %-100 %]  99 %  BP: ()/(35-89) 131/81     Weight: 69 kg (152 lb 1.9 oz)  Body mass index is 26.11 kg/m².    Intake/Output Summary (Last 24 hours) at 07/10/18 1116  Last data filed at 07/10/18 1000   Gross per 24 hour   Intake           912.67 ml   Output             1970 ml   Net         -1057.33 ml      Physical Exam   Constitutional: She appears well-developed.   Ill appearing    HENT:   + ETT    Eyes: Pupils are equal, round, and reactive to light.   Neck: Neck supple.   Cardiovascular: Normal rate.    Pulmonary/Chest: Effort normal.   BS decreased mildly bilaterally    Abdominal: Soft. There is no tenderness.   Genitourinary:   Genitourinary Comments: Flores    Musculoskeletal: She exhibits edema.   Neurological:   Awake , not following commands , open eyes , after intubation sedated wih fentanyl gtt    Skin: Skin is warm.   Nursing note and vitals reviewed.      Significant Labs: All pertinent labs within the past 24 hours have been reviewed.    Significant Imaging: I have reviewed all pertinent imaging results/findings within the past 24 hours.    Assessment/Plan:      * Acute respiratory failure with hypoxia and hypercarbia    ABG reflected hypoxic/hypercapneic respiratory failure  ICU care  Pulmonology/Critical Care following  Bipap for more than 24 hours with no improvement . 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, S REFLEX    Continue with steroids no improvement will consider pheresis as per neurlogy\  7/10  Plasma exchange series          Encephalopathy    - neurology following   - continue with steroids with nmo   7/10  Started on apheresis             Pressure ulcer of right heel, stage 3              Skin breakdown    Pt lying in specialty bed   Multiple areas of skin breakdown  - Inpatient consult to wound care          UTI (urinary tract infection)    - UA (+) nitrites    - Urine culture  pending    COAGULASE-NEGATIVE STAPHYLOCOCCUS SPECIES   50,000 - 99,999 cfu/ml   Susceptibility testing not routinely performed.     IV Rocephin - started 6/30/18, Rocephin stopped 7/5/18  7/10  Completed course           Severe muscle deconditioning    - Currently has [facility] (PT/OT/nurse)  - secondary to NMO        Acute on chronic systolic heart failure    Compensated as of 7/3/18  - BNP 1100  - CXR with vascular congestion with small bilateral effusion  - Mild BLE edema upon assessment but diminished breath sounds noted  - 2D ECHO on 06/02/18 with EF 35% and pulmonary HTN  - Pt received Bumex 2 mg IVP x 1 in ED  - Bumex 2 mg IVP daily====> changed to Bumex 2 mg twice daily 7/2/18  - Continue Metoprolol  7/10  Compensated         Left ischial pressure sore, stage III    Wound care           Hypothyroidism    - TSH about a month ago -- 1.265  - Continue Levothyroxine          Chronic kidney disease (CKD), stage III (moderate)    - Currently stable  - Monitor kidney function while on diuretics          Hypertension associated with diabetes    Controlled  - continue Toprol XL and lisinopril  - Apresoline prn  Lisinopril discontinued 7/4/18 - daughter stated it caused respiratory problems    DM  - HgbA1c on 06/03/18 -- 5.3 -- controlled  - Accuchecks and low dose SSI          Atrial fibrillation    - Currently rate controlled  Telemetry monitoring  - Continue Xarelto and Amiodarone  -xarelto on hold.LP done yesterday will resume it once ok with neurology and r            VTE Risk Mitigation         Ordered     heparin, porcine (PF) injection 3,800 Units  As needed (PRN)      07/10/18 0859     heparin (porcine) injection 4,000 Units  As needed (PRN)      07/09/18 2045     heparin (porcine) injection 5,000 Units  Every 8 hours      07/04/18 1209          Critical care time spent on the evaluation and treatment of severe organ dysfunction, review of pertinent labs and imaging studies, discussions with  consulting providers and discussions with patient/family: 40 minutes.    Raymundo Vidal MD  Department of Hospital Medicine   [facility]

## 2018-07-10 NOTE — NURSING
Spontaneous awakening trial initiated. Patient did not tolerated well, as patient RR increased, with breath stacking noted. Became very agitated. Sedation cont. Will cont to monitor.

## 2018-07-10 NOTE — ASSESSMENT & PLAN NOTE
7/5 start IV lasix  7/6 continue IV lasix  7/8 worsening Chest X Ray with pleural effusion on the right , increase lasix to BID  7/9 negative 2.9 liters . Cont IV Lasix 40 mg q 12 hrs .   7/10 -3 0.5 L since admission.  Improving oxygen requirements.  Creatinine in and serum bicarb increasing.  Will discontinue Lasix.

## 2018-07-10 NOTE — ASSESSMENT & PLAN NOTE
- Currently rate controlled  Telemetry monitoring  - Continue Xarelto and Amiodarone  -xarelto on hold.LP done yesterday will resume it once ok with neurology and r

## 2018-07-10 NOTE — PROGRESS NOTES
Progress Note  Neurological ICU    Admit Date: 6/30/2018   LOS: 10 days     SUBJECTIVE:   Patient is attentive today. She is withdrawing to pain, opens and closes eyes intermittently on command  She is afebrile. She remains intubated. No complications overnight    Continuous Infusions:   fentanyl 2.5 mL/hr at 07/10/18 1400     Scheduled Meds:   [START ON 7/11/2018] albumin human 5%  175 g Intravenous Once    albuterol-ipratropium  3 mL Nebulization Q6H    amiodarone  200 mg Oral BID    brimonidine 0.2%  1 drop Both Eyes Q12H    And    timolol maleate 0.5%  1 drop Both Eyes BID    [START ON 7/11/2018] calcium gluconate IVPB  2,000 mg Intravenous Once    chlorhexidine  15 mL Mouth/Throat BID    heparin (porcine)  5,000 Units Subcutaneous Q8H    levothyroxine  100 mcg Oral Before breakfast    methylPREDNISolone sodium succinate  250 mg Intravenous Q6H    metoprolol succinate  50 mg Oral BID    pantoprazole 40 mg in dextrose 5 % 100 mL IVPB (over 15 minutes) (ready to mix system)  40 mg Intravenous Daily     PRN Meds:sodium chloride, sodium chloride, acetaminophen, albuterol-ipratropium, baclofen, dextrose 50%, dextrose 50%, glucagon (human recombinant), glucose, glucose, heparin (porcine), heparin, porcine (PF), hydrALAZINE, insulin aspart U-100, lorazepam    Review of patient's allergies indicates:   Allergen Reactions    Morphine Hives    Atorvastatin      Other reaction(s): Muscle pain    Clonidine     Codeine      Other reaction(s): Unknown    Digoxin      Other reaction(s): Unknown    Diovan [valsartan] Other (See Comments)     Upset stomach, weakness    Eggs [egg derived]     Metoprolol Diarrhea    Naproxen      Other reaction(s): Nausea    Peanut     Propofol      Other reaction(s): delirious    Sulfa (sulfonamide antibiotics)        OBJECTIVE:     Vital Signs (Most Recent)  Temp: 97.6 °F (36.4 °C) (07/10/18 1215)  Pulse: 70 (07/10/18 1328)  Resp: 14 (07/10/18 1328)  BP: (!) 103/53  (07/10/18 1300)  SpO2: 100 % (07/10/18 1328)    Vital Signs Range (Last 24H):  Temp:  [97.6 °F (36.4 °C)-99.4 °F (37.4 °C)]   Pulse:  [69-74]   Resp:  [0-45]   BP: ()/(35-95)   SpO2:  [95 %-100 %]     I & O (Last 24H):  Intake/Output Summary (Last 24 hours) at 07/10/18 1500  Last data filed at 07/10/18 1400   Gross per 24 hour   Intake          1472.67 ml   Output             1880 ml   Net          -407.33 ml     Ventilator Data (Last 24H):     Vent Mode: A/C  Oxygen Concentration (%):  [65] 65  Resp Rate Total:  [14 br/min-18 br/min] 14 br/min  Vt Set:  [380 mL-400 mL] 380 mL  PEEP/CPAP:  [5 cmH20] 5 cmH20  Pressure Support:  [0 cmH20] 0 cmH20  Mean Airway Pressure:  [9.4 zaQ53-39 cmH20] 10 cmH20    Hemodynamic Parameters (Last 24H):       Physical Exam:  General: She is intubated not sedated  HEENT:   Acephalic, Atraumatic,  Pupils isocoria, reactive, corneal reactive, no gaze preference  Neck: supple, no JVD, no Carotid bruit  Lungs: CTA,  No rhonchi, no rales  Heart:: Regular Rate and rhythm, no murmurs, rubs and or gallops  Abdomen, Soft, non tender, non distended, no abdominal bruit, bowel sounds present  Extremities: edema,   Skin: No rash, no ecchymoses,         NEURO    Mental Status  Awake, opens and close eyes upon request  No higher cortical functions.    SPEECH:  Intubated    Pupils isocoria, reactive  Corneal reactive  No gaze preference  Withdraws to facial pain  No facial asymmetry  GAg and Cough intact    MOTOR:Upper Extremities; NO movement  Decreased tone    Lower Extremities: Withdraws to pian in the LE b/l  Decreased tone    TONE:    REFLEXES: Deep tendon reflexes tested:  Upper extremities: biceps (C5, C6)  1  Brachioradialis (C5, C6), 0 triceps (C6, C7),0   Lower extremities: knee or patellar (L2, 3, 4): 0  Ankle (S1, S2): 0  Plantars: Extensors      SENSORY: withdraws to pain in LE b/l  ROMBERG and GAIT: deferred    EXTRAPYRAMIDALS:  none      Lines/Drains:       Hemodialysis Catheter  07/09/18 2100 right internal jugular (Active)   Site Assessment Intact;Dry;Clean 7/10/2018 11:14 AM   Status Clamped 7/10/2018 11:14 AM   Dressing Intervention Dressing reinforced 7/10/2018 11:14 AM   Dressing Status Biopatch in place;Clean;Dry;Intact 7/10/2018 11:14 AM   Verification by X-ray Yes 7/10/2018 11:14 AM   Site Condition No complications 7/10/2018 11:14 AM   Dressing Occlusive 7/10/2018 11:14 AM   Daily Line Review Performed 7/10/2018 11:14 AM   Number of days: 0            Peripheral IV - Single Lumen 07/06/18 2315 Right Forearm (Active)   Site Assessment Intact;Dry;Clean 7/10/2018 11:14 AM   Line Status Infusing 7/10/2018 11:14 AM   Dressing Status Intact;Dry;Clean 7/10/2018 11:14 AM   Dressing Intervention Dressing reinforced 7/10/2018 11:14 AM   Dressing Change Due 07/09/18 7/9/2018  3:04 PM   Reason Not Rotated Not due 7/10/2018  3:00 AM   Number of days: 3            Peripheral IV - Single Lumen 07/09/18 0710 Right Antecubital (Active)   Site Assessment No redness;Dry;Clean;Intact;No swelling 7/10/2018 11:14 AM   Line Status Saline locked;Flushed 7/10/2018 11:14 AM   Dressing Status Dry;Clean;Intact;Biopatch in place 7/10/2018 11:14 AM   Reason Not Rotated Not due 7/10/2018  3:00 AM   Number of days: 1            NG/OG Tube 07/09/18 orogastric Left mouth (Active)   Placement Check placement verified by aspirate pH 7/10/2018 11:14 AM   pH Aspirate Result 4 7/10/2018 11:14 AM   Tube advanced (cm) 61 7/10/2018 11:14 AM   Tolerance no signs/symptoms of discomfort 7/10/2018 11:14 AM   Securement taped to commercial device 7/10/2018 11:14 AM   Clamp Status/Tolerance unclamped 7/10/2018 11:14 AM   Insertion Site Appearance no redness, warmth, tenderness, skin breakdown, drainage 7/10/2018 11:14 AM   Drainage Clear;Thin 7/10/2018 11:14 AM   Flush/Irrigation water;no resistance met 7/10/2018 11:14 AM   Feeding Method continuous 7/10/2018 11:14 AM   Feeding Action feeding continued 7/10/2018 11:14 AM  "  Current Rate (mL/hr) 50 mL/hr 7/10/2018 11:14 AM   Goal Rate (mL/hr) 50 mL/hr 7/10/2018 11:14 AM   Water Bolus (mL) 250 mL 7/10/2018 11:00 AM   Intake (mL) - Formula Tube Feeding 50 7/10/2018  2:00 PM   Residual Amount (ml) 40 ml 7/10/2018 11:14 AM   Number of days: 1            Urethral Catheter 07/05/18 1114 (Active)   Site Assessment Clean;Intact 7/10/2018 11:14 AM   Collection Container Urimeter 7/10/2018 11:14 AM   Securement Method secured to top of thigh w/ adhesive device 7/10/2018 11:14 AM   Catheter Care Performed yes 7/10/2018 11:14 AM   Reason for Continuing Urinary Catheterization Critically ill in ICU requiring intensive monitoring 7/10/2018 11:14 AM   CAUTI Prevention Bundle StatLock in place w 1" slack;Drainage bag off the floor;Green sheeting clip in use;No dependent loops or kinks;Drainage bag not overfilled (<2/3 full);Drainage bag below bladder;Intact seal between catheter & drainage tubing 7/10/2018 11:14 AM   Output (mL) 75 mL 7/10/2018  2:00 PM   Number of days: 5       Laboratory (Last 24H):  CBC:    Recent Labs  Lab 07/10/18  0418   WBC 8.75   HGB 10.9*   HCT 35.7*        CMP:    Recent Labs  Lab 07/10/18  0418   CALCIUM 9.5   *   K 3.9   CO2 35*      BUN 94*   CREATININE 1.3     Results for orders placed or performed during the hospital encounter of 06/30/18   Urine culture **CANNOT BE ORDERED STAT**   Result Value Ref Range    Urine Culture, Routine       COAGULASE-NEGATIVE STAPHYLOCOCCUS SPECIES  50,000 - 99,999 cfu/ml  Susceptibility testing not routinely performed.     Blood culture   Result Value Ref Range    Blood Culture, Routine No growth after 5 days.    Blood culture   Result Value Ref Range    Blood Culture, Routine No growth after 5 days.    CSF culture   Result Value Ref Range    CSF CULTURE No Growth to date     Gram Stain Result No organisms seen Few WBC's    CBC auto differential   Result Value Ref Range    WBC 9.92 3.90 - 12.70 K/uL    RBC 4.27 4.00 - " 5.40 M/uL    Hemoglobin 11.2 (L) 12.0 - 16.0 g/dL    Hematocrit 34.7 (L) 37.0 - 48.5 %    MCV 81 (L) 82 - 98 fL    MCH 26.2 (L) 27.0 - 31.0 pg    MCHC 32.3 32.0 - 36.0 g/dL    RDW 17.4 (H) 11.5 - 14.5 %    Platelets 298 150 - 350 K/uL    MPV 9.2 9.2 - 12.9 fL    Gran # (ANC) 6.9 1.8 - 7.7 K/uL    Lymph # 1.4 1.0 - 4.8 K/uL    Mono # 1.2 (H) 0.3 - 1.0 K/uL    Eos # 0.4 0.0 - 0.5 K/uL    Baso # 0.04 0.00 - 0.20 K/uL    Gran% 69.3 38.0 - 73.0 %    Lymph% 14.3 (L) 18.0 - 48.0 %    Mono% 12.1 4.0 - 15.0 %    Eosinophil% 3.9 0.0 - 8.0 %    Basophil% 0.4 0.0 - 1.9 %    Differential Method Automated    Comprehensive metabolic panel   Result Value Ref Range    Sodium 142 136 - 145 mmol/L    Potassium 3.8 3.5 - 5.1 mmol/L    Chloride 106 95 - 110 mmol/L    CO2 25 23 - 29 mmol/L    Glucose 104 70 - 110 mg/dL    BUN, Bld 13 8 - 23 mg/dL    Creatinine 0.7 0.5 - 1.4 mg/dL    Calcium 9.6 8.7 - 10.5 mg/dL    Total Protein 6.6 6.0 - 8.4 g/dL    Albumin 2.3 (L) 3.5 - 5.2 g/dL    Total Bilirubin 0.5 0.1 - 1.0 mg/dL    Alkaline Phosphatase 152 (H) 55 - 135 U/L    AST 21 10 - 40 U/L    ALT 11 10 - 44 U/L    Anion Gap 11 8 - 16 mmol/L    eGFR if African American >60 >60 mL/min/1.73 m^2    eGFR if non African American >60 >60 mL/min/1.73 m^2   Troponin I #1   Result Value Ref Range    Troponin I 0.025 0.000 - 0.026 ng/mL   B-Type natriuretic peptide (BNP)   Result Value Ref Range    BNP 1,100 (H) 0 - 99 pg/mL   Urinalysis   Result Value Ref Range    Specimen UA Urine, Catheterized     Color, UA Yellow Yellow, Straw, Samra    Appearance, UA Clear Clear    pH, UA 7.0 5.0 - 8.0    Specific Gravity, UA 1.015 1.005 - 1.030    Protein, UA Negative Negative    Glucose, UA Negative Negative    Ketones, UA Negative Negative    Bilirubin (UA) Negative Negative    Occult Blood UA Negative Negative    Nitrite, UA Positive (A) Negative    Urobilinogen, UA 1.0 <2.0 EU/dL    Leukocytes, UA Negative Negative   Troponin I #2   Result Value Ref Range     Troponin I 0.028 (H) 0.000 - 0.026 ng/mL   Urinalysis Microscopic   Result Value Ref Range    RBC, UA 0 0 - 4 /hpf    WBC, UA 1 0 - 5 /hpf    Bacteria, UA Rare None-Occ /hpf    Squam Epithel, UA 0 /hpf    Microscopic Comment SEE COMMENT    Troponin I   Result Value Ref Range    Troponin I 0.033 (H) 0.000 - 0.026 ng/mL   Troponin I   Result Value Ref Range    Troponin I 0.033 (H) 0.000 - 0.026 ng/mL   CBC auto differential   Result Value Ref Range    WBC 11.79 3.90 - 12.70 K/uL    RBC 4.36 4.00 - 5.40 M/uL    Hemoglobin 11.4 (L) 12.0 - 16.0 g/dL    Hematocrit 35.9 (L) 37.0 - 48.5 %    MCV 82 82 - 98 fL    MCH 26.1 (L) 27.0 - 31.0 pg    MCHC 31.8 (L) 32.0 - 36.0 g/dL    RDW 17.5 (H) 11.5 - 14.5 %    Platelets 359 (H) 150 - 350 K/uL    MPV 9.3 9.2 - 12.9 fL    Gran # (ANC) 8.7 (H) 1.8 - 7.7 K/uL    Lymph # 1.5 1.0 - 4.8 K/uL    Mono # 1.1 (H) 0.3 - 1.0 K/uL    Eos # 0.4 0.0 - 0.5 K/uL    Baso # 0.03 0.00 - 0.20 K/uL    Gran% 74.0 (H) 38.0 - 73.0 %    Lymph% 12.6 (L) 18.0 - 48.0 %    Mono% 9.4 4.0 - 15.0 %    Eosinophil% 3.7 0.0 - 8.0 %    Basophil% 0.3 0.0 - 1.9 %    Differential Method Automated    Basic metabolic panel   Result Value Ref Range    Sodium 142 136 - 145 mmol/L    Potassium 3.8 3.5 - 5.1 mmol/L    Chloride 105 95 - 110 mmol/L    CO2 24 23 - 29 mmol/L    Glucose 103 70 - 110 mg/dL    BUN, Bld 19 8 - 23 mg/dL    Creatinine 0.7 0.5 - 1.4 mg/dL    Calcium 9.8 8.7 - 10.5 mg/dL    Anion Gap 13 8 - 16 mmol/L    eGFR if African American >60 >60 mL/min/1.73 m^2    eGFR if non African American >60 >60 mL/min/1.73 m^2   TSH   Result Value Ref Range    TSH 0.670 0.400 - 4.000 uIU/mL   Troponin I   Result Value Ref Range    Troponin I 0.045 (H) 0.000 - 0.026 ng/mL   Brain natriuretic peptide   Result Value Ref Range     (H) 0 - 99 pg/mL   Troponin I   Result Value Ref Range    Troponin I 0.033 (H) 0.000 - 0.026 ng/mL   CBC auto differential   Result Value Ref Range    WBC 9.77 3.90 - 12.70 K/uL    RBC 4.68  4.00 - 5.40 M/uL    Hemoglobin 12.1 12.0 - 16.0 g/dL    Hematocrit 39.7 37.0 - 48.5 %    MCV 85 82 - 98 fL    MCH 25.9 (L) 27.0 - 31.0 pg    MCHC 30.5 (L) 32.0 - 36.0 g/dL    RDW 17.8 (H) 11.5 - 14.5 %    Platelets 371 (H) 150 - 350 K/uL    MPV 8.9 (L) 9.2 - 12.9 fL    Gran # (ANC) 6.8 1.8 - 7.7 K/uL    Lymph # 1.6 1.0 - 4.8 K/uL    Mono # 0.8 0.3 - 1.0 K/uL    Eos # 0.5 0.0 - 0.5 K/uL    Baso # 0.05 0.00 - 0.20 K/uL    Gran% 69.8 38.0 - 73.0 %    Lymph% 16.8 (L) 18.0 - 48.0 %    Mono% 7.9 4.0 - 15.0 %    Eosinophil% 5.0 0.0 - 8.0 %    Basophil% 0.5 0.0 - 1.9 %    Differential Method Automated    Comprehensive metabolic panel   Result Value Ref Range    Sodium 145 136 - 145 mmol/L    Potassium 3.4 (L) 3.5 - 5.1 mmol/L    Chloride 104 95 - 110 mmol/L    CO2 29 23 - 29 mmol/L    Glucose 102 70 - 110 mg/dL    BUN, Bld 25 (H) 8 - 23 mg/dL    Creatinine 0.8 0.5 - 1.4 mg/dL    Calcium 9.9 8.7 - 10.5 mg/dL    Total Protein 6.6 6.0 - 8.4 g/dL    Albumin 2.4 (L) 3.5 - 5.2 g/dL    Total Bilirubin 0.2 0.1 - 1.0 mg/dL    Alkaline Phosphatase 139 (H) 55 - 135 U/L    AST 20 10 - 40 U/L    ALT 13 10 - 44 U/L    Anion Gap 12 8 - 16 mmol/L    eGFR if African American >60 >60 mL/min/1.73 m^2    eGFR if non African American >60 >60 mL/min/1.73 m^2   Sedimentation rate, manual   Result Value Ref Range    Sed Rate 62 (H) 0 - 20 mm/Hr   High sensitivity CRP   Result Value Ref Range    CRP, High Sensitivity 65.12 (H) 0.00 - 3.19 mg/L   Urinalysis   Result Value Ref Range    Specimen UA Urine, Clean Catch     Color, UA Yellow Yellow, Straw, Samra    Appearance, UA Clear Clear    pH, UA 6.0 5.0 - 8.0    Specific Gravity, UA 1.015 1.005 - 1.030    Protein, UA Negative Negative    Glucose, UA Negative Negative    Ketones, UA Negative Negative    Bilirubin (UA) Negative Negative    Occult Blood UA Negative Negative    Nitrite, UA Negative Negative    Urobilinogen, UA Negative <2.0 EU/dL    Leukocytes, UA Trace (A) Negative   CSF cell count  with differential   Result Value Ref Range    Heme Aliquot 4.0 mL    Appearance, CSF Clear Clear    Color, CSF Colorless Colorless    WBC, CSF 38 (H) 0 - 5 /cu mm    RBC, CSF 31 (A) 0 /cu mm    Segmented Neutrophils, CSF 23 (H) 0 - 6 %    Lymphs, CSF 36 (L) 40 - 80 %    Mono/Macrophage, CSF 41 15 - 45 %   Glucose, CSF   Result Value Ref Range    Glucose, CSF 84 (H) 40 - 70 mg/dL   Protein, CSF   Result Value Ref Range    Protein,  (H) 15 - 40 mg/dL   Urinalysis Microscopic   Result Value Ref Range    RBC, UA 1 0 - 4 /hpf    WBC, UA 4 0 - 5 /hpf    Bacteria, UA Occasional None-Occ /hpf    Yeast, UA Occasional (A) None    Squam Epithel, UA 25 /hpf    Microscopic Comment SEE COMMENT    CBC auto differential   Result Value Ref Range    WBC 10.03 3.90 - 12.70 K/uL    RBC 4.20 4.00 - 5.40 M/uL    Hemoglobin 10.7 (L) 12.0 - 16.0 g/dL    Hematocrit 35.6 (L) 37.0 - 48.5 %    MCV 85 82 - 98 fL    MCH 25.5 (L) 27.0 - 31.0 pg    MCHC 30.1 (L) 32.0 - 36.0 g/dL    RDW 17.6 (H) 11.5 - 14.5 %    Platelets 422 (H) 150 - 350 K/uL    MPV 9.1 (L) 9.2 - 12.9 fL    Gran # (ANC) 7.5 1.8 - 7.7 K/uL    Lymph # 1.5 1.0 - 4.8 K/uL    Mono # 0.7 0.3 - 1.0 K/uL    Eos # 0.4 0.0 - 0.5 K/uL    Baso # 0.04 0.00 - 0.20 K/uL    Gran% 74.6 (H) 38.0 - 73.0 %    Lymph% 14.5 (L) 18.0 - 48.0 %    Mono% 6.6 4.0 - 15.0 %    Eosinophil% 3.9 0.0 - 8.0 %    Basophil% 0.4 0.0 - 1.9 %    Differential Method Automated    Comprehensive metabolic panel   Result Value Ref Range    Sodium 142 136 - 145 mmol/L    Potassium 3.5 3.5 - 5.1 mmol/L    Chloride 102 95 - 110 mmol/L    CO2 30 (H) 23 - 29 mmol/L    Glucose 95 70 - 110 mg/dL    BUN, Bld 28 (H) 8 - 23 mg/dL    Creatinine 1.0 0.5 - 1.4 mg/dL    Calcium 9.3 8.7 - 10.5 mg/dL    Total Protein 6.2 6.0 - 8.4 g/dL    Albumin 2.3 (L) 3.5 - 5.2 g/dL    Total Bilirubin 0.2 0.1 - 1.0 mg/dL    Alkaline Phosphatase 133 55 - 135 U/L    AST 21 10 - 40 U/L    ALT 13 10 - 44 U/L    Anion Gap 10 8 - 16 mmol/L    eGFR if  African American >60 >60 mL/min/1.73 m^2    eGFR if non African American 56 (A) >60 mL/min/1.73 m^2   CBC auto differential   Result Value Ref Range    WBC 11.53 3.90 - 12.70 K/uL    RBC 4.42 4.00 - 5.40 M/uL    Hemoglobin 11.4 (L) 12.0 - 16.0 g/dL    Hematocrit 39.0 37.0 - 48.5 %    MCV 88 82 - 98 fL    MCH 25.8 (L) 27.0 - 31.0 pg    MCHC 29.2 (L) 32.0 - 36.0 g/dL    RDW 17.3 (H) 11.5 - 14.5 %    Platelets 454 (H) 150 - 350 K/uL    MPV 9.0 (L) 9.2 - 12.9 fL    Gran # (ANC) 9.9 (H) 1.8 - 7.7 K/uL    Lymph # 1.1 1.0 - 4.8 K/uL    Mono # 0.5 0.3 - 1.0 K/uL    Eos # 0.1 0.0 - 0.5 K/uL    Baso # 0.03 0.00 - 0.20 K/uL    Gran% 85.6 (H) 38.0 - 73.0 %    Lymph% 9.4 (L) 18.0 - 48.0 %    Mono% 4.2 4.0 - 15.0 %    Eosinophil% 0.5 0.0 - 8.0 %    Basophil% 0.3 0.0 - 1.9 %    Differential Method Automated    Comprehensive metabolic panel   Result Value Ref Range    Sodium 145 136 - 145 mmol/L    Potassium 4.7 3.5 - 5.1 mmol/L    Chloride 101 95 - 110 mmol/L    CO2 31 (H) 23 - 29 mmol/L    Glucose 142 (H) 70 - 110 mg/dL    BUN, Bld 32 (H) 8 - 23 mg/dL    Creatinine 0.9 0.5 - 1.4 mg/dL    Calcium 10.2 8.7 - 10.5 mg/dL    Total Protein 7.3 6.0 - 8.4 g/dL    Albumin 2.7 (L) 3.5 - 5.2 g/dL    Total Bilirubin 0.4 0.1 - 1.0 mg/dL    Alkaline Phosphatase 143 (H) 55 - 135 U/L    AST 27 10 - 40 U/L    ALT 19 10 - 44 U/L    Anion Gap 13 8 - 16 mmol/L    eGFR if African American >60 >60 mL/min/1.73 m^2    eGFR if non African American >60 >60 mL/min/1.73 m^2   APTT   Result Value Ref Range    aPTT 27.3 21.0 - 32.0 sec   Protime-INR   Result Value Ref Range    Prothrombin Time 10.6 9.0 - 12.5 sec    INR 1.0 0.8 - 1.2   Ammonia   Result Value Ref Range    Ammonia 61 (H) 10 - 50 umol/L   Urinalysis   Result Value Ref Range    Specimen UA Urine, Catheterized     Color, UA Yellow Yellow, Straw, Samra    Appearance, UA Clear Clear    pH, UA 6.0 5.0 - 8.0    Specific Gravity, UA 1.020 1.005 - 1.030    Protein, UA Negative Negative    Glucose, UA  Negative Negative    Ketones, UA Negative Negative    Bilirubin (UA) Negative Negative    Occult Blood UA Negative Negative    Nitrite, UA Negative Negative    Urobilinogen, UA Negative <2.0 EU/dL    Leukocytes, UA Negative Negative   Basic metabolic panel   Result Value Ref Range    Sodium 147 (H) 136 - 145 mmol/L    Potassium 4.1 3.5 - 5.1 mmol/L    Chloride 102 95 - 110 mmol/L    CO2 33 (H) 23 - 29 mmol/L    Glucose 124 (H) 70 - 110 mg/dL    BUN, Bld 37 (H) 8 - 23 mg/dL    Creatinine 0.8 0.5 - 1.4 mg/dL    Calcium 10.5 8.7 - 10.5 mg/dL    Anion Gap 12 8 - 16 mmol/L    eGFR if African American >60 >60 mL/min/1.73 m^2    eGFR if non African American >60 >60 mL/min/1.73 m^2   CBC auto differential   Result Value Ref Range    WBC 10.93 3.90 - 12.70 K/uL    RBC 4.42 4.00 - 5.40 M/uL    Hemoglobin 11.5 (L) 12.0 - 16.0 g/dL    Hematocrit 38.4 37.0 - 48.5 %    MCV 87 82 - 98 fL    MCH 26.0 (L) 27.0 - 31.0 pg    MCHC 29.9 (L) 32.0 - 36.0 g/dL    RDW 17.0 (H) 11.5 - 14.5 %    Platelets 439 (H) 150 - 350 K/uL    MPV 8.8 (L) 9.2 - 12.9 fL    Gran # (ANC) 9.9 (H) 1.8 - 7.7 K/uL    Lymph # 0.9 (L) 1.0 - 4.8 K/uL    Mono # 0.1 (L) 0.3 - 1.0 K/uL    Eos # 0.0 0.0 - 0.5 K/uL    Baso # 0.01 0.00 - 0.20 K/uL    Gran% 90.6 (H) 38.0 - 73.0 %    Lymph% 8.5 (L) 18.0 - 48.0 %    Mono% 0.8 (L) 4.0 - 15.0 %    Eosinophil% 0.0 0.0 - 8.0 %    Basophil% 0.1 0.0 - 1.9 %    Differential Method Automated    Magnesium   Result Value Ref Range    Magnesium 1.7 1.6 - 2.6 mg/dL   T3, free   Result Value Ref Range    T3, Free 1.0 (L) 2.3 - 4.2 pg/mL   T4, free   Result Value Ref Range    Free T4 1.24 0.71 - 1.51 ng/dL   TSH   Result Value Ref Range    TSH 0.269 (L) 0.400 - 4.000 uIU/mL   Hepatic function panel   Result Value Ref Range    Total Protein 7.5 6.0 - 8.4 g/dL    Albumin 2.9 (L) 3.5 - 5.2 g/dL    Total Bilirubin 0.2 0.1 - 1.0 mg/dL    Bilirubin, Direct 0.1 0.1 - 0.3 mg/dL    AST 25 10 - 40 U/L    ALT 20 10 - 44 U/L    Alkaline Phosphatase  151 (H) 55 - 135 U/L   Rapid HIV   Result Value Ref Range    HIV Rapid Testing Negative Negative   Hepatitis C antibody   Result Value Ref Range    Hepatitis C Ab Negative    Hepatitis B surface antibody   Result Value Ref Range    Hep B S Ab Negative    Hepatitis A antibody, IgG   Result Value Ref Range    Hepatitis A Antibody IgG Negative    Phosphorus   Result Value Ref Range    Phosphorus 2.1 (L) 2.7 - 4.5 mg/dL   Magnesium   Result Value Ref Range    Magnesium 1.9 1.6 - 2.6 mg/dL   CBC auto differential   Result Value Ref Range    WBC 14.55 (H) 3.90 - 12.70 K/uL    RBC 4.31 4.00 - 5.40 M/uL    Hemoglobin 11.1 (L) 12.0 - 16.0 g/dL    Hematocrit 37.4 37.0 - 48.5 %    MCV 87 82 - 98 fL    MCH 25.8 (L) 27.0 - 31.0 pg    MCHC 29.7 (L) 32.0 - 36.0 g/dL    RDW 17.8 (H) 11.5 - 14.5 %    Platelets 405 (H) 150 - 350 K/uL    MPV 8.9 (L) 9.2 - 12.9 fL    Gran # (ANC) 13.3 (H) 1.8 - 7.7 K/uL    Lymph # 1.0 1.0 - 4.8 K/uL    Mono # 0.2 (L) 0.3 - 1.0 K/uL    Eos # 0.0 0.0 - 0.5 K/uL    Baso # 0.00 0.00 - 0.20 K/uL    Gran% 91.7 (H) 38.0 - 73.0 %    Lymph% 6.7 (L) 18.0 - 48.0 %    Mono% 1.6 (L) 4.0 - 15.0 %    Eosinophil% 0.0 0.0 - 8.0 %    Basophil% 0.0 0.0 - 1.9 %    Differential Method Automated    Basic metabolic panel   Result Value Ref Range    Sodium 146 (H) 136 - 145 mmol/L    Potassium 4.4 3.5 - 5.1 mmol/L    Chloride 102 95 - 110 mmol/L    CO2 32 (H) 23 - 29 mmol/L    Glucose 153 (H) 70 - 110 mg/dL    BUN, Bld 49 (H) 8 - 23 mg/dL    Creatinine 1.0 0.5 - 1.4 mg/dL    Calcium 10.2 8.7 - 10.5 mg/dL    Anion Gap 12 8 - 16 mmol/L    eGFR if African American >60 >60 mL/min/1.73 m^2    eGFR if non African American 56 (A) >60 mL/min/1.73 m^2   Phosphorus   Result Value Ref Range    Phosphorus 2.5 (L) 2.7 - 4.5 mg/dL   Magnesium   Result Value Ref Range    Magnesium 2.2 1.6 - 2.6 mg/dL   CBC auto differential   Result Value Ref Range    WBC 13.17 (H) 3.90 - 12.70 K/uL    RBC 4.12 4.00 - 5.40 M/uL    Hemoglobin 10.6 (L) 12.0 -  16.0 g/dL    Hematocrit 35.8 (L) 37.0 - 48.5 %    MCV 87 82 - 98 fL    MCH 25.7 (L) 27.0 - 31.0 pg    MCHC 29.6 (L) 32.0 - 36.0 g/dL    RDW 18.3 (H) 11.5 - 14.5 %    Platelets 389 (H) 150 - 350 K/uL    MPV 8.9 (L) 9.2 - 12.9 fL    Gran # (ANC) 12.2 (H) 1.8 - 7.7 K/uL    Lymph # 0.7 (L) 1.0 - 4.8 K/uL    Mono # 0.3 0.3 - 1.0 K/uL    Eos # 0.0 0.0 - 0.5 K/uL    Baso # 0.00 0.00 - 0.20 K/uL    Gran% 92.4 (H) 38.0 - 73.0 %    Lymph% 5.5 (L) 18.0 - 48.0 %    Mono% 2.1 (L) 4.0 - 15.0 %    Eosinophil% 0.0 0.0 - 8.0 %    Basophil% 0.0 0.0 - 1.9 %    Differential Method Automated    Basic metabolic panel   Result Value Ref Range    Sodium 145 136 - 145 mmol/L    Potassium 5.2 (H) 3.5 - 5.1 mmol/L    Chloride 104 95 - 110 mmol/L    CO2 32 (H) 23 - 29 mmol/L    Glucose 122 (H) 70 - 110 mg/dL    BUN, Bld 64 (H) 8 - 23 mg/dL    Creatinine 1.1 0.5 - 1.4 mg/dL    Calcium 9.8 8.7 - 10.5 mg/dL    Anion Gap 9 8 - 16 mmol/L    eGFR if African American 58 (A) >60 mL/min/1.73 m^2    eGFR if non African American 50 (A) >60 mL/min/1.73 m^2   Ammonia   Result Value Ref Range    Ammonia 47 10 - 50 umol/L   Potassium   Result Value Ref Range    Potassium 4.9 3.5 - 5.1 mmol/L   Phosphorus   Result Value Ref Range    Phosphorus 3.6 2.7 - 4.5 mg/dL   Magnesium   Result Value Ref Range    Magnesium 2.4 1.6 - 2.6 mg/dL   CBC auto differential   Result Value Ref Range    WBC 9.06 3.90 - 12.70 K/uL    RBC 4.23 4.00 - 5.40 M/uL    Hemoglobin 10.9 (L) 12.0 - 16.0 g/dL    Hematocrit 36.4 (L) 37.0 - 48.5 %    MCV 86 82 - 98 fL    MCH 25.8 (L) 27.0 - 31.0 pg    MCHC 29.9 (L) 32.0 - 36.0 g/dL    RDW 18.3 (H) 11.5 - 14.5 %    Platelets 386 (H) 150 - 350 K/uL    MPV 8.9 (L) 9.2 - 12.9 fL    Gran # (ANC) 8.4 (H) 1.8 - 7.7 K/uL    Lymph # 0.4 (L) 1.0 - 4.8 K/uL    Mono # 0.2 (L) 0.3 - 1.0 K/uL    Eos # 0.0 0.0 - 0.5 K/uL    Baso # 0.00 0.00 - 0.20 K/uL    Gran% 92.9 (H) 38.0 - 73.0 %    Lymph% 4.5 (L) 18.0 - 48.0 %    Mono% 2.6 (L) 4.0 - 15.0 %     Eosinophil% 0.0 0.0 - 8.0 %    Basophil% 0.0 0.0 - 1.9 %    Differential Method Automated    Basic metabolic panel   Result Value Ref Range    Sodium 149 (H) 136 - 145 mmol/L    Potassium 4.7 3.5 - 5.1 mmol/L    Chloride 105 95 - 110 mmol/L    CO2 35 (H) 23 - 29 mmol/L    Glucose 120 (H) 70 - 110 mg/dL    BUN, Bld 77 (H) 8 - 23 mg/dL    Creatinine 1.1 0.5 - 1.4 mg/dL    Calcium 9.9 8.7 - 10.5 mg/dL    Anion Gap 9 8 - 16 mmol/L    eGFR if African American 58 (A) >60 mL/min/1.73 m^2    eGFR if non African American 50 (A) >60 mL/min/1.73 m^2   Brain natriuretic peptide   Result Value Ref Range     (H) 0 - 99 pg/mL   Pathologist Review of Laboratory Test   Result Value Ref Range    Pathologist Review, Body Fluid REVIEWED    Thyroid peroxidase antibody   Result Value Ref Range    Thyroperoxidase Antibodies <6.0 <6.0 IU/mL   Vitamin D   Result Value Ref Range    Vit D, 25-Hydroxy 27 (L) 30 - 96 ng/mL   Vitamin B12   Result Value Ref Range    Vitamin B-12 1441 (H) 210 - 950 pg/mL   Phosphorus   Result Value Ref Range    Phosphorus 2.9 2.7 - 4.5 mg/dL   Magnesium   Result Value Ref Range    Magnesium 2.7 (H) 1.6 - 2.6 mg/dL   CBC auto differential   Result Value Ref Range    WBC 8.75 3.90 - 12.70 K/uL    RBC 4.26 4.00 - 5.40 M/uL    Hemoglobin 10.9 (L) 12.0 - 16.0 g/dL    Hematocrit 35.7 (L) 37.0 - 48.5 %    MCV 84 82 - 98 fL    MCH 25.6 (L) 27.0 - 31.0 pg    MCHC 30.5 (L) 32.0 - 36.0 g/dL    RDW 18.7 (H) 11.5 - 14.5 %    Platelets 344 150 - 350 K/uL    MPV 9.6 9.2 - 12.9 fL    Gran # (ANC) 8.0 (H) 1.8 - 7.7 K/uL    Lymph # 0.5 (L) 1.0 - 4.8 K/uL    Mono # 0.3 0.3 - 1.0 K/uL    Eos # 0.0 0.0 - 0.5 K/uL    Baso # 0.00 0.00 - 0.20 K/uL    Gran% 91.2 (H) 38.0 - 73.0 %    Lymph% 5.5 (L) 18.0 - 48.0 %    Mono% 3.3 (L) 4.0 - 15.0 %    Eosinophil% 0.0 0.0 - 8.0 %    Basophil% 0.0 0.0 - 1.9 %    Differential Method Automated    Basic metabolic panel   Result Value Ref Range    Sodium 152 (H) 136 - 145 mmol/L    Potassium 3.9  3.5 - 5.1 mmol/L    Chloride 106 95 - 110 mmol/L    CO2 35 (H) 23 - 29 mmol/L    Glucose 152 (H) 70 - 110 mg/dL    BUN, Bld 94 (H) 8 - 23 mg/dL    Creatinine 1.3 0.5 - 1.4 mg/dL    Calcium 9.5 8.7 - 10.5 mg/dL    Anion Gap 11 8 - 16 mmol/L    eGFR if African American 47 (A) >60 mL/min/1.73 m^2    eGFR if non African American 41 (A) >60 mL/min/1.73 m^2   POCT glucose   Result Value Ref Range    POCT Glucose 92 70 - 110 mg/dL   POCT glucose   Result Value Ref Range    POCT Glucose 122 (H) 70 - 110 mg/dL   POCT glucose   Result Value Ref Range    POCT Glucose 112 (H) 70 - 110 mg/dL   POCT glucose   Result Value Ref Range    POCT Glucose 88 70 - 110 mg/dL   POCT glucose   Result Value Ref Range    POCT Glucose 105 70 - 110 mg/dL   POCT glucose   Result Value Ref Range    POCT Glucose 110 70 - 110 mg/dL   POCT glucose   Result Value Ref Range    POCT Glucose 95 70 - 110 mg/dL   POCT glucose   Result Value Ref Range    POCT Glucose 90 70 - 110 mg/dL   POCT glucose   Result Value Ref Range    POCT Glucose 102 70 - 110 mg/dL   POCT glucose   Result Value Ref Range    POCT Glucose 94 70 - 110 mg/dL   POCT glucose   Result Value Ref Range    POCT Glucose 139 (H) 70 - 110 mg/dL   POCT glucose   Result Value Ref Range    POCT Glucose 121 (H) 70 - 110 mg/dL   POCT glucose   Result Value Ref Range    POCT Glucose 142 (H) 70 - 110 mg/dL   POCT glucose   Result Value Ref Range    POCT Glucose 108 70 - 110 mg/dL   POCT glucose   Result Value Ref Range    POCT Glucose 103 70 - 110 mg/dL   POCT glucose   Result Value Ref Range    POCT Glucose 107 70 - 110 mg/dL   POCT glucose   Result Value Ref Range    POCT Glucose 137 (H) 70 - 110 mg/dL   POCT glucose   Result Value Ref Range    POCT Glucose 130 (H) 70 - 110 mg/dL   POCT glucose   Result Value Ref Range    POCT Glucose 132 (H) 70 - 110 mg/dL   ISTAT PROCEDURE   Result Value Ref Range    POC PH 7.135 (LL) 7.35 - 7.45    POC PCO2 117.6 (HH) 35 - 45 mmHg    POC PO2 187 (H) 80 - 100  mmHg    POC HCO3 39.6 (H) 24 - 28 mmol/L    POC BE 11 -2 to 2 mmol/L    POC SATURATED O2 99 95 - 100 %    Sample ARTERIAL     Site RR     Allens Test Pass     DelSys Nasal Can     Mode SPONT     Flow 2.5     FiO2 30    POCT glucose   Result Value Ref Range    POCT Glucose 112 (H) 70 - 110 mg/dL   ISTAT PROCEDURE   Result Value Ref Range    POC PH 7.384 7.35 - 7.45    POC PCO2 68.3 (HH) 35 - 45 mmHg    POC PO2 114 (H) 80 - 100 mmHg    POC HCO3 40.8 (H) 24 - 28 mmol/L    POC BE 16 -2 to 2 mmol/L    POC SATURATED O2 98 95 - 100 %    Rate 20     Sample ARTERIAL     Site LR     Allens Test Pass     DelSys CPAP/BiPAP     Mode BiPAP     FiO2 30     IP 18     EP 6    POCT glucose   Result Value Ref Range    POCT Glucose 113 (H) 70 - 110 mg/dL   POCT glucose   Result Value Ref Range    POCT Glucose 200 (H) 70 - 110 mg/dL   POCT glucose   Result Value Ref Range    POCT Glucose 113 (H) 70 - 110 mg/dL   POCT glucose   Result Value Ref Range    POCT Glucose 139 (H) 70 - 110 mg/dL   POCT glucose   Result Value Ref Range    POCT Glucose 127 (H) 70 - 110 mg/dL   POCT glucose   Result Value Ref Range    POCT Glucose 122 (H) 70 - 110 mg/dL   POCT glucose   Result Value Ref Range    POCT Glucose 138 (H) 70 - 110 mg/dL   POCT glucose   Result Value Ref Range    POCT Glucose 259 (H) 70 - 110 mg/dL   POCT glucose   Result Value Ref Range    POCT Glucose 116 (H) 70 - 110 mg/dL   POCT glucose   Result Value Ref Range    POCT Glucose 118 (H) 70 - 110 mg/dL   POCT glucose   Result Value Ref Range    POCT Glucose 134 (H) 70 - 110 mg/dL   ISTAT PROCEDURE   Result Value Ref Range    POC PH 7.349 (L) 7.35 - 7.45    POC PCO2 69.9 (HH) 35 - 45 mmHg    POC PO2 66 (L) 80 - 100 mmHg    POC HCO3 38.5 (H) 24 - 28 mmol/L    POC BE 13 -2 to 2 mmol/L    POC SATURATED O2 91 (L) 95 - 100 %    Rate 20     Sample ARTERIAL     Site LR     Allens Test Pass     DelSys CPAP/BiPAP     Mode BiPAP     FiO2 40     IP 18     EP 6    POCT glucose   Result Value Ref  Range    POCT Glucose 136 (H) 70 - 110 mg/dL   POCT glucose   Result Value Ref Range    POCT Glucose 131 (H) 70 - 110 mg/dL   POCT glucose   Result Value Ref Range    POCT Glucose 140 (H) 70 - 110 mg/dL   ISTAT PROCEDURE   Result Value Ref Range    POC PH 7.377 7.35 - 7.45    POC PCO2 69.4 (HH) 35 - 45 mmHg    POC PO2 104 (H) 80 - 100 mmHg    POC HCO3 40.7 (H) 24 - 28 mmol/L    POC BE 16 -2 to 2 mmol/L    POC SATURATED O2 98 95 - 100 %    Rate 20     Sample ARTERIAL     Site RR     Allens Test Pass     DelSys CPAP/BiPAP     Mode AVAPS     Vt 500     PiP 12     FiO2 40    POCT glucose   Result Value Ref Range    POCT Glucose 117 (H) 70 - 110 mg/dL   ISTAT PROCEDURE   Result Value Ref Range    POC PH 7.383 7.35 - 7.45    POC PCO2 68.6 (HH) 35 - 45 mmHg    POC PO2 95 80 - 100 mmHg    POC HCO3 40.8 (H) 24 - 28 mmol/L    POC BE 16 -2 to 2 mmol/L    POC SATURATED O2 97 95 - 100 %    Rate 20     Sample ARTERIAL     Site RR     Allens Test Pass     DelSys CPAP/BiPAP     Mode AVAPS     Vt 500     PiP 17     FiO2 40    ISTAT PROCEDURE   Result Value Ref Range    POC PH 7.508 (H) 7.35 - 7.45    POC PCO2 47.2 (H) 35 - 45 mmHg    POC PO2 53 (LL) 80 - 100 mmHg    POC HCO3 37.5 (H) 24 - 28 mmol/L    POC BE 14 -2 to 2 mmol/L    POC SATURATED O2 90 (L) 95 - 100 %    Rate 18     Sample ARTERIAL     Site LR     Allens Test Pass     DelSys Adult Vent     Mode AC/PRVC     Vt 450     PEEP 5     FiO2 50    POCT glucose   Result Value Ref Range    POCT Glucose 129 (H) 70 - 110 mg/dL   POCT glucose   Result Value Ref Range    POCT Glucose 128 (H) 70 - 110 mg/dL   ISTAT PROCEDURE   Result Value Ref Range    POC PH 7.567 (HH) 7.35 - 7.45    POC PCO2 43.0 35 - 45 mmHg    POC PO2 96 80 - 100 mmHg    POC HCO3 39.1 (H) 24 - 28 mmol/L    POC BE 17 -2 to 2 mmol/L    POC SATURATED O2 98 95 - 100 %    Rate 18     Sample ARTERIAL     Site RR     Allens Test Pass     DelSys Adult Vent     Mode AC/PRVC     Vt 400     PEEP 5     FiO2 65    POCT glucose    Result Value Ref Range    POCT Glucose 153 (H) 70 - 110 mg/dL   ISTAT PROCEDURE   Result Value Ref Range    POC PH 7.508 (H) 7.35 - 7.45    POC PCO2 53.2 (H) 35 - 45 mmHg    POC PO2 175 (H) 80 - 100 mmHg    POC HCO3 42.2 (H) 24 - 28 mmol/L    POC BE 19 -2 to 2 mmol/L    POC SATURATED O2 100 95 - 100 %    Rate 14     Sample ARTERIAL     Site RR     Allens Test N/A     DelSys Adult Vent     Mode AC/PRVC     Vt 380     PEEP 5     FiO2 65    POCT glucose   Result Value Ref Range    POCT Glucose 168 (H) 70 - 110 mg/dL   POCT glucose   Result Value Ref Range    POCT Glucose 202 (H) 70 - 110 mg/dL         ASSESSMENT/PLAN:   Patient with multiple medical issues. NMO was in the differential along with Autoimmune encephalopathy, paraneoplastic  Now on Plasmapheresis. Some improvement today.  Will continue with Plasmapheresis      45 minutes of critical care provided    Thank you     Duane Burton MD., Ph.D., MS

## 2018-07-11 PROBLEM — E87.0 HYPERNATREMIA: Status: ACTIVE | Noted: 2018-01-01

## 2018-07-11 NOTE — ASSESSMENT & PLAN NOTE
7/5 Noninvasive Positive Pressure Ventilation , jet nebs and oxygen  7/6 IV steroids added  7/7 continue Noninvasive Positive Pressure Ventilation at night and prn during day  7/8 elevated  PCO2 - change ventilation mode  7/9 O2/ MV/ Pulm toilet . Daily ABG , CXR  7/12 chest x-ray and ABG reviewed.  Compensated respiratory acidosis.  No changes in vent settings.

## 2018-07-11 NOTE — ASSESSMENT & PLAN NOTE
7/11 sodium remains elevated at 152.  Water deficit 6.6 L plus about 1.4 L urine output last 24 hr.  Will replace half of the deficit next 24 hr.  Currently on 350 free water q.4 hours.  Will start D5W at 125 cc/hour.  Monitor BMP.

## 2018-07-11 NOTE — PLAN OF CARE
Patient received first phresis treatment and is now opening her eyes and following commands     07/11/18 1314   Discharge Reassessment   Assessment Type Discharge Planning Reassessment   Provided patient/caregiver education on the expected discharge date and the discharge plan No   Do you have any problems affording any of your prescribed medications? No   Discharge Plan A Long-term acute care facility (LTAC)   Discharge Plan B Skilled Nursing Facility   Patient choice form signed by patient/caregiver N/A   Can the patient answer the patient profile reliably? No, cognitively impaired   How does the patient rate their overall health at the present time? Fair   Describe the patient's ability to walk at the present time. Does not walk or unable to take any steps at all   How often would a person be available to care for the patient? Whenever needed   Number of comorbid conditions (as recorded on the chart) Five or more   During the past month, has the patient often been bothered by feeling down, depressed or hopeless? No   During the past month, has the patient often been bothered by little interest or pleasure in doing things? No

## 2018-07-11 NOTE — SUBJECTIVE & OBJECTIVE
Review of Systems   Unable to perform ROS: Mental status change     Objective:     Vital Signs (Most Recent):  Temp: 98.5 °F (36.9 °C) (07/11/18 0705)  Pulse: 70 (07/11/18 1037)  Resp: 14 (07/11/18 1037)  BP: (!) 111/58 (07/11/18 1037)  SpO2: 100 % (07/11/18 1037) Vital Signs (24h Range):  Temp:  [97.6 °F (36.4 °C)-98.7 °F (37.1 °C)] 98.5 °F (36.9 °C)  Pulse:  [69-74] 70  Resp:  [11-37] 14  SpO2:  [94 %-100 %] 100 %  BP: ()/() 111/58     Weight: 70 kg (154 lb 5.2 oz)  Body mass index is 26.49 kg/m².    Intake/Output Summary (Last 24 hours) at 07/11/18 1042  Last data filed at 07/11/18 0800   Gross per 24 hour   Intake          2858.13 ml   Output             1350 ml   Net          1508.13 ml      Physical Exam   Constitutional: She appears well-developed.   Ill appearing    HENT:   + ETT    Eyes: Pupils are equal, round, and reactive to light.   Neck: Neck supple.   Cardiovascular: Normal rate.    Pulmonary/Chest: Effort normal.   BS decreased mildly bilaterally   Right chest Vas-Cath   Abdominal: Soft. There is no tenderness.   Genitourinary:   Genitourinary Comments: Flores    Musculoskeletal: She exhibits no tenderness.   Neurological:   Awake ,  Following simple commands ,intubate on fentanyl drips    Skin: Skin is warm.       Significant Labs: All pertinent labs within the past 24 hours have been reviewed.    Significant Imaging: I have reviewed all pertinent imaging results/findings within the past 24 hours.

## 2018-07-11 NOTE — ASSESSMENT & PLAN NOTE
Controlled  - continue Toprol XL and lisinopril  - Apresoline prn  Lisinopril discontinued 7/4/18 - daughter stated it caused respiratory problems    DM  - HgbA1c on 06/03/18 -- 5.3 -- controlled  - Accuchecks and low dose SSI  -  7/11  Hold antihypertensive due to hypotension

## 2018-07-11 NOTE — PROGRESS NOTES
[facility] Hospital Medicine  Progress Note    Patient Name: Carlie Valdez  MRN: [id number]  Patient Class: IP- Inpatient   Admission Date: 6/30/2018  Length of Stay: 11 days  Attending Physician: Raymundo Vidal MD  Primary Care Provider: Johanna Guzman MD        Subjective:     Principal Problem:Respiratory failure    HPI:  Carlie Valdez is a 72 year old female with a PMHx of HTN, GERD, DM, A-fib, Asthma, and Defibrillator who presented from home with c/o chest pain. Located to right side with no radiation. Associated symptoms: SOB. Pt and sitters at bedside report she was recently discharged from [facility] and pt hasn't had her home medications since being discharged. ED workup revealed: Hgb/Hct 11.2/34.7, Alk phos 152, BNP 1100, troponin 0.028. UA (+) nitrites. CXR with central vascular congestion with small bilateral effusion. Primary cardiologist is Dr. Hernández.  Pt admitted to Telemetry for Acute on chronic CHF exacerbation and UTI.     Hospital Course:  Pt admitted for decompensated heart failure and UTI. Diuresis with Bumex and IV Rocephin in progress. Pt with physical debility and PT/OT consulted. Dr. Joiner had lengthy discussion with daughter, Haley, and patient in reference to generalized weakness and proper disposition. SNF placement was agreed however, when  approach regarding SNF selection patient refused. Introduce Hospice to patient and advised she was not a candidate for Rehab due to her extent of weakness. Pt stated she would discharge home with sitters and home health services. 7/2/18 - Dr. Joiner has spoken with pt's daughters, Haley and Rosy, regarding the mother's condition. She has diuresed and diuretics changed to oral. She was medically stable for discharge. SNF placement pending. PT/OT in progress. 7/3/18 - It was noted that pt has refused participation in PT/OT. She appears grossly more weak in the BUE/BLE  extremities. Family notified of condition. Granddaughter at bedside mentioned similar symptoms prior to admission. Neurology consult placed, ESR/CRP pending, CT Head and C spine pending. At 4th day, CT of Head and C spine found no concerning findings. ESR and CRP elevated although no concern for vasculitis. Neurology saw the patient and recommended LP to rule out GBs or CIDP. Xarelto placed on hold and LP by IR is pending as Xarelto needs to be on hold for 3 days. Speech therapy evaluated the patient and noted inconsistent dysphonia. The patient will voice normally then start mouthing words. Diet recommendations:  Mechanical soft, Thin. On 6th day, pt noted to have acute onset of metabolic encephalopathy. Pt more somnolent, respiration shallow. She would arouse with sternal rub but return to lethargy. Repeat CT of Head was negative, ammonia slightly elevated at 61 and ABG showed acidosis with hypoxic/hypercapneic respiratory failure.   07/06/18 - presently on Bipap for respiratory acidosis , Lumbar puncture done today 06/07/18 07/07- Diagnosed with NMO at [facility]  1000 mg/day for 5 days per neurology   07/08- possible aspiration overnight , follow up CXR new right pleural effusion , continue Bipap support   07/09 patient was intubated in morning continue to have respiratory distress and no improvement in neurological status   07/10  Started on plasma exchange series for nmo . As per neurology recommendation .  No improvement in mental status . Family updated on plan   07/11  Patient had first cycle of plasma exchange yesterday . Now can follow simple commands .continue to be on vent and with plasma exchange . Hold bp meds due to hypotension         Review of Systems   Unable to perform ROS: Mental status change     Objective:     Vital Signs (Most Recent):  Temp: 98.5 °F (36.9 °C) (07/11/18 0705)  Pulse: 70 (07/11/18 1037)  Resp: 14 (07/11/18 1037)  BP: (!) 111/58 (07/11/18 1037)  SpO2: 100 % (07/11/18 1037)  Vital Signs (24h Range):  Temp:  [97.6 °F (36.4 °C)-98.7 °F (37.1 °C)] 98.5 °F (36.9 °C)  Pulse:  [69-74] 70  Resp:  [11-37] 14  SpO2:  [94 %-100 %] 100 %  BP: ()/() 111/58     Weight: 70 kg (154 lb 5.2 oz)  Body mass index is 26.49 kg/m².    Intake/Output Summary (Last 24 hours) at 07/11/18 1042  Last data filed at 07/11/18 0800   Gross per 24 hour   Intake          2858.13 ml   Output             1350 ml   Net          1508.13 ml      Physical Exam   Constitutional: She appears well-developed.   Ill appearing    HENT:   + ETT    Eyes: Pupils are equal, round, and reactive to light.   Neck: Neck supple.   Cardiovascular: Normal rate.    Pulmonary/Chest: Effort normal.   BS decreased mildly bilaterally   Right chest Vas-Cath   Abdominal: Soft. There is no tenderness.   Genitourinary:   Genitourinary Comments: Flores    Musculoskeletal: She exhibits no tenderness.   Neurological:   Awake ,  Following simple commands ,intubate on fentanyl drips    Skin: Skin is warm.       Significant Labs: All pertinent labs within the past 24 hours have been reviewed.    Significant Imaging: I have reviewed all pertinent imaging results/findings within the past 24 hours.    Assessment/Plan:      * Respiratory failure    ABG reflected hypoxic/hypercapneic respiratory failure  ICU care  Pulmonology/Critical Care following  Bipap for more than 24 hours with no improvement . 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, S REFLEX    Continue with steroids no improvement will consider pheresis as per neurlogy\  7/10  Plasma exchange series          Weakness generalized              Encephalopathy    - neurology following   -autoimmune encephalopathy vs NMO   7/10  Started on apheresis   7/11  Day 2 of plasma pheresis  With some  improvement in mental status              Pressure ulcer of right heel, stage 3              Skin breakdown    Pt lying in specialty bed   Multiple areas of skin breakdown  - Inpatient consult to wound care          UTI (urinary tract infection)    - UA (+) nitrites    - Urine culture pending    COAGULASE-NEGATIVE STAPHYLOCOCCUS SPECIES   50,000 - 99,999 cfu/ml   Susceptibility testing not routinely performed.     IV Rocephin - started 6/30/18, Rocephin stopped 7/5/18  7/10  Completed course           Severe muscle deconditioning    - Currently has [facility] (PT/OT/nurse)  - secondary to NMO        Acute on chronic systolic heart failure    Compensated as of 7/3/18  - BNP 1100  - CXR with vascular congestion with small bilateral effusion  - Mild BLE edema upon assessment but diminished breath sounds noted  - 2D ECHO on 06/02/18 with EF 35% and pulmonary HTN  - Pt received Bumex 2 mg IVP x 1 in ED  - Bumex 2 mg IVP daily====> changed to Bumex 2 mg twice daily 7/2/18  - Continue Metoprolol  7/10  Compensated         Left ischial pressure sore, stage III    Wound care           Hypothyroidism    - TSH about a month ago -- 1.265  - Continue Levothyroxine          Chronic kidney disease (CKD), stage III (moderate)    - Currently stable  - Monitor kidney function while on diuretics          Hypertension associated with diabetes    Controlled  - continue Toprol XL and lisinopril  - Apresoline prn  Lisinopril discontinued 7/4/18 - daughter stated it caused respiratory problems    DM  - HgbA1c on 06/03/18 -- 5.3 -- controlled  - Accuchecks and low dose SSI  -  7/11  Hold antihypertensive due to hypotension         Atrial fibrillation    - Currently rate controlled  Telemetry monitoring  - Continue Xarelto and Amiodarone  -xarelto on hold.LP done yesterday will resume it once ok with neurology and r            VTE Risk Mitigation         Ordered     heparin, porcine (PF) injection 3,800 Units  As needed (PRN)       07/10/18 0859     heparin (porcine) injection 4,000 Units  As needed (PRN)      07/09/18 2045     heparin (porcine) injection 5,000 Units  Every 8 hours      07/04/18 1209          Critical care time spent on the evaluation and treatment of severe organ dysfunction, review of pertinent labs and imaging studies, discussions with consulting providers and discussions with patient/family:45 minutes.    Raymundo Vidal MD  Department of Hospital Medicine   [facility]

## 2018-07-11 NOTE — EICU
MAP soft at 65.  HR 76 paced.  Supposed to get metoprolol 50 mg.  And on amiodarone.  Notes reviewed.  No fever.    Plan:  Instead of 50 mg to give 25 mg of metoprolol , oral for tonight.

## 2018-07-11 NOTE — PLAN OF CARE
Problem: Patient Care Overview  Goal: Plan of Care Review  Patient currently requires total assist x 2 for bed mobility and total assist for sitting balance at EOB.   Outcome: Ongoing (interventions implemented as appropriate)  Patient on Vent sedated on fentanyl.  Responds to voice.  Intermittently follows commands.  BUE flaccid.  BLE have some movement.  No s/s of pain.  SBP ranges from 90s-1 teens.  Other vitals stable.  Remains A-paced on the monitor.  Tube feeding continued at goal rate of 50.  Residuals ranged from 120-200. Accu checks Q6H.  Urine output has been marginal.  Repositioning patient Q2H to prevent further skin breakdown.  POC reviewed with family.

## 2018-07-11 NOTE — ASSESSMENT & PLAN NOTE
- neurology following   -autoimmune encephalopathy vs NMO   7/10  Started on apheresis   7/11  Day 2 of plasma pheresis  With some improvement in mental status

## 2018-07-11 NOTE — PROGRESS NOTES
[facility]  Critical Care Medicine  Progress Note    Patient Name: Carlie Valdez  MRN: [id number]  Admission Date: 6/30/2018  Hospital Length of Stay: 11 days  Code Status: Full Code  Attending Provider: Raymundo Vidal MD  Primary Care Provider: Johanna Guzman MD   Principal Problem: Respiratory failure    Subjective:     HPI:  72 year old female with a PMHx of p[revious cerebral vascular accident with right sided weakness and aphasia, HTN, GERD, DM, A-fib, Cardiac Asthma, and Defibrillator who presented from home with c/o chest pain to Emergency Room on 6/30/2018 folllwing recent discharge from [facility]. Admitted for acute on chronic systolic Congestive Heart failure and progressively worsened. Noted to have worsening mental status and shortness of breath and transferrrd to ICU for hypercapnic respiratory failure. Placed on Noninvasive Positive Pressure Ventilation     Hospital/ICU Course:  7/5 Transferred to ICU. Noninvasive Positive Pressure Ventilation initiated , may need intubation  7/6 Improved ventilation with Noninvasive Positive Pressure Ventilation but now generalized weakness. Steroids started for respiratory failure. Lumbar puncture later today.  7/7 Respiratory status stable. Labile blood pressure. Started on high does steroids  7/8 worsening respiraoty status with elevated  PCO2  7/9 patient seen and examined, chest x-ray and ABG reviewed, daughter Haley was called, intubated for respiratory distress and tachypnea.  7/10 patient seen and examined, chest x-ray and ABG reviewed.  She had a Vas-Cath placed overnight.  Off high-dose steroid.  Plasmapheresis today.  7/11 patient seen and examined.  Chest x-ray and ABG reviewed.  Plasma exchange day 2.  Tolerating enteral feeding.  Hypotensive, started on Levophed drip.      Interval History:   Review of Systems   Unable to perform ROS: Intubated       Objective:     Vital Signs (Most Recent):  Temp: 98.5 °F  (36.9 °C) (07/11/18 1105)  Pulse: 70 (07/11/18 1237)  Resp: 14 (07/11/18 1237)  BP: (!) 110/56 (07/11/18 1235)  SpO2: 100 % (07/11/18 1237) Vital Signs (24h Range):  Temp:  [97.7 °F (36.5 °C)-98.7 °F (37.1 °C)] 98.5 °F (36.9 °C)  Pulse:  [69-74] 70  Resp:  [11-37] 14  SpO2:  [94 %-100 %] 100 %  BP: ()/(30-72) 110/56     Weight: 70 kg (154 lb 5.2 oz)  Body mass index is 26.49 kg/m².      Intake/Output Summary (Last 24 hours) at 07/11/18 1358  Last data filed at 07/11/18 1200   Gross per 24 hour   Intake          3901.88 ml   Output             1585 ml   Net          2316.88 ml       Physical Exam   Constitutional: She appears well-developed.   Ill appearing    HENT:   + ETT    Eyes: Pupils are equal, round, and reactive to light.   Neck: Neck supple.   Cardiovascular: Normal rate.    Pulmonary/Chest: Effort normal.   BS decreased mildly bilaterally   Right chest Vas-Cath   Abdominal: Soft. There is no tenderness.   Genitourinary:   Genitourinary Comments: Flores    Musculoskeletal: She exhibits no tenderness.   Neurological:   Awake , not following commands , open eyes , after intubation sedated wih fentanyl gtt    Skin: Skin is warm.       Vents:  Vent Mode: A/C (07/11/18 1235)  Ventilator Initiated: Yes (07/09/18 1022)  Set Rate: 14 bmp (07/11/18 1235)  Vt Set: 380 mL (07/11/18 1235)  Pressure Support: 0 cmH20 (07/11/18 1235)  PEEP/CPAP: 5 cmH20 (07/11/18 1235)  Oxygen Concentration (%): 45 (07/11/18 1237)  Peak Airway Pressure: 23 cmH2O (07/11/18 1235)  Plateau Pressure: 18 cmH20 (07/11/18 1235)  Total Ve: 5.67 mL (07/11/18 1235)  F/VT Ratio<105 (RSBI): (!) 34.57 (07/11/18 1235)    Lines/Drains/Airways     Central Venous Catheter Line                 Hemodialysis Catheter 07/09/18 2100 right internal jugular 1 day          Drain                 Urethral Catheter 07/05/18 1114 6 days         NG/OG Tube 07/09/18 orogastric Left mouth 2 days          Airway                 Airway - Non-Surgical 07/09/18 1005  Endotracheal Tube 2 days          Pressure Ulcer                 Pressure Injury 06/02/18 Right lateral Heel Stage 3 39 days         Pressure Injury 06/02/18 1659 Left proximal Ischial tuberosity Stage 3 38 days         Pressure Injury 06/02/18 1902 Right medial Heel Unstageable 38 days         Pressure Injury 06/30/18 Right lateral Buttocks Stage 2 11 days          Peripheral Intravenous Line                 Peripheral IV - Single Lumen 07/06/18 2315 Right Forearm 4 days         Peripheral IV - Single Lumen 07/09/18 0710 Right Antecubital 2 days                Significant Labs:    CBC/Anemia Profile:    Recent Labs  Lab 07/09/18  1449 07/10/18  0418 07/11/18  0410   WBC  --  8.75 11.45   HGB  --  10.9* 10.7*   HCT  --  35.7* 35.2*   PLT  --  344 248   MCV  --  84 84   RDW  --  18.7* 18.5*   [id number] 1441*  --   --      Chemistries:    Recent Labs  Lab 07/10/18  0418 07/11/18  0410   * 152*   K 3.9 3.3*    106   CO2 35* 36*   BUN 94* 86*   CREATININE 1.3 1.0   CALCIUM 9.5 8.9   MG 2.7* 2.5   PHOS 2.9 2.1*     Significant Imaging:  CXR: I have reviewed all pertinent results/findings within the past 24 hours and my personal findings are:  Vas-Cath, ET-tube in place.  No acute findings.           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 Neurology following needs Plasmapheresis. Discussed it with Neurology, he arranged for vas cath placement.   7/10.  Off high-dose steroid.  Status post Vas-Cath placement yesterday.  Plasmapheresis for 5 days.  Neurology follow-up  7/11 plasma exchange day 2.        Encephalopathy    7/6 pCO2 normalized and patient improved but still not alert, awaiting lumbar puncture  7/8 worsening mental status  - no longer feeding herself        Derm   Skin breakdown    7/5 Wound care nurse         Pulmonary   * Respiratory failure    7/5 Noninvasive Positive Pressure Ventilation , jet nebs and oxygen  7/6 IV steroids added  7/7 continue Noninvasive Positive Pressure Ventilation at night and prn during day  7/8 elevated  PCO2 - change ventilation mode  7/9 O2/ MV/ Pulm toilet . Daily ABG , CXR  7/12 chest x-ray and ABG reviewed.  Compensated respiratory acidosis.  No changes in vent settings.        Acute hypercapnic respiratory failure    7/9 neuromuscular resp failure sp intubation. O2/ MV/ Pulm toilet . Daily ABG , CXR .  7/10 daily ABG, chest x-ray.  Will discontinue Lasix.  Part of the respiratory acidosis is compensating for metabolic alkalosis.  Keep respiratory rate at 14.  Tidal volume 380 mL.  7/11 O2/mechanical ventilation/pulmonary toilet.  Enteral tube feeding.  Daily chest x-ray and ABG.        Cardiac/Vascular   Acute on chronic systolic heart failure    7/5 start IV lasix  7/6 continue IV lasix  7/8 worsening Chest X Ray with pleural effusion on the right , increase lasix to BID  7/9 negative 2.9 liters . Cont IV Lasix 40 mg q 12 hrs .   7/10 -3 0.5 L since admission.  Improving oxygen requirements.  Creatinine in and serum bicarb increasing.  Will discontinue Lasix.  7/11 no evidence of fluid overload.  Keep off Lasix.  Hypotensive.  Started on Levophed drip keep map above 65 mm of mercury.        Renal/   Hypernatremia    7/11 sodium remains elevated at 152.  Water deficit 6.6 L plus about 1.4 L urine output last 24 hr.  Will replace half of the deficit next 24 hr.  Currently on 350 free water q.4 hours.  Will start D5W at 125 cc/hour.  Monitor BMP.        UTI (urinary tract infection)    7/6 - resolved - antibiotics stopped        Other   Increased ammonia level    7/7 Continue to follow  7/8 normal today           Critical Care Daily Checklist:    A: Awake: RASS Goal/Actual Goal: RASS Goal: 0-->alert and calm  Actual: Guerrero Agitation Sedation Scale (RASS): Light sedation   B:  Spontaneous Breathing Trial Performed?   not applicable today.   C: SAT & SBT Coordinated?  Not applicable today.                      D: Delirium: CAM-ICU Overall CAM-ICU: Positive   E: Early Mobility Performed? Yes.  Bedrest   F: Feeding Goal: Goals: Diet advancement or initiate nutrition support by next RD visit  Status: Nutrition Goal Status: new   Current Diet Order   No orders of the defined types were placed in this encounter.      AS: Analgesia/Sedation Fentanyl drip/Ativan as needed   T: Thromboembolic Prophylaxis Heparin 5000 units subcutaneous q.8 hours   H: HOB > 300 Yes   U: Stress Ulcer Prophylaxis (if needed) IV PROTONIX   G: Glucose Control Fingerstick q.6 hours    B: Bowel Function Stool Occurrence: 1   I: Indwelling Catheter (Lines & Flores) Necessity Keep Flores   D: De-escalation of Antimicrobials/Pharmacotherapies No antibiotic    Plan for the day/ETD Y    Code Status:  Family/Goals of Care: Full Code  Discussed plan of care with daughter at bedside     Critical Care Time: 35 minutes  Critical secondary to Patient has a condition that poses threat to life and bodily function:  Respiratory failure due to neuromuscular disease      Critical care was time spent personally by me on the following activities: development of treatment plan with patient or surrogate and bedside caregivers, discussions with consultants, evaluation of patient's response to treatment, examination of patient, ordering and performing treatments and interventions, ordering and review of laboratory studies, ordering and review of radiographic studies, pulse oximetry, re-evaluation of patient's condition. This critical care time did not overlap with that of any other provider or involve time for any procedures.     John White MD  Critical Care Medicine  [facility]

## 2018-07-11 NOTE — ASSESSMENT & PLAN NOTE
7/9 neuromuscular resp failure sp intubation. O2/ MV/ Pulm toilet . Daily ABG , CXR .  7/10 daily ABG, chest x-ray.  Will discontinue Lasix.  Part of the respiratory acidosis is compensating for metabolic alkalosis.  Keep respiratory rate at 14.  Tidal volume 380 mL.  7/11 O2/mechanical ventilation/pulmonary toilet.  Enteral tube feeding.  Daily chest x-ray and ABG.

## 2018-07-11 NOTE — PROGRESS NOTES
BP in the low 100s.  Asked MD Ramos if we can cut dose in half.  He advised to give a one time dose of metoprolol 25mg and hold the 50mg tab.  WIll continue to monitor.

## 2018-07-11 NOTE — SUBJECTIVE & OBJECTIVE
Interval History:   Review of Systems   Unable to perform ROS: Intubated       Objective:     Vital Signs (Most Recent):  Temp: 98.5 °F (36.9 °C) (07/11/18 1105)  Pulse: 70 (07/11/18 1237)  Resp: 14 (07/11/18 1237)  BP: (!) 110/56 (07/11/18 1235)  SpO2: 100 % (07/11/18 1237) Vital Signs (24h Range):  Temp:  [97.7 °F (36.5 °C)-98.7 °F (37.1 °C)] 98.5 °F (36.9 °C)  Pulse:  [69-74] 70  Resp:  [11-37] 14  SpO2:  [94 %-100 %] 100 %  BP: ()/(30-72) 110/56     Weight: 70 kg (154 lb 5.2 oz)  Body mass index is 26.49 kg/m².      Intake/Output Summary (Last 24 hours) at 07/11/18 1358  Last data filed at 07/11/18 1200   Gross per 24 hour   Intake          3901.88 ml   Output             1585 ml   Net          2316.88 ml       Physical Exam   Constitutional: She appears well-developed.   Ill appearing    HENT:   + ETT    Eyes: Pupils are equal, round, and reactive to light.   Neck: Neck supple.   Cardiovascular: Normal rate.    Pulmonary/Chest: Effort normal.   BS decreased mildly bilaterally   Right chest Vas-Cath   Abdominal: Soft. There is no tenderness.   Genitourinary:   Genitourinary Comments: Flores    Musculoskeletal: She exhibits no tenderness.   Neurological:   Awake , not following commands , open eyes , after intubation sedated wih fentanyl gtt    Skin: Skin is warm.       Vents:  Vent Mode: A/C (07/11/18 1235)  Ventilator Initiated: Yes (07/09/18 1022)  Set Rate: 14 bmp (07/11/18 1235)  Vt Set: 380 mL (07/11/18 1235)  Pressure Support: 0 cmH20 (07/11/18 1235)  PEEP/CPAP: 5 cmH20 (07/11/18 1235)  Oxygen Concentration (%): 45 (07/11/18 1237)  Peak Airway Pressure: 23 cmH2O (07/11/18 1235)  Plateau Pressure: 18 cmH20 (07/11/18 1235)  Total Ve: 5.67 mL (07/11/18 1235)  F/VT Ratio<105 (RSBI): (!) 34.57 (07/11/18 1235)    Lines/Drains/Airways     Central Venous Catheter Line                 Hemodialysis Catheter 07/09/18 2100 right internal jugular 1 day          Drain                 Urethral Catheter 07/05/18 1114  6 days         NG/OG Tube 07/09/18 orogastric Left mouth 2 days          Airway                 Airway - Non-Surgical 07/09/18 1005 Endotracheal Tube 2 days          Pressure Ulcer                 Pressure Injury 06/02/18 Right lateral Heel Stage 3 39 days         Pressure Injury 06/02/18 1659 Left proximal Ischial tuberosity Stage 3 38 days         Pressure Injury 06/02/18 1902 Right medial Heel Unstageable 38 days         Pressure Injury 06/30/18 Right lateral Buttocks Stage 2 11 days          Peripheral Intravenous Line                 Peripheral IV - Single Lumen 07/06/18 2315 Right Forearm 4 days         Peripheral IV - Single Lumen 07/09/18 0710 Right Antecubital 2 days                Significant Labs:    CBC/Anemia Profile:    Recent Labs  Lab 07/09/18  1449 07/10/18  0418 07/11/18  0410   WBC  --  8.75 11.45   HGB  --  10.9* 10.7*   HCT  --  35.7* 35.2*   PLT  --  344 248   MCV  --  84 84   RDW  --  18.7* 18.5*   [id number] 1441*  --   --      Chemistries:    Recent Labs  Lab 07/10/18  0418 07/11/18  0410   * 152*   K 3.9 3.3*    106   CO2 35* 36*   BUN 94* 86*   CREATININE 1.3 1.0   CALCIUM 9.5 8.9   MG 2.7* 2.5   PHOS 2.9 2.1*     Significant Imaging:  CXR: I have reviewed all pertinent results/findings within the past 24 hours and my personal findings are:  Vas-Cath, ET-tube in place.  No acute findings.

## 2018-07-11 NOTE — ASSESSMENT & PLAN NOTE
7/5 start IV lasix  7/6 continue IV lasix  7/8 worsening Chest X Ray with pleural effusion on the right , increase lasix to BID  7/9 negative 2.9 liters . Cont IV Lasix 40 mg q 12 hrs .   7/10 -3 0.5 L since admission.  Improving oxygen requirements.  Creatinine in and serum bicarb increasing.  Will discontinue Lasix.  7/11 no evidence of fluid overload.  Keep off Lasix.  Hypotensive.  Started on Levophed drip keep map above 65 mm of mercury.

## 2018-07-11 NOTE — PROCEDURES
[facility] -   Transfusion Medicine  Procedure Note    SUMMARY   Therapeutic Plasma Exchange (Apheresis)  Date/Time: 7/11/2018 3:39 PM  Performed by: MARCELINO CARTER.  Authorized by: MARCELINO CARTER       Date of Procedure: 7/11/2018     Procedure: Plasma Exchange    Provider: Marcelino Carter MD     Assisting Provider: None    Pre-Procedure Diagnosis: Respiratory failure    Post-Procedure Diagnosis: Respiratory failure    Follow-up Assessment: Mrs. Valdez is a 72 year old female with a PMHx of HTN, DM, A-fib, Asthma, and Defibrillator who initially presented from home with acute on chronic CHF exacerbation on 6/30, then began developing generalized weakness and respiratory failure several days later with a nondiagnostic LP, Head/Spine CT negative for any acute findings, and elevated ESR/CRP.  Per chart, patient's outside medical records reveal recent history of antibody-positive NMO at [facility] and was subsequently given 5-day course of IV Solumedrol (per Neurology recs) without any improvement.  Today, patient's neurologic status has worsened (e.g., has no movement, does not vocalize, she has been able to track intermittently) and was intubated for ongoing respiratory issues.  Neurology has requested a plasma exchange series for further management of patient's NMOSD.    NEUROMYELITIS OPTICA SPECTRUM DISORDERS carries a Category II Grade 1B indication for therapeutic plasma exchange via the 2016 Journal of Clinical Apheresis Guidelines (Corea J et al. Journal of Clinical Apheresis 2016; 31:149-162.)    Interval History: Today's procedure (#2 of 5) was tolerated with one episode of hypotension.  Procedure subsequently paused and NS bolus given to resolve the low BP. Treatment resumed and successfully completed. Next treatment scheduled for 7/13.      Pertinent Laboratory Data:   Complete Blood Count:   Lab Results   Component Value Date    HGB 10.7 (L) 07/11/2018    HCT 35.2 (L) 07/11/2018      07/11/2018    WBC 11.45 07/11/2018     Basic Metabolic Panel:   Lab Results   Component Value Date     (H) 07/11/2018    K 3.3 (L) 07/11/2018     07/11/2018    CO2 36 (H) 07/11/2018     (H) 07/11/2018    BUN 86 (H) 07/11/2018    CREATININE 1.0 07/11/2018    CALCIUM 8.9 07/11/2018    ANIONGAP 10 07/11/2018    ESTGFRAFRICA >60 07/11/2018    EGFRNONAA 56 (A) 07/11/2018       Pertinent Medications:     Current Facility-Administered Medications:     [START ON 7/13/2018] 0.9%  NaCl infusion (for blood administration), , Intravenous, PRN, Rob Carter MD    acetaminophen tablet 650 mg, 650 mg, Oral, Q6H PRN, Subhash Ayala MD    [START ON 7/13/2018] albumin human 5% bottle 175 g, 175 g, Intravenous, Once, Rob Carter MD    albuterol-ipratropium 2.5 mg-0.5 mg/3 mL nebulizer solution 3 mL, 3 mL, Nebulization, Q4H PRN, CHEO Biswas, 3 mL at 07/04/18 1525    albuterol-ipratropium 2.5 mg-0.5 mg/3 mL nebulizer solution 3 mL, 3 mL, Nebulization, Q6H, James Meier MD, 3 mL at 07/11/18 1237    amiodarone tablet 200 mg, 200 mg, Oral, BID, CHEO Biswas, 200 mg at 07/11/18 0805    baclofen tablet 10 mg, 10 mg, Oral, TID PRN, CHEO Biswas, 10 mg at 07/02/18 0947    brimonidine 0.2% ophthalmic solution 1 drop, 1 drop, Both Eyes, Q12H, 1 drop at 07/11/18 0803 **AND** timolol maleate 0.5% ophthalmic solution 1 drop, 1 drop, Both Eyes, BID, Subhash Ayala MD, 1 drop at 07/11/18 0803    [START ON 7/13/2018] calcium gluconate 2,000 mg in sodium chloride 0.9% 100 mL IVPB, 2,000 mg, Intravenous, Once, Rob Carter MD    chlorhexidine 0.12 % solution 15 mL, 15 mL, Mouth/Throat, BID, John White MD, 15 mL at 07/11/18 0806    dextrose 5 % infusion, , Intravenous, Continuous, John White MD    dextrose 50% injection 12.5 g, 12.5 g, Intravenous, PRN, Vanesa Artis, FNP    dextrose 50% injection 25 g, 25 g, Intravenous, PRN, Vanesa Artis,  FNP    ergocalciferol capsule 50,000 Units, 50,000 Units, Oral, Q7 Days, Duane Hackett MD PhD    fentaNYL 2500 mcg in D5W 250 mL infusion premix (titrating) (conc: 10 mcg/mL), , Intravenous, Continuous, John White MD, Stopped at 07/11/18 0830    glucagon (human recombinant) injection 1 mg, 1 mg, Intramuscular, PRN, Vanesa Artis, CHEO    glucose chewable tablet 16 g, 16 g, Oral, PRN, Vanesa Artis, FNP    glucose chewable tablet 24 g, 24 g, Oral, PRN, Vanesa Artis, FNP    heparin (porcine) injection 4,000 Units, 4,000 Units, Intravenous, PRN, Raymundo Vidal MD, 4,000 Units at 07/09/18 2151    heparin (porcine) injection 5,000 Units, 5,000 Units, Subcutaneous, Q8H, Tonia Gates NP, 5,000 Units at 07/11/18 1349    heparin, porcine (PF) injection 3,800 Units, 3,800 Units, Intravenous, PRN, Rob Carter MD, 3,800 Units at 07/11/18 1133    hydrALAZINE injection 10 mg, 10 mg, Intravenous, Q6H PRN, Phoenix Ramos MD, 5 mg at 07/07/18 0615    insulin aspart U-100 pen 1-10 Units, 1-10 Units, Subcutaneous, Q6H PRN, Vanesa Daniels NP    insulin detemir U-100 pen 10 Units, 10 Units, Subcutaneous, Daily, Vanesa Daniels NP, 10 Units at 07/11/18 0803    levothyroxine tablet 100 mcg, 100 mcg, Oral, Before breakfast, Vanesa Artis, JOSEP, 100 mcg at 07/11/18 0500    lorazepam (ATIVAN) injection 4 mg, 4 mg, Intravenous, Q4H PRN, John White MD    pantoprazole 40 mg in dextrose 5 % 100 mL IVPB (over 15 minutes) (ready to mix system), 40 mg, Intravenous, Daily, John White MD, 40 mg at 07/11/18 0800    Review of patient's allergies indicates:   Allergen Reactions    Morphine Hives    Atorvastatin      Other reaction(s): Muscle pain    Clonidine     Codeine      Other reaction(s): Unknown    Digoxin      Other reaction(s): Unknown    Diovan [valsartan] Other (See Comments)     Upset stomach, weakness    Eggs [egg derived]     Metoprolol Diarrhea    Naproxen      Other  reaction(s): Nausea    Peanut     Propofol      Other reaction(s): delirious    Sulfa (sulfonamide antibiotics)        Anesthesia: None     Technical Procedures Used: Plasma Exchange: Volume exchanged - 3.5 Liters; Replacement fluid - Albumin; Number of procedures 2; Date of next procedure 7/13.    Description of the Findings of the Procedure:     Please see Apheresis Nurse flowsheet for details.    The patient was evaluated and all clinical and laboratory data relevant to the treatment was reviewed, and a decision was made to proceed with the Apheresis procedure.    I was available to the clinical staff throughout the procedure.    Significant Surgical Tasks Conducted by the Assistant(s): Not applicable  Complications: None  Estimated Blood Loss (EBL): Minimal  Implants: None   Specimens: None    Rob Carter M.D., M.S., [facility]  Medical Director  Section of Transfusion Medicine & Blood Donor Services  Department of Pathology and Laboratory Medicine  [facility]  [phone] (Blood Bank)  07/11/2018

## 2018-07-11 NOTE — PLAN OF CARE
Problem: Patient Care Overview  Goal: Plan of Care Review  Patient currently requires total assist x 2 for bed mobility and total assist for sitting balance at EOB.   Outcome: Ongoing (interventions implemented as appropriate)  Pt tolerated plasmapharesis after bolus given. Pt BP decreased when turned to right side and BP cuff on L arm. MD aware. IVFs started and H2O flush increased Q4 hr. Adequate urine output noted. Pt able to move R leg to command, but not controlled movement-spastic movement. Pt nods appropriately to questions asked and attempts to ask questions. Difficult to understand due to ETT in place. Pt unable to write at this time or see communication board. Will continue to attempt communication. Family also attempting at bedside. Pt turned Q2 hr with wedge. amirah heels floated with heel boots. All wounds assessed under dressings except duoderm to L. Not removed at this time. Dressing intact. Will continue to monitor.

## 2018-07-11 NOTE — ASSESSMENT & PLAN NOTE
7/6 - Lumbar puncture today , neurology to evaluate  7/7 High dose steroids as per neurology  7/9 on high dose steroids . Neurology following needs Plasmapheresis. Discussed it with Neurology, he arranged for vas cath placement.   7/10.  Off high-dose steroid.  Status post Vas-Cath placement yesterday.  Plasmapheresis for 5 days.  Neurology follow-up  7/11 plasma exchange day 2.

## 2018-07-11 NOTE — PROGRESS NOTES
Progress Note  Neurological ICU    Admit Date: 6/30/2018   LOS: 11 days     SUBJECTIVE:   Patient remains intubated, she is on Plasmapheresis   # 2  She is hypotensive. She follows minimal commands, unable to track, secondary to vision loss.   She blinks eyes on command.       Continuous Infusions:   fentanyl Stopped (07/11/18 0830)     Scheduled Meds:   [START ON 7/13/2018] albumin human 5%  175 g Intravenous Once    albuterol-ipratropium  3 mL Nebulization Q6H    amiodarone  200 mg Oral BID    brimonidine 0.2%  1 drop Both Eyes Q12H    And    timolol maleate 0.5%  1 drop Both Eyes BID    [START ON 7/13/2018] calcium gluconate IVPB  2,000 mg Intravenous Once    chlorhexidine  15 mL Mouth/Throat BID    heparin (porcine)  5,000 Units Subcutaneous Q8H    insulin detemir U-100  10 Units Subcutaneous Daily    levothyroxine  100 mcg Oral Before breakfast    pantoprazole 40 mg in dextrose 5 % 100 mL IVPB (over 15 minutes) (ready to mix system)  40 mg Intravenous Daily    sodium chloride  500 mL Intravenous Once     PRN Meds:sodium chloride, [START ON 7/13/2018] sodium chloride, acetaminophen, albuterol-ipratropium, baclofen, dextrose 50%, dextrose 50%, glucagon (human recombinant), glucose, glucose, heparin (porcine), heparin, porcine (PF), hydrALAZINE, insulin aspart U-100, lorazepam    Review of patient's allergies indicates:   Allergen Reactions    Morphine Hives    Atorvastatin      Other reaction(s): Muscle pain    Clonidine     Codeine      Other reaction(s): Unknown    Digoxin      Other reaction(s): Unknown    Diovan [valsartan] Other (See Comments)     Upset stomach, weakness    Eggs [egg derived]     Metoprolol Diarrhea    Naproxen      Other reaction(s): Nausea    Peanut     Propofol      Other reaction(s): delirious    Sulfa (sulfonamide antibiotics)        OBJECTIVE:     Vital Signs (Most Recent)  Temp: 98.5 °F (36.9 °C) (07/11/18 1105)  Pulse: 69 (07/11/18 1143)  Resp: 17 (07/11/18  1143)  BP: (!) 106/55 (07/11/18 1117)  SpO2: 100 % (07/11/18 1143)    Vital Signs Range (Last 24H):  Temp:  [97.6 °F (36.4 °C)-98.7 °F (37.1 °C)]   Pulse:  [69-74]   Resp:  [11-37]   BP: ()/()   SpO2:  [94 %-100 %]     I & O (Last 24H):  Intake/Output Summary (Last 24 hours) at 07/11/18 1153  Last data filed at 07/11/18 1000   Gross per 24 hour   Intake          2856.88 ml   Output             1550 ml   Net          1306.88 ml     Ventilator Data (Last 24H):     Vent Mode: A/C  Oxygen Concentration (%):  [45-65] 45  Resp Rate Total:  [14 br/min-18 br/min] 18 br/min  Vt Set:  [380 mL] 380 mL  PEEP/CPAP:  [5 cmH20] 5 cmH20  Pressure Support:  [0 cmH20] 0 cmH20  Mean Airway Pressure:  [9.1 cmH20-10 cmH20] 10 cmH20    Hemodynamic Parameters (Last 24H):       Physical Exam:  General  She is intubated and mildly sedated  HEENT:   Acephalic, Atraumatic, pupils isocoria, reactive, corneal reactive,  no ptosis, no lid lag, no neglect, no gaze palsy  Neck: no JVD, no Carotid bruit,   Lungs: CTA,    Heart: Regular Rate and rhythm, no murmurs, rubs and or gallops  Abdomen, Soft, non tender, non distended, bowel sounds present  Extremities: Edema,   Skin: No rash, no ecchymoses,         NEURO    Mental Status  Intubated  Follows one step commands. Unable to move extremities  SPEECH:  Intubated    CRANIAL NERVES:  Pupils reactive, isocoria  No Facial asymmetry   Withdraws to facial pain  GAG and cough intact        MOTOR:Upper Extremities: No movement  To painful stimulation- Flaccid tone    Lower Extremities: Moves to painful stimulation            REFLEXES: Deep tendon reflexes tested:  Upper extremities:   biceps (C5, C6) 0  brachioradialis (C5, C6) 0  triceps (C6, C7)0    Lower extremities:   knee or patellar (L2, 3, 4)0ankle (S1, S2) 0   extensors b/l      SENSORY: withdraws to facial and LE      ROMBERG  And GAIT:  Deferred    EXTRAPYRAMIDALS: None      Laboratory (Last 24H):  CBC:    Recent Labs  Lab  07/11/18  0410   WBC 11.45   HGB 10.7*   HCT 35.2*        CMP:    Recent Labs  Lab 07/11/18  0410   CALCIUM 8.9   *   K 3.3*   CO2 36*      BUN 86*   CREATININE 1.0     Results for orders placed or performed during the hospital encounter of 06/30/18   Urine culture **CANNOT BE ORDERED STAT**   Result Value Ref Range    Urine Culture, Routine       COAGULASE-NEGATIVE STAPHYLOCOCCUS SPECIES  50,000 - 99,999 cfu/ml  Susceptibility testing not routinely performed.     Blood culture   Result Value Ref Range    Blood Culture, Routine No growth after 5 days.    Blood culture   Result Value Ref Range    Blood Culture, Routine No growth after 5 days.    CSF culture   Result Value Ref Range    CSF CULTURE No Growth     Gram Stain Result No organisms seen Few WBC's    CBC auto differential   Result Value Ref Range    WBC 9.92 3.90 - 12.70 K/uL    RBC 4.27 4.00 - 5.40 M/uL    Hemoglobin 11.2 (L) 12.0 - 16.0 g/dL    Hematocrit 34.7 (L) 37.0 - 48.5 %    MCV 81 (L) 82 - 98 fL    MCH 26.2 (L) 27.0 - 31.0 pg    MCHC 32.3 32.0 - 36.0 g/dL    RDW 17.4 (H) 11.5 - 14.5 %    Platelets 298 150 - 350 K/uL    MPV 9.2 9.2 - 12.9 fL    Gran # (ANC) 6.9 1.8 - 7.7 K/uL    Lymph # 1.4 1.0 - 4.8 K/uL    Mono # 1.2 (H) 0.3 - 1.0 K/uL    Eos # 0.4 0.0 - 0.5 K/uL    Baso # 0.04 0.00 - 0.20 K/uL    Gran% 69.3 38.0 - 73.0 %    Lymph% 14.3 (L) 18.0 - 48.0 %    Mono% 12.1 4.0 - 15.0 %    Eosinophil% 3.9 0.0 - 8.0 %    Basophil% 0.4 0.0 - 1.9 %    Differential Method Automated    Comprehensive metabolic panel   Result Value Ref Range    Sodium 142 136 - 145 mmol/L    Potassium 3.8 3.5 - 5.1 mmol/L    Chloride 106 95 - 110 mmol/L    CO2 25 23 - 29 mmol/L    Glucose 104 70 - 110 mg/dL    BUN, Bld 13 8 - 23 mg/dL    Creatinine 0.7 0.5 - 1.4 mg/dL    Calcium 9.6 8.7 - 10.5 mg/dL    Total Protein 6.6 6.0 - 8.4 g/dL    Albumin 2.3 (L) 3.5 - 5.2 g/dL    Total Bilirubin 0.5 0.1 - 1.0 mg/dL    Alkaline Phosphatase 152 (H) 55 - 135 U/L    AST 21  10 - 40 U/L    ALT 11 10 - 44 U/L    Anion Gap 11 8 - 16 mmol/L    eGFR if African American >60 >60 mL/min/1.73 m^2    eGFR if non African American >60 >60 mL/min/1.73 m^2   Troponin I #1   Result Value Ref Range    Troponin I 0.025 0.000 - 0.026 ng/mL   B-Type natriuretic peptide (BNP)   Result Value Ref Range    BNP 1,100 (H) 0 - 99 pg/mL   Urinalysis   Result Value Ref Range    Specimen UA Urine, Catheterized     Color, UA Yellow Yellow, Straw, Samra    Appearance, UA Clear Clear    pH, UA 7.0 5.0 - 8.0    Specific Gravity, UA 1.015 1.005 - 1.030    Protein, UA Negative Negative    Glucose, UA Negative Negative    Ketones, UA Negative Negative    Bilirubin (UA) Negative Negative    Occult Blood UA Negative Negative    Nitrite, UA Positive (A) Negative    Urobilinogen, UA 1.0 <2.0 EU/dL    Leukocytes, UA Negative Negative   Troponin I #2   Result Value Ref Range    Troponin I 0.028 (H) 0.000 - 0.026 ng/mL   Urinalysis Microscopic   Result Value Ref Range    RBC, UA 0 0 - 4 /hpf    WBC, UA 1 0 - 5 /hpf    Bacteria, UA Rare None-Occ /hpf    Squam Epithel, UA 0 /hpf    Microscopic Comment SEE COMMENT    Troponin I   Result Value Ref Range    Troponin I 0.033 (H) 0.000 - 0.026 ng/mL   Troponin I   Result Value Ref Range    Troponin I 0.033 (H) 0.000 - 0.026 ng/mL   CBC auto differential   Result Value Ref Range    WBC 11.79 3.90 - 12.70 K/uL    RBC 4.36 4.00 - 5.40 M/uL    Hemoglobin 11.4 (L) 12.0 - 16.0 g/dL    Hematocrit 35.9 (L) 37.0 - 48.5 %    MCV 82 82 - 98 fL    MCH 26.1 (L) 27.0 - 31.0 pg    MCHC 31.8 (L) 32.0 - 36.0 g/dL    RDW 17.5 (H) 11.5 - 14.5 %    Platelets 359 (H) 150 - 350 K/uL    MPV 9.3 9.2 - 12.9 fL    Gran # (ANC) 8.7 (H) 1.8 - 7.7 K/uL    Lymph # 1.5 1.0 - 4.8 K/uL    Mono # 1.1 (H) 0.3 - 1.0 K/uL    Eos # 0.4 0.0 - 0.5 K/uL    Baso # 0.03 0.00 - 0.20 K/uL    Gran% 74.0 (H) 38.0 - 73.0 %    Lymph% 12.6 (L) 18.0 - 48.0 %    Mono% 9.4 4.0 - 15.0 %    Eosinophil% 3.7 0.0 - 8.0 %    Basophil% 0.3 0.0  - 1.9 %    Differential Method Automated    Basic metabolic panel   Result Value Ref Range    Sodium 142 136 - 145 mmol/L    Potassium 3.8 3.5 - 5.1 mmol/L    Chloride 105 95 - 110 mmol/L    CO2 24 23 - 29 mmol/L    Glucose 103 70 - 110 mg/dL    BUN, Bld 19 8 - 23 mg/dL    Creatinine 0.7 0.5 - 1.4 mg/dL    Calcium 9.8 8.7 - 10.5 mg/dL    Anion Gap 13 8 - 16 mmol/L    eGFR if African American >60 >60 mL/min/1.73 m^2    eGFR if non African American >60 >60 mL/min/1.73 m^2   TSH   Result Value Ref Range    TSH 0.670 0.400 - 4.000 uIU/mL   Troponin I   Result Value Ref Range    Troponin I 0.045 (H) 0.000 - 0.026 ng/mL   Brain natriuretic peptide   Result Value Ref Range     (H) 0 - 99 pg/mL   Troponin I   Result Value Ref Range    Troponin I 0.033 (H) 0.000 - 0.026 ng/mL   CBC auto differential   Result Value Ref Range    WBC 9.77 3.90 - 12.70 K/uL    RBC 4.68 4.00 - 5.40 M/uL    Hemoglobin 12.1 12.0 - 16.0 g/dL    Hematocrit 39.7 37.0 - 48.5 %    MCV 85 82 - 98 fL    MCH 25.9 (L) 27.0 - 31.0 pg    MCHC 30.5 (L) 32.0 - 36.0 g/dL    RDW 17.8 (H) 11.5 - 14.5 %    Platelets 371 (H) 150 - 350 K/uL    MPV 8.9 (L) 9.2 - 12.9 fL    Gran # (ANC) 6.8 1.8 - 7.7 K/uL    Lymph # 1.6 1.0 - 4.8 K/uL    Mono # 0.8 0.3 - 1.0 K/uL    Eos # 0.5 0.0 - 0.5 K/uL    Baso # 0.05 0.00 - 0.20 K/uL    Gran% 69.8 38.0 - 73.0 %    Lymph% 16.8 (L) 18.0 - 48.0 %    Mono% 7.9 4.0 - 15.0 %    Eosinophil% 5.0 0.0 - 8.0 %    Basophil% 0.5 0.0 - 1.9 %    Differential Method Automated    Comprehensive metabolic panel   Result Value Ref Range    Sodium 145 136 - 145 mmol/L    Potassium 3.4 (L) 3.5 - 5.1 mmol/L    Chloride 104 95 - 110 mmol/L    CO2 29 23 - 29 mmol/L    Glucose 102 70 - 110 mg/dL    BUN, Bld 25 (H) 8 - 23 mg/dL    Creatinine 0.8 0.5 - 1.4 mg/dL    Calcium 9.9 8.7 - 10.5 mg/dL    Total Protein 6.6 6.0 - 8.4 g/dL    Albumin 2.4 (L) 3.5 - 5.2 g/dL    Total Bilirubin 0.2 0.1 - 1.0 mg/dL    Alkaline Phosphatase 139 (H) 55 - 135 U/L    AST  20 10 - 40 U/L    ALT 13 10 - 44 U/L    Anion Gap 12 8 - 16 mmol/L    eGFR if African American >60 >60 mL/min/1.73 m^2    eGFR if non African American >60 >60 mL/min/1.73 m^2   Sedimentation rate, manual   Result Value Ref Range    Sed Rate 62 (H) 0 - 20 mm/Hr   High sensitivity CRP   Result Value Ref Range    CRP, High Sensitivity 65.12 (H) 0.00 - 3.19 mg/L   Urinalysis   Result Value Ref Range    Specimen UA Urine, Clean Catch     Color, UA Yellow Yellow, Straw, Samra    Appearance, UA Clear Clear    pH, UA 6.0 5.0 - 8.0    Specific Gravity, UA 1.015 1.005 - 1.030    Protein, UA Negative Negative    Glucose, UA Negative Negative    Ketones, UA Negative Negative    Bilirubin (UA) Negative Negative    Occult Blood UA Negative Negative    Nitrite, UA Negative Negative    Urobilinogen, UA Negative <2.0 EU/dL    Leukocytes, UA Trace (A) Negative   CSF cell count with differential   Result Value Ref Range    Heme Aliquot 4.0 mL    Appearance, CSF Clear Clear    Color, CSF Colorless Colorless    WBC, CSF 38 (H) 0 - 5 /cu mm    RBC, CSF 31 (A) 0 /cu mm    Segmented Neutrophils, CSF 23 (H) 0 - 6 %    Lymphs, CSF 36 (L) 40 - 80 %    Mono/Macrophage, CSF 41 15 - 45 %   Glucose, CSF   Result Value Ref Range    Glucose, CSF 84 (H) 40 - 70 mg/dL   Protein, CSF   Result Value Ref Range    Protein,  (H) 15 - 40 mg/dL   Urinalysis Microscopic   Result Value Ref Range    RBC, UA 1 0 - 4 /hpf    WBC, UA 4 0 - 5 /hpf    Bacteria, UA Occasional None-Occ /hpf    Yeast, UA Occasional (A) None    Squam Epithel, UA 25 /hpf    Microscopic Comment SEE COMMENT    CBC auto differential   Result Value Ref Range    WBC 10.03 3.90 - 12.70 K/uL    RBC 4.20 4.00 - 5.40 M/uL    Hemoglobin 10.7 (L) 12.0 - 16.0 g/dL    Hematocrit 35.6 (L) 37.0 - 48.5 %    MCV 85 82 - 98 fL    MCH 25.5 (L) 27.0 - 31.0 pg    MCHC 30.1 (L) 32.0 - 36.0 g/dL    RDW 17.6 (H) 11.5 - 14.5 %    Platelets 422 (H) 150 - 350 K/uL    MPV 9.1 (L) 9.2 - 12.9 fL    Gran #  (ANC) 7.5 1.8 - 7.7 K/uL    Lymph # 1.5 1.0 - 4.8 K/uL    Mono # 0.7 0.3 - 1.0 K/uL    Eos # 0.4 0.0 - 0.5 K/uL    Baso # 0.04 0.00 - 0.20 K/uL    Gran% 74.6 (H) 38.0 - 73.0 %    Lymph% 14.5 (L) 18.0 - 48.0 %    Mono% 6.6 4.0 - 15.0 %    Eosinophil% 3.9 0.0 - 8.0 %    Basophil% 0.4 0.0 - 1.9 %    Differential Method Automated    Comprehensive metabolic panel   Result Value Ref Range    Sodium 142 136 - 145 mmol/L    Potassium 3.5 3.5 - 5.1 mmol/L    Chloride 102 95 - 110 mmol/L    CO2 30 (H) 23 - 29 mmol/L    Glucose 95 70 - 110 mg/dL    BUN, Bld 28 (H) 8 - 23 mg/dL    Creatinine 1.0 0.5 - 1.4 mg/dL    Calcium 9.3 8.7 - 10.5 mg/dL    Total Protein 6.2 6.0 - 8.4 g/dL    Albumin 2.3 (L) 3.5 - 5.2 g/dL    Total Bilirubin 0.2 0.1 - 1.0 mg/dL    Alkaline Phosphatase 133 55 - 135 U/L    AST 21 10 - 40 U/L    ALT 13 10 - 44 U/L    Anion Gap 10 8 - 16 mmol/L    eGFR if African American >60 >60 mL/min/1.73 m^2    eGFR if non African American 56 (A) >60 mL/min/1.73 m^2   CBC auto differential   Result Value Ref Range    WBC 11.53 3.90 - 12.70 K/uL    RBC 4.42 4.00 - 5.40 M/uL    Hemoglobin 11.4 (L) 12.0 - 16.0 g/dL    Hematocrit 39.0 37.0 - 48.5 %    MCV 88 82 - 98 fL    MCH 25.8 (L) 27.0 - 31.0 pg    MCHC 29.2 (L) 32.0 - 36.0 g/dL    RDW 17.3 (H) 11.5 - 14.5 %    Platelets 454 (H) 150 - 350 K/uL    MPV 9.0 (L) 9.2 - 12.9 fL    Gran # (ANC) 9.9 (H) 1.8 - 7.7 K/uL    Lymph # 1.1 1.0 - 4.8 K/uL    Mono # 0.5 0.3 - 1.0 K/uL    Eos # 0.1 0.0 - 0.5 K/uL    Baso # 0.03 0.00 - 0.20 K/uL    Gran% 85.6 (H) 38.0 - 73.0 %    Lymph% 9.4 (L) 18.0 - 48.0 %    Mono% 4.2 4.0 - 15.0 %    Eosinophil% 0.5 0.0 - 8.0 %    Basophil% 0.3 0.0 - 1.9 %    Differential Method Automated    Comprehensive metabolic panel   Result Value Ref Range    Sodium 145 136 - 145 mmol/L    Potassium 4.7 3.5 - 5.1 mmol/L    Chloride 101 95 - 110 mmol/L    CO2 31 (H) 23 - 29 mmol/L    Glucose 142 (H) 70 - 110 mg/dL    BUN, Bld 32 (H) 8 - 23 mg/dL    Creatinine 0.9 0.5  - 1.4 mg/dL    Calcium 10.2 8.7 - 10.5 mg/dL    Total Protein 7.3 6.0 - 8.4 g/dL    Albumin 2.7 (L) 3.5 - 5.2 g/dL    Total Bilirubin 0.4 0.1 - 1.0 mg/dL    Alkaline Phosphatase 143 (H) 55 - 135 U/L    AST 27 10 - 40 U/L    ALT 19 10 - 44 U/L    Anion Gap 13 8 - 16 mmol/L    eGFR if African American >60 >60 mL/min/1.73 m^2    eGFR if non African American >60 >60 mL/min/1.73 m^2   APTT   Result Value Ref Range    aPTT 27.3 21.0 - 32.0 sec   Protime-INR   Result Value Ref Range    Prothrombin Time 10.6 9.0 - 12.5 sec    INR 1.0 0.8 - 1.2   Ammonia   Result Value Ref Range    Ammonia 61 (H) 10 - 50 umol/L   Urinalysis   Result Value Ref Range    Specimen UA Urine, Catheterized     Color, UA Yellow Yellow, Straw, Samra    Appearance, UA Clear Clear    pH, UA 6.0 5.0 - 8.0    Specific Gravity, UA 1.020 1.005 - 1.030    Protein, UA Negative Negative    Glucose, UA Negative Negative    Ketones, UA Negative Negative    Bilirubin (UA) Negative Negative    Occult Blood UA Negative Negative    Nitrite, UA Negative Negative    Urobilinogen, UA Negative <2.0 EU/dL    Leukocytes, UA Negative Negative   Basic metabolic panel   Result Value Ref Range    Sodium 147 (H) 136 - 145 mmol/L    Potassium 4.1 3.5 - 5.1 mmol/L    Chloride 102 95 - 110 mmol/L    CO2 33 (H) 23 - 29 mmol/L    Glucose 124 (H) 70 - 110 mg/dL    BUN, Bld 37 (H) 8 - 23 mg/dL    Creatinine 0.8 0.5 - 1.4 mg/dL    Calcium 10.5 8.7 - 10.5 mg/dL    Anion Gap 12 8 - 16 mmol/L    eGFR if African American >60 >60 mL/min/1.73 m^2    eGFR if non African American >60 >60 mL/min/1.73 m^2   CBC auto differential   Result Value Ref Range    WBC 10.93 3.90 - 12.70 K/uL    RBC 4.42 4.00 - 5.40 M/uL    Hemoglobin 11.5 (L) 12.0 - 16.0 g/dL    Hematocrit 38.4 37.0 - 48.5 %    MCV 87 82 - 98 fL    MCH 26.0 (L) 27.0 - 31.0 pg    MCHC 29.9 (L) 32.0 - 36.0 g/dL    RDW 17.0 (H) 11.5 - 14.5 %    Platelets 439 (H) 150 - 350 K/uL    MPV 8.8 (L) 9.2 - 12.9 fL    Gran # (ANC) 9.9 (H) 1.8 -  7.7 K/uL    Lymph # 0.9 (L) 1.0 - 4.8 K/uL    Mono # 0.1 (L) 0.3 - 1.0 K/uL    Eos # 0.0 0.0 - 0.5 K/uL    Baso # 0.01 0.00 - 0.20 K/uL    Gran% 90.6 (H) 38.0 - 73.0 %    Lymph% 8.5 (L) 18.0 - 48.0 %    Mono% 0.8 (L) 4.0 - 15.0 %    Eosinophil% 0.0 0.0 - 8.0 %    Basophil% 0.1 0.0 - 1.9 %    Differential Method Automated    Magnesium   Result Value Ref Range    Magnesium 1.7 1.6 - 2.6 mg/dL   T3, free   Result Value Ref Range    T3, Free 1.0 (L) 2.3 - 4.2 pg/mL   T4, free   Result Value Ref Range    Free T4 1.24 0.71 - 1.51 ng/dL   TSH   Result Value Ref Range    TSH 0.269 (L) 0.400 - 4.000 uIU/mL   Hepatic function panel   Result Value Ref Range    Total Protein 7.5 6.0 - 8.4 g/dL    Albumin 2.9 (L) 3.5 - 5.2 g/dL    Total Bilirubin 0.2 0.1 - 1.0 mg/dL    Bilirubin, Direct 0.1 0.1 - 0.3 mg/dL    AST 25 10 - 40 U/L    ALT 20 10 - 44 U/L    Alkaline Phosphatase 151 (H) 55 - 135 U/L   Rapid HIV   Result Value Ref Range    HIV Rapid Testing Negative Negative   Hepatitis C antibody   Result Value Ref Range    Hepatitis C Ab Negative    Hepatitis B surface antibody   Result Value Ref Range    Hep B S Ab Negative    Hepatitis A antibody, IgG   Result Value Ref Range    Hepatitis A Antibody IgG Negative    Hepatitis delta virus   Result Value Ref Range    Delta Ab Total Quantative Negative Negative   Phosphorus   Result Value Ref Range    Phosphorus 2.1 (L) 2.7 - 4.5 mg/dL   Magnesium   Result Value Ref Range    Magnesium 1.9 1.6 - 2.6 mg/dL   CBC auto differential   Result Value Ref Range    WBC 14.55 (H) 3.90 - 12.70 K/uL    RBC 4.31 4.00 - 5.40 M/uL    Hemoglobin 11.1 (L) 12.0 - 16.0 g/dL    Hematocrit 37.4 37.0 - 48.5 %    MCV 87 82 - 98 fL    MCH 25.8 (L) 27.0 - 31.0 pg    MCHC 29.7 (L) 32.0 - 36.0 g/dL    RDW 17.8 (H) 11.5 - 14.5 %    Platelets 405 (H) 150 - 350 K/uL    MPV 8.9 (L) 9.2 - 12.9 fL    Gran # (ANC) 13.3 (H) 1.8 - 7.7 K/uL    Lymph # 1.0 1.0 - 4.8 K/uL    Mono # 0.2 (L) 0.3 - 1.0 K/uL    Eos # 0.0 0.0 -  0.5 K/uL    Baso # 0.00 0.00 - 0.20 K/uL    Gran% 91.7 (H) 38.0 - 73.0 %    Lymph% 6.7 (L) 18.0 - 48.0 %    Mono% 1.6 (L) 4.0 - 15.0 %    Eosinophil% 0.0 0.0 - 8.0 %    Basophil% 0.0 0.0 - 1.9 %    Differential Method Automated    Basic metabolic panel   Result Value Ref Range    Sodium 146 (H) 136 - 145 mmol/L    Potassium 4.4 3.5 - 5.1 mmol/L    Chloride 102 95 - 110 mmol/L    CO2 32 (H) 23 - 29 mmol/L    Glucose 153 (H) 70 - 110 mg/dL    BUN, Bld 49 (H) 8 - 23 mg/dL    Creatinine 1.0 0.5 - 1.4 mg/dL    Calcium 10.2 8.7 - 10.5 mg/dL    Anion Gap 12 8 - 16 mmol/L    eGFR if African American >60 >60 mL/min/1.73 m^2    eGFR if non African American 56 (A) >60 mL/min/1.73 m^2   Phosphorus   Result Value Ref Range    Phosphorus 2.5 (L) 2.7 - 4.5 mg/dL   Magnesium   Result Value Ref Range    Magnesium 2.2 1.6 - 2.6 mg/dL   CBC auto differential   Result Value Ref Range    WBC 13.17 (H) 3.90 - 12.70 K/uL    RBC 4.12 4.00 - 5.40 M/uL    Hemoglobin 10.6 (L) 12.0 - 16.0 g/dL    Hematocrit 35.8 (L) 37.0 - 48.5 %    MCV 87 82 - 98 fL    MCH 25.7 (L) 27.0 - 31.0 pg    MCHC 29.6 (L) 32.0 - 36.0 g/dL    RDW 18.3 (H) 11.5 - 14.5 %    Platelets 389 (H) 150 - 350 K/uL    MPV 8.9 (L) 9.2 - 12.9 fL    Gran # (ANC) 12.2 (H) 1.8 - 7.7 K/uL    Lymph # 0.7 (L) 1.0 - 4.8 K/uL    Mono # 0.3 0.3 - 1.0 K/uL    Eos # 0.0 0.0 - 0.5 K/uL    Baso # 0.00 0.00 - 0.20 K/uL    Gran% 92.4 (H) 38.0 - 73.0 %    Lymph% 5.5 (L) 18.0 - 48.0 %    Mono% 2.1 (L) 4.0 - 15.0 %    Eosinophil% 0.0 0.0 - 8.0 %    Basophil% 0.0 0.0 - 1.9 %    Differential Method Automated    Basic metabolic panel   Result Value Ref Range    Sodium 145 136 - 145 mmol/L    Potassium 5.2 (H) 3.5 - 5.1 mmol/L    Chloride 104 95 - 110 mmol/L    CO2 32 (H) 23 - 29 mmol/L    Glucose 122 (H) 70 - 110 mg/dL    BUN, Bld 64 (H) 8 - 23 mg/dL    Creatinine 1.1 0.5 - 1.4 mg/dL    Calcium 9.8 8.7 - 10.5 mg/dL    Anion Gap 9 8 - 16 mmol/L    eGFR if African American 58 (A) >60 mL/min/1.73 m^2     eGFR if non African American 50 (A) >60 mL/min/1.73 m^2   Ammonia   Result Value Ref Range    Ammonia 47 10 - 50 umol/L   Potassium   Result Value Ref Range    Potassium 4.9 3.5 - 5.1 mmol/L   Phosphorus   Result Value Ref Range    Phosphorus 3.6 2.7 - 4.5 mg/dL   Magnesium   Result Value Ref Range    Magnesium 2.4 1.6 - 2.6 mg/dL   CBC auto differential   Result Value Ref Range    WBC 9.06 3.90 - 12.70 K/uL    RBC 4.23 4.00 - 5.40 M/uL    Hemoglobin 10.9 (L) 12.0 - 16.0 g/dL    Hematocrit 36.4 (L) 37.0 - 48.5 %    MCV 86 82 - 98 fL    MCH 25.8 (L) 27.0 - 31.0 pg    MCHC 29.9 (L) 32.0 - 36.0 g/dL    RDW 18.3 (H) 11.5 - 14.5 %    Platelets 386 (H) 150 - 350 K/uL    MPV 8.9 (L) 9.2 - 12.9 fL    Gran # (ANC) 8.4 (H) 1.8 - 7.7 K/uL    Lymph # 0.4 (L) 1.0 - 4.8 K/uL    Mono # 0.2 (L) 0.3 - 1.0 K/uL    Eos # 0.0 0.0 - 0.5 K/uL    Baso # 0.00 0.00 - 0.20 K/uL    Gran% 92.9 (H) 38.0 - 73.0 %    Lymph% 4.5 (L) 18.0 - 48.0 %    Mono% 2.6 (L) 4.0 - 15.0 %    Eosinophil% 0.0 0.0 - 8.0 %    Basophil% 0.0 0.0 - 1.9 %    Differential Method Automated    Basic metabolic panel   Result Value Ref Range    Sodium 149 (H) 136 - 145 mmol/L    Potassium 4.7 3.5 - 5.1 mmol/L    Chloride 105 95 - 110 mmol/L    CO2 35 (H) 23 - 29 mmol/L    Glucose 120 (H) 70 - 110 mg/dL    BUN, Bld 77 (H) 8 - 23 mg/dL    Creatinine 1.1 0.5 - 1.4 mg/dL    Calcium 9.9 8.7 - 10.5 mg/dL    Anion Gap 9 8 - 16 mmol/L    eGFR if African American 58 (A) >60 mL/min/1.73 m^2    eGFR if non African American 50 (A) >60 mL/min/1.73 m^2   Brain natriuretic peptide   Result Value Ref Range     (H) 0 - 99 pg/mL   Pathologist Review of Laboratory Test   Result Value Ref Range    Pathologist Review, Body Fluid REVIEWED    Thyroid peroxidase antibody   Result Value Ref Range    Thyroperoxidase Antibodies <6.0 <6.0 IU/mL   Vitamin D   Result Value Ref Range    Vit D, 25-Hydroxy 27 (L) 30 - 96 ng/mL   Vitamin B12   Result Value Ref Range    Vitamin B-12 1441 (H) 210 -  950 pg/mL   Phosphorus   Result Value Ref Range    Phosphorus 2.9 2.7 - 4.5 mg/dL   Magnesium   Result Value Ref Range    Magnesium 2.7 (H) 1.6 - 2.6 mg/dL   CBC auto differential   Result Value Ref Range    WBC 8.75 3.90 - 12.70 K/uL    RBC 4.26 4.00 - 5.40 M/uL    Hemoglobin 10.9 (L) 12.0 - 16.0 g/dL    Hematocrit 35.7 (L) 37.0 - 48.5 %    MCV 84 82 - 98 fL    MCH 25.6 (L) 27.0 - 31.0 pg    MCHC 30.5 (L) 32.0 - 36.0 g/dL    RDW 18.7 (H) 11.5 - 14.5 %    Platelets 344 150 - 350 K/uL    MPV 9.6 9.2 - 12.9 fL    Gran # (ANC) 8.0 (H) 1.8 - 7.7 K/uL    Lymph # 0.5 (L) 1.0 - 4.8 K/uL    Mono # 0.3 0.3 - 1.0 K/uL    Eos # 0.0 0.0 - 0.5 K/uL    Baso # 0.00 0.00 - 0.20 K/uL    Gran% 91.2 (H) 38.0 - 73.0 %    Lymph% 5.5 (L) 18.0 - 48.0 %    Mono% 3.3 (L) 4.0 - 15.0 %    Eosinophil% 0.0 0.0 - 8.0 %    Basophil% 0.0 0.0 - 1.9 %    Differential Method Automated    Basic metabolic panel   Result Value Ref Range    Sodium 152 (H) 136 - 145 mmol/L    Potassium 3.9 3.5 - 5.1 mmol/L    Chloride 106 95 - 110 mmol/L    CO2 35 (H) 23 - 29 mmol/L    Glucose 152 (H) 70 - 110 mg/dL    BUN, Bld 94 (H) 8 - 23 mg/dL    Creatinine 1.3 0.5 - 1.4 mg/dL    Calcium 9.5 8.7 - 10.5 mg/dL    Anion Gap 11 8 - 16 mmol/L    eGFR if African American 47 (A) >60 mL/min/1.73 m^2    eGFR if non African American 41 (A) >60 mL/min/1.73 m^2   Phosphorus   Result Value Ref Range    Phosphorus 2.1 (L) 2.7 - 4.5 mg/dL   Magnesium   Result Value Ref Range    Magnesium 2.5 1.6 - 2.6 mg/dL   CBC auto differential   Result Value Ref Range    WBC 11.45 3.90 - 12.70 K/uL    RBC 4.18 4.00 - 5.40 M/uL    Hemoglobin 10.7 (L) 12.0 - 16.0 g/dL    Hematocrit 35.2 (L) 37.0 - 48.5 %    MCV 84 82 - 98 fL    MCH 25.6 (L) 27.0 - 31.0 pg    MCHC 30.4 (L) 32.0 - 36.0 g/dL    RDW 18.5 (H) 11.5 - 14.5 %    Platelets 248 150 - 350 K/uL    MPV 9.8 9.2 - 12.9 fL    Gran # (ANC) 10.5 (H) 1.8 - 7.7 K/uL    Lymph # 0.4 (L) 1.0 - 4.8 K/uL    Mono # 0.5 0.3 - 1.0 K/uL    Eos # 0.0 0.0 - 0.5  K/uL    Baso # 0.00 0.00 - 0.20 K/uL    Gran% 91.9 (H) 38.0 - 73.0 %    Lymph% 3.6 (L) 18.0 - 48.0 %    Mono% 4.5 4.0 - 15.0 %    Eosinophil% 0.0 0.0 - 8.0 %    Basophil% 0.0 0.0 - 1.9 %    Differential Method Automated    Basic metabolic panel   Result Value Ref Range    Sodium 152 (H) 136 - 145 mmol/L    Potassium 3.3 (L) 3.5 - 5.1 mmol/L    Chloride 106 95 - 110 mmol/L    CO2 36 (H) 23 - 29 mmol/L    Glucose 155 (H) 70 - 110 mg/dL    BUN, Bld 86 (H) 8 - 23 mg/dL    Creatinine 1.0 0.5 - 1.4 mg/dL    Calcium 8.9 8.7 - 10.5 mg/dL    Anion Gap 10 8 - 16 mmol/L    eGFR if African American >60 >60 mL/min/1.73 m^2    eGFR if non African American 56 (A) >60 mL/min/1.73 m^2   POCT glucose   Result Value Ref Range    POCT Glucose 92 70 - 110 mg/dL   POCT glucose   Result Value Ref Range    POCT Glucose 122 (H) 70 - 110 mg/dL   POCT glucose   Result Value Ref Range    POCT Glucose 112 (H) 70 - 110 mg/dL   POCT glucose   Result Value Ref Range    POCT Glucose 88 70 - 110 mg/dL   POCT glucose   Result Value Ref Range    POCT Glucose 105 70 - 110 mg/dL   POCT glucose   Result Value Ref Range    POCT Glucose 110 70 - 110 mg/dL   POCT glucose   Result Value Ref Range    POCT Glucose 95 70 - 110 mg/dL   POCT glucose   Result Value Ref Range    POCT Glucose 90 70 - 110 mg/dL   POCT glucose   Result Value Ref Range    POCT Glucose 102 70 - 110 mg/dL   POCT glucose   Result Value Ref Range    POCT Glucose 94 70 - 110 mg/dL   POCT glucose   Result Value Ref Range    POCT Glucose 139 (H) 70 - 110 mg/dL   POCT glucose   Result Value Ref Range    POCT Glucose 121 (H) 70 - 110 mg/dL   POCT glucose   Result Value Ref Range    POCT Glucose 142 (H) 70 - 110 mg/dL   POCT glucose   Result Value Ref Range    POCT Glucose 108 70 - 110 mg/dL   POCT glucose   Result Value Ref Range    POCT Glucose 103 70 - 110 mg/dL   POCT glucose   Result Value Ref Range    POCT Glucose 107 70 - 110 mg/dL   POCT glucose   Result Value Ref Range    POCT Glucose  137 (H) 70 - 110 mg/dL   POCT glucose   Result Value Ref Range    POCT Glucose 130 (H) 70 - 110 mg/dL   POCT glucose   Result Value Ref Range    POCT Glucose 132 (H) 70 - 110 mg/dL   ISTAT PROCEDURE   Result Value Ref Range    POC PH 7.135 (LL) 7.35 - 7.45    POC PCO2 117.6 (HH) 35 - 45 mmHg    POC PO2 187 (H) 80 - 100 mmHg    POC HCO3 39.6 (H) 24 - 28 mmol/L    POC BE 11 -2 to 2 mmol/L    POC SATURATED O2 99 95 - 100 %    Sample ARTERIAL     Site RR     Allens Test Pass     DelSys Nasal Can     Mode SPONT     Flow 2.5     FiO2 30    POCT glucose   Result Value Ref Range    POCT Glucose 112 (H) 70 - 110 mg/dL   ISTAT PROCEDURE   Result Value Ref Range    POC PH 7.384 7.35 - 7.45    POC PCO2 68.3 (HH) 35 - 45 mmHg    POC PO2 114 (H) 80 - 100 mmHg    POC HCO3 40.8 (H) 24 - 28 mmol/L    POC BE 16 -2 to 2 mmol/L    POC SATURATED O2 98 95 - 100 %    Rate 20     Sample ARTERIAL     Site LR     Allens Test Pass     DelSys CPAP/BiPAP     Mode BiPAP     FiO2 30     IP 18     EP 6    POCT glucose   Result Value Ref Range    POCT Glucose 113 (H) 70 - 110 mg/dL   POCT glucose   Result Value Ref Range    POCT Glucose 200 (H) 70 - 110 mg/dL   POCT glucose   Result Value Ref Range    POCT Glucose 113 (H) 70 - 110 mg/dL   POCT glucose   Result Value Ref Range    POCT Glucose 139 (H) 70 - 110 mg/dL   POCT glucose   Result Value Ref Range    POCT Glucose 127 (H) 70 - 110 mg/dL   POCT glucose   Result Value Ref Range    POCT Glucose 122 (H) 70 - 110 mg/dL   POCT glucose   Result Value Ref Range    POCT Glucose 138 (H) 70 - 110 mg/dL   POCT glucose   Result Value Ref Range    POCT Glucose 259 (H) 70 - 110 mg/dL   POCT glucose   Result Value Ref Range    POCT Glucose 116 (H) 70 - 110 mg/dL   POCT glucose   Result Value Ref Range    POCT Glucose 118 (H) 70 - 110 mg/dL   POCT glucose   Result Value Ref Range    POCT Glucose 134 (H) 70 - 110 mg/dL   ISTAT PROCEDURE   Result Value Ref Range    POC PH 7.349 (L) 7.35 - 7.45    POC PCO2 69.9  (HH) 35 - 45 mmHg    POC PO2 66 (L) 80 - 100 mmHg    POC HCO3 38.5 (H) 24 - 28 mmol/L    POC BE 13 -2 to 2 mmol/L    POC SATURATED O2 91 (L) 95 - 100 %    Rate 20     Sample ARTERIAL     Site LR     Allens Test Pass     DelSys CPAP/BiPAP     Mode BiPAP     FiO2 40     IP 18     EP 6    POCT glucose   Result Value Ref Range    POCT Glucose 136 (H) 70 - 110 mg/dL   POCT glucose   Result Value Ref Range    POCT Glucose 131 (H) 70 - 110 mg/dL   POCT glucose   Result Value Ref Range    POCT Glucose 140 (H) 70 - 110 mg/dL   ISTAT PROCEDURE   Result Value Ref Range    POC PH 7.377 7.35 - 7.45    POC PCO2 69.4 (HH) 35 - 45 mmHg    POC PO2 104 (H) 80 - 100 mmHg    POC HCO3 40.7 (H) 24 - 28 mmol/L    POC BE 16 -2 to 2 mmol/L    POC SATURATED O2 98 95 - 100 %    Rate 20     Sample ARTERIAL     Site RR     Allens Test Pass     DelSys CPAP/BiPAP     Mode AVAPS     Vt 500     PiP 12     FiO2 40    POCT glucose   Result Value Ref Range    POCT Glucose 117 (H) 70 - 110 mg/dL   ISTAT PROCEDURE   Result Value Ref Range    POC PH 7.383 7.35 - 7.45    POC PCO2 68.6 (HH) 35 - 45 mmHg    POC PO2 95 80 - 100 mmHg    POC HCO3 40.8 (H) 24 - 28 mmol/L    POC BE 16 -2 to 2 mmol/L    POC SATURATED O2 97 95 - 100 %    Rate 20     Sample ARTERIAL     Site RR     Allens Test Pass     DelSys CPAP/BiPAP     Mode AVAPS     Vt 500     PiP 17     FiO2 40    ISTAT PROCEDURE   Result Value Ref Range    POC PH 7.508 (H) 7.35 - 7.45    POC PCO2 47.2 (H) 35 - 45 mmHg    POC PO2 53 (LL) 80 - 100 mmHg    POC HCO3 37.5 (H) 24 - 28 mmol/L    POC BE 14 -2 to 2 mmol/L    POC SATURATED O2 90 (L) 95 - 100 %    Rate 18     Sample ARTERIAL     Site LR     Allens Test Pass     DelSys Adult Vent     Mode AC/PRVC     Vt 450     PEEP 5     FiO2 50    POCT glucose   Result Value Ref Range    POCT Glucose 129 (H) 70 - 110 mg/dL   POCT glucose   Result Value Ref Range    POCT Glucose 128 (H) 70 - 110 mg/dL   ISTAT PROCEDURE   Result Value Ref Range    POC PH 7.567 (HH)  7.35 - 7.45    POC PCO2 43.0 35 - 45 mmHg    POC PO2 96 80 - 100 mmHg    POC HCO3 39.1 (H) 24 - 28 mmol/L    POC BE 17 -2 to 2 mmol/L    POC SATURATED O2 98 95 - 100 %    Rate 18     Sample ARTERIAL     Site RR     Allens Test Pass     DelSys Adult Vent     Mode AC/PRVC     Vt 400     PEEP 5     FiO2 65    POCT glucose   Result Value Ref Range    POCT Glucose 153 (H) 70 - 110 mg/dL   ISTAT PROCEDURE   Result Value Ref Range    POC PH 7.508 (H) 7.35 - 7.45    POC PCO2 53.2 (H) 35 - 45 mmHg    POC PO2 175 (H) 80 - 100 mmHg    POC HCO3 42.2 (H) 24 - 28 mmol/L    POC BE 19 -2 to 2 mmol/L    POC SATURATED O2 100 95 - 100 %    Rate 14     Sample ARTERIAL     Site RR     Allens Test N/A     DelSys Adult Vent     Mode AC/PRVC     Vt 380     PEEP 5     FiO2 65    POCT glucose   Result Value Ref Range    POCT Glucose 168 (H) 70 - 110 mg/dL   POCT glucose   Result Value Ref Range    POCT Glucose 202 (H) 70 - 110 mg/dL   POCT glucose   Result Value Ref Range    POCT Glucose 209 (H) 70 - 110 mg/dL   POCT glucose   Result Value Ref Range    POCT Glucose 162 (H) 70 - 110 mg/dL   ISTAT PROCEDURE   Result Value Ref Range    POC PH 7.398 7.35 - 7.45    POC PCO2 62.5 (HH) 35 - 45 mmHg    POC PO2 74 (L) 80 - 100 mmHg    POC HCO3 38.6 (H) 24 - 28 mmol/L    POC BE 14 -2 to 2 mmol/L    POC SATURATED O2 94 (L) 95 - 100 %    Rate 14     Sample ARTERIAL     Site LR     Allens Test Pass     DelSys Adult Vent     Mode AC/PRVC     Vt 380     PEEP 5    POCT glucose   Result Value Ref Range    POCT Glucose 171 (H) 70 - 110 mg/dL   POCT glucose   Result Value Ref Range    POCT Glucose 85 70 - 110 mg/dL         ASSESSMENT/PLAN:       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  Is on Plasmapheresis , no significant improvement in the extremity weakness    Vitamin D deficiency; : Started on treatment  NMO antibody pending  Thyroid peroxidase antibody negative  Encephalopathy panel pending      Discussed with patient's daughter  Haley.       45 minutes of critical care provided

## 2018-07-12 PROBLEM — E87.0 HYPERNATREMIA: Status: RESOLVED | Noted: 2018-01-01 | Resolved: 2018-01-01

## 2018-07-12 NOTE — ASSESSMENT & PLAN NOTE
7/6 - Lumbar puncture today , neurology to evaluate  7/7 High dose steroids as per neurology  7/9 on high dose steroids . Neurology following needs Plasmapheresis. Discussed it with Neurology, he arranged for vas cath placement.   7/10.  Off high-dose steroid.  Status post Vas-Cath placement yesterday.  Plasmapheresis for 5 days.  Neurology follow-up  7/11 plasma exchange day 2.  7/12 No plasmapheresis today. Neurology fup. Cs for PEGT , trach. Discussed with daughter

## 2018-07-12 NOTE — ASSESSMENT & PLAN NOTE
- Currently rate controlled  Telemetry monitoring  - Continue Xarelto and Amiodarone  -xarelto on hold.  - will resume after procedure is done

## 2018-07-12 NOTE — ASSESSMENT & PLAN NOTE
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

## 2018-07-12 NOTE — CONSULTS
[facility] -   General Surgery  Consult Note    Patient Name: Carlie Valdez  MRN: [id number]  Code Status: Full Code  Admission Date: 6/30/2018  Hospital Length of Stay: 12 days  Attending Physician: Raymundo Vidal MD  Primary Care Provider: Johanna Guzman MD    Patient information was obtained from patient, past medical records and ER records.     Inpatient consult to General Surgery  Consult performed by: LAWRENCE NUÑEZ  Consult ordered by: BOBBY CORONA  Reason for consult: trach and peg placement  Assessment/Recommendations: Plan for trach/peg next Tuesday or Wednesday        Subjective:     Principal Problem: Respiratory failure    History of Present Illness: Carlie Valdez is a 72 y.o. female with multiple medical problems. She was admitted for acute on chronic systolic congestive heart failure and progressively worsened. She was noted to have worsening mental status, weakness, and shortness of breath and transferrrd to ICU for respiratory failure. Currently in the ICU ventilated and receiving NG tube feeding. 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 There has been improvement in her neuro status after plasmapheresis. Plan is for her to receive 5 plasma exchanges. General Surgery is consulted for trach and peg placement after she has completed plasmapheresis.     No current facility-administered medications on file prior to encounter.      Current Outpatient Prescriptions on File Prior to Encounter   Medication Sig    albuterol 90 mcg/actuation inhaler Inhale 1-2 puffs into the lungs every 6 (six) hours as needed.     albuterol-ipratropium 2.5mg-0.5mg/3mL (DUO-NEB) 0.5 mg-3 mg(2.5 mg base)/3 mL nebulizer solution Take 3 mLs by nebulization every 6 (six) hours while awake. Rescue    amiodarone (PACERONE) 200 MG Tab Take 1 tablet (200 mg total) by mouth 2 (two) times daily. (Patient  taking differently: Take 100 mg by mouth 2 (two) times daily. )    brimonidine-timolol (COMBIGAN) 0.2-0.5 % Drop Place 1 drop into both eyes every 12 (twelve) hours.    bumetanide (BUMEX) 2 MG tablet Take 1 tablet (2 mg total) by mouth 2 (two) times daily. 1 Tablet Oral Every day (Patient taking differently: Take 1 mg by mouth 2 (two) times daily. Hold for SBP less than or equal to 100.)    fluticasone (FLONASE) 50 mcg/actuation nasal spray 2 sprays by Each Nare route once daily. (Patient taking differently: 2 sprays by Each Nare route daily as needed. )    levothyroxine (SYNTHROID) 100 MCG tablet Take 100 mcg by mouth before breakfast.     montelukast (SINGULAIR) 10 mg tablet Take 10 mg by mouth once daily.     multivitamin (THERAGRAN) per tablet Take 1 tablet by mouth once daily.     polyethylene glycol (GLYCOLAX) 17 gram PwPk Take 17 g by mouth once daily. (Patient taking differently: Take 17 g by mouth daily as needed. )    potassium chloride SA (K-DUR,KLOR-CON) 20 MEQ tablet Take 20 mEq by mouth once daily.     rivaroxaban (XARELTO) 15 mg Tab Take 15 mg by mouth every evening.     silver sulfADIAZINE 1% (SILVADENE) 1 % cream Apply topically 2 (two) times daily. Apply to bliter areas BID (Patient taking differently: Apply topically 2 (two) times daily. Apply to blistered areas BID.)    sodium bicarb-sodium chloride Pack 1 packet by Nasal route 2 (two) times daily. (Patient taking differently: 1 packet by Nasal route 2 (two) times daily as needed. )    zinc sulfate (ZINCATE) 220 (50) mg capsule Take 220 mg by mouth once daily.        Review of patient's allergies indicates:   Allergen Reactions    Morphine Hives    Atorvastatin      Other reaction(s): Muscle pain    Clonidine     Codeine      Other reaction(s): Unknown    Digoxin      Other reaction(s): Unknown    Diovan [valsartan] Other (See Comments)     Upset stomach, weakness    Eggs [egg derived]     Metoprolol Diarrhea    Naproxen       Other reaction(s): Nausea    Peanut     Propofol      Other reaction(s): delirious    Sulfa (sulfonamide antibiotics)        Past Medical History:   Diagnosis Date    Arthritis     Asthma     Atrial fibrillation     Blood clot of vein in shoulder area     Cardiac defibrillator in place     Chronic knee pain     Diabetes mellitus type 2 without retinopathy 2016    Diaphragmatic hernia without obstruction and without gangrene 2015    GERD (gastroesophageal reflux disease)     Hypertension     Primary open angle glaucoma (POAG) of both eyes, severe stage 2018     Past Surgical History:   Procedure Laterality Date    CARDIAC DEFIBRILLATOR PLACEMENT      , .    CATARACT EXTRACTION       SECTION      COLONOSCOPY      ashley      Dr. Macdonald    KNEE ARTHROSCOPY      UPPER GASTROINTESTINAL ENDOSCOPY       Family History     Problem Relation (Age of Onset)    Hypertension Mother, Father        Social History Main Topics    Smoking status: Never Smoker    Smokeless tobacco: Never Used    Alcohol use No    Drug use: No    Sexual activity: Yes     Partners: Male     Review of Systems   Unable to perform ROS: Intubated     Objective:     Vital Signs (Most Recent):  Temp: 99.1 °F (37.3 °C) (18 1105)  Pulse: 70 (18 1125)  Resp: 19 (18 1125)  BP: (!) 97/47 (18 1117)  SpO2: 100 % (18 1125) Vital Signs (24h Range):  Temp:  [98.2 °F (36.8 °C)-99.2 °F (37.3 °C)] 99.1 °F (37.3 °C)  Pulse:  [68-70] 70  Resp:  [14-23] 19  SpO2:  [94 %-100 %] 100 %  BP: ()/(34-67) 97/47     Weight: 73 kg (160 lb 15 oz)  Body mass index is 27.62 kg/m².    Physical Exam   Constitutional: She appears well-developed.   Acutely ill   HENT:   Head: Normocephalic.   Neck: Neck supple.   Pulmonary/Chest:   Intubated, on vent   Abdominal: Soft.   Neurological:   Alert, responds with shaking head   Skin: Skin is warm and dry.       Significant Labs:  CBC:   Recent Labs  Lab  07/12/18  0325   WBC 13.24*   RBC 3.92*   HGB 10.1*   HCT 33.0*      MCV 84   MCH 25.8*   MCHC 30.6*     CMP:   Recent Labs  Lab 07/06/18  1149  07/12/18  0325   GLU  --   < > 105   CALCIUM  --   < > 8.5*   ALBUMIN 2.9*  --   --    PROT 7.5  --   --    NA  --   < > 141   K  --   < > 4.2   CO2  --   < > 33*   CL  --   < > 101   BUN  --   < > 58*   CREATININE  --   < > 0.7   ALKPHOS 151*  --   --    ALT 20  --   --    AST 25  --   --    BILITOT 0.2  --   --    < > = values in this interval not displayed.    Significant Diagnostics:  I have reviewed all pertinent imaging results/findings within the past 24 hours.    Assessment/Plan:     * Respiratory failure    Plan for trach and peg placement early next week (tuesday/wednesday)  Plan was discussed the the patients daughter and questions were answered. She agrees to trach and peg, and patient also nods in agreement with the plan.           VTE Risk Mitigation         Ordered     heparin, porcine (PF) injection 3,800 Units  As needed (PRN)      07/10/18 0859     heparin (porcine) injection 4,000 Units  As needed (PRN)      07/09/18 2045     heparin (porcine) injection 5,000 Units  Every 8 hours      07/04/18 1209          Thank you for your consult. I will follow-up with patient. Please contact us if you have any additional questions.    Nelia Son PA-C  General Surgery  [facility]

## 2018-07-12 NOTE — SUBJECTIVE & OBJECTIVE
No current facility-administered medications on file prior to encounter.      Current Outpatient Prescriptions on File Prior to Encounter   Medication Sig    albuterol 90 mcg/actuation inhaler Inhale 1-2 puffs into the lungs every 6 (six) hours as needed.     albuterol-ipratropium 2.5mg-0.5mg/3mL (DUO-NEB) 0.5 mg-3 mg(2.5 mg base)/3 mL nebulizer solution Take 3 mLs by nebulization every 6 (six) hours while awake. Rescue    amiodarone (PACERONE) 200 MG Tab Take 1 tablet (200 mg total) by mouth 2 (two) times daily. (Patient taking differently: Take 100 mg by mouth 2 (two) times daily. )    brimonidine-timolol (COMBIGAN) 0.2-0.5 % Drop Place 1 drop into both eyes every 12 (twelve) hours.    bumetanide (BUMEX) 2 MG tablet Take 1 tablet (2 mg total) by mouth 2 (two) times daily. 1 Tablet Oral Every day (Patient taking differently: Take 1 mg by mouth 2 (two) times daily. Hold for SBP less than or equal to 100.)    fluticasone (FLONASE) 50 mcg/actuation nasal spray 2 sprays by Each Nare route once daily. (Patient taking differently: 2 sprays by Each Nare route daily as needed. )    levothyroxine (SYNTHROID) 100 MCG tablet Take 100 mcg by mouth before breakfast.     montelukast (SINGULAIR) 10 mg tablet Take 10 mg by mouth once daily.     multivitamin (THERAGRAN) per tablet Take 1 tablet by mouth once daily.     polyethylene glycol (GLYCOLAX) 17 gram PwPk Take 17 g by mouth once daily. (Patient taking differently: Take 17 g by mouth daily as needed. )    potassium chloride SA (K-DUR,KLOR-CON) 20 MEQ tablet Take 20 mEq by mouth once daily.     rivaroxaban (XARELTO) 15 mg Tab Take 15 mg by mouth every evening.     silver sulfADIAZINE 1% (SILVADENE) 1 % cream Apply topically 2 (two) times daily. Apply to bliter areas BID (Patient taking differently: Apply topically 2 (two) times daily. Apply to blistered areas BID.)    sodium bicarb-sodium chloride Pack 1 packet by Nasal route 2 (two) times daily. (Patient taking  differently: 1 packet by Nasal route 2 (two) times daily as needed. )    zinc sulfate (ZINCATE) 220 (50) mg capsule Take 220 mg by mouth once daily.        Review of patient's allergies indicates:   Allergen Reactions    Morphine Hives    Atorvastatin      Other reaction(s): Muscle pain    Clonidine     Codeine      Other reaction(s): Unknown    Digoxin      Other reaction(s): Unknown    Diovan [valsartan] Other (See Comments)     Upset stomach, weakness    Eggs [egg derived]     Metoprolol Diarrhea    Naproxen      Other reaction(s): Nausea    Peanut     Propofol      Other reaction(s): delirious    Sulfa (sulfonamide antibiotics)        Past Medical History:   Diagnosis Date    Arthritis     Asthma     Atrial fibrillation     Blood clot of vein in shoulder area     Cardiac defibrillator in place     Chronic knee pain     Diabetes mellitus type 2 without retinopathy 2016    Diaphragmatic hernia without obstruction and without gangrene 2015    GERD (gastroesophageal reflux disease)     Hypertension     Primary open angle glaucoma (POAG) of both eyes, severe stage 2018     Past Surgical History:   Procedure Laterality Date    CARDIAC DEFIBRILLATOR PLACEMENT      , .    CATARACT EXTRACTION       SECTION      COLONOSCOPY      ashley      Dr. Macdonald    KNEE ARTHROSCOPY      UPPER GASTROINTESTINAL ENDOSCOPY       Family History     Problem Relation (Age of Onset)    Hypertension Mother, Father        Social History Main Topics    Smoking status: Never Smoker    Smokeless tobacco: Never Used    Alcohol use No    Drug use: No    Sexual activity: Yes     Partners: Male     Review of Systems   Unable to perform ROS: Intubated     Objective:     Vital Signs (Most Recent):  Temp: 99.1 °F (37.3 °C) (18 1105)  Pulse: 70 (18 1125)  Resp: 19 (18 1125)  BP: (!) 97/47 (18 1117)  SpO2: 100 % (18 1125) Vital Signs (24h Range):  Temp:  [98.2 °F  (36.8 °C)-99.2 °F (37.3 °C)] 99.1 °F (37.3 °C)  Pulse:  [68-70] 70  Resp:  [14-23] 19  SpO2:  [94 %-100 %] 100 %  BP: ()/(34-67) 97/47     Weight: 73 kg (160 lb 15 oz)  Body mass index is 27.62 kg/m².    Physical Exam   Constitutional: She appears well-developed.   Acutely ill   HENT:   Head: Normocephalic.   Neck: Neck supple.   Pulmonary/Chest:   Intubated, on vent   Abdominal: Soft.   Neurological:   Alert, responds with shaking head   Skin: Skin is warm and dry.       Significant Labs:  CBC:   Recent Labs  Lab 07/12/18  0325   WBC 13.24*   RBC 3.92*   HGB 10.1*   HCT 33.0*      MCV 84   MCH 25.8*   MCHC 30.6*     CMP:   Recent Labs  Lab 07/06/18  1149  07/12/18  0325   GLU  --   < > 105   CALCIUM  --   < > 8.5*   ALBUMIN 2.9*  --   --    PROT 7.5  --   --    NA  --   < > 141   K  --   < > 4.2   CO2  --   < > 33*   CL  --   < > 101   BUN  --   < > 58*   CREATININE  --   < > 0.7   ALKPHOS 151*  --   --    ALT 20  --   --    AST 25  --   --    BILITOT 0.2  --   --    < > = values in this interval not displayed.    Significant Diagnostics:  I have reviewed all pertinent imaging results/findings within the past 24 hours.

## 2018-07-12 NOTE — PROGRESS NOTES
[facility]  Critical Care Medicine  Progress Note    Patient Name: Carlie Valdez  MRN: [id number]  Admission Date: 6/30/2018  Hospital Length of Stay: 12 days  Code Status: Full Code  Attending Provider: Raymundo Vidal MD  Primary Care Provider: Johanna Guzman MD   Principal Problem: Respiratory failure    Subjective:     HPI:  72 year old female with a PMHx of p[revious cerebral vascular accident with right sided weakness and aphasia, HTN, GERD, DM, A-fib, Cardiac Asthma, and Defibrillator who presented from home with c/o chest pain to Emergency Room on 6/30/2018 folllwing recent discharge from [facility]. Admitted for acute on chronic systolic Congestive Heart failure and progressively worsened. Noted to have worsening mental status and shortness of breath and transferrrd to ICU for hypercapnic respiratory failure. Placed on Noninvasive Positive Pressure Ventilation     Hospital/ICU Course:  7/5 Transferred to ICU. Noninvasive Positive Pressure Ventilation initiated , may need intubation  7/6 Improved ventilation with Noninvasive Positive Pressure Ventilation but now generalized weakness. Steroids started for respiratory failure. Lumbar puncture later today.  7/7 Respiratory status stable. Labile blood pressure. Started on high does steroids  7/8 worsening respiraoty status with elevated  PCO2  7/9 patient seen and examined, chest x-ray and ABG reviewed, daughter Haley was called, intubated for respiratory distress and tachypnea.  7/10 patient seen and examined, chest x-ray and ABG reviewed.  She had a Vas-Cath placed overnight.  Off high-dose steroid.  Plasmapheresis today.  7/11 patient seen and examined.  Chest x-ray and ABG reviewed.  Plasma exchange day 2.  Tolerating enteral feeding.  Hypotensive, started on Levophed drip.  7/12 seen and examined. Intubated, sedated with fentanyl gtt. No pressors . Mental status improved sp Plasmapheresis x 2 days. Tolerating  enteral feeding.       Interval History:   Review of Systems   Unable to perform ROS: Intubated       Objective:     Vital Signs (Most Recent):  Temp: 99.1 °F (37.3 °C) (07/12/18 1105)  Pulse: 70 (07/12/18 1318)  Resp: (!) 21 (07/12/18 1318)  BP: (!) 108/54 (07/12/18 1205)  SpO2: 100 % (07/12/18 1318) Vital Signs (24h Range):  Temp:  [98.2 °F (36.8 °C)-99.2 °F (37.3 °C)] 99.1 °F (37.3 °C)  Pulse:  [68-70] 70  Resp:  [12-23] 21  SpO2:  [94 %-100 %] 100 %  BP: ()/(34-67) 108/54     Weight: 73 kg (160 lb 15 oz)  Body mass index is 27.62 kg/m².      Intake/Output Summary (Last 24 hours) at 07/12/18 1322  Last data filed at 07/12/18 1134   Gross per 24 hour   Intake          4585.42 ml   Output             1435 ml   Net          3150.42 ml       Physical Exam   Constitutional: She appears well-developed.   Ill appearing    HENT:   + ETT    Eyes: Pupils are equal, round, and reactive to light.   Neck: Neck supple.   Cardiovascular: Normal rate.    Pulmonary/Chest: Effort normal.   BS decreased mildly bilaterally   Right chest Vas-Cath   Abdominal: Soft. There is no tenderness.   Genitourinary:   Genitourinary Comments: Flores    Musculoskeletal: She exhibits no tenderness.   Neurological:   Awake, following commands , not moving extremities purposefully   Skin: Skin is warm.       Vents:  Vent Mode: Spont (07/12/18 1318)  Ventilator Initiated: Yes (07/09/18 1022)  Set Rate: 0 bmp (07/12/18 1318)  Vt Set: 380 mL (07/12/18 1318)  Pressure Support: 12 cmH20 (07/12/18 1318)  PEEP/CPAP: 5 cmH20 (07/12/18 1318)  Oxygen Concentration (%): 30 (07/12/18 1318)  Peak Airway Pressure: 40 cmH2O (07/12/18 1318)  Plateau Pressure: 18 cmH20 (07/12/18 1318)  Total Ve: 11.8 mL (07/12/18 1318)  F/VT Ratio<105 (RSBI): (!) 14.15 (07/12/18 1318)    Lines/Drains/Airways     Central Venous Catheter Line                 Hemodialysis Catheter 07/09/18 2100 right internal jugular 2 days          Drain                 Urethral Catheter 07/05/18  1114 7 days         NG/OG Tube 07/09/18 orogastric Left mouth 3 days          Airway                 Airway - Non-Surgical 07/09/18 1005 Endotracheal Tube 3 days          Pressure Ulcer                 Pressure Injury 06/02/18 Right lateral Heel Stage 3 40 days         Pressure Injury 06/02/18 1659 Left proximal Ischial tuberosity Stage 3 39 days         Pressure Injury 06/02/18 1902 Right medial Heel Unstageable 39 days         Pressure Injury 06/30/18 Right lateral Buttocks Stage 2 12 days          Peripheral Intravenous Line                 Peripheral IV - Single Lumen 07/06/18 2315 Right Forearm 5 days         Peripheral IV - Single Lumen 07/09/18 0710 Right Antecubital 3 days              Significant Labs:    CBC/Anemia Profile:    Recent Labs  Lab 07/11/18  0410 07/12/18  0325   WBC 11.45 13.24*   HGB 10.7* 10.1*   HCT 35.2* 33.0*    175   MCV 84 84   RDW 18.5* 18.6*     Chemistries:    Recent Labs  Lab 07/11/18  0410 07/12/18  0325   * 141   K 3.3* 4.2    101   CO2 36* 33*   BUN 86* 58*   CREATININE 1.0 0.7   CALCIUM 8.9 8.5*   MG 2.5 2.3   PHOS 2.1* 1.4*     Significant Imaging:  CXR: I have reviewed all pertinent results/findings within the past 24 hours and my personal findings are:  + ETT , Vas cath in place. basilar atelectasis.       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 Neurology following needs Plasmapheresis. Discussed it with Neurology, he arranged for vas cath placement.   7/10.  Off high-dose steroid.  Status post Vas-Cath placement yesterday.  Plasmapheresis for 5 days.  Neurology follow-up  7/11 plasma exchange day 2.  7/12 No plasmapheresis today. Neurology fup. Cs for PEGT , trach. Discussed with daughter         Encephalopathy    7/6 pCO2 normalized and patient improved but still  not alert, awaiting lumbar puncture  7/8 worsening mental status  - no longer feeding herself        Derm   Skin breakdown    7/5 Wound care nurse        Pulmonary   * Respiratory failure    7/5 Noninvasive Positive Pressure Ventilation , jet nebs and oxygen  7/6 IV steroids added  7/7 continue Noninvasive Positive Pressure Ventilation at night and prn during day  7/8 elevated  PCO2 - change ventilation mode  7/9 O2/ MV/ Pulm toilet . Daily ABG , CXR  7/11 chest x-ray and ABG reviewed.  Compensated respiratory acidosis.  No changes in vent settings.  7/12 chest xray and ABG reviewed. Will increase TV to 420 to improve hypercapnia        Acute hypercapnic respiratory failure    7/9 neuromuscular resp failure sp intubation. O2/ MV/ Pulm toilet . Daily ABG , CXR .  7/10 daily ABG, chest x-ray.  Will discontinue Lasix.  Part of the respiratory acidosis is compensating for metabolic alkalosis.  Keep respiratory rate at 14.  Tidal volume 380 mL.  7/11 O2/mechanical ventilation/pulmonary toilet.  Enteral tube feeding.  Daily chest x-ray and ABG.        Cardiac/Vascular   Acute on chronic systolic heart failure    7/5 start IV lasix  7/6 continue IV lasix  7/8 worsening Chest X Ray with pleural effusion on the right , increase lasix to BID  7/9 negative 2.9 liters . Cont IV Lasix 40 mg q 12 hrs .   7/10 -3 0.5 L since admission.  Improving oxygen requirements.  Creatinine in and serum bicarb increasing.  Will discontinue Lasix.  7/11 no evidence of fluid overload.  Keep off Lasix.  Hypotensive.  Started on Levophed drip keep map above 65 mm of mercury.  7/12 normotensive, acceptable O2 requirement, avoid fluid overload         Atrial fibrillation    Po amiodarone         Renal/   Hypernatremia    7/11 sodium remains elevated at 152.  Water deficit 6.6 L plus about 1.4 L urine output last 24 hr.  Will replace half of the deficit next 24 hr.  Currently on 350 free water q.4 hours.  Will start D5W at 125 cc/hour.  Monitor  BMP.  7/12 resolved , Dc IVF         UTI (urinary tract infection)    7/6 - resolved - antibiotics stopped        Other   Increased ammonia level    7/7 Continue to follow  7/8 normal today           Critical Care Daily Checklist:    A: Awake: RASS Goal/Actual Goal: RASS Goal: 0-->alert and calm  Actual: Guerrero Agitation Sedation Scale (RASS): Light sedation   B: Spontaneous Breathing Trial Performed?     C: SAT & SBT Coordinated?  NA                      D: Delirium: CAM-ICU Overall CAM-ICU: Positive   E: Early Mobility Performed? Yes.    F: Feeding Goal: Goals: Diet advancement or initiate nutrition support by next RD visit  Status: Nutrition Goal Status: new   Current Diet Order   No orders of the defined types were placed in this encounter.      AS: Analgesia/Sedation Fentanyl gtt    T: Thromboembolic Prophylaxis Hep sc    H: HOB > 300 Yes   U: Stress Ulcer Prophylaxis (if needed) PP   G: Glucose Control FS q 6 hrs + Short acting Insulin.    B: Bowel Function Stool Occurrence: 0   I: Indwelling Catheter (Lines & Harris) Necessity Keep harris    D: De-escalation of Antimicrobials/Pharmacotherapies No abx     Plan for the day/ETD Y    Code Status:  Family/Goals of Care: Full Code  Y     Critical Care Time: 35 minutes  Critical secondary to Patient has a condition that poses threat to life and bodily function: Neuromuscular resp failure.      Critical care was time spent personally by me on the following activities: development of treatment plan with patient or surrogate and bedside caregivers, discussions with consultants, evaluation of patient's response to treatment, examination of patient, ordering and performing treatments and interventions, ordering and review of laboratory studies, ordering and review of radiographic studies, pulse oximetry, re-evaluation of patient's condition. This critical care time did not overlap with that of any other provider or involve time for any procedures.     John White,  MD  Critical Care Medicine  [facility]

## 2018-07-12 NOTE — HPI
Carlie Valdez is a 72 y.o. female with multiple medical problems. She was admitted for acute on chronic systolic congestive heart failure and progressively worsened. She was noted to have worsening mental status, weakness, and shortness of breath and transferrrd to ICU for respiratory failure. Currently in the ICU ventilated and receiving NG tube feeding. 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 There has been improvement in her neuro status after plasmapheresis. Plan is for her to receive 5 plasma exchanges. General Surgery is consulted for trach and peg placement after she has completed plasmapheresis.

## 2018-07-12 NOTE — PLAN OF CARE
Problem: Patient Care Overview  Goal: Plan of Care Review  Patient currently requires total assist x 2 for bed mobility and total assist for sitting balance at EOB.   Outcome: Ongoing (interventions implemented as appropriate)  POC and interventions discussed with patient and daughter Haley at visitation.  Patient remains intubated.  Not sedated.  Arouses to voice.  Follows commands.  Mouths words and nods head to communicate.  Unable to move arms/legs.  Extremities are flaccid.  Tactile stimuli to BLE elicits jerky / spastic movements.  Has sensation to BUE, BLE above calf area.  Updated on Plasmapheresis therapy - VU.

## 2018-07-12 NOTE — ASSESSMENT & PLAN NOTE
Continue with steroids no improvement will consider pheresis as per neurlogy\  7/10  Plasma exchange series    7/12  Plasma exchange   Plan for trach and peg

## 2018-07-12 NOTE — NURSING
MD Hackett notified of Lab sendout (7/9/18) from [facility] inability to run both serum tests due to insufficient specimen amount. MD chose to perform myasthenia gravis test with specimen in hand. Cancel NMO test. Results for this test obtained from CSF specimen. Ronna at [facility] [phone] to notify of MD decision.

## 2018-07-12 NOTE — ASSESSMENT & PLAN NOTE
7/5 Noninvasive Positive Pressure Ventilation , jet nebs and oxygen  7/6 IV steroids added  7/7 continue Noninvasive Positive Pressure Ventilation at night and prn during day  7/8 elevated  PCO2 - change ventilation mode  7/9 O2/ MV/ Pulm toilet . Daily ABG , CXR  7/11 chest x-ray and ABG reviewed.  Compensated respiratory acidosis.  No changes in vent settings.  7/12 chest xray and ABG reviewed. Will increase TV to 420 to improve hypercapnia

## 2018-07-12 NOTE — PLAN OF CARE
Problem: Patient Care Overview  Goal: Plan of Care Review  Patient currently requires total assist x 2 for bed mobility and total assist for sitting balance at EOB.   Outcome: Ongoing (interventions implemented as appropriate)  Pt resting comfortably. C/o neck pain. MD aware. Prn tylenol given. Pt refused fentanyl gtt at this time. Sleeping on and off throughout shift. Turned Q2 hr with wedge. Supine to left side. BP labile on R side. MD aware. Heels boots in place. Removed off and on throughout shift for break.  Electrolytes replaced. plasmapharesis scheduled for 7/13, 7/14, and 7/16. Daughter updated at bedside. Consulted surgery for trach and PEG placement per MD Hackett recommendation. Will continue to monitor.

## 2018-07-12 NOTE — PLAN OF CARE
Problem: Patient Care Overview  Goal: Plan of Care Review  Patient currently requires total assist x 2 for bed mobility and total assist for sitting balance at EOB.   Outcome: Ongoing (interventions implemented as appropriate)  Patient remains on ventilator at this time. ETT 7.5 remains secure at 24 cm at the lips. No redness or breakdown noted at this time Patient tolerated treatment well . Respiratory to follow.

## 2018-07-12 NOTE — ASSESSMENT & PLAN NOTE
7/11 sodium remains elevated at 152.  Water deficit 6.6 L plus about 1.4 L urine output last 24 hr.  Will replace half of the deficit next 24 hr.  Currently on 350 free water q.4 hours.  Will start D5W at 125 cc/hour.  Monitor BMP.  7/12 resolved , Dc IVF

## 2018-07-12 NOTE — PROGRESS NOTES
[facility] Hospital Medicine  Progress Note    Patient Name: Carlie Valdez  MRN: [id number]  Patient Class: IP- Inpatient   Admission Date: 6/30/2018  Length of Stay: 12 days  Attending Physician: Raymundo Vidal MD  Primary Care Provider: Johanna Guzman MD        Subjective:     Principal Problem:Respiratory failure    HPI:  Carlie Valdez is a 72 year old female with a PMHx of HTN, GERD, DM, A-fib, Asthma, and Defibrillator who presented from home with c/o chest pain. Located to right side with no radiation. Associated symptoms: SOB. Pt and sitters at bedside report she was recently discharged from [facility] and pt hasn't had her home medications since being discharged. ED workup revealed: Hgb/Hct 11.2/34.7, Alk phos 152, BNP 1100, troponin 0.028. UA (+) nitrites. CXR with central vascular congestion with small bilateral effusion. Primary cardiologist is Dr. Hernández.  Pt admitted to Telemetry for Acute on chronic CHF exacerbation and UTI.     Hospital Course:  Pt admitted for decompensated heart failure and UTI. Diuresis with Bumex and IV Rocephin in progress. Pt with physical debility and PT/OT consulted. Dr. Joiner had lengthy discussion with daughter, Haley, and patient in reference to generalized weakness and proper disposition. SNF placement was agreed however, when  approach regarding SNF selection patient refused. Introduce Hospice to patient and advised she was not a candidate for Rehab due to her extent of weakness. Pt stated she would discharge home with sitters and home health services. 7/2/18 - Dr. Joiner has spoken with pt's daughters, Haley and Rosy, regarding the mother's condition. She has diuresed and diuretics changed to oral. She was medically stable for discharge. SNF placement pending. PT/OT in progress. 7/3/18 - It was noted that pt has refused participation in PT/OT. She appears grossly more weak in the BUE/BLE  extremities. Family notified of condition. Granddaughter at bedside mentioned similar symptoms prior to admission. Neurology consult placed, ESR/CRP pending, CT Head and C spine pending. At 4th day, CT of Head and C spine found no concerning findings. ESR and CRP elevated although no concern for vasculitis. Neurology saw the patient and recommended LP to rule out GBs or CIDP. Xarelto placed on hold and LP by IR is pending as Xarelto needs to be on hold for 3 days. Speech therapy evaluated the patient and noted inconsistent dysphonia. The patient will voice normally then start mouthing words. Diet recommendations:  Mechanical soft, Thin. On 6th day, pt noted to have acute onset of metabolic encephalopathy. Pt more somnolent, respiration shallow. She would arouse with sternal rub but return to lethargy. Repeat CT of Head was negative, ammonia slightly elevated at 61 and ABG showed acidosis with hypoxic/hypercapneic respiratory failure.   07/06/18 - presently on Bipap for respiratory acidosis , Lumbar puncture done today 06/07/18 07/07- Diagnosed with NMO at [facility] Sol[facility]  1000 mg/day for 5 days per neurology   07/08- possible aspiration overnight , follow up CXR new right pleural effusion , continue Bipap support   07/09 patient was intubated in morning continue to have respiratory distress and no improvement in neurological status   07/10  Started on plasma exchange series for nmo . As per neurology recommendation .  No improvement in mental status . Family updated on plan   07/11  Patient had first cycle of plasma exchange yesterday . Now can follow simple commands .continue to be on vent and with plasma exchange . Hold bp meds due to hypotension   07/12  Patient having plasma exchange. Plan for  trach and peg family and patient agreed         Review of Systems   Unable to perform ROS: Intubated     Objective:     Vital Signs (Most Recent):  Temp: 99.1 °F (37.3 °C) (07/12/18 1105)  Pulse: 70 (07/12/18 1535)  Resp:  18 (07/12/18 1535)  BP: (!) 95/54 (07/12/18 1535)  SpO2: 98 % (07/12/18 1535) Vital Signs (24h Range):  Temp:  [98.9 °F (37.2 °C)-99.2 °F (37.3 °C)] 99.1 °F (37.3 °C)  Pulse:  [68-70] 70  Resp:  [12-23] 18  SpO2:  [91 %-100 %] 98 %  BP: ()/(34-72) 95/54     Weight: 73 kg (160 lb 15 oz)  Body mass index is 27.62 kg/m².    Intake/Output Summary (Last 24 hours) at 07/12/18 1637  Last data filed at 07/12/18 1600   Gross per 24 hour   Intake          5385.42 ml   Output             1835 ml   Net          3550.42 ml      Physical Exam   Constitutional: She appears well-developed.   Ill appearing    HENT:   + ETT    Eyes: Pupils are equal, round, and reactive to light.   Neck: Neck supple.   Cardiovascular: Normal rate.    Pulmonary/Chest: Effort normal.   BS decreased mildly bilaterally   Right chest Vas-Cath   Abdominal: Soft. There is no tenderness.   Genitourinary:   Genitourinary Comments: Flores    Musculoskeletal: She exhibits no tenderness.   Neurological:   Awake, following commands , not moving extremities purposefully   Skin: Skin is warm.   Nursing note and vitals reviewed.      Significant Labs: All pertinent labs within the past 24 hours have been reviewed.    Significant Imaging: I have reviewed all pertinent imaging results/findings within the past 24 hours.    Assessment/Plan:      * Respiratory failure    ABG reflected hypoxic/hypercapneic respiratory failure  ICU care  Pulmonology/Critical Care following  Bipap for more than 24 hours with no improvement . 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     IV Rocephin - started 6/30/18, Rocephin stopped 7/5/18  7/10  Completed course           Severe muscle deconditioning    - Currently has [facility] (PT/OT/nurse)  - secondary to NMO        Acute on chronic systolic heart failure    Compensated as of 7/3/18  - BNP 1100  - CXR with vascular congestion with small bilateral effusion  - Mild BLE edema upon assessment but diminished breath sounds noted  - 2D ECHO on 06/02/18 with EF 35% and pulmonary HTN  - Pt received Bumex 2 mg IVP x 1 in ED  - Bumex 2 mg IVP daily====> changed to Bumex 2 mg twice daily 7/2/18  - Continue Metoprolol  7/10  Compensated         Left ischial pressure sore, stage III    Wound care           Hypothyroidism    - TSH about a month ago -- 1.265  - Continue Levothyroxine          Chronic kidney disease (CKD), stage III (moderate)    - Currently stable  - Monitor kidney function while on diuretics          Hypertension associated with diabetes    Controlled  - continue Toprol XL and lisinopril  - Apresoline prn  Lisinopril discontinued 7/4/18 - daughter stated it caused respiratory problems    DM  - HgbA1c on 06/03/18 -- 5.3 -- controlled  - Accuchecks and low dose SSI  -  7/11  Hold antihypertensive due to hypotension         Atrial fibrillation    - Currently rate controlled  Telemetry monitoring  - Continue Xarelto and Amiodarone  -xarelto on  hold.  - will resume after procedure is done             VTE Risk Mitigation         Ordered     heparin, porcine (PF) injection 3,800 Units  As needed (PRN)      07/10/18 0859     heparin (porcine) injection 4,000 Units  As needed (PRN)      07/09/18 2045     heparin (porcine) injection 5,000 Units  Every 8 hours      07/04/18 1209          Critical care time spent on the evaluation and treatment of severe organ dysfunction, review of pertinent labs and imaging studies, discussions with consulting providers and discussions with patient/family: 40minutes.    Raymundo Vidal MD  Department of Hospital Medicine   [facility]

## 2018-07-12 NOTE — PROGRESS NOTES
Intubated, not sedated.  Blind in both eyes.  Awake and alert.  Daughter at bedside.  Mouths words and nods head appropriately to communicate.  Eyes open, looks to direction of voice.  Follows commands.  Unable to move BUE and BLE.  Reports + sensation to BUE and Upper portion of BLE above calf level.  Hypersensitive to BLE - tactile stimulation elicits jerky movements.  Extremities otherwise flaccid.

## 2018-07-12 NOTE — ASSESSMENT & PLAN NOTE
Plan for trach and peg placement early next week (tuesday/wednesday)  Plan was discussed the the patients daughter and questions were answered. She agrees to trach and peg, and patient also nods in agreement with the plan.

## 2018-07-12 NOTE — NURSING
Discussed low BP-MAP 60-65, with RIGOBERTO Myers and MD White. Will place art line. Adequate urine output noted.

## 2018-07-12 NOTE — PROGRESS NOTES
[facility] -   Adult Nutrition  Consult Note    SUMMARY     Recommendations    Recommendation/Intervention:   1. Continue current TF regimen. Check residuals q4hrs. Hold TF if >500 cc's.   2. When medically able, ADAT to Diabetic diet. Resume oral supplements.   3. Will continue to monitor.    Goals: 1) Diet advancement or initiate nutrition support by next RD visit 2) Meet >85% EEN/EPN this admit  Nutrition Goal Status: 1) goal met  2) new  Communication of RD Recs:  (POC review, sticky note)    Reason for Assessment    Reason for Assessment: RD f/u  Dx:  1. Respiratory failure, unspecified chronicity, unspecified whether with hypoxia or hypercapnia    2. Chest pain    3. Acute on chronic systolic heart failure    4. Anasarca    5. Bilateral pleural effusion    6. Decubitus ulcer of sacral region, unspecified ulcer stage    7. Blisters of multiple sites    8. Atrial fibrillation, unspecified type    9. Severe muscle deconditioning    10. Skin breakdown    11. Acute respiratory failure with hypoxia and hypercarbia    12. Abnormal liver function tests    13. Neuromyelitis optica spectrum disorder    14. Weakness generalized    15. Encephalopathy      Past Medical History:   Diagnosis Date    Arthritis     Asthma     Atrial fibrillation     Blood clot of vein in shoulder area     Cardiac defibrillator in place     Chronic knee pain     Diabetes mellitus type 2 without retinopathy 12/19/2016    Diaphragmatic hernia without obstruction and without gangrene 9/14/2015    GERD (gastroesophageal reflux disease)     Hypertension     Primary open angle glaucoma (POAG) of both eyes, severe stage 6/1/2018       General Information Comments: Pt currently in ICU, intubated this AM. Per ICU rounds, pt has neuromyelitis optica. Neurological status has not changed, pt receiving steroids for NMO. Respiratory status not improving. Multiple pressure ulcers noted    7.12.18- Pt remains intubated, sedated. TF  "is at goal rate, tolerating well. MD notes mental status improved.     Nutrition Discharge Planning: too soon to determine    Nutrition Risk Screen    Nutrition Risk Screen: no indicators present    Nutrition/Diet History    Do you have any cultural, spiritual, Catholic conflicts, given your current situation?: none    Anthropometrics    Temp: 99.1 °F (37.3 °C)  Height Method: Stated  Height: 5' 4" (162.6 cm)  Height (inches): 64 in  Weight Method: Bed Scale  Weight: 73 kg (160 lb 15 oz)  Weight (lb): 160.94 lb  Ideal Body Weight (IBW), Female: 120 lb  % Ideal Body Weight, Female (lb): 130.44 lb  BMI (Calculated): 26.9  BMI Grade: 25 - 29.9 - overweight       Lab/Procedures/Meds    Pertinent Labs Reviewed: reviewed  BMP  Lab Results   Component Value Date     07/12/2018    K 4.2 07/12/2018     07/12/2018    CO2 33 (H) 07/12/2018    BUN 58 (H) 07/12/2018    CREATININE 0.7 07/12/2018    CALCIUM 8.5 (L) 07/12/2018    ANIONGAP 7 (L) 07/12/2018    ESTGFRAFRICA >60 07/12/2018    EGFRNONAA >60 07/12/2018     Lab Results   Component Value Date    CALCIUM 8.5 (L) 07/12/2018    PHOS 1.4 (L) 07/12/2018     Lab Results   Component Value Date    ALBUMIN 2.9 (L) 07/06/2018       Recent Labs  Lab 07/12/18  1135   POCTGLUCOSE 115*     Lab Results   Component Value Date    HGBA1C 5.3 06/03/2018     Pertinent Medications Reviewed: reviewed  Scheduled Meds:   [START ON 7/13/2018] albumin human 5%  175 g Intravenous Once    albuterol-ipratropium  3 mL Nebulization Q6H    amiodarone  200 mg Oral BID    brimonidine 0.2%  1 drop Both Eyes Q12H    And    timolol maleate 0.5%  1 drop Both Eyes BID    [START ON 7/13/2018] calcium gluconate IVPB  2,000 mg Intravenous Once    chlorhexidine  15 mL Mouth/Throat BID    ergocalciferol  50,000 Units Oral Q7 Days    heparin (porcine)  5,000 Units Subcutaneous Q8H    insulin detemir U-100  10 Units Subcutaneous Daily    levothyroxine  100 mcg Oral Before breakfast    " pantoprazole 40 mg in dextrose 5 % 100 mL IVPB (over 15 minutes) (ready to mix system)  40 mg Intravenous Daily    sodium phosphate IVPB  15 mmol Intravenous Once     Continuous Infusions:   fentanyl Stopped (07/11/18 0830)     PRN Meds:.[START ON 7/13/2018] sodium chloride, acetaminophen, albuterol-ipratropium, baclofen, dextrose 50%, dextrose 50%, glucagon (human recombinant), glucose, glucose, heparin (porcine), heparin, porcine (PF), hydrALAZINE, insulin aspart U-100, lorazepam    Physical Findings/Assessment    Overall Physical Appearance: overweight, on ventilator support  Oral/Mouth Cavity: tooth/teeth missing  Skin:      -Stage 2 pressure ulcers to L lateral hip, R lateral buttocks     -Stage 3 pressure ulcers noted to L proximal ischial tuberosity, R lateral heel     -Unstageable pressure ulcer noted to R medial heel     -Beka=11    Estimated/Assessed Needs    Weight Used For Calorie Calculations: 71 kg (156 lb 8.4 oz)  Energy Calorie Requirements (kcal): 1327 kcal/day  Energy Need Method: [facility]  Protein Requirements:  gm/day  Weight Used For Protein Calculations: 71 kg (156 lb 8.4 oz) (x1.2-2 g/kg)     Fluid Need Method: RDA Method (or per MD)  RDA Method (mL): 1327  CHO Requirement: 50% EEN      Nutrition Prescription Ordered    Current Diet Order: NPO    Evaluation of Received Nutrient/Fluid Intake    Intake/Output Summary (Last 24 hours) at 07/12/18 1402  Last data filed at 07/12/18 1305   Gross per 24 hour   Intake          4685.42 ml   Output             1435 ml   Net          3250.42 ml        % Intake of Estimated Energy Needs: 75 - 100 %  % Meal Intake: NPO    Nutrition Risk    Level of Risk/Frequency of Follow-up:  (F/u x2 weekly)     Assessment and Plan    Nutrition Problem  Inadequate oral intake    Related to (etiology):   On mechanical ventilation    Signs and Symptoms (as evidenced by):   Current diet order (NPO)    Interventions/Recommendations (treatment strategy):  See  above    Nutrition Diagnosis Status:   Continues        Monitor and Evaluation    Food and Nutrient Intake: energy intake, enteral nutrition intake  Food and Nutrient Adminstration: enteral and parenteral nutrition administration  Anthropometric Measurements: weight  Biochemical Data, Medical Tests and Procedures: electrolyte and renal panel, glucose/endocrine profile, gastrointestinal profile  Nutrition-Focused Physical Findings: overall appearance, skin     Nutrition Follow-Up    RD Follow-up?: Yes x2 weekly

## 2018-07-12 NOTE — SUBJECTIVE & OBJECTIVE
Interval History:   Review of Systems   Unable to perform ROS: Intubated       Objective:     Vital Signs (Most Recent):  Temp: 99.1 °F (37.3 °C) (07/12/18 1105)  Pulse: 70 (07/12/18 1318)  Resp: (!) 21 (07/12/18 1318)  BP: (!) 108/54 (07/12/18 1205)  SpO2: 100 % (07/12/18 1318) Vital Signs (24h Range):  Temp:  [98.2 °F (36.8 °C)-99.2 °F (37.3 °C)] 99.1 °F (37.3 °C)  Pulse:  [68-70] 70  Resp:  [12-23] 21  SpO2:  [94 %-100 %] 100 %  BP: ()/(34-67) 108/54     Weight: 73 kg (160 lb 15 oz)  Body mass index is 27.62 kg/m².      Intake/Output Summary (Last 24 hours) at 07/12/18 1322  Last data filed at 07/12/18 1134   Gross per 24 hour   Intake          4585.42 ml   Output             1435 ml   Net          3150.42 ml       Physical Exam   Constitutional: She appears well-developed.   Ill appearing    HENT:   + ETT    Eyes: Pupils are equal, round, and reactive to light.   Neck: Neck supple.   Cardiovascular: Normal rate.    Pulmonary/Chest: Effort normal.   BS decreased mildly bilaterally   Right chest Vas-Cath   Abdominal: Soft. There is no tenderness.   Genitourinary:   Genitourinary Comments: Flores    Musculoskeletal: She exhibits no tenderness.   Neurological:   Awake, following commands , not moving extremities purposefully   Skin: Skin is warm.       Vents:  Vent Mode: Spont (07/12/18 1318)  Ventilator Initiated: Yes (07/09/18 1022)  Set Rate: 0 bmp (07/12/18 1318)  Vt Set: 380 mL (07/12/18 1318)  Pressure Support: 12 cmH20 (07/12/18 1318)  PEEP/CPAP: 5 cmH20 (07/12/18 1318)  Oxygen Concentration (%): 30 (07/12/18 1318)  Peak Airway Pressure: 40 cmH2O (07/12/18 1318)  Plateau Pressure: 18 cmH20 (07/12/18 1318)  Total Ve: 11.8 mL (07/12/18 1318)  F/VT Ratio<105 (RSBI): (!) 14.15 (07/12/18 1318)    Lines/Drains/Airways     Central Venous Catheter Line                 Hemodialysis Catheter 07/09/18 2100 right internal jugular 2 days          Drain                 Urethral Catheter 07/05/18 1114 7 days          NG/OG Tube 07/09/18 orogastric Left mouth 3 days          Airway                 Airway - Non-Surgical 07/09/18 1005 Endotracheal Tube 3 days          Pressure Ulcer                 Pressure Injury 06/02/18 Right lateral Heel Stage 3 40 days         Pressure Injury 06/02/18 1659 Left proximal Ischial tuberosity Stage 3 39 days         Pressure Injury 06/02/18 1902 Right medial Heel Unstageable 39 days         Pressure Injury 06/30/18 Right lateral Buttocks Stage 2 12 days          Peripheral Intravenous Line                 Peripheral IV - Single Lumen 07/06/18 2315 Right Forearm 5 days         Peripheral IV - Single Lumen 07/09/18 0710 Right Antecubital 3 days              Significant Labs:    CBC/Anemia Profile:    Recent Labs  Lab 07/11/18  0410 07/12/18  0325   WBC 11.45 13.24*   HGB 10.7* 10.1*   HCT 35.2* 33.0*    175   MCV 84 84   RDW 18.5* 18.6*     Chemistries:    Recent Labs  Lab 07/11/18  0410 07/12/18  0325   * 141   K 3.3* 4.2    101   CO2 36* 33*   BUN 86* 58*   CREATININE 1.0 0.7   CALCIUM 8.9 8.5*   MG 2.5 2.3   PHOS 2.1* 1.4*     Significant Imaging:  CXR: I have reviewed all pertinent results/findings within the past 24 hours and my personal findings are:  + ETT , Vas cath in place. basilar atelectasis.

## 2018-07-12 NOTE — SUBJECTIVE & OBJECTIVE
Review of Systems   Unable to perform ROS: Intubated     Objective:     Vital Signs (Most Recent):  Temp: 99.1 °F (37.3 °C) (07/12/18 1105)  Pulse: 70 (07/12/18 1535)  Resp: 18 (07/12/18 1535)  BP: (!) 95/54 (07/12/18 1535)  SpO2: 98 % (07/12/18 1535) Vital Signs (24h Range):  Temp:  [98.9 °F (37.2 °C)-99.2 °F (37.3 °C)] 99.1 °F (37.3 °C)  Pulse:  [68-70] 70  Resp:  [12-23] 18  SpO2:  [91 %-100 %] 98 %  BP: ()/(34-72) 95/54     Weight: 73 kg (160 lb 15 oz)  Body mass index is 27.62 kg/m².    Intake/Output Summary (Last 24 hours) at 07/12/18 1637  Last data filed at 07/12/18 1600   Gross per 24 hour   Intake          5385.42 ml   Output             1835 ml   Net          3550.42 ml      Physical Exam   Constitutional: She appears well-developed.   Ill appearing    HENT:   + ETT    Eyes: Pupils are equal, round, and reactive to light.   Neck: Neck supple.   Cardiovascular: Normal rate.    Pulmonary/Chest: Effort normal.   BS decreased mildly bilaterally   Right chest Vas-Cath   Abdominal: Soft. There is no tenderness.   Genitourinary:   Genitourinary Comments: Flores    Musculoskeletal: She exhibits no tenderness.   Neurological:   Awake, following commands , not moving extremities purposefully   Skin: Skin is warm.   Nursing note and vitals reviewed.      Significant Labs: All pertinent labs within the past 24 hours have been reviewed.    Significant Imaging: I have reviewed all pertinent imaging results/findings within the past 24 hours.

## 2018-07-12 NOTE — PLAN OF CARE
Problem: Patient Care Overview  Goal: Plan of Care Review  Patient currently requires total assist x 2 for bed mobility and total assist for sitting balance at EOB.   Outcome: Ongoing (interventions implemented as appropriate)  Recommendations    Recommendation/Intervention:   1. Continue current TF regimen. Check residuals q4hrs. Hold TF if >500 cc's.   2. When medically able, ADAT to Diabetic diet. Resume oral supplements.   3. Will continue to monitor.    Goals: 1) Diet advancement or initiate nutrition support by next RD visit 2) Meet >85% EEN/EPN this admit  Nutrition Goal Status: 1) goal met  2) new  Communication of RD Recs:  (POC review, sticky note)

## 2018-07-13 NOTE — ASSESSMENT & PLAN NOTE
Compensated as of 7/3/18  - BNP 1100  - CXR with vascular congestion with small bilateral effusion  - Mild BLE edema upon assessment but diminished breath sounds noted  - 2D ECHO on 06/02/18 with EF 35% and pulmonary HTN  - Pt received Bumex 2 mg IVP x 1 in ED  - Bumex 2 mg IVP daily====> changed to Bumex 2 mg twice daily 7/2/18  - Continue Metoprolol  7/10  Compensated   7/12  Compensated

## 2018-07-13 NOTE — ASSESSMENT & PLAN NOTE
7/5 start IV lasix  7/6 continue IV lasix  7/8 worsening Chest X Ray with pleural effusion on the right , increase lasix to BID  7/9 negative 2.9 liters . Cont IV Lasix 40 mg q 12 hrs .   7/10 -3 0.5 L since admission.  Improving oxygen requirements.  Creatinine in and serum bicarb increasing.  Will discontinue Lasix.  7/11 no evidence of fluid overload.  Keep off Lasix.  Hypotensive.  Started on Levophed drip keep map above 65 mm of mercury.  7/12 normotensive, acceptable O2 requirement, avoid fluid overload   7/13 of Lasix.  Stable O2 requirements.  No evidence of fluid overload.  Will give Diamox today to treat her metabolic alkalosis

## 2018-07-13 NOTE — PLAN OF CARE
Patient family decided the patient will get Trach and Peg after last Plasma Pheresis on Monday 7/16/18. Plan to d/c to LTAC  post Trach and PEG surg. CM gave patient dtrlisting of [facility] for [facility]. Dtr wants to confer with sibling for choice     07/13/18 1102   Discharge Reassessment   Assessment Type Discharge Planning Reassessment   Provided patient/caregiver education on the expected discharge date and the discharge plan No   Do you have any problems affording any of your prescribed medications? No   Discharge Plan A Long-term acute care facility (LTAC)   Discharge Plan B Skilled Nursing Facility   Patient choice form signed by patient/caregiver N/A   Can the patient answer the patient profile reliably? No, cognitively impaired   Describe the patient's ability to walk at the present time. Does not walk or unable to take any steps at all   How often would a person be available to care for the patient? Whenever needed   Number of comorbid conditions (as recorded on the chart) Five or more   During the past month, has the patient often been bothered by feeling down, depressed or hopeless? No   During the past month, has the patient often been bothered by little interest or pleasure in doing things? No

## 2018-07-13 NOTE — PLAN OF CARE
Problem: Patient Care Overview  Goal: Plan of Care Review  Patient currently requires total assist x 2 for bed mobility and total assist for sitting balance at EOB.   Outcome: Ongoing (interventions implemented as appropriate)  No acute events overnight. No change in neuro/neuromuscular status. Tolerating ventilator and tube feeding. VSS. Afebrile. UO adequate. No BM last night. Pt turned Q2H with pressure points protected. POC reviewed with pt and family. All questions and concerns addressed.

## 2018-07-13 NOTE — NURSING
Fentanyl gtt found to have been unintentionally infused, bag is empty. Fentanyl gtt noted to have about 80ccs left in bag before infusion. No harm done to patient, VSS at this time. RIGOBERTO Myers notified @ 1211; no Narcan ordered. MD Hackett notified @ 1212,no narcan ordered, no new orders at this time. Will continue to monitor.

## 2018-07-13 NOTE — ASSESSMENT & PLAN NOTE
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

## 2018-07-13 NOTE — ASSESSMENT & PLAN NOTE
7/6 - Lumbar puncture today , neurology to evaluate  7/7 High dose steroids as per neurology  7/9 on high dose steroids . Neurology following needs Plasmapheresis. Discussed it with Neurology, he arranged for vas cath placement.   7/10.  Off high-dose steroid.  Status post Vas-Cath placement yesterday.  Plasmapheresis for 5 days.  Neurology follow-up  7/11 plasma exchange day 2.  7/12 No plasmapheresis today. Neurology fup. Cs for PEGT , trach. Discussed with daughter   7/13 3rd plasmapheresis today

## 2018-07-13 NOTE — PROGRESS NOTES
Patient ia awake alert and following commands. She is getting plasmapheresis.   She  responds yes or no by nodding her head

## 2018-07-13 NOTE — NURSING
Daughter at bedside and requesting pt be seen by neurology, MD Hackett notified, stated will come to see patient after he is finished seeing another patient.

## 2018-07-13 NOTE — SUBJECTIVE & OBJECTIVE
Interval History:   Review of Systems   Unable to perform ROS: Intubated       Objective:     Vital Signs (Most Recent):  Temp: 98.4 °F (36.9 °C) (07/13/18 0725)  Pulse: 72 (07/13/18 1254)  Resp: 14 (07/13/18 1254)  BP: (!) 93/52 (07/13/18 1100)  SpO2: 99 % (07/13/18 1254) Vital Signs (24h Range):  Temp:  [97.5 °F (36.4 °C)-98.9 °F (37.2 °C)] 98.4 °F (36.9 °C)  Pulse:  [69-81] 72  Resp:  [12-22] 14  SpO2:  [79 %-100 %] 99 %  BP: ()/(39-76) 93/52     Weight: 73 kg (160 lb 15 oz)  Body mass index is 27.62 kg/m².      Intake/Output Summary (Last 24 hours) at 07/13/18 1315  Last data filed at 07/13/18 1300   Gross per 24 hour   Intake             3050 ml   Output             2265 ml   Net              785 ml       Physical Exam   Constitutional: She appears well-developed.   Ill appearing    HENT:   + ETT    Eyes: Pupils are equal, round, and reactive to light.   Neck: Neck supple.   Cardiovascular: Normal rate.    Pulmonary/Chest: Effort normal.   BS decreased mildly bilaterally   Right chest Vas-Cath   Abdominal: Soft. There is no tenderness.   Genitourinary:   Genitourinary Comments: Flores    Musculoskeletal: She exhibits no tenderness.   Neurological:   Awake, following commands , not moving extremities purposefully   Skin: Skin is warm.       Vents:  Vent Mode: A/C (07/13/18 1254)  Ventilator Initiated: Yes (07/09/18 1022)  Set Rate: 14 bmp (07/13/18 1254)  Vt Set: 380 mL (07/13/18 1254)  Pressure Support: 0 cmH20 (07/13/18 1254)  PEEP/CPAP: 5 cmH20 (07/13/18 1254)  Oxygen Concentration (%): 35 (07/13/18 1254)  Peak Airway Pressure: 26 cmH2O (07/13/18 1254)  Plateau Pressure: 18 cmH20 (07/13/18 1254)  Total Ve: 5.49 mL (07/13/18 1254)  F/VT Ratio<105 (RSBI): (!) 35.62 (07/13/18 1254)    Lines/Drains/Airways     Central Venous Catheter Line                 Hemodialysis Catheter 07/09/18 2100 right internal jugular 3 days          Drain                 Urethral Catheter 07/05/18 1114 8 days         NG/OG Tube  07/09/18 orogastric Left mouth 4 days          Airway                 Airway - Non-Surgical 07/09/18 1005 Endotracheal Tube 4 days          Arterial Line                 Arterial Line 07/13/18 1045 Right Radial less than 1 day          Pressure Ulcer                 Pressure Injury 06/02/18 Right lateral Heel Stage 3 41 days         Pressure Injury 06/02/18 1659 Left proximal Ischial tuberosity Stage 3 40 days         Pressure Injury 06/02/18 1902 Right medial Heel Unstageable 40 days         Pressure Injury 06/30/18 Right lateral Buttocks Stage 2 13 days          Peripheral Intravenous Line                 Peripheral IV - Single Lumen 07/06/18 2315 Right Forearm 6 days         Peripheral IV - Single Lumen 07/09/18 0710 Right Antecubital 4 days                Significant Labs:    CBC/Anemia Profile:    Recent Labs  Lab 07/12/18 0325 07/13/18 0319   WBC 13.24* 15.20*   HGB 10.1* 10.0*   HCT 33.0* 32.6*    156   MCV 84 83   RDW 18.6* 18.6*        Chemistries:    Recent Labs  Lab 07/12/18 0325 07/13/18  0319    141   K 4.2 3.9    99   CO2 33* 37*   BUN 58* 49*   CREATININE 0.7 0.7   CALCIUM 8.5* 8.9   MG 2.3 2.5   PHOS 1.4* 1.9*     Significant Imaging:  CXR: I have reviewed all pertinent results/findings within the past 24 hours and my personal findings are:  ET tube and dialysis catheter in good position.

## 2018-07-13 NOTE — PROGRESS NOTES
[facility] Hospital Medicine  Progress Note    Patient Name: Carlie Valdez  MRN: [id number]  Patient Class: IP- Inpatient   Admission Date: 6/30/2018  Length of Stay: 13 days  Attending Physician: Raymundo Vidal MD  Primary Care Provider: Johanna Guzman MD        Subjective:     Principal Problem:Respiratory failure    HPI:  Carlie Valdez is a 72 year old female with a PMHx of HTN, GERD, DM, A-fib, Asthma, and Defibrillator who presented from home with c/o chest pain. Located to right side with no radiation. Associated symptoms: SOB. Pt and sitters at bedside report she was recently discharged from [facility] and pt hasn't had her home medications since being discharged. ED workup revealed: Hgb/Hct 11.2/34.7, Alk phos 152, BNP 1100, troponin 0.028. UA (+) nitrites. CXR with central vascular congestion with small bilateral effusion. Primary cardiologist is Dr. Hernández.  Pt admitted to Telemetry for Acute on chronic CHF exacerbation and UTI.     Hospital Course:  Pt admitted for decompensated heart failure and UTI. Diuresis with Bumex and IV Rocephin in progress. Pt with physical debility and PT/OT consulted. Dr. Joiner had lengthy discussion with daughter, Haley, and patient in reference to generalized weakness and proper disposition. SNF placement was agreed however, when  approach regarding SNF selection patient refused. Introduce Hospice to patient and advised she was not a candidate for Rehab due to her extent of weakness. Pt stated she would discharge home with sitters and home health services. 7/2/18 - Dr. Joiner has spoken with pt's daughters, Haley and Rosy, regarding the mother's condition. She has diuresed and diuretics changed to oral. She was medically stable for discharge. SNF placement pending. PT/OT in progress. 7/3/18 - It was noted that pt has refused participation in PT/OT. She appears grossly more weak in the BUE/BLE  extremities. Family notified of condition. Granddaughter at bedside mentioned similar symptoms prior to admission. Neurology consult placed, ESR/CRP pending, CT Head and C spine pending. At 4th day, CT of Head and C spine found no concerning findings. ESR and CRP elevated although no concern for vasculitis. Neurology saw the patient and recommended LP to rule out GBs or CIDP. Xarelto placed on hold and LP by IR is pending as Xarelto needs to be on hold for 3 days. Speech therapy evaluated the patient and noted inconsistent dysphonia. The patient will voice normally then start mouthing words. Diet recommendations:  Mechanical soft, Thin. On 6th day, pt noted to have acute onset of metabolic encephalopathy. Pt more somnolent, respiration shallow. She would arouse with sternal rub but return to lethargy. Repeat CT of Head was negative, ammonia slightly elevated at 61 and ABG showed acidosis with hypoxic/hypercapneic respiratory failure.   07/06/18 - presently on Bipap for respiratory acidosis , Lumbar puncture done today 06/07/18 07/07- Diagnosed with NMO at [facility] Solumedrol  1000 mg/day for 5 days per neurology   07/08- possible aspiration overnight , follow up CXR new right pleural effusion , continue Bipap support   07/09 patient was intubated in morning continue to have respiratory distress and no improvement in neurological status   07/10  Started on plasma exchange series for nmo . As per neurology recommendation .  No improvement in mental status . Family updated on plan   07/11  Patient had first cycle of plasma exchange yesterday . Now can follow simple commands .continue to be on vent and with plasma exchange . Hold bp meds due to hypotension   07/12  Patient having plasma exchange. Plan for  trach and peg family and patient agreed   07/13  Patient was seen and examined today . . In morning patient was responsive as per family following commands . Later in the day patient unintentionally received fentanyl .  She is currently not responsive intubated on vent. Neurology did stat eeg . Will continue monitor and assess patient once fentanyl wears off         Review of Systems   Unable to perform ROS: Intubated     Objective:     Vital Signs (Most Recent):  Temp: 98.4 °F (36.9 °C) (07/13/18 0725)  Pulse: 72 (07/13/18 1254)  Resp: 14 (07/13/18 1254)  BP: (!) 93/52 (07/13/18 1100)  SpO2: 99 % (07/13/18 1254) Vital Signs (24h Range):  Temp:  [97.5 °F (36.4 °C)-98.9 °F (37.2 °C)] 98.4 °F (36.9 °C)  Pulse:  [69-81] 72  Resp:  [13-22] 14  SpO2:  [79 %-100 %] 99 %  BP: ()/(39-76) 93/52     Weight: 73 kg (160 lb 15 oz)  Body mass index is 27.62 kg/m².    Intake/Output Summary (Last 24 hours) at 07/13/18 1430  Last data filed at 07/13/18 1400   Gross per 24 hour   Intake             3050 ml   Output             2210 ml   Net              840 ml      Physical Exam   Constitutional: She appears well-developed.   Ill appearing    HENT:   + ETT    Eyes: Pupils are equal, round, and reactive to light.   Neck: Neck supple.   Cardiovascular: Normal rate.    Pulmonary/Chest: Effort normal.   BS decreased mildly bilaterally   Right chest Vas-Cath   Abdominal: Soft. There is no tenderness.   Genitourinary:   Genitourinary Comments: Flores    Musculoskeletal: She exhibits no tenderness.   Neurological:   Awake, following commands , not moving extremities purposefully   Skin: Skin is warm.   Nursing note and vitals reviewed.      Significant Labs: All pertinent labs within the past 24 hours have been reviewed.    Significant Imaging: I have reviewed all pertinent imaging results/findings within the past 24 hours.    Assessment/Plan:      * Respiratory failure    ABG reflected hypoxic/hypercapneic respiratory failure  ICU care  Pulmonology/Critical Care following  Bipap for more than 24 hours with no improvement . 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     IV Rocephin - started 6/30/18, Rocephin stopped 7/5/18  7/10  Completed course           Severe muscle deconditioning    - Currently has [facility] (PT/OT/nurse)  - secondary to NMO        Acute on chronic systolic heart failure    Compensated as of 7/3/18  - BNP 1100  - CXR with vascular congestion with small bilateral effusion  - Mild BLE edema upon assessment but diminished breath sounds noted  - 2D ECHO on 06/02/18 with EF 35% and pulmonary HTN  - Pt received Bumex 2 mg IVP x 1 in ED  - Bumex 2 mg IVP daily====> changed to Bumex 2 mg twice daily 7/2/18  - Continue Metoprolol  7/10  Compensated   7/12  Compensated         Left ischial pressure sore, stage III    Wound care           Hypothyroidism    - TSH about a month ago -- 1.265  - Continue Levothyroxine          Chronic kidney disease (CKD), stage III (moderate)    - Currently stable  - Monitor kidney function while on diuretics          Hypertension associated with diabetes    Controlled  - continue Toprol XL and  lisinopril  - Apresoline prn  Lisinopril discontinued 7/4/18 - daughter stated it caused respiratory problems    DM  - HgbA1c on 06/03/18 -- 5.3 -- controlled  - Accuchecks and low dose SSI  -  7/11  Hold antihypertensive due to hypotension   7/12   Hold antihypertensive         Atrial fibrillation    - Currently rate controlled  Telemetry monitoring  - Continue Xarelto and Amiodarone  -xarelto on hold.  - will resume after procedure is done trach and peg   -chads 4 discussed with neurology/critical care holding antiocoagulation for now and resume after trach and peg            VTE Risk Mitigation         Ordered     heparin, porcine (PF) injection 3,800 Units  As needed (PRN)      07/10/18 0859     heparin (porcine) injection 4,000 Units  As needed (PRN)      07/09/18 2045     heparin (porcine) injection 5,000 Units  Every 8 hours      07/04/18 1209          Critical care time spent on the evaluation and treatment of severe organ dysfunction, review of pertinent labs and imaging studies, discussions with consulting providers and discussions with patient/family: 40 minutes.    Raymundo Vidal MD  Department of Hospital Medicine   [facility]

## 2018-07-13 NOTE — SUBJECTIVE & OBJECTIVE
Review of Systems   Unable to perform ROS: Intubated     Objective:     Vital Signs (Most Recent):  Temp: 98.4 °F (36.9 °C) (07/13/18 0725)  Pulse: 72 (07/13/18 1254)  Resp: 14 (07/13/18 1254)  BP: (!) 93/52 (07/13/18 1100)  SpO2: 99 % (07/13/18 1254) Vital Signs (24h Range):  Temp:  [97.5 °F (36.4 °C)-98.9 °F (37.2 °C)] 98.4 °F (36.9 °C)  Pulse:  [69-81] 72  Resp:  [13-22] 14  SpO2:  [79 %-100 %] 99 %  BP: ()/(39-76) 93/52     Weight: 73 kg (160 lb 15 oz)  Body mass index is 27.62 kg/m².    Intake/Output Summary (Last 24 hours) at 07/13/18 1430  Last data filed at 07/13/18 1400   Gross per 24 hour   Intake             3050 ml   Output             2210 ml   Net              840 ml      Physical Exam   Constitutional: She appears well-developed.   Ill appearing    HENT:   + ETT    Eyes: Pupils are equal, round, and reactive to light.   Neck: Neck supple.   Cardiovascular: Normal rate.    Pulmonary/Chest: Effort normal.   BS decreased mildly bilaterally   Right chest Vas-Cath   Abdominal: Soft. There is no tenderness.   Genitourinary:   Genitourinary Comments: Flores    Musculoskeletal: She exhibits no tenderness.   Neurological:   Awake, following commands , not moving extremities purposefully   Skin: Skin is warm.   Nursing note and vitals reviewed.      Significant Labs: All pertinent labs within the past 24 hours have been reviewed.    Significant Imaging: I have reviewed all pertinent imaging results/findings within the past 24 hours.

## 2018-07-13 NOTE — ASSESSMENT & PLAN NOTE
7/9 neuromuscular resp failure sp intubation. O2/ MV/ Pulm toilet . Daily ABG , CXR .  7/10 daily ABG, chest x-ray.  Will discontinue Lasix.  Part of the respiratory acidosis is compensating for metabolic alkalosis.  Keep respiratory rate at 14.  Tidal volume 380 mL.  7/11 O2/mechanical ventilation/pulmonary toilet.  Enteral tube feeding.  Daily chest x-ray and ABG.  7/12 part of the hypercapnia is compensating for metabolic alkalosis.  Diamox 250 mg once today.

## 2018-07-13 NOTE — NURSING
Pt assessed per RIGOBERTO Myers. Pt's oxygen saturation improved O2 saturation now @ 99%. RIGOBERTO Myers at bedside to insert arterial line. No further orders given.

## 2018-07-13 NOTE — NURSING
10:29 Pts O2 saturation decreased to the 70s, pt also with  mental status change, noted decreased alertness, NP Louis called to bedside.

## 2018-07-13 NOTE — PROGRESS NOTES
[facility]  Critical Care Medicine  Progress Note    Patient Name: Calrie Valdez  MRN: [id number]  Admission Date: 6/30/2018  Hospital Length of Stay: 13 days  Code Status: Full Code  Attending Provider: Raymundo Vidal MD  Primary Care Provider: Johanna Guzman MD   Principal Problem: Respiratory failure    Subjective:     HPI:  72 year old female with a PMHx of p[revious cerebral vascular accident with right sided weakness and aphasia, HTN, GERD, DM, A-fib, Cardiac Asthma, and Defibrillator who presented from home with c/o chest pain to Emergency Room on 6/30/2018 folllwing recent discharge from [facility]. Admitted for acute on chronic systolic Congestive Heart failure and progressively worsened. Noted to have worsening mental status and shortness of breath and transferrrd to ICU for hypercapnic respiratory failure. Placed on Noninvasive Positive Pressure Ventilation     Hospital/ICU Course:  7/5 Transferred to ICU. Noninvasive Positive Pressure Ventilation initiated , may need intubation  7/6 Improved ventilation with Noninvasive Positive Pressure Ventilation but now generalized weakness. Steroids started for respiratory failure. Lumbar puncture later today.  7/7 Respiratory status stable. Labile blood pressure. Started on high does steroids  7/8 worsening respiraoty status with elevated  PCO2  7/9 patient seen and examined, chest x-ray and ABG reviewed, daughter Haley was called, intubated for respiratory distress and tachypnea.  7/10 patient seen and examined, chest x-ray and ABG reviewed.  She had a Vas-Cath placed overnight.  Off high-dose steroid.  Plasmapheresis today.  7/11 patient seen and examined.  Chest x-ray and ABG reviewed.  Plasma exchange day 2.  Tolerating enteral feeding.  Hypotensive, started on Levophed drip.  7/12 seen and examined. Intubated, sedated with fentanyl gtt. No pressors . Mental status improved sp Plasmapheresis x 2 days. Tolerating  enteral feeding.   7/13 patient seen and examined, remains intubated, on fentanyl drip for sedation.  Following commands attempting to move extremities.      Interval History:   Review of Systems   Unable to perform ROS: Intubated       Objective:     Vital Signs (Most Recent):  Temp: 98.4 °F (36.9 °C) (07/13/18 0725)  Pulse: 72 (07/13/18 1254)  Resp: 14 (07/13/18 1254)  BP: (!) 93/52 (07/13/18 1100)  SpO2: 99 % (07/13/18 1254) Vital Signs (24h Range):  Temp:  [97.5 °F (36.4 °C)-98.9 °F (37.2 °C)] 98.4 °F (36.9 °C)  Pulse:  [69-81] 72  Resp:  [12-22] 14  SpO2:  [79 %-100 %] 99 %  BP: ()/(39-76) 93/52     Weight: 73 kg (160 lb 15 oz)  Body mass index is 27.62 kg/m².      Intake/Output Summary (Last 24 hours) at 07/13/18 1315  Last data filed at 07/13/18 1300   Gross per 24 hour   Intake             3050 ml   Output             2265 ml   Net              785 ml       Physical Exam   Constitutional: She appears well-developed.   Ill appearing    HENT:   + ETT    Eyes: Pupils are equal, round, and reactive to light.   Neck: Neck supple.   Cardiovascular: Normal rate.    Pulmonary/Chest: Effort normal.   BS decreased mildly bilaterally   Right chest Vas-Cath   Abdominal: Soft. There is no tenderness.   Genitourinary:   Genitourinary Comments: Flores    Musculoskeletal: She exhibits no tenderness.   Neurological:   Awake, following commands , not moving extremities purposefully   Skin: Skin is warm.       Vents:  Vent Mode: A/C (07/13/18 1254)  Ventilator Initiated: Yes (07/09/18 1022)  Set Rate: 14 bmp (07/13/18 1254)  Vt Set: 380 mL (07/13/18 1254)  Pressure Support: 0 cmH20 (07/13/18 1254)  PEEP/CPAP: 5 cmH20 (07/13/18 1254)  Oxygen Concentration (%): 35 (07/13/18 1254)  Peak Airway Pressure: 26 cmH2O (07/13/18 1254)  Plateau Pressure: 18 cmH20 (07/13/18 1254)  Total Ve: 5.49 mL (07/13/18 1254)  F/VT Ratio<105 (RSBI): (!) 35.62 (07/13/18 1254)    Lines/Drains/Airways     Central Venous Catheter Line                  Hemodialysis Catheter 07/09/18 2100 right internal jugular 3 days          Drain                 Urethral Catheter 07/05/18 1114 8 days         NG/OG Tube 07/09/18 orogastric Left mouth 4 days          Airway                 Airway - Non-Surgical 07/09/18 1005 Endotracheal Tube 4 days          Arterial Line                 Arterial Line 07/13/18 1045 Right Radial less than 1 day          Pressure Ulcer                 Pressure Injury 06/02/18 Right lateral Heel Stage 3 41 days         Pressure Injury 06/02/18 1659 Left proximal Ischial tuberosity Stage 3 40 days         Pressure Injury 06/02/18 1902 Right medial Heel Unstageable 40 days         Pressure Injury 06/30/18 Right lateral Buttocks Stage 2 13 days          Peripheral Intravenous Line                 Peripheral IV - Single Lumen 07/06/18 2315 Right Forearm 6 days         Peripheral IV - Single Lumen 07/09/18 0710 Right Antecubital 4 days                Significant Labs:    CBC/Anemia Profile:    Recent Labs  Lab 07/12/18  0325 07/13/18  0319   WBC 13.24* 15.20*   HGB 10.1* 10.0*   HCT 33.0* 32.6*    156   MCV 84 83   RDW 18.6* 18.6*        Chemistries:    Recent Labs  Lab 07/12/18  0325 07/13/18  0319    141   K 4.2 3.9    99   CO2 33* 37*   BUN 58* 49*   CREATININE 0.7 0.7   CALCIUM 8.5* 8.9   MG 2.3 2.5   PHOS 1.4* 1.9*     Significant Imaging:  CXR: I have reviewed all pertinent results/findings within the past 24 hours and my personal findings are:  ET tube and dialysis catheter in good position.      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 Neurology following needs Plasmapheresis. Discussed it with Neurology, he arranged for vas cath placement.   7/10.  Off high-dose steroid.  Status post Vas-Cath placement yesterday.  Plasmapheresis for 5  days.  Neurology follow-up  7/11 plasma exchange day 2.  7/12 No plasmapheresis today. Neurology fup. Cs for PEGT , trach. Discussed with daughter   7/13 3rd plasmapheresis today        Encephalopathy    7/6 pCO2 normalized and patient improved but still not alert, awaiting lumbar puncture  7/8 worsening mental status  - no longer feeding herself        Derm   Skin breakdown    7/5 Wound care nurse        Pulmonary   * Respiratory failure    7/5 Noninvasive Positive Pressure Ventilation , jet nebs and oxygen  7/6 IV steroids added  7/7 continue Noninvasive Positive Pressure Ventilation at night and prn during day  7/8 elevated  PCO2 - change ventilation mode  7/9 O2/ MV/ Pulm toilet . Daily ABG , CXR  7/11 chest x-ray and ABG reviewed.  Compensated respiratory acidosis.  No changes in vent settings.  7/12 chest xray and ABG reviewed. Will increase TV to 420 to improve hypercapnia  7/13 chest x-ray and ABG reviewed.  Diamox 250 mg once today.  No changes in ventilator settings.          Acute hypercapnic respiratory failure    7/9 neuromuscular resp failure sp intubation. O2/ MV/ Pulm toilet . Daily ABG , CXR .  7/10 daily ABG, chest x-ray.  Will discontinue Lasix.  Part of the respiratory acidosis is compensating for metabolic alkalosis.  Keep respiratory rate at 14.  Tidal volume 380 mL.  7/11 O2/mechanical ventilation/pulmonary toilet.  Enteral tube feeding.  Daily chest x-ray and ABG.  7/12 part of the hypercapnia is compensating for metabolic alkalosis.  Diamox 250 mg once today.        Cardiac/Vascular   Acute on chronic systolic heart failure    7/5 start IV lasix  7/6 continue IV lasix  7/8 worsening Chest X Ray with pleural effusion on the right , increase lasix to BID  7/9 negative 2.9 liters . Cont IV Lasix 40 mg q 12 hrs .   7/10 -3 0.5 L since admission.  Improving oxygen requirements.  Creatinine in and serum bicarb increasing.  Will discontinue Lasix.  7/11 no evidence of fluid overload.  Keep off  Lasix.  Hypotensive.  Started on Levophed drip keep map above 65 mm of mercury.  7/12 normotensive, acceptable O2 requirement, avoid fluid overload   7/13 of Lasix.  Stable O2 requirements.  No evidence of fluid overload.  Will give Diamox today to treat her metabolic alkalosis        Atrial fibrillation    Po amiodarone   7/13 same        Renal/   UTI (urinary tract infection)    7/6 - resolved - antibiotics stopped           Critical Care Daily Checklist:    A: Awake: RASS Goal/Actual Goal: RASS Goal: 0-->alert and calm  Actual: Guerrero Agitation Sedation Scale (RASS): Alert and calm   B: Spontaneous Breathing Trial Performed?     C: SAT & SBT Coordinated?  Not today                      D: Delirium: CAM-ICU Overall CAM-ICU: Positive   E: Early Mobility Performed? Yes   F: Feeding Goal: Goals: Diet advancement or initiate nutrition support by next RD visit  Status: Nutrition Goal Status: new   Current Diet Order   No orders of the defined types were placed in this encounter.      AS: Analgesia/Sedation Fentanyl drip   T: Thromboembolic Prophylaxis Heparin 5000 units subcutaneous q.8 hours   H: HOB > 300 Yes   U: Stress Ulcer Prophylaxis (if needed) PPI   G: Glucose Control Fingerstick q.6 hours plus short-acting insulin   B: Bowel Function Stool Occurrence: 0   I: Indwelling Catheter (Lines & Flores) Necessity Keep Flores   D: De-escalation of Antimicrobials/Pharmacotherapies No antibiotic    Plan for the day/ETD Y    Code Status:  Family/Goals of Care: Full Code  Y     Critical Care Time: 35 minutes  Critical secondary to Patient has a condition that poses threat to life and bodily function:  RESPIRATORY FAILURE DUE TO NEUROMUSCULAR DISEASE      Critical care was time spent personally by me on the following activities: development of treatment plan with patient or surrogate and bedside caregivers, discussions with consultants, evaluation of patient's response to treatment, examination of patient, ordering and  performing treatments and interventions, ordering and review of laboratory studies, ordering and review of radiographic studies, pulse oximetry, re-evaluation of patient's condition. This critical care time did not overlap with that of any other provider or involve time for any procedures.     John White MD  Critical Care Medicine  [facility]

## 2018-07-13 NOTE — EICU
eICU Note :    Called by the [facility]:    Problem:Arterial blood gases: 7.46/56.3/75/17/40.9 on assist control rate of 14  tidal volume 380, PEEP of +5, FiO2 30%    Pertinent History and labs reviewed :72-year-old female known case of diabetes mellitus, atrial fibrillation, bronchial asthma that is post defibrillator      Treatment /Intervention given:Continue current treatment        Melanie Christine M.D  eICU Physician

## 2018-07-13 NOTE — PROGRESS NOTES
Follow up visit with MsIsabel José Miguel.  Bilateral heels remain in heel offloading boots.  Left heel remains intact. Right heel with stage 3 pressure injury laterally, healing well. Eschar has fallen off of medial heel with small partial thickness wound underneath and light pink scar tissue.  Continue foam dressing and boots.  Primary nurse at bedside to assist turning patient.  Prior to turning patient, her sats decreased, and I left room to allow medical team to intervene.  Later, I discussed with primary nurse Nereyda, continue wound care per orders to left ischial healing stage 3. She reports healing of stage 2 blisters to buttock/hip/and flank, so LDAs can be removed.  Will continue to follow.

## 2018-07-13 NOTE — ASSESSMENT & PLAN NOTE
7/5 Noninvasive Positive Pressure Ventilation , jet nebs and oxygen  7/6 IV steroids added  7/7 continue Noninvasive Positive Pressure Ventilation at night and prn during day  7/8 elevated  PCO2 - change ventilation mode  7/9 O2/ MV/ Pulm toilet . Daily ABG , CXR  7/11 chest x-ray and ABG reviewed.  Compensated respiratory acidosis.  No changes in vent settings.  7/12 chest xray and ABG reviewed. Will increase TV to 420 to improve hypercapnia  7/13 chest x-ray and ABG reviewed.  Diamox 250 mg once today.  No changes in ventilator settings.

## 2018-07-13 NOTE — ASSESSMENT & PLAN NOTE
- Currently rate controlled  Telemetry monitoring  - Continue Xarelto and Amiodarone  -xarelto on hold.  - will resume after procedure is done trach and peg   -chads 4 discussed with neurology/critical care holding antiocoagulation for now and resume after trach and peg

## 2018-07-13 NOTE — PROGRESS NOTES
Progress Note  Neurological ICU    Admit Date: 6/30/2018   LOS: 13 days     SUBJECTIVE:     Patient was awake and alert following commands and then had change in mental status. SPatient is unresponsive to verbal commands  She does not respond to painful stimulation in the facial area.     Continuous Infusions:   fentanyl Stopped (07/11/18 0830)     Scheduled Meds:   acetaZOLAMIDE (DIAMOX) IVPB  250 mg Intravenous Once    albumin human 5%  175 g Intravenous Once    albuterol-ipratropium  3 mL Nebulization Q6H    amiodarone  200 mg Oral BID    brimonidine 0.2%  1 drop Both Eyes Q12H    And    timolol maleate 0.5%  1 drop Both Eyes BID    calcium gluconate IVPB  2,000 mg Intravenous Once    chlorhexidine  15 mL Mouth/Throat BID    ergocalciferol  50,000 Units Oral Q7 Days    heparin (porcine)  5,000 Units Subcutaneous Q8H    levothyroxine  100 mcg Oral Before breakfast    pantoprazole 40 mg in dextrose 5 % 100 mL IVPB (over 15 minutes) (ready to mix system)  40 mg Intravenous Daily    sodium phosphate IVPB  30 mmol Intravenous Once     PRN Meds:sodium chloride, acetaminophen, albuterol-ipratropium, baclofen, dextrose 50%, dextrose 50%, glucagon (human recombinant), glucose, glucose, heparin (porcine), heparin, porcine (PF), hydrALAZINE, insulin aspart U-100, lorazepam    Review of patient's allergies indicates:   Allergen Reactions    Morphine Hives    Atorvastatin      Other reaction(s): Muscle pain    Clonidine     Codeine      Other reaction(s): Unknown    Digoxin      Other reaction(s): Unknown    Diovan [valsartan] Other (See Comments)     Upset stomach, weakness    Eggs [egg derived]     Metoprolol Diarrhea    Naproxen      Other reaction(s): Nausea    Peanut     Propofol      Other reaction(s): delirious    Sulfa (sulfonamide antibiotics)        OBJECTIVE:     Vital Signs (Most Recent)  Temp: 98.4 °F (36.9 °C) (07/13/18 0725)  Pulse: 72 (07/13/18 1254)  Resp: 14 (07/13/18 1254)  BP: (!)  93/52 (07/13/18 1100)  SpO2: 99 % (07/13/18 1254)    Vital Signs Range (Last 24H):  Temp:  [97.5 °F (36.4 °C)-98.9 °F (37.2 °C)]   Pulse:  [69-81]   Resp:  [13-22]   BP: ()/(39-76)   SpO2:  [79 %-100 %]     I & O (Last 24H):  Intake/Output Summary (Last 24 hours) at 07/13/18 1343  Last data filed at 07/13/18 1300   Gross per 24 hour   Intake             3050 ml   Output             2265 ml   Net              785 ml     Ventilator Data (Last 24H):     Vent Mode: A/C  Oxygen Concentration (%):  [30-35] 35  Resp Rate Total:  [14 br/min-17 br/min] 14 br/min  Vt Set:  [380 mL] 380 mL  PEEP/CPAP:  [5 cmH20] 5 cmH20  Pressure Support:  [0 cmH20] 0 cmH20  Mean Airway Pressure:  [8.3 cmH20-10 cmH20] 10 cmH20    Hemodynamic Parameters (Last 24H):       Physical Exam:  General: patient is unresponsive   HEENT:   Acephalic, Atraumatic,  Pupils isocoria, reactive, corneal reactiv  Neck: no Carotid bruit,  Lungs: CTA,  No rhonchi, no rales  Heart:: Regular Rate and rhythm, no murmurs, rubs and or gallops  Abdomen, Soft, non tender, non distended, no abdominal bruit, bowel sounds present  Extremities: No edema,   Skin: No rash, no ecchymoses,         NEURO    Mental Status: Unresponsive     SPEECH:   unresponsive  CRANIAL NERVES:  limited      II: visual acuity     II: visual fields    II: pupils Equal, round, reactive to light, isocoria   III,VII: ptosis    III,IV,VI: extraocular muscles     V: mastication    V: facial light touch sensation  Does not withdraw to painful stimulation   V,VII: corneal reflex  Present   VII: facial muscle function - upper     VII: facial muscle function - lower    VIII: hearing Not tested   IX: soft palate elevation     IX,X: gag reflex ABSENT   XI: trapezius strength     XI: sternocleidomastoid strength    XI: neck flexion strength     XII: tongue strength           MOTOR:Upper Extremities and Lower Extremities  No movement  Flaccid tone  Areflexia  Extensors b/l  EXTRAPYRAMIDALS:  none      Lines/Drains:       Hemodialysis Catheter 07/09/18 2100 right internal jugular (Active)   Site Assessment Clean;Dry;Intact 7/13/2018  7:05 AM   Status Clamped 7/13/2018  7:05 AM   Dressing Intervention New dressing 7/13/2018  7:05 AM   Dressing Status Clean;Dry;Intact 7/13/2018  7:05 AM   Verification by X-ray Yes 7/11/2018  7:05 PM   Site Condition No complications 7/13/2018  7:05 AM   Dressing Occlusive 7/13/2018  7:05 AM   Daily Line Review Performed 7/12/2018  3:05 PM   Number of days: 3            Peripheral IV - Single Lumen 07/06/18 2315 Right Forearm (Active)   Site Assessment Clean;Dry;Intact;No redness;No swelling 7/13/2018  7:05 AM   Line Status Saline locked 7/13/2018  7:05 AM   Dressing Status Clean;Dry;Intact 7/13/2018  7:05 AM   Dressing Intervention Dressing reinforced 7/12/2018  3:05 AM   Dressing Change Due 07/09/18 7/9/2018  3:04 PM   Reason Not Rotated Not due 7/13/2018  7:05 AM   Number of days: 6            Peripheral IV - Single Lumen 07/09/18 0710 Right Antecubital (Active)   Site Assessment Clean;Dry;Intact 7/13/2018  7:05 AM   Line Status Saline locked 7/13/2018  7:05 AM   Dressing Status Intact;Dry;Clean 7/13/2018  7:05 AM   Dressing Change Due 07/13/18 7/12/2018  3:05 PM   Reason Not Rotated Not due 7/13/2018  7:05 AM   Number of days: 4            Arterial Line 07/13/18 1045 Right Radial (Active)   Number of days: 0            NG/OG Tube 07/09/18 orogastric Left mouth (Active)   Placement Check placement verified by aspirate characteristics 7/13/2018  7:05 AM   pH Aspirate Result 5 7/12/2018  7:05 PM   Tube advanced (cm) 55 7/12/2018  7:05 PM   Tolerance no signs/symptoms of discomfort 7/13/2018  7:05 AM   Securement taped to commercial device 7/13/2018  7:05 AM   Clamp Status/Tolerance unclamped;no abdominal discomfort;no abdominal distention;no emesis;no nausea;no restlessness 7/13/2018  7:05 AM   Insertion Site Appearance no redness, warmth, tenderness, skin breakdown,  "drainage 7/13/2018  7:05 AM   Drainage Clear;Thin 7/10/2018  3:34 PM   Flush/Irrigation flushed w/;water;no resistance met 7/13/2018  7:05 AM   Feeding Method continuous 7/13/2018  7:05 AM   Feeding Action feeding held 7/13/2018  7:05 AM   Current Rate (mL/hr) 0 mL/hr 7/13/2018  7:05 AM   Goal Rate (mL/hr) 50 mL/hr 7/13/2018  7:05 AM   Water Bolus (mL) 200 mL 7/13/2018 12:00 PM   Intake (mL) - Formula Tube Feeding 50 7/13/2018  1:00 PM   Residual Amount (ml) 300 ml 7/13/2018  7:05 AM   Number of days: 4            Urethral Catheter 07/05/18 1114 (Active)   Site Assessment Intact;Clean 7/13/2018  7:05 AM   Collection Container Urimeter 7/13/2018  7:05 AM   Securement Method secured to top of thigh w/ adhesive device 7/13/2018  7:05 AM   Catheter Care Performed yes 7/13/2018  7:05 AM   Reason for Continuing Urinary Catheterization Critically ill in ICU requiring intensive monitoring 7/13/2018  7:05 AM   CAUTI Prevention Bundle StatLock in place w 1" slack;Drainage bag off the floor;Green sheeting clip in use;Intact seal between catheter & drainage tubing;No dependent loops or kinks;Drainage bag not overfilled (<2/3 full);Drainage bag below bladder 7/13/2018  7:05 AM   Output (mL) 30 mL 7/13/2018  1:00 PM   Number of days: 8       Laboratory (Last 24H):  CBC:    Recent Labs  Lab 07/13/18 0319   WBC 15.20*   HGB 10.0*   HCT 32.6*        CMP:    Recent Labs  Lab 07/13/18 0319   CALCIUM 8.9      K 3.9   CO2 37*   CL 99   BUN 49*   CREATININE 0.7   .this        West Nile Virus by PCR, CSF   Order: [id number]   Status:  Final result   Visible to patient:  No (Not Released) Next appt:  07/24/2018 at 04:20 PM in Internal Medicine (Johanna Guzman MD)    Ref Range & Units 4d ago   West Nile Virus Source  CSF    West Nile Virus Result Negative Negative            Thyroperoxidase Antibodies <6.0 IU/mL <6.0        NMO antibody was ordered on 7/3/2018 that is still pending  I had ordered accidentally on 7/9/2018 " that was cancelled    ASSESSMENT/PLAN:     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   Further investigation that she had received unintentional fentanyl of possible 80 cc  (10mcg/ml).   Initially head CT was ordered,but after I was told that she had received Fentanyl, Head CT was cancelled.  Counseling/Consultation:  Discussed with the patient's daughter Anders on the phone    Critical Care Time greater than:  50 minutes

## 2018-07-13 NOTE — ASSESSMENT & PLAN NOTE
Controlled  - continue Toprol XL and lisinopril  - Apresoline prn  Lisinopril discontinued 7/4/18 - daughter stated it caused respiratory problems    DM  - HgbA1c on 06/03/18 -- 5.3 -- controlled  - Accuchecks and low dose SSI  -  7/11  Hold antihypertensive due to hypotension   7/12   Hold antihypertensive

## 2018-07-14 PROBLEM — I95.9 HYPOTENSION: Status: ACTIVE | Noted: 2018-01-01

## 2018-07-14 NOTE — PROGRESS NOTES
[facility] Hospital Medicine  Progress Note    Patient Name: Carlie Valdez  MRN: [id number]  Patient Class: IP- Inpatient   Admission Date: 6/30/2018  Length of Stay: 14 days  Attending Physician: Raymundo Vidal MD  Primary Care Provider: Johanna Guzman MD        Subjective:     Principal Problem:Respiratory failure    HPI:  Carlie Valdez is a 72 year old female with a PMHx of HTN, GERD, DM, A-fib, Asthma, and Defibrillator who presented from home with c/o chest pain. Located to right side with no radiation. Associated symptoms: SOB. Pt and sitters at bedside report she was recently discharged from [facility] and pt hasn't had her home medications since being discharged. ED workup revealed: Hgb/Hct 11.2/34.7, Alk phos 152, BNP 1100, troponin 0.028. UA (+) nitrites. CXR with central vascular congestion with small bilateral effusion. Primary cardiologist is Dr. Hernández.  Pt admitted to Telemetry for Acute on chronic CHF exacerbation and UTI.     Hospital Course:  Pt admitted for decompensated heart failure and UTI. Diuresis with Bumex and IV Rocephin in progress. Pt with physical debility and PT/OT consulted. Dr. Joiner had lengthy discussion with daughter, Haley, and patient in reference to generalized weakness and proper disposition. SNF placement was agreed however, when  approach regarding SNF selection patient refused. Introduce Hospice to patient and advised she was not a candidate for Rehab due to her extent of weakness. Pt stated she would discharge home with sitters and home health services. 7/2/18 - Dr. Joiner has spoken with pt's daughters, Haley and Rosy, regarding the mother's condition. She has diuresed and diuretics changed to oral. She was medically stable for discharge. SNF placement pending. PT/OT in progress. 7/3/18 - It was noted that pt has refused participation in PT/OT. She appears grossly more weak in the BUE/BLE  extremities. Family notified of condition. Granddaughter at bedside mentioned similar symptoms prior to admission. Neurology consult placed, ESR/CRP pending, CT Head and C spine pending. At 4th day, CT of Head and C spine found no concerning findings. ESR and CRP elevated although no concern for vasculitis. Neurology saw the patient and recommended LP to rule out GBs or CIDP. Xarelto placed on hold and LP by IR is pending as Xarelto needs to be on hold for 3 days. Speech therapy evaluated the patient and noted inconsistent dysphonia. The patient will voice normally then start mouthing words. Diet recommendations:  Mechanical soft, Thin. On 6th day, pt noted to have acute onset of metabolic encephalopathy. Pt more somnolent, respiration shallow. She would arouse with sternal rub but return to lethargy. Repeat CT of Head was negative, ammonia slightly elevated at 61 and ABG showed acidosis with hypoxic/hypercapneic respiratory failure.   07/06/18 - presently on Bipap for respiratory acidosis , Lumbar puncture done today 06/07/18 07/07- Diagnosed with NMO at [facility] Solumedrol  1000 mg/day for 5 days per neurology   07/08- possible aspiration overnight , follow up CXR new right pleural effusion , continue Bipap support   07/09 patient was intubated in morning continue to have respiratory distress and no improvement in neurological status   07/10  Started on plasma exchange series for nmo . As per neurology recommendation .  No improvement in mental status . Family updated on plan   07/11  Patient had first cycle of plasma exchange yesterday . Now can follow simple commands .continue to be on vent and with plasma exchange . Hold bp meds due to hypotension   07/12  Patient having plasma exchange. Plan for  trach and peg family and patient agreed   07/13  Patient was seen and examined today . . In morning patient was responsive as per family following commands . Later in the day patient unintentionally received fentanyl .  She is currently not responsive intubated on vent. Neurology did stat eeg . Will continue monitor and assess patient once fentanyl wears off   07/13  Patient getting plasma pheresis for nmo . Patient hypotensive was given bolus . She was hypertensive last night received hydralazine . Waiting on labs for nmo antibodies         Review of Systems   Unable to perform ROS: Intubated     Objective:     Vital Signs (Most Recent):  Temp: 97.6 °F (36.4 °C) (07/14/18 0705)  Pulse: 70 (07/14/18 1053)  Resp: 15 (07/14/18 1053)  BP: (!) 70/37 (07/14/18 1045)  SpO2: 100 % (07/14/18 1053) Vital Signs (24h Range):  Temp:  [97.6 °F (36.4 °C)-98.2 °F (36.8 °C)] 97.6 °F (36.4 °C)  Pulse:  [40-75] 70  Resp:  [12-29] 15  SpO2:  [91 %-100 %] 100 %  BP: ()/(37-89) 70/37  Arterial Line BP: ()/(34-89) 73/34     Weight: 72 kg (158 lb 11.7 oz)  Body mass index is 27.25 kg/m².    Intake/Output Summary (Last 24 hours) at 07/14/18 1221  Last data filed at 07/14/18 1053   Gross per 24 hour   Intake             3000 ml   Output             1303 ml   Net             1697 ml      Physical Exam   Constitutional: She appears well-developed.   Ill appearing    HENT:   + ETT    Eyes: Pupils are equal, round, and reactive to light.   Neck: Neck supple.   Cardiovascular: Normal rate.    Pulmonary/Chest: Effort normal.   BS decreased mildly bilaterally   Right chest Vas-Cath   Abdominal: Soft. There is no tenderness.   Genitourinary:   Genitourinary Comments: Flores    Musculoskeletal: She exhibits no tenderness.   Atrophic muscles of upper and lower extremities   Neurological:   Awake, following commands , not moving extremities purposefully   Skin: Skin is warm.   Nursing note and vitals reviewed.      Significant Labs: All pertinent labs within the past 24 hours have been reviewed.    Significant Imaging: I have reviewed all pertinent imaging results/findings within the past 24 hours.    Assessment/Plan:      * Respiratory failure    ABG  reflected hypoxic/hypercapneic respiratory failure  ICU care  Pulmonology/Critical Care following  Bipap for more than 24 hours with no improvement . 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     IV Rocephin - started 6/30/18, Rocephin stopped 7/5/18  7/10  Completed course           Severe muscle deconditioning    - Currently has [facility] (PT/OT/nurse)  - secondary to NMO        Acute on chronic systolic heart failure    Compensated as of 7/3/18  - BNP 1100  - CXR with vascular congestion with small bilateral effusion  - Mild BLE edema upon assessment but diminished breath sounds noted  - 2D ECHO on 06/02/18 with EF 35% and pulmonary HTN  - Pt received Bumex 2 mg IVP x 1 in ED  - Bumex 2 mg IVP daily====> changed to Bumex 2 mg twice daily 7/2/18  - Continue Metoprolol  7/10  Compensated   7/12  Compensated         Left ischial pressure sore, stage III    Wound care           Hypothyroidism    -  TSH about a month ago -- 1.265  - Continue Levothyroxine          Chronic kidney disease (CKD), stage III (moderate)    - Currently stable  - Monitor kidney function while on diuretics          Hypertension associated with diabetes    Controlled  - continue Toprol XL and lisinopril  - Apresoline prn  Lisinopril discontinued 7/4/18 - daughter stated it caused respiratory problems    DM  - HgbA1c on 06/03/18 -- 5.3 -- controlled  - Accuchecks and low dose SSI  -  7/11  Hold antihypertensive due to hypotension   7/12   Hold antihypertensive         Atrial fibrillation    - Currently rate controlled  Telemetry monitoring  - Continue Xarelto and Amiodarone  -xarelto on hold.  - will resume after procedure is done trach and peg   -chads 4 discussed with neurology/critical care holding antiocoagulation for now and resume after trach and peg            VTE Risk Mitigation         Ordered     heparin, porcine (PF) injection 3,800 Units  As needed (PRN)      07/10/18 0859     heparin (porcine) injection 4,000 Units  As needed (PRN)      07/09/18 2045     heparin (porcine) injection 5,000 Units  Every 8 hours      07/04/18 1209          Critical care time spent on the evaluation and treatment of severe organ dysfunction, review of pertinent labs and imaging studies, discussions with consulting providers and discussions with patient/family: 40minutes.    Raymundo Vidal MD  Department of Hospital Medicine   [facility]

## 2018-07-14 NOTE — NURSING
Pt BP dropping to the 70s systolic on arterial line, Cuff pressure reading 69/39. RIGOBERTO Myers notified and came to bedside to assess pt. RIGOBERTO Myers stated will put in order for fluid bolus and D/C Diamox. Will carry out orders.

## 2018-07-14 NOTE — PROCEDURES
"Carlie Valdez is a 72 y.o. female patient.    Temp: 97.8 °F (36.6 °C) (07/14/18 1215)  Pulse: 70 (07/14/18 1305)  Resp: 14 (07/14/18 1305)  BP: (!) 148/71 (07/14/18 1305)  SpO2: 99 % (07/14/18 1305)  Weight: 72 kg (158 lb 11.7 oz) (07/14/18 0526)  Height: 5' 4" (162.6 cm) (07/09/18 1250)       Central Line  Date/Time: 7/14/2018 2:57 PM  Location procedure was performed: [facility] INTENSIVE CARE UNIT  Performed by: BOBBY CORONA  Pre-operative Diagnosis: shock  Post-operative diagnosis: shock, unspecified  Consent Done: Yes  Time out: Immediately prior to procedure a "time out" was called to verify the correct patient, procedure, equipment, support staff and site/side marked as required.  Indications: med administration and vascular access  Anesthesia: local infiltration    Anesthesia:  Local Anesthetic: lidocaine 1% without epinephrine  Anesthetic total: 3 mL  Preparation: skin prepped with ChloraPrep  Skin prep agent dried: skin prep agent completely dried prior to procedure  Sterile barriers: all five maximum sterile barriers used - cap, mask, sterile gown, sterile gloves, and large sterile sheet  Hand hygiene: hand hygiene performed prior to central venous catheter insertion  Location details: left femoral  Site selection rationale: poor vasculature per US  Catheter type: triple lumen  Catheter size: 7 Fr  Catheter Length: 20cm    Ultrasound guidance: yes  Vessel Caliber: small, patent, compressibility normal  Vascular Doppler: not done  Needle advanced into vessel with real time Ultrasound guidance.  Guidewire confirmed in vessel.  Sterile sheath used.  Manometry: No   Number of attempts: 3  Assessment: successful placement  Complications: none  Estimated blood loss (mL): 4  Specimens: No  Implants: No  Post-procedure: line sutured,  chlorhexidine patch,  sterile dressing applied and blood return through all ports  Complications: No  Comments: Failed attempt X 2 to right femoral site          Bobby" Geraldo Myers  7/14/2018

## 2018-07-14 NOTE — PROGRESS NOTES
Patient unable to coordinate wean parameter test from ventilator at this time. RN also at bedside; will try again later.

## 2018-07-14 NOTE — PROCEDURES
[facility] -   Transfusion Medicine  Procedure Note    SUMMARY   Therapeutic Plasma Exchange (Apheresis)  Date/Time: 7/13/2018 9:52 PM  Performed by: MARCELINO CARTER.  Authorized by: MARCELINO CARTER       Date of Procedure: 7/13/2018     Procedure: Plasma Exchange    Provider: Marcelino Carter MD     Assisting Provider: None    Pre-Procedure Diagnosis: Respiratory failure    Post-Procedure Diagnosis: Respiratory failure    Follow-up Assessment: Mrs. Valdez is a 72 year old female with a PMHx of HTN, DM, A-fib, Asthma, and Defibrillator who initially presented from home with acute on chronic CHF exacerbation on 6/30, then began developing generalized weakness and respiratory failure several days later with a nondiagnostic LP, Head/Spine CT negative for any acute findings, and elevated ESR/CRP.  Per chart, patient's outside medical records reveal recent history of antibody-positive NMO at [facility] and was subsequently given 5-day course of IV Solumedrol (per Neurology recs) without any improvement.  Today, patient's neurologic status has worsened (e.g., has no movement, does not vocalize, she has been able to track intermittently) and was intubated for ongoing respiratory issues.  Neurology has requested a plasma exchange series for further management of patient's NMOSD.    NEUROMYELITIS OPTICA SPECTRUM DISORDERS carries a Category II Grade 1B indication for therapeutic plasma exchange via the 2016 Journal of Clinical Apheresis Guidelines (Corea J et al. Journal of Clinical Apheresis 2016; 31:149-162.)    Interval History: Today's procedure (#3 of 5) was well tolerated and without complications.  Next treatment scheduled for 7/14.      Pertinent Laboratory Data:   Complete Blood Count:   Lab Results   Component Value Date    HGB 10.0 (L) 07/13/2018    HCT 32.6 (L) 07/13/2018     07/13/2018    WBC 15.20 (H) 07/13/2018     Basic Metabolic Panel:   Lab Results   Component Value Date      07/13/2018    K 3.9 07/13/2018    CL 99 07/13/2018    CO2 37 (H) 07/13/2018     07/13/2018    BUN 49 (H) 07/13/2018    CREATININE 0.7 07/13/2018    CALCIUM 8.9 07/13/2018    ANIONGAP 5 (L) 07/13/2018    ESTGFRAFRICA >60 07/13/2018    EGFRNONAA >60 07/13/2018       Pertinent Medications:     Current Facility-Administered Medications:     0.9%  NaCl infusion (for blood administration), , Intravenous, PRN, Rob Carter MD    0.9%  NaCl infusion (for blood administration), , Intravenous, PRN, Rob Carter MD    acetaminophen oral solution 650 mg, 650 mg, Oral, Q6H PRN, Raymundo Vidal MD, 650 mg at 07/12/18 1605    [START ON 7/14/2018] albumin human 5% bottle 175 g, 175 g, Intravenous, Once, Rob Carter MD    albuterol-ipratropium 2.5 mg-0.5 mg/3 mL nebulizer solution 3 mL, 3 mL, Nebulization, Q4H PRN, CHEO Biswas, 3 mL at 07/04/18 1525    albuterol-ipratropium 2.5 mg-0.5 mg/3 mL nebulizer solution 3 mL, 3 mL, Nebulization, Q6H, James Meier MD, 3 mL at 07/13/18 1951    amiodarone tablet 200 mg, 200 mg, Oral, BID, CHEO Biswas, 200 mg at 07/13/18 2108    baclofen tablet 10 mg, 10 mg, Oral, TID PRN, CHEO Biswas, 10 mg at 07/02/18 0947    brimonidine 0.2% ophthalmic solution 1 drop, 1 drop, Both Eyes, Q12H, 1 drop at 07/13/18 2100 **AND** timolol maleate 0.5% ophthalmic solution 1 drop, 1 drop, Both Eyes, BID, Subhash Ayala MD, 1 drop at 07/13/18 2100    [START ON 7/14/2018] calcium gluconate 2,000 mg in sodium chloride 0.9% 100 mL IVPB, 2,000 mg, Intravenous, Once, Raymundo Vidal MD    chlorhexidine 0.12 % solution 15 mL, 15 mL, Mouth/Throat, BID, John White MD, 15 mL at 07/13/18 2102    dextrose 50% injection 12.5 g, 12.5 g, Intravenous, PRN, CHEO Biswas    dextrose 50% injection 25 g, 25 g, Intravenous, PRN, CHEO Biswas    ergocalciferol capsule 50,000 Units, 50,000 Units, Oral, Q7 Days, Duane Hackett MD PhD,  50,000 Units at 07/11/18 1530    fentaNYL 2500 mcg in D5W 250 mL infusion premix (titrating) (conc: 10 mcg/mL), , Intravenous, Continuous, John White MD, Stopped at 07/11/18 0830    glucagon (human recombinant) injection 1 mg, 1 mg, Intramuscular, PRN, Vanesa Artis, JOSEP    glucose chewable tablet 16 g, 16 g, Oral, PRN, Vanesa Artis, JOSEP    glucose chewable tablet 24 g, 24 g, Oral, PRN, Vanesa Artis, FNP    heparin (porcine) injection 4,000 Units, 4,000 Units, Intravenous, PRN, Raymundo Vidal MD, 4,000 Units at 07/09/18 2151    heparin (porcine) injection 5,000 Units, 5,000 Units, Subcutaneous, Q8H, Tonia Gates NP, 5,000 Units at 07/13/18 2112    heparin, porcine (PF) injection 3,800 Units, 3,800 Units, Intravenous, PRN, Rob Carter MD, 3,800 Units at 07/13/18 1655    hydrALAZINE injection 10 mg, 10 mg, Intravenous, Q6H PRN, Phoenix Ramos MD, 5 mg at 07/07/18 0615    insulin aspart U-100 pen 1-10 Units, 1-10 Units, Subcutaneous, Q6H PRN, Vanesa Daniels NP, 2 Units at 07/13/18 1336    levothyroxine tablet 100 mcg, 100 mcg, Oral, Before breakfast, CHEO Biswas, 100 mcg at 07/13/18 0607    lorazepam (ATIVAN) injection 4 mg, 4 mg, Intravenous, Q4H PRN, John White MD    pantoprazole 40 mg in dextrose 5 % 100 mL IVPB (over 15 minutes) (ready to mix system), 40 mg, Intravenous, Daily, John White MD, 40 mg at 07/13/18 0815    Review of patient's allergies indicates:   Allergen Reactions    Morphine Hives    Atorvastatin      Other reaction(s): Muscle pain    Clonidine     Codeine      Other reaction(s): Unknown    Digoxin      Other reaction(s): Unknown    Diovan [valsartan] Other (See Comments)     Upset stomach, weakness    Eggs [egg derived]     Metoprolol Diarrhea    Naproxen      Other reaction(s): Nausea    Peanut     Propofol      Other reaction(s): delirious    Sulfa (sulfonamide antibiotics)        Anesthesia: None     Technical  Procedures Used: Plasma Exchange: Volume exchanged - 3.5 Liters; Replacement fluid - Albumin; Number of procedures 3; Date of next procedure 7/14.    Description of the Findings of the Procedure:     Please see Apheresis Nurse flowsheet for details.    The patient was evaluated and all clinical and laboratory data relevant to the treatment was reviewed, and a decision was made to proceed with the Apheresis procedure.    I was available to the clinical staff throughout the procedure.    Significant Surgical Tasks Conducted by the Assistant(s): Not applicable  Complications: None  Estimated Blood Loss (EBL): Minimal  Implants: None   Specimens: None    Rob Carter M.D., M.S., [facility]  Medical Director  Section of Transfusion Medicine & Blood Donor Services  Department of Pathology and Laboratory Medicine  [facility]  [phone] (Blood Bank)  07/13/2018

## 2018-07-14 NOTE — PROGRESS NOTES
INPATIENT   NEURO  CRITICAL CARE PROGRESS  NOTE    Carlie Valdez   72 y.o. female  DATE 7/14/2018        Patient is awake, alert and follows commands. She is intubated and not sedated. She makes great effort in communication  She is afebrile and no complaints. She is not moving any extremities. She is due for plasmapheresis X4 today.     She denies headaches, no nausea, no vomiting, she denies dizziness,no chest pain, she has light perception but denies any visualization of images.  She agrees to being tired.  She is requesting to turn off the music and watch tv.   She responds by noding and make an effort to talk over the breathing tube. She is tolerating tube feeds. No significant residual          [place] Coma Scale (GCS)    Score   EYE OPENING   Spontaneous 4   Response to verbal command 3   Response to pain 2   No eye opening 1             VERBAL response   Oriented 5   Confused 4   Inappropriate words 3   Incomprehensible sounds 2   No verbal response 1       MOTOR response   Obeys commands 6   Localizing response to pain 5   Withdrawal response to pain 4   Flexion to pain 3   Extension to pain 2   No motor response 1   Total  15   The GCS is scored between 3 and 15, 3 being the worst and 15 the best. It is composed of three parameters: best eye response (E), best verbal response (V), and best motor response (M). The components of the GCS should be recorded individually; for example, [id number] results in a GCS score of 9. A score of 13 or higher correlates with mild brain injury, a score of 9 to 12 correlates with moderate injury, and a score of 8 or less represents severe brain injury.  Past Medical History:   Diagnosis Date    Arthritis     Asthma     Atrial fibrillation     Blood clot of vein in shoulder area     Cardiac defibrillator in place     Chronic knee pain     Diabetes mellitus type 2 without retinopathy 12/19/2016    Diaphragmatic hernia without obstruction and without  gangrene 2015    GERD (gastroesophageal reflux disease)     Hypertension     Primary open angle glaucoma (POAG) of both eyes, severe stage 2018     Past Surgical History:   Procedure Laterality Date    CARDIAC DEFIBRILLATOR PLACEMENT      , .    CATARACT EXTRACTION       SECTION      COLONOSCOPY      ashley Macdonald    KNEE ARTHROSCOPY      UPPER GASTROINTESTINAL ENDOSCOPY       Family History   Problem Relation Age of Onset    Hypertension Mother     Hypertension Father     Colon cancer Neg Hx     Stomach cancer Neg Hx      Social History   Substance Use Topics    Smoking status: Never Smoker    Smokeless tobacco: Never Used    Alcohol use No       Review of patient's allergies indicates:   Allergen Reactions    Morphine Hives    Atorvastatin      Other reaction(s): Muscle pain    Clonidine     Codeine      Other reaction(s): Unknown    Digoxin      Other reaction(s): Unknown    Diovan [valsartan] Other (See Comments)     Upset stomach, weakness    Eggs [egg derived]     Metoprolol Diarrhea    Naproxen      Other reaction(s): Nausea    Peanut     Propofol      Other reaction(s): delirious    Sulfa (sulfonamide antibiotics)         Scheduled Meds:   acetaZOLAMIDE (DIAMOX) IVPB  250 mg Intravenous Once    albumin human 5%  175 g Intravenous Once    albuterol-ipratropium  3 mL Nebulization Q6H    amiodarone  200 mg Oral BID    brimonidine 0.2%  1 drop Both Eyes Q12H    And    timolol maleate 0.5%  1 drop Both Eyes BID    calcium gluconate IVPB  2,000 mg Intravenous Once    chlorhexidine  15 mL Mouth/Throat BID    ergocalciferol  50,000 Units Oral Q7 Days    heparin (porcine)  5,000 Units Subcutaneous Q8H    levothyroxine  100 mcg Oral Before breakfast    pantoprazole 40 mg in dextrose 5 % 100 mL IVPB (over 15 minutes) (ready to mix system)  40 mg Intravenous Daily     Continuous Infusions:   fentanyl Stopped (18 0830)     PRN  Meds:acetaminophen, albuterol-ipratropium, baclofen, heparin (porcine), heparin, porcine (PF), hydrALAZINE, lorazepam               OBJECTIVE:     Vital Signs (Most Recent)  Temp: 97.6 °F (36.4 °C) (07/14/18 0705)  Pulse: 70 (07/14/18 1000)  Resp: 14 (07/14/18 1000)  BP: (!) 93/52 (07/14/18 1000)  SpO2: 100 % (07/14/18 1000)     Vital Signs Range (Last 24H):  Temp:  [97.6 °F (36.4 °C)-98.2 °F (36.8 °C)]   Pulse:  [40-75]   Resp:  [12-29]   BP: ()/(44-89)   SpO2:  [91 %-100 %]   Arterial Line BP: ()/(40-89)     Physical Exam:  General:  She is awake, alert and following commands  HEENT:   Acephalic, Atraumatic,  EOMI, PERRL, no nystagmus, no ptosis, no lid lag, no neglect, no gaze palsy  Lungs: CTA,  No rhonchi, no rales  Heart: Regular Rate and rhythm, no murmurs, rubs and or gallops  Abdomen, Soft, non tender, non distended, no abdominal  bruit, bowel sounds present  Extremities: No edema,   Musculoskeletal:  No joint effusion,   Skin: No rash, no ecchymoses,         NEURO    Mental Status:   Awake, alert    Able to recognize the voice of her daughter  She makes great effort to communicated  Mood:  Depressed, she agrees to being sad  Affect: flat  Behavior: appropriate  Attention and Concentration: intact  SPEECH:  intubated  CRANIAL NERVES:  She is blind  Pupils isocoria, reactive  Corneal reactive  No nystagmus  NO ptosis  No neglect  EOMI  No facial asymmetry  Withdraws to facial pain  Turns head   GAG and cough intact    MOTOR:Upper Extremities and Lower Extremities      No spontaneous movement  Withdraws to pain in the LE.    TONE:  Flaccid  Reflexes 0: throughout UE and LE    Plantar responses:   Extensors b/l  Muscle Fasciculations:  none    SENSORY: Withdraws to pain intermittently in the LE b/l  ROMBERG and  GAIT:  Non ambualtory    EXTRAPYRAMIDALS:  none      Laboratory:  Lab Results   Component Value Date    WBC 18.75 (H) 07/14/2018    HGB 11.0 (L) 07/14/2018    HCT 34.9 (L) 07/14/2018    PLT  144 (L) 07/14/2018    CHOL 166 06/06/2018    TRIG 94 06/06/2018    HDL 35 (L) 06/06/2018    ALT 20 07/06/2018    AST 25 07/06/2018     07/14/2018    K 3.4 (L) 07/14/2018     07/14/2018    CREATININE 0.7 07/14/2018    BUN 40 (H) 07/14/2018    CO2 35 (H) 07/14/2018    TSH 0.269 (L) 07/06/2018    INR 1.0 07/05/2018    HGBA1C 5.3 06/03/2018       Diagnostic Results:none   ( All images reviewed Independently)   Imaging Results          X-Ray Chest 1 View (Final result)  Result time 06/30/18 08:50:50    Final result by Philip Damian Jr., MD (06/30/18 08:50:50)                 Impression:      CHF exacerbation.      Electronically signed by: Philip Damian Jr., MD  Date:    06/30/2018  Time:    08:50             Narrative:    EXAMINATION:  XR CHEST 1 VIEW    CLINICAL HISTORY:  Chest pain, unspecified    COMPARISON:  06/02/2018    FINDINGS:  Pacemaker remains in place right chest wall.  The heart remains enlarged.  Central vascular congestion with small bilateral effusions.  No pneumothorax.                                07/14/2018    Assessment and Recommendations:    Patient with possible NMO. Antibody has been ordered, by Dr. Daniels in Serum and CSF  7/3/2018.  Test is not back, I reordered on 7/9/2018  By me, one of them was cancelled, because of not enough blood sample. She has been getting everyday blood test, I have reordered NMO not been done. This test has been ordered several times by me and dr. Daniels. We do not have the final diagnosis, yet.  We cannot do the MRI of the brain and spine because of the pacemaker. She has been treated with plasmapheresis in anticipation that the NMO may be positive, Myasthenia gravis panel has been ordered that is not back either.   Patient did not benefit from steroids.     Differential diagnosis:   1) NMO  2) Auto immune encephalopathy        Plans:  1) Continue with Vitamin D  2) Continue with Plasmapheresis X5  3) She is on Xarelto.   4) NMO antibody and MG panel  is still pending  5)Trach and PEG, LTAC        Discussed with labs today regarding these pending tests  Just getting a run around for all the tests that has been ordered      Family Meetin) discussed with patient's daughter Haley                         Duane Burton MD., Ph.D., MS

## 2018-07-14 NOTE — SUBJECTIVE & OBJECTIVE
Review of Systems   Unable to perform ROS: Intubated     Objective:     Vital Signs (Most Recent):  Temp: 97.6 °F (36.4 °C) (07/14/18 0705)  Pulse: 70 (07/14/18 1053)  Resp: 15 (07/14/18 1053)  BP: (!) 70/37 (07/14/18 1045)  SpO2: 100 % (07/14/18 1053) Vital Signs (24h Range):  Temp:  [97.6 °F (36.4 °C)-98.2 °F (36.8 °C)] 97.6 °F (36.4 °C)  Pulse:  [40-75] 70  Resp:  [12-29] 15  SpO2:  [91 %-100 %] 100 %  BP: ()/(37-89) 70/37  Arterial Line BP: ()/(34-89) 73/34     Weight: 72 kg (158 lb 11.7 oz)  Body mass index is 27.25 kg/m².    Intake/Output Summary (Last 24 hours) at 07/14/18 1221  Last data filed at 07/14/18 1053   Gross per 24 hour   Intake             3000 ml   Output             1303 ml   Net             1697 ml      Physical Exam   Constitutional: She appears well-developed.   Ill appearing    HENT:   + ETT    Eyes: Pupils are equal, round, and reactive to light.   Neck: Neck supple.   Cardiovascular: Normal rate.    Pulmonary/Chest: Effort normal.   BS decreased mildly bilaterally   Right chest Vas-Cath   Abdominal: Soft. There is no tenderness.   Genitourinary:   Genitourinary Comments: Flores    Musculoskeletal: She exhibits no tenderness.   Atrophic muscles of upper and lower extremities   Neurological:   Awake, following commands , not moving extremities purposefully   Skin: Skin is warm.   Nursing note and vitals reviewed.      Significant Labs: All pertinent labs within the past 24 hours have been reviewed.    Significant Imaging: I have reviewed all pertinent imaging results/findings within the past 24 hours.

## 2018-07-14 NOTE — ASSESSMENT & PLAN NOTE
7/6 - Lumbar puncture today , neurology to evaluate  7/7 High dose steroids as per neurology  7/9 on high dose steroids . Neurology following needs Plasmapheresis. Discussed it with Neurology, he arranged for vas cath placement.   7/10.  Off high-dose steroid.  Status post Vas-Cath placement yesterday.  Plasmapheresis for 5 days.  Neurology follow-up  7/11 plasma exchange day 2.  7/12 No plasmapheresis today. Neurology fup. Cs for PEGT , trach. Discussed with daughter   7/13 3rd plasmapheresis today  7/14 4th plasmapheresis today

## 2018-07-14 NOTE — NURSING
Pt's blood pressure very labile with a MAP difference of 40 mmHg between sleeping and awake. Blood pressure has been elevated for 3 hours while pt is asleep. 5 mg of Hydralazine was given and SBP has dropped 70 mmHg. Will continue to monitor.

## 2018-07-14 NOTE — PROGRESS NOTES
[facility]  Critical Care Medicine  Progress Note    Patient Name: Carlie Valdez  MRN: [id number]  Admission Date: 6/30/2018  Hospital Length of Stay: 14 days  Code Status: Full Code  Attending Provider: Raymundo Vidal MD  Primary Care Provider: Johanna Guzman MD   Principal Problem: Respiratory failure    Subjective:     HPI:  72 year old female with a PMHx of p[revious cerebral vascular accident with right sided weakness and aphasia, HTN, GERD, DM, A-fib, Cardiac Asthma, and Defibrillator who presented from home with c/o chest pain to Emergency Room on 6/30/2018 folllwing recent discharge from [facility]. Admitted for acute on chronic systolic Congestive Heart failure and progressively worsened. Noted to have worsening mental status and shortness of breath and transferrrd to ICU for hypercapnic respiratory failure. Placed on Noninvasive Positive Pressure Ventilation     Hospital/ICU Course:  7/5 Transferred to ICU. Noninvasive Positive Pressure Ventilation initiated , may need intubation  7/6 Improved ventilation with Noninvasive Positive Pressure Ventilation but now generalized weakness. Steroids started for respiratory failure. Lumbar puncture later today.  7/7 Respiratory status stable. Labile blood pressure. Started on high does steroids  7/8 worsening respiraoty status with elevated  PCO2  7/9 patient seen and examined, chest x-ray and ABG reviewed, daughter Haley was called, intubated for respiratory distress and tachypnea.  7/10 patient seen and examined, chest x-ray and ABG reviewed.  She had a Vas-Cath placed overnight.  Off high-dose steroid.  Plasmapheresis today.  7/11 patient seen and examined.  Chest x-ray and ABG reviewed.  Plasma exchange day 2.  Tolerating enteral feeding.  Hypotensive, started on Levophed drip.  7/12 seen and examined. Intubated, sedated with fentanyl gtt. No pressors . Mental status improved sp Plasmapheresis x 2 days. Tolerating  enteral feeding.   7/13 patient seen and examined, remains intubated, on fentanyl drip for sedation.  Following commands attempting to move extremities.  7/14 patient seen and examined, remains intubated, fentanyl drip for sedation.  Following commands.  Unable to move extremities.  Plasmapheresis today.  ABG and chest x-ray reviewed.      Interval History:   Review of Systems   Unable to perform ROS: Intubated       Objective:     Vital Signs (Most Recent):  Temp: 97.6 °F (36.4 °C) (07/14/18 0305)  Pulse: 70 (07/14/18 0921)  Resp: 16 (07/14/18 0921)  BP: (!) 118/48 (07/14/18 0605)  SpO2: 100 % (07/14/18 0921) Vital Signs (24h Range):  Temp:  [97.6 °F (36.4 °C)-98.2 °F (36.8 °C)] 97.6 °F (36.4 °C)  Pulse:  [40-81] 70  Resp:  [12-29] 16  SpO2:  [79 %-100 %] 100 %  BP: ()/(44-89) 118/48  Arterial Line BP: ()/(40-89) 97/45     Weight: 72 kg (158 lb 11.7 oz)  Body mass index is 27.25 kg/m².      Intake/Output Summary (Last 24 hours) at 07/14/18 0943  Last data filed at 07/14/18 0800   Gross per 24 hour   Intake             2200 ml   Output             1213 ml   Net              987 ml       Physical Exam   Constitutional: She appears well-developed.   Ill appearing    HENT:   + ETT    Eyes: Pupils are equal, round, and reactive to light.   Neck: Neck supple.   Cardiovascular: Normal rate.    Pulmonary/Chest: Effort normal.   BS decreased mildly bilaterally   Right chest Vas-Cath   Abdominal: Soft. There is no tenderness.   Genitourinary:   Genitourinary Comments: Flores    Musculoskeletal: She exhibits no tenderness.   Atrophic muscles of upper and lower extremities   Neurological:   Awake, following commands , not moving extremities purposefully   Skin: Skin is warm.     Vents:  Vent Mode: A/C (07/14/18 0921)  Ventilator Initiated: Yes (07/09/18 1022)  Set Rate: 14 bmp (07/14/18 0921)  Vt Set: 380 mL (07/14/18 0921)  Pressure Support: 0 cmH20 (07/14/18 0921)  PEEP/CPAP: 5 cmH20 (07/14/18 0921)  Oxygen  Concentration (%): 40 (07/14/18 0921)  Peak Airway Pressure: 23 cmH2O (07/14/18 0921)  Plateau Pressure: 18 cmH20 (07/14/18 0921)  Total Ve: 5.78 mL (07/14/18 0921)  F/VT Ratio<105 (RSBI): (!) 39.6 (07/14/18 0921)    Lines/Drains/Airways     Central Venous Catheter Line                 Hemodialysis Catheter 07/09/18 2100 right internal jugular 4 days          Drain                 Urethral Catheter 07/05/18 1114 8 days         NG/OG Tube 07/13/18 1020 [place] sump Left mouth less than 1 day          Airway                 Airway - Non-Surgical 07/09/18 1005 Endotracheal Tube 4 days          Arterial Line                 Arterial Line 07/13/18 1045 Right Radial less than 1 day          Pressure Ulcer                 Pressure Injury 06/02/18 Right lateral Heel Stage 3 42 days         Pressure Injury 06/02/18 1659 Left proximal Ischial tuberosity Stage 3 41 days         Pressure Injury 06/02/18 1902 Right medial Heel Unstageable 41 days          Peripheral Intravenous Line                 Peripheral IV - Single Lumen 07/06/18 2315 Right Forearm 7 days         Peripheral IV - Single Lumen 07/09/18 0710 Right Antecubital 5 days                Significant Labs:    CBC/Anemia Profile:    Recent Labs  Lab 07/13/18  0319 07/14/18  0345   WBC 15.20* 18.75*   HGB 10.0* 11.0*   HCT 32.6* 34.9*    144*   MCV 83 82   RDW 18.6* 19.3*        Chemistries:    Recent Labs  Lab 07/13/18  0319 07/14/18  0345    144   K 3.9 3.4*   CL 99 101   CO2 37* 35*   BUN 49* 40*   CREATININE 0.7 0.7   CALCIUM 8.9 9.0   MG 2.5 2.5   PHOS 1.9* 3.2     Significant Imaging:  CXR: I have reviewed all pertinent results/findings within the past 24 hours and my personal findings are:  OG tube, ET tube, Vas-Cath in good position.  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 Neurology following needs Plasmapheresis. Discussed it with Neurology, he arranged for vas cath placement.   7/10.  Off high-dose steroid.  Status post Vas-Cath placement yesterday.  Plasmapheresis for 5 days.  Neurology follow-up  7/11 plasma exchange day 2.  7/12 No plasmapheresis today. Neurology fup. Cs for PEGT , trach. Discussed with daughter   7/13 3rd plasmapheresis today  7/14 4th plasmapheresis today        Encephalopathy    7/6 pCO2 normalized and patient improved but still not alert, awaiting lumbar puncture  7/8 worsening mental status  - no longer feeding herself        Derm   Skin breakdown    7/5 Wound care nurse        Pulmonary   * Respiratory failure    7/5 Noninvasive Positive Pressure Ventilation , jet nebs and oxygen  7/6 IV steroids added  7/7 continue Noninvasive Positive Pressure Ventilation at night and prn during day  7/8 elevated  PCO2 - change ventilation mode  7/9 O2/ MV/ Pulm toilet . Daily ABG , CXR  7/11 chest x-ray and ABG reviewed.  Compensated respiratory acidosis.  No changes in vent settings.  7/12 chest xray and ABG reviewed. Will increase TV to 420 to improve hypercapnia  7/13 chest x-ray and ABG reviewed.  Diamox 250 mg once today.  No changes in ventilator settings.  7/13 no change in vent settings.          Acute hypercapnic respiratory failure    7/9 neuromuscular resp failure sp intubation. O2/ MV/ Pulm toilet . Daily ABG , CXR .  7/10 daily ABG, chest x-ray.  Will discontinue Lasix.  Part of the respiratory acidosis is compensating for metabolic alkalosis.  Keep respiratory rate at 14.  Tidal volume 380 mL.  7/11 O2/mechanical ventilation/pulmonary toilet.  Enteral tube feeding.  Daily chest x-ray and ABG.  7/12 part of the hypercapnia is compensating for metabolic alkalosis.  Diamox 250 mg once today.        Cardiac/Vascular   Acute on chronic systolic heart failure    7/5 start IV lasix  7/6 continue IV lasix  7/8  worsening Chest X Ray with pleural effusion on the right , increase lasix to BID  7/9 negative 2.9 liters . Cont IV Lasix 40 mg q 12 hrs .   7/10 -3 0.5 L since admission.  Improving oxygen requirements.  Creatinine in and serum bicarb increasing.  Will discontinue Lasix.  7/11 no evidence of fluid overload.  Keep off Lasix.  Hypotensive.  Started on Levophed drip keep map above 65 mm of mercury.  7/12 normotensive, acceptable O2 requirement, avoid fluid overload   7/13 of Lasix.  Stable O2 requirements.  No evidence of fluid overload.  Will give Diamox today to treat her metabolic alkalosis  7/14 bicarb decreased from 37-35.  Patient positive fluid balance.  Will give another dose of Diamox today to 250 mg once.        Atrial fibrillation    Po amiodarone   7/13 same  7/14 rate controlled.  Heart rate 70.  P.o. amiodarone        Renal/   UTI (urinary tract infection)    7/6 - resolved - antibiotics stopped           Critical Care Daily Checklist:    A: Awake: RASS Goal/Actual Goal: RASS Goal: 0-->alert and calm  Actual: Guerrero Agitation Sedation Scale (RASS): Alert and calm   B: Spontaneous Breathing Trial Performed?  Y   C: SAT & SBT Coordinated?  Not able to participate                      D: Delirium: CAM-ICU Overall CAM-ICU: Positive   E: Early Mobility Performed? Yes.  Bedrest   F: Feeding Goal: Goals: Diet advancement or initiate nutrition support by next RD visit  Status: Nutrition Goal Status: new   Current Diet Order   No orders of the defined types were placed in this encounter.      AS: Analgesia/Sedation Fentanyl drip   T: Thromboembolic Prophylaxis Heparin 5000 units sc q.8 hours   H: HOB > 300 Yes   U: Stress Ulcer Prophylaxis (if needed) PPI   G: Glucose Control Fingerstick p.r.n.   B: Bowel Function Stool Occurrence: 0   I: Indwelling Catheter (Lines & Harris) Necessity Keep harris   D: De-escalation of Antimicrobials/Pharmacotherapies No antibiotic    Plan for the day/ETD Y    Code  Status:  Family/Goals of Care: Full Code  Spoke with daughter at bedside     Critical Care Time: 35 minutes  Critical secondary to Patient has a condition that poses threat to life and bodily function:  Respiratory failure due to neuromuscular disease      Critical care was time spent personally by me on the following activities: development of treatment plan with patient or surrogate and bedside caregivers, discussions with consultants, evaluation of patient's response to treatment, examination of patient, ordering and performing treatments and interventions, ordering and review of laboratory studies, ordering and review of radiographic studies, pulse oximetry, re-evaluation of patient's condition. This critical care time did not overlap with that of any other provider or involve time for any procedures.     John White MD  Critical Care Medicine  [facility]

## 2018-07-14 NOTE — PROGRESS NOTES
Failed attempt at LUE PICC X 2.  Able to access and pass guidewire but vein will not dilate with introducer.  Levophed continued to VasCath for now.    LATRELL Myers [facility]

## 2018-07-14 NOTE — ASSESSMENT & PLAN NOTE
7/5 start IV lasix  7/6 continue IV lasix  7/8 worsening Chest X Ray with pleural effusion on the right , increase lasix to BID  7/9 negative 2.9 liters . Cont IV Lasix 40 mg q 12 hrs .   7/10 -3 0.5 L since admission.  Improving oxygen requirements.  Creatinine in and serum bicarb increasing.  Will discontinue Lasix.  7/11 no evidence of fluid overload.  Keep off Lasix.  Hypotensive.  Started on Levophed drip keep map above 65 mm of mercury.  7/12 normotensive, acceptable O2 requirement, avoid fluid overload   7/13 of Lasix.  Stable O2 requirements.  No evidence of fluid overload.  Will give Diamox today to treat her metabolic alkalosis  7/14 bicarb decreased from 37-35.  Patient positive fluid balance.  Will give another dose of Diamox today to 250 mg once.

## 2018-07-14 NOTE — PLAN OF CARE
Problem: Patient Care Overview  Goal: Plan of Care Review  Patient currently requires total assist x 2 for bed mobility and total assist for sitting balance at EOB.   Outcome: Ongoing (interventions implemented as appropriate)  No acute events overnight. Neuro/neuromuscular status unchanged. Pt tolerating ventilator and tube feedings. Very labile blood pressure. Heart rate stable. Afebrile. UO adequate. CBG within limits. Pressure ulcers improving. Pt turned Q2H with pressure points protected. POC reviewed with pt and family. All questions and concerns addressed.

## 2018-07-14 NOTE — ASSESSMENT & PLAN NOTE
7/5 Noninvasive Positive Pressure Ventilation , jet nebs and oxygen  7/6 IV steroids added  7/7 continue Noninvasive Positive Pressure Ventilation at night and prn during day  7/8 elevated  PCO2 - change ventilation mode  7/9 O2/ MV/ Pulm toilet . Daily ABG , CXR  7/11 chest x-ray and ABG reviewed.  Compensated respiratory acidosis.  No changes in vent settings.  7/12 chest xray and ABG reviewed. Will increase TV to 420 to improve hypercapnia  7/13 chest x-ray and ABG reviewed.  Diamox 250 mg once today.  No changes in ventilator settings.  7/13 no change in vent settings.

## 2018-07-14 NOTE — SUBJECTIVE & OBJECTIVE
Interval History:   Review of Systems   Unable to perform ROS: Intubated       Objective:     Vital Signs (Most Recent):  Temp: 97.6 °F (36.4 °C) (07/14/18 0305)  Pulse: 70 (07/14/18 0921)  Resp: 16 (07/14/18 0921)  BP: (!) 118/48 (07/14/18 0605)  SpO2: 100 % (07/14/18 0921) Vital Signs (24h Range):  Temp:  [97.6 °F (36.4 °C)-98.2 °F (36.8 °C)] 97.6 °F (36.4 °C)  Pulse:  [40-81] 70  Resp:  [12-29] 16  SpO2:  [79 %-100 %] 100 %  BP: ()/(44-89) 118/48  Arterial Line BP: ()/(40-89) 97/45     Weight: 72 kg (158 lb 11.7 oz)  Body mass index is 27.25 kg/m².      Intake/Output Summary (Last 24 hours) at 07/14/18 0943  Last data filed at 07/14/18 0800   Gross per 24 hour   Intake             2200 ml   Output             1213 ml   Net              987 ml       Physical Exam   Constitutional: She appears well-developed.   Ill appearing    HENT:   + ETT    Eyes: Pupils are equal, round, and reactive to light.   Neck: Neck supple.   Cardiovascular: Normal rate.    Pulmonary/Chest: Effort normal.   BS decreased mildly bilaterally   Right chest Vas-Cath   Abdominal: Soft. There is no tenderness.   Genitourinary:   Genitourinary Comments: Flores    Musculoskeletal: She exhibits no tenderness.   Atrophic muscles of upper and lower extremities   Neurological:   Awake, following commands , not moving extremities purposefully   Skin: Skin is warm.     Vents:  Vent Mode: A/C (07/14/18 0921)  Ventilator Initiated: Yes (07/09/18 1022)  Set Rate: 14 bmp (07/14/18 0921)  Vt Set: 380 mL (07/14/18 0921)  Pressure Support: 0 cmH20 (07/14/18 0921)  PEEP/CPAP: 5 cmH20 (07/14/18 0921)  Oxygen Concentration (%): 40 (07/14/18 0921)  Peak Airway Pressure: 23 cmH2O (07/14/18 0921)  Plateau Pressure: 18 cmH20 (07/14/18 0921)  Total Ve: 5.78 mL (07/14/18 0921)  F/VT Ratio<105 (RSBI): (!) 39.6 (07/14/18 0921)    Lines/Drains/Airways     Central Venous Catheter Line                 Hemodialysis Catheter 07/09/18 2100 right internal jugular 4  days          Drain                 Urethral Catheter 07/05/18 1114 8 days         NG/OG Tube 07/13/18 1020 [place] sump Left mouth less than 1 day          Airway                 Airway - Non-Surgical 07/09/18 1005 Endotracheal Tube 4 days          Arterial Line                 Arterial Line 07/13/18 1045 Right Radial less than 1 day          Pressure Ulcer                 Pressure Injury 06/02/18 Right lateral Heel Stage 3 42 days         Pressure Injury 06/02/18 1659 Left proximal Ischial tuberosity Stage 3 41 days         Pressure Injury 06/02/18 1902 Right medial Heel Unstageable 41 days          Peripheral Intravenous Line                 Peripheral IV - Single Lumen 07/06/18 2315 Right Forearm 7 days         Peripheral IV - Single Lumen 07/09/18 0710 Right Antecubital 5 days                Significant Labs:    CBC/Anemia Profile:    Recent Labs  Lab 07/13/18 0319 07/14/18  0345   WBC 15.20* 18.75*   HGB 10.0* 11.0*   HCT 32.6* 34.9*    144*   MCV 83 82   RDW 18.6* 19.3*        Chemistries:    Recent Labs  Lab 07/13/18 0319 07/14/18  0345    144   K 3.9 3.4*   CL 99 101   CO2 37* 35*   BUN 49* 40*   CREATININE 0.7 0.7   CALCIUM 8.9 9.0   MG 2.5 2.5   PHOS 1.9* 3.2     Significant Imaging:  CXR: I have reviewed all pertinent results/findings within the past 24 hours and my personal findings are:  OG tube, ET tube, Vas-Cath in good position.  Left base opacity/atelectasis

## 2018-07-14 NOTE — PROCEDURES
ELECTROENCEPHALOGRAM PROCEDURE NOTE  History of presenting illness:  This is a 71 yo AAF with possible NMO, on plasmapheresis. She is intubated and was unresponsive  Reason for EEG: Rule out seizures  History of seizures:   NO        EEG: This study is done to evaluate for change in mental status/Seizures/Encephalopathy    This is a multichannel digital EEG recording using the international 10-20 placement system.       EEG begins with The resting record is fairly well organized and symmetric.   There are electrode artifacts    A  posterior dominant rhythm is poorly modulated and formed. Eyes remains closed through out the EEG.    Stage II sleep was not achieved. Hyperventilation  And photic stimulation were not performed.   There is significant suppression to the EEG. Muscle artifacts are seen. NO seizures or epileptiform discharges are seen    IMPRESSION: This is an abnormal EEG recording because of the presence of  Suppression, this kind of EEG is seen in sedation and or encephalopathy. No seizures or epileptiform discharges are seen. Clinical correlation is suggested as to the etiology of this. If indicated, repeat EEG and/or 24-hour ambulatory EEG monitoring might be useful in the future.          Duane Burton MD., Ph.D., MS

## 2018-07-15 PROBLEM — D72.829 LEUKOCYTOSIS: Status: ACTIVE | Noted: 2018-01-01

## 2018-07-15 PROBLEM — E87.8 ELECTROLYTE IMBALANCE: Status: ACTIVE | Noted: 2018-01-01

## 2018-07-15 PROBLEM — N39.0 UTI (URINARY TRACT INFECTION): Status: RESOLVED | Noted: 2018-01-01 | Resolved: 2018-01-01

## 2018-07-15 PROBLEM — G93.40 ENCEPHALOPATHY: Status: RESOLVED | Noted: 2018-01-01 | Resolved: 2018-01-01

## 2018-07-15 NOTE — ASSESSMENT & PLAN NOTE
NIF daily still undetectable   Vent settings reviewed and adjusted  SBT once stronger  Planning Trach/PEG July 17th or 18th per Surgery  VAP prophylaxis

## 2018-07-15 NOTE — SUBJECTIVE & OBJECTIVE
Review of Systems   Unable to perform ROS: Intubated     Objective:     Vital Signs (Most Recent):  Temp: 97.8 °F (36.6 °C) (07/15/18 0305)  Pulse: 70 (07/15/18 0859)  Resp: (!) 23 (07/15/18 0859)  BP: 111/62 (07/15/18 0600)  SpO2: 100 % (07/15/18 0859) Vital Signs (24h Range):  Temp:  [97.8 °F (36.6 °C)-98 °F (36.7 °C)] 97.8 °F (36.6 °C)  Pulse:  [68-73] 70  Resp:  [13-31] 23  SpO2:  [96 %-100 %] 100 %  BP: ()/(33-72) 111/62  Arterial Line BP: ()/(34-90) 138/60     Weight: 72 kg (158 lb 11.7 oz)  Body mass index is 27.25 kg/m².    Intake/Output Summary (Last 24 hours) at 07/15/18 0957  Last data filed at 07/15/18 0655   Gross per 24 hour   Intake           3301.2 ml   Output             2480 ml   Net            821.2 ml      Physical Exam   Constitutional: She appears well-developed. She is cooperative.  Non-toxic appearance. She does not have a sickly appearance. She appears ill. No distress. She is intubated (on mechanical ventilation) and restrained.   Ill appearing    HENT:   Head: Normocephalic and atraumatic.   Mouth/Throat: Oropharynx is clear and moist and mucous membranes are normal.   + ETT    Eyes: EOM and lids are normal. Pupils are equal, round, and reactive to light.   Neck: Trachea normal. Neck supple. Carotid bruit is not present.       Cardiovascular: Normal rate, regular rhythm and normal heart sounds.    Pulses:       Radial pulses are 2+ on the right side, and 2+ on the left side.        Dorsalis pedis pulses are 1+ on the right side, and 1+ on the left side.   Intermittently paced   Pulmonary/Chest: Effort normal. No accessory muscle usage. She is intubated (on mechanical ventilation). No respiratory distress. She has no rales.   BS decreased mildly bilaterally   Right chest Vas-Cath   Abdominal: Soft. Normal appearance. She exhibits no distension. Bowel sounds are decreased. There is no tenderness.   Genitourinary:   Genitourinary Comments: Flores    Musculoskeletal: She  exhibits no tenderness.        Right foot: There is no deformity.        Left foot: There is no deformity.   Atrophic muscles of upper and lower extremities   Lymphadenopathy:     She has no cervical adenopathy.   Neurological: She is alert. She displays atrophy.   Awake, following commands , not moving extremities purposefully   Skin: Skin is warm and dry. Capillary refill takes less than 2 seconds. No rash noted. No cyanosis.        DTI to ischial and heels   Nursing note and vitals reviewed.      Significant Labs: All pertinent labs within the past 24 hours have been reviewed.    Significant Imaging: I have reviewed all pertinent imaging results/findings within the past 24 hours.

## 2018-07-15 NOTE — PROGRESS NOTES
[facility] -   Critical Care Medicine  Progress Note    Patient Name: Carlie Valdez  MRN: [id number]  Admission Date: 6/30/2018  Hospital Length of Stay: 15 days  Code Status: Full Code  Attending Provider: Raymundo Vidal MD  Primary Care Provider: Johanna Guzman MD   Principal Problem: Neuromyelitis optica spectrum disorder    Subjective:     HPI:  72 year old female with a PMHx of p[revious cerebral vascular accident with right sided weakness and aphasia, HTN, GERD, DM, A-fib, Cardiac Asthma, and Defibrillator who presented from home with c/o chest pain to Emergency Room on 6/30/2018 folllwing recent discharge from [facility]. Admitted for acute on chronic systolic Congestive Heart failure and progressively worsened. Noted to have worsening mental status and shortness of breath and transferrrd to ICU for hypercapnic respiratory failure. Placed on Noninvasive Positive Pressure Ventilation     Hospital/ICU Course:  7/5 Transferred to ICU. Noninvasive Positive Pressure Ventilation initiated , may need intubation  7/6 Improved ventilation with Noninvasive Positive Pressure Ventilation but now generalized weakness. Steroids started for respiratory failure. Lumbar puncture later today.  7/7 Respiratory status stable. Labile blood pressure. Started on high does steroids  7/8 worsening respiraoty status with elevated  PCO2  7/9 patient seen and examined, chest x-ray and ABG reviewed, daughter Haley was called, intubated for respiratory distress and tachypnea.  7/10 patient seen and examined, chest x-ray and ABG reviewed.  She had a Vas-Cath placed overnight.  Off high-dose steroid.  Plasmapheresis today.  7/11 patient seen and examined.  Chest x-ray and ABG reviewed.  Plasma exchange day 2.  Tolerating enteral feeding.  Hypotensive, started on Levophed drip.  7/12 seen and examined. Intubated, sedated with fentanyl gtt. No pressors . Mental status improved sp Plasmapheresis x 2  days. Tolerating enteral feeding.   7/13 patient seen and examined, remains intubated, on fentanyl drip for sedation.  Following commands attempting to move extremities.  7/14 patient seen and examined, remains intubated, fentanyl drip for sedation.  Following commands.  Unable to move extremities.  Plasmapheresis today.  ABG and chest x-ray reviewed.  7/15 - Resting on vent and low dose Levophed infusion without sedation comfortable and in no distress.  [place] TF and nods head to questions fully awake.  No neuromuscular improvement.    Review of Systems   Unable to perform ROS: Intubated       Objective:     Vital Signs (Most Recent):  Temp: 97.8 °F (36.6 °C) (07/15/18 0305)  Pulse: 70 (07/15/18 0859)  Resp: (!) 23 (07/15/18 0859)  BP: 111/62 (07/15/18 0600)  SpO2: 100 % (07/15/18 0859) Vital Signs (24h Range):  Temp:  [97.8 °F (36.6 °C)-98 °F (36.7 °C)] 97.8 °F (36.6 °C)  Pulse:  [68-73] 70  Resp:  [13-31] 23  SpO2:  [96 %-100 %] 100 %  BP: ()/(33-72) 111/62  Arterial Line BP: ()/(34-90) 138/60     Weight: 72 kg (158 lb 11.7 oz)  Body mass index is 27.25 kg/m².      Intake/Output Summary (Last 24 hours) at 07/15/18 0948  Last data filed at 07/15/18 0655   Gross per 24 hour   Intake           3301.2 ml   Output             2480 ml   Net            821.2 ml       Physical Exam   Constitutional: She appears well-developed. She is cooperative.  Non-toxic appearance. She does not have a sickly appearance. She appears ill. No distress. She is intubated (on mechanical ventilation) and restrained.   HENT:   Head: Normocephalic and atraumatic.   Mouth/Throat: Oropharynx is clear and moist and mucous membranes are normal.   Eyes: EOM and lids are normal. Pupils are equal, round, and reactive to light.   Neck: Trachea normal. Neck supple. Carotid bruit is not present.       Cardiovascular: Normal rate, regular rhythm and normal heart sounds.    Pulses:       Radial pulses are 2+ on the right side, and 2+ on the left  side.        Dorsalis pedis pulses are 1+ on the right side, and 1+ on the left side.   Intermittently paced   Pulmonary/Chest: Effort normal. No accessory muscle usage. She is intubated (on mechanical ventilation). No respiratory distress. She has rales.   Abdominal: Soft. Normal appearance. She exhibits no distension. Bowel sounds are decreased. There is no tenderness.   Genitourinary:   Genitourinary Comments: Flores in place   Musculoskeletal:        Right foot: There is no deformity.        Left foot: There is no deformity.   Atrophy with mild diffuse edema   Lymphadenopathy:     She has no cervical adenopathy.   Neurological: She is alert. She displays atrophy.   Fully awake and nods head to questions and attempts to talk around OET.  Shoulder and LE strength 2/5   Skin: Skin is warm and dry. Capillary refill takes less than 2 seconds. No rash noted. No cyanosis.        DTI to ischial and heels       Vents:  Vent Mode: A/C (07/15/18 0859)  Ventilator Initiated: Yes (07/09/18 1022)  Set Rate: 14 bmp (07/15/18 0859)  Vt Set: 380 mL (07/15/18 0859)  Pressure Support: 0 cmH20 (07/15/18 0859)  PEEP/CPAP: 5 cmH20 (07/15/18 0859)  Oxygen Concentration (%): 40 (07/15/18 0859)  Peak Airway Pressure: 22 cmH2O (07/15/18 0859)  Plateau Pressure: 18 cmH20 (07/15/18 0859)  Total Ve: 8.69 mL (07/15/18 0859)  F/VT Ratio<105 (RSBI): (!) 59.13 (07/15/18 0859)    Lines/Drains/Airways     Central Venous Catheter Line                 Hemodialysis Catheter 07/09/18 2100 right internal jugular 5 days         Percutaneous Central Line Insertion/Assessment - triple lumen  07/14/18 1445 left femoral vein less than 1 day          Drain                 Urethral Catheter 07/05/18 1114 9 days         NG/OG Tube 07/13/18 1020 [place] sump Left mouth 1 day          Airway                 Airway - Non-Surgical 07/09/18 1005 Endotracheal Tube 5 days          Arterial Line                 Arterial Line 07/13/18 1045 Right Radial 1 day           Pressure Ulcer                 Pressure Injury 06/02/18 Right lateral Heel Stage 3 43 days         Pressure Injury 06/02/18 1659 Left proximal Ischial tuberosity Stage 3 42 days         Pressure Injury 06/02/18 1902 Right medial Heel Unstageable 42 days          Peripheral Intravenous Line                 Peripheral IV - Single Lumen 07/06/18 2315 Right Forearm 8 days         Peripheral IV - Single Lumen 07/09/18 0710 Right Antecubital 6 days                Significant Labs:    CBC/Anemia Profile:    Recent Labs  Lab 07/14/18  0345 07/15/18  0425   WBC 18.75* 18.81*   HGB 11.0* 9.4*   HCT 34.9* 30.0*   * 128*   MCV 82 82   RDW 19.3* 19.6*        Chemistries:    Recent Labs  Lab 07/14/18  0345 07/15/18  0425    142   K 3.4* 3.3*    104   CO2 35* 33*   BUN 40* 32*   CREATININE 0.7 0.7   CALCIUM 9.0 8.9   MG 2.5 2.4   PHOS 3.2 1.8*       ABGs:   Recent Labs  Lab 07/15/18  0521   PH 7.474*   PCO2 45.5*   HCO3 33.4*   POCSATURATED 85*   BE 10     All pertinent labs within the past 24 hours have been reviewed.    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, S REFLEX    Neuro following  NMO Antb and MG panel still pending  Plasmaphoresis Day # 5 of 5 tomorrow  Cont supportive care will need long term support and therapy  OT following  Planning LTAC post Trach/PEG        Derm   Skin breakdown    Turn Q 2 hours   Foot drop boots and elevating heels  Wound care following        Pulmonary   Acute respiratory failure with hypoxia and hypercapnia    NIF daily still undetectable   Vent settings reviewed and adjusted  SBT once stronger  Planning Trach/PEG July 17th or 18th per Surgery  VAP prophylaxis          Cardiac/Vascular   Chronic atrial  fibrillation    Cards following  Off Xarelto  Dr. Vidal discussed with Cards and due to low CHADS VASC score will hold on Heparin infusion given plasmaphoresis and planned Trach/PEG.  Resume Xarelto post Trach/PEG  Cont Amiodarone and cardiac monitoring  Has PPM        Acute on chronic systolic heart failure    I/O balance + 3.5 liters  Pleural effusion suspected on right  Add Lasix  Support nutrition  Accurate I/Os and daily weights        Renal/   Electrolyte imbalance    Replace K+ and Phos  Daily CMP        Oncology   Leukocytosis    Afeb and recent cultures NGTD  Follow fever curve and re-culture and IVAB if spikes  CBC daily        Endocrine   Hypothyroidism    Cont Levothyroxine        Vitamin D deficiency    Cont Ergocalciferol        Orthopedic   Severe muscle deconditioning    Cont OT and ROM  Support nutrition with TF  Foot drop boots        Other   Labile blood pressure    Continuous titration of Levophed infusion  ICU hemodynamic monitoring  Arterial line in place          Preventive Measures and Monitoring:   Stress Ulcer: PPI  Nutrition: TF  Glucose control: stable  Bowel prophylaxis: 2 BMs yesterday  DVT prophylaxis: SQ Hep  Hx CAD on B-Blocker: no hx CAD and has hypotension  Head of Bed/Reposition: Elevate HOB and turn Q1-2 hours   Early Mobility: Bed rest  SAT/SBT: daily  RASS goal: 0  Vent Day: #7  OG Day: #7  Central Line Left Femoral Day: #2  Right Radial Arterial Line Day: #3  VasCath to Right IJ Day: #7  Flores Day: #11  Code Status: Full  Pneumonia Vaccine: ordered    Counseling/Consultation:I have discussed the care of this patient in detail with the bedside nursing staff and Dr. White and Dr. Vidal and Dr. Hackett    Critical Care Time: 59 minutes  Critical secondary to Patient has a condition that poses threat to life and bodily function: Resp Failure intubated on mechanical ventilation  Patient has an abrupt change in neurologic status: NMO  Patient is currently on drug therapy  requiring intensive monitoring for toxicity: Levophed infusion      Critical care was time spent personally by me on the following activities: development of treatment plan with patient or surrogate and bedside caregivers, discussions with consultants, evaluation of patient's response to treatment, examination of patient, ordering and performing treatments and interventions, ordering and review of laboratory studies, ordering and review of radiographic studies, pulse oximetry, re-evaluation of patient's condition. This critical care time did not overlap with that of any other provider or involve time for any procedures.     Philip Myers, NP  Critical Care Medicine  [facility]

## 2018-07-15 NOTE — EICU
eICU Note :    Called by the [facility]:    Problem: K 3.3 , Phos 1.9   AB.47/45/48/10/33 on mechanical ventilator    Pertinent History and labs reviewed : 72-year-old female admitted with Acute respiratory failure with neuromyelitis optica spectrum disorder with positive NMO antibody titer and encephalopathy, UTI      Treatment /Intervention given:K-Phos 15 mmol IV piggyback over 6 hours        Melanie Christine M.D  eICU Physician

## 2018-07-15 NOTE — ASSESSMENT & PLAN NOTE
Cards following  Off Xarelto  Dr. Vidal discussed with Cards and due to low CHADS VASC score will hold on Heparin infusion given plasmaphoresis and planned Trach/PEG.  Resume Xarelto post Trach/PEG  Cont Amiodarone and cardiac monitoring  Has PPM

## 2018-07-15 NOTE — SUBJECTIVE & OBJECTIVE
Review of Systems   Unable to perform ROS: Intubated       Objective:     Vital Signs (Most Recent):  Temp: 97.8 °F (36.6 °C) (07/15/18 0305)  Pulse: 70 (07/15/18 0859)  Resp: (!) 23 (07/15/18 0859)  BP: 111/62 (07/15/18 0600)  SpO2: 100 % (07/15/18 0859) Vital Signs (24h Range):  Temp:  [97.8 °F (36.6 °C)-98 °F (36.7 °C)] 97.8 °F (36.6 °C)  Pulse:  [68-73] 70  Resp:  [13-31] 23  SpO2:  [96 %-100 %] 100 %  BP: ()/(33-72) 111/62  Arterial Line BP: ()/(34-90) 138/60     Weight: 72 kg (158 lb 11.7 oz)  Body mass index is 27.25 kg/m².      Intake/Output Summary (Last 24 hours) at 07/15/18 0948  Last data filed at 07/15/18 0655   Gross per 24 hour   Intake           3301.2 ml   Output             2480 ml   Net            821.2 ml       Physical Exam   Constitutional: She appears well-developed. She is cooperative.  Non-toxic appearance. She does not have a sickly appearance. She appears ill. No distress. She is intubated (on mechanical ventilation) and restrained.   HENT:   Head: Normocephalic and atraumatic.   Mouth/Throat: Oropharynx is clear and moist and mucous membranes are normal.   Eyes: EOM and lids are normal. Pupils are equal, round, and reactive to light.   Neck: Trachea normal. Neck supple. Carotid bruit is not present.       Cardiovascular: Normal rate, regular rhythm and normal heart sounds.    Pulses:       Radial pulses are 2+ on the right side, and 2+ on the left side.        Dorsalis pedis pulses are 1+ on the right side, and 1+ on the left side.   Intermittently paced   Pulmonary/Chest: Effort normal. No accessory muscle usage. She is intubated (on mechanical ventilation). No respiratory distress. She has rales.   Abdominal: Soft. Normal appearance. She exhibits no distension. Bowel sounds are decreased. There is no tenderness.   Genitourinary:   Genitourinary Comments: Flores in place   Musculoskeletal:        Right foot: There is no deformity.        Left foot: There is no deformity.    Atrophy with mild diffuse edema   Lymphadenopathy:     She has no cervical adenopathy.   Neurological: She is alert. She displays atrophy.   Fully awake and nods head to questions and attempts to talk around OET.  Shoulder and LE strength 2/5   Skin: Skin is warm and dry. Capillary refill takes less than 2 seconds. No rash noted. No cyanosis.        DTI to ischial and heels       Vents:  Vent Mode: A/C (07/15/18 0859)  Ventilator Initiated: Yes (07/09/18 1022)  Set Rate: 14 bmp (07/15/18 0859)  Vt Set: 380 mL (07/15/18 0859)  Pressure Support: 0 cmH20 (07/15/18 0859)  PEEP/CPAP: 5 cmH20 (07/15/18 0859)  Oxygen Concentration (%): 40 (07/15/18 0859)  Peak Airway Pressure: 22 cmH2O (07/15/18 0859)  Plateau Pressure: 18 cmH20 (07/15/18 0859)  Total Ve: 8.69 mL (07/15/18 0859)  F/VT Ratio<105 (RSBI): (!) 59.13 (07/15/18 0859)    Lines/Drains/Airways     Central Venous Catheter Line                 Hemodialysis Catheter 07/09/18 2100 right internal jugular 5 days         Percutaneous Central Line Insertion/Assessment - triple lumen  07/14/18 1445 left femoral vein less than 1 day          Drain                 Urethral Catheter 07/05/18 1114 9 days         NG/OG Tube 07/13/18 1020 [place] sump Left mouth 1 day          Airway                 Airway - Non-Surgical 07/09/18 1005 Endotracheal Tube 5 days          Arterial Line                 Arterial Line 07/13/18 1045 Right Radial 1 day          Pressure Ulcer                 Pressure Injury 06/02/18 Right lateral Heel Stage 3 43 days         Pressure Injury 06/02/18 1659 Left proximal Ischial tuberosity Stage 3 42 days         Pressure Injury 06/02/18 1902 Right medial Heel Unstageable 42 days          Peripheral Intravenous Line                 Peripheral IV - Single Lumen 07/06/18 2315 Right Forearm 8 days         Peripheral IV - Single Lumen 07/09/18 0710 Right Antecubital 6 days                Significant Labs:    CBC/Anemia Profile:    Recent Labs  Lab 07/14/18  1065  07/15/18  0425   WBC 18.75* 18.81*   HGB 11.0* 9.4*   HCT 34.9* 30.0*   * 128*   MCV 82 82   RDW 19.3* 19.6*        Chemistries:    Recent Labs  Lab 07/14/18  0345 07/15/18  0425    142   K 3.4* 3.3*    104   CO2 35* 33*   BUN 40* 32*   CREATININE 0.7 0.7   CALCIUM 9.0 8.9   MG 2.5 2.4   PHOS 3.2 1.8*       ABGs:   Recent Labs  Lab 07/15/18  0521   PH 7.474*   PCO2 45.5*   HCO3 33.4*   POCSATURATED 85*   BE 10     All pertinent labs within the past 24 hours have been reviewed.    Significant Imaging:  CXR: I have reviewed all pertinent results/findings within the past 24 hours and my personal findings are:  improved aeration of left base but increased infiltrate vs effusion to right base

## 2018-07-15 NOTE — PLAN OF CARE
Problem: Patient Care Overview  Goal: Plan of Care Review  Patient currently requires total assist x 2 for bed mobility and total assist for sitting balance at EOB.   Outcome: Ongoing (interventions implemented as appropriate)  Fall precautions maintained. Bed in lowest position with wheels locked. Side table and call bell within reach. Pt position changed Q2hrs. No new skin breakdown noted. Potassium replaced for K of 3.5. Plasmapheresis performed today, pt tolerated well. Central line placed per RIGOBERTO Myers to L. Groin. Pt with no complaints of pain this shift. Pt is AAOx4, drowsy at times, nods appropriately. Nutren 1.5 infusing to OG, tolerating well. BPs this shift have greatly fluctuated from 70s - 180s systolic. Levophed gtt started this shift for hypotension; infusing @ 0.04 at this time. Will report to oncoming nurse.

## 2018-07-15 NOTE — ASSESSMENT & PLAN NOTE
I/O balance + 3.5 liters  Pleural effusion suspected on right  Add Lasix  Support nutrition  Accurate I/Os and daily weights

## 2018-07-15 NOTE — ASSESSMENT & PLAN NOTE
Neuro following  NMO Antb and MG panel still pending  Plasmaphoresis Day # 5 of 5 tomorrow  Cont supportive care will need long term support and therapy  OT following  Planning LTAC post Trach/PEG

## 2018-07-15 NOTE — PROGRESS NOTES
Progress Note  Neurological ICU    Admit Date: 6/30/2018   LOS: 15 days     SUBJECTIVE:     Patient is awake, alert and follows commands, no movements in the extremities, intermittently she gets spinal reflex in the LE b/l  She is blind. No complications post plasmapheresis. She is afebrile    Continuous Infusions:   fentanyl Stopped (07/11/18 0830)    norepinephrine bitartrate-D5W 0.02 mcg/kg/min (07/15/18 1105)     Scheduled Meds:   [START ON 7/16/2018] albumin human 5%  175 g Intravenous Once    albuterol-ipratropium  3 mL Nebulization Q6H    amiodarone  200 mg Oral BID    brimonidine 0.2%  1 drop Both Eyes Q12H    And    timolol maleate 0.5%  1 drop Both Eyes BID    [START ON 7/16/2018] calcium gluconate IVPB  2,000 mg Intravenous Once    chlorhexidine  15 mL Mouth/Throat BID    ergocalciferol  50,000 Units Oral Q7 Days    furosemide  20 mg Oral BID    heparin (porcine)  5,000 Units Subcutaneous Q8H    levothyroxine  100 mcg Oral Before breakfast    pantoprazole 40 mg in dextrose 5 % 100 mL IVPB (over 15 minutes) (ready to mix system)  40 mg Intravenous Daily     PRN Meds:acetaminophen, albuterol-ipratropium, baclofen, heparin (porcine), heparin, porcine (PF), hydrALAZINE, pneumoc 13-naeem conj-dip cr(PF)    Review of patient's allergies indicates:   Allergen Reactions    Morphine Hives    Atorvastatin      Other reaction(s): Muscle pain    Clonidine     Codeine      Other reaction(s): Unknown    Digoxin      Other reaction(s): Unknown    Diovan [valsartan] Other (See Comments)     Upset stomach, weakness    Eggs [egg derived]     Metoprolol Diarrhea    Naproxen      Other reaction(s): Nausea    Peanut     Propofol      Other reaction(s): delirious    Sulfa (sulfonamide antibiotics)        OBJECTIVE:     Vital Signs (Most Recent)  Temp: 97.4 °F (36.3 °C) (07/15/18 0705)  Pulse: 70 (07/15/18 1105)  Resp: (!) 22 (07/15/18 1042)  BP: (!) 98/51 (07/15/18 0930)  SpO2: 100 % (07/15/18  1042)    Vital Signs Range (Last 24H):  Temp:  [97.4 °F (36.3 °C)-98 °F (36.7 °C)]   Pulse:  [68-73]   Resp:  [13-31]   BP: ()/(33-72)   SpO2:  [96 %-100 %]   Arterial Line BP: ()/(35-90)     I & O (Last 24H):  Intake/Output Summary (Last 24 hours) at 07/15/18 1222  Last data filed at 07/15/18 1105   Gross per 24 hour   Intake           2551.2 ml   Output             2750 ml   Net           -198.8 ml     Ventilator Data (Last 24H):     Vent Mode: A/C  Oxygen Concentration (%):  [40] 40  Resp Rate Total:  [14 br/min-25 br/min] 22 br/min  Vt Set:  [380 mL] 380 mL  PEEP/CPAP:  [5 cmH20] 5 cmH20  Pressure Support:  [0 cmH20] 0 cmH20  Mean Airway Pressure:  [9.8 kjL80-87 cmH20] 13 cmH20    Hemodynamic Parameters (Last 24H):       Physical Exam:  General: Intubated,not sedated, follows commands  HEENT:   Acephalic, Atraumatic,  EOMI, PERRLA, no nystagmus, no ptosis, no lid lag, no neglect, no gaze palsy  Blind, patient nods head to having light perception    Neck: supple  Lungs: CTA,  No rhonchi, no rales  Heart:: Regular Rate and rhythm, no murmurs, rubs and or gallops  Abdomen, Soft, non tender, non distended, no abdominal bruit, bowel sounds present  Extremities: No edema,   Skin: No rash, no ecchymoses,         NEURO    Mental Status   Awake, alert  , follows commands  Makes attempt to communicate       SPEECH:   Intubated      CRANIAL NERVES:      II: visual acuity  blind   II: visual fields asbent   II: pupils Equal, round, reactive to light   III,VII: ptosis None   III,IV,VI: extraocular muscles  Full ROM   V: mastication Normal   V: facial light touch sensation  Normal   V,VII: corneal reflex  Present   VII: facial muscle function - upper  Normal   VII: facial muscle function - lower Normal   VIII: hearing Intact   IX: soft palate elevation  Normal   IX,X: gag reflex Present   XI: trapezius strength  deferred   XI: sternocleidomastoid strength deferred   XI: neck flexion strength  deferred   XII:  tongue strength  deferred         MOTOR:Upper Extremities     0/5 proximal and distal  Flaccid  Areflexia        Lower Extremities: intermittent spinal reflex  Flaccid  Areflexia  Reflexes are gra      SENSORY: Withdraws to painful stimuli in the facial area only    EXTRAPYRAMIDALS:  None      Lines/Drains:       Hemodialysis Catheter 07/09/18 2100 right internal jugular (Active)   Site Assessment Clean;Dry;Intact 7/15/2018 11:05 AM   Status Clamped 7/15/2018 11:05 AM   Dressing Intervention New dressing 7/15/2018 11:05 AM   Dressing Status Clean;Dry;Intact 7/15/2018 11:05 AM   Verification by X-ray Yes 7/11/2018  7:05 PM   Site Condition No complications 7/15/2018 11:05 AM   Dressing Occlusive 7/15/2018 11:05 AM   Daily Line Review Performed 7/12/2018  3:05 PM   Number of days: 5            Percutaneous Central Line Insertion/Assessment - triple lumen  07/14/18 1445 left femoral vein (Active)   Dressing biopatch in place;dressing dry and intact 7/15/2018 11:05 AM   Securement secured w/ sutures;catheter securement device utilized 7/15/2018 11:05 AM   Additional Site Signs no drainage;no streak formation;no palpable cord;no pain;no warmth;no erythema;no edema 7/15/2018 11:05 AM   Distal Patency/Care infusing 7/15/2018 11:05 AM   Medial Patency/Care infusing 7/15/2018 11:05 AM   Proximal Patency/Care flushed w/o difficulty;normal saline locked 7/15/2018 11:05 AM   Daily Line Review Performed 7/14/2018  2:57 PM   Number of days: 0            Peripheral IV - Single Lumen 07/06/18 2315 Right Forearm (Active)   Site Assessment Clean;Dry;Intact;No redness;No swelling 7/15/2018 11:05 AM   Line Status Saline locked 7/15/2018 11:05 AM   Dressing Status Clean;Dry;Intact 7/15/2018 11:05 AM   Dressing Intervention Dressing reinforced 7/15/2018  3:05 AM   Dressing Change Due 07/09/18 7/9/2018  3:04 PM   Reason Not Rotated Not due 7/15/2018 11:05 AM   Number of days: 8            Peripheral IV - Single Lumen 07/09/18 0710 Right  Antecubital (Active)   Site Assessment Clean;Dry;Intact 7/15/2018 11:05 AM   Line Status Saline locked 7/15/2018 11:05 AM   Dressing Status Intact;Dry;Clean 7/15/2018 11:05 AM   Dressing Intervention Dressing reinforced 7/15/2018  3:05 AM   Dressing Change Due 07/13/18 7/12/2018  3:05 PM   Reason Not Rotated Not due 7/15/2018 11:05 AM   Number of days: 6            Arterial Line 07/13/18 1045 Right Radial (Active)   Site Assessment Clean;Dry;Intact;No redness;No swelling 7/15/2018 11:05 AM   Line Status Pulsatile blood flow 7/15/2018  3:05 AM   Art Line Waveform Square wave test performed;Appropriate 7/15/2018 11:05 AM   Arterial Line Interventions Zeroed and calibrated;Leveled;Flushed per protocol 7/15/2018 11:05 AM   Color/Movement/Sensation Capillary refill less than 3 sec 7/15/2018 11:05 AM   Dressing Type Transparent 7/15/2018 11:05 AM   Dressing Status Clean;Dry;Intact 7/15/2018 11:05 AM   Number of days: 2            NG/OG Tube 07/13/18 1020 Hasty sump Left mouth (Active)   Placement Check placement verified by x-ray 7/15/2018 11:05 AM   Tolerance no signs/symptoms of discomfort 7/15/2018 11:05 AM   Securement taped to commercial device 7/15/2018 11:05 AM   Clamp Status/Tolerance unclamped 7/15/2018 11:05 AM   Insertion Site Appearance no redness, warmth, tenderness, skin breakdown, drainage 7/15/2018 11:05 AM   Flush/Irrigation flushed w/;water;no resistance met 7/15/2018 11:05 AM   Feeding Method continuous 7/15/2018 11:05 AM   Feeding Action feeding continued 7/15/2018  3:05 AM   Current Rate (mL/hr) 50 mL/hr 7/14/2018 11:05 PM   Goal Rate (mL/hr) 50 mL/hr 7/14/2018 11:05 PM   Water Bolus (mL) 200 mL 7/15/2018  6:00 AM   Rate Formula Tube Feeding (mL/hr) 50 mL/hr 7/15/2018  7:05 AM   Intake (mL) - Formula Tube Feeding 50 7/15/2018 11:05 AM   Residual Amount (ml) 10 ml 7/15/2018  7:05 AM   Number of days: 2            Urethral Catheter 07/05/18 1114 (Active)   Site Assessment Intact;Clean 7/15/2018 11:05 AM  "  Collection Container Urimeter 7/15/2018 11:05 AM   Securement Method secured to top of thigh w/ adhesive device 7/15/2018 11:05 AM   Catheter Care Performed yes 7/15/2018 11:05 AM   Reason for Continuing Urinary Catheterization Critically ill in ICU requiring intensive monitoring 7/15/2018 11:05 AM   CAUTI Prevention Bundle StatLock in place w 1" slack;Intact seal between catheter & drainage tubing;Drainage bag off the floor;Green sheeting clip in use;No dependent loops or kinks;Drainage bag not overfilled (<2/3 full);Drainage bag below bladder 7/15/2018  3:05 AM   Output (mL) 100 mL 7/15/2018 11:05 AM   Number of days: 10       Laboratory (Last 24H):  CBC:    Recent Labs  Lab 07/15/18  0425   WBC 18.81*   HGB 9.4*   HCT 30.0*   *     CMP:    Recent Labs  Lab 07/15/18  0425   CALCIUM 8.9      K 3.3*   CO2 33*      BUN 32*   CREATININE 0.7     Results for orders placed or performed during the hospital encounter of 06/30/18   Urine culture **CANNOT BE ORDERED STAT**   Result Value Ref Range    Urine Culture, Routine       COAGULASE-NEGATIVE STAPHYLOCOCCUS SPECIES  50,000 - 99,999 cfu/ml  Susceptibility testing not routinely performed.     Blood culture   Result Value Ref Range    Blood Culture, Routine No growth after 5 days.    Blood culture   Result Value Ref Range    Blood Culture, Routine No growth after 5 days.    CSF culture   Result Value Ref Range    CSF CULTURE No Growth     Gram Stain Result No organisms seen Few WBC's    CBC auto differential   Result Value Ref Range    WBC 9.92 3.90 - 12.70 K/uL    RBC 4.27 4.00 - 5.40 M/uL    Hemoglobin 11.2 (L) 12.0 - 16.0 g/dL    Hematocrit 34.7 (L) 37.0 - 48.5 %    MCV 81 (L) 82 - 98 fL    MCH 26.2 (L) 27.0 - 31.0 pg    MCHC 32.3 32.0 - 36.0 g/dL    RDW 17.4 (H) 11.5 - 14.5 %    Platelets 298 150 - 350 K/uL    MPV 9.2 9.2 - 12.9 fL    Gran # (ANC) 6.9 1.8 - 7.7 K/uL    Lymph # 1.4 1.0 - 4.8 K/uL    Mono # 1.2 (H) 0.3 - 1.0 K/uL    Eos # 0.4 0.0 - " 0.5 K/uL    Baso # 0.04 0.00 - 0.20 K/uL    Gran% 69.3 38.0 - 73.0 %    Lymph% 14.3 (L) 18.0 - 48.0 %    Mono% 12.1 4.0 - 15.0 %    Eosinophil% 3.9 0.0 - 8.0 %    Basophil% 0.4 0.0 - 1.9 %    Differential Method Automated    Comprehensive metabolic panel   Result Value Ref Range    Sodium 142 136 - 145 mmol/L    Potassium 3.8 3.5 - 5.1 mmol/L    Chloride 106 95 - 110 mmol/L    CO2 25 23 - 29 mmol/L    Glucose 104 70 - 110 mg/dL    BUN, Bld 13 8 - 23 mg/dL    Creatinine 0.7 0.5 - 1.4 mg/dL    Calcium 9.6 8.7 - 10.5 mg/dL    Total Protein 6.6 6.0 - 8.4 g/dL    Albumin 2.3 (L) 3.5 - 5.2 g/dL    Total Bilirubin 0.5 0.1 - 1.0 mg/dL    Alkaline Phosphatase 152 (H) 55 - 135 U/L    AST 21 10 - 40 U/L    ALT 11 10 - 44 U/L    Anion Gap 11 8 - 16 mmol/L    eGFR if African American >60 >60 mL/min/1.73 m^2    eGFR if non African American >60 >60 mL/min/1.73 m^2   Troponin I #1   Result Value Ref Range    Troponin I 0.025 0.000 - 0.026 ng/mL   B-Type natriuretic peptide (BNP)   Result Value Ref Range    BNP 1,100 (H) 0 - 99 pg/mL   Urinalysis   Result Value Ref Range    Specimen UA Urine, Catheterized     Color, UA Yellow Yellow, Straw, Samra    Appearance, UA Clear Clear    pH, UA 7.0 5.0 - 8.0    Specific Gravity, UA 1.015 1.005 - 1.030    Protein, UA Negative Negative    Glucose, UA Negative Negative    Ketones, UA Negative Negative    Bilirubin (UA) Negative Negative    Occult Blood UA Negative Negative    Nitrite, UA Positive (A) Negative    Urobilinogen, UA 1.0 <2.0 EU/dL    Leukocytes, UA Negative Negative   Troponin I #2   Result Value Ref Range    Troponin I 0.028 (H) 0.000 - 0.026 ng/mL   Urinalysis Microscopic   Result Value Ref Range    RBC, UA 0 0 - 4 /hpf    WBC, UA 1 0 - 5 /hpf    Bacteria, UA Rare None-Occ /hpf    Squam Epithel, UA 0 /hpf    Microscopic Comment SEE COMMENT    Troponin I   Result Value Ref Range    Troponin I 0.033 (H) 0.000 - 0.026 ng/mL   Troponin I   Result Value Ref Range    Troponin I 0.033  (H) 0.000 - 0.026 ng/mL   CBC auto differential   Result Value Ref Range    WBC 11.79 3.90 - 12.70 K/uL    RBC 4.36 4.00 - 5.40 M/uL    Hemoglobin 11.4 (L) 12.0 - 16.0 g/dL    Hematocrit 35.9 (L) 37.0 - 48.5 %    MCV 82 82 - 98 fL    MCH 26.1 (L) 27.0 - 31.0 pg    MCHC 31.8 (L) 32.0 - 36.0 g/dL    RDW 17.5 (H) 11.5 - 14.5 %    Platelets 359 (H) 150 - 350 K/uL    MPV 9.3 9.2 - 12.9 fL    Gran # (ANC) 8.7 (H) 1.8 - 7.7 K/uL    Lymph # 1.5 1.0 - 4.8 K/uL    Mono # 1.1 (H) 0.3 - 1.0 K/uL    Eos # 0.4 0.0 - 0.5 K/uL    Baso # 0.03 0.00 - 0.20 K/uL    Gran% 74.0 (H) 38.0 - 73.0 %    Lymph% 12.6 (L) 18.0 - 48.0 %    Mono% 9.4 4.0 - 15.0 %    Eosinophil% 3.7 0.0 - 8.0 %    Basophil% 0.3 0.0 - 1.9 %    Differential Method Automated    Basic metabolic panel   Result Value Ref Range    Sodium 142 136 - 145 mmol/L    Potassium 3.8 3.5 - 5.1 mmol/L    Chloride 105 95 - 110 mmol/L    CO2 24 23 - 29 mmol/L    Glucose 103 70 - 110 mg/dL    BUN, Bld 19 8 - 23 mg/dL    Creatinine 0.7 0.5 - 1.4 mg/dL    Calcium 9.8 8.7 - 10.5 mg/dL    Anion Gap 13 8 - 16 mmol/L    eGFR if African American >60 >60 mL/min/1.73 m^2    eGFR if non African American >60 >60 mL/min/1.73 m^2   TSH   Result Value Ref Range    TSH 0.670 0.400 - 4.000 uIU/mL   Troponin I   Result Value Ref Range    Troponin I 0.045 (H) 0.000 - 0.026 ng/mL   Brain natriuretic peptide   Result Value Ref Range     (H) 0 - 99 pg/mL   Troponin I   Result Value Ref Range    Troponin I 0.033 (H) 0.000 - 0.026 ng/mL   CBC auto differential   Result Value Ref Range    WBC 9.77 3.90 - 12.70 K/uL    RBC 4.68 4.00 - 5.40 M/uL    Hemoglobin 12.1 12.0 - 16.0 g/dL    Hematocrit 39.7 37.0 - 48.5 %    MCV 85 82 - 98 fL    MCH 25.9 (L) 27.0 - 31.0 pg    MCHC 30.5 (L) 32.0 - 36.0 g/dL    RDW 17.8 (H) 11.5 - 14.5 %    Platelets 371 (H) 150 - 350 K/uL    MPV 8.9 (L) 9.2 - 12.9 fL    Gran # (ANC) 6.8 1.8 - 7.7 K/uL    Lymph # 1.6 1.0 - 4.8 K/uL    Mono # 0.8 0.3 - 1.0 K/uL    Eos # 0.5 0.0 -  0.5 K/uL    Baso # 0.05 0.00 - 0.20 K/uL    Gran% 69.8 38.0 - 73.0 %    Lymph% 16.8 (L) 18.0 - 48.0 %    Mono% 7.9 4.0 - 15.0 %    Eosinophil% 5.0 0.0 - 8.0 %    Basophil% 0.5 0.0 - 1.9 %    Differential Method Automated    Comprehensive metabolic panel   Result Value Ref Range    Sodium 145 136 - 145 mmol/L    Potassium 3.4 (L) 3.5 - 5.1 mmol/L    Chloride 104 95 - 110 mmol/L    CO2 29 23 - 29 mmol/L    Glucose 102 70 - 110 mg/dL    BUN, Bld 25 (H) 8 - 23 mg/dL    Creatinine 0.8 0.5 - 1.4 mg/dL    Calcium 9.9 8.7 - 10.5 mg/dL    Total Protein 6.6 6.0 - 8.4 g/dL    Albumin 2.4 (L) 3.5 - 5.2 g/dL    Total Bilirubin 0.2 0.1 - 1.0 mg/dL    Alkaline Phosphatase 139 (H) 55 - 135 U/L    AST 20 10 - 40 U/L    ALT 13 10 - 44 U/L    Anion Gap 12 8 - 16 mmol/L    eGFR if African American >60 >60 mL/min/1.73 m^2    eGFR if non African American >60 >60 mL/min/1.73 m^2   Sedimentation rate, manual   Result Value Ref Range    Sed Rate 62 (H) 0 - 20 mm/Hr   High sensitivity CRP   Result Value Ref Range    CRP, High Sensitivity 65.12 (H) 0.00 - 3.19 mg/L   Urinalysis   Result Value Ref Range    Specimen UA Urine, Clean Catch     Color, UA Yellow Yellow, Straw, Samra    Appearance, UA Clear Clear    pH, UA 6.0 5.0 - 8.0    Specific Gravity, UA 1.015 1.005 - 1.030    Protein, UA Negative Negative    Glucose, UA Negative Negative    Ketones, UA Negative Negative    Bilirubin (UA) Negative Negative    Occult Blood UA Negative Negative    Nitrite, UA Negative Negative    Urobilinogen, UA Negative <2.0 EU/dL    Leukocytes, UA Trace (A) Negative   CSF cell count with differential   Result Value Ref Range    Heme Aliquot 4.0 mL    Appearance, CSF Clear Clear    Color, CSF Colorless Colorless    WBC, CSF 38 (H) 0 - 5 /cu mm    RBC, CSF 31 (A) 0 /cu mm    Segmented Neutrophils, CSF 23 (H) 0 - 6 %    Lymphs, CSF 36 (L) 40 - 80 %    Mono/Macrophage, CSF 41 15 - 45 %   Glucose, CSF   Result Value Ref Range    Glucose, CSF 84 (H) 40 - 70 mg/dL    Protein, CSF   Result Value Ref Range    Protein,  (H) 15 - 40 mg/dL   Urinalysis Microscopic   Result Value Ref Range    RBC, UA 1 0 - 4 /hpf    WBC, UA 4 0 - 5 /hpf    Bacteria, UA Occasional None-Occ /hpf    Yeast, UA Occasional (A) None    Squam Epithel, UA 25 /hpf    Microscopic Comment SEE COMMENT    CBC auto differential   Result Value Ref Range    WBC 10.03 3.90 - 12.70 K/uL    RBC 4.20 4.00 - 5.40 M/uL    Hemoglobin 10.7 (L) 12.0 - 16.0 g/dL    Hematocrit 35.6 (L) 37.0 - 48.5 %    MCV 85 82 - 98 fL    MCH 25.5 (L) 27.0 - 31.0 pg    MCHC 30.1 (L) 32.0 - 36.0 g/dL    RDW 17.6 (H) 11.5 - 14.5 %    Platelets 422 (H) 150 - 350 K/uL    MPV 9.1 (L) 9.2 - 12.9 fL    Gran # (ANC) 7.5 1.8 - 7.7 K/uL    Lymph # 1.5 1.0 - 4.8 K/uL    Mono # 0.7 0.3 - 1.0 K/uL    Eos # 0.4 0.0 - 0.5 K/uL    Baso # 0.04 0.00 - 0.20 K/uL    Gran% 74.6 (H) 38.0 - 73.0 %    Lymph% 14.5 (L) 18.0 - 48.0 %    Mono% 6.6 4.0 - 15.0 %    Eosinophil% 3.9 0.0 - 8.0 %    Basophil% 0.4 0.0 - 1.9 %    Differential Method Automated    Comprehensive metabolic panel   Result Value Ref Range    Sodium 142 136 - 145 mmol/L    Potassium 3.5 3.5 - 5.1 mmol/L    Chloride 102 95 - 110 mmol/L    CO2 30 (H) 23 - 29 mmol/L    Glucose 95 70 - 110 mg/dL    BUN, Bld 28 (H) 8 - 23 mg/dL    Creatinine 1.0 0.5 - 1.4 mg/dL    Calcium 9.3 8.7 - 10.5 mg/dL    Total Protein 6.2 6.0 - 8.4 g/dL    Albumin 2.3 (L) 3.5 - 5.2 g/dL    Total Bilirubin 0.2 0.1 - 1.0 mg/dL    Alkaline Phosphatase 133 55 - 135 U/L    AST 21 10 - 40 U/L    ALT 13 10 - 44 U/L    Anion Gap 10 8 - 16 mmol/L    eGFR if African American >60 >60 mL/min/1.73 m^2    eGFR if non African American 56 (A) >60 mL/min/1.73 m^2   CBC auto differential   Result Value Ref Range    WBC 11.53 3.90 - 12.70 K/uL    RBC 4.42 4.00 - 5.40 M/uL    Hemoglobin 11.4 (L) 12.0 - 16.0 g/dL    Hematocrit 39.0 37.0 - 48.5 %    MCV 88 82 - 98 fL    MCH 25.8 (L) 27.0 - 31.0 pg    MCHC 29.2 (L) 32.0 - 36.0 g/dL    RDW 17.3 (H)  11.5 - 14.5 %    Platelets 454 (H) 150 - 350 K/uL    MPV 9.0 (L) 9.2 - 12.9 fL    Gran # (ANC) 9.9 (H) 1.8 - 7.7 K/uL    Lymph # 1.1 1.0 - 4.8 K/uL    Mono # 0.5 0.3 - 1.0 K/uL    Eos # 0.1 0.0 - 0.5 K/uL    Baso # 0.03 0.00 - 0.20 K/uL    Gran% 85.6 (H) 38.0 - 73.0 %    Lymph% 9.4 (L) 18.0 - 48.0 %    Mono% 4.2 4.0 - 15.0 %    Eosinophil% 0.5 0.0 - 8.0 %    Basophil% 0.3 0.0 - 1.9 %    Differential Method Automated    Comprehensive metabolic panel   Result Value Ref Range    Sodium 145 136 - 145 mmol/L    Potassium 4.7 3.5 - 5.1 mmol/L    Chloride 101 95 - 110 mmol/L    CO2 31 (H) 23 - 29 mmol/L    Glucose 142 (H) 70 - 110 mg/dL    BUN, Bld 32 (H) 8 - 23 mg/dL    Creatinine 0.9 0.5 - 1.4 mg/dL    Calcium 10.2 8.7 - 10.5 mg/dL    Total Protein 7.3 6.0 - 8.4 g/dL    Albumin 2.7 (L) 3.5 - 5.2 g/dL    Total Bilirubin 0.4 0.1 - 1.0 mg/dL    Alkaline Phosphatase 143 (H) 55 - 135 U/L    AST 27 10 - 40 U/L    ALT 19 10 - 44 U/L    Anion Gap 13 8 - 16 mmol/L    eGFR if African American >60 >60 mL/min/1.73 m^2    eGFR if non African American >60 >60 mL/min/1.73 m^2   APTT   Result Value Ref Range    aPTT 27.3 21.0 - 32.0 sec   Protime-INR   Result Value Ref Range    Prothrombin Time 10.6 9.0 - 12.5 sec    INR 1.0 0.8 - 1.2   Ammonia   Result Value Ref Range    Ammonia 61 (H) 10 - 50 umol/L   Urinalysis   Result Value Ref Range    Specimen UA Urine, Catheterized     Color, UA Yellow Yellow, Straw, Samra    Appearance, UA Clear Clear    pH, UA 6.0 5.0 - 8.0    Specific Gravity, UA 1.020 1.005 - 1.030    Protein, UA Negative Negative    Glucose, UA Negative Negative    Ketones, UA Negative Negative    Bilirubin (UA) Negative Negative    Occult Blood UA Negative Negative    Nitrite, UA Negative Negative    Urobilinogen, UA Negative <2.0 EU/dL    Leukocytes, UA Negative Negative   Basic metabolic panel   Result Value Ref Range    Sodium 147 (H) 136 - 145 mmol/L    Potassium 4.1 3.5 - 5.1 mmol/L    Chloride 102 95 - 110 mmol/L     CO2 33 (H) 23 - 29 mmol/L    Glucose 124 (H) 70 - 110 mg/dL    BUN, Bld 37 (H) 8 - 23 mg/dL    Creatinine 0.8 0.5 - 1.4 mg/dL    Calcium 10.5 8.7 - 10.5 mg/dL    Anion Gap 12 8 - 16 mmol/L    eGFR if African American >60 >60 mL/min/1.73 m^2    eGFR if non African American >60 >60 mL/min/1.73 m^2   CBC auto differential   Result Value Ref Range    WBC 10.93 3.90 - 12.70 K/uL    RBC 4.42 4.00 - 5.40 M/uL    Hemoglobin 11.5 (L) 12.0 - 16.0 g/dL    Hematocrit 38.4 37.0 - 48.5 %    MCV 87 82 - 98 fL    MCH 26.0 (L) 27.0 - 31.0 pg    MCHC 29.9 (L) 32.0 - 36.0 g/dL    RDW 17.0 (H) 11.5 - 14.5 %    Platelets 439 (H) 150 - 350 K/uL    MPV 8.8 (L) 9.2 - 12.9 fL    Gran # (ANC) 9.9 (H) 1.8 - 7.7 K/uL    Lymph # 0.9 (L) 1.0 - 4.8 K/uL    Mono # 0.1 (L) 0.3 - 1.0 K/uL    Eos # 0.0 0.0 - 0.5 K/uL    Baso # 0.01 0.00 - 0.20 K/uL    Gran% 90.6 (H) 38.0 - 73.0 %    Lymph% 8.5 (L) 18.0 - 48.0 %    Mono% 0.8 (L) 4.0 - 15.0 %    Eosinophil% 0.0 0.0 - 8.0 %    Basophil% 0.1 0.0 - 1.9 %    Differential Method Automated    Magnesium   Result Value Ref Range    Magnesium 1.7 1.6 - 2.6 mg/dL   T3, free   Result Value Ref Range    T3, Free 1.0 (L) 2.3 - 4.2 pg/mL   T4, free   Result Value Ref Range    Free T4 1.24 0.71 - 1.51 ng/dL   TSH   Result Value Ref Range    TSH 0.269 (L) 0.400 - 4.000 uIU/mL   Hepatic function panel   Result Value Ref Range    Total Protein 7.5 6.0 - 8.4 g/dL    Albumin 2.9 (L) 3.5 - 5.2 g/dL    Total Bilirubin 0.2 0.1 - 1.0 mg/dL    Bilirubin, Direct 0.1 0.1 - 0.3 mg/dL    AST 25 10 - 40 U/L    ALT 20 10 - 44 U/L    Alkaline Phosphatase 151 (H) 55 - 135 U/L   Rapid HIV   Result Value Ref Range    HIV Rapid Testing Negative Negative   Hepatitis C antibody   Result Value Ref Range    Hepatitis C Ab Negative    Hepatitis B surface antibody   Result Value Ref Range    Hep B S Ab Negative    Hepatitis A antibody, IgG   Result Value Ref Range    Hepatitis A Antibody IgG Negative    Hepatitis delta virus   Result Value Ref  Range    Delta Ab Total Quantative Negative Negative   FTA antibodies, IgG and IgM   Result Value Ref Range    FTA-ABS Non-reactive Non-reactive   Phosphorus   Result Value Ref Range    Phosphorus 2.1 (L) 2.7 - 4.5 mg/dL   Magnesium   Result Value Ref Range    Magnesium 1.9 1.6 - 2.6 mg/dL   CBC auto differential   Result Value Ref Range    WBC 14.55 (H) 3.90 - 12.70 K/uL    RBC 4.31 4.00 - 5.40 M/uL    Hemoglobin 11.1 (L) 12.0 - 16.0 g/dL    Hematocrit 37.4 37.0 - 48.5 %    MCV 87 82 - 98 fL    MCH 25.8 (L) 27.0 - 31.0 pg    MCHC 29.7 (L) 32.0 - 36.0 g/dL    RDW 17.8 (H) 11.5 - 14.5 %    Platelets 405 (H) 150 - 350 K/uL    MPV 8.9 (L) 9.2 - 12.9 fL    Gran # (ANC) 13.3 (H) 1.8 - 7.7 K/uL    Lymph # 1.0 1.0 - 4.8 K/uL    Mono # 0.2 (L) 0.3 - 1.0 K/uL    Eos # 0.0 0.0 - 0.5 K/uL    Baso # 0.00 0.00 - 0.20 K/uL    Gran% 91.7 (H) 38.0 - 73.0 %    Lymph% 6.7 (L) 18.0 - 48.0 %    Mono% 1.6 (L) 4.0 - 15.0 %    Eosinophil% 0.0 0.0 - 8.0 %    Basophil% 0.0 0.0 - 1.9 %    Differential Method Automated    Basic metabolic panel   Result Value Ref Range    Sodium 146 (H) 136 - 145 mmol/L    Potassium 4.4 3.5 - 5.1 mmol/L    Chloride 102 95 - 110 mmol/L    CO2 32 (H) 23 - 29 mmol/L    Glucose 153 (H) 70 - 110 mg/dL    BUN, Bld 49 (H) 8 - 23 mg/dL    Creatinine 1.0 0.5 - 1.4 mg/dL    Calcium 10.2 8.7 - 10.5 mg/dL    Anion Gap 12 8 - 16 mmol/L    eGFR if African American >60 >60 mL/min/1.73 m^2    eGFR if non African American 56 (A) >60 mL/min/1.73 m^2   Phosphorus    Value Ref Range     2.5 (L) 2.7 - 4.5 mg/dL       Value Ref Range    Magnesium 2.2 1.6 - 2.6 mg/dL   CBC auto differential   Result Value Ref Range    WBC 13.17 (H) 3.90 - 12.70 K/uL    RBC 4.12 4.00 - 5.40 M/uL    Hemoglobin 10.6 (L) 12.0 - 16.0 g/dL    Hematocrit 35.8 (L) 37.0 - 48.5 %    MCV 87 82 - 98 fL    MCH 25.7 (L) 27.0 - 31.0 pg    MCHC 29.6 (L) 32.0 - 36.0 g/dL    RDW 18.3 (H) 11.5 - 14.5 %    Platelets 389 (H) 150 - 350 K/uL    MPV 8.9 (L) 9.2 - 12.9 fL     Gran # (ANC) 12.2 (H) 1.8 - 7.7 K/uL    Lymph # 0.7 (L) 1.0 - 4.8 K/uL    Mono # 0.3 0.3 - 1.0 K/uL    Eos # 0.0 0.0 - 0.5 K/uL    Baso # 0.00 0.00 - 0.20 K/uL    Gran% 92.4 (H) 38.0 - 73.0 %    Lymph% 5.5 (L) 18.0 - 48.0 %    Mono% 2.1 (L) 4.0 - 15.0 %    Eosinophil% 0.0 0.0 - 8.0 %    Basophil% 0.0 0.0 - 1.9 %    Differential Method Automated    Basic metabolic panel   Result Value Ref Range    Sodium 145 136 - 145 mmol/L    Potassium 5.2 (H) 3.5 - 5.1 mmol/L    Chloride 104 95 - 110 mmol/L    CO2 32 (H) 23 - 29 mmol/L    Glucose 122 (H) 70 - 110 mg/dL    BUN, Bld 64 (H) 8 - 23 mg/dL    Creatinine 1.1 0.5 - 1.4 mg/dL    Calcium 9.8 8.7 - 10.5 mg/dL    Anion Gap 9 8 - 16 mmol/L    eGFR if African American 58 (A) >60 mL/min/1.73 m^2    eGFR if non African American 50 (A) >60 mL/min/1.73 m^2   Ammonia   Result Value Ref Range    Ammonia 47 10 - 50 umol/L   Potassium   Result Value Ref Range    Potassium 4.9 3.5 - 5.1 mmol/L   Phosphorus   Result Value Ref Range    Phosphorus 3.6 2.7 - 4.5 mg/dL   Magnesium   Result Value Ref Range           West Nile virus : negative   Myasthenia Gravis: preliminary: negative   Paraneoplastic is negative    Autoimmune encephalopathy panel is pending    ASSESSMENT/PLAN:   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   Additional one more plasmapheresis    Discussed with the patient's daughter Haley, Also made a call to Alisha- patient's other daughter  And discussed with her about the lab results   Prognosis and current condition    Patient will need trach and peg and rehab.

## 2018-07-15 NOTE — PROCEDURES
[facility] -   Transfusion Medicine  Procedure Note    SUMMARY   Procedures  Date of Procedure: 7/14/2018     Procedure: Plasma Exchange    Provider: Jonathan Vargas Jr, MD     Assisting Provider: None    Pre-Procedure Diagnosis: Respiratory failure    Post-Procedure Diagnosis: Respiratory failure    Follow-up Assessment: Mrs. Valdez is a 72 year old female with a PMHx of HTN, DM, A-fib, Asthma, and Defibrillator who initially presented from home with acute on chronic CHF exacerbation on 6/30, then began developing generalized weakness and respiratory failure several days later with a nondiagnostic LP, Head/Spine CT negative for any acute findings, and elevated ESR/CRP.  Per chart, patient's outside medical records reveal recent history of antibody-positive NMO at [facility] and was subsequently given 5-day course of IV Solumedrol (per Neurology recs) without any improvement.  Today, patient's neurologic status has worsened (e.g., has no movement, does not vocalize, she has been able to track intermittently) and was intubated for ongoing respiratory issues.  Neurology has requested a plasma exchange series for further management of patient's NMOSD.     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 Journal of Clinical Apheresis 2016; 31:149-162.)    Pertinent Laboratory Data:   Complete Blood Count:   Lab Results   Component Value Date    HGB 11.0 (L) 07/14/2018    HCT 34.9 (L) 07/14/2018     (L) 07/14/2018    WBC 18.75 (H) 07/14/2018     Lactate Dehydrogenase (LDH): No results found for: LDH  Basic Metabolic Panel:   Lab Results   Component Value Date     07/14/2018    K 3.4 (L) 07/14/2018     07/14/2018    CO2 35 (H) 07/14/2018     07/14/2018    BUN 40 (H) 07/14/2018    CREATININE 0.7 07/14/2018    CALCIUM 9.0 07/14/2018     ANIONGAP 8 07/14/2018    ESTGFRAFRICA >60 07/14/2018    EGFRNONAA >60 07/14/2018       Pertinent Medications: /na    Review of patient's allergies indicates:   Allergen Reactions    Morphine Hives    Atorvastatin      Other reaction(s): Muscle pain    Clonidine     Codeine      Other reaction(s): Unknown    Digoxin      Other reaction(s): Unknown    Diovan [valsartan] Other (See Comments)     Upset stomach, weakness    Eggs [egg derived]     Metoprolol Diarrhea    Naproxen      Other reaction(s): Nausea    Peanut     Propofol      Other reaction(s): delirious    Sulfa (sulfonamide antibiotics)        Anesthesia: None     Technical Procedures Used: Plasma Exchange: Volume exchanged - 3.5 Liters; Replacement fluid - Albumin; Number of procedures 4; Date of next procedure 7-16.    Description of the Findings of the Procedure: PLEX #4/5 completed today without difficulty.  Final procedure in series is Monday, 7/16    Please see Apheresis Nurse flowsheet for details.    The patient was evaluated and all clinical and laboratory data relevant to the treatment was reviewed, and a decision was made to proceed with the Apheresis procedure.    I was available to the clinical staff throughout the procedure.    Significant Surgical Tasks Conducted by the Assistant(s): Not applicable    Complications: None    Estimated Blood Loss (EBL): None    Implants: None     Specimens: None

## 2018-07-15 NOTE — PLAN OF CARE
Problem: Patient Care Overview  Goal: Plan of Care Review  Patient currently requires total assist x 2 for bed mobility and total assist for sitting balance at EOB.   Outcome: Ongoing (interventions implemented as appropriate)  Patient currently resting quietly in bed.  Patient currently on levophed drip for hypotension. Patient in paced rhythm on monitor at this time. Patient currently receiving oxygen via ventilator. Patient turned in bed every 2 hours to avoid skin breakdown to back side by speciality bed. No sign of wound worsening or further skin breakdown noted. Patient heels floated off matress with pillows. Plan of care updated with family. There are no further questions after updated on plan of care at this time. Will continue to monitor.

## 2018-07-16 NOTE — ASSESSMENT & PLAN NOTE
Neuro following  NMO Antb and MG panel still pending  Plasmaphoresis Day # 5 of 5 today  Cont supportive care, will need long term support and therapy  OT following  Planning LTAC post Trach/PEG in next 48 hours - surgery following

## 2018-07-16 NOTE — PROGRESS NOTES
Progress note  Neurology      Neurology follow up:  For: neuromyelitis optica     SUBJECTIVE:      HPI:   72 year old lady intubated and getting treatment for NMO with plasmapheresis . She has completed 5 cycles of treatment . She has improved significantly per treating doctor and her daughter.        Past Medical History:   Diagnosis Date    Arthritis     Asthma     Atrial fibrillation     Blood clot of vein in shoulder area     Cardiac defibrillator in place     Chronic knee pain     Diabetes mellitus type 2 without retinopathy 2016    Diaphragmatic hernia without obstruction and without gangrene 2015    GERD (gastroesophageal reflux disease)     Hypertension     Primary open angle glaucoma (POAG) of both eyes, severe stage 2018     Past Surgical History:   Procedure Laterality Date    CARDIAC DEFIBRILLATOR PLACEMENT      , .    CATARACT EXTRACTION       SECTION      COLONOSCOPY      ashley      Dr. Macdonald    KNEE ARTHROSCOPY      UPPER GASTROINTESTINAL ENDOSCOPY       Family History   Problem Relation Age of Onset    Hypertension Mother     Hypertension Father     Colon cancer Neg Hx     Stomach cancer Neg Hx      Social History   Substance Use Topics    Smoking status: Never Smoker    Smokeless tobacco: Never Used    Alcohol use No       Review of patient's allergies indicates:   Allergen Reactions    Morphine Hives    Atorvastatin      Other reaction(s): Muscle pain    Clonidine     Codeine      Other reaction(s): Unknown    Digoxin      Other reaction(s): Unknown    Diovan [valsartan] Other (See Comments)     Upset stomach, weakness    Eggs [egg derived]     Metoprolol Diarrhea    Naproxen      Other reaction(s): Nausea    Peanut     Propofol      Other reaction(s): delirious    Sulfa (sulfonamide antibiotics)       Patient Active Problem List   Diagnosis    Chronic atrial fibrillation    GERD (gastroesophageal reflux disease)    Labile blood  pressure    Childhood asthma without complication    Cardiac defibrillator in place    Abnormal CT scan, gastrointestinal tract    Diaphragmatic hernia without obstruction and without gangrene    Chronic knee pain    Obesity, Class I, BMI 30-34.9    Vitamin D deficiency    PVD (peripheral vascular disease)    LBBB (left bundle branch block)    Bilateral leg edema    ARF (acute renal failure)    Pneumonia due to infectious organism    Cough    Chronic kidney disease (CKD), stage III (moderate)    Bronchitis    Nuclear sclerosis of right eye    Pseudophakia of left eye    Severe persistent asthma with acute exacerbation    Pulmonary infiltrate in right lung on chest x-ray    Uncomplicated severe persistent asthma    Chronic atrial fibrillation with RVR    Chronic combined systolic and diastolic congestive heart failure    Non compliance w medication regimen    SIRS (systemic inflammatory response syndrome)    Cortical age-related cataract of both eyes    Type 2 diabetes mellitus with kidney complication, without long-term current use of insulin    Acute on chronic systolic congestive heart failure    Cerebrovascular accident (CVA) due to embolism    Hypertensive emergency    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    Primary open angle glaucoma (POAG) of both eyes, severe stage    Hypotension    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    Atrial fibrillation with RVR    Hypothyroidism    Deep tissue injury    Left ischial pressure sore, stage III    Pressure ulcer of thigh, stage 2    Atypical chest pain    NSVT (nonsustained ventricular tachycardia)    Right sided weakness    Acute on chronic systolic heart failure    Severe muscle deconditioning    Skin breakdown    Acute respiratory failure with hypoxia and hypercarbia    Pressure ulcer of right heel, stage 3    Pressure ulcer of right buttock, stage 2    Weakness generalized     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, S REFLEX    Electrolyte imbalance    Leukocytosis    Blisters of multiple sites         Scheduled Meds:   albuterol-ipratropium  3 mL Nebulization Q6H    amiodarone  200 mg Oral BID    brimonidine 0.2%  1 drop Both Eyes Q12H    And    timolol maleate 0.5%  1 drop Both Eyes BID    chlorhexidine  15 mL Mouth/Throat BID    ergocalciferol  50,000 Units Oral Q7 Days    furosemide  40 mg Oral BID    heparin (porcine)  5,000 Units Subcutaneous Q8H    levothyroxine  100 mcg Oral Before breakfast    pantoprazole 40 mg in dextrose 5 % 100 mL IVPB (over 15 minutes) (ready to mix system)  40 mg Intravenous Daily     Continuous Infusions:   fentanyl Stopped (07/11/18 0830)    norepinephrine bitartrate-D5W 0.02 mcg/kg/min (07/16/18 0601)     PRN Meds:acetaminophen, albuterol-ipratropium, baclofen, bisacodyl, heparin (porcine), heparin, porcine (PF), hydrALAZINE, pneumoc 13-naeem conj-dip cr(PF)      Review of Systems:   Review of systems not obtained due to patient factors intubated..        OBJECTIVE:     Vital Signs (Most Recent)  Temp: 98.8 °F (37.1 °C) (07/16/18 0730)  Pulse: 70 (07/16/18 1200)  Resp: (!) 22 (07/16/18 1200)  BP: (!) 110/52 (07/16/18 1100)  SpO2: 100 % (07/16/18 1200)    Vital Signs Range (Last 24H):  Temp:  [97.6 °F (36.4 °C)-98.8 °F (37.1 °C)]   Pulse:  [69-70]   Resp:  [13-26]   BP: ()/(38-90)   SpO2:  [93 %-100 %]   Arterial Line BP: ()/(43-71)       Physical Exam:  Neurologic: Mental status: alert, intubated, Follow commands.  Cranial nerves: normal  Sensory: she responds to pain with flexion and withdrawn.  Motor:quadriparesis, worse on the lower extremities.   Reflexes: depressed.  Coordination: can not do  Gait: not done.   She is blind in both eyes.          Laboratory:  Lab Results   Component Value Date    WBC 15.16 (H) 07/16/2018    HGB 8.0 (L) 07/16/2018    HCT 25.5 (L) 07/16/2018     (L)  "07/16/2018    CHOL 166 06/06/2018    TRIG 94 06/06/2018    HDL 35 (L) 06/06/2018    ALT 29 07/16/2018    AST 33 07/16/2018     07/16/2018    K 4.3 07/16/2018     07/16/2018    CREATININE 0.6 07/16/2018    BUN 29 (H) 07/16/2018    CO2 32 (H) 07/16/2018    TSH 0.269 (L) 07/06/2018    INR 1.0 07/05/2018    HGBA1C 5.3 06/03/2018     View Detailed Result Report  NMO/AQP4 FACS Titer, S REFLEX  5/20/2018")' href="[id number] FACS Titer, S REFLEX">  Specimen: Blood - Vein")'>Positive 1:85158 (H)   ANTI-NUCLEAR ANTIBODY (TIFFANIE)   NMO/AQP4 FACS, S        MS profile done last year in [facility] :  6/18/2017   Negative.        Diagnostic Results:    CT head:  7/5/2018   Normal.  CT C-spine;   No acute findings.  Multiple level degenerative changes.          ASSESSMENT/PLAN:     Assessment:   1. Severe NMO with generalis ed Neurological weakness s/p Steroid and plasmapheresis . She has improved. Her sensation is back . She is following commands. Vision is not back.    Patient Active Problem List   Diagnosis    Chronic atrial fibrillation    GERD (gastroesophageal reflux disease)    Labile blood pressure    Childhood asthma without complication    Cardiac defibrillator in place    Abnormal CT scan, gastrointestinal tract    Diaphragmatic hernia without obstruction and without gangrene    Chronic knee pain    Obesity, Class I, BMI 30-34.9    Vitamin D deficiency    PVD (peripheral vascular disease)    LBBB (left bundle branch block)    Bilateral leg edema    ARF (acute renal failure)    Pneumonia due to infectious organism    Cough    Chronic kidney disease (CKD), stage III (moderate)    Bronchitis    Nuclear sclerosis of right eye    Pseudophakia of left eye    Severe persistent asthma with acute exacerbation    Pulmonary infiltrate in right lung on chest x-ray    Uncomplicated severe persistent asthma    Chronic atrial fibrillation " with RVR    Chronic combined systolic and diastolic congestive heart failure    Non compliance w medication regimen    SIRS (systemic inflammatory response syndrome)    Cortical age-related cataract of both eyes    Type 2 diabetes mellitus with kidney complication, without long-term current use of insulin    Acute on chronic systolic congestive heart failure    Cerebrovascular accident (CVA) due to embolism    Hypertensive emergency    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    Primary open angle glaucoma (POAG) of both eyes, severe stage    Hypotension    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    Atrial fibrillation with RVR    Hypothyroidism    Deep tissue injury    Left ischial pressure sore, stage III    Pressure ulcer of thigh, stage 2    Atypical chest pain    NSVT (nonsustained ventricular tachycardia)    Right sided weakness    Acute on chronic systolic heart failure    Severe muscle deconditioning    Skin breakdown    Acute respiratory failure with hypoxia and hypercarbia    Pressure ulcer of right heel, stage 3    Pressure ulcer of right buttock, stage 2    Weakness generalized    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   After that we will start Cellcept 500 mg a day for a week and slowly increase by 500 mg every week until we reach the dose of 1000 mg po bid.   D/W Dr. Vidal , [facility] medicine.

## 2018-07-16 NOTE — PROGRESS NOTES
[facility] -   Critical Care Medicine  Progress Note    Patient Name: Carlie Valdez  MRN: [id number]  Admission Date: 6/30/2018  Hospital Length of Stay: 16 days  Code Status: Full Code  Attending Provider: Raymundo Vidal MD  Primary Care Provider: Johanna Guzman MD   Principal Problem: Neuromyelitis optica spectrum disorder    Subjective:     HPI:  72 year old female with a PMHx of p[revious cerebral vascular accident with right sided weakness and aphasia, HTN, GERD, DM, A-fib, Cardiac Asthma, and Defibrillator who presented from home with c/o chest pain to Emergency Room on 6/30/2018 folllwing recent discharge from [facility]. Admitted for acute on chronic systolic Congestive Heart failure and progressively worsened. Noted to have worsening mental status and shortness of breath and transferrrd to ICU for hypercapnic respiratory failure. Placed on Noninvasive Positive Pressure Ventilation     Hospital/ICU Course:  7/5 Transferred to ICU. Noninvasive Positive Pressure Ventilation initiated , may need intubation  7/6 Improved ventilation with Noninvasive Positive Pressure Ventilation but now generalized weakness. Steroids started for respiratory failure. Lumbar puncture later today.  7/7 Respiratory status stable. Labile blood pressure. Started on high does steroids  7/8 worsening respiraoty status with elevated  PCO2  7/9 patient seen and examined, chest x-ray and ABG reviewed, daughter Haley was called, intubated for respiratory distress and tachypnea.  7/10 patient seen and examined, chest x-ray and ABG reviewed.  She had a Vas-Cath placed overnight.  Off high-dose steroid.  Plasmapheresis today.  7/11 patient seen and examined.  Chest x-ray and ABG reviewed.  Plasma exchange day 2.  Tolerating enteral feeding.  Hypotensive, started on Levophed drip.  7/12 seen and examined. Intubated, sedated with fentanyl gtt. No pressors . Mental status improved sp Plasmapheresis x 2  days. Tolerating enteral feeding.   7/13 patient seen and examined, remains intubated, on fentanyl drip for sedation.  Following commands attempting to move extremities.  7/14 patient seen and examined, remains intubated, fentanyl drip for sedation.  Following commands.  Unable to move extremities.  Plasmapheresis today.  ABG and chest x-ray reviewed.  7/15 - Resting on vent and low dose Levophed infusion without sedation comfortable and in no distress.  [place] TF and nods head to questions fully awake.  No neuromuscular improvement.  7/16 - still resting on vent and on low dose Levophed due to persistently labile BP.  [place]air TF and receiving 5th Plasmaphoresis now.  She acknowledges her vision is improved but no improvement in muscle strength of extrem.     Review of Systems   Unable to perform ROS: Intubated       Objective:     Vital Signs (Most Recent):  Temp: 98.8 °F (37.1 °C) (07/16/18 0315)  Pulse: 70 (07/16/18 0946)  Resp: (!) 22 (07/16/18 0946)  BP: (!) 127/48 (07/16/18 0600)  SpO2: 100 % (07/16/18 0946) Vital Signs (24h Range):  Temp:  [96.6 °F (35.9 °C)-98.8 °F (37.1 °C)] 98.8 °F (37.1 °C)  Pulse:  [69-70] 70  Resp:  [13-26] 22  SpO2:  [93 %-100 %] 100 %  BP: ()/(39-90) 127/48  Arterial Line BP: ()/(43-71) 127/59     Weight: 72.4 kg (159 lb 9.8 oz)  Body mass index is 27.4 kg/m².      Intake/Output Summary (Last 24 hours) at 07/16/18 1111  Last data filed at 07/16/18 0601   Gross per 24 hour   Intake          1035.54 ml   Output              695 ml   Net           340.54 ml       Physical Exam   Constitutional: She appears well-developed. She is cooperative.  Non-toxic appearance. She does not have a sickly appearance. She appears ill. No distress. She is intubated (on mechanical ventilation).   HENT:   Head: Normocephalic and atraumatic.   Mouth/Throat: Oropharynx is clear and moist and mucous membranes are normal.   Eyes: EOM and lids are normal. Pupils are equal, round, and reactive to light.    Neck: Trachea normal. Neck supple. Carotid bruit is not present.       Cardiovascular: Normal rate, regular rhythm and normal heart sounds.    Pulses:       Radial pulses are 2+ on the right side, and 2+ on the left side.        Dorsalis pedis pulses are 1+ on the right side, and 1+ on the left side.   Pulmonary/Chest: Effort normal. No accessory muscle usage. She is intubated (on mechanical ventilation). No respiratory distress. She has rales.   Abdominal: Soft. Normal appearance. She exhibits no distension. Bowel sounds are decreased. There is no tenderness.   Genitourinary:   Genitourinary Comments: Flores in place   Musculoskeletal:        Right foot: There is no deformity.        Left foot: There is no deformity.   Atrophy with mild diffuse edema   Lymphadenopathy:     She has no cervical adenopathy.   Neurological: She is alert. She displays atrophy.   Fully awake and nods head to questions and attempts to talk around OET.  Shoulder and LE strength 2/5 has NO movement to distal extremities X 4   Skin: Skin is warm and dry. Capillary refill takes less than 2 seconds. No rash noted. No cyanosis.        DTI to ischial and heels       Vents:  Vent Mode: A/C (07/16/18 0946)  Ventilator Initiated: Yes (07/09/18 1022)  Set Rate: 14 bmp (07/16/18 0946)  Vt Set: 380 mL (07/16/18 0946)  Pressure Support: 0 cmH20 (07/16/18 0946)  PEEP/CPAP: 5 cmH20 (07/16/18 0946)  Oxygen Concentration (%): 40 (07/16/18 0946)  Peak Airway Pressure: 21 cmH2O (07/16/18 0946)  Plateau Pressure: 18 cmH20 (07/16/18 0946)  Total Ve: 6.61 mL (07/16/18 0946)  F/VT Ratio<105 (RSBI): (!) 64.14 (07/16/18 0946)    Lines/Drains/Airways     Central Venous Catheter Line                 Hemodialysis Catheter 07/09/18 2100 right internal jugular 6 days         Percutaneous Central Line Insertion/Assessment - triple lumen  07/14/18 1445 left femoral vein 1 day          Drain                 Urethral Catheter 07/05/18 1114 10 days         NG/OG Tube  07/13/18 1020 [place] sump Left mouth 3 days          Airway                 Airway - Non-Surgical 07/09/18 1005 Endotracheal Tube 7 days          Arterial Line                 Arterial Line 07/13/18 1045 Right Radial 3 days          Pressure Ulcer                 Pressure Injury 06/02/18 Right lateral Heel Stage 3 44 days         Pressure Injury 06/02/18 1659 Left proximal Ischial tuberosity Stage 3 43 days         Pressure Injury 06/02/18 1902 Right medial Heel Unstageable 43 days          Peripheral Intravenous Line                 Peripheral IV - Single Lumen 07/06/18 2315 Right Forearm 9 days         Peripheral IV - Single Lumen 07/09/18 0710 Right Antecubital 7 days                Significant Labs:    CBC/Anemia Profile:    Recent Labs  Lab 07/15/18  0425 07/16/18  0400   WBC 18.81* 15.16*   HGB 9.4* 8.0*   HCT 30.0* 25.5*   * 100*   MCV 82 81*   RDW 19.6* 19.8*        Chemistries:    Recent Labs  Lab 07/15/18  0425 07/16/18  0400    144   K 3.3* 4.3    106   CO2 33* 32*   BUN 32* 29*   CREATININE 0.7 0.6   CALCIUM 8.9 8.9   ALBUMIN  --  3.7   PROT  --  4.8*   BILITOT  --  0.6   ALKPHOS  --  66   ALT  --  29   AST  --  33   MG 2.4 2.2   PHOS 1.8* 2.4*       All pertinent labs within the past 24 hours have been reviewed.    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, S REFLEX    Neuro following  NMO Antb and MG panel still pending  Plasmaphoresis Day # 5 of 5 today  Cont supportive care, will need long term support and therapy  OT following  Planning LTAC post Trach/PEG in next 48 hours - surgery following        Derm   Skin breakdown    Turn Q 2 hours   Foot drop boots and elevating heels  Wound care following and on low flow mattress        Pulmonary   Acute respiratory failure with hypoxia  and hypercarbia    NIF daily, still undetectable   Vent settings reviewed and adjusted  SBT once stronger  Planning Trach/PEG July 17th or 18th per Surgery  VAP prophylaxis          Cardiac/Vascular   Acute on chronic systolic heart failure    I/O balance + 4 liters  Pleural effusion suspected on right  Increase Lasix  Support nutrition  Accurate I/Os and daily weights        Chronic atrial fibrillation    Cards following  Off Xarelto  Dr. Vidal discussed with Cards and due to low CHADS VASC score will hold on Heparin infusion given plasmaphoresis and planned Trach/PEG.  Resume Xarelto post Trach/PEG  Cont Amiodarone and cardiac monitoring  Has PPM        Renal/   Electrolyte imbalance    Replace electrolytes as needed  Daily CMP        Oncology   Leukocytosis    Afeb and recent cultures NGTD  Follow fever curve and re-culture and IVAB if spikes  CBC daily        Endocrine   Hypothyroidism    Cont Levothyroxine        Vitamin D deficiency    Cont Ergocalciferol weekly        Orthopedic   Severe muscle deconditioning    Cont PT/OT and ROM  Support nutrition with TF  Foot drop boots        Other   Labile blood pressure    Continuous titration of Levophed infusion  ICU hemodynamic monitoring  Arterial line in place        Preventive Measures and Monitoring:   Stress Ulcer: PPI  Nutrition: TF  Glucose control: stable  Bowel prophylaxis: PRN Dulcolax  DVT prophylaxis: SQ Hep  Hx CAD on B-Blocker: no hx CAD and has hypotension  Head of Bed/Reposition: Elevate HOB and turn Q1-2 hours   Early Mobility: Bed rest  SAT/SBT: daily  RASS goal: 0  Vent Day: #8  OG Day: #8  Central Line Left Femoral Day: #3  Right Radial Arterial Line Day: #4  VasCath to Right IJ Day: #8  Flores Day: #12  Code Status: Full  Pneumonia Vaccine: ordered     Counseling/Consultation:I have discussed the care of this patient in detail with the bedside nursing staff and Dr. White and Dr. Vidal and Dr. Hackett    Critical Care Time: 54  minutes  Critical secondary to Patient has a condition that poses threat to life and bodily function: intubated on mech ventilation  Patient has an abrupt change in neurologic status: NMO  Patient is currently on drug therapy requiring intensive monitoring for toxicity: Levophed infusion      Critical care was time spent personally by me on the following activities: development of treatment plan with patient or surrogate and bedside caregivers, discussions with consultants, evaluation of patient's response to treatment, examination of patient, ordering and performing treatments and interventions, ordering and review of laboratory studies, ordering and review of radiographic studies, pulse oximetry, re-evaluation of patient's condition. This critical care time did not overlap with that of any other provider or involve time for any procedures.     Philip Myers NP  Critical Care Medicine  [facility]

## 2018-07-16 NOTE — PROGRESS NOTES
[facility] Hospital Medicine  Progress Note    Patient Name: Carlie Valdez  MRN: [id number]  Patient Class: IP- Inpatient   Admission Date: 6/30/2018  Length of Stay: 16 days  Attending Physician: Raymundo Vidal MD  Primary Care Provider: Johanna Guzman MD        Subjective:     Principal Problem:Neuromyelitis optica spectrum disorder    HPI:  Carlie Valdez is a 72 year old female with a PMHx of HTN, GERD, DM, A-fib, Asthma, and Defibrillator who presented from home with c/o chest pain. Located to right side with no radiation. Associated symptoms: SOB. Pt and sitters at bedside report she was recently discharged from [facility] and pt hasn't had her home medications since being discharged. ED workup revealed: Hgb/Hct 11.2/34.7, Alk phos 152, BNP 1100, troponin 0.028. UA (+) nitrites. CXR with central vascular congestion with small bilateral effusion. Primary cardiologist is Dr. Hernández.  Pt admitted to Telemetry for Acute on chronic CHF exacerbation and UTI.     Hospital Course:  Pt admitted for decompensated heart failure and UTI. Diuresis with Bumex and IV Rocephin in progress. Pt with physical debility and PT/OT consulted. Dr. Joiner had lengthy discussion with daughter, Haley, and patient in reference to generalized weakness and proper disposition. SNF placement was agreed however, when  approach regarding SNF selection patient refused. Introduce Hospice to patient and advised she was not a candidate for Rehab due to her extent of weakness. Pt stated she would discharge home with sitters and home health services. 7/2/18 - Dr. Joiner has spoken with pt's daughters, Haley and Rosy, regarding the mother's condition. She has diuresed and diuretics changed to oral. She was medically stable for discharge. SNF placement pending. PT/OT in progress. 7/3/18 - It was noted that pt has refused participation in PT/OT. She appears grossly more weak in  the BUE/BLE extremities. Family notified of condition. Granddaughter at bedside mentioned similar symptoms prior to admission. Neurology consult placed, ESR/CRP pending, CT Head and C spine pending. At 4th day, CT of Head and C spine found no concerning findings. ESR and CRP elevated although no concern for vasculitis. Neurology saw the patient and recommended LP to rule out GBs or CIDP. Xarelto placed on hold and LP by IR is pending as Xarelto needs to be on hold for 3 days. Speech therapy evaluated the patient and noted inconsistent dysphonia. The patient will voice normally then start mouthing words. Diet recommendations:  Mechanical soft, Thin. On 6th day, pt noted to have acute onset of metabolic encephalopathy. Pt more somnolent, respiration shallow. She would arouse with sternal rub but return to lethargy. Repeat CT of Head was negative, ammonia slightly elevated at 61 and ABG showed acidosis with hypoxic/hypercapneic respiratory failure.   07/06/18 - presently on Bipap for respiratory acidosis , Lumbar puncture done today 06/07/18 07/07- Diagnosed with NMO at [facility] Solumedrol  1000 mg/day for 5 days per neurology   07/08- possible aspiration overnight , follow up CXR new right pleural effusion , continue Bipap support   07/09 patient was intubated in morning continue to have respiratory distress and no improvement in neurological status   07/10  Started on plasma exchange series for nmo . As per neurology recommendation .  No improvement in mental status . Family updated on plan   07/11  Patient had first cycle of plasma exchange yesterday . Now can follow simple commands .continue to be on vent and with plasma exchange . Hold bp meds due to hypotension   07/12  Patient having plasma exchange. Plan for  trach and peg family and patient agreed   07/13  Patient was seen and examined today . . In morning patient was responsive as per family following commands . Later in the day patient unintentionally received  fentanyl . She is currently not responsive intubated on vent. Neurology did stat eeg . Will continue monitor and assess patient once fentanyl wears off   07/13  Patient getting plasma pheresis for nmo . Patient hypotensive was given bolus . She was hypertensive last night received hydralazine . Waiting on labs for nmo antibodies   07/15  Patient showed improvement in the mental status . Getting last plasma pheresis tomorrow and trach/peg on Tuesday. Case management for ltac placement   07/16  plasmaphersis done today . Last day . Waiting on trach and peg . Plan for ltac placement . Will wait on neurology recommendation regarding any immunosuppressive needed for nmo        Review of Systems   Unable to perform ROS: Intubated     Objective:     Vital Signs (Most Recent):  Temp: 97.8 °F (36.6 °C) (07/15/18 0305)  Pulse: 70 (07/15/18 0859)  Resp: (!) 23 (07/15/18 0859)  BP: 111/62 (07/15/18 0600)  SpO2: 100 % (07/15/18 0859) Vital Signs (24h Range):  Temp:  [97.8 °F (36.6 °C)-98 °F (36.7 °C)] 97.8 °F (36.6 °C)  Pulse:  [68-73] 70  Resp:  [13-31] 23  SpO2:  [96 %-100 %] 100 %  BP: ()/(33-72) 111/62  Arterial Line BP: ()/(34-90) 138/60     Weight: 72 kg (158 lb 11.7 oz)  Body mass index is 27.25 kg/m².    Intake/Output Summary (Last 24 hours) at 07/15/18 0957  Last data filed at 07/15/18 0655   Gross per 24 hour   Intake           3301.2 ml   Output             2480 ml   Net            821.2 ml      Physical Exam   Constitutional: She appears well-developed. She is cooperative.  Non-toxic appearance. She does not have a sickly appearance. She appears ill. No distress. She is intubated (on mechanical ventilation) and restrained.   Ill appearing    HENT:   Head: Normocephalic and atraumatic.   Mouth/Throat: Oropharynx is clear and moist and mucous membranes are normal.   + ETT    Eyes: EOM and lids are normal. Pupils are equal, round, and reactive to light.   Neck: Trachea normal. Neck supple. Carotid bruit is  not present.       Cardiovascular: Normal rate, regular rhythm and normal heart sounds.    Pulses:       Radial pulses are 2+ on the right side, and 2+ on the left side.        Dorsalis pedis pulses are 1+ on the right side, and 1+ on the left side.   Intermittently paced   Pulmonary/Chest: Effort normal. No accessory muscle usage. She is intubated (on mechanical ventilation). No respiratory distress. She has no rales.   BS decreased mildly bilaterally   Right chest Vas-Cath   Abdominal: Soft. Normal appearance. She exhibits no distension. Bowel sounds are decreased. There is no tenderness.   Genitourinary:   Genitourinary Comments: Flores    Musculoskeletal: She exhibits no tenderness.        Right foot: There is no deformity.        Left foot: There is no deformity.   Atrophic muscles of upper and lower extremities   Lymphadenopathy:     She has no cervical adenopathy.   Neurological: She is alert. She displays atrophy.   Awake, following commands , not moving extremities purposefully   Skin: Skin is warm and dry. Capillary refill takes less than 2 seconds. No rash noted. No cyanosis.        DTI to ischial and heels   Nursing note and vitals reviewed.      Significant Labs: All pertinent labs within the past 24 hours have been reviewed.    Significant Imaging: I have reviewed all pertinent imaging results/findings within the past 24 hours.    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 No improvement in mental status   Intubated   7/10  Intubated on vent support         Skin breakdown    Pt lying in specialty bed   Multiple areas of skin breakdown  - Inpatient consult to wound care          Severe muscle deconditioning    - Currently has [facility] (PT/OT/nurse)  - secondary to NMO        Acute on chronic systolic heart failure    Compensated as of 7/3/18  - BNP 1100  - CXR with vascular congestion with small bilateral effusion  - Mild BLE edema upon assessment but diminished breath sounds noted  - 2D ECHO on 06/02/18 with EF 35% and pulmonary HTN  - Pt received Bumex 2 mg IVP x 1 in ED  - Bumex 2 mg IVP daily====> changed to Bumex 2 mg twice daily 7/2/18  - Continue Metoprolol  7/10  Compensated   7/12  Compensated         Left ischial pressure sore, stage III    Wound care           Hypothyroidism    - TSH about a month ago -- 1.265  - Continue Levothyroxine          Chronic kidney disease (CKD), stage III (moderate)    - Currently stable  - Monitor kidney function while on diuretics          Labile blood pressure    Controlled  - continue Toprol XL and lisinopril  - Apresoline prn  Lisinopril discontinued 7/4/18 - daughter stated it caused respiratory problems    DM  - HgbA1c on 06/03/18 -- 5.3 -- controlled  - Accuchecks and low dose SSI  -  7/11  Hold antihypertensive due to hypotension   7/12   Hold antihypertensive         Chronic atrial fibrillation    - Currently rate controlled  Telemetry monitoring  - Continue Xarelto and Amiodarone  -xarelto on hold.  - will resume after procedure is done trach and peg   -chads 4 discussed with neurology/critical care holding antiocoagulation for now and resume after trach and peg            VTE Risk Mitigation         Ordered     heparin, porcine (PF) injection 3,800 Units  As needed (PRN)      07/10/18 0859     heparin (porcine) injection  4,000 Units  As needed (PRN)      07/09/18 2045     heparin (porcine) injection 5,000 Units  Every 8 hours      07/04/18 1209          Critical care time spent on the evaluation and treatment of severe organ dysfunction, review of pertinent labs and imaging studies, discussions with consulting providers and discussions with patient/family: 40minutes.    Raymundo Vidal MD  Department of Hospital Medicine   [facility]

## 2018-07-16 NOTE — ASSESSMENT & PLAN NOTE
Continue with steroids no improvement will consider pheresis as per neurlogy\  7/10  Plasma exchange series    7/12  Plasma exchange   Plan for trach and peg   7/16  Will complete plasmaphersis waiting on neurology recommendation regarding immunosuppressive therapy if needed

## 2018-07-16 NOTE — PROGRESS NOTES
Reconsulted on pt for new breakdown to sacral area- R buttock has a new area of breakdown (skin tear) measuring 0.5 x 0.7 x 0.1 - the left buttock also has a skin tear measuring 0.4 x 0.7 x 0.1- - the right buttock has a duoderm covering the area already and the left buttock has been painted with cavilon and a foam dressing applied- pt is on a specialty bed and is being turned with a wedge pillow

## 2018-07-16 NOTE — PROCEDURES
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  Per chart, patient's outside medical records reveal recent history of antibody-positive NMO at [facility] and was subsequently given 5-day course of IV Solumedrol (per Neurology recs) without any improvement.  The patient's neurologic status had worsened (e.g., has no movement, does not vocalize, she has been able to track intermittently) and she was intubated for ongoing respiratory issues.  Neurology had requested a plasma exchange series for further management of patient's NMOSD (TPE x 5).    NEUROMYELITIS OPTICA SPECTRUM DISORDERS carries a Category II Grade 1B indication for therapeutic plasma exchange via the 2016 Journal of Clinical Apheresis Guidelines (Corea J et al. Journal of Clinical Apheresis 2016; 31:149-162.)    Interval History: Today's procedure (#5 of 5-6) was well tolerated and without complications. Per notes, the patient reports improved vision but not muscle strength. I was contacted by Dr. Regan requesting an additional TPE to complete this series. While the majority of studies performed five procedures for acute exacerbation, longer treatment series have been  reported. We will return tomorrow, 7/17 for her 6th and final TPE.    Pertinent Laboratory Data:   Complete Blood Count:   Lab Results   Component Value Date    HGB 8.0 (L) 07/16/2018    HCT 25.5 (L) 07/16/2018     (L) 07/16/2018    WBC 15.16 (H) 07/16/2018     Basic Metabolic Panel:   Lab Results   Component Value Date     07/16/2018    K 4.3 07/16/2018     07/16/2018    CO2 32 (H) 07/16/2018     07/16/2018    BUN 29 (H) 07/16/2018    CREATININE 0.6 07/16/2018    CALCIUM 8.9 07/16/2018    ANIONGAP 6 (L) 07/16/2018    ESTGFRAFRICA >60 07/16/2018    EGFRNONAA >60 07/16/2018       Pertinent Medications: None contraindicated for TPE      Review of patient's allergies indicates:   Allergen Reactions    Morphine Hives    Atorvastatin      Other reaction(s): Muscle pain    Clonidine     Codeine      Other reaction(s): Unknown    Digoxin      Other reaction(s): Unknown    Diovan [valsartan] Other (See Comments)     Upset stomach, weakness    Eggs [egg derived]     Metoprolol Diarrhea    Naproxen      Other reaction(s): Nausea    Peanut     Propofol      Other reaction(s): delirious    Sulfa (sulfonamide antibiotics)        Anesthesia: None     Technical Procedures Used: Plasma Exchange: Volume exchanged - 3.5 Liters; Replacement fluid - Albumin; Number of procedures 5 of 6; Date of next procedure 7/17.    Description of the Findings of the Procedure:   Please see Apheresis Nurse flowsheet for details.    The patient was evaluated and all clinical and laboratory data relevant to the treatment was reviewed, and a decision was made to proceed with the Apheresis procedure.    I was available to the clinical staff throughout the procedure.    Significant Surgical Tasks Conducted by the Assistant(s): Not applicable  Complications: None  Estimated Blood Loss (EBL): Minimal  Implants: None   Specimens: None    Lorna Manzanares MD, PhD  Section of Transfusion Medicine &  Histocompatibility  Department of Pathology and Laboratory Medicine  [facility]  [phone] (HLA & Blood Bank Offices)  07/16/2018

## 2018-07-16 NOTE — PLAN OF CARE
Problem: Patient Care Overview  Goal: Plan of Care Review  Patient currently requires total assist x 2 for bed mobility and total assist for sitting balance at EOB.   Outcome: Ongoing (interventions implemented as appropriate)  Patient remains on ventilator at this time. ETT 7.5 remains secure at 24 cm at the lips. No redness or breakdown noted around ETT or commercial tube estrada. Mouth care provided. Patient tolerated treatment well. Respiratory to follow.

## 2018-07-16 NOTE — PROGRESS NOTES
Potassium 3.3.  Patient not on replacement protocol.  Will inform oncoming RN.  To receive day 5/5 of plasmapheresis today.

## 2018-07-16 NOTE — ASSESSMENT & PLAN NOTE
I/O balance + 4 liters  Pleural effusion suspected on right  Increase Lasix  Support nutrition  Accurate I/Os and daily weights

## 2018-07-16 NOTE — PROGRESS NOTES
[facility] -   Adult Nutrition  Progress Note    SUMMARY     Recommendations    Recommendation/Intervention:   1. Continue current TF regimen. Check residuals q4hrs. Hold TF if >500 cc's.   2. If pt receives PEG placement, resume continuous TF when PEG is cleared for feedings.  3. Will continue to monitor.    Goals: Meet >85% EEN/EPN this admit  Nutrition Goal Status: goal met, continues  Communication of RD Recs:  (POC review, sticky note)    Reason for Assessment    Reason for Assessment: RD f/u  Dx:  1. Respiratory failure, unspecified chronicity, unspecified whether with hypoxia or hypercapnia    2. Chest pain    3. Acute on chronic systolic heart failure    4. Anasarca    5. Bilateral pleural effusion    6. Decubitus ulcer of sacral region, unspecified ulcer stage    7. Blisters of multiple sites    8. Atrial fibrillation, unspecified type    9. Severe muscle deconditioning    10. Skin breakdown    11. Acute respiratory failure with hypoxia and hypercarbia    12. Abnormal liver function tests    13. Neuromyelitis optica spectrum disorder    14. Weakness generalized    15. Encephalopathy    16. Respiratory failure    17. Hypotension, unspecified hypotension type    18. Chronic atrial fibrillation    19. Electrolyte imbalance    20. Hypothyroidism, unspecified type    21. Labile blood pressure    22. Leukocytosis, unspecified type    23. Vitamin D deficiency      Past Medical History:   Diagnosis Date    Arthritis     Asthma     Atrial fibrillation     Blood clot of vein in shoulder area     Cardiac defibrillator in place     Chronic knee pain     Diabetes mellitus type 2 without retinopathy 12/19/2016    Diaphragmatic hernia without obstruction and without gangrene 9/14/2015    GERD (gastroesophageal reflux disease)     Hypertension     Primary open angle glaucoma (POAG) of both eyes, severe stage 6/1/2018       General Information Comments: Pt currently in ICU, intubated this AM. Per  "ICU rounds, pt has neuromyelitis optica. Neurological status has not changed, pt receiving steroids for NMO. Respiratory status not improving. Multiple pressure ulcers noted  7.12.18- Pt remains intubated, sedated. TF is at goal rate, tolerating well. MD notes mental status improved.     7.16.18- Pt remains intubated in ICU. TF running at goal rate. Per ICU rounds, pt to possibly receive PEG/trach placement. MD notes pt will likely need LTAC placement after discharge.    Nutrition Discharge Planning: Likely on TF via PEG    Nutrition Risk Screen    Nutrition Risk Screen: no indicators present    Nutrition/Diet History    Do you have any cultural, spiritual, Sikhism conflicts, given your current situation?: none    Anthropometrics    Temp: 98.8 °F (37.1 °C)  Height Method: Stated  Height: 5' 4" (162.6 cm)  Height (inches): 64 in  Weight Method: Bed Scale  Weight: 72.4 kg (159 lb 9.8 oz)  Weight (lb): 159.61 lb  Ideal Body Weight (IBW), Female: 120 lb  % Ideal Body Weight, Female (lb): 130.44 lb  BMI (Calculated): 26.9  BMI Grade: 25 - 29.9 - overweight       Lab/Procedures/Meds    Pertinent Labs Reviewed: reviewed  BMP  Lab Results   Component Value Date     07/16/2018    K 4.3 07/16/2018     07/16/2018    CO2 32 (H) 07/16/2018    BUN 29 (H) 07/16/2018    CREATININE 0.6 07/16/2018    CALCIUM 8.9 07/16/2018    ANIONGAP 6 (L) 07/16/2018    ESTGFRAFRICA >60 07/16/2018    EGFRNONAA >60 07/16/2018     Lab Results   Component Value Date    CALCIUM 8.9 07/16/2018    PHOS 2.4 (L) 07/16/2018     Lab Results   Component Value Date    ALBUMIN 3.7 07/16/2018     No results for input(s): POCTGLUCOSE in the last 24 hours.  Lab Results   Component Value Date    HGBA1C 5.3 06/03/2018     Pertinent Medications Reviewed: reviewed  Scheduled Meds:   albuterol-ipratropium  3 mL Nebulization Q6H    amiodarone  200 mg Oral BID    brimonidine 0.2%  1 drop Both Eyes Q12H    And    timolol maleate 0.5%  1 drop Both Eyes BID "    chlorhexidine  15 mL Mouth/Throat BID    ergocalciferol  50,000 Units Oral Q7 Days    furosemide  20 mg Oral BID    heparin (porcine)  5,000 Units Subcutaneous Q8H    levothyroxine  100 mcg Oral Before breakfast    pantoprazole 40 mg in dextrose 5 % 100 mL IVPB (over 15 minutes) (ready to mix system)  40 mg Intravenous Daily     Continuous Infusions:   fentanyl Stopped (07/11/18 0830)    norepinephrine bitartrate-D5W 0.02 mcg/kg/min (07/16/18 0601)     PRN Meds:.acetaminophen, albuterol-ipratropium, baclofen, heparin (porcine), heparin, porcine (PF), hydrALAZINE, pneumoc 13-naeem conj-dip cr(PF)    Physical Findings/Assessment    Overall Physical Appearance: overweight, on ventilator support  Oral/Mouth Cavity: tooth/teeth missing  Skin:      -Stage 3 pressure ulcers noted to L proximal ischial tuberosity, R lateral heel     -Unstageable pressure ulcer noted to R medial heel     -Beka=12    Estimated/Assessed Needs    Weight Used For Calorie Calculations: 71 kg (156 lb 8.4 oz)  Energy Calorie Requirements (kcal): 1327 kcal/day  Energy Need Method: [facility]  Protein Requirements:  gm/day  Weight Used For Protein Calculations: 71 kg (156 lb 8.4 oz) (x1.2-2 g/kg)     Fluid Need Method: RDA Method (or per MD)  RDA Method (mL): 1327  CHO Requirement: 50% EEN      Nutrition Prescription Ordered    Current Diet Order: NPO  Current Nutrition Support Formula Ordered: Nutren 1.5  Current Nutrition Support Rate Ordered: 50 (ml)  Current Nutrition Support Frequency Ordered: mL/hr    Evaluation of Received Nutrient/Fluid Intake    Intake/Output Summary (Last 24 hours) at 07/16/18 1107  Last data filed at 07/16/18 0601   Gross per 24 hour   Intake          1035.54 ml   Output              695 ml   Net           340.54 ml     Energy Calories Required: meeting needs  Protein Required: meeting needs  Fluid Required: meeting needs  % Intake of Estimated Energy Needs: 75 - 100 %  % Meal Intake: NPO    Nutrition  Risk    Level of Risk/Frequency of Follow-up:  (F/u x2 weekly)     Assessment and Plan    Nutrition Problem  Inadequate oral intake    Related to (etiology):   On mechanical ventilation    Signs and Symptoms (as evidenced by):   Current diet order (NPO)    Interventions/Recommendations (treatment strategy):  See above    Nutrition Diagnosis Status:   Continues        Monitor and Evaluation    Food and Nutrient Intake: energy intake, enteral nutrition intake  Food and Nutrient Adminstration: enteral and parenteral nutrition administration  Anthropometric Measurements: weight  Biochemical Data, Medical Tests and Procedures: electrolyte and renal panel, glucose/endocrine profile, gastrointestinal profile  Nutrition-Focused Physical Findings: overall appearance, skin     Nutrition Follow-Up    RD Follow-up?: Yes x2 weekly

## 2018-07-16 NOTE — PLAN OF CARE
Problem: Patient Care Overview  Goal: Plan of Care Review  Patient currently requires total assist x 2 for bed mobility and total assist for sitting balance at EOB.   Outcome: Ongoing (interventions implemented as appropriate)  Alert, appears oriented despite being nonverbal due to intubation. Answers yes/no questions correctly.  Intubated and on ventilator but not sedated per her & her family's request. Remains in a paced rhythm on scope. No movement of BUE noted this shift but BLE noted with  Nonpurposeful, intermittent movements.  Unable to move legs on command.  Requiring pressor support of Levophed at 0.02mcg/kg/min to L femoral TLC all shift.  Attempts to wean pressors x2 this shift unsucessful, B/P drops to systolic pressures in 80's within 2-3 minutes per arterial line after pressor turned off.  Tolerating enteral feedings well at 50ml/hr per OGT. Hydrocolloid to wound on left ischial tuberosity. Dressing to foot wound. No new skin breakdown noted.  Turned Q2H throughout shift in addition to ANGIE mattress with pulsations Q3 min.  Bilateral heel lift boots on. Safety maintained.  Daughter updated on POC via telephone.

## 2018-07-16 NOTE — PLAN OF CARE
Problem: Patient Care Overview  Goal: Plan of Care Review  Patient currently requires total assist x 2 for bed mobility and total assist for sitting balance at EOB.   Outcome: Ongoing (interventions implemented as appropriate)  Recommendations    Recommendation/Intervention:   1. Continue current TF regimen. Check residuals q4hrs. Hold TF if >500 cc's.   2. If pt receives PEG placement, resume continuous TF when PEG is cleared for feedings.  3. Will continue to monitor.    Goals: Meet >85% EEN/EPN this admit  Nutrition Goal Status: goal met, continues  Communication of RD Recs:  (POC review, sticky note)

## 2018-07-16 NOTE — ASSESSMENT & PLAN NOTE
Turn Q 2 hours   Foot drop boots and elevating heels  Wound care following and on low flow mattress

## 2018-07-16 NOTE — SUBJECTIVE & OBJECTIVE
Review of Systems   Unable to perform ROS: Intubated       Objective:     Vital Signs (Most Recent):  Temp: 98.8 °F (37.1 °C) (07/16/18 0315)  Pulse: 70 (07/16/18 0946)  Resp: (!) 22 (07/16/18 0946)  BP: (!) 127/48 (07/16/18 0600)  SpO2: 100 % (07/16/18 0946) Vital Signs (24h Range):  Temp:  [96.6 °F (35.9 °C)-98.8 °F (37.1 °C)] 98.8 °F (37.1 °C)  Pulse:  [69-70] 70  Resp:  [13-26] 22  SpO2:  [93 %-100 %] 100 %  BP: ()/(39-90) 127/48  Arterial Line BP: ()/(43-71) 127/59     Weight: 72.4 kg (159 lb 9.8 oz)  Body mass index is 27.4 kg/m².      Intake/Output Summary (Last 24 hours) at 07/16/18 1111  Last data filed at 07/16/18 0601   Gross per 24 hour   Intake          1035.54 ml   Output              695 ml   Net           340.54 ml       Physical Exam   Constitutional: She appears well-developed. She is cooperative.  Non-toxic appearance. She does not have a sickly appearance. She appears ill. No distress. She is intubated (on mechanical ventilation).   HENT:   Head: Normocephalic and atraumatic.   Mouth/Throat: Oropharynx is clear and moist and mucous membranes are normal.   Eyes: EOM and lids are normal. Pupils are equal, round, and reactive to light.   Neck: Trachea normal. Neck supple. Carotid bruit is not present.       Cardiovascular: Normal rate, regular rhythm and normal heart sounds.    Pulses:       Radial pulses are 2+ on the right side, and 2+ on the left side.        Dorsalis pedis pulses are 1+ on the right side, and 1+ on the left side.   Pulmonary/Chest: Effort normal. No accessory muscle usage. She is intubated (on mechanical ventilation). No respiratory distress. She has rales.   Abdominal: Soft. Normal appearance. She exhibits no distension. Bowel sounds are decreased. There is no tenderness.   Genitourinary:   Genitourinary Comments: Flores in place   Musculoskeletal:        Right foot: There is no deformity.        Left foot: There is no deformity.   Atrophy with mild diffuse edema    Lymphadenopathy:     She has no cervical adenopathy.   Neurological: She is alert. She displays atrophy.   Fully awake and nods head to questions and attempts to talk around OET.  Shoulder and LE strength 2/5 has NO movement to distal extremities X 4   Skin: Skin is warm and dry. Capillary refill takes less than 2 seconds. No rash noted. No cyanosis.        DTI to ischial and heels       Vents:  Vent Mode: A/C (07/16/18 0946)  Ventilator Initiated: Yes (07/09/18 1022)  Set Rate: 14 bmp (07/16/18 0946)  Vt Set: 380 mL (07/16/18 0946)  Pressure Support: 0 cmH20 (07/16/18 0946)  PEEP/CPAP: 5 cmH20 (07/16/18 0946)  Oxygen Concentration (%): 40 (07/16/18 0946)  Peak Airway Pressure: 21 cmH2O (07/16/18 0946)  Plateau Pressure: 18 cmH20 (07/16/18 0946)  Total Ve: 6.61 mL (07/16/18 0946)  F/VT Ratio<105 (RSBI): (!) 64.14 (07/16/18 0946)    Lines/Drains/Airways     Central Venous Catheter Line                 Hemodialysis Catheter 07/09/18 2100 right internal jugular 6 days         Percutaneous Central Line Insertion/Assessment - triple lumen  07/14/18 1445 left femoral vein 1 day          Drain                 Urethral Catheter 07/05/18 1114 10 days         NG/OG Tube 07/13/18 1020 Lentner sump Left mouth 3 days          Airway                 Airway - Non-Surgical 07/09/18 1005 Endotracheal Tube 7 days          Arterial Line                 Arterial Line 07/13/18 1045 Right Radial 3 days          Pressure Ulcer                 Pressure Injury 06/02/18 Right lateral Heel Stage 3 44 days         Pressure Injury 06/02/18 1659 Left proximal Ischial tuberosity Stage 3 43 days         Pressure Injury 06/02/18 1902 Right medial Heel Unstageable 43 days          Peripheral Intravenous Line                 Peripheral IV - Single Lumen 07/06/18 2315 Right Forearm 9 days         Peripheral IV - Single Lumen 07/09/18 0710 Right Antecubital 7 days                Significant Labs:    CBC/Anemia Profile:    Recent Labs  Lab  07/15/18  0425 07/16/18  0400   WBC 18.81* 15.16*   HGB 9.4* 8.0*   HCT 30.0* 25.5*   * 100*   MCV 82 81*   RDW 19.6* 19.8*        Chemistries:    Recent Labs  Lab 07/15/18  0425 07/16/18  0400    144   K 3.3* 4.3    106   CO2 33* 32*   BUN 32* 29*   CREATININE 0.7 0.6   CALCIUM 8.9 8.9   ALBUMIN  --  3.7   PROT  --  4.8*   BILITOT  --  0.6   ALKPHOS  --  66   ALT  --  29   AST  --  33   MG 2.4 2.2   PHOS 1.8* 2.4*       All pertinent labs within the past 24 hours have been reviewed.    Significant Imaging:  CXR: I have reviewed all pertinent results/findings within the past 24 hours and my personal findings are:  no change

## 2018-07-16 NOTE — NURSING
Upon initial assessment two new areas of skin breakdown noted. Small area of breakdown noted to L. Upper buttocks, second area of breakdown to r. Buttocks. Wounds cleansed and dressed with foam dressing. Wounds appear to be skin tears vs stage II pressure ulcers- will notify wound care.

## 2018-07-16 NOTE — ASSESSMENT & PLAN NOTE
NIF daily, still undetectable   Vent settings reviewed and adjusted  SBT once stronger  Planning Trach/PEG July 17th or 18th per Surgery  VAP prophylaxis

## 2018-07-17 NOTE — ASSESSMENT & PLAN NOTE
Compensated as of 7/3/18  - BNP 1100  - CXR with vascular congestion with small bilateral effusion  - Mild BLE edema upon assessment but diminished breath sounds noted  - 2D ECHO on 06/02/18 with EF 35% and pulmonary HTN  - Pt received Bumex 2 mg IVP x 1 in ED  - Bumex 2 mg IVP daily====> changed to Bumex 2 mg twice daily 7/2/18  - Continue Metoprolol  7/10  Compensated   7/12  Compensated   7/17  Right sided effusion plan to iv diuresis .

## 2018-07-17 NOTE — SUBJECTIVE & OBJECTIVE
Review of Systems   Unable to perform ROS: Intubated     Objective:     Vital Signs (Most Recent):  Temp: 99.3 °F (37.4 °C) (07/17/18 1205)  Pulse: 71 (07/17/18 1245)  Resp: 20 (07/17/18 1245)  BP: (!) 132/51 (07/17/18 1205)  SpO2: 100 % (07/17/18 1245) Vital Signs (24h Range):  Temp:  [97.8 °F (36.6 °C)-99.3 °F (37.4 °C)] 99.3 °F (37.4 °C)  Pulse:  [68-72] 71  Resp:  [14-31] 20  SpO2:  [90 %-100 %] 100 %  BP: ()/(40-67) 132/51  Arterial Line BP: ()/(37-78) 151/71     Weight: 75 kg (165 lb 5.5 oz)  Body mass index is 28.38 kg/m².    Intake/Output Summary (Last 24 hours) at 07/17/18 1255  Last data filed at 07/17/18 1200   Gross per 24 hour   Intake          1090.06 ml   Output             1060 ml   Net            30.06 ml      Physical Exam   Constitutional: She appears well-developed. She is cooperative.  Non-toxic appearance. She does not have a sickly appearance. She appears ill. No distress. She is intubated (on mechanical ventilation).   HENT:   Head: Normocephalic and atraumatic.   Mouth/Throat: Oropharynx is clear and moist and mucous membranes are normal.   Eyes: EOM and lids are normal. Pupils are equal, round, and reactive to light.   Neck: Trachea normal. Neck supple. Carotid bruit is not present.       Cardiovascular: Normal rate, regular rhythm and normal heart sounds.    Pulses:       Radial pulses are 2+ on the right side, and 2+ on the left side.        Dorsalis pedis pulses are 1+ on the right side, and 1+ on the left side.   Pulmonary/Chest: Effort normal. No accessory muscle usage. She is intubated (on mechanical ventilation). No respiratory distress. She has rales.   Abdominal: Soft. Normal appearance. She exhibits no distension. Bowel sounds are decreased. There is no tenderness.   Genitourinary:   Genitourinary Comments: Flores in place   Musculoskeletal:        Right foot: There is no deformity.        Left foot: There is no deformity.   Atrophy with mild diffuse edema    Lymphadenopathy:     She has no cervical adenopathy.   Neurological: She is alert. She displays atrophy.   Fully awake and nods head to questions and attempts to talk around OET.  Shoulder and LE strength 2/5 has NO movement to distal extremities X 4   Skin: Skin is warm and dry. Capillary refill takes less than 2 seconds. No rash noted. No cyanosis.        DTI to ischial and heels   Nursing note and vitals reviewed.      Significant Labs: All pertinent labs within the past 24 hours have been reviewed.    Significant Imaging: I have reviewed all pertinent imaging results/findings within the past 24 hours.

## 2018-07-17 NOTE — ASSESSMENT & PLAN NOTE
Cards following  Off Xarelto  Dr. Vidal discussed with Cards and due to low CHADVAS score will hold on Heparin infusion given plasmaphoresis and planned Trach/PEG.  Resume Xarelto post Trach/PEG  Cont Amiodarone and cardiac monitoring  Has PPM

## 2018-07-17 NOTE — ASSESSMENT & PLAN NOTE
Neuro following  Plasmaphoresis Day # 6 of 6 done today  Starting Cellcept tomorrow per Neuro   Cont supportive care, will need long term support and therapy  OT/PT following  Planning LTAC post Trach/PEG tomorrow - surgery following

## 2018-07-17 NOTE — PROCEDURES
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  Per chart, patient's outside medical records reveal recent history of antibody-positive NMO at [facility] and was subsequently given 5-day course of IV Solumedrol (per Neurology recs) without any improvement.  The patient's neurologic status had worsened (e.g., has no movement, does not vocalize, she has been able to track intermittently) and she was intubated for ongoing respiratory issues.  Neurology had requested a plasma exchange series for further management of patient's NMOSD (TPE x 5).    NEUROMYELITIS OPTICA SPECTRUM DISORDERS carries a Category II Grade 1B indication for therapeutic plasma exchange via the 2016 Journal of Clinical Apheresis Guidelines (Corea J et al. Journal of Clinical Apheresis 2016; 31:149-162.)    Interval History: Today's procedure (#6 of 6) was well tolerated and without complications.This concludes her TPE series.    Pertinent Laboratory Data:   Complete Blood Count:   Lab Results   Component Value Date    HGB 7.6 (L) 07/17/2018    HCT 24.3 (L) 07/17/2018    PLT 92 (L) 07/17/2018    WBC 17.10 (H) 07/17/2018     Basic Metabolic Panel:   Lab Results    Component Value Date     07/17/2018    K 4.0 07/17/2018     07/17/2018    CO2 32 (H) 07/17/2018     (H) 07/17/2018    BUN 28 (H) 07/17/2018    CREATININE 0.7 07/17/2018    CALCIUM 9.1 07/17/2018    ANIONGAP 7 (L) 07/17/2018    ESTGFRAFRICA >60 07/17/2018    EGFRNONAA >60 07/17/2018       Pertinent Medications: None contraindicated for TPE      Review of patient's allergies indicates:   Allergen Reactions    Morphine Hives    Atorvastatin      Other reaction(s): Muscle pain    Clonidine     Codeine      Other reaction(s): Unknown    Digoxin      Other reaction(s): Unknown    Diovan [valsartan] Other (See Comments)     Upset stomach, weakness    Eggs [egg derived]     Metoprolol Diarrhea    Naproxen      Other reaction(s): Nausea    Peanut     Propofol      Other reaction(s): delirious    Sulfa (sulfonamide antibiotics)        Anesthesia: None     Technical Procedures Used: Plasma Exchange: Volume exchanged - 3.5 Liters; Replacement fluid - Albumin; Number of procedures 6 of 6; Date of next procedure N/A.    Description of the Findings of the Procedure:   Please see CTA Apheresis Nurse flowsheet for details (to be scanned).    The patient was evaluated and all clinical and laboratory data relevant to the treatment was reviewed, and a decision was made to proceed with the Apheresis procedure.    I was available to the clinical staff throughout the procedure.    Significant Surgical Tasks Conducted by the Assistant(s): Not applicable  Complications: None  Estimated Blood Loss (EBL): Minimal  Implants: None   Specimens: None    Lorna Manzanares MD, PhD  Section of Transfusion Medicine & Histocompatibility  Department of Pathology and Laboratory Medicine  [facility]  [phone] (HLA & Blood Bank Offices)  07/17/2018

## 2018-07-17 NOTE — ASSESSMENT & PLAN NOTE
ABG reflected hypoxic/hypercapneic respiratory failure  ICU care  Pulmonology/Critical Care following  Bipap for more than 24 hours with no improvement . No improvement in mental status   Intubated   7/10  Intubated on vent support   7/17  Continue with ventilator support plan for trach/peg by surgery  IV diuresis

## 2018-07-17 NOTE — PLAN OF CARE
Problem: Patient Care Overview  Goal: Plan of Care Review  Patient currently requires total assist x 2 for bed mobility and total assist for sitting balance at EOB.   Outcome: Ongoing (interventions implemented as appropriate)  Pt had 6th plasmapheresis today, tolerated well. 1 unit PRBCs transfused for H/H of 7.5/24. Heparin held d/t low platelets and SCDs placed. Pt turned Q2h to prevent further skin breakdown. Plan for trach and PEG tomorrow. POC reviewed with daughter.

## 2018-07-17 NOTE — PROGRESS NOTES
[facility] Hospital Medicine  Progress Note    Patient Name: Carlie Valdez  MRN: [id number]  Patient Class: IP- Inpatient   Admission Date: 6/30/2018  Length of Stay: 17 days  Attending Physician: Raymundo Vidal MD  Primary Care Provider: Johanna Guzman MD        Subjective:     Principal Problem:Neuromyelitis optica spectrum disorder    HPI:  Carlie Valdez is a 72 year old female with a PMHx of HTN, GERD, DM, A-fib, Asthma, and Defibrillator who presented from home with c/o chest pain. Located to right side with no radiation. Associated symptoms: SOB. Pt and sitters at bedside report she was recently discharged from [facility] and pt hasn't had her home medications since being discharged. ED workup revealed: Hgb/Hct 11.2/34.7, Alk phos 152, BNP 1100, troponin 0.028. UA (+) nitrites. CXR with central vascular congestion with small bilateral effusion. Primary cardiologist is Dr. Hernández.  Pt admitted to Telemetry for Acute on chronic CHF exacerbation and UTI.     Hospital Course:  Pt admitted for decompensated heart failure and UTI. Diuresis with Bumex and IV Rocephin in progress. Pt with physical debility and PT/OT consulted. Dr. Joiner had lengthy discussion with daughter, Haley, and patient in reference to generalized weakness and proper disposition. SNF placement was agreed however, when  approach regarding SNF selection patient refused. Introduce Hospice to patient and advised she was not a candidate for Rehab due to her extent of weakness. Pt stated she would discharge home with sitters and home health services. 7/2/18 - Dr. Joiner has spoken with pt's daughters, Haley and Rosy, regarding the mother's condition. She has diuresed and diuretics changed to oral. She was medically stable for discharge. SNF placement pending. PT/OT in progress. 7/3/18 - It was noted that pt has refused participation in PT/OT. She appears grossly more weak in  the BUE/BLE extremities. Family notified of condition. Granddaughter at bedside mentioned similar symptoms prior to admission. Neurology consult placed, ESR/CRP pending, CT Head and C spine pending. At 4th day, CT of Head and C spine found no concerning findings. ESR and CRP elevated although no concern for vasculitis. Neurology saw the patient and recommended LP to rule out GBs or CIDP. Xarelto placed on hold and LP by IR is pending as Xarelto needs to be on hold for 3 days. Speech therapy evaluated the patient and noted inconsistent dysphonia. The patient will voice normally then start mouthing words. Diet recommendations:  Mechanical soft, Thin. On 6th day, pt noted to have acute onset of metabolic encephalopathy. Pt more somnolent, respiration shallow. She would arouse with sternal rub but return to lethargy. Repeat CT of Head was negative, ammonia slightly elevated at 61 and ABG showed acidosis with hypoxic/hypercapneic respiratory failure.   07/06/18 - presently on Bipap for respiratory acidosis , Lumbar puncture done today 06/07/18 07/07- Diagnosed with NMO at [facility] Solumedrol  1000 mg/day for 5 days per neurology   07/08- possible aspiration overnight , follow up CXR new right pleural effusion , continue Bipap support   07/09 patient was intubated in morning continue to have respiratory distress and no improvement in neurological status   07/10  Started on plasma exchange series for nmo . As per neurology recommendation .  No improvement in mental status . Family updated on plan   07/11  Patient had first cycle of plasma exchange yesterday . Now can follow simple commands .continue to be on vent and with plasma exchange . Hold bp meds due to hypotension   07/12  Patient having plasma exchange. Plan for  trach and peg family and patient agreed   07/13  Patient was seen and examined today . . In morning patient was responsive as per family following commands . Later in the day patient unintentionally received  fentanyl . She is currently not responsive intubated on vent. Neurology did stat eeg . Will continue monitor and assess patient once fentanyl wears off   07/13  Patient getting plasma pheresis for nmo . Patient hypotensive was given bolus . She was hypertensive last night received hydralazine . Waiting on labs for nmo antibodies   07/15  Patient showed improvement in the mental status . Getting last plasma pheresis tomorrow and trach/peg on Tuesday. Case management for ltac placement   07/16  plasmaphersis done today . Last day . Waiting on trach and peg . Plan for ltac placement . Will wait on neurology recommendation regarding any immunosuppressive needed for nmo  07/17   Patient have one more cycle  Of plasmapheresis original plan was to do 5 cycles(standard of care for NMO) but extra cycle was added because patient showed improvement in her mental status . Now she can follow commands .she understand the plan for trach and peg than discharge to LTAC. Waiting on surgery possible trach and peg tomorrow . She also have right sided pleural effusion on diuretics         Review of Systems   Unable to perform ROS: Intubated     Objective:     Vital Signs (Most Recent):  Temp: 99.3 °F (37.4 °C) (07/17/18 1205)  Pulse: 71 (07/17/18 1245)  Resp: 20 (07/17/18 1245)  BP: (!) 132/51 (07/17/18 1205)  SpO2: 100 % (07/17/18 1245) Vital Signs (24h Range):  Temp:  [97.8 °F (36.6 °C)-99.3 °F (37.4 °C)] 99.3 °F (37.4 °C)  Pulse:  [68-72] 71  Resp:  [14-31] 20  SpO2:  [90 %-100 %] 100 %  BP: ()/(40-67) 132/51  Arterial Line BP: ()/(37-78) 151/71     Weight: 75 kg (165 lb 5.5 oz)  Body mass index is 28.38 kg/m².    Intake/Output Summary (Last 24 hours) at 07/17/18 1255  Last data filed at 07/17/18 1200   Gross per 24 hour   Intake          1090.06 ml   Output             1060 ml   Net            30.06 ml      Physical Exam   Constitutional: She appears well-developed. She is cooperative.  Non-toxic appearance. She does not  have a sickly appearance. She appears ill. No distress. She is intubated (on mechanical ventilation).   HENT:   Head: Normocephalic and atraumatic.   Mouth/Throat: Oropharynx is clear and moist and mucous membranes are normal.   Eyes: EOM and lids are normal. Pupils are equal, round, and reactive to light.   Neck: Trachea normal. Neck supple. Carotid bruit is not present.       Cardiovascular: Normal rate, regular rhythm and normal heart sounds.    Pulses:       Radial pulses are 2+ on the right side, and 2+ on the left side.        Dorsalis pedis pulses are 1+ on the right side, and 1+ on the left side.   Pulmonary/Chest: Effort normal. No accessory muscle usage. She is intubated (on mechanical ventilation). No respiratory distress. She has rales.   Abdominal: Soft. Normal appearance. She exhibits no distension. Bowel sounds are decreased. There is no tenderness.   Genitourinary:   Genitourinary Comments: Flores in place   Musculoskeletal:        Right foot: There is no deformity.        Left foot: There is no deformity.   Atrophy with mild diffuse edema   Lymphadenopathy:     She has no cervical adenopathy.   Neurological: She is alert. She displays atrophy.   Fully awake and nods head to questions and attempts to talk around OET.  Shoulder and LE strength 2/5 has NO movement to distal extremities X 4   Skin: Skin is warm and dry. Capillary refill takes less than 2 seconds. No rash noted. No cyanosis.        DTI to ischial and heels   Nursing note and vitals reviewed.      Significant Labs: All pertinent labs within the past 24 hours have been reviewed.    Significant Imaging: I have reviewed all pertinent imaging results/findings within the past 24 hours.    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 As per neurology after completing plasmaphersis starting Cellcept 500 mg a day for a week and slowly increase by 500 mg every week until we reach the dose of 1000 mg po bid.         Weakness generalized              Pressure ulcer of right heel, stage 3              Acute respiratory failure with hypoxia and hypercarbia    ABG reflected hypoxic/hypercapneic respiratory failure  ICU care  Pulmonology/Critical Care following  Bipap for more than 24 hours with no improvement . No improvement in mental status   Intubated   7/10  Intubated on vent support   7/17  Continue with ventilator support plan for trach/peg by surgery  IV diuresis         Skin breakdown    Pt lying in specialty bed   Multiple areas of skin breakdown  - Inpatient consult to wound care          Severe muscle deconditioning    - Currently has [facility] (PT/OT/nurse)  - secondary to NMO        Acute on chronic systolic heart failure    Compensated as of 7/3/18  - BNP 1100  - CXR with vascular congestion with small bilateral effusion  - Mild BLE edema upon assessment but diminished breath sounds noted  - 2D ECHO on 06/02/18 with EF 35% and pulmonary HTN  - Pt received Bumex 2 mg IVP x 1 in ED  - Bumex 2 mg IVP daily====> changed to Bumex 2 mg twice daily 7/2/18  - Continue Metoprolol  7/10  Compensated   7/12  Compensated   7/17  Right sided effusion plan to iv diuresis .         Left ischial pressure sore, stage III    Wound care           Hypothyroidism    - TSH about a month ago -- 1.265  - Continue Levothyroxine          Chronic kidney disease (CKD), stage III (moderate)    - Currently stable  - Monitor kidney function while on diuretics          Labile blood pressure    Controlled  - continue Toprol XL and lisinopril  - Apresoline prn  Lisinopril discontinued 7/4/18 - daughter stated it  caused respiratory problems    DM  - HgbA1c on 06/03/18 -- 5.3 -- controlled  - Accuchecks and low dose SSI  -  7/11  Hold antihypertensive due to hypotension   7/12   Hold antihypertensive         Chronic atrial fibrillation    - Currently rate controlled  Telemetry monitoring  - Continue Xarelto and Amiodarone  -xarelto on hold.  - will resume after procedure is done trach and peg   -chads 4 discussed with neurology/critical care holding antiocoagulation for now and resume after trach and peg            VTE Risk Mitigation         Ordered     heparin, porcine (PF) injection 3,800 Units  As needed (PRN)      07/10/18 0859     heparin (porcine) injection 4,000 Units  As needed (PRN)      07/09/18 2045     heparin (porcine) injection 5,000 Units  Every 8 hours      07/04/18 1209          Critical care time spent on the evaluation and treatment of severe organ dysfunction, review of pertinent labs and imaging studies, discussions with consulting providers and discussions with patient/family: 40minutes.    Raymundo Vidal MD  Department of Hospital Medicine   [facility] -

## 2018-07-17 NOTE — ASSESSMENT & PLAN NOTE
Continue with steroids no improvement will consider pheresis as per neurlogy\  7/10  Plasma exchange series    7/12  Plasma exchange   Plan for trach and peg   7/16  Will complete plasmaphersis waiting on neurology recommendation regarding immunosuppressive therapy if needed   7/17  Complete plasmapheresis . As per neurology after completing plasmaphersis starting Cellcept 500 mg a day for a week and slowly increase by 500 mg every week until we reach the dose of 1000 mg po bid.

## 2018-07-17 NOTE — PROGRESS NOTES
Progress note  Neurology      Neurology follow up:  For: neuromyelitis optica     SUBJECTIVE:      HPI:   72 year old lady intubated and getting treatment for NMO with plasmapheresis . She has completed 5 cycles of treatment . She has improved significantly per treating doctor and her daughter.  Today she has  Another single plasmapheresis.      Past Medical History:   Diagnosis Date    Arthritis     Asthma     Atrial fibrillation     Blood clot of vein in shoulder area     Cardiac defibrillator in place     Chronic knee pain     Diabetes mellitus type 2 without retinopathy 2016    Diaphragmatic hernia without obstruction and without gangrene 2015    GERD (gastroesophageal reflux disease)     Hypertension     Primary open angle glaucoma (POAG) of both eyes, severe stage 2018     Past Surgical History:   Procedure Laterality Date    CARDIAC DEFIBRILLATOR PLACEMENT      , .    CATARACT EXTRACTION       SECTION      COLONOSCOPY      ashley      Dr. Macdonald    KNEE ARTHROSCOPY      UPPER GASTROINTESTINAL ENDOSCOPY       Family History   Problem Relation Age of Onset    Hypertension Mother     Hypertension Father     Colon cancer Neg Hx     Stomach cancer Neg Hx      Social History   Substance Use Topics    Smoking status: Never Smoker    Smokeless tobacco: Never Used    Alcohol use No       Review of patient's allergies indicates:   Allergen Reactions    Morphine Hives    Atorvastatin      Other reaction(s): Muscle pain    Clonidine     Codeine      Other reaction(s): Unknown    Digoxin      Other reaction(s): Unknown    Diovan [valsartan] Other (See Comments)     Upset stomach, weakness    Eggs [egg derived]     Metoprolol Diarrhea    Naproxen      Other reaction(s): Nausea    Peanut     Propofol      Other reaction(s): delirious    Sulfa (sulfonamide antibiotics)       Patient Active Problem List   Diagnosis    Chronic atrial fibrillation    GERD  (gastroesophageal reflux disease)    Labile blood pressure    Childhood asthma without complication    Cardiac defibrillator in place    Abnormal CT scan, gastrointestinal tract    Diaphragmatic hernia without obstruction and without gangrene    Chronic knee pain    Obesity, Class I, BMI 30-34.9    Vitamin D deficiency    PVD (peripheral vascular disease)    LBBB (left bundle branch block)    Bilateral leg edema    ARF (acute renal failure)    Pneumonia due to infectious organism    Cough    Chronic kidney disease (CKD), stage III (moderate)    Bronchitis    Nuclear sclerosis of right eye    Pseudophakia of left eye    Severe persistent asthma with acute exacerbation    Pulmonary infiltrate in right lung on chest x-ray    Uncomplicated severe persistent asthma    Chronic atrial fibrillation with RVR    Chronic combined systolic and diastolic congestive heart failure    Non compliance w medication regimen    SIRS (systemic inflammatory response syndrome)    Cortical age-related cataract of both eyes    Type 2 diabetes mellitus with kidney complication, without long-term current use of insulin    Acute on chronic systolic congestive heart failure    Cerebrovascular accident (CVA) due to embolism    Hypertensive emergency    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    Primary open angle glaucoma (POAG) of both eyes, severe stage    Hypotension    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    Atrial fibrillation with RVR    Hypothyroidism    Deep tissue injury    Left ischial pressure sore, stage III    Pressure ulcer of thigh, stage 2    Atypical chest pain    NSVT (nonsustained ventricular tachycardia)    Right sided weakness    Acute on chronic systolic heart failure    Severe muscle deconditioning    Skin breakdown    Acute respiratory failure with hypoxia and hypercarbia    Pressure ulcer of right heel, stage 3    Pressure ulcer of right  buttock, stage 2    Weakness generalized    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, S REFLEX    Electrolyte imbalance    Leukocytosis    Blisters of multiple sites         Scheduled Meds:   albuterol-ipratropium  3 mL Nebulization Q6H    amiodarone  200 mg Oral BID    brimonidine 0.2%  1 drop Both Eyes Q12H    And    timolol maleate 0.5%  1 drop Both Eyes BID    chlorhexidine  15 mL Mouth/Throat BID    ergocalciferol  50,000 Units Oral Q7 Days    furosemide  40 mg Oral BID    heparin (porcine)  5,000 Units Subcutaneous Q8H    levothyroxine  100 mcg Oral Before breakfast    pantoprazole 40 mg in dextrose 5 % 100 mL IVPB (over 15 minutes) (ready to mix system)  40 mg Intravenous Daily     Continuous Infusions:   norepinephrine bitartrate-D5W 0.02 mcg/kg/min (07/17/18 1300)     PRN Meds:sodium chloride, acetaminophen, albuterol-ipratropium, baclofen, bisacodyl, heparin (porcine), heparin, porcine (PF), hydrALAZINE, pneumoc 13-naeem conj-dip cr(PF)      Review of Systems:   Review of systems not obtained due to patient factors intubated..        OBJECTIVE:     Vital Signs (Most Recent)  Temp: 99.2 °F (37.3 °C) (07/17/18 1305)  Pulse: 70 (07/17/18 1345)  Resp: 20 (07/17/18 1345)  BP: (!) 91/49 (07/17/18 1305)  SpO2: 100 % (07/17/18 1345)    Vital Signs Range (Last 24H):  Temp:  [97.8 °F (36.6 °C)-99.3 °F (37.4 °C)]   Pulse:  [68-72]   Resp:  [14-31]   BP: ()/(40-67)   SpO2:  [90 %-100 %]   Arterial Line BP: ()/(37-78)       Physical Exam:  She is still intubated. She is alert and daughter is at the bedside. She is  Seeing a little . She is moving shoulder girdle gently. Right pupil with cataract and pupil is larger than the left ( 2.5 mm) but left pupil is smaller ( 2 mm).   Sensory has improved. She is nodding her head to respond .        Laboratory:  Lab Results   Component Value Date    WBC 17.10 (H) 07/17/2018    HGB 7.6 (L) 07/17/2018    HCT 24.3 (L)  "07/17/2018    PLT 92 (L) 07/17/2018    CHOL 166 06/06/2018    TRIG 94 06/06/2018    HDL 35 (L) 06/06/2018    ALT 32 07/17/2018    AST 31 07/17/2018     07/17/2018    K 4.0 07/17/2018     07/17/2018    CREATININE 0.7 07/17/2018    BUN 28 (H) 07/17/2018    CO2 32 (H) 07/17/2018    TSH 0.269 (L) 07/06/2018    INR 1.0 07/05/2018    HGBA1C 5.3 06/03/2018     View Detailed Result Report  NMO/AQP4 FACS Titer, S REFLEX  5/20/2018")' href="[id number] FACS Titer, S REFLEX">  Specimen: Blood - Vein")'>Positive 1:02956 (H)   ANTI-NUCLEAR ANTIBODY (TIFFANIE)   NMO/AQP4 FACS, S        MS profile done last year in [facility] :  6/18/2017   Negative.        Diagnostic Results:    CT head:  7/5/2018   Normal.  CT C-spine;   No acute findings.  Multiple level degenerative changes.          ASSESSMENT/PLAN:     Assessment:   1. Severe NMO with generalis ed Neurological weakness s/p Steroid and plasmapheresis . She has improved. Her sensation is back . She is following commands. Vision is back a little.    Patient Active Problem List   Diagnosis    Chronic atrial fibrillation    GERD (gastroesophageal reflux disease)    Labile blood pressure    Childhood asthma without complication    Cardiac defibrillator in place    Abnormal CT scan, gastrointestinal tract    Diaphragmatic hernia without obstruction and without gangrene    Chronic knee pain    Obesity, Class I, BMI 30-34.9    Vitamin D deficiency    PVD (peripheral vascular disease)    LBBB (left bundle branch block)    Bilateral leg edema    ARF (acute renal failure)    Pneumonia due to infectious organism    Cough    Chronic kidney disease (CKD), stage III (moderate)    Bronchitis    Nuclear sclerosis of right eye    Pseudophakia of left eye    Severe persistent asthma with acute exacerbation    Pulmonary infiltrate in right lung on chest x-ray    Uncomplicated severe persistent asthma "    Chronic atrial fibrillation with RVR    Chronic combined systolic and diastolic congestive heart failure    Non compliance w medication regimen    SIRS (systemic inflammatory response syndrome)    Cortical age-related cataract of both eyes    Type 2 diabetes mellitus with kidney complication, without long-term current use of insulin    Acute on chronic systolic congestive heart failure    Cerebrovascular accident (CVA) due to embolism    Hypertensive emergency    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    Primary open angle glaucoma (POAG) of both eyes, severe stage    Hypotension    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    Atrial fibrillation with RVR    Hypothyroidism    Deep tissue injury    Left ischial pressure sore, stage III    Pressure ulcer of thigh, stage 2    Atypical chest pain    NSVT (nonsustained ventricular tachycardia)    Right sided weakness    Acute on chronic systolic heart failure    Severe muscle deconditioning    Skin breakdown    Acute respiratory failure with hypoxia and hypercarbia    Pressure ulcer of right heel, stage 3    Pressure ulcer of right buttock, stage 2    Weakness generalized    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  After that we will start Cellcept 500 mg a day for a week and slowly increase by 500 mg every week until we reach the dose of 1000 mg po bid.   D/W Dr. Vidal , [facility] medicine.

## 2018-07-17 NOTE — ASSESSMENT & PLAN NOTE
I/O balance still positive  Pleural effusion increased on right if persist or increase on CXR tomorrow will consider thoracentesis  Cont Lasix  Support nutrition  Accurate I/Os and daily weights

## 2018-07-17 NOTE — ASSESSMENT & PLAN NOTE
NIF daily, still undetectable   Vent settings reviewed and adjusted  SBT once stronger  Planning Trach/PEG tomorrow per Surgery  VAP prophylaxis

## 2018-07-17 NOTE — SUBJECTIVE & OBJECTIVE
Review of Systems   Unable to perform ROS: Intubated       Objective:     Vital Signs (Most Recent):  Temp: 99.3 °F (37.4 °C) (07/17/18 1205)  Pulse: 69 (07/17/18 1230)  Resp: 19 (07/17/18 1230)  BP: (!) 132/51 (07/17/18 1205)  SpO2: 100 % (07/17/18 1230) Vital Signs (24h Range):  Temp:  [97.8 °F (36.6 °C)-99.3 °F (37.4 °C)] 99.3 °F (37.4 °C)  Pulse:  [68-72] 69  Resp:  [14-31] 19  SpO2:  [90 %-100 %] 100 %  BP: ()/(40-67) 132/51  Arterial Line BP: ()/(37-78) 148/71     Weight: 75 kg (165 lb 5.5 oz)  Body mass index is 28.38 kg/m².      Intake/Output Summary (Last 24 hours) at 07/17/18 1243  Last data filed at 07/17/18 1100   Gross per 24 hour   Intake           950.06 ml   Output             1030 ml   Net           -79.94 ml       Physical Exam   Constitutional: She appears well-developed. She is cooperative.  Non-toxic appearance. She does not have a sickly appearance. She appears ill. No distress. She is intubated (on mechanical ventilation).   HENT:   Head: Normocephalic and atraumatic.   Mouth/Throat: Oropharynx is clear and moist and mucous membranes are normal.   Eyes: EOM and lids are normal. Pupils are equal, round, and reactive to light.   Neck: Trachea normal. Neck supple. Carotid bruit is not present.       Cardiovascular: Normal rate, regular rhythm and normal heart sounds.    Pulses:       Radial pulses are 2+ on the right side, and 2+ on the left side.        Dorsalis pedis pulses are 1+ on the right side, and 1+ on the left side.   Pulmonary/Chest: Effort normal. No accessory muscle usage. She is intubated (on mechanical ventilation). No respiratory distress. She has rales.   Abdominal: Soft. Normal appearance. She exhibits no distension. Bowel sounds are decreased. There is no tenderness.   Genitourinary:   Genitourinary Comments: Flores in place   Musculoskeletal:        Right foot: There is no deformity.        Left foot: There is no deformity.   Atrophy with mild diffuse edema    Lymphadenopathy:     She has no cervical adenopathy.   Neurological: She is alert. She displays atrophy.   Fully awake and nods head to questions and attempts to talk around OET.  Shoulder and LE strength 2/5 has NO movement to distal extremities X 4   Skin: Skin is warm and dry. Capillary refill takes less than 2 seconds. No rash noted. No cyanosis.        DTI to ischial and heels       Vents:  Vent Mode: A/C (07/17/18 1123)  Ventilator Initiated: Yes (07/09/18 1022)  Set Rate: 14 bmp (07/17/18 1123)  Vt Set: 380 mL (07/17/18 1123)  Pressure Support: 0 cmH20 (07/17/18 1123)  PEEP/CPAP: 5 cmH20 (07/17/18 1123)  Oxygen Concentration (%): 50 (07/17/18 1230)  Peak Airway Pressure: 22 cmH2O (07/17/18 1123)  Plateau Pressure: 18 cmH20 (07/17/18 1123)  Total Ve: 8.37 mL (07/17/18 1123)  F/VT Ratio<105 (RSBI): (!) 47.15 (07/17/18 1123)    Lines/Drains/Airways     Central Venous Catheter Line                 Hemodialysis Catheter 07/09/18 2100 right internal jugular 7 days         Percutaneous Central Line Insertion/Assessment - triple lumen  07/14/18 1445 left femoral vein 2 days          Drain                 Urethral Catheter 07/05/18 1114 12 days         NG/OG Tube 07/13/18 1020 [place] sump Left mouth 4 days          Airway                 Airway - Non-Surgical 07/09/18 1005 Endotracheal Tube 8 days          Arterial Line                 Arterial Line 07/13/18 1045 Right Radial 4 days          Pressure Ulcer                 Pressure Injury 06/02/18 Right lateral Heel Stage 3 45 days         Pressure Injury 06/02/18 1659 Left proximal Ischial tuberosity Stage 3 44 days         Pressure Injury 06/02/18 1902 Right medial Heel Unstageable 44 days          Peripheral Intravenous Line                 Peripheral IV - Single Lumen 07/06/18 2315 Right Forearm 10 days         Peripheral IV - Single Lumen 07/09/18 0710 Right Antecubital 8 days                Significant Labs:    CBC/Anemia Profile:    Recent Labs  Lab  07/16/18  0400 07/17/18  0327   WBC 15.16* 17.10*   HGB 8.0* 7.6*   HCT 25.5* 24.3*   * 92*   MCV 81* 81*   RDW 19.8* 19.9*        Chemistries:    Recent Labs  Lab 07/16/18  0400 07/17/18  0327    145   K 4.3 4.0    106   CO2 32* 32*   BUN 29* 28*   CREATININE 0.6 0.7   CALCIUM 8.9 9.1   ALBUMIN 3.7 4.2   PROT 4.8* 4.9*   BILITOT 0.6 0.9   ALKPHOS 66 60   ALT 29 32   AST 33 31   MG 2.2 2.2   PHOS 2.4* 2.5*       ABGs:   Recent Labs  Lab 07/17/18  0456   PH 7.473*   PCO2 34.4*   HCO3 25.2   POCSATURATED 97   BE 2     All pertinent labs within the past 24 hours have been reviewed.    Significant Imaging:  CXR: I have reviewed all pertinent results/findings within the past 24 hours and my personal findings are:  increased right pleural effusion

## 2018-07-17 NOTE — PROGRESS NOTES
[facility] -   Critical Care Medicine  Progress Note    Patient Name: Carlie Valdez  MRN: [id number]  Admission Date: 6/30/2018  Hospital Length of Stay: 17 days  Code Status: Full Code  Attending Provider: Raymundo Vidal MD  Primary Care Provider: Johanna Guzman MD   Principal Problem: Neuromyelitis optica spectrum disorder    Subjective:     HPI:  72 year old female with a PMHx of p[revious cerebral vascular accident with right sided weakness and aphasia, HTN, GERD, DM, A-fib, Cardiac Asthma, and Defibrillator who presented from home with c/o chest pain to Emergency Room on 6/30/2018 folllwing recent discharge from [facility]. Admitted for acute on chronic systolic Congestive Heart failure and progressively worsened. Noted to have worsening mental status and shortness of breath and transferrrd to ICU for hypercapnic respiratory failure. Placed on Noninvasive Positive Pressure Ventilation     Hospital/ICU Course:  7/5 Transferred to ICU. Noninvasive Positive Pressure Ventilation initiated , may need intubation  7/6 Improved ventilation with Noninvasive Positive Pressure Ventilation but now generalized weakness. Steroids started for respiratory failure. Lumbar puncture later today.  7/7 Respiratory status stable. Labile blood pressure. Started on high does steroids  7/8 worsening respiraoty status with elevated  PCO2  7/9 patient seen and examined, chest x-ray and ABG reviewed, daughter Haley was called, intubated for respiratory distress and tachypnea.  7/10 patient seen and examined, chest x-ray and ABG reviewed.  She had a Vas-Cath placed overnight.  Off high-dose steroid.  Plasmapheresis today.  7/11 patient seen and examined.  Chest x-ray and ABG reviewed.  Plasma exchange day 2.  Tolerating enteral feeding.  Hypotensive, started on Levophed drip.  7/12 seen and examined. Intubated, sedated with fentanyl gtt. No pressors . Mental status improved sp Plasmapheresis x 2  days. Tolerating enteral feeding.   7/13 patient seen and examined, remains intubated, on fentanyl drip for sedation.  Following commands attempting to move extremities.  7/14 patient seen and examined, remains intubated, fentanyl drip for sedation.  Following commands.  Unable to move extremities.  Plasmapheresis today.  ABG and chest x-ray reviewed.  7/15 - Resting on vent and low dose Levophed infusion without sedation comfortable and in no distress.  Chencho TF and nods head to questions fully awake.  No neuromuscular improvement.  7/16 - still resting on vent and on low dose Levophed due to persistently labile BP.  Chencho TF and receiving 5th Plasmaphoresis now.  She acknowledges her vision is improved but no improvement in muscle strength of extrem.   7/17 - Had 6th plasmaphoresis today and reportedly no more planned.  No neuro change last 24 hours on exam.  chencho TF.  Still on low dose Levophed infusion.  Plan Trach and PEG in AM.    Review of Systems   Unable to perform ROS: Intubated       Objective:     Vital Signs (Most Recent):  Temp: 99.3 °F (37.4 °C) (07/17/18 1205)  Pulse: 69 (07/17/18 1230)  Resp: 19 (07/17/18 1230)  BP: (!) 132/51 (07/17/18 1205)  SpO2: 100 % (07/17/18 1230) Vital Signs (24h Range):  Temp:  [97.8 °F (36.6 °C)-99.3 °F (37.4 °C)] 99.3 °F (37.4 °C)  Pulse:  [68-72] 69  Resp:  [14-31] 19  SpO2:  [90 %-100 %] 100 %  BP: ()/(40-67) 132/51  Arterial Line BP: ()/(37-78) 148/71     Weight: 75 kg (165 lb 5.5 oz)  Body mass index is 28.38 kg/m².      Intake/Output Summary (Last 24 hours) at 07/17/18 1243  Last data filed at 07/17/18 1100   Gross per 24 hour   Intake           950.06 ml   Output             1030 ml   Net           -79.94 ml       Physical Exam   Constitutional: She appears well-developed. She is cooperative.  Non-toxic appearance. She does not have a sickly appearance. She appears ill. No distress. She is intubated (on mechanical ventilation).   HENT:   Head: Normocephalic  and atraumatic.   Mouth/Throat: Oropharynx is clear and moist and mucous membranes are normal.   Eyes: EOM and lids are normal. Pupils are equal, round, and reactive to light.   Neck: Trachea normal. Neck supple. Carotid bruit is not present.       Cardiovascular: Normal rate, regular rhythm and normal heart sounds.    Pulses:       Radial pulses are 2+ on the right side, and 2+ on the left side.        Dorsalis pedis pulses are 1+ on the right side, and 1+ on the left side.   Pulmonary/Chest: Effort normal. No accessory muscle usage. She is intubated (on mechanical ventilation). No respiratory distress. She has rales.   Abdominal: Soft. Normal appearance. She exhibits no distension. Bowel sounds are decreased. There is no tenderness.   Genitourinary:   Genitourinary Comments: Flores in place   Musculoskeletal:        Right foot: There is no deformity.        Left foot: There is no deformity.   Atrophy with mild diffuse edema   Lymphadenopathy:     She has no cervical adenopathy.   Neurological: She is alert. She displays atrophy.   Fully awake and nods head to questions and attempts to talk around OET.  Shoulder and LE strength 2/5 has NO movement to distal extremities X 4   Skin: Skin is warm and dry. Capillary refill takes less than 2 seconds. No rash noted. No cyanosis.        DTI to ischial and heels       Vents:  Vent Mode: A/C (07/17/18 1123)  Ventilator Initiated: Yes (07/09/18 1022)  Set Rate: 14 bmp (07/17/18 1123)  Vt Set: 380 mL (07/17/18 1123)  Pressure Support: 0 cmH20 (07/17/18 1123)  PEEP/CPAP: 5 cmH20 (07/17/18 1123)  Oxygen Concentration (%): 50 (07/17/18 1230)  Peak Airway Pressure: 22 cmH2O (07/17/18 1123)  Plateau Pressure: 18 cmH20 (07/17/18 1123)  Total Ve: 8.37 mL (07/17/18 1123)  F/VT Ratio<105 (RSBI): (!) 47.15 (07/17/18 1123)    Lines/Drains/Airways     Central Venous Catheter Line                 Hemodialysis Catheter 07/09/18 2100 right internal jugular 7 days         Percutaneous Central  Line Insertion/Assessment - triple lumen  07/14/18 1445 left femoral vein 2 days          Drain                 Urethral Catheter 07/05/18 1114 12 days         NG/OG Tube 07/13/18 1020 Bhavesh dwyerp Left mouth 4 days          Airway                 Airway - Non-Surgical 07/09/18 1005 Endotracheal Tube 8 days          Arterial Line                 Arterial Line 07/13/18 1045 Right Radial 4 days          Pressure Ulcer                 Pressure Injury 06/02/18 Right lateral Heel Stage 3 45 days         Pressure Injury 06/02/18 1659 Left proximal Ischial tuberosity Stage 3 44 days         Pressure Injury 06/02/18 1902 Right medial Heel Unstageable 44 days          Peripheral Intravenous Line                 Peripheral IV - Single Lumen 07/06/18 2315 Right Forearm 10 days         Peripheral IV - Single Lumen 07/09/18 0710 Right Antecubital 8 days                Significant Labs:    CBC/Anemia Profile:    Recent Labs  Lab 07/16/18  0400 07/17/18  0327   WBC 15.16* 17.10*   HGB 8.0* 7.6*   HCT 25.5* 24.3*   * 92*   MCV 81* 81*   RDW 19.8* 19.9*        Chemistries:    Recent Labs  Lab 07/16/18  0400 07/17/18  0327    145   K 4.3 4.0    106   CO2 32* 32*   BUN 29* 28*   CREATININE 0.6 0.7   CALCIUM 8.9 9.1   ALBUMIN 3.7 4.2   PROT 4.8* 4.9*   BILITOT 0.6 0.9   ALKPHOS 66 60   ALT 29 32   AST 33 31   MG 2.2 2.2   PHOS 2.4* 2.5*       ABGs:   Recent Labs  Lab 07/17/18  0456   PH 7.473*   PCO2 34.4*   HCO3 25.2   POCSATURATED 97   BE 2     All pertinent labs within the past 24 hours have been reviewed.    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, S REFLEX    Neuro following  Plasmaphoresis Day # 6 of 6 done today  Starting Cellcept tomorrow per Neuro   Cont supportive care, will need long  term support and therapy  OT/PT following  Planning LTAC post Trach/PEG tomorrow - surgery following        Derm   Skin breakdown    Turn Q 2 hours   Foot drop boots and elevating heels  Wound care following and on low flow mattress        Pulmonary   Acute respiratory failure with hypoxia and hypercarbia    NIF daily, still undetectable   Vent settings reviewed and adjusted  SBT once stronger  Planning Trach/PEG tomorrow per Surgery  VAP prophylaxis          Cardiac/Vascular   Acute on chronic systolic heart failure    I/O balance still positive  Pleural effusion increased on right if persist or increase on CXR tomorrow will consider thoracentesis  Cont Lasix  Support nutrition  Accurate I/Os and daily weights        Chronic atrial fibrillation    Cards following  Off Xarelto  Dr. Vidal discussed with Cards and due to low CHADVAS score will hold on Heparin infusion given plasmaphoresis and planned Trach/PEG.  Resume Xarelto post Trach/PEG  Cont Amiodarone and cardiac monitoring  Has PPM        Renal/   Electrolyte imbalance    Replace electrolytes as needed  Daily CMP        Oncology   Leukocytosis    Afeb and recent cultures NGTD  Follow fever curve and re-culture and IVAB if spikes  CBC daily        Endocrine   Hypothyroidism    Cont Levothyroxine        Vitamin D deficiency    Cont Ergocalciferol weekly        Orthopedic   Severe muscle deconditioning    Cont PT/OT and ROM  Support nutrition with TF  Foot drop boots        Other   Labile blood pressure    Continuous titration of Levophed infusion  ICU hemodynamic monitoring  Arterial line in place        Preventive Measures and Monitoring:   Stress Ulcer: PPI  Nutrition: TF  Glucose control: stable  Bowel prophylaxis: PRN Dulcolax  DVT prophylaxis: SQ Hep  Hx CAD on B-Blocker: no hx CAD and has hypotension  Head of Bed/Reposition: Elevate HOB and turn Q1-2 hours   Early Mobility: Bed rest  SAT/SBT: daily  RASS goal: 0  Vent Day: #9  OG Day: #9  Central Line  Left Femoral Day: #4  Right Radial Arterial Line Day: #5  VasCath to Right IJ Day: #9  Flores Day: #13  Code Status: Full  Pneumonia Vaccine: ordered     Counseling/Consultation:I have discussed the care of this patient in detail with the bedside nursing staff and Dr. Lyman and Dr. Vidal and Dr. Regan    Critical Care Time: 52 minutes  Critical secondary to Patient has a condition that poses threat to life and bodily function: intubated on mechanical ventilation  Patient has an abrupt change in neurologic status: NMO  Patient is currently on drug therapy requiring intensive monitoring for toxicity: Levophed infusion      Critical care was time spent personally by me on the following activities: development of treatment plan with patient or surrogate and bedside caregivers, discussions with consultants, evaluation of patient's response to treatment, examination of patient, ordering and performing treatments and interventions, ordering and review of laboratory studies, ordering and review of radiographic studies, pulse oximetry, re-evaluation of patient's condition. This critical care time did not overlap with that of any other provider or involve time for any procedures.     Philip Myers NP  Critical Care Medicine  [facility] [place]

## 2018-07-17 NOTE — PLAN OF CARE
Patient/ family  has chosen [facility]. Patient dtr signed preference form , copy given to the family and original to the blue folder. [facility] came to assess [facility] and has  been accepted [facility]. D./C planning discussed with Dr. Vidal. He stated she will get plasma pheresis today as well as Diuresising for pleural effusion.  Trach and Peg tomorrow per surgery schedule.

## 2018-07-18 PROBLEM — E03.9 HYPOTHYROIDISM: Chronic | Status: ACTIVE | Noted: 2018-01-01

## 2018-07-18 PROBLEM — R07.9 CHEST PAIN: Status: ACTIVE | Noted: 2018-01-01

## 2018-07-18 PROBLEM — J90 PLEURAL EFFUSION ON RIGHT: Status: ACTIVE | Noted: 2018-01-01

## 2018-07-18 NOTE — ANESTHESIA PREPROCEDURE EVALUATION
07/18/2018  Carlie Valdez is a 72 y.o., female.    Anesthesia Evaluation    I have reviewed the Patient Summary Reports.    I have reviewed the Nursing Notes.   I have reviewed the Medications.     Review of Systems  Anesthesia Hx:  No problems with previous Anesthesia  Denies Family Hx of Anesthesia complications.   Denies Personal Hx of Anesthesia complications.   Social:  No Alcohol Use, Non-Smoker    Hematology/Oncology:     Oncology Normal    -- Anemia: Hematology Comments: 8.2/25.4    EENT/Dental:   glaucoma   Cardiovascular:   Pacemaker Hypertension Valvular problems/Murmurs Dysrhythmias atrial fibrillation CHF ECG has been reviewed. ekg 6/2018:  Atrial-paced rhythm 70  Left axis deviation  Left bundle branch block  Abnormal ECG  When compared with ECG of 06-JUN-2018 19:51,  Electronic atrial pacemaker has replaced Atrial fibrillation  T wave inversion more evident in Lateral leads    Echo 6/2018:  1 - Moderately depressed left ventricular systolic function (EF 35-40%).     2 - Low normal right ventricular systolic function .     3 - Pulmonary hypertension. The estimated PA systolic pressure is 54 mmHg.     4 - Severe left atrial enlargement.     5 - Concentric hypertrophy.     6 - Mild aortic regurgitation.     7 - Mild to moderate mitral regurgitation.     8 - Moderate to severe tricuspid regurgitation.     9 - Mild pulmonic regurgitation.     10 - Increased central venous pressure   Pulmonary:   Pneumonia Asthma Denies Sleep Apnea.    Renal/:   Chronic Renal Disease, ARF    Hepatic/GI:   GERD Denies Liver Disease. Denies Hepatitis. Diaphragmatic hernia without obstruction and without gangrene   Musculoskeletal:   Arthritis     Neurological:   CVA Denies Seizures.    Endocrine:   Diabetes Hypothyroidism        Physical Exam  General:  Well nourished    Airway/Jaw/Neck:  Airway  Findings: Pre-Existing Airway Tube(s): Oral Endotracheal tube     Dental:  Dental Findings: In tact   Chest/Lungs:  Chest/Lungs Findings: Normal Respiratory Rate, Clear to auscultation     Heart/Vascular:  Heart Findings: Rate: Tachycardia  Rhythm: Regular Rhythm  Sounds: Normal           Mode  AC/PRVC    Vt  380    PEEP  5.0    FiO2  45      Sodium 136 - 145 mmol/L 145    Potassium 3.5 - 5.1 mmol/L 3.5    Chloride 95 - 110 mmol/L 106    CO2 23 - 29 mmol/L 32     Glucose 70 - 110 mg/dL 108    BUN, Bld 8 - 23 mg/dL 24     Creatinine 0.5 - 1.4 mg/dL 0.7      WBC 3.90 - 12.70 K/uL 19.10     RBC 4.00 - 5.40 M/uL 3.17     Hemoglobin 12.0 - 16.0 g/dL 8.2     Hematocrit 37.0 - 48.5 % 25.4     MCV 82 - 98 fL 80     MCH 27.0 - 31.0 pg 25.9     MCHC 32.0 - 36.0 g/dL 32.3    RDW 11.5 - 14.5 % 18.7     Platelets 150 - 350 K/uL 92           Anesthesia Plan  Type of Anesthesia, risks & benefits discussed:  Anesthesia Type:  general  Patient's Preference:   Intra-op Monitoring Plan: standard ASA monitors, arterial line and central line  Intra-op Monitoring Plan Comments:   Post Op Pain Control Plan: multimodal analgesia  Post Op Pain Control Plan Comments:   Induction:   Inhalation  Beta Blocker:  Patient is on a Beta-Blocker and has received one dose within the past 24 hours (No further documentation required).       Informed Consent: Patient representative understands risks and agrees with Anesthesia plan.  Questions answered. Anesthesia consent signed with patient representative.  ASA Score: 4     Day of Surgery Review of History & Physical: I have interviewed and examined the patient. I have reviewed the patient's H&P dated:  There are no significant changes.  H&P update referred to the surgeon.     Anesthesia Plan Notes: Pre-existing stephany and central line; external defib pads; maintain alkalemia intraop; nimbex for muscle relaxation        Ready For Surgery From Anesthesia Perspective.

## 2018-07-18 NOTE — PROGRESS NOTES
[facility] Hospital Medicine  Progress Note    Patient Name: Carlie Valdez  MRN: [id number]  Patient Class: IP- Inpatient   Admission Date: 6/30/2018  Length of Stay: 18 days  Attending Physician: Denae Infante MD  Primary Care Provider: Johanna Guzman MD        Subjective:     Principal Problem:Neuromyelitis optica spectrum disorder    HPI:  Carlie Valdez is a 72 year old female with a PMHx of HTN, GERD, DM, A-fib, Asthma, and Defibrillator who presented from home with c/o chest pain. Located to right side with no radiation. Associated symptoms: SOB. Pt and sitters at bedside report she was recently discharged from [facility] and pt hasn't had her home medications since being discharged. ED workup revealed: Hgb/Hct 11.2/34.7, Alk phos 152, BNP 1100, troponin 0.028. UA (+) nitrites. CXR with central vascular congestion with small bilateral effusion. Primary cardiologist is Dr. Hernández.  Pt admitted to Telemetry for Acute on chronic CHF exacerbation and UTI.     Hospital Course:  Pt admitted for decompensated heart failure and UTI. Diuresis with Bumex and IV Rocephin in progress. Pt with physical debility and PT/OT consulted. Dr. Joiner had lengthy discussion with daughter, Haley, and patient in reference to generalized weakness and proper disposition. SNF placement was agreed however, when  approach regarding SNF selection patient refused. Introduce Hospice to patient and advised she was not a candidate for Rehab due to her extent of weakness. Pt stated she would discharge home with sitters and home health services. 7/2/18 - Dr. Joiner has spoken with pt's daughters, Haley and Rosy, regarding the mother's condition. She has diuresed and diuretics changed to oral. She was medically stable for discharge. SNF placement pending. PT/OT in progress. 7/3/18 - It was noted that pt has refused participation in PT/OT. She appears grossly more weak in  the BUE/BLE extremities. Family notified of condition. Granddaughter at bedside mentioned similar symptoms prior to admission. Neurology consult placed, ESR/CRP pending, CT Head and C spine pending. At 4th day, CT of Head and C spine found no concerning findings. ESR and CRP elevated although no concern for vasculitis. Neurology saw the patient and recommended LP to rule out GBs or CIDP. Xarelto placed on hold and LP by IR is pending as Xarelto needs to be on hold for 3 days. Speech therapy evaluated the patient and noted inconsistent dysphonia. The patient will voice normally then start mouthing words. Diet recommendations:  Mechanical soft, Thin. On 6th day, pt noted to have acute onset of metabolic encephalopathy. Pt more somnolent, respiration shallow. She would arouse with sternal rub but return to lethargy. Repeat CT of Head was negative, ammonia slightly elevated at 61 and ABG showed acidosis with hypoxic/hypercapneic respiratory failure.   07/06/18 - presently on Bipap for respiratory acidosis , Lumbar puncture done today 06/07/18 07/07- Diagnosed with NMO at [facility] Sol[facility]  1000 mg/day for 5 days per neurology   07/08- possible aspiration overnight , follow up CXR new right pleural effusion , continue Bipap support   07/09 patient was intubated in morning continue to have respiratory distress and no improvement in neurological status   07/10  Started on plasma exchange series for nmo . As per neurology recommendation .  No improvement in mental status . Family updated on plan   07/11  Patient had first cycle of plasma exchange yesterday . Now can follow simple commands .continue to be on vent and with plasma exchange . Hold bp meds due to hypotension   07/12  Patient having plasma exchange. Plan for  trach and peg family and patient agreed   07/13  Patient was seen and examined today . . In morning patient was responsive as per family following commands . Later in the day patient unintentionally received  fentanyl . She is currently not responsive intubated on vent. Neurology did stat eeg . Will continue monitor and assess patient once fentanyl wears off   07/13  Patient getting plasma pheresis for nmo . Patient hypotensive was given bolus . She was hypertensive last night received hydralazine . Waiting on labs for nmo antibodies   07/15  Patient showed improvement in the mental status . Getting last plasma pheresis tomorrow and trach/peg on Tuesday. Case management for ltac placement   07/16  plasmaphersis done today . Last day . Waiting on trach and peg . Plan for ltac placement . Will wait on neurology recommendation regarding any immunosuppressive needed for nmo  07/17   Patient have one more cycle  Of plasmapheresis original plan was to do 5 cycles(standard of care for NMO) but extra cycle was added because patient showed improvement in her mental status . Now she can follow commands .she understand the plan for trach and peg than discharge to LTAC. Waiting on surgery possible trach and peg tomorrow . She also have right sided pleural effusion on diuretics   07/18  Trach/peg placement pending           Review of Systems   Unable to perform ROS: Intubated     Objective:     Vital Signs (Most Recent):  Temp: 98.4 °F (36.9 °C) (07/18/18 0700)  Pulse: (!) 111 (07/18/18 0915)  Resp: 20 (07/18/18 0915)  BP: (!) 83/45 (07/18/18 0900)  SpO2: 99 % (07/18/18 0915) Vital Signs (24h Range):  Temp:  [97.8 °F (36.6 °C)-99.3 °F (37.4 °C)] 98.4 °F (36.9 °C)  Pulse:  [] 111  Resp:  [10-23] 20  SpO2:  [91 %-100 %] 99 %  BP: ()/() 83/45  Arterial Line BP: ()/(40-82) 149/82     Weight: 78 kg (171 lb 15.3 oz)  Body mass index is 29.52 kg/m².    Intake/Output Summary (Last 24 hours) at 07/18/18 1126  Last data filed at 07/18/18 0911   Gross per 24 hour   Intake          1004.75 ml   Output              845 ml   Net           159.75 ml      Physical Exam   Constitutional: She appears well-developed. She is  cooperative.  Non-toxic appearance. She does not have a sickly appearance. She appears ill. No distress. She is intubated (on mechanical ventilation).   HENT:   Head: Normocephalic and atraumatic.   Mouth/Throat: Oropharynx is clear and moist and mucous membranes are normal.   Eyes: EOM and lids are normal. Pupils are equal, round, and reactive to light.   Neck: Trachea normal. Neck supple. Carotid bruit is not present.       Cardiovascular: Normal rate, regular rhythm and normal heart sounds.    Pulses:       Radial pulses are 2+ on the right side, and 2+ on the left side.        Dorsalis pedis pulses are 1+ on the right side, and 1+ on the left side.   Pulmonary/Chest: Effort normal. No accessory muscle usage. She is intubated (on mechanical ventilation). No respiratory distress. She has rales.   Abdominal: Soft. Normal appearance. She exhibits no distension. Bowel sounds are decreased. There is no tenderness.   Genitourinary:   Genitourinary Comments: Flores in place   Musculoskeletal:        Right foot: There is no deformity.        Left foot: There is no deformity.   Atrophy with mild diffuse edema   Lymphadenopathy:     She has no cervical adenopathy.   Neurological: She is alert. She displays atrophy.   Fully awake and nods head to questions and attempts to talk around OET.  Shoulder and LE strength 2/5 has NO movement to distal extremities X 4   Skin: Skin is warm and dry. Capillary refill takes less than 2 seconds. No rash noted. No cyanosis.        DTI to ischial and heels   Nursing note and vitals reviewed.      Significant Labs: All pertinent labs within the past 24 hours have been reviewed.    Significant Imaging: I have reviewed all pertinent imaging results/findings within the past 24 hours.    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 As per neurology after completing plasmaphersis starting Cellcept 500 mg a day for a week and slowly increase by 500 mg every week until we reach the dose of 1000 mg po bid.         Weakness generalized              Pressure ulcer of right heel, stage 3              Acute respiratory failure with hypoxia and hypercarbia    ABG reflected hypoxic/hypercapneic respiratory failure  ICU care  Pulmonology/Critical Care following  Bipap for more than 24 hours with no improvement . No improvement in mental status   Intubated   7/10  Intubated on vent support   7/18  Continue with ventilator support plan for trach/peg by surgery  IV diuresis         Skin breakdown    Pt lying in specialty bed   Multiple areas of skin breakdown  - Inpatient consult to wound care          Severe muscle deconditioning    - Currently has [facility] (PT/OT/nurse)  - secondary to NMO  - continue PT/OT        Acute on chronic systolic heart failure    Compensated as of 7/3/18  - BNP 1100  - CXR with vascular congestion with small bilateral effusion  - Mild BLE edema upon assessment but diminished breath sounds noted  - 2D ECHO on 06/02/18 with EF 35% and pulmonary HTN  - Pt received Bumex 2 mg IVP x 1 in ED  - Bumex 2 mg IVP daily====> changed to Bumex 2 mg twice daily 7/2/18  - Continue Metoprolol  7/10  Compensated   7/12  Compensated   7/17  Right sided effusion plan to iv diuresis .   07/18   await possible thoracocentesis        Left ischial pressure sore, stage III    Wound care           Hypothyroidism    - TSH about a month ago -- 1.265  - Continue Levothyroxine          Chronic kidney disease (CKD), stage III (moderate)    - Currently stable  - Monitor kidney function while on diuretics          Vitamin D deficiency               Labile blood pressure    Continue to adjust   intermittent need for pressors         Chronic atrial fibrillation    - Currently rate controlled  Telemetry monitoring  - Continue Xarelto and Amiodarone  -xarelto on hold.  - will resume after procedure is done trach and peg   -chads 4 discussed with neurology/critical care holding antiocoagulation for now and resume after trach and peg            VTE Risk Mitigation         Ordered     heparin, porcine (PF) injection 3,800 Units  As needed (PRN)      07/10/18 0859     heparin (porcine) injection 4,000 Units  As needed (PRN)      07/09/18 2045     heparin (porcine) injection 5,000 Units  Every 8 hours      07/04/18 1209          Critical care time spent on the evaluation and treatment of severe organ dysfunction, review of pertinent labs and imaging studies, discussions with consulting providers and discussions with patient/family: 40 minutes.    Denae Infante MD  Department of Hospital Medicine   [facility]

## 2018-07-18 NOTE — ASSESSMENT & PLAN NOTE
ABG reflected hypoxic/hypercapneic respiratory failure  ICU care  Pulmonology/Critical Care following  Bipap for more than 24 hours with no improvement . No improvement in mental status   Intubated   7/10  Intubated on vent support   7/18  Continue with ventilator support plan for trach/peg by surgery  IV diuresis

## 2018-07-18 NOTE — EICU
ABG notified.  Type 2 resp failure with metabolic alkalosis mild.  Increased fio2 to 50%.  Replace potassium.  Consider decreasing lasix if possible. Mg fine.

## 2018-07-18 NOTE — SUBJECTIVE & OBJECTIVE
Review of Systems   Unable to perform ROS: Intubated     Objective:     Vital Signs (Most Recent):  Temp: 98.4 °F (36.9 °C) (07/18/18 0700)  Pulse: (!) 111 (07/18/18 0915)  Resp: 20 (07/18/18 0915)  BP: (!) 83/45 (07/18/18 0900)  SpO2: 99 % (07/18/18 0915) Vital Signs (24h Range):  Temp:  [97.8 °F (36.6 °C)-99.3 °F (37.4 °C)] 98.4 °F (36.9 °C)  Pulse:  [] 111  Resp:  [10-23] 20  SpO2:  [91 %-100 %] 99 %  BP: ()/() 83/45  Arterial Line BP: ()/(40-82) 149/82     Weight: 78 kg (171 lb 15.3 oz)  Body mass index is 29.52 kg/m².    Intake/Output Summary (Last 24 hours) at 07/18/18 1126  Last data filed at 07/18/18 0911   Gross per 24 hour   Intake          1004.75 ml   Output              845 ml   Net           159.75 ml      Physical Exam   Constitutional: She appears well-developed. She is cooperative.  Non-toxic appearance. She does not have a sickly appearance. She appears ill. No distress. She is intubated (on mechanical ventilation).   HENT:   Head: Normocephalic and atraumatic.   Mouth/Throat: Oropharynx is clear and moist and mucous membranes are normal.   Eyes: EOM and lids are normal. Pupils are equal, round, and reactive to light.   Neck: Trachea normal. Neck supple. Carotid bruit is not present.       Cardiovascular: Normal rate, regular rhythm and normal heart sounds.    Pulses:       Radial pulses are 2+ on the right side, and 2+ on the left side.        Dorsalis pedis pulses are 1+ on the right side, and 1+ on the left side.   Pulmonary/Chest: Effort normal. No accessory muscle usage. She is intubated (on mechanical ventilation). No respiratory distress. She has rales.   Abdominal: Soft. Normal appearance. She exhibits no distension. Bowel sounds are decreased. There is no tenderness.   Genitourinary:   Genitourinary Comments: Flores in place   Musculoskeletal:        Right foot: There is no deformity.        Left foot: There is no deformity.   Atrophy with mild diffuse edema    Lymphadenopathy:     She has no cervical adenopathy.   Neurological: She is alert. She displays atrophy.   Fully awake and nods head to questions and attempts to talk around OET.  Shoulder and LE strength 2/5 has NO movement to distal extremities X 4   Skin: Skin is warm and dry. Capillary refill takes less than 2 seconds. No rash noted. No cyanosis.        DTI to ischial and heels   Nursing note and vitals reviewed.      Significant Labs: All pertinent labs within the past 24 hours have been reviewed.    Significant Imaging: I have reviewed all pertinent imaging results/findings within the past 24 hours.

## 2018-07-18 NOTE — ASSESSMENT & PLAN NOTE
NIF daily, still undetectable   Vent settings reviewed and adjusted  SBT once stronger  Planning Trach/PEG today per Surgery  VAP prophylaxis

## 2018-07-18 NOTE — PROGRESS NOTES
Patient arrived back to ICU from OR with a #8 Shiley in place. Trach supplies left at the bedside along with extra trach for safety precautions. Placed back on previous settings.

## 2018-07-18 NOTE — PLAN OF CARE
Problem: Patient Care Overview  Goal: Plan of Care Review  Patient currently requires total assist x 2 for bed mobility and total assist for sitting balance at EOB.   Outcome: Ongoing (interventions implemented as appropriate)  Pt continues to be on mechanical vent with no significant changes to note from a respiratory standpoint.

## 2018-07-18 NOTE — PROGRESS NOTES
[facility]  Critical Care Medicine  Progress Note    Patient Name: Carlie Valdez  MRN: [id number]  Admission Date: 6/30/2018  Hospital Length of Stay: 18 days  Code Status: Full Code  Attending Provider: Denae Infante MD  Primary Care Provider: Johanna Guzman MD   Principal Problem: Neuromyelitis optica spectrum disorder    Subjective:     HPI:  72 year old female with a PMHx of p[revious cerebral vascular accident with right sided weakness and aphasia, HTN, GERD, DM, A-fib, Cardiac Asthma, and Defibrillator who presented from home with c/o chest pain to Emergency Room on 6/30/2018 folllwing recent discharge from [facility]. Admitted for acute on chronic systolic Congestive Heart failure and progressively worsened. Noted to have worsening mental status and shortness of breath and transferrrd to ICU for hypercapnic respiratory failure. Placed on Noninvasive Positive Pressure Ventilation     Hospital/ICU Course:  7/5 Transferred to ICU. Noninvasive Positive Pressure Ventilation initiated , may need intubation  7/6 Improved ventilation with Noninvasive Positive Pressure Ventilation but now generalized weakness. Steroids started for respiratory failure. Lumbar puncture later today.  7/7 Respiratory status stable. Labile blood pressure. Started on high does steroids  7/8 worsening respiraoty status with elevated  PCO2  7/9 patient seen and examined, chest x-ray and ABG reviewed, daughter Haley was called, intubated for respiratory distress and tachypnea.  7/10 patient seen and examined, chest x-ray and ABG reviewed.  She had a Vas-Cath placed overnight.  Off high-dose steroid.  Plasmapheresis today.  7/11 patient seen and examined.  Chest x-ray and ABG reviewed.  Plasma exchange day 2.  Tolerating enteral feeding.  Hypotensive, started on Levophed drip.  7/12 seen and examined. Intubated, sedated with fentanyl gtt. No pressors . Mental status improved sp Plasmapheresis x 2 days.  Tolerating enteral feeding.   7/13 patient seen and examined, remains intubated, on fentanyl drip for sedation.  Following commands attempting to move extremities.  7/14 patient seen and examined, remains intubated, fentanyl drip for sedation.  Following commands.  Unable to move extremities.  Plasmapheresis today.  ABG and chest x-ray reviewed.  7/15 - Resting on vent and low dose Levophed infusion without sedation comfortable and in no distress.  Chencho TF and nods head to questions fully awake.  No neuromuscular improvement.  7/16 - still resting on vent and on low dose Levophed due to persistently labile BP.  Chencho TF and receiving 5th Plasmaphoresis now.  She acknowledges her vision is improved but no improvement in muscle strength of extrem.   7/17 - Had 6th plasmaphoresis today and reportedly no more planned.  No neuro change last 24 hours on exam.  chencho TF.  Still on low dose Levophed infusion.  Plan Trach and PEG in AM.  7/18 - resting on vent without distress intubated.  BP still labile on low dose Levophed infusion.  Chencho TF.  Increased right pleural effusion.  Planning Trach/PEG.  Completed Plasmaphoresis.  Occasional non-sustained A-Fib this AM.  NIF undetectable.      Review of Systems   Unable to perform ROS: Intubated       Objective:     Vital Signs (Most Recent):  Temp: 98.4 °F (36.9 °C) (07/18/18 0700)  Pulse: (!) 111 (07/18/18 0915)  Resp: 20 (07/18/18 0915)  BP: (!) 83/45 (07/18/18 0900)  SpO2: 99 % (07/18/18 0915) Vital Signs (24h Range):  Temp:  [97.8 °F (36.6 °C)-99.3 °F (37.4 °C)] 98.4 °F (36.9 °C)  Pulse:  [] 111  Resp:  [10-23] 20  SpO2:  [91 %-100 %] 99 %  BP: ()/() 83/45  Arterial Line BP: ()/(40-82) 149/82     Weight: 78 kg (171 lb 15.3 oz)  Body mass index is 29.52 kg/m².      Intake/Output Summary (Last 24 hours) at 07/18/18 1026  Last data filed at 07/18/18 0911   Gross per 24 hour   Intake          1024.75 ml   Output              875 ml   Net           149.75 ml        Physical Exam   Constitutional: She appears well-developed. She is cooperative.  Non-toxic appearance. She does not have a sickly appearance. She appears ill. No distress. She is intubated (on mechanical ventilation).   HENT:   Head: Normocephalic and atraumatic.   Mouth/Throat: Oropharynx is clear and moist and mucous membranes are normal.   Eyes: EOM and lids are normal. Pupils are equal, round, and reactive to light.   Neck: Trachea normal. Neck supple. Carotid bruit is not present.       Cardiovascular: Normal rate, regular rhythm and normal heart sounds.    Pulses:       Radial pulses are 2+ on the right side, and 2+ on the left side.        Dorsalis pedis pulses are 1+ on the right side, and 1+ on the left side.   Pulmonary/Chest: Effort normal. No accessory muscle usage. She is intubated (on mechanical ventilation). No respiratory distress. She has decreased breath sounds in the right middle field and the right lower field. She has rales.   Abdominal: Soft. Normal appearance. She exhibits no distension. Bowel sounds are decreased. There is no tenderness.   Genitourinary:   Genitourinary Comments: Flores in place   Musculoskeletal:        Right foot: There is no deformity.        Left foot: There is no deformity.   Atrophy with +1 tibial edema   Lymphadenopathy:     She has no cervical adenopathy.   Neurological: She is alert. She displays atrophy.   Fully awake and nods head to questions and attempts to talk around OET.  Shoulder and LE strength 2/5 has NO movement to distal extremities X 4   Skin: Skin is warm and dry. Capillary refill takes less than 2 seconds. No rash noted. No cyanosis.        DTI to ischial and heels       Vents:  Vent Mode: A/C (07/18/18 0849)  Ventilator Initiated: Yes (07/09/18 1022)  Set Rate: 14 bmp (07/18/18 0849)  Vt Set: 380 mL (07/18/18 0849)  Pressure Support: 0 cmH20 (07/18/18 0849)  PEEP/CPAP: 5 cmH20 (07/18/18 0849)  Oxygen Concentration (%): 50 (07/18/18 0915)  Peak  Airway Pressure: 20 cmH2O (07/18/18 0849)  Plateau Pressure: 18 cmH20 (07/18/18 0849)  Total Ve: 7.19 mL (07/18/18 0849)  F/VT Ratio<105 (RSBI): (!) 54.64 (07/18/18 0849)    Lines/Drains/Airways     Central Venous Catheter Line                 Hemodialysis Catheter 07/09/18 2100 right internal jugular 8 days         Percutaneous Central Line Insertion/Assessment - triple lumen  07/14/18 1445 left femoral vein 3 days          Drain                 Urethral Catheter 07/05/18 1114 12 days         NG/OG Tube 07/13/18 1020 [place] sump Left mouth 5 days          Airway                 Airway - Non-Surgical 07/09/18 1005 Endotracheal Tube 9 days          Arterial Line                 Arterial Line 07/13/18 1045 Right Radial 4 days          Pressure Ulcer                 Pressure Injury 06/02/18 Right lateral Heel Stage 3 46 days         Pressure Injury 06/02/18 1659 Left proximal Ischial tuberosity Stage 3 45 days         Pressure Injury 06/02/18 1902 Right medial Heel Unstageable 45 days          Peripheral Intravenous Line                 Peripheral IV - Single Lumen 07/06/18 2315 Right Forearm 11 days         Peripheral IV - Single Lumen 07/09/18 0710 Right Antecubital 9 days                Significant Labs:    CBC/Anemia Profile:    Recent Labs  Lab 07/17/18 0327 07/18/18  0345   WBC 17.10* 19.10*   HGB 7.6* 8.2*   HCT 24.3* 25.4*   PLT 92* 92*   MCV 81* 80*   RDW 19.9* 18.7*        Chemistries:    Recent Labs  Lab 07/17/18 0327 07/18/18  0345    145   K 4.0 3.5    106   CO2 32* 32*   BUN 28* 24*   CREATININE 0.7 0.7   CALCIUM 9.1 9.0   ALBUMIN 4.2 4.1   PROT 4.9* 4.9*   BILITOT 0.9 1.3*   ALKPHOS 60 83   ALT 32 36   AST 31 35   MG 2.2 2.0   PHOS 2.5* 3.0       ABGs:   Recent Labs  Lab 07/18/18  0529   PH 7.475*   PCO2 43.0   HCO3 31.7*   POCSATURATED 91*   BE 8     All pertinent labs within the past 24 hours have been reviewed.    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, S REFLEX    Neuro following  Plasmaphoresis completed 6 treatments yesterday  Starting Cellcept today per Neuro   Cont supportive care, will need long term support and therapy  OT/PT following  Planning LTAC post Trach/PEG today - surgery following        Derm   Skin breakdown    Turn Q 2 hours   Foot drop boots and elevating heels  Wound care following and on low flow mattress        Pulmonary   Pleural effusion on right    Plan US of chest post Trach/PEG and possible right thoracentesis if enough fluid        Acute respiratory failure with hypoxia and hypercarbia    NIF daily, still undetectable   Vent settings reviewed and adjusted  SBT once stronger  Planning Trach/PEG today per Surgery  VAP prophylaxis          Cardiac/Vascular   Acute on chronic systolic heart failure    I/O balance still positive  Pleural effusion increased on right possible thoracentesis  Cont Lasix  Support nutrition  Accurate I/Os and daily weights        Chronic atrial fibrillation    Cards following  Resume Xarelto post Trach/PEG and VasCath removed  Cont Amiodarone and cardiac monitoring.  1 dose Lopressor IV  Has PPM        Renal/   Electrolyte imbalance    Replace electrolytes as needed  Daily CMP  Replace K+ today        Oncology   Leukocytosis    Afeb and recent cultures NGTD  Follow fever curve and re-culture and IVAB if spikes  CBC daily        Endocrine   Hypothyroidism    Cont Levothyroxine        Vitamin D deficiency    Cont Ergocalciferol weekly        Orthopedic   Severe muscle deconditioning    Cont PT/OT and ROM  Support nutrition with TF  Foot drop boots        Other   Labile blood pressure    Continuous titration of Levophed infusion  ICU hemodynamic monitoring  Arterial line in place             Preventive Measures and  Monitoring:   Stress Ulcer: PPI  Nutrition: TF  Glucose control: stable  Bowel prophylaxis: PRN Dulcolax  DVT prophylaxis: SQ Hep resume post Trach/PEG and VasCath removed  Hx CAD on B-Blocker: no hx CAD and has hypotension  Head of Bed/Reposition: Elevate HOB and turn Q1-2 hours   Early Mobility: Bed rest  SBT: daily  Vent Day: #10  OG Day: #10  Central Line Left Femoral Day: #5  Right Radial Arterial Line Day: #6  VasCath to Right IJ Day: #10  Flores Day: #14  Code Status: Full  Pneumonia Vaccine: ordered     Counseling/Consultation:I have discussed the care of this patient in detail with the bedside nursing staff and Dr. Lyman and Dr. Infante       Critical Care Time: 54 minutes  Critical secondary to Patient has a condition that poses threat to life and bodily function: Resp Failure intubated on mechanical ventilation  Patient has an abrupt change in neurologic status: NMO  Patient is currently on drug therapy requiring intensive monitoring for toxicity: Levophed infusion      Critical care was time spent personally by me on the following activities: development of treatment plan with patient or surrogate and bedside caregivers, discussions with consultants, evaluation of patient's response to treatment, examination of patient, ordering and performing treatments and interventions, ordering and review of laboratory studies, ordering and review of radiographic studies, pulse oximetry, re-evaluation of patient's condition. This critical care time did not overlap with that of any other provider or involve time for any procedures.     Philip Myers NP  Critical Care Medicine  [facility]

## 2018-07-18 NOTE — PLAN OF CARE
Problem: Patient Care Overview  Goal: Plan of Care Review  Patient currently requires total assist x 2 for bed mobility and total assist for sitting balance at EOB.   Outcome: Ongoing (interventions implemented as appropriate)  Patient awake, eyes open spontaneously, appears to  intermittently nods head appropriately to simple commands, also performs non- purposeful movement. Patient PO2- 57 with this am blood gas, had FIO2-  increased to 50 % .SPO2- 93 % @ time of reporting, with thick tan secretions on suctioning. Had large bm this am, brown soft stools observed. Tube feeds on hold since midnight , patient remains NPO for possible PEG and TRAC this am.Is being observed.

## 2018-07-18 NOTE — SUBJECTIVE & OBJECTIVE
Review of Systems   Unable to perform ROS: Intubated       Objective:     Vital Signs (Most Recent):  Temp: 98.4 °F (36.9 °C) (07/18/18 0700)  Pulse: (!) 111 (07/18/18 0915)  Resp: 20 (07/18/18 0915)  BP: (!) 83/45 (07/18/18 0900)  SpO2: 99 % (07/18/18 0915) Vital Signs (24h Range):  Temp:  [97.8 °F (36.6 °C)-99.3 °F (37.4 °C)] 98.4 °F (36.9 °C)  Pulse:  [] 111  Resp:  [10-23] 20  SpO2:  [91 %-100 %] 99 %  BP: ()/() 83/45  Arterial Line BP: ()/(40-82) 149/82     Weight: 78 kg (171 lb 15.3 oz)  Body mass index is 29.52 kg/m².      Intake/Output Summary (Last 24 hours) at 07/18/18 1026  Last data filed at 07/18/18 0911   Gross per 24 hour   Intake          1024.75 ml   Output              875 ml   Net           149.75 ml       Physical Exam   Constitutional: She appears well-developed. She is cooperative.  Non-toxic appearance. She does not have a sickly appearance. She appears ill. No distress. She is intubated (on mechanical ventilation).   HENT:   Head: Normocephalic and atraumatic.   Mouth/Throat: Oropharynx is clear and moist and mucous membranes are normal.   Eyes: EOM and lids are normal. Pupils are equal, round, and reactive to light.   Neck: Trachea normal. Neck supple. Carotid bruit is not present.       Cardiovascular: Normal rate, regular rhythm and normal heart sounds.    Pulses:       Radial pulses are 2+ on the right side, and 2+ on the left side.        Dorsalis pedis pulses are 1+ on the right side, and 1+ on the left side.   Pulmonary/Chest: Effort normal. No accessory muscle usage. She is intubated (on mechanical ventilation). No respiratory distress. She has decreased breath sounds in the right middle field and the right lower field. She has rales.   Abdominal: Soft. Normal appearance. She exhibits no distension. Bowel sounds are decreased. There is no tenderness.   Genitourinary:   Genitourinary Comments: Flores in place   Musculoskeletal:        Right foot: There is no  deformity.        Left foot: There is no deformity.   Atrophy with +1 tibial edema   Lymphadenopathy:     She has no cervical adenopathy.   Neurological: She is alert. She displays atrophy.   Fully awake and nods head to questions and attempts to talk around OET.  Shoulder and LE strength 2/5 has NO movement to distal extremities X 4   Skin: Skin is warm and dry. Capillary refill takes less than 2 seconds. No rash noted. No cyanosis.        DTI to ischial and heels       Vents:  Vent Mode: A/C (07/18/18 0849)  Ventilator Initiated: Yes (07/09/18 1022)  Set Rate: 14 bmp (07/18/18 0849)  Vt Set: 380 mL (07/18/18 0849)  Pressure Support: 0 cmH20 (07/18/18 0849)  PEEP/CPAP: 5 cmH20 (07/18/18 0849)  Oxygen Concentration (%): 50 (07/18/18 0915)  Peak Airway Pressure: 20 cmH2O (07/18/18 0849)  Plateau Pressure: 18 cmH20 (07/18/18 0849)  Total Ve: 7.19 mL (07/18/18 0849)  F/VT Ratio<105 (RSBI): (!) 54.64 (07/18/18 0849)    Lines/Drains/Airways     Central Venous Catheter Line                 Hemodialysis Catheter 07/09/18 2100 right internal jugular 8 days         Percutaneous Central Line Insertion/Assessment - triple lumen  07/14/18 1445 left femoral vein 3 days          Drain                 Urethral Catheter 07/05/18 1114 12 days         NG/OG Tube 07/13/18 1020 [place] sump Left mouth 5 days          Airway                 Airway - Non-Surgical 07/09/18 1005 Endotracheal Tube 9 days          Arterial Line                 Arterial Line 07/13/18 1045 Right Radial 4 days          Pressure Ulcer                 Pressure Injury 06/02/18 Right lateral Heel Stage 3 46 days         Pressure Injury 06/02/18 1659 Left proximal Ischial tuberosity Stage 3 45 days         Pressure Injury 06/02/18 1902 Right medial Heel Unstageable 45 days          Peripheral Intravenous Line                 Peripheral IV - Single Lumen 07/06/18 2315 Right Forearm 11 days         Peripheral IV - Single Lumen 07/09/18 0710 Right Antecubital 9 days                 Significant Labs:    CBC/Anemia Profile:    Recent Labs  Lab 07/17/18  0327 07/18/18  0345   WBC 17.10* 19.10*   HGB 7.6* 8.2*   HCT 24.3* 25.4*   PLT 92* 92*   MCV 81* 80*   RDW 19.9* 18.7*        Chemistries:    Recent Labs  Lab 07/17/18  0327 07/18/18  0345    145   K 4.0 3.5    106   CO2 32* 32*   BUN 28* 24*   CREATININE 0.7 0.7   CALCIUM 9.1 9.0   ALBUMIN 4.2 4.1   PROT 4.9* 4.9*   BILITOT 0.9 1.3*   ALKPHOS 60 83   ALT 32 36   AST 31 35   MG 2.2 2.0   PHOS 2.5* 3.0       ABGs:   Recent Labs  Lab 07/18/18  0529   PH 7.475*   PCO2 43.0   HCO3 31.7*   POCSATURATED 91*   BE 8     All pertinent labs within the past 24 hours have been reviewed.    Significant Imaging:  CXR: I have reviewed all pertinent results/findings within the past 24 hours and my personal findings are:  increased right pleural effusion

## 2018-07-18 NOTE — ASSESSMENT & PLAN NOTE
- Currently has [facility] (PT/OT/nurse)  - secondary to NMO  - continue PT/OT

## 2018-07-18 NOTE — PLAN OF CARE
Problem: Patient Care Overview  Goal: Plan of Care Review  Patient currently requires total assist x 2 for bed mobility and total assist for sitting balance at EOB.   Outcome: Ongoing (interventions implemented as appropriate)  Pt went for trach and PEG placement today, Surgery went well. Drgs clean. Trach secured to vent, PEG draining to gravity. Scant green/brown drainage in bag. Daughter at bedside. Levo infusing. POC reviewed

## 2018-07-18 NOTE — ASSESSMENT & PLAN NOTE
I/O balance still positive  Pleural effusion increased on right possible thoracentesis  Cont Lasix  Support nutrition  Accurate I/Os and daily weights

## 2018-07-18 NOTE — PROGRESS NOTES
Tried to get NIF on patient this morning several times, but unable to get any value at all. Patient has no effort. Findings reported to RIGOBERTO Chow.

## 2018-07-18 NOTE — OP NOTE
Operative Note       SURGERY DATE:  7/18/2018     PRE-OP DIAGNOSIS:  Acute respiratory failure with hypoxia and hypercarbia [J96.01, J96.02]    POST-OP DIAGNOSIS:  Acute respiratory failure with hypoxia and hypercarbia [J96.01, J96.02]    Procedure(s) (LRB):  TRACHEOSTOMY (N/A)  EGD, WITH PEG TUBE INSERTION (N/A)    Surgeon(s) and Role:     * Louis O. Jeansonne IV, MD - Primary    ASSISTANT:  None    TASKS PERFORMED BY ASSISTANT:  No assistant    ANESTHESIA: General    FINDINGS: Tracheostomy above lisa    GRAFTS/IMPLANTS:  Size 8 Tracheostomy, 20 Sinhala PEG tube    ESTIMATED BLOOD LOSS: 5 mL              COMPLICATIONS:  None    SPECIMEN:  None    DESCRIPTION OF PROCEDURE:    The patient was placed under general anesthesia.  Pre-op antibiotics were given within an hour before the incision and SCD's were placed for DVT prophylaxis.    The neck and abdomen were prepped in sterile fashion.  Local anesthesia was applied with lidocaine.  A 1 cm incision was made in the midline of the neck between the sternal notch and the cricoid cartilage.  A bronchoscopy was performed by the anesthesiologist.  The endotracheal tube balloon was deflated and the tube was backed out to a position just below the vocal cords under direct vision.  The trachea and the cricoid cartilage were palpated and an angiocath was inserted at the level of the second tracheal ring.  Under direct bronchoscopic visualization, a guidewire was inserted, the angiocath was removed, and the trachea was dilated over the guidewire to the diameter of a size 8 tracheostomy tube.  The tracheostomy tube was then inserted over a dilator.  The guidewire and dilator were removed, the inner canula was inserted, and the tracheostomy balloon was inflated.  The trach was connected to the ventilator tubing and end-tidal CO2 was confirmed.  The endotracheal tube was removed, and a bronchoscopy was performed through the trach, which confirmed its position above the lisa.   The trach was suture in place with 2-0 prolene sutures and secured with a fabric collar.    Attention was then turned to the abdomen.  An EGD was performed; the esophagus, stomach, and duodenum were normal in appearance.  The stomach was inflated.  A suitable position on the abdomen was chosen based on transillumination and direct palpation.  An angiocath was inserted under direct visualization.  A guidewire was inserted through the angiocath, and was grasped with the snare.  It was brought out through the mouth and attached to the end of the PEG tube.  Traction was then placed on the guidewire on the abdominal side, bringing the PEG out through the abdominal wall.  The disc of the PEG was visualized in the stomach and noted to be snug but able to freely rotate.  A sterile dressing was applied.    The patient tolerated the procedure well and was taken to PACU in stable condition.            CONDITION: Good    DISPOSITION: PACU - hemodynamically stable.

## 2018-07-18 NOTE — ASSESSMENT & PLAN NOTE
Compensated as of 7/3/18  - BNP 1100  - CXR with vascular congestion with small bilateral effusion  - Mild BLE edema upon assessment but diminished breath sounds noted  - 2D ECHO on 06/02/18 with EF 35% and pulmonary HTN  - Pt received Bumex 2 mg IVP x 1 in ED  - Bumex 2 mg IVP daily====> changed to Bumex 2 mg twice daily 7/2/18  - Continue Metoprolol  7/10  Compensated   7/12  Compensated   7/17  Right sided effusion plan to iv diuresis .   07/18   await possible thoracocentesis

## 2018-07-18 NOTE — ASSESSMENT & PLAN NOTE
Neuro following  Plasmaphoresis completed 6 treatments yesterday  Starting Cellcept today per Neuro   Cont supportive care, will need long term support and therapy  OT/PT following  Planning LTAC post Trach/PEG today - surgery following

## 2018-07-18 NOTE — ASSESSMENT & PLAN NOTE
Cards following  Resume Xarelto post Trach/PEG and VasCath removed  Cont Amiodarone and cardiac monitoring.  1 dose Lopressor IV  Has PPM

## 2018-07-18 NOTE — PROGRESS NOTES
The risks of tracheostomy and PEG tube including bleeding, infection, loss of airway, organ injury were explained to the patient's daughter and she verbalized understanding.  The nature of the patient's condition, probability of success, risks of refusing treatment, and alternatives and risks of the alternatives were also explained.

## 2018-07-18 NOTE — TRANSFER OF CARE
"Anesthesia Transfer of Care Note    Patient: Carlie Valdez    Procedure(s) Performed: Procedure(s) (LRB):  TRACHEOSTOMY (N/A)  EGD, WITH PEG TUBE INSERTION (N/A)    Patient location: ICU    Anesthesia Type: general    Transport from OR: Transported from OR intubated on 100% O2 by AMBU with adequate controlled ventilation    Post pain: adequate analgesia    Post assessment: no apparent anesthetic complications and tolerated procedure well    Post vital signs: stable (return to baseline)    Level of consciousness: sedated and lethargic    Nausea/Vomiting: no nausea/vomiting    Complications: none    Transfer of care protocol was followed      Last vitals:   Visit Vitals  BP (!) 63/47   Pulse 80   Temp 36.8 °C (98.3 °F) (Oral)   Resp 19   Ht 5' 4" (1.626 m)   Wt 78 kg (171 lb 15.3 oz)   SpO2 100%   Breastfeeding? No   BMI 29.52 kg/m²     "

## 2018-07-18 NOTE — PLAN OF CARE
07/18/18 0907   Discharge Reassessment   Assessment Type Discharge Planning Reassessment   Provided patient/caregiver education on the expected discharge date and the discharge plan No   Do you have any problems affording any of your prescribed medications? No   Discharge Plan A Long-term acute care facility (LTAC)   Discharge Plan B Long-term acute care facility (LTAC)   Patient choice form signed by patient/caregiver Yes   Can the patient answer the patient profile reliably? No, cognitively impaired   How does the patient rate their overall health at the present time? Fair   Describe the patient's ability to walk at the present time. Does not walk or unable to take any steps at all   How often would a person be available to care for the patient? Whenever needed   Number of comorbid conditions (as recorded on the chart) Five or more   During the past month, has the patient often been bothered by feeling down, depressed or hopeless? No   During the past month, has the patient often been bothered by little interest or pleasure in doing things? No

## 2018-07-18 NOTE — ANESTHESIA POSTPROCEDURE EVALUATION
"Anesthesia Post Evaluation    Patient: Carlie Valdez    Procedure(s) Performed: Procedure(s) (LRB):  TRACHEOSTOMY (N/A)  EGD, WITH PEG TUBE INSERTION (N/A)    Final Anesthesia Type: general  Patient location during evaluation: ICU  Patient participation: Yes- Able to Participate  Level of consciousness: sedated  Post-procedure vital signs: reviewed and stable  Pain management: adequate  Airway patency: patent  PONV status at discharge: No PONV  Anesthetic complications: no      Cardiovascular status: hemodynamically stable  Respiratory status: unassisted  Hydration status: euvolemic    Comments: Transferred to ICU in guarded condition        Visit Vitals  BP (!) 145/117   Pulse 69   Temp 37.2 °C (98.9 °F) (Oral)   Resp 18   Ht 5' 4" (1.626 m)   Wt 78 kg (171 lb 15.3 oz)   SpO2 95%   Breastfeeding? No   BMI 29.52 kg/m²       Pain/Emiliana Score: Pain Assessment Performed: Yes (7/18/2018  5:30 PM)  Presence of Pain: non-verbal indicators absent (7/18/2018  5:30 PM)      "

## 2018-07-19 PROBLEM — J98.11 ATELECTASIS OF BOTH LUNGS: Status: ACTIVE | Noted: 2018-01-01

## 2018-07-19 NOTE — PLAN OF CARE
Problem: Patient Care Overview  Goal: Plan of Care Review  Patient currently requires total assist x 2 for bed mobility and total assist for sitting balance at EOB.   Outcome: Ongoing (interventions implemented as appropriate)  Pt continues to be on mechanical ventilation. Trach is secure, intact, and patent. No significant changes to note at this time.

## 2018-07-19 NOTE — PROGRESS NOTES
Trach care done-some bleeding noted. Site cleaned and dried. Inner cannula changed. HME replaced. Gauze sponge placed under trach. Sutures are still intact. Trach ties changed as well.

## 2018-07-19 NOTE — PROGRESS NOTES
[facility]  Critical Care Medicine  Progress Note    Patient Name: Carlie Valdez  MRN: [id number]  Admission Date: 6/30/2018  Hospital Length of Stay: 19 days  Code Status: Full Code  Attending Provider: Denae Infante MD  Primary Care Provider: Johanna Guzman MD   Principal Problem: Neuromyelitis optica spectrum disorder    Subjective:     HPI:  72 year old female with a PMHx of p[revious cerebral vascular accident with right sided weakness and aphasia, HTN, GERD, DM, A-fib, Cardiac Asthma, and Defibrillator who presented from home with c/o chest pain to Emergency Room on 6/30/2018 folllwing recent discharge from [facility]. Admitted for acute on chronic systolic Congestive Heart failure and progressively worsened. Noted to have worsening mental status and shortness of breath and transferrrd to ICU for hypercapnic respiratory failure. Placed on Noninvasive Positive Pressure Ventilation     Hospital/ICU Course:  7/5 Transferred to ICU. Noninvasive Positive Pressure Ventilation initiated , may need intubation  7/6 Improved ventilation with Noninvasive Positive Pressure Ventilation but now generalized weakness. Steroids started for respiratory failure. Lumbar puncture later today.  7/7 Respiratory status stable. Labile blood pressure. Started on high does steroids  7/8 worsening respiraoty status with elevated  PCO2  7/9 patient seen and examined, chest x-ray and ABG reviewed, daughter Haley was called, intubated for respiratory distress and tachypnea.  7/10 patient seen and examined, chest x-ray and ABG reviewed.  She had a Vas-Cath placed overnight.  Off high-dose steroid.  Plasmapheresis today.  7/11 patient seen and examined.  Chest x-ray and ABG reviewed.  Plasma exchange day 2.  Tolerating enteral feeding.  Hypotensive, started on Levophed drip.  7/12 seen and examined. Intubated, sedated with fentanyl gtt. No pressors . Mental status improved sp Plasmapheresis x 2 days.  Tolerating enteral feeding.   7/13 patient seen and examined, remains intubated, on fentanyl drip for sedation.  Following commands attempting to move extremities.  7/14 patient seen and examined, remains intubated, fentanyl drip for sedation.  Following commands.  Unable to move extremities.  Plasmapheresis today.  ABG and chest x-ray reviewed.  7/15 - Resting on vent and low dose Levophed infusion without sedation comfortable and in no distress.  Chencho TF and nods head to questions fully awake.  No neuromuscular improvement.  7/16 - still resting on vent and on low dose Levophed due to persistently labile BP.  Chencho TF and receiving 5th Plasmaphoresis now.  She acknowledges her vision is improved but no improvement in muscle strength of extrem.   7/17 - Had 6th plasmaphoresis today and reportedly no more planned.  No neuro change last 24 hours on exam.  chencho TF.  Still on low dose Levophed infusion.  Plan Trach and PEG in AM.  7/18 - resting on vent without distress intubated.  BP still labile on low dose Levophed infusion.  Chencho TF.  Increased right pleural effusion.  Planning Trach/PEG.  Completed Plasmaphoresis.  Occasional non-sustained A-Fib this AM.  NIF undetectable.    07/19: SP PEG, repeat CXR and US chest doesn't show effusion: thoracentesis cancelled    Interval History:     Awake, smiles    Review of Systems   Unable to perform ROS: Intubated       Objective:     Vital Signs (Most Recent):  Temp: 98.9 °F (37.2 °C) (07/19/18 0700)  Pulse: 96 (07/19/18 0920)  Resp: 20 (07/19/18 0920)  BP: (!) 111/96 (07/19/18 0730)  SpO2: (!) 92 % (07/19/18 0920) Vital Signs (24h Range):  Temp:  [97.8 °F (36.6 °C)-99 °F (37.2 °C)] 98.9 °F (37.2 °C)  Pulse:  [] 96  Resp:  [7-29] 20  SpO2:  [92 %-100 %] 92 %  BP: ()/() 111/96  Arterial Line BP: ()/(48-95) 120/68     Weight: 74 kg (163 lb 2.3 oz)  Body mass index is 28 kg/m².      Intake/Output Summary (Last 24 hours) at 07/19/18 1100  Last data filed at  07/19/18 0600   Gross per 24 hour   Intake           511.41 ml   Output              955 ml   Net          -443.59 ml       Physical Exam   Constitutional: She appears well-developed. She is cooperative.  Non-toxic appearance. She does not have a sickly appearance. She appears ill. No distress. Intubated: on mechanical ventilation.       HENT:   Head: Normocephalic and atraumatic.   Mouth/Throat: Oropharynx is clear and moist and mucous membranes are normal.   Eyes: EOM and lids are normal. Pupils are equal, round, and reactive to light.   Neck: Trachea normal. Neck supple. Carotid bruit is not present.       Cardiovascular: Normal rate, regular rhythm and normal heart sounds.    Pulses:       Radial pulses are 2+ on the right side, and 2+ on the left side.        Dorsalis pedis pulses are 1+ on the right side, and 1+ on the left side.   Pulmonary/Chest: Effort normal. No accessory muscle usage. Intubated: on mechanical ventilation. No respiratory distress. She has decreased breath sounds in the right middle field and the right lower field. She has no wheezes. She has no rales.   Abdominal: Soft. Normal appearance. She exhibits no distension. Bowel sounds are decreased. There is no tenderness.   Genitourinary:   Genitourinary Comments: Flores in place   Musculoskeletal: She exhibits no edema.        Right foot: There is no deformity.        Left foot: There is no deformity.   Atrophy with +1 tibial edema   Lymphadenopathy:     She has no cervical adenopathy.   Neurological: She is alert. She displays atrophy.   Fully awake and nods head to questions and attempts to talk around OET.  Shoulder and LE strength 2/5 has NO movement to distal extremities X 4   Skin: Skin is warm and dry. Capillary refill takes less than 2 seconds. No rash noted. No cyanosis.        DTI to ischial and heels   Nursing note and vitals reviewed.      Vents:  Vent Mode: A/C (07/19/18 0920)  Ventilator Initiated: Yes (07/09/18 1022)  Set Rate:  14 bmp (07/19/18 0920)  Vt Set: 380 mL (07/19/18 0920)  Pressure Support: 0 cmH20 (07/19/18 0920)  PEEP/CPAP: 5 cmH20 (07/19/18 0920)  Oxygen Concentration (%): 50 (07/19/18 0920)  Peak Airway Pressure: 27 cmH2O (07/19/18 0920)  Plateau Pressure: 0 cmH20 (07/19/18 0920)  Total Ve: 7.75 mL (07/19/18 0920)  F/VT Ratio<105 (RSBI): (!) 53.76 (07/19/18 0920)    Lines/Drains/Airways     Central Venous Catheter Line                 Hemodialysis Catheter 07/09/18 2100 right internal jugular 9 days         Percutaneous Central Line Insertion/Assessment - triple lumen  07/14/18 1445 left femoral vein 4 days          Drain                 Urethral Catheter 07/05/18 1114 13 days         Gastrostomy/Enterostomy 07/18/18 1649 Percutaneous endoscopic gastrostomy (PEG) LUQ feeding less than 1 day          Airway                 Surgical Airway 07/18/18 1620 Shiley less than 1 day          Arterial Line                 Arterial Line 07/13/18 1045 Right Radial 6 days          Pressure Ulcer                 Pressure Injury 06/02/18 Right lateral Heel Stage 3 47 days         Pressure Injury 06/02/18 1659 Left proximal Ischial tuberosity Stage 3 46 days         Pressure Injury 06/02/18 1902 Right medial Heel Unstageable 46 days          Peripheral Intravenous Line                 Peripheral IV - Single Lumen 07/06/18 2315 Right Forearm 12 days         Peripheral IV - Single Lumen 07/09/18 0710 Right Antecubital 10 days                Significant Labs:    CBC/Anemia Profile:    Recent Labs  Lab 07/18/18 0345 07/19/18  0348   WBC 19.10* 16.03*  16.03*   HGB 8.2* 9.2*  9.2*   HCT 25.4* 28.2*  28.2*   PLT 92* 104*  104*   MCV 80* 81*  81*   RDW 18.7* 19.2*  19.2*        Chemistries:    Recent Labs  Lab 07/18/18  0345 07/19/18  0348    145   K 3.5 4.0    106   CO2 32* 26   BUN 24* 25*   CREATININE 0.7 0.8   CALCIUM 9.0 9.4   ALBUMIN 4.1 4.0   PROT 4.9* 5.7*   BILITOT 1.3* 1.6*   ALKPHOS 83 105   ALT 36 30   AST 35 23   MG  2.0 2.0  2.0   PHOS 3.0 3.9  3.9       ABGs:   Recent Labs  Lab 07/18/18 2046   PH 7.509*   PCO2 36.5   HCO3 29.1*   POCSATURATED 96   BE 6     All pertinent labs within the past 24 hours have been reviewed.    Significant Imaging:  I have reviewed and interpreted all pertinent imaging results/findings within the past 24 hours.     Repeat CXR shows   A tracheostomy tube remains in place.  A right subclavian venous line remains in place.  A cardiac pacemaker remains in place.  The borders of the heart are not well seen.  There is a moderate amount of interstitial and alveolar opacities seen in both lungs.  There is no pneumothorax.  The costophrenic angles are sharp.  The humeral heads are subluxed inferiorly.   Impression       1. The borders of the heart are not well seen. There is a moderate amount of interstitial and alveolar opacities seen in both lungs.  This is characteristic of pulmonary edema.  2. A tracheostomy tube remains in place. A right subclavian venous line remains in place. A cardiac pacemaker remains in place.  3. The humeral heads are subluxed inferiorly.         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, S REFLEX    Neuro following  Plasmaphoresis completed 6 treatments   Start Cellcept today per Neuro   Cont supportive care, will need long term support and therapy  OT/PT following  Planning LTAC post Trach/PEG: accepted at [facility]         Derm   Skin breakdown    Turn Q 2 hours   Foot drop boots and elevating heels  Wound care following and on low flow mattress        Pulmonary   Atelectasis of both lungs    Chest PT  Bronchodilators        Acute respiratory failure with hypoxia and hypercarbia    NIF daily, still undetectable   Vent settings reviewed and adjusted  SBT once stronger  SP  Trach/PEG   TV 20-30mls on Spontaneous mode  VAP prophylaxis  Chest PT          Cardiac/Vascular   Chronic atrial fibrillation    Cards  following  Resume Xarelto post Trach/PEG for PM   VasCath removal per Vascilar  Cont Amiodarone 200mg  and cardiac monitoring.    IV Lopressor 5 mg Q6  Has PPM        Acute on chronic systolic heart failure    I/O balance still positive  Cont Lasix PO and additional IV given  Support nutrition  Accurate I/Os and daily weights        Renal/   Electrolyte imbalance    Replace electrolytes as needed  Daily CMP  Replace K+ today        Oncology   Leukocytosis    Afeb and recent cultures NGTD  Follow fever curve and re-culture and IVAB if spikes  CBC daily        Endocrine   Hypothyroidism    Cont Levothyroxine        Vitamin D deficiency    Cont Ergocalciferol weekly        Orthopedic   Severe muscle deconditioning    Cont PT/OT and ROM  Support nutrition with TF  Foot drop boots        Other   Labile blood pressure    Continuous titration of Levophed infusion  ICU hemodynamic monitoring  Arterial line in place           Critical Care Daily Checklist:    A: Awake: RASS Goal/Actual Goal: RASS Goal: 0-->alert and calm  Actual: Guerrero Agitation Sedation Scale (RASS): Alert and calm   B: Spontaneous Breathing Trial Performed? Spon. Breathing Trial Initiated?: Not initiated (trach & peg today) (07/18/18 0830)   C: SAT & SBT Coordinated?  YES                      D: Delirium: CAM-ICU Overall CAM-ICU: Positive   E: Early Mobility Performed? Yes   F: Feeding Goal: Goals: Diet advancement or initiate nutrition support by next RD visit  Status: Nutrition Goal Status: new   Current Diet Order   Procedures    Diet NPO      AS: Analgesia/Sedation N/a   T: Thromboembolic Prophylaxis YES   H: HOB > 300 Yes   U: Stress Ulcer Prophylaxis (if needed) yes   G: Glucose Control SSI   B: Bowel Function Stool Occurrence: 0   I: Indwelling Catheter (Lines & Flores) Necessity YES: vascath   D: De-escalation of Antimicrobials/Pharmacotherapies No    Plan for the day/ETD mobilise    Code Status:  Family/Goals of Care: Full Code  Await  placement     Critical Care Time: 38 minutes  Critical secondary to Patient has a condition that poses threat to life and bodily function: RESPIRATORY FAILURE      Critical care was time spent personally by me on the following activities: development of treatment plan with patient or surrogate and bedside caregivers, discussions with consultants, evaluation of patient's response to treatment, examination of patient, ordering and performing treatments and interventions, ordering and review of laboratory studies, ordering and review of radiographic studies, pulse oximetry, re-evaluation of patient's condition. This critical care time did not overlap with that of any other provider or involve time for any procedures.     Rivas Lyman MD  Critical Care Medicine  [facility]

## 2018-07-19 NOTE — ASSESSMENT & PLAN NOTE
I/O balance still positive  Cont Lasix PO and additional IV given  Support nutrition  Accurate I/Os and daily weights

## 2018-07-19 NOTE — PHYSICIAN QUERY
PT Name: Carlie Valdez  MR #: [id number]     Physician Query Form - Documentation Clarification      CDS/: Vanesa Carnes                 Contact information:[email or web address]    This form is a permanent document in the medical record.     Query Date: July 19, 2018    By submitting this query, we are merely seeking further clarification of documentation. Please utilize your independent clinical judgment when addressing the question(s) below.    The Medical record reflects the following:    Supporting Clinical Findings Location in Medical Record   Renal failure, hepatic failure, CNS failure  or compromise, endocrine crisis, dehydration, cardiac failure, circulatory failure and shock.    Pre-op Diagnosis:     shock      Critical Care Medicine note 7-15           Procedure note 7-13     Acute on chronic systolic heart failure    7/5 start IV lasix   7/6 continue IV lasix   7/8 worsening Chest X Ray with pleural effusion on the right , increase lasix to BID   7/9 negative 2.9 liters . Cont IV Lasix 40 mg q 12 hrs .   7/10 -3 0.5 L since admission.  Improving oxygen requirements.  Creatinine in and serum bicarb increasing.  Will discontinue Lasix.   7/11 no evidence of fluid overload.  Keep off Lasix.  Hypotensive.  Started on Levophed drip keep map above 65 mm of mercury.        7/16 - still resting on vent and on low dose Levophed due to persistently labile BP     Critical care medicine note 7-11 and 7-19                                                                            Doctor, Please specify diagnosis or diagnoses associated with above clinical findings.    Please further specify the type of Shock.    Provider Use Only      [    x  ]  Cardiogenic      [      ]  Hypovolemic      [      ]  Other shock                                                                                                                 [  ] Clinically undetermined

## 2018-07-19 NOTE — PLAN OF CARE
Problem: Patient Care Overview  Goal: Plan of Care Review  Patient currently requires total assist x 2 for bed mobility and total assist for sitting balance at EOB.   Outcome: Ongoing (interventions implemented as appropriate)  Pt assessed for a thoracentesis today but MD decided against procedure. Pt verbalizing thoughts through lip reading. Levo gtt off and mididrine given. Pt restarted on tube feeds. No change in neuromuscular ability. Vascular to come remove vascath tonight. Pt to go to [facility] tomorrow

## 2018-07-19 NOTE — PLAN OF CARE
Problem: Patient Care Overview  Goal: Plan of Care Review  Patient currently requires total assist x 2 for bed mobility and total assist for sitting balance at EOB.   Outcome: Ongoing (interventions implemented as appropriate)  Patient alert, nods appropriately. Trach in place, patient tolerating well. PEG tube draining to gravity; dressing clean, dry and intact. Significant changes in VS during position changes, pillow used for position changes every 2 hours. Levophed gtt continued. Patient exhibited non-verbal signs of discomfort and nodded appropriately indicating pain at trach site; eICU notified, PRN Tramadol ordered and administered. Plan of care discussed with daughter and patient. Will continue to monitor.

## 2018-07-19 NOTE — ASSESSMENT & PLAN NOTE
Neuro following  Plasmaphoresis completed 6 treatments   Start Cellcept today per Neuro   Cont supportive care, will need long term support and therapy  OT/PT following  Planning LTAC post Trach/PEG: accepted at [facility]

## 2018-07-19 NOTE — DISCHARGE SUMMARY
[facility] Hospital Medicine  Discharge Summary      Patient Name: Carlie Valdez  MRN: [id number]  Admission Date: 6/30/2018  Hospital Length of Stay: 19 days  Discharge Date and Time:  07/19/2018 11:05 AM  Attending Physician: Denae Infante MD   Discharging Provider: Denae Infante MD  Primary Care Provider: Johanna Guzman MD      HPI:   Carlie Valdez is a 72 year old female with a PMHx of HTN, GERD, DM, A-fib, Asthma, and Defibrillator who presented from home with c/o chest pain. Located to right side with no radiation. Associated symptoms: SOB. Pt and sitters at bedside report she was recently discharged from [facility] and pt hasn't had her home medications since being discharged. ED workup revealed: Hgb/Hct 11.2/34.7, Alk phos 152, BNP 1100, troponin 0.028. UA (+) nitrites. CXR with central vascular congestion with small bilateral effusion. Primary cardiologist is Dr. Hernández.  Pt admitted to Telemetry for Acute on chronic CHF exacerbation and UTI.     Procedure(s) (LRB):  TRACHEOSTOMY (N/A)  EGD, WITH PEG TUBE INSERTION (N/A)      Hospital Course:   Pt admitted for decompensated heart failure and UTI. Diuresis with Bumex and IV Rocephin in progress. Pt with physical debility and PT/OT consulted. Dr. Joiner had lengthy discussion with daughter, Haley, and patient in reference to generalized weakness and proper disposition. SNF placement was agreed however, when  approach regarding SNF selection patient refused. Introduce Hospice to patient and advised she was not a candidate for Rehab due to her extent of weakness. Pt stated she would discharge home with sitters and home health services. 7/2/18 - Dr. Joiner has spoken with pt's daughters, Haley and Rosy, regarding the mother's condition. She has diuresed and diuretics changed to oral. She was medically stable for discharge. SNF placement pending. PT/OT in progress. 7/3/18 - It was noted  that pt has refused participation in PT/OT. She appears grossly more weak in the BUE/BLE extremities. Family notified of condition. Granddaughter at bedside mentioned similar symptoms prior to admission. Neurology consult placed, ESR/CRP pending, CT Head and C spine pending. At 4th day, CT of Head and C spine found no concerning findings. ESR and CRP elevated although no concern for vasculitis. Neurology saw the patient and recommended LP to rule out GBs or CIDP. Xarelto placed on hold and LP by IR is pending as Xarelto needs to be on hold for 3 days. Speech therapy evaluated the patient and noted inconsistent dysphonia. The patient will voice normally then start mouthing words. Diet recommendations:  Mechanical soft, Thin. On 6th day, pt noted to have acute onset of metabolic encephalopathy. Pt more somnolent, respiration shallow. She would arouse with sternal rub but return to lethargy. Repeat CT of Head was negative, ammonia slightly elevated at 61 and ABG showed acidosis with hypoxic/hypercapneic respiratory failure.   07/06/18 - presently on Bipap for respiratory acidosis , Lumbar puncture done today 06/07/18 07/07- Diagnosed with NMO at [facility] Sol[facility]  1000 mg/day for 5 days per neurology   07/08- possible aspiration overnight , follow up CXR new right pleural effusion , continue Bipap support   07/09 patient was intubated in morning continue to have respiratory distress and no improvement in neurological status   07/10  Started on plasma exchange series for nmo . As per neurology recommendation .  No improvement in mental status . Family updated on plan   07/11  Patient had first cycle of plasma exchange yesterday . Now can follow simple commands .continue to be on vent and with plasma exchange . Hold bp meds due to hypotension   07/12  Patient having plasma exchange. Plan for  trach and peg family and patient agreed   07/13  Patient was seen and examined today . . In morning patient was responsive as per  family following commands . Later in the day patient unintentionally received fentanyl . She is currently not responsive intubated on vent. Neurology did stat eeg . Will continue monitor and assess patient once fentanyl wears off   07/13  Patient getting plasma pheresis for nmo . Patient hypotensive was given bolus . She was hypertensive last night received hydralazine . Waiting on labs for nmo antibodies   07/15  Patient showed improvement in the mental status . Getting last plasma pheresis tomorrow and trach/peg on Tuesday. Case management for ltac placement   07/16  plasmaphersis done today . Last day . Waiting on trach and peg . Plan for ltac placement . Will wait on neurology recommendation regarding any immunosuppressive needed for nmo  07/17   Patient have one more cycle  Of plasmapheresis original plan was to do 5 cycles(standard of care for NMO) but extra cycle was added because patient showed improvement in her mental status . Now she can follow commands .she understand the plan for trach and peg than discharge to LTAC. Waiting on surgery possible trach and peg tomorrow . She also have right sided pleural effusion on diuretics   07/18  Trach/peg placement pending        Consults:   Consults         Status Ordering Provider     Inpatient consult to Cardiology  Once     Provider:  Israel David MD    Completed CATALINO GALLOWAY     Inpatient consult to General Surgery  Once     Provider:  Aly Urbano MD    Completed BOBBY CORONA     Inpatient consult to Hospitalist  Once     Provider:  Subhash Ayala MD    Acknowledged JOSELINE DÍAZ     Inpatient consult to Neurology  Once     Provider:  Mario Daniels MD    Completed CATALINO GALLOWAY     Inpatient consult to [facility]  Once     Provider:  (Not yet assigned)    Completed SANTINO GROVER     Inpatient consult to Pulmonology  Once     Provider:  James Meier MD    Acknowledged CATALINO GALLOWAY     Inpatient consult to  Registered Dietitian/Nutritionist  Once     Provider:  (Not yet assigned)    Completed RAYRAY PRIDE     Inpatient consult to Social Work  Once     Provider:  (Not yet assigned)    Completed FRANCISCO LOPEZ     Inpatient consult to Social Work  Once     Provider:  (Not yet assigned)    Completed CEE JO     Inpatient consult to Social Work  Once     Provider:  (Not yet assigned)    Completed CATALINO GALLOWAY     Inpatient consult to Vascular Surgery  Once     Provider:  MD Foreign Bonilla MUHAMMAD A.          No new Assessment & Plan notes have been filed under this hospital service since the last note was generated.  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] 07/06/2018 Yes    Atelectasis of both lungs [J98.11] 07/18/2018 Yes    Chest pain [R07.9] 07/18/2018 Yes    Blisters of multiple sites [R23.8]  Unknown    Electrolyte imbalance [E87.8] 07/15/2018 No    Leukocytosis [D72.829] 07/15/2018 Yes    Weakness generalized [R53.1] 07/06/2018 Yes    Acute respiratory failure with hypoxia and hypercarbia [J96.01, J96.02] 07/05/2018 Yes    Pressure ulcer of right heel, stage 3 [L89.613] 07/05/2018 Yes    Pressure ulcer of right buttock, stage 2 [L89.312] 07/05/2018 Yes    Acute on chronic systolic heart failure [I50.23] 06/30/2018 Yes    Severe muscle deconditioning [R29.898] 06/30/2018 Yes    Skin breakdown [L90.9] 06/30/2018 Yes    Left ischial pressure sore, stage III [L89.323] 06/04/2018 Yes    Hypothyroidism [E03.9] 06/03/2018 Yes     Chronic    Chronic kidney disease (CKD), stage III (moderate) [N18.3] 11/12/2016 Yes    Vitamin D deficiency [E55.9] 04/13/2016 Yes    Labile blood pressure [R09.89]  Yes    Chronic atrial fibrillation [I48.2] 08/14/2013 Yes     Chronic      Problems Resolved During this Admission:     Diagnosis Date Noted Date Resolved POA    Hypernatremia [E87.0] 07/11/2018 07/12/2018 Unknown    Encephalopathy [G93.40] 07/05/2018 07/15/2018 No    UTI (urinary tract infection) [N39.0] 06/30/2018 07/15/2018 Yes    Elevated troponin [R74.8] 11/12/2016 07/03/2018 Yes       Discharged Condition: good    Disposition: Rehab Facility    Follow Up:  Follow-up Information     Johanna Guzman MD.    Specialty:  Internal Medicine  Contact information:  [address]  [place] [address] [phone]                 Patient Instructions:   No discharge procedures on file.    Significant Diagnostic Studies: Labs:   BMP:   Recent Labs  Lab 07/18/18 0345 07/19/18 0348    86    145   K 3.5 4.0    106   CO2 32* 26   BUN 24* 25*   CREATININE 0.7 0.8   CALCIUM 9.0 9.4   MG 2.0 2.0  2.0   , CMP   Recent Labs  Lab 07/18/18 0345 07/19/18 0348    145   K 3.5 4.0    106   CO2 32* 26    86   BUN 24* 25*   CREATININE 0.7 0.8   CALCIUM 9.0 9.4   PROT 4.9* 5.7*   ALBUMIN 4.1 4.0   BILITOT 1.3* 1.6*   ALKPHOS 83 105   AST 35 23   ALT 36 30   ANIONGAP 7* 13   ESTGFRAFRICA >60 >60   EGFRNONAA >60 >60    and CBC   Recent Labs  Lab 07/18/18 0345 07/19/18 0348   WBC 19.10* 16.03*  16.03*   HGB 8.2* 9.2*  9.2*   HCT 25.4* 28.2*  28.2*   PLT 92* 104*  104*       Pending Diagnostic Studies:     Procedure Component Value Units Date/Time    Myelin basic protein, CSF [[id number]] Collected:  07/06/18 1500    Order Status:  Sent Lab Status:  In process Updated:  07/16/18 1540    Specimen:  CSF (Spinal Fluid) from Cerebrospinal Fluid     NMO AQUAPORIN-4-IGG, Serum [[id number]] Collected:  07/13/18 1515    Order Status:  Sent Lab Status:  In process Updated:  07/13/18 2126    Specimen:  Blood from Blood     Narrative:       Collection has been rescheduled by HEW at 7/13/2018 14:45 Reason:   Nurse will collect    US Chest Mediastinum [[id number]]     Order Status:  Sent Lab Status:  No result           Medications:  Reconciled Home Medications:      Medication List      START taking these medications    ergocalciferol 50,000 unit Cap  Commonly known as:  ERGOCALCIFEROL  Take 1 capsule (50,000 Units total) by mouth every 7 days.  Start taking on:  7/25/2018     furosemide 40 MG tablet  Commonly known as:  LASIX  Take 1 tablet (40 mg total) by mouth 2 (two) times daily.     midodrine 5 MG Tab  Commonly known as:  PROAMATINE  1 tablet (5 mg total) by Per G Tube route 3 (three) times daily.     norepinephrine bitartrate-D5W 4 mg/250 mL (16 mcg/mL) Soln  Inject 1.44 mcg/min into the vein continuous.        CHANGE how you take these medications    albuterol-ipratropium 2.5 mg-0.5 mg/3 mL nebulizer solution  Commonly known as:  DUO-NEB  Take 3 mLs by nebulization every 4 (four) hours as needed for Wheezing. Rescue  What changed:  · when to take this  · reasons to take this     amiodarone 200 MG Tab  Commonly known as:  PACERONE  Take 1 tablet (200 mg total) by mouth 2 (two) times daily.  What changed:  how much to take     bumetanide 2 MG tablet  Commonly known as:  BUMEX  Take 1 tablet (2 mg total) by mouth 2 (two) times daily. 1 Tablet Oral Every day  What changed:  · how much to take  · additional instructions     fluticasone 50 mcg/actuation nasal spray  Commonly known as:  FLONASE  2 sprays by Each Nare route once daily.  What changed:  · when to take this  · reasons to take this     polyethylene glycol 17 gram Pwpk  Commonly known as:  GLYCOLAX  Take 17 g by mouth once daily.  What changed:  · when to take this  · reasons to take this     silver sulfADIAZINE 1% 1 % cream  Commonly known as:  SILVADENE  Apply topically 2 (two) times daily. Apply to bliter areas BID  What changed:  additional instructions     sodium bicarb-sodium chloride Pack  1 packet by Nasal route 2 (two) times daily.  What changed:  · when to take this  · reasons to take this        CONTINUE taking these medications    albuterol 90  mcg/actuation inhaler  Inhale 1-2 puffs into the lungs every 6 (six) hours as needed.     bacitracin 500 unit/gram ointment  Apply topically once daily. Apply to R heel daily.     baclofen 10 MG tablet  Commonly known as:  LIORESAL  Take 10 mg by mouth 3 (three) times daily as needed.     brimonidine-timolol 0.2-0.5 % Drop  Commonly known as:  COMBIGAN  Place 1 drop into both eyes every 12 (twelve) hours.     diltiaZEM 300 MG 24 hr capsule  Commonly known as:  CARDIZEM CD  Take 300 mg by mouth once daily.     levothyroxine 100 MCG tablet  Commonly known as:  SYNTHROID  Take 100 mcg by mouth before breakfast.     magnesium oxide 400 mg tablet  Commonly known as:  MAG-OX  Take 400 mg by mouth 2 (two) times daily.     metoprolol succinate 25 MG 24 hr tablet  Commonly known as:  TOPROL-XL  Take 25 mg by mouth 2 (two) times daily. Hold for SBP less than or equal to 100.     montelukast 10 mg tablet  Commonly known as:  SINGULAIR  Take 10 mg by mouth once daily.     multivitamin per tablet  Commonly known as:  THERAGRAN  Take 1 tablet by mouth once daily.     ondansetron 4 MG tablet  Commonly known as:  ZOFRAN  Take 4 mg by mouth every 6 (six) hours as needed for Nausea.     pantoprazole 40 MG tablet  Commonly known as:  PROTONIX  Take 40 mg by mouth once daily.     potassium chloride SA 20 MEQ tablet  Commonly known as:  K-DUR,KLOR-CON  Take 20 mEq by mouth once daily.     rivaroxaban 15 mg Tab  Commonly known as:  XARELTO  Take 15 mg by mouth every evening.     traMADol 50 mg tablet  Commonly known as:  ULTRAM  Take 50 mg by mouth every 6 (six) hours as needed for Pain.     zinc sulfate 220 (50) mg capsule  Commonly known as:  ZINCATE  Take 220 mg by mouth once daily.            Indwelling Lines/Drains at time of discharge:   Lines/Drains/Airways     Central Venous Catheter Line                 Hemodialysis Catheter 07/09/18 2100 right internal jugular 9 days         Percutaneous Central Line Insertion/Assessment -  triple lumen  07/14/18 1445 left femoral vein 4 days          Drain                 Urethral Catheter 07/05/18 1114 13 days         Gastrostomy/Enterostomy 07/18/18 1649 Percutaneous endoscopic gastrostomy (PEG) LUQ feeding less than 1 day          Airway                 Surgical Airway 07/18/18 1620 Shiley less than 1 day          Arterial Line                 Arterial Line 07/13/18 1045 Right Radial 6 days          Pressure Ulcer                 Pressure Injury 06/02/18 Right lateral Heel Stage 3 47 days         Pressure Injury 06/02/18 1659 Left proximal Ischial tuberosity Stage 3 46 days         Pressure Injury 06/02/18 1902 Right medial Heel Unstageable 46 days                Time spent on the discharge of patient: 40 minutes  Patient was seen and examined on the date of discharge and determined to be suitable for discharge.        Denae Infante MD  Department of Hospital Medicine  [facility]

## 2018-07-19 NOTE — PLAN OF CARE
CM faxed over updated note since the patient has been off Levophed since 1130.  CM was by called and told by  Ethel (liaison) , Dr. Garcia ( their pulm) has refused to accept the patient at this point. After reviewing her care, He feels she needs to be off Levophed for 24 hr. They will review her again tomorrow. CM reported the above to GIULIANO Limon, Chg nurse Polly,  Dr. Infante , and  her Dtr

## 2018-07-19 NOTE — ASSESSMENT & PLAN NOTE
NIF daily, still undetectable   Vent settings reviewed and adjusted  SBT once stronger  SP  Trach/PEG   TV 20-30mls on Spontaneous mode  VAP prophylaxis  Chest PT

## 2018-07-19 NOTE — NURSING
Talked with Dr. Tapia- states he will pull vascath either this evening or tomorrow, can be done here or at [facility]- this should not delay pts transfer.  Also states it is ok to give Xarelto regardless of when vascath will be pulled.

## 2018-07-19 NOTE — SUBJECTIVE & OBJECTIVE
Interval History:     Awake, smiles    Review of Systems   Unable to perform ROS: Intubated       Objective:     Vital Signs (Most Recent):  Temp: 98.9 °F (37.2 °C) (07/19/18 0700)  Pulse: 96 (07/19/18 0920)  Resp: 20 (07/19/18 0920)  BP: (!) 111/96 (07/19/18 0730)  SpO2: (!) 92 % (07/19/18 0920) Vital Signs (24h Range):  Temp:  [97.8 °F (36.6 °C)-99 °F (37.2 °C)] 98.9 °F (37.2 °C)  Pulse:  [] 96  Resp:  [7-29] 20  SpO2:  [92 %-100 %] 92 %  BP: ()/() 111/96  Arterial Line BP: ()/(48-95) 120/68     Weight: 74 kg (163 lb 2.3 oz)  Body mass index is 28 kg/m².      Intake/Output Summary (Last 24 hours) at 07/19/18 1100  Last data filed at 07/19/18 0600   Gross per 24 hour   Intake           511.41 ml   Output              955 ml   Net          -443.59 ml       Physical Exam   Constitutional: She appears well-developed. She is cooperative.  Non-toxic appearance. She does not have a sickly appearance. She appears ill. No distress. Intubated: on mechanical ventilation.       HENT:   Head: Normocephalic and atraumatic.   Mouth/Throat: Oropharynx is clear and moist and mucous membranes are normal.   Eyes: EOM and lids are normal. Pupils are equal, round, and reactive to light.   Neck: Trachea normal. Neck supple. Carotid bruit is not present.       Cardiovascular: Normal rate, regular rhythm and normal heart sounds.    Pulses:       Radial pulses are 2+ on the right side, and 2+ on the left side.        Dorsalis pedis pulses are 1+ on the right side, and 1+ on the left side.   Pulmonary/Chest: Effort normal. No accessory muscle usage. Intubated: on mechanical ventilation. No respiratory distress. She has decreased breath sounds in the right middle field and the right lower field. She has no wheezes. She has no rales.   Abdominal: Soft. Normal appearance. She exhibits no distension. Bowel sounds are decreased. There is no tenderness.   Genitourinary:   Genitourinary Comments: Flores in place    Musculoskeletal: She exhibits no edema.        Right foot: There is no deformity.        Left foot: There is no deformity.   Atrophy with +1 tibial edema   Lymphadenopathy:     She has no cervical adenopathy.   Neurological: She is alert. She displays atrophy.   Fully awake and nods head to questions and attempts to talk around OET.  Shoulder and LE strength 2/5 has NO movement to distal extremities X 4   Skin: Skin is warm and dry. Capillary refill takes less than 2 seconds. No rash noted. No cyanosis.        DTI to ischial and heels   Nursing note and vitals reviewed.      Vents:  Vent Mode: A/C (07/19/18 0920)  Ventilator Initiated: Yes (07/09/18 1022)  Set Rate: 14 bmp (07/19/18 0920)  Vt Set: 380 mL (07/19/18 0920)  Pressure Support: 0 cmH20 (07/19/18 0920)  PEEP/CPAP: 5 cmH20 (07/19/18 0920)  Oxygen Concentration (%): 50 (07/19/18 0920)  Peak Airway Pressure: 27 cmH2O (07/19/18 0920)  Plateau Pressure: 0 cmH20 (07/19/18 0920)  Total Ve: 7.75 mL (07/19/18 0920)  F/VT Ratio<105 (RSBI): (!) 53.76 (07/19/18 0920)    Lines/Drains/Airways     Central Venous Catheter Line                 Hemodialysis Catheter 07/09/18 2100 right internal jugular 9 days         Percutaneous Central Line Insertion/Assessment - triple lumen  07/14/18 1445 left femoral vein 4 days          Drain                 Urethral Catheter 07/05/18 1114 13 days         Gastrostomy/Enterostomy 07/18/18 1649 Percutaneous endoscopic gastrostomy (PEG) LUQ feeding less than 1 day          Airway                 Surgical Airway 07/18/18 1620 Shiley less than 1 day          Arterial Line                 Arterial Line 07/13/18 1045 Right Radial 6 days          Pressure Ulcer                 Pressure Injury 06/02/18 Right lateral Heel Stage 3 47 days         Pressure Injury 06/02/18 1659 Left proximal Ischial tuberosity Stage 3 46 days         Pressure Injury 06/02/18 1902 Right medial Heel Unstageable 46 days          Peripheral Intravenous Line                  Peripheral IV - Single Lumen 07/06/18 2315 Right Forearm 12 days         Peripheral IV - Single Lumen 07/09/18 0710 Right Antecubital 10 days                Significant Labs:    CBC/Anemia Profile:    Recent Labs  Lab 07/18/18  0345 07/19/18  0348   WBC 19.10* 16.03*  16.03*   HGB 8.2* 9.2*  9.2*   HCT 25.4* 28.2*  28.2*   PLT 92* 104*  104*   MCV 80* 81*  81*   RDW 18.7* 19.2*  19.2*        Chemistries:    Recent Labs  Lab 07/18/18  0345 07/19/18  0348    145   K 3.5 4.0    106   CO2 32* 26   BUN 24* 25*   CREATININE 0.7 0.8   CALCIUM 9.0 9.4   ALBUMIN 4.1 4.0   PROT 4.9* 5.7*   BILITOT 1.3* 1.6*   ALKPHOS 83 105   ALT 36 30   AST 35 23   MG 2.0 2.0  2.0   PHOS 3.0 3.9  3.9       ABGs:   Recent Labs  Lab 07/18/18  2046   PH 7.509*   PCO2 36.5   HCO3 29.1*   POCSATURATED 96   BE 6     All pertinent labs within the past 24 hours have been reviewed.    Significant Imaging:  I have reviewed and interpreted all pertinent imaging results/findings within the past 24 hours.     Repeat CXR shows   A tracheostomy tube remains in place.  A right subclavian venous line remains in place.  A cardiac pacemaker remains in place.  The borders of the heart are not well seen.  There is a moderate amount of interstitial and alveolar opacities seen in both lungs.  There is no pneumothorax.  The costophrenic angles are sharp.  The humeral heads are subluxed inferiorly.   Impression       1. The borders of the heart are not well seen. There is a moderate amount of interstitial and alveolar opacities seen in both lungs.  This is characteristic of pulmonary edema.  2. A tracheostomy tube remains in place. A right subclavian venous line remains in place. A cardiac pacemaker remains in place.  3. The humeral heads are subluxed inferiorly.

## 2018-07-19 NOTE — PROGRESS NOTES
Seen briefly . Trach tube and Peg tube in place. She is moving eyes. Following command and tracking. No extremity movements. Vision is a little better . Will see tomorrow. Cellcept has been started.

## 2018-07-19 NOTE — ASSESSMENT & PLAN NOTE
Cards following  Resume Xarelto post Trach/PEG for PM   VasCath removal per Vascilar  Cont Amiodarone 200mg  and cardiac monitoring.    IV Lopressor 5 mg Q6  Has PPM

## 2018-07-19 NOTE — PROGRESS NOTES
General Surgery Progress Note    SUBJECTIVE:    No distress, trach functioning.    OBJECTIVE:    Vital signs and intake/output reviewed.     Physical Exam:   Trach site intact  Abdomen: appropriately tender to palpation, no peritoneal signs, PEG intact    Labs and images reviewed.      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]  Yes     - Neurology to evaluate      Atelectasis of both lungs [J98.11]  Yes    Chest pain [R07.9]  Yes    Blisters of multiple sites [R23.8]  Unknown    Electrolyte imbalance [E87.8]  No    Leukocytosis [D72.829]  Yes    Weakness generalized [R53.1]  Yes     7/6 Neurology to evaluate, lumbar puncture later today      Acute respiratory failure with hypoxia and hypercarbia [J96.01, J96.02]  Yes    Pressure ulcer of right heel, stage 3 [L89.613]  Yes    Pressure ulcer of right buttock, stage 2 [L89.312]  Yes    Acute on chronic systolic heart failure [I50.23]  Yes    Severe muscle deconditioning [R29.898]  Yes    Skin breakdown [L90.9]  Yes    Left ischial pressure sore, stage III [L89.323]  Yes    Hypothyroidism [E03.9]  Yes     Chronic    Chronic kidney disease (CKD), stage III (moderate) [N18.3]  Yes    Vitamin D deficiency [E55.9]  Yes    Labile blood pressure [R09.89]  Yes    Chronic atrial fibrillation [I48.2]  Yes     Chronic      Resolved Hospital Problems    Diagnosis Date Resolved POA    Hypernatremia [E87.0] 07/12/2018 Unknown    Encephalopathy [G93.40] 07/15/2018 No    UTI (urinary tract infection) [N39.0] 07/15/2018 Yes    Elevated troponin [R74.8] 07/03/2018 Yes        Routine trach/PEG care.     OK to use PEG.

## 2018-07-20 NOTE — NURSING
Pt discharged to [facility] ([place], [place]) via ambulance.  Report called to Saundra RN at [facility].  Notified pts daughter Haley of transfer.

## 2018-07-20 NOTE — PLAN OF CARE
Problem: Patient Care Overview  Goal: Plan of Care Review  Patient currently requires total assist x 2 for bed mobility and total assist for sitting balance at EOB.   Outcome: Ongoing (interventions implemented as appropriate)  No acute changes this shift.  To be discharged to LTAC on 7/20/18.  No pressor support x 24 hours.  Midorine given as ordered.  Suctioned for moderate amount of thick yellow sputum per trach. Afib with BBB on monitor. Tolerating PEG feedings well at goal rate.  Dressings to wounds CDI. Turned Q2H and on Evolution mattress for pressure to alternate Q3 minutes. Safety maintained.

## 2018-07-20 NOTE — DISCHARGE SUMMARY
[facility] Hospital Medicine  Discharge Summary      Patient Name: Carlie Valdez  MRN: [id number]  Admission Date: 6/30/2018  Hospital Length of Stay: 20 days  Discharge Date and Time:  07/20/2018 1:01 PM  Attending Physician: Denae Infante MD   Discharging Provider: Denae Infante MD  Primary Care Provider: Johanna Guzman MD      HPI:   Carlie Valdez is a 72 year old female with a PMHx of HTN, GERD, DM, A-fib, Asthma, and Defibrillator who presented from home with c/o chest pain. Located to right side with no radiation. Associated symptoms: SOB. Pt and sitters at bedside report she was recently discharged from [facility] and pt hasn't had her home medications since being discharged. ED workup revealed: Hgb/Hct 11.2/34.7, Alk phos 152, BNP 1100, troponin 0.028. UA (+) nitrites. CXR with central vascular congestion with small bilateral effusion. Primary cardiologist is Dr. Hernández.  Pt admitted to Telemetry for Acute on chronic CHF exacerbation and UTI.     Procedure(s) (LRB):  TRACHEOSTOMY (N/A)  EGD, WITH PEG TUBE INSERTION (N/A)      Hospital Course:   Pt admitted for decompensated heart failure and UTI. Diuresis with Bumex and IV Rocephin in progress. Pt with physical debility and PT/OT consulted. Dr. Joiner had lengthy discussion with daughter, Haley, and patient in reference to generalized weakness and proper disposition. SNF placement was agreed however, when  approach regarding SNF selection patient refused. Introduce Hospice to patient and advised she was not a candidate for Rehab due to her extent of weakness. Pt stated she would discharge home with sitters and home health services. 7/2/18 - Dr. Joiner has spoken with pt's daughters, Haley and Rosy, regarding the mother's condition. She has diuresed and diuretics changed to oral. She was medically stable for discharge. SNF placement pending. PT/OT in progress. 7/3/18 - It was noted  that pt has refused participation in PT/OT. She appears grossly more weak in the BUE/BLE extremities. Family notified of condition. Granddaughter at bedside mentioned similar symptoms prior to admission. Neurology consult placed, ESR/CRP pending, CT Head and C spine pending. At 4th day, CT of Head and C spine found no concerning findings. ESR and CRP elevated although no concern for vasculitis. Neurology saw the patient and recommended LP to rule out GBs or CIDP. Xarelto placed on hold and LP by IR is pending as Xarelto needs to be on hold for 3 days. Speech therapy evaluated the patient and noted inconsistent dysphonia. The patient will voice normally then start mouthing words. Diet recommendations:  Mechanical soft, Thin. On 6th day, pt noted to have acute onset of metabolic encephalopathy. Pt more somnolent, respiration shallow. She would arouse with sternal rub but return to lethargy. Repeat CT of Head was negative, ammonia slightly elevated at 61 and ABG showed acidosis with hypoxic/hypercapneic respiratory failure.   07/06/18 - presently on Bipap for respiratory acidosis , Lumbar puncture done today 06/07/18 07/07- Diagnosed with NMO at [facility]  1000 mg/day for 5 days per neurology   07/08- possible aspiration overnight , follow up CXR new right pleural effusion , continue Bipap support   07/09 patient was intubated in morning continue to have respiratory distress and no improvement in neurological status   07/10  Started on plasma exchange series for nmo . As per neurology recommendation .  No improvement in mental status . Family updated on plan   07/11  Patient had first cycle of plasma exchange yesterday . Now can follow simple commands .continue to be on vent and with plasma exchange . Hold bp meds due to hypotension   07/12  Patient having plasma exchange. Plan for  trach and peg family and patient agreed   07/13  Patient was seen and examined today . . In morning patient was responsive as per  family following commands . Later in the day patient unintentionally received fentanyl . She is currently not responsive intubated on vent. Neurology did stat eeg . Will continue monitor and assess patient once fentanyl wears off   07/13  Patient getting plasma pheresis for nmo . Patient hypotensive was given bolus . She was hypertensive last night received hydralazine . Waiting on labs for nmo antibodies   07/15  Patient showed improvement in the mental status . Getting last plasma pheresis tomorrow and trach/peg on Tuesday. Case management for ltac placement   07/16  plasmaphersis done today . Last day . Waiting on trach and peg . Plan for ltac placement . Will wait on neurology recommendation regarding any immunosuppressive needed for nmo  07/17   Patient have one more cycle  Of plasmapheresis original plan was to do 5 cycles(standard of care for NMO) but extra cycle was added because patient showed improvement in her mental status . Now she can follow commands .she understand the plan for trach and peg than discharge to LTAC. Waiting on surgery possible trach and peg tomorrow . She also have right sided pleural effusion on diuretics   07/19/18  S/P  peg tube and Trach    await  Placement      Consults:   Consults         Status Ordering Provider     Inpatient consult to Cardiology  Once     Provider:  Israel David MD    Completed CATALINO GALLOWAY     Inpatient consult to General Surgery  Once     Provider:  Aly Urbano MD    Completed BOBBY CORONA     Inpatient consult to Hospitalist  Once     Provider:  Subhash Ayala MD    Acknowledged JOSELINE DÍAZ     Inpatient consult to Neurology  Once     Provider:  Mario Daniels MD    Completed CATALINO GALLOWAY     Inpatient consult to [facility]  Once     Provider:  (Not yet assigned)    Completed SANTINO GROVER     Inpatient consult to Pulmonology  Once     Provider:  James Meier MD    Acknowledged CATALINO GALLOWAY      Inpatient consult to Registered Dietitian/Nutritionist  Once     Provider:  (Not yet assigned)    Completed RAYRAY PRIDE     Inpatient consult to Social Work  Once     Provider:  (Not yet assigned)    Completed FRANCISCO LOPEZ     Inpatient consult to Social Work  Once     Provider:  (Not yet assigned)    Completed CEE JO     Inpatient consult to Social Work  Once     Provider:  (Not yet assigned)    Completed CATALINO GALLOWAY     Inpatient consult to Vascular Surgery  Once     Provider:  MD Foreign Bonilla MUHAMMAD A.          No new Assessment & Plan notes have been filed under this hospital service since the last note was generated.  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] 07/06/2018 Yes    Atelectasis of both lungs [J98.11] 07/18/2018 Yes    Chest pain [R07.9] 07/18/2018 Yes    Blisters of multiple sites [R23.8]  Unknown    Electrolyte imbalance [E87.8] 07/15/2018 No    Leukocytosis [D72.829] 07/15/2018 Yes    Weakness generalized [R53.1] 07/06/2018 Yes    Acute respiratory failure with hypoxia and hypercarbia [J96.01, J96.02] 07/05/2018 Yes    Pressure ulcer of right heel, stage 3 [L89.613] 07/05/2018 Yes    Pressure ulcer of right buttock, stage 2 [L89.312] 07/05/2018 Yes    Acute on chronic systolic heart failure [I50.23] 06/30/2018 Yes    Severe muscle deconditioning [R29.898] 06/30/2018 Yes    Skin breakdown [L90.9] 06/30/2018 Yes    Left ischial pressure sore, stage III [L89.323] 06/04/2018 Yes    Hypothyroidism [E03.9] 06/03/2018 Yes     Chronic    Chronic kidney disease (CKD), stage III (moderate) [N18.3] 11/12/2016 Yes    Vitamin D deficiency [E55.9] 04/13/2016 Yes    Labile blood pressure [R09.89]  Yes    Chronic atrial fibrillation [I48.2] 08/14/2013 Yes     Chronic      Problems Resolved  During this Admission:    Diagnosis Date Noted Date Resolved POA    Hypernatremia [E87.0] 07/11/2018 07/12/2018 Unknown    Encephalopathy [G93.40] 07/05/2018 07/15/2018 No    UTI (urinary tract infection) [N39.0] 06/30/2018 07/15/2018 Yes    Elevated troponin [R74.8] 11/12/2016 07/03/2018 Yes       Discharged Condition: good    Disposition: Rehab Facility    Follow Up:  Follow-up Information     Johanna Guzman MD.    Specialty:  Internal Medicine  Contact information:  [address]  [place] [address] [phone]                 Patient Instructions:   No discharge procedures on file.    Significant Diagnostic Studies: Labs:   BMP:   Recent Labs  Lab 07/19/18  0348 07/20/18  0400   GLU 86 102    146*   K 4.0 3.4*    106   CO2 26 28   BUN 25* 36*   CREATININE 0.8 1.0   CALCIUM 9.4 9.5   MG 2.0  2.0 2.0   , CMP   Recent Labs  Lab 07/19/18  0348 07/20/18  0400    146*   K 4.0 3.4*    106   CO2 26 28   GLU 86 102   BUN 25* 36*   CREATININE 0.8 1.0   CALCIUM 9.4 9.5   PROT 5.7* 5.7*   ALBUMIN 4.0 3.7   BILITOT 1.6* 1.5*   ALKPHOS 105 110   AST 23 21   ALT 30 23   ANIONGAP 13 12   ESTGFRAFRICA >60 >60   EGFRNONAA >60 56*    and CBC   Recent Labs  Lab 07/19/18  0348 07/20/18  0400   WBC 16.03*  16.03* 12.14   HGB 9.2*  9.2* 8.1*   HCT 28.2*  28.2* 25.2*   *  104* 105*       Pending Diagnostic Studies:     Procedure Component Value Units Date/Time    Myelin basic protein, CSF [[id number]] Collected:  07/06/18 1500    Order Status:  Sent Lab Status:  In process Updated:  07/16/18 1540    Specimen:  CSF (Spinal Fluid) from Cerebrospinal Fluid     NMO AQUAPORIN-4-IGG, Serum [[id number]] Collected:  07/13/18 1515    Order Status:  Sent Lab Status:  In process Updated:  07/13/18 2126    Specimen:  Blood from Blood     Narrative:       Collection has been rescheduled by HEW at 7/13/2018 14:45 Reason:   Nurse will collect         Medications:  Reconciled Home Medications:       Medication List      START taking these medications    ergocalciferol 50,000 unit Cap  Commonly known as:  ERGOCALCIFEROL  Take 1 capsule (50,000 Units total) by mouth every 7 days.  Start taking on:  7/25/2018     midodrine 5 MG Tab  Commonly known as:  PROAMATINE  1 tablet (5 mg total) by Per G Tube route 3 (three) times daily.     norepinephrine bitartrate-D5W 4 mg/250 mL (16 mcg/mL) Soln  Inject 1.44 mcg/min into the vein continuous.        CHANGE how you take these medications    albuterol-ipratropium 2.5 mg-0.5 mg/3 mL nebulizer solution  Commonly known as:  DUO-NEB  Take 3 mLs by nebulization every 4 (four) hours as needed for Wheezing. Rescue  What changed:  · when to take this  · reasons to take this     amiodarone 200 MG Tab  Commonly known as:  PACERONE  Take 1 tablet (200 mg total) by mouth 2 (two) times daily.  What changed:  how much to take     bumetanide 2 MG tablet  Commonly known as:  BUMEX  Take 1 tablet (2 mg total) by mouth 2 (two) times daily. 1 Tablet Oral Every day  What changed:  · how much to take  · additional instructions     fluticasone 50 mcg/actuation nasal spray  Commonly known as:  FLONASE  2 sprays by Each Nare route once daily.  What changed:  · when to take this  · reasons to take this     polyethylene glycol 17 gram Pwpk  Commonly known as:  GLYCOLAX  Take 17 g by mouth once daily.  What changed:  · when to take this  · reasons to take this     silver sulfADIAZINE 1% 1 % cream  Commonly known as:  SILVADENE  Apply topically 2 (two) times daily. Apply to bliter areas BID  What changed:  additional instructions        CONTINUE taking these medications    albuterol 90 mcg/actuation inhaler  Inhale 1-2 puffs into the lungs every 6 (six) hours as needed.     bacitracin 500 unit/gram ointment  Apply topically once daily. Apply to R heel daily.     baclofen 10 MG tablet  Commonly known as:  LIORESAL  Take 10 mg by mouth 3 (three) times daily as needed.     brimonidine-timolol 0.2-0.5  % Drop  Commonly known as:  COMBIGAN  Place 1 drop into both eyes every 12 (twelve) hours.     levothyroxine 100 MCG tablet  Commonly known as:  SYNTHROID  Take 100 mcg by mouth before breakfast.     magnesium oxide 400 mg tablet  Commonly known as:  MAG-OX  Take 400 mg by mouth 2 (two) times daily.     montelukast 10 mg tablet  Commonly known as:  SINGULAIR  Take 10 mg by mouth once daily.     multivitamin per tablet  Commonly known as:  THERAGRAN  Take 1 tablet by mouth once daily.     ondansetron 4 MG tablet  Commonly known as:  ZOFRAN  Take 4 mg by mouth every 6 (six) hours as needed for Nausea.     pantoprazole 40 MG tablet  Commonly known as:  PROTONIX  Take 40 mg by mouth once daily.     rivaroxaban 15 mg Tab  Commonly known as:  XARELTO  Take 15 mg by mouth every evening.     traMADol 50 mg tablet  Commonly known as:  ULTRAM  Take 50 mg by mouth every 6 (six) hours as needed for Pain.     zinc sulfate 220 (50) mg capsule  Commonly known as:  ZINCATE  Take 220 mg by mouth once daily.        STOP taking these medications    diltiaZEM 300 MG 24 hr capsule  Commonly known as:  CARDIZEM CD     metoprolol succinate 25 MG 24 hr tablet  Commonly known as:  TOPROL-XL     potassium chloride SA 20 MEQ tablet  Commonly known as:  K-DUR,KLOR-CON     sodium bicarb-sodium chloride Pack            Indwelling Lines/Drains at time of discharge:   Lines/Drains/Airways     Central Venous Catheter Line                 Hemodialysis Catheter 07/09/18 2100 right internal jugular 10 days          Drain                 Urethral Catheter 07/05/18 1114 15 days         Gastrostomy/Enterostomy 07/18/18 1649 Percutaneous endoscopic gastrostomy (PEG) LUQ feeding 1 day          Airway                 Surgical Airway 07/18/18 1620 Nikita 1 day          Pressure Ulcer                 Pressure Injury 06/02/18 Right lateral Heel Stage 3 48 days         Pressure Injury 06/02/18 1659 Left proximal Ischial tuberosity Stage 3 47 days          Pressure Injury 06/02/18 1902 Right medial Heel Unstageable 47 days                Time spent on the discharge of patient:  40 minutes  Patient was seen and examined on the date of discharge and determined to be suitable for discharge.    Denae Infante MD  Department of Hospital Medicine  [facility]

## 2018-07-20 NOTE — DISCHARGE SUMMARY
[facility] Hospital Medicine  Discharge Summary      Patient Name: Carlie Valdez  MRN: [id number]  Admission Date: 6/30/2018  Hospital Length of Stay: 20 days  Discharge Date and Time:  07/20/2018 1:09 PM  Attending Physician: Denae Infante MD   Discharging Provider: Denae Infante MD  Primary Care Provider: Johanna Guzman MD      HPI:   Carlie aVldez is a 72 year old female with a PMHx of HTN, GERD, DM, A-fib, Asthma, and Defibrillator who presented from home with c/o chest pain. Located to right side with no radiation. Associated symptoms: SOB. Pt and sitters at bedside report she was recently discharged from [facility] and pt hasn't had her home medications since being discharged. ED workup revealed: Hgb/Hct 11.2/34.7, Alk phos 152, BNP 1100, troponin 0.028. UA (+) nitrites. CXR with central vascular congestion with small bilateral effusion. Primary cardiologist is Dr. Hernández.  Pt admitted to Telemetry for Acute on chronic CHF exacerbation and UTI.     Procedure(s) (LRB):  TRACHEOSTOMY (N/A)  EGD, WITH PEG TUBE INSERTION (N/A)      Hospital Course:   Pt admitted for decompensated heart failure and UTI. Diuresis with Bumex and IV Rocephin in progress. Pt with physical debility and PT/OT consulted. Dr. Joiner had lengthy discussion with daughter, Haley, and patient in reference to generalized weakness and proper disposition. SNF placement was agreed however, when  approach regarding SNF selection patient refused. Introduce Hospice to patient and advised she was not a candidate for Rehab due to her extent of weakness. Pt stated she would discharge home with sitters and home health services. 7/2/18 - Dr. Joiner has spoken with pt's daughters, Haley and Rosy, regarding the mother's condition. She has diuresed and diuretics changed to oral. She was medically stable for discharge. SNF placement pending. PT/OT in progress. 7/3/18 - It was noted  that pt has refused participation in PT/OT. She appears grossly more weak in the BUE/BLE extremities. Family notified of condition. Granddaughter at bedside mentioned similar symptoms prior to admission. Neurology consult placed, ESR/CRP pending, CT Head and C spine pending. At 4th day, CT of Head and C spine found no concerning findings. ESR and CRP elevated although no concern for vasculitis. Neurology saw the patient and recommended LP to rule out GBs or CIDP. Xarelto placed on hold and LP by IR is pending as Xarelto needs to be on hold for 3 days. Speech therapy evaluated the patient and noted inconsistent dysphonia. The patient will voice normally then start mouthing words. Diet recommendations:  Mechanical soft, Thin. On 6th day, pt noted to have acute onset of metabolic encephalopathy. Pt more somnolent, respiration shallow. She would arouse with sternal rub but return to lethargy. Repeat CT of Head was negative, ammonia slightly elevated at 61 and ABG showed acidosis with hypoxic/hypercapneic respiratory failure.   07/06/18 - presently on Bipap for respiratory acidosis , Lumbar puncture done today 06/07/18 07/07- Diagnosed with NMO at [facility]  1000 mg/day for 5 days per neurology   07/08- possible aspiration overnight , follow up CXR new right pleural effusion , continue Bipap support   07/09 patient was intubated in morning continue to have respiratory distress and no improvement in neurological status   07/10  Started on plasma exchange series for nmo . As per neurology recommendation .  No improvement in mental status . Family updated on plan   07/11  Patient had first cycle of plasma exchange yesterday . Now can follow simple commands .continue to be on vent and with plasma exchange . Hold bp meds due to hypotension   07/12  Patient having plasma exchange. Plan for  trach and peg family and patient agreed   07/13  Patient was seen and examined today . . In morning patient was responsive as per  family following commands . Later in the day patient unintentionally received fentanyl . She is currently not responsive intubated on vent. Neurology did stat eeg . Will continue monitor and assess patient once fentanyl wears off   07/13  Patient getting plasma pheresis for nmo . Patient hypotensive was given bolus . She was hypertensive last night received hydralazine . Waiting on labs for nmo antibodies   07/15  Patient showed improvement in the mental status . Getting last plasma pheresis tomorrow and trach/peg on Tuesday. Case management for ltac placement   07/16  plasmaphersis done today . Last day . Waiting on trach and peg . Plan for ltac placement . Will wait on neurology recommendation regarding any immunosuppressive needed for nmo  07/17   Patient have one more cycle  Of plasmapheresis original plan was to do 5 cycles(standard of care for NMO) but extra cycle was added because patient showed improvement in her mental status . Now she can follow commands .she understand the plan for trach and peg than discharge to LTAC. Waiting on surgery possible trach and peg tomorrow . She also have right sided pleural effusion on diuretics   07/19/18  S/P  peg tube and Trach    await  Placement     07/20/18  Stable for discharge to SNF     Consults:   Consults         Status Ordering Provider     Inpatient consult to Cardiology  Once     Provider:  Israel David MD    Completed CATALINO GALLOWAY     Inpatient consult to General Surgery  Once     Provider:  Aly Urbano MD    Completed BOBBY CORONA     Inpatient consult to Hospitalist  Once     Provider:  Subhash Ayala MD    Acknowledged JOSELINE DÍAZ     Inpatient consult to Neurology  Once     Provider:  Mario Daniels MD    Completed CATALINO GALLOWAY     Inpatient consult to [facility]  Once     Provider:  (Not yet assigned)    SANTINO Alexis     Inpatient consult to Pulmonology  Once     Provider:  James Meier MD     Acknowledged CATALINO GALLOWAY     Inpatient consult to Registered Dietitian/Nutritionist  Once     Provider:  (Not yet assigned)    Completed RAYRAY PRIDE     Inpatient consult to Social Work  Once     Provider:  (Not yet assigned)    Completed FRANCISCO LOPEZ     Inpatient consult to Social Work  Once     Provider:  (Not yet assigned)    Completed CEE JO     Inpatient consult to Social Work  Once     Provider:  (Not yet assigned)    Completed CATALINO GALLOWAY     Inpatient consult to Vascular Surgery  Once     Provider:  Garry Smith MD    Acknowledged SANTINO GROVER          No new Assessment & Plan notes have been filed under this hospital service since the last note was generated.  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] 07/06/2018 Yes    Atelectasis of both lungs [J98.11] 07/18/2018 Yes    Chest pain [R07.9] 07/18/2018 Yes    Blisters of multiple sites [R23.8]  Unknown    Electrolyte imbalance [E87.8] 07/15/2018 No    Leukocytosis [D72.829] 07/15/2018 Yes    Weakness generalized [R53.1] 07/06/2018 Yes    Respiratory failure [J96.90] 07/05/2018 Yes    Pressure ulcer of right heel, stage 3 [L89.613] 07/05/2018 Yes    Pressure ulcer of right buttock, stage 2 [L89.312] 07/05/2018 Yes    Acute on chronic systolic heart failure [I50.23] 06/30/2018 Yes    Severe muscle deconditioning [R29.898] 06/30/2018 Yes    Skin breakdown [L90.9] 06/30/2018 Yes    Left ischial pressure sore, stage III [L89.323] 06/04/2018 Yes    Hypothyroidism [E03.9] 06/03/2018 Yes     Chronic    Chronic kidney disease (CKD), stage III (moderate) [N18.3] 11/12/2016 Yes    Vitamin D deficiency [E55.9] 04/13/2016 Yes    Labile blood pressure [R09.89]  Yes    Chronic atrial fibrillation [I48.2] 08/14/2013 Yes     Chronic      Problems Resolved During  this Admission:    Diagnosis Date Noted Date Resolved POA    Hypernatremia [E87.0] 07/11/2018 07/12/2018 Unknown    Encephalopathy [G93.40] 07/05/2018 07/15/2018 No    UTI (urinary tract infection) [N39.0] 06/30/2018 07/15/2018 Yes    Elevated troponin [R74.8] 11/12/2016 07/03/2018 Yes       Discharged Condition: good    Disposition: Skilled Nursing Facility    Follow Up:  Follow-up Information     Johanna Guzman MD.    Specialty:  Internal Medicine  Contact information:  [address]  [place] [address] [phone]                 Patient Instructions:   No discharge procedures on file.    Significant Diagnostic Studies: Labs:   BMP:   Recent Labs  Lab 07/19/18  0348 07/20/18  0400   GLU 86 102    146*   K 4.0 3.4*    106   CO2 26 28   BUN 25* 36*   CREATININE 0.8 1.0   CALCIUM 9.4 9.5   MG 2.0  2.0 2.0   , CMP   Recent Labs  Lab 07/19/18  0348 07/20/18  0400    146*   K 4.0 3.4*    106   CO2 26 28   GLU 86 102   BUN 25* 36*   CREATININE 0.8 1.0   CALCIUM 9.4 9.5   PROT 5.7* 5.7*   ALBUMIN 4.0 3.7   BILITOT 1.6* 1.5*   ALKPHOS 105 110   AST 23 21   ALT 30 23   ANIONGAP 13 12   ESTGFRAFRICA >60 >60   EGFRNONAA >60 56*    and CBC   Recent Labs  Lab 07/19/18  0348 07/20/18  0400   WBC 16.03*  16.03* 12.14   HGB 9.2*  9.2* 8.1*   HCT 28.2*  28.2* 25.2*   *  104* 105*     Microbiology:   Blood Culture   Lab Results   Component Value Date    LABBLOO No growth after 5 days. 07/03/2018    LABBLOO No growth after 5 days. 07/03/2018       Pending Diagnostic Studies:     Procedure Component Value Units Date/Time    Myelin basic protein, CSF [[id number]] Collected:  07/06/18 1500    Order Status:  Sent Lab Status:  In process Updated:  07/16/18 1547    Specimen:  CSF (Spinal Fluid) from Cerebrospinal Fluid     NMO AQUAPORIN-4-IGG, Serum [[id number]] Collected:  07/13/18 1511    Order Status:  Sent Lab Status:  In process Updated:  07/13/18 2126    Specimen:  Blood from Blood      Narrative:       Collection has been rescheduled by HEW at 7/13/2018 14:45 Reason:   Nurse will collect         Medications:  Reconciled Home Medications:      Medication List      START taking these medications    ergocalciferol 50,000 unit Cap  Commonly known as:  ERGOCALCIFEROL  Take 1 capsule (50,000 Units total) by mouth every 7 days.  Start taking on:  7/25/2018     midodrine 5 MG Tab  Commonly known as:  PROAMATINE  1 tablet (5 mg total) by Per G Tube route 3 (three) times daily.     mycophenolate 250 mg Cap  Commonly known as:  CELLCEPT  Take 2 capsules (500 mg total) by mouth 2 (two) times daily.     norepinephrine bitartrate-D5W 4 mg/250 mL (16 mcg/mL) Soln  Inject 1.44 mcg/min into the vein continuous.        CHANGE how you take these medications    albuterol-ipratropium 2.5 mg-0.5 mg/3 mL nebulizer solution  Commonly known as:  DUO-NEB  Take 3 mLs by nebulization every 4 (four) hours as needed for Wheezing. Rescue  What changed:  · when to take this  · reasons to take this     amiodarone 200 MG Tab  Commonly known as:  PACERONE  Take 1 tablet (200 mg total) by mouth 2 (two) times daily.  What changed:  how much to take     bumetanide 2 MG tablet  Commonly known as:  BUMEX  Take 1 tablet (2 mg total) by mouth 2 (two) times daily. 1 Tablet Oral Every day  What changed:  · how much to take  · additional instructions     fluticasone 50 mcg/actuation nasal spray  Commonly known as:  FLONASE  2 sprays by Each Nare route once daily.  What changed:  · when to take this  · reasons to take this     polyethylene glycol 17 gram Pwpk  Commonly known as:  GLYCOLAX  Take 17 g by mouth once daily.  What changed:  · when to take this  · reasons to take this     * rivaroxaban 15 mg Tab  Commonly known as:  XARELTO  Take 15 mg by mouth every evening.  What changed:  Another medication with the same name was added. Make sure you understand how and when to take each.     * rivaroxaban 15 mg Tab  Commonly known as:   XARELTO  Take 1 tablet (15 mg total) by mouth daily with dinner or evening meal.  What changed:  You were already taking a medication with the same name, and this prescription was added. Make sure you understand how and when to take each.     silver sulfADIAZINE 1% 1 % cream  Commonly known as:  SILVADENE  Apply topically 2 (two) times daily. Apply to bliter areas BID  What changed:  additional instructions        * This list has 2 medication(s) that are the same as other medications prescribed for you. Read the directions carefully, and ask your doctor or other care provider to review them with you.            CONTINUE taking these medications    albuterol 90 mcg/actuation inhaler  Inhale 1-2 puffs into the lungs every 6 (six) hours as needed.     bacitracin 500 unit/gram ointment  Apply topically once daily. Apply to R heel daily.     baclofen 10 MG tablet  Commonly known as:  LIORESAL  Take 10 mg by mouth 3 (three) times daily as needed.     brimonidine-timolol 0.2-0.5 % Drop  Commonly known as:  COMBIGAN  Place 1 drop into both eyes every 12 (twelve) hours.     levothyroxine 100 MCG tablet  Commonly known as:  SYNTHROID  Take 100 mcg by mouth before breakfast.     magnesium oxide 400 mg tablet  Commonly known as:  MAG-OX  Take 400 mg by mouth 2 (two) times daily.     montelukast 10 mg tablet  Commonly known as:  SINGULAIR  Take 10 mg by mouth once daily.     multivitamin per tablet  Commonly known as:  THERAGRAN  Take 1 tablet by mouth once daily.     ondansetron 4 MG tablet  Commonly known as:  ZOFRAN  Take 4 mg by mouth every 6 (six) hours as needed for Nausea.     pantoprazole 40 MG tablet  Commonly known as:  PROTONIX  Take 40 mg by mouth once daily.     traMADol 50 mg tablet  Commonly known as:  ULTRAM  Take 50 mg by mouth every 6 (six) hours as needed for Pain.     zinc sulfate 220 (50) mg capsule  Commonly known as:  ZINCATE  Take 220 mg by mouth once daily.        STOP taking these medications     diltiaZEM 300 MG 24 hr capsule  Commonly known as:  CARDIZEM CD     metoprolol succinate 25 MG 24 hr tablet  Commonly known as:  TOPROL-XL     potassium chloride SA 20 MEQ tablet  Commonly known as:  K-DUR,KLOR-CON     sodium bicarb-sodium chloride Pack            Indwelling Lines/Drains at time of discharge:   Lines/Drains/Airways     Central Venous Catheter Line                 Hemodialysis Catheter 07/09/18 2100 right internal jugular 10 days          Drain                 Urethral Catheter 07/05/18 1114 15 days         Gastrostomy/Enterostomy 07/18/18 1649 Percutaneous endoscopic gastrostomy (PEG) LUQ feeding 1 day          Airway                 Surgical Airway 07/18/18 1620 Shiley 1 day          Pressure Ulcer                 Pressure Injury 06/02/18 Right lateral Heel Stage 3 48 days         Pressure Injury 06/02/18 1659 Left proximal Ischial tuberosity Stage 3 47 days         Pressure Injury 06/02/18 1902 Right medial Heel Unstageable 47 days                Time spent on the discharge of patient: 40 minutes  Patient was seen and examined on the date of discharge and determined to be suitable for discharge.    Critical care time spent on the evaluation and treatment of severe organ dysfunction, review of pertinent labs and imaging studies, discussions with consulting providers and discussions with patient/family: 50 minutes.     Denae Infante MD  Department of Hospital Medicine  [facility]

## 2018-07-20 NOTE — PROGRESS NOTES
Progress note  Neurology      Neurology follow up:  For:   Neuromyelitis optica       SUBJECTIVE:      HPI:   She is coming out of her debility from multisystem impairment. She is now with PEG tube and Trach. Tube. She is stable.      Past Medical History:   Diagnosis Date    Arthritis     Asthma     Atrial fibrillation     Blood clot of vein in shoulder area     Cardiac defibrillator in place     Chronic knee pain     Diabetes mellitus type 2 without retinopathy 2016    Diaphragmatic hernia without obstruction and without gangrene 2015    GERD (gastroesophageal reflux disease)     Hypertension     Primary open angle glaucoma (POAG) of both eyes, severe stage 2018     Past Surgical History:   Procedure Laterality Date    CARDIAC DEFIBRILLATOR PLACEMENT      , .    CATARACT EXTRACTION       SECTION      COLONOSCOPY      ashley      Dr. Macdonald    ESOPHAGOGASTRODUODENOSCOPY W/ PEG N/A 2018    Procedure: EGD, WITH PEG TUBE INSERTION;  Surgeon: Louis O. Jeansonne IV, MD;  Location: [facility] OR;  Service: General;  Laterality: N/A;    KNEE ARTHROSCOPY      TRACHEOSTOMY N/A 2018    Procedure: TRACHEOSTOMY;  Surgeon: Louis O. Jeansonne IV, MD;  Location: [facility] OR;  Service: General;  Laterality: N/A;    UPPER GASTROINTESTINAL ENDOSCOPY       Family History   Problem Relation Age of Onset    Hypertension Mother     Hypertension Father     Colon cancer Neg Hx     Stomach cancer Neg Hx      Social History   Substance Use Topics    Smoking status: Never Smoker    Smokeless tobacco: Never Used    Alcohol use No       Review of patient's allergies indicates:   Allergen Reactions    Morphine Hives    Atorvastatin      Other reaction(s): Muscle pain    Clonidine     Codeine      Other reaction(s): Unknown    Digoxin      Other reaction(s): Unknown    Diovan [valsartan] Other (See Comments)     Upset stomach, weakness    Eggs [egg derived]     Metoprolol Diarrhea     Naproxen      Other reaction(s): Nausea    Peanut     Propofol      Other reaction(s): delirious    Sulfa (sulfonamide antibiotics)       Patient Active Problem List   Diagnosis    Chronic atrial fibrillation    GERD (gastroesophageal reflux disease)    Labile blood pressure    Childhood asthma without complication    Cardiac defibrillator in place    Abnormal CT scan, gastrointestinal tract    Diaphragmatic hernia without obstruction and without gangrene    Chronic knee pain    Obesity, Class I, BMI 30-34.9    Vitamin D deficiency    PVD (peripheral vascular disease)    LBBB (left bundle branch block)    Bilateral leg edema    ARF (acute renal failure)    Pneumonia due to infectious organism    Cough    Chronic kidney disease (CKD), stage III (moderate)    Bronchitis    Nuclear sclerosis of right eye    Pseudophakia of left eye    Severe persistent asthma with acute exacerbation    Pulmonary infiltrate in right lung on chest x-ray    Uncomplicated severe persistent asthma    Chronic atrial fibrillation with RVR    Chronic combined systolic and diastolic congestive heart failure    Non compliance w medication regimen    SIRS (systemic inflammatory response syndrome)    Cortical age-related cataract of both eyes    Type 2 diabetes mellitus with kidney complication, without long-term current use of insulin    Acute on chronic systolic congestive heart failure    Cerebrovascular accident (CVA) due to embolism    Hypertensive emergency    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    Primary open angle glaucoma (POAG) of both eyes, severe stage    Hypotension    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    Atrial fibrillation with RVR    Hypothyroidism    Deep tissue injury    Left ischial pressure sore, stage III    Pressure ulcer of thigh, stage 2    Atypical chest pain    NSVT (nonsustained ventricular tachycardia)    Right sided weakness     Acute on chronic systolic heart failure    Severe muscle deconditioning    Skin breakdown    Acute respiratory failure with hypoxia and hypercarbia    Pressure ulcer of right heel, stage 3    Pressure ulcer of right buttock, stage 2    Weakness generalized    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, S REFLEX    Electrolyte imbalance    Leukocytosis    Blisters of multiple sites    Atelectasis of both lungs    Chest pain         Scheduled Meds:   albuterol-ipratropium  3 mL Nebulization Q6H    amiodarone  200 mg Oral BID    brimonidine 0.2%  1 drop Both Eyes Q12H    And    timolol maleate 0.5%  1 drop Both Eyes BID    chlorhexidine  15 mL Mouth/Throat BID    ergocalciferol  50,000 Units Oral Q7 Days    furosemide  40 mg Oral BID    levothyroxine  100 mcg Oral Before breakfast    metoprolol  5 mg Intravenous Q6H    midodrine  5 mg Per G Tube TID    mycophenolate  500 mg Oral BID    pantoprazole 40 mg in dextrose 5 % 100 mL IVPB (over 15 minutes) (ready to mix system)  40 mg Intravenous Daily    rivaroxaban  15 mg Oral Daily with dinner    sodium chloride 0.9%  3 mL Intravenous Q8H     Continuous Infusions:  PRN Meds:acetaminophen, albuterol-ipratropium, baclofen, bisacodyl, diphenhydrAMINE, heparin (porcine), heparin, porcine (PF), hydrALAZINE, ondansetron, pneumoc 13-naeem conj-dip cr(PF), promethazine (PHENERGAN) IVPB, sodium chloride 0.9%, traMADol      Review of Systems:   Review of systems not obtained due to patient factors non verbal..        OBJECTIVE:     Vital Signs (Most Recent)  Temp: 98.3 °F (36.8 °C) (07/20/18 1130)  Pulse: 71 (07/20/18 1244)  Resp: (!) 6 (07/20/18 1244)  BP: 122/79 (07/20/18 1200)  SpO2: 100 % (07/20/18 1200)    Vital Signs Range (Last 24H):  Temp:  [97.9 °F (36.6 °C)-99.1 °F (37.3 °C)]   Pulse:  []   Resp:  [0-18]   BP: ()/(44-93)   SpO2:  [95 %-100 %]   Arterial Line BP: ()/(49-89)       Physical Exam:  She is  still intubated. She is alert . She is  Seeing a little . She is moving shoulder girdle gently. Right pupil with cataract and pupil is larger than the left ( 2.5 mm) but left pupil is smaller ( 2 mm).   Sensory has improved. She is nodding her head to respond . She is trying to talk . She feels to pain. She is quadriparetic.          Laboratory:  Lab Results   Component Value Date    WBC 12.14 07/20/2018    HGB 8.1 (L) 07/20/2018    HCT 25.2 (L) 07/20/2018     (L) 07/20/2018    CHOL 166 06/06/2018    TRIG 94 06/06/2018    HDL 35 (L) 06/06/2018    ALT 23 07/20/2018    AST 21 07/20/2018     (H) 07/20/2018    K 3.4 (L) 07/20/2018     07/20/2018    CREATININE 1.0 07/20/2018    BUN 36 (H) 07/20/2018    CO2 28 07/20/2018    TSH 0.269 (L) 07/06/2018    INR 1.0 07/05/2018    HGBA1C 5.3 06/03/2018     Results for DARNELL PALM (MRN [id number]) as of 7/20/2018 12:58   Ref. Range 7/6/2018 13:00 7/6/2018 13:07 7/9/2018 15:25   Color, CSF Latest Ref Range: Colorless   Colorless    Heme Aliquot Latest Units: mL  4.0    Appearance, CSF Latest Ref Range: Clear   Clear    WBC, CSF Latest Ref Range: 0 - 5 /cu mm  38 (H)    RBC, CSF Latest Ref Range: 0 /cu mm  31 (A)    Segmented Neutrophils, CSF Latest Ref Range: 0 - 6 %  23 (H)    Lymphs, CSF Latest Ref Range: 40 - 80 %  36 (L)    Mono/Macrophage, CSF Latest Ref Range: 15 - 45 %  41    Glucose, CSF Latest Ref Range: 40 - 70 mg/dL  84 (H)    Protein, CSF Latest Ref Range: 15 - 40 mg/dL  123 (H)    CSF Bands Latest Units: bands 6     Serum Bands Latest Units: bands 6     Olig Bands Interpretation, CSF Latest Ref Range: <4 bands 0     IgG Index, CSF Latest Ref Range: <=0.85  0.41     Albumin, CSF Latest Ref Range: <=27.0 mg/dL 63.6 (H)     IGG/ALBUMIN RATIO, CSF Latest Ref Range: <=0.21  0.14     IgG Synthetic Rate Latest Ref Range: <=12 mg/24 h 0.00     Albumin, Serum Latest Ref Range: 3200 - 4800 mg/dL 3140 (L)     West Nile Virus Source Unknown   CSF    West Nile Virus Result Latest Ref Range: Negative    Negative   IgG, CSF Latest Ref Range: <=8.1 mg/dL 8.9 (H)     Pathologist Review, Body Fluid Unknown  REVIEWED      NMO antibody titer in  CSF: in process.      Imaging Results          X-Ray Chest 1 View (Final result)  Result time 06/30/18 08:50:50    Final result by Philip Damian Jr., MD (06/30/18 08:50:50)                 Impression:      CHF exacerbation.      Electronically signed by: Philip Damian Jr., MD  Date:    06/30/2018  Time:    08:50             Narrative:    EXAMINATION:  XR CHEST 1 VIEW    CLINICAL HISTORY:  Chest pain, unspecified    COMPARISON:  06/02/2018    FINDINGS:  Pacemaker remains in place right chest wall.  The heart remains enlarged.  Central vascular congestion with small bilateral effusions.  No pneumothorax.                                  ASSESSMENT/PLAN:     Assessment:   1. NMO with quadriparesis  S/p steroid and plasmapheresis . Improving slowly. Cont. PT/OT . Continue Cellcept as directed.    Patient Active Problem List   Diagnosis    Chronic atrial fibrillation    GERD (gastroesophageal reflux disease)    Labile blood pressure    Childhood asthma without complication    Cardiac defibrillator in place    Abnormal CT scan, gastrointestinal tract    Diaphragmatic hernia without obstruction and without gangrene    Chronic knee pain    Obesity, Class I, BMI 30-34.9    Vitamin D deficiency    PVD (peripheral vascular disease)    LBBB (left bundle branch block)    Bilateral leg edema    ARF (acute renal failure)    Pneumonia due to infectious organism    Cough    Chronic kidney disease (CKD), stage III (moderate)    Bronchitis    Nuclear sclerosis of right eye    Pseudophakia of left eye    Severe persistent asthma with acute exacerbation    Pulmonary infiltrate in right lung on chest x-ray    Uncomplicated severe persistent asthma    Chronic atrial fibrillation with RVR    Chronic combined systolic and  diastolic congestive heart failure    Non compliance w medication regimen    SIRS (systemic inflammatory response syndrome)    Cortical age-related cataract of both eyes    Type 2 diabetes mellitus with kidney complication, without long-term current use of insulin    Acute on chronic systolic congestive heart failure    Cerebrovascular accident (CVA) due to embolism    Hypertensive emergency    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    Primary open angle glaucoma (POAG) of both eyes, severe stage    Hypotension    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    Atrial fibrillation with RVR    Hypothyroidism    Deep tissue injury    Left ischial pressure sore, stage III    Pressure ulcer of thigh, stage 2    Atypical chest pain    NSVT (nonsustained ventricular tachycardia)    Right sided weakness    Acute on chronic systolic heart failure    Severe muscle deconditioning    Skin breakdown    Acute respiratory failure with hypoxia and hypercarbia    Pressure ulcer of right heel, stage 3    Pressure ulcer of right buttock, stage 2    Weakness generalized    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

## 2018-07-23 NOTE — PLAN OF CARE
D/C to [facility]     07/23/18 1507   Final Note   Assessment Type Final Discharge Note   Discharge Disposition LTAC  ([facility])   Right Care Referral Info   Post Acute Recommendation Other  ([facility])

## 2018-10-18 PROBLEM — R53.81 DEBILITY: Status: ACTIVE | Noted: 2018-01-01

## 2018-10-18 PROBLEM — Z79.899 LONG TERM CURRENT USE OF ANTIPSYCHOTIC MEDICATION: Status: ACTIVE | Noted: 2018-01-01

## 2018-10-18 PROBLEM — R65.20 SEVERE SEPSIS: Status: ACTIVE | Noted: 2018-01-01

## 2018-10-18 PROBLEM — A41.9 SEPSIS: Status: ACTIVE | Noted: 2018-01-01

## 2018-10-19 PROBLEM — J98.11 ATELECTASIS OF LEFT LUNG: Status: ACTIVE | Noted: 2018-01-01

## 2018-10-19 PROBLEM — L89.150 PRESSURE INJURY OF COCCYGEAL REGION, UNSTAGEABLE: Status: ACTIVE | Noted: 2018-01-01

## 2018-10-19 PROBLEM — Z99.11 VENTILATOR DEPENDENCE: Status: ACTIVE | Noted: 2018-01-01

## 2018-10-19 NOTE — CONSULTS
10/19/18 1105   Skin   Skin WDL ex   Skin Temperature cool   Skin Moisture Dry   Skin Elasticity Brisk   Beka Risk Assessment   Sensory Perception 3-->slightly limited   Moisture 3-->occasionally moist   Activity 1-->bedfast   Mobility 2-->very limited   Nutrition 3-->adequate   Friction and Shear 1-->problem   Beka Score 13       Pressure Injury 10/18/18 2015 Coccyx #1 Unstageable   Date First Assessed/Time First Assessed: 10/18/18 2015   Pressure Injury Present on Admission: yes  Location: Coccyx  Wound/PI Number (optional): #1  Is this injury device related?: No  Staging: (c) Unstageable   Wound Image     Staging U   Dressing Appearance Intact;Moist drainage   Drainage Amount Scant   Drainage Characteristics/Odor Serosanguineous   Appearance Purple;Maroon;Moist;Yellow;Slough   Tissue loss description Full thickness   Red (%), Wound Tissue Color 50 %   Yellow (%), Wound Tissue Color 50 %   Periwound Area Denuded   Wound Edges Jagged   Wound Length (cm) 6 cm   Wound Width (cm) 4 cm   Wound Depth (cm) 0.2 cm   Wound Volume (cm^3) 4.8 cm^3   Care Cleansed with:;Sterile normal saline;Applied:;Skin Barrier   Dressing Hydrofiber;Silver;Hydrocolloid   Dressing Change Due 10/22/18     Consulted on this 73 y/o F patient due to present on admission skin breakdown to coccygeal region. Patient in ICU with chronic TRACH and PEG tube.  Bilateral heels assessed. Scar tissue noted to right heel with scattered areas of purple discoloration noted with in light pink scar tissue. Painted with cavilon and floated off mattress.  Left heel lntact with no redness or breakdown noted.  PEG tube site clean and dry, no breakdown noted. TRACH site clean and dry, no breakdown noted. Patient turned to left side with assistance.  Sacral foam dressing removed.  Healed scar tissue noted to bilateral ischials.  Liz rectal region IAD noted with scattered areas of denuded skin and partial thickness tissue loss. Barrier applied.  Unstageable  "pressure injury noted to coccygeal/sacral region. Appears to be evolving DTI and due to nature of injury, expect it to worsen. At this time, wound bed is moist and purple/maroon boggy discoloration to 50%, and 50% moist yellow adherent slough. Measures 6x4x0.2cm with scant serosanguinous drainage and malodor.  Surrounding skin is denuded and beginning to break down.  Cleansed all with saline and patted dry. Milagros wound skin painted with cavilon.  Aquacel AG extra cut to size and applied to cover open woundbed, and secured with duoderm.  Patient then positioned on left side with foam wedge and heels floated.  Please see below for wound care recommendations:    Unstageable pressure injury to Coccygeal region:  1. Cleanse with saline  2. Pat dry  3. Paint intact milagros wound skin with cavilon  4. Apply Aquacel AG extra to cover wound bed  5. Secure with Duoderm  6. Change every 3 days and prn  7. Obtain foam wedge from [employer] management to assist with maintaining proper position changes at least q 2 hours and document actual position in [facility] q 2 hours    Skin Care Precautions / Pressure Injury Prevention:  1. Follow "Guidelines for Prevention of Pressure Ulcers in At Risk Patients"  These guidelines can be found on the [facility] Intranet by searching "Wound Care / Ostomy Resources"  2. Document wound assessment in [facility] using guidelines in Girma's "Assessment : Wound" procedure  3. Limit the amount of linen/underpad between patient and mattress surface to ONE fitted sheet and ONE covidien underpad - NO draw sheet/briefs.  4. Obtain Bath Wipes for providing milagros care - avoid the use of wash cloths to areas affected by IAD.  5. Apply Paste Barrier Ointment to perineal / perirectal areas in a thin even layer to clean dry skin BID and after each episode of pericare  6. Apply sween 24 moisturizer cream to all dry skin after daily bath and prn  7. Obtain foam wedge from [employer] management to assist with maintaining proper " position changes at least q 2hours and document actual position in EPIC q 2hours  8. Elevate heels off mattress on 2 separate pillows placed lengthwise under each leg supporting the leg from knee to ankle.  Document in EPIC flow sheet every 2 hours.  9. .Do NOT elevate HOB greater than 30 degrees unless contraindicated.  10. Remove SCD/Plexi Pulses/TESSA's every 12 hours for 30 minutes and assess skin underneath these devices for breakdown

## 2018-10-19 NOTE — ASSESSMENT & PLAN NOTE
Therapeutic bronchoscopy at bedside:    Bronchoscopy procedure discussed in detail with patient's daughter. Complications of the procedure discussed in detail with patient. Complications including but not limited to infection that may require hospital admission, bleeding that may require blood transfusion and or hospital admission, perforation of the lung which may require surgery,chance of injury to the throat, windpipe or bronchial tubes, laryngospam, coughing, aspiration, hypoxemia or cardiac arrythmias. The material risks of anesthesia in connection with the procedure including brain damage, paralysis from the neck downwards, paralysis from the waist downwards, loss of function of an arm or a leg, disfigurement (incluing scars)and death were discussed with patient who expressedand verbalized understanding. Alternate treatments and material risks associated with such alternatives were discussed with pateint. These include radiologic surveillance  with minimal risk and sugery with an indeterminate risk. The material risks of refusing the procedure was discussed in detail. This includes no diagnosis or confirmation of diagnisis and rendering of appropriate treatment the risk of which depends on the nature of the diagnosed ilness. She  expressed and verbalized understanding. All concerns addressed and questions answered to her satisfaction.  Informed consent signed by patient and appended to chart. Bronchocopy will be performed with bronchial lavage. Endobronchial washings will be obtained.   Specimen will be sent for microbiological and cytological  Analysis.    Aggressive pulmonary toileting.

## 2018-10-19 NOTE — HPI
Carlie Valdez is a 72 y.o. female patient with PMHx of Afib, CHF, DM, GERD, neuromyelitis optica spectrum disorder, and HTN, Chronic Trach/Peg/Harris who presents for altered mental status which onset gradually last night. Patient currently resides at [facility]. Per ER nurse, nursing home nurse states patient has had decreased level of consciousness since last night. Symptoms are constant and moderate in severity. Nursing home also reports patient has had decreased oral intake and decreased urine output. Per ER nurse, patient will take food orally. History limited secondary to patient being nonverbal due to trach. Patient presented to ER hypotensive, has chronic harris on arrival -->UA +. Chest Xray + infiltrate. Ct Head Neg. Sepsis protocol done in ER. Lactic neg. Received 30 ml/kg bolus with positive response in BP.Received rocephin.-->  Vanc/Zosyn. Urine culture and blood culture pending.

## 2018-10-19 NOTE — ASSESSMENT & PLAN NOTE
PNE/UTI  Vanc and merropenem  IVFs and pressors  Await blood/urine cultures  Wound care consult  Monitor labs  pulm on board

## 2018-10-19 NOTE — ASSESSMENT & PLAN NOTE
Patient with recent admission to local hospital see careeverywhere.  Patient had extensive workup.  CT head negative for acute findings. Patient + UTI and PNE. No meningeal s/s on exam.  Treat with Triple Antx therapy as patient high risk for health care associated infectious process. Use  Aztreonam instead of flurquinolone for double Pseudo coverage due to severe Drug/Drug interaction with Cipro  Avoid alterning medications.  Patient medication profile includes sedative medications at nursing home.  Would consider further medication rec at discharge and discourage use of altering medications if possible.

## 2018-10-19 NOTE — ASSESSMENT & PLAN NOTE
Unclear as to why patient is on, will hold at this time due to AMS and reevaluate over course.   Consider psych consult pending course

## 2018-10-19 NOTE — PLAN OF CARE
Patient unable to sign IMM and on vent     10/19/18 1034   Medicare Message   Important Message from Medicare regarding Discharge Appeal Rights Other (comments)   Date IMM was signed 10/19/18   Time IMM was signed 1034

## 2018-10-19 NOTE — PLAN OF CARE
Problem: Patient Care Overview  Goal: Plan of Care Review  Outcome: Ongoing (interventions implemented as appropriate)  Pt remains on levophed gtt. Bronchoscopy done at bedside, specimen sent for culture. TF started per nutrition recs, with a goal of 55ml/hr. Vital signs stable, pt shows no signs of distress.

## 2018-10-19 NOTE — PROGRESS NOTES
Trach care done. Site cleaned. Inner cannula changed.HME replaced. Drain sponge placed under site to help prevent breakdown when trach sits on base of neck.

## 2018-10-19 NOTE — ASSESSMENT & PLAN NOTE
Metabolic encephalopathy. Hold all psychoactive medications. Treat underlying sepsis. Neurochecks per routine.

## 2018-10-19 NOTE — PROCEDURES
"Carlie Valdez is a 72 y.o. female patient.    Temp: 96 °F (35.6 °C) (10/19/18 0050)  Pulse: 106 (10/19/18 0050)  Resp: (!) 26 (10/19/18 0050)  BP: (!) 71/50 (10/19/18 0050)  SpO2: 96 % (10/19/18 0050)  Weight: 83.7 kg (184 lb 8.4 oz) (10/19/18 0050)  Height: 5' 4" (162.6 cm) (10/19/18 0050)    PICC  Date/Time: 10/19/2018 1:31 AM  Performed by: Thor Li RN  Supervising provider: Alisha Dozier RN  Consent Done: Yes  Time out: Immediately prior to procedure a time out was called to verify the correct patient, procedure, equipment, support staff and site/side marked as required  Indications: vascular access  Anesthesia: local infiltration  Local anesthetic: lidocaine 1% without epinephrine  Anesthetic Total (mL): 3  Preparation: skin prepped with ChloraPrep  Skin prep agent dried: skin prep agent completely dried prior to procedure  Sterile barriers: all five maximum sterile barriers used - cap, mask, sterile gown, sterile gloves, and large sterile sheet  Hand hygiene: hand hygiene performed prior to central venous catheter insertion  Location details: left basilic  Catheter type: double lumen  Catheter Length: 12cm    Post-procedure: blood return through all ports, chlorhexidine patch and sterile dressing applied    Comments: unable to visualize vessel to RUE,   Unable to advance picc to SVC from LUE approach.   Midline order recieved per VIKTOR Bolanos NP. Primary nurse aware of results. Yellow midline label to each lumen.           Thor Li  10/19/2018  "

## 2018-10-19 NOTE — PROGRESS NOTES
Carlie Petersus [id number] is a 72 y.o. female who has been consulted for vancomycin dosing.  Consult ordered by Atif Bolanos NP     Dx: PNA  Vancomycin goal trough = 15-20 mcg/mL  Concomitant abx tx: Aztreonam 2 g every 8 hours, Zosyn 4.5 g every 8 hours    Tmax: 98.2 F,   Cultures collected: NGTD  The patient has the following labs:     Date Creatinine (mg/dl)    BUN WBC Count   10/19/2018 Estimated Creatinine Clearance: 38 mL/min (based on SCr of 1.4 mg/dL). Lab Results   Component Value Date    BUN 44 (H) 10/18/2018     Lab Results   Component Value Date    WBC 7.45 10/18/2018      which calculates to an Estimated Creatinine Clearance: 38 mL/min (based on SCr of 1.4 mg/dL)..       Current weight is 83.7 kg (184 lb 8.4 oz)    DW: 66.3 kg  The patient will be started on vancomycin at a dose of 20 mg/kg or 1,250 mg and due to her poor renal function, pharmacy will order post dose random levels to determine the appropriate dose and interval.   A placeholder dose of Vancomycin 1,250 mg has been ordered.    Patient will be followed by pharmacy for changes in renal function, toxicity, and efficacy.      The initial vancomycin random level has been ordered for 10/19 at 1800.      Thank you for allowing us to participate in this patient's care.     Atif Mahmood

## 2018-10-19 NOTE — ED NOTES
Unsuccessful EJ attempt and central line attempt per Dr. Orantes, PICC line team called out, per faustino Costello supervisor, pt with stable VS and should be brought to ICU to await PICC line team.

## 2018-10-19 NOTE — ASSESSMENT & PLAN NOTE
[] Respiratory rate >20 breaths/min or PaCO2 <32 mmHg   []  WBC >12,000 cells/mm3, <4000 cells/mm3, or >10 percent immature (band) forms  [x] Heart rate >90 beats/min   [] Temperature >38ºC or <36ºC    Microbiology: Panculturel.   Oxygenation: Continue ventilator support.  Keep SAO2 > = 92%  Hemodynamics: IV crystalloids. IV Norepinephrine. Keep MAP > = 65mmHg  Source control: IV Meropenem, IV Vancomycin.

## 2018-10-19 NOTE — ASSESSMENT & PLAN NOTE
PNE/UTI  Received rocephin in Er. --> Vanc Zosyn.  Lactic neg. Received fluids  Await blood/urine cultures  Wound care consult  Monitor labs  Poor IV access-->Central Line . Pressors if need

## 2018-10-19 NOTE — PROGRESS NOTES
[facility] Hospital Medicine  Progress Note    Patient Name: Carlie Valdez  MRN: [id number]  Patient Class: IP- Inpatient   Admission Date: 10/18/2018  Length of Stay: 1 days  Attending Physician: Perla Moy MD  Primary Care Provider: Johanna Guzman MD        Subjective:     Principal Problem:Severe sepsis    HPI:  Carlie Valdez is a 72 y.o. female patient with PMHx of Afib, CHF, DM, GERD, neuromyelitis optica spectrum disorder, and HTN, Chronic Trach/Peg/Harris who presents for altered mental status which onset gradually last night. Patient currently resides at [facility]. Per ER nurse, nursing home nurse states patient has had decreased level of consciousness since last night. Symptoms are constant and moderate in severity. Nursing home also reports patient has had decreased oral intake and decreased urine output. Per ER nurse, patient will take food orally. History limited secondary to patient being nonverbal due to trach. Patient presented to ER hypotensive, has chronic harris on arrival -->UA +. Chest Xray + infiltrate. Ct Head Neg. Sepsis protocol done in ER. Lactic neg. Received 30 ml/kg bolus with positive response in BP.Received rocephin.-->  Vanc/Zosyn. Urine culture and blood culture pending.         Hospital Course:  Pt admitted to ICU with IVFS and empiric IVabx- Vanc & Merropenem. Pulmonologist did a therapeutic bronchoscopy on 10/19 with copious thick obstructing mucopurulent muscus noted in the left mainstem bronchus. Patient is ventilator dependent. Ventilator support via trach. Continue supportive treatments.        Interval History: bronch by pulm today with improvements    Review of Systems Unable to perform ROS: Patient nonverbal       Objective:     Vital Signs (Most Recent):  Temp: 96.5 °F (35.8 °C) (10/19/18 1500)  Pulse: 69 (10/19/18 1700)  Resp: 19 (10/19/18 1700)  BP: 105/86 (10/19/18 1700)  SpO2: 100 % (10/19/18 1700) Vital Signs  (24h Range):  Temp:  [96 °F (35.6 °C)-98.2 °F (36.8 °C)] 96.5 °F (35.8 °C)  Pulse:  [] 69  Resp:  [4-30] 19  SpO2:  [89 %-100 %] 100 %  BP: ()/() 105/86     Weight: 83.7 kg (184 lb 8.4 oz)  Body mass index is 31.67 kg/m².    Intake/Output Summary (Last 24 hours) at 10/19/2018 1732  Last data filed at 10/19/2018 1700  Gross per 24 hour   Intake 3791.54 ml   Output 190 ml   Net 3601.54 ml      Physical Exam  Constitutional: She appears well-developed. She appears lethargic. She has a sickly appearance. She appears ill. No distress.   HENT:   Head: Normocephalic and atraumatic.   Nose: Nose normal.   Eyes: Right eye exhibits no discharge. Left eye exhibits no discharge. No scleral icterus.   Blind   Neck: Normal range of motion. Neck supple. No JVD present. No tracheal deviation present.   Cardiovascular: Normal rate, regular rhythm and intact distal pulses.   Murmur heard.  Upper Arm edema worse to right.  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   Pulmonary/Chest: Effort normal. No stridor. No respiratory distress. She has no wheezes. She has rales ( right). Trach/vent   Abdominal: Soft. Bowel sounds are normal. She exhibits no distension. There is no tenderness. There is no guarding. +Peg in place  Musculoskeletal: Normal range of motion. She exhibits edema. She exhibits no deformity.   Neurological: She appears lethargic.   Lethargic,Responds to pain   Skin: Skin is warm and dry. Capillary refill takes 2 to 3 seconds. No rash noted. She is not diaphoretic.   Wound/ulcer on arrival.  Wound care consult for full skin exam    Nursing note and vitals reviewed.      Significant Labs:   CBC:   Recent Labs   Lab 10/18/18  2101 10/19/18  0334   WBC 7.45 6.93   HGB 8.9* 8.5*   HCT 27.5* 26.8*   * 142*     CMP:   Recent Labs   Lab 10/18/18  2101 10/19/18  0334   * 134*   K 3.4* 3.5   CL 94* 97   CO2 22* 24   GLU 68* 91   BUN 44* 42*   CREATININE 1.4 1.3   CALCIUM 8.6* 8.4*   PROT 5.5*  --     ALBUMIN 2.1*  --    BILITOT 0.4  --    ALKPHOS 123  --    AST 29  --    ALT 36  --    ANIONGAP 16 13   EGFRNONAA 38* 41*       Significant Imaging: I have reviewed all pertinent imaging results/findings within the past 24 hours.   Imaging Results          X-Ray Chest 1 View (Final result)  Result time 10/19/18 07:52:47    Final result by Atif Mcgee MD (10/19/18 07:52:47)                 Impression:      See above..      Electronically signed by: Atif Mcgee MD  Date:    10/19/2018  Time:    07:52             Narrative:    EXAMINATION:  XR CHEST 1 VIEW    CLINICAL HISTORY:  Shortness of breath    FINDINGS:  Single view of the chest.    Near complete opacification of left hemithorax concerning for consolidation with areas of air bronchograms.  Moderate left-sided pleural effusion is also suspected.  Small right pleural effusion is suspected.  Coarse interstitial opacities are seen within the perihilar region of the right lung which suggests some component of pulmonary edema.  Tracheostomy appears satisfactory.  Right-sided pacing wires remain.                               CT Head Without Contrast (Final result)  Result time 10/18/18 22:15:25    Final result by Naman Mcguire Jr., MD (10/18/18 22:15:25)                 Impression:      No acute intracranial findings evident.    All CT scans at this facility are performed  using dose modulation techniques as appropriate to performed exam including the following:  automated exposure control; adjustment of mA and/or kV according to the patients size (this includes techniques or standardized protocols for targeted exams where dose is matched to indication/reason for exam: i.e. extremities or head);  iterative reconstruction technique.      Electronically signed by: Naman Mcguire MD  Date:    10/18/2018  Time:    22:15             Narrative:    EXAMINATION:  CT HEAD WITHOUT CONTRAST    CLINICAL HISTORY:  Confusion/delirium, altered LOC,  unexplained;    TECHNIQUE:  Noncontrast axial images of the head were obtained.  Motion artifact    COMPARISON:  07/05/2018    FINDINGS:  Involutional changes.  Old lacunar infarct in the right thalamus.    Ventricular system is normal.  No hydrocephalus.  No midline shift.  No abnormal density to indicate acute major vascular distribution ischemic infarction or hemorrhage.  No mass effect.  No extra-axial fluid collections.    Visualized paranasal sinuses and mastoid air cells appear clear.    No calvarial fracture.    No significant detrimental change noted.                               X-Ray Chest AP Portable (Final result)  Result time 10/18/18 21:10:59    Final result by Naman Mcguire Jr., MD (10/18/18 21:10:59)                 Impression:      Left-sided infiltrate.  Small right-sided effusion.  Cardiomegaly.      Electronically signed by: Naman Mcguire MD  Date:    10/18/2018  Time:    21:10             Narrative:    EXAMINATION:  XR CHEST AP PORTABLE    CLINICAL HISTORY:  Chest Pain;    COMPARISON:  07/19/2018    FINDINGS:  The patient is rotated.  Heart size appears mildly enlarged, similar to before.  Triple lead cardiac pacemaker.  Cardiac monitor leads.  Tracheostomy cannula in place.    Probable small right-sided pleural effusion.  Developing consolidation with air bronchograms in the left mid and lower lobe.  Left lower lobe pneumonia is likely.                              Assessment/Plan:      * Severe sepsis    PNE/UTI  Vanc and merropenem  IVFs and pressors  Await blood/urine cultures  Wound care consult  Monitor labs  pulm on board       Pressure injury of coccygeal region, unstageable    Wound care       Long term current use of antipsychotic medication    Unclear as to why patient is on, will hold at this time due to AMS and reevaluate over course.   Consider psych consult pending course      Debility    Turn q 2  Supportive care       Acute encephalopathy      CT head negative for acute  findings.  Treat underlying  UTI and PNE.   Metabolic encephalopathy. Hold all psychoactive medications.   Neurochecks per routine.     Hypothyroidism    Cont synthroid       Type 2 diabetes mellitus with kidney complication, without long-term current use of insulin    Check a1c  ssi  Monitor        Chronic atrial fibrillation    Continue anticoag-rivaroxaban and amio  montior rate close           VTE Risk Mitigation (From admission, onward)        Ordered     rivaroxaban tablet 15 mg  Nightly      10/18/18 9320          Critical care time spent on the evaluation and treatment of severe organ dysfunction, review of pertinent labs and imaging studies, discussions with consulting providers and discussions with patient/family: >30 minutes.    Perla Moy MD  Department of Hospital Medicine   [facility]

## 2018-10-19 NOTE — SUBJECTIVE & OBJECTIVE
Past Medical History:   Diagnosis Date    Anticoagulant long-term use     Arthritis     Asthma     Atrial fibrillation     Blood clot of vein in shoulder area     Cardiac defibrillator in place     CHF (congestive heart failure)     Chronic knee pain     Diabetes mellitus type 2 without retinopathy 2016    Diaphragmatic hernia without obstruction and without gangrene 2015    GERD (gastroesophageal reflux disease)     Hypertension     Immune deficiency disorder     Primary open angle glaucoma (POAG) of both eyes, severe stage 2018       Past Surgical History:   Procedure Laterality Date    CARDIAC DEFIBRILLATOR PLACEMENT      , .    CATARACT EXTRACTION       SECTION      COLONOSCOPY      EGD, WITH PEG TUBE INSERTION N/A 2018    Performed by Louis O. Jeansonne IV, MD at [facility] OR    ashley Macdonald    ESOPHAGOGASTRODUODENOSCOPY (EGD) N/A 2015    Performed by Hieu Colbert MD at [facility] ENDO    ESOPHAGOGASTRODUODENOSCOPY W/ PEG N/A 2018    Procedure: EGD, WITH PEG TUBE INSERTION;  Surgeon: Louis O. Jeansonne IV, MD;  Location: [facility] OR;  Service: General;  Laterality: N/A;    KNEE ARTHROSCOPY      TRACHEOSTOMY N/A 2018    Procedure: TRACHEOSTOMY;  Surgeon: Louis O. Jeansonne IV, MD;  Location: [facility] OR;  Service: General;  Laterality: N/A;    TRACHEOSTOMY N/A 2018    Performed by Louis O. Jeansonne IV, MD at [facility] OR    UPPER GASTROINTESTINAL ENDOSCOPY         Review of patient's allergies indicates:   Allergen Reactions    Morphine Hives    Atorvastatin      Other reaction(s): Muscle pain    Clonidine     Codeine      Other reaction(s): Unknown    Digoxin      Other reaction(s): Unknown    Diovan [valsartan] Other (See Comments)     Upset stomach, weakness    Eggs [egg derived]     Metoprolol Diarrhea    Naproxen      Other reaction(s): Nausea    Peanut     Propofol      Other reaction(s): delirious    Sulfa (sulfonamide  antibiotics)        Current Facility-Administered Medications on File Prior to Encounter   Medication    [DISCONTINUED] ergocalciferol capsule    [DISCONTINUED] furosemide 10 mg/mL (alcohol free) solution    [DISCONTINUED] GENERIC EXTERNAL MEDICATION    [DISCONTINUED] levothyroxine tablet    [DISCONTINUED] magnesium oxide tablet    [DISCONTINUED] mycophenolate tablet    [DISCONTINUED] pantoprazole EC tablet    [DISCONTINUED] sodium chloride 3% nebulizer solution    [DISCONTINUED] vancomycin (VANCOCIN) 1000 mg in dextrose 5 % 200 mL IVPB     Current Outpatient Medications on File Prior to Encounter   Medication Sig    albuterol 90 mcg/actuation inhaler Inhale 1-2 puffs into the lungs every 6 (six) hours as needed.     albuterol-ipratropium (DUO-NEB) 2.5 mg-0.5 mg/3 mL nebulizer solution Take 3 mLs by nebulization every 4 (four) hours as needed for Wheezing. Rescue    amiodarone (PACERONE) 200 MG Tab Take 1 tablet (200 mg total) by mouth 2 (two) times daily. (Patient taking differently: Take 100 mg by mouth 2 (two) times daily. )    bacitracin 500 unit/gram ointment Apply topically once daily. Apply to R heel daily.    baclofen (LIORESAL) 10 MG tablet Take 10 mg by mouth 3 (three) times daily as needed.     brimonidine-timolol (COMBIGAN) 0.2-0.5 % Drop Place 1 drop into both eyes every 12 (twelve) hours.    bumetanide (BUMEX) 2 MG tablet Take 1 tablet (2 mg total) by mouth 2 (two) times daily. 1 Tablet Oral Every day (Patient taking differently: Take 1 mg by mouth 2 (two) times daily. Hold for SBP less than or equal to 100.)    ergocalciferol (ERGOCALCIFEROL) 50,000 unit Cap Take 1 capsule (50,000 Units total) by mouth every 7 days.    fluticasone (FLONASE) 50 mcg/actuation nasal spray 2 sprays by Each Nare route once daily. (Patient taking differently: 2 sprays by Each Nare route daily as needed. )    furosemide (LASIX) 20 MG tablet Take 20 mg by mouth 2 (two) times daily.    levothyroxine  (SYNTHROID) 100 MCG tablet Take 100 mcg by mouth before breakfast.     magnesium oxide (MAG-OX) 400 mg tablet Take 400 mg by mouth 2 (two) times daily.    midodrine (PROAMATINE) 5 MG Tab 1 tablet (5 mg total) by Per G Tube route 3 (three) times daily.    montelukast (SINGULAIR) 10 mg tablet Take 10 mg by mouth once daily.     multivitamin (THERAGRAN) per tablet Take 1 tablet by mouth once daily.     mycophenolate (CELLCEPT) 250 mg Cap Take 2 capsules (500 mg total) by mouth 2 (two) times daily.    norepinephrine bitartrate-D5W 4 mg/250 mL (16 mcg/mL) Soln Inject 1.44 mcg/min into the vein continuous.    ondansetron (ZOFRAN) 4 MG tablet Take 4 mg by mouth every 6 (six) hours as needed for Nausea.    pantoprazole (PROTONIX) 40 MG tablet Take 40 mg by mouth once daily.    polyethylene glycol (GLYCOLAX) 17 gram PwPk Take 17 g by mouth once daily. (Patient taking differently: Take 17 g by mouth daily as needed. )    predniSONE (DELTASONE) 20 MG tablet Take 20 mg by mouth once daily.    rivaroxaban (XARELTO) 15 mg Tab Take 15 mg by mouth every evening.     rivaroxaban (XARELTO) 15 mg Tab Take 1 tablet (15 mg total) by mouth daily with dinner or evening meal.    silver sulfADIAZINE 1% (SILVADENE) 1 % cream Apply topically 2 (two) times daily. Apply to bliter areas BID (Patient taking differently: Apply topically 2 (two) times daily. Apply to blistered areas BID.)    traMADol (ULTRAM) 50 mg tablet Take 50 mg by mouth every 6 (six) hours as needed for Pain.    zinc sulfate (ZINCATE) 220 (50) mg capsule Take 220 mg by mouth once daily.      Family History     Problem Relation (Age of Onset)    Hypertension Mother, Father        Tobacco Use    Smoking status: Never Smoker    Smokeless tobacco: Never Used   Substance and Sexual Activity    Alcohol use: No     Alcohol/week: 0.0 oz    Drug use: No    Sexual activity: Yes     Partners: Male     Review of Systems   Unable to perform ROS: Mental status change      Objective:     Vital Signs (Most Recent):  Temp: 98.2 °F (36.8 °C) (10/18/18 2016)  Pulse: (!) 114 (10/18/18 2316)  Resp: 17 (10/18/18 2316)  BP: 109/77 (10/18/18 2242)  SpO2: 100 % (10/18/18 2316) Vital Signs (24h Range):  Temp:  [98.2 °F (36.8 °C)] 98.2 °F (36.8 °C)  Pulse:  [] 114  Resp:  [17-30] 17  SpO2:  [89 %-100 %] 100 %  BP: ()/(41-94) 109/77     Weight: 80.4 kg (177 lb 3.2 oz)  Body mass index is 30.42 kg/m².    Physical Exam   Constitutional: She appears well-developed. She appears lethargic. She has a sickly appearance. She appears ill. No distress.   HENT:   Head: Normocephalic and atraumatic.   Nose: Nose normal.   Eyes: Right eye exhibits no discharge. Left eye exhibits no discharge. No scleral icterus.   Blind     Neck: Normal range of motion. Neck supple. No JVD present. No tracheal deviation present.   Cardiovascular: Normal rate, regular rhythm and intact distal pulses.   Murmur heard.  Upper Arm edema worse to right.  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   Pulmonary/Chest: Effort normal. No stridor. No respiratory distress. She has no wheezes. She has rales ( right).   Trach/vent   Abdominal: Soft. Bowel sounds are normal. She exhibits no distension. There is no tenderness. There is no guarding.   Peg   Genitourinary:   Genitourinary Comments: Flores  + Ua   Musculoskeletal: Normal range of motion. She exhibits edema. She exhibits no deformity.   Neurological: She appears lethargic.   Lethargic  Responds to pain   Skin: Skin is warm and dry. Capillary refill takes 2 to 3 seconds. No rash noted. She is not diaphoretic.   Wound/ulcer on arrival.  Wound care consult for full skin exam    Psychiatric:   Unable to assess   Nursing note and vitals reviewed.          Significant Labs: All pertinent labs within the past 24 hours have been reviewed.  Results for orders placed or performed during the hospital encounter of 10/18/18   CBC auto differential   Result Value Ref Range     WBC 7.45 3.90 - 12.70 K/uL    RBC 3.25 (L) 4.00 - 5.40 M/uL    Hemoglobin 8.9 (L) 12.0 - 16.0 g/dL    Hematocrit 27.5 (L) 37.0 - 48.5 %    MCV 85 82 - 98 fL    MCH 27.4 27.0 - 31.0 pg    MCHC 32.4 32.0 - 36.0 g/dL    RDW 18.1 (H) 11.5 - 14.5 %    Platelets 125 (L) 150 - 350 K/uL    MPV 11.1 9.2 - 12.9 fL    Lymph # CANCELED 1.0 - 4.8 K/uL    Mono # CANCELED 0.3 - 1.0 K/uL    Eos # CANCELED 0.0 - 0.5 K/uL    Baso # CANCELED 0.00 - 0.20 K/uL    Gran% 8.0 (L) 38.0 - 73.0 %    Lymph% 6.0 (L) 18.0 - 48.0 %    Mono% 0.0 (L) 4.0 - 15.0 %    Eosinophil% 1.0 0.0 - 8.0 %    Basophil% 0.0 0.0 - 1.9 %    Bands 69.0 %    Metamyelocytes 14.0 %    Myelocytes 2.0 %    Platelet Estimate Appears normal     Differential Method Manual    Comprehensive metabolic panel   Result Value Ref Range    Sodium 132 (L) 136 - 145 mmol/L    Potassium 3.4 (L) 3.5 - 5.1 mmol/L    Chloride 94 (L) 95 - 110 mmol/L    CO2 22 (L) 23 - 29 mmol/L    Glucose 68 (L) 70 - 110 mg/dL    BUN, Bld 44 (H) 8 - 23 mg/dL    Creatinine 1.4 0.5 - 1.4 mg/dL    Calcium 8.6 (L) 8.7 - 10.5 mg/dL    Total Protein 5.5 (L) 6.0 - 8.4 g/dL    Albumin 2.1 (L) 3.5 - 5.2 g/dL    Total Bilirubin 0.4 0.1 - 1.0 mg/dL    Alkaline Phosphatase 123 55 - 135 U/L    AST 29 10 - 40 U/L    ALT 36 10 - 44 U/L    Anion Gap 16 8 - 16 mmol/L    eGFR if African American 43 (A) >60 mL/min/1.73 m^2    eGFR if non African American 38 (A) >60 mL/min/1.73 m^2   Urinalysis - Cath.   Result Value Ref Range    Specimen UA Urine, Catheterized     Color, UA Yellow Yellow, Straw, Samra    Appearance, UA Clear Clear    pH, UA 5.0 5.0 - 8.0    Specific Gravity, UA >=1.030 (A) 1.005 - 1.030    Protein, UA 2+ (A) Negative    Glucose, UA Negative Negative    Ketones, UA Trace (A) Negative    Bilirubin (UA) 1+ (A) Negative    Occult Blood UA 3+ (A) Negative    Nitrite, UA Negative Negative    Urobilinogen, UA Negative <2.0 EU/dL    Leukocytes, UA 2+ (A) Negative   Lactic acid, plasma   Result Value Ref Range     Lactate (Lactic Acid) 1.8 0.5 - 2.2 mmol/L   Protime-INR   Result Value Ref Range    Prothrombin Time 13.7 (H) 9.0 - 12.5 sec    INR 1.3 (H) 0.8 - 1.2   Troponin I   Result Value Ref Range    Troponin I 0.166 (H) 0.000 - 0.026 ng/mL   B-Type natriuretic peptide (BNP)   Result Value Ref Range     (H) 0 - 99 pg/mL   Urinalysis Microscopic   Result Value Ref Range    RBC, UA >100 (H) 0 - 4 /hpf    WBC, UA >100 (H) 0 - 5 /hpf    Bacteria, UA Many (A) None-Occ /hpf    Squam Epithel, UA 2 /hpf    Hyaline Casts, UA 0 0-1/lpf /lpf    Microscopic Comment SEE COMMENT    Magnesium   Result Value Ref Range    Magnesium 1.9 1.6 - 2.6 mg/dL   Phosphorus   Result Value Ref Range    Phosphorus 4.1 2.7 - 4.5 mg/dL       Significant Imaging: I have reviewed all pertinent imaging results/findings within the past 24 hours.   Imaging Results          CT Head Without Contrast (Final result)  Result time 10/18/18 22:15:25    Final result by Naman Mcguire Jr., MD (10/18/18 22:15:25)                 Impression:      No acute intracranial findings evident.    All CT scans at this facility are performed  using dose modulation techniques as appropriate to performed exam including the following:  automated exposure control; adjustment of mA and/or kV according to the patients size (this includes techniques or standardized protocols for targeted exams where dose is matched to indication/reason for exam: i.e. extremities or head);  iterative reconstruction technique.      Electronically signed by: Naman Mcguire MD  Date:    10/18/2018  Time:    22:15             Narrative:    EXAMINATION:  CT HEAD WITHOUT CONTRAST    CLINICAL HISTORY:  Confusion/delirium, altered LOC, unexplained;    TECHNIQUE:  Noncontrast axial images of the head were obtained.  Motion artifact    COMPARISON:  07/05/2018    FINDINGS:  Involutional changes.  Old lacunar infarct in the right thalamus.    Ventricular system is normal.  No hydrocephalus.  No midline  shift.  No abnormal density to indicate acute major vascular distribution ischemic infarction or hemorrhage.  No mass effect.  No extra-axial fluid collections.    Visualized paranasal sinuses and mastoid air cells appear clear.    No calvarial fracture.    No significant detrimental change noted.                               X-Ray Chest AP Portable (Final result)  Result time 10/18/18 21:10:59    Final result by Naman Mcguire Jr., MD (10/18/18 21:10:59)                 Impression:      Left-sided infiltrate.  Small right-sided effusion.  Cardiomegaly.      Electronically signed by: Naman Mcguire MD  Date:    10/18/2018  Time:    21:10             Narrative:    EXAMINATION:  XR CHEST AP PORTABLE    CLINICAL HISTORY:  Chest Pain;    COMPARISON:  07/19/2018    FINDINGS:  The patient is rotated.  Heart size appears mildly enlarged, similar to before.  Triple lead cardiac pacemaker.  Cardiac monitor leads.  Tracheostomy cannula in place.    Probable small right-sided pleural effusion.  Developing consolidation with air bronchograms in the left mid and lower lobe.  Left lower lobe pneumonia is likely.                                 Dysphagia 2 with honey thickened fluid ***ALL PO FLUID IN LEFT HEAD ROTATION***  MD/team Please enter the following as notes to RN: 1) Left head rotation with honey thickened fluid 2) Crush meds or provide via alternate source, 3) Provide small single bites and sips at slow rate-NO STRAWS 4) Encourage successive swallows for oral and pharyngeal clearance 5) Alternate food and liquids to aid in oral and pharyngeal clearance 6) Aspiration precautions. Monitor for s/s aspiration/laryngeal penetration. If noted:  D/C p.o. intake, provide non-oral nutrition/hydration/meds

## 2018-10-19 NOTE — H&P
[facility] Hospital Medicine  History & Physical    Patient Name: Carlie Valdez  MRN: [id number]  Admission Date: 10/18/2018  Attending Physician: Emilia Hart MD  Primary Care Provider: Johanna Guzman MD         Patient information was obtained from past medical records and ER records.     Subjective:     Principal Problem:Severe sepsis    Chief Complaint:   Chief Complaint   Patient presents with    Altered Mental Status    Hypotension        HPI: Carlie Valdez is a 72 y.o. female patient with PMHx of Afib, CHF, DM, GERD, neuromyelitis optica spectrum disorder, and HTN, Chronic Trach/Peg/Harris who presents for altered mental status which onset gradually last night. Patient currently resides at [facility]. Per ER nurse, nursing home nurse states patient has had decreased level of consciousness since last night. Symptoms are constant and moderate in severity. Nursing home also reports patient has had decreased oral intake and decreased urine output. Per ER nurse, patient will take food orally. History limited secondary to patient being nonverbal due to trach. Patient presented to ER hypotensive, has chronic harris on arrival -->UA +. Chest Xray + infiltrate. Ct Head Neg. Sepsis protocol done in ER. Lactic neg. Received 30 ml/kg bolus with positive response in BP.Received rocephin.-->  Vanc/Zosyn. Urine culture and blood culture pending.     Patient is Full Code    Past Medical History:   Diagnosis Date    Anticoagulant long-term use     Arthritis     Asthma     Atrial fibrillation     Blood clot of vein in shoulder area     Cardiac defibrillator in place     CHF (congestive heart failure)     Chronic knee pain     Diabetes mellitus type 2 without retinopathy 12/19/2016    Diaphragmatic hernia without obstruction and without gangrene 9/14/2015    GERD (gastroesophageal reflux disease)     Hypertension     Immune deficiency disorder     Primary open  angle glaucoma (POAG) of both eyes, severe stage 2018       Past Surgical History:   Procedure Laterality Date    CARDIAC DEFIBRILLATOR PLACEMENT      , .    CATARACT EXTRACTION       SECTION      COLONOSCOPY      EGD, WITH PEG TUBE INSERTION N/A 2018    Performed by Louis O. Jeansonne IV, MD at [facility]    ashley Macdonald    ESOPHAGOGASTRODUODENOSCOPY (EGD) N/A 2015    Performed by Hieu Colbert MD at [facility] ENDO    ESOPHAGOGASTRODUODENOSCOPY W/ PEG N/A 2018    Procedure: EGD, WITH PEG TUBE INSERTION;  Surgeon: Louis O. Jeansonne IV, MD;  Location: [facility] OR;  Service: General;  Laterality: N/A;    KNEE ARTHROSCOPY      TRACHEOSTOMY N/A 2018    Procedure: TRACHEOSTOMY;  Surgeon: Louis O. Jeansonne IV, MD;  Location: [facility] OR;  Service: General;  Laterality: N/A;    TRACHEOSTOMY N/A 2018    Performed by Louis O. Jeansonne IV, MD at [facility] OR    UPPER GASTROINTESTINAL ENDOSCOPY         Review of patient's allergies indicates:   Allergen Reactions    Morphine Hives    Atorvastatin      Other reaction(s): Muscle pain    Clonidine     Codeine      Other reaction(s): Unknown    Digoxin      Other reaction(s): Unknown    Diovan [valsartan] Other (See Comments)     Upset stomach, weakness    Eggs [egg derived]     Metoprolol Diarrhea    Naproxen      Other reaction(s): Nausea    Peanut     Propofol      Other reaction(s): delirious    Sulfa (sulfonamide antibiotics)        Current Facility-Administered Medications on File Prior to Encounter   Medication    [DISCONTINUED] ergocalciferol capsule    [DISCONTINUED] furosemide 10 mg/mL (alcohol free) solution    [DISCONTINUED] GENERIC EXTERNAL MEDICATION    [DISCONTINUED] levothyroxine tablet    [DISCONTINUED] magnesium oxide tablet    [DISCONTINUED] mycophenolate tablet    [DISCONTINUED] pantoprazole EC tablet    [DISCONTINUED] sodium chloride 3% nebulizer solution    [DISCONTINUED] vancomycin (VANCOCIN)  1000 mg in dextrose 5 % 200 mL IVPB     Current Outpatient Medications on File Prior to Encounter   Medication Sig    albuterol 90 mcg/actuation inhaler Inhale 1-2 puffs into the lungs every 6 (six) hours as needed.     albuterol-ipratropium (DUO-NEB) 2.5 mg-0.5 mg/3 mL nebulizer solution Take 3 mLs by nebulization every 4 (four) hours as needed for Wheezing. Rescue    amiodarone (PACERONE) 200 MG Tab Take 1 tablet (200 mg total) by mouth 2 (two) times daily. (Patient taking differently: Take 100 mg by mouth 2 (two) times daily. )    bacitracin 500 unit/gram ointment Apply topically once daily. Apply to R heel daily.    baclofen (LIORESAL) 10 MG tablet Take 10 mg by mouth 3 (three) times daily as needed.     brimonidine-timolol (COMBIGAN) 0.2-0.5 % Drop Place 1 drop into both eyes every 12 (twelve) hours.    bumetanide (BUMEX) 2 MG tablet Take 1 tablet (2 mg total) by mouth 2 (two) times daily. 1 Tablet Oral Every day (Patient taking differently: Take 1 mg by mouth 2 (two) times daily. Hold for SBP less than or equal to 100.)    ergocalciferol (ERGOCALCIFEROL) 50,000 unit Cap Take 1 capsule (50,000 Units total) by mouth every 7 days.    fluticasone (FLONASE) 50 mcg/actuation nasal spray 2 sprays by Each Nare route once daily. (Patient taking differently: 2 sprays by Each Nare route daily as needed. )    furosemide (LASIX) 20 MG tablet Take 20 mg by mouth 2 (two) times daily.    levothyroxine (SYNTHROID) 100 MCG tablet Take 100 mcg by mouth before breakfast.     magnesium oxide (MAG-OX) 400 mg tablet Take 400 mg by mouth 2 (two) times daily.    midodrine (PROAMATINE) 5 MG Tab 1 tablet (5 mg total) by Per G Tube route 3 (three) times daily.    montelukast (SINGULAIR) 10 mg tablet Take 10 mg by mouth once daily.     multivitamin (THERAGRAN) per tablet Take 1 tablet by mouth once daily.     mycophenolate (CELLCEPT) 250 mg Cap Take 2 capsules (500 mg total) by mouth 2 (two) times daily.    norepinephrine  bitartrate-D5W 4 mg/250 mL (16 mcg/mL) Soln Inject 1.44 mcg/min into the vein continuous.    ondansetron (ZOFRAN) 4 MG tablet Take 4 mg by mouth every 6 (six) hours as needed for Nausea.    pantoprazole (PROTONIX) 40 MG tablet Take 40 mg by mouth once daily.    polyethylene glycol (GLYCOLAX) 17 gram PwPk Take 17 g by mouth once daily. (Patient taking differently: Take 17 g by mouth daily as needed. )    predniSONE (DELTASONE) 20 MG tablet Take 20 mg by mouth once daily.    rivaroxaban (XARELTO) 15 mg Tab Take 15 mg by mouth every evening.     rivaroxaban (XARELTO) 15 mg Tab Take 1 tablet (15 mg total) by mouth daily with dinner or evening meal.    silver sulfADIAZINE 1% (SILVADENE) 1 % cream Apply topically 2 (two) times daily. Apply to bliter areas BID (Patient taking differently: Apply topically 2 (two) times daily. Apply to blistered areas BID.)    traMADol (ULTRAM) 50 mg tablet Take 50 mg by mouth every 6 (six) hours as needed for Pain.    zinc sulfate (ZINCATE) 220 (50) mg capsule Take 220 mg by mouth once daily.      Family History     Problem Relation (Age of Onset)    Hypertension Mother, Father        Tobacco Use    Smoking status: Never Smoker    Smokeless tobacco: Never Used   Substance and Sexual Activity    Alcohol use: No     Alcohol/week: 0.0 oz    Drug use: No    Sexual activity: Yes     Partners: Male     Review of Systems   Unable to perform ROS: Mental status change     Objective:     Vital Signs (Most Recent):  Temp: 98.2 °F (36.8 °C) (10/18/18 2016)  Pulse: (!) 114 (10/18/18 2316)  Resp: 17 (10/18/18 2316)  BP: 109/77 (10/18/18 2242)  SpO2: 100 % (10/18/18 2316) Vital Signs (24h Range):  Temp:  [98.2 °F (36.8 °C)] 98.2 °F (36.8 °C)  Pulse:  [] 114  Resp:  [17-30] 17  SpO2:  [89 %-100 %] 100 %  BP: ()/(41-94) 109/77     Weight: 80.4 kg (177 lb 3.2 oz)  Body mass index is 30.42 kg/m².    Physical Exam   Constitutional: She appears well-developed. She appears lethargic.  She has a sickly appearance. She appears ill. No distress.   HENT:   Head: Normocephalic and atraumatic.   Nose: Nose normal.   Eyes: Right eye exhibits no discharge. Left eye exhibits no discharge. No scleral icterus.   Blind     Neck: Normal range of motion. Neck supple. No JVD present. No tracheal deviation present.   Cardiovascular: Normal rate, regular rhythm and intact distal pulses.   Murmur heard.  Upper Arm edema worse to right.  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   Pulmonary/Chest: Effort normal. No stridor. No respiratory distress. She has no wheezes. She has rales ( right).   Trach/vent   Abdominal: Soft. Bowel sounds are normal. She exhibits no distension. There is no tenderness. There is no guarding.   Peg   Genitourinary:   Genitourinary Comments: Flores  + Ua   Musculoskeletal: Normal range of motion. She exhibits edema. She exhibits no deformity.   Neurological: She appears lethargic.   Lethargic  Responds to pain   Skin: Skin is warm and dry. Capillary refill takes 2 to 3 seconds. No rash noted. She is not diaphoretic.   Wound/ulcer on arrival.  Wound care consult for full skin exam    Psychiatric:   Unable to assess   Nursing note and vitals reviewed.          Significant Labs: All pertinent labs within the past 24 hours have been reviewed.  Results for orders placed or performed during the hospital encounter of 10/18/18   CBC auto differential   Result Value Ref Range    WBC 7.45 3.90 - 12.70 K/uL    RBC 3.25 (L) 4.00 - 5.40 M/uL    Hemoglobin 8.9 (L) 12.0 - 16.0 g/dL    Hematocrit 27.5 (L) 37.0 - 48.5 %    MCV 85 82 - 98 fL    MCH 27.4 27.0 - 31.0 pg    MCHC 32.4 32.0 - 36.0 g/dL    RDW 18.1 (H) 11.5 - 14.5 %    Platelets 125 (L) 150 - 350 K/uL    MPV 11.1 9.2 - 12.9 fL    Lymph # CANCELED 1.0 - 4.8 K/uL    Mono # CANCELED 0.3 - 1.0 K/uL    Eos # CANCELED 0.0 - 0.5 K/uL    Baso # CANCELED 0.00 - 0.20 K/uL    Gran% 8.0 (L) 38.0 - 73.0 %    Lymph% 6.0 (L) 18.0 - 48.0 %    Mono% 0.0 (L)  4.0 - 15.0 %    Eosinophil% 1.0 0.0 - 8.0 %    Basophil% 0.0 0.0 - 1.9 %    Bands 69.0 %    Metamyelocytes 14.0 %    Myelocytes 2.0 %    Platelet Estimate Appears normal     Differential Method Manual    Comprehensive metabolic panel   Result Value Ref Range    Sodium 132 (L) 136 - 145 mmol/L    Potassium 3.4 (L) 3.5 - 5.1 mmol/L    Chloride 94 (L) 95 - 110 mmol/L    CO2 22 (L) 23 - 29 mmol/L    Glucose 68 (L) 70 - 110 mg/dL    BUN, Bld 44 (H) 8 - 23 mg/dL    Creatinine 1.4 0.5 - 1.4 mg/dL    Calcium 8.6 (L) 8.7 - 10.5 mg/dL    Total Protein 5.5 (L) 6.0 - 8.4 g/dL    Albumin 2.1 (L) 3.5 - 5.2 g/dL    Total Bilirubin 0.4 0.1 - 1.0 mg/dL    Alkaline Phosphatase 123 55 - 135 U/L    AST 29 10 - 40 U/L    ALT 36 10 - 44 U/L    Anion Gap 16 8 - 16 mmol/L    eGFR if African American 43 (A) >60 mL/min/1.73 m^2    eGFR if non African American 38 (A) >60 mL/min/1.73 m^2   Urinalysis - Cath.   Result Value Ref Range    Specimen UA Urine, Catheterized     Color, UA Yellow Yellow, Straw, Samra    Appearance, UA Clear Clear    pH, UA 5.0 5.0 - 8.0    Specific Gravity, UA >=1.030 (A) 1.005 - 1.030    Protein, UA 2+ (A) Negative    Glucose, UA Negative Negative    Ketones, UA Trace (A) Negative    Bilirubin (UA) 1+ (A) Negative    Occult Blood UA 3+ (A) Negative    Nitrite, UA Negative Negative    Urobilinogen, UA Negative <2.0 EU/dL    Leukocytes, UA 2+ (A) Negative   Lactic acid, plasma   Result Value Ref Range    Lactate (Lactic Acid) 1.8 0.5 - 2.2 mmol/L   Protime-INR   Result Value Ref Range    Prothrombin Time 13.7 (H) 9.0 - 12.5 sec    INR 1.3 (H) 0.8 - 1.2   Troponin I   Result Value Ref Range    Troponin I 0.166 (H) 0.000 - 0.026 ng/mL   B-Type natriuretic peptide (BNP)   Result Value Ref Range     (H) 0 - 99 pg/mL   Urinalysis Microscopic   Result Value Ref Range    RBC, UA >100 (H) 0 - 4 /hpf    WBC, UA >100 (H) 0 - 5 /hpf    Bacteria, UA Many (A) None-Occ /hpf    Squam Epithel, UA 2 /hpf    Hyaline Casts, UA 0  0-1/lpf /lpf    Microscopic Comment SEE COMMENT    Magnesium   Result Value Ref Range    Magnesium 1.9 1.6 - 2.6 mg/dL   Phosphorus   Result Value Ref Range    Phosphorus 4.1 2.7 - 4.5 mg/dL       Significant Imaging: I have reviewed all pertinent imaging results/findings within the past 24 hours.   Imaging Results          CT Head Without Contrast (Final result)  Result time 10/18/18 22:15:25    Final result by Naman Mcguire Jr., MD (10/18/18 22:15:25)                 Impression:      No acute intracranial findings evident.    All CT scans at this facility are performed  using dose modulation techniques as appropriate to performed exam including the following:  automated exposure control; adjustment of mA and/or kV according to the patients size (this includes techniques or standardized protocols for targeted exams where dose is matched to indication/reason for exam: i.e. extremities or head);  iterative reconstruction technique.      Electronically signed by: Naman Mcguire MD  Date:    10/18/2018  Time:    22:15             Narrative:    EXAMINATION:  CT HEAD WITHOUT CONTRAST    CLINICAL HISTORY:  Confusion/delirium, altered LOC, unexplained;    TECHNIQUE:  Noncontrast axial images of the head were obtained.  Motion artifact    COMPARISON:  07/05/2018    FINDINGS:  Involutional changes.  Old lacunar infarct in the right thalamus.    Ventricular system is normal.  No hydrocephalus.  No midline shift.  No abnormal density to indicate acute major vascular distribution ischemic infarction or hemorrhage.  No mass effect.  No extra-axial fluid collections.    Visualized paranasal sinuses and mastoid air cells appear clear.    No calvarial fracture.    No significant detrimental change noted.                               X-Ray Chest AP Portable (Final result)  Result time 10/18/18 21:10:59    Final result by Naman Mcguire Jr., MD (10/18/18 21:10:59)                 Impression:      Left-sided infiltrate.   Small right-sided effusion.  Cardiomegaly.      Electronically signed by: Jb Mcguire MD  Date:    10/18/2018  Time:    21:10             Narrative:    EXAMINATION:  XR CHEST AP PORTABLE    CLINICAL HISTORY:  Chest Pain;    COMPARISON:  07/19/2018    FINDINGS:  The patient is rotated.  Heart size appears mildly enlarged, similar to before.  Triple lead cardiac pacemaker.  Cardiac monitor leads.  Tracheostomy cannula in place.    Probable small right-sided pleural effusion.  Developing consolidation with air bronchograms in the left mid and lower lobe.  Left lower lobe pneumonia is likely.                                I have personally reviewed the patients labs, imaging, ekg and discussed the patient case in detail with the Er provider    Assessment/Plan:     * Severe sepsis    PNE/UTI  Received rocephin in Er. --> Vanc Zosyn.  Lactic neg. Received fluids  Await blood/urine cultures  Wound care consult  Monitor labs  Poor IV access-->Central Line . Pressors if need       Acute encephalopathy    Patient with recent admission to local hospital see careeverywhere.  Patient had extensive workup.  CT head negative for acute findings. Patient + UTI and PNE. No meningeal s/s on exam.  Treat with Triple Antx therapy as patient high risk for health care associated infectious process. Use  Aztreonam instead of flurquinolone for double Pseudo coverage due to severe Drug/Drug interaction with Cipro  Avoid alterning medications.  Patient medication profile includes sedative medications at nursing home.  Would consider further medication rec at discharge and discourage use of altering medications if possible.        Long term current use of antipsychotic medication    Unclear as to why patient is on, will hold at this time due to AMS and reevaluate over course.   Consider psych consult pending course      Debility    Turn q 2  Supportive care       Hypothyroidism    Check tsh  monitor       Type 2 diabetes mellitus with  kidney complication, without long-term current use of insulin    Check a1c  ssi  Monitor        Chronic atrial fibrillation    Continue anticoag  montior rate close  Patient hypotensive on arrival , has improved with treatment of sepsis, but will hold meds that could alter BP for now        Vent Dependent   -admitted to ICU. Continue Vent therapy. Pulmonary Consult      VTE Risk Mitigation (From admission, onward)        Ordered     rivaroxaban tablet 15 mg  Nightly     Jan/scd 10/18/18 2240         total critical care time on case: 55 mins        Atif Bolanos NP  Department of Hospital Medicine   [facility] -

## 2018-10-19 NOTE — HPI
72 year old female who  has a past medical history of Anticoagulant long-term use, Arthritis, Asthma, Atrial fibrillation, Blood clot of vein in shoulder area, Cardiac defibrillator in place, CHF (congestive heart failure), Chronic knee pain, Diabetes mellitus type 2 without retinopathy (12/19/2016), Diaphragmatic hernia without obstruction and without gangrene (9/14/2015), GERD (gastroesophageal reflux disease), Hypertension, Immune deficiency disorder, and Primary open angle glaucoma (POAG) of both eyes, severe stage (6/1/2018), Nursing home resident,  with chronic Trach/Peg/Flores transferred for altered mental status which onset gradually last night. Patient currently resides at [facility]. Per ER nurse, nursing home nurse states patient has had decreased level of consciousness since last night. Symptoms are constant and moderate in severity. Nursing home also reports patient has had decreased oral intake and decreased urine output. Hypotensive in the ED. Sepsis protocol initiated.

## 2018-10-19 NOTE — ASSESSMENT & PLAN NOTE
CT head negative for acute findings.  Treat underlying  UTI and PNE.   Metabolic encephalopathy. Hold all psychoactive medications.   Neurochecks per routine.

## 2018-10-19 NOTE — HOSPITAL COURSE
10/19: Admitted to MICU. Patient is ventilator dependent. Tracheostomy => Shiley size 8. Ventilator support via trach. Keep JANEL 2 > = 92%.  Sepsis protocol.  Source control => IV Meropenem + IV Vancomycin. Complere atelectasis of left lung on CXR. Therapeutic bronchoscopy with airway clearance today.    10/20: Seen and examined at bedside. Hospital chart reviewed. No acute interval detrimental events noted. Bronchoscopy wit airway clearance yesterday. Left lung re expanded. Pulmonary toileting.  Staph Aureus in both respiratory and blood cultures. IV MEROPENEM + IV VANCOMYCIN.    10/21: Seen and examined at bedside. Hospital chart reviewed. No acute interval detrimental events noted. Remains on ventilator support, patient is ventilator dependent. Respiratory mechanics are OK.  Urine culture -ESCHERICHIA COLI & KLEBSIELLA PNEUMONIAE both pan sensitive. STAPHYLOCOCCUS AUREUS in both blood and respiratory cultures - ID pending. MRSA suspected. ID consulted. MEROPENEM switched to ZOSYN by ID.  IV VANCOMYCIN maintained.   10/22 patient seen and examined.  On 100% FiO2, full mechanical ventilation via trach.  Afebrile.  On PEG feeding.  No pressors.

## 2018-10-19 NOTE — ASSESSMENT & PLAN NOTE
Continue anticoag  montior rate close  Patient hypotensive on arrival , has improved with treatment of sepsis, but will hold meds that could alter BP for now

## 2018-10-19 NOTE — SUBJECTIVE & OBJECTIVE
Past Medical History:   Diagnosis Date    Anticoagulant long-term use     Arthritis     Asthma     Atrial fibrillation     Blood clot of vein in shoulder area     Cardiac defibrillator in place     CHF (congestive heart failure)     Chronic knee pain     Diabetes mellitus type 2 without retinopathy 2016    Diaphragmatic hernia without obstruction and without gangrene 2015    GERD (gastroesophageal reflux disease)     Hypertension     Immune deficiency disorder     Primary open angle glaucoma (POAG) of both eyes, severe stage 2018       Past Surgical History:   Procedure Laterality Date    CARDIAC DEFIBRILLATOR PLACEMENT      , .    CATARACT EXTRACTION       SECTION      COLONOSCOPY      EGD, WITH PEG TUBE INSERTION N/A 2018    Performed by Louis O. Jeansonne IV, MD at [facility] OR    ashley Macdonald    ESOPHAGOGASTRODUODENOSCOPY (EGD) N/A 2015    Performed by Hieu Colbert MD at [facility] ENDO    ESOPHAGOGASTRODUODENOSCOPY W/ PEG N/A 2018    Procedure: EGD, WITH PEG TUBE INSERTION;  Surgeon: Louis O. Jeansonne IV, MD;  Location: [facility] OR;  Service: General;  Laterality: N/A;    KNEE ARTHROSCOPY      TRACHEOSTOMY N/A 2018    Procedure: TRACHEOSTOMY;  Surgeon: Louis O. Jeansonne IV, MD;  Location: [facility] OR;  Service: General;  Laterality: N/A;    TRACHEOSTOMY N/A 2018    Performed by Louis O. Jeansonne IV, MD at [facility] OR    UPPER GASTROINTESTINAL ENDOSCOPY         Review of patient's allergies indicates:   Allergen Reactions    Morphine Hives    Atorvastatin      Other reaction(s): Muscle pain    Clonidine     Codeine      Other reaction(s): Unknown    Digoxin      Other reaction(s): Unknown    Diovan [valsartan] Other (See Comments)     Upset stomach, weakness    Eggs [egg derived]     Metoprolol Diarrhea    Naproxen      Other reaction(s): Nausea    Peanut     Propofol      Other reaction(s): delirious    Sulfa (sulfonamide  antibiotics)      Scheduled Meds:   acetylcysteine 100 mg/ml (10%)  4 mL Nebulization Q12H    albuterol-ipratropium  3 mL Nebulization Q6H    amiodarone  100 mg Oral BID    brimonidine 0.2%  1 drop Both Eyes Q12H    famotidine (PF)  20 mg Intravenous Daily    levothyroxine  100 mcg Oral Before breakfast    lidocaine HCL 10 mg/ml (1%)  1 mL Other Once    meropenem (MERREM) IVPB  500 mg Intravenous Q8H    midodrine  5 mg Per G Tube TID    rivaroxaban  15 mg Oral QHS    timolol maleate 0.5%  1 drop Both Eyes BID    [START ON 10/21/2018] vancomycin (VANCOCIN) IVPB  20 mg/kg (Order-Specific) Intravenous Q48H    zinc sulfate  220 mg Oral Daily     Continuous Infusions:   sodium chloride 0.9% 125 mL/hr at 10/19/18 0600    norepinephrine bitartrate-D5W 0.03 mcg/kg/min (10/19/18 0600)     PRN Meds:.acetaminophen, dextrose 50%, dextrose 50%, glucagon (human recombinant), glucose, glucose, sodium chloride 0.9%    Family History     Problem Relation (Age of Onset)    Hypertension Mother, Father        Tobacco Use    Smoking status: Never Smoker    Smokeless tobacco: Never Used   Substance and Sexual Activity    Alcohol use: No     Alcohol/week: 0.0 oz    Drug use: No    Sexual activity: Yes     Partners: Male         Review of Systems   Unable to perform ROS: Patient nonverbal     Objective:     Vital Signs (Most Recent):  Temp: 96 °F (35.6 °C) (10/19/18 0050)  Pulse: 70 (10/19/18 0936)  Resp: 13 (10/19/18 0936)  BP: 111/64 (10/19/18 0645)  SpO2: (!) 94 % (10/19/18 0936) Vital Signs (24h Range):  Temp:  [96 °F (35.6 °C)-98.2 °F (36.8 °C)] 96 °F (35.6 °C)  Pulse:  [] 70  Resp:  [13-30] 13  SpO2:  [89 %-100 %] 94 %  BP: ()/() 111/64     Weight: 83.7 kg (184 lb 8.4 oz)  Body mass index is 31.67 kg/m².      Intake/Output Summary (Last 24 hours) at 10/19/2018 1011  Last data filed at 10/19/2018 0600  Gross per 24 hour   Intake 3466.54 ml   Output 110 ml   Net 3356.54 ml       Physical Exam    Constitutional: She appears well-developed. She appears lethargic. She has a sickly appearance. She appears ill. No distress.   HENT:   Head: Normocephalic and atraumatic.   Nose: Nose normal.   Eyes: Right eye exhibits no discharge. Left eye exhibits no discharge. No scleral icterus.   Blind     Neck: Normal range of motion. Neck supple. No JVD present. No tracheal deviation present.   Cardiovascular: Normal rate, regular rhythm and intact distal pulses.   Murmur heard.  Upper Arm edema worse to right.  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   Pulmonary/Chest: Effort normal. No stridor. No respiratory distress. She has no wheezes. She has rales ( right).   Trach/vent   Abdominal: Soft. Bowel sounds are normal. She exhibits no distension. There is no tenderness. There is no guarding.   Peg   Genitourinary:   Genitourinary Comments: Flores  + Ua   Musculoskeletal: Normal range of motion. She exhibits edema. She exhibits no deformity.   Neurological: She appears lethargic.   Lethargic  Responds to pain   Skin: Skin is warm and dry. Capillary refill takes 2 to 3 seconds. No rash noted. She is not diaphoretic.   Wound/ulcer on arrival.  Wound care consult for full skin exam    Psychiatric:   Unable to assess   Nursing note and vitals reviewed.      Vents:  Vent Mode: A/C (10/19/18 0936)  Ventilator Initiated: Yes (10/18/18 2124)  Set Rate: 18 bmp (10/19/18 0936)  Vt Set: 350 mL (10/19/18 0936)  Pressure Support: 0 cmH20 (10/19/18 0936)  PEEP/CPAP: (S) 18 cmH20 (10/19/18 0938)  Oxygen Concentration (%): 100 (10/19/18 0936)  Peak Airway Pressure: 25 cmH2O (10/19/18 0936)  Plateau Pressure: 0 cmH20 (10/19/18 0936)  Total Ve: 6.6 mL (10/19/18 0936)  F/VT Ratio<105 (RSBI): (!) 28.45 (10/19/18 0936)    Lines/Drains/Airways     Drain                 Gastrostomy/Enterostomy Percutaneous endoscopic gastrostomy (PEG) LUQ -- days         Urethral Catheter 07/05/18 1114 105 days         Gastrostomy/Enterostomy 07/18/18 1792  Percutaneous endoscopic gastrostomy (PEG) LUQ feeding 92 days          Airway                 Surgical Airway 07/18/18 1620 Nikita 92 days          Pressure Ulcer                 Pressure Injury 10/18/18 2015 medial Coccyx #1 Stage 3 less than 1 day          Peripheral Intravenous Line                 Midline Catheter Insertion/Assessment  - Double Lumen 10/19/18 0131 Left brachial vein other (see comments) less than 1 day         Peripheral IV - Single Lumen 10/18/18 2105 Left Hand less than 1 day                Significant Labs:    CBC/Anemia Profile:  Recent Labs   Lab 10/18/18  2101 10/19/18  0334   WBC 7.45 6.93   HGB 8.9* 8.5*   HCT 27.5* 26.8*   * 142*   MCV 85 85   RDW 18.1* 18.3*        Chemistries:  Recent Labs   Lab 10/18/18  2101 10/19/18  0334   * 134*   K 3.4* 3.5   CL 94* 97   CO2 22* 24   BUN 44* 42*   CREATININE 1.4 1.3   CALCIUM 8.6* 8.4*   ALBUMIN 2.1*  --    PROT 5.5*  --    BILITOT 0.4  --    ALKPHOS 123  --    ALT 36  --    AST 29  --    MG 1.9  --    PHOS 4.1  --        ABGs:   Recent Labs   Lab 10/19/18  0442   PH 7.412   PCO2 37.4   HCO3 23.8*   POCSATURATED 94*   BE -1     Bilirubin:   Recent Labs   Lab 10/18/18  2101   BILITOT 0.4     Blood Culture:   Recent Labs   Lab 10/18/18  2100 10/18/18  2102   LABBLOO No Growth to date No Growth to date       Coagulation:   Recent Labs   Lab 10/19/18  0334   INR 1.3*   APTT 45.3*     Lactic Acid:   Recent Labs   Lab 10/18/18  2101   LACTATE 1.8     Troponin:   Recent Labs   Lab 10/18/18  2101   TROPONINI 0.166*       Significant Imaging:     CXR: Single view of the chest.    Near complete opacification of left hemithorax concerning for consolidation with areas of air bronchograms.  Moderate left-sided pleural effusion is also suspected.  Small right pleural effusion is suspected.  Coarse interstitial opacities are seen within the perihilar region of the right lung which suggests some component of pulmonary edema.  Tracheostomy appears  satisfactory.  Right-sided pacing wires remain.

## 2018-10-19 NOTE — SUBJECTIVE & OBJECTIVE
Interval History: bronch by pulm today with improvements    Review of Systems Unable to perform ROS: Patient nonverbal       Objective:     Vital Signs (Most Recent):  Temp: 96.5 °F (35.8 °C) (10/19/18 1500)  Pulse: 69 (10/19/18 1700)  Resp: 19 (10/19/18 1700)  BP: 105/86 (10/19/18 1700)  SpO2: 100 % (10/19/18 1700) Vital Signs (24h Range):  Temp:  [96 °F (35.6 °C)-98.2 °F (36.8 °C)] 96.5 °F (35.8 °C)  Pulse:  [] 69  Resp:  [4-30] 19  SpO2:  [89 %-100 %] 100 %  BP: ()/() 105/86     Weight: 83.7 kg (184 lb 8.4 oz)  Body mass index is 31.67 kg/m².    Intake/Output Summary (Last 24 hours) at 10/19/2018 1732  Last data filed at 10/19/2018 1700  Gross per 24 hour   Intake 3791.54 ml   Output 190 ml   Net 3601.54 ml      Physical Exam  Constitutional: She appears well-developed. She appears lethargic. She has a sickly appearance. She appears ill. No distress.   HENT:   Head: Normocephalic and atraumatic.   Nose: Nose normal.   Eyes: Right eye exhibits no discharge. Left eye exhibits no discharge. No scleral icterus.   Blind   Neck: Normal range of motion. Neck supple. No JVD present. No tracheal deviation present.   Cardiovascular: Normal rate, regular rhythm and intact distal pulses.   Murmur heard.  Upper Arm edema worse to right.  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   Pulmonary/Chest: Effort normal. No stridor. No respiratory distress. She has no wheezes. She has rales ( right). Trach/vent   Abdominal: Soft. Bowel sounds are normal. She exhibits no distension. There is no tenderness. There is no guarding. +Peg in place  Musculoskeletal: Normal range of motion. She exhibits edema. She exhibits no deformity.   Neurological: She appears lethargic.   Lethargic,Responds to pain   Skin: Skin is warm and dry. Capillary refill takes 2 to 3 seconds. No rash noted. She is not diaphoretic.   Wound/ulcer on arrival.  Wound care consult for full skin exam    Nursing note and vitals  reviewed.      Significant Labs:   CBC:   Recent Labs   Lab 10/18/18  2101 10/19/18  0334   WBC 7.45 6.93   HGB 8.9* 8.5*   HCT 27.5* 26.8*   * 142*     CMP:   Recent Labs   Lab 10/18/18  2101 10/19/18  0334   * 134*   K 3.4* 3.5   CL 94* 97   CO2 22* 24   GLU 68* 91   BUN 44* 42*   CREATININE 1.4 1.3   CALCIUM 8.6* 8.4*   PROT 5.5*  --    ALBUMIN 2.1*  --    BILITOT 0.4  --    ALKPHOS 123  --    AST 29  --    ALT 36  --    ANIONGAP 16 13   EGFRNONAA 38* 41*       Significant Imaging: I have reviewed all pertinent imaging results/findings within the past 24 hours.   Imaging Results          X-Ray Chest 1 View (Final result)  Result time 10/19/18 07:52:47    Final result by Atif Mcgee MD (10/19/18 07:52:47)                 Impression:      See above..      Electronically signed by: Atif Mcgee MD  Date:    10/19/2018  Time:    07:52             Narrative:    EXAMINATION:  XR CHEST 1 VIEW    CLINICAL HISTORY:  Shortness of breath    FINDINGS:  Single view of the chest.    Near complete opacification of left hemithorax concerning for consolidation with areas of air bronchograms.  Moderate left-sided pleural effusion is also suspected.  Small right pleural effusion is suspected.  Coarse interstitial opacities are seen within the perihilar region of the right lung which suggests some component of pulmonary edema.  Tracheostomy appears satisfactory.  Right-sided pacing wires remain.                               CT Head Without Contrast (Final result)  Result time 10/18/18 22:15:25    Final result by Naman Massey Jr., MD (10/18/18 22:15:25)                 Impression:      No acute intracranial findings evident.    All CT scans at this facility are performed  using dose modulation techniques as appropriate to performed exam including the following:  automated exposure control; adjustment of mA and/or kV according to the patients size (this includes techniques or standardized protocols for  targeted exams where dose is matched to indication/reason for exam: i.e. extremities or head);  iterative reconstruction technique.      Electronically signed by: Naman Mcguire MD  Date:    10/18/2018  Time:    22:15             Narrative:    EXAMINATION:  CT HEAD WITHOUT CONTRAST    CLINICAL HISTORY:  Confusion/delirium, altered LOC, unexplained;    TECHNIQUE:  Noncontrast axial images of the head were obtained.  Motion artifact    COMPARISON:  07/05/2018    FINDINGS:  Involutional changes.  Old lacunar infarct in the right thalamus.    Ventricular system is normal.  No hydrocephalus.  No midline shift.  No abnormal density to indicate acute major vascular distribution ischemic infarction or hemorrhage.  No mass effect.  No extra-axial fluid collections.    Visualized paranasal sinuses and mastoid air cells appear clear.    No calvarial fracture.    No significant detrimental change noted.                               X-Ray Chest AP Portable (Final result)  Result time 10/18/18 21:10:59    Final result by Naman Mcguire Jr., MD (10/18/18 21:10:59)                 Impression:      Left-sided infiltrate.  Small right-sided effusion.  Cardiomegaly.      Electronically signed by: Naman Mcguire MD  Date:    10/18/2018  Time:    21:10             Narrative:    EXAMINATION:  XR CHEST AP PORTABLE    CLINICAL HISTORY:  Chest Pain;    COMPARISON:  07/19/2018    FINDINGS:  The patient is rotated.  Heart size appears mildly enlarged, similar to before.  Triple lead cardiac pacemaker.  Cardiac monitor leads.  Tracheostomy cannula in place.    Probable small right-sided pleural effusion.  Developing consolidation with air bronchograms in the left mid and lower lobe.  Left lower lobe pneumonia is likely.

## 2018-10-19 NOTE — HOSPITAL COURSE
Pt admitted to ICU with IVFS and empiric IVabx- Vanc & Merropenem. Pulmonologist did a therapeutic bronchoscopy on 10/19 with copious thick obstructing mucopurulent muscus noted in the left mainstem bronchus. Patient is ventilator dependent. Ventilator support via trach. Continue supportive treatments.    10/20  Patient responds to question and command. S/p bronch Mucus Plugging noted. Patient reports sob. Discussed with Pulmonary CC    10/21  Patient improving clinically. Plan for return to NH in progress. Discussed with family at bedside. Patient reports improvement in sx.    10/22  Patient in contact isolation - MRSA pna. Patient with clots deep suctioning. Plan for LTAC in progress. Discussed with CC Team / case management    Carlie Valdez is a 72 y.o. female patient with PMHx of Afib, CHF, DM, GERD, neuromyelitis optica spectrum disorder, and HTN, Chronic Trach/Peg/Harris who presents for altered mental status which onset gradually last night. Patient currently resides at [facility]. Per ER nurse, nursing home nurse states patient has had decreased level of consciousness since last night. Symptoms are constant and moderate in severity. Nursing home also reports patient has had decreased oral intake and decreased urine output. Per ER nurse, patient will take food orally. History limited secondary to patient being nonverbal due to trach. Patient presented to ER hypotensive, has chronic harris on arrival -->UA +. Chest Xray + infiltrate. Ct Head Neg. Sepsis protocol done in ER. Lactic neg. Received 30 ml/kg bolus with positive response in BP.Received rocephin.-->  Vanc/Zosyn. Urine culture and blood culture MRSA positive Bacteremia. Patient admitted to ICU administered IV ABX with good response. Patient underwent bronh demonstrating mucous plugging. Patient with subsequent Hemoptysis likely due to trauma. Patient blood tinged sputum improved. Patient respiratory status improved and discharge to SNF  for completion of IV ABX. Patient seen by Pulmonary CC / ID and Hospital Medicine. Patient seen and examined today prior to her discharge to [facility]

## 2018-10-19 NOTE — PLAN OF CARE
Problem: Patient Care Overview  Goal: Plan of Care Review  Outcome: Ongoing (interventions implemented as appropriate)  Pt admitted from Er for sepsis, BP 70's unable to placed TLC, picc line ordered, Levo started per PICC, Pt follows some simple commands, trys to speak over vent, stage III sacral wound dressing changed.

## 2018-10-19 NOTE — CONSULTS
[facility] -   Adult Nutrition  Consult Note    SUMMARY     Recommendations    Recommendation/Intervention:   1. Advance TF to goal rate (55 mL/hr) as tolerated.   2. Will continue to monitor    Goals: Meet >85% EEN/EPN this admit  Nutrition Goal Status: new  Communication of RD Recs: (POC review, discussed on rounds, reviewed w/ NP)    Reason for Assessment    Reason for Assessment: consult  Dx:  1. Sepsis, due to unspecified organism    2. Urinary tract infection associated with indwelling urethral catheter, initial encounter    3. Pneumonia of left lower lobe due to infectious organism    4. Shortness of breath    5. Severe sepsis    6. Sepsis    7. Atelectasis of left lung      Past Medical History:   Diagnosis Date    Anticoagulant long-term use     Arthritis     Asthma     Atrial fibrillation     Blood clot of vein in shoulder area     Cardiac defibrillator in place     CHF (congestive heart failure)     Chronic knee pain     Diabetes mellitus type 2 without retinopathy 12/19/2016    Diaphragmatic hernia without obstruction and without gangrene 9/14/2015    GERD (gastroesophageal reflux disease)     Hypertension     Immune deficiency disorder     Primary open angle glaucoma (POAG) of both eyes, severe stage 6/1/2018       Interdisciplinary Rounds: attended  General Information Comments: Pt is a resident at [facility]. Currently in ICU on mechanical ventilation. Tracheostomy and PEG tube present. Per H&P, pt had some oral intake while at [facility]. No family/caregivers at bedside. Unable to obtain wt/diet hx. NFPE not completed d/t pt orientation. Will reattempt at RD f/u. TF recs given to RIGOBERTO Myers. TF started @ 25 mL/hr per nursing flowsheets.    Nutrition Discharge Planning: Adequate TF via PEG    Nutrition Risk Screen         Nutrition/Diet History    Do you have any cultural, spiritual, Mu-ism conflicts, given your current situation?: none reported  Food Allergies:  "egg, peanut    Anthropometrics    Temp: 96.3 °F (35.7 °C)  Height Method: Estimated  Height: 5' 4" (162.6 cm)  Height (inches): 64 in  Weight Method: Bed Scale  Weight: 83.7 kg (184 lb 8.4 oz)  Weight (lb): 184.53 lb  Ideal Body Weight (IBW), Female: 120 lb  % Ideal Body Weight, Female (lb): 153.78 lb  BMI (Calculated): 31.7  BMI Grade: 30 - 34.9- obesity - grade I       Lab/Procedures/Meds    Pertinent Labs Reviewed: reviewed  BMP  Lab Results   Component Value Date     (L) 10/19/2018    K 3.5 10/19/2018    CL 97 10/19/2018    CO2 24 10/19/2018    BUN 42 (H) 10/19/2018    CREATININE 1.3 10/19/2018    CALCIUM 8.4 (L) 10/19/2018    ANIONGAP 13 10/19/2018    ESTGFRAFRICA 47 (A) 10/19/2018    EGFRNONAA 41 (A) 10/19/2018     Lab Results   Component Value Date    CALCIUM 8.4 (L) 10/19/2018    PHOS 4.1 10/18/2018     Lab Results   Component Value Date    ALBUMIN 2.1 (L) 10/18/2018     Recent Labs   Lab 10/19/18  1104   POCTGLUCOSE 96     Lab Results   Component Value Date    HGBA1C 4.9 10/19/2018     Pertinent Medications Reviewed: reviewed  Scheduled Meds:   acetylcysteine 100 mg/ml (10%)  4 mL Nebulization Q12H    albuterol-ipratropium  3 mL Nebulization Q6H    amiodarone  100 mg Oral BID    brimonidine 0.2%  1 drop Both Eyes Q12H    famotidine (PF)  20 mg Intravenous Daily    levothyroxine  100 mcg Oral Before breakfast    lidocaine HCL 10 mg/ml (1%)  1 mL Other Once    meropenem (MERREM) IVPB  500 mg Intravenous Q8H    midodrine  5 mg Per G Tube TID    rivaroxaban  15 mg Oral QHS    timolol maleate 0.5%  1 drop Both Eyes BID    [START ON 10/21/2018] vancomycin (VANCOCIN) IVPB  20 mg/kg (Order-Specific) Intravenous Q48H    zinc sulfate  220 mg Oral Daily     Continuous Infusions:   sodium chloride 0.9% 125 mL/hr at 10/19/18 0600    norepinephrine bitartrate-D5W 0.03 mcg/kg/min (10/19/18 0600)     PRN Meds:.acetaminophen, dextrose 50%, dextrose 50%, glucagon (human recombinant), glucose, glucose, " sodium chloride 0.9%    Physical Findings/Assessment    Overall Physical Appearance: obese, on ventilator support  Oral/Mouth Cavity: decay/carries, tooth/teeth missing, no dental appliances present  Skin: (Stg 3 PI to coccyx; Beka=12)    Estimated/Assessed Needs    Weight Used For Calorie Calculations: 83.7 kg (184 lb 8.4 oz)  Energy Calorie Requirements (kcal): 1522  Energy Need Method: [place] (modified)  Protein Requirements: 109   Weight Used For Protein Calculations: 54.4 kg (120 lb)(Using IBW x2 g/kg - Obese in ICU)     Fluid Need Method: RDA Method(or per MD/NP)  RDA Method (mL): 1522  CHO Requirement: 50% EEN      Nutrition Prescription Ordered    Current Diet Order: NPO  Current Nutrition Support Formula Ordered: Peptamen Intense VHP  Current Nutrition Support Rate Ordered: 25 (ml)  Current Nutrition Support Frequency Ordered: mL/hr    Evaluation of Received Nutrient/Fluid Intake    Enteral Calories (kcal): 600  Enteral Protein (gm): 56  Enteral (Free Water) Fluid (mL): 504  % Intake of Estimated Energy Needs: 25 - 50 %  % Meal Intake: NPO    Nutrition Risk    Level of Risk/Frequency of Follow-up: (f/u x2 weekly)     Assessment and Plan    Nutrition Problem  Inadequate oral intake    Related to (etiology):   Tracheostomy present    Signs and Symptoms (as evidenced by):   PEG tube present, TF currently @ 25 mL/hr    Interventions/Recommendations (treatment strategy):  See above    Nutrition Diagnosis Status:   New         Monitor and Evaluation    Food and Nutrient Intake: energy intake, enteral nutrition intake  Food and Nutrient Adminstration: diet order, enteral and parenteral nutrition administration  Anthropometric Measurements: weight  Biochemical Data, Medical Tests and Procedures: electrolyte and renal panel, glucose/endocrine profile, gastrointestinal profile  Nutrition-Focused Physical Findings: overall appearance     Nutrition Follow-Up    RD Follow-up?: Yes

## 2018-10-19 NOTE — ASSESSMENT & PLAN NOTE
Patient is ventilator dependent via Shiley size 8 tracheostomy tube. Continue ventilator support. Ventilator settings reviewed and adjusted to optimize gas exchange. Daily wake up and breathe trials.  Titrate FIO2 to keep SAO2 > 92%. Bronchodilators per protocol.

## 2018-10-19 NOTE — EICU
eICU Note : New Admit :notified by the [facility]:    Brief HPI:72-year-old female with Chronic PEG  and trach past medical history significant for chronic atrial fibrllation, congestive heart failure, diabetes mellitus, GERD, neuromyelitis optica spectrum disorder and history of hypertension.Pt was admitted with decreased level of consciousness since last night. Pt has had poor intake and decreased UO the patient has been non verbal     Vital Signs :  P=11 8/m, blood pressure 112/93, respiratory rate 23, O2 saturation 100% on FiO2 70%      Camera Assessment :patient lying in bed in no distress, status post tracheostomy on mechanical ventilator, FiO2 70%, PEEP of +7, assist control of 18      Data: WBC 7.45, Hemoglobin 8.9, hematocrit 27.5, Platelets 125, Sodium 132, Potassium 3.4, Chloride 94, CO2 22, Anion gap 16, BUN 44, creatinine 1.4, Glucose 68, calcium 8.6, Phosphorus 4.1, Magnesium 1.9 ,Albumin 2.1   CXR :L lung atelectasis   Impression and recommendations:1.Acute on Chronic Respiratory Failure on Mechanical ventilator.  FiO2 70%, PEEP of 7, assist control of 18 and tidal volume 400 due to sepsis  2.  Left lung white out : CXR L sided atelectasis , will need a bronchoscopy .PCCM to see her in AM. On Zosyn and Vanco empirically  3.Chronic Afib with RVR   4.Hypothyroidism : TSH 1.259   5.PUD, DVT prophylaxis .SQH ,PPI.          Melanie Christine M.D  eICU Physician

## 2018-10-19 NOTE — PLAN OF CARE
Problem: Patient Care Overview  Goal: Plan of Care Review  Outcome: Ongoing (interventions implemented as appropriate)  Recommendations    Recommendation/Intervention:   1. Advance TF to goal rate (55 mL/hr) as tolerated.   2. Will continue to monitor    Goals: Meet >85% EEN/EPN this admit  Nutrition Goal Status: new  Communication of RD Recs: (POC review, discussed on rounds, reviewed w/ NP)

## 2018-10-19 NOTE — CONSULTS
[facility] -   Critical Care Medicine  Consult Note    Patient Name: Carlie Valdez  MRN: [id number]  Admission Date: 10/18/2018  Hospital Length of Stay: 1 days  Code Status: Full Code  Attending Physician: Perla Moy MD   Primary Care Provider: Johanna Guzman MD   Principal Problem: Severe sepsis      Subjective:     HPI:  72 year old female who  has a past medical history of Anticoagulant long-term use, Arthritis, Asthma, Atrial fibrillation, Blood clot of vein in shoulder area, Cardiac defibrillator in place, CHF (congestive heart failure), Chronic knee pain, Diabetes mellitus type 2 without retinopathy (12/19/2016), Diaphragmatic hernia without obstruction and without gangrene (9/14/2015), GERD (gastroesophageal reflux disease), Hypertension, Immune deficiency disorder, and Primary open angle glaucoma (POAG) of both eyes, severe stage (6/1/2018), Nursing home resident,  with chronic Trach/Peg/Flores transferred for altered mental status which onset gradually last night. Patient currently resides at [facility]. Per ER nurse, nursing home nurse states patient has had decreased level of consciousness since last night. Symptoms are constant and moderate in severity. Nursing home also reports patient has had decreased oral intake and decreased urine output. Hypotensive in the ED. Sepsis protocol initiated.     Hospital/ICU Course:  10/19: Admitted to MICU. Patient is ventilator dependent. Tracheostomy => Shiley size 8. Ventilator support via trach. Keep JANEL 2 > = 92%.  Sepsis protocol.  Source control => IV Meropenem + IV Vancomycin. Complere atelectasis of left lung on CXR. Therapeutic bronchoscopy with airway clearance today.    Past Medical History:   Diagnosis Date    Anticoagulant long-term use     Arthritis     Asthma     Atrial fibrillation     Blood clot of vein in shoulder area     Cardiac defibrillator in place     CHF (congestive heart failure)     Chronic  knee pain     Diabetes mellitus type 2 without retinopathy 2016    Diaphragmatic hernia without obstruction and without gangrene 2015    GERD (gastroesophageal reflux disease)     Hypertension     Immune deficiency disorder     Primary open angle glaucoma (POAG) of both eyes, severe stage 2018       Past Surgical History:   Procedure Laterality Date    CARDIAC DEFIBRILLATOR PLACEMENT      , .    CATARACT EXTRACTION       SECTION      COLONOSCOPY      EGD, WITH PEG TUBE INSERTION N/A 2018    Performed by Louis O. Jeansonne IV, MD at [facility] OR    ashley Macdonald    ESOPHAGOGASTRODUODENOSCOPY (EGD) N/A 2015    Performed by Hieu Colbert MD at [facility] ENDO    ESOPHAGOGASTRODUODENOSCOPY W/ PEG N/A 2018    Procedure: EGD, WITH PEG TUBE INSERTION;  Surgeon: Louis O. Jeansonne IV, MD;  Location: [facility] OR;  Service: General;  Laterality: N/A;    KNEE ARTHROSCOPY      TRACHEOSTOMY N/A 2018    Procedure: TRACHEOSTOMY;  Surgeon: Louis O. Jeansonne IV, MD;  Location: [facility] OR;  Service: General;  Laterality: N/A;    TRACHEOSTOMY N/A 2018    Performed by Louis O. Jeansonne IV, MD at [facility] OR    UPPER GASTROINTESTINAL ENDOSCOPY         Review of patient's allergies indicates:   Allergen Reactions    Morphine Hives    Atorvastatin      Other reaction(s): Muscle pain    Clonidine     Codeine      Other reaction(s): Unknown    Digoxin      Other reaction(s): Unknown    Diovan [valsartan] Other (See Comments)     Upset stomach, weakness    Eggs [egg derived]     Metoprolol Diarrhea    Naproxen      Other reaction(s): Nausea    Peanut     Propofol      Other reaction(s): delirious    Sulfa (sulfonamide antibiotics)      Scheduled Meds:   acetylcysteine 100 mg/ml (10%)  4 mL Nebulization Q12H    albuterol-ipratropium  3 mL Nebulization Q6H    amiodarone  100 mg Oral BID    brimonidine 0.2%  1 drop Both Eyes Q12H    famotidine (PF)  20 mg Intravenous  Daily    levothyroxine  100 mcg Oral Before breakfast    lidocaine HCL 10 mg/ml (1%)  1 mL Other Once    meropenem (MERREM) IVPB  500 mg Intravenous Q8H    midodrine  5 mg Per G Tube TID    rivaroxaban  15 mg Oral QHS    timolol maleate 0.5%  1 drop Both Eyes BID    [START ON 10/21/2018] vancomycin (VANCOCIN) IVPB  20 mg/kg (Order-Specific) Intravenous Q48H    zinc sulfate  220 mg Oral Daily     Continuous Infusions:   sodium chloride 0.9% 125 mL/hr at 10/19/18 0600    norepinephrine bitartrate-D5W 0.03 mcg/kg/min (10/19/18 0600)     PRN Meds:.acetaminophen, dextrose 50%, dextrose 50%, glucagon (human recombinant), glucose, glucose, sodium chloride 0.9%    Family History     Problem Relation (Age of Onset)    Hypertension Mother, Father        Tobacco Use    Smoking status: Never Smoker    Smokeless tobacco: Never Used   Substance and Sexual Activity    Alcohol use: No     Alcohol/week: 0.0 oz    Drug use: No    Sexual activity: Yes     Partners: Male         Review of Systems   Unable to perform ROS: Patient nonverbal     Objective:     Vital Signs (Most Recent):  Temp: 96 °F (35.6 °C) (10/19/18 0050)  Pulse: 70 (10/19/18 0936)  Resp: 13 (10/19/18 0936)  BP: 111/64 (10/19/18 0645)  SpO2: (!) 94 % (10/19/18 0936) Vital Signs (24h Range):  Temp:  [96 °F (35.6 °C)-98.2 °F (36.8 °C)] 96 °F (35.6 °C)  Pulse:  [] 70  Resp:  [13-30] 13  SpO2:  [89 %-100 %] 94 %  BP: ()/() 111/64     Weight: 83.7 kg (184 lb 8.4 oz)  Body mass index is 31.67 kg/m².      Intake/Output Summary (Last 24 hours) at 10/19/2018 1011  Last data filed at 10/19/2018 0600  Gross per 24 hour   Intake 3466.54 ml   Output 110 ml   Net 3356.54 ml       Physical Exam   Constitutional: She appears well-developed. She appears lethargic. She has a sickly appearance. She appears ill. No distress.   HENT:   Head: Normocephalic and atraumatic.   Nose: Nose normal.   Eyes: Right eye exhibits no discharge. Left eye exhibits no  discharge. No scleral icterus.   Blind     Neck: Normal range of motion. Neck supple. No JVD present. No tracheal deviation present.   Cardiovascular: Normal rate, regular rhythm and intact distal pulses.   Murmur heard.  Upper Arm edema worse to right.  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   Pulmonary/Chest: Effort normal. No stridor. No respiratory distress. She has no wheezes. She has rales ( right).   Trach/vent   Abdominal: Soft. Bowel sounds are normal. She exhibits no distension. There is no tenderness. There is no guarding.   Peg   Genitourinary:   Genitourinary Comments: Flores  + Ua   Musculoskeletal: Normal range of motion. She exhibits edema. She exhibits no deformity.   Neurological: She appears lethargic.   Lethargic  Responds to pain   Skin: Skin is warm and dry. Capillary refill takes 2 to 3 seconds. No rash noted. She is not diaphoretic.   Wound/ulcer on arrival.  Wound care consult for full skin exam    Psychiatric:   Unable to assess   Nursing note and vitals reviewed.      Vents:  Vent Mode: A/C (10/19/18 0936)  Ventilator Initiated: Yes (10/18/18 2124)  Set Rate: 18 bmp (10/19/18 0936)  Vt Set: 350 mL (10/19/18 0936)  Pressure Support: 0 cmH20 (10/19/18 0936)  PEEP/CPAP: (S) 18 cmH20 (10/19/18 0938)  Oxygen Concentration (%): 100 (10/19/18 0936)  Peak Airway Pressure: 25 cmH2O (10/19/18 0936)  Plateau Pressure: 0 cmH20 (10/19/18 0936)  Total Ve: 6.6 mL (10/19/18 0936)  F/VT Ratio<105 (RSBI): (!) 28.45 (10/19/18 0936)    Lines/Drains/Airways     Drain                 Gastrostomy/Enterostomy Percutaneous endoscopic gastrostomy (PEG) LUQ -- days         Urethral Catheter 07/05/18 1114 105 days         Gastrostomy/Enterostomy 07/18/18 1649 Percutaneous endoscopic gastrostomy (PEG) LUQ feeding 92 days          Airway                 Surgical Airway 07/18/18 1620 Nikita 92 days          Pressure Ulcer                 Pressure Injury 10/18/18 2015 medial Coccyx #1 Stage 3 less than 1 day           Peripheral Intravenous Line                 Midline Catheter Insertion/Assessment  - Double Lumen 10/19/18 0131 Left brachial vein other (see comments) less than 1 day         Peripheral IV - Single Lumen 10/18/18 2105 Left Hand less than 1 day                Significant Labs:    CBC/Anemia Profile:  Recent Labs   Lab 10/18/18  2101 10/19/18  0334   WBC 7.45 6.93   HGB 8.9* 8.5*   HCT 27.5* 26.8*   * 142*   MCV 85 85   RDW 18.1* 18.3*        Chemistries:  Recent Labs   Lab 10/18/18  2101 10/19/18  0334   * 134*   K 3.4* 3.5   CL 94* 97   CO2 22* 24   BUN 44* 42*   CREATININE 1.4 1.3   CALCIUM 8.6* 8.4*   ALBUMIN 2.1*  --    PROT 5.5*  --    BILITOT 0.4  --    ALKPHOS 123  --    ALT 36  --    AST 29  --    MG 1.9  --    PHOS 4.1  --        ABGs:   Recent Labs   Lab 10/19/18  0442   PH 7.412   PCO2 37.4   HCO3 23.8*   POCSATURATED 94*   BE -1     Bilirubin:   Recent Labs   Lab 10/18/18  2101   BILITOT 0.4     Blood Culture:   Recent Labs   Lab 10/18/18  2100 10/18/18  2102   LABBLOO No Growth to date No Growth to date       Coagulation:   Recent Labs   Lab 10/19/18  0334   INR 1.3*   APTT 45.3*     Lactic Acid:   Recent Labs   Lab 10/18/18  2101   LACTATE 1.8     Troponin:   Recent Labs   Lab 10/18/18  2101   TROPONINI 0.166*       Significant Imaging:     CXR: Single view of the chest.    Near complete opacification of left hemithorax concerning for consolidation with areas of air bronchograms.  Moderate left-sided pleural effusion is also suspected.  Small right pleural effusion is suspected.  Coarse interstitial opacities are seen within the perihilar region of the right lung which suggests some component of pulmonary edema.  Tracheostomy appears satisfactory.  Right-sided pacing wires remain.      Assessment/Plan:       I have reviewed all labs and imaging studies and compared to previous results. I have also discussed labs with all the teams in the medical care of the patient and my plan is  outlined below     Neuro   Acute encephalopathy    Metabolic encephalopathy. Hold all psychoactive medications. Treat underlying sepsis. Neurochecks per routine.     Pulmonary   Atelectasis of left lung    Therapeutic bronchoscopy at bedside:    Bronchoscopy procedure discussed in detail with patient's daughter. Complications of the procedure discussed in detail with patient. Complications including but not limited to infection that may require hospital admission, bleeding that may require blood transfusion and or hospital admission, perforation of the lung which may require surgery,chance of injury to the throat, windpipe or bronchial tubes, laryngospam, coughing, aspiration, hypoxemia or cardiac arrythmias. The material risks of anesthesia in connection with the procedure including brain damage, paralysis from the neck downwards, paralysis from the waist downwards, loss of function of an arm or a leg, disfigurement (incluing scars)and death were discussed with patient who expressedand verbalized understanding. Alternate treatments and material risks associated with such alternatives were discussed with pateint. These include radiologic surveillance  with minimal risk and sugery with an indeterminate risk. The material risks of refusing the procedure was discussed in detail. This includes no diagnosis or confirmation of diagnisis and rendering of appropriate treatment the risk of which depends on the nature of the diagnosed ilness. She  expressed and verbalized understanding. All concerns addressed and questions answered to her satisfaction.  Informed consent signed by patient and appended to chart. Bronchocopy will be performed with bronchial lavage. Endobronchial washings will be obtained.   Specimen will be sent for microbiological and cytological  Analysis.    Aggressive pulmonary toileting.      Ventilator dependence    Patient is ventilator dependent via Shiley size 8 tracheostomy tube. Continue ventilator  support. Ventilator settings reviewed and adjusted to optimize gas exchange. Daily wake up and breathe trials.  Titrate FIO2 to keep SAO2 > 92%. Bronchodilators per protocol.     Cardiac/Vascular   Chronic atrial fibrillation    Rate controlled. Continue RIVAROXABAN.      ID   * Severe sepsis    [] Respiratory rate >20 breaths/min or PaCO2 <32 mmHg   []  WBC >12,000 cells/mm3, <4000 cells/mm3, or >10 percent immature (band) forms  [x] Heart rate >90 beats/min   [] Temperature >38ºC or <36ºC    Microbiology: Panculturel.   Oxygenation: Continue ventilator support.  Keep SAO2 > = 92%  Hemodynamics: IV crystalloids. IV Norepinephrine. Keep MAP > = 65mmHg  Source control: IV Meropenem, IV Vancomycin.     Immunology/Multi System    Immunization status reviewed and updated.     Hematology    Monitor hemogram. Transfuse as needed.     Endocrine     EUGLYCEMIC. SBGM. Blood glucose target 100 - 180 mg/dl         Hypothyroidism    ? Amiodarone induced. Monitor TSH.      Type 2 diabetes mellitus with kidney complication, without long-term current use of insulin    SBG  Monitoring. Blood glucose target 100 - 180 mg/dl         GI    NPO for now. Stress ulcer prophylaxis. Bowel regimen.      Other   Long term current use of antipsychotic medication    Hold antipsychotics for now.     Debility    PT / OT         Critical Care Daily Checklist:    A: Awake: RASS Goal/Actual Goal:    Actual: Guerrero Agitation Sedation Scale (RASS): Alert and calm   B: Spontaneous Breathing Trial Performed?  N/A   C: SAT & SBT Coordinated?  N/A                 D: Delirium: CAM-ICU Overall CAM-ICU: Positive   E: Early Mobility Performed? YES   F: Feeding Goal:    Status:     Current Diet Order   Procedures    Diet NPO      AS: Analgesia/Sedation ON HOLD   T: Thromboembolic Prophylaxis RIVAROXABAN   H: HOB > 300 Yes   U: Stress Ulcer Prophylaxis (if needed) FAMOTIDINE   G: Glucose Control SBGM   B: Bowel Function     I: Indwelling Catheter (Lines &  Mark) Necessity YES   D: De-escalation of Antimicrobials/Pharmacotherapies YES    Plan for the day/ETD Continue current    Code Status:  Family/Goals of Care: Full Code  SNF     Critical Care Time: 90  minutes  Critical secondary to Patient has a condition that poses threat to life and bodily function: Severe sepsis, atelectasis of left lung, ventilator dependent.     Critical care was time spent personally by me on the following activities: development of treatment plan with patient or surrogate and bedside caregivers, discussions with consultants, evaluation of patient's response to treatment, examination of patient, ordering and performing treatments and interventions, ordering and review of laboratory studies, ordering and review of radiographic studies, pulse oximetry, re-evaluation of patient's condition. This critical care time did not overlap with that of any other provider or involve time for any procedures.    Thank you for your consult. I will follow-up with patient. Please contact us if you have any additional questions.     Bradley Amaral MD  Critical Care Medicine  [facility] -

## 2018-10-19 NOTE — PLAN OF CARE
Medical Record Assessment completed.  Patient came from [facility].  Patient Trach,  Peg and vent dep. ( FULL CODE ).  Patient has [employer] insurance. Per EMR, PMHx of Afib, CHF, DM, GERD, neuromyelitis optica spectrum disorder, and HTN, Chronic Trach/Peg/Flores who presents for altered mental status which onset gradually last night. Patient currently resides at [facility]. Nursing home nurse states patient has had decreased level of consciousness since last night. Nursing home also reports patient has had decreased oral intake and decreased urine output. Per ER nurse, patient will take food orally. History limited secondary to patient being nonverbal due to trach. Patient D/C chg plan is to return to [facility]   CM to f/u for safe transition           10/19/18 1021   Discharge Assessment   Assessment Type Discharge Planning Assessment   Confirmed/corrected address and phone number on facesheet? No   Assessment information obtained from? Medical Record   Expected Length of Stay (days) (TBD)   Communicated expected length of stay with patient/caregiver no   Prior to hospitilization cognitive status: Unable to Assess   Prior to hospitalization functional status: Completely Dependent   Current cognitive status: Unable to Assess   Current Functional Status: Completely Dependent   Facility Arrived From: [facility]   Lives With facility resident   Able to Return to Prior Arrangements yes   Is patient able to care for self after discharge? No   Patient's perception of discharge disposition nursing home   Readmission Within The Last 30 Days no previous admission in last 30 days   Patient currently being followed by outpatient case management? No   Patient currently receives any other outside agency services? No   Equipment Currently Used at Home other (see comments)  (Patient is trach , Peg and vent dep)   Do you have any problems affording any of your prescribed medications? No   Is the  patient taking medications as prescribed? yes   Does the patient have transportation home? Yes   Transportation Available ambulance   Does the patient receive services at the [facility]? No   Discharge Plan A Return to nursing home   Discharge Plan B Return to Nursing Home   Patient/Family In Agreement With Plan yes

## 2018-10-19 NOTE — PROCEDURES
"Carlie Valdez is a 72 y.o. female patient.    Temp: 96 °F (35.6 °C) (10/19/18 0050)  Pulse: 103 (10/19/18 0742)  Resp: (!) 24 (10/19/18 0742)  BP: 111/64 (10/19/18 0645)  SpO2: 98 % (10/19/18 0742)  Weight: 83.7 kg (184 lb 8.4 oz) (10/19/18 0834)  Height: 5' 4" (162.6 cm) (10/19/18 0835)       Procedures    [facility]  Bronchoscopy   Procedure Note    SUMMARY     Date of Procedure: 10/19/2018    Procedure: Bronchoscopy, Therapeutic    Provider: Bradley Amaral MD    Assisting Provider: Geraldo MEYER    Pre-Procedure Diagnosis: Atelectasis of left lung    Post-Procedure Diagnosis: Same    Indication: Atelectasis of left lung      Anesthesia: Patient ventilator dependent via Shiley size 8 tracheostomy tube.     Technical Procedures Used: Therapeutic bronchoscopy    Description of the Findings of the Procedure:     Consent was obtained from the patients daughter  for the procedure with all risk and benefit of the procedure explained in detail.  She was given the opportunity to ask questions and all concerns were answered.  The bronchocope was inserted into the main airway via the Shiley size 8 tracheostomy tune. An anatomical survey was done of the main airways and the subsegmental bronchus.  The findings are reported as follows: The TRACHEA was patent. Copious thick obstructing mucopurulent mucus was noted in the LEFT MAINSTEN BRONCHUS. Bronchiolalveolar lavage was performed using aliquots of normal saline instilled into the airways then aspirated back. Airway was cleared after the procedure. Friable mucosa was noted in the entire tracheobronchial tree. Remainder was within normal limits.     Significant Surgical Tasks Conducted by the Assistant(s), if Applicable: None    Complications: None; patient tolerated the procedure well.    Estimated Blood Loss (EBL): Minimal           Implants: None    Specimens: Sent purulent fluid       Condition: Stable        Disposition: ICU - intubated " and hemodynamically stable.    Attestation: I was present and scrubbed for the entire procedure.          Bradley Amaral  10/19/2018

## 2018-10-19 NOTE — ED PROVIDER NOTES
SCRIBE #1 NOTE: I, Mali Cardona, am scribing for, and in the presence of, Ayana Orantes MD. I have scribed the entire note.      History      Chief Complaint   Patient presents with    Altered Mental Status    Hypotension       Review of patient's allergies indicates:   Allergen Reactions    Morphine Hives    Atorvastatin      Other reaction(s): Muscle pain    Clonidine     Codeine      Other reaction(s): Unknown    Digoxin      Other reaction(s): Unknown    Diovan [valsartan] Other (See Comments)     Upset stomach, weakness    Eggs [egg derived]     Metoprolol Diarrhea    Naproxen      Other reaction(s): Nausea    Peanut     Propofol      Other reaction(s): delirious    Sulfa (sulfonamide antibiotics)         HPI   HPI    10/18/2018, 8:33 PM   History obtained from the ER nurse, nursing home nurse   History limited secondary to being nonverbal due to trach      History of Present Illness: Carlie Valdez is a 72 y.o. female patient with PMHx of Afib, CHF, DM, GERD, neuromyelitis optica spectrum disorder, and HTN who presents to the Emergency Department for altered mental status which onset gradually last night. Patient currently resides at [facility]. Per ER nurse, nursing home nurse states patient has had decreased level of consciousness since last night. Symptoms are constant and moderate in severity. Nursing home also reports patient has had decreased oral intake and decreased urine output. Per ER nurse, patient will take food orally. History limited secondary to patient being nonverbal due to trach.    Arrival mode: EMS     PCP: Johanna Guzamn MD       Past Medical History:  Past Medical History:   Diagnosis Date    Anticoagulant long-term use     Arthritis     Asthma     Atrial fibrillation     Blood clot of vein in shoulder area     Cardiac defibrillator in place     CHF (congestive heart failure)     Chronic knee pain     Diabetes mellitus type 2 without  retinopathy 2016    Diaphragmatic hernia without obstruction and without gangrene 2015    GERD (gastroesophageal reflux disease)     Hypertension     Immune deficiency disorder     Primary open angle glaucoma (POAG) of both eyes, severe stage 2018       Past Surgical History:  Past Surgical History:   Procedure Laterality Date    CARDIAC DEFIBRILLATOR PLACEMENT      , .    CATARACT EXTRACTION       SECTION      COLONOSCOPY      EGD, WITH PEG TUBE INSERTION N/A 2018    Performed by Louis O. Jeansonne IV, MD at [facility] OR    ashley Macdonald    ESOPHAGOGASTRODUODENOSCOPY (EGD) N/A 2015    Performed by Hieu Colbert MD at [facility] ENDO    ESOPHAGOGASTRODUODENOSCOPY W/ PEG N/A 2018    Procedure: EGD, WITH PEG TUBE INSERTION;  Surgeon: Louis O. Jeansonne IV, MD;  Location: [facility] OR;  Service: General;  Laterality: N/A;    KNEE ARTHROSCOPY      TRACHEOSTOMY N/A 2018    Procedure: TRACHEOSTOMY;  Surgeon: Louis O. Jeansonne IV, MD;  Location: [facility] OR;  Service: General;  Laterality: N/A;    TRACHEOSTOMY N/A 2018    Performed by Louis O. Jeansonne IV, MD at [facility] OR    UPPER GASTROINTESTINAL ENDOSCOPY           Family History:  Family History   Problem Relation Age of Onset    Hypertension Mother     Hypertension Father     Colon cancer Neg Hx     Stomach cancer Neg Hx        Social History:  Social History     Tobacco Use    Smoking status: Never Smoker    Smokeless tobacco: Never Used   Substance and Sexual Activity    Alcohol use: No     Alcohol/week: 0.0 oz    Drug use: No    Sexual activity: Yes     Partners: Male       ROS   Review of Systems   Unable to perform ROS: Mental status change   Constitutional: Positive for appetite change (decreased).        (+) AMS   Genitourinary: Positive for decreased urine volume.       Physical Exam      Initial Vitals   BP Pulse Resp Temp SpO2   10/18/18 2016 10/18/18 2016 10/18/18 2016 10/18/18 2016 10/18/18  2006   (!) 97/52 (!) 114 (!) 24 98.2 °F (36.8 °C) 100 %      MAP       --                 Physical Exam  Nursing Notes and Vital Signs Reviewed.  Constitutional: Patient is in no acute distress. Well-developed and well-nourished. Patient responds to verbal and manual stimulation.  Head: Atraumatic. Normocephalic.  Eyes: Patient is blind.  ENT: Mucous membranes are moist. Oropharynx is clear and symmetric.    Neck: Supple.   Cardiovascular: Regular rate. Irregularly irregular rhythm. No murmurs, rubs, or gallops. Distal pulses are 2+ and symmetric.   Pulmonary/Chest: Trach in place, patient is vent dependent. No respiratory distress. Decreased, coarse breath sounds at bilat bases. No wheezing or rales.  Abdominal: Soft and non-distended. Peg tube in place, clean, dry, and intact.  : Indwelling harris catheter with small amount of brown urine in bag.  Musculoskeletal: Flaccid extremities. Diffuse swelling to RUE without erythema, lesion, or drainage. 2+ piting edema to RUE, pulse difficulty to palpate due to edema. LUE contracted with 2+ radial pulse. Good perfusion.  Skin: Warm and dry.  Neurological:  Awake. No acute focal neurological deficits are appreciated.    ED Course    Critical Care  Date/Time: 10/18/2018 10:11 PM  Performed by: Ayana Orantes MD  Authorized by: Ayana Orantes MD   Direct patient critical care time: 10 minutes  Additional history critical care time: 10 minutes  Ordering / reviewing critical care time: 10 minutes  Documentation critical care time: 10 minutes  Consulting other physicians critical care time: 10 minutes  Consult with family critical care time: 10 minutes  Total critical care time (exclusive of procedural time) : 60 minutes  Critical care time was exclusive of separately billable procedures and treating other patients and teaching time.  Critical care was necessary to treat or prevent imminent or life-threatening deterioration of the following conditions: sepsis  (hypotension, pneumonia).  Critical care was time spent personally by me on the following activities: blood draw for specimens, development of treatment plan with patient or surrogate, discussions with consultants, interpretation of cardiac output measurements, evaluation of patient's response to treatment, examination of patient, obtaining history from patient or surrogate, ordering and performing treatments and interventions, ordering and review of laboratory studies, re-evaluation of patient's condition, review of old charts, ordering and review of radiographic studies, ventilator management and vascular access procedures.    External Jugular IV  Date/Time: 10/18/2018 2:30 AM  Performed by: Ayana Orantes MD  Authorized by: Ayana Orantes MD   Consent Done: Emergent Situation  Location (Ext Jugular): Right.  Area Prepped With: Chlorohexidine.  Catheter Size: 18 ga.  Catheter Type: Jelco.  Number of attempts: 1  Patient tolerance: Patient tolerated the procedure well with no immediate complications  Comments: Unsuccessful placement. Plan on central line placement.    Central Line  Date/Time: 10/18/2018 11:23 PM  Performed by: Ayana Orantes MD  Consent Done: Emergent Situation  Indications: med administration  Preparation: skin prepped with ChloraPrep  Skin prep agent dried: skin prep agent completely dried prior to procedure  Sterile barriers: all five maximum sterile barriers used - cap, mask, sterile gown, sterile gloves, and large sterile sheet  Hand hygiene: hand hygiene performed prior to central venous catheter insertion  Location: right IJ, right subclavian.  Catheter type: triple lumen  Catheter size: 7 Fr  Ultrasound guidance: yes  Needle advanced into vessel with real time Ultrasound guidance.  Guidewire confirmed in vessel.  Sterile sheath used.  Number of attempts: 5 or more  Complications: No  Comments: Unsuccessful placement. Attempted x3 at R IJ. Attempted x2 at R subclavian. Will need  PICC line placement.        ED Vital Signs:  Vitals:    10/18/18 2115 10/18/18 2116 10/18/18 2117 10/18/18 2123   BP:  (!) 58/43  (!) 62/41   Pulse: 110 (!) 118 (!) 113 (!) 120   Resp: (!) 26 (!) 26  (!) 22   Temp:       TempSrc:       SpO2:  (!) 89%  (!) 94%   Weight:       Height:        10/18/18 2124 10/18/18 2127 10/18/18 2130 10/18/18 2143   BP: (!) 71/45 (!) 103/57 117/81 (!) 136/94   Pulse: (!) 113 (!) 115 (!) 114 109   Resp: (!) 27 (!) 27 (!) 25 (!) 28   Temp:       TempSrc:       SpO2: (!) 94% 100% 98% 100%   Weight:       Height:        10/18/18 2211 10/18/18 2226 10/18/18 2229 10/18/18 2231   BP: 124/73 119/88     Pulse: (!) 114 (!) 113 107 108   Resp: (!) 26 (!) 24 18 (!) 29   Temp:       TempSrc:       SpO2: 100% 100% 100% 100%   Weight:       Height:        10/18/18 2237 10/18/18 2242 10/18/18 2316   BP:  109/77    Pulse: (!) 114 (!) 113 (!) 114   Resp: 18 19 17   Temp:      TempSrc:      SpO2: 100% 98% 100%   Weight:      Height:          Abnormal Lab Results:  Labs Reviewed   CBC W/ AUTO DIFFERENTIAL - Abnormal; Notable for the following components:       Result Value    RBC 3.25 (*)     Hemoglobin 8.9 (*)     Hematocrit 27.5 (*)     RDW 18.1 (*)     Platelets 125 (*)     Gran% 8.0 (*)     Lymph% 6.0 (*)     Mono% 0.0 (*)     All other components within normal limits   COMPREHENSIVE METABOLIC PANEL - Abnormal; Notable for the following components:    Sodium 132 (*)     Potassium 3.4 (*)     Chloride 94 (*)     CO2 22 (*)     Glucose 68 (*)     BUN, Bld 44 (*)     Calcium 8.6 (*)     Total Protein 5.5 (*)     Albumin 2.1 (*)     eGFR if  43 (*)     eGFR if non  38 (*)     All other components within normal limits   URINALYSIS - Abnormal; Notable for the following components:    Specific Gravity, UA >=1.030 (*)     Protein, UA 2+ (*)     Ketones, UA Trace (*)     Bilirubin (UA) 1+ (*)     Occult Blood UA 3+ (*)     Leukocytes, UA 2+ (*)     All other components within  normal limits   PROTIME-INR - Abnormal; Notable for the following components:    Prothrombin Time 13.7 (*)     INR 1.3 (*)     All other components within normal limits   TROPONIN I - Abnormal; Notable for the following components:    Troponin I 0.166 (*)     All other components within normal limits   B-TYPE NATRIURETIC PEPTIDE - Abnormal; Notable for the following components:     (*)     All other components within normal limits   URINALYSIS MICROSCOPIC - Abnormal; Notable for the following components:    RBC, UA >100 (*)     WBC, UA >100 (*)     Bacteria, UA Many (*)     All other components within normal limits   CULTURE, URINE   CULTURE, BLOOD   CULTURE, BLOOD   CULTURE, URINE   LACTIC ACID, PLASMA   MAGNESIUM   PHOSPHORUS   MAGNESIUM   PHOSPHORUS   TSH   MAGNESIUM   PHOSPHORUS   TSH        All Lab Results:  Results for orders placed or performed during the hospital encounter of 10/18/18   CBC auto differential   Result Value Ref Range    WBC 7.45 3.90 - 12.70 K/uL    RBC 3.25 (L) 4.00 - 5.40 M/uL    Hemoglobin 8.9 (L) 12.0 - 16.0 g/dL    Hematocrit 27.5 (L) 37.0 - 48.5 %    MCV 85 82 - 98 fL    MCH 27.4 27.0 - 31.0 pg    MCHC 32.4 32.0 - 36.0 g/dL    RDW 18.1 (H) 11.5 - 14.5 %    Platelets 125 (L) 150 - 350 K/uL    MPV 11.1 9.2 - 12.9 fL    Lymph # CANCELED 1.0 - 4.8 K/uL    Mono # CANCELED 0.3 - 1.0 K/uL    Eos # CANCELED 0.0 - 0.5 K/uL    Baso # CANCELED 0.00 - 0.20 K/uL    Gran% 8.0 (L) 38.0 - 73.0 %    Lymph% 6.0 (L) 18.0 - 48.0 %    Mono% 0.0 (L) 4.0 - 15.0 %    Eosinophil% 1.0 0.0 - 8.0 %    Basophil% 0.0 0.0 - 1.9 %    Bands 69.0 %    Metamyelocytes 14.0 %    Myelocytes 2.0 %    Platelet Estimate Appears normal     Differential Method Manual    Comprehensive metabolic panel   Result Value Ref Range    Sodium 132 (L) 136 - 145 mmol/L    Potassium 3.4 (L) 3.5 - 5.1 mmol/L    Chloride 94 (L) 95 - 110 mmol/L    CO2 22 (L) 23 - 29 mmol/L    Glucose 68 (L) 70 - 110 mg/dL    BUN, Bld 44 (H) 8 - 23 mg/dL     Creatinine 1.4 0.5 - 1.4 mg/dL    Calcium 8.6 (L) 8.7 - 10.5 mg/dL    Total Protein 5.5 (L) 6.0 - 8.4 g/dL    Albumin 2.1 (L) 3.5 - 5.2 g/dL    Total Bilirubin 0.4 0.1 - 1.0 mg/dL    Alkaline Phosphatase 123 55 - 135 U/L    AST 29 10 - 40 U/L    ALT 36 10 - 44 U/L    Anion Gap 16 8 - 16 mmol/L    eGFR if African American 43 (A) >60 mL/min/1.73 m^2    eGFR if non African American 38 (A) >60 mL/min/1.73 m^2   Urinalysis - Cath.   Result Value Ref Range    Specimen UA Urine, Catheterized     Color, UA Yellow Yellow, Straw, Samra    Appearance, UA Clear Clear    pH, UA 5.0 5.0 - 8.0    Specific Gravity, UA >=1.030 (A) 1.005 - 1.030    Protein, UA 2+ (A) Negative    Glucose, UA Negative Negative    Ketones, UA Trace (A) Negative    Bilirubin (UA) 1+ (A) Negative    Occult Blood UA 3+ (A) Negative    Nitrite, UA Negative Negative    Urobilinogen, UA Negative <2.0 EU/dL    Leukocytes, UA 2+ (A) Negative   Lactic acid, plasma   Result Value Ref Range    Lactate (Lactic Acid) 1.8 0.5 - 2.2 mmol/L   Protime-INR   Result Value Ref Range    Prothrombin Time 13.7 (H) 9.0 - 12.5 sec    INR 1.3 (H) 0.8 - 1.2   Troponin I   Result Value Ref Range    Troponin I 0.166 (H) 0.000 - 0.026 ng/mL   B-Type natriuretic peptide (BNP)   Result Value Ref Range     (H) 0 - 99 pg/mL   Urinalysis Microscopic   Result Value Ref Range    RBC, UA >100 (H) 0 - 4 /hpf    WBC, UA >100 (H) 0 - 5 /hpf    Bacteria, UA Many (A) None-Occ /hpf    Squam Epithel, UA 2 /hpf    Hyaline Casts, UA 0 0-1/lpf /lpf    Microscopic Comment SEE COMMENT    Magnesium   Result Value Ref Range    Magnesium 1.9 1.6 - 2.6 mg/dL   Phosphorus   Result Value Ref Range    Phosphorus 4.1 2.7 - 4.5 mg/dL       Imaging Results:  Imaging Results          CT Head Without Contrast (Final result)  Result time 10/18/18 22:15:25    Final result by Naman Massey Jr., MD (10/18/18 22:15:25)                 Impression:      No acute intracranial findings evident.    All CT  scans at this facility are performed  using dose modulation techniques as appropriate to performed exam including the following:  automated exposure control; adjustment of mA and/or kV according to the patients size (this includes techniques or standardized protocols for targeted exams where dose is matched to indication/reason for exam: i.e. extremities or head);  iterative reconstruction technique.      Electronically signed by: Naman Mcguire MD  Date:    10/18/2018  Time:    22:15             Narrative:    EXAMINATION:  CT HEAD WITHOUT CONTRAST    CLINICAL HISTORY:  Confusion/delirium, altered LOC, unexplained;    TECHNIQUE:  Noncontrast axial images of the head were obtained.  Motion artifact    COMPARISON:  07/05/2018    FINDINGS:  Involutional changes.  Old lacunar infarct in the right thalamus.    Ventricular system is normal.  No hydrocephalus.  No midline shift.  No abnormal density to indicate acute major vascular distribution ischemic infarction or hemorrhage.  No mass effect.  No extra-axial fluid collections.    Visualized paranasal sinuses and mastoid air cells appear clear.    No calvarial fracture.    No significant detrimental change noted.                               X-Ray Chest AP Portable (Final result)  Result time 10/18/18 21:10:59    Final result by Naman Mcguire Jr., MD (10/18/18 21:10:59)                 Impression:      Left-sided infiltrate.  Small right-sided effusion.  Cardiomegaly.      Electronically signed by: Naman Mcguire MD  Date:    10/18/2018  Time:    21:10             Narrative:    EXAMINATION:  XR CHEST AP PORTABLE    CLINICAL HISTORY:  Chest Pain;    COMPARISON:  07/19/2018    FINDINGS:  The patient is rotated.  Heart size appears mildly enlarged, similar to before.  Triple lead cardiac pacemaker.  Cardiac monitor leads.  Tracheostomy cannula in place.    Probable small right-sided pleural effusion.  Developing consolidation with air bronchograms in the left mid and  lower lobe.  Left lower lobe pneumonia is likely.                               The EKG was ordered, reviewed, and independently interpreted by the ED provider.  Interpretation time: 2038  Rate: 109 BPM  Rhythm: atrial fibrillation with RVR with premature ventricular or aberrantly conducted complexes  Interpretation: Left axis deviation. Nonspecific intraventricular block. Possible lateral infarct, age undetermined. Abnormal ECG. No STEMI.         The Emergency Provider reviewed the vital signs and test results, which are outlined above.    ED Discussion     9:15 PM: Called to bedside by nurse. Re-evaluated patient. Patient's BP on monitor is 58/43. Manual systolic BP is 80s. Patient responds to verbal and manual stimuli. Will plan on central line.    9:20 PM: Patient moved to room 4.    9:25 PM: Patient's daughter, Haley called. Discussed patient's case with daughter. Discussed patient's code status, daughter states patient remains full code.     9:29 PM: Re-evaluated patient. Patient in trendelenburg. Patient's BP has improved to 117/81. No levophed has been administered.    10:17 PM: Discussed case with Atif Bolanos NP ([facility]) who agrees with current care and management of pt and accepts admission.   Admitting Service: Hospital medicine   Admitting Physician: Dr. Regalado  Admit to: ICU        ED Medication(s):  Medications   norepinephrine bitartrate-D5W 4 mg/250 mL (16 mcg/mL) infusion Soln (not administered)   piperacillin-tazobactam 4.5 g in dextrose 5 % 100 mL IVPB (ready to mix system) (4.5 g Intravenous New Bag 10/18/18 7696)   rivaroxaban tablet 15 mg (not administered)   zinc sulfate capsule 220 mg (not administered)   amiodarone tablet 100 mg (not administered)   levothyroxine tablet 100 mcg (not administered)   midodrine tablet 5 mg (not administered)   sodium chloride 0.9% flush 5 mL (not administered)   acetaminophen tablet 650 mg (not administered)   glucose chewable tablet 16 g (not  administered)   glucose chewable tablet 24 g (not administered)   dextrose 50% injection 12.5 g (not administered)   dextrose 50% injection 25 g (not administered)   glucagon (human recombinant) injection 1 mg (not administered)   albuterol-ipratropium 2.5 mg-0.5 mg/3 mL nebulizer solution 3 mL (not administered)   vancomycin (VANCOCIN) 1,250 mg in dextrose 5 % 250 mL IVPB (not administered)   brimonidine 0.2% ophthalmic solution 1 drop (not administered)   timolol maleate 0.5% ophthalmic solution 1 drop (not administered)   aztreonam 2 g in dextrose 5 % 100 mL IVPB (ready to mix system) (not administered)   albuterol-ipratropium 2.5 mg-0.5 mg/3 mL nebulizer solution 3 mL (3 mLs Nebulization Given 10/18/18 2040)   0.9%  NaCl infusion (0 mLs Intravenous Stopped 10/18/18 2219)   sodium chloride 0.9% bolus 2,412 mL (1,412 mLs Intravenous New Bag 10/18/18 2219)   cefTRIAXone (ROCEPHIN) 2 g in dextrose 5 % 50 mL IVPB (0 g Intravenous Stopped 10/18/18 2317)   albuterol-ipratropium 2.5 mg-0.5 mg/3 mL nebulizer solution 3 mL (3 mLs Nebulization Given 10/18/18 2231)             Medical Decision Making    Medical Decision Making:   Clinical Tests:   Lab Tests: Ordered and Reviewed  Radiological Study: Ordered and Reviewed  Medical Tests: Ordered and Reviewed           Scribe Attestation:   Scribe #1: I performed the above scribed service and the documentation accurately describes the services I performed. I attest to the accuracy of the note.    Attending:   Physician Attestation Statement for Scribe #1: I, Ayana Orantes MD, personally performed the services described in this documentation, as scribed by Mali Cardona, in my presence, and it is both accurate and complete.          Clinical Impression       ICD-10-CM ICD-9-CM   1. Sepsis, due to unspecified organism A41.9 038.9     995.91   2. Urinary tract infection associated with indwelling urethral catheter, initial encounter T83.511A 996.64    N39.0 599.0   3. Pneumonia  of left lower lobe due to infectious organism J18.1 486       Disposition:   Disposition: Admitted (ICU)  Condition: Critical         Ayana Orantes MD  10/22/18 1034

## 2018-10-19 NOTE — ED NOTES
Dr. Orantes at bedside. Notified of patient change in condition, decreased alertness and hypotension

## 2018-10-20 NOTE — EICU
Patient evaluated for ventilator dyssynchrony.  Trach in place.  She underwent bronchoscopy yesterday due to mucous plugging with left sided opacity.  Video assessment showed respiratory rate 30s with tidal volume typically less than 140 ml.  Ventilator settings PC with inspiratory pressure 12 and PEEP 18.    Ventilator adjusted to inspiratory pressure 16 and PEEP 15.  With these changes, respiratory rate typically 25-30 and TV typically > 250 ml.  Rather than giving sedation at this time, I think we should see how she does with these changes.

## 2018-10-20 NOTE — ASSESSMENT & PLAN NOTE
PNE/UTI  Vanc and merropenem  IVFs and pressors  Await blood/urine cultures  Wound care consult  Monitor labs  pulm on board    10/20  Off Pressors  ABX  Cx pending  Pulmonary CC  UTI colonization chronic - Change harris regularly  ID on consult

## 2018-10-20 NOTE — PLAN OF CARE
Problem: Patient Care Overview  Goal: Plan of Care Review  Outcome: Ongoing (interventions implemented as appropriate)  Patient resting quietly in bed with no signs of distress. VSS. Patient does not tolerate being turned on left side. cxr improving, secretions decreasing from trach. U/o improved. Denies pain. Daughter visted, update given, POC discussed.

## 2018-10-20 NOTE — SUBJECTIVE & OBJECTIVE
Interval History: Increased Mucus Plugging Poorly Communicative due to Trach / Vent    Review of Systems   Unable to perform ROS: Patient nonverbal     Objective:     Vital Signs (Most Recent):  Temp: 97.4 °F (36.3 °C) (10/20/18 0305)  Pulse: 69 (10/20/18 0902)  Resp: (!) 22 (10/20/18 0902)  BP: (!) 154/96 (10/20/18 0640)  SpO2: 100 % (10/20/18 0902) Vital Signs (24h Range):  Temp:  [96.5 °F (35.8 °C)-97.4 °F (36.3 °C)] 97.4 °F (36.3 °C)  Pulse:  [68-77] 69  Resp:  [10-36] 22  SpO2:  [96 %-100 %] 100 %  BP: ()/() 154/96     Weight: 87 kg (191 lb 12.8 oz)  Body mass index is 32.92 kg/m².    Intake/Output Summary (Last 24 hours) at 10/20/2018 1048  Last data filed at 10/20/2018 0630  Gross per 24 hour   Intake 3624.81 ml   Output 910 ml   Net 2714.81 ml      Physical Exam   Constitutional: She appears well-developed. She appears lethargic. She has a sickly appearance. She appears ill. No distress.   HENT:   Head: Normocephalic and atraumatic.   Nose: Nose normal.   Eyes: Right eye exhibits no discharge. Left eye exhibits no discharge. No scleral icterus.   Blind     Neck: Normal range of motion. Neck supple. No JVD present. No tracheal deviation present.   Cardiovascular: Normal rate, regular rhythm and intact distal pulses.   Murmur heard.   Systolic murmur is present with a grade of 3/6.  Right Upper Arm edema     Pulmonary/Chest: Effort normal. No stridor. No respiratory distress. She has no wheezes. Rales:  right.   Trach/vent   Abdominal: Soft. Bowel sounds are normal. She exhibits no distension. There is no tenderness. There is no guarding.   Peg   Musculoskeletal: Normal range of motion. She exhibits edema. She exhibits no deformity.   Neurological: She appears lethargic.   Lethargic  Responds to pain   Skin: Skin is warm and dry. Capillary refill takes 2 to 3 seconds. No rash noted. She is not diaphoretic.   Psychiatric: She has a normal mood and affect. Thought content normal.   Nursing note and  vitals reviewed.      Significant Labs:   ABGs:   Recent Labs   Lab 10/20/18  0510   PH 7.435   PCO2 36.3   HCO3 24.4   POCSATURATED 98   BE 0     BMP:   Recent Labs   Lab 10/20/18  0445   *   *   K 3.2*      CO2 22*   BUN 40*   CREATININE 1.1   CALCIUM 8.5*   MG 1.8     CBC:   Recent Labs   Lab 10/18/18  2101 10/19/18  0334 10/20/18  0445   WBC 7.45 6.93 5.76   HGB 8.9* 8.5* 7.6*   HCT 27.5* 26.8* 24.1*   * 142* 112*     CMP:   Recent Labs   Lab 10/18/18  2101 10/19/18  0334 10/20/18  0445   * 134* 134*   K 3.4* 3.5 3.2*   CL 94* 97 100   CO2 22* 24 22*   GLU 68* 91 113*   BUN 44* 42* 40*   CREATININE 1.4 1.3 1.1   CALCIUM 8.6* 8.4* 8.5*   PROT 5.5*  --   --    ALBUMIN 2.1*  --   --    BILITOT 0.4  --   --    ALKPHOS 123  --   --    AST 29  --   --    ALT 36  --   --    ANIONGAP 16 13 12   EGFRNONAA 38* 41* 50*     Cardiac Markers:   Recent Labs   Lab 10/18/18  2101   *     Lactic Acid:   Recent Labs   Lab 10/18/18  2101   LACTATE 1.8     Lipase: No results for input(s): LIPASE in the last 48 hours.  Troponin:   Recent Labs   Lab 10/18/18  2101   TROPONINI 0.166*     Urine Studies:   Recent Labs   Lab 10/18/18  2134   COLORU Yellow   APPEARANCEUA Clear   PHUR 5.0   SPECGRAV >=1.030*   PROTEINUA 2+*   GLUCUA Negative   KETONESU Trace*   BILIRUBINUA 1+*   OCCULTUA 3+*   NITRITE Negative   UROBILINOGEN Negative   LEUKOCYTESUR 2+*   RBCUA >100*   WBCUA >100*   BACTERIA Many*   SQUAMEPITHEL 2   HYALINECASTS 0     All pertinent labs within the past 24 hours have been reviewed.    Significant Imaging: I have reviewed all pertinent imaging results/findings within the past 24 hours.

## 2018-10-20 NOTE — ASSESSMENT & PLAN NOTE
Patient is ventilator dependent via Shiley size 8 tracheostomy tube. Continue ventilator support. Titrate FIO2 to keep SAO2 > 92%. Bronchodilators per protocol.

## 2018-10-20 NOTE — PROGRESS NOTES
[facility] -   Critical Care Medicine  Progress Note    Patient Name: Carlie Valdez  MRN: [id number]  Admission Date: 10/18/2018  Hospital Length of Stay: 2 days  Code Status: Full Code  Attending Provider: Subhash Ayala MD  Primary Care Provider: Johanna Guzman MD   Principal Problem: Severe sepsis    Subjective:     HPI:  72 year old female who  has a past medical history of Anticoagulant long-term use, Arthritis, Asthma, Atrial fibrillation, Blood clot of vein in shoulder area, Cardiac defibrillator in place, CHF (congestive heart failure), Chronic knee pain, Diabetes mellitus type 2 without retinopathy (12/19/2016), Diaphragmatic hernia without obstruction and without gangrene (9/14/2015), GERD (gastroesophageal reflux disease), Hypertension, Immune deficiency disorder, and Primary open angle glaucoma (POAG) of both eyes, severe stage (6/1/2018), Nursing home resident,  with chronic Trach/Peg/Flores transferred for altered mental status which onset gradually last night. Patient currently resides at [facility]. Per ER nurse, nursing home nurse states patient has had decreased level of consciousness since last night. Symptoms are constant and moderate in severity. Nursing home also reports patient has had decreased oral intake and decreased urine output. Hypotensive in the ED. Sepsis protocol initiated.       Hospital/ICU Course:  10/19: Admitted to MICU. Patient is ventilator dependent. Tracheostomy => Shiley size 8. Ventilator support via trach. Keep JANEL 2 > = 92%.  Sepsis protocol.  Source control => IV Meropenem + IV Vancomycin. Complere atelectasis of left lung on CXR. Therapeutic bronchoscopy with airway clearance today.    10/20: Seen and examined at bedside. Hospital chart reviewed. No acute interval detrimental events noted. Bronchoscopy wit airway clearance yesterday. Left lung re expanded. Pulmonary toileting.  Staph Aureus in both respiratory and blood cultures. IV  MEROPENEM + IV VANCOMYCIN.    Interval History: Seen and examined at bedside. Hospital chart reviewed. No acute interval detrimental events noted. Bronchoscopy wit airway clearance yesterday. Left lung re expanded. Pulmonary toileting.  Off pressors. Hemodynamically stable. Staph Aureus in both respiratory and blood cultures. IV MEROPENEM + IV VANCOMYCIN.      Review of Systems   Unable to perform ROS: Patient nonverbal     Objective:     Vital Signs (Most Recent):  Temp: 96.4 °F (35.8 °C) (10/20/18 1100)  Pulse: 70 (10/20/18 1456)  Resp: (!) 24 (10/20/18 1456)  BP: 133/80 (10/20/18 1430)  SpO2: 98 % (10/20/18 1456) Vital Signs (24h Range):  Temp:  [96.2 °F (35.7 °C)-97.4 °F (36.3 °C)] 96.4 °F (35.8 °C)  Pulse:  [62-71] 70  Resp:  [9-37] 24  SpO2:  [97 %-100 %] 98 %  BP: ()/() 133/80     Weight: 87 kg (191 lb 12.8 oz)  Body mass index is 32.92 kg/m².      Intake/Output Summary (Last 24 hours) at 10/20/2018 1518  Last data filed at 10/20/2018 1400  Gross per 24 hour   Intake 3879.81 ml   Output 1915 ml   Net 1964.81 ml       Physical Exam   Constitutional: She appears well-developed. She appears lethargic. She has a sickly appearance. She appears ill. No distress.   HENT:   Head: Normocephalic and atraumatic.   Nose: Nose normal.   Eyes: Right eye exhibits no discharge. Left eye exhibits no discharge. No scleral icterus.   Blind     Neck: Normal range of motion. Neck supple. No JVD present. No tracheal deviation present.   Cardiovascular: Normal rate, regular rhythm and intact distal pulses.   Murmur heard.  Upper Arm edema worse to right.  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   Pulmonary/Chest: Effort normal. No stridor. No respiratory distress. She has no wheezes. She has rales ( right).   Trach/vent   Abdominal: Soft. Bowel sounds are normal. She exhibits no distension. There is no tenderness. There is no guarding.   Peg   Genitourinary:   Genitourinary Comments: Flores  + Ua   Musculoskeletal:  Normal range of motion. She exhibits edema. She exhibits no deformity.   Neurological: She appears lethargic.   Lethargic  Responds to pain   Skin: Skin is warm and dry. Capillary refill takes 2 to 3 seconds. No rash noted. She is not diaphoretic.   Wound/ulcer on arrival.  Wound care consult for full skin exam    Psychiatric:   Unable to assess   Nursing note and vitals reviewed.    Scheduled Meds:   acetylcysteine 100 mg/ml (10%)  4 mL Nebulization Q12H    albuterol-ipratropium  3 mL Nebulization Q6H    amiodarone  100 mg Oral BID    brimonidine 0.2%  1 drop Both Eyes Q12H    famotidine (PF)  20 mg Intravenous Daily    levothyroxine  100 mcg Oral Before breakfast    lidocaine HCL 10 mg/ml (1%)  1 mL Other Once    meropenem (MERREM) IVPB  500 mg Intravenous Q8H    midodrine  5 mg Per G Tube TID    rivaroxaban  15 mg Oral QHS    timolol maleate 0.5%  1 drop Both Eyes BID    vancomycin (VANCOCIN) IVPB  1,000 mg Intravenous Q24H    zinc sulfate  220 mg Oral Daily     Continuous Infusions:   sodium chloride 0.9% 75 mL/hr at 10/20/18 1400    norepinephrine bitartrate-D5W Stopped (10/20/18 0630)     PRN Meds:.acetaminophen, dextrose 50%, dextrose 50%, glucagon (human recombinant), glucose, glucose, sodium chloride 0.9%  Vents:  Vent Mode: A/C (10/20/18 1456)  Ventilator Initiated: Yes (10/18/18 2124)  Set Rate: 18 bmp (10/20/18 1456)  Vt Set: 0 mL (10/20/18 1456)  Pressure Support: 0 cmH20 (10/20/18 1456)  PEEP/CPAP: 15 cmH20 (10/20/18 1456)  Oxygen Concentration (%): 45 (10/20/18 1456)  Peak Airway Pressure: 32 cmH2O (10/20/18 1456)  Plateau Pressure: 0 cmH20 (10/20/18 1456)  Total Ve: 8.3 mL (10/20/18 1456)  F/VT Ratio<105 (RSBI): (!) 58.68 (10/20/18 1456)    Lines/Drains/Airways     Drain                 Gastrostomy/Enterostomy Percutaneous endoscopic gastrostomy (PEG) LUQ -- days         Urethral Catheter 07/05/18 1114 107 days         Gastrostomy/Enterostomy 07/18/18 1649 Percutaneous endoscopic  gastrostomy (PEG) LUQ feeding 93 days          Airway                 Surgical Airway 07/18/18 1620 Shiley 93 days          Pressure Ulcer                 Pressure Injury 10/18/18 2015 Coccyx #1 Unstageable 1 day          Peripheral Intravenous Line                 Midline Catheter Insertion/Assessment  - Double Lumen 10/19/18 0131 Left brachial vein other (see comments) 1 day         Peripheral IV - Single Lumen 10/18/18 2105 Left Hand 1 day                Significant Labs:    CBC/Anemia Profile:  Recent Labs   Lab 10/18/18  2101 10/19/18  0334 10/20/18  0445   WBC 7.45 6.93 5.76   HGB 8.9* 8.5* 7.6*   HCT 27.5* 26.8* 24.1*   * 142* 112*   MCV 85 85 86   RDW 18.1* 18.3* 18.1*        Chemistries:  Recent Labs   Lab 10/18/18  2101 10/19/18  0334 10/20/18  0445   * 134* 134*   K 3.4* 3.5 3.2*   CL 94* 97 100   CO2 22* 24 22*   BUN 44* 42* 40*   CREATININE 1.4 1.3 1.1   CALCIUM 8.6* 8.4* 8.5*   ALBUMIN 2.1*  --   --    PROT 5.5*  --   --    BILITOT 0.4  --   --    ALKPHOS 123  --   --    ALT 36  --   --    AST 29  --   --    MG 1.9  --  1.8   PHOS 4.1  --   --        ABGs:   Recent Labs   Lab 10/20/18  0510   PH 7.435   PCO2 36.3   HCO3 24.4   POCSATURATED 98   BE 0     Bilirubin:   Recent Labs   Lab 10/18/18  2101   BILITOT 0.4     Blood Culture:   Recent Labs   Lab 10/18/18  2100 10/18/18  2102   LABBLOO Gram stain aer bottle: Gram positive cocci in clusters resembling Staph   Results called to and read back by:Atif Estrada RN  10/19/2018  18:36  Gram stain refugio bottle: Gram positive cocci in clusters resembling Staph   Positive results previously called  10/19/2018  18:37  STAPHYLOCOCCUS AUREUS  ID consult required at [facility] and [facility].  For susceptibility see order #[id number]   Gram stain aer bottle: Gram positive cocci in clusters resembling Staph   Results called to and read back by: Atif Estrada RN  10/19/2018  17:20  STAPHYLOCOCCUS AUREUS  Susceptibility  pending  ID consult required at [facility] and [facility]   locations.       Coagulation:   Recent Labs   Lab 10/19/18  0334   INR 1.3*   APTT 45.3*     Lactic Acid:   Recent Labs   Lab 10/18/18  2101   LACTATE 1.8     Lipid Panel:   Recent Labs   Lab 10/19/18  0334   CHOL 158   HDL 36*   LDLCALC 103.4   TRIG 93   CHOLHDL 22.8     Respiratory Culture:   Recent Labs   Lab 10/19/18  0900   GSRESP <10 epithelial cells per low power field.  Moderate WBC's  Few Gram positive cocci  Rare Gram positive rods  Rare Gram negative rods   RESPIRATORYC STAPHYLOCOCCUS AUREUS  Many  Susceptibility pending  Normal respiratory barbie also present       Troponin:   Recent Labs   Lab 10/18/18  2101   TROPONINI 0.166*     Urine Culture:   Recent Labs   Lab 10/18/18  2134   LABURIN GRAM NEGATIVE JM  > 100,000 cfu/ml  Identification and susceptibility pending         Significant Imaging:    X-Ray Chest 1 View: 10/20/18:       Tracheostomy tube.  Multi lead defibrillator.  Bibasilar infiltrate/pneumonia greater on the left.  Findings similar to the prior film dated 10/19/2018.  Cardiomegaly        Assessment/Plan:     I have reviewed all labs and imaging studies and compared to previous results. I have also discussed labs with all the teams in the medical care of the patient and my plan is outlined below     Neuro   Acute encephalopathy    Metabolic encephalopathy resolving. Psychoactive medications on hold. Treat underlying sepsis. Neurochecks per routine.     Derm    Sacral decubiti: Stage II: Skin care per wound care recommendations.         Pulmonary   Atelectasis of left lung    Bronchoscopy with airway clearance yesterday.  Aggressive pulmonary toileting.        Ventilator dependence    Patient is ventilator dependent via Shiley size 8 tracheostomy tube. Continue ventilator support. Titrate FIO2 to keep SAO2 > 92%. Bronchodilators per protocol.     Pneumonia due to infectious organism    Respiratory cultures =>  STAPH AUREUS.    Suspect MRSA:    Continue IV VANCOMyCIN     Cardiac/Vascular   Chronic atrial fibrillation    Rate controlled. Continue RIVAROXABAN.      Renal/    Monitor BUN / Cr. Strict input and output monitoring. Montor urine output. Monitor and replete electrolytes PRN.      ID   * Severe sepsis        Microbiology: Panculture for Temp > 101.   Oxygenation: Continue ventilator support.  Keep SAO2 > = 92%  Hemodynamics: IV crystalloids. Start Midodrine. Wean off IV Norepinephrine. Keep MAP > = 65mmHg  Source control: IV Meropenem, IV Vancomycin.     Immunology/Multi System    Immunization status reviewed and updated.     Hematology    Monitor hemogram. Transfuse as needed.     Endocrine     EUGLYCEMIC. SBGM. Blood glucose target 100 - 180 mg/dl         Hypothyroidism    ? Amiodarone induced. Monitor TSH.      Type 2 diabetes mellitus with kidney complication, without long-term current use of insulin    SBG  Monitoring. Blood glucose target 100 - 180 mg/dl         GI    Resume tube feeds per RD recommnedations. Stress ulcer prophylaxis. Bowel regimen.      Other   Pressure injury of coccygeal region, unstageable    Skin care per routine.      Long term current use of antipsychotic medication    Hold antipsychotics.     Debility    PT / OT        Critical Care Daily Checklist:    A: Awake: RASS Goal/Actual Goal:    Actual: Guerrero Agitation Sedation Scale (RASS): Alert and calm   B: Spontaneous Breathing Trial Performed?  N/A   C: SAT & SBT Coordinated?  N/A                    D: Delirium: CAM-ICU Overall CAM-ICU: Positive   E: Early Mobility Performed? Yes   F: Feeding Goal: Goals: Meet >85% EEN/EPN this admit  Status: Nutrition Goal Status: new   Current Diet Order   Procedures    Diet NPO      AS: Analgesia/Sedation On hold   T: Thromboembolic Prophylaxis Rivaroxaban   H: HOB > 300 Yes   U: Stress Ulcer Prophylaxis (if needed) FAMOTIDINE   G: Glucose Control SBGM   B: Bowel Function     I: Indwelling  Catheter (Lines & Flores) Necessity YES   D: De-escalation of Antimicrobials/Pharmacotherapies YES    Plan for the day/ETD Continue current    Code Status:  Family/Goals of Care: Full Code  SNF     Critical Care Time:60 minutes  Critical secondary to Severe sepsis, atelectasis of left lung, ventilator dependent.         Critical care was time spent personally by me on the following activities: development of treatment plan with patient or surrogate and bedside caregivers, discussions with consultants, evaluation of patient's response to treatment, examination of patient, ordering and performing treatments and interventions, ordering and review of laboratory studies, ordering and review of radiographic studies, pulse oximetry, re-evaluation of patient's condition. This critical care time did not overlap with that of any other provider or involve time for any procedures.     Bradley Amaral MD  Critical Care Medicine  [facility] -

## 2018-10-20 NOTE — PLAN OF CARE
Problem: Patient Care Overview  Goal: Plan of Care Review  Outcome: Ongoing (interventions implemented as appropriate)  Pt remains on vent via trach. Adjustments made to vent settings per eICU MD for vent asynchrony. Scant sputum per trach. NSR w/BBB on monitor. Able to wean levo gtt to 0.02mcg/kg/min. Good UOP per harris; 825mL this shift. Q2 turns in specialty bed; no further skin breakdown noted.

## 2018-10-20 NOTE — ASSESSMENT & PLAN NOTE
Monitor BUN / Cr. Strict input and output monitoring. Montor urine output. Monitor and replete electrolytes PRN.

## 2018-10-20 NOTE — ASSESSMENT & PLAN NOTE
CT head negative for acute findings.  Treat underlying  UTI and PNE.   Metabolic encephalopathy. Hold all psychoactive medications.   Neurochecks per routine.    10/20  Improved  Off Pressors  ABX  Pulmonary CC

## 2018-10-20 NOTE — PROGRESS NOTES
[facility] Hospital Medicine  Progress Note    Patient Name: Carlie Valdez  MRN: [id number]  Patient Class: IP- Inpatient   Admission Date: 10/18/2018  Length of Stay: 2 days  Attending Physician: Subhash Ayala MD  Primary Care Provider: Johanna Guzman MD        Subjective:     Principal Problem:Severe sepsis    HPI:  Carlie Valdez is a 72 y.o. female patient with PMHx of Afib, CHF, DM, GERD, neuromyelitis optica spectrum disorder, and HTN, Chronic Trach/Peg/Harris who presents for altered mental status which onset gradually last night. Patient currently resides at [facility]. Per ER nurse, nursing home nurse states patient has had decreased level of consciousness since last night. Symptoms are constant and moderate in severity. Nursing home also reports patient has had decreased oral intake and decreased urine output. Per ER nurse, patient will take food orally. History limited secondary to patient being nonverbal due to trach. Patient presented to ER hypotensive, has chronic harris on arrival -->UA +. Chest Xray + infiltrate. Ct Head Neg. Sepsis protocol done in ER. Lactic neg. Received 30 ml/kg bolus with positive response in BP.Received rocephin.-->  Vanc/Zosyn. Urine culture and blood culture pending.         Hospital Course:  Pt admitted to ICU with IVFS and empiric IVabx- Vanc & Merropenem. Pulmonologist did a therapeutic bronchoscopy on 10/19 with copious thick obstructing mucopurulent muscus noted in the left mainstem bronchus. Patient is ventilator dependent. Ventilator support via trach. Continue supportive treatments.    10/20  Patient responds to question and command. S/p bronch Mucus Plugging noted. Patient reports sob. Discussed with Pulmonary CC    Interval History: Increased Mucus Plugging Poorly Communicative due to Trach / Vent    Review of Systems   Unable to perform ROS: Patient nonverbal     Objective:     Vital Signs (Most Recent):  Temp:  97.4 °F (36.3 °C) (10/20/18 0305)  Pulse: 69 (10/20/18 0902)  Resp: (!) 22 (10/20/18 0902)  BP: (!) 154/96 (10/20/18 0640)  SpO2: 100 % (10/20/18 0902) Vital Signs (24h Range):  Temp:  [96.5 °F (35.8 °C)-97.4 °F (36.3 °C)] 97.4 °F (36.3 °C)  Pulse:  [68-77] 69  Resp:  [10-36] 22  SpO2:  [96 %-100 %] 100 %  BP: ()/() 154/96     Weight: 87 kg (191 lb 12.8 oz)  Body mass index is 32.92 kg/m².    Intake/Output Summary (Last 24 hours) at 10/20/2018 1048  Last data filed at 10/20/2018 0630  Gross per 24 hour   Intake 3624.81 ml   Output 910 ml   Net 2714.81 ml      Physical Exam   Constitutional: She appears well-developed. She appears lethargic. She has a sickly appearance. She appears ill. No distress.   HENT:   Head: Normocephalic and atraumatic.   Nose: Nose normal.   Eyes: Right eye exhibits no discharge. Left eye exhibits no discharge. No scleral icterus.   Blind     Neck: Normal range of motion. Neck supple. No JVD present. No tracheal deviation present.   Cardiovascular: Normal rate, regular rhythm and intact distal pulses.   Murmur heard.   Systolic murmur is present with a grade of 3/6.  Right Upper Arm edema     Pulmonary/Chest: Effort normal. No stridor. No respiratory distress. She has no wheezes. Rales:  right.   Trach/vent   Abdominal: Soft. Bowel sounds are normal. She exhibits no distension. There is no tenderness. There is no guarding.   Peg   Musculoskeletal: Normal range of motion. She exhibits edema. She exhibits no deformity.   Neurological: She appears lethargic.   Lethargic  Responds to pain   Skin: Skin is warm and dry. Capillary refill takes 2 to 3 seconds. No rash noted. She is not diaphoretic.   Psychiatric: She has a normal mood and affect. Thought content normal.   Nursing note and vitals reviewed.      Significant Labs:   ABGs:   Recent Labs   Lab 10/20/18  0510   PH 7.435   PCO2 36.3   HCO3 24.4   POCSATURATED 98   BE 0     BMP:   Recent Labs   Lab 10/20/18  0445   *   NA  134*   K 3.2*      CO2 22*   BUN 40*   CREATININE 1.1   CALCIUM 8.5*   MG 1.8     CBC:   Recent Labs   Lab 10/18/18  2101 10/19/18  0334 10/20/18  0445   WBC 7.45 6.93 5.76   HGB 8.9* 8.5* 7.6*   HCT 27.5* 26.8* 24.1*   * 142* 112*     CMP:   Recent Labs   Lab 10/18/18  2101 10/19/18  0334 10/20/18  0445   * 134* 134*   K 3.4* 3.5 3.2*   CL 94* 97 100   CO2 22* 24 22*   GLU 68* 91 113*   BUN 44* 42* 40*   CREATININE 1.4 1.3 1.1   CALCIUM 8.6* 8.4* 8.5*   PROT 5.5*  --   --    ALBUMIN 2.1*  --   --    BILITOT 0.4  --   --    ALKPHOS 123  --   --    AST 29  --   --    ALT 36  --   --    ANIONGAP 16 13 12   EGFRNONAA 38* 41* 50*     Cardiac Markers:   Recent Labs   Lab 10/18/18  2101   *     Lactic Acid:   Recent Labs   Lab 10/18/18  2101   LACTATE 1.8     Lipase: No results for input(s): LIPASE in the last 48 hours.  Troponin:   Recent Labs   Lab 10/18/18  2101   TROPONINI 0.166*     Urine Studies:   Recent Labs   Lab 10/18/18  2134   COLORU Yellow   APPEARANCEUA Clear   PHUR 5.0   SPECGRAV >=1.030*   PROTEINUA 2+*   GLUCUA Negative   KETONESU Trace*   BILIRUBINUA 1+*   OCCULTUA 3+*   NITRITE Negative   UROBILINOGEN Negative   LEUKOCYTESUR 2+*   RBCUA >100*   WBCUA >100*   BACTERIA Many*   SQUAMEPITHEL 2   HYALINECASTS 0     All pertinent labs within the past 24 hours have been reviewed.    Significant Imaging: I have reviewed all pertinent imaging results/findings within the past 24 hours.    Assessment/Plan:      * Severe sepsis    PNE/UTI  Vanc and merropenem  IVFs and pressors  Await blood/urine cultures  Wound care consult  Monitor labs  pulm on board    10/20  Off Pressors  ABX  Cx pending  Pulmonary CC  UTI colonization chronic - Change harris regularly  ID on consult       Pressure injury of coccygeal region, unstageable    Wound care       Atelectasis of left lung    10/20  Vent   Pulmonary CC         Ventilator dependence    Pulmonary CC       Long term current use of antipsychotic  medication    Unclear as to why patient is on, will hold at this time due to AMS and reevaluate over course.   Consider psych consult pending course     10/20  Held  Monitor  Restart as indicated     Debility    Turn q 2  Supportive care    10/20  Continue Therapy       Acute encephalopathy      CT head negative for acute findings.  Treat underlying  UTI and PNE.   Metabolic encephalopathy. Hold all psychoactive medications.   Neurochecks per routine.    10/20  Improved  Off Pressors  ABX  Pulmonary CC     Hypothyroidism    Cont synthroid       Type 2 diabetes mellitus with kidney complication, without long-term current use of insulin    Check a1c  ssi  Monitor     10/20  NISS  Accuchecks  Controlled       Chronic atrial fibrillation    Continue anticoag-rivaroxaban and amio  montior rate close      10/20  Amiodarone  Xarelto         VTE Risk Mitigation (From admission, onward)        Ordered     rivaroxaban tablet 15 mg  Nightly      10/18/18 2240          Critical care time spent on the evaluation and treatment of severe organ dysfunction, review of pertinent labs and imaging studies, discussions with consulting providers and discussions with patient/family: 35 minutes.    Subhash Ayala MD  Department of Hospital Medicine   [facility]

## 2018-10-20 NOTE — PROGRESS NOTES
Trach care done. Site cleaned. Inner cannula changed.HME replaced. Drain sponges placed under site to help prevent breakdown when trach sits on base of neck.

## 2018-10-20 NOTE — ASSESSMENT & PLAN NOTE
Metabolic encephalopathy resolving. Psychoactive medications on hold. Treat underlying sepsis. Neurochecks per routine.

## 2018-10-20 NOTE — ASSESSMENT & PLAN NOTE
Microbiology: Panculture for Temp > 101.   Oxygenation: Continue ventilator support.  Keep SAO2 > = 92%  Hemodynamics: IV crystalloids. Start Midodrine. Wean off IV Norepinephrine. Keep MAP > = 65mmHg  Source control: IV Meropenem, IV Vancomycin.

## 2018-10-20 NOTE — SUBJECTIVE & OBJECTIVE
Interval History: Seen and examined at bedside. Hospital chart reviewed. No acute interval detrimental events noted. Bronchoscopy wit airway clearance yesterday. Left lung re expanded. Pulmonary toileting.  Off pressors. Hemodynamically stable. Staph Aureus in both respiratory and blood cultures. IV MEROPENEM + IV VANCOMYCIN.      Review of Systems   Unable to perform ROS: Patient nonverbal     Objective:     Vital Signs (Most Recent):  Temp: 96.4 °F (35.8 °C) (10/20/18 1100)  Pulse: 70 (10/20/18 1456)  Resp: (!) 24 (10/20/18 1456)  BP: 133/80 (10/20/18 1430)  SpO2: 98 % (10/20/18 1456) Vital Signs (24h Range):  Temp:  [96.2 °F (35.7 °C)-97.4 °F (36.3 °C)] 96.4 °F (35.8 °C)  Pulse:  [62-71] 70  Resp:  [9-37] 24  SpO2:  [97 %-100 %] 98 %  BP: ()/() 133/80     Weight: 87 kg (191 lb 12.8 oz)  Body mass index is 32.92 kg/m².      Intake/Output Summary (Last 24 hours) at 10/20/2018 1518  Last data filed at 10/20/2018 1400  Gross per 24 hour   Intake 3879.81 ml   Output 1915 ml   Net 1964.81 ml       Physical Exam   Constitutional: She appears well-developed. She appears lethargic. She has a sickly appearance. She appears ill. No distress.   HENT:   Head: Normocephalic and atraumatic.   Nose: Nose normal.   Eyes: Right eye exhibits no discharge. Left eye exhibits no discharge. No scleral icterus.   Blind     Neck: Normal range of motion. Neck supple. No JVD present. No tracheal deviation present.   Cardiovascular: Normal rate, regular rhythm and intact distal pulses.   Murmur heard.  Upper Arm edema worse to right.  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   Pulmonary/Chest: Effort normal. No stridor. No respiratory distress. She has no wheezes. She has rales ( right).   Trach/vent   Abdominal: Soft. Bowel sounds are normal. She exhibits no distension. There is no tenderness. There is no guarding.   Peg   Genitourinary:   Genitourinary Comments: Flores  + Ua   Musculoskeletal: Normal range of motion. She  exhibits edema. She exhibits no deformity.   Neurological: She appears lethargic.   Lethargic  Responds to pain   Skin: Skin is warm and dry. Capillary refill takes 2 to 3 seconds. No rash noted. She is not diaphoretic.   Wound/ulcer on arrival.  Wound care consult for full skin exam    Psychiatric:   Unable to assess   Nursing note and vitals reviewed.    Scheduled Meds:   acetylcysteine 100 mg/ml (10%)  4 mL Nebulization Q12H    albuterol-ipratropium  3 mL Nebulization Q6H    amiodarone  100 mg Oral BID    brimonidine 0.2%  1 drop Both Eyes Q12H    famotidine (PF)  20 mg Intravenous Daily    levothyroxine  100 mcg Oral Before breakfast    lidocaine HCL 10 mg/ml (1%)  1 mL Other Once    meropenem (MERREM) IVPB  500 mg Intravenous Q8H    midodrine  5 mg Per G Tube TID    rivaroxaban  15 mg Oral QHS    timolol maleate 0.5%  1 drop Both Eyes BID    vancomycin (VANCOCIN) IVPB  1,000 mg Intravenous Q24H    zinc sulfate  220 mg Oral Daily     Continuous Infusions:   sodium chloride 0.9% 75 mL/hr at 10/20/18 1400    norepinephrine bitartrate-D5W Stopped (10/20/18 0630)     PRN Meds:.acetaminophen, dextrose 50%, dextrose 50%, glucagon (human recombinant), glucose, glucose, sodium chloride 0.9%  Vents:  Vent Mode: A/C (10/20/18 1456)  Ventilator Initiated: Yes (10/18/18 2124)  Set Rate: 18 bmp (10/20/18 1456)  Vt Set: 0 mL (10/20/18 1456)  Pressure Support: 0 cmH20 (10/20/18 1456)  PEEP/CPAP: 15 cmH20 (10/20/18 1456)  Oxygen Concentration (%): 45 (10/20/18 1456)  Peak Airway Pressure: 32 cmH2O (10/20/18 1456)  Plateau Pressure: 0 cmH20 (10/20/18 1456)  Total Ve: 8.3 mL (10/20/18 1456)  F/VT Ratio<105 (RSBI): (!) 58.68 (10/20/18 1456)    Lines/Drains/Airways     Drain                 Gastrostomy/Enterostomy Percutaneous endoscopic gastrostomy (PEG) LUQ -- days         Urethral Catheter 07/05/18 1114 107 days         Gastrostomy/Enterostomy 07/18/18 1649 Percutaneous endoscopic gastrostomy (PEG) LUQ feeding 93  days          Airway                 Surgical Airway 07/18/18 1620 Shiley 93 days          Pressure Ulcer                 Pressure Injury 10/18/18 2015 Coccyx #1 Unstageable 1 day          Peripheral Intravenous Line                 Midline Catheter Insertion/Assessment  - Double Lumen 10/19/18 0131 Left brachial vein other (see comments) 1 day         Peripheral IV - Single Lumen 10/18/18 2105 Left Hand 1 day                Significant Labs:    CBC/Anemia Profile:  Recent Labs   Lab 10/18/18  2101 10/19/18  0334 10/20/18  0445   WBC 7.45 6.93 5.76   HGB 8.9* 8.5* 7.6*   HCT 27.5* 26.8* 24.1*   * 142* 112*   MCV 85 85 86   RDW 18.1* 18.3* 18.1*        Chemistries:  Recent Labs   Lab 10/18/18  2101 10/19/18  0334 10/20/18  0445   * 134* 134*   K 3.4* 3.5 3.2*   CL 94* 97 100   CO2 22* 24 22*   BUN 44* 42* 40*   CREATININE 1.4 1.3 1.1   CALCIUM 8.6* 8.4* 8.5*   ALBUMIN 2.1*  --   --    PROT 5.5*  --   --    BILITOT 0.4  --   --    ALKPHOS 123  --   --    ALT 36  --   --    AST 29  --   --    MG 1.9  --  1.8   PHOS 4.1  --   --        ABGs:   Recent Labs   Lab 10/20/18  0510   PH 7.435   PCO2 36.3   HCO3 24.4   POCSATURATED 98   BE 0     Bilirubin:   Recent Labs   Lab 10/18/18  2101   BILITOT 0.4     Blood Culture:   Recent Labs   Lab 10/18/18  2100 10/18/18  2102   LABBLOO Gram stain aer bottle: Gram positive cocci in clusters resembling Staph   Results called to and read back by:Atif Estrada RN  10/19/2018  18:36  Gram stain refugio bottle: Gram positive cocci in clusters resembling Staph   Positive results previously called  10/19/2018  18:37  STAPHYLOCOCCUS AUREUS  ID consult required at [facility] and [facility].  For susceptibility see order #[id number]   Gram stain aer bottle: Gram positive cocci in clusters resembling Staph   Results called to and read back by: Atif Estrada RN  10/19/2018  17:20  STAPHYLOCOCCUS AUREUS  Susceptibility pending  ID consult required at  [facility] and [facility]   locations.       Coagulation:   Recent Labs   Lab 10/19/18  0334   INR 1.3*   APTT 45.3*     Lactic Acid:   Recent Labs   Lab 10/18/18  2101   LACTATE 1.8     Lipid Panel:   Recent Labs   Lab 10/19/18  0334   CHOL 158   HDL 36*   LDLCALC 103.4   TRIG 93   CHOLHDL 22.8     Respiratory Culture:   Recent Labs   Lab 10/19/18  0900   GSRESP <10 epithelial cells per low power field.  Moderate WBC's  Few Gram positive cocci  Rare Gram positive rods  Rare Gram negative rods   RESPIRATORYC STAPHYLOCOCCUS AUREUS  Many  Susceptibility pending  Normal respiratory barbie also present       Troponin:   Recent Labs   Lab 10/18/18  2101   TROPONINI 0.166*     Urine Culture:   Recent Labs   Lab 10/18/18  2134   LABURIN GRAM NEGATIVE JM  > 100,000 cfu/ml  Identification and susceptibility pending         Significant Imaging:    X-Ray Chest 1 View: 10/20/18:       Tracheostomy tube.  Multi lead defibrillator.  Bibasilar infiltrate/pneumonia greater on the left.  Findings similar to the prior film dated 10/19/2018.  Cardiomegaly

## 2018-10-20 NOTE — ASSESSMENT & PLAN NOTE
Unclear as to why patient is on, will hold at this time due to AMS and reevaluate over course.   Consider psych consult pending course     10/20  Held  Monitor  Restart as indicated

## 2018-10-21 PROBLEM — T83.511A URINARY TRACT INFECTION ASSOCIATED WITH INDWELLING URETHRAL CATHETER: Status: ACTIVE | Noted: 2018-01-01

## 2018-10-21 PROBLEM — R65.20 SEVERE SEPSIS: Status: RESOLVED | Noted: 2018-01-01 | Resolved: 2018-01-01

## 2018-10-21 PROBLEM — B95.62 MRSA BACTEREMIA: Status: ACTIVE | Noted: 2018-01-01

## 2018-10-21 PROBLEM — R78.81 MRSA BACTEREMIA: Status: ACTIVE | Noted: 2018-01-01

## 2018-10-21 PROBLEM — A41.9 SEVERE SEPSIS: Status: RESOLVED | Noted: 2018-01-01 | Resolved: 2018-01-01

## 2018-10-21 PROBLEM — L89.159 PRESSURE ULCER OF COCCYGEAL REGION: Status: ACTIVE | Noted: 2018-01-01

## 2018-10-21 NOTE — ASSESSMENT & PLAN NOTE
Continue anticoag-rivaroxaban and amio  montior rate close      10/20  Amiodarone  Xarelto    10/21  Amiodarone  Xarelto

## 2018-10-21 NOTE — ASSESSMENT & PLAN NOTE
10/20-from pneumonia, staph bacteremia , UTI -all cultures reviewed-    Respiratory cultures - staph aureus  Urine-E coli and Klebsiella.  Blood cultures -staph aureus      Will continue vanco , will switch to zosyn in AM

## 2018-10-21 NOTE — ASSESSMENT & PLAN NOTE
Respiratory cultures => STAPH AUREUS.    Identification pending.    Suspect MRSA:    Continue IV VANCOMYCIN.    Appreciate ID input.

## 2018-10-21 NOTE — PROGRESS NOTES
[facility] Hospital Medicine  Progress Note    Patient Name: Carlie Valdez  MRN: [id number]  Patient Class: IP- Inpatient   Admission Date: 10/18/2018  Length of Stay: 3 days  Attending Physician: Subhash Ayala MD  Primary Care Provider: Johanna Guzman MD        Subjective:     Principal Problem:Severe sepsis    HPI:  Carlie Valdez is a 72 y.o. female patient with PMHx of Afib, CHF, DM, GERD, neuromyelitis optica spectrum disorder, and HTN, Chronic Trach/Peg/Harris who presents for altered mental status which onset gradually last night. Patient currently resides at [facility]. Per ER nurse, nursing home nurse states patient has had decreased level of consciousness since last night. Symptoms are constant and moderate in severity. Nursing home also reports patient has had decreased oral intake and decreased urine output. Per ER nurse, patient will take food orally. History limited secondary to patient being nonverbal due to trach. Patient presented to ER hypotensive, has chronic harris on arrival -->UA +. Chest Xray + infiltrate. Ct Head Neg. Sepsis protocol done in ER. Lactic neg. Received 30 ml/kg bolus with positive response in BP.Received rocephin.-->  Vanc/Zosyn. Urine culture and blood culture pending.         Hospital Course:  Pt admitted to ICU with IVFS and empiric IVabx- Vanc & Merropenem. Pulmonologist did a therapeutic bronchoscopy on 10/19 with copious thick obstructing mucopurulent muscus noted in the left mainstem bronchus. Patient is ventilator dependent. Ventilator support via trach. Continue supportive treatments.    10/20  Patient responds to question and command. S/p bronch Mucus Plugging noted. Patient reports sob. Discussed with Pulmonary CC    10/21  Patient improving clinically. Plan for return to NH in progress. Discussed with family at bedside. Patient reports improvement in sx.    Interval History: Improving. Awaiting placement    Review  of Systems   Unable to perform ROS: Acuity of condition     Objective:     Vital Signs (Most Recent):  Temp: 96.9 °F (36.1 °C) (10/21/18 1130)  Pulse: 70 (10/21/18 1501)  Resp: 20 (10/21/18 1501)  BP: 116/88 (10/21/18 1300)  SpO2: 99 % (10/21/18 1501) Vital Signs (24h Range):  Temp:  [96.9 °F (36.1 °C)-97.6 °F (36.4 °C)] 96.9 °F (36.1 °C)  Pulse:  [69-75] 70  Resp:  [15-36] 20  SpO2:  [88 %-100 %] 99 %  BP: ()/() 116/88     Weight: 87 kg (191 lb 12.8 oz)  Body mass index is 32.92 kg/m².    Intake/Output Summary (Last 24 hours) at 10/21/2018 1703  Last data filed at 10/21/2018 1300  Gross per 24 hour   Intake 2920 ml   Output 1320 ml   Net 1600 ml      Physical Exam   Constitutional: She appears well-developed. She appears lethargic. She has a sickly appearance. She appears ill. No distress.   HENT:   Head: Normocephalic and atraumatic.   Nose: Nose normal.   Eyes: Right eye exhibits no discharge. Left eye exhibits no discharge. No scleral icterus.   Blind     Neck: Normal range of motion. Neck supple. No JVD present. No tracheal deviation present.   Cardiovascular: Normal rate, regular rhythm and intact distal pulses.   Murmur heard.   Systolic murmur is present with a grade of 3/6.  Right Upper Arm edema     Pulmonary/Chest: Effort normal. No stridor. No respiratory distress. She has no wheezes. Rales:  right.   Trach/vent   Abdominal: Soft. Bowel sounds are normal. She exhibits no distension. There is no tenderness. There is no guarding.   Peg   Musculoskeletal: Normal range of motion. She exhibits edema. She exhibits no deformity.   Neurological: She appears lethargic.   Lethargic  Responds to pain   Skin: Skin is warm and dry. Capillary refill takes 2 to 3 seconds. No rash noted. She is not diaphoretic.   Psychiatric: She has a normal mood and affect. Thought content normal.   Nursing note and vitals reviewed.      Significant Labs:   BMP:   Recent Labs   Lab 10/21/18  0430   *   *   K  3.3*      CO2 23   BUN 30*   CREATININE 0.8   CALCIUM 8.1*   MG 1.9     CBC:   Recent Labs   Lab 10/20/18  0445 10/21/18  0430   WBC 5.76 4.20   HGB 7.6* 6.7*   HCT 24.1* 21.0*   * 74*     CMP:   Recent Labs   Lab 10/20/18  0445 10/21/18  0430   * 135*   K 3.2* 3.3*    104   CO2 22* 23   * 336*   BUN 40* 30*   CREATININE 1.1 0.8   CALCIUM 8.5* 8.1*   ANIONGAP 12 8   EGFRNONAA 50* >60     All pertinent labs within the past 24 hours have been reviewed.    Significant Imaging: I have reviewed all pertinent imaging results/findings within the past 24 hours.    Assessment/Plan:      Pressure ulcer of coccygeal region    Wound care       Atelectasis of left lung    10/20  Vent   Pulmonary CC    10/21  Trach  Vent  Pulmonary         Ventilator dependence    Pulmonary CC       Long term current use of antipsychotic medication    Unclear as to why patient is on, will hold at this time due to AMS and reevaluate over course.   Consider psych consult pending course     10/20  Held  Monitor  Restart as indicated     Electrolyte imbalance    10/21  Replete as indicated       Debility    Turn q 2  Supportive care    10/20  Continue Therapy    10/21  PT/OT  Supportive Care       Acute encephalopathy      CT head negative for acute findings.  Treat underlying  UTI and PNE.   Metabolic encephalopathy. Hold all psychoactive medications.   Neurochecks per routine.    10/20  Improved  Off Pressors  ABX  Pulmonary CC    10/21  ABX  Pulmonary CC  Monitor     Urinary tract infection associated with indwelling urethral catheter    10/21  ABX  ID on Consult       Hypothyroidism    Cont synthroid       Type 2 diabetes mellitus with kidney complication, without long-term current use of insulin    Check a1c  ssi  Monitor     10/20  NISS  Accuchecks  Controlled    10/21  NISS  Accuchecks  Levemir       Pneumonia due to infectious organism    10/21  Improved  ABX  Monitor  Pulmonary     Chronic atrial fibrillation     Continue anticoag-rivaroxaban and amio  montior rate close      10/20  Amiodarone  Xarelto    10/21  Amiodarone  Xarelto       VTE Risk Mitigation (From admission, onward)        Ordered     rivaroxaban tablet 15 mg  Nightly      10/18/18 8260          Critical care time spent on the evaluation and treatment of severe organ dysfunction, review of pertinent labs and imaging studies, discussions with consulting providers and discussions with patient/family: 33 minutes.    Subhash Ayala MD  Department of Hospital Medicine   [facility]

## 2018-10-21 NOTE — SUBJECTIVE & OBJECTIVE
Interval History: Improving. Awaiting placement    Review of Systems   Unable to perform ROS: Acuity of condition     Objective:     Vital Signs (Most Recent):  Temp: 96.9 °F (36.1 °C) (10/21/18 1130)  Pulse: 70 (10/21/18 1501)  Resp: 20 (10/21/18 1501)  BP: 116/88 (10/21/18 1300)  SpO2: 99 % (10/21/18 1501) Vital Signs (24h Range):  Temp:  [96.9 °F (36.1 °C)-97.6 °F (36.4 °C)] 96.9 °F (36.1 °C)  Pulse:  [69-75] 70  Resp:  [15-36] 20  SpO2:  [88 %-100 %] 99 %  BP: ()/() 116/88     Weight: 87 kg (191 lb 12.8 oz)  Body mass index is 32.92 kg/m².    Intake/Output Summary (Last 24 hours) at 10/21/2018 1703  Last data filed at 10/21/2018 1300  Gross per 24 hour   Intake 2920 ml   Output 1320 ml   Net 1600 ml      Physical Exam   Constitutional: She appears well-developed. She appears lethargic. She has a sickly appearance. She appears ill. No distress.   HENT:   Head: Normocephalic and atraumatic.   Nose: Nose normal.   Eyes: Right eye exhibits no discharge. Left eye exhibits no discharge. No scleral icterus.   Blind     Neck: Normal range of motion. Neck supple. No JVD present. No tracheal deviation present.   Cardiovascular: Normal rate, regular rhythm and intact distal pulses.   Murmur heard.   Systolic murmur is present with a grade of 3/6.  Right Upper Arm edema     Pulmonary/Chest: Effort normal. No stridor. No respiratory distress. She has no wheezes. Rales:  right.   Trach/vent   Abdominal: Soft. Bowel sounds are normal. She exhibits no distension. There is no tenderness. There is no guarding.   Peg   Musculoskeletal: Normal range of motion. She exhibits edema. She exhibits no deformity.   Neurological: She appears lethargic.   Lethargic  Responds to pain   Skin: Skin is warm and dry. Capillary refill takes 2 to 3 seconds. No rash noted. She is not diaphoretic.   Psychiatric: She has a normal mood and affect. Thought content normal.   Nursing note and vitals reviewed.      Significant Labs:   BMP:    Recent Labs   Lab 10/21/18  0430   *   *   K 3.3*      CO2 23   BUN 30*   CREATININE 0.8   CALCIUM 8.1*   MG 1.9     CBC:   Recent Labs   Lab 10/20/18  0445 10/21/18  0430   WBC 5.76 4.20   HGB 7.6* 6.7*   HCT 24.1* 21.0*   * 74*     CMP:   Recent Labs   Lab 10/20/18  0445 10/21/18  0430   * 135*   K 3.2* 3.3*    104   CO2 22* 23   * 336*   BUN 40* 30*   CREATININE 1.1 0.8   CALCIUM 8.5* 8.1*   ANIONGAP 12 8   EGFRNONAA 50* >60     All pertinent labs within the past 24 hours have been reviewed.    Significant Imaging: I have reviewed all pertinent imaging results/findings within the past 24 hours.

## 2018-10-21 NOTE — HPI
72 year old woman with history of Afib, CHF, DM, GERD, neuromyelitis optica spectrum disorder, and HTN, Chronic Trach/Peg/Flores who presents for altered mental status .She is a resident of   [facility].  Since admission, cultures have turned positive-  Respiratory cultures - staph aureus  Urine-E coli and Klebsiella.  Blood cultures -staph aureus  Chest x-ray showed -Near complete opacification of left hemithorax concerning for consolidation with areas of air bronchograms.  Moderate left-sided pleural effusion is also suspected.

## 2018-10-21 NOTE — CONSULTS
[facility]  Infectious Disease  Consult Note    Patient Name: Carlie Valdez  MRN: [id number]  Admission Date: 10/18/2018  Hospital Length of Stay: 3 days  Attending Physician: Subhash Ayala MD  Primary Care Provider: Johanna Guzman MD     Isolation Status: No active isolations    Patient information was obtained from past medical records and ER records.      Consults  Assessment/Plan:     * Severe sepsis    10/20-from pneumonia, staph bacteremia , UTI -all cultures reviewed-    Respiratory cultures - staph aureus  Urine-E coli and Klebsiella.  Blood cultures -staph aureus      Will continue vanco , will switch to zosyn in AM     Pressure injury of coccygeal region, unstageable    10/20-wound care      Ventilator dependence    10/20-pulmonology follow up      Type 2 diabetes mellitus with kidney complication, without long-term current use of insulin    10/20- insulin sliding scale as per primary team      Pneumonia due to infectious organism    10/20-  Will continue vanco/meropenem for now but will switch to zosyn in am      Chronic atrial fibrillation    10/20- rate control as per primary team   On amiodarone, rivaroxaban         Thank you for your consult. I will follow-up with patient. Please contact us if you have any additional questions.    Philip Ceja MD  Infectious Disease  [facility]    Subjective:     Principal Problem: Severe sepsis    HPI: 72 year old woman with history of Afib, CHF, DM, GERD, neuromyelitis optica spectrum disorder, and HTN, Chronic Trach/Peg/Flores who presents for altered mental status .She is a resident of   [facility].  Since admission, cultures have turned positive-  Respiratory cultures - staph aureus  Urine-E coli and Klebsiella.  Blood cultures -staph aureus  Chest x-ray showed -Near complete opacification of left hemithorax concerning for consolidation with areas of air bronchograms.  Moderate left-sided pleural effusion is  also suspected.     Past Medical History:   Diagnosis Date    Anticoagulant long-term use     Arthritis     Asthma     Atrial fibrillation     Blood clot of vein in shoulder area     Cardiac defibrillator in place     CHF (congestive heart failure)     Chronic knee pain     Diabetes mellitus type 2 without retinopathy 2016    Diaphragmatic hernia without obstruction and without gangrene 2015    GERD (gastroesophageal reflux disease)     Hypertension     Immune deficiency disorder     Primary open angle glaucoma (POAG) of both eyes, severe stage 2018       Past Surgical History:   Procedure Laterality Date    CARDIAC DEFIBRILLATOR PLACEMENT      , .    CATARACT EXTRACTION       SECTION      COLONOSCOPY      EGD, WITH PEG TUBE INSERTION N/A 2018    Performed by Louis O. Jeansonne IV, MD at [facility] OR    ashley Macdonald    ESOPHAGOGASTRODUODENOSCOPY (EGD) N/A 2015    Performed by Hieu Colbert MD at [facility] ENDO    ESOPHAGOGASTRODUODENOSCOPY W/ PEG N/A 2018    Procedure: EGD, WITH PEG TUBE INSERTION;  Surgeon: Louis O. Jeansonne IV, MD;  Location: [facility] OR;  Service: General;  Laterality: N/A;    KNEE ARTHROSCOPY      TRACHEOSTOMY N/A 2018    Procedure: TRACHEOSTOMY;  Surgeon: Louis O. Jeansonne IV, MD;  Location: [facility] OR;  Service: General;  Laterality: N/A;    TRACHEOSTOMY N/A 2018    Performed by Louis O. Jeansonne IV, MD at [facility] OR    UPPER GASTROINTESTINAL ENDOSCOPY         Review of patient's allergies indicates:   Allergen Reactions    Morphine Hives    Atorvastatin      Other reaction(s): Muscle pain    Clonidine     Codeine      Other reaction(s): Unknown    Digoxin      Other reaction(s): Unknown    Diovan [valsartan] Other (See Comments)     Upset stomach, weakness    Eggs [egg derived]     Metoprolol Diarrhea    Naproxen      Other reaction(s): Nausea    Peanut     Propofol      Other reaction(s): delirious    Sulfa  (sulfonamide antibiotics)        Medications:  Medications Prior to Admission   Medication Sig    albuterol 90 mcg/actuation inhaler Inhale 1-2 puffs into the lungs every 6 (six) hours as needed.     albuterol-ipratropium (DUO-NEB) 2.5 mg-0.5 mg/3 mL nebulizer solution Take 3 mLs by nebulization every 4 (four) hours as needed for Wheezing. Rescue    amiodarone (PACERONE) 200 MG Tab Take 1 tablet (200 mg total) by mouth 2 (two) times daily. (Patient taking differently: Take 100 mg by mouth 2 (two) times daily. )    bacitracin 500 unit/gram ointment Apply topically once daily. Apply to R heel daily.    baclofen (LIORESAL) 10 MG tablet Take 10 mg by mouth 3 (three) times daily as needed.     brimonidine-timolol (COMBIGAN) 0.2-0.5 % Drop Place 1 drop into both eyes every 12 (twelve) hours.    bumetanide (BUMEX) 2 MG tablet Take 1 tablet (2 mg total) by mouth 2 (two) times daily. 1 Tablet Oral Every day (Patient taking differently: Take 1 mg by mouth 2 (two) times daily. Hold for SBP less than or equal to 100.)    ergocalciferol (ERGOCALCIFEROL) 50,000 unit Cap Take 1 capsule (50,000 Units total) by mouth every 7 days.    fluticasone (FLONASE) 50 mcg/actuation nasal spray 2 sprays by Each Nare route once daily. (Patient taking differently: 2 sprays by Each Nare route daily as needed. )    furosemide (LASIX) 20 MG tablet Take 20 mg by mouth 2 (two) times daily.    levothyroxine (SYNTHROID) 100 MCG tablet Take 100 mcg by mouth before breakfast.     magnesium oxide (MAG-OX) 400 mg tablet Take 400 mg by mouth 2 (two) times daily.    midodrine (PROAMATINE) 5 MG Tab 1 tablet (5 mg total) by Per G Tube route 3 (three) times daily.    montelukast (SINGULAIR) 10 mg tablet Take 10 mg by mouth once daily.     multivitamin (THERAGRAN) per tablet Take 1 tablet by mouth once daily.     mycophenolate (CELLCEPT) 250 mg Cap Take 2 capsules (500 mg total) by mouth 2 (two) times daily.    norepinephrine bitartrate-D5W 4  mg/250 mL (16 mcg/mL) Soln Inject 1.44 mcg/min into the vein continuous.    ondansetron (ZOFRAN) 4 MG tablet Take 4 mg by mouth every 6 (six) hours as needed for Nausea.    pantoprazole (PROTONIX) 40 MG tablet Take 40 mg by mouth once daily.    polyethylene glycol (GLYCOLAX) 17 gram PwPk Take 17 g by mouth once daily. (Patient taking differently: Take 17 g by mouth daily as needed. )    predniSONE (DELTASONE) 20 MG tablet Take 20 mg by mouth once daily.    rivaroxaban (XARELTO) 15 mg Tab Take 15 mg by mouth every evening.     rivaroxaban (XARELTO) 15 mg Tab Take 1 tablet (15 mg total) by mouth daily with dinner or evening meal.    silver sulfADIAZINE 1% (SILVADENE) 1 % cream Apply topically 2 (two) times daily. Apply to bliter areas BID (Patient taking differently: Apply topically 2 (two) times daily. Apply to blistered areas BID.)    traMADol (ULTRAM) 50 mg tablet Take 50 mg by mouth every 6 (six) hours as needed for Pain.    zinc sulfate (ZINCATE) 220 (50) mg capsule Take 220 mg by mouth once daily.      Antibiotics (From admission, onward)    Start     Stop Route Frequency Ordered    10/20/18 0230  vancomycin in dextrose 5 % 1 gram/250 mL IVPB 1,000 mg      -- IV Every 24 hours (non-standard times) 10/19/18 1443    10/19/18 1115  meropenem-0.9% sodium chloride 500 mg/50 mL IVPB      -- IV Every 8 hours (non-standard times) 10/19/18 1004        Antifungals (From admission, onward)    None        Antivirals (From admission, onward)    None           Immunization History   Administered Date(s) Administered    Tdap 07/07/2014       Family History     Problem Relation (Age of Onset)    Hypertension Mother, Father        Social History     Socioeconomic History    Marital status:      Spouse name: None    Number of children: None    Years of education: None    Highest education level: None   Social Needs    Financial resource strain: None    Food insecurity - worry: None    Food insecurity -  inability: None    Transportation needs - medical: None    Transportation needs - non-medical: None   Occupational History    None   Tobacco Use    Smoking status: Never Smoker    Smokeless tobacco: Never Used   Substance and Sexual Activity    Alcohol use: No     Alcohol/week: 0.0 oz    Drug use: No    Sexual activity: Yes     Partners: Male   Other Topics Concern    None   Social History Narrative    None     Review of Systems   Unable to perform ROS: Patient nonverbal     Objective:     Vital Signs (Most Recent):  Temp: 97.6 °F (36.4 °C) (10/20/18 1905)  Pulse: 70 (10/20/18 1955)  Resp: (!) 29 (10/20/18 1955)  BP: (!) 133/101 (10/20/18 1905)  SpO2: 100 % (10/20/18 1955) Vital Signs (24h Range):  Temp:  [96.2 °F (35.7 °C)-97.6 °F (36.4 °C)] 97.6 °F (36.4 °C)  Pulse:  [62-72] 70  Resp:  [9-37] 29  SpO2:  [90 %-100 %] 100 %  BP: ()/() 133/101     Weight: 87 kg (191 lb 12.8 oz)  Body mass index is 32.92 kg/m².    Estimated Creatinine Clearance: 49.3 mL/min (based on SCr of 1.1 mg/dL).    Physical Exam   Constitutional: She appears well-developed. She appears lethargic. She has a sickly appearance. She appears ill. No distress.   HENT:   Head: Normocephalic and atraumatic.   Nose: Nose normal.   Eyes: Right eye exhibits no discharge. Left eye exhibits no discharge. No scleral icterus.   Blind     Neck: Normal range of motion. Neck supple. No JVD present. No tracheal deviation present.   Cardiovascular: Normal rate, regular rhythm and intact distal pulses.   Murmur heard.  Upper Arm edema worse to right.  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   Pulmonary/Chest: Effort normal. No stridor. No respiratory distress. She has no wheezes. She has rales ( right).   Trach/vent   Abdominal: Soft. Bowel sounds are normal. She exhibits no distension. There is no tenderness. There is no guarding.   Peg   Musculoskeletal: Normal range of motion. She exhibits edema. She exhibits no deformity.    Neurological: She appears lethargic.   Lethargic  Responds to pain   Skin: Skin is warm and dry. Capillary refill takes 2 to 3 seconds. No rash noted. She is not diaphoretic.   Wound/ulcer on arrival.  Wound care consult for full skin exam    Psychiatric:   Unable to assess   Nursing note and vitals reviewed.      Significant Labs:   Blood Culture:   Recent Labs   Lab 07/03/18  1549 10/18/18  2100 10/18/18  2102   LABBLOO No growth after 5 days.  No growth after 5 days. Gram stain aer bottle: Gram positive cocci in clusters resembling Staph   Results called to and read back by:Atif Estrada RN  10/19/2018  18:36  Gram stain refugio bottle: Gram positive cocci in clusters resembling Staph   Positive results previously called  10/19/2018  18:37  STAPHYLOCOCCUS AUREUS  ID consult required at [facility] and [facility]   locations.  For susceptibility see order #[id number]   Gram stain aer bottle: Gram positive cocci in clusters resembling Staph   Results called to and read back by: Atif Estrada RN  10/19/2018  17:20  STAPHYLOCOCCUS AUREUS  Susceptibility pending  ID consult required at [facility] and [facility]   locations.       BMP:   Recent Labs   Lab 10/20/18  0445   *   *   K 3.2*      CO2 22*   BUN 40*   CREATININE 1.1   CALCIUM 8.5*   MG 1.8     CBC:   Recent Labs   Lab 10/18/18  2101 10/19/18  0334 10/20/18  0445   WBC 7.45 6.93 5.76   HGB 8.9* 8.5* 7.6*   HCT 27.5* 26.8* 24.1*   * 142* 112*     Wound Culture: No results for input(s): LABAERO in the last 4320 hours.  All pertinent labs within the past 24 hours have been reviewed.    Significant Imaging: I have reviewed all pertinent imaging results/findings within the past 24 hours.

## 2018-10-21 NOTE — ASSESSMENT & PLAN NOTE
CT head negative for acute findings.  Treat underlying  UTI and PNE.   Metabolic encephalopathy. Hold all psychoactive medications.   Neurochecks per routine.    10/20  Improved  Off Pressors  ABX  Pulmonary CC    10/21  ABX  Pulmonary CC  Monitor

## 2018-10-21 NOTE — ASSESSMENT & PLAN NOTE
Check a1c  ssi  Monitor     10/20  NISS  Accuchecks  Controlled    10/21  NISS  Accuchecks  Levemir

## 2018-10-21 NOTE — SUBJECTIVE & OBJECTIVE
Interval History: Seen and examined at bedside. Hospital chart reviewed. No acute interval detrimental events noted. Remains on ventilator support, patient is ventilator dependent. Respiratory mechanics are OK.  Urine culture -ESCHERICHIA COLI & KLEBSIELLA PNEUMONIAE both pan sensitive. STAPHYLOCOCCUS AUREUS in both blood and respiratory cultures - ID pending. MRSA suspected. ID consulted. MEROPENEM switched to ZOSYN by ID.  IV VANCOMYCIN maintained.       Review of Systems   Unable to perform ROS: Patient nonverbal     Objective:     Vital Signs (Most Recent):  Temp: 97 °F (36.1 °C) (10/21/18 0730)  Pulse: 69 (10/21/18 1000)  Resp: 18 (10/21/18 1000)  BP: 103/60 (10/21/18 1000)  SpO2: (!) 94 % (10/21/18 1000) Vital Signs (24h Range):  Temp:  [96.4 °F (35.8 °C)-97.6 °F (36.4 °C)] 97 °F (36.1 °C)  Pulse:  [69-75] 69  Resp:  [15-37] 18  SpO2:  [88 %-100 %] 94 %  BP: ()/() 103/60     Weight: 87 kg (191 lb 12.8 oz)  Body mass index is 32.92 kg/m².      Intake/Output Summary (Last 24 hours) at 10/21/2018 1058  Last data filed at 10/21/2018 1000  Gross per 24 hour   Intake 3108.75 ml   Output 1780 ml   Net 1328.75 ml       Physical Exam   Constitutional: She appears well-developed. She appears lethargic. She has a sickly appearance. She appears ill. No distress.       HENT:   Head: Normocephalic and atraumatic.   Nose: Nose normal.   Eyes: Right eye exhibits no discharge. Left eye exhibits no discharge. No scleral icterus.   Blind     Neck: Normal range of motion. Neck supple. No JVD present. No tracheal deviation present.   Cardiovascular: Normal rate, regular rhythm and intact distal pulses.   Murmur heard.  Upper Arm edema worse to right.  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   Pulmonary/Chest: Effort normal. No stridor. No respiratory distress. She has no wheezes. She has rales ( right).   Abdominal: Soft. Bowel sounds are normal. She exhibits no distension. There is no tenderness. There is no  guarding.   Genitourinary:   Genitourinary Comments: Flores  + Ua   Musculoskeletal: Normal range of motion. She exhibits edema. She exhibits no deformity.   Neurological: She appears lethargic.   Awake and Alert.     Skin: Skin is warm and dry. Capillary refill takes 2 to 3 seconds. No rash noted. She is not diaphoretic.        Psychiatric: She has a normal mood and affect.   Nursing note and vitals reviewed.    Scheduled Meds:   acetylcysteine 100 mg/ml (10%)  4 mL Nebulization Q12H    albuterol-ipratropium  3 mL Nebulization Q6H    amiodarone  100 mg Oral BID    brimonidine 0.2%  1 drop Both Eyes Q12H    famotidine (PF)  20 mg Intravenous Daily    levothyroxine  100 mcg Oral Before breakfast    lidocaine HCL 10 mg/ml (1%)  1 mL Other Once    midodrine  5 mg Per G Tube TID    piperacillin-tazobactam (ZOSYN) IVPB  4.5 g Intravenous Q8H    rivaroxaban  15 mg Oral QHS    timolol maleate 0.5%  1 drop Both Eyes BID    vancomycin (VANCOCIN) IVPB  1,000 mg Intravenous Q24H    zinc sulfate  220 mg Oral Daily     Continuous Infusions:   sodium chloride 0.9% 75 mL/hr at 10/21/18 1000    norepinephrine bitartrate-D5W Stopped (10/20/18 0630)     PRN Meds:.acetaminophen, dextrose 50%, dextrose 50%, glucagon (human recombinant), glucose, glucose, sodium chloride 0.9%  Vents:  Vent Mode: A/C (10/21/18 0915)  Ventilator Initiated: Yes (10/18/18 2124)  Set Rate: 18 bmp (10/21/18 0915)  Vt Set: 0 mL (10/21/18 0915)  Pressure Support: 0 cmH20 (10/21/18 0915)  PEEP/CPAP: 12 cmH20 (10/21/18 0915)  Oxygen Concentration (%): 50 (10/21/18 1000)  Peak Airway Pressure: 27 cmH2O (10/21/18 0915)  Plateau Pressure: 0 cmH20 (10/21/18 0915)  Total Ve: 6.52 mL (10/21/18 0915)  F/VT Ratio<105 (RSBI): (!) 31.41 (10/21/18 0915)    Lines/Drains/Airways     Drain                 Gastrostomy/Enterostomy Percutaneous endoscopic gastrostomy (PEG) LUQ -- days         Urethral Catheter 07/05/18 1114 107 days         Gastrostomy/Enterostomy  07/18/18 1649 Percutaneous endoscopic gastrostomy (PEG) LUQ feeding 94 days          Airway                 Surgical Airway 07/18/18 1620 Shiley 94 days          Pressure Ulcer                 Pressure Injury 10/18/18 2015 Coccyx #1 Unstageable 2 days          Peripheral Intravenous Line                 Midline Catheter Insertion/Assessment  - Double Lumen 10/19/18 0131 Left brachial vein other (see comments) 2 days         Peripheral IV - Single Lumen 10/18/18 2105 Left Hand 2 days                Significant Labs:    CBC/Anemia Profile:  Recent Labs   Lab 10/20/18  0445 10/21/18  0430   WBC 5.76 4.20   HGB 7.6* 6.7*   HCT 24.1* 21.0*   * 74*   MCV 86 88   RDW 18.1* 18.3*        Chemistries:  Recent Labs   Lab 10/20/18  0445 10/21/18  0430   * 135*   K 3.2* 3.3*    104   CO2 22* 23   BUN 40* 30*   CREATININE 1.1 0.8   CALCIUM 8.5* 8.1*   MG 1.8 1.9       ABGs:   Recent Labs   Lab 10/21/18  0529   PH 7.351   PCO2 47.5*   HCO3 26.3   POCSATURATED 98   BE 1     Bilirubin:   Recent Labs   Lab 10/18/18  2101   BILITOT 0.4       Significant Imaging:    X-Ray Chest 1 View: 10/21/18:     Cardiomegaly.  Tracheostomy tube.  Multi lead pacemaker/defibrillator.  Mildly increased density at the right lung base consistent with a right lower lobe infiltrate.  Interval clearing of the left base when compared with the prior film.

## 2018-10-21 NOTE — SUBJECTIVE & OBJECTIVE
Past Medical History:   Diagnosis Date    Anticoagulant long-term use     Arthritis     Asthma     Atrial fibrillation     Blood clot of vein in shoulder area     Cardiac defibrillator in place     CHF (congestive heart failure)     Chronic knee pain     Diabetes mellitus type 2 without retinopathy 2016    Diaphragmatic hernia without obstruction and without gangrene 2015    GERD (gastroesophageal reflux disease)     Hypertension     Immune deficiency disorder     Primary open angle glaucoma (POAG) of both eyes, severe stage 2018       Past Surgical History:   Procedure Laterality Date    CARDIAC DEFIBRILLATOR PLACEMENT      , .    CATARACT EXTRACTION       SECTION      COLONOSCOPY      EGD, WITH PEG TUBE INSERTION N/A 2018    Performed by Louis O. Jeansonne IV, MD at [facility] [place] OR    ashley Macdonald    ESOPHAGOGASTRODUODENOSCOPY (EGD) N/A 2015    Performed by Hieu Colbert MD at [facility] [place] ENDO    ESOPHAGOGASTRODUODENOSCOPY W/ PEG N/A 2018    Procedure: EGD, WITH PEG TUBE INSERTION;  Surgeon: Louis O. Jeansonne IV, MD;  Location: [facility] [place] OR;  Service: General;  Laterality: N/A;    KNEE ARTHROSCOPY      TRACHEOSTOMY N/A 2018    Procedure: TRACHEOSTOMY;  Surgeon: Louis O. Jeansonne IV, MD;  Location: [facility] [place] OR;  Service: General;  Laterality: N/A;    TRACHEOSTOMY N/A 2018    Performed by Louis O. Jeansonne IV, MD at [facility] [place] OR    UPPER GASTROINTESTINAL ENDOSCOPY         Review of patient's allergies indicates:   Allergen Reactions    Morphine Hives    Atorvastatin      Other reaction(s): Muscle pain    Clonidine     Codeine      Other reaction(s): Unknown    Digoxin      Other reaction(s): Unknown    Diovan [valsartan] Other (See Comments)     Upset stomach, weakness    Eggs [egg derived]     Metoprolol Diarrhea    Naproxen      Other reaction(s): Nausea    Peanut     Propofol      Other reaction(s): delirious    Sulfa (sulfonamide  antibiotics)        Medications:  Medications Prior to Admission   Medication Sig    albuterol 90 mcg/actuation inhaler Inhale 1-2 puffs into the lungs every 6 (six) hours as needed.     albuterol-ipratropium (DUO-NEB) 2.5 mg-0.5 mg/3 mL nebulizer solution Take 3 mLs by nebulization every 4 (four) hours as needed for Wheezing. Rescue    amiodarone (PACERONE) 200 MG Tab Take 1 tablet (200 mg total) by mouth 2 (two) times daily. (Patient taking differently: Take 100 mg by mouth 2 (two) times daily. )    bacitracin 500 unit/gram ointment Apply topically once daily. Apply to R heel daily.    baclofen (LIORESAL) 10 MG tablet Take 10 mg by mouth 3 (three) times daily as needed.     brimonidine-timolol (COMBIGAN) 0.2-0.5 % Drop Place 1 drop into both eyes every 12 (twelve) hours.    bumetanide (BUMEX) 2 MG tablet Take 1 tablet (2 mg total) by mouth 2 (two) times daily. 1 Tablet Oral Every day (Patient taking differently: Take 1 mg by mouth 2 (two) times daily. Hold for SBP less than or equal to 100.)    ergocalciferol (ERGOCALCIFEROL) 50,000 unit Cap Take 1 capsule (50,000 Units total) by mouth every 7 days.    fluticasone (FLONASE) 50 mcg/actuation nasal spray 2 sprays by Each Nare route once daily. (Patient taking differently: 2 sprays by Each Nare route daily as needed. )    furosemide (LASIX) 20 MG tablet Take 20 mg by mouth 2 (two) times daily.    levothyroxine (SYNTHROID) 100 MCG tablet Take 100 mcg by mouth before breakfast.     magnesium oxide (MAG-OX) 400 mg tablet Take 400 mg by mouth 2 (two) times daily.    midodrine (PROAMATINE) 5 MG Tab 1 tablet (5 mg total) by Per G Tube route 3 (three) times daily.    montelukast (SINGULAIR) 10 mg tablet Take 10 mg by mouth once daily.     multivitamin (THERAGRAN) per tablet Take 1 tablet by mouth once daily.     mycophenolate (CELLCEPT) 250 mg Cap Take 2 capsules (500 mg total) by mouth 2 (two) times daily.    norepinephrine bitartrate-D5W 4 mg/250 mL (16  mcg/mL) Soln Inject 1.44 mcg/min into the vein continuous.    ondansetron (ZOFRAN) 4 MG tablet Take 4 mg by mouth every 6 (six) hours as needed for Nausea.    pantoprazole (PROTONIX) 40 MG tablet Take 40 mg by mouth once daily.    polyethylene glycol (GLYCOLAX) 17 gram PwPk Take 17 g by mouth once daily. (Patient taking differently: Take 17 g by mouth daily as needed. )    predniSONE (DELTASONE) 20 MG tablet Take 20 mg by mouth once daily.    rivaroxaban (XARELTO) 15 mg Tab Take 15 mg by mouth every evening.     rivaroxaban (XARELTO) 15 mg Tab Take 1 tablet (15 mg total) by mouth daily with dinner or evening meal.    silver sulfADIAZINE 1% (SILVADENE) 1 % cream Apply topically 2 (two) times daily. Apply to bliter areas BID (Patient taking differently: Apply topically 2 (two) times daily. Apply to blistered areas BID.)    traMADol (ULTRAM) 50 mg tablet Take 50 mg by mouth every 6 (six) hours as needed for Pain.    zinc sulfate (ZINCATE) 220 (50) mg capsule Take 220 mg by mouth once daily.      Antibiotics (From admission, onward)    Start     Stop Route Frequency Ordered    10/20/18 0230  vancomycin in dextrose 5 % 1 gram/250 mL IVPB 1,000 mg      -- IV Every 24 hours (non-standard times) 10/19/18 1443    10/19/18 1115  meropenem-0.9% sodium chloride 500 mg/50 mL IVPB      -- IV Every 8 hours (non-standard times) 10/19/18 1004        Antifungals (From admission, onward)    None        Antivirals (From admission, onward)    None           Immunization History   Administered Date(s) Administered    Tdap 07/07/2014       Family History     Problem Relation (Age of Onset)    Hypertension Mother, Father        Social History     Socioeconomic History    Marital status:      Spouse name: None    Number of children: None    Years of education: None    Highest education level: None   Social Needs    Financial resource strain: None    Food insecurity - worry: None    Food insecurity - inability: None     Transportation needs - medical: None    Transportation needs - non-medical: None   Occupational History    None   Tobacco Use    Smoking status: Never Smoker    Smokeless tobacco: Never Used   Substance and Sexual Activity    Alcohol use: No     Alcohol/week: 0.0 oz    Drug use: No    Sexual activity: Yes     Partners: Male   Other Topics Concern    None   Social History Narrative    None     Review of Systems   Unable to perform ROS: Patient nonverbal     Objective:     Vital Signs (Most Recent):  Temp: 97.6 °F (36.4 °C) (10/20/18 1905)  Pulse: 70 (10/20/18 1955)  Resp: (!) 29 (10/20/18 1955)  BP: (!) 133/101 (10/20/18 1905)  SpO2: 100 % (10/20/18 1955) Vital Signs (24h Range):  Temp:  [96.2 °F (35.7 °C)-97.6 °F (36.4 °C)] 97.6 °F (36.4 °C)  Pulse:  [62-72] 70  Resp:  [9-37] 29  SpO2:  [90 %-100 %] 100 %  BP: ()/() 133/101     Weight: 87 kg (191 lb 12.8 oz)  Body mass index is 32.92 kg/m².    Estimated Creatinine Clearance: 49.3 mL/min (based on SCr of 1.1 mg/dL).    Physical Exam   Constitutional: She appears well-developed. She appears lethargic. She has a sickly appearance. She appears ill. No distress.   HENT:   Head: Normocephalic and atraumatic.   Nose: Nose normal.   Eyes: Right eye exhibits no discharge. Left eye exhibits no discharge. No scleral icterus.   Blind     Neck: Normal range of motion. Neck supple. No JVD present. No tracheal deviation present.   Cardiovascular: Normal rate, regular rhythm and intact distal pulses.   Murmur heard.  Upper Arm edema worse to right.  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   Pulmonary/Chest: Effort normal. No stridor. No respiratory distress. She has no wheezes. She has rales ( right).   Trach/vent   Abdominal: Soft. Bowel sounds are normal. She exhibits no distension. There is no tenderness. There is no guarding.   Peg   Musculoskeletal: Normal range of motion. She exhibits edema. She exhibits no deformity.   Neurological: She appears  lethargic.   Lethargic  Responds to pain   Skin: Skin is warm and dry. Capillary refill takes 2 to 3 seconds. No rash noted. She is not diaphoretic.   Wound/ulcer on arrival.  Wound care consult for full skin exam    Psychiatric:   Unable to assess   Nursing note and vitals reviewed.      Significant Labs:   Blood Culture:   Recent Labs   Lab 07/03/18  1549 10/18/18  2100 10/18/18  2102   LABBLOO No growth after 5 days.  No growth after 5 days. Gram stain aer bottle: Gram positive cocci in clusters resembling Staph   Results called to and read back by:Atif Estrada RN  10/19/2018  18:36  Gram stain refugio bottle: Gram positive cocci in clusters resembling Staph   Positive results previously called  10/19/2018  18:37  STAPHYLOCOCCUS AUREUS  ID consult required at [facility] and [facility]   locations.  For susceptibility see order #[id number]   Gram stain aer bottle: Gram positive cocci in clusters resembling Staph   Results called to and read back by: Atif Estrada RN  10/19/2018  17:20  STAPHYLOCOCCUS AUREUS  Susceptibility pending  ID consult required at [facility] and [facility]   locations.       BMP:   Recent Labs   Lab 10/20/18  0445   *   *   K 3.2*      CO2 22*   BUN 40*   CREATININE 1.1   CALCIUM 8.5*   MG 1.8     CBC:   Recent Labs   Lab 10/18/18  2101 10/19/18  0334 10/20/18  0445   WBC 7.45 6.93 5.76   HGB 8.9* 8.5* 7.6*   HCT 27.5* 26.8* 24.1*   * 142* 112*     Wound Culture: No results for input(s): LABAERO in the last 4320 hours.  All pertinent labs within the past 24 hours have been reviewed.    Significant Imaging: I have reviewed all pertinent imaging results/findings within the past 24 hours.

## 2018-10-21 NOTE — PROGRESS NOTES
Vancomycin Progress Note    Indication: Staph pneumonia & bacteremia  WBC = 4.20  Tmax = 97.6  Cultures:     Blood x 2 (10/18) -- Staph aureus (susceptibility pending)     Resp (10/19) -- many Staph aureus (susceptibility pending)  SCr = 0.8 (trending down)     Trough @ 0327 (drawn 1 hr late) = 13.6 mcg/ml (slightly subtherapeutic)   Will check 18-hr level @ 2100 tonight to see if q18h interval would be more appropriate  Goal trough: 15-20 mcg/ml    Thank you for allowing us to participate in this patient's care.   Katherine McArdle, Pharm.D. 10/21/2018 7:11 AM

## 2018-10-21 NOTE — PROGRESS NOTES
[facility] -   Critical Care Medicine  Progress Note    Patient Name: Carlie Valdez  MRN: [id number]  Admission Date: 10/18/2018  Hospital Length of Stay: 3 days  Code Status: Full Code  Attending Provider: Subhash Ayala MD  Primary Care Provider: Johanna Guzman MD   Principal Problem: Severe sepsis    Subjective:     HPI:  72 year old female who  has a past medical history of Anticoagulant long-term use, Arthritis, Asthma, Atrial fibrillation, Blood clot of vein in shoulder area, Cardiac defibrillator in place, CHF (congestive heart failure), Chronic knee pain, Diabetes mellitus type 2 without retinopathy (12/19/2016), Diaphragmatic hernia without obstruction and without gangrene (9/14/2015), GERD (gastroesophageal reflux disease), Hypertension, Immune deficiency disorder, and Primary open angle glaucoma (POAG) of both eyes, severe stage (6/1/2018), Nursing home resident,  with chronic Trach/Peg/Flores transferred for altered mental status which onset gradually last night. Patient currently resides at [facility]. Per ER nurse, nursing home nurse states patient has had decreased level of consciousness since last night. Symptoms are constant and moderate in severity. Nursing home also reports patient has had decreased oral intake and decreased urine output. Hypotensive in the ED. Sepsis protocol initiated.         Hospital/ICU Course:  10/19: Admitted to MICU. Patient is ventilator dependent. Tracheostomy => Shiley size 8. Ventilator support via trach. Keep JANEL 2 > = 92%.  Sepsis protocol.  Source control => IV Meropenem + IV Vancomycin. Complere atelectasis of left lung on CXR. Therapeutic bronchoscopy with airway clearance today.    10/20: Seen and examined at bedside. Hospital chart reviewed. No acute interval detrimental events noted. Bronchoscopy wit airway clearance yesterday. Left lung re expanded. Pulmonary toileting.  Staph Aureus in both respiratory and blood cultures. IV  MEROPENEM + IV VANCOMYCIN.    10/21: Seen and examined at bedside. Hospital chart reviewed. No acute interval detrimental events noted. Remains on ventilator support, patient is ventilator dependent. Respiratory mechanics are OK.  Urine culture -ESCHERICHIA COLI & KLEBSIELLA PNEUMONIAE both pan sensitive. STAPHYLOCOCCUS AUREUS in both blood and respiratory cultures - ID pending. MRSA suspected. ID consulted. MEROPENEM switched to ZOSYN by ID.  IV VANCOMYCIN maintained.     Interval History: Seen and examined at bedside. Hospital chart reviewed. No acute interval detrimental events noted. Remains on ventilator support, patient is ventilator dependent. Respiratory mechanics are OK.  Urine culture -ESCHERICHIA COLI & KLEBSIELLA PNEUMONIAE both pan sensitive. STAPHYLOCOCCUS AUREUS in both blood and respiratory cultures - ID pending. MRSA suspected. ID consulted. MEROPENEM switched to ZOSYN by ID.  IV VANCOMYCIN maintained.       Review of Systems   Unable to perform ROS: Patient nonverbal     Objective:     Vital Signs (Most Recent):  Temp: 97 °F (36.1 °C) (10/21/18 0730)  Pulse: 69 (10/21/18 1000)  Resp: 18 (10/21/18 1000)  BP: 103/60 (10/21/18 1000)  SpO2: (!) 94 % (10/21/18 1000) Vital Signs (24h Range):  Temp:  [96.4 °F (35.8 °C)-97.6 °F (36.4 °C)] 97 °F (36.1 °C)  Pulse:  [69-75] 69  Resp:  [15-37] 18  SpO2:  [88 %-100 %] 94 %  BP: ()/() 103/60     Weight: 87 kg (191 lb 12.8 oz)  Body mass index is 32.92 kg/m².      Intake/Output Summary (Last 24 hours) at 10/21/2018 1058  Last data filed at 10/21/2018 1000  Gross per 24 hour   Intake 3108.75 ml   Output 1780 ml   Net 1328.75 ml       Physical Exam   Constitutional: She appears well-developed. She appears lethargic. She has a sickly appearance. She appears ill. No distress.       HENT:   Head: Normocephalic and atraumatic.   Nose: Nose normal.   Eyes: Right eye exhibits no discharge. Left eye exhibits no discharge. No scleral icterus.   Blind     Neck:  Normal range of motion. Neck supple. No JVD present. No tracheal deviation present.   Cardiovascular: Normal rate, regular rhythm and intact distal pulses.   Murmur heard.  Upper Arm edema worse to right.  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   Pulmonary/Chest: Effort normal. No stridor. No respiratory distress. She has no wheezes. She has rales ( right).   Abdominal: Soft. Bowel sounds are normal. She exhibits no distension. There is no tenderness. There is no guarding.   Genitourinary:   Genitourinary Comments: Flores  + Ua   Musculoskeletal: Normal range of motion. She exhibits edema. She exhibits no deformity.   Neurological: She appears lethargic.   Awake and Alert.     Skin: Skin is warm and dry. Capillary refill takes 2 to 3 seconds. No rash noted. She is not diaphoretic.        Psychiatric: She has a normal mood and affect.   Nursing note and vitals reviewed.    Scheduled Meds:   acetylcysteine 100 mg/ml (10%)  4 mL Nebulization Q12H    albuterol-ipratropium  3 mL Nebulization Q6H    amiodarone  100 mg Oral BID    brimonidine 0.2%  1 drop Both Eyes Q12H    famotidine (PF)  20 mg Intravenous Daily    levothyroxine  100 mcg Oral Before breakfast    lidocaine HCL 10 mg/ml (1%)  1 mL Other Once    midodrine  5 mg Per G Tube TID    piperacillin-tazobactam (ZOSYN) IVPB  4.5 g Intravenous Q8H    rivaroxaban  15 mg Oral QHS    timolol maleate 0.5%  1 drop Both Eyes BID    vancomycin (VANCOCIN) IVPB  1,000 mg Intravenous Q24H    zinc sulfate  220 mg Oral Daily     Continuous Infusions:   sodium chloride 0.9% 75 mL/hr at 10/21/18 1000    norepinephrine bitartrate-D5W Stopped (10/20/18 0630)     PRN Meds:.acetaminophen, dextrose 50%, dextrose 50%, glucagon (human recombinant), glucose, glucose, sodium chloride 0.9%  Vents:  Vent Mode: A/C (10/21/18 0915)  Ventilator Initiated: Yes (10/18/18 2124)  Set Rate: 18 bmp (10/21/18 0915)  Vt Set: 0 mL (10/21/18 0915)  Pressure Support: 0 cmH20 (10/21/18  0915)  PEEP/CPAP: 12 cmH20 (10/21/18 0915)  Oxygen Concentration (%): 50 (10/21/18 1000)  Peak Airway Pressure: 27 cmH2O (10/21/18 0915)  Plateau Pressure: 0 cmH20 (10/21/18 0915)  Total Ve: 6.52 mL (10/21/18 0915)  F/VT Ratio<105 (RSBI): (!) 31.41 (10/21/18 0915)    Lines/Drains/Airways     Drain                 Gastrostomy/Enterostomy Percutaneous endoscopic gastrostomy (PEG) LUQ -- days         Urethral Catheter 07/05/18 1114 107 days         Gastrostomy/Enterostomy 07/18/18 1649 Percutaneous endoscopic gastrostomy (PEG) LUQ feeding 94 days          Airway                 Surgical Airway 07/18/18 1620 Shiley 94 days          Pressure Ulcer                 Pressure Injury 10/18/18 2015 Coccyx #1 Unstageable 2 days          Peripheral Intravenous Line                 Midline Catheter Insertion/Assessment  - Double Lumen 10/19/18 0131 Left brachial vein other (see comments) 2 days         Peripheral IV - Single Lumen 10/18/18 2105 Left Hand 2 days                Significant Labs:    CBC/Anemia Profile:  Recent Labs   Lab 10/20/18  0445 10/21/18  0430   WBC 5.76 4.20   HGB 7.6* 6.7*   HCT 24.1* 21.0*   * 74*   MCV 86 88   RDW 18.1* 18.3*        Chemistries:  Recent Labs   Lab 10/20/18  0445 10/21/18  0430   * 135*   K 3.2* 3.3*    104   CO2 22* 23   BUN 40* 30*   CREATININE 1.1 0.8   CALCIUM 8.5* 8.1*   MG 1.8 1.9       ABGs:   Recent Labs   Lab 10/21/18  0529   PH 7.351   PCO2 47.5*   HCO3 26.3   POCSATURATED 98   BE 1     Bilirubin:   Recent Labs   Lab 10/18/18  2101   BILITOT 0.4       Significant Imaging:    X-Ray Chest 1 View: 10/21/18:     Cardiomegaly.  Tracheostomy tube.  Multi lead pacemaker/defibrillator.  Mildly increased density at the right lung base consistent with a right lower lobe infiltrate.  Interval clearing of the left base when compared with the prior film.      Assessment/Plan:     I have reviewed all labs and imaging studies and compared to previous results. I have also  discussed labs with all the teams in the medical care of the patient and my plan is outlined below     Neuro   Acute encephalopathy    Metabolic encephalopathy resolving.  Neurochecks per routine.     Derm          Pressure ulcer of coccygeal region    Skin care per wound care recommendations.     Pulmonary   Atelectasis of left lung    Pulmonary toileting.       Ventilator dependence    Patient is ventilator dependent via Shiley size 8 tracheostomy tube. Continue ventilator support. Titrate FIO2 to keep SAO2 > 92%. Bronchodilators per protocol.     Pneumonia due to infectious organism    Respiratory cultures => STAPH AUREUS.    Identification pending.    Suspect MRSA:    Continue IV VANCOMYCIN.    Appreciate ID input.      Cardiac/Vascular   Chronic atrial fibrillation    Rate controlled. Continue RIVAROXABAN.      Renal/    Strict input and output monitoring.      Electrolyte imbalance    Monitor and replete electrolytes as needed.     Urinary tract infection associated with indwelling urethral catheter    KLEBSIELLA + E COLI   Li sensitive.    IV MEROPENEM switched to IV ZOSYN     Flores replaced.     Appreciate ID input.      Chronic kidney disease (CKD), stage III (moderate)    Monitor BUN / Cr. Montor urine output.     Immunology/Multi System    Immunization status reviewed and updated.     Hematology    Monitor hemogram. Transfuse as needed per guidelines.      Endocrine     EUGLYCEMIC. SBGM. Blood glucose target 100 - 180 mg/dl         Hypothyroidism    ? Amiodarone induced. Monitor TSH.   Continue LEVOTHYROXINE.     Type 2 diabetes mellitus with kidney complication, without long-term current use of insulin    SBG  Monitoring. Blood glucose target 100 - 180 mg/dl         GI    Resume tube feeds per RD recommnedations. Stress ulcer prophylaxis. Bowel regimen.      Other   Long term current use of antipsychotic medication    Hold antipsychotics.     Debility    PT / OT        Critical Care Daily Checklist:     A: Awake: RASS Goal/Actual Goal:    Actual: Guerrero Agitation Sedation Scale (RASS): Alert and calm   B: Spontaneous Breathing Trial Performed?  N/A   C: SAT & SBT Coordinated?  N/A                    D: Delirium: CAM-ICU Overall CAM-ICU: Positive   E: Early Mobility Performed? Yes   F: Feeding Goal: Goals: Meet >85% EEN/EPN this admit  Status: Nutrition Goal Status: new   Current Diet Order   No orders of the defined types were placed in this encounter.      AS: Analgesia/Sedation On hold   T: Thromboembolic Prophylaxis Rivaroxaban   H: HOB > 300 Yes   U: Stress Ulcer Prophylaxis (if needed) Famotidine   G: Glucose Control SBGM   B: Bowel Function Stool Occurrence: 1   I: Indwelling Catheter (Lines & Flores) Necessity Yes   D: De-escalation of Antimicrobials/Pharmacotherapies Yes    Plan for the day/ETD Contiue current. D/C IV Fluids. Blood Transfusion per guidelines.     Code Status:  Family/Goals of Care: Full Code  SNF     Critical Care Time: 60  minutes  Critical secondary to Patient has a condition that poses threat to life and bodily function: Severe sepsis, atelectasis of left lung, ventilator dependent.       Critical care was time spent personally by me on the following activities: development of treatment plan with patient or surrogate and bedside caregivers, discussions with consultants, evaluation of patient's response to treatment, examination of patient, ordering and performing treatments and interventions, ordering and review of laboratory studies, ordering and review of radiographic studies, pulse oximetry, re-evaluation of patient's condition. This critical care time did not overlap with that of any other provider or involve time for any procedures.     Bradley Amaral MD  Critical Care Medicine  [facility]

## 2018-10-21 NOTE — ASSESSMENT & PLAN NOTE
Follow-up Note  HISTORY OF PRESENT ILLNESS:  Eleni Barfield is a 43year old old male,  returns to the clinic for chronic shoulder pain.   Who has been seen in the pain center and maintained on Ultram and as well as Norco for his chronic left shoulder pain KLEBSIELLA + E COLI   Pan sensitive.    IV MEROPENEM switched to IV ZOSYN     Flores replaced.     Appreciate ID input.    above  Neurological: As above  Denies chest pain, shortness of breath.     MEDICAL HISTORY:  Patient Active Problem List:     Tobacco abuse     Scapula fracture     Chronic left shoulder pain    Past Medical History:   Diagnosis Date   • Attention deficit h IMAGING:  No new relevant studies     [place] PHYSICIAN MONITORING PROGRAM REVIEWED  Yes    ASSESSMENT:   Annie Tom is a 43year old  male, with chronic left shoulder pain who has been weaning narcotic medications. PLAN:  RECOMMENDATIONS:  1.   Contin

## 2018-10-21 NOTE — PLAN OF CARE
Problem: Patient Care Overview  Goal: Plan of Care Review  Outcome: Ongoing (interventions implemented as appropriate)  Pt remains on vent via trach. NSR w/BBB on monitor. IVF and IV abx per orders. TF inc to 40mL/hr. Good UOP per harris. Q2 turns in specialty bed; no further skin breakdown noted.

## 2018-10-21 NOTE — PLAN OF CARE
Problem: Patient Care Overview  Goal: Plan of Care Review  Outcome: Ongoing (interventions implemented as appropriate)  Vitals signs closely monitored. Fall precautions in place. Patient turned every two hours. Pain assessed every two hours. Safety promoted. Infection risks reduced. Blood noted in ET circuit. MD Kaur aware. Pt o2 dropped to  76. Pt bag lavaged. Md Amaral aware. Vent settings changed

## 2018-10-22 PROBLEM — E87.8 ELECTROLYTE IMBALANCE: Status: RESOLVED | Noted: 2018-01-01 | Resolved: 2018-01-01

## 2018-10-22 PROBLEM — L89.150 PRESSURE INJURY OF COCCYGEAL REGION, UNSTAGEABLE: Status: ACTIVE | Noted: 2018-01-01

## 2018-10-22 PROBLEM — G93.40 ACUTE ENCEPHALOPATHY: Status: RESOLVED | Noted: 2018-01-01 | Resolved: 2018-01-01

## 2018-10-22 PROBLEM — R09.02 SEVERE HYPOXEMIA: Status: ACTIVE | Noted: 2018-01-01

## 2018-10-22 PROBLEM — R09.02 SEVERE HYPOXEMIA: Status: RESOLVED | Noted: 2018-01-01 | Resolved: 2018-01-01

## 2018-10-22 NOTE — ASSESSMENT & PLAN NOTE
KLEBSIELLA + E COLI   Pan sensitive.    IV MEROPENEM switched to IV ZOSYN     Flores replaced.     Appreciate ID input.      10/22 Klebsiella pneumoniae and E coli sensitive to Cipro.  Start p.o. Cipro discontinue meropenem.

## 2018-10-22 NOTE — ASSESSMENT & PLAN NOTE
10/22 patient is vent dependent.  Currently on 100% FiO2.  Next today she was on 45% FiO2.  Oral care, pulmonary toilet.  daily chest x-ray.  ABG in a.m.    Elevated BNP.  History of CHF.  Positive 8 L since admission.  Start Lasix 40 mg twice a day.  Keep input less than output.

## 2018-10-22 NOTE — ASSESSMENT & PLAN NOTE
Respiratory cultures => STAPH AUREUS.    Identification pending.    Suspect MRSA:    Continue IV VANCOMYCIN.    Appreciate ID input.     10/22 on IV vancomycin.

## 2018-10-22 NOTE — NURSING
Report called to [facility]. Ambulance service contacted. Daughter updated. Mark and jessie left per [facility] request

## 2018-10-22 NOTE — ASSESSMENT & PLAN NOTE
10/21- source control is important in all cases of MRSA bacteremia .  Respiratory culture- MRSA   Will continue IV Vancomycin ,closely monitor vanco trough .  Contact isolation .

## 2018-10-22 NOTE — PROGRESS NOTES
Pharmacist Renal Dose Adjustment Note    Carlie Valdez is a 72 y.o. female being treated with the medication famotidine.    Patient Data:    Vital Signs (Most Recent):  Temp: 96.9 °F (36.1 °C) (10/22/18 1130)  Pulse: 70 (10/22/18 1300)  Resp: 20 (10/22/18 1300)  BP: (!) 148/79 (10/22/18 1300)  SpO2: 100 % (10/22/18 1300)   Vital Signs (72h Range):  Temp:  [96.2 °F (35.7 °C)-98.9 °F (37.2 °C)]   Pulse:  [62-75]   Resp:  [6-37]   BP: ()/()   SpO2:  [85 %-100 %]      Recent Labs   Lab 10/20/18  0445 10/21/18  0430 10/22/18  0415   CREATININE 1.1 0.8 0.6     Serum creatinine: 0.6 mg/dL 10/22/18 0415  Estimated creatinine clearance: 89.5 mL/min    Famotidine 20 mg daily will be changed to famotidine 20 mg BID.    Pharmacist's Name: Lana Rogers  Pharmacist's Extension: [phone]

## 2018-10-22 NOTE — SUBJECTIVE & OBJECTIVE
Interval History: Blood and resp cultures are positive for MRSA.  She remains on ventilator support.    Urine culture- E coli and Klebsiella pneumonia   Review of Systems   Unable to perform ROS: Acuity of condition     Objective:     Vital Signs (Most Recent):  Temp: 97.9 °F (36.6 °C) (10/21/18 1905)  Pulse: 69 (10/21/18 2106)  Resp: 15 (10/21/18 2106)  BP: 135/77 (10/21/18 2106)  SpO2: 100 % (10/21/18 2106) Vital Signs (24h Range):  Temp:  [96.8 °F (36 °C)-98.9 °F (37.2 °C)] 97.9 °F (36.6 °C)  Pulse:  [68-75] 69  Resp:  [12-28] 15  SpO2:  [88 %-100 %] 100 %  BP: ()/() 135/77     Weight: 87 kg (191 lb 12.8 oz)  Body mass index is 32.92 kg/m².    Estimated Creatinine Clearance: 67.8 mL/min (based on SCr of 0.8 mg/dL).    Physical Exam   Constitutional: She appears well-developed. She appears lethargic. She has a sickly appearance. She appears ill. No distress.   HENT:   Head: Normocephalic and atraumatic.   Nose: Nose normal.   Eyes: Right eye exhibits no discharge. Left eye exhibits no discharge. No scleral icterus.   Blind     Neck: Normal range of motion. Neck supple. No JVD present. No tracheal deviation present.   Cardiovascular: Normal rate, regular rhythm and intact distal pulses.   Murmur heard.  Upper Arm edema worse to right.  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   Pulmonary/Chest: Effort normal. No stridor. No respiratory distress. She has no wheezes. She has rales ( right).   Trach/vent   Abdominal: Soft. Bowel sounds are normal. She exhibits no distension. There is no tenderness. There is no guarding.   Peg   Musculoskeletal: Normal range of motion. She exhibits edema. She exhibits no deformity.   Neurological: She appears lethargic.   Lethargic  Responds to pain   Skin: Skin is warm and dry. Capillary refill takes 2 to 3 seconds. No rash noted. She is not diaphoretic.   Wound/ulcer on arrival.  Wound care consult for full skin exam    Psychiatric:   Unable to assess   Nursing note  and vitals reviewed.      Significant Labs:   Blood Culture:   Recent Labs   Lab 07/03/18  1549 10/18/18  2100 10/18/18  2102   LABBLOO No growth after 5 days.  No growth after 5 days. Gram stain aer bottle: Gram positive cocci in clusters resembling Staph   Results called to and read back by:Atif Estrada RN  10/19/2018  18:36  Gram stain refugio bottle: Gram positive cocci in clusters resembling Staph   Positive results previously called  10/19/2018  18:37  METHICILLIN RESISTANT STAPHYLOCOCCUS AUREUS  ID consult required at [facility] and [facility]   locations.  For susceptibility see order #[id number]   Gram stain aer bottle: Gram positive cocci in clusters resembling Staph   Results called to and read back by: Atif Estrada RN  10/19/2018  17:20  METHICILLIN RESISTANT STAPHYLOCOCCUS AUREUS  ID consult required at [facility] and [facility]   locations.       BMP:   Recent Labs   Lab 10/21/18  0430   *   *   K 3.3*      CO2 23   BUN 30*   CREATININE 0.8   CALCIUM 8.1*   MG 1.9     Respiratory Culture:   Recent Labs   Lab 10/19/18  0900   GSRESP <10 epithelial cells per low power field.  Moderate WBC's  Few Gram positive cocci  Rare Gram positive rods  Rare Gram negative rods   RESPIRATORYC METHICILLIN RESISTANT STAPHYLOCOCCUS AUREUS  Many  Normal respiratory barbie also present       Urine Culture:   Recent Labs   Lab 06/30/18  0802 10/18/18  2134   LABURIN COAGULASE-NEGATIVE STAPHYLOCOCCUS SPECIES  50,000 - 99,999 cfu/ml  Susceptibility testing not routinely performed.   ESCHERICHIA COLI  > 100,000 cfu/ml    KLEBSIELLA PNEUMONIAE  > 100,000 cfu/ml         Significant Imaging: I have reviewed all pertinent imaging results/findings within the past 24 hours.

## 2018-10-22 NOTE — PROGRESS NOTES
[facility] Hospital Medicine  Progress Note    Patient Name: Carlie Valdez  MRN: [id number]  Patient Class: IP- Inpatient   Admission Date: 10/18/2018  Length of Stay: 4 days  Attending Physician: Subhash Ayala MD  Primary Care Provider: Johanna Guzman MD        Subjective:     Principal Problem:Severe sepsis    HPI:  Carlie Valdez is a 72 y.o. female patient with PMHx of Afib, CHF, DM, GERD, neuromyelitis optica spectrum disorder, and HTN, Chronic Trach/Peg/Harris who presents for altered mental status which onset gradually last night. Patient currently resides at [facility]. Per ER nurse, nursing home nurse states patient has had decreased level of consciousness since last night. Symptoms are constant and moderate in severity. Nursing home also reports patient has had decreased oral intake and decreased urine output. Per ER nurse, patient will take food orally. History limited secondary to patient being nonverbal due to trach. Patient presented to ER hypotensive, has chronic harris on arrival -->UA +. Chest Xray + infiltrate. Ct Head Neg. Sepsis protocol done in ER. Lactic neg. Received 30 ml/kg bolus with positive response in BP.Received rocephin.-->  Vanc/Zosyn. Urine culture and blood culture pending.         Hospital Course:  Pt admitted to ICU with IVFS and empiric IVabx- Vanc & Merropenem. Pulmonologist did a therapeutic bronchoscopy on 10/19 with copious thick obstructing mucopurulent muscus noted in the left mainstem bronchus. Patient is ventilator dependent. Ventilator support via trach. Continue supportive treatments.    10/20  Patient responds to question and command. S/p bronch Mucus Plugging noted. Patient reports sob. Discussed with Pulmonary CC    10/21  Patient improving clinically. Plan for return to NH in progress. Discussed with family at bedside. Patient reports improvement in sx.    10/22  Patient in contact isolation - MRSA pna. Patient  with clots deep suctioning. Plan for LTAC in progress. Discussed with CC Team / case management    Interval History: Patient with improved o2 sats. Remains on vent. Contact Isolation. ID on consult    Review of Systems   Unable to perform ROS: Acuity of condition     Objective:     Vital Signs (Most Recent):  Temp: 97 °F (36.1 °C) (10/22/18 1530)  Pulse: 69 (10/22/18 1604)  Resp: (!) 21 (10/22/18 1604)  BP: (!) 87/54 (10/22/18 1600)  SpO2: 96 % (10/22/18 1604) Vital Signs (24h Range):  Temp:  [96.8 °F (36 °C)-98.9 °F (37.2 °C)] 97 °F (36.1 °C)  Pulse:  [67-74] 69  Resp:  [6-34] 21  SpO2:  [85 %-100 %] 96 %  BP: ()/() 87/54     Weight: 85.3 kg (188 lb 0.8 oz)  Body mass index is 32.28 kg/m².    Intake/Output Summary (Last 24 hours) at 10/22/2018 1623  Last data filed at 10/22/2018 1600  Gross per 24 hour   Intake 2055 ml   Output 1865 ml   Net 190 ml      Physical Exam   Constitutional: She appears well-developed. She appears lethargic. She has a sickly appearance. She appears ill. No distress.   HENT:   Head: Normocephalic and atraumatic.   Nose: Nose normal.   Eyes: Right eye exhibits no discharge. Left eye exhibits no discharge. No scleral icterus.   Blind     Neck: Normal range of motion. Neck supple. No JVD present. No tracheal deviation present.   Cardiovascular: Normal rate, regular rhythm and intact distal pulses.   Murmur heard.   Systolic murmur is present with a grade of 3/6.  Right Upper Arm edema     Pulmonary/Chest: Effort normal. No stridor. No respiratory distress. She has no wheezes. Rales:  right.   Trach/vent   Abdominal: Soft. Bowel sounds are normal. She exhibits no distension. There is no tenderness. There is no guarding.   Peg   Musculoskeletal: Normal range of motion. She exhibits edema. She exhibits no deformity.   Neurological: She appears lethargic.   Lethargic  Responds to pain   Skin: Skin is warm and dry. Capillary refill takes 2 to 3 seconds. No rash noted. She is not  diaphoretic.   Psychiatric: She has a normal mood and affect. Thought content normal.   Nursing note and vitals reviewed.      Significant Labs:   Blood Culture: No results for input(s): LABBLOO in the last 48 hours.  BMP:   Recent Labs   Lab 10/22/18  0415   *      K 4.0   *   CO2 25   BUN 28*   CREATININE 0.6   CALCIUM 9.0   MG 2.1     CBC:   Recent Labs   Lab 10/21/18  0430 10/22/18  0415   WBC 4.20 4.81   HGB 6.7* 8.7*   HCT 21.0* 28.7*   PLT 74* 69*     CMP:   Recent Labs   Lab 10/21/18  0430 10/22/18  0415   * 144   K 3.3* 4.0    111*   CO2 23 25   * 113*   BUN 30* 28*   CREATININE 0.8 0.6   CALCIUM 8.1* 9.0   ANIONGAP 8 8   EGFRNONAA >60 >60     All pertinent labs within the past 24 hours have been reviewed.    Significant Imaging: I have reviewed all pertinent imaging results/findings within the past 24 hours.    Assessment/Plan:      Pressure injury of coccygeal region, unstageable    Wound Care       Severe hypoxemia    10/22  Pulmonary  Vent  Trach       MRSA bacteremia    10/22  Repeat Cultures  Vancomycin x 2 weeks  Monitor       Pressure ulcer of coccygeal region    Wound care       Atelectasis of left lung    10/20  Vent   Pulmonary CC    10/21  Trach  Vent  Pulmonary    10/22  Trach  Vent         Ventilator dependence    Pulmonary CC       Long term current use of antipsychotic medication    Unclear as to why patient is on, will hold at this time due to AMS and reevaluate over course.   Consider psych consult pending course     10/20  Held  Monitor  Restart as indicated     Electrolyte imbalance    10/21  Replete as indicated       Debility    Turn q 2  Supportive care    10/20  Continue Therapy    10/21  PT/OT  Supportive Care       Acute encephalopathy      CT head negative for acute findings.  Treat underlying  UTI and PNE.   Metabolic encephalopathy. Hold all psychoactive medications.   Neurochecks per routine.    10/20  Improved  Off Pressors  ABX  Pulmonary  CC    10/21  ABX  Pulmonary CC  Monitor    10/22  ABX  Improved  Pulmonary CC     Urinary tract infection associated with indwelling urethral catheter    10/21  ABX  ID on Consult    10/22  ABX  ID       Hypothyroidism    Cont synthroid       Type 2 diabetes mellitus with kidney complication, without long-term current use of insulin    Check a1c  ssi  Monitor     10/20  NISS  Accuchecks  Controlled    10/21  NISS  Accuchecks  Levemir    10/22  Levemir  NISS  Accuchecks           Pneumonia due to infectious organism    10/21  Improved  ABX  Monitor  Pulmonary    10/22  ABX  Pulmonary       Chronic atrial fibrillation    Continue anticoag-rivaroxaban and amio  montior rate close      10/20  Amiodarone  Xarelto    10/21  Amiodarone  Xarelto    10/22  Amiodarone  Xarelto       VTE Risk Mitigation (From admission, onward)        Ordered     rivaroxaban tablet 15 mg  Nightly      10/18/18 2240          Critical care time spent on the evaluation and treatment of severe organ dysfunction, review of pertinent labs and imaging studies, discussions with consulting providers and discussions with patient/family: 38 minutes.    Subhash Ayala MD  Department of Hospital Medicine   [facility]

## 2018-10-22 NOTE — ASSESSMENT & PLAN NOTE
Check a1c  ssi  Monitor     10/20  NISS  Accuchecks  Controlled    10/21  NISS  Accuchecks  Levemir    10/22  Levemir  NISS  Accuchecks

## 2018-10-22 NOTE — PLAN OF CARE
Problem: Patient Care Overview  Goal: Plan of Care Review  Outcome: Ongoing (interventions implemented as appropriate)  Patient remains on ventilator at this time.#8 Shiley remains in place and secure at this time. Suctioning niurka blood from trach. Nursing notified and will coordinate with respiratory on suctioning. Patient tolerated treatment well. No redness or breakdown noted around Stoma or trach tube. Respiratory to follow.

## 2018-10-22 NOTE — PLAN OF CARE
CM met with team for d/c planning. It was discussed  LTAC vs Skilled for next level of care. Skilled is too low level  For someone that has to have suctioning as frequently as she does as  Well as she has clots with her suctioning, IVAB, at least 4 weeks and Trach care and Peg fdg.  CM sent referral to post acute for approval for LTAC candidate. CM to f/u for safe transition     10/22/18 6100   Discharge Reassessment   Assessment Type Discharge Planning Reassessment   Provided patient/caregiver education on the expected discharge date and the discharge plan No   Do you have any problems affording any of your prescribed medications? No   Discharge Plan A Long-term acute care facility (LTAC)   Discharge Plan B Long-term acute care facility (LTAC)   Patient choice form signed by patient/caregiver N/A   Can the patient answer the patient profile reliably? Yes, cognitively intact   How does the patient rate their overall health at the present time? Fair   Describe the patient's ability to walk at the present time. Major restrictions/daily assistance from another person   How often would a person be available to care for the patient? Whenever needed   Number of comorbid conditions (as recorded on the chart) Five or more   During the past month, has the patient often been bothered by feeling down, depressed or hopeless? No   During the past month, has the patient often been bothered by little interest or pleasure in doing things? No

## 2018-10-22 NOTE — ASSESSMENT & PLAN NOTE
10/20-  Will continue vanco/meropenem for now but will switch to zosyn in am     10/20- respiratory culture-MRSA.    Will continue IV Vancomycin , closely monitor vanco trough .

## 2018-10-22 NOTE — PLAN OF CARE
No family at the bedside. Patient is trach / vent/Peg presently and unable to sign IMM     10/22/18 1322   Medicare Message   Important Message from Medicare regarding Discharge Appeal Rights Other (comments)   Date IMM was signed 10/22/18   Time IMM was signed 1322

## 2018-10-22 NOTE — PLAN OF CARE
D/C planning discussed. Patient has used all her Medicare days and is in her Lifetime reserve. KARINE spoke with Dr. Ayala and ALISIA is a sister fac to [facility]. KARINE spoke with Berry at [facility] after Dr Ayala was able to speak with liaison for skill. [facility] will accept the patient for skilled care today and continue with IVAB. KARINE uploaded the orders. RN to call report to [phone] and ask for resp Unit. [facility] to call [facility].  Dtr Haley notified of d/c back to [facility] for Skilled care.

## 2018-10-22 NOTE — DISCHARGE SUMMARY
[facility] Hospital Medicine  Discharge Summary      Patient Name: Carlie Valdez  MRN: [id number]  Admission Date: 10/18/2018  Hospital Length of Stay: 4 days  Discharge Date and Time: No discharge date for patient encounter.  Attending Physician: Subhash Ayala MD   Discharging Provider: Subhash Ayala MD  Primary Care Provider: Johanna Guzman MD      HPI:   Carlie Valdez is a 72 y.o. female patient with PMHx of Afib, CHF, DM, GERD, neuromyelitis optica spectrum disorder, and HTN, Chronic Trach/Peg/Harris who presents for altered mental status which onset gradually last night. Patient currently resides at [facility]. Per ER nurse, nursing home nurse states patient has had decreased level of consciousness since last night. Symptoms are constant and moderate in severity. Nursing home also reports patient has had decreased oral intake and decreased urine output. Per ER nurse, patient will take food orally. History limited secondary to patient being nonverbal due to trach. Patient presented to ER hypotensive, has chronic harris on arrival -->UA +. Chest Xray + infiltrate. Ct Head Neg. Sepsis protocol done in ER. Lactic neg. Received 30 ml/kg bolus with positive response in BP.Received rocephin.-->  Vanc/Zosyn. Urine culture and blood culture pending.         * No surgery found *      Hospital Course:   Pt admitted to ICU with IVFS and empiric IVabx- Vanc & Merropenem. Pulmonologist did a therapeutic bronchoscopy on 10/19 with copious thick obstructing mucopurulent muscus noted in the left mainstem bronchus. Patient is ventilator dependent. Ventilator support via trach. Continue supportive treatments.    10/20  Patient responds to question and command. S/p bronch Mucus Plugging noted. Patient reports sob. Discussed with Pulmonary CC    10/21  Patient improving clinically. Plan for return to NH in progress. Discussed with family at bedside. Patient reports improvement  in sx.    10/22  Patient in contact isolation - MRSA pna. Patient with clots deep suctioning. Plan for LTAC in progress. Discussed with CC Team / case management    Carlie Valdez is a 72 y.o. female patient with PMHx of Afib, CHF, DM, GERD, neuromyelitis optica spectrum disorder, and HTN, Chronic Trach/Peg/Harris who presents for altered mental status which onset gradually last night. Patient currently resides at [facility]. Per ER nurse, nursing home nurse states patient has had decreased level of consciousness since last night. Symptoms are constant and moderate in severity. Nursing home also reports patient has had decreased oral intake and decreased urine output. Per ER nurse, patient will take food orally. History limited secondary to patient being nonverbal due to trach. Patient presented to ER hypotensive, has chronic harris on arrival -->UA +. Chest Xray + infiltrate. Ct Head Neg. Sepsis protocol done in ER. Lactic neg. Received 30 ml/kg bolus with positive response in BP.Received rocephin.-->  Vanc/Zosyn. Urine culture and blood culture MRSA positive Bacteremia. Patient admitted to ICU administered IV ABX with good response. Patient underwent bronh demonstrating mucous plugging. Patient with subsequent Hemoptysis likely due to trauma. Patient blood tinged sputum improved. Patient respiratory status improved and discharge to SNF for completion of IV ABX. Patient seen by Pulmonary CC / ID and Hospital Medicine. Patient seen and examined today prior to her discharge to [facility]     Consults:   Consults (From admission, onward)        Status Ordering Provider     Inpatient consult to Infectious Diseases  Once     Provider:  Philip Ceja MD    Acknowledged SARA LIMON     Inpatient consult to Pulmonology  Once     Provider:  (Not yet assigned)    Acknowledged KATHY KUMAR     Inpatient consult to Registered Dietitian/Nutritionist  Once     Provider:  (Not yet assigned)     Completed RUBÉN RAMIREZ     Inpatient consult to Registered Dietitian/Nutritionist  Once     Provider:  (Not yet assigned)    Completed BOBBY CORONA     Pharmacy to dose Vancomycin consult  Once     Provider:  (Not yet assigned)    Completed TIN ARREGUIN     Pharmacy to dose Vancomycin consult  Once     Provider:  (Not yet assigned)    Acknowledged BOBBY CORONA          No new Assessment & Plan notes have been filed under this hospital service since the last note was generated.  Service: Hospital Medicine    Final Active Diagnoses:    Diagnosis Date Noted POA    Pressure injury of coccygeal region, unstageable [L89.150] 10/22/2018 Yes    MRSA bacteremia [R78.81] 10/21/2018 Yes    Ventilator dependence [Z99.11] 10/19/2018 Not Applicable    Atelectasis of left lung [J98.11] 10/19/2018 Yes    Pressure ulcer of coccygeal region [L89.159] 10/19/2018 Yes    Debility [R53.81] 07/06/2018 Yes    Urinary tract infection associated with indwelling urethral catheter [T83.511A, N39.0] 06/30/2018 Yes    Hypothyroidism [E03.9] 06/03/2018 Yes     Chronic    Type 2 diabetes mellitus with kidney complication, without long-term current use of insulin [E11.29] 03/09/2018 Yes    Pneumonia due to infectious organism [J18.9] 09/17/2016 Yes    Chronic atrial fibrillation [I48.2] 08/14/2013 Yes     Chronic      Problems Resolved During this Admission:    Diagnosis Date Noted Date Resolved POA    PRINCIPAL PROBLEM:  Severe sepsis [A41.9, R65.20] 10/18/2018 10/21/2018 Yes    Severe hypoxemia [R09.02] 10/22/2018 10/22/2018 Yes    Electrolyte imbalance [E87.8] 07/15/2018 10/22/2018 Yes    Acute encephalopathy [G93.40] 07/05/2018 10/22/2018 Yes    Chronic kidney disease (CKD), stage III (moderate) [N18.3] 11/12/2016 10/21/2018 Yes       Discharged Condition: stable    Disposition: Skilled Nursing Facility    Follow Up:  Follow-up Information     [facility].    Specialties:  Nursing Home Agency,  Rehabilitation  Contact information:  [id number] [facility]  CONTACT: ALMA QUEZADA [id number] [phone]                 Patient Instructions:      Diet diabetic     Activity as tolerated       Significant Diagnostic Studies: Labs:   BMP:   Recent Labs   Lab 10/21/18  0430 10/22/18  0415   * 113*   * 144   K 3.3* 4.0    111*   CO2 23 25   BUN 30* 28*   CREATININE 0.8 0.6   CALCIUM 8.1* 9.0   MG 1.9 2.1   , CMP   Recent Labs   Lab 10/21/18  0430 10/22/18  0415   * 144   K 3.3* 4.0    111*   CO2 23 25   * 113*   BUN 30* 28*   CREATININE 0.8 0.6   CALCIUM 8.1* 9.0   ANIONGAP 8 8   ESTGFRAFRICA >60 >60   EGFRNONAA >60 >60   , CBC   Recent Labs   Lab 10/21/18  0430 10/22/18  0415   WBC 4.20 4.81   HGB 6.7* 8.7*   HCT 21.0* 28.7*   PLT 74* 69*   , INR   Lab Results   Component Value Date    INR 1.3 (H) 10/19/2018    INR 1.3 (H) 10/18/2018    INR 1.0 07/05/2018   , Troponin   Recent Labs   Lab 10/18/18  2101   TROPONINI 0.166*    and All labs within the past 24 hours have been reviewed    Pending Diagnostic Studies:     Procedure Component Value Units Date/Time    Cytology Specimen-Medical Cytology (Fluid/Wash/Brush) [[id number]] Collected:  10/19/18 0945    Order Status:  Sent Lab Status:  In process Updated:  10/19/18 1033    Specimen:  Bronch wash          Medications:  Reconciled Home Medications:      Medication List      START taking these medications    ciprofloxacin HCl 500 MG tablet  Commonly known as:  CIPRO  1 tablet (500 mg total) by Per G Tube route every 12 (twelve) hours. for 10 days     insulin detemir U-100 100 unit/mL (3 mL) Inpn pen  Commonly known as:  LEVEMIR FLEXTOUCH  Inject 10 Units into the skin every evening.     vancomycin in dextrose 5 % 1 gram/250 mL Soln  Sig 1 gm IV q 18 hrs x 14 days        CHANGE how you take these medications    amiodarone 200 MG Tab  Commonly known as:  PACERONE  Take 1 tablet (200 mg total) by mouth 2 (two) times  daily.  What changed:  how much to take     bumetanide 2 MG tablet  Commonly known as:  BUMEX  Take 1 tablet (2 mg total) by mouth 2 (two) times daily. 1 Tablet Oral Every day  What changed:    · how much to take  · additional instructions     fluticasone 50 mcg/actuation nasal spray  Commonly known as:  FLONASE  2 sprays by Each Nare route once daily.  What changed:    · when to take this  · reasons to take this     polyethylene glycol 17 gram Pwpk  Commonly known as:  GLYCOLAX  Take 17 g by mouth once daily.  What changed:    · when to take this  · reasons to take this     silver sulfADIAZINE 1% 1 % cream  Commonly known as:  SILVADENE  Apply topically 2 (two) times daily. Apply to bliter areas BID  What changed:  additional instructions        CONTINUE taking these medications    albuterol 90 mcg/actuation inhaler  Commonly known as:  PROVENTIL/VENTOLIN HFA  Inhale 1-2 puffs into the lungs every 6 (six) hours as needed.     albuterol-ipratropium 2.5 mg-0.5 mg/3 mL nebulizer solution  Commonly known as:  DUO-NEB  Take 3 mLs by nebulization every 4 (four) hours as needed for Wheezing. Rescue     bacitracin 500 unit/gram ointment  Apply topically once daily. Apply to R heel daily.     baclofen 10 MG tablet  Commonly known as:  LIORESAL  Take 10 mg by mouth 3 (three) times daily as needed.     brimonidine-timolol 0.2-0.5 % Drop  Commonly known as:  COMBIGAN  Place 1 drop into both eyes every 12 (twelve) hours.     ergocalciferol 50,000 unit Cap  Commonly known as:  ERGOCALCIFEROL  Take 1 capsule (50,000 Units total) by mouth every 7 days.     furosemide 20 MG tablet  Commonly known as:  LASIX  Take 20 mg by mouth 2 (two) times daily.     levothyroxine 100 MCG tablet  Commonly known as:  SYNTHROID  Take 100 mcg by mouth before breakfast.     magnesium oxide 400 mg (241.3 mg magnesium) tablet  Commonly known as:  MAG-OX  Take 400 mg by mouth 2 (two) times daily.     midodrine 5 MG Tab  Commonly known as:  PROAMATINE  1  tablet (5 mg total) by Per G Tube route 3 (three) times daily.     montelukast 10 mg tablet  Commonly known as:  SINGULAIR  Take 10 mg by mouth once daily.     multivitamin per tablet  Commonly known as:  THERAGRAN  Take 1 tablet by mouth once daily.     mycophenolate 250 mg Cap  Commonly known as:  CELLCEPT  Take 2 capsules (500 mg total) by mouth 2 (two) times daily.     norepinephrine bitartrate-D5W 4 mg/250 mL (16 mcg/mL) Soln  Inject 1.44 mcg/min into the vein continuous.     ondansetron 4 MG tablet  Commonly known as:  ZOFRAN  Take 4 mg by mouth every 6 (six) hours as needed for Nausea.     pantoprazole 40 MG tablet  Commonly known as:  PROTONIX  Take 40 mg by mouth once daily.     * rivaroxaban 15 mg Tab  Commonly known as:  XARELTO  Take 15 mg by mouth every evening.     * rivaroxaban 15 mg Tab  Commonly known as:  XARELTO  Take 1 tablet (15 mg total) by mouth daily with dinner or evening meal.     traMADol 50 mg tablet  Commonly known as:  ULTRAM  Take 50 mg by mouth every 6 (six) hours as needed for Pain.     zinc sulfate 220 (50) mg capsule  Commonly known as:  ZINCATE  Take 220 mg by mouth once daily.         * This list has 2 medication(s) that are the same as other medications prescribed for you. Read the directions carefully, and ask your doctor or other care provider to review them with you.            STOP taking these medications    predniSONE 20 MG tablet  Commonly known as:  DELTASONE            Indwelling Lines/Drains at time of discharge:   Lines/Drains/Airways     Drain                 Gastrostomy/Enterostomy Percutaneous endoscopic gastrostomy (PEG) LUQ -- days         Urethral Catheter 07/05/18 1114 109 days         Gastrostomy/Enterostomy 07/18/18 1649 Percutaneous endoscopic gastrostomy (PEG) LUQ feeding 96 days          Airway                 Surgical Airway 07/18/18 1620 Nikita 96 days          Pressure Ulcer                 Pressure Injury 10/18/18 2015 Coccyx #1 Unstageable 3 days                 Time spent on the discharge of patient: 45  minutes  Patient was seen and examined on the date of discharge and determined to be suitable for discharge.    Critical care time spent on the evaluation and treatment of severe organ dysfunction, review of pertinent labs and imaging studies, discussions with consulting providers and discussions with patient/family: 35 minutes.     Subhash Aayla MD  Department of Hospital Medicine  [facility]

## 2018-10-22 NOTE — PLAN OF CARE
Post acute stated the patient is still to critical for transitioning to LTAC presently due to the following : She is a good candidate for LTAC but she must be more stablle before transitioning to the next level of care  Interqual's clinical status criteria requires that pt must be neurologically stable for last 24 hours, heart rate < or equal to 120/min or arrhythmia managed, resp rate < or equal to 30/min and stable airway, SBP > or equal to 90mmHg or within acceptable limits last 24 hours, and no evidence of active hemorrhage or bleeding controlled  Also neurologically ,  documentation needs to state what is the patient's baseline and has she returned to baseline, have a stable airway and VS wnl.

## 2018-10-22 NOTE — PROGRESS NOTES
[facility]  Infectious Disease  Progress Note    Patient Name: Carlie Valdez  MRN: [id number]  Admission Date: 10/18/2018  Length of Stay: 3 days  Attending Physician: Subhash Ayala MD  Primary Care Provider: Johanna Guzman MD    Isolation Status: Contact  Assessment/Plan:      MRSA bacteremia    10/21- source control is important in all cases of MRSA bacteremia .  Respiratory culture- MRSA   Will continue IV Vancomycin ,closely monitor vanco trough .  Contact isolation .      Ventilator dependence    10/20-pulmonology follow up     10/21- continue trach care ,close monitoring ,pulmonology follow up .      Urinary tract infection associated with indwelling urethral catheter    10/21- cultures are positive for E coli and Klebsiella -will continue Zosyn      Type 2 diabetes mellitus with kidney complication, without long-term current use of insulin    10/20- insulin sliding scale as per primary team     10/21- will closely monitor serum glucose, accuchecks , insulin sliding scale .     Pneumonia due to infectious organism    10/20-  Will continue vanco/meropenem for now but will switch to zosyn in am     10/20- respiratory culture-MRSA.    Will continue IV Vancomycin , closely monitor vanco trough .     Chronic atrial fibrillation    10/20- rate control as per primary team   On amiodarone, rivaroxaban    10/21- will continue amiodarone and rivaroxaban ,closely monitor heart rate.         Anticipated Disposition:     Thank you for your consult. I will follow-up with patient. Please contact us if you have any additional questions.    Philip Ceja MD  Infectious Disease  [facility]    Subjective:     Principal Problem:Severe sepsis    HPI: 72 year old woman with history of Afib, CHF, DM, GERD, neuromyelitis optica spectrum disorder, and HTN, Chronic Trach/Peg/Flores who presents for altered mental status .She is a resident of   [facility].  Since admission, cultures  have turned positive-  Respiratory cultures - staph aureus  Urine-E coli and Klebsiella.  Blood cultures -staph aureus  Chest x-ray showed -Near complete opacification of left hemithorax concerning for consolidation with areas of air bronchograms.  Moderate left-sided pleural effusion is also suspected.   Interval History: Blood and resp cultures are positive for MRSA.  She remains on ventilator support.    Urine culture- E coli and Klebsiella pneumonia   Review of Systems   Unable to perform ROS: Acuity of condition     Objective:     Vital Signs (Most Recent):  Temp: 97.9 °F (36.6 °C) (10/21/18 1905)  Pulse: 69 (10/21/18 2106)  Resp: 15 (10/21/18 2106)  BP: 135/77 (10/21/18 2106)  SpO2: 100 % (10/21/18 2106) Vital Signs (24h Range):  Temp:  [96.8 °F (36 °C)-98.9 °F (37.2 °C)] 97.9 °F (36.6 °C)  Pulse:  [68-75] 69  Resp:  [12-28] 15  SpO2:  [88 %-100 %] 100 %  BP: ()/() 135/77     Weight: 87 kg (191 lb 12.8 oz)  Body mass index is 32.92 kg/m².    Estimated Creatinine Clearance: 67.8 mL/min (based on SCr of 0.8 mg/dL).    Physical Exam   Constitutional: She appears well-developed. She appears lethargic. She has a sickly appearance. She appears ill. No distress.   HENT:   Head: Normocephalic and atraumatic.   Nose: Nose normal.   Eyes: Right eye exhibits no discharge. Left eye exhibits no discharge. No scleral icterus.   Blind     Neck: Normal range of motion. Neck supple. No JVD present. No tracheal deviation present.   Cardiovascular: Normal rate, regular rhythm and intact distal pulses.   Murmur heard.  Upper Arm edema worse to right.  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   Pulmonary/Chest: Effort normal. No stridor. No respiratory distress. She has no wheezes. She has rales ( right).   Trach/vent   Abdominal: Soft. Bowel sounds are normal. She exhibits no distension. There is no tenderness. There is no guarding.   Peg   Musculoskeletal: Normal range of motion. She exhibits edema. She exhibits  no deformity.   Neurological: She appears lethargic.   Lethargic  Responds to pain   Skin: Skin is warm and dry. Capillary refill takes 2 to 3 seconds. No rash noted. She is not diaphoretic.   Wound/ulcer on arrival.  Wound care consult for full skin exam    Psychiatric:   Unable to assess   Nursing note and vitals reviewed.      Significant Labs:   Blood Culture:   Recent Labs   Lab 07/03/18  1549 10/18/18  2100 10/18/18  2102   LABBLOO No growth after 5 days.  No growth after 5 days. Gram stain aer bottle: Gram positive cocci in clusters resembling Staph   Results called to and read back by:Atif Estrada RN  10/19/2018  18:36  Gram stain refugio bottle: Gram positive cocci in clusters resembling Staph   Positive results previously called  10/19/2018  18:37  METHICILLIN RESISTANT STAPHYLOCOCCUS AUREUS  ID consult required at [facility]   locations.  For susceptibility see order #[id number]   Gram stain aer bottle: Gram positive cocci in clusters resembling Staph   Results called to and read back by: Atif Estrada RN  10/19/2018  17:20  METHICILLIN RESISTANT STAPHYLOCOCCUS AUREUS  ID consult required at [facility] and [facility]   locations.       BMP:   Recent Labs   Lab 10/21/18  0430   *   *   K 3.3*      CO2 23   BUN 30*   CREATININE 0.8   CALCIUM 8.1*   MG 1.9     Respiratory Culture:   Recent Labs   Lab 10/19/18  0900   GSRESP <10 epithelial cells per low power field.  Moderate WBC's  Few Gram positive cocci  Rare Gram positive rods  Rare Gram negative rods   RESPIRATORYC METHICILLIN RESISTANT STAPHYLOCOCCUS AUREUS  Many  Normal respiratory barbie also present       Urine Culture:   Recent Labs   Lab 06/30/18  0802 10/18/18  2134   LABURIN COAGULASE-NEGATIVE STAPHYLOCOCCUS SPECIES  50,000 - 99,999 cfu/ml  Susceptibility testing not routinely performed.   ESCHERICHIA COLI  > 100,000 cfu/ml    KLEBSIELLA PNEUMONIAE  > 100,000 cfu/ml          Significant Imaging: I have reviewed all pertinent imaging results/findings within the past 24 hours.

## 2018-10-22 NOTE — ASSESSMENT & PLAN NOTE
10/20- rate control as per primary team   On amiodarone, rivaroxaban    10/21- will continue amiodarone and rivaroxaban ,closely monitor heart rate.

## 2018-10-22 NOTE — ASSESSMENT & PLAN NOTE
CT head negative for acute findings.  Treat underlying  UTI and PNE.   Metabolic encephalopathy. Hold all psychoactive medications.   Neurochecks per routine.    10/20  Improved  Off Pressors  ABX  Pulmonary CC    10/21  ABX  Pulmonary CC  Monitor    10/22  ABX  Improved  Pulmonary CC

## 2018-10-22 NOTE — CONSULTS
Follow up visit with Ms. Valdez for continued care of Unstageable pressure injury to coccygeal/sacral region and MASD to milagros-rectal region. Patient remains mechanically ventilated via TRACH, and nutrition via PEG.  She is awake and alert.  Primary nurse GIULIANO Alexis states her coccygeal PI is exuding increasing amounts and dressing is being changed twice per day due to increased drainage.  Patient turned to right side and dressing removed. The yellow slough covered portion of the wound bed is now grey/tan adherent slough.  She continues to have generalized edema, and appears to weeping from milagros wound breakdown.  Cleansed all with bath wipes and patted dry.  Milagros wound painted with cavilon.  For this dressing change only, Drawtex 2 layers applied over wound and surrounding skin, and secured with large sacral foam dressing.  Dressing edges then secured with tegaderm near rectum. WIll update wound care orders to apply 3 layes aquacel extra under sacral foam dressing for future changes.   MASD again noted to milagros-rectal skin including left ischial region with partial thickness tissue loss. Thin layer critic aid paste applied. Patient positioned on right side with foam wedge at this time. Heels floated.  Please see wound care order for update.

## 2018-10-22 NOTE — NURSING
Pt O2 dropped to 76%. She was bag lavaged. O2 still low. NP oleksandr called to bedside. He bag lavaged her more. O2 slowly returned to normal . Vent setting now 100%. Still getting 15 of peep

## 2018-10-22 NOTE — PLAN OF CARE
Problem: Patient Care Overview  Goal: Plan of Care Review  Outcome: Ongoing (interventions implemented as appropriate)  Pt placed on contact isolation for presumed MRSA. Pt continues to have niurka blood via trach, but decreased this AM. H/H improved at 8.7/28.7 today. NSR w/BBB on monitor. Tolerating TF @ 40mL/hr goal. Good UOP per harris. Q2 turns in specialty bed; no further skin breakdown noted. Pt slept easily b/t care.

## 2018-10-22 NOTE — ASSESSMENT & PLAN NOTE
10/20-pulmonology follow up     10/21- continue trach care ,close monitoring ,pulmonology follow up .

## 2018-10-22 NOTE — PHYSICIAN QUERY
PT Name: Carlie Valdez  MR #: [id number]    Physician Query Form - Perfusion Diagnosis Clarification     CDS/: Brianda Jaeger RN    Contact information:[email or web address]  This form is a permanent document in the medical record.     Query Date: October 22, 2018    By submitting this query, we are merely seeking further clarification of documentation. Please utilize your independent clinical judgment when addressing the question(s) below.    The medical record contains the following:    Indicators   Supporting Clinical Findings   Location in Medical Record   X Acute Illness (e.g. AMI, Sepsis, etc.) Severe Sepsis Hospital med note 10/21    Acidosis documented     X ABGs / Labs PH 7.41, Pco2 37.4, Po2 69, BE -1, HCO3 23.8, O2 sat 94    Na 132, Cl 94, K+3.4, CO2 22, BUN 44, Cr 1.4, Glucose 68, Ca 8.6  Lactic acid 1.8  Troponin 0.166,   eGFR 43 ABG 10/19      Labs 10/18   X Vital Signs 58/43    62/41   71/45   69/50  10/18 vitals 2116  10/18 vitals 2123  10/18 vitals 2124  10/18 vitals 2113   X Hypotension or Low Blood Pressure documented Patient hypotensive on arrival, has imrpoved with treatment of sepsis H & P 10/18   X Altered Mental Status or Confusion Long term current use of antipsychotic medication.  Unclear as to why patient is on, will hold at this time due to AMS and revaluate over course H & P 10/18    Diaphoresis, Cold Extremities or Cyanosis        Oliguria     X Medication/Treatment:  -Vasopressors  -Inotropic Drugs  -IV Fluids  -Cardiac Assist Devices  -Hemodynamic Monitoring  -Blood/Blood Products Norepinephrine 0.02mcg/kg/min IV continuous    Midodrine tab 5mg per g-tube three times daily     MAR 10/19 1930- 10/20 0404        MAR current        Other:       Provider, please specify diagnosis or diagnoses associated with above clinical findings.    [ x ] Septic Shock  [  ] Other Shock (please specify): _________________________________  [  ] Shock  Unspecified  [  ] Other Condition (please specify): ___________________________________  [  ] Clinically Undetermined    Please document in your progress notes daily for the duration of treatment until resolved and include in your discharge summary.

## 2018-10-22 NOTE — PLAN OF CARE
Problem: Patient Care Overview  Goal: Plan of Care Review  Outcome: Ongoing (interventions implemented as appropriate)  Vitals signs closely monitored. Fall precautions in place. Patient turned every two hours. Pain assessed every two hours. Safety promoted. Infection risks reduced. WOC saw today. Dressing changed on dti. Cream put on moisture injury. Copious amount of bloody secretions this am. Secretions have decreased throughout the today. Trach is positional. RT and NP Louis aware. Will cont to monitor

## 2018-10-22 NOTE — ASSESSMENT & PLAN NOTE
Continue anticoag-rivaroxaban and amio  montior rate close      10/20  Amiodarone  Xarelto    10/21  Amiodarone  Xarelto    10/22  Amiodarone  Xarelto

## 2018-10-22 NOTE — SUBJECTIVE & OBJECTIVE
Interval History: Patient with improved o2 sats. Remains on vent. Contact Isolation. ID on consult    Review of Systems   Unable to perform ROS: Acuity of condition     Objective:     Vital Signs (Most Recent):  Temp: 97 °F (36.1 °C) (10/22/18 1530)  Pulse: 69 (10/22/18 1604)  Resp: (!) 21 (10/22/18 1604)  BP: (!) 87/54 (10/22/18 1600)  SpO2: 96 % (10/22/18 1604) Vital Signs (24h Range):  Temp:  [96.8 °F (36 °C)-98.9 °F (37.2 °C)] 97 °F (36.1 °C)  Pulse:  [67-74] 69  Resp:  [6-34] 21  SpO2:  [85 %-100 %] 96 %  BP: ()/() 87/54     Weight: 85.3 kg (188 lb 0.8 oz)  Body mass index is 32.28 kg/m².    Intake/Output Summary (Last 24 hours) at 10/22/2018 1623  Last data filed at 10/22/2018 1600  Gross per 24 hour   Intake 2055 ml   Output 1865 ml   Net 190 ml      Physical Exam   Constitutional: She appears well-developed. She appears lethargic. She has a sickly appearance. She appears ill. No distress.   HENT:   Head: Normocephalic and atraumatic.   Nose: Nose normal.   Eyes: Right eye exhibits no discharge. Left eye exhibits no discharge. No scleral icterus.   Blind     Neck: Normal range of motion. Neck supple. No JVD present. No tracheal deviation present.   Cardiovascular: Normal rate, regular rhythm and intact distal pulses.   Murmur heard.   Systolic murmur is present with a grade of 3/6.  Right Upper Arm edema     Pulmonary/Chest: Effort normal. No stridor. No respiratory distress. She has no wheezes. Rales:  right.   Trach/vent   Abdominal: Soft. Bowel sounds are normal. She exhibits no distension. There is no tenderness. There is no guarding.   Peg   Musculoskeletal: Normal range of motion. She exhibits edema. She exhibits no deformity.   Neurological: She appears lethargic.   Lethargic  Responds to pain   Skin: Skin is warm and dry. Capillary refill takes 2 to 3 seconds. No rash noted. She is not diaphoretic.   Psychiatric: She has a normal mood and affect. Thought content normal.   Nursing note and  vitals reviewed.      Significant Labs:   Blood Culture: No results for input(s): LABBLOO in the last 48 hours.  BMP:   Recent Labs   Lab 10/22/18  0415   *      K 4.0   *   CO2 25   BUN 28*   CREATININE 0.6   CALCIUM 9.0   MG 2.1     CBC:   Recent Labs   Lab 10/21/18  0430 10/22/18  0415   WBC 4.20 4.81   HGB 6.7* 8.7*   HCT 21.0* 28.7*   PLT 74* 69*     CMP:   Recent Labs   Lab 10/21/18  0430 10/22/18  0415   * 144   K 3.3* 4.0    111*   CO2 23 25   * 113*   BUN 30* 28*   CREATININE 0.8 0.6   CALCIUM 8.1* 9.0   ANIONGAP 8 8   EGFRNONAA >60 >60     All pertinent labs within the past 24 hours have been reviewed.    Significant Imaging: I have reviewed all pertinent imaging results/findings within the past 24 hours.

## 2018-10-22 NOTE — ASSESSMENT & PLAN NOTE
10/20- insulin sliding scale as per primary team     10/21- will closely monitor serum glucose, accuchecks , insulin sliding scale .

## 2018-10-22 NOTE — PROGRESS NOTES
[facility] -   Critical Care Medicine  Progress Note    Patient Name: Carlie Valdez  MRN: [id number]  Admission Date: 10/18/2018  Hospital Length of Stay: 4 days  Code Status: Full Code  Attending Provider: Subhash Ayala MD  Primary Care Provider: Johanna Guzman MD   Principal Problem: Severe sepsis    Subjective:     HPI:  72 year old female who  has a past medical history of Anticoagulant long-term use, Arthritis, Asthma, Atrial fibrillation, Blood clot of vein in shoulder area, Cardiac defibrillator in place, CHF (congestive heart failure), Chronic knee pain, Diabetes mellitus type 2 without retinopathy (12/19/2016), Diaphragmatic hernia without obstruction and without gangrene (9/14/2015), GERD (gastroesophageal reflux disease), Hypertension, Immune deficiency disorder, and Primary open angle glaucoma (POAG) of both eyes, severe stage (6/1/2018), Nursing home resident,  with chronic Trach/Peg/Flores transferred for altered mental status which onset gradually last night. Patient currently resides at [facility]. Per ER nurse, nursing home nurse states patient has had decreased level of consciousness since last night. Symptoms are constant and moderate in severity. Nursing home also reports patient has had decreased oral intake and decreased urine output. Hypotensive in the ED. Sepsis protocol initiated.         Hospital/ICU Course:  10/19: Admitted to MICU. Patient is ventilator dependent. Tracheostomy => Shiley size 8. Ventilator support via trach. Keep JANEL 2 > = 92%.  Sepsis protocol.  Source control => IV Meropenem + IV Vancomycin. Complere atelectasis of left lung on CXR. Therapeutic bronchoscopy with airway clearance today.    10/20: Seen and examined at bedside. Hospital chart reviewed. No acute interval detrimental events noted. Bronchoscopy wit airway clearance yesterday. Left lung re expanded. Pulmonary toileting.  Staph Aureus in both respiratory and blood cultures. IV  MEROPENEM + IV VANCOMYCIN.    10/21: Seen and examined at bedside. Hospital chart reviewed. No acute interval detrimental events noted. Remains on ventilator support, patient is ventilator dependent. Respiratory mechanics are OK.  Urine culture -ESCHERICHIA COLI & KLEBSIELLA PNEUMONIAE both pan sensitive. STAPHYLOCOCCUS AUREUS in both blood and respiratory cultures - ID pending. MRSA suspected. ID consulted. MEROPENEM switched to ZOSYN by ID.  IV VANCOMYCIN maintained.   10/22 patient seen and examined.  On 100% FiO2, full mechanical ventilation via trach.  Afebrile.  On PEG feeding.  No pressors.    Interval History:   Review of Systems   Unable to perform ROS: Other (On mechanical ventilation via trach.)       Objective:     Vital Signs (Most Recent):  Temp: 97 °F (36.1 °C) (10/22/18 0730)  Pulse: 69 (10/22/18 1015)  Resp: 19 (10/22/18 1015)  BP: (!) 134/122 (10/22/18 1000)  SpO2: 100 % (10/22/18 1015) Vital Signs (24h Range):  Temp:  [96.8 °F (36 °C)-98.9 °F (37.2 °C)] 97 °F (36.1 °C)  Pulse:  [67-74] 69  Resp:  [6-34] 19  SpO2:  [85 %-100 %] 100 %  BP: ()/() 134/122     Weight: 85.3 kg (188 lb 0.8 oz)  Body mass index is 32.28 kg/m².      Intake/Output Summary (Last 24 hours) at 10/22/2018 1143  Last data filed at 10/22/2018 1000  Gross per 24 hour   Intake 2440 ml   Output 1870 ml   Net 570 ml       Physical Exam   Constitutional:   Cachectic, ill-appearing   HENT:   Trach in place.   Eyes: EOM are normal.   Neck: Neck supple.   Cardiovascular: Normal rate.   Pulmonary/Chest: Effort normal. She has rales.   Breath sounds decreased bilaterally   Abdominal: Soft.   PEG tube in place   Genitourinary:   Genitourinary Comments: Flores in place   Musculoskeletal: She exhibits deformity.   Contraction extremities.  Swelling of the right upper extremity.   Neurological:   Awake not following commands    Skin: Skin is warm.       Vents:  Vent Mode: A/C (10/22/18 2251)  Ventilator Initiated: Yes (10/18/18  2124)  Set Rate: 18 bmp (10/22/18 0952)  Vt Set: 0 mL (10/22/18 0952)  Pressure Support: 0 cmH20 (10/22/18 0952)  PEEP/CPAP: 15 cmH20 (10/22/18 0952)  Oxygen Concentration (%): 100 (10/22/18 1015)  Peak Airway Pressure: 31 cmH2O (10/22/18 0952)  Plateau Pressure: 0 cmH20 (10/22/18 0952)  Total Ve: 4.14 mL (10/22/18 0952)  F/VT Ratio<105 (RSBI): (!) 86.51 (10/22/18 0952)    Lines/Drains/Airways     Drain                 Gastrostomy/Enterostomy Percutaneous endoscopic gastrostomy (PEG) LUQ -- days         Urethral Catheter 07/05/18 1114 109 days         Gastrostomy/Enterostomy 07/18/18 1649 Percutaneous endoscopic gastrostomy (PEG) LUQ feeding 95 days          Airway                 Surgical Airway 07/18/18 1620 Shiley 95 days          Pressure Ulcer                 Pressure Injury 10/18/18 2015 Coccyx #1 Unstageable 3 days          Peripheral Intravenous Line                 Midline Catheter Insertion/Assessment  - Double Lumen 10/19/18 0131 Left brachial vein other (see comments) 3 days         Peripheral IV - Single Lumen 10/18/18 2105 Left Hand 3 days                Significant Labs:    CBC/Anemia Profile:  Recent Labs   Lab 10/21/18  0430 10/22/18  0415   WBC 4.20 4.81   HGB 6.7* 8.7*   HCT 21.0* 28.7*   PLT 74* 69*   MCV 88 89   RDW 18.3* 18.8*        Chemistries:  Recent Labs   Lab 10/21/18  0430 10/22/18  0415   * 144   K 3.3* 4.0    111*   CO2 23 25   BUN 30* 28*   CREATININE 0.8 0.6   CALCIUM 8.1* 9.0   MG 1.9 2.1    .    Blood culture 10 18 positive for MRSA.    Urine culture positive for Klebsiella and E coli sensitive to Cipro.    Significant Imaging:  CXR: I have reviewed all pertinent results/findings within the past 24 hours and my personal findings are:  Right base atelectasis/pleural effusion.       Echo June 2018    CONCLUSIONS     1 - Moderately depressed left ventricular systolic function (EF 35-40%).     2 - Low normal right ventricular systolic function .     3 - Pulmonary  hypertension. The estimated PA systolic pressure is 54 mmHg.     4 - Severe left atrial enlargement.     5 - Concentric hypertrophy.     6 - Mild aortic regurgitation.     7 - Mild to moderate mitral regurgitation.     8 - Moderate to severe tricuspid regurgitation.     9 - Mild pulmonic regurgitation.     10 - Increased central venous pressure.     2D echo today pending.      Assessment/Plan:     Neuro   Acute encephalopathy    Metabolic encephalopathy resolving.  Neurochecks per routine.     Derm          Pressure ulcer of coccygeal region    Skin care per wound care recommendations.     Pulmonary   Severe hypoxemia    10/22 patient is vent dependent.  Currently on 100% FiO2.  Next today she was on 45% FiO2.  Oral care, pulmonary toilet.  daily chest x-ray.  ABG in a.m.    Elevated BNP.  History of CHF.  Positive 8 L since admission.  Start Lasix 40 mg twice a day.  Keep input less than output.     Atelectasis of left lung    Pulmonary toileting.    10/22 pulmonary toilet.     Ventilator dependence    Patient is ventilator dependent via Shiley size 8 tracheostomy tube. Continue ventilator support. Titrate FIO2 to keep SAO2 > 92%. Bronchodilators per protocol.     Pneumonia due to infectious organism    Respiratory cultures => STAPH AUREUS.    Identification pending.    Suspect MRSA:    Continue IV VANCOMYCIN.    Appreciate ID input.     10/22 on IV vancomycin.     Cardiac/Vascular   Chronic atrial fibrillation    Rate controlled. Continue RIVAROXABAN.   10/22 Continue amiodarone, and Xarelto.     Renal/    Strict input and output monitoring.      Electrolyte imbalance    Monitor and replete electrolytes as needed.     Urinary tract infection associated with indwelling urethral catheter    KLEBSIELLA + E COLI   Li sensitive.    IV MEROPENEM switched to IV ZOSYN     Flores replaced.     Appreciate ID input.      10/22 Klebsiella pneumoniae and E coli sensitive to Cipro.  Start p.o. Cipro discontinue meropenem.      ID   MRSA bacteremia    10/22 on IV vancomycin.  Repeat blood culture.  Check 2D echo to rule out infective endocarditis.     Immunology/Multi System    Immunization status reviewed and updated.     Hematology    Monitor hemogram. Transfuse as needed per guidelines.      Endocrine     EUGLYCEMIC. SBGM. Blood glucose target 100 - 180 mg/dl         Hypothyroidism    ? Amiodarone induced. Monitor TSH.   Continue LEVOTHYROXINE.     Type 2 diabetes mellitus with kidney complication, without long-term current use of insulin    SBG  Monitoring. Blood glucose target 100 - 180 mg/dl         GI    Resume tube feeds per RD recommnedations. Stress ulcer prophylaxis. Bowel regimen.      Other   Long term current use of antipsychotic medication    Hold antipsychotics.     Debility    PT / OT        Critical Care Daily Checklist:    A: Awake: RASS Goal/Actual Goal:    Actual: Guerrero Agitation Sedation Scale (RASS): Alert and calm   B: Spontaneous Breathing Trial Performed? Spon. Breathing Trial Initiated?: Not initiated (10/22/18 0952)   C: SAT & SBT Coordinated?  Not done today.  Patient on FiO2 100%.                        D: Delirium: CAM-ICU Overall CAM-ICU: Positive   E: Early Mobility Performed? No.  Vent dependent.  On high FiO2.  Bedrest.   F: Feeding Goal: Goals: Meet >85% EEN/EPN this admit  Status: Nutrition Goal Status: new   Current Diet Order   No orders of the defined types were placed in this encounter.      AS: Analgesia/Sedation Y   T: Thromboembolic Prophylaxis ON XARELTO.   H: HOB > 300 Yes   U: Stress Ulcer Prophylaxis (if needed) FAMOTIDINE IV   G: Glucose Control Fingerstick q.6 hours.  Short-acting insulin.   B: Bowel Function Stool Occurrence: 1   I: Indwelling Catheter (Lines & Flores) Necessity Keep Flores for now.  Critically ill patient   D: De-escalation of Antimicrobials/Pharmacotherapies Discontinue meropenem.  Continue IV vancomycin.  Start Cipro p.o..    Plan for the day/ETD Y    Code  Status:  Family/Goals of Care: Full Code  Y     Critical Care Time: 40 minutes  Critical secondary to Patient has a condition that poses threat to life and bodily function:  SEVERE HYPOXEMIA, CHRONIC RESPIRATORY FAILURE, MRSA BACTEREMIA.       Critical care was time spent personally by me on the following activities: development of treatment plan with patient or surrogate and bedside caregivers, discussions with consultants, evaluation of patient's response to treatment, examination of patient, ordering and performing treatments and interventions, ordering and review of laboratory studies, ordering and review of radiographic studies, pulse oximetry, re-evaluation of patient's condition. This critical care time did not overlap with that of any other provider or involve time for any procedures.     John White MD  Critical Care Medicine  [facility]

## 2018-10-22 NOTE — SUBJECTIVE & OBJECTIVE
Interval History:   Review of Systems   Unable to perform ROS: Other (On mechanical ventilation via trach.)       Objective:     Vital Signs (Most Recent):  Temp: 97 °F (36.1 °C) (10/22/18 0730)  Pulse: 69 (10/22/18 1015)  Resp: 19 (10/22/18 1015)  BP: (!) 134/122 (10/22/18 1000)  SpO2: 100 % (10/22/18 1015) Vital Signs (24h Range):  Temp:  [96.8 °F (36 °C)-98.9 °F (37.2 °C)] 97 °F (36.1 °C)  Pulse:  [67-74] 69  Resp:  [6-34] 19  SpO2:  [85 %-100 %] 100 %  BP: ()/() 134/122     Weight: 85.3 kg (188 lb 0.8 oz)  Body mass index is 32.28 kg/m².      Intake/Output Summary (Last 24 hours) at 10/22/2018 1143  Last data filed at 10/22/2018 1000  Gross per 24 hour   Intake 2440 ml   Output 1870 ml   Net 570 ml       Physical Exam   Constitutional:   Cachectic, ill-appearing   HENT:   Trach in place.   Eyes: EOM are normal.   Neck: Neck supple.   Cardiovascular: Normal rate.   Pulmonary/Chest: Effort normal. She has rales.   Breath sounds decreased bilaterally   Abdominal: Soft.   PEG tube in place   Genitourinary:   Genitourinary Comments: Flores in place   Musculoskeletal: She exhibits deformity.   Contraction extremities.  Swelling of the right upper extremity.   Neurological:   Awake not following commands    Skin: Skin is warm.       Vents:  Vent Mode: A/C (10/22/18 0952)  Ventilator Initiated: Yes (10/18/18 2124)  Set Rate: 18 bmp (10/22/18 0952)  Vt Set: 0 mL (10/22/18 0952)  Pressure Support: 0 cmH20 (10/22/18 0952)  PEEP/CPAP: 15 cmH20 (10/22/18 0952)  Oxygen Concentration (%): 100 (10/22/18 1015)  Peak Airway Pressure: 31 cmH2O (10/22/18 0952)  Plateau Pressure: 0 cmH20 (10/22/18 0952)  Total Ve: 4.14 mL (10/22/18 0952)  F/VT Ratio<105 (RSBI): (!) 86.51 (10/22/18 0952)    Lines/Drains/Airways     Drain                 Gastrostomy/Enterostomy Percutaneous endoscopic gastrostomy (PEG) LUQ -- days         Urethral Catheter 07/05/18 1114 109 days         Gastrostomy/Enterostomy 07/18/18 1649 Percutaneous  endoscopic gastrostomy (PEG) LUQ feeding 95 days          Airway                 Surgical Airway 07/18/18 1620 Shiley 95 days          Pressure Ulcer                 Pressure Injury 10/18/18 2015 Coccyx #1 Unstageable 3 days          Peripheral Intravenous Line                 Midline Catheter Insertion/Assessment  - Double Lumen 10/19/18 0131 Left brachial vein other (see comments) 3 days         Peripheral IV - Single Lumen 10/18/18 2105 Left Hand 3 days                Significant Labs:    CBC/Anemia Profile:  Recent Labs   Lab 10/21/18  0430 10/22/18  0415   WBC 4.20 4.81   HGB 6.7* 8.7*   HCT 21.0* 28.7*   PLT 74* 69*   MCV 88 89   RDW 18.3* 18.8*        Chemistries:  Recent Labs   Lab 10/21/18  0430 10/22/18  0415   * 144   K 3.3* 4.0    111*   CO2 23 25   BUN 30* 28*   CREATININE 0.8 0.6   CALCIUM 8.1* 9.0   MG 1.9 2.1    .    Blood culture 10 18 positive for MRSA.    Urine culture positive for Klebsiella and E coli sensitive to Cipro.    Significant Imaging:  CXR: I have reviewed all pertinent results/findings within the past 24 hours and my personal findings are:  Right base atelectasis/pleural effusion.       Echo June 2018    CONCLUSIONS     1 - Moderately depressed left ventricular systolic function (EF 35-40%).     2 - Low normal right ventricular systolic function .     3 - Pulmonary hypertension. The estimated PA systolic pressure is 54 mmHg.     4 - Severe left atrial enlargement.     5 - Concentric hypertrophy.     6 - Mild aortic regurgitation.     7 - Mild to moderate mitral regurgitation.     8 - Moderate to severe tricuspid regurgitation.     9 - Mild pulmonic regurgitation.     10 - Increased central venous pressure.     2D echo today pending.

## 2018-10-23 NOTE — ASSESSMENT & PLAN NOTE
10/20-pulmonology follow up     10/21- continue trach care ,close monitoring ,pulmonology follow up .   10/22- continue ventilatory care and support .

## 2018-10-23 NOTE — ASSESSMENT & PLAN NOTE
10/21- cultures are positive for E coli and Klebsiella -will continue Zosyn   10/22- Can use PO Augumentin   on discharge . Critical care team added cipro.

## 2018-10-23 NOTE — ASSESSMENT & PLAN NOTE
10/20-  Will continue vanco/meropenem for now but will switch to zosyn in am     10/20- respiratory culture-MRSA.    Will continue IV Vancomycin , closely monitor vanco trough .    10/22- Vancomycin and Augumentin via pEG tube can be out patient option.  Will discuss with primary team

## 2018-10-23 NOTE — ASSESSMENT & PLAN NOTE
10/20- insulin sliding scale as per primary team     10/21- will closely monitor serum glucose, accuchecks , insulin sliding scale .  10/22- closely monitor glucose , accuchecks

## 2018-10-23 NOTE — SUBJECTIVE & OBJECTIVE
Interval History: Blood and resp cultures are positive for MRSA.  She remains on ventilator support.    Urine culture- E coli and Klebsiella pneumonia   10/22- blood culture is pending   Review of Systems   Unable to perform ROS: Acuity of condition     Objective:     Vital Signs (Most Recent):  Temp: 97.8 °F (36.6 °C) (10/22/18 1930)  Pulse: 70 (10/22/18 1930)  Resp: (!) 24 (10/22/18 1930)  BP: 100/68 (10/22/18 1930)  SpO2: 97 % (10/22/18 1930) Vital Signs (24h Range):  Temp:  [96.9 °F (36.1 °C)-97.8 °F (36.6 °C)] 97.8 °F (36.6 °C)  Pulse:  [66-74] 70  Resp:  [8-34] 24  SpO2:  [85 %-100 %] 97 %  BP: ()/() 100/68     Weight: 85.3 kg (188 lb 0.8 oz)  Body mass index is 32.28 kg/m².    Estimated Creatinine Clearance: 89.5 mL/min (based on SCr of 0.6 mg/dL).    Physical Exam   Constitutional: She appears well-developed. She appears lethargic. She has a sickly appearance. She appears ill. No distress.   HENT:   Head: Normocephalic and atraumatic.   Nose: Nose normal.   Eyes: Right eye exhibits no discharge. Left eye exhibits no discharge. No scleral icterus.   Blind     Neck: Normal range of motion. Neck supple. No JVD present. No tracheal deviation present.   Cardiovascular: Normal rate, regular rhythm and intact distal pulses.   Murmur heard.  Upper Arm edema worse to right.  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   Pulmonary/Chest: Effort normal. No stridor. No respiratory distress. She has no wheezes. She has rales ( right).   Trach/vent   Abdominal: Soft. Bowel sounds are normal. She exhibits no distension. There is no tenderness. There is no guarding.   Peg   Musculoskeletal: Normal range of motion. She exhibits edema. She exhibits no deformity.   Neurological: She appears lethargic.   Lethargic  Responds to pain   Skin: Skin is warm and dry. Capillary refill takes 2 to 3 seconds. No rash noted. She is not diaphoretic.   Wound/ulcer on arrival.  Wound care consult for full skin exam     Psychiatric:   Unable to assess   Nursing note and vitals reviewed.      Significant Labs:   Blood Culture:   Recent Labs   Lab 07/03/18  1549 10/18/18  2100 10/18/18  2102   LABBLOO No growth after 5 days.  No growth after 5 days. Gram stain aer bottle: Gram positive cocci in clusters resembling Staph   Results called to and read back by:Atif Estrada RN  10/19/2018  18:36  Gram stain refugio bottle: Gram positive cocci in clusters resembling Staph   Positive results previously called  10/19/2018  18:37  METHICILLIN RESISTANT STAPHYLOCOCCUS AUREUS  ID consult required at [facility] and [facility]   locations.  For susceptibility see order #[id number]   Gram stain aer bottle: Gram positive cocci in clusters resembling Staph   Results called to and read back by: Atif Estrada RN  10/19/2018  17:20  METHICILLIN RESISTANT STAPHYLOCOCCUS AUREUS  ID consult required at [facility] and [facility]   locations.       BMP:   Recent Labs   Lab 10/22/18  0415   *      K 4.0   *   CO2 25   BUN 28*   CREATININE 0.6   CALCIUM 9.0   MG 2.1     Respiratory Culture:   Recent Labs   Lab 10/19/18  0900   GSRESP <10 epithelial cells per low power field.  Moderate WBC's  Few Gram positive cocci  Rare Gram positive rods  Rare Gram negative rods   RESPIRATORYC METHICILLIN RESISTANT STAPHYLOCOCCUS AUREUS  Many  Normal respiratory barbie also present       Urine Culture:   Recent Labs   Lab 06/30/18  0802 10/18/18  2134   LABURIN COAGULASE-NEGATIVE STAPHYLOCOCCUS SPECIES  50,000 - 99,999 cfu/ml  Susceptibility testing not routinely performed.   ESCHERICHIA COLI  > 100,000 cfu/ml    KLEBSIELLA PNEUMONIAE  > 100,000 cfu/ml         Significant Imaging: I have reviewed all pertinent imaging results/findings within the past 24 hours.

## 2018-10-23 NOTE — PLAN OF CARE
10/23/18 1620   Final Note   Assessment Type Final Discharge Note   Anticipated Discharge Disposition SNF  ([facility])   Hospital Follow Up  Appt(s) scheduled? No   Discharge plans and expectations educations in teach back method with documentation complete? Yes   Right Care Referral Info   Post Acute Recommendation SNF / Sub-Acute Rehab  ([facility])

## 2018-10-23 NOTE — ASSESSMENT & PLAN NOTE
10/21- source control is important in all cases of MRSA bacteremia .  Respiratory culture- MRSA   Will continue IV Vancomycin ,closely monitor vanco trough .  Contact isolation .     10/22- will plan to complete 2 weeks of IV vancomycin from negative blood cultures, follow repeat blood culture

## 2018-10-23 NOTE — PROGRESS NOTES
VSS. NSR.  Pt resting in bed, on vent via trach oxygenating well. Lungs coarse, blood tinge sputum with ET suction.  Trach care done by RT.  Tube feedings stopped and flushed PEG.  Flores and LUE midline to stay in place.  Pt to be discharged to [facility].  Report already called by previous RN Vanesa.     1950- Viktoria Otto and partner with EMS here to transport pt. Pt safely transferred to stretcher and ems portable vent and monitor.    2000-daughter called and updated on pt discharge to facility.

## 2018-10-23 NOTE — PROGRESS NOTES
[facility]  Infectious Disease  Progress Note    Patient Name: Carlie Valdez  MRN: [id number]  Admission Date: 10/18/2018  Length of Stay: 4 days  Attending Physician: No att. providers found  Primary Care Provider: Johanna Guzman MD    Isolation Status: No active isolations  Assessment/Plan:      MRSA bacteremia    10/21- source control is important in all cases of MRSA bacteremia .  Respiratory culture- MRSA   Will continue IV Vancomycin ,closely monitor vanco trough .  Contact isolation .     10/22- will plan to complete 2 weeks of IV vancomycin from negative blood cultures, follow repeat blood culture     Ventilator dependence    10/20-pulmonology follow up     10/21- continue trach care ,close monitoring ,pulmonology follow up .   10/22- continue ventilatory care and support .     Urinary tract infection associated with indwelling urethral catheter    10/21- cultures are positive for E coli and Klebsiella -will continue Zosyn   10/22- Can use PO Augumentin   on discharge . Critical care team added cipro.     Type 2 diabetes mellitus with kidney complication, without long-term current use of insulin    10/20- insulin sliding scale as per primary team     10/21- will closely monitor serum glucose, accuchecks , insulin sliding scale .  10/22- closely monitor glucose , accuchecks     Pneumonia due to infectious organism    10/20-  Will continue vanco/meropenem for now but will switch to zosyn in am     10/20- respiratory culture-MRSA.    Will continue IV Vancomycin , closely monitor vanco trough .    10/22- Vancomycin and Augumentin via pEG tube can be out patient option.  Will discuss with primary team         Anticipated Disposition:     Thank you for your consult. I will follow-up with patient. Please contact us if you have any additional questions.  (Late entry note )  Philip Ceja MD  Infectious Disease  [facility]    Subjective:     Principal Problem:Severe  sepsis    HPI: 72 year old woman with history of Afib, CHF, DM, GERD, neuromyelitis optica spectrum disorder, and HTN, Chronic Trach/Peg/Flores who presents for altered mental status .She is a resident of   [facility].  Since admission, cultures have turned positive-  Respiratory cultures - staph aureus  Urine-E coli and Klebsiella.  Blood cultures -staph aureus  Chest x-ray showed -Near complete opacification of left hemithorax concerning for consolidation with areas of air bronchograms.  Moderate left-sided pleural effusion is also suspected.   Interval History: Blood and resp cultures are positive for MRSA.  She remains on ventilator support.    Urine culture- E coli and Klebsiella pneumonia   10/22- blood culture is pending   Review of Systems   Unable to perform ROS: Acuity of condition     Objective:     Vital Signs (Most Recent):  Temp: 97.8 °F (36.6 °C) (10/22/18 1930)  Pulse: 70 (10/22/18 1930)  Resp: (!) 24 (10/22/18 1930)  BP: 100/68 (10/22/18 1930)  SpO2: 97 % (10/22/18 1930) Vital Signs (24h Range):  Temp:  [96.9 °F (36.1 °C)-97.8 °F (36.6 °C)] 97.8 °F (36.6 °C)  Pulse:  [66-74] 70  Resp:  [8-34] 24  SpO2:  [85 %-100 %] 97 %  BP: ()/() 100/68     Weight: 85.3 kg (188 lb 0.8 oz)  Body mass index is 32.28 kg/m².    Estimated Creatinine Clearance: 89.5 mL/min (based on SCr of 0.6 mg/dL).    Physical Exam   Constitutional: She appears well-developed. She appears lethargic. She has a sickly appearance. She appears ill. No distress.   HENT:   Head: Normocephalic and atraumatic.   Nose: Nose normal.   Eyes: Right eye exhibits no discharge. Left eye exhibits no discharge. No scleral icterus.   Blind     Neck: Normal range of motion. Neck supple. No JVD present. No tracheal deviation present.   Cardiovascular: Normal rate, regular rhythm and intact distal pulses.   Murmur heard.  Upper Arm edema worse to right.  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   Pulmonary/Chest: Effort normal. No  stridor. No respiratory distress. She has no wheezes. She has rales ( right).   Trach/vent   Abdominal: Soft. Bowel sounds are normal. She exhibits no distension. There is no tenderness. There is no guarding.   Peg   Musculoskeletal: Normal range of motion. She exhibits edema. She exhibits no deformity.   Neurological: She appears lethargic.   Lethargic  Responds to pain   Skin: Skin is warm and dry. Capillary refill takes 2 to 3 seconds. No rash noted. She is not diaphoretic.   Wound/ulcer on arrival.  Wound care consult for full skin exam    Psychiatric:   Unable to assess   Nursing note and vitals reviewed.      Significant Labs:   Blood Culture:   Recent Labs   Lab 07/03/18  1549 10/18/18  2100 10/18/18  2102   LABBLOO No growth after 5 days.  No growth after 5 days. Gram stain aer bottle: Gram positive cocci in clusters resembling Staph   Results called to and read back by:Atif Estrada RN  10/19/2018  18:36  Gram stain refugio bottle: Gram positive cocci in clusters resembling Staph   Positive results previously called  10/19/2018  18:37  METHICILLIN RESISTANT STAPHYLOCOCCUS AUREUS  ID consult required at [facility] and [facility]   locations.  For susceptibility see order #[id number]   Gram stain aer bottle: Gram positive cocci in clusters resembling Staph   Results called to and read back by: Atif Estrada RN  10/19/2018  17:20  METHICILLIN RESISTANT STAPHYLOCOCCUS AUREUS  ID consult required at [facility] and [facility]   locations.       BMP:   Recent Labs   Lab 10/22/18  0415   *      K 4.0   *   CO2 25   BUN 28*   CREATININE 0.6   CALCIUM 9.0   MG 2.1     Respiratory Culture:   Recent Labs   Lab 10/19/18  0900   GSRESP <10 epithelial cells per low power field.  Moderate WBC's  Few Gram positive cocci  Rare Gram positive rods  Rare Gram negative rods   RESPIRATORYC METHICILLIN RESISTANT STAPHYLOCOCCUS AUREUS  Many  Normal respiratory barbie also  present       Urine Culture:   Recent Labs   Lab 06/30/18  0802 10/18/18  2134   LABURIN COAGULASE-NEGATIVE STAPHYLOCOCCUS SPECIES  50,000 - 99,999 cfu/ml  Susceptibility testing not routinely performed.   ESCHERICHIA COLI  > 100,000 cfu/ml    KLEBSIELLA PNEUMONIAE  > 100,000 cfu/ml         Significant Imaging: I have reviewed all pertinent imaging results/findings within the past 24 hours.

## 2018-10-24 PROBLEM — J96.21 ACUTE ON CHRONIC RESPIRATORY FAILURE WITH HYPOXIA AND HYPERCAPNIA: Status: ACTIVE | Noted: 2018-01-01

## 2018-10-24 PROBLEM — I16.1 HYPERTENSIVE EMERGENCY: Status: RESOLVED | Noted: 2018-01-01 | Resolved: 2018-01-01

## 2018-10-24 PROBLEM — E11.29 TYPE 2 DIABETES MELLITUS WITH KIDNEY COMPLICATION, WITHOUT LONG-TERM CURRENT USE OF INSULIN: Chronic | Status: ACTIVE | Noted: 2018-03-09

## 2018-10-24 PROBLEM — L89.150 PRESSURE INJURY OF COCCYGEAL REGION, UNSTAGEABLE: Chronic | Status: ACTIVE | Noted: 2018-01-01

## 2018-10-24 PROBLEM — J96.22 ACUTE ON CHRONIC RESPIRATORY FAILURE WITH HYPOXIA AND HYPERCAPNIA: Status: ACTIVE | Noted: 2018-01-01

## 2018-10-24 PROBLEM — J45.51 SEVERE PERSISTENT ASTHMA WITH ACUTE EXACERBATION: Status: RESOLVED | Noted: 2017-05-04 | Resolved: 2018-01-01

## 2018-10-24 PROBLEM — D69.6 THROMBOCYTOPENIA: Status: ACTIVE | Noted: 2018-01-01

## 2018-10-24 PROBLEM — J45.50 UNCOMPLICATED SEVERE PERSISTENT ASTHMA: Chronic | Status: ACTIVE | Noted: 2017-05-18

## 2018-10-24 NOTE — SUBJECTIVE & OBJECTIVE
Past Medical History:   Diagnosis Date    Anticoagulant long-term use     Arthritis     Asthma     Atrial fibrillation     Blood clot of vein in shoulder area     Cardiac defibrillator in place     CHF (congestive heart failure)     Chronic knee pain     Diabetes mellitus type 2 without retinopathy 2016    Diaphragmatic hernia without obstruction and without gangrene 2015    GERD (gastroesophageal reflux disease)     Hypertension     Immune deficiency disorder     Primary open angle glaucoma (POAG) of both eyes, severe stage 2018       Past Surgical History:   Procedure Laterality Date    CARDIAC DEFIBRILLATOR PLACEMENT      , .    CATARACT EXTRACTION       SECTION      COLONOSCOPY      EGD, WITH PEG TUBE INSERTION N/A 2018    Performed by Louis O. Jeansonne IV, MD at [facility] OR    ashlye Macdonald    ESOPHAGOGASTRODUODENOSCOPY (EGD) N/A 2015    Performed by Hieu Colbert MD at [facility] ENDO    ESOPHAGOGASTRODUODENOSCOPY W/ PEG N/A 2018    Procedure: EGD, WITH PEG TUBE INSERTION;  Surgeon: Louis O. Jeansonne IV, MD;  Location: [facility] OR;  Service: General;  Laterality: N/A;    KNEE ARTHROSCOPY      TRACHEOSTOMY N/A 2018    Procedure: TRACHEOSTOMY;  Surgeon: Louis O. Jeansonne IV, MD;  Location: [facility] OR;  Service: General;  Laterality: N/A;    TRACHEOSTOMY N/A 2018    Performed by Louis O. Jeansonne IV, MD at [facility] OR    UPPER GASTROINTESTINAL ENDOSCOPY         Review of patient's allergies indicates:   Allergen Reactions    Morphine Hives    Atorvastatin      Other reaction(s): Muscle pain    Clonidine     Codeine      Other reaction(s): Unknown    Digoxin      Other reaction(s): Unknown    Diovan [valsartan] Other (See Comments)     Upset stomach, weakness    Eggs [egg derived]     Metoprolol Diarrhea    Naproxen      Other reaction(s): Nausea    Peanut     Propofol      Other reaction(s): delirious    Sulfa (sulfonamide  antibiotics)        No current facility-administered medications on file prior to encounter.      Current Outpatient Medications on File Prior to Encounter   Medication Sig    albuterol-ipratropium (DUO-NEB) 2.5 mg-0.5 mg/3 mL nebulizer solution Take 3 mLs by nebulization every 4 (four) hours as needed for Wheezing. Rescue    amiodarone (PACERONE) 200 MG Tab Take 1 tablet (200 mg total) by mouth 2 (two) times daily. (Patient taking differently: 200 mg by Per G Tube route 2 (two) times daily. )    baclofen (LIORESAL) 10 MG tablet 10 mg by Per G Tube route 3 (three) times daily as needed.     brimonidine-timolol (COMBIGAN) 0.2-0.5 % Drop Place 1 drop into both eyes every 12 (twelve) hours.    bumetanide (BUMEX) 2 MG tablet Take 1 tablet (2 mg total) by mouth 2 (two) times daily. 1 Tablet Oral Every day (Patient taking differently: Take 2 mg by mouth once daily. 1 Tablet Oral Every day)    ergocalciferol (ERGOCALCIFEROL) 50,000 unit Cap Take 1 capsule (50,000 Units total) by mouth every 7 days. (Patient taking differently: 50,000 Units by Per G Tube route every 7 days. )    fluticasone (FLONASE) 50 mcg/actuation nasal spray 2 sprays by Each Nare route once daily.    furosemide (LASIX) 20 MG tablet 20 mg by Per G Tube route 2 (two) times daily.     insulin detemir U-100 (LEVEMIR FLEXTOUCH) 100 unit/mL (3 mL) SubQ InPn pen Inject 10 Units into the skin every evening.    levothyroxine (SYNTHROID) 100 MCG tablet 100 mcg by Per G Tube route before breakfast.     magnesium oxide (MAG-OX) 400 mg tablet 400 mg by Per G Tube route 2 (two) times daily.     midodrine (PROAMATINE) 5 MG Tab 1 tablet (5 mg total) by Per G Tube route 3 (three) times daily.    montelukast (SINGULAIR) 10 mg tablet 10 mg by Per G Tube route once daily.     multivitamin (THERAGRAN) per tablet Take 1 tablet by mouth once daily.     mycophenolate (CELLCEPT) 250 mg Cap Take 2 capsules (500 mg total) by mouth 2 (two) times daily. (Patient  taking differently: 500 mg 2 (two) times daily. )    ondansetron (ZOFRAN) 4 MG tablet 4 mg by Per G Tube route every 6 (six) hours as needed for Nausea.     pantoprazole (PROTONIX) 40 MG tablet Take 40 mg by mouth once daily.    polyethylene glycol (GLYCOLAX) 17 gram PwPk Take 17 g by mouth once daily. (Patient taking differently: 17 g by Per G Tube route once daily. )    rivaroxaban (XARELTO) 15 mg Tab 15 mg by Per G Tube route every evening.     traMADol (ULTRAM) 50 mg tablet 50 mg by Per G Tube route every 6 (six) hours as needed for Pain.     vancomycin HCl in 5 % dextrose (VANCOMYCIN IN DEXTROSE 5 %) 1 gram/250 mL Soln Sig 1 gm IV q 18 hrs x 14 days    zinc sulfate (ZINCATE) 220 (50) mg capsule 220 mg by Per G Tube route once daily.     [DISCONTINUED] ciprofloxacin HCl (CIPRO) 500 MG tablet 1 tablet (500 mg total) by Per G Tube route every 12 (twelve) hours. for 10 days    albuterol 90 mcg/actuation inhaler Inhale 2 puffs into the lungs every 4 (four) hours as needed.     bacitracin 500 unit/gram ointment Apply topically once daily. Apply to R heel daily.    [DISCONTINUED] norepinephrine bitartrate-D5W 4 mg/250 mL (16 mcg/mL) Soln Inject 1.44 mcg/min into the vein continuous.    [DISCONTINUED] rivaroxaban (XARELTO) 15 mg Tab Take 1 tablet (15 mg total) by mouth daily with dinner or evening meal.    [DISCONTINUED] silver sulfADIAZINE 1% (SILVADENE) 1 % cream Apply topically 2 (two) times daily. Apply to bliter areas BID (Patient taking differently: Apply topically 2 (two) times daily. Apply to blistered areas BID.)     Family History     Problem Relation (Age of Onset)    Hypertension Mother, Father        Tobacco Use    Smoking status: Never Smoker    Smokeless tobacco: Never Used   Substance and Sexual Activity    Alcohol use: No     Alcohol/week: 0.0 oz    Drug use: No    Sexual activity: No     Review of Systems   Unable to perform ROS: Patient nonverbal     Objective:     Vital Signs (Most  Recent):  Temp: (!) 89.1 °F (31.7 °C) (10/24/18 0931)  Pulse: 69 (10/24/18 0927)  Resp: 14 (10/24/18 0927)  BP: 134/83 (10/24/18 0927)  SpO2: 100 % (10/24/18 0927) Vital Signs (24h Range):  Temp:  [86.9 °F (30.5 °C)-89.1 °F (31.7 °C)] 89.1 °F (31.7 °C)  Pulse:  [69-70] 69  Resp:  [9-18] 14  SpO2:  [81 %-100 %] 100 %  BP: ()/(58-83) 134/83     Weight: 83.2 kg (183 lb 5 oz)  Body mass index is 26.3 kg/m².    Physical Exam   Constitutional: She is oriented to person, place, and time. She appears well-developed and well-nourished. She has a sickly appearance. She appears ill.   Trach/vent dependent    HENT:   Head: Normocephalic and atraumatic.   Eyes: Conjunctivae and EOM are normal. Pupils are equal, round, and reactive to light.   Blind    Neck: Normal range of motion. Neck supple.   Cardiovascular: Normal rate, regular rhythm and intact distal pulses.   Murmur heard.  Pulmonary/Chest: Effort normal. No accessory muscle usage. No tachypnea. She has rales in the right lower field and the left lower field.   Abdominal: Soft. Bowel sounds are normal.   Peg tube inplace    Genitourinary:   Genitourinary Comments: Chronic Flores  + Ua        Musculoskeletal: Normal range of motion. She exhibits edema.   Neurological: She is alert and oriented to person, place, and time. She has normal reflexes.   Skin: Skin is warm and dry.   PICC line in place to LUE  Bilateral Upper Arm edema. Hx of chronic upper arm thrombus.    Unstageable pressure injury to coccygeal/sacral region. Stage 3 pressure injury to left ischium  Right heel with deep tissue injury.Right medial malleolus stage 3 pressure injury     Psychiatric: She has a normal mood and affect. Her behavior is normal. Judgment and thought content normal.   Nonverbal secondary to tracheostomy. Answers questions with nodding.    Nursing note and vitals reviewed.         Significant Labs:   ABGs:   Recent Labs   Lab 10/24/18  0907   PH 7.295*   PCO2 56.2*   HCO3 27.3    POCSATURATED 70*   BE 1     Blood Culture:   Recent Labs   Lab 10/22/18  1320   LABBLOO No growth to date  No Growth to date     BMP:   Recent Labs   Lab 10/24/18  0628   *      K 3.5      CO2 26   BUN 29*   CREATININE 0.7   CALCIUM 9.4     CBC:   Recent Labs   Lab 10/24/18  0628   WBC 4.40   HGB 9.1*   HCT 30.3*   PLT 38*     CMP:   Recent Labs   Lab 10/24/18  0628      K 3.5      CO2 26   *   BUN 29*   CREATININE 0.7   CALCIUM 9.4   PROT 5.6*   ALBUMIN 2.1*   BILITOT 0.3   ALKPHOS 118   AST 21   ALT 26   ANIONGAP 10   EGFRNONAA >60     Cardiac Markers: No results for input(s): CKMB, MYOGLOBIN, BNP, TROPISTAT in the last 48 hours.  Lactic Acid:   Recent Labs   Lab 10/24/18  0628   LACTATE 2.6*     Troponin:   Recent Labs   Lab 10/24/18  0628   TROPONINI 0.054*     TSH:   Recent Labs   Lab 10/19/18  0334   TSH 1.259     Urine Culture: No results for input(s): LABURIN in the last 48 hours.  Urine Studies:   Recent Labs   Lab 10/24/18  0721   COLORU Yellow   APPEARANCEUA Clear   PHUR 5.0   SPECGRAV 1.025   PROTEINUA 2+*   GLUCUA Negative   KETONESU Trace*   BILIRUBINUA Negative   OCCULTUA 1+*   NITRITE Negative   UROBILINOGEN Negative   LEUKOCYTESUR 1+*   RBCUA 2   WBCUA 23*   BACTERIA Many*   SQUAMEPITHEL 48   HYALINECASTS 6*     All pertinent labs within the past 24 hours have been reviewed.    Significant Imaging:   Imaging Results          X-Ray Chest AP Portable (Final result)  Result time 10/24/18 07:48:45    Final result by JOSEFINA Garcia Sr., MD (10/24/18 07:48:45)                 Impression:      1. There is silhouetting of the right border of the heart. There is a mild amount of interstitial and alveolar opacities seen in the medial aspect of both lungs.  This is characteristic of pulmonary edema.  2. There is persistent opacification of the base of the right hemithorax.  This is characteristic of a small right pleural effusion with associated atelectasis.  3. There  are moderate osteoarthritic changes in the left glenohumeral joint.  .      Electronically signed by: Homar Garcia MD  Date:    10/24/2018  Time:    07:48             Narrative:    EXAMINATION:  XR CHEST AP PORTABLE    CLINICAL HISTORY:  Sepsis;    COMPARISON:  10/22/2018    FINDINGS:  A cardiac pacemaker remains in place.  A tracheostomy tube remains in place.  There is silhouetting of the right border of the heart.  There is a mild amount of interstitial and alveolar opacities seen in the medial aspect of both lungs.  There is persistent opacification of the base of the right hemithorax.  The left costophrenic angle is sharp.  There is no pneumothorax.  There are moderate osteoarthritic changes in the left glenohumeral joint.

## 2018-10-24 NOTE — PROGRESS NOTES
Patient manually ventilated with ambubag & PEEP valve, lavaged, and suctioned. Large amount of thick bloody sputum with some clots. RIGOBERTO Chow aware.

## 2018-10-24 NOTE — ASSESSMENT & PLAN NOTE
ICU   - BNP pending  - CXR with vascular congestion with small bilateral effusion  -Patient received Lasix 20 mg IV x 1 dose in ED   - 2D ECHO on 10/22/18 showed (EF 40-45%). DD, Mild AR, Mild to moderate MR, Moderate to severe TR, and    Pulmonary HTN.   - Strict I&Os  - Daily weights

## 2018-10-24 NOTE — CONSULTS
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  She also has a past medical history of Anticoagulant long-term use, Arthritis, Asthma, Atrial fibrillation, Blood clot of vein in shoulder area, Cardiac defibrillator in place, CHF (congestive heart failure), Chronic knee pain, Diabetes mellitus type 2 without retinopathy (12/19/2016), Diaphragmatic hernia without obstruction and without gangrene (9/14/2015), GERD (gastroesophageal reflux disease), Hypertension, Immune deficiency disorder, and Primary open angle glaucoma (POAG) of both eyes, severe stage (6/1/2018).  She was also recently hospitalized here at [facility] in ICU 10/19/18 admitted and discharged 2 days ago on 10/22/2018 for Acute on chronic hypoxic resp failure with total left lung opacification as well as severe sepsis with MRSA PNA and bacteremia as well as E.Coli and Klebsiella UTI.  She received a bronch here and left lung cleared with suctioning, IVAB and nebs.  She had some endobronchial bleeding with clots also.  She was discharged to NH on 10/22 on Cipro via PEG and IV Vanc.  She returned back to [facility] early this AM due to decreased LOC at NH, multiple episodes of diarrhea, per [facility] nursing home suctioned a large amount of blood from trach.  In ED temp 89.9 F and suctioned blood clots from Trach.  Plt count 38.  IVAB resumed and given IV Lasix        Hospital/ICU Course:  10/24 - Admitted to ICU on mech vent and warming via artic sun.  She is awake and responsive not requiring  pressor support    Past Medical History:   Diagnosis Date    Anticoagulant long-term use     Arthritis     Asthma     Atrial fibrillation     Blood clot of vein in shoulder area     Cardiac defibrillator in place     CHF (congestive heart failure)     Chronic knee pain     Diabetes mellitus type 2 without retinopathy 2016    Diaphragmatic hernia without obstruction and without gangrene 2015    GERD (gastroesophageal reflux disease)     Hypertension     Immune deficiency disorder     Primary open angle glaucoma (POAG) of both eyes, severe stage 2018       Past Surgical History:   Procedure Laterality Date    CARDIAC DEFIBRILLATOR PLACEMENT      , .    CATARACT EXTRACTION       SECTION      COLONOSCOPY      EGD, WITH PEG TUBE INSERTION N/A 2018    Performed by Louis O. Jeansonne IV, MD at [facility] OR    ashley Macdonald    ESOPHAGOGASTRODUODENOSCOPY (EGD) N/A 2015    Performed by Hieu Colbert MD at [facility] ENDO    ESOPHAGOGASTRODUODENOSCOPY W/ PEG N/A 2018    Procedure: EGD, WITH PEG TUBE INSERTION;  Surgeon: Louis O. Jeansonne IV, MD;  Location: [facility] OR;  Service: General;  Laterality: N/A;    KNEE ARTHROSCOPY      TRACHEOSTOMY N/A 2018    Procedure: TRACHEOSTOMY;  Surgeon: Louis O. Jeansonne IV, MD;  Location: [facility] OR;  Service: General;  Laterality: N/A;    TRACHEOSTOMY N/A 2018    Performed by Louis O. Jeansonne IV, MD at [facility] OR    UPPER GASTROINTESTINAL ENDOSCOPY         Review of patient's allergies indicates:   Allergen Reactions    Morphine Hives    Atorvastatin      Other reaction(s): Muscle pain    Clonidine     Codeine      Other reaction(s): Unknown    Digoxin      Other reaction(s): Unknown    Diovan [valsartan] Other (See Comments)     Upset stomach, weakness    Eggs [egg derived]     Metoprolol Diarrhea    Naproxen      Other reaction(s): Nausea    Peanut     Propofol      Other reaction(s): delirious    Sulfa  (sulfonamide antibiotics)        Family History     Problem Relation (Age of Onset)    Hypertension Mother, Father        Tobacco Use    Smoking status: Never Smoker    Smokeless tobacco: Never Used   Substance and Sexual Activity    Alcohol use: No     Alcohol/week: 0.0 oz    Drug use: No    Sexual activity: No         Review of Systems   Unable to perform ROS: Patient nonverbal     Objective:     Vital Signs (Most Recent):  Temp: (!) 92.8 °F (33.8 °C) (10/24/18 1115)  Pulse: 69 (10/24/18 1136)  Resp: (!) 0 (10/24/18 1136)  BP: 139/85 (10/24/18 1136)  SpO2: 99 % (10/24/18 1136) Vital Signs (24h Range):  Temp:  [86.9 °F (30.5 °C)-92.8 °F (33.8 °C)] 92.8 °F (33.8 °C)  Pulse:  [69-71] 69  Resp:  [0-21] 0  SpO2:  [81 %-100 %] 99 %  BP: ()/(58-97) 139/85     Weight: 83.2 kg (183 lb 5 oz)  Body mass index is 26.3 kg/m².      Intake/Output Summary (Last 24 hours) at 10/24/2018 1150  Last data filed at 10/24/2018 1114  Gross per 24 hour   Intake 3050 ml   Output --   Net 3050 ml       Physical Exam   Constitutional: Vital signs are normal. She appears well-developed. She appears cachectic. She has a sickly appearance. She appears ill. No distress.   HENT:   Head: Atraumatic.   Mouth/Throat: Oropharynx is clear and moist.   Eyes: EOM and lids are normal. Pupils are equal, round, and reactive to light.   Neck: Neck supple.       Cardiovascular: Normal rate and regular rhythm.   Pulses:       Radial pulses are 1+ on the right side, and 1+ on the left side.        Dorsalis pedis pulses are 1+ on the right side, and 1+ on the left side.   Pulmonary/Chest: No accessory muscle usage. No respiratory distress. She has decreased breath sounds in the right lower field. She has rhonchi. She has rales.   Abdominal: Soft. Bowel sounds are normal. She exhibits no distension. There is no tenderness.       Genitourinary:   Genitourinary Comments: Flores in place   Musculoskeletal:   Diffuse atrophy and +1 tibal edema    Lymphadenopathy:     She has no cervical adenopathy.   Neurological: She is alert.   Awake with eyes open and nods head to questions   Skin: Skin is dry. Capillary refill takes 2 to 3 seconds. No cyanosis.            Vents:  Vent Mode: A/C (10/24/18 1136)  Ventilator Initiated: Yes (10/24/18 0630)  Set Rate: 20 bmp (10/24/18 1136)  Vt Set: 0 mL (10/24/18 1136)  Pressure Support: 0 cmH20 (10/24/18 1136)  PEEP/CPAP: 15 cmH20 (10/24/18 1136)  Oxygen Concentration (%): 65 (10/24/18 1136)  Peak Airway Pressure: 34 cmH2O (10/24/18 1136)  Plateau Pressure: 0 cmH20 (10/24/18 1136)  Total Ve: 6.7 mL (10/24/18 1136)  F/VT Ratio<105 (RSBI): (!) 0 (10/24/18 1136)    Lines/Drains/Airways     Drain                 Gastrostomy/Enterostomy Percutaneous endoscopic gastrostomy (PEG) LUQ -- days         Urethral Catheter 10/24/18 1030 16 Fr. less than 1 day          Airway                 Surgical Airway 07/18/18 1620 Shiley 97 days          Pressure Ulcer                 Pressure Injury 10/18/18 2015 Coccyx #1 Unstageable 5 days         Pressure Injury 10/24/18 1100 Right medial Malleolus Stage 2 less than 1 day          Peripheral Intravenous Line                 Midline Catheter Insertion/Assessment  - Double Lumen 10/19/18 0131 Left brachial vein other (see comments) 5 days                Significant Labs:    CBC/Anemia Profile:  Recent Labs   Lab 10/24/18  0628   WBC 4.40   HGB 9.1*   HCT 30.3*   PLT 38*   MCV 90   RDW 19.1*        Chemistries:  Recent Labs   Lab 10/24/18  0628      K 3.5      CO2 26   BUN 29*   CREATININE 0.7   CALCIUM 9.4   ALBUMIN 2.1*   PROT 5.6*   BILITOT 0.3   ALKPHOS 118   ALT 26   AST 21       ABGs:   Recent Labs   Lab 10/24/18  0907   PH 7.295*   PCO2 56.2*   HCO3 27.3   POCSATURATED 70*   BE 1     Coagulation:   Recent Labs   Lab 10/24/18  0618   INR 1.2   APTT 36.5*     Lactic Acid:   Recent Labs   Lab 10/24/18  0628 10/24/18  0954   LACTATE 2.6* 1.1     Troponin:   Recent Labs   Lab  10/24/18  0628   TROPONINI 0.054*     Urine Studies:   Recent Labs   Lab 10/24/18  0721   COLORU Yellow   APPEARANCEUA Clear   PHUR 5.0   SPECGRAV 1.025   PROTEINUA 2+*   GLUCUA Negative   KETONESU Trace*   BILIRUBINUA Negative   OCCULTUA 1+*   NITRITE Negative   UROBILINOGEN Negative   LEUKOCYTESUR 1+*   RBCUA 2   WBCUA 23*   BACTERIA Many*   SQUAMEPITHEL 48   HYALINECASTS 6*     All pertinent labs within the past 24 hours have been reviewed.    Significant Imaging:   CXR: I have reviewed all pertinent results/findings within the past 24 hours and my personal findings are:  RLL atelectasis vs infiltrate with mild to mod bilat pulm edema    Assessment/Plan:     Pulmonary   Uncomplicated severe persistent asthma    Cont Duonebs  Add Formeterol and Budesonide     Acute on chronic respiratory failure with hypoxia and hypercapnia    Cont full vent support  Vent settings reviewed and adjusted  VAP prophylaxis  Wean PEEP and FiO2 slowly     Cardiac/Vascular   Chronic atrial fibrillation    NSR on Amiodarone infusion     Acute on chronic systolic congestive heart failure w/ pulm edema    Cont IV Lasix  Daily weights  Accurate I/Os  Follow up labs     Renal/   Chronic kidney disease (CKD), stage III (moderate)    Creatine 0.7  Monitor I/Os and creatine with diuresis     Urinary tract infection associated with indwelling urethral catheter    Cont Cipro  Repeat cultures pending     ID   * Severe sepsis    Cont IVAB from discharge  Normal WBC  Warm to normothermia  Cont supportive care  No IVFs given pulm edema     MRSA bacteremia    Cont Vanc, pharmacy to dose  Repeat cultures pending     Hematology   Thrombocytopenia    No bleeding other than Endobronchial  No anticoagulants or antiplatelets for now  Follow CBC     Endocrine   Hypothyroidism    Cont Levothyroxine     Type 2 diabetes mellitus with kidney complication, without long-term current use of insulin    Cont SSI  Glucose stable  Monitor for insulin toxicity      Other   Pressure injury of coccygeal region, unstageable    Wound care following  Low pressure bed  Turn Q 2 hours        Preventive Measures and Monitoring:   Stress Ulcer: Pepcid  Nutrition: resume TF per nutrition recs  Glucose control: SSI  Bowel prophylaxis: Miralax  DVT prophylaxis: SCDs  Hx CAD on B-Blocker: no hx CAD  Head of Bed/Reposition: Elevate HOB and turn Q1-2 hours   Early Mobility: bed rest  Chronic Trach/PEG  Central Line LUE Mid line Day: unknown  Flores Day: #1  IVAB Day: #6  Code Status: Full  Pneumonia Vaccine: ordered  Flu Vaccine: ordered    Counseling/Consultation:I have discussed the care of this patient in detail with the bedside nursing staff and Dr. Meier and Dr. Magallon    Critical Care Time: 78 minutes  Critical secondary to Patient has a condition that poses threat to life and bodily function: acute on chronic resp failure on mech ventilation with multiple vent adjustments     Critical care was time spent personally by me on the following activities: development of treatment plan with patient or surrogate and bedside caregivers, discussions with consultants, evaluation of patient's response to treatment, examination of patient, ordering and performing treatments and interventions, ordering and review of laboratory studies, ordering and review of radiographic studies, pulse oximetry, re-evaluation of patient's condition. This critical care time did not overlap with that of any other provider or involve time for any procedures.    Thank you for your consult. I will follow-up with patient. Please contact us if you have any additional questions.     Philip Myers NP  Critical Care Medicine  [facility]

## 2018-10-24 NOTE — PROGRESS NOTES
RT called to bedside for suctioning in attempt to increase O2 sat; moderate to large amounts of thick red niurka blood suctioned. Patient manually ventilated to increase O2 sats to 90s. ABG drawn to verify sats; results reported to MD Hodges. PEEP increased to +12. GIULIANO Baker also aware at this time.

## 2018-10-24 NOTE — ED PROVIDER NOTES
SCRIBE #1 NOTE: I, Hoa Moy, am scribing for, and in the presence of, Sebastian Hodges MD. I have scribed the entire note.      History      Chief Complaint   Patient presents with    Altered Mental Status     from [facility], decreased LOC at NH, multiple episodes of diarrhea, per \[facility]\"" nursing home suctioned a large amount of blood from trach       Review of patient's allergies indicates:   Allergen Reactions    Morphine Hives    Atorvastatin      Other reaction(s): Muscle pain    Clonidine     Codeine      Other reaction(s): Unknown    Digoxin      Other reaction(s): Unknown    Diovan [valsartan] Other (See Comments)     Upset stomach, weakness    Eggs [egg derived]     Metoprolol Diarrhea    Naproxen      Other reaction(s): Nausea    Peanut     Propofol      Other reaction(s): delirious    Sulfa (sulfonamide antibiotics)         HPI   HPI    10/24/2018, 6:13 AM   History obtained from \[facility]\""  HPI limited due to acuity of condition      History of Present Illness: Carlie Valdez is a 72 y.o. female patient who presents to the Emergency Department for decreased mental status, per [facility]. Pt's pulse ox was reported to be 48%; hypoxia is questionable given that she was likely hypothermic affecting spO2 reading. NH also notes recent diarrhea. Pt was admitted from ED on 10/18 for sepsis and was d/c with Vancomycin on 10/22. Pt is not c/o any sxs. Patient denies any localized pain, SOB, fever, chills, abd pain, mucous coming from trach, and all other sxs at this time. Per \[facility]\"", [facility] nursing staff suctioned a large amount of blood from pt's trach. Pt has had episodes of hemoptysis following a previous bronchoscopy, which was presumed to be from trauma. No further complaints or concerns at this time.       Arrival mode: \[facility]\""    PCP: Johanna Guzman MD       Past Medical History:  Past Medical History:   Diagnosis Date    Anticoagulant long-term use      Arthritis     Asthma     Atrial fibrillation     Blood clot of vein in shoulder area     Cardiac defibrillator in place     CHF (congestive heart failure)     Chronic knee pain     Diabetes mellitus type 2 without retinopathy 2016    Diaphragmatic hernia without obstruction and without gangrene 2015    GERD (gastroesophageal reflux disease)     Hypertension     Immune deficiency disorder     Primary open angle glaucoma (POAG) of both eyes, severe stage 2018       Past Surgical History:  Past Surgical History:   Procedure Laterality Date    CARDIAC DEFIBRILLATOR PLACEMENT      , .    CATARACT EXTRACTION       SECTION      COLONOSCOPY      EGD, WITH PEG TUBE INSERTION N/A 2018    Performed by Louis O. Jeansonne IV, MD at [facility] OR    ashley Macdonald    ESOPHAGOGASTRODUODENOSCOPY (EGD) N/A 2015    Performed by Hieu Colbert MD at [facility] ENDO    ESOPHAGOGASTRODUODENOSCOPY W/ PEG N/A 2018    Procedure: EGD, WITH PEG TUBE INSERTION;  Surgeon: Louis O. Jeansonne IV, MD;  Location: [facility] OR;  Service: General;  Laterality: N/A;    KNEE ARTHROSCOPY      TRACHEOSTOMY N/A 2018    Procedure: TRACHEOSTOMY;  Surgeon: Louis O. Jeansonne IV, MD;  Location: [facility] OR;  Service: General;  Laterality: N/A;    TRACHEOSTOMY N/A 2018    Performed by Louis O. Jeansonne IV, MD at [facility] OR    UPPER GASTROINTESTINAL ENDOSCOPY           Family History:  Family History   Problem Relation Age of Onset    Hypertension Mother     Hypertension Father     Colon cancer Neg Hx     Stomach cancer Neg Hx        Social History:  Social History     Tobacco Use    Smoking status: Never Smoker    Smokeless tobacco: Never Used   Substance and Sexual Activity    Alcohol use: No     Alcohol/week: 0.0 oz    Drug use: No    Sexual activity: No       ROS   Review of Systems   Constitutional: Negative for chills and fever.   HENT:        (-) mucous from trach   Respiratory:  Negative for shortness of breath.    Gastrointestinal: Negative for abdominal pain.   Musculoskeletal:        (-) localized pain   ROS limited due to pt's condition    Physical Exam      Initial Vitals [10/24/18 0608]   BP Pulse Resp Temp SpO2   110/80 70 18 (S) (!) 86.9 °F (30.5 °C) 98 %      MAP       --          Physical Exam  Nursing Notes and Vital Signs Reviewed.  Constitutional: Patient is in NAD. Elderly.   Head: Atraumatic. Normocephalic.  Eyes: Difficult to open lids, blind.   ENT: Mucous membranes are dry. Tracheostomy in place. Trach is intact, without sounds of upper airway obstructions. 10 cc of bloody aspirate obtained on RT suctioning.  Neck: Supple. Full ROM.   Cardiovascular: Regular rate. Regular rhythm. No murmurs, rubs, or gallops. Distal pulses are 2+ and symmetric.  Pulmonary/Chest: Breath sounds are coarse bilaterally.  Abdominal: PED tube is dry and intact. Abd is non-tender and cool.  Musculoskeletal: No obvious deformities. No edema.  Skin: Cool and dry. PICC line in place to LUE.  Neurological:  Alert, awake, and appropriate.  Unable to speak secondary to tracheostomy in place. Pt easily answers yes or no questions with nodding.  Psychiatric: Normal affect.      ED Course    Critical Care  Date/Time: 10/24/2018 7:19 AM  Performed by: Sebastian Hodges MD  Authorized by: Sebastian Hodges MD   Direct patient critical care time: 10 minutes  Additional history critical care time: 7 minutes  Ordering / reviewing critical care time: 6 minutes  Documentation critical care time: 6 minutes  Consulting other physicians critical care time: 6 minutes  Total critical care time (exclusive of procedural time) : 35 minutes  Critical care time was exclusive of separately billable procedures and treating other patients and teaching time.  Critical care was necessary to treat or prevent imminent or life-threatening deterioration of the following conditions: Hypotensive crisis.  Critical care was time  spent personally by me on the following activities: blood draw for specimens, development of treatment plan with patient or surrogate, interpretation of cardiac output measurements, evaluation of patient's response to treatment, examination of patient, obtaining history from patient or surrogate, ordering and performing treatments and interventions, ordering and review of laboratory studies, ordering and review of radiographic studies, pulse oximetry, re-evaluation of patient's condition, review of old charts, transcutaneous pacing and ventilator management.        ED Vital Signs:  Vitals:    10/24/18 0630 10/24/18 0636 10/24/18 0647 10/24/18 0712   BP:    100/71   Pulse: 69 69 69 69   Resp:   13 12   Temp:       TempSrc:       SpO2:   95% 98%   Weight:       Height:        10/24/18 0727 10/24/18 0747 10/24/18 0749 10/24/18 0750   BP: 90/62 (!) 92/58     Pulse: 69 69  69   Resp: 16 11  15   Temp:       TempSrc:       SpO2: 97% (!) 93% 95% 96%   Weight:       Height:        10/24/18 0757 10/24/18 0822 10/24/18 0827 10/24/18 0853   BP: 94/62 125/68 132/76 106/64   Pulse: 69 69 69 69   Resp: 12 10 14 (!) 9   Temp:       TempSrc:       SpO2: (!) 94% (!) 94% 95% (!) 81%   Weight:       Height:        10/24/18 0902 10/24/18 0905 10/24/18 0912   BP: 115/72  112/69   Pulse: 69 69 69   Resp: 14 18 10   Temp:      TempSrc:      SpO2: (!) 83% (!) 94% (!) 84%   Weight:      Height:          Abnormal Lab Results:  Labs Reviewed   CBC W/ AUTO DIFFERENTIAL - Abnormal; Notable for the following components:       Result Value    RBC 3.37 (*)     Hemoglobin 9.1 (*)     Hematocrit 30.3 (*)     MCHC 30.0 (*)     RDW 19.1 (*)     Platelets 38 (*)     Lymph # 0.4 (*)     Mono # 0.1 (*)     Gran% 86.2 (*)     Lymph% 9.1 (*)     Mono% 2.7 (*)     Platelet Estimate Decreased (*)     All other components within normal limits    Narrative:     PLT   critical result(s) called and verbal readback obtained from Uzma mccormack Rn , 10/24/2018 07:14    COMPREHENSIVE METABOLIC PANEL - Abnormal; Notable for the following components:    Glucose 123 (*)     BUN, Bld 29 (*)     Total Protein 5.6 (*)     Albumin 2.1 (*)     All other components within normal limits   LACTIC ACID, PLASMA - Abnormal; Notable for the following components:    Lactate (Lactic Acid) 2.6 (*)     All other components within normal limits   URINALYSIS, REFLEX TO URINE CULTURE - Abnormal; Notable for the following components:    Protein, UA 2+ (*)     Ketones, UA Trace (*)     Occult Blood UA 1+ (*)     Leukocytes, UA 1+ (*)     All other components within normal limits    Narrative:     Preferred Collection Type->Urine, Clean Catch   TROPONIN I - Abnormal; Notable for the following components:    Troponin I 0.054 (*)     All other components within normal limits   URINALYSIS MICROSCOPIC - Abnormal; Notable for the following components:    WBC, UA 23 (*)     Bacteria, UA Many (*)     Hyaline Casts, UA 6 (*)     All other components within normal limits    Narrative:     Preferred Collection Type->Urine, Clean Catch   APTT - Abnormal; Notable for the following components:    aPTT 36.5 (*)     All other components within normal limits   FIBRINOGEN - Abnormal; Notable for the following components:    Fibrinogen 636 (*)     All other components within normal limits   D DIMER, QUANTITATIVE - Abnormal; Notable for the following components:    D-Dimer 2.44 (*)     All other components within normal limits   ISTAT PROCEDURE - Abnormal; Notable for the following components:    POC PH 7.295 (*)     POC PCO2 56.2 (*)     POC PO2 42 (*)     POC SATURATED O2 70 (*)     All other components within normal limits   INFLUENZA A & B BY MOLECULAR   CULTURE, BLOOD   CULTURE, BLOOD   CULTURE, RESPIRATORY   CULTURE, URINE   FIBRINOGEN   APTT   PROTIME-INR   D DIMER, QUANTITATIVE   PROTIME-INR   LACTIC ACID, PLASMA        All Lab Results:  Results for orders placed or performed during the hospital encounter of  10/24/18   Influenza A & B by Molecular   Result Value Ref Range    Influenza A, Molecular Negative Negative    Influenza B, Molecular Negative Negative    Flu A & B Source NP    CBC auto differential   Result Value Ref Range    WBC 4.40 3.90 - 12.70 K/uL    RBC 3.37 (L) 4.00 - 5.40 M/uL    Hemoglobin 9.1 (L) 12.0 - 16.0 g/dL    Hematocrit 30.3 (L) 37.0 - 48.5 %    MCV 90 82 - 98 fL    MCH 27.0 27.0 - 31.0 pg    MCHC 30.0 (L) 32.0 - 36.0 g/dL    RDW 19.1 (H) 11.5 - 14.5 %    Platelets 38 (LL) 150 - 350 K/uL    MPV SEE COMMENT 9.2 - 12.9 fL    Gran # (ANC) 3.8 1.8 - 7.7 K/uL    Lymph # 0.4 (L) 1.0 - 4.8 K/uL    Mono # 0.1 (L) 0.3 - 1.0 K/uL    Eos # 0.1 0.0 - 0.5 K/uL    Baso # 0.00 0.00 - 0.20 K/uL    Gran% 86.2 (H) 38.0 - 73.0 %    Lymph% 9.1 (L) 18.0 - 48.0 %    Mono% 2.7 (L) 4.0 - 15.0 %    Eosinophil% 2.0 0.0 - 8.0 %    Basophil% 0.0 0.0 - 1.9 %    Platelet Estimate Decreased (A)     Aniso Slight     Poik Slight     Ovalocytes Occasional     Large/Giant Platelets Present     Differential Method Automated    Comprehensive metabolic panel   Result Value Ref Range    Sodium 141 136 - 145 mmol/L    Potassium 3.5 3.5 - 5.1 mmol/L    Chloride 105 95 - 110 mmol/L    CO2 26 23 - 29 mmol/L    Glucose 123 (H) 70 - 110 mg/dL    BUN, Bld 29 (H) 8 - 23 mg/dL    Creatinine 0.7 0.5 - 1.4 mg/dL    Calcium 9.4 8.7 - 10.5 mg/dL    Total Protein 5.6 (L) 6.0 - 8.4 g/dL    Albumin 2.1 (L) 3.5 - 5.2 g/dL    Total Bilirubin 0.3 0.1 - 1.0 mg/dL    Alkaline Phosphatase 118 55 - 135 U/L    AST 21 10 - 40 U/L    ALT 26 10 - 44 U/L    Anion Gap 10 8 - 16 mmol/L    eGFR if African American >60 >60 mL/min/1.73 m^2    eGFR if non African American >60 >60 mL/min/1.73 m^2   Lactic acid, plasma #1   Result Value Ref Range    Lactate (Lactic Acid) 2.6 (H) 0.5 - 2.2 mmol/L   Urinalysis, Reflex to Urine Culture Urine, Clean Catch   Result Value Ref Range    Specimen UA Urine, Catheterized     Color, UA Yellow Yellow, Straw, Samra    Appearance, UA  Clear Clear    pH, UA 5.0 5.0 - 8.0    Specific Gravity, UA 1.025 1.005 - 1.030    Protein, UA 2+ (A) Negative    Glucose, UA Negative Negative    Ketones, UA Trace (A) Negative    Bilirubin (UA) Negative Negative    Occult Blood UA 1+ (A) Negative    Nitrite, UA Negative Negative    Urobilinogen, UA Negative <2.0 EU/dL    Leukocytes, UA 1+ (A) Negative   Troponin I   Result Value Ref Range    Troponin I 0.054 (H) 0.000 - 0.026 ng/mL   Urinalysis Microscopic   Result Value Ref Range    RBC, UA 2 0 - 4 /hpf    WBC, UA 23 (H) 0 - 5 /hpf    Bacteria, UA Many (A) None-Occ /hpf    Squam Epithel, UA 48 /hpf    Hyaline Casts, UA 6 (A) 0-1/lpf /lpf    Microscopic Comment SEE COMMENT    Protime-INR   Result Value Ref Range    Prothrombin Time 12.2 9.0 - 12.5 sec    INR 1.2 0.8 - 1.2   APTT   Result Value Ref Range    aPTT 36.5 (H) 21.0 - 32.0 sec   Fibrinogen   Result Value Ref Range    Fibrinogen 636 (H) 182 - 366 mg/dL   D dimer, quantitative   Result Value Ref Range    D-Dimer 2.44 (H) <0.50 mg/L FEU   ISTAT PROCEDURE   Result Value Ref Range    POC PH 7.295 (L) 7.35 - 7.45    POC PCO2 56.2 (HH) 35 - 45 mmHg    POC PO2 42 (LL) 80 - 100 mmHg    POC HCO3 27.3 24 - 28 mmol/L    POC BE 1 -2 to 2 mmol/L    POC SATURATED O2 70 (L) 95 - 100 %    Rate 12     Sample ARTERIAL     Site RR     Allens Test Pass     DelSys Adult Vent     Mode AC/PRVC     Vt 350     PEEP 7     FiO2 100        Imaging Results:  Imaging Results          X-Ray Chest AP Portable (Final result)  Result time 10/24/18 07:48:45    Final result by JOSEFINA Garcia Sr., MD (10/24/18 07:48:45)                 Impression:      1. There is silhouetting of the right border of the heart. There is a mild amount of interstitial and alveolar opacities seen in the medial aspect of both lungs.  This is characteristic of pulmonary edema.  2. There is persistent opacification of the base of the right hemithorax.  This is characteristic of a small right pleural effusion with  associated atelectasis.  3. There are moderate osteoarthritic changes in the left glenohumeral joint.  .      Electronically signed by: Homar Garcia MD  Date:    10/24/2018  Time:    07:48             Narrative:    EXAMINATION:  XR CHEST AP PORTABLE    CLINICAL HISTORY:  Sepsis;    COMPARISON:  10/22/2018    FINDINGS:  A cardiac pacemaker remains in place.  A tracheostomy tube remains in place.  There is silhouetting of the right border of the heart.  There is a mild amount of interstitial and alveolar opacities seen in the medial aspect of both lungs.  There is persistent opacification of the base of the right hemithorax.  The left costophrenic angle is sharp.  There is no pneumothorax.  There are moderate osteoarthritic changes in the left glenohumeral joint.                                        The EKG was ordered, reviewed, and independently interpreted by the ED provider.  Interpretation time: 6:50  Rate: 70 BPM  Rhythm: normal sinus rhythm  Interpretation: LAD. LBBB. No STEMI.      The Emergency Provider reviewed the vital signs and test results, which are outlined above.    ED Discussion     6:18 AM: Patient moved to Room 4.    6:42 AM: Re-evaluated pt. Pt's BP is 82/60. Pt denies feeling weak. No other complaints at this time.    7:03 AM: Re-evaluated pt. Pt is hypotensive at 82/50 with warmed fluids ongoing and dai hugger applied.    7:41 AM: Re-evaluated pt. Pt had about 10 cc of blood suctioned from trach.    8:15 AM: Per the ER nurse, pt's temp is 83 degrees with dai hugger. Arctic Sun will be placed.     8:18 AM: Discussed case with KADI Koenig (Hospital Medicine). Dr. Naun Lomeli agrees with current care and management of pt and accepts admission.   Admitting Service: Hospital medicine   Admitting Physician: Dr. Naun Lomeli  Admit to: ICU    8:25 AM: Re-evaluated pt. Though pt's temp has increased to 87 degrees, Arctic Sun will still need to be placed at this time. I have  discussed test results, shared treatment plan, and the need for admission with patient at bedside. Pt expresses understanding at this time and agree with all information. Pt is ready for admit.    ED Medication(s):  Medications   vancomycin (VANCOCIN) 1,750 mg in dextrose 5 % 250 mL IVPB (1,750 mg Intravenous New Bag 10/24/18 0800)   meropenem-0.9% sodium chloride 500 mg/50 mL IVPB (not administered)   sodium chloride 0.9% bolus 1,000 mL (0 mLs Intravenous Stopped 10/24/18 0845)   piperacillin-tazobactam 4.5 g in dextrose 5 % 100 mL IVPB (ready to mix system) (4.5 g Intravenous New Bag 10/24/18 0726)   sodium chloride 0.9% bolus 1,000 mL (0 mLs Intravenous Stopped 10/24/18 0749)   sodium chloride 0.9% bolus 500 mL (500 mLs Intravenous New Bag 10/24/18 0749)             Medical Decision Making    Medical Decision Making:   History:   I obtained history from: EMS provider.  Clinical Tests:   Lab Tests: Reviewed and Ordered  Radiological Study: Reviewed and Ordered  Medical Tests: Reviewed and Ordered           Scribe Attestation:   Scribe #1: I performed the above scribed service and the documentation accurately describes the services I performed. I attest to the accuracy of the note.    Attending:   Physician Attestation Statement for Scribe #1: I, Sebastian Hodges MD, personally performed the services described in this documentation, as scribed by Hoa Moy, in my presence, and it is both accurate and complete.          Clinical Impression       ICD-10-CM ICD-9-CM   1. Sepsis, due to unspecified organism A41.9 038.9     995.91   2. Altered mental status R41.82 780.97   3. Hypothermia, initial encounter T68.XXXA 991.6   4. Thrombocytopenia D69.6 287.5   5. Urinary tract infection associated with indwelling urethral catheter, initial encounter T83.511A 996.64    N39.0 599.0   6. Hemoptysis R04.2 786.30       Disposition:   Disposition: Admitted  Condition: Serious         Sebastian Hodges MD  10/24/18  [id number]

## 2018-10-24 NOTE — PLAN OF CARE
Problem: Patient Care Overview  Goal: Plan of Care Review  Outcome: Ongoing (interventions implemented as appropriate)    Recommendations     Recommendation/Intervention: 1.Consider adding 1 pack of beneprotein daily. 2. Check residuals Q4 hours. Hold if > 500 cc. 3. Will continue to monitor.   Goals: Meet > 85 % EEN/EPN while admitted  Nutrition Goal Status: new  Communication of RD Recs: (Reviewed w/ NP)

## 2018-10-24 NOTE — PROGRESS NOTES
Trach care done. Site cleaned. Inner cannula changed. HME replaced. Gauze sponge placed under trach.

## 2018-10-24 NOTE — ASSESSMENT & PLAN NOTE
- Urine culture pending  - Received  IV Rocephinx 1 dose in ED   - Will continue IV Ciprofloxacin

## 2018-10-24 NOTE — PROGRESS NOTES
Patient placed back on vent support with settings verified with Geraldo Myers NP at bedside.   Current MV settings: RR14, 24 Pi, I-time .96, 100% FiO2 and Peep +15.

## 2018-10-24 NOTE — ED NOTES
Spoke to GI[facility] ICU, evaluation requested by ICU NP, Geraldo.  Will see pt in ED in 5 min.

## 2018-10-24 NOTE — PLAN OF CARE
Problem: Patient Care Overview  Goal: Plan of Care Review  Outcome: Ongoing (interventions implemented as appropriate)  Vitals stable, pt responds to voice, attempts to mouth words.  Normothermic now, Artic sun pads removed.  Wound care done per wound care nurse today.  Pt placed on specialty bed.  UOP good today.

## 2018-10-24 NOTE — CONSULTS
[facility] -   Adult Nutrition  Consult Note    SUMMARY     Recommendations    Recommendation/Intervention: 1.Consider adding 1 pack of beneprotein daily. 2. Check residuals Q4 hours. Hold if > 500 cc. 3. Will continue to monitor.   Goals: Meet > 85 % EEN/EPN while admitted  Nutrition Goal Status: new  Communication of RD Recs: (Reviewed w/ NP)    Reason for Assessment    Reason for Assessment: consult  Dx:  1. Sepsis, due to unspecified organism    2. Altered mental status    3. Hypothermia, initial encounter    4. Thrombocytopenia    5. Urinary tract infection associated with indwelling urethral catheter, initial encounter    6. Hemoptysis    7. Severe sepsis    8. Acute on chronic respiratory failure with hypoxia and hypercapnia    9. Acute on chronic systolic congestive heart failure    10. Chronic atrial fibrillation    11. Chronic kidney disease (CKD), stage III (moderate)    12. MRSA bacteremia    13. Pressure injury of coccygeal region, unstageable    14. Uncomplicated severe persistent asthma      Past Medical History:   Diagnosis Date    Anticoagulant long-term use     Arthritis     Asthma     Atrial fibrillation     Blood clot of vein in shoulder area     Cardiac defibrillator in place     CHF (congestive heart failure)     Chronic knee pain     Diabetes mellitus type 2 without retinopathy 12/19/2016    Diaphragmatic hernia without obstruction and without gangrene 9/14/2015    GERD (gastroesophageal reflux disease)     Hypertension     Immune deficiency disorder     Primary open angle glaucoma (POAG) of both eyes, severe stage 6/1/2018       Interdisciplinary Rounds: attended  General Information Comments: Pt currently in ICU. Pt is a resident at [facility]. Pt admitted w/ AMS. Pt has a trach and PEG. Pt on mechanical ventilation. Pt was recently discharged on 10/22. Per [facility] records, no wt loss in the past 3 months. NFPE not completed due to pt with other healthcare  "providers at time of visit. Will complete at follow up. Reviewed TF recs w/ NP today, TF ordered per RD recs.   Nutrition Discharge Planning: Patient to receive adequate intake via PEG with tolerance.     Nutrition Risk Screen         Nutrition/Diet History     Food preferences: N/A pt has a PEG    Anthropometrics    Temp: (!) 94.3 °F (34.6 °C)  Height Method: Estimated  Height: 5' 10" (177.8 cm)  Height (inches): 70 in  Weight Method: Bed Scale  Weight: 87.4 kg (192 lb 10.9 oz)  Weight (lb): 192.68 lb  Ideal Body Weight (IBW), Female: 150 lb  % Ideal Body Weight, Female (lb): 128.45 lb  BMI (Calculated): 27.7  BMI Grade: 25 - 29.9 - overweight       Lab/Procedures/Meds    Pertinent Labs Reviewed: reviewed  BMP  Lab Results   Component Value Date     10/24/2018    K 3.5 10/24/2018     10/24/2018    CO2 26 10/24/2018    BUN 29 (H) 10/24/2018    CREATININE 0.7 10/24/2018    CALCIUM 9.4 10/24/2018    ANIONGAP 10 10/24/2018    ESTGFRAFRICA >60 10/24/2018    EGFRNONAA >60 10/24/2018     /lastphos  Lab Results   Component Value Date    CALCIUM 9.4 10/24/2018    PHOS 4.1 10/18/2018       Lab Results   Component Value Date    ALBUMIN 2.1 (L) 10/24/2018     Recent Labs   Lab 10/24/18  1204   POCTGLUCOSE 144*     Lab Results   Component Value Date    HGBA1C 4.9 10/19/2018       Pertinent Medications Reviewed: reviewed    Physical Findings/Assessment    Overall Physical Appearance: on ventilator support  Tubes: (PEG)  Oral/Mouth Cavity: (dry)  Skin: (pressure injury malleolus stg 2 and coccyx unstageable )    Estimated/Assessed Needs    Weight Used For Calorie Calculations: 87.4 kg (192 lb 10.9 oz)  Energy Calorie Requirements (kcal): 1043  Energy Need Method: [facility]  Protein Requirements: 105 - 131 g  Weight Used For Protein Calculations: 87.4 kg (192 lb 10.9 oz)     Fluid Need Method: RDA Method(or per MD)  RDA Method (mL): 1043  CHO Requirement: 50 % EEN      Nutrition Prescription Ordered    Current Diet " Order: NPO  Current Nutrition Support Formula Ordered: Peptamen Intense VHP  Current Nutrition Support Rate Ordered: 45 (ml)(ml/hr)    Evaluation of Received Nutrient/Fluid Intake    Enteral Calories (kcal): 1080  Enteral Protein (gm): 100  % Kcal Needs: 100  % Protein Needs: 95     Intake/Output Summary (Last 24 hours) at 10/24/2018 1408  Last data filed at 10/24/2018 1210  Gross per 24 hour   Intake 3110 ml   Output 94 ml   Net 3016 ml       % Intake of Estimated Energy Needs: 75 - 100 %  % Meal Intake: NPO    Nutrition Risk    Level of Risk/Frequency of Follow-up: (2xweekly)     Assessment and Plan  Nutrition Problem  Swallowing difficulty    Related to (etiology):   Dysphagia     Signs and Symptoms (as evidenced by):   Pt has a PEG x nutrition support    Interventions/Recommendations (treatment strategy):  See above     Nutrition Diagnosis Status:   New      Monitor and Evaluation    Food and Nutrient Intake: energy intake, enteral nutrition intake  Food and Nutrient Adminstration: enteral and parenteral nutrition administration  Anthropometric Measurements: weight  Biochemical Data, Medical Tests and Procedures: electrolyte and renal panel, glucose/endocrine profile  Nutrition-Focused Physical Findings: overall appearance, skin     Nutrition Follow-Up    RD Follow-up?: Yes

## 2018-10-24 NOTE — CONSULTS
10/24/18 1105   Handoff Report   Given To GIULIANO Mcdowell   Pain/Comfort Assessments   Pain Assessment Performed Yes       Number Scale   Presence of Pain non-verbal indicators absent   Skin   Skin WDL ex   Skin Temperature cold   Specialty Bed/Overlay Low air loss;Alternating pressure  (ordered)   Beka Risk Assessment   Sensory Perception 2-->very limited   Moisture 3-->occasionally moist   Activity 1-->bedfast   Mobility 1-->completely immobile   Nutrition 2-->probably inadequate   Friction and Shear 1-->problem   Beka Score 10       Wound 10/24/18 0705 Incontinence associated dermatitis Perirectal   Date First Assessed/Time First Assessed: 10/24/18 0705   Pre-existing: Yes  Primary Wound Type: Incontinence associated dermatitis  Location: Perirectal   Wound WDL ex   Dressing Appearance Open to air   Drainage Amount Scant   Drainage Characteristics/Odor Serosanguineous   Appearance Red;Moist   Tissue loss description Partial thickness   Red (%), Wound Tissue Color 100 %   Periwound Area Denuded   Wound Edges Open   Care Cleansed with:;Sterile normal saline;Applied:;Skin Barrier       Pressure Injury 10/24/18 Coccyx Unstageable   Date First Assessed: 10/24/18   Pressure Injury Present on Admission: yes  Location: Coccyx  Is this injury device related?: No  Staging: (c) Unstageable   Wound Image    Staging U   Dressing Appearance Intact;Moist drainage   Drainage Amount Small   Drainage Characteristics/Odor Tan   Appearance Black;Tan;Slough;Red;Moist   Tissue loss description Full thickness   Black (%), Wound Tissue Color 50 %   Red (%), Wound Tissue Color 50 %   Periwound Area Denuded   Wound Edges Open   Wound Length (cm) 10 cm   Wound Width (cm) 5 cm   Wound Depth (cm) 0.2 cm   Wound Volume (cm^3) 10 cm^3   Care Cleansed with:;Sterile normal saline;Applied:;Skin Barrier   Dressing Applied;Hydrofiber;Hydrocolloid   Periwound Care Absorptive dressing applied;Cleansed with pH balanced cleanser;Dry periwound area  maintained;Skin barrier film applied   Dressing Change Due 10/27/18       Pressure Injury 10/24/18 1100 Right medial Malleolus Stage 3   Date First Assessed/Time First Assessed: 10/24/18 1100   Pressure Injury Present on Admission: yes  Side: Right  Orientation: medial  Location: Malleolus  Is this injury device related?: No  Staging: Stage 3   Staging 3   Dressing Appearance Open to air   Drainage Amount Small   Drainage Characteristics/Odor Serous   Appearance Red;Granulating;Moist   Tissue loss description Full thickness   Red (%), Wound Tissue Color 100 %   Periwound Area Scar tissue   Wound Edges Open   Wound Length (cm) 0.75 cm   Wound Width (cm) 0.75 cm   Wound Depth (cm) 0.1 cm   Wound Volume (cm^3) 0.06 cm^3   Care Cleansed with:;Sterile normal saline;Applied:;Skin Barrier   Dressing Applied;Foam   Dressing Change Due 10/27/18       Pressure Injury 10/24/18 1105 Left Ischial tuberosity Stage 3   Date First Assessed/Time First Assessed: 10/24/18 1105   Pressure Injury Present on Admission: yes  Side: Left  Location: Ischial tuberosity  Is this injury device related?: No  Staging: Stage 3   Wound Image    Staging 3   Dressing Appearance Intact;Moist drainage   Drainage Amount Scant   Drainage Characteristics/Odor Serous   Appearance Red;Yellow;Moist   Tissue loss description Full thickness   Red (%), Wound Tissue Color 75 %   Yellow (%), Wound Tissue Color 25 %   Periwound Area Scar tissue   Wound Edges Open   Wound Length (cm) 3 cm   Wound Width (cm) 3 cm   Wound Depth (cm) 0.1 cm   Wound Volume (cm^3) 0.9 cm^3   Care Cleansed with:;Sterile normal saline;Applied:;Skin Barrier   Dressing Foam   Dressing Change Due 10/27/18   Skin Interventions   Device Skin Pressure Protection absorbent pad utilized/changed;adhesive use limited;positioning supports utilized;pressure points protected   Pressure Reduction Devices Pressure-redistributing mattress utilized  (ordered specialty ANGIE pulsate bed)   Pressure Reduction  Techniques frequent weight shift encouraged;heels elevated off bed;positioned off wounds;pressure points protected   Skin Protection Adhesive use limited;Incontinence pads;Skin sealant/moisture barrier;Tubing/devices free from skin contact   Positioning   Body Position supine   Head of Bed (HOB) HOB at 30 degrees   Positioning/Transfer Devices pillows;wedge;applied         Consulted on this 71 y/o F patient due to present on admission skin breakdown. Patient was recently discharged from ICU last week.  She is currently ventillated via trach.  Skin assessment completed. Milagros trach skin intact and clean.  PEG tube to LUQ with milagros tube skin intact.  Artic Sun pads on body for patient warming currently and unable to assess underneath the pads. Patient turned to left side with assistance of primary nurse GIULIANO Mcdowell. Gauze dressing removed.     Unstageable pressure injury to coccyx measures 10x5x0.2cm. Wound bed moist, 50% tan/black firm moist slough, 50% red moist tissue. Cleansed with saline, painted milagros wound with cavilon. aquacel extra x2 layers applied and secured with duoderm. This is an evolving wound that began as a DTI and is expected to worsen.    Stage 3 pressure injury to left ischium noted measures 3x3x0.1cm with moist pink and yellow wound bed and scant serous drainage.  Cleansed with saline, milagros wound painted with cavilon. Foam dressing applied. Intact scar tissue also noted to right ischium.    Milagros rectal IAD noted due to diarrhea with scattered areas of partial thickness tissue loss. Painted with cavilon.    Right heel DTI noted with maroon/purple discoloration within scar tissue. Painted with cavilon.     Right medial malleolus stage 3 pressure injury noted with moist red granulating wound bed and scant serous drainage.  Cleansed with saline,  Painted with cavilon, and foam dressing applied.      Recommend ANGIE bed and heel offloading boots.  Please see below for wound care  "recommendations:    Unstageable pressure injury to coccyx:  1. Cleanse with saline  2. Pat dry  3. Paint milagros wound skin with cavilon  4. Apply 2 layers Aquacel extra to cover wound  5. Secure with duoderm  6. Change every 3 days and prn edges roll  7. Obtain foam wedge from [employer] management to assist with maintaining proper position changes at least q 2 hours and document actual position in [facility] q 2 hours    Stage 3 pressure injury to left ischium:  1. Cleanse with saline  2. Pat dry  3. Paint milagros wound skin with cavilon  4. Apply foam dressing (may convert to duoderm if is consistently soiled)  5. Change every 3 days and prn    DTI to left heel:  1. Paint with cavilon every shift  2. Apply heel offloading boots ([facility] Clarence Teddy)    Stage 3 pressure injury of right medial malleolus:  1. Cleanse with saline  2. Pat dry  3. Paint with cavilon  4. Apply foam dressing  5. Change every 3 days and prn    IAD - perirectal:  1. Cleanse with bath wipes  2. Apply thin layer critic aid paste BID and prn    Skin Care Precautions / Pressure Injury Prevention:  1. Follow "Guidelines for Prevention of Pressure Ulcers in At Risk Patients"  These guidelines can be found on the [facility] Intranet by searching "Wound Care / Ostomy Resources"  2. Document wound assessment in [facility] using guidelines in Girma's "Assessment : Wound" procedure  3. Limit the amount of linen/underpad between patient and mattress surface to ONE fitted sheet and ONE covidien underpad - NO draw sheet/briefs.  4. Obtain bath Wipes for providing milagros care - avoid the use of wash cloths to areas affected by IAD.  5. Apply paste Barrier Ointment to perineal / perirectal areas in a thin even layer to clean dry skin BID and after each episode of pericare  6. Apply sween 24 moisturizer cream to all dry skin after daily bath and prn  7. Obtain foam wedge from materials management to assist with maintaining proper position changes at least q 2hours and document actual " position in EPIC q 2hours  8. Elevate heels off mattress on 2 separate pillows placed lengthwise under each leg supporting the leg from knee to ankle.  Document in EPIC flow sheet every 2 hours.  9. .Do NOT elevate HOB greater than 30 degrees unless contraindicated.  10. Remove SCD/Plexi Pulses/TESSA's every 12 hours for 30 minutes and assess skin underneath these devices for breakdown

## 2018-10-24 NOTE — HPI
Ms Valdez is a 72 year old female NH resident at [facility] with Trach/PEG vent dependent and bed bound post NMO diagnosis earlier this year.  She also has a past medical history of Anticoagulant long-term use, Arthritis, Asthma, Atrial fibrillation, Blood clot of vein in shoulder area, Cardiac defibrillator in place, CHF (congestive heart failure), Chronic knee pain, Diabetes mellitus type 2 without retinopathy (12/19/2016), Diaphragmatic hernia without obstruction and without gangrene (9/14/2015), GERD (gastroesophageal reflux disease), Hypertension, Immune deficiency disorder, and Primary open angle glaucoma (POAG) of both eyes, severe stage (6/1/2018).  She was also recently hospitalized here at [facility] in ICU 10/19/18 admitted and discharged 2 days ago on 10/22/2018 for Acute on chronic hypoxic resp failure with total left lung opacification as well as severe sepsis with MRSA PNA and bacteremia as well as E.Coli and Klebsiella UTI.  She received a bronch here and left lung cleared with suctioning, IVAB and nebs.  She had some endobronchial bleeding with clots also.  She was discharged to NH on 10/22 on Cipro via PEG and IV Vanc.  She returned back to [facility] early this AM due to decreased LOC at NH, multiple episodes of diarrhea, per [facility] nursing home suctioned a large amount of blood from trach.  In ED temp 89.9 F and suctioned blood clots from Trach.  Plt count 38.  IVAB resumed and given IV Lasix

## 2018-10-24 NOTE — HPI
Carlie Valdez is a 72 y.o. female patient with PMHx of Afib, CHF, DM, GERD, neuromyelitis optica spectrum disorder, and HTN, Chronic Trach/Peg/Harris who presents to the Emergency Department for altered mental status which onset gradually last night. Patient currently resides at [facility]. Pt's pulse ox was reported to be 48% per [facility]. Pt was admitted from ED on 10/18 for sepsis and was d/c with IV Vancomycin and ciprofloxacin on 10/22. Per [facility], [facility] nursing staff suctioned a large amount of blood from pt's trach. Pt has had episodes of hemoptysis following a previous bronchoscopy on 10/19/18. Patient presented to ER hypotensive, has chronic harris on arrival, UA +. Chest Xray +pulmonary edema. Sepsis protocol initiated in ED. Lactic 2.6. Received 30 ml/kg bolus with positive response in BP. Patient received IV zosyn and vancomycin. Patient admitted to ICU for severe sepsis, acute on chronic heart failure, and acute on chronic respiratory failure with hypoxia and hypercapnia under the care of hospital medicine.

## 2018-10-24 NOTE — ASSESSMENT & PLAN NOTE
Pulmonology consult for critical care and vent management   Lex vent dependent   Alfonzo bass

## 2018-10-24 NOTE — HOSPITAL COURSE
10/24 - Admitted to ICU on [facility] vent and warming via artic sun.  She is awake and responsive not requiring pressor support  10/25/2018 Chart reviewed . Bronchoscopy last admission for mucous pluggine on the left. pleural effusion on Chest X Ray on the right. history of Congestive Heart failure:Moderate left ventricular systolic dysfunction with a visually estimated ejection fraction of 40%.  Grade 3 diastolic dysfunction consistent with restrictive physiology with elevated left atrial pressure.  Severe tricuspid regurgitation.  Moderate pulmonary hypertension with an estimated pulmonary artery systolic pressure 55 mmHg.  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   10/26/2018  CT of chest with bilateral effusions and atelectasis with right lower lobe and left lower lobe collapse  10/28 Little change, mild diuresis continues  10/29 seen and examined.  Vent dependent.  T-max 101° yesterday.  On enteral feeding via PEG.  10/30 seen and examined. Tmax 98.6 last 24 hrs. On higher FiO2 today 70%. Had an episode of hypoxemia, bradycardia earlier sp suctioning. CXR , ABG reviewed. On enteral feeding.   10/31 seen and examined. Resp therapist noticed cuff leak despite good cuff pressure. Afebrile. No presssors. On Fio2 60% now . RR decreased to 16 . No pressors. CXR , ABG reviewed.   11/1 - Resting on vent still mucus plugging in no distress and VSS.  Still with small air leak around trach but returning good volumes and asymptomatic.   11/2 - Improved O2 demand and CXR still on vent and TF.  Awake and at times talking around trach but no distress, denies SOB and has good Vt.  101.1 temp yest evening but normal WBC

## 2018-10-24 NOTE — H&P
[facility] Hospital Medicine  History & Physical    Patient Name: Carlie Valdez  MRN: [id number]  Admission Date: 10/24/2018  Attending Physician: Pipe Lomeli, *   Primary Care Provider: Johanna Guzman MD         Patient information was obtained from ER records.     Subjective:     Principal Problem:Severe sepsis    Chief Complaint:   Chief Complaint   Patient presents with    Altered Mental Status     from [facility], decreased LOC at NH, multiple episodes of diarrhea, per [facility] nursing home suctioned a large amount of blood from trach        HPI: Carlie Valdez is a 72 y.o. female patient with PMHx of Afib, CHF, DM, GERD, neuromyelitis optica spectrum disorder, and HTN, Chronic Trach/Peg/Harris who presents to the Emergency Department for altered mental status which onset gradually last night. Patient currently resides at [facility]. Pt's pulse ox was reported to be 48% per [facility]. Pt was admitted from ED on 10/18 for sepsis and was d/c with IV Vancomycin and ciprofloxacin on 10/22. Per [facility], [facility] nursing staff suctioned a large amount of blood from pt's trach. Pt has had episodes of hemoptysis following a previous bronchoscopy on 10/19/18. Patient presented to ER hypotensive, has chronic harris on arrival, UA +. Chest Xray +pulmonary edema. Sepsis protocol initiated in ED. Lactic 2.6. Received 30 ml/kg bolus with positive response in BP. Patient received IV zosyn and vancomycin. Patient admitted to ICU for severe sepsis, acute on chronic heart failure, and acute on chronic respiratory failure with hypoxia and hypercapnia under the care of hospital medicine.         Past Medical History:   Diagnosis Date    Anticoagulant long-term use     Arthritis     Asthma     Atrial fibrillation     Blood clot of vein in shoulder area     Cardiac defibrillator in place     CHF (congestive heart failure)     Chronic knee pain     Diabetes mellitus  type 2 without retinopathy 2016    Diaphragmatic hernia without obstruction and without gangrene 2015    GERD (gastroesophageal reflux disease)     Hypertension     Immune deficiency disorder     Primary open angle glaucoma (POAG) of both eyes, severe stage 2018       Past Surgical History:   Procedure Laterality Date    CARDIAC DEFIBRILLATOR PLACEMENT      , .    CATARACT EXTRACTION       SECTION      COLONOSCOPY      EGD, WITH PEG TUBE INSERTION N/A 2018    Performed by Louis O. Jeansonne IV, MD at [facility] OR    ashley Macdonald    ESOPHAGOGASTRODUODENOSCOPY (EGD) N/A 2015    Performed by Hieu Colbert MD at [facility] ENDO    ESOPHAGOGASTRODUODENOSCOPY W/ PEG N/A 2018    Procedure: EGD, WITH PEG TUBE INSERTION;  Surgeon: Louis O. Jeansonne IV, MD;  Location: [facility] OR;  Service: General;  Laterality: N/A;    KNEE ARTHROSCOPY      TRACHEOSTOMY N/A 2018    Procedure: TRACHEOSTOMY;  Surgeon: Louis O. Jeansonne IV, MD;  Location: [facility] OR;  Service: General;  Laterality: N/A;    TRACHEOSTOMY N/A 2018    Performed by Louis O. Jeansonne IV, MD at [facility] OR    UPPER GASTROINTESTINAL ENDOSCOPY         Review of patient's allergies indicates:   Allergen Reactions    Morphine Hives    Atorvastatin      Other reaction(s): Muscle pain    Clonidine     Codeine      Other reaction(s): Unknown    Digoxin      Other reaction(s): Unknown    Diovan [valsartan] Other (See Comments)     Upset stomach, weakness    Eggs [egg derived]     Metoprolol Diarrhea    Naproxen      Other reaction(s): Nausea    Peanut     Propofol      Other reaction(s): delirious    Sulfa (sulfonamide antibiotics)        No current facility-administered medications on file prior to encounter.      Current Outpatient Medications on File Prior to Encounter   Medication Sig    albuterol-ipratropium (DUO-NEB) 2.5 mg-0.5 mg/3 mL nebulizer solution Take 3 mLs by nebulization every 4 (four)  hours as needed for Wheezing. Rescue    amiodarone (PACERONE) 200 MG Tab Take 1 tablet (200 mg total) by mouth 2 (two) times daily. (Patient taking differently: 200 mg by Per G Tube route 2 (two) times daily. )    baclofen (LIORESAL) 10 MG tablet 10 mg by Per G Tube route 3 (three) times daily as needed.     brimonidine-timolol (COMBIGAN) 0.2-0.5 % Drop Place 1 drop into both eyes every 12 (twelve) hours.    bumetanide (BUMEX) 2 MG tablet Take 1 tablet (2 mg total) by mouth 2 (two) times daily. 1 Tablet Oral Every day (Patient taking differently: Take 2 mg by mouth once daily. 1 Tablet Oral Every day)    ergocalciferol (ERGOCALCIFEROL) 50,000 unit Cap Take 1 capsule (50,000 Units total) by mouth every 7 days. (Patient taking differently: 50,000 Units by Per G Tube route every 7 days. )    fluticasone (FLONASE) 50 mcg/actuation nasal spray 2 sprays by Each Nare route once daily.    furosemide (LASIX) 20 MG tablet 20 mg by Per G Tube route 2 (two) times daily.     insulin detemir U-100 (LEVEMIR FLEXTOUCH) 100 unit/mL (3 mL) SubQ InPn pen Inject 10 Units into the skin every evening.    levothyroxine (SYNTHROID) 100 MCG tablet 100 mcg by Per G Tube route before breakfast.     magnesium oxide (MAG-OX) 400 mg tablet 400 mg by Per G Tube route 2 (two) times daily.     midodrine (PROAMATINE) 5 MG Tab 1 tablet (5 mg total) by Per G Tube route 3 (three) times daily.    montelukast (SINGULAIR) 10 mg tablet 10 mg by Per G Tube route once daily.     multivitamin (THERAGRAN) per tablet Take 1 tablet by mouth once daily.     mycophenolate (CELLCEPT) 250 mg Cap Take 2 capsules (500 mg total) by mouth 2 (two) times daily. (Patient taking differently: 500 mg 2 (two) times daily. )    ondansetron (ZOFRAN) 4 MG tablet 4 mg by Per G Tube route every 6 (six) hours as needed for Nausea.     pantoprazole (PROTONIX) 40 MG tablet Take 40 mg by mouth once daily.    polyethylene glycol (GLYCOLAX) 17 gram PwPk Take 17 g by  mouth once daily. (Patient taking differently: 17 g by Per G Tube route once daily. )    rivaroxaban (XARELTO) 15 mg Tab 15 mg by Per G Tube route every evening.     traMADol (ULTRAM) 50 mg tablet 50 mg by Per G Tube route every 6 (six) hours as needed for Pain.     vancomycin HCl in 5 % dextrose (VANCOMYCIN IN DEXTROSE 5 %) 1 gram/250 mL Soln Sig 1 gm IV q 18 hrs x 14 days    zinc sulfate (ZINCATE) 220 (50) mg capsule 220 mg by Per G Tube route once daily.     [DISCONTINUED] ciprofloxacin HCl (CIPRO) 500 MG tablet 1 tablet (500 mg total) by Per G Tube route every 12 (twelve) hours. for 10 days    albuterol 90 mcg/actuation inhaler Inhale 2 puffs into the lungs every 4 (four) hours as needed.     bacitracin 500 unit/gram ointment Apply topically once daily. Apply to R heel daily.    [DISCONTINUED] norepinephrine bitartrate-D5W 4 mg/250 mL (16 mcg/mL) Soln Inject 1.44 mcg/min into the vein continuous.    [DISCONTINUED] rivaroxaban (XARELTO) 15 mg Tab Take 1 tablet (15 mg total) by mouth daily with dinner or evening meal.    [DISCONTINUED] silver sulfADIAZINE 1% (SILVADENE) 1 % cream Apply topically 2 (two) times daily. Apply to bliter areas BID (Patient taking differently: Apply topically 2 (two) times daily. Apply to blistered areas BID.)     Family History     Problem Relation (Age of Onset)    Hypertension Mother, Father        Tobacco Use    Smoking status: Never Smoker    Smokeless tobacco: Never Used   Substance and Sexual Activity    Alcohol use: No     Alcohol/week: 0.0 oz    Drug use: No    Sexual activity: No     Review of Systems   Unable to perform ROS: Patient nonverbal     Objective:     Vital Signs (Most Recent):  Temp: (!) 89.1 °F (31.7 °C) (10/24/18 0931)  Pulse: 69 (10/24/18 0927)  Resp: 14 (10/24/18 0927)  BP: 134/83 (10/24/18 0927)  SpO2: 100 % (10/24/18 0927) Vital Signs (24h Range):  Temp:  [86.9 °F (30.5 °C)-89.1 °F (31.7 °C)] 89.1 °F (31.7 °C)  Pulse:  [69-70] 69  Resp:  [9-18]  14  SpO2:  [81 %-100 %] 100 %  BP: ()/(58-83) 134/83     Weight: 83.2 kg (183 lb 5 oz)  Body mass index is 26.3 kg/m².    Physical Exam   Constitutional: She is oriented to person, place, and time. She appears well-developed and well-nourished. She has a sickly appearance. She appears ill.   Trach/vent dependent    HENT:   Head: Normocephalic and atraumatic.   Eyes: Conjunctivae and EOM are normal. Pupils are equal, round, and reactive to light.   Blind    Neck: Normal range of motion. Neck supple.   Cardiovascular: Normal rate, regular rhythm and intact distal pulses.   Murmur heard.  Pulmonary/Chest: Effort normal. No accessory muscle usage. No tachypnea. She has rales in the right lower field and the left lower field.   Abdominal: Soft. Bowel sounds are normal.   Peg tube inplace    Genitourinary:   Genitourinary Comments: Chronic Flores  + Ua        Musculoskeletal: Normal range of motion. She exhibits edema.   Neurological: She is alert and oriented to person, place, and time. She has normal reflexes.   Skin: Skin is warm and dry.   PICC line in place to LUE  Bilateral Upper Arm edema. Hx of chronic upper arm thrombus.    Unstageable pressure injury to coccygeal/sacral region. Stage 3 pressure injury to left ischium  Right heel with deep tissue injury.Right medial malleolus stage 3 pressure injury     Psychiatric: She has a normal mood and affect. Her behavior is normal. Judgment and thought content normal.   Nonverbal secondary to tracheostomy. Answers questions with nodding.    Nursing note and vitals reviewed.         Significant Labs:   ABGs:   Recent Labs   Lab 10/24/18  0907   PH 7.295*   PCO2 56.2*   HCO3 27.3   POCSATURATED 70*   BE 1     Blood Culture:   Recent Labs   Lab 10/22/18  1320   LABBLOO No growth to date  No Growth to date     BMP:   Recent Labs   Lab 10/24/18  0628   *      K 3.5      CO2 26   BUN 29*   CREATININE 0.7   CALCIUM 9.4     CBC:   Recent Labs   Lab  10/24/18  0628   WBC 4.40   HGB 9.1*   HCT 30.3*   PLT 38*     CMP:   Recent Labs   Lab 10/24/18  0628      K 3.5      CO2 26   *   BUN 29*   CREATININE 0.7   CALCIUM 9.4   PROT 5.6*   ALBUMIN 2.1*   BILITOT 0.3   ALKPHOS 118   AST 21   ALT 26   ANIONGAP 10   EGFRNONAA >60     Cardiac Markers: No results for input(s): CKMB, MYOGLOBIN, BNP, TROPISTAT in the last 48 hours.  Lactic Acid:   Recent Labs   Lab 10/24/18  0628   LACTATE 2.6*     Troponin:   Recent Labs   Lab 10/24/18  0628   TROPONINI 0.054*     TSH:   Recent Labs   Lab 10/19/18  0334   TSH 1.259     Urine Culture: No results for input(s): LABURIN in the last 48 hours.  Urine Studies:   Recent Labs   Lab 10/24/18  0721   COLORU Yellow   APPEARANCEUA Clear   PHUR 5.0   SPECGRAV 1.025   PROTEINUA 2+*   GLUCUA Negative   KETONESU Trace*   BILIRUBINUA Negative   OCCULTUA 1+*   NITRITE Negative   UROBILINOGEN Negative   LEUKOCYTESUR 1+*   RBCUA 2   WBCUA 23*   BACTERIA Many*   SQUAMEPITHEL 48   HYALINECASTS 6*     All pertinent labs within the past 24 hours have been reviewed.    Significant Imaging:   Imaging Results          X-Ray Chest AP Portable (Final result)  Result time 10/24/18 07:48:45    Final result by JOSEFINA Garcia Sr., MD (10/24/18 07:48:45)                 Impression:      1. There is silhouetting of the right border of the heart. There is a mild amount of interstitial and alveolar opacities seen in the medial aspect of both lungs.  This is characteristic of pulmonary edema.  2. There is persistent opacification of the base of the right hemithorax.  This is characteristic of a small right pleural effusion with associated atelectasis.  3. There are moderate osteoarthritic changes in the left glenohumeral joint.  .      Electronically signed by: Homar Garcia MD  Date:    10/24/2018  Time:    07:48             Narrative:    EXAMINATION:  XR CHEST AP PORTABLE    CLINICAL  HISTORY:  Sepsis;    COMPARISON:  10/22/2018    FINDINGS:  A cardiac pacemaker remains in place.  A tracheostomy tube remains in place.  There is silhouetting of the right border of the heart.  There is a mild amount of interstitial and alveolar opacities seen in the medial aspect of both lungs.  There is persistent opacification of the base of the right hemithorax.  The left costophrenic angle is sharp.  There is no pneumothorax.  There are moderate osteoarthritic changes in the left glenohumeral joint.                              Assessment/Plan:     * Severe sepsis    PNE/UTI  IV Vanc and Ciprofloxacin   IVFs   Blood/urine/sputum cultures pending  Wound care consult  Monitor labs  pulm on board       MRSA bacteremia    Repeat Blood cx pending   IV vanco and cipro          Acute on chronic systolic congestive heart failure w/ pulm edema    ICU   - BNP pending  - CXR with vascular congestion with small bilateral effusion  -Patient received Lasix 20 mg IV x 1 dose in ED   - 2D ECHO on 10/22/18 showed (EF 40-45%). DD, Mild AR, Mild to moderate MR, Moderate to severe TR, and    Pulmonary HTN.   - Strict I&Os  - Daily weights         Urinary tract infection associated with indwelling urethral catheter    - Urine culture pending  - Received  IV Rocephinx 1 dose in ED   - Will continue IV Ciprofloxacin         Thrombocytopenia    Current Platelet count 38  Xarelto on hold   Daily CBC   Monitor for active bleeding        Acute on chronic respiratory failure with hypoxia and hypercapnia    Pulmonology consult for critical care and vent management   Trach, vent dependent   Duonebs tx          Pressure injury of coccygeal region, unstageable    Wound care consult        Deep tissue injury    Wound care consult        Hypothyroidism    Resume levothyroxine        Type 2 diabetes mellitus with kidney complication, without long-term current use of insulin    Accuchecks   SSI   Ha1c 4.8 on 10/19/18       Chronic kidney disease  (CKD), stage III (moderate)    Creatinine 0.7 stable   Monitor renal function        Elevated troponin    Likely due to demand ischemia  Initial troponin 0.054  Serial troponin        Chronic atrial fibrillation    Heart rate controlled   Xarelto on hold due to bleeding  Resume Amiodarone        VTE Risk Mitigation (From admission, onward)        Ordered     Place sequential compression device  Until discontinued      10/24/18 1141        Critical care time spent on the evaluation and treatment of severe organ dysfunction, review of pertinent labs and imaging studies, discussions with consulting providers and discussions with patient/family: 65 minutes.     Tamara Camargo NP  Department of Hospital Medicine   [facility]

## 2018-10-24 NOTE — SUBJECTIVE & OBJECTIVE
Past Medical History:   Diagnosis Date    Anticoagulant long-term use     Arthritis     Asthma     Atrial fibrillation     Blood clot of vein in shoulder area     Cardiac defibrillator in place     CHF (congestive heart failure)     Chronic knee pain     Diabetes mellitus type 2 without retinopathy 2016    Diaphragmatic hernia without obstruction and without gangrene 2015    GERD (gastroesophageal reflux disease)     Hypertension     Immune deficiency disorder     Primary open angle glaucoma (POAG) of both eyes, severe stage 2018       Past Surgical History:   Procedure Laterality Date    CARDIAC DEFIBRILLATOR PLACEMENT      , .    CATARACT EXTRACTION       SECTION      COLONOSCOPY      EGD, WITH PEG TUBE INSERTION N/A 2018    Performed by Louis O. [facility]ansonne IV, MD at [facility] OR    ashley Macdonald    ESOPHAGOGASTRODUODENOSCOPY (EGD) N/A 2015    Performed by Hieu Colbert MD at [facility] ENDO    ESOPHAGOGASTRODUODENOSCOPY W/ PEG N/A 2018    Procedure: EGD, WITH PEG TUBE INSERTION;  Surgeon: Louis O. Jeansonne IV, MD;  Location: [facility] OR;  Service: General;  Laterality: N/A;    KNEE ARTHROSCOPY      TRACHEOSTOMY N/A 2018    Procedure: TRACHEOSTOMY;  Surgeon: Louis O. Jeansonne IV, MD;  Location: [facility] OR;  Service: General;  Laterality: N/A;    TRACHEOSTOMY N/A 2018    Performed by Louis O. Jeansonne IV, MD at [facility] OR    UPPER GASTROINTESTINAL ENDOSCOPY         Review of patient's allergies indicates:   Allergen Reactions    Morphine Hives    Atorvastatin      Other reaction(s): Muscle pain    Clonidine     Codeine      Other reaction(s): Unknown    Digoxin      Other reaction(s): Unknown    Diovan [valsartan] Other (See Comments)     Upset stomach, weakness    Eggs [egg derived]     Metoprolol Diarrhea    Naproxen      Other reaction(s): Nausea    Peanut     Propofol      Other reaction(s): delirious    Sulfa (sulfonamide  antibiotics)        Family History     Problem Relation (Age of Onset)    Hypertension Mother, Father        Tobacco Use    Smoking status: Never Smoker    Smokeless tobacco: Never Used   Substance and Sexual Activity    Alcohol use: No     Alcohol/week: 0.0 oz    Drug use: No    Sexual activity: No         Review of Systems   Unable to perform ROS: Patient nonverbal     Objective:     Vital Signs (Most Recent):  Temp: (!) 92.8 °F (33.8 °C) (10/24/18 1115)  Pulse: 69 (10/24/18 1136)  Resp: (!) 0 (10/24/18 1136)  BP: 139/85 (10/24/18 1136)  SpO2: 99 % (10/24/18 1136) Vital Signs (24h Range):  Temp:  [86.9 °F (30.5 °C)-92.8 °F (33.8 °C)] 92.8 °F (33.8 °C)  Pulse:  [69-71] 69  Resp:  [0-21] 0  SpO2:  [81 %-100 %] 99 %  BP: ()/(58-97) 139/85     Weight: 83.2 kg (183 lb 5 oz)  Body mass index is 26.3 kg/m².      Intake/Output Summary (Last 24 hours) at 10/24/2018 1150  Last data filed at 10/24/2018 1114  Gross per 24 hour   Intake 3050 ml   Output --   Net 3050 ml       Physical Exam   Constitutional: Vital signs are normal. She appears well-developed. She appears cachectic. She has a sickly appearance. She appears ill. No distress.   HENT:   Head: Atraumatic.   Mouth/Throat: Oropharynx is clear and moist.   Eyes: EOM and lids are normal. Pupils are equal, round, and reactive to light.   Neck: Neck supple.       Cardiovascular: Normal rate and regular rhythm.   Pulses:       Radial pulses are 1+ on the right side, and 1+ on the left side.        Dorsalis pedis pulses are 1+ on the right side, and 1+ on the left side.   Pulmonary/Chest: No accessory muscle usage. No respiratory distress. She has decreased breath sounds in the right lower field. She has rhonchi. She has rales.   Abdominal: Soft. Bowel sounds are normal. She exhibits no distension. There is no tenderness.       Genitourinary:   Genitourinary Comments: Flores in place   Musculoskeletal:   Diffuse atrophy and +1 tibal edema   Lymphadenopathy:      She has no cervical adenopathy.   Neurological: She is alert.   Awake with eyes open and nods head to questions   Skin: Skin is dry. Capillary refill takes 2 to 3 seconds. No cyanosis.            Vents:  Vent Mode: A/C (10/24/18 1136)  Ventilator Initiated: Yes (10/24/18 0630)  Set Rate: 20 bmp (10/24/18 1136)  Vt Set: 0 mL (10/24/18 1136)  Pressure Support: 0 cmH20 (10/24/18 1136)  PEEP/CPAP: 15 cmH20 (10/24/18 1136)  Oxygen Concentration (%): 65 (10/24/18 1136)  Peak Airway Pressure: 34 cmH2O (10/24/18 1136)  Plateau Pressure: 0 cmH20 (10/24/18 1136)  Total Ve: 6.7 mL (10/24/18 1136)  F/VT Ratio<105 (RSBI): (!) 0 (10/24/18 1136)    Lines/Drains/Airways     Drain                 Gastrostomy/Enterostomy Percutaneous endoscopic gastrostomy (PEG) LUQ -- days         Urethral Catheter 10/24/18 1030 16 Fr. less than 1 day          Airway                 Surgical Airway 07/18/18 1620 Shiley 97 days          Pressure Ulcer                 Pressure Injury 10/18/18 2015 Coccyx #1 Unstageable 5 days         Pressure Injury 10/24/18 1100 Right medial Malleolus Stage 2 less than 1 day          Peripheral Intravenous Line                 Midline Catheter Insertion/Assessment  - Double Lumen 10/19/18 0131 Left brachial vein other (see comments) 5 days                Significant Labs:    CBC/Anemia Profile:  Recent Labs   Lab 10/24/18  0628   WBC 4.40   HGB 9.1*   HCT 30.3*   PLT 38*   MCV 90   RDW 19.1*        Chemistries:  Recent Labs   Lab 10/24/18  0628      K 3.5      CO2 26   BUN 29*   CREATININE 0.7   CALCIUM 9.4   ALBUMIN 2.1*   PROT 5.6*   BILITOT 0.3   ALKPHOS 118   ALT 26   AST 21       ABGs:   Recent Labs   Lab 10/24/18  0907   PH 7.295*   PCO2 56.2*   HCO3 27.3   POCSATURATED 70*   BE 1     Coagulation:   Recent Labs   Lab 10/24/18  0618   INR 1.2   APTT 36.5*     Lactic Acid:   Recent Labs   Lab 10/24/18  0628 10/24/18  0954   LACTATE 2.6* 1.1     Troponin:   Recent Labs   Lab 10/24/18  0628   TROPONINI  0.054*     Urine Studies:   Recent Labs   Lab 10/24/18  0721   COLORU Yellow   APPEARANCEUA Clear   PHUR 5.0   SPECGRAV 1.025   PROTEINUA 2+*   GLUCUA Negative   KETONESU Trace*   BILIRUBINUA Negative   OCCULTUA 1+*   NITRITE Negative   UROBILINOGEN Negative   LEUKOCYTESUR 1+*   RBCUA 2   WBCUA 23*   BACTERIA Many*   SQUAMEPITHEL 48   HYALINECASTS 6*     All pertinent labs within the past 24 hours have been reviewed.    Significant Imaging:   CXR: I have reviewed all pertinent results/findings within the past 24 hours and my personal findings are:  RLL atelectasis vs infiltrate with mild to mod bilat pulm edema

## 2018-10-24 NOTE — PLAN OF CARE
Patient is presently trach and peg     10/24/18 1300   Readmission Questionnaire   At the time of your discharge, did someone talk to you about what your health problems were? (CM spoke with Dtr and report given to Bailey Fernandez per [facility] RN staff)   Lives With facility resident   Living Arrangements other (see comments)  (NH)   Does the patient have family/friends to help with healtcare needs after discharge? other (comments)   Does your caregiver provide all the help you need? Yes   Are you currently feeling confused? No   Are you currently having problems thinking? No   Are you currently having memory problems? No   Have you felt down, depressed, or hopeless? Unable to Assess   Have you felt little interest or pleasure in doing things? Unable to assess

## 2018-10-24 NOTE — NURSING
Pt admitted from ER via stretcher, RT bagging patient through trach.  Pt is alert.  Artic sun in place, pt remains hyperthermic.  Temperature probe on criticore harris is damaged, unable to control warming without this- Harris replaced.  Patient has midline IV catheter to left upper arm from previous admission.

## 2018-10-24 NOTE — ASSESSMENT & PLAN NOTE
Cont full vent support  Vent settings reviewed and adjusted  VAP prophylaxis  Wean PEEP and FiO2 slowly

## 2018-10-24 NOTE — ASSESSMENT & PLAN NOTE
PNE/UTI  IV Vanc and Ciprofloxacin   IVFs   Blood/urine/sputum cultures pending  Wound care consult  Monitor labs  pulm on board

## 2018-10-24 NOTE — CONSULTS
Clinical Pharmacy: Consult Note    Pharmacy consulted to dose Vancomycin by Geraldo Myers NP  71 y/o female with PMH of Afib, CHF, DM, and GERD    Indication: Severe Sepsis  Goal trough: 15-20 mcg/ml     WBC = 4.40  Tmax = 94.3    Tmin = 86.9  SCr = 0.7, CrCl = 87.3 ml/min     Cultures:   Blood x 2 sets, 10/18: MRSA  Urine, 10/18: E. Coli, Klesbiella (pan-sensitive)  Resp, 10/19: MRSA    Patient was just discharged 2 days ago to [facility]. Pt received a 1750mg loading dose in ER. Entered dose of 1gm every 18 hours. Patient was on this dose prior to being discharged on 10/22. Unsure of when patient received last Vancomycin dose at [facility]. Because we re-loaded patient here, I will order a trough prior to the next dose to ensure that patient does not have a supratherapeutic level. Moved dose out 24 hours from last dose to allow time for level to be reviewed.   Trough due : 10/25 at 0100    Thank you for allowing us to participate in this patient's care.   Lana Rogers, PharmD 10/24/2018 2:45 PM

## 2018-10-24 NOTE — PLAN OF CARE
Problem: Patient Care Overview  Goal: Plan of Care Review  Outcome: Ongoing (interventions implemented as appropriate)  Pt continues to be on mechanical vent.

## 2018-10-24 NOTE — ASSESSMENT & PLAN NOTE
Cont IVAB from discharge  Normal WBC  Warm to normothermia  Cont supportive care  No IVFs given pulm edema

## 2018-10-24 NOTE — PLAN OF CARE
Patient was d/c 10/22/18. Patient readmitted today w/  PMHx of Afib, CHF, DM, GERD, neuromyelitis optica spectrum disorder, and HTN, Chronic Trach/Peg/Harris who presents to the Emergency Department for altered mental status which onset gradually last night. Patient currently resides at [place] [facility].Per EMR,  Pt has had episodes of hemoptysis following a previous bronchoscopy on 10/19/18. Patient presented to ER hypotensive, has chronic harris on arrival, UA +. Chest Xray +pulmonary edema. Sepsis protocol initiated in ED. Lactic 2.6.. Patient admitted to ICU for severe sepsis, acute on chronic heart failure, and acute on chronic respiratory failure with hypoxia and hypercapnia .         10/24/18 1303   Discharge Assessment   Assessment Type Discharge Planning Assessment   Confirmed/corrected address and phone number on facesheet? Yes   Assessment information obtained from? Medical Record   Expected Length of Stay (days) (TBD)   Communicated expected length of stay with patient/caregiver no   Prior to hospitilization cognitive status: Unable to Assess;Coma/Sedated/Intubated   Current cognitive status: Coma/Sedated/Intubated;Unable to Assess   Lives With facility resident   Able to Return to Prior Arrangements yes   Is patient able to care for self after discharge? No   Patient's perception of discharge disposition skilled nursing facility   Readmission Within The Last 30 Days previous discharge plan unsuccessful   If yes, most recent facility name: [facility]   Patient currently being followed by outpatient case management? No   Patient currently receives any other outside agency services? No   Equipment Currently Used at Home ventilator;suction machine;respiratory supplies;nutrition supplies;nebulizer;other (see comments)  (TRAC /PEG)   Do you have any problems affording any of your prescribed medications? No   Is the patient taking medications as prescribed? yes   Does the patient have transportation home? Yes    Transportation Available ambulance   Does the patient receive services at [facility]? No   Discharge Plan A Return to nursing home;Skilled Nursing Facility   Discharge Plan B Return to Nursing Home;Skilled Nursing Facility   Patient/Family In Agreement With Plan unable to assess

## 2018-10-25 PROBLEM — L89.323 PRESSURE INJURY OF LEFT ISCHIUM, STAGE 3: Status: ACTIVE | Noted: 2018-01-01

## 2018-10-25 PROBLEM — R32 INCONTINENCE ASSOCIATED DERMATITIS: Status: ACTIVE | Noted: 2018-01-01

## 2018-10-25 PROBLEM — L89.513 PRESSURE INJURY OF RIGHT ANKLE, STAGE 3: Status: ACTIVE | Noted: 2018-01-01

## 2018-10-25 PROBLEM — L25.8 INCONTINENCE ASSOCIATED DERMATITIS: Status: ACTIVE | Noted: 2018-01-01

## 2018-10-25 NOTE — PROGRESS NOTES
Follow up visit with Ms. Valdez, patient seen in conjuction with Dr. Jeansonne for continued evaluation of Unstageable pressure injury to coccyx and potential need for debridement. duoderm dressing currently rolling off. Removed, and cleansed with saline. No change in condition from last assessment: Unstageable pressure injury to coccyx measures 10x5x0.2cm. Wound bed moist, 50% tan/black firm moist slough, 50% red moist tissue. Cleansed with saline, painted milagros wound with cavilon. aquacel extra x2 layers applied and secured with duoderm. Discussed with Dr. Jeansonne and primary nurse July.  Due to rolling of duoderm, entire dressing then covered with sacral foam dressing to cover and protect.  Will monitor closely.

## 2018-10-25 NOTE — HOSPITAL COURSE
73 y/o AAF admitted to ICU with a dx of acute respiratory failure , acute on chronic systolic chf exacerbation  And sepsis due to PNA/UTI .She was started on IV lasix  AnD IVAB .  She  Has a (+) urine cx (10/18/18)  For  Klebsiella pneumonaie and e.coli sensitive to  Cipro . She had a (+) sputum cx for MRSA (10/18/18).  The CT chest show Bilateral pleural effusions.  Near complete collapse of the right lower and left lower lobes with partial collapse of the right upper and middle lobes.  Left upper lobe infiltrate or pneumonia versus aspiration pneumonia.    10/27-  She remains on ventilatory support . Chest x-ray showed interval increased opacification of the left lower lung.  She had thoracentesis done today .  Pleural fluid wbc -1361.  Respiratory culture -10/24- MRSA, candida    10/28-  Remains on ventilatory support .  Pleural fluid cultures are pending .   Chest x-ray showed -Stable cardiomegaly.  Tracheostomy cannula.  Bilateral pleural fluid collections.  Questionable decrease in left pleural fluid versus changes related to patient positioning.    10/29- T max 101.she remains on ventilatory support  .   10/30 - she remains on ventilatory support .  Chest x-ray showed marked amount of haziness throughout the left hemithorax. There is a moderate amount of haziness throughout the lower half of the right hemithorax.  This represents an interval worsening of the appearance of the lungs and is worrisome for pneumonia.  2. There is opacification of the bases of both hemithoraces.  This is characteristic of pleural effusions.  10/31-  She remains on ventilatory support.  ABG showed alkalosis ,she was given acetazolamide ,critical care team added micafungin     11/01-  She was seen in the ICU,   Chest x-ray showed - interval worsening of the appearance of the lungs. There is a moderate amount of haziness throughout both hemithoraces with opacification of the bases of both hemithoraces.  This is characteristic of  moderate-sized bilateral layering pleural effusions with associated atelectasis.    Discharge summary   -11/01-  72 year old woman with history of  Afib, CHF, DM, GERD, neuromyelitis optica spectrum disorder, and HTN, Chronic Trach/Peg/Flores who presents to the Emergency Department for altered mental status . Pt's pulse ox was reported to be 48% per [facility]. She was recently discharged from the hospital Pt was admitted from ED on 10/18 for sepsis and was discharged on 10/22 d/c with IV Vancomycin and ciprofloxacin on 10/22. Per [facility], [facility] nursing staff suctioned a large amount of blood from pt's trach    Since admission, she was admitted to ICU and was managed for sepsis . Cultures from respiratory site -MRSA/candida .  Chest imaging showed -  Chest CT scan -Bilateral pleural effusions.  Near complete collapse of the right lower and left lower lobes with partial collapse of the right upper and middle lobes.  Left upper lobe infiltrate or pneumonia versus aspiration pneumonia.  Tracheotomy tube.  Right chest wall pacemaker.    She was seen in consultation with pulmonology /critical care team and gen surgery for wound care . She had thoracentesis done and was started on aggressive diuresis.She was managed with vancomycin,zosyn and micafungin. She had interval tracheostomy tube change on 10/31.        She has remained clinically stable and is back to her baseline.   She was seen and examined on day of discharge and plan of care was discussed with her daughter at [id number] . The risk of recidivism remains high if she is not suctioned frequently .We will inform the nursing home about the need for tracheal suctioning  .Her overall prognosis is guarded and palliative care is an option .At this time,family wants full code. She will be discharged on PO zyvox.

## 2018-10-25 NOTE — ASSESSMENT & PLAN NOTE
Cont IV Lasix  Daily weights  Accurate I/Os  Follow up labs  10/25/2018  Start IV lasix for pleural effusion and review CT of chest

## 2018-10-25 NOTE — ASSESSMENT & PLAN NOTE
Cont IVAB from discharge  Normal WBC  Warm to normothermia  Cont supportive care  No IVFs given pulm edema  10/25/2018  cultures pending, stable blood pressure

## 2018-10-25 NOTE — ASSESSMENT & PLAN NOTE
Cont full vent support  Vent settings reviewed and adjusted  VAP prophylaxis  Wean PEEP and FiO2 slowly  10/25/2018  CHeck CT of chest for pleural effusion

## 2018-10-25 NOTE — PROGRESS NOTES
Patient transported to CT using transport vent. Ambubag & mask brought with patient. Patient is now back ICU on  on previous settings.

## 2018-10-25 NOTE — PLAN OF CARE
Problem: Patient Care Overview  Goal: Plan of Care Review  Outcome: Ongoing (interventions implemented as appropriate)  Patient responds to voice, attempts to mouth words as communication. Patient nods appropriately to questions at times.Tube feeding rate reduced due to high residuals. C. Diff sample collected and sent, pending. UOP adequate. Patient remains normothermic throughout shift. Patient turned every two hours, heels elevated. Plan of care discussed with patient and daughter. Will continue to monitor.

## 2018-10-25 NOTE — SUBJECTIVE & OBJECTIVE
Interval History:     Review of Systems   Unable to perform ROS: Patient nonverbal     Objective:     Vital Signs (Most Recent):  Temp: 99.1 °F (37.3 °C) (10/25/18 1115)  Pulse: 71 (10/25/18 1147)  Resp: 16 (10/25/18 1147)  BP: (!) 140/58 (10/25/18 1100)  SpO2: 99 % (10/25/18 1147) Vital Signs (24h Range):  Temp:  [94.3 °F (34.6 °C)-99.1 °F (37.3 °C)] 99.1 °F (37.3 °C)  Pulse:  [69-74] 71  Resp:  [0-25] 16  SpO2:  [93 %-99 %] 99 %  BP: ()/(55-81) 140/58     Weight: 85 kg (187 lb 6.3 oz)  Body mass index is 26.89 kg/m².    Intake/Output Summary (Last 24 hours) at 10/25/2018 1203  Last data filed at 10/25/2018 1100  Gross per 24 hour   Intake 810 ml   Output 3626 ml   Net -2816 ml      Physical Exam   Constitutional: Vital signs are normal. She appears well-developed. She appears cachectic. She has a sickly appearance. She appears ill. No distress.   HENT:   Head: Atraumatic.   Mouth/Throat: Oropharynx is clear and moist.   Eyes: EOM and lids are normal. Pupils are equal, round, and reactive to light.   Neck: Neck supple.       Cardiovascular: Normal rate and regular rhythm.   Pulses:       Radial pulses are 1+ on the right side, and 1+ on the left side.        Dorsalis pedis pulses are 1+ on the right side, and 1+ on the left side.   Pulmonary/Chest: No accessory muscle usage. No respiratory distress. She has decreased breath sounds in the right lower field. She has rhonchi. She has rales.   Abdominal: Soft. Bowel sounds are normal. She exhibits no distension. There is no tenderness.       Genitourinary:   Genitourinary Comments: Flores in place   Musculoskeletal:   Diffuse atrophy and +1 tibal edema   Lymphadenopathy:     She has no cervical adenopathy.   Neurological: She is alert.   Awake with eyes open and nods head to questions   Skin: Skin is dry. Capillary refill takes 2 to 3 seconds. No cyanosis.            Significant Labs: All pertinent labs within the past 24 hours have been reviewed.    Significant  Imaging: I have reviewed all pertinent imaging results/findings within the past 24 hours.

## 2018-10-25 NOTE — PROGRESS NOTES
[facility]  Critical Care Medicine  Progress Note    Patient Name: Carlie Valdez  MRN: [id number]  Admission Date: 10/24/2018  Hospital Length of Stay: 1 days  Code Status: Full Code  Attending Provider: Pipe Lomeli, *  Primary Care Provider: Johanna Guzman MD   Principal Problem: Severe sepsis    Subjective:     HPI:       Ms Valdez is a 72 year old female NH resident at [facility] with Trach/PEG vent dependent and bed bound post NMO diagnosis earlier this year.  She also has a past medical history of Anticoagulant long-term use, Arthritis, Asthma, Atrial fibrillation, Blood clot of vein in shoulder area, Cardiac defibrillator in place, CHF (congestive heart failure), Chronic knee pain, Diabetes mellitus type 2 without retinopathy (12/19/2016), Diaphragmatic hernia without obstruction and without gangrene (9/14/2015), GERD (gastroesophageal reflux disease), Hypertension, Immune deficiency disorder, and Primary open angle glaucoma (POAG) of both eyes, severe stage (6/1/2018).  She was also recently hospitalized here at [facility] in ICU 10/19/18 admitted and discharged 2 days ago on 10/22/2018 for Acute on chronic hypoxic resp failure with total left lung opacification as well as severe sepsis with MRSA PNA and bacteremia as well as E.Coli and Klebsiella UTI.  She received a bronch here and left lung cleared with suctioning, IVAB and nebs.  She had some endobronchial bleeding with clots also.  She was discharged to NH on 10/22 on Cipro via PEG and IV Vanc.  She returned back to [facility] early this AM due to decreased LOC at NH, multiple episodes of diarrhea, per [facility] nursing home suctioned a large amount of blood from trach.  In ED temp 89.9 F and suctioned blood clots from Trach.  Plt count 38.  IVAB resumed and given IV Lasix        Hospital/ICU Course:  10/24 - Admitted to ICU on mech vent and warming via artic sun.  She is awake and responsive not requiring  pressor support  10/25/2018 Chart reviewed . Bronchoscopy last admission for mucous pluggine on the left. pleural effusion on Chest X Ray on the right. history of Congestive Heart failure:Moderate left ventricular systolic dysfunction with a visually estimated ejection fraction of 40%.  Grade 3 diastolic dysfunction consistent with restrictive physiology with elevated left atrial pressure.  Severe tricuspid regurgitation.  Moderate pulmonary hypertension with an estimated pulmonary artery systolic pressure 55 mmHg.  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       Past Medical History:   Diagnosis Date    Anticoagulant long-term use     Arthritis     Asthma     Atrial fibrillation     Blood clot of vein in shoulder area     Cardiac defibrillator in place     CHF (congestive heart failure)     Chronic knee pain     Diabetes mellitus type 2 without retinopathy 2016    Diaphragmatic hernia without obstruction and without gangrene 2015    GERD (gastroesophageal reflux disease)     Hypertension     Immune deficiency disorder     Primary open angle glaucoma (POAG) of both eyes, severe stage 2018       Past Surgical History:   Procedure Laterality Date    CARDIAC DEFIBRILLATOR PLACEMENT      , .    CATARACT EXTRACTION       SECTION      COLONOSCOPY      EGD, WITH PEG TUBE INSERTION N/A 2018    Performed by Louis O. [facility]ansonne IV, MD at [facility] OR    [facility]      Dr. Macdonald    ESOPHAGOGASTRODUODENOSCOPY (EGD) N/A 2015    Performed by Hieu Colbert MD at [facility] ENDO    ESOPHAGOGASTRODUODENOSCOPY W/ PEG N/A 2018    Procedure: EGD, WITH PEG TUBE INSERTION;  Surgeon: Louis O. Jeansonne IV, MD;  Location: [facility] OR;  Service: General;  Laterality: N/A;    KNEE ARTHROSCOPY      TRACHEOSTOMY N/A 2018    Procedure: TRACHEOSTOMY;  Surgeon: Louis O. Jeansonne IV, MD;  Location:  [facility];  Service: General;  Laterality: N/A;    TRACHEOSTOMY N/A 7/18/2018    Performed by Louis [facility]. [facility]ne IV, [facility] at [facility]    UPPER GASTROINTESTINAL ENDOSCOPY         Review of patient's allergies indicates:   Allergen Reactions    Morphine Hives    Atorvastatin      Other reaction(s): Muscle pain    Clonidine     Codeine      Other reaction(s): Unknown    Digoxin      Other reaction(s): Unknown    Diovan [valsartan] Other (See Comments)     Upset stomach, weakness    Eggs [egg derived]     Metoprolol Diarrhea    Naproxen      Other reaction(s): Nausea    Peanut     Propofol      Other reaction(s): delirious    Sulfa (sulfonamide antibiotics)        Family History     Problem Relation (Age of Onset)    Hypertension Mother, Father        Tobacco Use    Smoking status: Never Smoker    Smokeless tobacco: Never Used   Substance and Sexual Activity    Alcohol use: No     Alcohol/week: 0.0 oz    Drug use: No    Sexual activity: No         Review of Systems   Unable to perform ROS: Patient nonverbal     Objective:     Vital Signs (Most Recent):  Temp: 98.8 °F (37.1 °C) (10/25/18 0730)  Pulse: 70 (10/25/18 0946)  Resp: (!) 21 (10/25/18 0946)  BP: (!) 141/64 (10/25/18 0800)  SpO2: 97 % (10/25/18 0946) Vital Signs (24h Range):  Temp:  [90.7 °F (32.6 °C)-98.8 °F (37.1 °C)] 98.8 °F (37.1 °C)  Pulse:  [69-73] 70  Resp:  [0-25] 21  SpO2:  [92 %-100 %] 97 %  BP: ()/(59-97) 141/64     Weight: 85 kg (187 lb 6.3 oz)  Body mass index is 26.89 kg/m².      Intake/Output Summary (Last 24 hours) at 10/25/2018 1014  Last data filed at 10/25/2018 0800  Gross per 24 hour   Intake 910 ml   Output 2626 ml   Net -1716 ml       Physical Exam   Constitutional: Vital signs are normal. She appears well-developed and well-nourished. She appears cachectic. She has a sickly appearance. She appears ill. She appears distressed.   HENT:   Head: Atraumatic.   Mouth/Throat: Oropharynx is clear and moist.   Eyes: EOM and lids  are normal. Pupils are equal, round, and reactive to light.   Neck: Neck supple.       trahcestomy   Cardiovascular: Normal rate. An irregularly irregular rhythm present.   Murmur heard.   Systolic murmur is present with a grade of 2/6.  Pulses:       Radial pulses are 1+ on the right side, and 1+ on the left side.        Dorsalis pedis pulses are 1+ on the right side, and 1+ on the left side.   Pulmonary/Chest: No accessory muscle usage. No respiratory distress. She has decreased breath sounds in the right lower field and the left lower field. She has rhonchi. She has rales in the right lower field and the left lower field.   Abdominal: Soft. Bowel sounds are normal. She exhibits no distension. There is no tenderness.       Genitourinary:   Genitourinary Comments: Flores in place   Musculoskeletal: She exhibits edema.   Diffuse atrophy and +1 tibal edema   Lymphadenopathy:     She has no cervical adenopathy.   Neurological: She is alert. She displays abnormal reflex. She exhibits abnormal muscle tone. Coordination abnormal.   Awake with eyes open and nods head to questions   Skin: Skin is dry. Capillary refill takes 2 to 3 seconds. No cyanosis.        decubitus ulcer sacral area       Vents:  Vent Mode: A/C (10/25/18 0946)  Ventilator Initiated: Yes (10/24/18 0630)  Set Rate: 20 bmp (10/25/18 0946)  Vt Set: 0 mL (10/25/18 0946)  Pressure Support: 0 cmH20 (10/25/18 0946)  PEEP/CPAP: 12 cmH20 (10/25/18 0946)  Oxygen Concentration (%): 55 (10/25/18 0946)  Peak Airway Pressure: 30 cmH2O (10/25/18 0946)  Plateau Pressure: 0 cmH20 (10/25/18 0946)  Total Ve: 6.89 mL (10/25/18 0946)  F/VT Ratio<105 (RSBI): (!) 60.69 (10/25/18 0946)    Lines/Drains/Airways     Drain                 Gastrostomy/Enterostomy 10/19/18 Percutaneous endoscopic gastrostomy (PEG) LUQ 6 days         Urethral Catheter 10/24/18 1030 16 Fr. less than 1 day          Airway                 Surgical Airway 07/18/18 1620 Shiley 98 days          Pressure  Ulcer                 Pressure Injury 10/24/18 Coccyx Unstageable 1 day         Pressure Injury 10/24/18 1100 Right medial Malleolus Stage 3 less than 1 day         Pressure Injury 10/24/18 1105 Left Ischial tuberosity Stage 3 less than 1 day          Peripheral Intravenous Line                 Midline Catheter Insertion/Assessment  - Double Lumen 10/19/18 0131 Left brachial vein other (see comments) 6 days                Significant Labs:    CBC/Anemia Profile:  Recent Labs   Lab 10/24/18  0628 10/25/18  0412   WBC 4.40 5.16   HGB 9.1* 8.6*   HCT 30.3* 27.8*   PLT 38* 64*   MCV 90 88   RDW 19.1* 18.9*        Chemistries:  Recent Labs   Lab 10/24/18  0628 10/25/18  0412    145   K 3.5 3.9    107   CO2 26 30*   BUN 29* 26*   CREATININE 0.7 0.7   CALCIUM 9.4 9.1   ALBUMIN 2.1*  --    PROT 5.6*  --    BILITOT 0.3  --    ALKPHOS 118  --    ALT 26  --    AST 21  --    MG  --  1.7       ABGs:   Recent Labs   Lab 10/25/18  0453   PH 7.422   PCO2 48.5*   HCO3 31.7*   POCSATURATED 97   BE 7     Coagulation:   Recent Labs   Lab 10/24/18  0618   INR 1.2   APTT 36.5*     Lactic Acid:   Recent Labs   Lab 10/24/18  0628 10/24/18  0954   LACTATE 2.6* 1.1     Troponin:   Recent Labs   Lab 10/24/18  0628 10/24/18  1159 10/24/18  1826   TROPONINI 0.054* 0.066* 0.075*     Urine Studies:   Recent Labs   Lab 10/24/18  0721   COLORU Yellow   APPEARANCEUA Clear   PHUR 5.0   SPECGRAV 1.025   PROTEINUA 2+*   GLUCUA Negative   KETONESU Trace*   BILIRUBINUA Negative   OCCULTUA 1+*   NITRITE Negative   UROBILINOGEN Negative   LEUKOCYTESUR 1+*   RBCUA 2   WBCUA 23*   BACTERIA Many*   SQUAMEPITHEL 48   HYALINECASTS 6*     All pertinent labs within the past 24 hours have been reviewed.    Significant Imaging:   CXR: I have reviewed all pertinent results/findings within the past 24 hours and my personal findings are:  RLL atelectasis vs infiltrate with mild to mod bilat pulm edema    Assessment/Plan:     Pulmonary   Acute on chronic  respiratory failure with hypoxia and hypercapnia    Cont full vent support  Vent settings reviewed and adjusted  VAP prophylaxis  Wean PEEP and FiO2 slowly  10/25/2018  CHeck CT of chest for pleural effusion       Uncomplicated severe persistent asthma    Cont Duonebs  Add Formeterol and Budesonide     Cardiac/Vascular   Acute on chronic systolic congestive heart failure w/ pulm edema    Cont IV Lasix  Daily weights  Accurate I/Os  Follow up labs  10/25/2018  Start IV lasix for pleural effusion and review CT of chest     Chronic atrial fibrillation    NSR on Amiodarone infusion     Renal/   Urinary tract infection associated with indwelling urethral catheter    Cont Cipro  Repeat cultures pending  10/25/2018 awaiting cultures to review       Chronic kidney disease (CKD), stage III (moderate)    Creatine 0.7  Monitor I/Os and creatine with diuresis     ID   * Severe sepsis    Cont IVAB from discharge  Normal WBC  Warm to normothermia  Cont supportive care  No IVFs given pulm edema  10/25/2018  cultures pending, stable blood pressure     MRSA bacteremia    Cont Vanc, pharmacy to dose  Repeat cultures pending     Hematology   Thrombocytopenia    No bleeding other than Endobronchial  No anticoagulants or antiplatelets for now  Follow CBC     Endocrine   Hypothyroidism    Cont Levothyroxine     Type 2 diabetes mellitus with kidney complication, without long-term current use of insulin    Cont SSI  Glucose stable  Monitor for insulin toxicity     Other   Pressure injury of coccygeal region, unstageable    Wound care following  Low pressure bed  Turn Q 2 hours  10/25/2018 Evaluated by surgery. CT of pelvis ordered for depth of decubitis          Critical Care Daily Checklist:    A: Awake: RASS Goal/Actual Goal:    Actual: Guerrero Agitation Sedation Scale (RASS): Drowsy   B: Spontaneous Breathing Trial Performed? Spon. Breathing Trial Initiated?: Not initiated (10/24/18 2012)   C: SAT & SBT Coordinated?  na                       D: Delirium: CAM-ICU Overall CAM-ICU: Positive   E: Early Mobility Performed? No   F: Feeding Goal: Goals: Meet > 85 % EEN/EPN while admitted  Status: Nutrition Goal Status: new   Current Diet Order   No orders of the defined types were placed in this encounter.      AS: Analgesia/Sedation adequate   T: Thromboembolic Prophylaxis held anticoagujlation due to hemoptysis  Sequential Compression Device ordered   H: HOB > 300 Yes   U: Stress Ulcer Prophylaxis (if needed) y   G: Glucose Control adeuate   B: Bowel Function Stool Occurrence: 1   I: Indwelling Catheter (Lines & Flores) Necessity needed   D: De-escalation of Antimicrobials/Pharmacotherapies na    Plan for the day/ETD CT      Code Status:  Family/Goals of Care: Full Code  discussed     Critical Care Time: 50 minutes  Critical secondary to Patient has a condition that poses threat to life and bodily function: Progressive Severe Rheumaoid Arthritis and sepsis      Critical care was time spent personally by me on the following activities: development of treatment plan with patient or surrogate and bedside caregivers, discussions with consultants, evaluation of patient's response to treatment, examination of patient, ordering and performing treatments and interventions, ordering and review of laboratory studies, ordering and review of radiographic studies, pulse oximetry, re-evaluation of patient's condition. This critical care time did not overlap with that of any other provider or involve time for any procedures.     James Meier MD  Critical Care Medicine  [facility]

## 2018-10-25 NOTE — PHYSICIAN QUERY
"PT Name: Carlie Valdez  MR #: [id number]    Physician Query Form - Heart  Condition Clarification     CDS/: Brianda Jaeger RN          Contact information:[email or web address]  This form is a permanent document in the medical record.     Query Date: October 25, 2018    By submitting this query, we are merely seeking further clarification of documentation. Please utilize your independent clinical judgment when addressing the question(s) below.    The medical record contains the following   Indicators     Supporting Clinical Findings Location in Medical Record   X BNP  Labs 10/18   X EF (EF 40-45%) 2D Echo 10/22   X Radiology findings Left-sided infiltrate.  Small right-sided effusion.  Cardiomegaly CXR 10/18   X Echo Results 1 - Mildly to moderately depressed left ventricular systolic function (EF 40-45%).     2 - Impaired LV relaxation, elevated LAP (grade 2 diastolic dysfunction).     3 - Low normal right ventricular systolic function .     4 - Mild aortic regurgitation.     5 - Mild to moderate mitral regurgitation.     6 - Moderate to severe tricuspid regurgitation.     7 - Severe left atrial enlargement.  2D Echo 10/22    "Ascites" documented     X "SOB" or "DIXON" documented /p bronch Mucus Plugging noted. Patient reports sob. Discussed with Pulmonary [facility] med note 10/20    "Hypoxia" documented     X Heart Failure documented Carlie Valdez is a 72 y.o. female patient with PMHx of Afib, CHF, DM, GERD, neuromyelitis optica spectrum disorder, and HTN   H & P   X "Edema" documented Flaccid extremities. Diffuse swelling to RUE without erythema, lesion, or drainage. 2+ piting edema to RUE, pulse difficulty to palpate due to edema. ED provider note   X Diuretics/Meds Albuterol-ipratropium 2.5mg-0.5mg/3ml neb every 6 hours  Lasix 40mg IV 2 times daily   MAR 10/20   X Treatment: Pulmonologist did a therapeutic bronchoscopy on 10/19 with copious thick obstructing " mucopurulent muscus noted in the left mainstem bronchus     [facility] med note 10/21    Other:      Heart failure (HF) can be acute, chronic or both. It is generally further specificed as systolic, diastolic, or combined. Lastly, it is important to identify an underlying etiology if known or suspected.     Common clues to acute exacerbation:  Rapidly progressive symptoms (w/in 2 weeks of presentation), using IV diuretics to treat, using supplemental O2, pulmonary edema on Xray, MI w/in 4 weeks, and/or BNP >500    Systolic Heart Failure: is defined as chart documentation of a left ventricular ejection fraction (LVEF) less than 40%     Diastolic Heart Failure: is defined as a left ventricular ejection fraction (LVEF) greater than 40%   +      Evidence of diastolic dysfunction on echocardiography OR    Right heart catheterization wedge pressure above 12 mm Hg OR    Left heart catheterization left ventricular end diastolic pressure 18 mm Hg or above.    References: *American Heart Association    The clinical guidelines noted below are only system guidelines, and do not replace the providers clinical judgment.     Provider, please specify the diagnosis associated with above clinical findings  [   ] Acute Systolic Heart Failure - New diagnosis.  EF < 40%  and acute HF symptoms documented  [   ] Acute on Chronic Systolic Heart Failure- Pre-existing systolic HF diagnosis.  EF < 40%  and acute HF symptoms documented  [   ] Chronic Systolic Heart Failure - Pre-existing systolic HF diagnosis.  EF < 40%  without  acute HF symptoms documented  [   ] Acute Diastolic Heart Failure - New diagnosis.  EF > 40%  and acute HF symptoms documented  [   ] Acute on Chronic Diastolic Heart Failure -    Pre-existing diastoic HF diagnosis.  EF > 40%  and acute HF symptoms documented                                 [   ] Chronic Diastolic Heart Failure - Pre-existing diastolic HF diagnosis.  EF > 40%  without  acute HF symptoms  documented  [   ] Acute Combined Systolic and Diastolic Heart Failure   [ x  ] Acute on Chronic Combined Systolic and Diastolic Heart Failure                   [   ] Chronic Combined Systolic and Diastolic Heart Failure  [   ] Other Type of Heart Failure (please specify type): _________________________  [   ] Other (please specify): ___________________________________  [  ] Clinically Undetermined                          Please document in your progress notes daily for the duration of treatment until resolved and include in your discharge summary.

## 2018-10-25 NOTE — ASSESSMENT & PLAN NOTE
- CXR with vascular congestion with small bilateral effusion  - cont IV lasix   - 2D ECHO on 10/22/18 showed (EF 40-45%). DD, Mild AR, Mild to moderate MR, Moderate to severe TR, and    Pulmonary HTN.   - Strict I&Os  - Daily weights  reviewed

## 2018-10-25 NOTE — PROGRESS NOTES
[facility] Hospital Medicine  Progress Note    Patient Name: Carlie Valdez  MRN: [id number]  Patient Class: IP- Inpatient   Admission Date: 10/24/2018  Length of Stay: 1 days  Attending Physician: Pipe Lomeli, *  Primary Care Provider: Johanna Guzman MD        Subjective:     Principal Problem:Severe sepsis    HPI:  Carlie Valdez is a 72 y.o. female patient with PMHx of Afib, CHF, DM, GERD, neuromyelitis optica spectrum disorder, and HTN, Chronic Trach/Peg/Harris who presents to the Emergency Department for altered mental status which onset gradually last night. Patient currently resides at [facility]. Pt's pulse ox was reported to be 48% per [facility]. Pt was admitted from ED on 10/18 for sepsis and was d/c with IV Vancomycin and ciprofloxacin on 10/22. Per [facility], [facility] nursing staff suctioned a large amount of blood from pt's trach. Pt has had episodes of hemoptysis following a previous bronchoscopy on 10/19/18. Patient presented to ER hypotensive, has chronic harris on arrival, UA +. Chest Xray +pulmonary edema. Sepsis protocol initiated in ED. Lactic 2.6. Received 30 ml/kg bolus with positive response in BP. Patient received IV zosyn and vancomycin. Patient admitted to ICU for severe sepsis, acute on chronic heart failure, and acute on chronic respiratory failure with hypoxia and hypercapnia under the care of [facility] medicine.         Hospital Course:  71 y/o AAF admitted to ICU with a dx of acute respiratory failure , acute on chronic systolic chf exacerbation  And sepsis due to PNA/UTI .She was started on IV lasix  AnD IVAB .  She  Has a (+) urine cx (10/18/18)  For  Klebsiella pneumonaie and e.coli sensitive to  Cipro . She had a (+) sputum cx for MRSA (10/18/18).     Interval History:     Review of Systems   Unable to perform ROS: Patient nonverbal     Objective:     Vital Signs (Most Recent):  Temp: 99.1 °F (37.3 °C) (10/25/18 1115)  Pulse: 71  (10/25/18 1147)  Resp: 16 (10/25/18 1147)  BP: (!) 140/58 (10/25/18 1100)  SpO2: 99 % (10/25/18 1147) Vital Signs (24h Range):  Temp:  [94.3 °F (34.6 °C)-99.1 °F (37.3 °C)] 99.1 °F (37.3 °C)  Pulse:  [69-74] 71  Resp:  [0-25] 16  SpO2:  [93 %-99 %] 99 %  BP: ()/(55-81) 140/58     Weight: 85 kg (187 lb 6.3 oz)  Body mass index is 26.89 kg/m².    Intake/Output Summary (Last 24 hours) at 10/25/2018 1203  Last data filed at 10/25/2018 1100  Gross per 24 hour   Intake 810 ml   Output 3626 ml   Net -2816 ml      Physical Exam   Constitutional: Vital signs are normal. She appears well-developed. She appears cachectic. She has a sickly appearance. She appears ill. No distress.   HENT:   Head: Atraumatic.   Mouth/Throat: Oropharynx is clear and moist.   Eyes: EOM and lids are normal. Pupils are equal, round, and reactive to light.   Neck: Neck supple.       Cardiovascular: Normal rate and regular rhythm.   Pulses:       Radial pulses are 1+ on the right side, and 1+ on the left side.        Dorsalis pedis pulses are 1+ on the right side, and 1+ on the left side.   Pulmonary/Chest: No accessory muscle usage. No respiratory distress. She has decreased breath sounds in the right lower field. She has rhonchi. She has rales.   Abdominal: Soft. Bowel sounds are normal. She exhibits no distension. There is no tenderness.       Genitourinary:   Genitourinary Comments: Flores in place   Musculoskeletal:   Diffuse atrophy and +1 tibal edema   Lymphadenopathy:     She has no cervical adenopathy.   Neurological: She is alert.   Awake with eyes open and nods head to questions   Skin: Skin is dry. Capillary refill takes 2 to 3 seconds. No cyanosis.            Significant Labs: All pertinent labs within the past 24 hours have been reviewed.    Significant Imaging: I have reviewed all pertinent imaging results/findings within the past 24 hours.    Assessment/Plan:      * Severe sepsis    PNE/UTI  IV Vanc and Ciprofloxacin   IVFs    Blood/urine/sputum cultures pending  Wound care consult  Monitor labs  pulm on board       Pressure injury of right ankle, stage 3    Cont wound care        Pressure injury of left ischium, stage 3    Cont wound care        Thrombocytopenia    -cont monitor   -PLT > 50 k   - most likely due to sepsis          Acute on chronic respiratory failure with hypoxia and hypercapnia    Pulmonology consult for critical care and vent management   Trach, vent dependent   Duonebs tx          Pressure injury of coccygeal region, unstageable    Wound care consult        MRSA bacteremia    Repeat Blood cx pending   IV vanco and cipro          Urinary tract infection associated with indwelling urethral catheter    - Urine culture pending  - Will continue IV Ciprofloxacin         Deep tissue injury    Wound care consult        Hypothyroidism    Resume levothyroxine        Acute on chronic systolic congestive heart failure w/ pulm edema    - CXR with vascular congestion with small bilateral effusion  - cont IV lasix   - 2D ECHO on 10/22/18 showed (EF 40-45%). DD, Mild AR, Mild to moderate MR, Moderate to severe TR, and    Pulmonary HTN.   - Strict I&Os  - Daily weights  reviewed          Type 2 diabetes mellitus with kidney complication, without long-term current use of insulin    Accuchecks   SSI   Ha1c 4.8 on 10/19/18       Uncomplicated severe persistent asthma    Cont breathing tx  reviewed        Chronic kidney disease (CKD), stage III (moderate)    Creatinine 0.7 stable   Monitor renal function        Elevated troponin    Likely due to demand ischemia  Initial troponin 0.054  Serial troponin        Chronic atrial fibrillation    Heart rate controlled   Xarelto on hold due to bleeding  Cont  Amiodarone        VTE Risk Mitigation (From admission, onward)        Ordered     Place sequential compression device  Until discontinued      10/24/18 1141          Critical care time spent on the evaluation and treatment of severe organ  dysfunction, review of pertinent labs and imaging studies, discussions with consulting providers and discussions with patient/family: 35  minutes.    Pipe Lomeli MD  Department of Hospital Medicine   [facility]

## 2018-10-25 NOTE — SUBJECTIVE & OBJECTIVE
Past Medical History:   Diagnosis Date    Anticoagulant long-term use     Arthritis     Asthma     Atrial fibrillation     Blood clot of vein in shoulder area     Cardiac defibrillator in place     CHF (congestive heart failure)     Chronic knee pain     Diabetes mellitus type 2 without retinopathy 2016    Diaphragmatic hernia without obstruction and without gangrene 2015    GERD (gastroesophageal reflux disease)     Hypertension     Immune deficiency disorder     Primary open angle glaucoma (POAG) of both eyes, severe stage 2018       Past Surgical History:   Procedure Laterality Date    CARDIAC DEFIBRILLATOR PLACEMENT      , .    CATARACT EXTRACTION       SECTION      COLONOSCOPY      EGD, WITH PEG TUBE INSERTION N/A 2018    Performed by Louis O. Jeansonne IV, MD at [facility] OR    ashley Macdonald    ESOPHAGOGASTRODUODENOSCOPY (EGD) N/A 2015    Performed by Hieu Colbert MD at [facility] ENDO    ESOPHAGOGASTRODUODENOSCOPY W/ PEG N/A 2018    Procedure: EGD, WITH PEG TUBE INSERTION;  Surgeon: Louis O. Jeansonne IV, MD;  Location: [facility] OR;  Service: General;  Laterality: N/A;    KNEE ARTHROSCOPY      TRACHEOSTOMY N/A 2018    Procedure: TRACHEOSTOMY;  Surgeon: Louis O. Jeansonne IV, MD;  Location: [facility] OR;  Service: General;  Laterality: N/A;    TRACHEOSTOMY N/A 2018    Performed by Louis O. Jeansonne IV, MD at [facility] OR    UPPER GASTROINTESTINAL ENDOSCOPY         Review of patient's allergies indicates:   Allergen Reactions    Morphine Hives    Atorvastatin      Other reaction(s): Muscle pain    Clonidine     Codeine      Other reaction(s): Unknown    Digoxin      Other reaction(s): Unknown    Diovan [valsartan] Other (See Comments)     Upset stomach, weakness    Eggs [egg derived]     Metoprolol Diarrhea    Naproxen      Other reaction(s): Nausea    Peanut     Propofol      Other reaction(s): delirious    Sulfa (sulfonamide  antibiotics)        Family History     Problem Relation (Age of Onset)    Hypertension Mother, Father        Tobacco Use    Smoking status: Never Smoker    Smokeless tobacco: Never Used   Substance and Sexual Activity    Alcohol use: No     Alcohol/week: 0.0 oz    Drug use: No    Sexual activity: No         Review of Systems   Unable to perform ROS: Patient nonverbal     Objective:     Vital Signs (Most Recent):  Temp: 98.8 °F (37.1 °C) (10/25/18 0730)  Pulse: 70 (10/25/18 0946)  Resp: (!) 21 (10/25/18 0946)  BP: (!) 141/64 (10/25/18 0800)  SpO2: 97 % (10/25/18 0946) Vital Signs (24h Range):  Temp:  [90.7 °F (32.6 °C)-98.8 °F (37.1 °C)] 98.8 °F (37.1 °C)  Pulse:  [69-73] 70  Resp:  [0-25] 21  SpO2:  [92 %-100 %] 97 %  BP: ()/(59-97) 141/64     Weight: 85 kg (187 lb 6.3 oz)  Body mass index is 26.89 kg/m².      Intake/Output Summary (Last 24 hours) at 10/25/2018 1014  Last data filed at 10/25/2018 0800  Gross per 24 hour   Intake 910 ml   Output 2626 ml   Net -1716 ml       Physical Exam   Constitutional: Vital signs are normal. She appears well-developed and well-nourished. She appears cachectic. She has a sickly appearance. She appears ill. She appears distressed.   HENT:   Head: Atraumatic.   Mouth/Throat: Oropharynx is clear and moist.   Eyes: EOM and lids are normal. Pupils are equal, round, and reactive to light.   Neck: Neck supple.       trahcestomy   Cardiovascular: Normal rate. An irregularly irregular rhythm present.   Murmur heard.   Systolic murmur is present with a grade of 2/6.  Pulses:       Radial pulses are 1+ on the right side, and 1+ on the left side.        Dorsalis pedis pulses are 1+ on the right side, and 1+ on the left side.   Pulmonary/Chest: No accessory muscle usage. No respiratory distress. She has decreased breath sounds in the right lower field and the left lower field. She has rhonchi. She has rales in the right lower field and the left lower field.   Abdominal: Soft. Bowel  sounds are normal. She exhibits no distension. There is no tenderness.       Genitourinary:   Genitourinary Comments: Flores in place   Musculoskeletal: She exhibits edema.   Diffuse atrophy and +1 tibal edema   Lymphadenopathy:     She has no cervical adenopathy.   Neurological: She is alert. She displays abnormal reflex. She exhibits abnormal muscle tone. Coordination abnormal.   Awake with eyes open and nods head to questions   Skin: Skin is dry. Capillary refill takes 2 to 3 seconds. No cyanosis.        decubitus ulcer sacral area       Vents:  Vent Mode: A/C (10/25/18 0946)  Ventilator Initiated: Yes (10/24/18 0630)  Set Rate: 20 bmp (10/25/18 0946)  Vt Set: 0 mL (10/25/18 0946)  Pressure Support: 0 cmH20 (10/25/18 0946)  PEEP/CPAP: 12 cmH20 (10/25/18 0946)  Oxygen Concentration (%): 55 (10/25/18 0946)  Peak Airway Pressure: 30 cmH2O (10/25/18 0946)  Plateau Pressure: 0 cmH20 (10/25/18 0946)  Total Ve: 6.89 mL (10/25/18 0946)  F/VT Ratio<105 (RSBI): (!) 60.69 (10/25/18 0946)    Lines/Drains/Airways     Drain                 Gastrostomy/Enterostomy 10/19/18 Percutaneous endoscopic gastrostomy (PEG) LUQ 6 days         Urethral Catheter 10/24/18 1030 16 Fr. less than 1 day          Airway                 Surgical Airway 07/18/18 1620 Shiley 98 days          Pressure Ulcer                 Pressure Injury 10/24/18 Coccyx Unstageable 1 day         Pressure Injury 10/24/18 1100 Right medial Malleolus Stage 3 less than 1 day         Pressure Injury 10/24/18 1105 Left Ischial tuberosity Stage 3 less than 1 day          Peripheral Intravenous Line                 Midline Catheter Insertion/Assessment  - Double Lumen 10/19/18 0131 Left brachial vein other (see comments) 6 days                Significant Labs:    CBC/Anemia Profile:  Recent Labs   Lab 10/24/18  0628 10/25/18  0412   WBC 4.40 5.16   HGB 9.1* 8.6*   HCT 30.3* 27.8*   PLT 38* 64*   MCV 90 88   RDW 19.1* 18.9*        Chemistries:  Recent Labs   Lab  10/24/18  0628 10/25/18  0412    145   K 3.5 3.9    107   CO2 26 30*   BUN 29* 26*   CREATININE 0.7 0.7   CALCIUM 9.4 9.1   ALBUMIN 2.1*  --    PROT 5.6*  --    BILITOT 0.3  --    ALKPHOS 118  --    ALT 26  --    AST 21  --    MG  --  1.7       ABGs:   Recent Labs   Lab 10/25/18  0453   PH 7.422   PCO2 48.5*   HCO3 31.7*   POCSATURATED 97   BE 7     Coagulation:   Recent Labs   Lab 10/24/18  0618   INR 1.2   APTT 36.5*     Lactic Acid:   Recent Labs   Lab 10/24/18  0628 10/24/18  0954   LACTATE 2.6* 1.1     Troponin:   Recent Labs   Lab 10/24/18  0628 10/24/18  1159 10/24/18  1826   TROPONINI 0.054* 0.066* 0.075*     Urine Studies:   Recent Labs   Lab 10/24/18  0721   COLORU Yellow   APPEARANCEUA Clear   PHUR 5.0   SPECGRAV 1.025   PROTEINUA 2+*   GLUCUA Negative   KETONESU Trace*   BILIRUBINUA Negative   OCCULTUA 1+*   NITRITE Negative   UROBILINOGEN Negative   LEUKOCYTESUR 1+*   RBCUA 2   WBCUA 23*   BACTERIA Many*   SQUAMEPITHEL 48   HYALINECASTS 6*     All pertinent labs within the past 24 hours have been reviewed.    Significant Imaging:   CXR: I have reviewed all pertinent results/findings within the past 24 hours and my personal findings are:  RLL atelectasis vs infiltrate with mild to mod bilat pulm edema

## 2018-10-25 NOTE — PLAN OF CARE
Problem: Patient Care Overview  Goal: Plan of Care Review  Outcome: Ongoing (interventions implemented as appropriate)  Pt alert today, daughter at bedside this am, given full update.  Dr. Jeansonne assesses coccyx wound with wound care nurse today and requests CT scan to evaluate extent of wound.  Pt taken for CT this afternoon, tolerated well.  Pt denies pain this shift.  Daughter given update on the phone this afternoon.

## 2018-10-25 NOTE — PROGRESS NOTES
Trach care done. Site cleaned. Inner cannula changed. HME replaced. Gauze sponge placed under trach..

## 2018-10-26 NOTE — PROGRESS NOTES
[facility]  Critical Care Medicine  Progress Note    Patient Name: Carlie Valdez  MRN: [id number]  Admission Date: 10/24/2018  Hospital Length of Stay: 2 days  Code Status: Full Code  Attending Provider: Pipe Lomeli, *  Primary Care Provider: Johanna Guzman MD   Principal Problem: Severe sepsis    Subjective:     HPI:       Ms Valdez is a 72 year old female NH resident at [facility] with Trach/PEG vent dependent and bed bound post NMO diagnosis earlier this year.  She also has a past medical history of Anticoagulant long-term use, Arthritis, Asthma, Atrial fibrillation, Blood clot of vein in shoulder area, Cardiac defibrillator in place, CHF (congestive heart failure), Chronic knee pain, Diabetes mellitus type 2 without retinopathy (12/19/2016), Diaphragmatic hernia without obstruction and without gangrene (9/14/2015), GERD (gastroesophageal reflux disease), Hypertension, Immune deficiency disorder, and Primary open angle glaucoma (POAG) of both eyes, severe stage (6/1/2018).  She was also recently hospitalized here at [facility] in ICU 10/19/18 admitted and discharged 2 days ago on 10/22/2018 for Acute on chronic hypoxic resp failure with total left lung opacification as well as severe sepsis with MRSA PNA and bacteremia as well as E.Coli and Klebsiella UTI.  She received a bronch here and left lung cleared with suctioning, IVAB and nebs.  She had some endobronchial bleeding with clots also.  She was discharged to NH on 10/22 on Cipro via PEG and IV Vanc.  She returned back to [facility] early this AM due to decreased LOC at NH, multiple episodes of diarrhea, per [facility] nursing home suctioned a large amount of blood from trach.  In ED temp 89.9 F and suctioned blood clots from Trach.  Plt count 38.  IVAB resumed and given IV Lasix        Hospital/ICU Course:  10/24 - Admitted to ICU on mech vent and warming via artic sun.  She is awake and responsive not requiring  pressor support  10/25/2018 Chart reviewed . Bronchoscopy last admission for mucous pluggine on the left. pleural effusion on Chest X Ray on the right. history of Congestive Heart failure:Moderate left ventricular systolic dysfunction with a visually estimated ejection fraction of 40%.  Grade 3 diastolic dysfunction consistent with restrictive physiology with elevated left atrial pressure.  Severe tricuspid regurgitation.  Moderate pulmonary hypertension with an estimated pulmonary artery systolic pressure 55 mmHg.  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   10/26/2018  CT of chest with bilateral effusions and atelectasis with right lower lobe and left lower lobe collapse      Past Medical History:   Diagnosis Date    Anticoagulant long-term use     Arthritis     Asthma     Atrial fibrillation     Blood clot of vein in shoulder area     Cardiac defibrillator in place     CHF (congestive heart failure)     Chronic knee pain     Diabetes mellitus type 2 without retinopathy 2016    Diaphragmatic hernia without obstruction and without gangrene 2015    GERD (gastroesophageal reflux disease)     Hypertension     Immune deficiency disorder     Primary open angle glaucoma (POAG) of both eyes, severe stage 2018       Past Surgical History:   Procedure Laterality Date    CARDIAC DEFIBRILLATOR PLACEMENT      , .    CATARACT EXTRACTION       SECTION      COLONOSCOPY      EGD, WITH PEG TUBE INSERTION N/A 2018    Performed by Louis O. Jeansonne IV, [facility] at [facility] OR    ashley Macdonald    ESOPHAGOGASTRODUODENOSCOPY (EGD) N/A 2015    Performed by Hieu Colbert MD at [facility] ENDO    ESOPHAGOGASTRODUODENOSCOPY W/ PEG N/A 2018    Procedure: EGD, WITH PEG TUBE INSERTION;  Surgeon: Louis O. Jeansonne IV, MD;  Location: [facility] OR;  Service: General;  Laterality: N/A;    KNEE  ARTHROSCOPY      TRACHEOSTOMY N/A 7/18/2018    Procedure: TRACHEOSTOMY;  Surgeon: Louis O. Jeansonne IV, MD;  Location: [facility] OR;  Service: General;  Laterality: N/A;    TRACHEOSTOMY N/A 7/18/2018    Performed by Louis O. [facility]ne IV, MD at [facility] OR    UPPER GASTROINTESTINAL ENDOSCOPY         Review of patient's allergies indicates:   Allergen Reactions    Morphine Hives    Atorvastatin      Other reaction(s): Muscle pain    Clonidine     Codeine      Other reaction(s): Unknown    Digoxin      Other reaction(s): Unknown    Diovan [valsartan] Other (See Comments)     Upset stomach, weakness    Eggs [egg derived]     Metoprolol Diarrhea    Naproxen      Other reaction(s): Nausea    Peanut     Propofol      Other reaction(s): delirious    Sulfa (sulfonamide antibiotics)        Family History     Problem Relation (Age of Onset)    Hypertension Mother, Father        Tobacco Use    Smoking status: Never Smoker    Smokeless tobacco: Never Used   Substance and Sexual Activity    Alcohol use: No     Alcohol/week: 0.0 oz    Drug use: No    Sexual activity: No         Review of Systems   Unable to perform ROS: Patient nonverbal     Objective:     Vital Signs (Most Recent):  Temp: 97 °F (36.1 °C) (10/26/18 1105)  Pulse: 70 (10/26/18 1216)  Resp: 20 (10/26/18 1216)  BP: (!) 149/77 (10/26/18 1200)  SpO2: 96 % (10/26/18 1216) Vital Signs (24h Range):  Temp:  [97 °F (36.1 °C)-98.9 °F (37.2 °C)] 97 °F (36.1 °C)  Pulse:  [69-74] 70  Resp:  [11-23] 20  SpO2:  [94 %-100 %] 96 %  BP: (143-190)/() 149/77     Weight: 85 kg (187 lb 6.3 oz)  Body mass index is 26.89 kg/m².      Intake/Output Summary (Last 24 hours) at 10/26/2018 1313  Last data filed at 10/26/2018 1043  Gross per 24 hour   Intake 1208 ml   Output 3536 ml   Net -2328 ml       Physical Exam   Constitutional: Vital signs are normal. She appears well-developed and well-nourished. She appears cachectic. She has a sickly appearance. She appears ill. She  appears distressed.   HENT:   Head: Atraumatic.   Mouth/Throat: Oropharynx is clear and moist.   Eyes: EOM and lids are normal. Pupils are equal, round, and reactive to light.   Neck: Neck supple.       trahcestomy   Cardiovascular: Normal rate. An irregularly irregular rhythm present.   Murmur heard.   Systolic murmur is present with a grade of 2/6.  Pulses:       Radial pulses are 1+ on the right side, and 1+ on the left side.        Dorsalis pedis pulses are 1+ on the right side, and 1+ on the left side.   Pulmonary/Chest: No accessory muscle usage. No respiratory distress. She has decreased breath sounds in the right lower field and the left lower field. She has rhonchi. She has rales in the right lower field and the left lower field.   Abdominal: Soft. Bowel sounds are normal. She exhibits no distension. There is no tenderness.       Genitourinary:   Genitourinary Comments: Flores in place   Musculoskeletal: She exhibits edema.   Diffuse atrophy and +1 tibal edema   Lymphadenopathy:     She has no cervical adenopathy.   Neurological: She is alert. She displays abnormal reflex. She exhibits abnormal muscle tone. Coordination abnormal.   Awake with eyes open and nods head to questions   Skin: Skin is dry. Capillary refill takes 2 to 3 seconds. No cyanosis.        decubitus ulcer sacral area       Vents:  Vent Mode: A/C (10/26/18 1216)  Ventilator Initiated: Yes (10/24/18 0630)  Set Rate: 20 bmp (10/26/18 1216)  Vt Set: 0 mL (10/26/18 1216)  Pressure Support: 0 cmH20 (10/26/18 1216)  PEEP/CPAP: 12 cmH20 (10/26/18 1216)  Oxygen Concentration (%): 55 (10/26/18 1216)  Peak Airway Pressure: 30 cmH2O (10/26/18 1216)  Plateau Pressure: 0 cmH20 (10/26/18 1216)  Total Ve: 6.35 mL (10/26/18 1216)  F/VT Ratio<105 (RSBI): (!) 63.29 (10/26/18 1216)    Lines/Drains/Airways     Drain                 Gastrostomy/Enterostomy 10/19/18 Percutaneous endoscopic gastrostomy (PEG) LUQ 7 days         Urethral Catheter 10/24/18 1030 16  Fr. 2 days          Airway                 Surgical Airway 07/18/18 1620 Shiley 99 days          Pressure Ulcer                 Pressure Injury 10/24/18 1100 Right medial Malleolus Stage 3 2 days         Pressure Injury 10/24/18 1105 Left Ischial tuberosity Stage 3 2 days         Pressure Injury 10/24/18 Coccyx Unstageable 2 days          Peripheral Intravenous Line                 Midline Catheter Insertion/Assessment  - Double Lumen 10/19/18 0131 Left brachial vein other (see comments) 7 days                Significant Labs:    CBC/Anemia Profile:  Recent Labs   Lab 10/25/18  0412 10/26/18  0402   WBC 5.16 5.21   HGB 8.6* 8.7*   HCT 27.8* 28.2*   PLT 64* 51*   MCV 88 88   RDW 18.9* 18.6*        Chemistries:  Recent Labs   Lab 10/25/18  0412 10/26/18  0402    143   K 3.9 3.6    104   CO2 30* 32*   BUN 26* 25*   CREATININE 0.7 0.7   CALCIUM 9.1 9.3   MG 1.7 1.8   PHOS  --  2.7       ABGs:   Recent Labs   Lab 10/26/18  0504   PH 7.436   PCO2 50.5*   HCO3 34.0*   POCSATURATED 94*   BE 10     Coagulation:   No results for input(s): PT, INR, APTT in the last 48 hours.  Lactic Acid:   No results for input(s): LACTATE in the last 48 hours.  Troponin:   Recent Labs   Lab 10/24/18  1826   TROPONINI 0.075*     Urine Studies:   No results for input(s): COLORU, APPEARANCEUA, PHUR, SPECGRAV, PROTEINUA, GLUCUA, KETONESU, BILIRUBINUA, OCCULTUA, NITRITE, UROBILINOGEN, LEUKOCYTESUR, RBCUA, WBCUA, BACTERIA, SQUAMEPITHEL, HYALINECASTS in the last 48 hours.    Invalid input(s): WRIGHTSUR  All pertinent labs within the past 24 hours have been reviewed.    Significant Imaging:   CXR: I have reviewed all pertinent results/findings within the past 24 hours and my personal findings are:  RLL atelectasis vs infiltrate with mild to mod bilat pulm edema    Assessment/Plan:     Pulmonary   Acute on chronic respiratory failure with hypoxia and hypercapnia      .Cont full vent support  Vent settings reviewed and adjusted  VAP  prophylaxis  Wean PEEP and FiO2 slowly  10/26/2018  CHeck CT of chest for pleural effusion   10/26/2018 Continue support and diuresis         Uncomplicated severe persistent asthma    Cont Duonebs  Add Formeterol and Budesonide     Cardiac/Vascular   Acute on chronic systolic congestive heart failure w/ pulm edema    Cont IV Lasix  Daily weights  Accurate I/Os  Follow up labs  10/26/2018  Start IV lasix for pleural effusion and review CT of chest  10/26/2018 Good diuresis, bilateral atelectasis on CT- continue diuretics       Chronic atrial fibrillation    NSR on Amiodarone infusion     Renal/   Urinary tract infection associated with indwelling urethral catheter    Cont Cipro  Repeat cultures pending  10/25/2018 awaiting cultures to review       Chronic kidney disease (CKD), stage III (moderate)    Creatine 0.7  Monitor I/Os and creatine with diuresis     ID   * Severe sepsis    Cont IVAB from discharge  Normal WBC  Warm to normothermia  Cont supportive care  No IVFs given pulm edema  10/25/2018  cultures pending, stable blood pressure     MRSA bacteremia    Cont Vanc, pharmacy to dose  Repeat cultures pending     Hematology   Thrombocytopenia    No bleeding other than Endobronchial  No anticoagulants or antiplatelets for now  Follow CBC     Endocrine   Hypothyroidism    Cont Levothyroxine     Type 2 diabetes mellitus with kidney complication, without long-term current use of insulin    Cont SSI  Glucose stable  Monitor for insulin toxicity     Other   Pressure injury of coccygeal region, unstageable    Wound care following  Low pressure bed  Turn Q 2 hours  10/25/2018 Evaluated by surgery. CT of pelvis ordered for depth of decubitis          Critical Care Daily Checklist:    A: Awake: RASS Goal/Actual Goal:    Actual: Guerrero Agitation Sedation Scale (RASS): Alert and calm   B: Spontaneous Breathing Trial Performed? Spon. Breathing Trial Initiated?: Not initiated (10/25/18 1320)   C: SAT & SBT Coordinated?  na                       D: Delirium: CAM-ICU Overall CAM-ICU: Negative   E: Early Mobility Performed? No   F: Feeding Goal: Goals: Meet > 85 % EEN/EPN while admitted  Status: Nutrition Goal Status: new   Current Diet Order   No orders of the defined types were placed in this encounter.      AS: Analgesia/Sedation adequate   T: Thromboembolic Prophylaxis yes   H: HOB > 300 Yes   U: Stress Ulcer Prophylaxis (if needed) yes   G: Glucose Control adequate   B: Bowel Function Stool Occurrence: 1   I: Indwelling Catheter (Lines & Flores) Necessity needed   D: De-escalation of Antimicrobials/Pharmacotherapies na    Plan for the day/ETD Support , diuresis    Code Status:  Family/Goals of Care: Full Code  discussed     Critical Care Time: 40 minutes  Critical secondary to Patient has a condition that poses threat to life and bodily function: Severe Respiratory Distress      Critical care was time spent personally by me on the following activities: development of treatment plan with patient or surrogate and bedside caregivers, discussions with consultants, evaluation of patient's response to treatment, examination of patient, ordering and performing treatments and interventions, ordering and review of laboratory studies, ordering and review of radiographic studies, pulse oximetry, re-evaluation of patient's condition. This critical care time did not overlap with that of any other provider or involve time for any procedures.     James Meier MD  Critical Care Medicine  [facility]

## 2018-10-26 NOTE — PROGRESS NOTES
Took patient off to lavage and suction . Got thick tan secretions. Placed back on vent right after

## 2018-10-26 NOTE — EICU
eICU Note :    eICU Rounds with bedside RN and eICU RN:    Problem:Request for pain meds, patient admitted yesterday with sepsis , creatinine 0.7    Pertinent History and labs reviewed :72-year-old female from nursing home status post trach, PEG, vent dependent bedbound post and MO diagnosis earlier this year with chronic knee pain, diabetes mellitus type 2 without retinopathy, diaphragmatic hernia without obstruction and without gangrene, GERD, hypertension, immune deficiency.  Patient was brought back to [facility] because of bleeding from the trach and loss of consciousness  Patient Active Problem List   Diagnosis    Chronic atrial fibrillation    GERD (gastroesophageal reflux disease)    Labile blood pressure    Childhood asthma without complication    Cardiac defibrillator in place    Abnormal CT scan, gastrointestinal tract    Diaphragmatic hernia without obstruction and without gangrene    Chronic knee pain    Obesity, Class I, BMI 30-34.9    Vitamin D deficiency    PVD (peripheral vascular disease)    LBBB (left bundle branch block)    Bilateral leg edema    Pneumonia due to infectious organism    Cough    Elevated troponin    Chronic kidney disease (CKD), stage III (moderate)    Nuclear sclerosis of right eye    Pseudophakia of left eye    Uncomplicated severe persistent asthma    Chronic atrial fibrillation with RVR    Chronic combined systolic and diastolic congestive heart failure    Non compliance w medication regimen    SIRS (systemic inflammatory response syndrome)    Cortical age-related cataract of both eyes    Type 2 diabetes mellitus with kidney complication, without long-term current use of insulin    Acute on chronic systolic congestive heart failure w/ pulm edema    Cerebrovascular accident (CVA) due to embolism    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    Primary open angle glaucoma (POAG) of both eyes, severe stage    Hypotension    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    Atrial fibrillation with RVR    Hypothyroidism    Deep tissue injury    Left ischial pressure sore, stage III    Pressure ulcer of thigh, stage 2    Atypical chest pain    NSVT (nonsustained ventricular tachycardia)    Right sided weakness    Acute on chronic systolic heart failure    Severe muscle deconditioning    Urinary tract infection associated with indwelling urethral catheter    Skin breakdown    Pressure ulcer of right heel, stage 3    Pressure ulcer of right buttock, stage 2    Debility    Neuromyelitis optica    Leukocytosis    Blisters of multiple sites    Chest pain    Severe sepsis    Long term current use of antipsychotic medication    Ventilator dependence    Atelectasis of left lung    Pressure ulcer of coccygeal region    MRSA bacteremia    Pressure injury of coccygeal region, unstageable    Acute on chronic respiratory failure with hypoxia and hypercapnia    Thrombocytopenia    Incontinence associated dermatitis    Pressure injury of left ischium, stage 3    Pressure injury of right ankle, stage 3         Treatment /Intervention given:resume UTRAM 50 every 6 when necessary pain        Melanie Nanci QUARLES  eICU Physician

## 2018-10-26 NOTE — SUBJECTIVE & OBJECTIVE
Past Medical History:   Diagnosis Date    Anticoagulant long-term use     Arthritis     Asthma     Atrial fibrillation     Blood clot of vein in shoulder area     Cardiac defibrillator in place     CHF (congestive heart failure)     Chronic knee pain     Diabetes mellitus type 2 without retinopathy 2016    Diaphragmatic hernia without obstruction and without gangrene 2015    GERD (gastroesophageal reflux disease)     Hypertension     Immune deficiency disorder     Primary open angle glaucoma (POAG) of both eyes, severe stage 2018       Past Surgical History:   Procedure Laterality Date    CARDIAC DEFIBRILLATOR PLACEMENT      , .    CATARACT EXTRACTION       SECTION      COLONOSCOPY      EGD, WITH PEG TUBE INSERTION N/A 2018    Performed by [facility]. [facility], [facility] at [facility] OR    ashley Macdonald    ESOPHAGOGASTRODUODENOSCOPY (EGD) N/A 2015    Performed by Hieu Colbert MD at [facility] ENDO    ESOPHAGOGASTRODUODENOSCOPY W/ PEG N/A 2018    Procedure: EGD, WITH PEG TUBE INSERTION;  Surgeon: Louis O. Jeansonne IV, MD;  Location: [facility] OR;  Service: General;  Laterality: N/A;    KNEE ARTHROSCOPY      TRACHEOSTOMY N/A 2018    Procedure: TRACHEOSTOMY;  Surgeon: Louis O. Jeansonne IV, MD;  Location: [facility] OR;  Service: General;  Laterality: N/A;    TRACHEOSTOMY N/A 2018    Performed by [facility]. [facility] IV, MD at [facility] OR    UPPER GASTROINTESTINAL ENDOSCOPY         Review of patient's allergies indicates:   Allergen Reactions    Morphine Hives    Atorvastatin      Other reaction(s): Muscle pain    Clonidine     Codeine      Other reaction(s): Unknown    Digoxin      Other reaction(s): Unknown    Diovan [valsartan] Other (See Comments)     Upset stomach, weakness    Eggs [egg derived]     Metoprolol Diarrhea    Naproxen      Other reaction(s): Nausea    Peanut     Propofol      Other reaction(s): delirious    Sulfa (sulfonamide  antibiotics)        Family History     Problem Relation (Age of Onset)    Hypertension Mother, Father        Tobacco Use    Smoking status: Never Smoker    Smokeless tobacco: Never Used   Substance and Sexual Activity    Alcohol use: No     Alcohol/week: 0.0 oz    Drug use: No    Sexual activity: No         Review of Systems   Unable to perform ROS: Patient nonverbal     Objective:     Vital Signs (Most Recent):  Temp: 97 °F (36.1 °C) (10/26/18 1105)  Pulse: 70 (10/26/18 1216)  Resp: 20 (10/26/18 1216)  BP: (!) 149/77 (10/26/18 1200)  SpO2: 96 % (10/26/18 1216) Vital Signs (24h Range):  Temp:  [97 °F (36.1 °C)-98.9 °F (37.2 °C)] 97 °F (36.1 °C)  Pulse:  [69-74] 70  Resp:  [11-23] 20  SpO2:  [94 %-100 %] 96 %  BP: (143-190)/() 149/77     Weight: 85 kg (187 lb 6.3 oz)  Body mass index is 26.89 kg/m².      Intake/Output Summary (Last 24 hours) at 10/26/2018 1313  Last data filed at 10/26/2018 1043  Gross per 24 hour   Intake 1208 ml   Output 3536 ml   Net -2328 ml       Physical Exam   Constitutional: Vital signs are normal. She appears well-developed and well-nourished. She appears cachectic. She has a sickly appearance. She appears ill. She appears distressed.   HENT:   Head: Atraumatic.   Mouth/Throat: Oropharynx is clear and moist.   Eyes: EOM and lids are normal. Pupils are equal, round, and reactive to light.   Neck: Neck supple.       trahcestomy   Cardiovascular: Normal rate. An irregularly irregular rhythm present.   Murmur heard.   Systolic murmur is present with a grade of 2/6.  Pulses:       Radial pulses are 1+ on the right side, and 1+ on the left side.        Dorsalis pedis pulses are 1+ on the right side, and 1+ on the left side.   Pulmonary/Chest: No accessory muscle usage. No respiratory distress. She has decreased breath sounds in the right lower field and the left lower field. She has rhonchi. She has rales in the right lower field and the left lower field.   Abdominal: Soft. Bowel sounds  are normal. She exhibits no distension. There is no tenderness.       Genitourinary:   Genitourinary Comments: Flores in place   Musculoskeletal: She exhibits edema.   Diffuse atrophy and +1 tibal edema   Lymphadenopathy:     She has no cervical adenopathy.   Neurological: She is alert. She displays abnormal reflex. She exhibits abnormal muscle tone. Coordination abnormal.   Awake with eyes open and nods head to questions   Skin: Skin is dry. Capillary refill takes 2 to 3 seconds. No cyanosis.        decubitus ulcer sacral area       Vents:  Vent Mode: A/C (10/26/18 1216)  Ventilator Initiated: Yes (10/24/18 0630)  Set Rate: 20 bmp (10/26/18 1216)  Vt Set: 0 mL (10/26/18 1216)  Pressure Support: 0 cmH20 (10/26/18 1216)  PEEP/CPAP: 12 cmH20 (10/26/18 1216)  Oxygen Concentration (%): 55 (10/26/18 1216)  Peak Airway Pressure: 30 cmH2O (10/26/18 1216)  Plateau Pressure: 0 cmH20 (10/26/18 1216)  Total Ve: 6.35 mL (10/26/18 1216)  F/VT Ratio<105 (RSBI): (!) 63.29 (10/26/18 1216)    Lines/Drains/Airways     Drain                 Gastrostomy/Enterostomy 10/19/18 Percutaneous endoscopic gastrostomy (PEG) LUQ 7 days         Urethral Catheter 10/24/18 1030 16 Fr. 2 days          Airway                 Surgical Airway 07/18/18 1620 Shiley 99 days          Pressure Ulcer                 Pressure Injury 10/24/18 1100 Right medial Malleolus Stage 3 2 days         Pressure Injury 10/24/18 1105 Left Ischial tuberosity Stage 3 2 days         Pressure Injury 10/24/18 Coccyx Unstageable 2 days          Peripheral Intravenous Line                 Midline Catheter Insertion/Assessment  - Double Lumen 10/19/18 0131 Left brachial vein other (see comments) 7 days                Significant Labs:    CBC/Anemia Profile:  Recent Labs   Lab 10/25/18  0412 10/26/18  0402   WBC 5.16 5.21   HGB 8.6* 8.7*   HCT 27.8* 28.2*   PLT 64* 51*   MCV 88 88   RDW 18.9* 18.6*        Chemistries:  Recent Labs   Lab 10/25/18  0412 10/26/18  0402    088    K 3.9 3.6    104   CO2 30* 32*   BUN 26* 25*   CREATININE 0.7 0.7   CALCIUM 9.1 9.3   MG 1.7 1.8   PHOS  --  2.7       ABGs:   Recent Labs   Lab 10/26/18  0504   PH 7.436   PCO2 50.5*   HCO3 34.0*   POCSATURATED 94*   BE 10     Coagulation:   No results for input(s): PT, INR, APTT in the last 48 hours.  Lactic Acid:   No results for input(s): LACTATE in the last 48 hours.  Troponin:   Recent Labs   Lab 10/24/18  1826   TROPONINI 0.075*     Urine Studies:   No results for input(s): COLORU, APPEARANCEUA, PHUR, SPECGRAV, PROTEINUA, GLUCUA, KETONESU, BILIRUBINUA, OCCULTUA, NITRITE, UROBILINOGEN, LEUKOCYTESUR, RBCUA, WBCUA, BACTERIA, SQUAMEPITHEL, HYALINECASTS in the last 48 hours.    Invalid input(s): WRIGHTSUR  All pertinent labs within the past 24 hours have been reviewed.    Significant Imaging:   CXR: I have reviewed all pertinent results/findings within the past 24 hours and my personal findings are:  RLL atelectasis vs infiltrate with mild to mod bilat pulm edema

## 2018-10-26 NOTE — ASSESSMENT & PLAN NOTE
.Cont full vent support  Vent settings reviewed and adjusted  VAP prophylaxis  Wean PEEP and FiO2 slowly  10/26/2018  CHeck CT of chest for pleural effusion   10/26/2018 Continue support and diuresis

## 2018-10-26 NOTE — PROGRESS NOTES
Clinical Pharmacy: Vancomycin Progress Note    Vancomycin random level = 24.4 mcg/ml (drawn @0400)  Will continue to hold dose.  Will order random level 18 hours post level from this morning. (66 hours post dose)     Indication: Severe Sepsis    Goal trough: 15-20 mcg/ml   Patient will need 2 weeks of therapy post negative blood cx.     Random level due: 10/26 at 2200      Thank you for allowing us to participate in this patient's care.   Lana Rogers, PharmD 10/26/2018 3:18 PM

## 2018-10-26 NOTE — SUBJECTIVE & OBJECTIVE
Interval History: Pt was seen and examined at bedside .  She cont on ventilation support . The ct chest show  Lung collapse , possible pn and moderate pleura effusion     Review of Systems   Unable to perform ROS: Patient nonverbal     Objective:     Vital Signs (Most Recent):  Temp: 97 °F (36.1 °C) (10/26/18 1105)  Pulse: 70 (10/26/18 1216)  Resp: 20 (10/26/18 1216)  BP: (!) 149/77 (10/26/18 1200)  SpO2: 96 % (10/26/18 1216) Vital Signs (24h Range):  Temp:  [97 °F (36.1 °C)-98.9 °F (37.2 °C)] 97 °F (36.1 °C)  Pulse:  [69-74] 70  Resp:  [11-23] 20  SpO2:  [94 %-100 %] 96 %  BP: (143-190)/() 149/77     Weight: 85 kg (187 lb 6.3 oz)  Body mass index is 26.89 kg/m².    Intake/Output Summary (Last 24 hours) at 10/26/2018 1316  Last data filed at 10/26/2018 1043  Gross per 24 hour   Intake 1208 ml   Output 3536 ml   Net -2328 ml      Physical Exam   Constitutional: Vital signs are normal. She appears well-developed. She appears cachectic. She has a sickly appearance. She appears ill. No distress.   HENT:   Head: Atraumatic.   Mouth/Throat: Oropharynx is clear and moist.   Eyes: EOM and lids are normal. Pupils are equal, round, and reactive to light.   Neck: Neck supple.       Cardiovascular: Normal rate and regular rhythm.   Pulses:       Radial pulses are 1+ on the right side, and 1+ on the left side.        Dorsalis pedis pulses are 1+ on the right side, and 1+ on the left side.   Pulmonary/Chest: No accessory muscle usage. No respiratory distress. She has decreased breath sounds in the right lower field. She has rhonchi. She has rales.   Abdominal: Soft. Bowel sounds are normal. She exhibits no distension. There is no tenderness.       Genitourinary:   Genitourinary Comments: Flores in place   Musculoskeletal:   Diffuse atrophy and +1 tibal edema   Lymphadenopathy:     She has no cervical adenopathy.   Neurological: She is alert.   Awake with eyes open and nods head to questions   Skin: Skin is dry. Capillary  refill takes 2 to 3 seconds. No cyanosis.            Significant Labs: All pertinent labs within the past 24 hours have been reviewed.    Significant Imaging: I have reviewed all pertinent imaging results/findings within the past 24 hours.

## 2018-10-26 NOTE — PLAN OF CARE
Problem: Skin Integrity Impairment, Risk/Actual (Adult)  Goal: Identify Related Risk Factors and Signs and Symptoms  Related risk factors and signs and symptoms are identified upon initiation of Human Response Clinical Practice Guideline (CPG)  Outcome: Ongoing (interventions implemented as appropriate)  PT HAS BEEN UNABLE TO BE WEANED.  PEEP REMAINS AT 12.  SECRETIONS ARE VERY THICK AND TAN TO YELLOW.  PT HAS DIURESED WELL TODAY AND SHE HAS TOLERATED TUBE FEEDINGS TODAY.  MUSCLE SPASMS HAVE DECREASED SINCE STARTING BACLOFEN TODAY.  BLOOD PRESSURE IS STILL LABILE.  PT HAS TOLERATED TURNING AND HEEL LIFT BOOTS.  DAUGHTER CALLED TODAY AND WAS UPDATED TO POC.  DR. MCCLOUD READ THE CT AND HAS DECIDE TO CONTINUE TO DIURESE PT AS LONG AS SHE HER OUTPUT REMAINS GOOD AS APPOSED TO BRONCH/THORASENTISIS.

## 2018-10-26 NOTE — PROGRESS NOTES
[facility] Hospital Medicine  Progress Note    Patient Name: Carlie Valdez  MRN: [id number]  Patient Class: IP- Inpatient   Admission Date: 10/24/2018  Length of Stay: 2 days  Attending Physician: Pipe Lomeli, *  Primary Care Provider: Johanna Guzman MD        Subjective:     Principal Problem:Severe sepsis    HPI:  Carlie Valdez is a 72 y.o. female patient with PMHx of Afib, CHF, DM, GERD, neuromyelitis optica spectrum disorder, and HTN, Chronic Trach/Peg/Harris who presents to the Emergency Department for altered mental status which onset gradually last night. Patient currently resides at [facility]. Pt's pulse ox was reported to be 48% per [facility]. Pt was admitted from ED on 10/18 for sepsis and was d/c with IV Vancomycin and ciprofloxacin on 10/22. Per [facility], [facility] nursing staff suctioned a large amount of blood from pt's trach. Pt has had episodes of hemoptysis following a previous bronchoscopy on 10/19/18. Patient presented to ER hypotensive, has chronic harris on arrival, UA +. Chest Xray +pulmonary edema. Sepsis protocol initiated in ED. Lactic 2.6. Received 30 ml/kg bolus with positive response in BP. Patient received IV zosyn and vancomycin. Patient admitted to ICU for severe sepsis, acute on chronic heart failure, and acute on chronic respiratory failure with hypoxia and hypercapnia under the care of [facility] medicine.         Hospital Course:  73 y/o AAF admitted to ICU with a dx of acute respiratory failure , acute on chronic systolic chf exacerbation  And sepsis due to PNA/UTI .She was started on IV lasix  AnD IVAB .  She  Has a (+) urine cx (10/18/18)  For  Klebsiella pneumonaie and e.coli sensitive to  Cipro . She had a (+) sputum cx for MRSA (10/18/18).  The CT chest show Bilateral pleural effusions.  Near complete collapse of the right lower and left lower lobes with partial collapse of the right upper and middle lobes.  Left upper lobe  infiltrate or pneumonia versus aspiration pneumonia.    Interval History: Pt was seen and examined at bedside .  She cont on ventilation support . The ct chest show  Lung collapse , possible pn and moderate pleura effusion     Review of Systems   Unable to perform ROS: Patient nonverbal     Objective:     Vital Signs (Most Recent):  Temp: 97 °F (36.1 °C) (10/26/18 1105)  Pulse: 70 (10/26/18 1216)  Resp: 20 (10/26/18 1216)  BP: (!) 149/77 (10/26/18 1200)  SpO2: 96 % (10/26/18 1216) Vital Signs (24h Range):  Temp:  [97 °F (36.1 °C)-98.9 °F (37.2 °C)] 97 °F (36.1 °C)  Pulse:  [69-74] 70  Resp:  [11-23] 20  SpO2:  [94 %-100 %] 96 %  BP: (143-190)/() 149/77     Weight: 85 kg (187 lb 6.3 oz)  Body mass index is 26.89 kg/m².    Intake/Output Summary (Last 24 hours) at 10/26/2018 1316  Last data filed at 10/26/2018 1043  Gross per 24 hour   Intake 1208 ml   Output 3536 ml   Net -2328 ml      Physical Exam   Constitutional: Vital signs are normal. She appears well-developed. She appears cachectic. She has a sickly appearance. She appears ill. No distress.   HENT:   Head: Atraumatic.   Mouth/Throat: Oropharynx is clear and moist.   Eyes: EOM and lids are normal. Pupils are equal, round, and reactive to light.   Neck: Neck supple.       Cardiovascular: Normal rate and regular rhythm.   Pulses:       Radial pulses are 1+ on the right side, and 1+ on the left side.        Dorsalis pedis pulses are 1+ on the right side, and 1+ on the left side.   Pulmonary/Chest: No accessory muscle usage. No respiratory distress. She has decreased breath sounds in the right lower field. She has rhonchi. She has rales.   Abdominal: Soft. Bowel sounds are normal. She exhibits no distension. There is no tenderness.       Genitourinary:   Genitourinary Comments: Flores in place   Musculoskeletal:   Diffuse atrophy and +1 tibal edema   Lymphadenopathy:     She has no cervical adenopathy.   Neurological: She is alert.   Awake with eyes open and  nods head to questions   Skin: Skin is dry. Capillary refill takes 2 to 3 seconds. No cyanosis.            Significant Labs: All pertinent labs within the past 24 hours have been reviewed.    Significant Imaging: I have reviewed all pertinent imaging results/findings within the past 24 hours.    Assessment/Plan:      * Severe sepsis    PNE/UTI  IV Vanc and Ciprofloxacin   IVFs   Blood/urine/sputum cultures pending  Wound care consult  Monitor labs  pulm on board       Pressure injury of right ankle, stage 3    Cont wound care        Pressure injury of left ischium, stage 3    Cont wound care        Incontinence associated dermatitis    Cont wound care        Thrombocytopenia    -cont monitor   -PLT > 50 k   - most likely due to sepsis          Acute on chronic respiratory failure with hypoxia and hypercapnia    Pulmonology consult for critical care and vent management   Trach, vent dependent   CT chest shoe b/l lower lung collapse , moderate pleura effusion  And pulmonary infiltrate   Duonebs tx          Pressure injury of coccygeal region, unstageable    Wound care consult        MRSA bacteremia    Repeat Blood cx pending   IV vanco and cipro          Urinary tract infection associated with indwelling urethral catheter    - Urine culture pending  - Will continue IV Ciprofloxacin         Deep tissue injury    Wound care consult        Hypothyroidism    Resume levothyroxine        Acute on chronic systolic congestive heart failure w/ pulm edema    - CXR with vascular congestion with small bilateral effusion  - cont IV lasix   - 2D ECHO on 10/22/18 showed (EF 40-45%). DD, Mild AR, Mild to moderate MR, Moderate to severe TR, and    Pulmonary HTN.   - Strict I&Os  - Daily weights  reviewed          Type 2 diabetes mellitus with kidney complication, without long-term current use of insulin    Accuchecks   SSI   Ha1c 4.8 on 10/19/18       Uncomplicated severe persistent asthma    Cont breathing tx  reviewed        Chronic  kidney disease (CKD), stage III (moderate)    Stable   Monitor renal function        Elevated troponin    Likely due to demand ischemia  Initial troponin 0.054  Serial troponin        Chronic atrial fibrillation    Heart rate controlled   Resume Xarelto . No sing of acute bleeding at this time   Cont  Amiodarone        VTE Risk Mitigation (From admission, onward)        Ordered     rivaroxaban tablet 15 mg  Nightly      10/26/18 1324     Place sequential compression device  Until discontinued      10/24/18 1141          Critical care time spent on the evaluation and treatment of severe organ dysfunction, review of pertinent labs and imaging studies, discussions with consulting providers and discussions with patient/family: 35 minutes.    Pipe Lomeli MD  Department of Hospital Medicine   [facility]

## 2018-10-26 NOTE — PROGRESS NOTES
[facility]   Adult Nutrition  Progress Note    SUMMARY     Recommendations    Recommendation/Intervention: 1.Consider increasing TF rate to 65 ml/hr to better meet EEN (1560 kcal, 144 g protein and 1310 ml free water).  2. Check residuals Q4 hours. Hold if > 500 cc. 3. Will continue to monitor.   Goals: Meet > 85 % EEN/EPN while admitted  Nutrition Goal Status: progressing towards goal   Communication of RD Recs: POC, sticky note    Reason for Assessment    Reason for Assessment: RD f/u   Dx:  1. Sepsis, due to unspecified organism    2. Altered mental status    3. Hypothermia, initial encounter    4. Thrombocytopenia    5. Urinary tract infection associated with indwelling urethral catheter, initial encounter    6. Hemoptysis    7. Severe sepsis    8. Acute on chronic respiratory failure with hypoxia and hypercapnia    9. Acute on chronic systolic congestive heart failure    10. Chronic atrial fibrillation    11. Chronic kidney disease (CKD), stage III (moderate)    12. MRSA bacteremia    13. Pressure injury of coccygeal region, unstageable    14. Uncomplicated severe persistent asthma      Past Medical History:   Diagnosis Date    Anticoagulant long-term use     Arthritis     Asthma     Atrial fibrillation     Blood clot of vein in shoulder area     Cardiac defibrillator in place     CHF (congestive heart failure)     Chronic knee pain     Diabetes mellitus type 2 without retinopathy 12/19/2016    Diaphragmatic hernia without obstruction and without gangrene 9/14/2015    GERD (gastroesophageal reflux disease)     Hypertension     Immune deficiency disorder     Primary open angle glaucoma (POAG) of both eyes, severe stage 6/1/2018       Interdisciplinary Rounds: attended  General Information Comments: Pt currently in ICU. Pt is a resident at [facility]. Pt admitted w/ AMS. Pt has a trach and PEG. Pt on mechanical ventilation. Pt was recently discharged on 10/22. Per [facility]  "records, no wt loss in the past 3 months. NFPE not completed due to pt with other healthcare providers at time of visit. Will complete at follow up. Reviewed TF recs w/ NP today, TF ordered per RD recs.   10.26.18. Pt remains in ICU, on mechanical ventilation. Pt tolerating TF. Pt confused. Unable to answer my questions appropriately. No family members at bedside. Per NFPE (10.26.18) no significant muscle wasting/fat depletion noted.   Nutrition Discharge Planning: Patient to receive adequate intake via PEG with tolerance.     Nutrition Risk Screen    Nutrition Risk Screen: tube feeding or parenteral nutrition    Nutrition/Diet History    Do you have any cultural, spiritual, Zoroastrianism conflicts, given your current situation?: none  Anthropometrics    Temp: 97 °F (36.1 °C)  Height Method: Estimated  Height: 5' 10" (177.8 cm)  Height (inches): 70 in  Weight Method: Bed Scale  Weight: 85 kg (187 lb 6.3 oz)  Weight (lb): 187.39 lb  Ideal Body Weight (IBW), Female: 150 lb  % Ideal Body Weight, Female (lb): 124.93 lb  BMI (Calculated): 26.9  BMI Grade: 25 - 29.9 - overweight       Lab/Procedures/Meds    Pertinent Labs Reviewed: reviewed  BMP  Lab Results   Component Value Date     10/26/2018    K 3.6 10/26/2018     10/26/2018    CO2 32 (H) 10/26/2018    BUN 25 (H) 10/26/2018    CREATININE 0.7 10/26/2018    CALCIUM 9.3 10/26/2018    ANIONGAP 7 (L) 10/26/2018    ESTGFRAFRICA >60 10/26/2018    EGFRNONAA >60 10/26/2018     /lastphos  Lab Results   Component Value Date    CALCIUM 9.3 10/26/2018    PHOS 2.7 10/26/2018       Lab Results   Component Value Date    ALBUMIN 2.1 (L) 10/24/2018     Recent Labs   Lab 10/26/18  1157   POCTGLUCOSE 112*     Lab Results   Component Value Date    HGBA1C 4.9 10/19/2018       Pertinent Medications Reviewed: reviewed    Physical Findings/Assessment    Overall Physical Appearance: on ventilator support  Tubes: (PEG)  Oral/Mouth Cavity: (dry)  Skin: (pressure injury malleolus stg 2 " and coccyx unstageable )    Estimated/Assessed Needs    Weight Used For Calorie Calculations: 85 kg (187 lb 6.3 oz)  Energy Calorie Requirements (kcal): 1592  Energy Need Method: [facility]  Protein Requirements: 105 - 174 g  Weight Used For Protein Calculations: 87.4 kg (192 lb 10.9 oz)     Fluid Need Method: RDA Method(or per MD)  RDA Method (mL): 1592  CHO Requirement: 50 % EEN      Nutrition Prescription Ordered    Current Diet Order: NPO  Current Nutrition Support Formula Ordered: Peptamen Intense VHP(at 45 ml/hr + 1 pack of beneprotein daily)  Current Nutrition Support Rate Ordered: 45 (ml)(ml/hr)    Evaluation of Received Nutrient/Fluid Intake    Enteral Calories (kcal): 1105  Enteral Protein (gm): 106  % Kcal Needs: 70  % Protein Needs: 100     Intake/Output Summary (Last 24 hours) at 10/26/2018 1229  Last data filed at 10/26/2018 1043  Gross per 24 hour   Intake 1608 ml   Output 3988 ml   Net -2380 ml       % Meal Intake: NPO    Nutrition Risk    Level of Risk/Frequency of Follow-up: (2xweekly)     Assessment and Plan  Nutrition Problem  Swallowing difficulty    Related to (etiology):   Dysphagia     Signs and Symptoms (as evidenced by):   Pt has a PEG x nutrition support    Interventions/Recommendations (treatment strategy):  See above     Nutrition Diagnosis Status:   Continues      Monitor and Evaluation    Food and Nutrient Intake: energy intake, enteral nutrition intake  Food and Nutrient Adminstration: enteral and parenteral nutrition administration  Anthropometric Measurements: weight  Biochemical Data, Medical Tests and Procedures: electrolyte and renal panel, glucose/endocrine profile  Nutrition-Focused Physical Findings: overall appearance, skin     Nutrition Follow-Up    RD Follow-up?: Yes

## 2018-10-26 NOTE — ASSESSMENT & PLAN NOTE
Heart rate controlled   Resume Xarelto . No sing of acute bleeding at this time   Cont  Amiodarone

## 2018-10-26 NOTE — ASSESSMENT & PLAN NOTE
Pulmonology consult for critical care and vent management   Trach, vent dependent   CT chest shoe b/l lower lung collapse , moderate pleura effusion  And pulmonary infiltrate   Alfonzo tx

## 2018-10-26 NOTE — PLAN OF CARE
Problem: Patient Care Overview  Goal: Plan of Care Review  Outcome: Ongoing (interventions implemented as appropriate)   10/25/18 0621   Coping/Psychosocial   Plan Of Care Reviewed With patient;daughter       Comments: Pt daughter called and updated on pt condition, pt c/o pain during the evening, eicu md contacted and order for meds received. Pt also on baclofen 10 tid, med not reordered. Tramadol given twice this shift. vanc rand = 28.6, will repeat this am at 0800. Uop wnl, no issues noted with TF residual, no temps over night.

## 2018-10-26 NOTE — PLAN OF CARE
Problem: Patient Care Overview  Goal: Plan of Care Review  Outcome: Ongoing (interventions implemented as appropriate)  Recommendations     Recommendation/Intervention: 1.Consider increasing TF rate to 65 ml/hr to better meet EEN (1560 kcal, 144 g protein and 1310 ml free water).  2. Check residuals Q4 hours. Hold if > 500 cc. 3. Will continue to monitor.   Goals: Meet > 85 % EEN/EPN while admitted  Nutrition Goal Status: progressing towards goal   Communication of RD Recs: POC, sticky note

## 2018-10-26 NOTE — ASSESSMENT & PLAN NOTE
Cont IV Lasix  Daily weights  Accurate I/Os  Follow up labs  10/26/2018  Start IV lasix for pleural effusion and review CT of chest  10/26/2018 Good diuresis, bilateral atelectasis on CT- continue diuretics

## 2018-10-27 NOTE — SUBJECTIVE & OBJECTIVE
Interval History: Pt was seen and examined at bedside .  She cont on ventilation support . The ct chest show  Lung collapse , possible pn and moderate pleura effusion   10/27-  She remains weak,she had thoracentesis today .  CT scan of the chest/abdomen /pelvis- 10/25-    Moderate right and small left pleural effusion.  The right lung reveals dependent atelectasis of the right middle and lower lobes with near complete collapse of the right lower lobe.  The left lung reveals near-complete collapse of the left lower lobe with patchy infiltrate of the left upper lung zone, possibly representing a pneumonia.  Variable subcutaneous edema is present.    Review of Systems   Unable to perform ROS: Patient nonverbal     Objective:     Vital Signs (Most Recent):  Temp: 98 °F (36.7 °C) (10/27/18 1530)  Pulse: 86 (10/27/18 1601)  Resp: 19 (10/27/18 1601)  BP: (!) 151/105 (10/27/18 1600)  SpO2: 100 % (10/27/18 1601) Vital Signs (24h Range):  Temp:  [98 °F (36.7 °C)-99.6 °F (37.6 °C)] 98 °F (36.7 °C)  Pulse:  [] 86  Resp:  [4-22] 19  SpO2:  [90 %-100 %] 100 %  BP: ()/() 151/105     Weight: 85 kg (187 lb 6.3 oz)  Body mass index is 26.89 kg/m².    Intake/Output Summary (Last 24 hours) at 10/27/2018 1624  Last data filed at 10/27/2018 1600  Gross per 24 hour   Intake 2414.17 ml   Output 2806 ml   Net -391.83 ml      Physical Exam   Constitutional: Vital signs are normal. She appears well-developed. She appears cachectic. She has a sickly appearance. She appears ill. No distress.   HENT:   Head: Atraumatic.   Mouth/Throat: Oropharynx is clear and moist.   Eyes: EOM and lids are normal. Pupils are equal, round, and reactive to light.   Neck: Neck supple.       Cardiovascular: Normal rate and regular rhythm.   Pulses:       Radial pulses are 1+ on the right side, and 1+ on the left side.        Dorsalis pedis pulses are 1+ on the right side, and 1+ on the left side.   Pulmonary/Chest: No accessory muscle usage. No  respiratory distress. She has decreased breath sounds in the right lower field. She has rhonchi. She has rales.   Abdominal: Soft. Bowel sounds are normal. She exhibits no distension. There is no tenderness.       Genitourinary:   Genitourinary Comments: Flores in place   Musculoskeletal:   Diffuse atrophy and +1 tibal edema   Lymphadenopathy:     She has no cervical adenopathy.   Neurological: She is alert.   Awake with eyes open and nods head to questions   Skin: Skin is dry. Capillary refill takes 2 to 3 seconds. No cyanosis.            Significant Labs: All pertinent labs within the past 24 hours have been reviewed.    Significant Imaging: I have reviewed all pertinent imaging results/findings within the past 24 hours.

## 2018-10-27 NOTE — ASSESSMENT & PLAN NOTE
Repeat Blood cx pending   IV vanco and cipro   10/27- Blood culture-10/22- negative  Day 4/14 of therapy for MRSA bacteremia.  Cardiac echo done 10/22- no evidence of endocarditis

## 2018-10-27 NOTE — ASSESSMENT & PLAN NOTE
- Urine culture pending  - Will continue IV Ciprofloxacin      10/27 treated with cipro s/p therapy with zosyn during previous Hospital admit this month

## 2018-10-27 NOTE — ASSESSMENT & PLAN NOTE
Accuchecks   SSI   Ha1c 4.8 on 10/19/18    10/27- will continue insulin sliding scale ,closely monitor glucose level

## 2018-10-27 NOTE — ASSESSMENT & PLAN NOTE
Heart rate controlled   Resume Xarelto . No sing of acute bleeding at this time   Cont  Amiodarone     10/27- will continue amiodarone/xarelto , will continue close monitoring

## 2018-10-27 NOTE — PROGRESS NOTES
[facility] Hospital Medicine  Progress Note    Patient Name: Carlie Valdez  MRN: [id number]  Patient Class: IP- Inpatient   Admission Date: 10/24/2018  Length of Stay: 3 days  Attending Physician: Philip Ceja MD  Primary Care Provider: Johanna Guzman MD        Subjective:     Principal Problem:Severe sepsis    HPI:  Carlie Valdez is a 72 y.o. female patient with PMHx of Afib, CHF, DM, GERD, neuromyelitis optica spectrum disorder, and HTN, Chronic Trach/Peg/Harris who presents to the Emergency Department for altered mental status which onset gradually last night. Patient currently resides at [facility]. Pt's pulse ox was reported to be 48% per [facility]. Pt was admitted from ED on 10/18 for sepsis and was d/c with IV Vancomycin and ciprofloxacin on 10/22. Per [facility], [facility] nursing staff suctioned a large amount of blood from pt's trach. Pt has had episodes of hemoptysis following a previous bronchoscopy on 10/19/18. Patient presented to ER hypotensive, has chronic harris on arrival, UA +. Chest Xray +pulmonary edema. Sepsis protocol initiated in ED. Lactic 2.6. Received 30 ml/kg bolus with positive response in BP. Patient received IV zosyn and vancomycin. Patient admitted to ICU for severe sepsis, acute on chronic heart failure, and acute on chronic respiratory failure with hypoxia and hypercapnia under the care of [facility] medicine.         Hospital Course:  71 y/o AAF admitted to ICU with a dx of acute respiratory failure , acute on chronic systolic chf exacerbation  And sepsis due to PNA/UTI .She was started on IV lasix  AnD IVAB .  She  Has a (+) urine cx (10/18/18)  For  Klebsiella pneumonaie and e.coli sensitive to  Cipro . She had a (+) sputum cx for MRSA (10/18/18).  The CT chest show Bilateral pleural effusions.  Near complete collapse of the right lower and left lower lobes with partial collapse of the right upper and middle lobes.  Left upper lobe  infiltrate or pneumonia versus aspiration pneumonia.    10/27-  She remains on ventilatory support . Chest x-ray showed interval increased opacification of the left lower lung.  She had thoracentesis done today .  Pleural fluid wbc -1361.  Respiratory culture -10/24- MRSA, candida        Interval History: Pt was seen and examined at bedside .  She cont on ventilation support . The ct chest show  Lung collapse , possible pn and moderate pleura effusion   10/27-  She remains weak,she had thoracentesis today .  CT scan of the chest/abdomen /pelvis- 10/25-    Moderate right and small left pleural effusion.  The right lung reveals dependent atelectasis of the right middle and lower lobes with near complete collapse of the right lower lobe.  The left lung reveals near-complete collapse of the left lower lobe with patchy infiltrate of the left upper lung zone, possibly representing a pneumonia.  Variable subcutaneous edema is present.    Review of Systems   Unable to perform ROS: Patient nonverbal     Objective:     Vital Signs (Most Recent):  Temp: 98 °F (36.7 °C) (10/27/18 1530)  Pulse: 86 (10/27/18 1601)  Resp: 19 (10/27/18 1601)  BP: (!) 151/105 (10/27/18 1600)  SpO2: 100 % (10/27/18 1601) Vital Signs (24h Range):  Temp:  [98 °F (36.7 °C)-99.6 °F (37.6 °C)] 98 °F (36.7 °C)  Pulse:  [] 86  Resp:  [4-22] 19  SpO2:  [90 %-100 %] 100 %  BP: ()/() 151/105     Weight: 85 kg (187 lb 6.3 oz)  Body mass index is 26.89 kg/m².    Intake/Output Summary (Last 24 hours) at 10/27/2018 1624  Last data filed at 10/27/2018 1600  Gross per 24 hour   Intake 2414.17 ml   Output 2806 ml   Net -391.83 ml      Physical Exam   Constitutional: Vital signs are normal. She appears well-developed. She appears cachectic. She has a sickly appearance. She appears ill. No distress.   HENT:   Head: Atraumatic.   Mouth/Throat: Oropharynx is clear and moist.   Eyes: EOM and lids are normal. Pupils are equal, round, and reactive to  light.   Neck: Neck supple.       Cardiovascular: Normal rate and regular rhythm.   Pulses:       Radial pulses are 1+ on the right side, and 1+ on the left side.        Dorsalis pedis pulses are 1+ on the right side, and 1+ on the left side.   Pulmonary/Chest: No accessory muscle usage. No respiratory distress. She has decreased breath sounds in the right lower field. She has rhonchi. She has rales.   Abdominal: Soft. Bowel sounds are normal. She exhibits no distension. There is no tenderness.       Genitourinary:   Genitourinary Comments: Flores in place   Musculoskeletal:   Diffuse atrophy and +1 tibal edema   Lymphadenopathy:     She has no cervical adenopathy.   Neurological: She is alert.   Awake with eyes open and nods head to questions   Skin: Skin is dry. Capillary refill takes 2 to 3 seconds. No cyanosis.            Significant Labs: All pertinent labs within the past 24 hours have been reviewed.    Significant Imaging: I have reviewed all pertinent imaging results/findings within the past 24 hours.    Assessment/Plan:      * Severe sepsis    PNE/UTI  IV Vanc and Ciprofloxacin   IVFs   Blood/urine/sputum cultures pending  Wound care consult  Monitor labs  pulm on board    10/27- related to UTI and MRSA pneumonia -on vancomycin and cipro .  Urine culture -10/18- E coli, Klebsiella   Will closely monitor vanco trough .Vanco trough is 17.     Pleural effusion on right    10/27- s/p thoracentesis -will follow cultures to guide therapy .   Pulmonology follow up .  Post procedure chest x-ray reviewed - no evidence of pneumothorax.        Pressure injury of right ankle, stage 3    Cont wound care        Pressure injury of left ischium, stage 3    Cont wound care        Incontinence associated dermatitis    Cont wound care        Thrombocytopenia    -cont monitor   -PLT > 50 k   - most likely due to sepsis     10/27- will monitor closely ,might need transfusion with bleeding , platelet is now 71.         Acute on  chronic respiratory failure with hypoxia and hypercapnia    Pulmonology consult for critical care and vent management   Trach, vent dependent   CT chest shoe b/l lower lung collapse , moderate pleura effusion  And pulmonary infiltrate   Duonebs tx       10/27- will continue close critical care follow up , ventilatory care and support .  Might need bronchoscopy, will monitor closely post bronchoscopy       Pressure injury of coccygeal region, unstageable    Wound care consult     10/27- will follow wound care , supportive care ,  Continue nursing care       MRSA bacteremia    Repeat Blood cx pending   IV vanco and cipro   10/27- Blood culture-10/22- negative  Day 4/14 of therapy for MRSA bacteremia.  Cardiac echo done 10/22- no evidence of endocarditis        Urinary tract infection associated with indwelling urethral catheter    - Urine culture pending  - Will continue IV Ciprofloxacin      10/27 treated with cipro s/p therapy with zosyn during previous Hospital admit this month        Deep tissue injury    Wound care consult        Hypothyroidism    Resume levothyroxine        Acute on chronic systolic congestive heart failure w/ pulm edema    - CXR with vascular congestion with small bilateral effusion  - cont IV lasix   - 2D ECHO on 10/22/18 showed (EF 40-45%). DD, Mild AR, Mild to moderate MR, Moderate to severe TR, and    Pulmonary HTN.   - Strict I&Os  - Daily weights  reviewed     10/27- will continue diuresis with furosemide .Closely monitor renal panel         Type 2 diabetes mellitus with kidney complication, without long-term current use of insulin    Accuchecks   SSI   Ha1c 4.8 on 10/19/18    10/27- will continue insulin sliding scale ,closely monitor glucose level       Uncomplicated severe persistent asthma    Cont breathing tx  reviewed       10/27- will continue duonebs as tolerated , pulmonology follow up      Chronic kidney disease (CKD), stage III (moderate)    Stable   Monitor renal function         Elevated troponin    Likely due to demand ischemia  Initial troponin 0.054  Serial troponin        Chronic atrial fibrillation    Heart rate controlled   Resume Xarelto . No sing of acute bleeding at this time   Cont  Amiodarone     10/27- will continue amiodarone/xarelto , will continue close monitoring        VTE Risk Mitigation (From admission, onward)        Ordered     rivaroxaban tablet 15 mg  Nightly      10/26/18 1324     Place sequential compression device  Until discontinued      10/24/18 1141          Critical care time spent on the evaluation and treatment of severe organ dysfunction, review of pertinent labs and imaging studies, discussions with consulting providers and discussions with patient/family: 45 minutes.    Philip Ceja MD  Department of Hospital Medicine   [facility]

## 2018-10-27 NOTE — ASSESSMENT & PLAN NOTE
10/27- s/p thoracentesis -will follow cultures to guide therapy .   Pulmonology follow up .  Post procedure chest x-ray reviewed - no evidence of pneumothorax.

## 2018-10-27 NOTE — PROCEDURES
"Carlie Valdez is a 72 y.o. female patient.    Temp: 98.1 °F (36.7 °C) (10/27/18 1014)  Pulse: 109 (10/27/18 1236)  Resp: 18 (10/27/18 1236)  BP: (!) 67/45 (10/27/18 1236)  SpO2: (!) 94 % (10/27/18 1236)  Weight: 85 kg (187 lb 6.3 oz) (10/25/18 0300)  Height: 5' 10" (177.8 cm) (10/25/18 0300)       Thoracentesis  Date/Time: 10/27/2018 2:50 PM  Location procedure was performed: [facility] INTENSIVE CARE UNIT  Performed by: James Meier MD  Authorized by: James Meier MD   Assisting provider: James Meier MD  Pre-operative diagnosis: RIght pleural effusion  Post-operative diagnosis: same  Consent Done: Yes  Consent: Verbal consent obtained.  Risks and benefits: risks, benefits and alternatives were discussed  Consent given by: guardian  Patient understanding: patient states understanding of the procedure being performed  Patient consent: the patient's understanding of the procedure matches consent given  Procedure consent: procedure consent matches procedure scheduled  Relevant documents: relevant documents present and verified  Test results: test results available and properly labeled  Site marked: the operative site was marked  Imaging studies: imaging studies available  Required items: required blood products, implants, devices, and special equipment available  Patient identity confirmed: MRN and name  Time out: Immediately prior to procedure a "time out" was called to verify the correct patient, procedure, equipment, support staff and site/side marked as required.  Procedure purpose: diagnostic  Indications: pleural effusion  Preparation: Patient was prepped and draped in the usual sterile fashion.  Local anesthesia used: yes    Anesthesia:  Local anesthesia used: yes  Local Anesthetic: lidocaine 1% without epinephrine  Anesthetic total: 5 mL  Patient sedated: no  Description of findings: 40 cc of cloudy fluid removed   Preparation: skin prepped with ChloraPrep  Patient position: left lateral " decubitus  Ultrasound guidance: yes  Location: right posterior  Intercostal space: 8th  Puncture method: over-the-needle catheter  Needle size: 18  Catheter size: 18 gauge  Number of attempts: 1  Drainage amount: 40 ml  Drainage characteristics: cloudy  Patient tolerance: Patient tolerated the procedure well with no immediate complications  Chest x-ray performed: yes  Chest x-ray findings: pleural effusion  Technical procedures used: ultrasound  Significant surgical tasks conducted by the assistant(s): none  Complications: No  Estimated blood loss (mL): 4  Specimens: Yes  Implants: No  Comments: While at the patient's bedside ultrasound was used to localize the greatest extent of pleural fluid. The pleural fluid characteristic were clear. The pleural fluid was 2 cm from the skin surface and the depth of the pleural fluid was measured -estimated volume of pleural fluid is 600 .  The site was marked with a washable marker. Patient was placed on a wedge in the ICU while on mechanical ventilation            James Meier  10/27/2018

## 2018-10-27 NOTE — ASSESSMENT & PLAN NOTE
PNE/UTI  IV Vanc and Ciprofloxacin   IVFs   Blood/urine/sputum cultures pending  Wound care consult  Monitor labs  pulm on board    10/27- related to UTI and MRSA pneumonia -on vancomycin and cipro .  Urine culture -10/18- E coli, Klebsiella   Will closely monitor vanco trough .Vanco trough is 17.

## 2018-10-27 NOTE — PLAN OF CARE
Problem: Patient Care Overview  Goal: Plan of Care Review  Outcome: Outcome(s) achieved Date Met: 10/27/18   10/27/18 5346   Coping/Psychosocial   Plan Of Care Reviewed With patient;daughter       Comments: No acute issues over night vss, midodrine held at 2100 due to increased bp, uop wnl.

## 2018-10-27 NOTE — PROGRESS NOTES
Post Procedure Progress Note:  Vital signs stable  Personal review of post procedure Chest X Ray:  No evidence of pneumothorax  Radiologist report pending.    Patient stable.  Continue with post procedure monitoring and treatment plans.    James Meier MD  Pulmonary / Critical Care Medicine

## 2018-10-27 NOTE — PROGRESS NOTES
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  She also has a past medical history of Anticoagulant long-term use, Arthritis, Asthma, Atrial fibrillation, Blood clot of vein in shoulder area, Cardiac defibrillator in place, CHF (congestive heart failure), Chronic knee pain, Diabetes mellitus type 2 without retinopathy (12/19/2016), Diaphragmatic hernia without obstruction and without gangrene (9/14/2015), GERD (gastroesophageal reflux disease), Hypertension, Immune deficiency disorder, and Primary open angle glaucoma (POAG) of both eyes, severe stage (6/1/2018).  She was also recently hospitalized here at [facility] in ICU 10/19/18 admitted and discharged 2 days ago on 10/22/2018 for Acute on chronic hypoxic resp failure with total left lung opacification as well as severe sepsis with MRSA PNA and bacteremia as well as E.Coli and Klebsiella UTI.  She received a bronch here and left lung cleared with suctioning, IVAB and nebs.  She had some endobronchial bleeding with clots also.  She was discharged to NH on 10/22 on Cipro via PEG and IV Vanc.  She returned back to [facility] early this AM due to decreased LOC at NH, multiple episodes of diarrhea, per [facility] nursing home suctioned a large amount of blood from trach.  In ED temp 89.9 F and suctioned blood clots from Trach.  Plt count 38.  IVAB resumed and given IV Lasix        Hospital/ICU Course:  10/24 - Admitted to ICU on mech vent and warming via artic sun.  She is awake and responsive not requiring pressor  support  10/25/2018 Chart reviewed . Bronchoscopy last admission for mucous pluggine on the left. pleural effusion on Chest X Ray on the right. history of Congestive Heart failure:Moderate left ventricular systolic dysfunction with a visually estimated ejection fraction of 40%.  Grade 3 diastolic dysfunction consistent with restrictive physiology with elevated left atrial pressure.  Severe tricuspid regurgitation.  Moderate pulmonary hypertension with an estimated pulmonary artery systolic pressure 55 mmHg.  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   10/26/2018  CT of chest with bilateral effusions and atelectasis with right lower lobe and left lower lobe collapse      Past Medical History:   Diagnosis Date    Anticoagulant long-term use     Arthritis     Asthma     Atrial fibrillation     Blood clot of vein in shoulder area     Cardiac defibrillator in place     CHF (congestive heart failure)     Chronic knee pain     Diabetes mellitus type 2 without retinopathy 2016    Diaphragmatic hernia without obstruction and without gangrene 2015    GERD (gastroesophageal reflux disease)     Hypertension     Immune deficiency disorder     Primary open angle glaucoma (POAG) of both eyes, severe stage 2018       Past Surgical History:   Procedure Laterality Date    CARDIAC DEFIBRILLATOR PLACEMENT      , .    CATARACT EXTRACTION       SECTION      COLONOSCOPY      EGD, WITH PEG TUBE INSERTION N/A 2018    Performed by Louis O. Jeansonne IV, MD at [facility] OR    ashley Macdonald    ESOPHAGOGASTRODUODENOSCOPY (EGD) N/A 2015    Performed by Hieu Colbert MD at [facility] ENDO    ESOPHAGOGASTRODUODENOSCOPY W/ PEG N/A 2018    Procedure: EGD, WITH PEG TUBE INSERTION;  Surgeon: Louis O. Jeansonne IV, MD;  Location: [facility] OR;  Service: General;  Laterality: N/A;    KNEE ARTHROSCOPY       TRACHEOSTOMY N/A 7/18/2018    Procedure: TRACHEOSTOMY;  Surgeon: Louis O. Jeansonne IV, MD;  Location: [facility] OR;  Service: General;  Laterality: N/A;    TRACHEOSTOMY N/A 7/18/2018    Performed by [facility], [facility] at [facility] OR    UPPER GASTROINTESTINAL ENDOSCOPY         Review of patient's allergies indicates:   Allergen Reactions    Morphine Hives    Atorvastatin      Other reaction(s): Muscle pain    Clonidine     Codeine      Other reaction(s): Unknown    Digoxin      Other reaction(s): Unknown    Diovan [valsartan] Other (See Comments)     Upset stomach, weakness    Eggs [egg derived]     Metoprolol Diarrhea    Naproxen      Other reaction(s): Nausea    Peanut     Propofol      Other reaction(s): delirious    Sulfa (sulfonamide antibiotics)        Family History     Problem Relation (Age of Onset)    Hypertension Mother, Father        Tobacco Use    Smoking status: Never Smoker    Smokeless tobacco: Never Used   Substance and Sexual Activity    Alcohol use: No     Alcohol/week: 0.0 oz    Drug use: No    Sexual activity: No         Review of Systems   Unable to perform ROS: Patient nonverbal     Objective:     Vital Signs (Most Recent):  Temp: 98.1 °F (36.7 °C) (10/27/18 1014)  Pulse: 107 (10/27/18 1100)  Resp: 18 (10/27/18 1100)  BP: (!) 89/61 (10/27/18 1100)  SpO2: 97 % (10/27/18 1100) Vital Signs (24h Range):  Temp:  [97.6 °F (36.4 °C)-99.6 °F (37.6 °C)] 98.1 °F (36.7 °C)  Pulse:  [] 107  Resp:  [4-23] 18  SpO2:  [90 %-100 %] 97 %  BP: ()/() 89/61     Weight: 85 kg (187 lb 6.3 oz)  Body mass index is 26.89 kg/m².      Intake/Output Summary (Last 24 hours) at 10/27/2018 1210  Last data filed at 10/27/2018 1100  Gross per 24 hour   Intake 1869.17 ml   Output 3125 ml   Net -1255.83 ml       Physical Exam   Constitutional: Vital signs are normal. She appears well-developed and well-nourished. She appears cachectic. She has a sickly appearance. She appears ill. She  appears distressed.   HENT:   Head: Atraumatic.   Mouth/Throat: Oropharynx is clear and moist.   Eyes: EOM and lids are normal. Pupils are equal, round, and reactive to light.   Neck: Neck supple.       trahcestomy   Cardiovascular: Normal rate. An irregularly irregular rhythm present.   Murmur heard.   Systolic murmur is present with a grade of 2/6.  Pulses:       Radial pulses are 1+ on the right side, and 1+ on the left side.        Dorsalis pedis pulses are 1+ on the right side, and 1+ on the left side.   Pulmonary/Chest: No accessory muscle usage. No respiratory distress. She has decreased breath sounds in the right lower field and the left lower field. She has rhonchi. She has rales in the right lower field and the left lower field.   Abdominal: Soft. Bowel sounds are normal. She exhibits no distension. There is no tenderness.       Genitourinary:   Genitourinary Comments: Flores in place   Musculoskeletal: She exhibits edema.   Diffuse atrophy and +1 tibal edema   Lymphadenopathy:     She has no cervical adenopathy.   Neurological: She is alert. She displays abnormal reflex. She exhibits abnormal muscle tone. Coordination abnormal.   Awake with eyes open and nods head to questions   Skin: Skin is dry. Capillary refill takes 2 to 3 seconds. No cyanosis.        decubitus ulcer sacral area       Vents:  Vent Mode: A/C (10/27/18 1035)  Ventilator Initiated: Yes (10/24/18 0630)  Set Rate: 20 bmp (10/27/18 1035)  Vt Set: 0 mL (10/27/18 1035)  Pressure Support: 0 cmH20 (10/27/18 1035)  PEEP/CPAP: (S) 12 cmH20(changed per np vira sorensen due to ABG results ) (10/27/18 1035)  Oxygen Concentration (%): 55 (10/27/18 1100)  Peak Airway Pressure: 31 cmH2O (10/27/18 1035)  Plateau Pressure: 0 cmH20 (10/27/18 1035)  Total Ve: 6.2 mL (10/27/18 1035)  F/VT Ratio<105 (RSBI): (!) 72.52 (10/27/18 1035)    Lines/Drains/Airways     Drain                 Gastrostomy/Enterostomy 10/19/18 Percutaneous endoscopic gastrostomy (PEG) LUQ  8 days         Urethral Catheter 10/24/18 1030 16 Fr. 3 days          Airway                 Surgical Airway 07/18/18 1620 Shiley 100 days          Pressure Ulcer                 Pressure Injury 10/24/18 1100 Right medial Malleolus Stage 3 3 days         Pressure Injury 10/24/18 1105 Left Ischial tuberosity Stage 3 3 days         Pressure Injury 10/24/18 Coccyx Unstageable 3 days          Peripheral Intravenous Line                 Midline Catheter Insertion/Assessment  - Double Lumen 10/19/18 0130 Left brachial vein 8 days                Significant Labs:    CBC/Anemia Profile:  Recent Labs   Lab 10/26/18  0402 10/27/18  0429   WBC 5.21 6.74   HGB 8.7* 8.1*   HCT 28.2* 26.8*   PLT 51* 71*   MCV 88 89   RDW 18.6* 18.6*        Chemistries:  Recent Labs   Lab 10/26/18  0402 10/27/18  0429    144   K 3.6 3.9    101   CO2 32* 37*   BUN 25* 25*   CREATININE 0.7 0.8   CALCIUM 9.3 9.2   MG 1.8 1.8   PHOS 2.7 2.5*       ABGs:   Recent Labs   Lab 10/27/18  1030   PH 7.447   PCO2 56.5*   HCO3 39.0*   POCSATURATED 89*   BE 15     Coagulation:   No results for input(s): PT, INR, APTT in the last 48 hours.  Lactic Acid:   No results for input(s): LACTATE in the last 48 hours.  Troponin:   No results for input(s): TROPONINI in the last 48 hours.  Urine Studies:   No results for input(s): COLORU, APPEARANCEUA, PHUR, SPECGRAV, PROTEINUA, GLUCUA, KETONESU, BILIRUBINUA, OCCULTUA, NITRITE, UROBILINOGEN, LEUKOCYTESUR, RBCUA, WBCUA, BACTERIA, SQUAMEPITHEL, HYALINECASTS in the last 48 hours.    Invalid input(s): WRIGHTSUR  All pertinent labs within the past 24 hours have been reviewed.    Significant Imaging:   CXR: I have reviewed all pertinent results/findings within the past 24 hours and my personal findings are:  RLL atelectasis vs infiltrate with mild to mod bilat pulm edema    Assessment/Plan:     Pulmonary   Acute on chronic respiratory failure with hypoxia and hypercapnia      .Cont full vent support  Vent settings  reviewed and adjusted  VAP prophylaxis  Wean PEEP and FiO2 slowly  10/25CHeck CT of chest for pleural effusion   10/26 Continue support and diuresis  10/27 Probable thoracentesis on the right today - need consent         Uncomplicated severe persistent asthma    Cont Duonebs  Add Formeterol and Budesonide     Cardiac/Vascular   Acute on chronic systolic congestive heart failure w/ pulm edema    Cont IV Lasix  Daily weights  Accurate I/Os  Follow up labs  10/26/2018  Start IV lasix for pleural effusion and review CT of chest  10/26/2018 Good diuresis, bilateral atelectasis on CT- continue diuretics       Chronic atrial fibrillation    NSR on Amiodarone infusion     Renal/   Urinary tract infection associated with indwelling urethral catheter    Cont Cipro  Repeat cultures pending  10/25/2018 awaiting cultures to review       Chronic kidney disease (CKD), stage III (moderate)    Creatine 0.7  Monitor I/Os and creatine with diuresis     ID   * Severe sepsis    Cont IVAB from discharge  Normal WBC  Warm to normothermia  Cont supportive care  No IVFs given pulm edema  10/25/2018  cultures pending, stable blood pressure     MRSA bacteremia    Cont Vanc, pharmacy to dose  Repeat cultures pending     Hematology   Thrombocytopenia    No bleeding other than Endobronchial  No anticoagulants or antiplatelets for now  Follow CBC     Endocrine   Hypothyroidism    Cont Levothyroxine     Type 2 diabetes mellitus with kidney complication, without long-term current use of insulin    Cont SSI  Glucose stable  Monitor for insulin toxicity     Other   Pressure injury of coccygeal region, unstageable    Wound care following  Low pressure bed  Turn Q 2 hours  10/25/2018 Evaluated by surgery. CT of pelvis ordered for depth of decubitis          Critical Care Daily Checklist:    A: Awake: RASS Goal/Actual Goal:    Actual: Guerrero Agitation Sedation Scale (RASS): Drowsy   B: Spontaneous Breathing Trial Performed? Spon. Breathing Trial  Initiated?: Not initiated (10/25/18 1320)   C: SAT & SBT Coordinated?  na                      D: Delirium: CAM-ICU Overall CAM-ICU: Positive   E: Early Mobility Performed? No   F: Feeding Goal: Goals: Meet > 85 % EEN/EPN while admitted  Status: Nutrition Goal Status: new   Current Diet Order   No orders of the defined types were placed in this encounter.      AS: Analgesia/Sedation adequate   T: Thromboembolic Prophylaxis No due to hemnoptysis   H: HOB > 300 Yes   U: Stress Ulcer Prophylaxis (if needed) y   G: Glucose Control adequate   B: Bowel Function Stool Occurrence: 1   I: Indwelling Catheter (Lines & Flores) Necessity needed   D: De-escalation of Antimicrobials/Pharmacotherapies na    Plan for the day/ETD thoracentesis    Code Status:  Family/Goals of Care: Full Code  No lilia to discuss     Critical Care Time: 45 minutes  Critical secondary to Patient has a condition that poses threat to life and bodily function: Severe Respiratory Distress      Critical care was time spent personally by me on the following activities: development of treatment plan with patient or surrogate and bedside caregivers, discussions with consultants, evaluation of patient's response to treatment, examination of patient, ordering and performing treatments and interventions, ordering and review of laboratory studies, ordering and review of radiographic studies, pulse oximetry, re-evaluation of patient's condition. This critical care time did not overlap with that of any other provider or involve time for any procedures.     James Meier MD  Critical Care Medicine  [facility]

## 2018-10-27 NOTE — ASSESSMENT & PLAN NOTE
-cont monitor   -PLT > 50 k   - most likely due to sepsis     10/27- will monitor closely ,might need transfusion with bleeding , platelet is now 71.

## 2018-10-27 NOTE — ASSESSMENT & PLAN NOTE
Cont breathing tx  reviewed       10/27- will continue duonebs as tolerated , pulmonology follow up

## 2018-10-27 NOTE — CLINICAL REVIEW
Clinical Pharmacy Review    Pharmacy Vancomycin Consult day #4  PULSE DOSE   Vancomycin random 10/27 at 0427 = 17.5   Previous dose given 10/24 at 0800 --> Patient clears vancomycin very slowly   Scr remains normal, 0.8, CrCl estimated 75.4 ml/min   1,000 mg (12 mg/kg) dose given 10/27 at 1014   Dx: MRSA Bacteremia (Blood cultures x 2 10/18)  Repeat blood culture (10/22, 10/24) NGTD --> Patient will need 2 weeks vancomycin  Goal trough: 15-20  Random level due 10/28 at 0430   WBC WNL   Tmax 99.6   Repeat respiratory culture (10/24): MRSA  Urine culture (10/24): Candida tropicalis  Also on Ciprofloxacin 400 mg IV BID (day #4)      Pharmacy will continue to closely monitor patient and make adjustments as necessary.   Thank you for allowing us to participate in this patient's care,   Adeline Jean 10/27/2018 12:20 PM

## 2018-10-27 NOTE — SUBJECTIVE & OBJECTIVE
Past Medical History:   Diagnosis Date    Anticoagulant long-term use     Arthritis     Asthma     Atrial fibrillation     Blood clot of vein in shoulder area     Cardiac defibrillator in place     CHF (congestive heart failure)     Chronic knee pain     Diabetes mellitus type 2 without retinopathy 2016    Diaphragmatic hernia without obstruction and without gangrene 2015    GERD (gastroesophageal reflux disease)     Hypertension     Immune deficiency disorder     Primary open angle glaucoma (POAG) of both eyes, severe stage 2018       Past Surgical History:   Procedure Laterality Date    CARDIAC DEFIBRILLATOR PLACEMENT      , .    CATARACT EXTRACTION       SECTION      COLONOSCOPY      EGD, WITH PEG TUBE INSERTION N/A 2018    Performed by Louis O. [facility]ne IV, MD at [facility] OR    ashley Macdonald    ESOPHAGOGASTRODUODENOSCOPY (EGD) N/A 2015    Performed by Hieu Colbert MD at [facility] ENDO    ESOPHAGOGASTRODUODENOSCOPY W/ PEG N/A 2018    Procedure: EGD, WITH PEG TUBE INSERTION;  Surgeon: Louis O. Jeansonne IV, MD;  Location: [facility] OR;  Service: General;  Laterality: N/A;    KNEE ARTHROSCOPY      TRACHEOSTOMY N/A 2018    Procedure: TRACHEOSTOMY;  Surgeon: Louis O. Jeansonne IV, MD;  Location: [facility] OR;  Service: General;  Laterality: N/A;    TRACHEOSTOMY N/A 2018    Performed by Louis O. Jeansonne IV, MD at [facility] OR    UPPER GASTROINTESTINAL ENDOSCOPY         Review of patient's allergies indicates:   Allergen Reactions    Morphine Hives    Atorvastatin      Other reaction(s): Muscle pain    Clonidine     Codeine      Other reaction(s): Unknown    Digoxin      Other reaction(s): Unknown    Diovan [valsartan] Other (See Comments)     Upset stomach, weakness    Eggs [egg derived]     Metoprolol Diarrhea    Naproxen      Other reaction(s): Nausea    Peanut     Propofol      Other reaction(s): delirious    Sulfa (sulfonamide  antibiotics)        Family History     Problem Relation (Age of Onset)    Hypertension Mother, Father        Tobacco Use    Smoking status: Never Smoker    Smokeless tobacco: Never Used   Substance and Sexual Activity    Alcohol use: No     Alcohol/week: 0.0 oz    Drug use: No    Sexual activity: No         Review of Systems   Unable to perform ROS: Patient nonverbal     Objective:     Vital Signs (Most Recent):  Temp: 98.1 °F (36.7 °C) (10/27/18 1014)  Pulse: 107 (10/27/18 1100)  Resp: 18 (10/27/18 1100)  BP: (!) 89/61 (10/27/18 1100)  SpO2: 97 % (10/27/18 1100) Vital Signs (24h Range):  Temp:  [97.6 °F (36.4 °C)-99.6 °F (37.6 °C)] 98.1 °F (36.7 °C)  Pulse:  [] 107  Resp:  [4-23] 18  SpO2:  [90 %-100 %] 97 %  BP: ()/() 89/61     Weight: 85 kg (187 lb 6.3 oz)  Body mass index is 26.89 kg/m².      Intake/Output Summary (Last 24 hours) at 10/27/2018 1210  Last data filed at 10/27/2018 1100  Gross per 24 hour   Intake 1869.17 ml   Output 3125 ml   Net -1255.83 ml       Physical Exam   Constitutional: Vital signs are normal. She appears well-developed and well-nourished. She appears cachectic. She has a sickly appearance. She appears ill. She appears distressed.   HENT:   Head: Atraumatic.   Mouth/Throat: Oropharynx is clear and moist.   Eyes: EOM and lids are normal. Pupils are equal, round, and reactive to light.   Neck: Neck supple.       trahcestomy   Cardiovascular: Normal rate. An irregularly irregular rhythm present.   Murmur heard.   Systolic murmur is present with a grade of 2/6.  Pulses:       Radial pulses are 1+ on the right side, and 1+ on the left side.        Dorsalis pedis pulses are 1+ on the right side, and 1+ on the left side.   Pulmonary/Chest: No accessory muscle usage. No respiratory distress. She has decreased breath sounds in the right lower field and the left lower field. She has rhonchi. She has rales in the right lower field and the left lower field.   Abdominal: Soft.  Bowel sounds are normal. She exhibits no distension. There is no tenderness.       Genitourinary:   Genitourinary Comments: Flores in place   Musculoskeletal: She exhibits edema.   Diffuse atrophy and +1 tibal edema   Lymphadenopathy:     She has no cervical adenopathy.   Neurological: She is alert. She displays abnormal reflex. She exhibits abnormal muscle tone. Coordination abnormal.   Awake with eyes open and nods head to questions   Skin: Skin is dry. Capillary refill takes 2 to 3 seconds. No cyanosis.        decubitus ulcer sacral area       Vents:  Vent Mode: A/C (10/27/18 1035)  Ventilator Initiated: Yes (10/24/18 0630)  Set Rate: 20 bmp (10/27/18 1035)  Vt Set: 0 mL (10/27/18 1035)  Pressure Support: 0 cmH20 (10/27/18 1035)  PEEP/CPAP: (S) 12 cmH20(changed per np vira sorensen due to ABG results ) (10/27/18 1035)  Oxygen Concentration (%): 55 (10/27/18 1100)  Peak Airway Pressure: 31 cmH2O (10/27/18 1035)  Plateau Pressure: 0 cmH20 (10/27/18 1035)  Total Ve: 6.2 mL (10/27/18 1035)  F/VT Ratio<105 (RSBI): (!) 72.52 (10/27/18 1035)    Lines/Drains/Airways     Drain                 Gastrostomy/Enterostomy 10/19/18 Percutaneous endoscopic gastrostomy (PEG) LUQ 8 days         Urethral Catheter 10/24/18 1030 16 Fr. 3 days          Airway                 Surgical Airway 07/18/18 1620 Shiley 100 days          Pressure Ulcer                 Pressure Injury 10/24/18 1100 Right medial Malleolus Stage 3 3 days         Pressure Injury 10/24/18 1105 Left Ischial tuberosity Stage 3 3 days         Pressure Injury 10/24/18 Coccyx Unstageable 3 days          Peripheral Intravenous Line                 Midline Catheter Insertion/Assessment  - Double Lumen 10/19/18 0130 Left brachial vein 8 days                Significant Labs:    CBC/Anemia Profile:  Recent Labs   Lab 10/26/18  0402 10/27/18  0429   WBC 5.21 6.74   HGB 8.7* 8.1*   HCT 28.2* 26.8*   PLT 51* 71*   MCV 88 89   RDW 18.6* 18.6*        Chemistries:  Recent Labs   Lab  10/26/18  0402 10/27/18  0429    144   K 3.6 3.9    101   CO2 32* 37*   BUN 25* 25*   CREATININE 0.7 0.8   CALCIUM 9.3 9.2   MG 1.8 1.8   PHOS 2.7 2.5*       ABGs:   Recent Labs   Lab 10/27/18  1030   PH 7.447   PCO2 56.5*   HCO3 39.0*   POCSATURATED 89*   BE 15     Coagulation:   No results for input(s): PT, INR, APTT in the last 48 hours.  Lactic Acid:   No results for input(s): LACTATE in the last 48 hours.  Troponin:   No results for input(s): TROPONINI in the last 48 hours.  Urine Studies:   No results for input(s): COLORU, APPEARANCEUA, PHUR, SPECGRAV, PROTEINUA, GLUCUA, KETONESU, BILIRUBINUA, OCCULTUA, NITRITE, UROBILINOGEN, LEUKOCYTESUR, RBCUA, WBCUA, BACTERIA, SQUAMEPITHEL, HYALINECASTS in the last 48 hours.    Invalid input(s): WRIGHTSUR  All pertinent labs within the past 24 hours have been reviewed.    Significant Imaging:   CXR: I have reviewed all pertinent results/findings within the past 24 hours and my personal findings are:  RLL atelectasis vs infiltrate with mild to mod bilat pulm edema

## 2018-10-27 NOTE — ASSESSMENT & PLAN NOTE
- CXR with vascular congestion with small bilateral effusion  - cont IV lasix   - 2D ECHO on 10/22/18 showed (EF 40-45%). DD, Mild AR, Mild to moderate MR, Moderate to severe TR, and    Pulmonary HTN.   - Strict I&Os  - Daily weights  reviewed     10/27- will continue diuresis with furosemide .Closely monitor renal panel

## 2018-10-27 NOTE — ASSESSMENT & PLAN NOTE
.Cont full vent support  Vent settings reviewed and adjusted  VAP prophylaxis  Wean PEEP and FiO2 slowly  10/25CHeck CT of chest for pleural effusion   10/26 Continue support and diuresis  10/27 Probable thoracentesis on the right today - need consent

## 2018-10-27 NOTE — ASSESSMENT & PLAN NOTE
Pulmonology consult for critical care and vent management   Trach, vent dependent   CT chest shoe b/l lower lung collapse , moderate pleura effusion  And pulmonary infiltrate   Alfonzo tx       10/27- will continue close critical care follow up , ventilatory care and support .  Might need bronchoscopy, will monitor closely post bronchoscopy

## 2018-10-28 NOTE — ASSESSMENT & PLAN NOTE
Heart rate controlled   Resume Xarelto . No sing of acute bleeding at this time   Cont  Amiodarone     10/27- will continue amiodarone/xarelto , will continue close monitoring     10/28- continue amiodarone/xarelto ,

## 2018-10-28 NOTE — ASSESSMENT & PLAN NOTE
Cont IV Lasix  Daily weights  Accurate I/Os  Follow up labs  10/28/2018  Start IV lasix for pleural effusion and review CT of chest  10/27 Good diuresis, bilateral atelectasis on CT- continue diuretics  10/28 continue diuresis

## 2018-10-28 NOTE — ASSESSMENT & PLAN NOTE
Pulmonology consult for critical care and vent management   Trach, vent dependent   CT chest shoe b/l lower lung collapse , moderate pleura effusion  And pulmonary infiltrate   Alfonzo tx       10/27- will continue close critical care follow up , ventilatory care and support .  Might need bronchoscopy, will monitor closely post bronchoscopy      10/28-critical care follow up,continue ventilatory support .

## 2018-10-28 NOTE — SUBJECTIVE & OBJECTIVE
Past Medical History:   Diagnosis Date    Anticoagulant long-term use     Arthritis     Asthma     Atrial fibrillation     Blood clot of vein in shoulder area     Cardiac defibrillator in place     CHF (congestive heart failure)     Chronic knee pain     Diabetes mellitus type 2 without retinopathy 2016    Diaphragmatic hernia without obstruction and without gangrene 2015    GERD (gastroesophageal reflux disease)     Hypertension     Immune deficiency disorder     Primary open angle glaucoma (POAG) of both eyes, severe stage 2018       Past Surgical History:   Procedure Laterality Date    CARDIAC DEFIBRILLATOR PLACEMENT      , .    CATARACT EXTRACTION       SECTION      COLONOSCOPY      EGD, WITH PEG TUBE INSERTION N/A 2018    Performed by Louis O. Jeansonne IV, MD at [facility] OR    ashley Macdonald    ESOPHAGOGASTRODUODENOSCOPY (EGD) N/A 2015    Performed by Hieu Colbert MD at [facility] ENDO    ESOPHAGOGASTRODUODENOSCOPY W/ PEG N/A 2018    Procedure: EGD, WITH PEG TUBE INSERTION;  Surgeon: Louis O. Jeansonne IV, MD;  Location: [facility] OR;  Service: General;  Laterality: N/A;    KNEE ARTHROSCOPY      TRACHEOSTOMY N/A 2018    Procedure: TRACHEOSTOMY;  Surgeon: Louis O. Jeansonne IV, MD;  Location: [facility] OR;  Service: General;  Laterality: N/A;    TRACHEOSTOMY N/A 2018    Performed by Louis O. Jeansonne IV, MD at [facility] OR    UPPER GASTROINTESTINAL ENDOSCOPY         Review of patient's allergies indicates:   Allergen Reactions    Morphine Hives    Atorvastatin      Other reaction(s): Muscle pain    Clonidine     Codeine      Other reaction(s): Unknown    Digoxin      Other reaction(s): Unknown    Diovan [valsartan] Other (See Comments)     Upset stomach, weakness    Eggs [egg derived]     Metoprolol Diarrhea    Naproxen      Other reaction(s): Nausea    Peanut     Propofol      Other reaction(s): delirious    Sulfa (sulfonamide  antibiotics)        Family History     Problem Relation (Age of Onset)    Hypertension Mother, Father        Tobacco Use    Smoking status: Never Smoker    Smokeless tobacco: Never Used   Substance and Sexual Activity    Alcohol use: No     Alcohol/week: 0.0 oz    Drug use: No    Sexual activity: No         Review of Systems   Unable to perform ROS: Patient nonverbal     Objective:     Vital Signs (Most Recent):  Temp: 97.7 °F (36.5 °C) (10/28/18 0717)  Pulse: (!) 118 (10/28/18 1231)  Resp: 19 (10/28/18 1231)  BP: (!) 168/101 (10/28/18 1000)  SpO2: 100 % (10/28/18 1231) Vital Signs (24h Range):  Temp:  [97.7 °F (36.5 °C)-99.4 °F (37.4 °C)] 97.7 °F (36.5 °C)  Pulse:  [] 118  Resp:  [0-29] 19  SpO2:  [83 %-100 %] 100 %  BP: ()/() 168/101     Weight: 85 kg (187 lb 6.3 oz)  Body mass index is 26.89 kg/m².      Intake/Output Summary (Last 24 hours) at 10/28/2018 1250  Last data filed at 10/28/2018 0700  Gross per 24 hour   Intake 1604 ml   Output 1424 ml   Net 180 ml       Physical Exam   Constitutional: Vital signs are normal. She appears well-developed and well-nourished. She appears cachectic. She has a sickly appearance. She appears ill. She appears distressed.   HENT:   Head: Atraumatic.   Mouth/Throat: Oropharynx is clear and moist.   Eyes: EOM and lids are normal. Pupils are equal, round, and reactive to light.   Neck: Neck supple.       trahcestomy   Cardiovascular: Normal rate. An irregularly irregular rhythm present.   Murmur heard.   Systolic murmur is present with a grade of 2/6.  Pulses:       Radial pulses are 1+ on the right side, and 1+ on the left side.        Dorsalis pedis pulses are 1+ on the right side, and 1+ on the left side.   Pulmonary/Chest: No accessory muscle usage. No respiratory distress. She has decreased breath sounds in the right lower field and the left lower field. She has rhonchi. She has rales in the right lower field and the left lower field.   Abdominal: Soft.  Bowel sounds are normal. She exhibits no distension. There is no tenderness.       Genitourinary:   Genitourinary Comments: Flores in place   Musculoskeletal: She exhibits edema.   Diffuse atrophy and +1 tibal edema   Lymphadenopathy:     She has no cervical adenopathy.   Neurological: She is alert. She displays abnormal reflex. She exhibits abnormal muscle tone. Coordination abnormal.   Awake with eyes open and nods head to questions   Skin: Skin is dry. Capillary refill takes 2 to 3 seconds. No cyanosis.        decubitus ulcer sacral area       Vents:  Vent Mode: A/C (10/28/18 1231)  Ventilator Initiated: Yes (10/24/18 0630)  Set Rate: 20 bmp (10/28/18 1231)  Vt Set: 0 mL (10/28/18 1231)  Pressure Support: 0 cmH20 (10/28/18 1231)  PEEP/CPAP: 12 cmH20 (10/28/18 1231)  Oxygen Concentration (%): 55 (10/28/18 1231)  Peak Airway Pressure: 31 cmH2O (10/28/18 1231)  Plateau Pressure: 0 cmH20 (10/28/18 1231)  Total Ve: 8.3 mL (10/28/18 1231)  F/VT Ratio<105 (RSBI): (!) 46.8 (10/28/18 1231)    Lines/Drains/Airways     Drain                 Gastrostomy/Enterostomy 10/19/18 Percutaneous endoscopic gastrostomy (PEG) LUQ 9 days         Urethral Catheter 10/24/18 1030 16 Fr. 4 days          Airway                 Surgical Airway 07/18/18 1620 Shiley 101 days          Pressure Ulcer                 Pressure Injury 10/24/18 1100 Right medial Malleolus Stage 3 4 days         Pressure Injury 10/24/18 1105 Left Ischial tuberosity Stage 3 4 days         Pressure Injury 10/24/18 Coccyx Unstageable 4 days          Peripheral Intravenous Line                 Midline Catheter Insertion/Assessment  - Double Lumen 10/19/18 0130 Left brachial vein 9 days                Significant Labs:    CBC/Anemia Profile:  Recent Labs   Lab 10/27/18  0429 10/28/18  0425   WBC 6.74 7.97   HGB 8.1* 8.4*   HCT 26.8* 27.8*   PLT 71* 95*   MCV 89 89   RDW 18.6* 18.5*        Chemistries:  Recent Labs   Lab 10/27/18  0429 10/28/18  0425 10/28/18  0426      --  143   K 3.9  --  3.8     --  99   CO2 37*  --  32*   BUN 25*  --  33*   CREATININE 0.8  --  0.8   CALCIUM 9.2  --  9.2   MG 1.8  --  2.0   PHOS 2.5* 2.7  --        ABGs:   Recent Labs   Lab 10/28/18  0510   PH 7.445   PCO2 59.4*   HCO3 40.8*   POCSATURATED 96   BE 17     Coagulation:   No results for input(s): PT, INR, APTT in the last 48 hours.  Lactic Acid:   No results for input(s): LACTATE in the last 48 hours.  Troponin:   No results for input(s): TROPONINI in the last 48 hours.  Urine Studies:   No results for input(s): COLORU, APPEARANCEUA, PHUR, SPECGRAV, PROTEINUA, GLUCUA, KETONESU, BILIRUBINUA, OCCULTUA, NITRITE, UROBILINOGEN, LEUKOCYTESUR, RBCUA, WBCUA, BACTERIA, SQUAMEPITHEL, HYALINECASTS in the last 48 hours.    Invalid input(s): WRIGHTSUR  All pertinent labs within the past 24 hours have been reviewed.    Significant Imaging:   CXR: I have reviewed all pertinent results/findings within the past 24 hours and my personal findings are:  RLL atelectasis vs infiltrate with mild to mod bilat pulm edema

## 2018-10-28 NOTE — ASSESSMENT & PLAN NOTE
-cont monitor   -PLT > 50 k   - most likely due to sepsis     10/27- will monitor closely ,might need transfusion with bleeding , platelet is now 71.    10/28- platelet is 95 today, will continue to monitor closely.

## 2018-10-28 NOTE — PROGRESS NOTES
[facility]  Critical Care Medicine  Progress Note    Patient Name: Carlie Valdez  MRN: [id number]  Admission Date: 10/24/2018  Hospital Length of Stay: 4 days  Code Status: Full Code  Attending Provider: Philip Ceja MD  Primary Care Provider: Johanna Guzman MD   Principal Problem: Severe sepsis    Subjective:     HPI:       Ms Valdez is a 72 year old female NH resident at [facility] with Trach/PEG vent dependent and bed bound post NMO diagnosis earlier this year.  She also has a past medical history of Anticoagulant long-term use, Arthritis, Asthma, Atrial fibrillation, Blood clot of vein in shoulder area, Cardiac defibrillator in place, CHF (congestive heart failure), Chronic knee pain, Diabetes mellitus type 2 without retinopathy (12/19/2016), Diaphragmatic hernia without obstruction and without gangrene (9/14/2015), GERD (gastroesophageal reflux disease), Hypertension, Immune deficiency disorder, and Primary open angle glaucoma (POAG) of both eyes, severe stage (6/1/2018).  She was also recently hospitalized here at [facility] in ICU 10/19/18 admitted and discharged 2 days ago on 10/22/2018 for Acute on chronic hypoxic resp failure with total left lung opacification as well as severe sepsis with MRSA PNA and bacteremia as well as E.Coli and Klebsiella UTI.  She received a bronch here and left lung cleared with suctioning, IVAB and nebs.  She had some endobronchial bleeding with clots also.  She was discharged to NH on 10/22 on Cipro via PEG and IV Vanc.  She returned back to [facility] ED early this AM due to decreased LOC at NH, multiple episodes of diarrhea, per [facility] nursing home suctioned a large amount of blood from trach.  In ED temp 89.9 F and suctioned blood clots from Trach.  Plt count 38.  IVAB resumed and given IV Lasix        Hospital/ICU Course:  10/24 - Admitted to ICU on mech vent and warming via artic sun.  She is awake and responsive not requiring pressor  support  10/25/2018 Chart reviewed . Bronchoscopy last admission for mucous pluggine on the left. pleural effusion on Chest X Ray on the right. history of Congestive Heart failure:Moderate left ventricular systolic dysfunction with a visually estimated ejection fraction of 40%.  Grade 3 diastolic dysfunction consistent with restrictive physiology with elevated left atrial pressure.  Severe tricuspid regurgitation.  Moderate pulmonary hypertension with an estimated pulmonary artery systolic pressure 55 mmHg.  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   10/26/2018  CT of chest with bilateral effusions and atelectasis with right lower lobe and left lower lobe collapse  10/28 Little change, mild diuresis continues    Past Medical History:   Diagnosis Date    Anticoagulant long-term use     Arthritis     Asthma     Atrial fibrillation     Blood clot of vein in shoulder area     Cardiac defibrillator in place     CHF (congestive heart failure)     Chronic knee pain     Diabetes mellitus type 2 without retinopathy 2016    Diaphragmatic hernia without obstruction and without gangrene 2015    GERD (gastroesophageal reflux disease)     Hypertension     Immune deficiency disorder     Primary open angle glaucoma (POAG) of both eyes, severe stage 2018       Past Surgical History:   Procedure Laterality Date    CARDIAC DEFIBRILLATOR PLACEMENT      , .    CATARACT EXTRACTION       SECTION      COLONOSCOPY      EGD, WITH PEG TUBE INSERTION N/A 2018    Performed by [facility] O. [facility]ne IV, [facility] at [facility] OR    ashley Macdonald    ESOPHAGOGASTRODUODENOSCOPY (EGD) N/A 2015    Performed by Hieu Colbert MD at [facility] ENDO    ESOPHAGOGASTRODUODENOSCOPY W/ PEG N/A 2018    Procedure: EGD, WITH PEG TUBE INSERTION;  Surgeon: Louis O. Jeansonne IV, MD;  Location: [facility] OR;  Service:  General;  Laterality: N/A;    KNEE ARTHROSCOPY      TRACHEOSTOMY N/A 7/18/2018    Procedure: TRACHEOSTOMY;  Surgeon: Louis O. Jeansonne IV, MD;  Location: [facility] OR;  Service: General;  Laterality: N/A;    TRACHEOSTOMY N/A 7/18/2018    Performed by Louis O. Jeansonne IV, MD at [facility] OR    UPPER GASTROINTESTINAL ENDOSCOPY         Review of patient's allergies indicates:   Allergen Reactions    Morphine Hives    Atorvastatin      Other reaction(s): Muscle pain    Clonidine     Codeine      Other reaction(s): Unknown    Digoxin      Other reaction(s): Unknown    Diovan [valsartan] Other (See Comments)     Upset stomach, weakness    Eggs [egg derived]     Metoprolol Diarrhea    Naproxen      Other reaction(s): Nausea    Peanut     Propofol      Other reaction(s): delirious    Sulfa (sulfonamide antibiotics)        Family History     Problem Relation (Age of Onset)    Hypertension Mother, Father        Tobacco Use    Smoking status: Never Smoker    Smokeless tobacco: Never Used   Substance and Sexual Activity    Alcohol use: No     Alcohol/week: 0.0 oz    Drug use: No    Sexual activity: No         Review of Systems   Unable to perform ROS: Patient nonverbal     Objective:     Vital Signs (Most Recent):  Temp: 97.7 °F (36.5 °C) (10/28/18 0717)  Pulse: (!) 118 (10/28/18 1231)  Resp: 19 (10/28/18 1231)  BP: (!) 168/101 (10/28/18 1000)  SpO2: 100 % (10/28/18 1231) Vital Signs (24h Range):  Temp:  [97.7 °F (36.5 °C)-99.4 °F (37.4 °C)] 97.7 °F (36.5 °C)  Pulse:  [] 118  Resp:  [0-29] 19  SpO2:  [83 %-100 %] 100 %  BP: ()/() 168/101     Weight: 85 kg (187 lb 6.3 oz)  Body mass index is 26.89 kg/m².      Intake/Output Summary (Last 24 hours) at 10/28/2018 1250  Last data filed at 10/28/2018 0700  Gross per 24 hour   Intake 1604 ml   Output 1424 ml   Net 180 ml       Physical Exam   Constitutional: Vital signs are normal. She appears well-developed and well-nourished. She appears cachectic.  She has a sickly appearance. She appears ill. She appears distressed.   HENT:   Head: Atraumatic.   Mouth/Throat: Oropharynx is clear and moist.   Eyes: EOM and lids are normal. Pupils are equal, round, and reactive to light.   Neck: Neck supple.       trahcestomy   Cardiovascular: Normal rate. An irregularly irregular rhythm present.   Murmur heard.   Systolic murmur is present with a grade of 2/6.  Pulses:       Radial pulses are 1+ on the right side, and 1+ on the left side.        Dorsalis pedis pulses are 1+ on the right side, and 1+ on the left side.   Pulmonary/Chest: No accessory muscle usage. No respiratory distress. She has decreased breath sounds in the right lower field and the left lower field. She has rhonchi. She has rales in the right lower field and the left lower field.   Abdominal: Soft. Bowel sounds are normal. She exhibits no distension. There is no tenderness.       Genitourinary:   Genitourinary Comments: Flores in place   Musculoskeletal: She exhibits edema.   Diffuse atrophy and +1 tibal edema   Lymphadenopathy:     She has no cervical adenopathy.   Neurological: She is alert. She displays abnormal reflex. She exhibits abnormal muscle tone. Coordination abnormal.   Awake with eyes open and nods head to questions   Skin: Skin is dry. Capillary refill takes 2 to 3 seconds. No cyanosis.        decubitus ulcer sacral area       Vents:  Vent Mode: A/C (10/28/18 1231)  Ventilator Initiated: Yes (10/24/18 0630)  Set Rate: 20 bmp (10/28/18 1231)  Vt Set: 0 mL (10/28/18 1231)  Pressure Support: 0 cmH20 (10/28/18 1231)  PEEP/CPAP: 12 cmH20 (10/28/18 1231)  Oxygen Concentration (%): 55 (10/28/18 1231)  Peak Airway Pressure: 31 cmH2O (10/28/18 1231)  Plateau Pressure: 0 cmH20 (10/28/18 1231)  Total Ve: 8.3 mL (10/28/18 1231)  F/VT Ratio<105 (RSBI): (!) 46.8 (10/28/18 1231)    Lines/Drains/Airways     Drain                 Gastrostomy/Enterostomy 10/19/18 Percutaneous endoscopic gastrostomy (PEG) LUQ 9  days         Urethral Catheter 10/24/18 1030 16 Fr. 4 days          Airway                 Surgical Airway 07/18/18 1620 Shiley 101 days          Pressure Ulcer                 Pressure Injury 10/24/18 1100 Right medial Malleolus Stage 3 4 days         Pressure Injury 10/24/18 1105 Left Ischial tuberosity Stage 3 4 days         Pressure Injury 10/24/18 Coccyx Unstageable 4 days          Peripheral Intravenous Line                 Midline Catheter Insertion/Assessment  - Double Lumen 10/19/18 0130 Left brachial vein 9 days                Significant Labs:    CBC/Anemia Profile:  Recent Labs   Lab 10/27/18  0429 10/28/18  0425   WBC 6.74 7.97   HGB 8.1* 8.4*   HCT 26.8* 27.8*   PLT 71* 95*   MCV 89 89   RDW 18.6* 18.5*        Chemistries:  Recent Labs   Lab 10/27/18  0429 10/28/18  0425 10/28/18  0426     --  143   K 3.9  --  3.8     --  99   CO2 37*  --  32*   BUN 25*  --  33*   CREATININE 0.8  --  0.8   CALCIUM 9.2  --  9.2   MG 1.8  --  2.0   PHOS 2.5* 2.7  --        ABGs:   Recent Labs   Lab 10/28/18  0510   PH 7.445   PCO2 59.4*   HCO3 40.8*   POCSATURATED 96   BE 17     Coagulation:   No results for input(s): PT, INR, APTT in the last 48 hours.  Lactic Acid:   No results for input(s): LACTATE in the last 48 hours.  Troponin:   No results for input(s): TROPONINI in the last 48 hours.  Urine Studies:   No results for input(s): COLORU, APPEARANCEUA, PHUR, SPECGRAV, PROTEINUA, GLUCUA, KETONESU, BILIRUBINUA, OCCULTUA, NITRITE, UROBILINOGEN, LEUKOCYTESUR, RBCUA, WBCUA, BACTERIA, SQUAMEPITHEL, HYALINECASTS in the last 48 hours.    Invalid input(s): WRIGHTSUR  All pertinent labs within the past 24 hours have been reviewed.    Significant Imaging:   CXR: I have reviewed all pertinent results/findings within the past 24 hours and my personal findings are:  RLL atelectasis vs infiltrate with mild to mod bilat pulm edema    Assessment/Plan:     Pulmonary   Pleural effusion on right    10/28 review cultures      Acute on chronic respiratory failure with hypoxia and hypercapnia      .Cont full vent support  Vent settings reviewed and adjusted  VAP prophylaxis  Wean PEEP and FiO2 slowly  10/25CHeck CT of chest for pleural effusion   10/26 Continue support and diuresis  10/27 Probable thoracentesis on the right today - need consent  10/28 Taper FiO2 as tolerated         Uncomplicated severe persistent asthma    Cont Duonebs  Add Formeterol and Budesonide     Cardiac/Vascular   Acute on chronic systolic congestive heart failure w/ pulm edema    Cont IV Lasix  Daily weights  Accurate I/Os  Follow up labs  10/28/2018  Start IV lasix for pleural effusion and review CT of chest  10/27 Good diuresis, bilateral atelectasis on CT- continue diuretics  10/28 continue diuresis       Chronic atrial fibrillation    NSR on Amiodarone infusion     Renal/   Urinary tract infection associated with indwelling urethral catheter    Cont Cipro  Repeat cultures pending  10/25/2018 awaiting cultures to review       Chronic kidney disease (CKD), stage III (moderate)    Creatine 0.7  Monitor I/Os and creatine with diuresis     ID   * Severe sepsis    Cont IVAB from discharge  Normal WBC  Warm to normothermia  Cont supportive care  No IVFs given pulm edema  10/25/2018  cultures pending, stable blood pressure     MRSA bacteremia    Cont Vanc, pharmacy to dose  Repeat cultures pending     Hematology   Thrombocytopenia    No bleeding other than Endobronchial  No anticoagulants or antiplatelets for now  Follow CBC     Endocrine   Hypothyroidism    Cont Levothyroxine     Type 2 diabetes mellitus with kidney complication, without long-term current use of insulin    Cont SSI  Glucose stable  Monitor for insulin toxicity     Other   Pressure injury of coccygeal region, unstageable    Wound care following  Low pressure bed  Turn Q 2 hours  10/25/2018 Evaluated by surgery. CT of pelvis ordered for depth of decubitis          Critical Care Daily Checklist:     A: Awake: RASS Goal/Actual Goal:    Actual: Guerrero Agitation Sedation Scale (RASS): Drowsy   B: Spontaneous Breathing Trial Performed? Spon. Breathing Trial Initiated?: Not initiated (10/25/18 1320)   C: SAT & SBT Coordinated?  na                      D: Delirium: CAM-ICU Overall CAM-ICU: Positive   E: Early Mobility Performed? No   F: Feeding Goal: Goals: Meet > 85 % EEN/EPN while admitted  Status: Nutrition Goal Status: new   Current Diet Order   Procedures    Diet NPO      AS: Analgesia/Sedation adequate   T: Thromboembolic Prophylaxis yes   H: HOB > 300 Yes   U: Stress Ulcer Prophylaxis (if needed) y   G: Glucose Control adequate   B: Bowel Function Stool Occurrence: 1   I: Indwelling Catheter (Lines & Flores) Necessity needed   D: De-escalation of Antimicrobials/Pharmacotherapies na    Plan for the day/ETD support    Code Status:  Family/Goals of Care: Full Code  discussed     Critical Care Time: 40 minutes  Critical secondary to Patient has a condition that poses threat to life and bodily function: Severe Respiratory Distress      Critical care was time spent personally by me on the following activities: development of treatment plan with patient or surrogate and bedside caregivers, discussions with consultants, evaluation of patient's response to treatment, examination of patient, ordering and performing treatments and interventions, ordering and review of laboratory studies, ordering and review of radiographic studies, pulse oximetry, re-evaluation of patient's condition. This critical care time did not overlap with that of any other provider or involve time for any procedures.     James Meier MD  Critical Care Medicine  [facility]

## 2018-10-28 NOTE — ASSESSMENT & PLAN NOTE
- CXR with vascular congestion with small bilateral effusion  - cont IV lasix   - 2D ECHO on 10/22/18 showed (EF 40-45%). DD, Mild AR, Mild to moderate MR, Moderate to severe TR, and    Pulmonary HTN.   - Strict I&Os  - Daily weights  reviewed     10/27- will continue diuresis with furosemide .Closely monitor renal panel  10/28- will continue lasix at 40mg bid

## 2018-10-28 NOTE — SUBJECTIVE & OBJECTIVE
Interval History:   Pt was seen and examined at bedside .  She cont on ventilation support . The ct chest show  Lung collapse , possible pn and moderate pleura effusion   10/27-  She remains weak,she had thoracentesis today .  CT scan of the chest/abdomen /pelvis- 10/25-    Moderate right and small left pleural effusion.  The right lung reveals dependent atelectasis of the right middle and lower lobes with near complete collapse of the right lower lobe.  The left lung reveals near-complete collapse of the left lower lobe with patchy infiltrate of the left upper lung zone, possibly representing a pneumonia.  Variable subcutaneous edema is present.  10/28- remains weak, on ventilatory support , repeat cultures remain negtaive       Review of Systems   Unable to perform ROS: Patient nonverbal     Objective:     Vital Signs (Most Recent):  Temp: 97.7 °F (36.5 °C) (10/28/18 0717)  Pulse: (!) 118 (10/28/18 1231)  Resp: 19 (10/28/18 1231)  BP: (!) 168/101 (10/28/18 1000)  SpO2: 100 % (10/28/18 1231) Vital Signs (24h Range):  Temp:  [97.7 °F (36.5 °C)-99.4 °F (37.4 °C)] 97.7 °F (36.5 °C)  Pulse:  [] 118  Resp:  [0-29] 19  SpO2:  [83 %-100 %] 100 %  BP: ()/() 168/101     Weight: 85 kg (187 lb 6.3 oz)  Body mass index is 26.89 kg/m².    Intake/Output Summary (Last 24 hours) at 10/28/2018 1259  Last data filed at 10/28/2018 0700  Gross per 24 hour   Intake 1604 ml   Output 1424 ml   Net 180 ml      Physical Exam   Constitutional: Vital signs are normal. She appears well-developed. She has a sickly appearance. She appears ill. No distress.   HENT:   Head: Atraumatic.   Mouth/Throat: Oropharynx is clear and moist.   Eyes: EOM and lids are normal. Pupils are equal, round, and reactive to light.   Neck: Neck supple.       Cardiovascular: Normal rate and regular rhythm.   Pulses:       Radial pulses are 1+ on the right side, and 1+ on the left side.        Dorsalis pedis pulses are 1+ on the right side, and 1+ on  the left side.   Pulmonary/Chest: No accessory muscle usage. No respiratory distress. She has decreased breath sounds in the right lower field. She has rhonchi. She has rales.   Abdominal: Soft. Bowel sounds are normal. She exhibits no distension. There is no tenderness.       Genitourinary:   Genitourinary Comments: Flores in place   Musculoskeletal:   Diffuse atrophy and +1 tibal edema   Lymphadenopathy:     She has no cervical adenopathy.   Neurological: She is alert.   Awake with eyes open and nods head to questions   Skin: Skin is dry. Capillary refill takes 2 to 3 seconds. No cyanosis.        Has sacral decub ulcer   Nursing note and vitals reviewed.      Significant Labs:   Blood Culture: No results for input(s): LABBLOO in the last 48 hours.  BMP:   Recent Labs   Lab 10/28/18  0426   GLU 93      K 3.8   CL 99   CO2 32*   BUN 33*   CREATININE 0.8   CALCIUM 9.2   MG 2.0     CBC:   Recent Labs   Lab 10/27/18  0429 10/28/18  0425   WBC 6.74 7.97   HGB 8.1* 8.4*   HCT 26.8* 27.8*   PLT 71* 95*     Magnesium:   Recent Labs   Lab 10/27/18  0429 10/28/18  0426   MG 1.8 2.0     Urine Studies: No results for input(s): COLORU, APPEARANCEUA, PHUR, SPECGRAV, PROTEINUA, GLUCUA, KETONESU, BILIRUBINUA, OCCULTUA, NITRITE, UROBILINOGEN, LEUKOCYTESUR, RBCUA, WBCUA, BACTERIA, SQUAMEPITHEL, HYALINECASTS in the last 48 hours.    Invalid input(s): NESTOR  All pertinent labs within the past 24 hours have been reviewed.    Significant Imaging: I have reviewed and interpreted all pertinent imaging results/findings within the past 24 hours.

## 2018-10-28 NOTE — CLINICAL REVIEW
Clinical Pharmacy Review     Pharmacy Vancomycin consult day #5/14  PULSE DOSE   Vancomycin random 10/28 at 0425 = 19.3 (drawn approximately 18 hours after dose)  Give 1,000 mg every 48 hour placeholder dose 10/28 at 1200   Next random due 10/29 at 1100 (at 24 hour abad) - may be able to schedule pending random level   Scr 0.8 (stable) CrCl 75.4 ml/min   WBC slight increase, but still WNL   Tmax 99.4   Dx: MRSA Bacteremia (Blood cultures x 2 10/18)  Repeat blood culture (10/22, 10/24) NGTD --> Patient will need 2 weeks vancomycin  Goal trough: 15-20  Repeat respiratory culture (10/24): MRSA, candida   Urine culture (10/24): Candida tropicalis- Ciprofloxacin 400 mg IV BID (day #5)   ID following     Pharmacy will continue to closely monitor patient and make adjustments as necessary.   Thank you for allowing us to participate in this patient's care,   Adeline Jean 10/28/2018 10:49 AM

## 2018-10-28 NOTE — ASSESSMENT & PLAN NOTE
Accuchecks   SSI   Ha1c 4.8 on 10/19/18    10/27- will continue insulin sliding scale ,closely monitor glucose level    10/28- will continue insulin sliding scale .

## 2018-10-28 NOTE — ASSESSMENT & PLAN NOTE
Wound care consult     10/27- will follow wound care , supportive care ,  Continue nursing care      10/28- continue wound care

## 2018-10-28 NOTE — PLAN OF CARE
Problem: Patient Care Overview  Goal: Plan of Care Review  Outcome: Ongoing (interventions implemented as appropriate)  Vitals signs closely monitored. Fall precautions in place. Patient turned every two hours. Pain assessed every two hours. Safety promoted. Infection risks reduced. Thoracentesis today. Minimal fluid removed. Sent to lab.  Daughter Haley updated. Pt has mucous plug and had to be bag lavaged. Some blood noticed in sputum. MD Mieer aware. Pt hypotensive. Initial treatment was LR at 250 ml/hr. It was uneffective. Midodrine increased and baclofen decreased. Now normotensive. Multiple liquid bm. Had to change sacral dressing twice due to soiling

## 2018-10-28 NOTE — ASSESSMENT & PLAN NOTE
.Cont full vent support  Vent settings reviewed and adjusted  VAP prophylaxis  Wean PEEP and FiO2 slowly  10/25CHeck CT of chest for pleural effusion   10/26 Continue support and diuresis  10/27 Probable thoracentesis on the right today - need consent  10/28 Taper FiO2 as tolerated

## 2018-10-28 NOTE — PLAN OF CARE
Problem: Patient Care Overview  Goal: Plan of Care Review  Outcome: Ongoing (interventions implemented as appropriate)   10/28/18 9401   Coping/Psychosocial   Plan Of Care Reviewed With patient       Comments: No acute issues noted over night. Pt pacemaker possibly in need of interrogation, pt with bloody sputum noted, suctioned frequently, chencho tf.

## 2018-10-28 NOTE — PROGRESS NOTES
[facility] Hospital Medicine  Progress Note    Patient Name: Carlie Valdez  MRN: [id number]  Patient Class: IP- Inpatient   Admission Date: 10/24/2018  Length of Stay: 4 days  Attending Physician: Philip Ceja MD  Primary Care Provider: Johanna Guzman MD        Subjective:     Principal Problem:Severe sepsis    HPI:  Carlie Valdez is a 72 y.o. female patient with PMHx of Afib, CHF, DM, GERD, neuromyelitis optica spectrum disorder, and HTN, Chronic Trach/Peg/Harris who presents to the Emergency Department for altered mental status which onset gradually last night. Patient currently resides at [facility]. Pt's pulse ox was reported to be 48% per [facility]. Pt was admitted from ED on 10/18 for sepsis and was d/c with IV Vancomycin and ciprofloxacin on 10/22. Per [facility], [facility] nursing staff suctioned a large amount of blood from pt's trach. Pt has had episodes of hemoptysis following a previous bronchoscopy on 10/19/18. Patient presented to ER hypotensive, has chronic harris on arrival, UA +. Chest Xray +pulmonary edema. Sepsis protocol initiated in ED. Lactic 2.6. Received 30 ml/kg bolus with positive response in BP. Patient received IV zosyn and vancomycin. Patient admitted to ICU for severe sepsis, acute on chronic heart failure, and acute on chronic respiratory failure with hypoxia and hypercapnia under the care of [facility] medicine.         Hospital Course:  73 y/o AAF admitted to ICU with a dx of acute respiratory failure , acute on chronic systolic chf exacerbation  And sepsis due to PNA/UTI .She was started on IV lasix  AnD IVAB .  She  Has a (+) urine cx (10/18/18)  For  Klebsiella pneumonaie and e.coli sensitive to  Cipro . She had a (+) sputum cx for MRSA (10/18/18).  The CT chest show Bilateral pleural effusions.  Near complete collapse of the right lower and left lower lobes with partial collapse of the right upper and middle lobes.  Left upper lobe  infiltrate or pneumonia versus aspiration pneumonia.    10/27-  She remains on ventilatory support . Chest x-ray showed interval increased opacification of the left lower lung.  She had thoracentesis done today .  Pleural fluid wbc -1361.  Respiratory culture -10/24- MRSA, candida    10/28-  Remains on ventilatory support .  Pleural fluid cultures are pending .   Chest x-ray showed -Stable cardiomegaly.  Tracheostomy cannula.  Bilateral pleural fluid collections.  Questionable decrease in left pleural fluid versus changes related to patient positioning.      Interval History:   Pt was seen and examined at bedside .  She cont on ventilation support . The ct chest show  Lung collapse , possible pn and moderate pleura effusion   10/27-  She remains weak,she had thoracentesis today .  CT scan of the chest/abdomen /pelvis- 10/25-    Moderate right and small left pleural effusion.  The right lung reveals dependent atelectasis of the right middle and lower lobes with near complete collapse of the right lower lobe.  The left lung reveals near-complete collapse of the left lower lobe with patchy infiltrate of the left upper lung zone, possibly representing a pneumonia.  Variable subcutaneous edema is present.  10/28- remains weak, on ventilatory support , repeat cultures remain negtaive       Review of Systems   Unable to perform ROS: Patient nonverbal     Objective:     Vital Signs (Most Recent):  Temp: 97.7 °F (36.5 °C) (10/28/18 0717)  Pulse: (!) 118 (10/28/18 1231)  Resp: 19 (10/28/18 1231)  BP: (!) 168/101 (10/28/18 1000)  SpO2: 100 % (10/28/18 1231) Vital Signs (24h Range):  Temp:  [97.7 °F (36.5 °C)-99.4 °F (37.4 °C)] 97.7 °F (36.5 °C)  Pulse:  [] 118  Resp:  [0-29] 19  SpO2:  [83 %-100 %] 100 %  BP: ()/() 168/101     Weight: 85 kg (187 lb 6.3 oz)  Body mass index is 26.89 kg/m².    Intake/Output Summary (Last 24 hours) at 10/28/2018 1259  Last data filed at 10/28/2018 0700  Gross per 24 hour    Intake 1604 ml   Output 1424 ml   Net 180 ml      Physical Exam   Constitutional: Vital signs are normal. She appears well-developed. She has a sickly appearance. She appears ill. No distress.   HENT:   Head: Atraumatic.   Mouth/Throat: Oropharynx is clear and moist.   Eyes: EOM and lids are normal. Pupils are equal, round, and reactive to light.   Neck: Neck supple.       Cardiovascular: Normal rate and regular rhythm.   Pulses:       Radial pulses are 1+ on the right side, and 1+ on the left side.        Dorsalis pedis pulses are 1+ on the right side, and 1+ on the left side.   Pulmonary/Chest: No accessory muscle usage. No respiratory distress. She has decreased breath sounds in the right lower field. She has rhonchi. She has rales.   Abdominal: Soft. Bowel sounds are normal. She exhibits no distension. There is no tenderness.       Genitourinary:   Genitourinary Comments: Flores in place   Musculoskeletal:   Diffuse atrophy and +1 tibal edema   Lymphadenopathy:     She has no cervical adenopathy.   Neurological: She is alert.   Awake with eyes open and nods head to questions   Skin: Skin is dry. Capillary refill takes 2 to 3 seconds. No cyanosis.        Has sacral decub ulcer   Nursing note and vitals reviewed.      Significant Labs:   Blood Culture: No results for input(s): LABBLOO in the last 48 hours.  BMP:   Recent Labs   Lab 10/28/18  0426   GLU 93      K 3.8   CL 99   CO2 32*   BUN 33*   CREATININE 0.8   CALCIUM 9.2   MG 2.0     CBC:   Recent Labs   Lab 10/27/18  0429 10/28/18  0425   WBC 6.74 7.97   HGB 8.1* 8.4*   HCT 26.8* 27.8*   PLT 71* 95*     Magnesium:   Recent Labs   Lab 10/27/18  0429 10/28/18  0426   MG 1.8 2.0     Urine Studies: No results for input(s): COLORU, APPEARANCEUA, PHUR, SPECGRAV, PROTEINUA, GLUCUA, KETONESU, BILIRUBINUA, OCCULTUA, NITRITE, UROBILINOGEN, LEUKOCYTESUR, RBCUA, WBCUA, BACTERIA, SQUAMEPITHEL, HYALINECASTS in the last 48 hours.    Invalid input(s): NESTOR  All  pertinent labs within the past 24 hours have been reviewed.    Significant Imaging: I have reviewed and interpreted all pertinent imaging results/findings within the past 24 hours.    Assessment/Plan:      * Severe sepsis    PNE/UTI  IV Vanc and Ciprofloxacin   IVFs   Blood/urine/sputum cultures pending  Wound care consult  Monitor labs  pulm on board    10/27- related to UTI and MRSA pneumonia -on vancomycin and cipro .  Urine culture -10/18- E coli, Klebsiella   Will closely monitor vanco trough .Vanco trough is 17.     Pleural effusion on right    10/27- s/p thoracentesis -will follow cultures to guide therapy .   Pulmonology follow up .  Post procedure chest x-ray reviewed - no evidence of pneumothorax.        Pressure injury of right ankle, stage 3    Cont wound care       10/28- supportive care,wound care .     Pressure injury of left ischium, stage 3    Cont wound care        Incontinence associated dermatitis    Cont wound care        Thrombocytopenia    -cont monitor   -PLT > 50 k   - most likely due to sepsis     10/27- will monitor closely ,might need transfusion with bleeding , platelet is now 71.    10/28- platelet is 95 today, will continue to monitor closely.         Acute on chronic respiratory failure with hypoxia and hypercapnia    Pulmonology consult for critical care and vent management   Trach, vent dependent   CT chest shoe b/l lower lung collapse , moderate pleura effusion  And pulmonary infiltrate   Duonebs tx       10/27- will continue close critical care follow up , ventilatory care and support .  Might need bronchoscopy, will monitor closely post bronchoscopy      10/28-critical care follow up,continue ventilatory support .      Pressure injury of coccygeal region, unstageable    Wound care consult     10/27- will follow wound care , supportive care ,  Continue nursing care      10/28- continue wound care      MRSA bacteremia    Repeat Blood cx pending   IV vanco and cipro   10/27- Blood  culture-10/22- negative  Day 4/14 of therapy for MRSA bacteremia.  Cardiac echo done 10/22- no evidence of endocarditis       10/28- will continue vancomycin - will complete total of 14 days from negative blood cultures .  Day 5/14.      Urinary tract infection associated with indwelling urethral catheter    - Urine culture pending  - Will continue IV Ciprofloxacin      10/27 treated with cipro s/p therapy with zosyn during previous Hospital admit this month     10/28- will stop cipro., will monitor closely .     Deep tissue injury    Wound care consult        Hypothyroidism    Resume levothyroxine     10/28- continue levothyroxine     Acute on chronic systolic congestive heart failure w/ pulm edema    - CXR with vascular congestion with small bilateral effusion  - cont IV lasix   - 2D ECHO on 10/22/18 showed (EF 40-45%). DD, Mild AR, Mild to moderate MR, Moderate to severe TR, and    Pulmonary HTN.   - Strict I&Os  - Daily weights  reviewed     10/27- will continue diuresis with furosemide .Closely monitor renal panel  10/28- will continue lasix at 40mg bid         Type 2 diabetes mellitus with kidney complication, without long-term current use of insulin    Accuchecks   SSI   Ha1c 4.8 on 10/19/18    10/27- will continue insulin sliding scale ,closely monitor glucose level    10/28- will continue insulin sliding scale .     Uncomplicated severe persistent asthma    Cont breathing tx  reviewed       10/27- will continue duonebs as tolerated , pulmonology follow up      Chronic kidney disease (CKD), stage III (moderate)    Stable   Monitor renal function     10/28- serum creatinine is now 0.8     Elevated troponin    Likely due to demand ischemia  Initial troponin 0.054  Serial troponin        Chronic atrial fibrillation    Heart rate controlled   Resume Xarelto . No sing of acute bleeding at this time   Cont  Amiodarone     10/27- will continue amiodarone/xarelto , will continue close monitoring     10/28- continue  amiodarone/xarelto ,       VTE Risk Mitigation (From admission, onward)        Ordered     rivaroxaban tablet 15 mg  Nightly      10/26/18 1324     Place sequential compression device  Until discontinued      10/24/18 1141          Critical care time spent on the evaluation and treatment of severe organ dysfunction, review of pertinent labs and imaging studies, discussions with consulting providers and discussions with patient/family: 45  minutes.    Philip Ceja MD  Department of Hospital Medicine   [facility]

## 2018-10-28 NOTE — ASSESSMENT & PLAN NOTE
Repeat Blood cx pending   IV vanco and cipro   10/27- Blood culture-10/22- negative  Day 4/14 of therapy for MRSA bacteremia.  Cardiac echo done 10/22- no evidence of endocarditis       10/28- will continue vancomycin - will complete total of 14 days from negative blood cultures .  Day 5/14.

## 2018-10-28 NOTE — ASSESSMENT & PLAN NOTE
- Urine culture pending  - Will continue IV Ciprofloxacin      10/27 treated with cipro s/p therapy with zosyn during previous Hospital admit this month     10/28- will stop cipro., will monitor closely .

## 2018-10-29 PROBLEM — B37.9 CANDIDA INFECTION: Status: ACTIVE | Noted: 2018-01-01

## 2018-10-29 NOTE — PROGRESS NOTES
Trach care done. Site cleaned, drain sponges placed. Inner cannula changed. HME replaced. Mepilex placed under site to help prevent breakdown when trach sits on base of neck.

## 2018-10-29 NOTE — EICU
Tylenol 500 mg via PEG ordered once for temp 101.  Already on abx for PNA/Urosepsis pending cultures. Continue care.

## 2018-10-29 NOTE — ASSESSMENT & PLAN NOTE
Pulmonology consult for critical care and vent management   Trach, vent dependent   CT chest shoe b/l lower lung collapse , moderate pleura effusion  And pulmonary infiltrate   Alfonzo tx       10/27- will continue close critical care follow up , ventilatory care and support .  Might need bronchoscopy, will monitor closely post bronchoscopy      10/28-critical care follow up,continue ventilatory support .     10/29- will continue ventilatory support, critical care follow up , supportive care .  Will discuss with pulmonology about discharge options

## 2018-10-29 NOTE — PROGRESS NOTES
Follow up visit with Ms. Ramirez.  Dr. Jeansonne at bedside during my visit to reassess unstageable pressure injury to coccyx.  CT reviewed and besides aspiration attempted with no result.  Wound care changed to promote autolytic debridement.  Cleansed with saline, intact milagros wound skin painted with cavilon.  mesalt applied to cover wound bed and secured with tegaderm.  Will continue to follow.

## 2018-10-29 NOTE — ASSESSMENT & PLAN NOTE
- Urine culture pending  - Will continue IV Ciprofloxacin      10/27 treated with cipro s/p therapy with zosyn during previous Hospital admit this month     10/28- will stop cipro., will monitor closely .  10/29 no need to treat candiduria -will plan to change harris

## 2018-10-29 NOTE — CONSULTS
[facility]  Wound Care  Consult Note    Patient Name: Carlie Valdez  MRN: [id number]  Code Status: Full Code  Admission Date: 10/24/2018  Hospital Length of Stay: 5 days  Attending Physician: Philip Ceja MD  Primary Care Provider: Johanna Guzman MD    Patient information was obtained from chart.    Consults  Subjective:     Principal Problem: Severe sepsis    History of Present Illness: 72 y.o. female with trach/PEG admitted for respiratory decline and UTI.  Wound care consulted for sacral decubitus ulcer.    No current facility-administered medications on file prior to encounter.      Current Outpatient Medications on File Prior to Encounter   Medication Sig    albuterol-ipratropium (DUO-NEB) 2.5 mg-0.5 mg/3 mL nebulizer solution Take 3 mLs by nebulization every 4 (four) hours as needed for Wheezing. Rescue    amiodarone (PACERONE) 200 MG Tab Take 1 tablet (200 mg total) by mouth 2 (two) times daily. (Patient taking differently: 200 mg by Per G Tube route 2 (two) times daily. )    baclofen (LIORESAL) 10 MG tablet 10 mg by Per G Tube route 3 (three) times daily as needed.     brimonidine-timolol (COMBIGAN) 0.2-0.5 % Drop Place 1 drop into both eyes every 12 (twelve) hours.    bumetanide (BUMEX) 2 MG tablet Take 1 tablet (2 mg total) by mouth 2 (two) times daily. 1 Tablet Oral Every day (Patient taking differently: Take 2 mg by mouth once daily. 1 Tablet Oral Every day)    ergocalciferol (ERGOCALCIFEROL) 50,000 unit Cap Take 1 capsule (50,000 Units total) by mouth every 7 days. (Patient taking differently: 50,000 Units by Per G Tube route every 7 days. )    fluticasone (FLONASE) 50 mcg/actuation nasal spray 2 sprays by Each Nare route once daily.    furosemide (LASIX) 20 MG tablet 20 mg by Per G Tube route 2 (two) times daily.     insulin detemir U-100 (LEVEMIR FLEXTOUCH) 100 unit/mL (3 mL) SubQ InPn pen Inject 10 Units into the skin every evening.    levothyroxine  (SYNTHROID) 100 MCG tablet 100 mcg by Per G Tube route before breakfast.     magnesium oxide (MAG-OX) 400 mg tablet 400 mg by Per G Tube route 2 (two) times daily.     midodrine (PROAMATINE) 5 MG Tab 1 tablet (5 mg total) by Per G Tube route 3 (three) times daily.    montelukast (SINGULAIR) 10 mg tablet 10 mg by Per G Tube route once daily.     multivitamin (THERAGRAN) per tablet Take 1 tablet by mouth once daily.     mycophenolate (CELLCEPT) 250 mg Cap Take 2 capsules (500 mg total) by mouth 2 (two) times daily. (Patient taking differently: 500 mg 2 (two) times daily. )    ondansetron (ZOFRAN) 4 MG tablet 4 mg by Per G Tube route every 6 (six) hours as needed for Nausea.     pantoprazole (PROTONIX) 40 MG tablet Take 40 mg by mouth once daily.    polyethylene glycol (GLYCOLAX) 17 gram PwPk Take 17 g by mouth once daily. (Patient taking differently: 17 g by Per G Tube route once daily. )    rivaroxaban (XARELTO) 15 mg Tab 15 mg by Per G Tube route every evening.     traMADol (ULTRAM) 50 mg tablet 50 mg by Per G Tube route every 6 (six) hours as needed for Pain.     vancomycin HCl in 5 % dextrose (VANCOMYCIN IN DEXTROSE 5 %) 1 gram/250 mL Soln Sig 1 gm IV q 18 hrs x 14 days    zinc sulfate (ZINCATE) 220 (50) mg capsule 220 mg by Per G Tube route once daily.     albuterol 90 mcg/actuation inhaler Inhale 2 puffs into the lungs every 4 (four) hours as needed.     bacitracin 500 unit/gram ointment Apply topically once daily. Apply to R heel daily.       Review of patient's allergies indicates:   Allergen Reactions    Morphine Hives    Atorvastatin      Other reaction(s): Muscle pain    Clonidine     Codeine      Other reaction(s): Unknown    Digoxin      Other reaction(s): Unknown    Diovan [valsartan] Other (See Comments)     Upset stomach, weakness    Eggs [egg derived]     Metoprolol Diarrhea    Naproxen      Other reaction(s): Nausea    Peanut     Propofol      Other reaction(s): delirious     Sulfa (sulfonamide antibiotics)        Past Medical History:   Diagnosis Date    Anticoagulant long-term use     Arthritis     Asthma     Atrial fibrillation     Blood clot of vein in shoulder area     Cardiac defibrillator in place     CHF (congestive heart failure)     Chronic knee pain     Diabetes mellitus type 2 without retinopathy 2016    Diaphragmatic hernia without obstruction and without gangrene 2015    GERD (gastroesophageal reflux disease)     Hypertension     Immune deficiency disorder     Primary open angle glaucoma (POAG) of both eyes, severe stage 2018     Past Surgical History:   Procedure Laterality Date    CARDIAC DEFIBRILLATOR PLACEMENT      , .    CATARACT EXTRACTION       SECTION      COLONOSCOPY      EGD, WITH PEG TUBE INSERTION N/A 2018    Performed by Louis O. Jeansonne IV, MD at [facility] OR    ashley Macdonald    ESOPHAGOGASTRODUODENOSCOPY (EGD) N/A 2015    Performed by Hieu Colbert MD at [facility] ENDO    ESOPHAGOGASTRODUODENOSCOPY W/ PEG N/A 2018    Procedure: EGD, WITH PEG TUBE INSERTION;  Surgeon: Louis O. Jeansonne IV, MD;  Location: [facility] OR;  Service: General;  Laterality: N/A;    KNEE ARTHROSCOPY      TRACHEOSTOMY N/A 2018    Procedure: TRACHEOSTOMY;  Surgeon: Louis O. Jeansonne IV, MD;  Location: [facility] OR;  Service: General;  Laterality: N/A;    TRACHEOSTOMY N/A 2018    Performed by Louis O. Jeansonne IV, MD at [facility] OR    UPPER GASTROINTESTINAL ENDOSCOPY       Family History     Problem Relation (Age of Onset)    Hypertension Mother, Father        Tobacco Use    Smoking status: Never Smoker    Smokeless tobacco: Never Used   Substance and Sexual Activity    Alcohol use: No     Alcohol/week: 0.0 oz    Drug use: No    Sexual activity: No     Review of Systems   Unable to perform ROS: Intubated     Objective:     Vital Signs (Most Recent):  Temp: 99.9 °F (37.7 °C) (10/29/18 0305)  Pulse: (!) 123 (10/29/18  1136)  Resp: (!) 22 (10/29/18 1136)  BP: (!) 128/112 (10/29/18 1136)  SpO2: 100 % (10/29/18 1136) Vital Signs (24h Range):  Temp:  [99 °F (37.2 °C)-101.1 °F (38.4 °C)] 99.9 °F (37.7 °C)  Pulse:  [] 123  Resp:  [13-34] 22  SpO2:  [94 %-100 %] 100 %  BP: ()/() 128/112     Weight: 85 kg (187 lb 6.3 oz)  Body mass index is 26.89 kg/m².    Physical Exam   Constitutional: No distress. She is intubated.   Pulmonary/Chest: She is intubated.   Skin:                Significant Labs:  CBC:   Recent Labs   Lab 10/29/18  0428   WBC 8.43   RBC 3.28*   HGB 8.8*   HCT 29.0*   *   MCV 88   MCH 26.8*   MCHC 30.3*       Significant Diagnostics:  I have reviewed all pertinent imaging results/findings within the past 24 hours.    Assessment/Plan:     Pressure injury of coccygeal region, unstageable    CT scan to rule out abscess or necrosis       VTE Risk Mitigation (From admission, onward)        Ordered     rivaroxaban tablet 15 mg  Nightly      10/26/18 1324     Place sequential compression device  Until discontinued      10/24/18 1141          Thank you for your consult. I will follow-up with patient. Please contact us if you have any additional questions.    Louis O. Jeansonne, MD  General Surgery  [facility]

## 2018-10-29 NOTE — ASSESSMENT & PLAN NOTE
Accuchecks   SSI   Ha1c 4.8 on 10/19/18    10/27- will continue insulin sliding scale ,closely monitor glucose level    10/28- will continue insulin sliding scale .well controlled

## 2018-10-29 NOTE — ASSESSMENT & PLAN NOTE
-cont monitor   -PLT > 50 k   - most likely due to sepsis     10/27- will monitor closely ,might need transfusion with bleeding , platelet is now 71.    10/28- platelet is 95 today, will continue to monitor closely.   10/29- serum platelet -124 , no evidence of bleeding noted at this time

## 2018-10-29 NOTE — ASSESSMENT & PLAN NOTE
Repeat Blood cx pending   IV vanco and cipro   10/27- Blood culture-10/22- negative  Day 4/14 of therapy for MRSA bacteremia.  Cardiac echo done 10/22- no evidence of endocarditis       10/28- will continue vancomycin - will complete total of 14 days from negative blood cultures .  Day 5/14.   10/29 -will continue vancomycin -day 6/14 vanco trough -is 20.8

## 2018-10-29 NOTE — ASSESSMENT & PLAN NOTE
No need to treat asymptomatic candiduria , will monitor fever curve while on zosyn ,  Fluconazole can interact with amiodarone -QTC prolongation

## 2018-10-29 NOTE — PLAN OF CARE
Problem: Patient Care Overview  Goal: Plan of Care Review  Outcome: Ongoing (interventions implemented as appropriate)  Temp 101.1 down to <100 with Tylenol. Pt alert with some confusion to place and situation. Pt tolerating ventilator. Heart rhythm betweem NSR, ST, and A-fib. Pressure stable. Tolerating tube feedings. Liquid BM x 2. CBGs WNL. Wound care performed and pt turned Q2H with pressure points protected. Specialty bed and heel boots utilized. POC reviewed with pt and family. All questions and concerns addressed.

## 2018-10-29 NOTE — PROGRESS NOTES
[facility] -   General Surgery  Progress Note    Subjective:     History of Present Illness:  72 y.o. female with trach/PEG admitted for respiratory decline and UTI.  Wound care consulted for sacral decubitus ulcer.    Post-Op Info:  * No surgery found *         Interval History: No events    Medications:  Continuous Infusions:  Scheduled Meds:   albuterol-ipratropium  3 mL Nebulization Q6H    amiodarone  200 mg Per G Tube BID    arformoterol  15 mcg Nebulization Q12H    baclofen  5 mg Oral BID    brimonidine 0.2%  1 drop Both Eyes Q12H    budesonide  0.5 mg Nebulization Q12H    famotidine  20 mg Oral BID    furosemide  40 mg Intravenous BID    levothyroxine  100 mcg Per G Tube Before breakfast    magnesium oxide  400 mg Per G Tube BID    midodrine  10 mg Per G Tube TID    polyethylene glycol  17 g Oral Daily    rivaroxaban  15 mg Per G Tube QHS    sodium chloride 3%  4 mL Nebulization Q12H    vancomycin (VANCOCIN) IVPB  1,000 mg Intravenous Q48H    zinc sulfate  220 mg Per G Tube Daily     PRN Meds:bisacodyl, dextrose 50%, glucagon (human recombinant), insulin aspart U-100, ondansetron, pneumoc 13-naeem conj-dip cr(PF), traMADol     Review of patient's allergies indicates:   Allergen Reactions    Morphine Hives    Atorvastatin      Other reaction(s): Muscle pain    Clonidine     Codeine      Other reaction(s): Unknown    Digoxin      Other reaction(s): Unknown    Diovan [valsartan] Other (See Comments)     Upset stomach, weakness    Eggs [egg derived]     Metoprolol Diarrhea    Naproxen      Other reaction(s): Nausea    Peanut     Propofol      Other reaction(s): delirious    Sulfa (sulfonamide antibiotics)      Objective:     Vital Signs (Most Recent):  Temp: 99.9 °F (37.7 °C) (10/29/18 0305)  Pulse: (!) 123 (10/29/18 1136)  Resp: (!) 22 (10/29/18 1136)  BP: (!) 128/112 (10/29/18 1136)  SpO2: 100 % (10/29/18 1136) Vital Signs (24h Range):  Temp:  [99 °F (37.2 °C)-101.1 °F  (38.4 °C)] 99.9 °F (37.7 °C)  Pulse:  [] 123  Resp:  [13-34] 22  SpO2:  [94 %-100 %] 100 %  BP: ()/() 128/112     Weight: 85 kg (187 lb 6.3 oz)  Body mass index is 26.89 kg/m².    Intake/Output - Last 3 Shifts       10/27 0700 - 10/28 0659 10/28 0700 - 10/29 0659 10/29 0700 - 10/30 0659    I.V. (mL/kg) 579.2 (6.8)      NG/GT 1129 1530     IV Piggyback 650 450     Total Intake(mL/kg) 2358.2 (27.7) 1980 (23.3)     Urine (mL/kg/hr) 1701 (0.8) 3208 (1.6)     Drains 320      Stool 0 0     Total Output 2021 3208     Net +337.2 -1228            Stool Occurrence 3 x 4 x           Physical Exam   Constitutional: No distress. She is intubated.   Pulmonary/Chest: She is intubated.   Skin:            Significant Labs:  CBC:   Recent Labs   Lab 10/29/18  0428   WBC 8.43   RBC 3.28*   HGB 8.8*   HCT 29.0*   *   MCV 88   MCH 26.8*   MCHC 30.3*       Significant Diagnostics:  I have reviewed all pertinent imaging results/findings within the past 24 hours.     CT scan did not reveal any abscess or necrosis    Assessment/Plan:     Pressure injury of coccygeal region, unstageable    Aspiration performed at bedside, no purulent material drained.  Continue wound care.         Louis O. Jeansonne, MD  General Surgery  [facility]

## 2018-10-29 NOTE — ASSESSMENT & PLAN NOTE
.Cont full vent support  Vent settings reviewed and adjusted  VAP prophylaxis  Wean PEEP and FiO2 slowly  10/25CHeck CT of chest for pleural effusion   10/26 Continue support and diuresis  10/27 Probable thoracentesis on the right today - need consent  10/28 Taper FiO2 as tolerated  10/29 continue diuresis.  Wean O2.  Daily chest x-ray and ABG.  Vent dependent.

## 2018-10-29 NOTE — ASSESSMENT & PLAN NOTE
10/28 review cultures  10/29 ultrasound of the chest bilateral today.  Status post thoracentesis on the right 40 cc of cloudy fluid removed.  Cultures pending.  As per WBC count and glucose level, effusion does not appear complicated.

## 2018-10-29 NOTE — ASSESSMENT & PLAN NOTE
10/29 Candida present in sputum and in urine.  Patient afebrile.  Start IV fluconazole if okay with Infectious Disease.

## 2018-10-29 NOTE — SUBJECTIVE & OBJECTIVE
Interval History:   Review of Systems   Unable to perform ROS: Other (Trach)     Objective:     Vital Signs (Most Recent):  Temp: 98 °F (36.7 °C) (10/29/18 1130)  Pulse: (!) 125 (10/29/18 1300)  Resp: 20 (10/29/18 1300)  BP: 107/79 (10/29/18 1300)  SpO2: (!) 93 % (10/29/18 1300) Vital Signs (24h Range):  Temp:  [98 °F (36.7 °C)-101.1 °F (38.4 °C)] 98 °F (36.7 °C)  Pulse:  [] 125  Resp:  [13-34] 20  SpO2:  [91 %-100 %] 93 %  BP: ()/() 107/79     Weight: 85 kg (187 lb 6.3 oz)  Body mass index is 26.89 kg/m².      Intake/Output Summary (Last 24 hours) at 10/29/2018 1326  Last data filed at 10/29/2018 1200  Gross per 24 hour   Intake 1235 ml   Output 2688 ml   Net -1453 ml       Physical Exam   Constitutional:   Ill appearing     HENT:   Head: Normocephalic and atraumatic.   Neck: Neck supple.   Trach in place   Cardiovascular:   AFib 125 beats per minute.   Pulmonary/Chest: Effort normal. No respiratory distress.   Bilateral rhonchi   Abdominal: Soft. There is no tenderness.   Peg in place   Genitourinary:   Genitourinary Comments: Flores in place   Musculoskeletal: She exhibits edema.   Upper extremity swelling, left upper extremity PICC line, contracted in 4 extremities   Neurological: She is alert.   Skin: Skin is warm.       Vents:  Vent Mode: A/C (10/29/18 1245)  Ventilator Initiated: Yes (10/24/18 0630)  Set Rate: 20 bmp (10/29/18 1245)  Vt Set: 0 mL (10/29/18 1245)  Pressure Support: 0 cmH20 (10/29/18 1245)  PEEP/CPAP: 10 cmH20 (10/29/18 1245)  Oxygen Concentration (%): 55 (10/29/18 1300)  Peak Airway Pressure: 35 cmH2O (10/29/18 1245)  Plateau Pressure: 0 cmH20 (10/29/18 1245)  Total Ve: 7.07 mL (10/29/18 1245)  F/VT Ratio<105 (RSBI): (!) 56.98 (10/29/18 1245)    Lines/Drains/Airways     Drain                 Gastrostomy/Enterostomy 10/19/18 Percutaneous endoscopic gastrostomy (PEG) LUQ 10 days         Urethral Catheter 10/24/18 1030 16 Fr. 5 days          Airway                 Surgical Airway  07/18/18 1620 Shisaji 102 days          Pressure Ulcer                 Pressure Injury 10/24/18 1100 Right medial Malleolus Stage 3 5 days         Pressure Injury 10/24/18 1105 Left Ischial tuberosity Stage 3 5 days         Pressure Injury 10/24/18 Coccyx Unstageable 5 days          Peripheral Intravenous Line                 Midline Catheter Insertion/Assessment  - Double Lumen 10/19/18 0130 Left brachial vein 10 days                Significant Labs:    CBC/Anemia Profile:  Recent Labs   Lab 10/28/18  0425 10/29/18  0428   WBC 7.97 8.43   HGB 8.4* 8.8*   HCT 27.8* 29.0*   PLT 95* 124*   MCV 89 88   RDW 18.5* 18.3*        Chemistries:  Recent Labs   Lab 10/28/18  0425 10/28/18  0426 10/29/18  0427 10/29/18  0428   NA  --  143  --  146*   K  --  3.8  --  3.7   CL  --  99  --  101   CO2  --  32*  --  36*   BUN  --  33*  --  35*   CREATININE  --  0.8  --  0.9   CALCIUM  --  9.2  --  9.3   MG  --  2.0  --  2.0   PHOS 2.7  --  2.6*  --      EKG 10/27/2018 AFib.  Blood culture negative.  Sputum culture shows MRSA.  Candida albicans in sputum.  Candida tropicalis in urine.    Significant Imaging:  CXR: I have reviewed all pertinent results/findings within the past 24 hours and my personal findings are:  Tubes and lines are stable.  Cardiomegaly with bibasilar atelectasis versus airspace disease versus aspiration or edema with small to moderate bilateral pleural effusions    CT chest 10/25/2018    Bilateral pleural effusions.  Near complete collapse of the right lower and left lower lobes with partial collapse of the right upper and middle lobes.  Left upper lobe infiltrate or pneumonia versus aspiration pneumonia.    2D echo 10/22/2018    CONCLUSIONS     1 - Mildly to moderately depressed left ventricular systolic function (EF 40-45%).     2 - Impaired LV relaxation, elevated LAP (grade 2 diastolic dysfunction).     3 - Low normal right ventricular systolic function .     4 - Mild aortic regurgitation.     5 - Mild to  moderate mitral regurgitation.     6 - Moderate to severe tricuspid regurgitation.     7 - Severe left atrial enlargement.

## 2018-10-29 NOTE — PROGRESS NOTES
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  She also has a past medical history of Anticoagulant long-term use, Arthritis, Asthma, Atrial fibrillation, Blood clot of vein in shoulder area, Cardiac defibrillator in place, CHF (congestive heart failure), Chronic knee pain, Diabetes mellitus type 2 without retinopathy (12/19/2016), Diaphragmatic hernia without obstruction and without gangrene (9/14/2015), GERD (gastroesophageal reflux disease), Hypertension, Immune deficiency disorder, and Primary open angle glaucoma (POAG) of both eyes, severe stage (6/1/2018).  She was also recently hospitalized here at [facility] in ICU 10/19/18 admitted and discharged 2 days ago on 10/22/2018 for Acute on chronic hypoxic resp failure with total left lung opacification as well as severe sepsis with MRSA PNA and bacteremia as well as E.Coli and Klebsiella UTI.  She received a bronch here and left lung cleared with suctioning, IVAB and nebs.  She had some endobronchial bleeding with clots also.  She was discharged to NH on 10/22 on Cipro via PEG and IV Vanc.  She returned back to [facility] early this AM due to decreased LOC at NH, multiple episodes of diarrhea, per [facility] nursing home suctioned a large amount of blood from trach.  In ED temp 89.9 F and suctioned blood clots from Trach.  Plt count 38.  IVAB resumed and given IV Lasix        Hospital/ICU Course:  10/24 - Admitted to ICU on mech vent and warming via artic sun.  She is awake and responsive not requiring pressor  support  10/25/2018 Chart reviewed . Bronchoscopy last admission for mucous pluggine on the left. pleural effusion on Chest X Ray on the right. history of Congestive Heart failure:Moderate left ventricular systolic dysfunction with a visually estimated ejection fraction of 40%.  Grade 3 diastolic dysfunction consistent with restrictive physiology with elevated left atrial pressure.  Severe tricuspid regurgitation.  Moderate pulmonary hypertension with an estimated pulmonary artery systolic pressure 55 mmHg.  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   10/26/2018  CT of chest with bilateral effusions and atelectasis with right lower lobe and left lower lobe collapse  10/28 Little change, mild diuresis continues  10/29 seen and examined.  Vent dependent.  T-max 101° yesterday.  On enteral feeding via PEG.    Interval History:   Review of Systems   Unable to perform ROS: Other (Trach)     Objective:     Vital Signs (Most Recent):  Temp: 98 °F (36.7 °C) (10/29/18 1130)  Pulse: (!) 125 (10/29/18 1300)  Resp: 20 (10/29/18 1300)  BP: 107/79 (10/29/18 1300)  SpO2: (!) 93 % (10/29/18 1300) Vital Signs (24h Range):  Temp:  [98 °F (36.7 °C)-101.1 °F (38.4 °C)] 98 °F (36.7 °C)  Pulse:  [] 125  Resp:  [13-34] 20  SpO2:  [91 %-100 %] 93 %  BP: ()/() 107/79     Weight: 85 kg (187 lb 6.3 oz)  Body mass index is 26.89 kg/m².      Intake/Output Summary (Last 24 hours) at 10/29/2018 1326  Last data filed at 10/29/2018 1200  Gross per 24 hour   Intake 1235 ml   Output 2688 ml   Net -1453 ml       Physical Exam   Constitutional:   Ill appearing     HENT:   Head: Normocephalic and atraumatic.   Neck: Neck supple.   Trach in place   Cardiovascular:   AFib 125 beats per minute.   Pulmonary/Chest: Effort normal. No respiratory distress.   Bilateral rhonchi   Abdominal: Soft. There is no tenderness.   Peg in place    Genitourinary:   Genitourinary Comments: Flores in place   Musculoskeletal: She exhibits edema.   Upper extremity swelling, left upper extremity PICC line, contracted in 4 extremities   Neurological: She is alert.   Skin: Skin is warm.       Vents:  Vent Mode: A/C (10/29/18 1245)  Ventilator Initiated: Yes (10/24/18 0630)  Set Rate: 20 bmp (10/29/18 1245)  Vt Set: 0 mL (10/29/18 1245)  Pressure Support: 0 cmH20 (10/29/18 1245)  PEEP/CPAP: 10 cmH20 (10/29/18 1245)  Oxygen Concentration (%): 55 (10/29/18 1300)  Peak Airway Pressure: 35 cmH2O (10/29/18 1245)  Plateau Pressure: 0 cmH20 (10/29/18 1245)  Total Ve: 7.07 mL (10/29/18 1245)  F/VT Ratio<105 (RSBI): (!) 56.98 (10/29/18 1245)    Lines/Drains/Airways     Drain                 Gastrostomy/Enterostomy 10/19/18 Percutaneous endoscopic gastrostomy (PEG) LUQ 10 days         Urethral Catheter 10/24/18 1030 16 Fr. 5 days          Airway                 Surgical Airway 07/18/18 1620 Shiley 102 days          Pressure Ulcer                 Pressure Injury 10/24/18 1100 Right medial Malleolus Stage 3 5 days         Pressure Injury 10/24/18 1105 Left Ischial tuberosity Stage 3 5 days         Pressure Injury 10/24/18 Coccyx Unstageable 5 days          Peripheral Intravenous Line                 Midline Catheter Insertion/Assessment  - Double Lumen 10/19/18 0130 Left brachial vein 10 days                Significant Labs:    CBC/Anemia Profile:  Recent Labs   Lab 10/28/18  0425 10/29/18  0428   WBC 7.97 8.43   HGB 8.4* 8.8*   HCT 27.8* 29.0*   PLT 95* 124*   MCV 89 88   RDW 18.5* 18.3*        Chemistries:  Recent Labs   Lab 10/28/18  0425 10/28/18  0426 10/29/18  0427 10/29/18  0428   NA  --  143  --  146*   K  --  3.8  --  3.7   CL  --  99  --  101   CO2  --  32*  --  36*   BUN  --  33*  --  35*   CREATININE  --  0.8  --  0.9   CALCIUM  --  9.2  --  9.3   MG  --  2.0  --  2.0   PHOS 2.7  --  2.6*  --      EKG 10/27/2018 AFib.  Blood culture negative.  Sputum culture shows  MRSA.  Candida albicans in sputum.  Candida tropicalis in urine.    Significant Imaging:  CXR: I have reviewed all pertinent results/findings within the past 24 hours and my personal findings are:  Tubes and lines are stable.  Cardiomegaly with bibasilar atelectasis versus airspace disease versus aspiration or edema with small to moderate bilateral pleural effusions    CT chest 10/25/2018    Bilateral pleural effusions.  Near complete collapse of the right lower and left lower lobes with partial collapse of the right upper and middle lobes.  Left upper lobe infiltrate or pneumonia versus aspiration pneumonia.    2D echo 10/22/2018    CONCLUSIONS     1 - Mildly to moderately depressed left ventricular systolic function (EF 40-45%).     2 - Impaired LV relaxation, elevated LAP (grade 2 diastolic dysfunction).     3 - Low normal right ventricular systolic function .     4 - Mild aortic regurgitation.     5 - Mild to moderate mitral regurgitation.     6 - Moderate to severe tricuspid regurgitation.     7 - Severe left atrial enlargement.    Assessment/Plan:     Pulmonary   Pleural effusion    10/28 review cultures  10/29 ultrasound of the chest bilateral today.  Status post thoracentesis on the right 40 cc of cloudy fluid removed.  Cultures pending.  As per WBC count and glucose level, effusion does not appear complicated.     Acute on chronic respiratory failure with hypoxia and hypercapnia      .Cont full vent support  Vent settings reviewed and adjusted  VAP prophylaxis  Wean PEEP and FiO2 slowly  10/25CHeck CT of chest for pleural effusion   10/26 Continue support and diuresis  10/27 Probable thoracentesis on the right today - need consent  10/28 Taper FiO2 as tolerated  10/29 continue diuresis.  Wean O2.  Daily chest x-ray and ABG.  Vent dependent.         Uncomplicated severe persistent asthma    Cont Duonebs  Add Formeterol and Budesonide     Cardiac/Vascular   Acute on chronic systolic congestive heart failure  w/ pulm edema    Cont IV Lasix  Daily weights  Accurate I/Os  Follow up labs  10/29/2018  Start IV lasix for pleural effusion and review CT of chest  10/27 Good diuresis, bilateral atelectasis on CT- continue diuretics  10/28 continue diuresis  10/29 continue IV Lasix 40 mg twice a day.  Patient -5 L so far since admission.       Chronic atrial fibrillation    NSR on Amiodarone infusion  10/29 continue amiodarone.  Continue anticoagulation with rivaroxaban.     Renal/   Urinary tract infection associated with indwelling urethral catheter    Cont Cipro  Repeat cultures pending  10/25/2018 awaiting cultures to review       Chronic kidney disease (CKD), stage III (moderate)    Creatine 0.7  Monitor I/Os and creatine with diuresis     ID   * Severe sepsis    Cont IVAB from discharge  Normal WBC  Warm to normothermia  Cont supportive care  No IVFs given pulm edema  10/25/2018  cultures pending, stable blood pressure     Candida infection    10/29 Candida present in sputum and in urine.  Patient afebrile.  Start IV fluconazole if okay with Infectious Disease.     MRSA bacteremia    Cont Vanc, pharmacy to dose  Repeat cultures pending     Hematology   Thrombocytopenia    No bleeding other than Endobronchial  No anticoagulants or antiplatelets for now  Follow CBC     Endocrine   Hypothyroidism    Cont Levothyroxine     Type 2 diabetes mellitus with kidney complication, without long-term current use of insulin    Cont SSI  Glucose stable  Monitor for insulin toxicity     Other   Pressure injury of coccygeal region, unstageable    Wound care following  Low pressure bed  Turn Q 2 hours  10/25/2018 Evaluated by surgery. CT of pelvis ordered for depth of decubitis          Critical Care Daily Checklist:    A: Awake: RASS Goal/Actual Goal:    Actual: Guerrero Agitation Sedation Scale (RASS): Alert and calm   B: Spontaneous Breathing Trial Performed? Spon. Breathing Trial Initiated?: Not initiated (10/25/18 1320)   C: SAT & SBT  Coordinated?  Not applicable.  Vent dependent.                      D: Delirium: CAM-ICU Overall CAM-ICU: Negative   E: Early Mobility Performed? No.  Bedrest.  Patient contracted.  Chronically on the vent.   F: Feeding Goal: Goals: Meet > 85 % EEN/EPN while admitted  Status: Nutrition Goal Status: new   Current Diet Order   Procedures    Diet NPO      AS: Analgesia/Sedation Y   T: Thromboembolic Prophylaxis ON XARELTO P.O.   H: HOB > 300 Yes   U: Stress Ulcer Prophylaxis (if needed) Famotidine    G: Glucose Control Fingerstick q.6 hours.  Short-acting insulin p.r.n..   B: Bowel Function Stool Occurrence: 1   I: Indwelling Catheter (Lines & Flores) Necessity Keep Flores.   D: De-escalation of Antimicrobials/Pharmacotherapies On vancomycin for MRSA infection.    Plan for the day/ETD Y    Code Status:  Family/Goals of Care: Full Code  Y     Critical Care Time: 40 minutes  Critical secondary to Patient has a condition that poses threat to life and bodily function:  ACUTE HYPOXEMIC RESPIRATORY FAILURE, ACUTE ON CHRONIC DIASTOLIC HEART FAILURE, MRSA INFECTION.       Critical care was time spent personally by me on the following activities: development of treatment plan with patient or surrogate and bedside caregivers, discussions with consultants, evaluation of patient's response to treatment, examination of patient, ordering and performing treatments and interventions, ordering and review of laboratory studies, ordering and review of radiographic studies, pulse oximetry, re-evaluation of patient's condition. This critical care time did not overlap with that of any other provider or involve time for any procedures.     John White MD  Critical Care Medicine  [facility]

## 2018-10-29 NOTE — SUBJECTIVE & OBJECTIVE
No current facility-administered medications on file prior to encounter.      Current Outpatient Medications on File Prior to Encounter   Medication Sig    albuterol-ipratropium (DUO-NEB) 2.5 mg-0.5 mg/3 mL nebulizer solution Take 3 mLs by nebulization every 4 (four) hours as needed for Wheezing. Rescue    amiodarone (PACERONE) 200 MG Tab Take 1 tablet (200 mg total) by mouth 2 (two) times daily. (Patient taking differently: 200 mg by Per G Tube route 2 (two) times daily. )    baclofen (LIORESAL) 10 MG tablet 10 mg by Per G Tube route 3 (three) times daily as needed.     brimonidine-timolol (COMBIGAN) 0.2-0.5 % Drop Place 1 drop into both eyes every 12 (twelve) hours.    bumetanide (BUMEX) 2 MG tablet Take 1 tablet (2 mg total) by mouth 2 (two) times daily. 1 Tablet Oral Every day (Patient taking differently: Take 2 mg by mouth once daily. 1 Tablet Oral Every day)    ergocalciferol (ERGOCALCIFEROL) 50,000 unit Cap Take 1 capsule (50,000 Units total) by mouth every 7 days. (Patient taking differently: 50,000 Units by Per G Tube route every 7 days. )    fluticasone (FLONASE) 50 mcg/actuation nasal spray 2 sprays by Each Nare route once daily.    furosemide (LASIX) 20 MG tablet 20 mg by Per G Tube route 2 (two) times daily.     insulin detemir U-100 (LEVEMIR FLEXTOUCH) 100 unit/mL (3 mL) SubQ InPn pen Inject 10 Units into the skin every evening.    levothyroxine (SYNTHROID) 100 MCG tablet 100 mcg by Per G Tube route before breakfast.     magnesium oxide (MAG-OX) 400 mg tablet 400 mg by Per G Tube route 2 (two) times daily.     midodrine (PROAMATINE) 5 MG Tab 1 tablet (5 mg total) by Per G Tube route 3 (three) times daily.    montelukast (SINGULAIR) 10 mg tablet 10 mg by Per G Tube route once daily.     multivitamin (THERAGRAN) per tablet Take 1 tablet by mouth once daily.     mycophenolate (CELLCEPT) 250 mg Cap Take 2 capsules (500 mg total) by mouth 2 (two) times daily. (Patient taking differently: 500  mg 2 (two) times daily. )    ondansetron (ZOFRAN) 4 MG tablet 4 mg by Per G Tube route every 6 (six) hours as needed for Nausea.     pantoprazole (PROTONIX) 40 MG tablet Take 40 mg by mouth once daily.    polyethylene glycol (GLYCOLAX) 17 gram PwPk Take 17 g by mouth once daily. (Patient taking differently: 17 g by Per G Tube route once daily. )    rivaroxaban (XARELTO) 15 mg Tab 15 mg by Per G Tube route every evening.     traMADol (ULTRAM) 50 mg tablet 50 mg by Per G Tube route every 6 (six) hours as needed for Pain.     vancomycin HCl in 5 % dextrose (VANCOMYCIN IN DEXTROSE 5 %) 1 gram/250 mL Soln Sig 1 gm IV q 18 hrs x 14 days    zinc sulfate (ZINCATE) 220 (50) mg capsule 220 mg by Per G Tube route once daily.     albuterol 90 mcg/actuation inhaler Inhale 2 puffs into the lungs every 4 (four) hours as needed.     bacitracin 500 unit/gram ointment Apply topically once daily. Apply to R heel daily.       Review of patient's allergies indicates:   Allergen Reactions    Morphine Hives    Atorvastatin      Other reaction(s): Muscle pain    Clonidine     Codeine      Other reaction(s): Unknown    Digoxin      Other reaction(s): Unknown    Diovan [valsartan] Other (See Comments)     Upset stomach, weakness    Eggs [egg derived]     Metoprolol Diarrhea    Naproxen      Other reaction(s): Nausea    Peanut     Propofol      Other reaction(s): delirious    Sulfa (sulfonamide antibiotics)        Past Medical History:   Diagnosis Date    Anticoagulant long-term use     Arthritis     Asthma     Atrial fibrillation     Blood clot of vein in shoulder area     Cardiac defibrillator in place     CHF (congestive heart failure)     Chronic knee pain     Diabetes mellitus type 2 without retinopathy 12/19/2016    Diaphragmatic hernia without obstruction and without gangrene 9/14/2015    GERD (gastroesophageal reflux disease)     Hypertension     Immune deficiency disorder     Primary open angle  glaucoma (POAG) of both eyes, severe stage 2018     Past Surgical History:   Procedure Laterality Date    CARDIAC DEFIBRILLATOR PLACEMENT      , .    CATARACT EXTRACTION       SECTION      COLONOSCOPY      EGD, WITH PEG TUBE INSERTION N/A 2018    Performed by Louis O. Jeansonne IV, MD at [facility] OR    ashley Macdonald    ESOPHAGOGASTRODUODENOSCOPY (EGD) N/A 2015    Performed by Hieu Colbert MD at [facility] ENDO    ESOPHAGOGASTRODUODENOSCOPY W/ PEG N/A 2018    Procedure: EGD, WITH PEG TUBE INSERTION;  Surgeon: Louis O. Jeansonne IV, MD;  Location: [facility] OR;  Service: General;  Laterality: N/A;    KNEE ARTHROSCOPY      TRACHEOSTOMY N/A 2018    Procedure: TRACHEOSTOMY;  Surgeon: Louis O. Jeansonne IV, MD;  Location: [facility] OR;  Service: General;  Laterality: N/A;    TRACHEOSTOMY N/A 2018    Performed by Louis O. Jeansonne IV, MD at [facility] OR    UPPER GASTROINTESTINAL ENDOSCOPY       Family History     Problem Relation (Age of Onset)    Hypertension Mother, Father        Tobacco Use    Smoking status: Never Smoker    Smokeless tobacco: Never Used   Substance and Sexual Activity    Alcohol use: No     Alcohol/week: 0.0 oz    Drug use: No    Sexual activity: No     Review of Systems   Unable to perform ROS: Intubated     Objective:     Vital Signs (Most Recent):  Temp: 99.9 °F (37.7 °C) (10/29/18 0305)  Pulse: (!) 123 (10/29/18 1136)  Resp: (!) 22 (10/29/18 1136)  BP: (!) 128/112 (10/29/18 1136)  SpO2: 100 % (10/29/18 1136) Vital Signs (24h Range):  Temp:  [99 °F (37.2 °C)-101.1 °F (38.4 °C)] 99.9 °F (37.7 °C)  Pulse:  [] 123  Resp:  [13-34] 22  SpO2:  [94 %-100 %] 100 %  BP: ()/() 128/112     Weight: 85 kg (187 lb 6.3 oz)  Body mass index is 26.89 kg/m².    Physical Exam   Constitutional: No distress. She is intubated.   Pulmonary/Chest: She is intubated.   Skin:                Significant Labs:  CBC:   Recent Labs   Lab 10/29/18  0428   WBC 8.43   RBC  3.28*   HGB 8.8*   HCT 29.0*   *   MCV 88   MCH 26.8*   MCHC 30.3*       Significant Diagnostics:  I have reviewed all pertinent imaging results/findings within the past 24 hours.

## 2018-10-29 NOTE — PROGRESS NOTES
[facility] -   Adult Nutrition  Progress Note    SUMMARY     Recommendations    Recommendation/Intervention: 1.Consider increasing TF rate to 75 ml/hr to better meet EEN (1800  kcal, 167 g protein and 1512 ml free water).  2. Check residuals Q4 hours. Hold if > 500 cc. 3. Will continue to monitor.   Goals: Meet > 85 % EEN/EPN while admitted  Nutrition Goal Status: progressing towards goal   Communication of RD Recs: Reviewed w/ NP    Reason for Assessment    Reason for Assessment: RD f/u   Dx:  1. Sepsis, due to unspecified organism    2. Altered mental status    3. Hypothermia, initial encounter    4. Thrombocytopenia    5. Urinary tract infection associated with indwelling urethral catheter, initial encounter    6. Hemoptysis    7. Severe sepsis    8. Acute on chronic respiratory failure with hypoxia and hypercapnia    9. Acute on chronic systolic congestive heart failure    10. Chronic atrial fibrillation    11. Chronic kidney disease (CKD), stage III (moderate)    12. MRSA bacteremia    13. Pressure injury of coccygeal region, unstageable    14. Uncomplicated severe persistent asthma    15. Arrhythmia    16. History of EKG    17. Pleural effusion on right    18. Bilateral pleural effusion      Past Medical History:   Diagnosis Date    Anticoagulant long-term use     Arthritis     Asthma     Atrial fibrillation     Blood clot of vein in shoulder area     Cardiac defibrillator in place     CHF (congestive heart failure)     Chronic knee pain     Diabetes mellitus type 2 without retinopathy 12/19/2016    Diaphragmatic hernia without obstruction and without gangrene 9/14/2015    GERD (gastroesophageal reflux disease)     Hypertension     Immune deficiency disorder     Primary open angle glaucoma (POAG) of both eyes, severe stage 6/1/2018       Interdisciplinary Rounds: attended  General Information Comments: Pt currently in ICU. Pt is a resident at [facility]. Pt admitted w/ AMS.  "Pt has a trach and PEG. Pt on mechanical ventilation. Pt was recently discharged on 10/22. Per epic records, no wt loss in the past 3 months. NFPE not completed due to pt with other healthcare providers at time of visit. Will complete at follow up. Reviewed TF recs w/ NP today, TF ordered per RD recs.   10.26.18. Pt remains in ICU, on mechanical ventilation. Pt tolerating TF. Pt confused. Unable to answer my questions appropriately. No family members at bedside. Per NFPE (10.26.18) no significant muscle wasting/fat depletion noted.   10.29.18. Pt remains in ICU. Pt on the vent. Pt confused. Pt on TF, tolerating. Reviewed TF recs w/ NP via secure chat today.   Nutrition Discharge Planning: Patient to receive adequate intake via PEG with tolerance.     Nutrition Risk Screen    Nutrition Risk Screen: tube feeding or parenteral nutrition    Nutrition/Diet History    Do you have any cultural, spiritual, Taoism conflicts, given your current situation?: none  Anthropometrics    Temp: 98 °F (36.7 °C)  Height Method: Estimated  Height: 5' 10" (177.8 cm)  Height (inches): 70 in  Weight Method: Bed Scale  Weight: 85 kg (187 lb 6.3 oz)  Weight (lb): 187.39 lb  Ideal Body Weight (IBW), Female: 150 lb  % Ideal Body Weight, Female (lb): 124.93 lb  BMI (Calculated): 26.9  BMI Grade: 25 - 29.9 - overweight       Lab/Procedures/Meds    Pertinent Labs Reviewed: reviewed  BMP  Lab Results   Component Value Date     (H) 10/29/2018    K 3.7 10/29/2018     10/29/2018    CO2 36 (H) 10/29/2018    BUN 35 (H) 10/29/2018    CREATININE 0.9 10/29/2018    CALCIUM 9.3 10/29/2018    ANIONGAP 9 10/29/2018    ESTGFRAFRICA >60 10/29/2018    EGFRNONAA >60 10/29/2018     /lastphos  Lab Results   Component Value Date    CALCIUM 9.3 10/29/2018    PHOS 2.6 (L) 10/29/2018       Lab Results   Component Value Date    ALBUMIN 2.1 (L) 10/24/2018     Recent Labs   Lab 10/29/18  1222   POCTGLUCOSE 102     Lab Results   Component Value Date    " HGBA1C 4.9 10/19/2018       Pertinent Medications Reviewed: reviewed    Physical Findings/Assessment    Overall Physical Appearance: on ventilator support  Tubes: (PEG)  Oral/Mouth Cavity: (dry)  Skin: (pressure injury malleolus stg 2 and coccyx unstageable )    Estimated/Assessed Needs    Weight Used For Calorie Calculations: 85 kg (187 lb 6.3 oz)  Energy Calorie Requirements (kcal): 1800  Energy Need Method: [facility]  Protein Requirements: 102 - 170 g  Weight Used For Protein Calculations: 85 kg (187 lb 6.3 oz)     Fluid Need Method: RDA Method(or per MD)  RDA Method (mL):1800  CHO Requirement: 50 % EEN      Nutrition Prescription Ordered    Current Diet Order: NPO  Current Nutrition Support Formula Ordered: Peptamen Intense VHP(at 45 ml/hr + 1 pack of beneprotein daily)  Current Nutrition Support Rate Ordered: 45 (ml)(ml/hr)    Evaluation of Received Nutrient/Fluid Intake    Enteral Calories (kcal): 1105  Enteral Protein (gm): 106  % Kcal Needs: 61  % Protein Needs: 100     Intake/Output Summary (Last 24 hours) at 10/29/2018 1305  Last data filed at 10/29/2018 1200  Gross per 24 hour   Intake 1235 ml   Output 2688 ml   Net -1453 ml       % Meal Intake: NPO    Nutrition Risk    Level of Risk/Frequency of Follow-up: (2xweekly)     Assessment and Plan  Nutrition Problem  Swallowing difficulty    Related to (etiology):   Dysphagia     Signs and Symptoms (as evidenced by):   Pt has a PEG x nutrition support    Interventions/Recommendations (treatment strategy):  See above     Nutrition Diagnosis Status:   Continues      Monitor and Evaluation    Food and Nutrient Intake: energy intake, enteral nutrition intake  Food and Nutrient Adminstration: enteral and parenteral nutrition administration  Anthropometric Measurements: weight  Biochemical Data, Medical Tests and Procedures: electrolyte and renal panel, glucose/endocrine profile  Nutrition-Focused Physical Findings: overall appearance, skin     Nutrition  Follow-Up    RD Follow-up?: Yes

## 2018-10-29 NOTE — ASSESSMENT & PLAN NOTE
Wound care consult     10/27- will follow wound care , supportive care ,  Continue nursing care      10/28- continue wound care     10/29 will continue to follow wound care , zosyn was added to IV vancomycin, follow cultures

## 2018-10-29 NOTE — ASSESSMENT & PLAN NOTE
Heart rate controlled   Resume Xarelto . No sing of acute bleeding at this time   Cont  Amiodarone     10/27- will continue amiodarone/xarelto , will continue close monitoring     10/28- continue amiodarone/xarelto ,  10/29 -rate better controlled, will continue amiodarone, xarelto

## 2018-10-29 NOTE — HPI
Patient was admitted to the hospital with respiratory issues.  She was seen by the wound care service regarding her wounds.    General surgery was called about her leaking around her tracheostomy.  She has a 8.  Shiley and there is concerned that there is air leaking around it causing her to have decreased ability to ventilate and oxygenate

## 2018-10-29 NOTE — SUBJECTIVE & OBJECTIVE
Interval History: No events    Medications:  Continuous Infusions:  Scheduled Meds:   albuterol-ipratropium  3 mL Nebulization Q6H    amiodarone  200 mg Per G Tube BID    arformoterol  15 mcg Nebulization Q12H    baclofen  5 mg Oral BID    brimonidine 0.2%  1 drop Both Eyes Q12H    budesonide  0.5 mg Nebulization Q12H    famotidine  20 mg Oral BID    furosemide  40 mg Intravenous BID    levothyroxine  100 mcg Per G Tube Before breakfast    magnesium oxide  400 mg Per G Tube BID    midodrine  10 mg Per G Tube TID    polyethylene glycol  17 g Oral Daily    rivaroxaban  15 mg Per G Tube QHS    sodium chloride 3%  4 mL Nebulization Q12H    vancomycin (VANCOCIN) IVPB  1,000 mg Intravenous Q48H    zinc sulfate  220 mg Per G Tube Daily     PRN Meds:bisacodyl, dextrose 50%, glucagon (human recombinant), insulin aspart U-100, ondansetron, pneumoc 13-naeem conj-dip cr(PF), traMADol     Review of patient's allergies indicates:   Allergen Reactions    Morphine Hives    Atorvastatin      Other reaction(s): Muscle pain    Clonidine     Codeine      Other reaction(s): Unknown    Digoxin      Other reaction(s): Unknown    Diovan [valsartan] Other (See Comments)     Upset stomach, weakness    Eggs [egg derived]     Metoprolol Diarrhea    Naproxen      Other reaction(s): Nausea    Peanut     Propofol      Other reaction(s): delirious    Sulfa (sulfonamide antibiotics)      Objective:     Vital Signs (Most Recent):  Temp: 99.9 °F (37.7 °C) (10/29/18 0305)  Pulse: (!) 123 (10/29/18 1136)  Resp: (!) 22 (10/29/18 1136)  BP: (!) 128/112 (10/29/18 1136)  SpO2: 100 % (10/29/18 1136) Vital Signs (24h Range):  Temp:  [99 °F (37.2 °C)-101.1 °F (38.4 °C)] 99.9 °F (37.7 °C)  Pulse:  [] 123  Resp:  [13-34] 22  SpO2:  [94 %-100 %] 100 %  BP: ()/() 128/112     Weight: 85 kg (187 lb 6.3 oz)  Body mass index is 26.89 kg/m².    Intake/Output - Last 3 Shifts       10/27 0700 - 10/28 0659 10/28 0700 - 10/29  0659 10/29 0700 - 10/30 0659    I.V. (mL/kg) 579.2 (6.8)      NG/GT 1129 1530     IV Piggyback 650 450     Total Intake(mL/kg) 2358.2 (27.7) 1980 (23.3)     Urine (mL/kg/hr) 1701 (0.8) 3208 (1.6)     Drains 320      Stool 0 0     Total Output 2021 3208     Net +337.2 -1228            Stool Occurrence 3 x 4 x           Physical Exam   Constitutional: No distress. She is intubated.   Pulmonary/Chest: She is intubated.   Skin:            Significant Labs:  CBC:   Recent Labs   Lab 10/29/18  0428   WBC 8.43   RBC 3.28*   HGB 8.8*   HCT 29.0*   *   MCV 88   MCH 26.8*   MCHC 30.3*       Significant Diagnostics:  I have reviewed all pertinent imaging results/findings within the past 24 hours.     CT scan did not reveal any abscess or necrosis

## 2018-10-29 NOTE — PLAN OF CARE
No family at the bedside     10/29/18 8472   Medicare Message   Important Message from Medicare regarding Discharge Appeal Rights Other (comments)   Date IMM was signed 10/29/18   Time IMM was signed [id number]

## 2018-10-29 NOTE — ASSESSMENT & PLAN NOTE
NSR on Amiodarone infusion  10/29 continue amiodarone.  Continue anticoagulation with rivaroxaban.

## 2018-10-29 NOTE — PROGRESS NOTES
Clinical Pharmacy Progress Note:    Vancomycin Indication: Severe Sepsis/MRSA in respiratory  Hx; MRSA in blood on 10/21          The patient has the following labs:     Date  Creatinine (mg/dl)  BUN  WBC Count  10/29/2018  Estimated Creatinine Clearance: 67 mL/min (based on SCr of 0.9 mg/dL).  Lab Results  Component  Value  Date  BUN  35 (H)  10/29/2018     Lab Results  Component  Value  Date  WBC  8.43  10/29/2018      Tmax/24h =99.1 F  Cultures: MRSA in respiratory/Candida Albicans in respiratory (few wbc) Rare gm + cocci    Last vancomycin random : 10/29 @ 1054 = 20.8  Will give dose of 750 mg iv x 1 and order a repeat random at 18 hr abad post dose  Anticipate patient may be able to change from pulse to [id number] frequency  Random due 10/30 @ 0930    Thank you for allowing us to participate in this patient's care.     Luzma Nolan [facility]  10/29/2018 12:12 PM

## 2018-10-29 NOTE — ASSESSMENT & PLAN NOTE
Cont IV Lasix  Daily weights  Accurate I/Os  Follow up labs  10/29/2018  Start IV lasix for pleural effusion and review CT of chest  10/27 Good diuresis, bilateral atelectasis on CT- continue diuretics  10/28 continue diuresis  10/29 continue IV Lasix 40 mg twice a day.  Patient -5 L so far since admission.

## 2018-10-29 NOTE — PLAN OF CARE
Problem: Patient Care Overview  Goal: Plan of Care Review  Outcome: Ongoing (interventions implemented as appropriate)  Recommendations     Recommendation/Intervention: 1.Consider increasing TF rate to 75 ml/hr to better meet EEN (1800  kcal, 167 g protein and 1512 ml free water).  2. Check residuals Q4 hours. Hold if > 500 cc. 3. Will continue to monitor.   Goals: Meet > 85 % EEN/EPN while admitted  Nutrition Goal Status: progressing towards goal   Communication of RD Recs: Reviewed w/ NP

## 2018-10-29 NOTE — PLAN OF CARE
Patient remains vent dependent. Patient  dtr to visit patient after work.     10/29/18 1557   Discharge Reassessment   Assessment Type Discharge Planning Reassessment   Provided patient/caregiver education on the expected discharge date and the discharge plan No   Do you have any problems affording any of your prescribed medications? No   Discharge Plan A Skilled Nursing Facility;Return to nursing home   Discharge Plan B Skilled Nursing Facility;Return to Nursing Home   Patient choice form signed by patient/caregiver N/A   Anticipated Discharge Disposition SNF   Can the patient answer the patient profile reliably? No historian available;No, cognitively impaired   How does the patient rate their overall health at the present time? Fair   Describe the patient's ability to walk at the present time. Major restrictions/daily assistance from another person   How often would a person be available to care for the patient? Whenever needed   Number of comorbid conditions (as recorded on the chart) Five or more   During the past month, has the patient often been bothered by feeling down, depressed or hopeless? No   During the past month, has the patient often been bothered by little interest or pleasure in doing things? No

## 2018-10-29 NOTE — PROGRESS NOTES
[facility] Hospital Medicine  Progress Note    Patient Name: Carlie Valdez  MRN: [id number]  Patient Class: IP- Inpatient   Admission Date: 10/24/2018  Length of Stay: 5 days  Attending Physician: Philip Ceja MD  Primary Care Provider: Johanna Guzman MD        Subjective:     Principal Problem:Severe sepsis    HPI:  Carlie Valdez is a 72 y.o. female patient with PMHx of Afib, CHF, DM, GERD, neuromyelitis optica spectrum disorder, and HTN, Chronic Trach/Peg/Harris who presents to the Emergency Department for altered mental status which onset gradually last night. Patient currently resides at [facility]. Pt's pulse ox was reported to be 48% per [facility]. Pt was admitted from ED on 10/18 for sepsis and was d/c with IV Vancomycin and ciprofloxacin on 10/22. Per [facility], [facility] nursing staff suctioned a large amount of blood from pt's trach. Pt has had episodes of hemoptysis following a previous bronchoscopy on 10/19/18. Patient presented to ER hypotensive, has chronic harris on arrival, UA +. Chest Xray +pulmonary edema. Sepsis protocol initiated in ED. Lactic 2.6. Received 30 ml/kg bolus with positive response in BP. Patient received IV zosyn and vancomycin. Patient admitted to ICU for severe sepsis, acute on chronic heart failure, and acute on chronic respiratory failure with hypoxia and hypercapnia under the care of [facility] medicine.         Hospital Course:  71 y/o AAF admitted to ICU with a dx of acute respiratory failure , acute on chronic systolic chf exacerbation  And sepsis due to PNA/UTI .She was started on IV lasix  AnD IVAB .  She  Has a (+) urine cx (10/18/18)  For  Klebsiella pneumonaie and e.coli sensitive to  Cipro . She had a (+) sputum cx for MRSA (10/18/18).  The CT chest show Bilateral pleural effusions.  Near complete collapse of the right lower and left lower lobes with partial collapse of the right upper and middle lobes.  Left upper lobe  infiltrate or pneumonia versus aspiration pneumonia.    10/27-  She remains on ventilatory support . Chest x-ray showed interval increased opacification of the left lower lung.  She had thoracentesis done today .  Pleural fluid wbc -1361.  Respiratory culture -10/24- MRSA, candida    10/28-  Remains on ventilatory support .  Pleural fluid cultures are pending .   Chest x-ray showed -Stable cardiomegaly.  Tracheostomy cannula.  Bilateral pleural fluid collections.  Questionable decrease in left pleural fluid versus changes related to patient positioning.    10/29- T max 101.she remains on ventilatory support  .     Interval History:   Pt was seen and examined at bedside .  She cont on ventilation support . The ct chest show  Lung collapse , possible pn and moderate pleura effusion   10/27-  She remains weak,she had thoracentesis today .  CT scan of the chest/abdomen /pelvis- 10/25-    Moderate right and small left pleural effusion.  The right lung reveals dependent atelectasis of the right middle and lower lobes with near complete collapse of the right lower lobe.  The left lung reveals near-complete collapse of the left lower lobe with patchy infiltrate of the left upper lung zone, possibly representing a pneumonia.  Variable subcutaneous edema is present.  10/28- remains weak, on ventilatory support , repeat cultures remain negtaive     10/29-  She was seen at bedside, remains weak , ultrasound of the mediastinum showed small right pleural effusion with calculated volume of 518ml.  T max 101.  Review of Systems   Unable to perform ROS: Patient nonverbal     Objective:     Vital Signs (Most Recent):  Temp: 98 °F (36.7 °C) (10/29/18 1130)  Pulse: (!) 114 (10/29/18 1514)  Resp: 20 (10/29/18 1514)  BP: (!) 125/95 (10/29/18 1430)  SpO2: 100 % (10/29/18 1514) Vital Signs (24h Range):  Temp:  [98 °F (36.7 °C)-101.1 °F (38.4 °C)] 98 °F (36.7 °C)  Pulse:  [] 114  Resp:  [13-34] 20  SpO2:  [91 %-100 %] 100 %  BP:  ()/() 125/95     Weight: 85 kg (187 lb 6.3 oz)  Body mass index is 26.89 kg/m².    Intake/Output Summary (Last 24 hours) at 10/29/2018 1604  Last data filed at 10/29/2018 1400  Gross per 24 hour   Intake 1190 ml   Output 2408 ml   Net -1218 ml      Physical Exam   Constitutional: Vital signs are normal. She appears well-developed. She has a sickly appearance. She appears ill. No distress.   HENT:   Head: Atraumatic.   Mouth/Throat: Oropharynx is clear and moist.   Eyes: EOM and lids are normal. Pupils are equal, round, and reactive to light.   Neck: Neck supple.       Cardiovascular: Normal rate and regular rhythm.   Pulses:       Radial pulses are 1+ on the right side, and 1+ on the left side.        Dorsalis pedis pulses are 1+ on the right side, and 1+ on the left side.   Pulmonary/Chest: No accessory muscle usage. No respiratory distress. She has decreased breath sounds in the right lower field. She has rhonchi. She has rales.   Abdominal: Soft. Bowel sounds are normal. She exhibits no distension. There is no tenderness.       Genitourinary:   Genitourinary Comments: Flores in place   Musculoskeletal:   Diffuse atrophy and +1 tibal edema   Lymphadenopathy:     She has no cervical adenopathy.   Neurological: She is alert.   Awake with eyes open and nods head to questions   Skin: Skin is dry. Capillary refill takes 2 to 3 seconds. No cyanosis.        Has sacral decub ulcer   Nursing note and vitals reviewed.      Significant Labs:   Blood Culture: No results for input(s): LABBLOO in the last 48 hours.  BMP:   Recent Labs   Lab 10/29/18  0428   GLU 87   *   K 3.7      CO2 36*   BUN 35*   CREATININE 0.9   CALCIUM 9.3   MG 2.0     CBC:   Recent Labs   Lab 10/28/18  0425 10/29/18  0428   WBC 7.97 8.43   HGB 8.4* 8.8*   HCT 27.8* 29.0*   PLT 95* 124*     Magnesium:   Recent Labs   Lab 10/28/18  0426 10/29/18  0428   MG 2.0 2.0     Urine Studies: No results for input(s): COLORU, APPEARANCEUA,  PHUR, SPECGRAV, PROTEINUA, GLUCUA, KETONESU, BILIRUBINUA, OCCULTUA, NITRITE, UROBILINOGEN, LEUKOCYTESUR, RBCUA, WBCUA, BACTERIA, SQUAMEPITHEL, HYALINECASTS in the last 48 hours.    Invalid input(s): NESTOR  All pertinent labs within the past 24 hours have been reviewed.    Significant Imaging: I have reviewed and interpreted all pertinent imaging results/findings within the past 24 hours.    Assessment/Plan:      * Severe sepsis    PNE/UTI  IV Vanc and Ciprofloxacin   IVFs   Blood/urine/sputum cultures pending  Wound care consult  Monitor labs  pulm on board    10/27- related to UTI and MRSA pneumonia -on vancomycin and cipro .  Urine culture -10/18- E coli, Klebsiella   Will closely monitor vanco trough .Vanco trough is 17.     Candida infection      No need to treat asymptomatic candiduria , will monitor fever curve while on zosyn ,  Fluconazole can interact with amiodarone -QTC prolongation       Pleural effusion    10/27- s/p thoracentesis -will follow cultures to guide therapy .   Pulmonology follow up .  Post procedure chest x-ray reviewed - no evidence of pneumothorax.        Pressure injury of right ankle, stage 3    Cont wound care       10/28- supportive care,wound care .     Pressure injury of left ischium, stage 3    Cont wound care        Incontinence associated dermatitis    Cont wound care        Thrombocytopenia    -cont monitor   -PLT > 50 k   - most likely due to sepsis     10/27- will monitor closely ,might need transfusion with bleeding , platelet is now 71.    10/28- platelet is 95 today, will continue to monitor closely.   10/29- serum platelet -124 , no evidence of bleeding noted at this time          Acute on chronic respiratory failure with hypoxia and hypercapnia    Pulmonology consult for critical care and vent management   Trach, vent dependent   CT chest shoe b/l lower lung collapse , moderate pleura effusion  And pulmonary infiltrate   Duonebs tx       10/27- will continue close  critical care follow up , ventilatory care and support .  Might need bronchoscopy, will monitor closely post bronchoscopy      10/28-critical care follow up,continue ventilatory support .     10/29- will continue ventilatory support, critical care follow up , supportive care .  Will discuss with pulmonology about discharge options     Pressure injury of coccygeal region, unstageable    Wound care consult     10/27- will follow wound care , supportive care ,  Continue nursing care      10/28- continue wound care     10/29 will continue to follow wound care , zosyn was added to IV vancomycin, follow cultures     MRSA bacteremia    Repeat Blood cx pending   IV vanco and cipro   10/27- Blood culture-10/22- negative  Day 4/14 of therapy for MRSA bacteremia.  Cardiac echo done 10/22- no evidence of endocarditis       10/28- will continue vancomycin - will complete total of 14 days from negative blood cultures .  Day 5/14.   10/29 -will continue vancomycin -day 6/14 vanco trough -is 20.8     Urinary tract infection associated with indwelling urethral catheter    - Urine culture pending  - Will continue IV Ciprofloxacin      10/27 treated with cipro s/p therapy with zosyn during previous Hospital admit this month     10/28- will stop cipro., will monitor closely .  10/29 no need to treat candiduria -will plan to change harris      Deep tissue injury    Wound care consult        Hypothyroidism    Resume levothyroxine     10/28- continue levothyroxine     Acute on chronic systolic congestive heart failure w/ pulm edema    - CXR with vascular congestion with small bilateral effusion  - cont IV lasix   - 2D ECHO on 10/22/18 showed (EF 40-45%). DD, Mild AR, Mild to moderate MR, Moderate to severe TR, and    Pulmonary HTN.   - Strict I&Os  - Daily weights  reviewed     10/27- will continue diuresis with furosemide .Closely monitor renal panel  10/28- will continue lasix at 40mg bid         Type 2 diabetes mellitus with kidney  complication, without long-term current use of insulin    Accuchecks   SSI   Ha1c 4.8 on 10/19/18    10/27- will continue insulin sliding scale ,closely monitor glucose level    10/28- will continue insulin sliding scale .well controlled     Uncomplicated severe persistent asthma    Cont breathing tx  reviewed       10/27- will continue duonebs as tolerated , pulmonology follow up      Chronic kidney disease (CKD), stage III (moderate)    Stable   Monitor renal function     10/28- serum creatinine is now 0.8     Elevated troponin    Likely due to demand ischemia  Initial troponin 0.054  Serial troponin        Chronic atrial fibrillation    Heart rate controlled   Resume Xarelto . No sing of acute bleeding at this time   Cont  Amiodarone     10/27- will continue amiodarone/xarelto , will continue close monitoring     10/28- continue amiodarone/xarelto ,  10/29 -rate better controlled, will continue amiodarone, xarelto       VTE Risk Mitigation (From admission, onward)        Ordered     rivaroxaban tablet 15 mg  Nightly      10/26/18 1324     Place sequential compression device  Until discontinued      10/24/18 1141          Critical care time spent on the evaluation and treatment of severe organ dysfunction, review of pertinent labs and imaging studies, discussions with consulting providers and discussions with patient/family:  45 minutes.    Philip Ceja MD  Department of Hospital Medicine   [facility]

## 2018-10-29 NOTE — SUBJECTIVE & OBJECTIVE
Interval History:   Pt was seen and examined at bedside .  She cont on ventilation support . The ct chest show  Lung collapse , possible pn and moderate pleura effusion   10/27-  She remains weak,she had thoracentesis today .  CT scan of the chest/abdomen /pelvis- 10/25-    Moderate right and small left pleural effusion.  The right lung reveals dependent atelectasis of the right middle and lower lobes with near complete collapse of the right lower lobe.  The left lung reveals near-complete collapse of the left lower lobe with patchy infiltrate of the left upper lung zone, possibly representing a pneumonia.  Variable subcutaneous edema is present.  10/28- remains weak, on ventilatory support , repeat cultures remain negtaive     10/29-  She was seen at bedside, remains weak , ultrasound of the mediastinum showed small right pleural effusion with calculated volume of 518ml.  T max 101.  Review of Systems   Unable to perform ROS: Patient nonverbal     Objective:     Vital Signs (Most Recent):  Temp: 98 °F (36.7 °C) (10/29/18 1130)  Pulse: (!) 114 (10/29/18 1514)  Resp: 20 (10/29/18 1514)  BP: (!) 125/95 (10/29/18 1430)  SpO2: 100 % (10/29/18 1514) Vital Signs (24h Range):  Temp:  [98 °F (36.7 °C)-101.1 °F (38.4 °C)] 98 °F (36.7 °C)  Pulse:  [] 114  Resp:  [13-34] 20  SpO2:  [91 %-100 %] 100 %  BP: ()/() 125/95     Weight: 85 kg (187 lb 6.3 oz)  Body mass index is 26.89 kg/m².    Intake/Output Summary (Last 24 hours) at 10/29/2018 1604  Last data filed at 10/29/2018 1400  Gross per 24 hour   Intake 1190 ml   Output 2408 ml   Net -1218 ml      Physical Exam   Constitutional: Vital signs are normal. She appears well-developed. She has a sickly appearance. She appears ill. No distress.   HENT:   Head: Atraumatic.   Mouth/Throat: Oropharynx is clear and moist.   Eyes: EOM and lids are normal. Pupils are equal, round, and reactive to light.   Neck: Neck supple.       Cardiovascular: Normal rate and regular  rhythm.   Pulses:       Radial pulses are 1+ on the right side, and 1+ on the left side.        Dorsalis pedis pulses are 1+ on the right side, and 1+ on the left side.   Pulmonary/Chest: No accessory muscle usage. No respiratory distress. She has decreased breath sounds in the right lower field. She has rhonchi. She has rales.   Abdominal: Soft. Bowel sounds are normal. She exhibits no distension. There is no tenderness.       Genitourinary:   Genitourinary Comments: Flores in place   Musculoskeletal:   Diffuse atrophy and +1 tibal edema   Lymphadenopathy:     She has no cervical adenopathy.   Neurological: She is alert.   Awake with eyes open and nods head to questions   Skin: Skin is dry. Capillary refill takes 2 to 3 seconds. No cyanosis.        Has sacral decub ulcer   Nursing note and vitals reviewed.      Significant Labs:   Blood Culture: No results for input(s): LABBLOO in the last 48 hours.  BMP:   Recent Labs   Lab 10/29/18  0428   GLU 87   *   K 3.7      CO2 36*   BUN 35*   CREATININE 0.9   CALCIUM 9.3   MG 2.0     CBC:   Recent Labs   Lab 10/28/18  0425 10/29/18  0428   WBC 7.97 8.43   HGB 8.4* 8.8*   HCT 27.8* 29.0*   PLT 95* 124*     Magnesium:   Recent Labs   Lab 10/28/18  0426 10/29/18  0428   MG 2.0 2.0     Urine Studies: No results for input(s): COLORU, APPEARANCEUA, PHUR, SPECGRAV, PROTEINUA, GLUCUA, KETONESU, BILIRUBINUA, OCCULTUA, NITRITE, UROBILINOGEN, LEUKOCYTESUR, RBCUA, WBCUA, BACTERIA, SQUAMEPITHEL, HYALINECASTS in the last 48 hours.    Invalid input(s): WRIGHTSUR  All pertinent labs within the past 24 hours have been reviewed.    Significant Imaging: I have reviewed and interpreted all pertinent imaging results/findings within the past 24 hours.

## 2018-10-30 NOTE — ASSESSMENT & PLAN NOTE
.Cont full vent support  Vent settings reviewed and adjusted  VAP prophylaxis  Wean PEEP and FiO2 slowly  10/25CHeck CT of chest for pleural effusion   10/26 Continue support and diuresis  10/27 Probable thoracentesis on the right today - need consent  10/28 Taper FiO2 as tolerated  10/29 continue diuresis.  Wean O2.  Daily chest x-ray and ABG.  Vent dependent.  10/30 Diamox 500 mg once. Off lasix. Trach care.

## 2018-10-30 NOTE — PROGRESS NOTES
Clinical Pharmacy Progress Note: Vancomycin Dosing     Vancomycin Day #2    Estimated Creatinine Clearance: 60.3 mL/min (based on SCr of 1 mg/dL).   SCr trend: (increased x 1 day)   Lab Results   Component Value Date    WBC 11.34 10/30/2018   WBC trend: (increased x 1 day)         Tmax/24h: 98.6   Level:   Vancomycin, random [[id number]] Collected: 10/30/18 0406   Specimen: Blood Updated: 10/30/18 0811    Vancomycin, Random 25.7 ug/mL      Goal trough: 15-20 mcg/mL   Indication: Severe Sepsis/MRSA in respiratory Hx; MRSA in blood on 10/21  Cultures:   Respiratory on 10/24- MRSA. Vanc KYUNG of 2. Kyara albicans.   Urine on 10/24: Candida tropicalis   Urine on 10/18: Klebsiella, E coli   Plan: Random level reflects ~ 12 hours post-dose. Will order 24 hour random level 10/30/2018 @ 1600.   Pharmacy will continue current vancomycin pulse dose approach. Will redose when patient random level < 18 mcg/mL.    Please note: A vancomycin 1000 mg IV every 48 hours placeholder dose only has been entered.    Next level due:  Vancomycin random level due 10/30/2018 @ 1600.     Thank you for allowing us to participate in this patient's care.    Holly Baker, PharmD 10/30/2018 11:24 AM

## 2018-10-30 NOTE — NURSING
eICU contacted for pt's drowsiness. Hard to arouse and unable to keep awake. Pt not communicating, which is out of normal. Daughter at bedside concerned. Awaiting orders.

## 2018-10-30 NOTE — SUBJECTIVE & OBJECTIVE
Interval History:   Review of Systems   Unable to perform ROS: Other (Trach)       Objective:     Vital Signs (Most Recent):  Temp: 97.9 °F (36.6 °C) (10/30/18 0700)  Pulse: 104 (10/30/18 1000)  Resp: (!) 0 (10/30/18 1000)  BP: (!) 130/90 (10/30/18 0900)  SpO2: 100 % (10/30/18 1000) Vital Signs (24h Range):  Temp:  [97.9 °F (36.6 °C)-98.6 °F (37 °C)] 97.9 °F (36.6 °C)  Pulse:  [] 104  Resp:  [0-30] 0  SpO2:  [42 %-100 %] 100 %  BP: ()/() 130/90     Weight: 85 kg (187 lb 6.3 oz)  Body mass index is 26.89 kg/m².      Intake/Output Summary (Last 24 hours) at 10/30/2018 1005  Last data filed at 10/30/2018 1000  Gross per 24 hour   Intake 2040 ml   Output 1603 ml   Net 437 ml       Physical Exam   Constitutional:   Ill appearing     HENT:   Head: Normocephalic and atraumatic.   Neck: Neck supple.   Trach in place   Cardiovascular:   AFib    Pulmonary/Chest: Effort normal. No respiratory distress.   Bilateral rhonchi   Abdominal: Soft. There is no tenderness.   Peg in place   Genitourinary:   Genitourinary Comments: Flores in place   Musculoskeletal: She exhibits edema.   Upper extremity swelling, left upper extremity PICC line, contracted in 4 extremities   Neurological: She is alert.   Skin: Skin is warm.       Vents:  Vent Mode: A/C (10/30/18 0743)  Ventilator Initiated: Yes (10/24/18 0630)  Set Rate: 20 bmp (10/30/18 0743)  Vt Set: 0 mL (10/30/18 0743)  Pressure Support: 0 cmH20 (10/30/18 0743)  PEEP/CPAP: 10 cmH20 (10/30/18 0743)  Oxygen Concentration (%): 75 (10/30/18 1000)  Peak Airway Pressure: 35 cmH2O (10/30/18 0743)  Plateau Pressure: 0 cmH20 (10/30/18 0743)  Total Ve: 15 mL (10/30/18 0743)  F/VT Ratio<105 (RSBI): (!) 26.6 (10/30/18 0743)    Lines/Drains/Airways     Drain                 Gastrostomy/Enterostomy 10/19/18 Percutaneous endoscopic gastrostomy (PEG) LUQ 11 days         Urethral Catheter 10/24/18 1030 16 Fr. 5 days          Airway                 Surgical Airway 07/18/18 1620 Nikita  103 days          Pressure Ulcer                 Pressure Injury 10/24/18 Coccyx Unstageable 6 days         Pressure Injury 10/24/18 1100 Right medial Malleolus Stage 3 5 days         Pressure Injury 10/24/18 1105 Left Ischial tuberosity Stage 3 5 days          Peripheral Intravenous Line                 Midline Catheter Insertion/Assessment  - Double Lumen 10/19/18 0130 Left brachial vein 11 days                Significant Labs:    CBC/Anemia Profile:  Recent Labs   Lab 10/29/18  0428 10/30/18  0417   WBC 8.43 11.34   HGB 8.8* 8.4*   HCT 29.0* 26.7*   * 155   MCV 88 87   RDW 18.3* 18.4*        Chemistries:  Recent Labs   Lab 10/29/18  0427 10/29/18  0428 10/30/18  0417   NA  --  146* 145   K  --  3.7 3.1*   CL  --  101 100   CO2  --  36* 34*   BUN  --  35* 37*   CREATININE  --  0.9 1.0   CALCIUM  --  9.3 8.9   MG  --  2.0 2.0   PHOS 2.6*  --  2.5*       Significant Imaging:  CXR: I have reviewed all pertinent results/findings within the past 24 hours and my personal findings are:  trach, worsening infiltrates Lt > Rt   U/S: I have reviewed all pertinent results/findings within the past 24 hours and my personal findings are:  small right side effusion, minimal left.

## 2018-10-30 NOTE — PROGRESS NOTES
No changes in assessment. Cont to turn q 2hrs and heel boots on. Tube feeding tolerated. Vent settings continued. Safety maintained.

## 2018-10-30 NOTE — NURSING
Dr. Garcia notified of CXR, EKG, and ABGs. Pt's vitals stabilizing. Will continue to monitor closely.

## 2018-10-30 NOTE — ASSESSMENT & PLAN NOTE
Accuchecks   SSI   Ha1c 4.8 on 10/19/18    10/27- will continue insulin sliding scale ,closely monitor glucose level    10/28- will continue insulin sliding scale .well controlled    10/30- controlled, will continue insulin sliding scale

## 2018-10-30 NOTE — ASSESSMENT & PLAN NOTE
Repeat Blood cx pending   IV vanco and cipro   10/27- Blood culture-10/22- negative  Day 4/14 of therapy for MRSA bacteremia.  Cardiac echo done 10/22- no evidence of endocarditis       10/28- will continue vancomycin - will complete total of 14 days from negative blood cultures .  Day 5/14.   10/29 -will continue vancomycin -day 6/14 vanco trough -is 20.8.  10/30- will continue to closely monitor vanco trough , day 7/14

## 2018-10-30 NOTE — PROGRESS NOTES
[facility]  Critical Care Medicine  Progress Note    Patient Name: Carlie Valdez  MRN: [id number]  Admission Date: 10/24/2018  Hospital Length of Stay: 6 days  Code Status: Full Code  Attending Provider: Philip Ceja MD  Primary Care Provider: Johanna Guzman MD   Principal Problem: Severe sepsis    Subjective:     HPI:       Ms Valdez is a 72 year old female NH resident at [facility] with Trach/PEG vent dependent and bed bound post NMO diagnosis earlier this year.  She also has a past medical history of Anticoagulant long-term use, Arthritis, Asthma, Atrial fibrillation, Blood clot of vein in shoulder area, Cardiac defibrillator in place, CHF (congestive heart failure), Chronic knee pain, Diabetes mellitus type 2 without retinopathy (12/19/2016), Diaphragmatic hernia without obstruction and without gangrene (9/14/2015), GERD (gastroesophageal reflux disease), Hypertension, Immune deficiency disorder, and Primary open angle glaucoma (POAG) of both eyes, severe stage (6/1/2018).  She was also recently hospitalized here at [facility] in ICU 10/19/18 admitted and discharged 2 days ago on 10/22/2018 for Acute on chronic hypoxic resp failure with total left lung opacification as well as severe sepsis with MRSA PNA and bacteremia as well as E.Coli and Klebsiella UTI.  She received a bronch here and left lung cleared with suctioning, IVAB and nebs.  She had some endobronchial bleeding with clots also.  She was discharged to NH on 10/22 on Cipro via PEG and IV Vanc.  She returned back to [facility] ED early this AM due to decreased LOC at NH, multiple episodes of diarrhea, per [facility] nursing home suctioned a large amount of blood from trach.  In ED temp 89.9 F and suctioned blood clots from Trach.  Plt count 38.  IVAB resumed and given IV Lasix        Hospital/ICU Course:  10/24 - Admitted to ICU on mech vent and warming via artic sun.  She is awake and responsive not requiring pressor  support  10/25/2018 Chart reviewed . Bronchoscopy last admission for mucous pluggine on the left. pleural effusion on Chest X Ray on the right. history of Congestive Heart failure:Moderate left ventricular systolic dysfunction with a visually estimated ejection fraction of 40%.  Grade 3 diastolic dysfunction consistent with restrictive physiology with elevated left atrial pressure.  Severe tricuspid regurgitation.  Moderate pulmonary hypertension with an estimated pulmonary artery systolic pressure 55 mmHg.  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   10/26/2018  CT of chest with bilateral effusions and atelectasis with right lower lobe and left lower lobe collapse  10/28 Little change, mild diuresis continues  10/29 seen and examined.  Vent dependent.  T-max 101° yesterday.  On enteral feeding via PEG.  10/30 seen and examined. Tmax 98.6 last 24 hrs. On higher FiO2 today 70%. Had an episode of hypoxemia, bradycardia earlier sp suctioning. CXR , ABG reviewed. On enteral feeding.     Interval History:   Review of Systems   Unable to perform ROS: Other (Trach)       Objective:     Vital Signs (Most Recent):  Temp: 97.9 °F (36.6 °C) (10/30/18 0700)  Pulse: 104 (10/30/18 1000)  Resp: (!) 0 (10/30/18 1000)  BP: (!) 130/90 (10/30/18 0900)  SpO2: 100 % (10/30/18 1000) Vital Signs (24h Range):  Temp:  [97.9 °F (36.6 °C)-98.6 °F (37 °C)] 97.9 °F (36.6 °C)  Pulse:  [] 104  Resp:  [0-30] 0  SpO2:  [42 %-100 %] 100 %  BP: ()/() 130/90     Weight: 85 kg (187 lb 6.3 oz)  Body mass index is 26.89 kg/m².      Intake/Output Summary (Last 24 hours) at 10/30/2018 1005  Last data filed at 10/30/2018 1000  Gross per 24 hour   Intake 2040 ml   Output 1603 ml   Net 437 ml       Physical Exam   Constitutional:   Ill appearing     HENT:   Head: Normocephalic and atraumatic.   Neck: Neck supple.   Trach in place    Cardiovascular:   AFib    Pulmonary/Chest: Effort normal. No respiratory distress.   Bilateral rhonchi   Abdominal: Soft. There is no tenderness.   Peg in place   Genitourinary:   Genitourinary Comments: Flores in place   Musculoskeletal: She exhibits edema.   Upper extremity swelling, left upper extremity PICC line, contracted in 4 extremities   Neurological: She is alert.   Skin: Skin is warm.       Vents:  Vent Mode: A/C (10/30/18 0743)  Ventilator Initiated: Yes (10/24/18 0630)  Set Rate: 20 bmp (10/30/18 0743)  Vt Set: 0 mL (10/30/18 0743)  Pressure Support: 0 cmH20 (10/30/18 0743)  PEEP/CPAP: 10 cmH20 (10/30/18 0743)  Oxygen Concentration (%): 75 (10/30/18 1000)  Peak Airway Pressure: 35 cmH2O (10/30/18 0743)  Plateau Pressure: 0 cmH20 (10/30/18 0743)  Total Ve: 15 mL (10/30/18 0743)  F/VT Ratio<105 (RSBI): (!) 26.6 (10/30/18 0743)    Lines/Drains/Airways     Drain                 Gastrostomy/Enterostomy 10/19/18 Percutaneous endoscopic gastrostomy (PEG) LUQ 11 days         Urethral Catheter 10/24/18 1030 16 Fr. 5 days          Airway                 Surgical Airway 07/18/18 1620 Shiley 103 days          Pressure Ulcer                 Pressure Injury 10/24/18 Coccyx Unstageable 6 days         Pressure Injury 10/24/18 1100 Right medial Malleolus Stage 3 5 days         Pressure Injury 10/24/18 1105 Left Ischial tuberosity Stage 3 5 days          Peripheral Intravenous Line                 Midline Catheter Insertion/Assessment  - Double Lumen 10/19/18 0130 Left brachial vein 11 days                Significant Labs:    CBC/Anemia Profile:  Recent Labs   Lab 10/29/18  0428 10/30/18  0417   WBC 8.43 11.34   HGB 8.8* 8.4*   HCT 29.0* 26.7*   * 155   MCV 88 87   RDW 18.3* 18.4*        Chemistries:  Recent Labs   Lab 10/29/18  0427 10/29/18  0428 10/30/18  0417   NA  --  146* 145   K  --  3.7 3.1*   CL  --  101 100   CO2  --  36* 34*   BUN  --  35* 37*   CREATININE  --  0.9 1.0   CALCIUM  --  9.3 8.9   MG  --   2.0 2.0   PHOS 2.6*  --  2.5*       Significant Imaging:  CXR: I have reviewed all pertinent results/findings within the past 24 hours and my personal findings are:  trach, worsening infiltrates Lt > Rt   U/S: I have reviewed all pertinent results/findings within the past 24 hours and my personal findings are:  small right side effusion, minimal left.       Assessment/Plan:     Pulmonary   Pleural effusion    10/28 review cultures  10/29 ultrasound of the chest bilateral today.  Status post thoracentesis on the right 40 cc of cloudy fluid removed.  Cultures pending.  As per WBC count and glucose level, effusion does not appear complicated.  10/30 pleural fluid cx afb negative, fungal pending . No need for thoracentesis.      Acute on chronic respiratory failure with hypoxia and hypercapnia      .Cont full vent support  Vent settings reviewed and adjusted  VAP prophylaxis  Wean PEEP and FiO2 slowly  10/25CHeck CT of chest for pleural effusion   10/26 Continue support and diuresis  10/27 Probable thoracentesis on the right today - need consent  10/28 Taper FiO2 as tolerated  10/29 continue diuresis.  Wean O2.  Daily chest x-ray and ABG.  Vent dependent.  10/30 Diamox 500 mg once. Off lasix. Trach care.          Uncomplicated severe persistent asthma    Cont Duonebs  Add Formeterol and Budesonide     Pneumonia due to infectious organism    10/30 Likely MRSA PNA. On IV vancomycin      Cardiac/Vascular   Acute on chronic systolic congestive heart failure w/ pulm edema    Cont IV Lasix  Daily weights  Accurate I/Os  Follow up labs  10/30/2018  Start IV lasix for pleural effusion and review CT of chest  10/27 Good diuresis, bilateral atelectasis on CT- continue diuretics  10/28 continue diuresis  10/29 continue IV Lasix 40 mg twice a day.  Patient -5 L so far since admission.  10/30 off lasix, metabolic alkalosis . Diamox 500 mg once today.        Chronic atrial fibrillation    NSR on Amiodarone infusion  10/29 continue  amiodarone.  Continue anticoagulation with rivaroxaban.  10/30 same      Renal/   Urinary tract infection associated with indwelling urethral catheter    Cont Cipro  Repeat cultures pending  10/29/2018 awaiting cultures to review  10/30 E Coli + Klebsiella on IV Zosyn        Chronic kidney disease (CKD), stage III (moderate)    Creatine 0.7  Monitor I/Os and creatine with diuresis     ID   * Severe sepsis    Cont IVAB from discharge  Normal WBC  Warm to normothermia  Cont supportive care  No IVFs given pulm edema  10/25/2018  cultures pending, stable blood pressure     Candida infection    10/29 Candida present in sputum and in urine.  Patient afebrile.  Start IV fluconazole if okay with Infectious Disease.     MRSA bacteremia    Cont Vanc, pharmacy to dose  Repeat cultures pending  10/30 repeat bld cx negative. On IV Vancomycin.      Hematology   Thrombocytopenia    No bleeding other than Endobronchial  No anticoagulants or antiplatelets for now  Follow CBC     Endocrine   Hypothyroidism    Cont Levothyroxine     Type 2 diabetes mellitus with kidney complication, without long-term current use of insulin    Cont SSI  Glucose stable  Monitor for insulin toxicity     Other   Pressure injury of coccygeal region, unstageable    Wound care following  Low pressure bed  Turn Q 2 hours  10/25/2018 Evaluated by surgery. CT of pelvis ordered for depth of decubitis          Critical Care Daily Checklist:    A: Awake: RASS Goal/Actual Goal:    Actual: Guerrero Agitation Sedation Scale (RASS): Alert and calm   B: Spontaneous Breathing Trial Performed? Spon. Breathing Trial Initiated?: Not initiated (10/25/18 1320)   C: SAT & SBT Coordinated?  NA                      D: Delirium: CAM-ICU Overall CAM-ICU: Positive   E: Early Mobility Performed? Yes. BEDREST    F: Feeding Goal: Goals: Meet > 85 % EEN/EPN while admitted  Status: Nutrition Goal Status: new   Current Diet Order   No orders of the defined types were placed in this  encounter.      AS: Analgesia/Sedation Avoid oversedation. Not on continuous sedation.    T: Thromboembolic Prophylaxis Xarelto po   H: HOB > 300 Yes   U: Stress Ulcer Prophylaxis (if needed) Famotidine    G: Glucose Control Y    B: Bowel Function Stool Occurrence: 1   I: Indwelling Catheter (Lines & Harris) Necessity Keep harris    D: De-escalation of Antimicrobials/Pharmacotherapies Cont Zosyn , Vancomycin . Discussed with ID , will not start fluconazole    Plan for the day/ETD Y    Code Status:  Family/Goals of Care: Full Code  Y   Poor prognosis   Critical Care Time: 40 minutes  Critical secondary to Patient has a condition that poses threat to life and bodily function: Severe hypoxemic resp failure.      Critical care was time spent personally by me on the following activities: development of treatment plan with patient or surrogate and bedside caregivers, discussions with consultants, evaluation of patient's response to treatment, examination of patient, ordering and performing treatments and interventions, ordering and review of laboratory studies, ordering and review of radiographic studies, pulse oximetry, re-evaluation of patient's condition. This critical care time did not overlap with that of any other provider or involve time for any procedures.     John White MD  Critical Care Medicine  [facility]

## 2018-10-30 NOTE — ASSESSMENT & PLAN NOTE
-cont monitor   -PLT > 50 k   - most likely due to sepsis     10/27- will monitor closely ,might need transfusion with bleeding , platelet is now 71.    10/28- platelet is 95 today, will continue to monitor closely.   10/29- serum platelet -124 , no evidence of bleeding noted at this time   10/30- will continue to monitor,improving ,platelet is now 155

## 2018-10-30 NOTE — ASSESSMENT & PLAN NOTE
Cont Vanc, pharmacy to dose  Repeat cultures pending  10/30 repeat bld cx negative. On IV Vancomycin.

## 2018-10-30 NOTE — ASSESSMENT & PLAN NOTE
10/28 review cultures  10/29 ultrasound of the chest bilateral today.  Status post thoracentesis on the right 40 cc of cloudy fluid removed.  Cultures pending.  As per WBC count and glucose level, effusion does not appear complicated.  10/30 pleural fluid cx afb negative, fungal pending . No need for thoracentesis.

## 2018-10-30 NOTE — EICU
eLert for hypoxemia    Patient received a total of 500 cc LR with improvement in HR and BP for several hours with some improvement as well in mental status although not still at baseline.    Patient however became acutely hypoxemic with transient bradycardia.  Per report was able to suction out thick whitish secretion per trach and patient's oxygenation improved.  Telemetry with notable rhythm change on telemetry with transient hypertension as patient appears agitated.    · Ordered ABG 7.61/34/88 on 55% FiO2  · Ordered CXR opacity on left lower lobe could represent atelectasis, effusion, infiltrate  · Ordered 12 L ECG old left bundle branch block    Patient calmer now but O2 requirement slightly higher  · Continue suction prn  · Will continue to monitor

## 2018-10-30 NOTE — ASSESSMENT & PLAN NOTE
Cont Cipro  Repeat cultures pending  10/29/2018 awaiting cultures to review  10/30 E Coli + Klebsiella on IV Zosyn

## 2018-10-30 NOTE — ASSESSMENT & PLAN NOTE
Cont IV Lasix  Daily weights  Accurate I/Os  Follow up labs  10/30/2018  Start IV lasix for pleural effusion and review CT of chest  10/27 Good diuresis, bilateral atelectasis on CT- continue diuretics  10/28 continue diuresis  10/29 continue IV Lasix 40 mg twice a day.  Patient -5 L so far since admission.  10/30 off lasix, metabolic alkalosis . Diamox 500 mg once today.

## 2018-10-30 NOTE — ASSESSMENT & PLAN NOTE
NSR on Amiodarone infusion  10/29 continue amiodarone.  Continue anticoagulation with rivaroxaban.  10/30 same

## 2018-10-30 NOTE — PLAN OF CARE
Problem: Patient Care Overview  Goal: Plan of Care Review  Outcome: Ongoing (interventions implemented as appropriate)  Pt drowsy throughout the night with hypotension and tachycardia. 500cc LR given with improvement in vitals but not alertness. One epidose of SpO2 and HR decrease due to mucous plugs. Pt quickly became stable once deep suctioned. Pt now awake and confused. Pt tolerating ventilator. Copious thick, tan secretions. VSS. Afebrile. UO adequate. Tolerating TF. Liquid BM X2. CBGs WNL. K+ 3.1. eICU notified, no new orders noted. Will pass on to day shift. but will Wound care performed per orders. Pt turned Q2H with pressure points protected if vitals allowed. POC reviewed with pt and family. All questions and concerns addressed.

## 2018-10-30 NOTE — PLAN OF CARE
Problem: Patient Care Overview  Goal: Plan of Care Review  Outcome: Ongoing (interventions implemented as appropriate)  Vitals signs closely monitored. Fall precautions in place. Patient turned every two hours. Pain assessed every two hours. Safety promoted. Infection risks reduced. HR in the 120s today. Pt was agitated. HR decreased when pt was calm. EKG was obtained. US done 500 mL pleural effusion to R lung. O2 dropped once,to the 70's pt required  suctioning but returned to baseline quickly

## 2018-10-30 NOTE — NURSING
Pt's SpO2 decreased to 60%. She was not pulling volumes on the vent and she became unresponsive. Pt was bagged with 100% oxygen with no increase in SpO2. SpO2 decreased to 30% and HR decreased to 50s. Pt was then deep suctioned and 2 large mucous plugs were removed along with copious thick, tan secretions. Pt's SpO2 now at 100% and pt responding to verbal commands. HR has returned to normal. Dr. Garcia screened in for occurrence.

## 2018-10-30 NOTE — SUBJECTIVE & OBJECTIVE
Interval History:   Pt was seen and examined at bedside .  She cont on ventilation support . The ct chest show  Lung collapse , possible pn and moderate pleura effusion   10/27-  She remains weak,she had thoracentesis today .  CT scan of the chest/abdomen /pelvis- 10/25-    Moderate right and small left pleural effusion.  The right lung reveals dependent atelectasis of the right middle and lower lobes with near complete collapse of the right lower lobe.  The left lung reveals near-complete collapse of the left lower lobe with patchy infiltrate of the left upper lung zone, possibly representing a pneumonia.  Variable subcutaneous edema is present.  10/28- remains weak, on ventilatory support , repeat cultures remain negtaive     10/29-  She was seen at bedside, remains weak , ultrasound of the mediastinum showed small right pleural effusion with calculated volume of 518ml.  T max 101.  10/30- chest x-ray showed marked amount of haziness throughout the left hemithorax. There is a moderate amount of haziness throughout the lower half of the right hemithorax.  This represents an interval worsening of the appearance of the lungs and is worrisome for pneumonia.    Fever curve has improved.  .  Review of Systems   Unable to perform ROS: Patient nonverbal     Objective:     Vital Signs (Most Recent):  Temp: 98 °F (36.7 °C) (10/30/18 1200)  Pulse: 102 (10/30/18 1630)  Resp: 20 (10/30/18 1630)  BP: (!) 153/109 (10/30/18 1600)  SpO2: 97 % (10/30/18 1630) Vital Signs (24h Range):  Temp:  [97.9 °F (36.6 °C)-98.6 °F (37 °C)] 98 °F (36.7 °C)  Pulse:  [] 102  Resp:  [0-30] 20  SpO2:  [42 %-100 %] 97 %  BP: ()/() 153/109     Weight: 85 kg (187 lb 6.3 oz)  Body mass index is 26.89 kg/m².    Intake/Output Summary (Last 24 hours) at 10/30/2018 1653  Last data filed at 10/30/2018 1400  Gross per 24 hour   Intake 1645 ml   Output 1418 ml   Net 227 ml      Physical Exam   Constitutional: Vital signs are normal. She  appears well-developed. She has a sickly appearance. She appears ill. No distress.   HENT:   Head: Atraumatic.   Mouth/Throat: Oropharynx is clear and moist.   Eyes: EOM and lids are normal. Pupils are equal, round, and reactive to light.   Neck: Neck supple.       Cardiovascular: Normal rate and regular rhythm.   Pulses:       Radial pulses are 1+ on the right side, and 1+ on the left side.        Dorsalis pedis pulses are 1+ on the right side, and 1+ on the left side.   Pulmonary/Chest: No accessory muscle usage. No respiratory distress. She has decreased breath sounds in the right lower field. She has rhonchi. She has rales.   Abdominal: Soft. Bowel sounds are normal. She exhibits no distension. There is no tenderness.       Genitourinary:   Genitourinary Comments: Flores in place   Musculoskeletal:   Diffuse atrophy and +1 tibal edema   Lymphadenopathy:     She has no cervical adenopathy.   Neurological: She is alert.   Awake with eyes open and nods head to questions   Skin: Skin is dry. Capillary refill takes 2 to 3 seconds. No cyanosis.        Has sacral decub ulcer   Nursing note and vitals reviewed.      Significant Labs:   Blood Culture: No results for input(s): LABBLOO in the last 48 hours.  BMP:   Recent Labs   Lab 10/30/18  0417   *      K 3.1*      CO2 34*   BUN 37*   CREATININE 1.0   CALCIUM 8.9   MG 2.0     CBC:   Recent Labs   Lab 10/29/18  0428 10/30/18  0417   WBC 8.43 11.34   HGB 8.8* 8.4*   HCT 29.0* 26.7*   * 155     Magnesium:   Recent Labs   Lab 10/29/18  0428 10/30/18  0417   MG 2.0 2.0     Urine Studies: No results for input(s): COLORU, APPEARANCEUA, PHUR, SPECGRAV, PROTEINUA, GLUCUA, KETONESU, BILIRUBINUA, OCCULTUA, NITRITE, UROBILINOGEN, LEUKOCYTESUR, RBCUA, WBCUA, BACTERIA, SQUAMEPITHEL, HYALINECASTS in the last 48 hours.    Invalid input(s): WRIGHTSUR  All pertinent labs within the past 24 hours have been reviewed.    Significant Imaging: I have reviewed and  interpreted all pertinent imaging results/findings within the past 24 hours.

## 2018-10-30 NOTE — EICU
Notified of increased lethargy    71 y/o F chronic trach and PEG, HFrEF EF 40%, grade 3 diastolic dysfucntion, chronic afib, presented from nursing home with pneumonia and UTI.    Camera assessment:  Patient seen asleep, not in acute distress    Telemetry:  BP 71/41   afib    Data:  Resp culture MRSA, candida albicans few  Urine culture >100,000 Candida tropicalis, UA WBC 23, sq epith 48  Pleural fluid culture: in process  Pleural fluid analysis: cloudy, WBC 1391, TP 2.3 (serum TP 5.6 3 days prior)  Na 146, BUN 35, creatinine 0.9, CO2 36  WBC 8.43, H/H 8.8/29    · Patient more lethargic per report, fluid negative 5 liters since admit.  Ordered to hold furosemide for now and give trial of 250 bolus of LRS as patient's BUN and Na elevated which could be a sign of volume depletion especially in the light of tachycardia and hypotension.  Increase free water flushes per PEG.  · If persistently hypotensive and tachycardic, consider antifungals

## 2018-10-30 NOTE — PROGRESS NOTES
[facility] Hospital Medicine  Progress Note    Patient Name: Carlie Valdez  MRN: [id number]  Patient Class: IP- Inpatient   Admission Date: 10/24/2018  Length of Stay: 6 days  Attending Physician: Philip Ceja MD  Primary Care Provider: Johanna Guzman MD        Subjective:     Principal Problem:Severe sepsis    HPI:  Carlie Valdez is a 72 y.o. female patient with PMHx of Afib, CHF, DM, GERD, neuromyelitis optica spectrum disorder, and HTN, Chronic Trach/Peg/Harris who presents to the Emergency Department for altered mental status which onset gradually last night. Patient currently resides at [facility]. Pt's pulse ox was reported to be 48% per [facility]. Pt was admitted from ED on 10/18 for sepsis and was d/c with IV Vancomycin and ciprofloxacin on 10/22. Per [facility], [facility] nursing staff suctioned a large amount of blood from pt's trach. Pt has had episodes of hemoptysis following a previous bronchoscopy on 10/19/18. Patient presented to ER hypotensive, has chronic harris on arrival, UA +. Chest Xray +pulmonary edema. Sepsis protocol initiated in ED. Lactic 2.6. Received 30 ml/kg bolus with positive response in BP. Patient received IV zosyn and vancomycin. Patient admitted to ICU for severe sepsis, acute on chronic heart failure, and acute on chronic respiratory failure with hypoxia and hypercapnia under the care of \[facility]\"" medicine.         Hospital Course:  71 y/o AAF admitted to ICU with a dx of acute respiratory failure , acute on chronic systolic chf exacerbation  And sepsis due to PNA/UTI .She was started on IV lasix  AnD IVAB .  She  Has a (+) urine cx (10/18/18)  For  Klebsiella pneumonaie and e.coli sensitive to  Cipro . She had a (+) sputum cx for MRSA (10/18/18).  The CT chest show Bilateral pleural effusions.  Near complete collapse of the right lower and left lower lobes with partial collapse of the right upper and middle lobes.  Left upper lobe  infiltrate or pneumonia versus aspiration pneumonia.    10/27-  She remains on ventilatory support . Chest x-ray showed interval increased opacification of the left lower lung.  She had thoracentesis done today .  Pleural fluid wbc -1361.  Respiratory culture -10/24- MRSA, candida    10/28-  Remains on ventilatory support .  Pleural fluid cultures are pending .   Chest x-ray showed -Stable cardiomegaly.  Tracheostomy cannula.  Bilateral pleural fluid collections.  Questionable decrease in left pleural fluid versus changes related to patient positioning.    10/29- T max 101.she remains on ventilatory support  .   10/30 - she remains on ventilatory support .  Chest x-ray showed marked amount of haziness throughout the left hemithorax. There is a moderate amount of haziness throughout the lower half of the right hemithorax.  This represents an interval worsening of the appearance of the lungs and is worrisome for pneumonia.  2. There is opacification of the bases of both hemithoraces.  This is characteristic of pleural effusions.      Interval History:   Pt was seen and examined at bedside .  She cont on ventilation support . The ct chest show  Lung collapse , possible pn and moderate pleura effusion   10/27-  She remains weak,she had thoracentesis today .  CT scan of the chest/abdomen /pelvis- 10/25-    Moderate right and small left pleural effusion.  The right lung reveals dependent atelectasis of the right middle and lower lobes with near complete collapse of the right lower lobe.  The left lung reveals near-complete collapse of the left lower lobe with patchy infiltrate of the left upper lung zone, possibly representing a pneumonia.  Variable subcutaneous edema is present.  10/28- remains weak, on ventilatory support , repeat cultures remain negtaive     10/29-  She was seen at bedside, remains weak , ultrasound of the mediastinum showed small right pleural effusion with calculated volume of 518ml.  T max  101.  10/30- chest x-ray showed marked amount of haziness throughout the left hemithorax. There is a moderate amount of haziness throughout the lower half of the right hemithorax.  This represents an interval worsening of the appearance of the lungs and is worrisome for pneumonia.    Fever curve has improved.  .  Review of Systems   Unable to perform ROS: Patient nonverbal     Objective:     Vital Signs (Most Recent):  Temp: 98 °F (36.7 °C) (10/30/18 1200)  Pulse: 102 (10/30/18 1630)  Resp: 20 (10/30/18 1630)  BP: (!) 153/109 (10/30/18 1600)  SpO2: 97 % (10/30/18 1630) Vital Signs (24h Range):  Temp:  [97.9 °F (36.6 °C)-98.6 °F (37 °C)] 98 °F (36.7 °C)  Pulse:  [] 102  Resp:  [0-30] 20  SpO2:  [42 %-100 %] 97 %  BP: ()/() 153/109     Weight: 85 kg (187 lb 6.3 oz)  Body mass index is 26.89 kg/m².    Intake/Output Summary (Last 24 hours) at 10/30/2018 1653  Last data filed at 10/30/2018 1400  Gross per 24 hour   Intake 1645 ml   Output 1418 ml   Net 227 ml      Physical Exam   Constitutional: Vital signs are normal. She appears well-developed. She has a sickly appearance. She appears ill. No distress.   HENT:   Head: Atraumatic.   Mouth/Throat: Oropharynx is clear and moist.   Eyes: EOM and lids are normal. Pupils are equal, round, and reactive to light.   Neck: Neck supple.       Cardiovascular: Normal rate and regular rhythm.   Pulses:       Radial pulses are 1+ on the right side, and 1+ on the left side.        Dorsalis pedis pulses are 1+ on the right side, and 1+ on the left side.   Pulmonary/Chest: No accessory muscle usage. No respiratory distress. She has decreased breath sounds in the right lower field. She has rhonchi. She has rales.   Abdominal: Soft. Bowel sounds are normal. She exhibits no distension. There is no tenderness.       Genitourinary:   Genitourinary Comments: Flores in place   Musculoskeletal:   Diffuse atrophy and +1 tibal edema   Lymphadenopathy:     She has no cervical  adenopathy.   Neurological: She is alert.   Awake with eyes open and nods head to questions   Skin: Skin is dry. Capillary refill takes 2 to 3 seconds. No cyanosis.        Has sacral decub ulcer   Nursing note and vitals reviewed.      Significant Labs:   Blood Culture: No results for input(s): LABBLOO in the last 48 hours.  BMP:   Recent Labs   Lab 10/30/18  0417   *      K 3.1*      CO2 34*   BUN 37*   CREATININE 1.0   CALCIUM 8.9   MG 2.0     CBC:   Recent Labs   Lab 10/29/18  0428 10/30/18  0417   WBC 8.43 11.34   HGB 8.8* 8.4*   HCT 29.0* 26.7*   * 155     Magnesium:   Recent Labs   Lab 10/29/18  0428 10/30/18  0417   MG 2.0 2.0     Urine Studies: No results for input(s): COLORU, APPEARANCEUA, PHUR, SPECGRAV, PROTEINUA, GLUCUA, KETONESU, BILIRUBINUA, OCCULTUA, NITRITE, UROBILINOGEN, LEUKOCYTESUR, RBCUA, WBCUA, BACTERIA, SQUAMEPITHEL, HYALINECASTS in the last 48 hours.    Invalid input(s): WRIGHTSUR  All pertinent labs within the past 24 hours have been reviewed.    Significant Imaging: I have reviewed and interpreted all pertinent imaging results/findings within the past 24 hours.    Assessment/Plan:      * Severe sepsis    PNE/UTI  IV Vanc and Ciprofloxacin   IVFs   Blood/urine/sputum cultures pending  Wound care consult  Monitor labs  pulm on board    10/27- related to UTI and MRSA pneumonia -on vancomycin and cipro .  Urine culture -10/18- E coli, Klebsiella   Will closely monitor vanco trough .Vanco trough is 17.     Candida infection      No need to treat asymptomatic candiduria , will monitor fever curve while on zosyn ,  Fluconazole can interact with amiodarone -QTC prolongation       Pleural effusion    10/27- s/p thoracentesis -will follow cultures to guide therapy .   Pulmonology follow up .  Post procedure chest x-ray reviewed - no evidence of pneumothorax.        Pressure injury of right ankle, stage 3    Cont wound care       10/28- supportive care,wound care .      Pressure injury of left ischium, stage 3    Cont wound care        Incontinence associated dermatitis    Cont wound care        Thrombocytopenia    -cont monitor   -PLT > 50 k   - most likely due to sepsis     10/27- will monitor closely ,might need transfusion with bleeding , platelet is now 71.    10/28- platelet is 95 today, will continue to monitor closely.   10/29- serum platelet -124 , no evidence of bleeding noted at this time   10/30- will continue to monitor,improving ,platelet is now 155         Acute on chronic respiratory failure with hypoxia and hypercapnia    Pulmonology consult for critical care and vent management   Trach, vent dependent   CT chest shoe b/l lower lung collapse , moderate pleura effusion  And pulmonary infiltrate   Duonebs tx       10/27- will continue close critical care follow up , ventilatory care and support .  Might need bronchoscopy, will monitor closely post bronchoscopy      10/28-critical care follow up,continue ventilatory support .     10/29- will continue ventilatory support, critical care follow up , supportive care .  Will discuss with pulmonology about discharge options  10/30 - will plan to have a family meeting and will continue ventilatory support ,discussed with pulmonology ,will continue diuresis      Pressure injury of coccygeal region, unstageable    Wound care consult     10/27- will follow wound care , supportive care ,  Continue nursing care      10/28- continue wound care     10/29 will continue to follow wound care , zosyn was added to IV vancomycin, follow cultures     MRSA bacteremia    Repeat Blood cx pending   IV vanco and cipro   10/27- Blood culture-10/22- negative  Day 4/14 of therapy for MRSA bacteremia.  Cardiac echo done 10/22- no evidence of endocarditis       10/28- will continue vancomycin - will complete total of 14 days from negative blood cultures .  Day 5/14.   10/29 -will continue vancomycin -day 6/14 vanco trough -is 20.8.  10/30- will  continue to closely monitor vanco trough , day 7/14      Urinary tract infection associated with indwelling urethral catheter    - Urine culture pending  - Will continue IV Ciprofloxacin      10/27 treated with cipro s/p therapy with zosyn during previous Hospital admit this month     10/28- will stop cipro., will monitor closely .  10/29 no need to treat candiduria -will plan to change harris      Deep tissue injury    Wound care consult        Hypothyroidism    Resume levothyroxine     10/28- continue levothyroxine  10/30- on synthroid      Acute on chronic systolic congestive heart failure w/ pulm edema    - CXR with vascular congestion with small bilateral effusion  - cont IV lasix   - 2D ECHO on 10/22/18 showed (EF 40-45%). DD, Mild AR, Mild to moderate MR, Moderate to severe TR, and    Pulmonary HTN.   - Strict I&Os  - Daily weights  reviewed     10/27- will continue diuresis with furosemide .Closely monitor renal panel  10/28- will continue lasix at 40mg bid    10/30- will continue diuresis , will closely monitor serum creatinine         Type 2 diabetes mellitus with kidney complication, without long-term current use of insulin    Accuchecks   SSI   Ha1c 4.8 on 10/19/18    10/27- will continue insulin sliding scale ,closely monitor glucose level    10/28- will continue insulin sliding scale .well controlled    10/30- controlled, will continue insulin sliding scale      Uncomplicated severe persistent asthma    Cont breathing tx  reviewed       10/27- will continue duonebs as tolerated , pulmonology follow up      Chronic kidney disease (CKD), stage III (moderate)    Stable   Monitor renal function     10/28- serum creatinine is now 0.8     Elevated troponin    Likely due to demand ischemia  Initial troponin 0.054  Serial troponin        Chronic atrial fibrillation    Heart rate controlled   Resume Xarelto . No sing of acute bleeding at this time   Cont  Amiodarone     10/27- will continue amiodarone/xarelto ,  will continue close monitoring     10/28- continue amiodarone/xarelto ,  10/29 -rate better controlled, will continue amiodarone, xarelto       VTE Risk Mitigation (From admission, onward)        Ordered     rivaroxaban tablet 15 mg  Nightly      10/26/18 1324     Place sequential compression device  Until discontinued      10/24/18 1141          Critical care time spent on the evaluation and treatment of severe organ dysfunction, review of pertinent labs and imaging studies, discussions with consulting providers and discussions with patient/family:  45 minutes.    Philip Ceja MD  Department of Hospital Medicine   [facility]

## 2018-10-30 NOTE — ASSESSMENT & PLAN NOTE
Pulmonology consult for critical care and vent management   Trach, vent dependent   CT chest shoe b/l lower lung collapse , moderate pleura effusion  And pulmonary infiltrate   Dupeter tx       10/27- will continue close critical care follow up , ventilatory care and support .  Might need bronchoscopy, will monitor closely post bronchoscopy      10/28-critical care follow up,continue ventilatory support .     10/29- will continue ventilatory support, critical care follow up , supportive care .  Will discuss with pulmonology about discharge options  10/30 - will plan to have a family meeting and will continue ventilatory support ,discussed with pulmonology ,will continue diuresis

## 2018-10-30 NOTE — PROGRESS NOTES
Care assumed. Pt on vent with setting confirmed. No changes overnight. VSS. Skin assessment done. Chart reviewed. Pt on special bed. Safety maintained.

## 2018-10-30 NOTE — PLAN OF CARE
Problem: Patient Care Overview  Goal: Plan of Care Review  Pt responds to voice, attempts to mouth words. Turned every 2 H to prevent further skin breakdown, pressure points protected. Vitals remained stable. Tolerating ventilator. SpO2 's today. Afebrile. UO adequate. No BM today. Tolerating tube feedings. Bed locked, in lowest position.  Will continue to monitor closely.

## 2018-10-30 NOTE — ASSESSMENT & PLAN NOTE
- CXR with vascular congestion with small bilateral effusion  - cont IV lasix   - 2D ECHO on 10/22/18 showed (EF 40-45%). DD, Mild AR, Mild to moderate MR, Moderate to severe TR, and    Pulmonary HTN.   - Strict I&Os  - Daily weights  reviewed     10/27- will continue diuresis with furosemide .Closely monitor renal panel  10/28- will continue lasix at 40mg bid    10/30- will continue diuresis , will closely monitor serum creatinine

## 2018-10-31 PROBLEM — J95.09: Status: ACTIVE | Noted: 2018-01-01

## 2018-10-31 NOTE — ASSESSMENT & PLAN NOTE
NSR on Amiodarone infusion  10/29 continue amiodarone.  Continue anticoagulation with rivaroxaban.  10/30 same   10/31  bpm. Amiodarone, Xarelto.

## 2018-10-31 NOTE — ASSESSMENT & PLAN NOTE
Cont Vanc, pharmacy to dose  Repeat cultures pending  10/30 repeat bld cx negative. On IV Vancomycin.   10/31 2 weeks IV VANCOMYCIN

## 2018-10-31 NOTE — SUBJECTIVE & OBJECTIVE
No current facility-administered medications on file prior to encounter.      Current Outpatient Medications on File Prior to Encounter   Medication Sig    albuterol-ipratropium (DUO-NEB) 2.5 mg-0.5 mg/3 mL nebulizer solution Take 3 mLs by nebulization every 4 (four) hours as needed for Wheezing. Rescue    amiodarone (PACERONE) 200 MG Tab Take 1 tablet (200 mg total) by mouth 2 (two) times daily. (Patient taking differently: 200 mg by Per G Tube route 2 (two) times daily. )    baclofen (LIORESAL) 10 MG tablet 10 mg by Per G Tube route 3 (three) times daily as needed.     brimonidine-timolol (COMBIGAN) 0.2-0.5 % Drop Place 1 drop into both eyes every 12 (twelve) hours.    bumetanide (BUMEX) 2 MG tablet Take 1 tablet (2 mg total) by mouth 2 (two) times daily. 1 Tablet Oral Every day (Patient taking differently: Take 2 mg by mouth once daily. 1 Tablet Oral Every day)    ergocalciferol (ERGOCALCIFEROL) 50,000 unit Cap Take 1 capsule (50,000 Units total) by mouth every 7 days. (Patient taking differently: 50,000 Units by Per G Tube route every 7 days. )    fluticasone (FLONASE) 50 mcg/actuation nasal spray 2 sprays by Each Nare route once daily.    furosemide (LASIX) 20 MG tablet 20 mg by Per G Tube route 2 (two) times daily.     insulin detemir U-100 (LEVEMIR FLEXTOUCH) 100 unit/mL (3 mL) SubQ InPn pen Inject 10 Units into the skin every evening.    levothyroxine (SYNTHROID) 100 MCG tablet 100 mcg by Per G Tube route before breakfast.     magnesium oxide (MAG-OX) 400 mg tablet 400 mg by Per G Tube route 2 (two) times daily.     midodrine (PROAMATINE) 5 MG Tab 1 tablet (5 mg total) by Per G Tube route 3 (three) times daily.    montelukast (SINGULAIR) 10 mg tablet 10 mg by Per G Tube route once daily.     multivitamin (THERAGRAN) per tablet Take 1 tablet by mouth once daily.     mycophenolate (CELLCEPT) 250 mg Cap Take 2 capsules (500 mg total) by mouth 2 (two) times daily. (Patient taking differently: 500  mg 2 (two) times daily. )    ondansetron (ZOFRAN) 4 MG tablet 4 mg by Per G Tube route every 6 (six) hours as needed for Nausea.     pantoprazole (PROTONIX) 40 MG tablet Take 40 mg by mouth once daily.    polyethylene glycol (GLYCOLAX) 17 gram PwPk Take 17 g by mouth once daily. (Patient taking differently: 17 g by Per G Tube route once daily. )    rivaroxaban (XARELTO) 15 mg Tab 15 mg by Per G Tube route every evening.     traMADol (ULTRAM) 50 mg tablet 50 mg by Per G Tube route every 6 (six) hours as needed for Pain.     vancomycin HCl in 5 % dextrose (VANCOMYCIN IN DEXTROSE 5 %) 1 gram/250 mL Soln Sig 1 gm IV q 18 hrs x 14 days    zinc sulfate (ZINCATE) 220 (50) mg capsule 220 mg by Per G Tube route once daily.     albuterol 90 mcg/actuation inhaler Inhale 2 puffs into the lungs every 4 (four) hours as needed.     bacitracin 500 unit/gram ointment Apply topically once daily. Apply to R heel daily.       Review of patient's allergies indicates:   Allergen Reactions    Morphine Hives    Atorvastatin      Other reaction(s): Muscle pain    Clonidine     Codeine      Other reaction(s): Unknown    Digoxin      Other reaction(s): Unknown    Diovan [valsartan] Other (See Comments)     Upset stomach, weakness    Eggs [egg derived]     Metoprolol Diarrhea    Naproxen      Other reaction(s): Nausea    Peanut     Propofol      Other reaction(s): delirious    Sulfa (sulfonamide antibiotics)        Past Medical History:   Diagnosis Date    Anticoagulant long-term use     Arthritis     Asthma     Atrial fibrillation     Blood clot of vein in shoulder area     Cardiac defibrillator in place     CHF (congestive heart failure)     Chronic knee pain     Diabetes mellitus type 2 without retinopathy 12/19/2016    Diaphragmatic hernia without obstruction and without gangrene 9/14/2015    GERD (gastroesophageal reflux disease)     Hypertension     Immune deficiency disorder     Primary open angle  glaucoma (POAG) of both eyes, severe stage 2018     Past Surgical History:   Procedure Laterality Date    CARDIAC DEFIBRILLATOR PLACEMENT      , .    CATARACT EXTRACTION       SECTION      COLONOSCOPY      EGD, WITH PEG TUBE INSERTION N/A 2018    Performed by Louis O. [facility]ansonne IV, [facility] at [facility] OR    ashley Macdonald    ESOPHAGOGASTRODUODENOSCOPY (EGD) N/A 2015    Performed by Hieu Colbert MD at [facility] ENDO    ESOPHAGOGASTRODUODENOSCOPY W/ PEG N/A 2018    Procedure: EGD, WITH PEG TUBE INSERTION;  Surgeon: Louis O. Jeansonne IV, MD;  Location: [facility] OR;  Service: General;  Laterality: N/A;    KNEE ARTHROSCOPY      TRACHEOSTOMY N/A 2018    Procedure: TRACHEOSTOMY;  Surgeon: Louis O. Jeansonne IV, MD;  Location: [facility] OR;  Service: General;  Laterality: N/A;    TRACHEOSTOMY N/A 2018    Performed by Louis [facility]. [facility]ne IV, MD at [facility] OR    UPPER GASTROINTESTINAL ENDOSCOPY       Family History     Problem Relation (Age of Onset)    Hypertension Mother, Father        Tobacco Use    Smoking status: Never Smoker    Smokeless tobacco: Never Used   Substance and Sexual Activity    Alcohol use: No     Alcohol/week: 0.0 oz    Drug use: No    Sexual activity: No     Review of Systems   Reason unable to perform ROS: Intubated.     Objective:     Vital Signs (Most Recent):  Temp: 98.4 °F (36.9 °C) (10/31/18 0700)  Pulse: 103 (10/31/18 1300)  Resp: (!) 25 (10/31/18 1200)  BP: 130/86 (10/31/18 1300)  SpO2: 98 % (10/31/18 1200) Vital Signs (24h Range):  Temp:  [98.1 °F (36.7 °C)-98.7 °F (37.1 °C)] 98.4 °F (36.9 °C)  Pulse:  [] 103  Resp:  [20-30] 25  SpO2:  [97 %-100 %] 98 %  BP: (125-174)/() 130/86     Weight: 85 kg (187 lb 6.3 oz)  Body mass index is 26.89 kg/m².    Physical Exam   Constitutional:   Chronically ill   Neck:   There is a tracheostomy tube in place.  There is no surrounding erythema.  The balloon is inflated.   Vitals reviewed.      Significant  Labs:  CBC:   Recent Labs   Lab 10/31/18  0335   WBC 10.92   RBC 3.16*   HGB 8.6*   HCT 27.4*      MCV 87   MCH 27.2   MCHC 31.4*     BMP:   Recent Labs   Lab 10/31/18  0335 10/31/18  1134   GLU 97 99    143   K 3.2* 4.5    104   CO2 29 27   BUN 38* 36*   CREATININE 1.1 1.1   CALCIUM 9.0 9.1   MG 2.1  --      CMP:   Recent Labs   Lab 10/31/18  1134   GLU 99   CALCIUM 9.1      K 4.5   CO2 27      BUN 36*   CREATININE 1.1       Significant Diagnostics:  CXR: I have reviewed all pertinent results/findings within the past 24 hours and my personal findings are:        Atif Mcgee MD 10/31/2018       Narrative     EXAMINATION:  XR CHEST 1 VIEW    CLINICAL HISTORY:  respiratory failure;    TECHNIQUE:  Single frontal view of the chest was performed.    COMPARISON:  10/30/2018    FINDINGS:  Cardiac pacemaker remains in place.  A tracheostomy tube remains in place.  Stable cardiomegaly.  Bilateral infiltrates are seen throughout the left lung and right lower lobe.  Moderate bilateral pleural effusions, left greater than right.  There is no pneumothorax.    In comparison to the prior study, there is no adverse interval changes      Impression       In comparison to the prior study, there is no adverse interval changes      Electronically signed by: Atif Mcgee MD  Date: 10/31/2018  Time: 08:23

## 2018-10-31 NOTE — ASSESSMENT & PLAN NOTE
We can change to tracheostomy tube to a new 8.  Shiley.  If this does not solve the problem a tracheostomy tube with a larger balloon can be looked into.  The patient could also be scoped from above to determine if the balloon is completely occluding the trachea below the occur cords.    You could also try sedation to see if this changes the air return pattern.  With an awake patient it is possible that respiratory for stent rated by the patient can push air by the cuff however this would only be possible with resistance provided through the tracheostomy tube

## 2018-10-31 NOTE — SUBJECTIVE & OBJECTIVE
Interval History:   Review of Systems   Reason unable to perform ROS: TRACH.       Objective:     Vital Signs (Most Recent):  Temp: 98.4 °F (36.9 °C) (10/31/18 0700)  Pulse: 105 (10/31/18 0802)  Resp: 20 (10/31/18 0802)  BP: (!) 145/97 (10/31/18 0700)  SpO2: 97 % (10/31/18 0802) Vital Signs (24h Range):  Temp:  [97.7 °F (36.5 °C)-98.7 °F (37.1 °C)] 98.4 °F (36.9 °C)  Pulse:  [] 105  Resp:  [0-30] 20  SpO2:  [97 %-100 %] 97 %  BP: (125-174)/() 145/97     Weight: 85 kg (187 lb 6.3 oz)  Body mass index is 26.89 kg/m².      Intake/Output Summary (Last 24 hours) at 10/31/2018 1009  Last data filed at 10/31/2018 0721  Gross per 24 hour   Intake 1595 ml   Output 1140 ml   Net 455 ml       Physical Exam   Constitutional:   Ill appearing     HENT:   Head: Normocephalic and atraumatic.   Neck: Neck supple.   Trach in place   Cardiovascular:   AFib    Pulmonary/Chest: Effort normal. No respiratory distress.   Bilateral rhonchi   Abdominal: Soft. There is no tenderness.   Peg in place   Genitourinary:   Genitourinary Comments: Flores in place   Musculoskeletal: She exhibits edema.   Upper extremity swelling, left upper extremity PICC line, contracted in 4 extremities   Neurological: She is alert.   Skin: Skin is warm.       Vents:  Vent Mode: A/C (10/31/18 0739)  Ventilator Initiated: Yes (10/24/18 0630)  Set Rate: 20 bmp (10/31/18 0739)  Vt Set: 0 mL (10/31/18 0739)  Pressure Support: 0 cmH20 (10/31/18 0739)  PEEP/CPAP: 10 cmH20 (10/31/18 0739)  Oxygen Concentration (%): (S) 60 (10/31/18 0750)  Peak Airway Pressure: 30 cmH2O (10/31/18 0739)  Plateau Pressure: 0 cmH20 (10/31/18 0739)  Total Ve: 5.45 mL (10/31/18 0739)  F/VT Ratio<105 (RSBI): (!) 91.87 (10/31/18 0512)    Lines/Drains/Airways     Drain                 Gastrostomy/Enterostomy 10/19/18 Percutaneous endoscopic gastrostomy (PEG) LUQ 12 days         Urethral Catheter 10/24/18 1030 16 Fr. 6 days          Airway                 Surgical Airway 07/18/18 1620  Nikita 104 days          Pressure Ulcer                 Pressure Injury 10/24/18 Coccyx Unstageable 7 days         Pressure Injury 10/24/18 1100 Right medial Malleolus Stage 3 6 days         Pressure Injury 10/24/18 1105 Left Ischial tuberosity Stage 3 6 days          Peripheral Intravenous Line                 Midline Catheter Insertion/Assessment  - Double Lumen 10/19/18 0130 Left brachial vein 12 days                Significant Labs:    CBC/Anemia Profile:  Recent Labs   Lab 10/30/18  0417 10/31/18  0335   WBC 11.34 10.92   HGB 8.4* 8.6*   HCT 26.7* 27.4*    203   MCV 87 87   RDW 18.4* 18.2*        Chemistries:  Recent Labs   Lab 10/30/18  0417 10/31/18  0335    143   K 3.1* 3.2*    102   CO2 34* 29   BUN 37* 38*   CREATININE 1.0 1.1   CALCIUM 8.9 9.0   MG 2.0 2.1   PHOS 2.5* 2.4*   Sputum + MRSA   Bld cx + MRSA initially then cleared  U cx Klebsiella     Significant Imaging:  CXR: I have reviewed all pertinent results/findings within the past 24 hours and my personal findings are:  trach Cardiac pacemaker remains in place.  A tracheostomy tube remains in place.  Stable cardiomegaly.  Bilateral infiltrates are seen throughout the left lung and right lower lobe.  Moderate bilateral pleural effusions, left greater than right.  There is no pneumothorax.    Echo 10/22:      1 - Mildly to moderately depressed left ventricular systolic function (EF 40-45%).     2 - Impaired LV relaxation, elevated LAP (grade 2 diastolic dysfunction).     3 - Low normal right ventricular systolic function .     4 - Mild aortic regurgitation.     5 - Mild to moderate mitral regurgitation.     6 - Moderate to severe tricuspid regurgitation.     7 - Severe left atrial enlargement.

## 2018-10-31 NOTE — PROGRESS NOTES
[facility]  Critical Care Medicine  Progress Note    Patient Name: Carlie Valdez  MRN: [id number]  Admission Date: 10/24/2018  Hospital Length of Stay: 7 days  Code Status: Full Code  Attending Provider: Philip Ceja MD  Primary Care Provider: Johanna Guzman MD   Principal Problem: Severe sepsis    Subjective:     HPI:       Ms Valdez is a 72 year old female NH resident at [facility] with Trach/PEG vent dependent and bed bound post NMO diagnosis earlier this year.  She also has a past medical history of Anticoagulant long-term use, Arthritis, Asthma, Atrial fibrillation, Blood clot of vein in shoulder area, Cardiac defibrillator in place, CHF (congestive heart failure), Chronic knee pain, Diabetes mellitus type 2 without retinopathy (12/19/2016), Diaphragmatic hernia without obstruction and without gangrene (9/14/2015), GERD (gastroesophageal reflux disease), Hypertension, Immune deficiency disorder, and Primary open angle glaucoma (POAG) of both eyes, severe stage (6/1/2018).  She was also recently hospitalized here at [facility] in ICU 10/19/18 admitted and discharged 2 days ago on 10/22/2018 for Acute on chronic hypoxic resp failure with total left lung opacification as well as severe sepsis with MRSA PNA and bacteremia as well as E.Coli and Klebsiella UTI.  She received a bronch here and left lung cleared with suctioning, IVAB and nebs.  She had some endobronchial bleeding with clots also.  She was discharged to NH on 10/22 on Cipro via PEG and IV Vanc.  She returned back to [facility] ED early this AM due to decreased LOC at NH, multiple episodes of diarrhea, per [facility] nursing home suctioned a large amount of blood from trach.  In ED temp 89.9 F and suctioned blood clots from Trach.  Plt count 38.  IVAB resumed and given IV Lasix        Hospital/ICU Course:  10/24 - Admitted to ICU on mech vent and warming via artic sun.  She is awake and responsive not requiring pressor  support  10/25/2018 Chart reviewed . Bronchoscopy last admission for mucous pluggine on the left. pleural effusion on Chest X Ray on the right. history of Congestive Heart failure:Moderate left ventricular systolic dysfunction with a visually estimated ejection fraction of 40%.  Grade 3 diastolic dysfunction consistent with restrictive physiology with elevated left atrial pressure.  Severe tricuspid regurgitation.  Moderate pulmonary hypertension with an estimated pulmonary artery systolic pressure 55 mmHg.  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   10/26/2018  CT of chest with bilateral effusions and atelectasis with right lower lobe and left lower lobe collapse  10/28 Little change, mild diuresis continues  10/29 seen and examined.  Vent dependent.  T-max 101° yesterday.  On enteral feeding via PEG.  10/30 seen and examined. Tmax 98.6 last 24 hrs. On higher FiO2 today 70%. Had an episode of hypoxemia, bradycardia earlier sp suctioning. CXR , ABG reviewed. On enteral feeding.   10/31 seen and examined. Resp therapist noticed cuff leak despite good cuff pressure. Afebrile. No presssors. On Fio2 60% now . RR decreased to 16 . No pressors. CXR , ABG reviewed.     Interval History:   Review of Systems   Reason unable to perform ROS: TRACH.       Objective:     Vital Signs (Most Recent):  Temp: 98.4 °F (36.9 °C) (10/31/18 0700)  Pulse: 105 (10/31/18 0802)  Resp: 20 (10/31/18 0802)  BP: (!) 145/97 (10/31/18 0700)  SpO2: 97 % (10/31/18 0802) Vital Signs (24h Range):  Temp:  [97.7 °F (36.5 °C)-98.7 °F (37.1 °C)] 98.4 °F (36.9 °C)  Pulse:  [] 105  Resp:  [0-30] 20  SpO2:  [97 %-100 %] 97 %  BP: (125-174)/() 145/97     Weight: 85 kg (187 lb 6.3 oz)  Body mass index is 26.89 kg/m².      Intake/Output Summary (Last 24 hours) at 10/31/2018 1009  Last data filed at 10/31/2018 0721  Gross per 24 hour   Intake 1595 ml    Output 1140 ml   Net 455 ml       Physical Exam   Constitutional:   Ill appearing     HENT:   Head: Normocephalic and atraumatic.   Neck: Neck supple.   Trach in place   Cardiovascular:   AFib    Pulmonary/Chest: Effort normal. No respiratory distress.   Bilateral rhonchi   Abdominal: Soft. There is no tenderness.   Peg in place   Genitourinary:   Genitourinary Comments: Flores in place   Musculoskeletal: She exhibits edema.   Upper extremity swelling, left upper extremity PICC line, contracted in 4 extremities   Neurological: She is alert.   Skin: Skin is warm.       Vents:  Vent Mode: A/C (10/31/18 0739)  Ventilator Initiated: Yes (10/24/18 0630)  Set Rate: 20 bmp (10/31/18 0739)  Vt Set: 0 mL (10/31/18 0739)  Pressure Support: 0 cmH20 (10/31/18 0739)  PEEP/CPAP: 10 cmH20 (10/31/18 0739)  Oxygen Concentration (%): (S) 60 (10/31/18 0750)  Peak Airway Pressure: 30 cmH2O (10/31/18 0739)  Plateau Pressure: 0 cmH20 (10/31/18 0739)  Total Ve: 5.45 mL (10/31/18 0739)  F/VT Ratio<105 (RSBI): (!) 91.87 (10/31/18 0512)    Lines/Drains/Airways     Drain                 Gastrostomy/Enterostomy 10/19/18 Percutaneous endoscopic gastrostomy (PEG) LUQ 12 days         Urethral Catheter 10/24/18 1030 16 Fr. 6 days          Airway                 Surgical Airway 07/18/18 1620 Shiley 104 days          Pressure Ulcer                 Pressure Injury 10/24/18 Coccyx Unstageable 7 days         Pressure Injury 10/24/18 1100 Right medial Malleolus Stage 3 6 days         Pressure Injury 10/24/18 1105 Left Ischial tuberosity Stage 3 6 days          Peripheral Intravenous Line                 Midline Catheter Insertion/Assessment  - Double Lumen 10/19/18 0130 Left brachial vein 12 days                Significant Labs:    CBC/Anemia Profile:  Recent Labs   Lab 10/30/18  0417 10/31/18  0335   WBC 11.34 10.92   HGB 8.4* 8.6*   HCT 26.7* 27.4*    203   MCV 87 87   RDW 18.4* 18.2*        Chemistries:  Recent Labs   Lab 10/30/18  0412  10/31/18  0335    143   K 3.1* 3.2*    102   CO2 34* 29   BUN 37* 38*   CREATININE 1.0 1.1   CALCIUM 8.9 9.0   MG 2.0 2.1   PHOS 2.5* 2.4*   Sputum + MRSA   Bld cx + MRSA initially then cleared  U cx Klebsiella     Significant Imaging:  CXR: I have reviewed all pertinent results/findings within the past 24 hours and my personal findings are:  trach Cardiac pacemaker remains in place.  A tracheostomy tube remains in place.  Stable cardiomegaly.  Bilateral infiltrates are seen throughout the left lung and right lower lobe.  Moderate bilateral pleural effusions, left greater than right.  There is no pneumothorax.    Echo 10/22:      1 - Mildly to moderately depressed left ventricular systolic function (EF 40-45%).     2 - Impaired LV relaxation, elevated LAP (grade 2 diastolic dysfunction).     3 - Low normal right ventricular systolic function .     4 - Mild aortic regurgitation.     5 - Mild to moderate mitral regurgitation.     6 - Moderate to severe tricuspid regurgitation.     7 - Severe left atrial enlargement.           Assessment/Plan:     ENT   Leakage of tracheostomy site    Spoke with surgery regarding? Need for  trach change , surgery will evaluate      Pulmonary   Pleural effusion    10/28 review cultures  10/29 ultrasound of the chest bilateral today.  Status post thoracentesis on the right 40 cc of cloudy fluid removed.  Cultures pending.  As per WBC count and glucose level, effusion does not appear complicated.  10/30 pleural fluid cx afb negative, fungal pending . No need for thoracentesis.   10/31 same      Acute on chronic respiratory failure with hypoxia and hypercapnia      .Cont full vent support  Vent settings reviewed and adjusted  VAP prophylaxis  Wean PEEP and FiO2 slowly  10/25CHeck CT of chest for pleural effusion   10/26 Continue support and diuresis  10/27 Probable thoracentesis on the right today - need consent  10/28 Taper FiO2 as tolerated  10/29 continue diuresis.  Wean O2.   Daily chest x-ray and ABG.  Vent dependent.  10/30 Diamox 500 mg once. Off lasix. Trach care.   10/31 Diamox 250 mg today. On Dc lasix 20 mg q 12 hrs          Uncomplicated severe persistent asthma    Cont Duonebs  Add Formeterol and Budesonide  10/31 SAME      Pneumonia due to infectious organism    10/30 Likely MRSA PNA. On IV vancomycin   10/31 On Zosyn + Vancomycin.      Cardiac/Vascular   Acute on chronic systolic congestive heart failure w/ pulm edema    Cont IV Lasix  Daily weights  Accurate I/Os  Follow up labs  10/31/2018  Start IV lasix for pleural effusion and review CT of chest  10/27 Good diuresis, bilateral atelectasis on CT- continue diuretics  10/28 continue diuresis  10/29 continue IV Lasix 40 mg twice a day.  Patient -5 L so far since admission.  10/30 off lasix, metabolic alkalosis . Diamox 500 mg once today.   10/31 Diamox 250 mg today. Lasix 20 mg q 12 hrs on dc        Chronic atrial fibrillation    NSR on Amiodarone infusion  10/29 continue amiodarone.  Continue anticoagulation with rivaroxaban.  10/30 same   10/31  bpm. Amiodarone, Xarelto.      Renal/   Urinary tract infection associated with indwelling urethral catheter    Cont Cipro  Repeat cultures pending  10/29/2018 awaiting cultures to review  10/30 E Coli + Klebsiella on IV Zosyn        Chronic kidney disease (CKD), stage III (moderate)    Creatine 0.7  Monitor I/Os and creatine with diuresis     ID   * Severe sepsis    Cont IVAB from discharge  Normal WBC  Warm to normothermia  Cont supportive care  No IVFs given pulm edema  10/25/2018  cultures pending, stable blood pressure     Candida infection    10/29 Candida present in sputum and in urine.  Patient afebrile.  Start IV fluconazole if okay with Infectious Disease.  10/31 started on Micafungin by ID      MRSA bacteremia    Cont Vanc, pharmacy to dose  Repeat cultures pending  10/30 repeat bld cx negative. On IV Vancomycin.   10/31 2 weeks IV VANCOMYCIN      Hematology    Thrombocytopenia    No bleeding other than Endobronchial  No anticoagulants or antiplatelets for now  Follow CBC     Endocrine   Hypothyroidism    Cont Levothyroxine     Type 2 diabetes mellitus with kidney complication, without long-term current use of insulin    Cont SSI  Glucose stable  Monitor for insulin toxicity     Other   Pressure injury of coccygeal region, unstageable    Wound care following  Low pressure bed  Turn Q 2 hours  10/25/2018 Evaluated by surgery. CT of pelvis ordered for depth of decubitis          Critical Care Daily Checklist:    A: Awake: RASS Goal/Actual Goal:    Actual: Guerrero Agitation Sedation Scale (RASS): Drowsy   B: Spontaneous Breathing Trial Performed? Spon. Breathing Trial Initiated?: Not initiated (10/25/18 1320)   C: SAT & SBT Coordinated?  NA                      D: Delirium: CAM-ICU Overall CAM-ICU: Positive   E: Early Mobility Performed? Yes   F: Feeding Goal: Goals: Meet > 85 % EEN/EPN while admitted  Status: Nutrition Goal Status: new   Current Diet Order   No orders of the defined types were placed in this encounter.      AS: Analgesia/Sedation Y   T: Thromboembolic Prophylaxis Xarelto    H: HOB > 300 Yes   U: Stress Ulcer Prophylaxis (if needed) Famotidine    G: Glucose Control Y   B: Bowel Function Stool Occurrence: 1   I: Indwelling Catheter (Lines & Flores) Necessity Y   D: De-escalation of Antimicrobials/Pharmacotherapies Y. Needs broad spectrum for now + Micafuingin     Plan for the day/ETD Y    Code Status:  Family/Goals of Care: Full Code  Y     Critical Care Time: 40 minutes  Critical secondary to Patient has a condition that poses threat to life and bodily function: CHF/MRSA PNA/Bacteremia   Critical care was time spent personally by me on the following activities: development of treatment plan with patient or surrogate and bedside caregivers, discussions with consultants, evaluation of patient's response to treatment, examination of patient, ordering and  performing treatments and interventions, ordering and review of laboratory studies, ordering and review of radiographic studies, pulse oximetry, re-evaluation of patient's condition. This critical care time did not overlap with that of any other provider or involve time for any procedures.     John White MD  Critical Care Medicine  [facility]

## 2018-10-31 NOTE — PLAN OF CARE
Problem: Airway, Artificial (Adult)  Goal: Signs and Symptoms of Listed Potential Problems Will be Absent, Minimized or Managed (Airway, Artificial)  Signs and symptoms of listed potential problems will be absent, minimized or managed by discharge/transition of care (reference Airway, Artificial (Adult) CPG).  Outcome: Ongoing (interventions implemented as appropriate)  Pt remains on mechanical ventilator; trach care was done; pt was lavaged, bagged and suctioned for copious amounts of thick secretions.

## 2018-10-31 NOTE — ASSESSMENT & PLAN NOTE
Heart rate controlled   Resume Xarelto . No sing of acute bleeding at this time   Cont  Amiodarone     10/27- will continue amiodarone/xarelto , will continue close monitoring     10/28- continue amiodarone/xarelto ,  10/29 -rate better controlled, will continue amiodarone, xarelto  10/31- will continue Xarelto, rate control with amiodarone

## 2018-10-31 NOTE — PLAN OF CARE
Problem: Patient Care Overview  Goal: Plan of Care Review  Cont with POC.Dr. Estes consulted and trach changed. Antibiotics continue. Skin integrity maintained.

## 2018-10-31 NOTE — ASSESSMENT & PLAN NOTE
10/31- respiratory culture-MRSA-will continue vanco  Critical care team added Micafungin   Will continue supportive care

## 2018-10-31 NOTE — HOSPITAL COURSE
Patient is intubated in the ICU.  She is alert.  She does have air leaking around her tracheostomy tube.  The tracheostomy tube is in good position.  There is no surrounding erythema

## 2018-10-31 NOTE — PLAN OF CARE
Problem: Patient Care Overview  Goal: Plan of Care Review  Outcome: Ongoing (interventions implemented as appropriate)  Plan of care reviewed. Patient stable. VSS. Patient trach dependent. Drowsy throughout shift. Doesn't follow commands. Can mouth words intermittently, and state name intermittently. Responds to voice. FIO2 at 75. Oxygen saturation at . Pt tolerating ventilator. Copious thick, tan secretions. IV antibiotics. L brachial midline clean dry and intact. Afebrile. PEG tube to LUQ. Cont tube feed of peptamen intense VHP at 45 ml/hr with goal at 45 ml/hr. Benoprotien. Tolerating tube feeding. Blood glucose monitoring. Liquid loose brown stool x2. Turn every two hours to prevent skin breakdown. Specialty bed. Boots in place. Wound care done per orders. Flores to gravity. Clear yellow urine. Good UOP. Will cont to monitor.

## 2018-10-31 NOTE — EICU
Resp alkalosis  Probably overventilating  Vent showing low exhaled Vt - recheck and can lower PC to 20 -was set at 24  PBW about 45.5 kg - aim for about 360 ml or less  Redo ABG this AM   RT noted lot of secretions   K 3.2 -replete and recheck later today

## 2018-10-31 NOTE — ASSESSMENT & PLAN NOTE
Cont wound care       10/28- supportive care,wound care .  10/31- will continue wound care /surgical follow up

## 2018-10-31 NOTE — ASSESSMENT & PLAN NOTE
10/29 Candida present in sputum and in urine.  Patient afebrile.  Start IV fluconazole if okay with Infectious Disease.  10/31 started on Micafungin by ID

## 2018-10-31 NOTE — CONSULTS
[facility] -   General Surgery  Consult Note    Patient Name: Carlie Valdez  MRN: [id number]  Code Status: Full Code  Admission Date: 10/24/2018  Hospital Length of Stay: 7 days  Attending Physician: Philip Ceja MD  Primary Care Provider: Johanna Guzman MD    Patient information was obtained from past medical records, Critical care staff and ER records.     Consults  Subjective:     Principal Problem: Severe sepsis    History of Present Illness:  Patient was admitted to the hospital with respiratory issues.  She was seen by the wound care service regarding her wounds.    General surgery was called about her leaking around her tracheostomy.  She has a 8.  Shiley and there is concerned that there is air leaking around it causing her to have decreased ability to ventilate and oxygenate    No current facility-administered medications on file prior to encounter.      Current Outpatient Medications on File Prior to Encounter   Medication Sig    albuterol-ipratropium (DUO-NEB) 2.5 mg-0.5 mg/3 mL nebulizer solution Take 3 mLs by nebulization every 4 (four) hours as needed for Wheezing. Rescue    amiodarone (PACERONE) 200 MG Tab Take 1 tablet (200 mg total) by mouth 2 (two) times daily. (Patient taking differently: 200 mg by Per G Tube route 2 (two) times daily. )    baclofen (LIORESAL) 10 MG tablet 10 mg by Per G Tube route 3 (three) times daily as needed.     brimonidine-timolol (COMBIGAN) 0.2-0.5 % Drop Place 1 drop into both eyes every 12 (twelve) hours.    bumetanide (BUMEX) 2 MG tablet Take 1 tablet (2 mg total) by mouth 2 (two) times daily. 1 Tablet Oral Every day (Patient taking differently: Take 2 mg by mouth once daily. 1 Tablet Oral Every day)    ergocalciferol (ERGOCALCIFEROL) 50,000 unit Cap Take 1 capsule (50,000 Units total) by mouth every 7 days. (Patient taking differently: 50,000 Units by Per G Tube route every 7 days. )    fluticasone (FLONASE) 50 mcg/actuation nasal  spray 2 sprays by Each Nare route once daily.    furosemide (LASIX) 20 MG tablet 20 mg by Per G Tube route 2 (two) times daily.     insulin detemir U-100 (LEVEMIR FLEXTOUCH) 100 unit/mL (3 mL) SubQ InPn pen Inject 10 Units into the skin every evening.    levothyroxine (SYNTHROID) 100 MCG tablet 100 mcg by Per G Tube route before breakfast.     magnesium oxide (MAG-OX) 400 mg tablet 400 mg by Per G Tube route 2 (two) times daily.     midodrine (PROAMATINE) 5 MG Tab 1 tablet (5 mg total) by Per G Tube route 3 (three) times daily.    montelukast (SINGULAIR) 10 mg tablet 10 mg by Per G Tube route once daily.     multivitamin (THERAGRAN) per tablet Take 1 tablet by mouth once daily.     mycophenolate (CELLCEPT) 250 mg Cap Take 2 capsules (500 mg total) by mouth 2 (two) times daily. (Patient taking differently: 500 mg 2 (two) times daily. )    ondansetron (ZOFRAN) 4 MG tablet 4 mg by Per G Tube route every 6 (six) hours as needed for Nausea.     pantoprazole (PROTONIX) 40 MG tablet Take 40 mg by mouth once daily.    polyethylene glycol (GLYCOLAX) 17 gram PwPk Take 17 g by mouth once daily. (Patient taking differently: 17 g by Per G Tube route once daily. )    rivaroxaban (XARELTO) 15 mg Tab 15 mg by Per G Tube route every evening.     traMADol (ULTRAM) 50 mg tablet 50 mg by Per G Tube route every 6 (six) hours as needed for Pain.     vancomycin HCl in 5 % dextrose (VANCOMYCIN IN DEXTROSE 5 %) 1 gram/250 mL Soln Sig 1 gm IV q 18 hrs x 14 days    zinc sulfate (ZINCATE) 220 (50) mg capsule 220 mg by Per G Tube route once daily.     albuterol 90 mcg/actuation inhaler Inhale 2 puffs into the lungs every 4 (four) hours as needed.     bacitracin 500 unit/gram ointment Apply topically once daily. Apply to R heel daily.       Review of patient's allergies indicates:   Allergen Reactions    Morphine Hives    Atorvastatin      Other reaction(s): Muscle pain    Clonidine     Codeine      Other reaction(s):  Unknown    Digoxin      Other reaction(s): Unknown    Diovan [valsartan] Other (See Comments)     Upset stomach, weakness    Eggs [egg derived]     Metoprolol Diarrhea    Naproxen      Other reaction(s): Nausea    Peanut     Propofol      Other reaction(s): delirious    Sulfa (sulfonamide antibiotics)        Past Medical History:   Diagnosis Date    Anticoagulant long-term use     Arthritis     Asthma     Atrial fibrillation     Blood clot of vein in shoulder area     Cardiac defibrillator in place     CHF (congestive heart failure)     Chronic knee pain     Diabetes mellitus type 2 without retinopathy 2016    Diaphragmatic hernia without obstruction and without gangrene 2015    GERD (gastroesophageal reflux disease)     Hypertension     Immune deficiency disorder     Primary open angle glaucoma (POAG) of both eyes, severe stage 2018     Past Surgical History:   Procedure Laterality Date    CARDIAC DEFIBRILLATOR PLACEMENT      , .    CATARACT EXTRACTION       SECTION      COLONOSCOPY      EGD, WITH PEG TUBE INSERTION N/A 2018    Performed by Louis O. Jeansonne IV, MD at [facility] OR    ashley Macdonald    ESOPHAGOGASTRODUODENOSCOPY (EGD) N/A 2015    Performed by Hieu Colbert MD at [facility] ENDO    ESOPHAGOGASTRODUODENOSCOPY W/ PEG N/A 2018    Procedure: EGD, WITH PEG TUBE INSERTION;  Surgeon: Louis O. Jeansonne IV, MD;  Location: [facility] OR;  Service: General;  Laterality: N/A;    KNEE ARTHROSCOPY      TRACHEOSTOMY N/A 2018    Procedure: TRACHEOSTOMY;  Surgeon: Louis O. Jeansonne IV, MD;  Location: [facility] OR;  Service: General;  Laterality: N/A;    TRACHEOSTOMY N/A 2018    Performed by Louis O. Jeansonne IV, MD at [facility] OR    UPPER GASTROINTESTINAL ENDOSCOPY       Family History     Problem Relation (Age of Onset)    Hypertension Mother, Father        Tobacco Use    Smoking status: Never Smoker    Smokeless tobacco: Never Used    Substance and Sexual Activity    Alcohol use: No     Alcohol/week: 0.0 oz    Drug use: No    Sexual activity: No     Review of Systems   Reason unable to perform ROS: Intubated.     Objective:     Vital Signs (Most Recent):  Temp: 98.4 °F (36.9 °C) (10/31/18 0700)  Pulse: 103 (10/31/18 1300)  Resp: (!) 25 (10/31/18 1200)  BP: 130/86 (10/31/18 1300)  SpO2: 98 % (10/31/18 1200) Vital Signs (24h Range):  Temp:  [98.1 °F (36.7 °C)-98.7 °F (37.1 °C)] 98.4 °F (36.9 °C)  Pulse:  [] 103  Resp:  [20-30] 25  SpO2:  [97 %-100 %] 98 %  BP: (125-174)/() 130/86     Weight: 85 kg (187 lb 6.3 oz)  Body mass index is 26.89 kg/m².    Physical Exam   Constitutional:   Chronically ill   Neck:   There is a tracheostomy tube in place.  There is no surrounding erythema.  The balloon is inflated.   Vitals reviewed.      Significant Labs:  CBC:   Recent Labs   Lab 10/31/18  0335   WBC 10.92   RBC 3.16*   HGB 8.6*   HCT 27.4*      MCV 87   MCH 27.2   MCHC 31.4*     BMP:   Recent Labs   Lab 10/31/18  0335 10/31/18  1134   GLU 97 99    143   K 3.2* 4.5    104   CO2 29 27   BUN 38* 36*   CREATININE 1.1 1.1   CALCIUM 9.0 9.1   MG 2.1  --      CMP:   Recent Labs   Lab 10/31/18  1134   GLU 99   CALCIUM 9.1      K 4.5   CO2 27      BUN 36*   CREATININE 1.1       Significant Diagnostics:  CXR: I have reviewed all pertinent results/findings within the past 24 hours and my personal findings are:        Atif Mcgee MD 10/31/2018       Narrative     EXAMINATION:  XR CHEST 1 VIEW    CLINICAL HISTORY:  respiratory failure;    TECHNIQUE:  Single frontal view of the chest was performed.    COMPARISON:  10/30/2018    FINDINGS:  Cardiac pacemaker remains in place.  A tracheostomy tube remains in place.  Stable cardiomegaly.  Bilateral infiltrates are seen throughout the left lung and right lower lobe.  Moderate bilateral pleural effusions, left greater than right.  There is no pneumothorax.    In comparison  to the prior study, there is no adverse interval changes      Impression       In comparison to the prior study, there is no adverse interval changes      Electronically signed by: Atif Mcgee MD  Date: 10/31/2018  Time: 08:23         Assessment/Plan:     * Severe sepsis    Management per Medicine/critical care     Leakage of tracheostomy site    We can change to tracheostomy tube to a new 8.  Shiley.  If this does not solve the problem a tracheostomy tube with a larger balloon can be looked into.  The patient could also be scoped from above to determine if the balloon is completely occluding the trachea below the occur cords.    You could also try sedation to see if this changes the air return pattern.  With an awake patient it is possible that respiratory for stent rated by the patient can push air by the cuff however this would only be possible with resistance provided through the tracheostomy tube     Pressure injury of left ischium, stage 3    Per wound care/Dr. Jeansonne     Thrombocytopenia    Monitor per Medicine/pulmonary critical care     Acute on chronic respiratory failure with hypoxia and hypercapnia    Management per Pulmonary Critical Care     Pressure injury of coccygeal region, unstageable    Aspiration performed at bedside, no purulent material drained.  Continue wound care.  Per Dr. Jeansonne/wound care service.  General surgery should be called about this only for acute issues     Hypothyroidism    Management per Medicine /critical care     Acute on chronic systolic congestive heart failure w/ pulm edema    Management per Medicine/critical care     Uncomplicated severe persistent asthma    Management per Medicine/critical care     Chronic kidney disease (CKD), stage III (moderate)    Management per Medicine/critical care     Elevated troponin    Management per Medicine/critical care     Pneumonia due to infectious organism    Management per Medicine/pulmonary critical care     Chronic atrial  fibrillation    Management per Medicine critical care       VTE Risk Mitigation (From admission, onward)        Ordered     rivaroxaban tablet 15 mg  Nightly      10/26/18 1324     Place sequential compression device  Until discontinued      10/24/18 1141        Prior to changing the trach tube we would need to get consent from the family    Thank you for your consult. I will follow-up with patient. Please contact us if you have any additional questions.    Jaison Estes MD  General Surgery  [facility]

## 2018-10-31 NOTE — ASSESSMENT & PLAN NOTE
Cont IV Lasix  Daily weights  Accurate I/Os  Follow up labs  10/31/2018  Start IV lasix for pleural effusion and review CT of chest  10/27 Good diuresis, bilateral atelectasis on CT- continue diuretics  10/28 continue diuresis  10/29 continue IV Lasix 40 mg twice a day.  Patient -5 L so far since admission.  10/30 off lasix, metabolic alkalosis . Diamox 500 mg once today.   10/31 Diamox 250 mg today. Lasix 20 mg q 12 hrs on dc

## 2018-10-31 NOTE — ASSESSMENT & PLAN NOTE
Pulmonology consult for critical care and vent management   Trach, vent dependent   CT chest shoe b/l lower lung collapse , moderate pleura effusion  And pulmonary infiltrate   Alfonzo tx       10/27- will continue close critical care follow up , ventilatory care and support .  Might need bronchoscopy, will monitor closely post bronchoscopy      10/28-critical care follow up,continue ventilatory support .     10/29- will continue ventilatory support, critical care follow up , supportive care .  Will discuss with pulmonology about discharge options  10/30 - will plan to have a family meeting and will continue ventilatory support ,discussed with pulmonology ,will continue diuresis   10/31- will continue critical care follow up, ventilatory weaning . Will plan to transfer back to NH when she is back at her baseline

## 2018-10-31 NOTE — PROGRESS NOTES
Care assumed from [place].no significant changes overnight. Replacing k+. Tolerating vent changes. Pt opens eyes to name. Will state name at times. Will not follow any commands (ie will not squeeze hand). No movement to BLE. Pt mouths or attempts to talk around trach, but currently very random words/doesnt follow any thought process. Safety maintained. Pt skin issues monitored. All precautions taken to help heal with boots/bed/turning/dressings ect.

## 2018-10-31 NOTE — ASSESSMENT & PLAN NOTE
Repeat Blood cx pending   IV vanco and cipro   10/27- Blood culture-10/22- negative  Day 4/14 of therapy for MRSA bacteremia.  Cardiac echo done 10/22- no evidence of endocarditis       10/28- will continue vancomycin - will complete total of 14 days from negative blood cultures .  Day 5/14.   10/29 -will continue vancomycin -day 6/14 vanco trough -is 20.8.  10/30- will continue to closely monitor vanco trough , day 7/14     10/31- day 8/14 of vancomycin, random vanco-19

## 2018-10-31 NOTE — PROGRESS NOTES
Upon entering room I could hear a air leak around patients trach. The cuff is fully inflated . NP Vanesa Daniels aware . Patient not in distress

## 2018-10-31 NOTE — ASSESSMENT & PLAN NOTE
10/28 review cultures  10/29 ultrasound of the chest bilateral today.  Status post thoracentesis on the right 40 cc of cloudy fluid removed.  Cultures pending.  As per WBC count and glucose level, effusion does not appear complicated.  10/30 pleural fluid cx afb negative, fungal pending . No need for thoracentesis.   10/31 same

## 2018-10-31 NOTE — SUBJECTIVE & OBJECTIVE
Interval History:   Pt was seen and examined at bedside .  She cont on ventilation support . The ct chest show  Lung collapse , possible pn and moderate pleura effusion   10/27-  She remains weak,she had thoracentesis today .  CT scan of the chest/abdomen /pelvis- 10/25-    Moderate right and small left pleural effusion.  The right lung reveals dependent atelectasis of the right middle and lower lobes with near complete collapse of the right lower lobe.  The left lung reveals near-complete collapse of the left lower lobe with patchy infiltrate of the left upper lung zone, possibly representing a pneumonia.  Variable subcutaneous edema is present.  10/28- remains weak, on ventilatory support , repeat cultures remain negtaive     10/29-  She was seen at bedside, remains weak , ultrasound of the mediastinum showed small right pleural effusion with calculated volume of 518ml.  T max 101.  10/30- chest x-ray showed marked amount of haziness throughout the left hemithorax. There is a moderate amount of haziness throughout the lower half of the right hemithorax.  This represents an interval worsening of the appearance of the lungs and is worrisome for pneumonia.    Fever curve has improved.  .10/31-    She remains on ventilatory support.  ABG showed alkalosis .    Chest x-ray showed   bilateral infiltrates are seen throughout the left lung and right lower lobe.  Moderate bilateral pleural effusions, left greater than right-no adverse change from previous imaging   Review of Systems   Unable to perform ROS: Patient nonverbal     Objective:     Vital Signs (Most Recent):  Temp: 98.4 °F (36.9 °C) (10/31/18 0700)  Pulse: 100 (10/31/18 1637)  Resp: (!) 26 (10/31/18 1637)  BP: 130/86 (10/31/18 1300)  SpO2: 99 % (10/31/18 1637) Vital Signs (24h Range):  Temp:  [98.1 °F (36.7 °C)-98.7 °F (37.1 °C)] 98.4 °F (36.9 °C)  Pulse:  [] 100  Resp:  [20-30] 26  SpO2:  [97 %-100 %] 99 %  BP: (127-174)/() 130/86     Weight: 85 kg  (187 lb 6.3 oz)  Body mass index is 26.89 kg/m².    Intake/Output Summary (Last 24 hours) at 10/31/2018 1725  Last data filed at 10/31/2018 1100  Gross per 24 hour   Intake 1410 ml   Output 1275 ml   Net 135 ml      Physical Exam   Constitutional: Vital signs are normal. She appears well-developed. She has a sickly appearance. She appears ill. No distress.   HENT:   Head: Atraumatic.   Mouth/Throat: Oropharynx is clear and moist.   Eyes: EOM and lids are normal. Pupils are equal, round, and reactive to light.   Neck: Neck supple.       Cardiovascular: Normal rate and regular rhythm.   Pulses:       Radial pulses are 1+ on the right side, and 1+ on the left side.        Dorsalis pedis pulses are 1+ on the right side, and 1+ on the left side.   Pulmonary/Chest: No accessory muscle usage. No respiratory distress. She has decreased breath sounds in the right lower field. She has rhonchi. She has rales.   Abdominal: Soft. Bowel sounds are normal. She exhibits no distension. There is no tenderness.       Genitourinary:   Genitourinary Comments: Flores in place   Musculoskeletal:   Diffuse atrophy and +1 tibal edema   Lymphadenopathy:     She has no cervical adenopathy.   Neurological: She is alert.   Awake with eyes open and nods head to questions   Skin: Skin is dry. Capillary refill takes 2 to 3 seconds. No cyanosis.        Has sacral decub ulcer   Nursing note and vitals reviewed.      Significant Labs:   Blood Culture: No results for input(s): LABBLOO in the last 48 hours.  BMP:   Recent Labs   Lab 10/31/18  0335 10/31/18  1134   GLU 97 99    143   K 3.2* 4.5    104   CO2 29 27   BUN 38* 36*   CREATININE 1.1 1.1   CALCIUM 9.0 9.1   MG 2.1  --      CBC:   Recent Labs   Lab 10/30/18  0417 10/31/18  0335   WBC 11.34 10.92   HGB 8.4* 8.6*   HCT 26.7* 27.4*    203     Magnesium:   Recent Labs   Lab 10/30/18  0417 10/31/18  0335   MG 2.0 2.1     Urine Studies: No results for input(s): COLORU, APPEARANCEUA,  PHUR, SPECGRAV, PROTEINUA, GLUCUA, KETONESU, BILIRUBINUA, OCCULTUA, NITRITE, UROBILINOGEN, LEUKOCYTESUR, RBCUA, WBCUA, BACTERIA, SQUAMEPITHEL, HYALINECASTS in the last 48 hours.    Invalid input(s): NESTOR  All pertinent labs within the past 24 hours have been reviewed.    Significant Imaging: I have reviewed and interpreted all pertinent imaging results/findings within the past 24 hours.

## 2018-10-31 NOTE — NURSING
EICU notified of Potassium of 3.2. Lat one drawn was 3.1. Per orders will replace potassium. VSS. Will cont to monitor

## 2018-10-31 NOTE — ASSESSMENT & PLAN NOTE
Accuchecks   SSI   Ha1c 4.8 on 10/19/18    10/27- will continue insulin sliding scale ,closely monitor glucose level    10/28- will continue insulin sliding scale .well controlled    10/30- controlled, will continue insulin sliding scale   `10/31- will continue acuchecks, closely monitor glucose

## 2018-10-31 NOTE — ASSESSMENT & PLAN NOTE
-cont monitor   -PLT > 50 k   - most likely due to sepsis     10/27- will monitor closely ,might need transfusion with bleeding , platelet is now 71.    10/28- platelet is 95 today, will continue to monitor closely.   10/29- serum platelet -124 , no evidence of bleeding noted at this time   10/30- will continue to monitor,improving ,platelet is now 155    10/31- now resolved.

## 2018-10-31 NOTE — PROGRESS NOTES
Vancomycin Progress Notes:    Estimated Creatinine Clearance: 54.8 mL/min (based on SCr of 1.1 mg/dL).  white blood cell count = 10.92  Tmax/24h = 98.7 F  Indication: Severe Sepsis/PNE/UTI  Cultures -MRSA respiratory     Serum creatinine increased to 1.1 from 1  Random = 19.2   Will give a dose of 500 mg iv x  1 today  Random 11/1 @ 1500/Luzma Nolan [facility] 10/31/2018 11:05 AM

## 2018-10-31 NOTE — PROGRESS NOTES
[facility] Hospital Medicine  Progress Note    Patient Name: Carlie Valdez  MRN: [id number]  Patient Class: IP- Inpatient   Admission Date: 10/24/2018  Length of Stay: 7 days  Attending Physician: Philip Ceja MD  Primary Care Provider: Johanna Guzman MD        Subjective:     Principal Problem:Severe sepsis    HPI:  Carlie Valdez is a 72 y.o. female patient with PMHx of Afib, CHF, DM, GERD, neuromyelitis optica spectrum disorder, and HTN, Chronic Trach/Peg/Harris who presents to the Emergency Department for altered mental status which onset gradually last night. Patient currently resides at [facility]. Pt's pulse ox was reported to be 48% per [facility]. Pt was admitted from ED on 10/18 for sepsis and was d/c with IV Vancomycin and ciprofloxacin on 10/22. Per [facility], [facility] nursing staff suctioned a large amount of blood from pt's trach. Pt has had episodes of hemoptysis following a previous bronchoscopy on 10/19/18. Patient presented to ER hypotensive, has chronic harris on arrival, UA +. Chest Xray +pulmonary edema. Sepsis protocol initiated in ED. Lactic 2.6. Received 30 ml/kg bolus with positive response in BP. Patient received IV zosyn and vancomycin. Patient admitted to ICU for severe sepsis, acute on chronic heart failure, and acute on chronic respiratory failure with hypoxia and hypercapnia under the care of [facility] medicine.         Hospital Course:  73 y/o AAF admitted to ICU with a dx of acute respiratory failure , acute on chronic systolic chf exacerbation  And sepsis due to PNA/UTI .She was started on IV lasix  AnD IVAB .  She  Has a (+) urine cx (10/18/18)  For  Klebsiella pneumonaie and e.coli sensitive to  Cipro . She had a (+) sputum cx for MRSA (10/18/18).  The CT chest show Bilateral pleural effusions.  Near complete collapse of the right lower and left lower lobes with partial collapse of the right upper and middle lobes.  Left upper lobe  infiltrate or pneumonia versus aspiration pneumonia.    10/27-  She remains on ventilatory support . Chest x-ray showed interval increased opacification of the left lower lung.  She had thoracentesis done today .  Pleural fluid wbc -1361.  Respiratory culture -10/24- MRSA, candida    10/28-  Remains on ventilatory support .  Pleural fluid cultures are pending .   Chest x-ray showed -Stable cardiomegaly.  Tracheostomy cannula.  Bilateral pleural fluid collections.  Questionable decrease in left pleural fluid versus changes related to patient positioning.    10/29- T max 101.she remains on ventilatory support  .   10/30 - she remains on ventilatory support .  Chest x-ray showed marked amount of haziness throughout the left hemithorax. There is a moderate amount of haziness throughout the lower half of the right hemithorax.  This represents an interval worsening of the appearance of the lungs and is worrisome for pneumonia.  2. There is opacification of the bases of both hemithoraces.  This is characteristic of pleural effusions.  10/31-  She remains on ventilatory support.  ABG showed alkalosis ,she was given acetazolamide ,critical care team add micafungin     Interval History:   Pt was seen and examined at bedside .  She cont on ventilation support . The ct chest show  Lung collapse , possible pn and moderate pleura effusion   10/27-  She remains weak,she had thoracentesis today .  CT scan of the chest/abdomen /pelvis- 10/25-    Moderate right and small left pleural effusion.  The right lung reveals dependent atelectasis of the right middle and lower lobes with near complete collapse of the right lower lobe.  The left lung reveals near-complete collapse of the left lower lobe with patchy infiltrate of the left upper lung zone, possibly representing a pneumonia.  Variable subcutaneous edema is present.  10/28- remains weak, on ventilatory support , repeat cultures remain negtaive     10/29-  She was seen at bedside,  remains weak , ultrasound of the mediastinum showed small right pleural effusion with calculated volume of 518ml.  T max 101.  10/30- chest x-ray showed marked amount of haziness throughout the left hemithorax. There is a moderate amount of haziness throughout the lower half of the right hemithorax.  This represents an interval worsening of the appearance of the lungs and is worrisome for pneumonia.    Fever curve has improved.  .10/31-    She remains on ventilatory support.  ABG showed alkalosis .    Chest x-ray showed   bilateral infiltrates are seen throughout the left lung and right lower lobe.  Moderate bilateral pleural effusions, left greater than right-no adverse change from previous imaging   Review of Systems   Unable to perform ROS: Patient nonverbal     Objective:     Vital Signs (Most Recent):  Temp: 98.4 °F (36.9 °C) (10/31/18 0700)  Pulse: 100 (10/31/18 1637)  Resp: (!) 26 (10/31/18 1637)  BP: 130/86 (10/31/18 1300)  SpO2: 99 % (10/31/18 1637) Vital Signs (24h Range):  Temp:  [98.1 °F (36.7 °C)-98.7 °F (37.1 °C)] 98.4 °F (36.9 °C)  Pulse:  [] 100  Resp:  [20-30] 26  SpO2:  [97 %-100 %] 99 %  BP: (127-174)/() 130/86     Weight: 85 kg (187 lb 6.3 oz)  Body mass index is 26.89 kg/m².    Intake/Output Summary (Last 24 hours) at 10/31/2018 1725  Last data filed at 10/31/2018 1100  Gross per 24 hour   Intake 1410 ml   Output 1275 ml   Net 135 ml      Physical Exam   Constitutional: Vital signs are normal. She appears well-developed. She has a sickly appearance. She appears ill. No distress.   HENT:   Head: Atraumatic.   Mouth/Throat: Oropharynx is clear and moist.   Eyes: EOM and lids are normal. Pupils are equal, round, and reactive to light.   Neck: Neck supple.       Cardiovascular: Normal rate and regular rhythm.   Pulses:       Radial pulses are 1+ on the right side, and 1+ on the left side.        Dorsalis pedis pulses are 1+ on the right side, and 1+ on the left side.   Pulmonary/Chest:  No accessory muscle usage. No respiratory distress. She has decreased breath sounds in the right lower field. She has rhonchi. She has rales.   Abdominal: Soft. Bowel sounds are normal. She exhibits no distension. There is no tenderness.       Genitourinary:   Genitourinary Comments: Flores in place   Musculoskeletal:   Diffuse atrophy and +1 tibal edema   Lymphadenopathy:     She has no cervical adenopathy.   Neurological: She is alert.   Awake with eyes open and nods head to questions   Skin: Skin is dry. Capillary refill takes 2 to 3 seconds. No cyanosis.        Has sacral decub ulcer   Nursing note and vitals reviewed.      Significant Labs:   Blood Culture: No results for input(s): LABBLOO in the last 48 hours.  BMP:   Recent Labs   Lab 10/31/18  0335 10/31/18  1134   GLU 97 99    143   K 3.2* 4.5    104   CO2 29 27   BUN 38* 36*   CREATININE 1.1 1.1   CALCIUM 9.0 9.1   MG 2.1  --      CBC:   Recent Labs   Lab 10/30/18  0417 10/31/18  0335   WBC 11.34 10.92   HGB 8.4* 8.6*   HCT 26.7* 27.4*    203     Magnesium:   Recent Labs   Lab 10/30/18  0417 10/31/18  0335   MG 2.0 2.1     Urine Studies: No results for input(s): COLORU, APPEARANCEUA, PHUR, SPECGRAV, PROTEINUA, GLUCUA, KETONESU, BILIRUBINUA, OCCULTUA, NITRITE, UROBILINOGEN, LEUKOCYTESUR, RBCUA, WBCUA, BACTERIA, SQUAMEPITHEL, HYALINECASTS in the last 48 hours.    Invalid input(s): WRIGHTSUR  All pertinent labs within the past 24 hours have been reviewed.    Significant Imaging: I have reviewed and interpreted all pertinent imaging results/findings within the past 24 hours.    Assessment/Plan:      * Severe sepsis    PNE/UTI  IV Vanc and Ciprofloxacin   IVFs   Blood/urine/sputum cultures pending  Wound care consult  Monitor labs  pulm on board    10/27- related to UTI and MRSA pneumonia -on vancomycin and cipro .  Urine culture -10/18- E coli, Klebsiella   Will closely monitor vanco trough .Vanco trough is 17.     Candida infection      No  need to treat asymptomatic candiduria , will monitor fever curve while on zosyn ,  Fluconazole can interact with amiodarone -QTC prolongation       Pleural effusion    10/27- s/p thoracentesis -will follow cultures to guide therapy .   Pulmonology follow up .  Post procedure chest x-ray reviewed - no evidence of pneumothorax.        Pressure injury of right ankle, stage 3    Cont wound care       10/28- supportive care,wound care .  10/31- will continue wound care /surgical follow up     Pressure injury of left ischium, stage 3    Cont wound care        Incontinence associated dermatitis    Cont wound care        Thrombocytopenia    -cont monitor   -PLT > 50 k   - most likely due to sepsis     10/27- will monitor closely ,might need transfusion with bleeding , platelet is now 71.    10/28- platelet is 95 today, will continue to monitor closely.   10/29- serum platelet -124 , no evidence of bleeding noted at this time   10/30- will continue to monitor,improving ,platelet is now 155    10/31- now resolved.       Acute on chronic respiratory failure with hypoxia and hypercapnia    Pulmonology consult for critical care and vent management   Trach, vent dependent   CT chest shoe b/l lower lung collapse , moderate pleura effusion  And pulmonary infiltrate   Duonebs tx       10/27- will continue close critical care follow up , ventilatory care and support .  Might need bronchoscopy, will monitor closely post bronchoscopy      10/28-critical care follow up,continue ventilatory support .     10/29- will continue ventilatory support, critical care follow up , supportive care .  Will discuss with pulmonology about discharge options  10/30 - will plan to have a family meeting and will continue ventilatory support ,discussed with pulmonology ,will continue diuresis   10/31- will continue critical care follow up, ventilatory weaning . Will plan to transfer back to NH when she is back at her baseline     Pressure injury of  coccygeal region, unstageable    Wound care consult     10/27- will follow wound care , supportive care ,  Continue nursing care      10/28- continue wound care     10/29 will continue to follow wound care , zosyn was added to IV vancomycin, follow cultures     MRSA bacteremia    Repeat Blood cx pending   IV vanco and cipro   10/27- Blood culture-10/22- negative  Day 4/14 of therapy for MRSA bacteremia.  Cardiac echo done 10/22- no evidence of endocarditis       10/28- will continue vancomycin - will complete total of 14 days from negative blood cultures .  Day 5/14.   10/29 -will continue vancomycin -day 6/14 vanco trough -is 20.8.  10/30- will continue to closely monitor vanco trough , day 7/14     10/31- day 8/14 of vancomycin, random vanco-19     Urinary tract infection associated with indwelling urethral catheter    - Urine culture pending  - Will continue IV Ciprofloxacin      10/27 treated with cipro s/p therapy with zosyn during previous Hospital admit this month     10/28- will stop cipro., will monitor closely .  10/29 no need to treat candiduria -will plan to change harris      Deep tissue injury    Wound care consult        Hypothyroidism    Resume levothyroxine     10/28- continue levothyroxine  10/30- on synthroid      Acute on chronic systolic congestive heart failure w/ pulm edema    - CXR with vascular congestion with small bilateral effusion  - cont IV lasix   - 2D ECHO on 10/22/18 showed (EF 40-45%). DD, Mild AR, Mild to moderate MR, Moderate to severe TR, and    Pulmonary HTN.   - Strict I&Os  - Daily weights  reviewed     10/27- will continue diuresis with furosemide .Closely monitor renal panel  10/28- will continue lasix at 40mg bid    10/30- will continue diuresis , will closely monitor serum creatinine         Type 2 diabetes mellitus with kidney complication, without long-term current use of insulin    Accuchecks   SSI   Ha1c 4.8 on 10/19/18    10/27- will continue insulin sliding scale  ,closely monitor glucose level    10/28- will continue insulin sliding scale .well controlled    10/30- controlled, will continue insulin sliding scale   `10/31- will continue acuchecks, closely monitor glucose     Uncomplicated severe persistent asthma    Cont breathing tx  reviewed       10/27- will continue duonebs as tolerated , pulmonology follow up      Chronic kidney disease (CKD), stage III (moderate)    Stable   Monitor renal function     10/28- serum creatinine is now 0.8     Elevated troponin    Likely due to demand ischemia  Initial troponin 0.054  Serial troponin        Pneumonia due to infectious organism      10/31- respiratory culture-MRSA-will continue vanco  Critical care team added Micafungin   Will continue supportive care       Chronic atrial fibrillation    Heart rate controlled   Resume Xarelto . No sing of acute bleeding at this time   Cont  Amiodarone     10/27- will continue amiodarone/xarelto , will continue close monitoring     10/28- continue amiodarone/xarelto ,  10/29 -rate better controlled, will continue amiodarone, xarelto  10/31- will continue Xarelto, rate control with amiodarone       VTE Risk Mitigation (From admission, onward)        Ordered     rivaroxaban tablet 15 mg  Nightly      10/26/18 1324     Place sequential compression device  Until discontinued      10/24/18 1141          Critical care time spent on the evaluation and treatment of severe organ dysfunction, review of pertinent labs and imaging studies, discussions with consulting providers and discussions with patient/family: 45 minutes.    Philip Ceja MD  Department of Hospital Medicine   [facility]

## 2018-10-31 NOTE — PROGRESS NOTES
Having to over inflate cuff so that patient isn't able to talk around cuff and to get tidal volumes on vent.   aware of issue

## 2018-10-31 NOTE — ASSESSMENT & PLAN NOTE
Aspiration performed at bedside, no purulent material drained.  Continue wound care.  Per Dr. Jeansonne/wound care service.  General surgery should be called about this only for acute issues

## 2018-10-31 NOTE — PROCEDURES
"Carlie Valdez is a 72 y.o. female patient.    Temp: 98.1 °F (36.7 °C) (10/31/18 1200)  Pulse: 99 (10/31/18 1700)  Resp: (!) 25 (10/31/18 1700)  BP: (!) 148/107 (10/31/18 1700)  SpO2: 100 % (10/31/18 1700)  Weight: 85 kg (187 lb 6.3 oz) (10/25/18 0300)  Height: 5' 10" (177.8 cm) (10/25/18 0300)       Procedures     The patient has a tracheostomy tube as she is vent dependent.  There was an air leak around it.    The pulmonology service requested that the tracheostomy tube be changed.    Procedure tracheostomy tube change.    Consent was obtained from her .  The old tracheostomy tube had its balloon deflated.  The tracheostomy tube was removed. A new 8 Greek Shiley balloon tracheostomy tube was placed.  The balloon was inflated.  The patient was connected to the ventilator.  Saturations remained between 92 and 100%.    The procedure was tolerated well.    Further management per pulmonology    Jaison Estes  10/31/2018  "

## 2018-11-01 PROBLEM — D69.6 THROMBOCYTOPENIA: Status: RESOLVED | Noted: 2018-01-01 | Resolved: 2018-01-01

## 2018-11-01 PROBLEM — A41.9 SEVERE SEPSIS: Status: RESOLVED | Noted: 2018-01-01 | Resolved: 2018-01-01

## 2018-11-01 PROBLEM — R65.20 SEVERE SEPSIS: Status: RESOLVED | Noted: 2018-01-01 | Resolved: 2018-01-01

## 2018-11-01 PROBLEM — R78.81 MRSA BACTEREMIA: Status: RESOLVED | Noted: 2018-01-01 | Resolved: 2018-01-01

## 2018-11-01 PROBLEM — B95.62 MRSA BACTEREMIA: Status: RESOLVED | Noted: 2018-01-01 | Resolved: 2018-01-01

## 2018-11-01 NOTE — PLAN OF CARE
Problem: Patient Care Overview  Goal: Plan of Care Review  Outcome: Ongoing (interventions implemented as appropriate)  Plan of care reviewed with daughter and spouse. Patient stable. VSS. Patient trach dependent. Drowsy throughout shift. Doesn't follow commands. Can mouth words intermittently, and state name intermittently. Responds to voice. FIO2 at 60. Oxygen saturation at . Pt tolerating ventilator. Copious thick, tan secretions. IV antibiotics. L brachial midline clean dry and intact. Afebrile. PEG tube to LUQ. Cont tube feed of peptamen intense VHP at 45 ml/hr with goal at 45 ml/hr. Benoprotien. Tolerating tube feeding. Blood glucose monitoring. Liquid loose brown stool x2. Turn every two hours to prevent skin breakdown. Specialty bed. Boots in place. Wound care done per orders. Flores to gravity. Clear yellow urine. Good UOP. Will cont to monitor.

## 2018-11-01 NOTE — PROGRESS NOTES
Follow up visit with Ms. José Miguel. Primary nurse BRIDGER Camargo RN and Dr. Jeansonne present at bedside for assessment. Patient turned to right side with assistance.  Mesalt/tegaderm dressing removed and site cleansed with sterile saline and patted dry.  Small amount sanguinous drainage noted to wound edge at 5 o'clock.  Slough covered wound bed again noted, slough is now pale yellow and moist, no fluctunance or purulence noted. Liz wound skin painted with cavilon.  Covered with mesalt gauze and tegaderm to seal. Recommend continued wound care per orders.  During wound care, patient de-satted. Wound care suspended, patient turned supine and sat up, and treated per GIULIANO Alexis and RT Nelia. Visited by RIGOBERTO Chow during this time. Once stablilized and sats increased, wound care completed, patient then turned with foam wedge to left side, heel boots on , remains on specialty ANGIE pulsate bed.  Will follow.

## 2018-11-01 NOTE — ASSESSMENT & PLAN NOTE
Change Vanc to Zyvox  Cont Zosyn Day 4 of 7  Tracheal suctioning  Cont Duonebs and add Mucomyst  Afeb now with normal WBC

## 2018-11-01 NOTE — PROGRESS NOTES
[facility]   Adult Nutrition  Progress Note    SUMMARY     Recommendations    Recommendation/Intervention: 1.Continue current TF regimen. 2. Check residuals Q4 hours. Hold if > 500 cc. 3. Will continue to monitor.   Goals: Meet > 85 % EEN/EPN while admitted  Nutrition Goal Status: goal met   Communication of RD Recs: Reviewed w/ NP    Reason for Assessment    Reason for Assessment: RD f/u   Dx:  1. Sepsis, due to unspecified organism    2. Altered mental status    3. Hypothermia, initial encounter    4. Thrombocytopenia    5. Urinary tract infection associated with indwelling urethral catheter, initial encounter    6. Hemoptysis    7. Severe sepsis    8. Acute on chronic respiratory failure with hypoxia and hypercapnia    9. Acute on chronic systolic congestive heart failure    10. Chronic atrial fibrillation    11. Chronic kidney disease (CKD), stage III (moderate)    12. MRSA bacteremia    13. Pressure injury of coccygeal region, unstageable    14. Uncomplicated severe persistent asthma    15. Arrhythmia    16. History of EKG    17. Pleural effusion on right    18. Bilateral pleural effusion    19. Cardiac rhythm disorder or disturbance or change    20. Leakage of tracheostomy site      Past Medical History:   Diagnosis Date    Anticoagulant long-term use     Arthritis     Asthma     Atrial fibrillation     Blood clot of vein in shoulder area     Cardiac defibrillator in place     CHF (congestive heart failure)     Chronic knee pain     Diabetes mellitus type 2 without retinopathy 12/19/2016    Diaphragmatic hernia without obstruction and without gangrene 9/14/2015    GERD (gastroesophageal reflux disease)     Hypertension     Immune deficiency disorder     Primary open angle glaucoma (POAG) of both eyes, severe stage 6/1/2018       Interdisciplinary Rounds: attended  General Information Comments: Pt currently in ICU. Pt is a resident at [facility]. Pt admitted w/ AMS. Pt has  "a trach and PEG. Pt on mechanical ventilation. Pt was recently discharged on 10/22. Per epic records, no wt loss in the past 3 months. NFPE not completed due to pt with other healthcare providers at time of visit. Will complete at follow up. Reviewed TF recs w/ NP today, TF ordered per RD recs.   10.26.18. Pt remains in ICU, on mechanical ventilation. Pt tolerating TF. Pt confused. Unable to answer my questions appropriately. No family members at bedside. Per NFPE (10.26.18) no significant muscle wasting/fat depletion noted.   10.29.18. Pt remains in ICU. Pt on the vent. Pt confused. Pt on TF, tolerating. Reviewed TF recs w/ NP via secure chat today.   11.1.18. Pt resting on the vent. No family members at bedside.  Pt tolerating TF. Reviewed TF recs w/ NP this am.   Nutrition Discharge Planning: Patient to receive adequate intake via PEG with tolerance.     Nutrition Risk Screen    Nutrition Risk Screen: tube feeding or parenteral nutrition    Nutrition/Diet History    Do you have any cultural, spiritual, Taoist conflicts, given your current situation?: none  Anthropometrics    Temp: 99.3 °F (37.4 °C)  Height Method: Estimated  Height: 5' 10" (177.8 cm)  Height (inches): 70 in  Weight Method: Bed Scale  Weight: 85 kg (187 lb 6.3 oz)  Weight (lb): 187.39 lb  Ideal Body Weight (IBW), Female: 150 lb  % Ideal Body Weight, Female (lb): 124.93 lb  BMI (Calculated): 26.9  BMI Grade: 25 - 29.9 - overweight       Lab/Procedures/Meds    Pertinent Labs Reviewed: reviewed  BMP  Lab Results   Component Value Date     11/01/2018    K 3.7 11/01/2018     11/01/2018    CO2 25 11/01/2018    BUN 37 (H) 11/01/2018    CREATININE 1.1 11/01/2018    CALCIUM 9.1 11/01/2018    ANIONGAP 13 11/01/2018    ESTGFRAFRICA 58 (A) 11/01/2018    EGFRNONAA 50 (A) 11/01/2018     /lastphos  Lab Results   Component Value Date    CALCIUM 9.1 11/01/2018    PHOS 2.8 11/01/2018       Lab Results   Component Value Date    ALBUMIN 2.1 (L) " 10/24/2018     Recent Labs   Lab 11/01/18  1143   POCTGLUCOSE 93     Lab Results   Component Value Date    HGBA1C 4.9 10/19/2018       Pertinent Medications Reviewed: reviewed    Physical Findings/Assessment    Overall Physical Appearance: on ventilator support  Tubes: (PEG)  Oral/Mouth Cavity: (dry)  Skin: (pressure injury malleolus stg 2 and coccyx unstageable )    Estimated/Assessed Needs    Weight Used For Calorie Calculations: 85 kg (187 lb 6.3 oz)  Energy Calorie Requirements (kcal):1587  Energy Need Method: [facility]  Protein Requirements: 102 - 170 g  Weight Used For Protein Calculations: 85 kg (187 lb 6.3 oz)     Fluid Need Method: RDA Method(or per MD)  RDA Method (mL):1587  CHO Requirement: 50 % EEN      Nutrition Prescription Ordered    Current Diet Order: NPO  Current Nutrition Support Formula Ordered: Peptamen Intense VHP(at 65 ml/hr + 1 pack of beneprotein daily)      Evaluation of Received Nutrient/Fluid Intake    Enteral Calories (kcal): 1585  Enteral Protein (gm): 150  % Kcal Needs: 100  % Protein Needs: 100     Intake/Output Summary (Last 24 hours) at 11/1/2018 1214  Last data filed at 11/1/2018 1100  Gross per 24 hour   Intake 1865 ml   Output 1183 ml   Net 682 ml       % Meal Intake: NPO    Nutrition Risk    Level of Risk/Frequency of Follow-up: (2xweekly)     Assessment and Plan  Nutrition Problem  Swallowing difficulty    Related to (etiology):   Dysphagia     Signs and Symptoms (as evidenced by):   Pt has a PEG x nutrition support    Interventions/Recommendations (treatment strategy):  See above     Nutrition Diagnosis Status:   Continues      Monitor and Evaluation    Food and Nutrient Intake: energy intake, enteral nutrition intake  Food and Nutrient Adminstration: enteral and parenteral nutrition administration  Anthropometric Measurements: weight  Biochemical Data, Medical Tests and Procedures: electrolyte and renal panel, glucose/endocrine profile  Nutrition-Focused Physical Findings:  overall appearance, skin     Nutrition Follow-Up    RD Follow-up?: Yes

## 2018-11-01 NOTE — ASSESSMENT & PLAN NOTE
- Urine culture pending  - Will continue IV Ciprofloxacin      10/27 treated with cipro s/p therapy with zosyn during previous Hospital admit this month     10/28- will stop cipro., will monitor closely .  10/29 no need to treat candiduria -will plan to change harris     11/1- treated

## 2018-11-01 NOTE — SUBJECTIVE & OBJECTIVE
Review of Systems   Unable to perform ROS: Patient nonverbal       Objective:     Vital Signs (Most Recent):  Temp: 99.3 °F (37.4 °C) (11/01/18 1146)  Pulse: 99 (11/01/18 1202)  Resp: 16 (11/01/18 1202)  BP: 131/80 (11/01/18 1147)  SpO2: 97 % (11/01/18 1202) Vital Signs (24h Range):  Temp:  [97.7 °F (36.5 °C)-99.3 °F (37.4 °C)] 99.3 °F (37.4 °C)  Pulse:  [] 99  Resp:  [7-27] 16  SpO2:  [60 %-100 %] 97 %  BP: ()/() 131/80     Weight: 85 kg (187 lb 6.3 oz)  Body mass index is 26.89 kg/m².      Intake/Output Summary (Last 24 hours) at 11/1/2018 1451  Last data filed at 11/1/2018 1100  Gross per 24 hour   Intake 1720 ml   Output 1083 ml   Net 637 ml       Physical Exam   Constitutional: Vital signs are normal. She appears well-developed. She appears cachectic. She has a sickly appearance. She appears ill. No distress.   HENT:   Head: Atraumatic.   Mouth/Throat: Oropharynx is clear and moist.   Eyes: EOM and lids are normal. Pupils are equal, round, and reactive to light.   Neck: Neck supple.       Cardiovascular: Normal rate and regular rhythm.   Pulses:       Radial pulses are 1+ on the right side, and 1+ on the left side.        Dorsalis pedis pulses are 1+ on the right side, and 1+ on the left side.   Pulmonary/Chest: No accessory muscle usage. No respiratory distress. She has decreased breath sounds in the right lower field, the left middle field and the left lower field. She has rales.   Abdominal: Soft. Bowel sounds are normal. She exhibits no distension. There is no tenderness.       Genitourinary:   Genitourinary Comments: Flores in place   Musculoskeletal:   Diffuse atrophy and trace edema   Lymphadenopathy:     She has no cervical adenopathy.   Neurological: She is alert.   Awake with eyes open and nods head to questions   Skin: Skin is dry. Capillary refill takes 2 to 3 seconds. No cyanosis.            Vents:  Vent Mode: A/C (11/01/18 1146)  Ventilator Initiated: Yes (10/24/18 0630)  Set  Rate: 16 bmp (11/01/18 1146)  Vt Set: 0 mL (11/01/18 1146)  Pressure Support: 0 cmH20 (11/01/18 1146)  PEEP/CPAP: 10 cmH20 (11/01/18 1146)  Oxygen Concentration (%): 60 (11/01/18 1147)  Peak Airway Pressure: 31 cmH2O (11/01/18 1146)  Plateau Pressure: 0 cmH20 (11/01/18 1146)  Total Ve: 7.48 mL (11/01/18 1146)  F/VT Ratio<105 (RSBI): (!) 39.66 (11/01/18 1146)    Lines/Drains/Airways     Drain                 Gastrostomy/Enterostomy 10/19/18 Percutaneous endoscopic gastrostomy (PEG) LUQ 13 days         Urethral Catheter 10/24/18 1030 16 Fr. 8 days          Airway                 Surgical Airway 07/18/18 1620 Shiley 105 days          Pressure Ulcer                 Pressure Injury 10/24/18 1100 Right medial Malleolus Stage 3 8 days         Pressure Injury 10/24/18 1105 Left Ischial tuberosity Stage 3 8 days         Pressure Injury 10/24/18 Coccyx Unstageable 8 days          Peripheral Intravenous Line                 Midline Catheter Insertion/Assessment  - Double Lumen 10/19/18 0130 Left brachial vein 13 days                Significant Labs:    CBC/Anemia Profile:  Recent Labs   Lab 10/31/18  0335 11/01/18  0407   WBC 10.92 7.68   HGB 8.6* 8.6*   HCT 27.4* 28.5*    255   MCV 87 89   RDW 18.2* 18.8*        Chemistries:  Recent Labs   Lab 10/31/18  0335 10/31/18  1134 11/01/18  0407    143 143   K 3.2* 4.5 3.7    104 105   CO2 29 27 25   BUN 38* 36* 37*   CREATININE 1.1 1.1 1.1   CALCIUM 9.0 9.1 9.1   MG 2.1  --  2.3   PHOS 2.4*  --  2.8       ABGs:   Recent Labs   Lab 11/01/18  0454   PH 7.449   PCO2 44.7   HCO3 31.1*   POCSATURATED 99   BE 7     All pertinent labs within the past 24 hours have been reviewed.    Significant Imaging:  CXR: I have reviewed all pertinent results/findings within the past 24 hours and my personal findings are:  There has been interval worsening of the appearance of the lungs. There is a moderate amount of haziness throughout both hemithoraces with opacification of the  bases of both hemithoraces.  This is characteristic of moderate-sized bilateral layering pleural effusions with associated atelectasis.

## 2018-11-01 NOTE — PROGRESS NOTES
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  She also has a past medical history of Anticoagulant long-term use, Arthritis, Asthma, Atrial fibrillation, Blood clot of vein in shoulder area, Cardiac defibrillator in place, CHF (congestive heart failure), Chronic knee pain, Diabetes mellitus type 2 without retinopathy (12/19/2016), Diaphragmatic hernia without obstruction and without gangrene (9/14/2015), GERD (gastroesophageal reflux disease), Hypertension, Immune deficiency disorder, and Primary open angle glaucoma (POAG) of both eyes, severe stage (6/1/2018).  She was also recently hospitalized here at [facility] in ICU 10/19/18 admitted and discharged 2 days ago on 10/22/2018 for Acute on chronic hypoxic resp failure with total left lung opacification as well as severe sepsis with MRSA PNA and bacteremia as well as E.Coli and Klebsiella UTI.  She received a bronch here and left lung cleared with suctioning, IVAB and nebs.  She had some endobronchial bleeding with clots also.  She was discharged to NH on 10/22 on Cipro via PEG and IV Vanc.  She returned back to [facility] ED early this AM due to decreased LOC at NH, multiple episodes of diarrhea, per [facility] nursing home suctioned a large amount of blood from trach.  In ED temp 89.9 F and suctioned blood clots from Trach.  Plt count 38.  IVAB resumed and given IV Lasix        Hospital/ICU Course:  10/24 - Admitted to ICU on mech vent and warming via artic sun.   She is awake and responsive not requiring pressor support  10/25/2018 Chart reviewed . Bronchoscopy last admission for mucous pluggine on the left. pleural effusion on Chest X Ray on the right. history of Congestive Heart failure:Moderate left ventricular systolic dysfunction with a visually estimated ejection fraction of 40%.  Grade 3 diastolic dysfunction consistent with restrictive physiology with elevated left atrial pressure.  Severe tricuspid regurgitation.  Moderate pulmonary hypertension with an estimated pulmonary artery systolic pressure 55 mmHg.  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   10/26/2018  CT of chest with bilateral effusions and atelectasis with right lower lobe and left lower lobe collapse  10/28 Little change, mild diuresis continues  10/29 seen and examined.  Vent dependent.  T-max 101° yesterday.  On enteral feeding via PEG.  10/30 seen and examined. Tmax 98.6 last 24 hrs. On higher FiO2 today 70%. Had an episode of hypoxemia, bradycardia earlier sp suctioning. CXR , ABG reviewed. On enteral feeding.   10/31 seen and examined. Resp therapist noticed cuff leak despite good cuff pressure. Afebrile. No presssors. On Fio2 60% now . RR decreased to 16 . No pressors. CXR , ABG reviewed.   11/1 - Resting on vent still mucus plugging in no distress and VSS.  Still with small air leak around trach but returning good volumes and asymptomatic.     Review of Systems   Unable to perform ROS: Patient nonverbal       Objective:     Vital Signs (Most Recent):  Temp: 99.3 °F (37.4 °C) (11/01/18 1146)  Pulse: 99 (11/01/18 1202)  Resp: 16 (11/01/18 1202)  BP: 131/80 (11/01/18 1147)  SpO2: 97 % (11/01/18 1202) Vital Signs (24h Range):  Temp:  [97.7 °F (36.5 °C)-99.3 °F (37.4 °C)] 99.3 °F (37.4 °C)  Pulse:  [] 99  Resp:  [7-27] 16  SpO2:  [60 %-100 %] 97 %  BP: ()/() 131/80     Weight: 85 kg (187  lb 6.3 oz)  Body mass index is 26.89 kg/m².      Intake/Output Summary (Last 24 hours) at 11/1/2018 1451  Last data filed at 11/1/2018 1100  Gross per 24 hour   Intake 1720 ml   Output 1083 ml   Net 637 ml       Physical Exam   Constitutional: Vital signs are normal. She appears well-developed. She appears cachectic. She has a sickly appearance. She appears ill. No distress.   HENT:   Head: Atraumatic.   Mouth/Throat: Oropharynx is clear and moist.   Eyes: EOM and lids are normal. Pupils are equal, round, and reactive to light.   Neck: Neck supple.       Cardiovascular: Normal rate and regular rhythm.   Pulses:       Radial pulses are 1+ on the right side, and 1+ on the left side.        Dorsalis pedis pulses are 1+ on the right side, and 1+ on the left side.   Pulmonary/Chest: No accessory muscle usage. No respiratory distress. She has decreased breath sounds in the right lower field, the left middle field and the left lower field. She has rales.   Abdominal: Soft. Bowel sounds are normal. She exhibits no distension. There is no tenderness.       Genitourinary:   Genitourinary Comments: Flores in place   Musculoskeletal:   Diffuse atrophy and trace edema   Lymphadenopathy:     She has no cervical adenopathy.   Neurological: She is alert.   Awake with eyes open and nods head to questions   Skin: Skin is dry. Capillary refill takes 2 to 3 seconds. No cyanosis.            Vents:  Vent Mode: A/C (11/01/18 1146)  Ventilator Initiated: Yes (10/24/18 0630)  Set Rate: 16 bmp (11/01/18 1146)  Vt Set: 0 mL (11/01/18 1146)  Pressure Support: 0 cmH20 (11/01/18 1146)  PEEP/CPAP: 10 cmH20 (11/01/18 1146)  Oxygen Concentration (%): 60 (11/01/18 1147)  Peak Airway Pressure: 31 cmH2O (11/01/18 1146)  Plateau Pressure: 0 cmH20 (11/01/18 1146)  Total Ve: 7.48 mL (11/01/18 1146)  F/VT Ratio<105 (RSBI): (!) 39.66 (11/01/18 1146)    Lines/Drains/Airways     Drain                 Gastrostomy/Enterostomy 10/19/18 Percutaneous endoscopic  gastrostomy (PEG) LUQ 13 days         Urethral Catheter 10/24/18 1030 16 Fr. 8 days          Airway                 Surgical Airway 07/18/18 1620 Shiley 105 days          Pressure Ulcer                 Pressure Injury 10/24/18 1100 Right medial Malleolus Stage 3 8 days         Pressure Injury 10/24/18 1105 Left Ischial tuberosity Stage 3 8 days         Pressure Injury 10/24/18 Coccyx Unstageable 8 days          Peripheral Intravenous Line                 Midline Catheter Insertion/Assessment  - Double Lumen 10/19/18 0130 Left brachial vein 13 days                Significant Labs:    CBC/Anemia Profile:  Recent Labs   Lab 10/31/18  0335 11/01/18  0407   WBC 10.92 7.68   HGB 8.6* 8.6*   HCT 27.4* 28.5*    255   MCV 87 89   RDW 18.2* 18.8*        Chemistries:  Recent Labs   Lab 10/31/18  0335 10/31/18  1134 11/01/18  0407    143 143   K 3.2* 4.5 3.7    104 105   CO2 29 27 25   BUN 38* 36* 37*   CREATININE 1.1 1.1 1.1   CALCIUM 9.0 9.1 9.1   MG 2.1  --  2.3   PHOS 2.4*  --  2.8       ABGs:   Recent Labs   Lab 11/01/18  0454   PH 7.449   PCO2 44.7   HCO3 31.1*   POCSATURATED 99   BE 7     All pertinent labs within the past 24 hours have been reviewed.    Significant Imaging:  CXR: I have reviewed all pertinent results/findings within the past 24 hours and my personal findings are:  There has been interval worsening of the appearance of the lungs. There is a moderate amount of haziness throughout both hemithoraces with opacification of the bases of both hemithoraces.  This is characteristic of moderate-sized bilateral layering pleural effusions with associated atelectasis.      Assessment/Plan:               Pulmonary   * Acute on chronic respiratory failure with hypoxia and hypercapnia, Vent Dependent w/ Trach    Cont full vent support  VAP prophylaxis  Vent settings reviewed and adjusted     Uncomplicated severe persistent asthma    Cont Duonebs, Formeterol and Budesonide     Pleural effusion     Status post thoracentesis 10/29 on the right 40 cc of cloudy fluid removed.  Cultures AFB negative and fungal still pending.  As per WBC count and glucose level, effusion does not appear complicated.  No need for repeat thoracentesis.      Pneumonia due to methicillin resistant Staphylococcus aureus    Change Vanc to Zyvox  Cont Zosyn Day 4 of 7  Tracheal suctioning  Cont Duonebs and add Mucomyst  Afeb now with normal WBC     Cardiac/Vascular   Chronic atrial fibrillation    HR controlled  Cont Rivaroxaban and Amiodarone     Acute on chronic systolic congestive heart failure w/ pulm edema    Diuresed for -4 liter fluid balance  Follow daily weights  Currently off diuretics       Renal/   Chronic kidney disease (CKD), stage III (moderate)    Creatine 1.1  Monitor I/Os and creatine      Urinary tract infection associated with indwelling urethral catheter    Culture + Candida Tropicalis  Cont Micafungin   ID   Candida infection    10/29 Candida present in sputum and in urine.  Patient afebrile.  Start IV fluconazole if okay with Infectious Disease.  10/31 started on Micafungin by ID      Endocrine   Type 2 diabetes mellitus with kidney complication, without long-term current use of insulin    Cont SSI  Glucose stable  Monitor for insulin toxicity     Hypothyroidism    Cont Levothyroxine     Other   Pressure injury of coccygeal region, unstageable    Wound care following  Low pressure bed  Turn Q 2 hours       Preventive Measures and Monitoring:   Stress Ulcer: Pepcid  Nutrition: TF per nutrition recs  Glucose control: Stable  Bowel prophylaxis: Miralax  DVT prophylaxis: SCDs  Hx CAD on B-Blocker: no hx CAD  Head of Bed/Reposition: Elevate HOB and turn Q1-2 hours   Early Mobility: bed rest  Chronic Trach/PEG  Central Line LUE Mid line Day: unknown  Flores Day: #9  IVAB Day: #14  Code Status: Full  Pneumonia Vaccine: ordered  Flu Vaccine: ordered    Counseling/Consultation:I have discussed the care of this patient in  detail with the bedside nursing staff and Dr. Sánchez and Dr. Ceja.    Critical Care Time: 49 minutes  Critical secondary to Patient has a condition that poses threat to life and bodily function: Acute on Chronic Resp Failure and Mech Vent management      Critical care was time spent personally by me on the following activities: development of treatment plan with patient or surrogate and bedside caregivers, discussions with consultants, evaluation of patient's response to treatment, examination of patient, ordering and performing treatments and interventions, ordering and review of laboratory studies, ordering and review of radiographic studies, pulse oximetry, re-evaluation of patient's condition. This critical care time did not overlap with that of any other provider or involve time for any procedures.     Philip Myers NP  Critical Care Medicine  [facility]

## 2018-11-01 NOTE — PROGRESS NOTES
[facility] Hospital Medicine  Progress Note    Patient Name: Carlie Valdez  MRN: [id number]  Patient Class: IP- Inpatient   Admission Date: 10/24/2018  Length of Stay: 8 days  Attending Physician: Philip Ceja MD  Primary Care Provider: Johanna Guzman MD        Subjective:     Principal Problem:Acute on chronic respiratory failure with hypoxia and hypercapnia    HPI:  Carlie Valdez is a 72 y.o. female patient with PMHx of Afib, CHF, DM, GERD, neuromyelitis optica spectrum disorder, and HTN, Chronic Trach/Peg/Harris who presents to the Emergency Department for altered mental status which onset gradually last night. Patient currently resides at [facility]. Pt's pulse ox was reported to be 48% per [facility]. Pt was admitted from ED on 10/18 for sepsis and was d/c with IV Vancomycin and ciprofloxacin on 10/22. Per [facility], [facility] nursing staff suctioned a large amount of blood from pt's trach. Pt has had episodes of hemoptysis following a previous bronchoscopy on 10/19/18. Patient presented to ER hypotensive, has chronic harris on arrival, UA +. Chest Xray +pulmonary edema. Sepsis protocol initiated in ED. Lactic 2.6. Received 30 ml/kg bolus with positive response in BP. Patient received IV zosyn and vancomycin. Patient admitted to ICU for severe sepsis, acute on chronic heart failure, and acute on chronic respiratory failure with hypoxia and hypercapnia under the care of \[facility]\"" medicine.         Hospital Course:  73 y/o AAF admitted to ICU with a dx of acute respiratory failure , acute on chronic systolic chf exacerbation  And sepsis due to PNA/UTI .She was started on IV lasix  AnD IVAB .  She  Has a (+) urine cx (10/18/18)  For  Klebsiella pneumonaie and e.coli sensitive to  Cipro . She had a (+) sputum cx for MRSA (10/18/18).  The CT chest show Bilateral pleural effusions.  Near complete collapse of the right lower and left lower lobes with partial collapse of the  right upper and middle lobes.  Left upper lobe infiltrate or pneumonia versus aspiration pneumonia.    10/27-  She remains on ventilatory support . Chest x-ray showed interval increased opacification of the left lower lung.  She had thoracentesis done today .  Pleural fluid wbc -1361.  Respiratory culture -10/24- MRSA, candida    10/28-  Remains on ventilatory support .  Pleural fluid cultures are pending .   Chest x-ray showed -Stable cardiomegaly.  Tracheostomy cannula.  Bilateral pleural fluid collections.  Questionable decrease in left pleural fluid versus changes related to patient positioning.    10/29- T max 101.she remains on ventilatory support  .   10/30 - she remains on ventilatory support .  Chest x-ray showed marked amount of haziness throughout the left hemithorax. There is a moderate amount of haziness throughout the lower half of the right hemithorax.  This represents an interval worsening of the appearance of the lungs and is worrisome for pneumonia.  2. There is opacification of the bases of both hemithoraces.  This is characteristic of pleural effusions.  10/31-  She remains on ventilatory support.  ABG showed alkalosis ,she was given acetazolamide ,critical care team added micafungin     11/01-  She was seen in the ICU,   Chest x-ray showed - interval worsening of the appearance of the lungs. There is a moderate amount of haziness throughout both hemithoraces with opacification of the bases of both hemithoraces.  This is characteristic of moderate-sized bilateral layering pleural effusions with associated atelectasis.    Interval History:   Pt was seen and examined at bedside .  She cont on ventilation support . The ct chest show  Lung collapse , possible pn and moderate pleura effusion   10/27-  She remains weak,she had thoracentesis today .  CT scan of the chest/abdomen /pelvis- 10/25-    Moderate right and small left pleural effusion.  The right lung reveals dependent atelectasis of the right  middle and lower lobes with near complete collapse of the right lower lobe.  The left lung reveals near-complete collapse of the left lower lobe with patchy infiltrate of the left upper lung zone, possibly representing a pneumonia.  Variable subcutaneous edema is present.  10/28- remains weak, on ventilatory support , repeat cultures remain negtaive     10/29-  She was seen at bedside, remains weak , ultrasound of the mediastinum showed small right pleural effusion with calculated volume of 518ml.  T max 101.  10/30- chest x-ray showed marked amount of haziness throughout the left hemithorax. There is a moderate amount of haziness throughout the lower half of the right hemithorax.  This represents an interval worsening of the appearance of the lungs and is worrisome for pneumonia.    Fever curve has improved.  .10/31-    She remains on ventilatory support.  ABG showed alkalosis .    Chest x-ray showed   bilateral infiltrates are seen throughout the left lung and right lower lobe.  Moderate bilateral pleural effusions, left greater than right-no adverse change from previous imaging     11/01- remains on ventilatory support , she has low grade fever , cbc showed wbc of 7.68  Review of Systems   Unable to perform ROS: Patient nonverbal     Objective:     Vital Signs (Most Recent):  Temp: 100.2 °F (37.9 °C) (11/01/18 1522)  Pulse: 100 (11/01/18 1600)  Resp: 20 (11/01/18 1600)  BP: (!) 140/95 (11/01/18 1600)  SpO2: 100 % (11/01/18 1600) Vital Signs (24h Range):  Temp:  [97.7 °F (36.5 °C)-100.2 °F (37.9 °C)] 100.2 °F (37.9 °C)  Pulse:  [] 100  Resp:  [7-27] 20  SpO2:  [60 %-100 %] 100 %  BP: ()/() 140/95     Weight: 82 kg (180 lb 12.4 oz)  Body mass index is 25.94 kg/m².    Intake/Output Summary (Last 24 hours) at 11/1/2018 1621  Last data filed at 11/1/2018 1600  Gross per 24 hour   Intake 1930 ml   Output 1333 ml   Net 597 ml      Physical Exam   Constitutional: Vital signs are normal. She appears  well-developed. She has a sickly appearance. She appears ill. No distress.   HENT:   Head: Atraumatic.   Mouth/Throat: Oropharynx is clear and moist.   Eyes: EOM and lids are normal. Pupils are equal, round, and reactive to light.   Neck: Neck supple.       Cardiovascular: Normal rate and regular rhythm.   Pulses:       Radial pulses are 1+ on the right side, and 1+ on the left side.        Dorsalis pedis pulses are 1+ on the right side, and 1+ on the left side.   Pulmonary/Chest: No accessory muscle usage. No respiratory distress. She has decreased breath sounds in the right lower field. She has rhonchi. She has rales.   Abdominal: Soft. Bowel sounds are normal. She exhibits no distension. There is no tenderness.       Genitourinary:   Genitourinary Comments: Flores in place   Musculoskeletal:   Diffuse atrophy and +1 tibal edema   Lymphadenopathy:     She has no cervical adenopathy.   Neurological: She is alert.   Awake with eyes open and nods head to questions   Skin: Skin is dry. Capillary refill takes 2 to 3 seconds. No cyanosis.        Has sacral decub ulcer   Nursing note and vitals reviewed.      Significant Labs:   Blood Culture: No results for input(s): LABBLOO in the last 48 hours.  BMP:   Recent Labs   Lab 11/01/18  0407   GLU 93      K 3.7      CO2 25   BUN 37*   CREATININE 1.1   CALCIUM 9.1   MG 2.3     CBC:   Recent Labs   Lab 10/31/18  0335 11/01/18  0407   WBC 10.92 7.68   HGB 8.6* 8.6*   HCT 27.4* 28.5*    255     Magnesium:   Recent Labs   Lab 10/31/18  0335 11/01/18  0407   MG 2.1 2.3     Urine Studies: No results for input(s): COLORU, APPEARANCEUA, PHUR, SPECGRAV, PROTEINUA, GLUCUA, KETONESU, BILIRUBINUA, OCCULTUA, NITRITE, UROBILINOGEN, LEUKOCYTESUR, RBCUA, WBCUA, BACTERIA, SQUAMEPITHEL, HYALINECASTS in the last 48 hours.    Invalid input(s): WRIGHTSUR  All pertinent labs within the past 24 hours have been reviewed.    Significant Imaging: I have reviewed and interpreted all  pertinent imaging results/findings within the past 24 hours.    Assessment/Plan:      * Acute on chronic respiratory failure with hypoxia and hypercapnia, Vent Dependent w/ Trach    Pulmonology consult for critical care and vent management   Trach, vent dependent   CT chest shoe b/l lower lung collapse , moderate pleura effusion  And pulmonary infiltrate   Duonebs tx       10/27- will continue close critical care follow up , ventilatory care and support .  Might need bronchoscopy, will monitor closely post bronchoscopy      10/28-critical care follow up,continue ventilatory support .     10/29- will continue ventilatory support, critical care follow up , supportive care .  Will discuss with pulmonology about discharge options  10/30 - will plan to have a family meeting and will continue ventilatory support ,discussed with pulmonology ,will continue diuresis   10/31- will continue critical care follow up, ventilatory weaning . Will plan to transfer back to NH when she is back at her baseline  11/01- she has mucous plugs ,might need bronchoscopy , continue mucomyst /pulmonary toilet      Leakage of tracheostomy site      11/1- s/p replacement of tracheostomy tube-will continue close follow up       Candida infection      No need to treat asymptomatic candiduria , will monitor fever curve while on zosyn ,  Fluconazole can interact with amiodarone -QTC prolongation       Pleural effusion on right    10/27- s/p thoracentesis -will follow cultures to guide therapy .   Pulmonology follow up .  Post procedure chest x-ray reviewed - no evidence of pneumothorax.     11/1- will follow pulmonology , continue diuresis with lasix        Pressure injury of right ankle, stage 3    Cont wound care       10/28- supportive care,wound care .  10/31- will continue wound care /surgical follow up     Pressure injury of left ischium, stage 3    Cont wound care        Incontinence associated dermatitis    Cont wound care        Pressure  injury of coccygeal region, unstageable    Wound care consult     10/27- will follow wound care , supportive care ,  Continue nursing care      10/28- continue wound care     10/29 will continue to follow wound care , zosyn was added to IV vancomycin, follow cultures  11/1- will continue wound care      Urinary tract infection associated with indwelling urethral catheter    - Urine culture pending  - Will continue IV Ciprofloxacin      10/27 treated with cipro s/p therapy with zosyn during previous Hospital admit this month     10/28- will stop cipro., will monitor closely .  10/29 no need to treat candiduria -will plan to change harris     11/1- treated      Deep tissue injury    Wound care consult        Hypothyroidism    Resume levothyroxine     10/28- continue levothyroxine  10/30- on synthroid      Acute on chronic systolic congestive heart failure w/ pulm edema    - CXR with vascular congestion with small bilateral effusion  - cont IV lasix   - 2D ECHO on 10/22/18 showed (EF 40-45%). DD, Mild AR, Mild to moderate MR, Moderate to severe TR, and    Pulmonary HTN.   - Strict I&Os  - Daily weights  reviewed     10/27- will continue diuresis with furosemide .Closely monitor renal panel  10/28- will continue lasix at 40mg bid    10/30- will continue diuresis , will closely monitor serum creatinine  11/1- will restart furosemide daily , closely monitor daily weights.         Type 2 diabetes mellitus with kidney complication, without long-term current use of insulin    Accuchecks   SSI   Ha1c 4.8 on 10/19/18    10/27- will continue insulin sliding scale ,closely monitor glucose level    10/28- will continue insulin sliding scale .well controlled    10/30- controlled, will continue insulin sliding scale   `10/31- will continue acuchecks, closely monitor glucose  11/1- will continue acuchecks , glucose monitoring      Uncomplicated severe persistent asthma    Cont breathing tx  reviewed       10/27- will continue duonebs  as tolerated , pulmonology follow up      Chronic kidney disease (CKD), stage III (moderate)    Stable   Monitor renal function     10/28- serum creatinine is now 0.8     Elevated troponin    Likely due to demand ischemia  Initial troponin 0.054  Serial troponin        Pneumonia due to methicillin resistant Staphylococcus aureus      10/31- respiratory culture-MRSA-will continue vanco  Critical care team added Micafungin   Will continue supportive care       Chronic atrial fibrillation    Heart rate controlled   Resume Xarelto . No sing of acute bleeding at this time   Cont  Amiodarone     10/27- will continue amiodarone/xarelto , will continue close monitoring     10/28- continue amiodarone/xarelto ,  10/29 -rate better controlled, will continue amiodarone, xarelto  10/31- will continue Xarelto, rate control with amiodarone  11/1- on amiodarone/xarelto -continue supportive care        VTE Risk Mitigation (From admission, onward)        Ordered     rivaroxaban tablet 15 mg  Nightly      10/26/18 1324     Place sequential compression device  Until discontinued      10/24/18 1141          Critical care time spent on the evaluation and treatment of severe organ dysfunction, review of pertinent labs and imaging studies, discussions with consulting providers and discussions with patient/family:  45 minutes.    Philip Ceja MD  Department of Hospital Medicine   [facility]

## 2018-11-01 NOTE — PROGRESS NOTES
I was called to the room , nurse and wound care at bedside patient sat dropped while they rolled her to change dressing  . Required 100% and to be suctioned

## 2018-11-01 NOTE — PLAN OF CARE
Problem: Patient Care Overview  Goal: Plan of Care Review  Outcome: Ongoing (interventions implemented as appropriate)  Recommendations     Recommendation/Intervention: 1.Continue current TF regimen. 2. Check residuals Q4 hours. Hold if > 500 cc. 3. Will continue to monitor.   Goals: Meet > 85 % EEN/EPN while admitted  Nutrition Goal Status: goal met   Communication of RD Recs: Reviewed w/ NP

## 2018-11-01 NOTE — SUBJECTIVE & OBJECTIVE
Interval History: Trach changed by Dr. Estes    Medications:  Continuous Infusions:  Scheduled Meds:   acetylcysteine 100 mg/ml (10%)  4 mL Nebulization Q12H    albuterol-ipratropium  3 mL Nebulization Q6H    amiodarone  200 mg Per G Tube BID    arformoterol  15 mcg Nebulization Q12H    baclofen  5 mg Oral BID    brimonidine 0.2%  1 drop Both Eyes Q12H    budesonide  0.5 mg Nebulization Q12H    famotidine  20 mg Oral BID    levothyroxine  100 mcg Per G Tube Before breakfast    magnesium oxide  400 mg Per G Tube BID    micafungin (MYCAMINE) IVPB  100 mg Intravenous Q24H    midodrine  10 mg Per G Tube TID    piperacillin-tazobactam (ZOSYN) IVPB  4.5 g Intravenous Q8H    polyethylene glycol  17 g Oral Daily    rivaroxaban  15 mg Per G Tube QHS    sodium chloride 3%  4 mL Nebulization Q12H    [START ON 11/9/2018] vancomycin (VANCOCIN) IVPB  1,000 mg Intravenous Q48H    zinc sulfate  220 mg Per G Tube Daily     PRN Meds:bisacodyl, dextrose 50%, glucagon (human recombinant), lorazepam, ondansetron, pneumoc 13-naeem conj-dip cr(PF), traMADol     Review of patient's allergies indicates:   Allergen Reactions    Morphine Hives    Atorvastatin      Other reaction(s): Muscle pain    Clonidine     Codeine      Other reaction(s): Unknown    Digoxin      Other reaction(s): Unknown    Diovan [valsartan] Other (See Comments)     Upset stomach, weakness    Eggs [egg derived]     Metoprolol Diarrhea    Naproxen      Other reaction(s): Nausea    Peanut     Propofol      Other reaction(s): delirious    Sulfa (sulfonamide antibiotics)      Objective:     Vital Signs (Most Recent):  Temp: 98 °F (36.7 °C) (11/01/18 0730)  Pulse: 98 (11/01/18 1014)  Resp: (!) 27 (11/01/18 1014)  BP: (!) 141/86 (11/01/18 0900)  SpO2: 100 % (11/01/18 1014) Vital Signs (24h Range):  Temp:  [97.7 °F (36.5 °C)-98.7 °F (37.1 °C)] 98 °F (36.7 °C)  Pulse:  [] 98  Resp:  [14-30] 27  SpO2:  [74 %-100 %] 100 %  BP:  ()/() 141/86     Weight: 85 kg (187 lb 6.3 oz)  Body mass index is 26.89 kg/m².    Intake/Output - Last 3 Shifts       10/30 0700 - 10/31 0659 10/31 0700 - 11/01 0659 11/01 0700 - 11/02 0659    NG/GT 1230 1165 335    IV Piggyback 400 750     Total Intake(mL/kg) 1630 (19.2) 1915 (22.5) 335 (3.9)    Urine (mL/kg/hr) 1385 (0.7) 1373 (0.7) 60 (0.2)    Stool  0     Total Output 1385 1373 60    Net +245 +542 +275           Stool Occurrence  2 x           Physical Exam   Constitutional: No distress. She is intubated.   Pulmonary/Chest: She is intubated.   Skin:            Significant Labs:  CBC:   Recent Labs   Lab 11/01/18  0407   WBC 7.68   RBC 3.20*   HGB 8.6*   HCT 28.5*      MCV 89   MCH 26.9*   MCHC 30.2*       Significant Diagnostics:  I have reviewed all pertinent imaging results/findings within the past 24 hours.     CT scan did not reveal any abscess or necrosis

## 2018-11-01 NOTE — ASSESSMENT & PLAN NOTE
Wound care consult     10/27- will follow wound care , supportive care ,  Continue nursing care      10/28- continue wound care     10/29 will continue to follow wound care , zosyn was added to IV vancomycin, follow cultures  11/1- will continue wound care

## 2018-11-01 NOTE — ASSESSMENT & PLAN NOTE
Pulmonology consult for critical care and vent management   Trach, vent dependent   CT chest shoe b/l lower lung collapse , moderate pleura effusion  And pulmonary infiltrate   Alfonzo tx       10/27- will continue close critical care follow up , ventilatory care and support .  Might need bronchoscopy, will monitor closely post bronchoscopy      10/28-critical care follow up,continue ventilatory support .     10/29- will continue ventilatory support, critical care follow up , supportive care .  Will discuss with pulmonology about discharge options  10/30 - will plan to have a family meeting and will continue ventilatory support ,discussed with pulmonology ,will continue diuresis   10/31- will continue critical care follow up, ventilatory weaning . Will plan to transfer back to NH when she is back at her baseline  11/01- she has mucous plugs ,might need bronchoscopy , continue mucomyst /pulmonary toilet

## 2018-11-01 NOTE — PLAN OF CARE
Problem: Patient Care Overview  Goal: Plan of Care Review  Outcome: Ongoing (interventions implemented as appropriate)  Vitals signs closely monitored. Fall precautions in place. Patient turned every two hours. Pain assessed every two hours. Safety promoted. Infection risks reduced. TF rate increased from 45 to 65. Currently at 55. Po zyvox ordered. Pt 02 dropped this am while turning. Wound care nurse came. Sacral wound appears to be improving. Dressing was changed. Daily weights were ordered today.  updated

## 2018-11-01 NOTE — PROGRESS NOTES
She had a trach change yesterday , they put the same size he had back in . Still has a leak around the cuff . RIGOBERTO leggett aware

## 2018-11-01 NOTE — PROGRESS NOTES
[facility] -   General Surgery  Progress Note    Subjective:     History of Present Illness:   Patient was admitted to the hospital with respiratory issues.  She was seen by the wound care service regarding her wounds.    General surgery was called about her leaking around her tracheostomy.  She has a 8.  Shiley and there is concerned that there is air leaking around it causing her to have decreased ability to ventilate and oxygenate    Post-Op Info:  * No surgery found *         Interval History: Trach changed by Dr. Estes    Medications:  Continuous Infusions:  Scheduled Meds:   acetylcysteine 100 mg/ml (10%)  4 mL Nebulization Q12H    albuterol-ipratropium  3 mL Nebulization Q6H    amiodarone  200 mg Per G Tube BID    arformoterol  15 mcg Nebulization Q12H    baclofen  5 mg Oral BID    brimonidine 0.2%  1 drop Both Eyes Q12H    budesonide  0.5 mg Nebulization Q12H    famotidine  20 mg Oral BID    levothyroxine  100 mcg Per G Tube Before breakfast    magnesium oxide  400 mg Per G Tube BID    micafungin (MYCAMINE) IVPB  100 mg Intravenous Q24H    midodrine  10 mg Per G Tube TID    piperacillin-tazobactam (ZOSYN) IVPB  4.5 g Intravenous Q8H    polyethylene glycol  17 g Oral Daily    rivaroxaban  15 mg Per G Tube QHS    sodium chloride 3%  4 mL Nebulization Q12H    [START ON 11/9/2018] vancomycin (VANCOCIN) IVPB  1,000 mg Intravenous Q48H    zinc sulfate  220 mg Per G Tube Daily     PRN Meds:bisacodyl, dextrose 50%, glucagon (human recombinant), lorazepam, ondansetron, pneumoc 13-naeem conj-dip cr(PF), traMADol     Review of patient's allergies indicates:   Allergen Reactions    Morphine Hives    Atorvastatin      Other reaction(s): Muscle pain    Clonidine     Codeine      Other reaction(s): Unknown    Digoxin      Other reaction(s): Unknown    Diovan [valsartan] Other (See Comments)     Upset stomach, weakness    Eggs [egg derived]     Metoprolol Diarrhea    Naproxen       Other reaction(s): Nausea    Peanut     Propofol      Other reaction(s): delirious    Sulfa (sulfonamide antibiotics)      Objective:     Vital Signs (Most Recent):  Temp: 98 °F (36.7 °C) (11/01/18 0730)  Pulse: 98 (11/01/18 1014)  Resp: (!) 27 (11/01/18 1014)  BP: (!) 141/86 (11/01/18 0900)  SpO2: 100 % (11/01/18 1014) Vital Signs (24h Range):  Temp:  [97.7 °F (36.5 °C)-98.7 °F (37.1 °C)] 98 °F (36.7 °C)  Pulse:  [] 98  Resp:  [14-30] 27  SpO2:  [74 %-100 %] 100 %  BP: ()/() 141/86     Weight: 85 kg (187 lb 6.3 oz)  Body mass index is 26.89 kg/m².    Intake/Output - Last 3 Shifts       10/30 0700 - 10/31 0659 10/31 0700 - 11/01 0659 11/01 0700 - 11/02 0659    NG/GT 1230 1165 335    IV Piggyback 400 750     Total Intake(mL/kg) 1630 (19.2) 1915 (22.5) 335 (3.9)    Urine (mL/kg/hr) 1385 (0.7) 1373 (0.7) 60 (0.2)    Stool  0     Total Output 1385 1373 60    Net +245 +542 +275           Stool Occurrence  2 x           Physical Exam   Constitutional: No distress. She is intubated.   Pulmonary/Chest: She is intubated.   Skin:            Significant Labs:  CBC:   Recent Labs   Lab 11/01/18  0407   WBC 7.68   RBC 3.20*   HGB 8.6*   HCT 28.5*      MCV 89   MCH 26.9*   MCHC 30.2*       Significant Diagnostics:  I have reviewed all pertinent imaging results/findings within the past 24 hours.     CT scan did not reveal any abscess or necrosis    Assessment/Plan:     Continue current wound care with [facility].    Louis O. Jeansonne, MD  General Surgery  [facility]

## 2018-11-01 NOTE — ASSESSMENT & PLAN NOTE
Heart rate controlled   Resume Xarelto . No sing of acute bleeding at this time   Cont  Amiodarone     10/27- will continue amiodarone/xarelto , will continue close monitoring     10/28- continue amiodarone/xarelto ,  10/29 -rate better controlled, will continue amiodarone, xarelto  10/31- will continue Xarelto, rate control with amiodarone  11/1- on amiodarone/xarelto -continue supportive care

## 2018-11-01 NOTE — ASSESSMENT & PLAN NOTE
- CXR with vascular congestion with small bilateral effusion  - cont IV lasix   - 2D ECHO on 10/22/18 showed (EF 40-45%). DD, Mild AR, Mild to moderate MR, Moderate to severe TR, and    Pulmonary HTN.   - Strict I&Os  - Daily weights  reviewed     10/27- will continue diuresis with furosemide .Closely monitor renal panel  10/28- will continue lasix at 40mg bid    10/30- will continue diuresis , will closely monitor serum creatinine  11/1- will restart furosemide daily , closely monitor daily weights.

## 2018-11-01 NOTE — ASSESSMENT & PLAN NOTE
Accuchecks   SSI   Ha1c 4.8 on 10/19/18    10/27- will continue insulin sliding scale ,closely monitor glucose level    10/28- will continue insulin sliding scale .well controlled    10/30- controlled, will continue insulin sliding scale   `10/31- will continue acuchecks, closely monitor glucose  11/1- will continue acuchecks , glucose monitoring

## 2018-11-01 NOTE — ASSESSMENT & PLAN NOTE
10/27- s/p thoracentesis -will follow cultures to guide therapy .   Pulmonology follow up .  Post procedure chest x-ray reviewed - no evidence of pneumothorax.     11/1- will follow pulmonology , continue diuresis with lasix

## 2018-11-02 PROBLEM — B37.9 CANDIDA INFECTION: Status: RESOLVED | Noted: 2018-01-01 | Resolved: 2018-01-01

## 2018-11-02 PROBLEM — E87.8 ELECTROLYTE IMBALANCE: Status: RESOLVED | Noted: 2018-01-01 | Resolved: 2018-01-01

## 2018-11-02 PROBLEM — T83.511A URINARY TRACT INFECTION ASSOCIATED WITH INDWELLING URETHRAL CATHETER: Status: RESOLVED | Noted: 2018-01-01 | Resolved: 2018-01-01

## 2018-11-02 PROBLEM — N39.0 URINARY TRACT INFECTION ASSOCIATED WITH INDWELLING URETHRAL CATHETER: Status: RESOLVED | Noted: 2018-01-01 | Resolved: 2018-01-01

## 2018-11-02 PROBLEM — J45.50 UNCOMPLICATED SEVERE PERSISTENT ASTHMA: Chronic | Status: RESOLVED | Noted: 2017-05-18 | Resolved: 2018-01-01

## 2018-11-02 NOTE — ASSESSMENT & PLAN NOTE
Cont Zyvox per PEG  Cont Zosyn Day 5 of 7  Tracheal suctioning with lavage and bagging needs to be frequent for mucus plugging and to prevent recurrent re-hospitalizations  Cont Duonebs and Mucomyst  Family and NH staff need instruction - NOT to feed patient orally, this is likely contributing to aspiration PNA

## 2018-11-02 NOTE — SUBJECTIVE & OBJECTIVE
Review of Systems   Unable to perform ROS: Other   Trached    Objective:     Vital Signs (Most Recent):  Temp: 97.2 °F (36.2 °C) (11/02/18 1105)  Pulse: 69 (11/02/18 1121)  Resp: 17 (11/02/18 1121)  BP: 95/61 (11/02/18 1100)  SpO2: 99 % (11/02/18 1121) Vital Signs (24h Range):  Temp:  [97.2 °F (36.2 °C)-101.1 °F (38.4 °C)] 97.2 °F (36.2 °C)  Pulse:  [] 69  Resp:  [12-25] 17  SpO2:  [95 %-100 %] 99 %  BP: ()/() 95/61     Weight: 76 kg (167 lb 8.8 oz)  Body mass index is 24.04 kg/m².      Intake/Output Summary (Last 24 hours) at 11/2/2018 1220  Last data filed at 11/2/2018 1100  Gross per 24 hour   Intake 1530 ml   Output 2235 ml   Net -705 ml       Physical Exam   Constitutional: Vital signs are normal. She appears well-developed. She appears cachectic. She has a sickly appearance. She appears ill. No distress.   HENT:   Head: Atraumatic.   Mouth/Throat: Oropharynx is clear and moist.   Eyes: EOM and lids are normal. Pupils are equal, round, and reactive to light.   Neck: Neck supple.       Cardiovascular: Normal rate and regular rhythm.   Pulses:       Radial pulses are 1+ on the right side, and 1+ on the left side.        Dorsalis pedis pulses are 1+ on the right side, and 1+ on the left side.   Pulmonary/Chest: No accessory muscle usage. No respiratory distress. She has rales.   Abdominal: Soft. She exhibits no distension. Bowel sounds are decreased. There is no tenderness.       Genitourinary:   Genitourinary Comments: Flores in place   Musculoskeletal:   Diffuse atrophy and trace edema   Lymphadenopathy:     She has no cervical adenopathy.   Neurological: She is alert.   Awake with eyes open and nods head to questions and tries to talk around trach   Skin: Skin is dry. Capillary refill takes 2 to 3 seconds. No cyanosis.            Vents:  Vent Mode: A/C (11/02/18 1121)  Ventilator Initiated: Yes (10/24/18 0630)  Set Rate: 16 bmp (11/02/18 1121)  Vt Set: 0 mL (11/02/18 1121)  Pressure Support: 0  cmH20 (11/02/18 1121)  PEEP/CPAP: 8 cmH20 (11/02/18 1121)  Oxygen Concentration (%): 40 (11/02/18 1121)  Peak Airway Pressure: 26 cmH2O (11/02/18 1121)  Plateau Pressure: 0 cmH20 (11/02/18 1121)  Total Ve: 7.2 mL (11/02/18 1121)  F/VT Ratio<105 (RSBI): (!) 46.45 (11/02/18 1121)    Lines/Drains/Airways     Drain                 Gastrostomy/Enterostomy 10/19/18 Percutaneous endoscopic gastrostomy (PEG) LUQ 14 days         Urethral Catheter 10/24/18 1030 16 Fr. 9 days          Airway                 Surgical Airway 07/18/18 1620 Shiley 106 days          Pressure Ulcer                 Pressure Injury 10/24/18 1100 Right medial Malleolus Stage 3 9 days         Pressure Injury 10/24/18 1105 Left Ischial tuberosity Stage 3 9 days         Pressure Injury 10/24/18 Coccyx Unstageable 9 days          Peripheral Intravenous Line                 Midline Catheter Insertion/Assessment  - Double Lumen 10/19/18 0130 Left brachial vein 14 days                Significant Labs:    CBC/Anemia Profile:  Recent Labs   Lab 11/01/18  0407 11/02/18  0337   WBC 7.68 8.94   HGB 8.6* 8.5*   HCT 28.5* 27.7*    271   MCV 89 89   RDW 18.8* 18.6*        Chemistries:  Recent Labs   Lab 11/01/18  0407 11/02/18  0337    145   K 3.7 3.4*    107   CO2 25 24   BUN 37* 36*   CREATININE 1.1 1.1   CALCIUM 9.1 9.3   MG 2.3 2.4   PHOS 2.8 2.2*       ABGs:   Recent Labs   Lab 11/02/18  0448   PH 7.493*   PCO2 38.3   HCO3 29.4*   POCSATURATED 99   BE 6     All pertinent labs within the past 24 hours have been reviewed.    Significant Imaging:  CXR: I have reviewed all pertinent results/findings within the past 24 hours and my personal findings are:  1. The left border of the heart is silhouetted. There is a moderate amount of alveolar consolidation scattered throughout the left lung.  This is consistent with the patient's history and characteristic of pneumonia.  Overall mild improvement from prior film.

## 2018-11-02 NOTE — DISCHARGE SUMMARY
[facility] Hospital Medicine  Discharge Summary      Patient Name: Carlie Valdez  MRN: [id number]  Admission Date: 10/24/2018  Hospital Length of Stay: 9 days  Discharge Date and Time:  11/02/2018 4:16 PM  Attending Physician: Philip Ceja MD   Discharging Provider: Philip Ceja MD  Primary Care Provider: Johanna Guzman MD      HPI:   Carlie Valdez is a 72 y.o. female patient with PMHx of Afib, CHF, DM, GERD, neuromyelitis optica spectrum disorder, and HTN, Chronic Trach/Peg/Harris who presents to the Emergency Department for altered mental status which onset gradually last night. Patient currently resides at [facility]. Pt's pulse ox was reported to be 48% per [facility]. Pt was admitted from ED on 10/18 for sepsis and was d/c with IV Vancomycin and ciprofloxacin on 10/22. Per [facility], [facility] nursing staff suctioned a large amount of blood from pt's trach. Pt has had episodes of hemoptysis following a previous bronchoscopy on 10/19/18. Patient presented to ER hypotensive, has chronic harris on arrival, UA +. Chest Xray +pulmonary edema. Sepsis protocol initiated in ED. Lactic 2.6. Received 30 ml/kg bolus with positive response in BP. Patient received IV zosyn and vancomycin. Patient admitted to ICU for severe sepsis, acute on chronic heart failure, and acute on chronic respiratory failure with hypoxia and hypercapnia under the care of hospital medicine.         * No surgery found *      Hospital Course:   71 y/o AAF admitted to ICU with a dx of acute respiratory failure , acute on chronic systolic chf exacerbation  And sepsis due to PNA/UTI .She was started on IV lasix  AnD IVAB .  She  Has a (+) urine cx (10/18/18)  For  Klebsiella pneumonaie and e.coli sensitive to  Cipro . She had a (+) sputum cx for MRSA (10/18/18).  The CT chest show Bilateral pleural effusions.  Near complete collapse of the right lower and left lower lobes with partial collapse of the right  upper and middle lobes.  Left upper lobe infiltrate or pneumonia versus aspiration pneumonia.    10/27-  She remains on ventilatory support . Chest x-ray showed interval increased opacification of the left lower lung.  She had thoracentesis done today .  Pleural fluid wbc -1361.  Respiratory culture -10/24- MRSA, candida    10/28-  Remains on ventilatory support .  Pleural fluid cultures are pending .   Chest x-ray showed -Stable cardiomegaly.  Tracheostomy cannula.  Bilateral pleural fluid collections.  Questionable decrease in left pleural fluid versus changes related to patient positioning.    10/29- T max 101.she remains on ventilatory support  .   10/30 - she remains on ventilatory support .  Chest x-ray showed marked amount of haziness throughout the left hemithorax. There is a moderate amount of haziness throughout the lower half of the right hemithorax.  This represents an interval worsening of the appearance of the lungs and is worrisome for pneumonia.  2. There is opacification of the bases of both hemithoraces.  This is characteristic of pleural effusions.  10/31-  She remains on ventilatory support.  ABG showed alkalosis ,she was given acetazolamide ,critical care team added micafungin     11/01-  She was seen in the ICU,   Chest x-ray showed - interval worsening of the appearance of the lungs. There is a moderate amount of haziness throughout both hemithoraces with opacification of the bases of both hemithoraces.  This is characteristic of moderate-sized bilateral layering pleural effusions with associated atelectasis.    Discharge summary   -11/01-  72 year old woman with history of  Afib, CHF, DM, GERD, neuromyelitis optica spectrum disorder, and HTN, Chronic Trach/Peg/Flores who presents to the Emergency Department for altered mental status . Pt's pulse ox was reported to be 48% per [facility]. She was recently discharged from the hospital Pt was admitted from ED on 10/18 for sepsis and was discharged on  10/22 d/c with IV Vancomycin and ciprofloxacin on 10/22. Per [facility] nursing staff suctioned a large amount of blood from pt's trach    Since admission, she was admitted to ICU and was managed for sepsis . Cultures from respiratory site -MRSA/candida .  Chest imaging showed -  Chest CT scan -Bilateral pleural effusions.  Near complete collapse of the right lower and left lower lobes with partial collapse of the right upper and middle lobes.  Left upper lobe infiltrate or pneumonia versus aspiration pneumonia.  Tracheotomy tube.  Right chest wall pacemaker.    She was seen in consultation with pulmonology /critical care team and gen surgery for wound care . She had thoracentesis done and was started on aggressive diuresis.She was managed with vancomycin,zosyn and micafungin. She had interval tracheostomy tube change on 10/31.        She has remained clinically stable and is back to her baseline.   She was seen and examined on day of discharge and plan of care was discussed with her daughter at [phone] . The risk of recidivism remains high if she is not suctioned frequently .We will inform the nursing home about the need for tracheal suctioning  .Her overall prognosis is guarded and palliative care is an option .At this time,family wants full code. She will be discharged on PO zyvox.      Consults:   Consults (From admission, onward)        Status Ordering Provider     Inpatient consult to Pulmonology  Once     Provider:  (Not yet assigned)    Completed BRII JOHNSON     Inpatient consult to Registered Dietitian/Nutritionist  Once     Provider:  (Not yet assigned)    Completed BOBBY CORONA     IP consult to case management  Once     Provider:  (Not yet assigned)    Completed LIDA GEORGE          No new Assessment & Plan notes have been filed under this hospital service since the last note was generated.  Service: Hospital Medicine    Final Active Diagnoses:    Diagnosis Date Noted  POA    PRINCIPAL PROBLEM:  Acute on chronic respiratory failure with hypoxia and hypercapnia, Vent Dependent w/ Trach [J96.21, J96.22] 10/24/2018 Yes    Leakage of tracheostomy site [J95.03] 10/31/2018 Unknown    Bilateral pleural effusion [J90]  Unknown    Incontinence associated dermatitis [L30.8, R32] 10/25/2018 Yes    Pressure injury of left ischium, stage 3 [L89.323] 10/25/2018 Yes    Pressure injury of right ankle, stage 3 [L89.513] 10/25/2018 Yes    Pressure injury of coccygeal region, unstageable [L89.150] 10/22/2018 Yes     Chronic    Hypothyroidism [E03.9] 06/03/2018 Yes     Chronic    Type 2 diabetes mellitus with kidney complication, without long-term current use of insulin [E11.29] 03/09/2018 Yes     Chronic    Acute on chronic systolic congestive heart failure w/ pulm edema [I50.23] 09/23/2017 Yes    Chronic kidney disease (CKD), stage III (moderate) [N18.3] 11/12/2016 Yes     Chronic    Pneumonia due to methicillin resistant Staphylococcus aureus [J15.212] 09/17/2016 Yes    Chronic atrial fibrillation [I48.2] 08/14/2013 Yes     Chronic      Problems Resolved During this Admission:    Diagnosis Date Noted Date Resolved POA    Candida infection [B37.9] 10/29/2018 11/02/2018 Unknown    Hemoptysis [R04.2]  11/01/2018 Yes    Thrombocytopenia [D69.6] 10/24/2018 11/01/2018 Yes    MRSA bacteremia [R78.81] 10/21/2018 11/01/2018 Yes    Severe sepsis [A41.9, R65.20] 10/18/2018 11/01/2018 Yes    Electrolyte imbalance [E87.8] 07/15/2018 11/02/2018 Yes    Urinary tract infection associated with indwelling urethral catheter [T83.511A, N39.0] 06/30/2018 11/02/2018 Yes    Uncomplicated severe persistent asthma [J45.50] 05/18/2017 11/02/2018 Yes     Chronic    Elevated troponin [R74.8] 11/12/2016 11/02/2018 Yes       Discharged Condition: fair    Disposition: Skilled Nursing Facility    Follow Up:  Follow-up Information     Johanna Guzman MD.    Specialty:  Internal Medicine  Why:  As  needed  Contact information:  [address]  [place] [address] [phone]             Please follow up.    Why:  As needed               Patient Instructions:      Activity as tolerated       Significant Diagnostic Studies: Labs: All labs within the past 24 hours have been reviewed  Microbiology:   Blood Culture   Lab Results   Component Value Date    LABBLOO No growth after 5 days. 10/24/2018   , Sputum Culture   Lab Results   Component Value Date    GSRESP Few WBC's 10/24/2018    GSRESP Rare Gram positive cocci 10/24/2018    RESPIRATORYC METHICILLIN RESISTANT STAPHYLOCOCCUS AUREUS  Few   10/24/2018    RESPIRATORYC PIEDAD ALBICANS  Few   10/24/2018    and Wound Culture: negative    Pending Diagnostic Studies:     None         Medications:  Reconciled Home Medications:      Medication List      START taking these medications    budesonide 0.5 mg/2 mL nebulizer solution  Commonly known as:  PULMICORT  Take 2 mLs (0.5 mg total) by nebulization every 12 (twelve) hours. Controller     linezolid 600 mg Tab  Commonly known as:  ZYVOX  1 tablet (600 mg total) by Per G Tube route every 12 (twelve) hours.        CHANGE how you take these medications    amiodarone 200 MG Tab  Commonly known as:  PACERONE  Take 1 tablet (200 mg total) by mouth 2 (two) times daily.  What changed:  how to take this     bumetanide 2 MG tablet  Commonly known as:  BUMEX  Take 1 tablet (2 mg total) by mouth 2 (two) times daily. 1 Tablet Oral Every day  What changed:    · when to take this  · additional instructions     ergocalciferol 50,000 unit Cap  Commonly known as:  ERGOCALCIFEROL  Take 1 capsule (50,000 Units total) by mouth every 7 days.  What changed:  how to take this     mycophenolate 250 mg Cap  Commonly known as:  CELLCEPT  Take 2 capsules (500 mg total) by mouth 2 (two) times daily.  What changed:  how to take this     polyethylene glycol 17 gram Pwpk  Commonly known as:  GLYCOLAX  Take 17 g by mouth once daily.  What  changed:  how to take this        CONTINUE taking these medications    albuterol 90 mcg/actuation inhaler  Commonly known as:  PROVENTIL/VENTOLIN HFA  Inhale 2 puffs into the lungs every 4 (four) hours as needed.     albuterol-ipratropium 2.5 mg-0.5 mg/3 mL nebulizer solution  Commonly known as:  DUO-NEB  Take 3 mLs by nebulization every 4 (four) hours as needed for Wheezing. Rescue     bacitracin 500 unit/gram ointment  Apply topically once daily. Apply to R heel daily.     baclofen 10 MG tablet  Commonly known as:  LIORESAL  10 mg by Per G Tube route 3 (three) times daily as needed.     brimonidine-timolol 0.2-0.5 % Drop  Commonly known as:  COMBIGAN  Place 1 drop into both eyes every 12 (twelve) hours.     fluticasone 50 mcg/actuation nasal spray  Commonly known as:  FLONASE  2 sprays by Each Nare route once daily.     furosemide 20 MG tablet  Commonly known as:  LASIX  20 mg by Per G Tube route 2 (two) times daily.     insulin detemir U-100 100 unit/mL (3 mL) Inpn pen  Commonly known as:  LEVEMIR FLEXTOUCH  Inject 10 Units into the skin every evening.     levothyroxine 100 MCG tablet  Commonly known as:  SYNTHROID  100 mcg by Per G Tube route before breakfast.     magnesium oxide 400 mg (241.3 mg magnesium) tablet  Commonly known as:  MAG-OX  400 mg by Per G Tube route 2 (two) times daily.     midodrine 5 MG Tab  Commonly known as:  PROAMATINE  1 tablet (5 mg total) by Per G Tube route 3 (three) times daily.     montelukast 10 mg tablet  Commonly known as:  SINGULAIR  10 mg by Per G Tube route once daily.     multivitamin per tablet  Commonly known as:  THERAGRAN  Take 1 tablet by mouth once daily.     ondansetron 4 MG tablet  Commonly known as:  ZOFRAN  4 mg by Per G Tube route every 6 (six) hours as needed for Nausea.     pantoprazole 40 MG tablet  Commonly known as:  PROTONIX  Take 40 mg by mouth once daily.     rivaroxaban 15 mg Tab  Commonly known as:  XARELTO  15 mg by Per G Tube route every evening.      traMADol 50 mg tablet  Commonly known as:  ULTRAM  50 mg by Per G Tube route every 6 (six) hours as needed for Pain.     zinc sulfate 220 (50) mg capsule  Commonly known as:  ZINCATE  220 mg by Per G Tube route once daily.        STOP taking these medications    vancomycin in dextrose 5 % 1 gram/250 mL Soln            Indwelling Lines/Drains at time of discharge:   Lines/Drains/Airways     Drain                 Gastrostomy/Enterostomy 10/19/18 Percutaneous endoscopic gastrostomy (PEG) LUQ 14 days         Urethral Catheter 10/24/18 1030 16 Fr. 9 days          Airway                 Surgical Airway 07/18/18 1620 Shiley 106 days          Pressure Ulcer                 Pressure Injury 10/24/18 1100 Right medial Malleolus Stage 3 9 days         Pressure Injury 10/24/18 1105 Left Ischial tuberosity Stage 3 9 days         Pressure Injury 10/24/18 Coccyx Unstageable 9 days                Time spent on the discharge of patient:  45 minutes  Patient was seen and examined on the date of discharge and determined to be suitable for discharge.  Philip Ceja MD  Department of Hospital Medicine  [facility]

## 2018-11-02 NOTE — EICU
Bedside nurse called to ask md if he wanted to restart accu checks q 6 hrs with sliding scale . Informed Dr. thakkar.

## 2018-11-02 NOTE — PLAN OF CARE
VIKTORC to [facility] ( permanent resident), RN to call report to [phone] and CM called [facility] and  within the hour.     11/02/18 6582   Final Note   Assessment Type Final Discharge Note   Anticipated Discharge Disposition Long Term   Discharge plans and expectations educations in teach back method with documentation complete? Yes   Right Care Referral Info   Post Acute Recommendation Other  ([facility])

## 2018-11-02 NOTE — PROGRESS NOTES
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  She also has a past medical history of Anticoagulant long-term use, Arthritis, Asthma, Atrial fibrillation, Blood clot of vein in shoulder area, Cardiac defibrillator in place, CHF (congestive heart failure), Chronic knee pain, Diabetes mellitus type 2 without retinopathy (12/19/2016), Diaphragmatic hernia without obstruction and without gangrene (9/14/2015), GERD (gastroesophageal reflux disease), Hypertension, Immune deficiency disorder, and Primary open angle glaucoma (POAG) of both eyes, severe stage (6/1/2018).  She was also recently hospitalized here at [facility] in ICU 10/19/18 admitted and discharged 2 days ago on 10/22/2018 for Acute on chronic hypoxic resp failure with total left lung opacification as well as severe sepsis with MRSA PNA and bacteremia as well as E.Coli and Klebsiella UTI.  She received a bronch here and left lung cleared with suctioning, IVAB and nebs.  She had some endobronchial bleeding with clots also.  She was discharged to NH on 10/22 on Cipro via PEG and IV Vanc.  She returned back to [facility] early this AM due to decreased LOC at NH, multiple episodes of diarrhea, per [facility] nursing home suctioned a large amount of blood from trach.  In ED temp 89.9 F and suctioned blood clots from Trach.  Plt count 38.  IVAB resumed and given IV Lasix        Hospital/ICU Course:  10/24 - Admitted to ICU on mech vent and warming via artic sun.   She is awake and responsive not requiring pressor support  10/25/2018 Chart reviewed . Bronchoscopy last admission for mucous pluggine on the left. pleural effusion on Chest X Ray on the right. history of Congestive Heart failure:Moderate left ventricular systolic dysfunction with a visually estimated ejection fraction of 40%.  Grade 3 diastolic dysfunction consistent with restrictive physiology with elevated left atrial pressure.  Severe tricuspid regurgitation.  Moderate pulmonary hypertension with an estimated pulmonary artery systolic pressure 55 mmHg.  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   10/26/2018  CT of chest with bilateral effusions and atelectasis with right lower lobe and left lower lobe collapse  10/28 Little change, mild diuresis continues  10/29 seen and examined.  Vent dependent.  T-max 101° yesterday.  On enteral feeding via PEG.  10/30 seen and examined. Tmax 98.6 last 24 hrs. On higher FiO2 today 70%. Had an episode of hypoxemia, bradycardia earlier sp suctioning. CXR , ABG reviewed. On enteral feeding.   10/31 seen and examined. Resp therapist noticed cuff leak despite good cuff pressure. Afebrile. No presssors. On Fio2 60% now . RR decreased to 16 . No pressors. CXR , ABG reviewed.   11/1 - Resting on vent still mucus plugging in no distress and VSS.  Still with small air leak around trach but returning good volumes and asymptomatic.   11/2 - Improved O2 demand and CXR still on vent and TF.  Awake and at times talking around trach but no distress, denies SOB and has good Vt.  101.1 temp yest evening but normal WBC    Review of Systems   Unable to perform ROS: Other   Trached    Objective:     Vital Signs (Most Recent):  Temp: 97.2 °F (36.2 °C) (11/02/18 1105)  Pulse: 69 (11/02/18 1121)  Resp: 17 (11/02/18 1121)  BP: 95/61 (11/02/18 1100)  SpO2: 99 % (11/02/18 1121) Vital Signs (24h  Range):  Temp:  [97.2 °F (36.2 °C)-101.1 °F (38.4 °C)] 97.2 °F (36.2 °C)  Pulse:  [] 69  Resp:  [12-25] 17  SpO2:  [95 %-100 %] 99 %  BP: ()/() 95/61     Weight: 76 kg (167 lb 8.8 oz)  Body mass index is 24.04 kg/m².      Intake/Output Summary (Last 24 hours) at 11/2/2018 1220  Last data filed at 11/2/2018 1100  Gross per 24 hour   Intake 1530 ml   Output 2235 ml   Net -705 ml       Physical Exam   Constitutional: Vital signs are normal. She appears well-developed. She appears cachectic. She has a sickly appearance. She appears ill. No distress.   HENT:   Head: Atraumatic.   Mouth/Throat: Oropharynx is clear and moist.   Eyes: EOM and lids are normal. Pupils are equal, round, and reactive to light.   Neck: Neck supple.       Cardiovascular: Normal rate and regular rhythm.   Pulses:       Radial pulses are 1+ on the right side, and 1+ on the left side.        Dorsalis pedis pulses are 1+ on the right side, and 1+ on the left side.   Pulmonary/Chest: No accessory muscle usage. No respiratory distress. She has rales.   Abdominal: Soft. She exhibits no distension. Bowel sounds are decreased. There is no tenderness.       Genitourinary:   Genitourinary Comments: Flores in place   Musculoskeletal:   Diffuse atrophy and trace edema   Lymphadenopathy:     She has no cervical adenopathy.   Neurological: She is alert.   Awake with eyes open and nods head to questions and tries to talk around trach   Skin: Skin is dry. Capillary refill takes 2 to 3 seconds. No cyanosis.            Vents:  Vent Mode: A/C (11/02/18 1121)  Ventilator Initiated: Yes (10/24/18 0630)  Set Rate: 16 bmp (11/02/18 1121)  Vt Set: 0 mL (11/02/18 1121)  Pressure Support: 0 cmH20 (11/02/18 1121)  PEEP/CPAP: 8 cmH20 (11/02/18 1121)  Oxygen Concentration (%): 40 (11/02/18 1121)  Peak Airway Pressure: 26 cmH2O (11/02/18 1121)  Plateau Pressure: 0 cmH20 (11/02/18 1121)  Total Ve: 7.2 mL (11/02/18 1121)  F/VT Ratio<105 (RSBI): (!) 46.45  (11/02/18 1121)    Lines/Drains/Airways     Drain                 Gastrostomy/Enterostomy 10/19/18 Percutaneous endoscopic gastrostomy (PEG) LUQ 14 days         Urethral Catheter 10/24/18 1030 16 Fr. 9 days          Airway                 Surgical Airway 07/18/18 1620 Shiley 106 days          Pressure Ulcer                 Pressure Injury 10/24/18 1100 Right medial Malleolus Stage 3 9 days         Pressure Injury 10/24/18 1105 Left Ischial tuberosity Stage 3 9 days         Pressure Injury 10/24/18 Coccyx Unstageable 9 days          Peripheral Intravenous Line                 Midline Catheter Insertion/Assessment  - Double Lumen 10/19/18 0130 Left brachial vein 14 days                Significant Labs:    CBC/Anemia Profile:  Recent Labs   Lab 11/01/18  0407 11/02/18  0337   WBC 7.68 8.94   HGB 8.6* 8.5*   HCT 28.5* 27.7*    271   MCV 89 89   RDW 18.8* 18.6*        Chemistries:  Recent Labs   Lab 11/01/18  0407 11/02/18  0337    145   K 3.7 3.4*    107   CO2 25 24   BUN 37* 36*   CREATININE 1.1 1.1   CALCIUM 9.1 9.3   MG 2.3 2.4   PHOS 2.8 2.2*       ABGs:   Recent Labs   Lab 11/02/18  0448   PH 7.493*   PCO2 38.3   HCO3 29.4*   POCSATURATED 99   BE 6     All pertinent labs within the past 24 hours have been reviewed.    Significant Imaging:  CXR: I have reviewed all pertinent results/findings within the past 24 hours and my personal findings are:  1. The left border of the heart is silhouetted. There is a moderate amount of alveolar consolidation scattered throughout the left lung.  This is consistent with the patient's history and characteristic of pneumonia.  Overall mild improvement from prior film.       Assessment/Plan:               Pulmonary   * Acute on chronic respiratory failure with hypoxia and hypercapnia, Vent Dependent w/ Trach    Cont full vent support  VAP prophylaxis  Vent settings reviewed and O2 weaned  At baseline vent settings         Uncomplicated severe persistent asthma     Cont Duonebs, Formeterol and Budesonide     Pleural effusion on right    Status post thoracentesis 10/29 on the right 40 cc of cloudy fluid removed.  Cultures AFB negative and fungal still pending.  As per WBC count and glucose level, effusion does not appear complicated.  No need for repeat thoracentesis.        Pneumonia due to methicillin resistant Staphylococcus aureus    Cont Zyvox per PEG  Cont Zosyn Day 5 of 7  Tracheal suctioning with lavage and bagging needs to be frequent for mucus plugging and to prevent recurrent re-hospitalizations  Cont Duonebs and Mucomyst  Family and NH staff need instruction - NOT to feed patient orally, this is likely contributing to aspiration PNA     Cardiac/Vascular   Chronic atrial fibrillation    HR controlled  Cont Rivaroxaban and Amiodarone     Acute on chronic systolic congestive heart failure w/ pulm edema    Diuresed for -4 liter fluid balance  Follow daily weights  Lasix resumed       Renal/   Chronic kidney disease (CKD), stage III (moderate)    Creatine 1.1  Monitor I/Os and creatine      Electrolyte imbalance    Replace K+     Urinary tract infection associated with indwelling urethral catheter    Culture + Candida Tropicalis  Cont Micafungin       ID   Candida infection    10/29 Candida present in sputum and in urine.  Patient afebrile.  Start IV fluconazole if okay with Infectious Disease.  10/31 started on Micafungin by ID      Endocrine   Type 2 diabetes mellitus with kidney complication, without long-term current use of insulin    Cont SSI  Glucose stable  Monitor for insulin toxicity     Hypothyroidism    Cont Levothyroxine     Other   Pressure injury of coccygeal region, unstageable    Wound care following  Low pressure bed  Turn Q 2 hours         Preventive Measures and Monitoring:   Stress Ulcer: Pepcid  Nutrition: TF per nutrition recs  Glucose control: Stable  Bowel prophylaxis: Miralax  DVT prophylaxis: SCDs  Hx CAD on B-Blocker: no hx CAD  Head of  Bed/Reposition: Elevate HOB and turn Q1-2 hours   Early Mobility: bed rest  Chronic Trach/PEG  Central Line LUE Mid line Day: unknown  Flores Day: #10  IVAB Day: #15  Code Status: Full  Pneumonia Vaccine: ordered  Flu Vaccine: ordered     Counseling/Consultation:I have discussed the care of this patient in detail with the bedside nursing staff and Dr. Sánchez and Dr. Ceja.    Critical Care Time: 42 minutes  Critical secondary to Patient has a condition that poses threat to life and bodily function: Acute on Chronic Resp Failure with [facility] vent management      Critical care was time spent personally by me on the following activities: development of treatment plan with patient or surrogate and bedside caregivers, discussions with consultants, evaluation of patient's response to treatment, examination of patient, ordering and performing treatments and interventions, ordering and review of laboratory studies, ordering and review of radiographic studies, pulse oximetry, re-evaluation of patient's condition. This critical care time did not overlap with that of any other provider or involve time for any procedures.     Philip Myers NP  Critical Care Medicine  [facility]

## 2018-11-02 NOTE — PLAN OF CARE
Problem: Patient Care Overview  Goal: Plan of Care Review  Outcome: Ongoing (interventions implemented as appropriate)  Patient continues to arouse to voice. Patient turned every two hours, heel elevating boots maintained. TF continued, rate currently at 45 mL/hr with a goal rate of 65. TF rate not increased due to high residuals. Patient O2 drops briefly during position changes. Patient afebrile throughout shift. VSS and closely monitored. Plan of care discussed with patient and daughter (Katerina). Will continue to monitor.

## 2018-11-02 NOTE — PROGRESS NOTES
Trach care done. Site cleaned. Inner cannula amd trach ties changed. HME replaced. Drain sponges placed under site to help prevent breakdown when trach sits on base of neck.

## 2018-11-02 NOTE — ASSESSMENT & PLAN NOTE
Cont full vent support  VAP prophylaxis  Vent settings reviewed and O2 weaned  At baseline vent settings

## 2018-11-02 NOTE — ASSESSMENT & PLAN NOTE
Status post thoracentesis 10/29 on the right 40 cc of cloudy fluid removed.  Cultures AFB negative and fungal still pending.  As per WBC count and glucose level, effusion does not appear complicated.  No need for repeat thoracentesis.

## 2018-11-03 NOTE — NURSING TRANSFER
Nursing Transfer Note      11/2/2018     Transfer To: [facility]     Transfer via stretcher    Transfer with to O2/vent, cardiac monitoring    Transported by [facility]     Chart send with patient: Yes    Notified: daughter (Haley)      [facility] stret[facility] with vent/trach/PEG in place, intact and patent. VSS. Patient tolerated transfer well. Haley (Daughter) notified of patient transfer by [facility] to [facility].

## 2019-01-01 ENCOUNTER — HOSPITAL ENCOUNTER (INPATIENT)
Facility: HOSPITAL | Age: 74
LOS: 9 days | Discharge: HOME | DRG: 853 | End: 2019-02-14
Attending: EMERGENCY MEDICINE | Admitting: INTERNAL MEDICINE
Payer: MEDICARE

## 2019-01-01 ENCOUNTER — ANESTHESIA EVENT (OUTPATIENT)
Dept: SURGERY | Facility: HOSPITAL | Age: 74
DRG: 853 | End: 2019-01-01
Payer: MEDICARE

## 2019-01-01 ENCOUNTER — HOSPITAL ENCOUNTER (EMERGENCY)
Facility: HOSPITAL | Age: 74
End: 2019-05-07
Attending: EMERGENCY MEDICINE
Payer: MEDICARE

## 2019-01-01 ENCOUNTER — OUTPATIENT CASE MANAGEMENT (OUTPATIENT)
Dept: ADMINISTRATIVE | Facility: OTHER | Age: 74
End: 2019-01-01

## 2019-01-01 ENCOUNTER — HOSPITAL ENCOUNTER (INPATIENT)
Facility: HOSPITAL | Age: 74
LOS: 3 days | Discharge: HOME | DRG: 208 | End: 2019-03-21
Attending: EMERGENCY MEDICINE | Admitting: HOSPITALIST
Payer: MEDICARE

## 2019-01-01 ENCOUNTER — ANESTHESIA (OUTPATIENT)
Dept: SURGERY | Facility: HOSPITAL | Age: 74
DRG: 853 | End: 2019-01-01
Payer: MEDICARE

## 2019-01-01 ENCOUNTER — TELEPHONE (OUTPATIENT)
Dept: GASTROENTEROLOGY | Facility: CLINIC | Age: 74
End: 2019-01-01

## 2019-01-01 VITALS — SYSTOLIC BLOOD PRESSURE: 95 MMHG | HEART RATE: 54 BPM | RESPIRATION RATE: 25 BRPM | DIASTOLIC BLOOD PRESSURE: 68 MMHG

## 2019-01-01 VITALS
TEMPERATURE: 99 F | HEIGHT: 70 IN | BODY MASS INDEX: 22.09 KG/M2 | RESPIRATION RATE: 20 BRPM | WEIGHT: 154.31 LBS | SYSTOLIC BLOOD PRESSURE: 122 MMHG | DIASTOLIC BLOOD PRESSURE: 66 MMHG | OXYGEN SATURATION: 97 % | HEART RATE: 70 BPM

## 2019-01-01 VITALS
WEIGHT: 154.31 LBS | TEMPERATURE: 98 F | DIASTOLIC BLOOD PRESSURE: 58 MMHG | SYSTOLIC BLOOD PRESSURE: 102 MMHG | RESPIRATION RATE: 28 BRPM | HEIGHT: 67 IN | HEART RATE: 70 BPM | BODY MASS INDEX: 24.22 KG/M2 | OXYGEN SATURATION: 96 %

## 2019-01-01 DIAGNOSIS — J18.9 PNEUMONIA OF BOTH LOWER LOBES DUE TO INFECTIOUS ORGANISM: Primary | ICD-10-CM

## 2019-01-01 DIAGNOSIS — R58 BLOOD LOSS: ICD-10-CM

## 2019-01-01 DIAGNOSIS — N18.9 CHRONIC KIDNEY DISEASE, UNSPECIFIED CKD STAGE: ICD-10-CM

## 2019-01-01 DIAGNOSIS — I48.91 ATRIAL FIBRILLATION: ICD-10-CM

## 2019-01-01 DIAGNOSIS — L89.154 DECUBITUS ULCER OF SACRAL REGION, STAGE 4: ICD-10-CM

## 2019-01-01 DIAGNOSIS — T83.511A URINARY TRACT INFECTION ASSOCIATED WITH INDWELLING URETHRAL CATHETER, INITIAL ENCOUNTER: ICD-10-CM

## 2019-01-01 DIAGNOSIS — J95.851 VAP (VENTILATOR-ASSOCIATED PNEUMONIA): ICD-10-CM

## 2019-01-01 DIAGNOSIS — I48.20 CHRONIC ATRIAL FIBRILLATION: Chronic | ICD-10-CM

## 2019-01-01 DIAGNOSIS — D84.9 IMMUNOCOMPROMISED STATE: ICD-10-CM

## 2019-01-01 DIAGNOSIS — E87.0 HYPERNATREMIA: ICD-10-CM

## 2019-01-01 DIAGNOSIS — I50.42 CHRONIC COMBINED SYSTOLIC AND DIASTOLIC CONGESTIVE HEART FAILURE: Chronic | ICD-10-CM

## 2019-01-01 DIAGNOSIS — G36.0 NEUROMYELITIS OPTICA: Chronic | ICD-10-CM

## 2019-01-01 DIAGNOSIS — T14.8XXA DEEP TISSUE INJURY: Chronic | ICD-10-CM

## 2019-01-01 DIAGNOSIS — E44.0 MODERATE MALNUTRITION: ICD-10-CM

## 2019-01-01 DIAGNOSIS — I46.9 CARDIAC ARREST: Primary | ICD-10-CM

## 2019-01-01 DIAGNOSIS — E03.9 HYPOTHYROIDISM, UNSPECIFIED TYPE: Chronic | ICD-10-CM

## 2019-01-01 DIAGNOSIS — N17.9 ACUTE KIDNEY INJURY: ICD-10-CM

## 2019-01-01 DIAGNOSIS — R65.20 SEVERE SEPSIS WITH ACUTE ORGAN DYSFUNCTION: ICD-10-CM

## 2019-01-01 DIAGNOSIS — N18.3 ACUTE RENAL FAILURE SUPERIMPOSED ON STAGE 3 CHRONIC KIDNEY DISEASE, UNSPECIFIED ACUTE RENAL FAILURE TYPE: ICD-10-CM

## 2019-01-01 DIAGNOSIS — J96.22 ACUTE ON CHRONIC RESPIRATORY FAILURE WITH HYPOXIA AND HYPERCAPNIA: ICD-10-CM

## 2019-01-01 DIAGNOSIS — J96.01 ACUTE RESPIRATORY FAILURE WITH HYPOXEMIA: ICD-10-CM

## 2019-01-01 DIAGNOSIS — A41.9 SEVERE SEPSIS WITH ACUTE ORGAN DYSFUNCTION: ICD-10-CM

## 2019-01-01 DIAGNOSIS — R65.21 SEPTIC SHOCK: Primary | ICD-10-CM

## 2019-01-01 DIAGNOSIS — Z86.79 HISTORY OF ATRIAL FIBRILLATION: ICD-10-CM

## 2019-01-01 DIAGNOSIS — N17.9 ACUTE RENAL FAILURE SUPERIMPOSED ON STAGE 3 CHRONIC KIDNEY DISEASE, UNSPECIFIED ACUTE RENAL FAILURE TYPE: ICD-10-CM

## 2019-01-01 DIAGNOSIS — A41.9 SEPTIC SHOCK: Primary | ICD-10-CM

## 2019-01-01 DIAGNOSIS — Z12.39 BREAST CANCER SCREENING: ICD-10-CM

## 2019-01-01 DIAGNOSIS — N39.0 URINARY TRACT INFECTION ASSOCIATED WITH INDWELLING URETHRAL CATHETER, INITIAL ENCOUNTER: ICD-10-CM

## 2019-01-01 DIAGNOSIS — Z99.11 VENTILATOR DEPENDENT: ICD-10-CM

## 2019-01-01 DIAGNOSIS — L89.150 PRESSURE INJURY OF COCCYGEAL REGION, UNSTAGEABLE: Chronic | ICD-10-CM

## 2019-01-01 DIAGNOSIS — E86.1 INTRAVASCULAR VOLUME DEPLETION: ICD-10-CM

## 2019-01-01 DIAGNOSIS — Z93.0 TRACHEOSTOMY IN PLACE: ICD-10-CM

## 2019-01-01 DIAGNOSIS — T68.XXXA HYPOTHERMIA: ICD-10-CM

## 2019-01-01 DIAGNOSIS — J96.21 ACUTE ON CHRONIC RESPIRATORY FAILURE WITH HYPOXIA AND HYPERCAPNIA: ICD-10-CM

## 2019-01-01 DIAGNOSIS — Z93.1 GASTROINTESTINAL TUBE PRESENT: ICD-10-CM

## 2019-01-01 DIAGNOSIS — D64.9 ANEMIA, UNSPECIFIED TYPE: ICD-10-CM

## 2019-01-01 DIAGNOSIS — I95.9 HYPOTENSION, UNSPECIFIED HYPOTENSION TYPE: Chronic | ICD-10-CM

## 2019-01-01 DIAGNOSIS — Z79.01 CHRONIC ANTICOAGULATION: ICD-10-CM

## 2019-01-01 LAB
ABO + RH BLD: NORMAL
ABO + RH BLD: NORMAL
ALBUMIN SERPL BCP-MCNC: 1.4 G/DL
ALBUMIN SERPL BCP-MCNC: 1.5 G/DL
ALBUMIN SERPL BCP-MCNC: 1.6 G/DL
ALBUMIN SERPL BCP-MCNC: 1.6 G/DL
ALBUMIN SERPL BCP-MCNC: 1.7 G/DL
ALBUMIN SERPL BCP-MCNC: 1.8 G/DL
ALLENS TEST: ABNORMAL
ALLENS TEST: ABNORMAL
ALP SERPL-CCNC: 123 U/L
ALP SERPL-CCNC: 135 U/L
ALP SERPL-CCNC: 136 U/L
ALP SERPL-CCNC: 136 U/L
ALP SERPL-CCNC: 139 U/L
ALP SERPL-CCNC: 144 U/L
ALP SERPL-CCNC: 144 U/L
ALP SERPL-CCNC: 157 U/L
ALP SERPL-CCNC: 165 U/L
ALP SERPL-CCNC: 174 U/L
ALP SERPL-CCNC: 243 U/L
ALP SERPL-CCNC: 253 U/L
ALP SERPL-CCNC: 280 U/L
ALP SERPL-CCNC: 294 U/L
ALT SERPL W/O P-5'-P-CCNC: 11 U/L
ALT SERPL W/O P-5'-P-CCNC: 13 U/L
ALT SERPL W/O P-5'-P-CCNC: 14 U/L
ALT SERPL W/O P-5'-P-CCNC: 15 U/L
ALT SERPL W/O P-5'-P-CCNC: 16 U/L
ALT SERPL W/O P-5'-P-CCNC: 16 U/L
ALT SERPL W/O P-5'-P-CCNC: 20 U/L
ALT SERPL W/O P-5'-P-CCNC: 42 U/L
ALT SERPL W/O P-5'-P-CCNC: 51 U/L
ALT SERPL W/O P-5'-P-CCNC: 70 U/L
ALT SERPL W/O P-5'-P-CCNC: 99 U/L
ANION GAP SERPL CALC-SCNC: 10 MMOL/L
ANION GAP SERPL CALC-SCNC: 11 MMOL/L
ANION GAP SERPL CALC-SCNC: 11 MMOL/L
ANION GAP SERPL CALC-SCNC: 12 MMOL/L
ANION GAP SERPL CALC-SCNC: 12 MMOL/L
ANION GAP SERPL CALC-SCNC: 13 MMOL/L
ANION GAP SERPL CALC-SCNC: 13 MMOL/L
ANION GAP SERPL CALC-SCNC: 15 MMOL/L
ANION GAP SERPL CALC-SCNC: 16 MMOL/L
ANION GAP SERPL CALC-SCNC: 16 MMOL/L
ANION GAP SERPL CALC-SCNC: 9 MMOL/L
ANISOCYTOSIS BLD QL SMEAR: SLIGHT
APTT BLDCRRT: 28.5 SEC
APTT BLDCRRT: 41.8 SEC
AST SERPL-CCNC: 20 U/L
AST SERPL-CCNC: 21 U/L
AST SERPL-CCNC: 23 U/L
AST SERPL-CCNC: 24 U/L
AST SERPL-CCNC: 24 U/L
AST SERPL-CCNC: 25 U/L
AST SERPL-CCNC: 34 U/L
AST SERPL-CCNC: 34 U/L
AST SERPL-CCNC: 35 U/L
AST SERPL-CCNC: 43 U/L
AST SERPL-CCNC: 60 U/L
AST SERPL-CCNC: 95 U/L
BACTERIA #/AREA URNS HPF: ABNORMAL /HPF
BACTERIA #/AREA URNS HPF: ABNORMAL /HPF
BACTERIA BLD CULT: NORMAL
BACTERIA SPEC AEROBE CULT: NORMAL
BACTERIA SPEC ANAEROBE CULT: NORMAL
BACTERIA SPEC ANAEROBE CULT: NORMAL
BACTERIA UR CULT: NORMAL
BASO STIPL BLD QL SMEAR: ABNORMAL
BASOPHILS # BLD AUTO: 0.01 K/UL
BASOPHILS # BLD AUTO: 0.02 K/UL
BASOPHILS # BLD AUTO: 0.03 K/UL
BASOPHILS # BLD AUTO: 0.04 K/UL
BASOPHILS # BLD AUTO: 0.04 K/UL
BASOPHILS NFR BLD: 0.1 %
BASOPHILS NFR BLD: 0.2 %
BILIRUB SERPL-MCNC: 0.2 MG/DL
BILIRUB SERPL-MCNC: 0.3 MG/DL
BILIRUB SERPL-MCNC: 0.4 MG/DL
BILIRUB SERPL-MCNC: 0.4 MG/DL
BILIRUB UR QL STRIP: NEGATIVE
BLD GP AB SCN CELLS X3 SERPL QL: NORMAL
BLD GP AB SCN CELLS X3 SERPL QL: NORMAL
BLD PROD TYP BPU: NORMAL
BLOOD UNIT EXPIRATION DATE: NORMAL
BLOOD UNIT TYPE CODE: 5100
BLOOD UNIT TYPE CODE: 9500
BLOOD UNIT TYPE CODE: 9500
BLOOD UNIT TYPE: NORMAL
BNP SERPL-MCNC: 276 PG/ML
BNP SERPL-MCNC: 298 PG/ML
BUN SERPL-MCNC: 105 MG/DL
BUN SERPL-MCNC: 107 MG/DL
BUN SERPL-MCNC: 111 MG/DL
BUN SERPL-MCNC: 112 MG/DL
BUN SERPL-MCNC: 115 MG/DL
BUN SERPL-MCNC: 34 MG/DL
BUN SERPL-MCNC: 35 MG/DL
BUN SERPL-MCNC: 46 MG/DL
BUN SERPL-MCNC: 48 MG/DL
BUN SERPL-MCNC: 51 MG/DL
BUN SERPL-MCNC: 59 MG/DL
BUN SERPL-MCNC: 60 MG/DL
BUN SERPL-MCNC: 97 MG/DL
BUN SERPL-MCNC: 98 MG/DL
BUN SERPL-MCNC: 99 MG/DL
CALCIUM SERPL-MCNC: 10.1 MG/DL
CALCIUM SERPL-MCNC: 10.2 MG/DL
CALCIUM SERPL-MCNC: 10.2 MG/DL
CALCIUM SERPL-MCNC: 10.9 MG/DL
CALCIUM SERPL-MCNC: 8.7 MG/DL
CALCIUM SERPL-MCNC: 9 MG/DL
CALCIUM SERPL-MCNC: 9.1 MG/DL
CALCIUM SERPL-MCNC: 9.2 MG/DL
CALCIUM SERPL-MCNC: 9.3 MG/DL
CALCIUM SERPL-MCNC: 9.4 MG/DL
CALCIUM SERPL-MCNC: 9.8 MG/DL
CALCIUM SERPL-MCNC: 9.8 MG/DL
CALCIUM SERPL-MCNC: 9.9 MG/DL
CAOX CRY URNS QL MICRO: ABNORMAL
CAOX CRY URNS QL MICRO: ABNORMAL
CHLORIDE SERPL-SCNC: 102 MMOL/L
CHLORIDE SERPL-SCNC: 102 MMOL/L
CHLORIDE SERPL-SCNC: 104 MMOL/L
CHLORIDE SERPL-SCNC: 106 MMOL/L
CHLORIDE SERPL-SCNC: 107 MMOL/L
CHLORIDE SERPL-SCNC: 109 MMOL/L
CHLORIDE SERPL-SCNC: 110 MMOL/L
CHLORIDE SERPL-SCNC: 112 MMOL/L
CHLORIDE SERPL-SCNC: 114 MMOL/L
CHLORIDE SERPL-SCNC: 117 MMOL/L
CHLORIDE SERPL-SCNC: 118 MMOL/L
CHLORIDE SERPL-SCNC: 118 MMOL/L
CHLORIDE SERPL-SCNC: 99 MMOL/L
CLARITY UR: CLEAR
CO2 SERPL-SCNC: 21 MMOL/L
CO2 SERPL-SCNC: 22 MMOL/L
CO2 SERPL-SCNC: 24 MMOL/L
CO2 SERPL-SCNC: 24 MMOL/L
CO2 SERPL-SCNC: 25 MMOL/L
CO2 SERPL-SCNC: 26 MMOL/L
CO2 SERPL-SCNC: 28 MMOL/L
CO2 SERPL-SCNC: 29 MMOL/L
CO2 SERPL-SCNC: 33 MMOL/L
CO2 SERPL-SCNC: 34 MMOL/L
CO2 SERPL-SCNC: 34 MMOL/L
CODING SYSTEM: NORMAL
COLOR UR: YELLOW
CORTIS SERPL-MCNC: 18.3 UG/DL
CORTIS SERPL-MCNC: 21.7 UG/DL
CORTIS SERPL-MCNC: 25.6 UG/DL
CORTIS SERPL-MCNC: 26 UG/DL
CREAT SERPL-MCNC: 0.7 MG/DL
CREAT SERPL-MCNC: 0.8 MG/DL
CREAT SERPL-MCNC: 0.9 MG/DL
CREAT SERPL-MCNC: 1 MG/DL
CREAT SERPL-MCNC: 1.1 MG/DL
CREAT SERPL-MCNC: 1.2 MG/DL
CREAT SERPL-MCNC: 1.3 MG/DL
CREAT SERPL-MCNC: 2.2 MG/DL
CREAT SERPL-MCNC: 2.3 MG/DL
CREAT SERPL-MCNC: 2.4 MG/DL
CREAT SERPL-MCNC: 2.5 MG/DL
CRP SERPL-MCNC: 205.2 MG/L
CRP SERPL-MCNC: 259.88 MG/L
DACRYOCYTES BLD QL SMEAR: ABNORMAL
DELSYS: ABNORMAL
DELSYS: ABNORMAL
DIFFERENTIAL METHOD: ABNORMAL
DISPENSE STATUS: NORMAL
EOSINOPHIL # BLD AUTO: 0.2 K/UL
EOSINOPHIL # BLD AUTO: 0.2 K/UL
EOSINOPHIL # BLD AUTO: 0.3 K/UL
EOSINOPHIL # BLD AUTO: 0.4 K/UL
EOSINOPHIL # BLD AUTO: 0.5 K/UL
EOSINOPHIL # BLD AUTO: 0.6 K/UL
EOSINOPHIL NFR BLD: 1 %
EOSINOPHIL NFR BLD: 1.2 %
EOSINOPHIL NFR BLD: 1.2 %
EOSINOPHIL NFR BLD: 1.6 %
EOSINOPHIL NFR BLD: 2 %
EOSINOPHIL NFR BLD: 2 %
EOSINOPHIL NFR BLD: 2.4 %
EOSINOPHIL NFR BLD: 2.5 %
EOSINOPHIL NFR BLD: 2.6 %
EOSINOPHIL NFR BLD: 2.9 %
EOSINOPHIL NFR BLD: 3 %
EOSINOPHIL NFR BLD: 3.2 %
EOSINOPHIL NFR BLD: 3.3 %
EOSINOPHIL NFR BLD: 3.7 %
ERYTHROCYTE [DISTWIDTH] IN BLOOD BY AUTOMATED COUNT: 18.6 %
ERYTHROCYTE [DISTWIDTH] IN BLOOD BY AUTOMATED COUNT: 18.7 %
ERYTHROCYTE [DISTWIDTH] IN BLOOD BY AUTOMATED COUNT: 18.8 %
ERYTHROCYTE [DISTWIDTH] IN BLOOD BY AUTOMATED COUNT: 18.9 %
ERYTHROCYTE [DISTWIDTH] IN BLOOD BY AUTOMATED COUNT: 18.9 %
ERYTHROCYTE [DISTWIDTH] IN BLOOD BY AUTOMATED COUNT: 19 %
ERYTHROCYTE [DISTWIDTH] IN BLOOD BY AUTOMATED COUNT: 19.1 %
ERYTHROCYTE [DISTWIDTH] IN BLOOD BY AUTOMATED COUNT: 19.1 %
ERYTHROCYTE [DISTWIDTH] IN BLOOD BY AUTOMATED COUNT: 19.2 %
ERYTHROCYTE [DISTWIDTH] IN BLOOD BY AUTOMATED COUNT: 19.3 %
ERYTHROCYTE [DISTWIDTH] IN BLOOD BY AUTOMATED COUNT: 19.4 %
ERYTHROCYTE [DISTWIDTH] IN BLOOD BY AUTOMATED COUNT: 20 %
ERYTHROCYTE [SEDIMENTATION RATE] IN BLOOD BY WESTERGREN METHOD: 108 MM/HR
ERYTHROCYTE [SEDIMENTATION RATE] IN BLOOD BY WESTERGREN METHOD: 18 MM/H
ERYTHROCYTE [SEDIMENTATION RATE] IN BLOOD BY WESTERGREN METHOD: 20 MM/H
ERYTHROCYTE [SEDIMENTATION RATE] IN BLOOD BY WESTERGREN METHOD: 79 MM/HR
EST. GFR  (AFRICAN AMERICAN): 21 ML/MIN/1.73 M^2
EST. GFR  (AFRICAN AMERICAN): 22 ML/MIN/1.73 M^2
EST. GFR  (AFRICAN AMERICAN): 24 ML/MIN/1.73 M^2
EST. GFR  (AFRICAN AMERICAN): 25 ML/MIN/1.73 M^2
EST. GFR  (AFRICAN AMERICAN): 47 ML/MIN/1.73 M^2
EST. GFR  (AFRICAN AMERICAN): 52 ML/MIN/1.73 M^2
EST. GFR  (AFRICAN AMERICAN): 58 ML/MIN/1.73 M^2
EST. GFR  (AFRICAN AMERICAN): >60 ML/MIN/1.73 M^2
EST. GFR  (NON AFRICAN AMERICAN): 19 ML/MIN/1.73 M^2
EST. GFR  (NON AFRICAN AMERICAN): 19 ML/MIN/1.73 M^2
EST. GFR  (NON AFRICAN AMERICAN): 20 ML/MIN/1.73 M^2
EST. GFR  (NON AFRICAN AMERICAN): 22 ML/MIN/1.73 M^2
EST. GFR  (NON AFRICAN AMERICAN): 41 ML/MIN/1.73 M^2
EST. GFR  (NON AFRICAN AMERICAN): 45 ML/MIN/1.73 M^2
EST. GFR  (NON AFRICAN AMERICAN): 50 ML/MIN/1.73 M^2
EST. GFR  (NON AFRICAN AMERICAN): 56 ML/MIN/1.73 M^2
EST. GFR  (NON AFRICAN AMERICAN): >60 ML/MIN/1.73 M^2
ESTIMATED AVG GLUCOSE: 103 MG/DL
FIO2: 40
FIO2: 40
GIANT PLATELETS BLD QL SMEAR: PRESENT
GLUCOSE SERPL-MCNC: 113 MG/DL
GLUCOSE SERPL-MCNC: 123 MG/DL
GLUCOSE SERPL-MCNC: 70 MG/DL
GLUCOSE SERPL-MCNC: 71 MG/DL
GLUCOSE SERPL-MCNC: 73 MG/DL
GLUCOSE SERPL-MCNC: 79 MG/DL
GLUCOSE SERPL-MCNC: 79 MG/DL
GLUCOSE SERPL-MCNC: 83 MG/DL
GLUCOSE SERPL-MCNC: 83 MG/DL
GLUCOSE SERPL-MCNC: 84 MG/DL
GLUCOSE SERPL-MCNC: 85 MG/DL
GLUCOSE SERPL-MCNC: 86 MG/DL
GLUCOSE SERPL-MCNC: 91 MG/DL
GLUCOSE SERPL-MCNC: 92 MG/DL
GLUCOSE SERPL-MCNC: 99 MG/DL
GLUCOSE UR QL STRIP: NEGATIVE
GRAM STN SPEC: NORMAL
HBA1C MFR BLD HPLC: 5.2 %
HCO3 UR-SCNC: 29.6 MMOL/L (ref 24–28)
HCO3 UR-SCNC: 34.7 MMOL/L (ref 24–28)
HCT VFR BLD AUTO: 19.9 %
HCT VFR BLD AUTO: 20.5 %
HCT VFR BLD AUTO: 20.7 %
HCT VFR BLD AUTO: 22.7 %
HCT VFR BLD AUTO: 23.1 %
HCT VFR BLD AUTO: 23.5 %
HCT VFR BLD AUTO: 24 %
HCT VFR BLD AUTO: 26 %
HCT VFR BLD AUTO: 26.3 %
HCT VFR BLD AUTO: 27.5 %
HCT VFR BLD AUTO: 27.9 %
HCT VFR BLD AUTO: 28.2 %
HCT VFR BLD AUTO: 29.8 %
HCT VFR BLD AUTO: 30.8 %
HCT VFR BLD AUTO: 31.3 %
HGB BLD-MCNC: 5.6 G/DL
HGB BLD-MCNC: 6.1 G/DL
HGB BLD-MCNC: 6.2 G/DL
HGB BLD-MCNC: 6.8 G/DL
HGB BLD-MCNC: 6.9 G/DL
HGB BLD-MCNC: 7.1 G/DL
HGB BLD-MCNC: 7.6 G/DL
HGB BLD-MCNC: 7.9 G/DL
HGB BLD-MCNC: 8.2 G/DL
HGB BLD-MCNC: 8.4 G/DL
HGB BLD-MCNC: 8.5 G/DL
HGB BLD-MCNC: 8.7 G/DL
HGB BLD-MCNC: 8.7 G/DL
HGB BLD-MCNC: 9.7 G/DL
HGB BLD-MCNC: 9.8 G/DL
HGB UR QL STRIP: ABNORMAL
HYALINE CASTS #/AREA URNS LPF: 0 /LPF
HYPOCHROMIA BLD QL SMEAR: ABNORMAL
HYPOCHROMIA BLD QL SMEAR: ABNORMAL
INFLUENZA A, MOLECULAR: NEGATIVE
INFLUENZA B, MOLECULAR: NEGATIVE
INR PPP: 1.1
INR PPP: 1.2
KETONES UR QL STRIP: NEGATIVE
LACTATE SERPL-SCNC: 0.6 MMOL/L
LACTATE SERPL-SCNC: 1.1 MMOL/L
LACTATE SERPL-SCNC: 2.5 MMOL/L
LACTATE SERPL-SCNC: 4.1 MMOL/L
LACTATE SERPL-SCNC: 4.2 MMOL/L
LEUKOCYTE ESTERASE UR QL STRIP: ABNORMAL
LIPASE SERPL-CCNC: 268 U/L
LYMPHOCYTES # BLD AUTO: 0.6 K/UL
LYMPHOCYTES # BLD AUTO: 0.8 K/UL
LYMPHOCYTES # BLD AUTO: 1 K/UL
LYMPHOCYTES # BLD AUTO: 1.1 K/UL
LYMPHOCYTES # BLD AUTO: 1.2 K/UL
LYMPHOCYTES # BLD AUTO: 1.3 K/UL
LYMPHOCYTES NFR BLD: 3.3 %
LYMPHOCYTES NFR BLD: 4 %
LYMPHOCYTES NFR BLD: 5 %
LYMPHOCYTES NFR BLD: 5.1 %
LYMPHOCYTES NFR BLD: 5.4 %
LYMPHOCYTES NFR BLD: 5.6 %
LYMPHOCYTES NFR BLD: 5.8 %
LYMPHOCYTES NFR BLD: 5.8 %
LYMPHOCYTES NFR BLD: 8.6 %
LYMPHOCYTES NFR BLD: 8.7 %
LYMPHOCYTES NFR BLD: 9 %
LYMPHOCYTES NFR BLD: 9.1 %
LYMPHOCYTES NFR BLD: 9.7 %
LYMPHOCYTES NFR BLD: 9.8 %
MAGNESIUM SERPL-MCNC: 1.8 MG/DL
MAGNESIUM SERPL-MCNC: 2.1 MG/DL
MAGNESIUM SERPL-MCNC: 2.3 MG/DL
MAGNESIUM SERPL-MCNC: 2.5 MG/DL
MAGNESIUM SERPL-MCNC: 2.9 MG/DL
MCH RBC QN AUTO: 25.3 PG
MCH RBC QN AUTO: 25.8 PG
MCH RBC QN AUTO: 26 PG
MCH RBC QN AUTO: 26.4 PG
MCH RBC QN AUTO: 26.5 PG
MCH RBC QN AUTO: 26.5 PG
MCH RBC QN AUTO: 26.6 PG
MCH RBC QN AUTO: 26.8 PG
MCH RBC QN AUTO: 26.8 PG
MCH RBC QN AUTO: 26.9 PG
MCH RBC QN AUTO: 27.4 PG
MCH RBC QN AUTO: 27.7 PG
MCH RBC QN AUTO: 28 PG
MCH RBC QN AUTO: 28.1 PG
MCHC RBC AUTO-ENTMCNC: 28.1 G/DL
MCHC RBC AUTO-ENTMCNC: 29.2 G/DL
MCHC RBC AUTO-ENTMCNC: 29.8 G/DL
MCHC RBC AUTO-ENTMCNC: 30 G/DL
MCHC RBC AUTO-ENTMCNC: 30 G/DL
MCHC RBC AUTO-ENTMCNC: 30.2 G/DL
MCHC RBC AUTO-ENTMCNC: 30.4 G/DL
MCHC RBC AUTO-ENTMCNC: 30.5 G/DL
MCHC RBC AUTO-ENTMCNC: 30.5 G/DL
MCHC RBC AUTO-ENTMCNC: 30.9 G/DL
MCHC RBC AUTO-ENTMCNC: 31.2 G/DL
MCHC RBC AUTO-ENTMCNC: 31.3 G/DL
MCHC RBC AUTO-ENTMCNC: 31.5 G/DL
MCHC RBC AUTO-ENTMCNC: 31.7 G/DL
MCV RBC AUTO: 85 FL
MCV RBC AUTO: 85 FL
MCV RBC AUTO: 86 FL
MCV RBC AUTO: 86 FL
MCV RBC AUTO: 87 FL
MCV RBC AUTO: 87 FL
MCV RBC AUTO: 89 FL
MCV RBC AUTO: 90 FL
MCV RBC AUTO: 91 FL
MCV RBC AUTO: 92 FL
MICROSCOPIC COMMENT: ABNORMAL
MODE: ABNORMAL
MODE: ABNORMAL
MONOCYTES # BLD AUTO: 0.3 K/UL
MONOCYTES # BLD AUTO: 0.6 K/UL
MONOCYTES # BLD AUTO: 0.8 K/UL
MONOCYTES # BLD AUTO: 0.9 K/UL
MONOCYTES # BLD AUTO: 1.1 K/UL
MONOCYTES NFR BLD: 2.4 %
MONOCYTES NFR BLD: 3.4 %
MONOCYTES NFR BLD: 3.6 %
MONOCYTES NFR BLD: 4.1 %
MONOCYTES NFR BLD: 4.2 %
MONOCYTES NFR BLD: 4.5 %
MONOCYTES NFR BLD: 4.7 %
MONOCYTES NFR BLD: 4.8 %
MONOCYTES NFR BLD: 5.1 %
MONOCYTES NFR BLD: 5.5 %
MONOCYTES NFR BLD: 5.9 %
MONOCYTES NFR BLD: 6.6 %
MONOCYTES NFR BLD: 7 %
MONOCYTES NFR BLD: 7.4 %
NEUTROPHILS # BLD AUTO: 10 K/UL
NEUTROPHILS # BLD AUTO: 10.8 K/UL
NEUTROPHILS # BLD AUTO: 11.9 K/UL
NEUTROPHILS # BLD AUTO: 14.1 K/UL
NEUTROPHILS # BLD AUTO: 14.8 K/UL
NEUTROPHILS # BLD AUTO: 16.2 K/UL
NEUTROPHILS # BLD AUTO: 16.9 K/UL
NEUTROPHILS # BLD AUTO: 18.5 K/UL
NEUTROPHILS # BLD AUTO: 19.7 K/UL
NEUTROPHILS # BLD AUTO: 19.9 K/UL
NEUTROPHILS # BLD AUTO: 22.5 K/UL
NEUTROPHILS # BLD AUTO: 8.7 K/UL
NEUTROPHILS # BLD AUTO: 9.1 K/UL
NEUTROPHILS # BLD AUTO: 9.3 K/UL
NEUTROPHILS NFR BLD: 79.7 %
NEUTROPHILS NFR BLD: 79.8 %
NEUTROPHILS NFR BLD: 84.2 %
NEUTROPHILS NFR BLD: 85.1 %
NEUTROPHILS NFR BLD: 85.7 %
NEUTROPHILS NFR BLD: 86.7 %
NEUTROPHILS NFR BLD: 87.9 %
NEUTROPHILS NFR BLD: 88.2 %
NEUTROPHILS NFR BLD: 88.6 %
NEUTROPHILS NFR BLD: 88.9 %
NEUTROPHILS NFR BLD: 89.3 %
NEUTROPHILS NFR BLD: 90 %
NEUTROPHILS NFR BLD: 91.1 %
NEUTROPHILS NFR BLD: 91.6 %
NITRITE UR QL STRIP: NEGATIVE
NUM UNITS TRANS PACKED RBC: NORMAL
OB PNL STL: NEGATIVE
OB PNL STL: NEGATIVE
OVALOCYTES BLD QL SMEAR: ABNORMAL
PCO2 BLDA: 41.7 MMHG (ref 35–45)
PCO2 BLDA: 45.3 MMHG (ref 35–45)
PEEP: 5
PEEP: 8
PH SMN: 7.42 [PH] (ref 7.35–7.45)
PH SMN: 7.53 [PH] (ref 7.35–7.45)
PH UR STRIP: 7 [PH] (ref 5–8)
PH UR STRIP: 7 [PH] (ref 5–8)
PH UR STRIP: >8 [PH] (ref 5–8)
PHOSPHATE SERPL-MCNC: 4.6 MG/DL
PHOSPHATE SERPL-MCNC: 5.2 MG/DL
PHOSPHATE SERPL-MCNC: 5.5 MG/DL
PHOSPHATE SERPL-MCNC: 6.1 MG/DL
PLATELET # BLD AUTO: 134 K/UL
PLATELET # BLD AUTO: 165 K/UL
PLATELET # BLD AUTO: 169 K/UL
PLATELET # BLD AUTO: 179 K/UL
PLATELET # BLD AUTO: 396 K/UL
PLATELET # BLD AUTO: 404 K/UL
PLATELET # BLD AUTO: 416 K/UL
PLATELET # BLD AUTO: 424 K/UL
PLATELET # BLD AUTO: 442 K/UL
PLATELET # BLD AUTO: 452 K/UL
PLATELET # BLD AUTO: 502 K/UL
PLATELET # BLD AUTO: 507 K/UL
PLATELET # BLD AUTO: 602 K/UL
PLATELET # BLD AUTO: 602 K/UL
PLATELET BLD QL SMEAR: ABNORMAL
PMV BLD AUTO: 10 FL
PMV BLD AUTO: 10 FL
PMV BLD AUTO: 10.1 FL
PMV BLD AUTO: 10.2 FL
PMV BLD AUTO: 11.1 FL
PMV BLD AUTO: 11.4 FL
PMV BLD AUTO: 12 FL
PMV BLD AUTO: 9.4 FL
PMV BLD AUTO: 9.4 FL
PMV BLD AUTO: 9.5 FL
PMV BLD AUTO: 9.6 FL
PMV BLD AUTO: 9.7 FL
PMV BLD AUTO: 9.8 FL
PMV BLD AUTO: ABNORMAL FL
PO2 BLDA: 171 MMHG (ref 80–100)
PO2 BLDA: 82 MMHG (ref 80–100)
POC BE: 12 MMOL/L
POC BE: 5 MMOL/L
POC SATURATED O2: 100 % (ref 95–100)
POC SATURATED O2: 96 % (ref 95–100)
POCT GLUCOSE: 100 MG/DL (ref 70–110)
POCT GLUCOSE: 100 MG/DL (ref 70–110)
POCT GLUCOSE: 101 MG/DL (ref 70–110)
POCT GLUCOSE: 103 MG/DL (ref 70–110)
POCT GLUCOSE: 103 MG/DL (ref 70–110)
POCT GLUCOSE: 108 MG/DL (ref 70–110)
POCT GLUCOSE: 109 MG/DL (ref 70–110)
POCT GLUCOSE: 110 MG/DL (ref 70–110)
POCT GLUCOSE: 111 MG/DL (ref 70–110)
POCT GLUCOSE: 112 MG/DL (ref 70–110)
POCT GLUCOSE: 114 MG/DL (ref 70–110)
POCT GLUCOSE: 118 MG/DL (ref 70–110)
POCT GLUCOSE: 119 MG/DL (ref 70–110)
POCT GLUCOSE: 121 MG/DL (ref 70–110)
POCT GLUCOSE: 127 MG/DL (ref 70–110)
POCT GLUCOSE: 128 MG/DL (ref 70–110)
POCT GLUCOSE: 130 MG/DL (ref 70–110)
POCT GLUCOSE: 140 MG/DL (ref 70–110)
POCT GLUCOSE: 147 MG/DL (ref 70–110)
POCT GLUCOSE: 23 MG/DL (ref 70–110)
POCT GLUCOSE: 55 MG/DL (ref 70–110)
POCT GLUCOSE: 56 MG/DL (ref 70–110)
POCT GLUCOSE: 66 MG/DL (ref 70–110)
POCT GLUCOSE: 72 MG/DL (ref 70–110)
POCT GLUCOSE: 79 MG/DL (ref 70–110)
POCT GLUCOSE: 79 MG/DL (ref 70–110)
POCT GLUCOSE: 80 MG/DL (ref 70–110)
POCT GLUCOSE: 81 MG/DL (ref 70–110)
POCT GLUCOSE: 82 MG/DL (ref 70–110)
POCT GLUCOSE: 85 MG/DL (ref 70–110)
POCT GLUCOSE: 86 MG/DL (ref 70–110)
POCT GLUCOSE: 88 MG/DL (ref 70–110)
POCT GLUCOSE: 90 MG/DL (ref 70–110)
POCT GLUCOSE: 91 MG/DL (ref 70–110)
POCT GLUCOSE: 91 MG/DL (ref 70–110)
POCT GLUCOSE: 92 MG/DL (ref 70–110)
POCT GLUCOSE: 92 MG/DL (ref 70–110)
POCT GLUCOSE: 93 MG/DL (ref 70–110)
POCT GLUCOSE: 94 MG/DL (ref 70–110)
POCT GLUCOSE: 95 MG/DL (ref 70–110)
POCT GLUCOSE: 95 MG/DL (ref 70–110)
POCT GLUCOSE: 96 MG/DL (ref 70–110)
POCT GLUCOSE: 98 MG/DL (ref 70–110)
POCT GLUCOSE: 98 MG/DL (ref 70–110)
POIKILOCYTOSIS BLD QL SMEAR: SLIGHT
POLYCHROMASIA BLD QL SMEAR: ABNORMAL
POLYCHROMASIA BLD QL SMEAR: ABNORMAL
POTASSIUM SERPL-SCNC: 3.1 MMOL/L
POTASSIUM SERPL-SCNC: 3.5 MMOL/L
POTASSIUM SERPL-SCNC: 3.5 MMOL/L
POTASSIUM SERPL-SCNC: 3.7 MMOL/L
POTASSIUM SERPL-SCNC: 3.7 MMOL/L
POTASSIUM SERPL-SCNC: 3.8 MMOL/L
POTASSIUM SERPL-SCNC: 3.8 MMOL/L
POTASSIUM SERPL-SCNC: 3.9 MMOL/L
POTASSIUM SERPL-SCNC: 4 MMOL/L
POTASSIUM SERPL-SCNC: 4.1 MMOL/L
POTASSIUM SERPL-SCNC: 4.3 MMOL/L
POTASSIUM SERPL-SCNC: 4.5 MMOL/L
POTASSIUM SERPL-SCNC: 4.6 MMOL/L
POTASSIUM SERPL-SCNC: 4.9 MMOL/L
POTASSIUM SERPL-SCNC: 5.3 MMOL/L
PROCALCITONIN SERPL IA-MCNC: 1.2 NG/ML
PROCALCITONIN SERPL IA-MCNC: 3.81 NG/ML
PROCALCITONIN SERPL IA-MCNC: 5.35 NG/ML
PROCALCITONIN SERPL IA-MCNC: 5.48 NG/ML
PROT SERPL-MCNC: 6.3 G/DL
PROT SERPL-MCNC: 6.7 G/DL
PROT SERPL-MCNC: 6.8 G/DL
PROT SERPL-MCNC: 6.8 G/DL
PROT SERPL-MCNC: 7.1 G/DL
PROT SERPL-MCNC: 7.2 G/DL
PROT SERPL-MCNC: 7.2 G/DL
PROT SERPL-MCNC: 7.3 G/DL
PROT SERPL-MCNC: 7.4 G/DL
PROT SERPL-MCNC: 7.8 G/DL
PROT UR QL STRIP: ABNORMAL
PROT UR QL STRIP: ABNORMAL
PROT UR QL STRIP: NEGATIVE
PROTHROMBIN TIME: 11.5 SEC
PROTHROMBIN TIME: 13 SEC
RBC # BLD AUTO: 2.21 M/UL
RBC # BLD AUTO: 2.31 M/UL
RBC # BLD AUTO: 2.36 M/UL
RBC # BLD AUTO: 2.54 M/UL
RBC # BLD AUTO: 2.69 M/UL
RBC # BLD AUTO: 2.7 M/UL
RBC # BLD AUTO: 3.03 M/UL
RBC # BLD AUTO: 3.04 M/UL
RBC # BLD AUTO: 3.09 M/UL
RBC # BLD AUTO: 3.1 M/UL
RBC # BLD AUTO: 3.23 M/UL
RBC # BLD AUTO: 3.28 M/UL
RBC # BLD AUTO: 3.47 M/UL
RBC # BLD AUTO: 3.68 M/UL
RBC #/AREA URNS HPF: 10 /HPF (ref 0–4)
RBC #/AREA URNS HPF: 11 /HPF (ref 0–4)
RBC #/AREA URNS HPF: 3 /HPF (ref 0–4)
SAMPLE: ABNORMAL
SAMPLE: ABNORMAL
SCHISTOCYTES BLD QL SMEAR: PRESENT
SITE: ABNORMAL
SITE: ABNORMAL
SODIUM SERPL-SCNC: 140 MMOL/L
SODIUM SERPL-SCNC: 140 MMOL/L
SODIUM SERPL-SCNC: 142 MMOL/L
SODIUM SERPL-SCNC: 144 MMOL/L
SODIUM SERPL-SCNC: 145 MMOL/L
SODIUM SERPL-SCNC: 145 MMOL/L
SODIUM SERPL-SCNC: 148 MMOL/L
SODIUM SERPL-SCNC: 149 MMOL/L
SODIUM SERPL-SCNC: 149 MMOL/L
SODIUM SERPL-SCNC: 150 MMOL/L
SODIUM SERPL-SCNC: 150 MMOL/L
SODIUM SERPL-SCNC: 151 MMOL/L
SODIUM SERPL-SCNC: 151 MMOL/L
SODIUM SERPL-SCNC: 152 MMOL/L
SODIUM SERPL-SCNC: 152 MMOL/L
SODIUM UR-SCNC: 46 MMOL/L
SP GR UR STRIP: 1.01 (ref 1–1.03)
SP GR UR STRIP: 1.01 (ref 1–1.03)
SP GR UR STRIP: <=1.005 (ref 1–1.03)
SPECIMEN SOURCE: NORMAL
SPHEROCYTES BLD QL SMEAR: ABNORMAL
SQUAMOUS #/AREA URNS HPF: 2 /HPF
STOMATOCYTES BLD QL SMEAR: PRESENT
TARGETS BLD QL SMEAR: ABNORMAL
TROPONIN I SERPL DL<=0.01 NG/ML-MCNC: 0.01 NG/ML
TROPONIN I SERPL DL<=0.01 NG/ML-MCNC: 0.02 NG/ML
TSH SERPL DL<=0.005 MIU/L-ACNC: 3.21 UIU/ML
URN SPEC COLLECT METH UR: ABNORMAL
UROBILINOGEN UR STRIP-ACNC: NEGATIVE EU/DL
VANCOMYCIN SERPL-MCNC: 13.6 UG/ML
VANCOMYCIN SERPL-MCNC: 17.5 UG/ML
VANCOMYCIN SERPL-MCNC: 20 UG/ML
VANCOMYCIN SERPL-MCNC: 22.9 UG/ML
VT: 375
VT: 400
WBC # BLD AUTO: 10.5 K/UL
WBC # BLD AUTO: 10.68 K/UL
WBC # BLD AUTO: 11.69 K/UL
WBC # BLD AUTO: 12.59 K/UL
WBC # BLD AUTO: 13.18 K/UL
WBC # BLD AUTO: 14.24 K/UL
WBC # BLD AUTO: 16.18 K/UL
WBC # BLD AUTO: 17.06 K/UL
WBC # BLD AUTO: 17.75 K/UL
WBC # BLD AUTO: 19.29 K/UL
WBC # BLD AUTO: 21.15 K/UL
WBC # BLD AUTO: 22.22 K/UL
WBC # BLD AUTO: 22.4 K/UL
WBC # BLD AUTO: 24.64 K/UL
WBC #/AREA URNS HPF: 1 /HPF (ref 0–5)
WBC #/AREA URNS HPF: 10 /HPF (ref 0–5)
WBC #/AREA URNS HPF: 15 /HPF (ref 0–5)
WBC CLUMPS URNS QL MICRO: ABNORMAL
YEAST URNS QL MICRO: ABNORMAL

## 2019-01-01 PROCEDURE — 27200966 HC CLOSED SUCTION SYSTEM

## 2019-01-01 PROCEDURE — 94668 MNPJ CHEST WALL SBSQ: CPT

## 2019-01-01 PROCEDURE — 85025 COMPLETE CBC W/AUTO DIFF WBC: CPT

## 2019-01-01 PROCEDURE — 85651 RBC SED RATE NONAUTOMATED: CPT

## 2019-01-01 PROCEDURE — 11046 DBRDMT MUSC&/FSCA EA ADDL: CPT | Mod: ,,, | Performed by: SURGERY

## 2019-01-01 PROCEDURE — C9113 INJ PANTOPRAZOLE SODIUM, VIA: HCPCS | Performed by: NURSE PRACTITIONER

## 2019-01-01 PROCEDURE — P9016 RBC LEUKOCYTES REDUCED: HCPCS

## 2019-01-01 PROCEDURE — 94003 VENT MGMT INPAT SUBQ DAY: CPT

## 2019-01-01 PROCEDURE — 99291 CRITICAL CARE FIRST HOUR: CPT | Mod: ,,, | Performed by: NURSE PRACTITIONER

## 2019-01-01 PROCEDURE — 25000242 PHARM REV CODE 250 ALT 637 W/ HCPCS: Performed by: NURSE PRACTITIONER

## 2019-01-01 PROCEDURE — 82533 TOTAL CORTISOL: CPT | Mod: 91

## 2019-01-01 PROCEDURE — 99291 PR CRITICAL CARE, E/M 30-74 MINUTES: ICD-10-PCS | Mod: ,,, | Performed by: NURSE PRACTITIONER

## 2019-01-01 PROCEDURE — 86901 BLOOD TYPING SEROLOGIC RH(D): CPT

## 2019-01-01 PROCEDURE — 99291 CRITICAL CARE FIRST HOUR: CPT | Mod: ,,, | Performed by: INTERNAL MEDICINE

## 2019-01-01 PROCEDURE — 99900026 HC AIRWAY MAINTENANCE (STAT)

## 2019-01-01 PROCEDURE — 83735 ASSAY OF MAGNESIUM: CPT

## 2019-01-01 PROCEDURE — 36569 INSJ PICC 5 YR+ W/O IMAGING: CPT

## 2019-01-01 PROCEDURE — 87186 SC STD MICRODIL/AGAR DIL: CPT

## 2019-01-01 PROCEDURE — 25000003 PHARM REV CODE 250: Performed by: INTERNAL MEDICINE

## 2019-01-01 PROCEDURE — 27100108

## 2019-01-01 PROCEDURE — 84145 PROCALCITONIN (PCT): CPT

## 2019-01-01 PROCEDURE — 80053 COMPREHEN METABOLIC PANEL: CPT

## 2019-01-01 PROCEDURE — 86140 C-REACTIVE PROTEIN: CPT

## 2019-01-01 PROCEDURE — 63600175 PHARM REV CODE 636 W HCPCS: Performed by: EMERGENCY MEDICINE

## 2019-01-01 PROCEDURE — 63600175 PHARM REV CODE 636 W HCPCS: Performed by: NURSE PRACTITIONER

## 2019-01-01 PROCEDURE — 94640 AIRWAY INHALATION TREATMENT: CPT

## 2019-01-01 PROCEDURE — 87186 SC STD MICRODIL/AGAR DIL: CPT | Mod: 59

## 2019-01-01 PROCEDURE — 99024 POSTOP FOLLOW-UP VISIT: CPT | Mod: POP,,, | Performed by: SURGERY

## 2019-01-01 PROCEDURE — S0028 INJECTION, FAMOTIDINE, 20 MG: HCPCS | Performed by: NURSE PRACTITIONER

## 2019-01-01 PROCEDURE — 25000003 PHARM REV CODE 250: Performed by: NURSE PRACTITIONER

## 2019-01-01 PROCEDURE — 25000003 PHARM REV CODE 250: Performed by: EMERGENCY MEDICINE

## 2019-01-01 PROCEDURE — 87075 CULTR BACTERIA EXCEPT BLOOD: CPT

## 2019-01-01 PROCEDURE — 87077 CULTURE AEROBIC IDENTIFY: CPT

## 2019-01-01 PROCEDURE — 25000003 PHARM REV CODE 250: Performed by: PHYSICIAN ASSISTANT

## 2019-01-01 PROCEDURE — 99291 PR CRITICAL CARE, E/M 30-74 MINUTES: ICD-10-PCS | Mod: ,,, | Performed by: INTERNAL MEDICINE

## 2019-01-01 PROCEDURE — 63600175 PHARM REV CODE 636 W HCPCS: Performed by: INTERNAL MEDICINE

## 2019-01-01 PROCEDURE — 84100 ASSAY OF PHOSPHORUS: CPT

## 2019-01-01 PROCEDURE — 82272 OCCULT BLD FECES 1-3 TESTS: CPT

## 2019-01-01 PROCEDURE — 99291 PR CRITICAL CARE, E/M 30-74 MINUTES: ICD-10-PCS | Mod: 25,,, | Performed by: INTERNAL MEDICINE

## 2019-01-01 PROCEDURE — 96375 TX/PRO/DX INJ NEW DRUG ADDON: CPT | Mod: 59

## 2019-01-01 PROCEDURE — 87070 CULTURE OTHR SPECIMN AEROBIC: CPT

## 2019-01-01 PROCEDURE — 36415 COLL VENOUS BLD VENIPUNCTURE: CPT

## 2019-01-01 PROCEDURE — 20000000 HC ICU ROOM

## 2019-01-01 PROCEDURE — 85730 THROMBOPLASTIN TIME PARTIAL: CPT

## 2019-01-01 PROCEDURE — 94002 VENT MGMT INPAT INIT DAY: CPT

## 2019-01-01 PROCEDURE — 97803 MED NUTRITION INDIV SUBSEQ: CPT

## 2019-01-01 PROCEDURE — 84484 ASSAY OF TROPONIN QUANT: CPT

## 2019-01-01 PROCEDURE — 86920 COMPATIBILITY TEST SPIN: CPT

## 2019-01-01 PROCEDURE — 96367 TX/PROPH/DG ADDL SEQ IV INF: CPT

## 2019-01-01 PROCEDURE — 97802 MEDICAL NUTRITION INDIV IN: CPT

## 2019-01-01 PROCEDURE — 87086 URINE CULTURE/COLONY COUNT: CPT

## 2019-01-01 PROCEDURE — 99291 CRITICAL CARE FIRST HOUR: CPT | Mod: 25,,, | Performed by: INTERNAL MEDICINE

## 2019-01-01 PROCEDURE — 82800 BLOOD PH: CPT

## 2019-01-01 PROCEDURE — 51702 INSERT TEMP BLADDER CATH: CPT

## 2019-01-01 PROCEDURE — 96365 THER/PROPH/DIAG IV INF INIT: CPT | Mod: 59

## 2019-01-01 PROCEDURE — 11043 PR DEBRIDEMENT, SKIN, SUB-Q TISSUE,MUSCLE,=<20 SQ CM: ICD-10-PCS | Mod: ,,, | Performed by: SURGERY

## 2019-01-01 PROCEDURE — 96374 THER/PROPH/DIAG INJ IV PUSH: CPT

## 2019-01-01 PROCEDURE — 63600175 PHARM REV CODE 636 W HCPCS: Performed by: PHYSICIAN ASSISTANT

## 2019-01-01 PROCEDURE — 36569 PR INSERT PICC W/O SUB-Q PORT >5Y/O: ICD-10-PCS | Mod: ,,, | Performed by: NURSE PRACTITIONER

## 2019-01-01 PROCEDURE — 63600175 PHARM REV CODE 636 W HCPCS: Mod: JG | Performed by: PHYSICIAN ASSISTANT

## 2019-01-01 PROCEDURE — 86141 C-REACTIVE PROTEIN HS: CPT

## 2019-01-01 PROCEDURE — 93010 ELECTROCARDIOGRAM REPORT: CPT | Mod: ,,, | Performed by: INTERNAL MEDICINE

## 2019-01-01 PROCEDURE — 27201112

## 2019-01-01 PROCEDURE — 85610 PROTHROMBIN TIME: CPT

## 2019-01-01 PROCEDURE — 82533 TOTAL CORTISOL: CPT

## 2019-01-01 PROCEDURE — 93010 EKG 12-LEAD: ICD-10-PCS | Mod: ,,, | Performed by: INTERNAL MEDICINE

## 2019-01-01 PROCEDURE — C1751 CATH, INF, PER/CENT/MIDLINE: HCPCS

## 2019-01-01 PROCEDURE — 63600175 PHARM REV CODE 636 W HCPCS: Mod: JG | Performed by: INTERNAL MEDICINE

## 2019-01-01 PROCEDURE — 83690 ASSAY OF LIPASE: CPT

## 2019-01-01 PROCEDURE — 63600175 PHARM REV CODE 636 W HCPCS

## 2019-01-01 PROCEDURE — 86850 RBC ANTIBODY SCREEN: CPT

## 2019-01-01 PROCEDURE — 96366 THER/PROPH/DIAG IV INF ADDON: CPT | Mod: 59

## 2019-01-01 PROCEDURE — 99222 PR INITIAL HOSPITAL CARE,LEVL II: ICD-10-PCS | Mod: 25,,, | Performed by: SURGERY

## 2019-01-01 PROCEDURE — 92950 HEART/LUNG RESUSCITATION CPR: CPT

## 2019-01-01 PROCEDURE — 87205 SMEAR GRAM STAIN: CPT

## 2019-01-01 PROCEDURE — 96365 THER/PROPH/DIAG IV INF INIT: CPT

## 2019-01-01 PROCEDURE — 99900035 HC TECH TIME PER 15 MIN (STAT)

## 2019-01-01 PROCEDURE — 93005 ELECTROCARDIOGRAM TRACING: CPT

## 2019-01-01 PROCEDURE — 11046 PR DEB MUSC/FASCIA ADD-ON: ICD-10-PCS | Mod: ,,, | Performed by: SURGERY

## 2019-01-01 PROCEDURE — 96361 HYDRATE IV INFUSION ADD-ON: CPT

## 2019-01-01 PROCEDURE — 96375 TX/PRO/DX INJ NEW DRUG ADDON: CPT

## 2019-01-01 PROCEDURE — 11043 DBRDMT MUSC&/FSCA 1ST 20/<: CPT | Mod: ,,, | Performed by: SURGERY

## 2019-01-01 PROCEDURE — 80202 ASSAY OF VANCOMYCIN: CPT

## 2019-01-01 PROCEDURE — 36600 WITHDRAWAL OF ARTERIAL BLOOD: CPT

## 2019-01-01 PROCEDURE — 83880 ASSAY OF NATRIURETIC PEPTIDE: CPT

## 2019-01-01 PROCEDURE — 84443 ASSAY THYROID STIM HORMONE: CPT

## 2019-01-01 PROCEDURE — 25000003 PHARM REV CODE 250: Performed by: NURSE ANESTHETIST, CERTIFIED REGISTERED

## 2019-01-01 PROCEDURE — 99291 CRITICAL CARE FIRST HOUR: CPT | Mod: 25

## 2019-01-01 PROCEDURE — 81000 URINALYSIS NONAUTO W/SCOPE: CPT

## 2019-01-01 PROCEDURE — 85018 HEMOGLOBIN: CPT

## 2019-01-01 PROCEDURE — 87076 CULTURE ANAEROBE IDENT EACH: CPT

## 2019-01-01 PROCEDURE — 36410 VNPNXR 3YR/> PHY/QHP DX/THER: CPT

## 2019-01-01 PROCEDURE — 36430 TRANSFUSION BLD/BLD COMPNT: CPT | Mod: 59

## 2019-01-01 PROCEDURE — 87070 CULTURE OTHR SPECIMN AEROBIC: CPT | Mod: 59

## 2019-01-01 PROCEDURE — 36556 INSERT NON-TUNNEL CV CATH: CPT

## 2019-01-01 PROCEDURE — 82803 BLOOD GASES ANY COMBINATION: CPT

## 2019-01-01 PROCEDURE — 36000707: Performed by: SURGERY

## 2019-01-01 PROCEDURE — 94667 MNPJ CHEST WALL 1ST: CPT

## 2019-01-01 PROCEDURE — 99222 1ST HOSP IP/OBS MODERATE 55: CPT | Mod: 25,,, | Performed by: SURGERY

## 2019-01-01 PROCEDURE — 83605 ASSAY OF LACTIC ACID: CPT

## 2019-01-01 PROCEDURE — 36569 INSJ PICC 5 YR+ W/O IMAGING: CPT | Mod: ,,, | Performed by: NURSE PRACTITIONER

## 2019-01-01 PROCEDURE — 37000008 HC ANESTHESIA 1ST 15 MINUTES: Performed by: SURGERY

## 2019-01-01 PROCEDURE — 36430 TRANSFUSION BLD/BLD COMPNT: CPT

## 2019-01-01 PROCEDURE — 84300 ASSAY OF URINE SODIUM: CPT

## 2019-01-01 PROCEDURE — 25500020 PHARM REV CODE 255: Performed by: EMERGENCY MEDICINE

## 2019-01-01 PROCEDURE — 37000009 HC ANESTHESIA EA ADD 15 MINS: Performed by: SURGERY

## 2019-01-01 PROCEDURE — 87176 TISSUE HOMOGENIZATION CULTR: CPT

## 2019-01-01 PROCEDURE — 80048 BASIC METABOLIC PNL TOTAL CA: CPT

## 2019-01-01 PROCEDURE — 99024 PR POST-OP FOLLOW-UP VISIT: ICD-10-PCS | Mod: POP,,, | Performed by: SURGERY

## 2019-01-01 PROCEDURE — 83605 ASSAY OF LACTIC ACID: CPT | Mod: 91

## 2019-01-01 PROCEDURE — 63600175 PHARM REV CODE 636 W HCPCS: Performed by: NURSE ANESTHETIST, CERTIFIED REGISTERED

## 2019-01-01 PROCEDURE — 37799 UNLISTED PX VASCULAR SURGERY: CPT

## 2019-01-01 PROCEDURE — 87502 INFLUENZA DNA AMP PROBE: CPT

## 2019-01-01 PROCEDURE — 87040 BLOOD CULTURE FOR BACTERIA: CPT | Mod: 59

## 2019-01-01 PROCEDURE — 85014 HEMATOCRIT: CPT

## 2019-01-01 PROCEDURE — 36000706: Performed by: SURGERY

## 2019-01-01 PROCEDURE — 83036 HEMOGLOBIN GLYCOSYLATED A1C: CPT

## 2019-01-01 RX ORDER — POTASSIUM CHLORIDE 7.45 MG/ML
10 INJECTION INTRAVENOUS
Status: COMPLETED | OUTPATIENT
Start: 2019-01-01 | End: 2019-01-01

## 2019-01-01 RX ORDER — VANCOMYCIN HCL IN 5 % DEXTROSE 1G/250ML
1000 PLASTIC BAG, INJECTION (ML) INTRAVENOUS
Status: COMPLETED | OUTPATIENT
Start: 2019-01-01 | End: 2019-01-01

## 2019-01-01 RX ORDER — COSYNTROPIN 0.25 MG/ML
0.25 INJECTION, POWDER, FOR SOLUTION INTRAMUSCULAR; INTRAVENOUS ONCE
Status: COMPLETED | OUTPATIENT
Start: 2019-01-01 | End: 2019-01-01

## 2019-01-01 RX ORDER — SODIUM CHLORIDE 0.9 % (FLUSH) 0.9 %
5 SYRINGE (ML) INJECTION
Status: DISCONTINUED | OUTPATIENT
Start: 2019-01-01 | End: 2019-01-01 | Stop reason: HOSPADM

## 2019-01-01 RX ORDER — NOREPINEPHRINE BITARTRATE/D5W 4MG/250ML
0.1 PLASTIC BAG, INJECTION (ML) INTRAVENOUS CONTINUOUS
Status: DISCONTINUED | OUTPATIENT
Start: 2019-01-01 | End: 2019-01-01

## 2019-01-01 RX ORDER — FUROSEMIDE 10 MG/ML
40 INJECTION INTRAMUSCULAR; INTRAVENOUS ONCE
Status: COMPLETED | OUTPATIENT
Start: 2019-01-01 | End: 2019-01-01

## 2019-01-01 RX ORDER — EPINEPHRINE 0.1 MG/ML
INJECTION INTRAVENOUS CODE/TRAUMA/SEDATION MEDICATION
Status: COMPLETED | OUTPATIENT
Start: 2019-01-01 | End: 2019-01-01

## 2019-01-01 RX ORDER — POTASSIUM CHLORIDE 20 MEQ/15ML
40 SOLUTION ORAL ONCE
Status: COMPLETED | OUTPATIENT
Start: 2019-01-01 | End: 2019-01-01

## 2019-01-01 RX ORDER — PANTOPRAZOLE SODIUM 40 MG/10ML
40 INJECTION, POWDER, LYOPHILIZED, FOR SOLUTION INTRAVENOUS DAILY
Status: DISCONTINUED | OUTPATIENT
Start: 2019-01-01 | End: 2019-01-01 | Stop reason: HOSPADM

## 2019-01-01 RX ORDER — MIDAZOLAM HYDROCHLORIDE 1 MG/ML
INJECTION, SOLUTION INTRAMUSCULAR; INTRAVENOUS
Status: DISCONTINUED | OUTPATIENT
Start: 2019-01-01 | End: 2019-01-01

## 2019-01-01 RX ORDER — SODIUM CHLORIDE 9 MG/ML
INJECTION, SOLUTION INTRAVENOUS
Status: COMPLETED | OUTPATIENT
Start: 2019-01-01 | End: 2019-01-01

## 2019-01-01 RX ORDER — VANCOMYCIN HCL IN 5 % DEXTROSE 1G/250ML
1000 PLASTIC BAG, INJECTION (ML) INTRAVENOUS ONCE
Status: COMPLETED | OUTPATIENT
Start: 2019-01-01 | End: 2019-01-01

## 2019-01-01 RX ORDER — HYDROCODONE BITARTRATE AND ACETAMINOPHEN 500; 5 MG/1; MG/1
TABLET ORAL
Status: DISCONTINUED | OUTPATIENT
Start: 2019-01-01 | End: 2019-01-01

## 2019-01-01 RX ORDER — MEROPENEM AND SODIUM CHLORIDE 500 MG/50ML
500 INJECTION, SOLUTION INTRAVENOUS
Status: DISCONTINUED | OUTPATIENT
Start: 2019-01-01 | End: 2019-01-01

## 2019-01-01 RX ORDER — PANTOPRAZOLE SODIUM 40 MG/10ML
40 INJECTION, POWDER, LYOPHILIZED, FOR SOLUTION INTRAVENOUS 2 TIMES DAILY
Status: DISCONTINUED | OUTPATIENT
Start: 2019-01-01 | End: 2019-01-01

## 2019-01-01 RX ORDER — BUDESONIDE 0.5 MG/2ML
0.5 INHALANT ORAL EVERY 12 HOURS
Status: DISCONTINUED | OUTPATIENT
Start: 2019-01-01 | End: 2019-01-01

## 2019-01-01 RX ORDER — SODIUM CHLORIDE 450 MG/100ML
INJECTION, SOLUTION INTRAVENOUS CONTINUOUS
Status: DISCONTINUED | OUTPATIENT
Start: 2019-01-01 | End: 2019-01-01

## 2019-01-01 RX ORDER — DEXTROSE MONOHYDRATE 50 MG/ML
INJECTION, SOLUTION INTRAVENOUS CONTINUOUS
Status: DISCONTINUED | OUTPATIENT
Start: 2019-01-01 | End: 2019-01-01

## 2019-01-01 RX ORDER — ONDANSETRON 8 MG/1
8 TABLET, ORALLY DISINTEGRATING ORAL EVERY 8 HOURS PRN
Status: DISCONTINUED | OUTPATIENT
Start: 2019-01-01 | End: 2019-01-01 | Stop reason: HOSPADM

## 2019-01-01 RX ORDER — ACETAMINOPHEN 325 MG/1
650 TABLET ORAL EVERY 6 HOURS PRN
Status: DISCONTINUED | OUTPATIENT
Start: 2019-01-01 | End: 2019-01-01 | Stop reason: HOSPADM

## 2019-01-01 RX ORDER — LEVOTHYROXINE SODIUM 100 UG/1
100 TABLET ORAL
Status: DISCONTINUED | OUTPATIENT
Start: 2019-01-01 | End: 2019-01-01 | Stop reason: HOSPADM

## 2019-01-01 RX ORDER — IBUPROFEN 200 MG
16 TABLET ORAL
Status: DISCONTINUED | OUTPATIENT
Start: 2019-01-01 | End: 2019-01-01 | Stop reason: HOSPADM

## 2019-01-01 RX ORDER — SODIUM CHLORIDE FOR INHALATION 3 %
4 VIAL, NEBULIZER (ML) INHALATION
Status: DISCONTINUED | OUTPATIENT
Start: 2019-01-01 | End: 2019-01-01 | Stop reason: HOSPADM

## 2019-01-01 RX ORDER — HYDROCODONE BITARTRATE AND ACETAMINOPHEN 500; 5 MG/1; MG/1
TABLET ORAL
Status: DISCONTINUED | OUTPATIENT
Start: 2019-01-01 | End: 2019-01-01 | Stop reason: HOSPADM

## 2019-01-01 RX ORDER — POTASSIUM CHLORIDE 20 MEQ/15ML
30 SOLUTION ORAL ONCE
Status: COMPLETED | OUTPATIENT
Start: 2019-01-01 | End: 2019-01-01

## 2019-01-01 RX ORDER — HYDRALAZINE HYDROCHLORIDE 20 MG/ML
10 INJECTION INTRAMUSCULAR; INTRAVENOUS EVERY 6 HOURS PRN
Status: DISCONTINUED | OUTPATIENT
Start: 2019-01-01 | End: 2019-01-01 | Stop reason: HOSPADM

## 2019-01-01 RX ORDER — IPRATROPIUM BROMIDE AND ALBUTEROL SULFATE 2.5; .5 MG/3ML; MG/3ML
3 SOLUTION RESPIRATORY (INHALATION) EVERY 4 HOURS PRN
Status: DISCONTINUED | OUTPATIENT
Start: 2019-01-01 | End: 2019-01-01

## 2019-01-01 RX ORDER — MIDODRINE HYDROCHLORIDE 5 MG/1
5 TABLET ORAL 3 TIMES DAILY
Status: DISCONTINUED | OUTPATIENT
Start: 2019-01-01 | End: 2019-01-01

## 2019-01-01 RX ORDER — VANCOMYCIN HCL IN 5 % DEXTROSE 1G/250ML
1000 PLASTIC BAG, INJECTION (ML) INTRAVENOUS
Status: DISCONTINUED | OUTPATIENT
Start: 2019-01-01 | End: 2019-01-01

## 2019-01-01 RX ORDER — ACETYLCYSTEINE 200 MG/ML
4 SOLUTION ORAL; RESPIRATORY (INHALATION) 3 TIMES DAILY
Status: DISCONTINUED | OUTPATIENT
Start: 2019-01-01 | End: 2019-01-01

## 2019-01-01 RX ORDER — FAMOTIDINE 10 MG/ML
20 INJECTION INTRAVENOUS DAILY
Status: DISCONTINUED | OUTPATIENT
Start: 2019-01-01 | End: 2019-01-01

## 2019-01-01 RX ORDER — IBUPROFEN 200 MG
24 TABLET ORAL
Status: DISCONTINUED | OUTPATIENT
Start: 2019-01-01 | End: 2019-01-01 | Stop reason: HOSPADM

## 2019-01-01 RX ORDER — MEROPENEM AND SODIUM CHLORIDE 500 MG/50ML
500 INJECTION, SOLUTION INTRAVENOUS
Status: DISCONTINUED | OUTPATIENT
Start: 2019-01-01 | End: 2019-01-01 | Stop reason: ALTCHOICE

## 2019-01-01 RX ORDER — GLUCAGON 1 MG
1 KIT INJECTION
Status: DISCONTINUED | OUTPATIENT
Start: 2019-01-01 | End: 2019-01-01 | Stop reason: HOSPADM

## 2019-01-01 RX ORDER — AMIODARONE HYDROCHLORIDE 200 MG/1
200 TABLET ORAL 2 TIMES DAILY
Status: DISCONTINUED | OUTPATIENT
Start: 2019-01-01 | End: 2019-01-01 | Stop reason: HOSPADM

## 2019-01-01 RX ORDER — ONDANSETRON 8 MG/1
8 TABLET, ORALLY DISINTEGRATING ORAL EVERY 8 HOURS PRN
Status: DISCONTINUED | OUTPATIENT
Start: 2019-01-01 | End: 2019-01-01

## 2019-01-01 RX ORDER — BUMETANIDE 1 MG/1
2 TABLET ORAL DAILY
Status: DISCONTINUED | OUTPATIENT
Start: 2019-01-01 | End: 2019-01-01 | Stop reason: HOSPADM

## 2019-01-01 RX ORDER — NOREPINEPHRINE BITARTRATE/D5W 4MG/250ML
0.1 PLASTIC BAG, INJECTION (ML) INTRAVENOUS CONTINUOUS
Status: DISCONTINUED | OUTPATIENT
Start: 2019-01-01 | End: 2019-01-01 | Stop reason: HOSPADM

## 2019-01-01 RX ORDER — INSULIN ASPART 100 [IU]/ML
0-5 INJECTION, SOLUTION INTRAVENOUS; SUBCUTANEOUS
Status: DISCONTINUED | OUTPATIENT
Start: 2019-01-01 | End: 2019-01-01

## 2019-01-01 RX ORDER — FAMOTIDINE 20 MG/1
20 TABLET, FILM COATED ORAL 2 TIMES DAILY
Status: DISCONTINUED | OUTPATIENT
Start: 2019-01-01 | End: 2019-01-01

## 2019-01-01 RX ORDER — MEROPENEM AND SODIUM CHLORIDE 500 MG/50ML
500 INJECTION, SOLUTION INTRAVENOUS EVERY 8 HOURS
Status: CANCELLED
Start: 2019-01-01 | End: 2019-01-01

## 2019-01-01 RX ORDER — SODIUM CHLORIDE 9 MG/ML
INJECTION, SOLUTION INTRAVENOUS CONTINUOUS
Status: DISCONTINUED | OUTPATIENT
Start: 2019-01-01 | End: 2019-01-01

## 2019-01-01 RX ORDER — IPRATROPIUM BROMIDE AND ALBUTEROL SULFATE 2.5; .5 MG/3ML; MG/3ML
3 SOLUTION RESPIRATORY (INHALATION) EVERY 6 HOURS
Status: DISCONTINUED | OUTPATIENT
Start: 2019-01-01 | End: 2019-01-01 | Stop reason: HOSPADM

## 2019-01-01 RX ORDER — BUDESONIDE 0.5 MG/2ML
0.5 INHALANT ORAL EVERY 12 HOURS
Status: DISCONTINUED | OUTPATIENT
Start: 2019-01-01 | End: 2019-01-01 | Stop reason: HOSPADM

## 2019-01-01 RX ORDER — INSULIN ASPART 100 [IU]/ML
0-5 INJECTION, SOLUTION INTRAVENOUS; SUBCUTANEOUS EVERY 6 HOURS PRN
Status: DISCONTINUED | OUTPATIENT
Start: 2019-01-01 | End: 2019-01-01 | Stop reason: HOSPADM

## 2019-01-01 RX ORDER — PIPERACILLIN SODIUM, TAZOBACTAM SODIUM 3; .375 G/15ML; G/15ML
3.38 INJECTION, POWDER, LYOPHILIZED, FOR SOLUTION INTRAVENOUS EVERY 8 HOURS
Start: 2019-01-01 | End: 2019-01-01

## 2019-01-01 RX ORDER — ROCURONIUM BROMIDE 10 MG/ML
INJECTION, SOLUTION INTRAVENOUS
Status: DISCONTINUED | OUTPATIENT
Start: 2019-01-01 | End: 2019-01-01

## 2019-01-01 RX ORDER — PANTOPRAZOLE SODIUM 40 MG/1
40 TABLET, DELAYED RELEASE ORAL DAILY
Status: DISCONTINUED | OUTPATIENT
Start: 2019-01-01 | End: 2019-01-01

## 2019-01-01 RX ORDER — POLYETHYLENE GLYCOL 3350 17 G/17G
17 POWDER, FOR SOLUTION ORAL DAILY
Status: DISCONTINUED | OUTPATIENT
Start: 2019-01-01 | End: 2019-01-01

## 2019-01-01 RX ORDER — SODIUM CHLORIDE, SODIUM LACTATE, POTASSIUM CHLORIDE, CALCIUM CHLORIDE 600; 310; 30; 20 MG/100ML; MG/100ML; MG/100ML; MG/100ML
INJECTION, SOLUTION INTRAVENOUS CONTINUOUS PRN
Status: DISCONTINUED | OUTPATIENT
Start: 2019-01-01 | End: 2019-01-01

## 2019-01-01 RX ORDER — INSULIN ASPART 100 [IU]/ML
1-10 INJECTION, SOLUTION INTRAVENOUS; SUBCUTANEOUS
Status: DISCONTINUED | OUTPATIENT
Start: 2019-01-01 | End: 2019-01-01 | Stop reason: HOSPADM

## 2019-01-01 RX ORDER — FAMOTIDINE 20 MG/1
20 TABLET, FILM COATED ORAL DAILY
Status: DISCONTINUED | OUTPATIENT
Start: 2019-01-01 | End: 2019-01-01 | Stop reason: HOSPADM

## 2019-01-01 RX ORDER — MONTELUKAST SODIUM 10 MG/1
10 TABLET ORAL DAILY
Status: DISCONTINUED | OUTPATIENT
Start: 2019-01-01 | End: 2019-01-01 | Stop reason: HOSPADM

## 2019-01-01 RX ORDER — LANOLIN ALCOHOL/MO/W.PET/CERES
400 CREAM (GRAM) TOPICAL 2 TIMES DAILY
Status: DISCONTINUED | OUTPATIENT
Start: 2019-01-01 | End: 2019-01-01 | Stop reason: HOSPADM

## 2019-01-01 RX ORDER — POLYETHYLENE GLYCOL 3350 17 G/17G
17 POWDER, FOR SOLUTION ORAL EVERY OTHER DAY
Qty: 30 PACKET | Refills: 0 | Status: SHIPPED | OUTPATIENT
Start: 2019-01-01

## 2019-01-01 RX ORDER — MIDODRINE HYDROCHLORIDE 5 MG/1
10 TABLET ORAL 3 TIMES DAILY
Status: DISCONTINUED | OUTPATIENT
Start: 2019-01-01 | End: 2019-01-01 | Stop reason: HOSPADM

## 2019-01-01 RX ORDER — IPRATROPIUM BROMIDE AND ALBUTEROL SULFATE 2.5; .5 MG/3ML; MG/3ML
3 SOLUTION RESPIRATORY (INHALATION) EVERY 4 HOURS
Status: DISCONTINUED | OUTPATIENT
Start: 2019-01-01 | End: 2019-01-01 | Stop reason: HOSPADM

## 2019-01-01 RX ORDER — FAMOTIDINE 10 MG/ML
20 INJECTION INTRAVENOUS 2 TIMES DAILY
Status: DISCONTINUED | OUTPATIENT
Start: 2019-01-01 | End: 2019-01-01

## 2019-01-01 RX ORDER — FUROSEMIDE 10 MG/ML
20 INJECTION INTRAMUSCULAR; INTRAVENOUS ONCE
Status: COMPLETED | OUTPATIENT
Start: 2019-01-01 | End: 2019-01-01

## 2019-01-01 RX ORDER — ACETYLCYSTEINE 200 MG/ML
4 SOLUTION ORAL; RESPIRATORY (INHALATION) 2 TIMES DAILY
Status: DISCONTINUED | OUTPATIENT
Start: 2019-01-01 | End: 2019-01-01

## 2019-01-01 RX ADMIN — ERTAPENEM SODIUM 1 G: 1 INJECTION, POWDER, LYOPHILIZED, FOR SOLUTION INTRAMUSCULAR; INTRAVENOUS at 09:02

## 2019-01-01 RX ADMIN — ACETYLCYSTEINE 4 ML: 200 INHALANT RESPIRATORY (INHALATION) at 08:02

## 2019-01-01 RX ADMIN — MEROPENEM AND SODIUM CHLORIDE 500 MG: 500 INJECTION, SOLUTION INTRAVENOUS at 08:02

## 2019-01-01 RX ADMIN — PIPERACILLIN SODIUM AND TAZOBACTAM SODIUM 4.5 G: 4; .5 INJECTION, POWDER, LYOPHILIZED, FOR SOLUTION INTRAVENOUS at 06:03

## 2019-01-01 RX ADMIN — LEVOTHYROXINE SODIUM 100 MCG: 100 TABLET ORAL at 05:02

## 2019-01-01 RX ADMIN — TIGECYCLINE 50 MG: 50 INJECTION, POWDER, LYOPHILIZED, FOR SOLUTION INTRAVENOUS at 12:03

## 2019-01-01 RX ADMIN — PIPERACILLIN SODIUM AND TAZOBACTAM SODIUM 4.5 G: 4; .5 INJECTION, POWDER, LYOPHILIZED, FOR SOLUTION INTRAVENOUS at 03:03

## 2019-01-01 RX ADMIN — IPRATROPIUM BROMIDE AND ALBUTEROL SULFATE 3 ML: .5; 3 SOLUTION RESPIRATORY (INHALATION) at 11:03

## 2019-01-01 RX ADMIN — SODIUM CHLORIDE 1000 ML: 900 INJECTION, SOLUTION INTRAVENOUS at 09:05

## 2019-01-01 RX ADMIN — ACETYLCYSTEINE 4 ML: 200 INHALANT RESPIRATORY (INHALATION) at 07:02

## 2019-01-01 RX ADMIN — FUROSEMIDE 40 MG: 10 INJECTION, SOLUTION INTRAVENOUS at 08:02

## 2019-01-01 RX ADMIN — MAGNESIUM OXIDE TAB 400 MG (241.3 MG ELEMENTAL MG) 400 MG: 400 (241.3 MG) TAB at 08:03

## 2019-01-01 RX ADMIN — MIDODRINE HYDROCHLORIDE 5 MG: 5 TABLET ORAL at 09:02

## 2019-01-01 RX ADMIN — AMIODARONE HYDROCHLORIDE 200 MG: 200 TABLET ORAL at 08:03

## 2019-01-01 RX ADMIN — IPRATROPIUM BROMIDE AND ALBUTEROL SULFATE 3 ML: .5; 3 SOLUTION RESPIRATORY (INHALATION) at 08:03

## 2019-01-01 RX ADMIN — AMPICILLIN SODIUM AND SULBACTAM SODIUM 3 G: 2; 1 INJECTION, POWDER, FOR SOLUTION INTRAMUSCULAR; INTRAVENOUS at 12:02

## 2019-01-01 RX ADMIN — IPRATROPIUM BROMIDE AND ALBUTEROL SULFATE 3 ML: .5; 3 SOLUTION RESPIRATORY (INHALATION) at 08:02

## 2019-01-01 RX ADMIN — LEVOTHYROXINE SODIUM 100 MCG: 100 TABLET ORAL at 05:03

## 2019-01-01 RX ADMIN — MIDODRINE HYDROCHLORIDE 5 MG: 5 TABLET ORAL at 08:02

## 2019-01-01 RX ADMIN — TIGECYCLINE 50 MG: 50 INJECTION, POWDER, LYOPHILIZED, FOR SOLUTION INTRAVENOUS at 08:02

## 2019-01-01 RX ADMIN — VANCOMYCIN HYDROCHLORIDE 1000 MG: 1 INJECTION, POWDER, LYOPHILIZED, FOR SOLUTION INTRAVENOUS at 07:02

## 2019-01-01 RX ADMIN — EPINEPHRINE 1 MG: 0.1 INJECTION, SOLUTION ENDOTRACHEAL; INTRACARDIAC; INTRAVENOUS at 10:05

## 2019-01-01 RX ADMIN — MONTELUKAST SODIUM 10 MG: 10 TABLET, FILM COATED ORAL at 09:03

## 2019-01-01 RX ADMIN — NOREPINEPHRINE BITARTRATE 0.12 MCG/KG/MIN: 4 SOLUTION at 06:03

## 2019-01-01 RX ADMIN — IPRATROPIUM BROMIDE AND ALBUTEROL SULFATE 3 ML: .5; 3 SOLUTION RESPIRATORY (INHALATION) at 03:03

## 2019-01-01 RX ADMIN — BUDESONIDE 0.5 MG: 0.5 SUSPENSION RESPIRATORY (INHALATION) at 07:03

## 2019-01-01 RX ADMIN — COSYNTROPIN 0.25 MG: 0.25 INJECTION, POWDER, LYOPHILIZED, FOR SOLUTION INTRAVENOUS at 08:03

## 2019-01-01 RX ADMIN — IPRATROPIUM BROMIDE AND ALBUTEROL SULFATE 3 ML: .5; 3 SOLUTION RESPIRATORY (INHALATION) at 01:02

## 2019-01-01 RX ADMIN — BUDESONIDE 0.5 MG: 0.5 SUSPENSION RESPIRATORY (INHALATION) at 09:02

## 2019-01-01 RX ADMIN — FAMOTIDINE 20 MG: 10 INJECTION, SOLUTION INTRAVENOUS at 08:02

## 2019-01-01 RX ADMIN — TIGECYCLINE 50 MG: 50 INJECTION, POWDER, LYOPHILIZED, FOR SOLUTION INTRAVENOUS at 01:03

## 2019-01-01 RX ADMIN — MAGNESIUM OXIDE TAB 400 MG (241.3 MG ELEMENTAL MG) 400 MG: 400 (241.3 MG) TAB at 09:03

## 2019-01-01 RX ADMIN — BUMETANIDE 2 MG: 1 TABLET ORAL at 11:03

## 2019-01-01 RX ADMIN — PIPERACILLIN AND TAZOBACTAM 4.5 G: 4; .5 INJECTION, POWDER, LYOPHILIZED, FOR SOLUTION INTRAVENOUS; PARENTERAL at 05:02

## 2019-01-01 RX ADMIN — SODIUM CHLORIDE 30 MG/ML INHALATION SOLUTION 4 ML: 30 SOLUTION INHALANT at 08:03

## 2019-01-01 RX ADMIN — AMPICILLIN SODIUM AND SULBACTAM SODIUM 3 G: 2; 1 INJECTION, POWDER, FOR SOLUTION INTRAMUSCULAR; INTRAVENOUS at 11:02

## 2019-01-01 RX ADMIN — PIPERACILLIN AND TAZOBACTAM 4.5 G: 4; .5 INJECTION, POWDER, LYOPHILIZED, FOR SOLUTION INTRAVENOUS; PARENTERAL at 01:02

## 2019-01-01 RX ADMIN — BUDESONIDE 0.5 MG: 0.5 SUSPENSION RESPIRATORY (INHALATION) at 08:03

## 2019-01-01 RX ADMIN — IPRATROPIUM BROMIDE AND ALBUTEROL SULFATE 3 ML: .5; 3 SOLUTION RESPIRATORY (INHALATION) at 04:03

## 2019-01-01 RX ADMIN — AMIODARONE HYDROCHLORIDE 200 MG: 200 TABLET ORAL at 09:03

## 2019-01-01 RX ADMIN — BUDESONIDE 0.5 MG: 0.5 SUSPENSION RESPIRATORY (INHALATION) at 07:02

## 2019-01-01 RX ADMIN — MIDODRINE HYDROCHLORIDE 10 MG: 5 TABLET ORAL at 03:03

## 2019-01-01 RX ADMIN — AMPICILLIN SODIUM AND SULBACTAM SODIUM 3 G: 2; 1 INJECTION, POWDER, FOR SOLUTION INTRAMUSCULAR; INTRAVENOUS at 05:02

## 2019-01-01 RX ADMIN — NOREPINEPHRINE BITARTRATE 0.16 MCG/KG/MIN: 4 SOLUTION at 09:03

## 2019-01-01 RX ADMIN — IPRATROPIUM BROMIDE AND ALBUTEROL SULFATE 3 ML: .5; 3 SOLUTION RESPIRATORY (INHALATION) at 07:02

## 2019-01-01 RX ADMIN — SODIUM CHLORIDE, SODIUM LACTATE, POTASSIUM CHLORIDE, AND CALCIUM CHLORIDE: 600; 310; 30; 20 INJECTION, SOLUTION INTRAVENOUS at 01:02

## 2019-01-01 RX ADMIN — PIPERACILLIN AND TAZOBACTAM 4.5 G: 4; .5 INJECTION, POWDER, LYOPHILIZED, FOR SOLUTION INTRAVENOUS; PARENTERAL at 08:02

## 2019-01-01 RX ADMIN — POTASSIUM CHLORIDE 40 MEQ: 20 SOLUTION ORAL at 11:02

## 2019-01-01 RX ADMIN — NOREPINEPHRINE BITARTRATE 0.1 MCG/KG/MIN: 4 SOLUTION at 09:03

## 2019-01-01 RX ADMIN — NOREPINEPHRINE BITARTRATE 0.16 MCG/KG/MIN: 4 SOLUTION at 07:03

## 2019-01-01 RX ADMIN — IPRATROPIUM BROMIDE AND ALBUTEROL SULFATE 3 ML: .5; 3 SOLUTION RESPIRATORY (INHALATION) at 05:02

## 2019-01-01 RX ADMIN — MEROPENEM AND SODIUM CHLORIDE 500 MG: 500 INJECTION, SOLUTION INTRAVENOUS at 01:03

## 2019-01-01 RX ADMIN — MEROPENEM AND SODIUM CHLORIDE 500 MG: 500 INJECTION, SOLUTION INTRAVENOUS at 08:03

## 2019-01-01 RX ADMIN — POTASSIUM CHLORIDE 10 MEQ: 7.46 INJECTION, SOLUTION INTRAVENOUS at 08:02

## 2019-01-01 RX ADMIN — SODIUM CHLORIDE 30 MG/ML INHALATION SOLUTION: 30 SOLUTION INHALANT at 07:03

## 2019-01-01 RX ADMIN — TIGECYCLINE 50 MG: 50 INJECTION, POWDER, LYOPHILIZED, FOR SOLUTION INTRAVENOUS at 02:03

## 2019-01-01 RX ADMIN — DEXTROSE MONOHYDRATE 12.5 G: 25 INJECTION, SOLUTION INTRAVENOUS at 09:03

## 2019-01-01 RX ADMIN — VANCOMYCIN HYDROCHLORIDE 1000 MG: 1 INJECTION, POWDER, LYOPHILIZED, FOR SOLUTION INTRAVENOUS at 02:02

## 2019-01-01 RX ADMIN — AMPICILLIN SODIUM AND SULBACTAM SODIUM 3 G: 2; 1 INJECTION, POWDER, FOR SOLUTION INTRAMUSCULAR; INTRAVENOUS at 06:02

## 2019-01-01 RX ADMIN — IPRATROPIUM BROMIDE AND ALBUTEROL SULFATE 3 ML: .5; 3 SOLUTION RESPIRATORY (INHALATION) at 07:03

## 2019-01-01 RX ADMIN — LEVOTHYROXINE SODIUM 100 MCG: 100 TABLET ORAL at 06:02

## 2019-01-01 RX ADMIN — MONTELUKAST SODIUM 10 MG: 10 TABLET, FILM COATED ORAL at 02:03

## 2019-01-01 RX ADMIN — SODIUM CHLORIDE 2049 ML: 0.9 INJECTION, SOLUTION INTRAVENOUS at 03:03

## 2019-01-01 RX ADMIN — ROCURONIUM BROMIDE 50 MG: 10 INJECTION, SOLUTION INTRAVENOUS at 01:02

## 2019-01-01 RX ADMIN — ACETYLCYSTEINE 4 ML: 200 INHALANT RESPIRATORY (INHALATION) at 09:02

## 2019-01-01 RX ADMIN — NOREPINEPHRINE BITARTRATE 0.02 MCG/KG/MIN: 4 SOLUTION at 05:03

## 2019-01-01 RX ADMIN — FAMOTIDINE 20 MG: 20 TABLET, FILM COATED ORAL at 09:03

## 2019-01-01 RX ADMIN — MIDODRINE HYDROCHLORIDE 10 MG: 5 TABLET ORAL at 02:03

## 2019-01-01 RX ADMIN — IPRATROPIUM BROMIDE AND ALBUTEROL SULFATE 3 ML: .5; 3 SOLUTION RESPIRATORY (INHALATION) at 03:02

## 2019-01-01 RX ADMIN — IPRATROPIUM BROMIDE AND ALBUTEROL SULFATE 3 ML: .5; 3 SOLUTION RESPIRATORY (INHALATION) at 01:03

## 2019-01-01 RX ADMIN — SODIUM CHLORIDE, SODIUM LACTATE, POTASSIUM CHLORIDE, AND CALCIUM CHLORIDE 500 ML: .6; .31; .03; .02 INJECTION, SOLUTION INTRAVENOUS at 07:02

## 2019-01-01 RX ADMIN — SODIUM CHLORIDE 500 ML: 0.9 INJECTION, SOLUTION INTRAVENOUS at 07:03

## 2019-01-01 RX ADMIN — MIDODRINE HYDROCHLORIDE 10 MG: 5 TABLET ORAL at 08:03

## 2019-01-01 RX ADMIN — HYDRALAZINE HYDROCHLORIDE 10 MG: 20 INJECTION INTRAMUSCULAR; INTRAVENOUS at 05:02

## 2019-01-01 RX ADMIN — SODIUM CHLORIDE 75 ML/HR: 0.45 INJECTION, SOLUTION INTRAVENOUS at 08:02

## 2019-01-01 RX ADMIN — POLYETHYLENE GLYCOL 3350 17 G: 17 POWDER, FOR SOLUTION ORAL at 10:02

## 2019-01-01 RX ADMIN — FUROSEMIDE 20 MG: 10 INJECTION, SOLUTION INTRAVENOUS at 03:02

## 2019-01-01 RX ADMIN — SODIUM CHLORIDE: 0.45 INJECTION, SOLUTION INTRAVENOUS at 09:02

## 2019-01-01 RX ADMIN — IOHEXOL 75 ML: 350 INJECTION, SOLUTION INTRAVENOUS at 05:03

## 2019-01-01 RX ADMIN — MEROPENEM AND SODIUM CHLORIDE 500 MG: 500 INJECTION, SOLUTION INTRAVENOUS at 12:03

## 2019-01-01 RX ADMIN — FAMOTIDINE 20 MG: 20 TABLET, FILM COATED ORAL at 09:02

## 2019-01-01 RX ADMIN — SODIUM CHLORIDE: 0.45 INJECTION, SOLUTION INTRAVENOUS at 12:02

## 2019-01-01 RX ADMIN — DEXTROSE MONOHYDRATE 12.5 G: 25 INJECTION, SOLUTION INTRAVENOUS at 02:03

## 2019-01-01 RX ADMIN — IPRATROPIUM BROMIDE AND ALBUTEROL SULFATE 3 ML: .5; 3 SOLUTION RESPIRATORY (INHALATION) at 12:02

## 2019-01-01 RX ADMIN — SODIUM CHLORIDE 30 MG/ML INHALATION SOLUTION: 30 SOLUTION INHALANT at 08:03

## 2019-01-01 RX ADMIN — MEROPENEM AND SODIUM CHLORIDE 500 MG: 500 INJECTION, SOLUTION INTRAVENOUS at 04:03

## 2019-01-01 RX ADMIN — DEXTROSE 100 MG: 50 INJECTION, SOLUTION INTRAVENOUS at 08:02

## 2019-01-01 RX ADMIN — MEROPENEM AND SODIUM CHLORIDE 500 MG: 500 INJECTION, SOLUTION INTRAVENOUS at 09:02

## 2019-01-01 RX ADMIN — NOREPINEPHRINE BITARTRATE 0.12 MCG/KG/MIN: 4 SOLUTION at 01:03

## 2019-01-01 RX ADMIN — NOREPINEPHRINE BITARTRATE 0.14 MCG/KG/MIN: 4 SOLUTION at 05:03

## 2019-01-01 RX ADMIN — POLYETHYLENE GLYCOL 3350 17 G: 17 POWDER, FOR SOLUTION ORAL at 08:02

## 2019-01-01 RX ADMIN — AMPICILLIN SODIUM AND SULBACTAM SODIUM 3 G: 2; 1 INJECTION, POWDER, FOR SOLUTION INTRAMUSCULAR; INTRAVENOUS at 01:02

## 2019-01-01 RX ADMIN — MIDODRINE HYDROCHLORIDE 5 MG: 5 TABLET ORAL at 03:02

## 2019-01-01 RX ADMIN — IPRATROPIUM BROMIDE AND ALBUTEROL SULFATE 3 ML: .5; 3 SOLUTION RESPIRATORY (INHALATION) at 09:02

## 2019-01-01 RX ADMIN — BUDESONIDE 0.5 MG: 0.5 SUSPENSION RESPIRATORY (INHALATION) at 08:02

## 2019-01-01 RX ADMIN — IPRATROPIUM BROMIDE AND ALBUTEROL SULFATE 3 ML: .5; 3 SOLUTION RESPIRATORY (INHALATION) at 09:03

## 2019-01-01 RX ADMIN — POTASSIUM CHLORIDE 10 MEQ: 7.46 INJECTION, SOLUTION INTRAVENOUS at 09:02

## 2019-01-01 RX ADMIN — PANTOPRAZOLE SODIUM 40 MG: 40 INJECTION, POWDER, FOR SOLUTION INTRAVENOUS at 08:02

## 2019-01-01 RX ADMIN — TIGECYCLINE 50 MG: 50 INJECTION, POWDER, LYOPHILIZED, FOR SOLUTION INTRAVENOUS at 09:02

## 2019-01-01 RX ADMIN — ERTAPENEM SODIUM 1 G: 1 INJECTION, POWDER, LYOPHILIZED, FOR SOLUTION INTRAMUSCULAR; INTRAVENOUS at 10:02

## 2019-01-01 RX ADMIN — VANCOMYCIN HYDROCHLORIDE 1000 MG: 1 INJECTION, POWDER, LYOPHILIZED, FOR SOLUTION INTRAVENOUS at 12:02

## 2019-01-01 RX ADMIN — PANTOPRAZOLE SODIUM 40 MG: 40 INJECTION, POWDER, FOR SOLUTION INTRAVENOUS at 09:02

## 2019-01-01 RX ADMIN — SODIUM CHLORIDE: 0.9 INJECTION, SOLUTION INTRAVENOUS at 12:03

## 2019-01-01 RX ADMIN — POTASSIUM CHLORIDE 30 MEQ: 20 SOLUTION ORAL at 08:03

## 2019-01-01 RX ADMIN — SODIUM CHLORIDE 30 MG/ML INHALATION SOLUTION 4 ML: 30 SOLUTION INHALANT at 09:03

## 2019-01-01 RX ADMIN — IPRATROPIUM BROMIDE AND ALBUTEROL SULFATE 3 ML: .5; 3 SOLUTION RESPIRATORY (INHALATION) at 12:03

## 2019-01-01 RX ADMIN — ACETYLCYSTEINE 4 ML: 200 INHALANT RESPIRATORY (INHALATION) at 05:02

## 2019-01-01 RX ADMIN — PANTOPRAZOLE SODIUM 40 MG: 40 INJECTION, POWDER, LYOPHILIZED, FOR SOLUTION INTRAVENOUS at 08:02

## 2019-01-01 RX ADMIN — PANTOPRAZOLE SODIUM 40 MG: 40 INJECTION, POWDER, LYOPHILIZED, FOR SOLUTION INTRAVENOUS at 09:02

## 2019-01-01 RX ADMIN — EPINEPHRINE 1 MG: 0.1 INJECTION, SOLUTION ENDOTRACHEAL; INTRACARDIAC; INTRAVENOUS at 09:05

## 2019-01-01 RX ADMIN — MIDAZOLAM 2 MG: 1 INJECTION INTRAMUSCULAR; INTRAVENOUS at 01:02

## 2019-01-01 RX ADMIN — MIDODRINE HYDROCHLORIDE 10 MG: 5 TABLET ORAL at 09:03

## 2019-01-01 RX ADMIN — SODIUM CHLORIDE, SODIUM LACTATE, POTASSIUM CHLORIDE, AND CALCIUM CHLORIDE 2000 ML: .6; .31; .03; .02 INJECTION, SOLUTION INTRAVENOUS at 02:02

## 2019-01-01 RX ADMIN — MIDODRINE HYDROCHLORIDE 5 MG: 5 TABLET ORAL at 02:03

## 2019-01-01 RX ADMIN — MIDODRINE HYDROCHLORIDE 5 MG: 5 TABLET ORAL at 04:02

## 2019-01-01 RX ADMIN — ACETYLCYSTEINE 4 ML: 200 INHALANT RESPIRATORY (INHALATION) at 03:02

## 2019-01-01 RX ADMIN — BUDESONIDE 0.5 MG: 0.5 SUSPENSION RESPIRATORY (INHALATION) at 09:03

## 2019-01-01 RX ADMIN — BUMETANIDE 2 MG: 1 TABLET ORAL at 09:03

## 2019-01-01 RX ADMIN — VANCOMYCIN HYDROCHLORIDE 1250 MG: 100 INJECTION, POWDER, LYOPHILIZED, FOR SOLUTION INTRAVENOUS at 08:03

## 2019-01-01 RX ADMIN — FAMOTIDINE 20 MG: 20 TABLET, FILM COATED ORAL at 04:03

## 2019-01-01 RX ADMIN — MEROPENEM AND SODIUM CHLORIDE 500 MG: 500 INJECTION, SOLUTION INTRAVENOUS at 02:03

## 2019-01-01 NOTE — EICU
Child's Well Visit, 4 Months: Care Instructions  Your Care Instructions    You may be seeing new sides to your baby's behavior at 4 months. He or she may have a range of emotions, including anger, ghazal, fear, and surprise. Your baby may be much more social and may laugh and smile at other people. At this age, your baby may be ready to roll over and hold on to toys. He or she may , smile, laugh, and squeal. By the third or fourth month, many babies can sleep up to 7 or 8 hours during the night and develop set nap times. Follow-up care is a key part of your child's treatment and safety. Be sure to make and go to all appointments, and call your doctor if your child is having problems. It's also a good idea to know your child's test results and keep a list of the medicines your child takes. How can you care for your child at home? Feeding  · Breast milk is the best food for your baby. Let your baby decide when and how long to nurse. · If you do not breastfeed, use a formula with iron. · Do not give your baby honey in the first year of life. Honey can make your baby sick. · You may begin to give solid foods to your baby when he or she is about 7 months old. Some babies may be ready for solid foods at 4 or 5 months. Ask your doctor when you can start feeding your baby solid foods. At first, give foods that are smooth, easy to digest, and part fluid, such as rice cereal.  · Use a baby spoon or a small spoon to feed your baby. Begin with one or two teaspoons of cereal mixed with breast milk or lukewarm formula. Your baby's stools will become firmer after starting solid foods. · Keep feeding your baby breast milk or formula while he or she starts eating solid foods. Parenting  · Read books to your baby daily. · If your baby is teething, it may help to gently rub his or her gums or use teething rings. · Put your baby on his or her stomach when awake to help strengthen the neck and arms.   · Give your baby K at 3.3, creatinine normal.  No central line.    Plan:  - K phos 15 mmols IVPB for low Po4/k  - D5 with kcl 20 meq 50 ml/hr X 2 hrs for ( low K and sod 152.) on 250 ml water via tube q 4hrs already.    brightly colored toys to hold and look at. Immunizations  · Most babies get the second dose of important vaccines at their 4-month checkup. Make sure that your baby gets the recommended childhood vaccines for illnesses, such as whooping cough and diphtheria. These vaccines will help keep your baby healthy and prevent the spread of disease. Your baby needs all doses to be protected. When should you call for help? Watch closely for changes in your child's health, and be sure to contact your doctor if:    · You are concerned that your child is not growing or developing normally.     · You are worried about your child's behavior.     · You need more information about how to care for your child, or you have questions or concerns. Where can you learn more? Go to http://ana maría-juan.info/. Enter  in the search box to learn more about \"Child's Well Visit, 4 Months: Care Instructions. \"  Current as of: March 27, 2018  Content Version: 11.9  © 4731-1644 [employer]. Care instructions adapted under license by [employer] (which disclaims liability or warranty for this information). If you have questions about a medical condition or this instruction, always ask your healthcare professional. [place] [address] any warranty or liability for your use of this information.

## 2019-02-05 PROBLEM — R65.20 SEVERE SEPSIS WITH ACUTE ORGAN DYSFUNCTION: Status: ACTIVE | Noted: 2019-01-01

## 2019-02-05 PROBLEM — D64.9 ANEMIA: Status: ACTIVE | Noted: 2019-01-01

## 2019-02-05 PROBLEM — N17.9 ACUTE ON CHRONIC RENAL FAILURE: Status: ACTIVE | Noted: 2019-01-01

## 2019-02-05 PROBLEM — R65.21 SEPTIC SHOCK: Status: ACTIVE | Noted: 2019-01-01

## 2019-02-05 PROBLEM — R53.2 FUNCTIONAL QUADRIPLEGIA: Status: ACTIVE | Noted: 2019-01-01

## 2019-02-05 PROBLEM — N18.9 ACUTE ON CHRONIC RENAL FAILURE: Status: ACTIVE | Noted: 2019-01-01

## 2019-02-05 PROBLEM — J18.9 PNEUMONIA: Status: ACTIVE | Noted: 2019-01-01

## 2019-02-05 PROBLEM — I10 HYPERTENSION: Status: ACTIVE | Noted: 2019-01-01

## 2019-02-05 PROBLEM — A41.9 SEPTIC SHOCK: Status: ACTIVE | Noted: 2019-01-01

## 2019-02-05 NOTE — ED PROVIDER NOTES
SCRIBE #1 NOTE: I, Hoa Moy, am scribing for, and in the presence of, Kari Florentino MD. I have scribed the entire note.       History     Chief Complaint   Patient presents with    Abnormal Lab     sent from [facility]     Review of patient's allergies indicates:   Allergen Reactions    Morphine Hives    Atorvastatin      Other reaction(s): Muscle pain    Clonidine     Codeine      Other reaction(s): Unknown    Digoxin      Other reaction(s): Unknown    Diovan [valsartan] Other (See Comments)     Upset stomach, weakness    Eggs [egg derived]     Metoprolol Diarrhea    Naproxen      Other reaction(s): Nausea    Peanut     Propofol      Other reaction(s): delirious    Sulfa (sulfonamide antibiotics)          History of Present Illness     HPI    2/5/2019, 11:26 AM  History obtained from the patient   HPI and ROS limited due to pt being a poor historian      History of Present Illness: Carlie Valdez is a 73 y.o. female patient who lives at [facility] for skilled nursing care. She presents to the Emergency Department for evaluation of abnormal labs. Pt had outpatient labs done today. See ED Discussion for more details. [facility]ASI states pt has a sacral wound that was cultured and showed Gram negative bacteria. Pt can respond to simple questions. She is not c/o any sxs at this time however HPI limited due to patient being trached and not being able to speak well. Patient denies any n/v/d and all other sxs at this time. No further complaints or concerns at this time.     Arrival mode: [facility]    PCP: Johanna Guzman MD        Past Medical History:  Past Medical History:   Diagnosis Date    Anticoagulant long-term use     Arthritis     Asthma     Atrial fibrillation     Blood clot of vein in shoulder area     Cardiac defibrillator in place     CHF (congestive heart failure)     Chronic knee pain     Diabetes mellitus type 2 without retinopathy 12/19/2016    Diaphragmatic  hernia without obstruction and without gangrene 2015    GERD (gastroesophageal reflux disease)     Hypertension     Immune deficiency disorder     Primary open angle glaucoma (POAG) of both eyes, severe stage 2018       Past Surgical History:  Past Surgical History:   Procedure Laterality Date    CARDIAC DEFIBRILLATOR PLACEMENT      , .    CATARACT EXTRACTION       SECTION      COLONOSCOPY      EGD, WITH PEG TUBE INSERTION N/A 2018    [facility]    ashley Macdonald    ESOPHAGOGASTRODUODENOSCOPY (EGD) N/A 2015    [facility]bert [facility]    KNEE ARTHROSCOPY      TRACHEOSTOMY N/A 2018    Performed by [facility]    UPPER GASTROINTESTINAL ENDOSCOPY           Family History:  Family History   Problem Relation Age of Onset    Hypertension Mother     Hypertension Father     Colon cancer Neg Hx     Stomach cancer Neg Hx        Social History:  Social History     Tobacco Use    Smoking status: Never Smoker    Smokeless tobacco: Never Used   Substance and Sexual Activity    Alcohol use: No     Alcohol/week: 0.0 oz    Drug use: No    Sexual activity: No        Review of Systems     Review of Systems   Constitutional:        (+) abnormal lab   Gastrointestinal: Negative for diarrhea, nausea and vomiting.   Skin: Positive for wound (sacral).   All other systems reviewed and are negative.       Physical Exam     Initial Vitals   BP Pulse Resp Temp SpO2   19 1120 19 1120 19 1120 19 1300 19 1120   (!) 141/75 97 16 (!) 94 °F (34.4 °C) 99 %      MAP       --                 Physical Exam  Nursing Notes and Vital Signs Reviewed.  Constitutional: Patient is in no acute distress.  Head: Atraumatic. Normocephalic.  Eyes: PERRL. EOM intact. Conjunctivae are not pale. No scleral icterus.  ENT: Mucous membranes are dry. Oropharynx is clear and symmetric. Trach in  place. Pt is on the ventilator.  Neck: Supple. Full ROM. No lymphadenopathy.  Cardiovascular: Regular rate. Regular rhythm. No murmurs, rubs, or gallops. Distal pulses are 2+ and symmetric.  Pulmonary/Chest: No respiratory distress. Clear to auscultation bilaterally. No wheezing or rales.  Abdominal: Soft and non-distended.  There is no tenderness.  No rebound, guarding, or rigidity. G tube in place.  Genitourinary: Indwelling harris catheter in place  Musculoskeletal: Quadraplegia.. Dependent edema edema. Contractures to the BLE.  Skin: Warm and dry. Very large necrotic-appearing sacral wound to the bone.  Neurological:  Alert and awake.  Can follow simple commands but unable to move legs or arms at baseline.   Psychiatric: Normal affect. Good eye contact. Appropriate in content.     ED Course   Critical Care  Date/Time: 2/5/2019 1:48 PM  Performed by: Kari Florentino MD  Authorized by: Kari Florentino MD   Direct patient critical care time: 15 minutes  Ordering / reviewing critical care time: 15 minutes  Documentation critical care time: 15 minutes  Other critical care time: 15 minutes  Total critical care time (exclusive of procedural time) : 60 minutes  Critical care time was exclusive of separately billable procedures and treating other patients and teaching time.  Critical care was necessary to treat or prevent imminent or life-threatening deterioration of the following conditions: sepsis (Symptomatic anemia).  Critical care was time spent personally by me on the following activities: blood draw for specimens, development of treatment plan with patient or surrogate, interpretation of cardiac output measurements, evaluation of patient's response to treatment, examination of patient, obtaining history from patient or surrogate, ordering and performing treatments and interventions, ordering and review of laboratory studies, ordering and review of radiographic studies, pulse oximetry, re-evaluation of  "patient's condition, review of old charts and ventilator management.        ED Vital Signs:  Vitals:    02/05/19 1120 02/05/19 1215 02/05/19 1229 02/05/19 1230   BP: (!) 141/75 96/61  122/66   Pulse: 97 69  70   Resp: 16   18   Temp:       TempSrc: Oral      SpO2: 99% 96%  100%   Weight:   69.8 kg (153 lb 14.1 oz)    Height:        02/05/19 1300 02/05/19 1301 02/05/19 1303 02/05/19 1305   BP: 114/62  (!) 141/71    Pulse: 71  70 69   Resp: 19 18 19   Temp: (!) 94 °F (34.4 °C)      TempSrc: Core Rectal      SpO2: 100%  100% 100%   Weight:       Height:  5' 10.08" (1.78 m)         Abnormal Lab Results:  Labs Reviewed   CBC W/ AUTO DIFFERENTIAL - Abnormal; Notable for the following components:       Result Value    WBC 16.18 (*)     RBC 2.21 (*)     Hemoglobin 5.6 (*)     Hematocrit 19.9 (*)     MCH 25.3 (*)     MCHC 28.1 (*)     RDW 20.0 (*)     Platelets 602 (*)     Gran # (ANC) 14.1 (*)     Lymph # 0.8 (*)     Gran% 87.9 (*)     Lymph% 5.0 (*)     All other components within normal limits    Narrative:        H&H critical result(s) called and verbal readback obtained from   Williams Garcia RN, 02/05/2019 12:13   COMPREHENSIVE METABOLIC PANEL - Abnormal; Notable for the following components:    Sodium 152 (*)     CO2 34 (*)     Glucose 113 (*)     BUN, Bld 115 (*)     Creatinine 2.4 (*)     Calcium 10.9 (*)     Albumin 1.8 (*)     Alkaline Phosphatase 139 (*)     eGFR if  22 (*)     eGFR if non  19 (*)     All other components within normal limits   MAGNESIUM - Abnormal; Notable for the following components:    Magnesium 2.9 (*)     All other components within normal limits   URINALYSIS, REFLEX TO URINE CULTURE - Abnormal; Notable for the following components:    pH, UA >8.0 (*)     Protein, UA 1+ (*)     Occult Blood UA 3+ (*)     Leukocytes, UA 3+ (*)     All other components within normal limits    Narrative:     Preferred Collection Type->Urine, Catheterized   URINALYSIS MICROSCOPIC " - Abnormal; Notable for the following components:    WBC, UA 10 (*)     WBC Clumps, UA Occasional (*)     Bacteria, UA Moderate (*)     All other components within normal limits    Narrative:     Preferred Collection Type->Urine, Catheterized   LACTIC ACID, PLASMA - Abnormal; Notable for the following components:    Lactate (Lactic Acid) 4.2 (*)     All other components within normal limits    Narrative:      LACTIC ACID  critical result(s) called and verbal readback obtained   from UYEN GAMEZ RN, 02/05/2019 13:41   PROCALCITONIN - Abnormal; Notable for the following components:    Procalcitonin 1.20 (*)     All other components within normal limits   CULTURE, BLOOD   CULTURE, BLOOD   SODIUM, URINE, RANDOM    Narrative:     Preferred Collection Type->Urine, Catheterized   OCCULT BLOOD X 1, STOOL   PROTIME-INR   APTT   PROTIME-INR   APTT   PROCALCITONIN   LACTIC ACID, PLASMA   TYPE & SCREEN   PREPARE RBC SOFT        All Lab Results:  Results for orders placed or performed during the hospital encounter of 02/05/19   CBC auto differential   Result Value Ref Range    WBC 16.18 (H) 3.90 - 12.70 K/uL    RBC 2.21 (L) 4.00 - 5.40 M/uL    Hemoglobin 5.6 (LL) 12.0 - 16.0 g/dL    Hematocrit 19.9 (LL) 37.0 - 48.5 %    MCV 90 82 - 98 fL    MCH 25.3 (L) 27.0 - 31.0 pg    MCHC 28.1 (L) 32.0 - 36.0 g/dL    RDW 20.0 (H) 11.5 - 14.5 %    Platelets 602 (H) 150 - 350 K/uL    MPV 10.0 9.2 - 12.9 fL    Gran # (ANC) 14.1 (H) 1.8 - 7.7 K/uL    Lymph # 0.8 (L) 1.0 - 4.8 K/uL    Mono # 0.8 0.3 - 1.0 K/uL    Eos # 0.4 0.0 - 0.5 K/uL    Baso # 0.01 0.00 - 0.20 K/uL    Gran% 87.9 (H) 38.0 - 73.0 %    Lymph% 5.0 (L) 18.0 - 48.0 %    Mono% 5.1 4.0 - 15.0 %    Eosinophil% 2.5 0.0 - 8.0 %    Basophil% 0.1 0.0 - 1.9 %    Platelet Estimate Appears normal     Stomatocytes Present     Spherocytes Occasional     Differential Method Automated    Comprehensive metabolic panel   Result Value Ref Range    Sodium 152 (H) 136 - 145 mmol/L    Potassium 4.9  3.5 - 5.1 mmol/L    Chloride 102 95 - 110 mmol/L    CO2 34 (H) 23 - 29 mmol/L    Glucose 113 (H) 70 - 110 mg/dL    BUN, Bld 115 (H) 8 - 23 mg/dL    Creatinine 2.4 (H) 0.5 - 1.4 mg/dL    Calcium 10.9 (H) 8.7 - 10.5 mg/dL    Total Protein 7.8 6.0 - 8.4 g/dL    Albumin 1.8 (L) 3.5 - 5.2 g/dL    Total Bilirubin 0.2 0.1 - 1.0 mg/dL    Alkaline Phosphatase 139 (H) 55 - 135 U/L    AST 23 10 - 40 U/L    ALT 14 10 - 44 U/L    Anion Gap 16 8 - 16 mmol/L    eGFR if African American 22 (A) >60 mL/min/1.73 m^2    eGFR if non African American 19 (A) >60 mL/min/1.73 m^2   Magnesium   Result Value Ref Range    Magnesium 2.9 (H) 1.6 - 2.6 mg/dL   Sodium, urine, random   Result Value Ref Range    Sodium Urine Random 46 20 - 250 mmol/L   Urinalysis, Reflex to Urine Culture Urine, Catheterized   Result Value Ref Range    Specimen UA Urine, Catheterized     Color, UA Yellow Yellow, Straw, Samra    Appearance, UA Clear Clear    pH, UA >8.0 (A) 5.0 - 8.0    Specific Gravity, UA 1.010 1.005 - 1.030    Protein, UA 1+ (A) Negative    Glucose, UA Negative Negative    Ketones, UA Negative Negative    Bilirubin (UA) Negative Negative    Occult Blood UA 3+ (A) Negative    Nitrite, UA Negative Negative    Urobilinogen, UA Negative <2.0 EU/dL    Leukocytes, UA 3+ (A) Negative   Urinalysis Microscopic   Result Value Ref Range    RBC, UA 3 0 - 4 /hpf    WBC, UA 10 (H) 0 - 5 /hpf    WBC Clumps, UA Occasional (A) None-Rare    Bacteria, UA Moderate (A) None-Occ /hpf    Hyaline Casts, UA 0 0-1/lpf /lpf    Ca Oxalate Gabby, UA Rare None-Moderate    Microscopic Comment SEE COMMENT    Occult blood x 1, stool   Result Value Ref Range    Occult Blood Negative Negative   Lactic acid, plasma   Result Value Ref Range    Lactate (Lactic Acid) 4.2 (HH) 0.5 - 2.2 mmol/L   Protime-INR   Result Value Ref Range    Prothrombin Time 11.5 9.0 - 12.5 sec    INR 1.1 0.8 - 1.2   APTT   Result Value Ref Range    aPTT 28.5 21.0 - 32.0 sec   Procalcitonin   Result Value Ref  Range    Procalcitonin 1.20 (H) <0.25 ng/mL   Type & Screen   Result Value Ref Range    Group & Rh O POS     Indirect Annelise NEG        Imaging Results:  Imaging Results          X-Ray Chest AP Portable (Final result)  Result time 02/05/19 13:22:51    Final result by JOSEFINA Garcia Sr., MD (02/05/19 13:22:51)                 Impression:      1. The current examination is limited secondary to patient rotation to the left.  2. The borders of the heart are not well seen. There has been interval worsening of the appearance of the lungs. There is a marked amount of alveolar consolidation scattered throughout the left lung. There is a moderate amount of alveolar consolidation scattered throughout the lower half of the right lung.  This is characteristic of pneumonia.  3. There is opacification of the base of the left hemithorax. There is blunting of the right costophrenic angle.  This is characteristic of pleural effusions.  .      Electronically signed by: Homar Garcia MD  Date:    02/05/2019  Time:    13:22             Narrative:    EXAMINATION:  XR CHEST AP PORTABLE    CLINICAL HISTORY:  Dependence on respirator (ventilator) status    COMPARISON:  11/02/2018    FINDINGS:  The current examination is limited secondary to patient rotation to the left.  A tracheostomy tube remains in place.  The cardiac pacemaker remains in place.  The borders of the heart are not well seen.  There has been interval worsening of the appearance of the lungs.  There is a marked amount of alveolar consolidation scattered throughout the left lung.  There is a moderate amount of alveolar consolidation scattered throughout the lower half of the right lung.  There is opacification of the base of the left hemithorax.  There is blunting of the right costophrenic angle.  There is no pneumothorax.                                 The EKG was ordered, reviewed, and independently interpreted by the ED provider.  Interpretation time: 12:51  Rate: 70  BPM  Rhythm: atrial-paced rhythm  Interpretation: R superior axis deviation. Nonspecific intraventricular block. No STEMI.             The Emergency Provider reviewed the vital signs and test results, which are outlined above.     ED Discussion     11:30 AM: Per medical records:  Na+: 152  K+: 5.1  CO2: 34  Chloride: 102  BUN: 122  Cr: 2.25  Albumin: 1.7  AST: 18  ALT: 13    12:59 PM: Re-evaluated pt. Pt is resting. Peformed rectal exam.  Rectal:  No tenderness.  No masses.  No hemorrhoids.  Normal sphincter tone.  Stool color is normal.    1:48 PM: Discussed case with KADI Koenig ([facility]). Dr. Stout agrees with current care and management of pt and accepts admission.   Admitting Service: Hospital medicine   Admitting Physician: Dr. Stout  Admit to: ICU    1:58 PM: Re-evaluated pt. I have discussed test results, shared treatment plan, and the need for admission with patient at bedside. Pt expresses understanding at this time and agree with all information. All questions answered. Pt has no further questions or concerns at this time. Pt is ready for admit.    2:01 PM: 30mL/kg IVF have been administered within 3 hours of identification of SIRS criteria. Vital signs were reviewed and a focal sepsis perfusion assessment was performed.    ED Medication(s):  Medications   0.9%  NaCl infusion (for blood administration) (not administered)   piperacillin-tazobactam 4.5 g in dextrose 5 % 100 mL IVPB (ready to mix system) (4.5 g Intravenous New Bag 2/5/19 1326)   lactated ringers bolus 2,000 mL (not administered)   vancomycin in dextrose 5 % 1 gram/250 mL IVPB 1,000 mg (not administered)       New Prescriptions    No medications on file                 Medical Decision Making:   Clinical Tests:   Lab Tests: Reviewed and Ordered  Radiological Study: Reviewed and Ordered  Medical Tests: Reviewed and Ordered             Scribe Attestation:   Scribe #1: I performed the above scribed service and the  documentation accurately describes the services I performed. I attest to the accuracy of the note.     Attending:   Physician Attestation Statement for Scribe #1: I, Kari Florentino MD, personally performed the services described in this documentation, as scribed by Hoa Moy, in my presence, and it is both accurate and complete.           Clinical Impression       ICD-10-CM ICD-9-CM   1. Septic shock A41.9 038.9    R65.21 785.52     995.92   2. Ventilator dependent Z99.11 V46.11   3. History of atrial fibrillation Z86.79 V12.59   4. Urinary tract infection associated with indwelling urethral catheter, initial encounter T83.511A 996.64    N39.0 599.0   5. Decubitus ulcer of sacral region, stage 4 L89.154 707.03     707.24   6. Hypernatremia E87.0 276.0   7. Intravascular volume depletion E86.1 276.52   8. Anemia, unspecified type D64.9 285.9   9. Tracheostomy in place Z93.0 V44.0   10. Gastrointestinal tube present Z93.1 V44.1   11. Acute kidney injury N17.9 584.9   12. Chronic anticoagulation Z79.01 V58.61   13. Immunocompromised state D84.9 279.3       Disposition:   Disposition: Admitted  Condition: Serious         Kari Florentino MD  02/05/19 1412

## 2019-02-05 NOTE — ASSESSMENT & PLAN NOTE
-BUN/Creatinine 115/2.5  -in the setting of Severe Sepsis   -IV hydration   -nephrotoxic medications held- will resume medications as appropriate   -CMP in am

## 2019-02-05 NOTE — ASSESSMENT & PLAN NOTE
-pt admitted to ICU  -Pulmonology consulted   -#8 Nikita present   -pt stable on ventilator  -Alfonzo prn   -sputum culture results pending

## 2019-02-05 NOTE — HPI
Carlie Valdez is a 73 y.o. female patient with PMHX of Atrial fibrillation, HTN, CKD, CVA (embolic), Anemia , Heart failure, Chronic respiratory failure with trach, functional quadriplegia, CAD, and Neuromyelitis Optica who presented to the ER today for abnormal lab results.  Pt is a resident at [facility] for skilled nursing care. Pt had outpatient labs done today.  [facility]ASI states pt has a sacral wound that was cultured and showed Gram negative bacteria.  Pt did not respond when questioned however has hx of responding appropriately to simple questions. HPI limited due to patient being trached and unable to mouth words.  ER workup showed: WBC 16.18, H/H 5.6/19.9, Na 152, BUN/Creatinine 115/2.4, Lactic acid 4.2, and Procal 1.20.  Chest xray showed interval worsening of the appearance of the lungs with marked amount of alveolar consolidation scattered throughout left lung and the lower half of the right lung characteristic of pneumonia.  UA + for infectious process.  Hospital Medicine contacted for admission to ICU.

## 2019-02-05 NOTE — ASSESSMENT & PLAN NOTE
-pt on amiodarone and Xarelto  -Xarelto held at this time to rule out active bleeding   -Amiodarone held due to hypotension-will monitor and resume when appropriate

## 2019-02-05 NOTE — PLAN OF CARE
Per EMR,  Patient came from [facility]. She presents to the Emergency Department for evaluation of abnormal labs. Pt had outpatient labs done today. See ED Discussion for more details. AASI states pt has a sacral wound that was cultured and showed Gram negative bacteria. Pt can respond to simple questions.  Patient  trached and not being able to speak well. CM o f/u for safe transition.    Cm to check benefits to see if patient is a resident of NH or still has skilled days left.    Johanna Guzman MD       [id number]  [place] - [address]  [address]  [place] [address]  Phone: [phone] Fax: [phone]    [address] [place] [address] [place] - [address] AT [facility] [address]  [address] [place] [address]  Phone: [phone] Fax: [phone]    [id number] - [place] - [address]  [address] [place] [address]  Phone: [phone] Fax: [phone]       02/05/19 1544   Discharge Assessment   Assessment Type Discharge Planning Assessment   Confirmed/corrected address and phone number on facesheet? No  ([facility])   Assessment information obtained from? Medical Record   Expected Length of Stay (days) (TBD)   Communicated expected length of stay with patient/caregiver no   Prior to hospitilization cognitive status: Alert/Oriented   Prior to hospitalization functional status: Completely Dependent   Current cognitive status: (Alert and awake- Has Trach and does not speak)   Lives With facility resident   Able to Return to Prior Arrangements yes   Is patient able to care for self after discharge? No   Who are your caregiver(s) and their phone number(s)? ([facility])   Patient's perception of discharge disposition other (comments)   Patient currently being followed by outpatient case management? No   Patient currently receives any other outside agency services? No   Equipment  Currently Used at Home ventilator;suction machine;respiratory supplies;nutrition supplies;nebulizer   Do you have any problems affording any of your prescribed medications? No   Is the patient taking medications as prescribed? yes   Does the patient have transportation home? Yes   Transportation Anticipated agency   Does the patient receive services at the [facility]? No   Discharge Plan A Other   Discharge Plan B Other   DME Needed Upon Discharge  other (see comments)   Patient/Family in Agreement with Plan unable to assess

## 2019-02-05 NOTE — ED NOTES
Patient sent by [facility] for further evaluation of increased BUN and creatinine .     Level of Consciousness: The patient is awake, opens eyes spontaneously, follows commands, and is non-verbal. MARICRUZ orientation status.   Appearance: Lying in with no acute distress noted.   Skin: Skin is cool and dry; Oral mucosa dry; Unstageable, malodorous wound to coccyx.   Musculoskeletal: Left arm contracted. Does not move upper right extremity or lower extremities.   Respiratory: Chronic vent-dependent trach present; Airway open and patent, respirations, even and unlabored. Breath sounds clear.  Cardiac: Regular rate and rhythm, no peripheral edema noted, good pulses palpated peripherally, capillary refill < 3 seconds.  Urinary: Chronic harris present upon arrival; Cloudy urine present.  Abdomen: Soft, non-tender to palpation. Rounded and obese. Peg tube present and intact.   Neurologic: PERRLA, face exhibits symmetrical expression, follows commands with LUE only.   Psychosocial: Calm and cooperative.     P Side rails up x 2, call light in reach, bed low and locked. Cardiac monitor applied to patient; alarms on, audible, and set. Will continue to monitor.

## 2019-02-05 NOTE — SUBJECTIVE & OBJECTIVE
Past Medical History:   Diagnosis Date    Anticoagulant long-term use     Arthritis     Asthma     Atrial fibrillation     Blood clot of vein in shoulder area     Cardiac defibrillator in place     CHF (congestive heart failure)     Chronic knee pain     Diabetes mellitus type 2 without retinopathy 2016    Diaphragmatic hernia without obstruction and without gangrene 2015    GERD (gastroesophageal reflux disease)     Hypertension     Immune deficiency disorder     Primary open angle glaucoma (POAG) of both eyes, severe stage 2018       Past Surgical History:   Procedure Laterality Date    CARDIAC DEFIBRILLATOR PLACEMENT      , .    CATARACT EXTRACTION       SECTION      COLONOSCOPY      EGD, WITH PEG TUBE INSERTION N/A 2018    Performed by Louis O. Jeansonne IV, MD at [facility] OR    ashley Macdonald    ESOPHAGOGASTRODUODENOSCOPY (EGD) N/A 2015    Performed by Hieu Colbert MD at [facility] ENDO    KNEE ARTHROSCOPY      TRACHEOSTOMY N/A 2018    Performed by Louis O. Jeansonne IV, MD at [facility] OR    UPPER GASTROINTESTINAL ENDOSCOPY         Review of patient's allergies indicates:   Allergen Reactions    Morphine Hives    Atorvastatin      Other reaction(s): Muscle pain    Clonidine     Codeine      Other reaction(s): Unknown    Digoxin      Other reaction(s): Unknown    Diovan [valsartan] Other (See Comments)     Upset stomach, weakness    Eggs [egg derived]     Metoprolol Diarrhea    Naproxen      Other reaction(s): Nausea    Peanut     Propofol      Other reaction(s): delirious    Sulfa (sulfonamide antibiotics)        No current facility-administered medications on file prior to encounter.      Current Outpatient Medications on File Prior to Encounter   Medication Sig    albuterol 90 mcg/actuation inhaler Inhale 2 puffs into the lungs every 4 (four) hours as needed.     albuterol-ipratropium (DUO-NEB) 2.5 mg-0.5 mg/3 mL nebulizer solution  Take 3 mLs by nebulization every 4 (four) hours as needed for Wheezing. Rescue    amiodarone (PACERONE) 200 MG Tab Take 1 tablet (200 mg total) by mouth 2 (two) times daily. (Patient taking differently: 200 mg by Per G Tube route 2 (two) times daily. )    bacitracin 500 unit/gram ointment Apply topically once daily. Apply to R heel daily.    baclofen (LIORESAL) 10 MG tablet 10 mg by Per G Tube route 3 (three) times daily as needed.     brimonidine-timolol (COMBIGAN) 0.2-0.5 % Drop Place 1 drop into both eyes every 12 (twelve) hours.    budesonide (PULMICORT) 0.5 mg/2 mL nebulizer solution Take 2 mLs (0.5 mg total) by nebulization every 12 (twelve) hours. Controller    bumetanide (BUMEX) 2 MG tablet Take 1 tablet (2 mg total) by mouth 2 (two) times daily. 1 Tablet Oral Every day (Patient taking differently: Take 2 mg by mouth once daily. 1 Tablet Oral Every day)    ergocalciferol (ERGOCALCIFEROL) 50,000 unit Cap Take 1 capsule (50,000 Units total) by mouth every 7 days. (Patient taking differently: 50,000 Units by Per G Tube route every 7 days. )    fluticasone (FLONASE) 50 mcg/actuation nasal spray 2 sprays by Each Nare route once daily.    furosemide (LASIX) 20 MG tablet 20 mg by Per G Tube route 2 (two) times daily.     insulin detemir U-100 (LEVEMIR FLEXTOUCH) 100 unit/mL (3 mL) SubQ InPn pen Inject 10 Units into the skin every evening.    levothyroxine (SYNTHROID) 100 MCG tablet 100 mcg by Per G Tube route before breakfast.     linezolid (ZYVOX) 600 mg Tab 1 tablet (600 mg total) by Per G Tube route every 12 (twelve) hours.    magnesium oxide (MAG-OX) 400 mg tablet 400 mg by Per G Tube route 2 (two) times daily.     midodrine (PROAMATINE) 5 MG Tab 1 tablet (5 mg total) by Per G Tube route 3 (three) times daily.    montelukast (SINGULAIR) 10 mg tablet 10 mg by Per G Tube route once daily.     multivitamin (THERAGRAN) per tablet Take 1 tablet by mouth once daily.     mycophenolate (CELLCEPT) 250  mg Cap Take 2 capsules (500 mg total) by mouth 2 (two) times daily. (Patient taking differently: 500 mg 2 (two) times daily. )    ondansetron (ZOFRAN) 4 MG tablet 4 mg by Per G Tube route every 6 (six) hours as needed for Nausea.     pantoprazole (PROTONIX) 40 MG tablet Take 40 mg by mouth once daily.    polyethylene glycol (GLYCOLAX) 17 gram PwPk Take 17 g by mouth once daily. (Patient taking differently: 17 g by Per G Tube route once daily. )    rivaroxaban (XARELTO) 15 mg Tab 15 mg by Per G Tube route every evening.     traMADol (ULTRAM) 50 mg tablet 50 mg by Per G Tube route every 6 (six) hours as needed for Pain.     zinc sulfate (ZINCATE) 220 (50) mg capsule 220 mg by Per G Tube route once daily.      Family History     Problem Relation (Age of Onset)    Hypertension Mother, Father        Tobacco Use    Smoking status: Never Smoker    Smokeless tobacco: Never Used   Substance and Sexual Activity    Alcohol use: No     Alcohol/week: 0.0 oz    Drug use: No    Sexual activity: No     Review of Systems   Unable to perform ROS: Patient nonverbal (pt lethargic due to Sepsis and trach dependent respiratory failure )     Objective:     Vital Signs (Most Recent):  Temp: (!) 94.6 °F (34.8 °C) (02/05/19 1630)  Pulse: 70 (02/05/19 1630)  Resp: 18 (02/05/19 1630)  BP: (!) 100/47 (02/05/19 1630)  SpO2: 100 % (02/05/19 1630) Vital Signs (24h Range):  Temp:  [94 °F (34.4 °C)-94.6 °F (34.8 °C)] 94.6 °F (34.8 °C)  Pulse:  [67-97] 70  Resp:  [16-24] 18  SpO2:  [96 %-100 %] 100 %  BP: ()/(47-81) 100/47     Weight: 69.8 kg (153 lb 14.1 oz)  Body mass index is 22.03 kg/m².    Physical Exam   Constitutional: She appears well-developed and well-nourished. She appears lethargic.   HENT:   Head: Normocephalic.   Nose: Nose normal.   Mouth/Throat: Mucous membranes are dry.   Eyes: Conjunctivae are normal.   Cardiovascular: Normal rate, regular rhythm, normal heart sounds and intact distal pulses.   Defibrillator present     Pulmonary/Chest: Effort normal. No respiratory distress. She has decreased breath sounds in the right lower field and the left lower field. She has no wheezes. She has no rhonchi.   #8 Shiley present    Abdominal: Soft. Bowel sounds are normal. She exhibits no distension. There is no tenderness.   G tube in place   Genitourinary:   Genitourinary Comments: Flores catheter present    Musculoskeletal:   Pt unable to follow commands.  Gross minimal movement noted to left hand.  RUE flaccid and no movement to BLE which appear partially contracted.    Neurological: She appears lethargic. GCS eye subscore is 3. GCS verbal subscore is 1. GCS motor subscore is 2.   Skin: Skin is warm. Capillary refill takes 2 to 3 seconds.   Large unstageable wound to sacrum with brown drainage and foul odor   Psychiatric:   Difficult to assess            Significant Labs:   Blood Culture: No results for input(s): LABBLOO in the last 48 hours.  CBC:   Recent Labs   Lab 02/05/19  1150   WBC 16.18*   HGB 5.6*   HCT 19.9*   *     CMP:   Recent Labs   Lab 02/05/19  1150   *   K 4.9      CO2 34*   *   *   CREATININE 2.4*   CALCIUM 10.9*   PROT 7.8   ALBUMIN 1.8*   BILITOT 0.2   ALKPHOS 139*   AST 23   ALT 14   ANIONGAP 16   EGFRNONAA 19*     Lactic Acid:   Recent Labs   Lab 02/05/19  1235 02/05/19  1621   LACTATE 4.2* 4.1*     Urine Studies:   Recent Labs   Lab 02/05/19  1211   COLORU Yellow   APPEARANCEUA Clear   PHUR >8.0*   SPECGRAV 1.010   PROTEINUA 1+*   GLUCUA Negative   KETONESU Negative   BILIRUBINUA Negative   OCCULTUA 3+*   NITRITE Negative   UROBILINOGEN Negative   LEUKOCYTESUR 3+*   RBCUA 3   WBCUA 10*   BACTERIA Moderate*   HYALINECASTS 0       Significant Imaging:   Imaging Results          X-Ray Chest AP Portable (Final result)  Result time 02/05/19 13:22:51    Final result by JOSEFINA Garcia Sr., MD (02/05/19 13:22:51)                 Impression:      1. The current examination is limited  secondary to patient rotation to the left.  2. The borders of the heart are not well seen. There has been interval worsening of the appearance of the lungs. There is a marked amount of alveolar consolidation scattered throughout the left lung. There is a moderate amount of alveolar consolidation scattered throughout the lower half of the right lung.  This is characteristic of pneumonia.  3. There is opacification of the base of the left hemithorax. There is blunting of the right costophrenic angle.  This is characteristic of pleural effusions.  .      Electronically signed by: Homar Garcia MD  Date:    02/05/2019  Time:    13:22             Narrative:    EXAMINATION:  XR CHEST AP PORTABLE    CLINICAL HISTORY:  Dependence on respirator (ventilator) status    COMPARISON:  11/02/2018    FINDINGS:  The current examination is limited secondary to patient rotation to the left.  A tracheostomy tube remains in place.  The cardiac pacemaker remains in place.  The borders of the heart are not well seen.  There has been interval worsening of the appearance of the lungs.  There is a marked amount of alveolar consolidation scattered throughout the left lung.  There is a moderate amount of alveolar consolidation scattered throughout the lower half of the right lung.  There is opacification of the base of the left hemithorax.  There is blunting of the right costophrenic angle.  There is no pneumothorax.

## 2019-02-06 PROBLEM — L89.154 PRESSURE INJURY OF COCCYGEAL REGION, STAGE 4: Status: ACTIVE | Noted: 2018-01-01

## 2019-02-06 PROBLEM — L89.320: Status: ACTIVE | Noted: 2019-01-01

## 2019-02-06 PROBLEM — L89.203: Status: ACTIVE | Noted: 2019-01-01

## 2019-02-06 PROBLEM — L89.220: Status: ACTIVE | Noted: 2019-01-01

## 2019-02-06 PROBLEM — L89.150 PRESSURE INJURY OF SACRAL REGION, UNSTAGEABLE: Status: ACTIVE | Noted: 2019-01-01

## 2019-02-06 NOTE — ASSESSMENT & PLAN NOTE
Source likely multifactorial with wound, pulmonary, urinary source  Broad spectrum abx; monitor culture growth  ICU hemodynamic monitoring

## 2019-02-06 NOTE — SUBJECTIVE & OBJECTIVE
Past Medical History:   Diagnosis Date    Anticoagulant long-term use     Arthritis     Asthma     Atrial fibrillation     Blood clot of vein in shoulder area     Cardiac defibrillator in place     CHF (congestive heart failure)     Chronic knee pain     Diabetes mellitus type 2 without retinopathy 2016    Diaphragmatic hernia without obstruction and without gangrene 2015    GERD (gastroesophageal reflux disease)     Hypertension     Immune deficiency disorder     Primary open angle glaucoma (POAG) of both eyes, severe stage 2018       Past Surgical History:   Procedure Laterality Date    CARDIAC DEFIBRILLATOR PLACEMENT      , .    CATARACT EXTRACTION       SECTION      COLONOSCOPY      EGD, WITH PEG TUBE INSERTION N/A 2018    Performed by [facility] at [facility] OR    ashley Macdonald    ESOPHAGOGASTRODUODENOSCOPY (EGD) N/A 2015    Performed by Hieu Colbert MD at [facility] ENDO    KNEE ARTHROSCOPY      TRACHEOSTOMY N/A 2018    Performed by Louis O. Jeansonne IV, MD at [facility] OR    UPPER GASTROINTESTINAL ENDOSCOPY         Review of patient's allergies indicates:   Allergen Reactions    Morphine Hives    Atorvastatin      Other reaction(s): Muscle pain    Clonidine     Codeine      Other reaction(s): Unknown    Digoxin      Other reaction(s): Unknown    Diovan [valsartan] Other (See Comments)     Upset stomach, weakness    Eggs [egg derived]     Metoprolol Diarrhea    Naproxen      Other reaction(s): Nausea    Peanut     Propofol      Other reaction(s): delirious    Sulfa (sulfonamide antibiotics)        Family History     Problem Relation (Age of Onset)    Hypertension Mother, Father        Tobacco Use    Smoking status: Never Smoker    Smokeless tobacco: Never Used   Substance and Sexual Activity    Alcohol use: No     Alcohol/week: 0.0 oz    Drug use: No    Sexual activity: No         Review of Systems   Unable to perform  ROS: Patient nonverbal     Objective:     Vital Signs (Most Recent):  Temp: (!) 94.6 °F (34.8 °C) (02/05/19 1630)  Pulse: 70 (02/05/19 1804)  Resp: (!) 21 (02/05/19 1804)  BP: (!) 159/79 (02/05/19 1804)  SpO2: 97 % (02/05/19 1804) Vital Signs (24h Range):  Temp:  [94 °F (34.4 °C)-94.6 °F (34.8 °C)] 94.6 °F (34.8 °C)  Pulse:  [67-97] 70  Resp:  [16-24] 21  SpO2:  [96 %-100 %] 97 %  BP: ()/(47-81) 159/79     Weight: 69.8 kg (153 lb 14.1 oz)  Body mass index is 22.03 kg/m².      Intake/Output Summary (Last 24 hours) at 2/5/2019 1832  Last data filed at 2/5/2019 1615  Gross per 24 hour   Intake --   Output 500 ml   Net -500 ml       Physical Exam   Constitutional: She has a sickly appearance. She appears ill.   HENT:   Head: Atraumatic.   Eyes: Conjunctivae are normal. Pupils are equal, round, and reactive to light.   Neck: No JVD present. No tracheal deviation present.   Cardiovascular: Normal rate and regular rhythm.   Pulses:       Radial pulses are 2+ on the right side, and 2+ on the left side.        Dorsalis pedis pulses are 1+ on the right side, and 1+ on the left side.   Pulmonary/Chest: She has decreased breath sounds in the right upper field, the right middle field and the right lower field. She has no wheezes. She has rhonchi in the left upper field.   Abdominal: Soft. Bowel sounds are normal. She exhibits no distension.       Musculoskeletal: She exhibits no edema.   Neurological: She is alert.   Skin: Skin is warm and dry. Capillary refill takes 2 to 3 seconds. She is not diaphoretic. No cyanosis.                            Vents:  Vent Mode: A/C (02/05/19 1804)  Ventilator Initiated: Yes (02/05/19 1127)  Set Rate: 18 bmp (02/05/19 1804)  Vt Set: 400 mL (02/05/19 1804)  Pressure Support: 0 cmH20 (02/05/19 1804)  PEEP/CPAP: 8 cmH20 (02/05/19 1804)  Oxygen Concentration (%): 40 (02/05/19 1804)  Peak Airway Pressure: 36 cmH2O (02/05/19 1804)  Plateau Pressure: 0 cmH20 (02/05/19 1804)  Total Ve: 8.4 mL  (02/05/19 1804)  F/VT Ratio<105 (RSBI): (!) 50.48 (02/05/19 1804)    Lines/Drains/Airways     Drain                 Urethral Catheter -- days         Gastrostomy/Enterostomy 10/19/18 Percutaneous endoscopic gastrostomy (PEG)  days          Airway                 Surgical Airway Shiley -- days         Surgical Airway 07/18/18 1620 Shiley 202 days          Pressure Ulcer                 Pressure Injury 10/24/18 1100 Right medial Malleolus Stage 3 104 days         Pressure Injury 10/24/18 1105 Left Ischial tuberosity Stage 3 104 days         Pressure Injury 10/24/18 Coccyx Unstageable 104 days         Pressure Injury 02/05/19 1620 Left lateral Hip Stage 2 less than 1 day          Peripheral Intravenous Line                 Midline Catheter Insertion/Assessment  - Double Lumen 10/19/18 0130 Left brachial vein 109 days         Midline Catheter Insertion/Assessment  - Double Lumen 02/05/19 1717 Left basilic vein (medial side of arm) 18g x 10cm less than 1 day         Peripheral IV - Single Lumen 02/05/19 1150 Right Hand less than 1 day         Peripheral IV - Single Lumen 02/05/19 1243 Right Antecubital less than 1 day                Significant Labs:    CBC/Anemia Profile:  Recent Labs   Lab 02/05/19  1150 02/05/19  1221   WBC 16.18*  --    HGB 5.6*  --    HCT 19.9*  --    *  --    MCV 90  --    RDW 20.0*  --    OCCULTBLOOD  --  Negative        Chemistries:  Recent Labs   Lab 02/05/19  1150   *   K 4.9      CO2 34*   *   CREATININE 2.4*   CALCIUM 10.9*   ALBUMIN 1.8*   PROT 7.8   BILITOT 0.2   ALKPHOS 139*   ALT 14   AST 23   MG 2.9*       All pertinent labs within the past 24 hours have been reviewed.    Significant Imaging:   I have reviewed all pertinent imaging results/findings within the past 24 hours.

## 2019-02-06 NOTE — CONSULTS
Clinical Pharmacy: Vancomycin Consult Note    Pharmacy consulted to dose Vancomycin by Dr. Florentnio  71 y/o female with PMH of Afib, CHF, DM, and GERD    Indication: Septic Shock (initially entered as SSTI)  Goal trough: 15-20 mcg/ml     WBC = 16.18  Tmax = 94.6  SCr = 2.4, CrCl = 22.6 ml/min  (last SCr on 11/01 was 1.1)   Cultures: pending  Dosing weight: 69.8 kg  Other abx: Zosyn    Pt received a 1gm dose in the ER and will be pulse dosed at this time. A random level will be ordered with AM labs. Re dose patient once level is <18 mcg/ml.  Pharmacy will continue to follow patients clinical status, renal function, C&S, and adjust dose as necessary to maintain trough levels between 15 and 20 mcg/ml.   Random level due: 02/06 at 0430 with AM labs.     Thank you for allowing us to participate in this patient's care.   Lana Rogers, PharmD 2/5/2019 6:41 PM

## 2019-02-06 NOTE — ASSESSMENT & PLAN NOTE
-H/H 5.6/19.9  -unknown source of bleeding   -BUN of 115 noted   -FOBT results pending   -2 units of PRBCs transfused   -will repeat H/H post transfusion

## 2019-02-06 NOTE — ASSESSMENT & PLAN NOTE
-wound care consulted   -infection suspected   -wound culture results pending   -pt to be positioned per protocol to avoid further breakdown   -will consult General Surgery as needed pending evaluation

## 2019-02-06 NOTE — ASSESSMENT & PLAN NOTE
Full vent support  Settings reviewed and appropriate for optimal gas exchange  VAP prophylaxis  bronchodilator

## 2019-02-06 NOTE — ASSESSMENT & PLAN NOTE
-wound care consulted   -infection suspected   -wound culture results pending   -pt to be positioned per protocol to avoid further breakdown   -General Surgery evaluated and took to the OR for surgical debridement 06 Feb.

## 2019-02-06 NOTE — ANESTHESIA RELEASE NOTE
"Anesthesia Release from PACU Note    Patient: Carlie Valdez    Procedure(s) Performed: Procedure(s) (LRB):  DEBRIDEMENT, WOUND, SACRUM (N/A)    Anesthesia type: general    Post pain: Adequate analgesia    Post assessment: no apparent anesthetic complications    Last Vitals:   Visit Vitals  BP (!) 108/41   Pulse 72   Temp 37.4 °C (99.4 °F) (Oral)   Resp 19   Ht 5' 10" (1.778 m)   Wt 69.8 kg (153 lb 14.1 oz)   SpO2 100%   Breastfeeding? No   BMI 22.08 kg/m²       Post vital signs: stable    Level of consciousness: awake    Nausea/Vomiting: no nausea/no vomiting    Complications: none    Airway Patency: patent    Respiratory: unassisted    Cardiovascular: stable and blood pressure at baseline    Hydration: euvolemic  "

## 2019-02-06 NOTE — ASSESSMENT & PLAN NOTE
-IV antibiotics    Imaging results showed interval worsening with marked amount of alveolar consolidation scattered throughout the left lung and lower half of the right lung characteristic of pneumonia.  -vent dependent   -sputum and blood culture results pending  -Alfonzo prn   -Nebulizer treatments

## 2019-02-06 NOTE — OR NURSING
Patient transported back to ICU.  Report given to ICU nurse Nereyda at 1447 per OR nurse Echo Potter.

## 2019-02-06 NOTE — CONSULTS
Pharmacy Vancomycin Consult Note    WBC=14.24  Tmax=99.6  CrCl=24.6ml/min Scr=2.2  Patient is currently being pulse dosed.    Dx. Sepsis, skin/soft tissue  Cultures: respiratory-many gram (+) cocci    Vancomycin random=13.6 mcg/ml  Patient can receive Vancomycin 1 gram one time dose today.   Next vancomycin random level due 2/7 with AM labs    Thank you for allowing us to participate in this patient's care.  Brianda Live, Pharm D 2/6/2019 10:40 AM

## 2019-02-06 NOTE — HOSPITAL COURSE
Admitted to ICU for treatment of septic shock.  Abnormal urinalysis and multiple decubitus wounds as possible sources of infection.  IV fluid resuscitation.  Empirically started Vancomycin and Zosyn.  Wound care and General Surgery to evaluate for source control.  Surgical debridement of the wound with exposed bone at the base.  Wound cultures growing multiple organisms with multiple patterns of drug resistance.  Infectious Disease consult.  Minor clinical improvement with broad spectrum antibiotic treatment.  2/9/19 Pt was seen and examined at bedside .She is s/p 1 PRBC . The H/H remain < 6 w/o any sign of acute bleeding . She is on mechanical  Ventilation trough  A tracheotomy. There was  No acute event overnight . ID was consulted yesterday  . The wound cx is (+) for e.coli and proteus sensitive to meropenem .    2/10/19 Pt was seen and examined . The H/H remain lower than < 7 .  There was no acute event overnight . She is on mechanical ventilation  Trough a tracheotomy .    2/11/19 She is s/p 2 PRB   With a good H/H respond . There was no acute event overnight .   2/12/19 Pt was seen and examined at bedside . The H/H remian stable . The na level is trending up . The CM is working on  IVAB   2/13/19 Pt was seen and examined at  Bedside . The Na level is cont trending up . The free H20 was increased . There was no acute event overnight   2/14/19 Pt was seen and examined at bedside . The H/H remain stable . The Na level is trending down . She will be d/c on IVAB Invanz and tigeceline . She was determined to be suitable  To be d/c to  Critical access hospital

## 2019-02-06 NOTE — TRANSFER OF CARE
"Anesthesia Transfer of Care Note    Patient: Carlie Valdez    Procedure(s) Performed: Procedure(s) (LRB):  DEBRIDEMENT, WOUND, SACRUM (N/A)    Patient location: ICU    Anesthesia Type: general    Transport from OR: Transported from OR on room air with adequate spontaneous ventilation    Post pain: adequate analgesia    Post assessment: no apparent anesthetic complications    Post vital signs: stable    Level of consciousness: awake    Nausea/Vomiting: no nausea/vomiting    Complications: none    Transfer of care protocol was followed      Last vitals:   Visit Vitals  BP (!) 108/41   Pulse 72   Temp 37.4 °C (99.4 °F) (Oral)   Resp 19   Ht 5' 10" (1.778 m)   Wt 69.8 kg (153 lb 14.1 oz)   SpO2 100%   Breastfeeding? No   BMI 22.08 kg/m²     "

## 2019-02-06 NOTE — OP NOTE
[facility] -   General Surgery  Operative Note    SUMMARY     Date of Procedure: 2/6/2019     Procedure:   EXCISIONAL DEBRIDEMENT OF WOUND, SACRUM      Surgeon(s) and Role:     * Akila Laguna MD - Primary    Assisting Surgeon: None    Pre-Operative Diagnosis: Decubitus ulcer of sacral region, stage 4 [L89.154]    Post-Operative Diagnosis: Post-Op Diagnosis Codes:     * Decubitus ulcer of sacral region, stage 4 [L89.154]    Anesthesia: General    Drains, Packs, Catheters: Saline soaked kerlix packing    Estimated Blood Loss (EBL): 10 cc           Implants: * No implants in log *    Specimens:   Specimen (12h ago, onward)    None           Description of the Findings of the Procedure:  10 cm (width) x 11 cm (length) x 5 cm (deep) sacral decubitus wound with necrotic edges requiring sharp surgical debridement of necrotic skin soft tissue and muscle with Bovie cautery to healthy bleeding pink viable tissue. Exposed coccyx and sacrum making wound stage 4.     Significant Surgical Tasks Conducted by the Assistant(s), if Applicable:  None    Complications: No    Indications for Procedure:  73-year-old female admitted to the ICU with sepsis from multiple sources.  She has a significant sacral decubitus ulcer with necrotic tissue throughout.  Surgery was consulted for excisional debridement for source control.  Risks and benefits were discussed with her daughter who is her decision maker and decision was made to proceed with operative intervention.  Consents were obtained.    Procedure in detail:  The patient was taken the operating room and laid on the operating table in supine position. Anesthesia was administered.  She is ventilated via a permanent trach.  She was placed in a right lateral decubitus position on a beanbag with all pressure points padded.  Her sacrum was prepped and draped in sterile fashion with Betadine solution.  Time-out was performed verifying patient identification, correct  surgical site, IV antibiotic administration, and other pertinent in details the case.    The patient's sacrum, coccyx and surrounding area were inspected. Her anus is approximately 5 cm distal to the inferior portion of her sacral decubitus ulcer.  She had full-thickness skin, subcutaneous tissue, and muscle necrosis at the 9 - 12 o'clock position of the wound which was sharply excised using Bovie cautery. All involved tissue was excised until healthy bleeding pink viable tissue was encountered.  The necrotic tissue was sent for culture.     There was also an area of full-thickness skin, subcutaneous tissue, and muscle necrosis at the 2 - 6 o'clock position.  This was excised in similar fashion. After excision the wound bed was made hemostatic and appeared healthy and viable.  There was no gross purulence noted. The wound after completion measured 10 x 11 x 5 cm in size.  It was packed with saline soaked Kerlix and dressed in sterile fashion. The patient returned to ICU and will continue local wound care as recommended by Wound Care Nurse in notes today.    Condition: Fair    Disposition: ICU - intubated and hemodynamically stable.    Attestation: I performed the procedure.    Akila Laguna

## 2019-02-06 NOTE — PROGRESS NOTES
Patient blood pressure currently 102/43 with a MAP of 62. Patient blood pressure on recheck was 84/43 with a MAP of 55. Patient currently making urine 40-60 cc/hour. No change in hourly urine output at this time. Dr. Garcia from [facility] notified of current blood pressure. MD orders to continue to monitor patient and monitor for change in urine output. No distress noted. Will continue to monitor.

## 2019-02-06 NOTE — CONSULTS
Consult Note  General Surgery    Consult Requested By: ICU  Reason for Consult: Evaluation of sacral decubitus ulcer  Report:  Consulting physician will be notified of my consult note through Epic.    Chief Complaint   Patient presents with    Abnormal Lab     sent from [facility]       SUBJECTIVE:     History of Present Illness:  Carlie Valdez is a 73 y.o. female nursing home patient with chronic respiratory failure for and functional quadriplegia who is trach dependent admitted for sepsis with suspected sources including pulmonary, urinary, and sacral decubitus wounds.  Surgery was consulted for debridement.     Scheduled Meds:   acetylcysteine 200 mg/ml (20%)  4 mL Nebulization TID    budesonide  0.5 mg Nebulization Q12H    levothyroxine  100 mcg Per G Tube Before breakfast    midodrine  5 mg Per G Tube TID    pantoprazole  40 mg Intravenous BID    piperacillin-tazobactam (ZOSYN) IVPB  4.5 g Intravenous Q8H    [START ON 2/11/2019] vancomycin (VANCOCIN) IVPB  1,000 mg Intravenous Q72H    vancomycin (VANCOCIN) IVPB  1,000 mg Intravenous Once     Continuous Infusions:    Review of patient's allergies indicates:   Allergen Reactions    Morphine Hives    Atorvastatin      Other reaction(s): Muscle pain    Clonidine     Codeine      Other reaction(s): Unknown    Digoxin      Other reaction(s): Unknown    Diovan [valsartan] Other (See Comments)     Upset stomach, weakness    Eggs [egg derived]     Metoprolol Diarrhea    Naproxen      Other reaction(s): Nausea    Peanut     Propofol      Other reaction(s): delirious    Sulfa (sulfonamide antibiotics)        Past Medical History:   Diagnosis Date    Anticoagulant long-term use     Arthritis     Asthma     Atrial fibrillation     Blood clot of vein in shoulder area     Cardiac defibrillator in place     CHF (congestive heart failure)     Chronic knee pain     Diabetes mellitus type 2 without retinopathy 12/19/2016     Diaphragmatic hernia without obstruction and without gangrene 2015    GERD (gastroesophageal reflux disease)     Hypertension     Immune deficiency disorder     Primary open angle glaucoma (POAG) of both eyes, severe stage 2018     Past Surgical History:   Procedure Laterality Date    CARDIAC DEFIBRILLATOR PLACEMENT      , .    CATARACT EXTRACTION       SECTION      COLONOSCOPY      EGD, WITH PEG TUBE INSERTION N/A 2018    Performed by Louis O. Jeansonne IV, [facility] at [facility] OR    ashley Macdonald    ESOPHAGOGASTRODUODENOSCOPY (EGD) N/A 2015    Performed by Hieu Colbert MD at [facility] ENDO    KNEE ARTHROSCOPY      TRACHEOSTOMY N/A 2018    Performed by [facility], [facility] at [facility] OR    UPPER GASTROINTESTINAL ENDOSCOPY       Family History   Problem Relation Age of Onset    Hypertension Mother     Hypertension Father     Colon cancer Neg Hx     Stomach cancer Neg Hx      Social History     Tobacco Use    Smoking status: Never Smoker    Smokeless tobacco: Never Used   Substance Use Topics    Alcohol use: No     Alcohol/week: 0.0 oz    Drug use: No        Review of Systems:  Review of systems not obtained due to patient factors Trach dependent.    OBJECTIVE:     Vital Signs (Most Recent)  Temp: 99.1 °F (37.3 °C) (19 0708)  Pulse: 72 (19 1124)  Resp: 19 (19 1124)  BP: (!) 96/40 (19 1124)  SpO2: 100 % (19 1124)    Vital Signs Range (Last 24H):  Temp:  [94 °F (34.4 °C)-99.6 °F (37.6 °C)]   Pulse:  [67-75]   Resp:  [3-29]   BP: ()/(32-88)   SpO2:  [91 %-100 %]     Physical Exam:  General:  Cachectic, trach dependent  HEENT:  Normocephalic, atraumatic  Neck:  Trach in place  Lungs:  No increased work of breathing, left-sided coarse breath sounds  CV:  Regular rate and rhythm  Abdomen:  Soft, G-tube in place  Skin:  Large sacral wound with necrotic edges, foul-smelling, no gross purulence  Extremities:  Pressure injuries to the  left lateral hip lower leg and ischium    Labs and images were reviewed.  Leukocytosis of 14,000  H and H 7 and 23, s/p 2 unit pRBC transfusion yesterday        ASSESSMENT/PLAN:       1. Septic shock    2. Ventilator dependent    3. History of atrial fibrillation    4. Urinary tract infection associated with indwelling urethral catheter, initial encounter    5. Decubitus ulcer of sacral region, stage 4    6. Hypernatremia    7. Intravascular volume depletion    8. Anemia, unspecified type    9. Tracheostomy in place    10. Gastrointestinal tube present    11. Acute kidney injury    12. Chronic anticoagulation    13. Immunocompromised state       Two OR today for debridement of sacral decubitus wound for source control.  Consent obtained from family.  Risks, benefits, and alternatives were discussed in detail and decision was made to proceed with operative intervention.  Consents were obtained.  Type and screen.    Akila Laguna

## 2019-02-06 NOTE — ANESTHESIA PREPROCEDURE EVALUATION
02/06/2019  Carlie Valdez is a 73 y.o., female.    Anesthesia Evaluation    I have reviewed the Patient Summary Reports.    I have reviewed the Nursing Notes.   I have reviewed the Medications.     Review of Systems  Anesthesia Hx:  No problems with previous Anesthesia  Denies Family Hx of Anesthesia complications.   Denies Personal Hx of Anesthesia complications.   Social:  No Alcohol Use, Non-Smoker    Hematology/Oncology:     Oncology Normal    -- Anemia: Hematology Comments: 8.2/25.4    EENT/Dental:   glaucoma   Cardiovascular:   Pacemaker Hypertension Valvular problems/Murmurs Dysrhythmias atrial fibrillation CHF ECG has been reviewed. ekg 6/2018:  Atrial-paced rhythm 70  Left axis deviation  Left bundle branch block  Abnormal ECG  When compared with ECG of 06-JUN-2018 19:51,  Electronic atrial pacemaker has replaced Atrial fibrillation  T wave inversion more evident in Lateral leads    Echo 6/2018:  1 - Moderately depressed left ventricular systolic function (EF 35-40%).     2 - Low normal right ventricular systolic function .     3 - Pulmonary hypertension. The estimated PA systolic pressure is 54 mmHg.     4 - Severe left atrial enlargement.     5 - Concentric hypertrophy.     6 - Mild aortic regurgitation.     7 - Mild to moderate mitral regurgitation.     8 - Moderate to severe tricuspid regurgitation.     9 - Mild pulmonic regurgitation.     10 - Increased central venous pressure   Pulmonary:   Pneumonia Asthma Denies Sleep Apnea.    Renal/:   Chronic Renal Disease, ARF    Hepatic/GI:   GERD Denies Liver Disease. Denies Hepatitis. Diaphragmatic hernia without obstruction and without gangrene   Musculoskeletal:   Arthritis     Neurological:   CVA Denies Seizures.    Endocrine:   Diabetes Hypothyroidism           Anesthesia Plan  Type of Anesthesia, risks & benefits  discussed:  Anesthesia Type:  general  Patient's Preference:   Intra-op Monitoring Plan: standard ASA monitors  Intra-op Monitoring Plan Comments:   Post Op Pain Control Plan:   Post Op Pain Control Plan Comments:   Induction:    Beta Blocker:         Informed Consent: Patient representative understands risks and agrees with Anesthesia plan.  Questions answered.   ASA Score: 4     Day of Surgery Review of History & Physical: I have interviewed and examined the patient. I have reviewed the patient's H&P dated:  There are no significant changes.          Ready For Surgery From Anesthesia Perspective.

## 2019-02-06 NOTE — ASSESSMENT & PLAN NOTE
With resultant functional quadraplegia and vent dependence   Holding cellcept in setting of sepsis

## 2019-02-06 NOTE — PLAN OF CARE
Problem: Adult Inpatient Plan of Care  Goal: Plan of Care Review  Outcome: Ongoing (interventions implemented as appropriate)  Patient currently resting quietly in bed. VS currently stable. Patient paced rhythm on monitor at this time. Patient currently receiving oxygen via ventilator through trachostomy. Patient encouraged to turn in bed every 2 hours to avoid further skin breakdown to back side and prevent new wound development. No sign of new wound development or further skin breakdown noted. Patient has heel offloading devices in place and positioned with wedge. Plan of care updated with patient. Patient tolerated PRBC infusion well earlier in shift. Plan of care updated. There are no further questions after updated on plan of care at this time. Will continue to monitor.

## 2019-02-06 NOTE — ASSESSMENT & PLAN NOTE
Appropriate response to transfusion  BUN down 107 and no overt bleeding  Continue therapeutic PPI  Conservative transfusion plan  Monitor h/h for response

## 2019-02-06 NOTE — ASSESSMENT & PLAN NOTE
-Pt admitted to ICU  -in the setting of UTI, Pneumonia, and wound infection   -IV antibiotics   -IV hydration with bolus given in ED  -Lactic acid 4.2 initial with serial results pending   -blood, urine, and wound culture results pending  -Procalcitonin 1.2  -Chest xray showed pneumonia and pleural effusion   -UA + for infectious process

## 2019-02-06 NOTE — HPI
73 year old female well known to our service s/t prior admissions; complicated PMH includes quadraplegia and chronic vent dependent respiratory failure with trach s/t NMO; DM; CHF; HTN; GERD; atrial fib; MRSA sputum    Presented to ED from NH for eval of abnl labs; EMS also reported gm neg bacteria growth from sacral wound    ED evaluation revealed hypothermia 94F; normotension; leukocytosis 16k; anemia 5.6/19.9; sodium 152; creatinine 2.4 with ; lactic acid 4.2; procalcitonin 1.2

## 2019-02-06 NOTE — ASSESSMENT & PLAN NOTE
Near total lt lung infiltrate  Continue mucomyst and aggressive pulmonary toilet  Consider therapeutic bronch if unable to clear  Sputum culture pending; history of MRSA and high suspicion for pseudomonas in immunosuppressed NH vent resident continue [employer] abx

## 2019-02-06 NOTE — ASSESSMENT & PLAN NOTE
No reported hematochezia or melena but with , suspicious  Add therapeutic PPI  Transfuse   Monitor h/h for response

## 2019-02-06 NOTE — PLAN OF CARE
Patient went to OR today for debridement of sacral decubitus wound .  CM spoke with the fac nurse. They use [facility] team to perform wd care.  They stated the more they clean the wound, the more necrotic tissue was found. CM called dtr Haley. CM explained role of CM . CM explained  IMM, Advancce directive ( full code) and d/c dispostion. Dtr agreed patient to return to [facility]. CM to f/u with safe transition

## 2019-02-06 NOTE — CONSULTS
02/06/19 0945   Handoff Report   Given To GIULIANO Dawn   Pain/Comfort/Sleep   Preferred Pain Scale FACES (Gonsalez-Jara FACES Pain Rating Scale)   Pain Rating (0-10): Rest 0       Surgical Airway Shiley   No Placement Date or Time found.   Present Prior to Hospital Arrival?: Yes  Type: Tracheostomy  Brand: Shiley  Airway Device Size: 8.0   Site Assessment Clean;No drainage;Other (Comment)  (skin intact)   Ties Assessment Other (Comment)  (skin intact underneath)       Gastrostomy/Enterostomy 10/19/18 Percutaneous endoscopic gastrostomy (PEG) [facility]   Placement Date: 10/19/18   Present Prior to Hospital Arrival?: Yes  Type: Percutaneous endoscopic gastrostomy (PEG)  Location: [facility]   Insertion Site dry;no redness;no warmth;no tenderness;no drainage;no swelling   Site Care outer bumper rotated   Skin   Skin WDL ex   Skin Color/Characteristics maroon/purple;redness blanchable;redness nonblanchable   Skin Temperature warm   Skin Moisture dry   Skin Elasticity slow return to original state   Skin Integrity pressure injury;wound   Bed Support Surface Assessed  (Low Air Loss Pulsate)   Beka Risk Assessment   Sensory Perception 2-->very limited   Moisture 4-->rarely moist   Activity 1-->bedfast   Mobility 2-->very limited   Nutrition 2-->probably inadequate   Friction and Shear 1-->problem   Beka Score 12       Pressure Injury 02/05/19 Left Ischial tuberosity Unstageable - Present on Admission   Date First Assessed: 02/05/19   Pressure Injury Present on Admission: yes  Side: Left  Location: Ischial tuberosity  Is this injury device related?: No  Staging: Unstageable - Present on Admission   Wound Image     Staging UPA   Dressing Appearance Open to air   Drainage Amount Small   Drainage Characteristics/Odor Serosanguineous   Appearance Pink;Red;Yellow;White;Moist   Tissue loss description Full thickness   Black (%), Wound Tissue Color 0 %   Red (%), Wound Tissue Color 25 %   Yellow (%), Wound Tissue Color 75 %   Periwound Area  Dry;Intact;Scar tissue   Wound Edges Open;Irregular   Wound Length (cm) 3 cm   Wound Width (cm) 4.5 cm   Wound Depth (cm) 0.3 cm   Wound Volume (cm^3) 4.05 cm^3   Wound Surface Area (cm^2) 13.5 cm^2   Tunneling (depth (cm)/location) 0   Undermining (depth (cm)/location) 0   Care Cleansed with:;Sterile normal saline;Applied:;Skin Barrier   Dressing Hydrofiber;Foam;Applied   Periwound Care Absorptive dressing applied;Cleansed with pH balanced cleanser;Dry periwound area maintained;Skin barrier film applied   Dressing Change Due 02/08/19       Pressure Injury 02/05/19 Sacral spine Unstageable - Present on Admission   Date First Assessed: 02/05/19   Pressure Injury Present on Admission: yes  Location: (c) Sacral spine  Is this injury device related?: No  Staging: (c) Unstageable - Present on Admission   Wound Image    Staging UPA   Dressing Appearance Moist drainage;Saturated   Drainage Amount Large   Drainage Characteristics/Odor Malodorous;Tan   Appearance Tan;Gray;Yellow;Slough;Necrotic;Red;Wet   Tissue loss description Full thickness   Black (%), Wound Tissue Color 50 %   Red (%), Wound Tissue Color 25 %   Yellow (%), Wound Tissue Color 25 %   Periwound Area Denuded   Wound Edges Irregular;Open   Wound Length (cm) 12 cm   Wound Width (cm) 12 cm   Wound Depth (cm) 6 cm   Wound Volume (cm^3) 864 cm^3   Wound Surface Area (cm^2) 144 cm^2   Care Cleansed with:;Sterile normal saline;Applied:;Skin Barrier   Dressing Gauze, wet to moist;Rolled gauze;Abd pad;Applied   Packing Inserted  1   Periwound Care Absorptive dressing applied;Cleansed with pH balanced cleanser;Dry periwound area maintained;Skin barrier film applied   Dressing Change Due 02/06/19       Pressure Injury 02/05/19 1620 Left lateral Hip Stage 3   Date First Assessed/Time First Assessed: 02/05/19 1620   Pressure Injury Present on Admission: yes  Side: Left  Orientation: lateral  Location: Hip  Is this injury device related?: No  Staging: (c) Stage 3   Wound  Image    Staging 3   Dressing Appearance Moist drainage;Intact   Drainage Amount Small   Drainage Characteristics/Odor Serosanguineous   Appearance Red;Maroon;Purple;Yellow;White;Slough;Moist   Tissue loss description Full thickness   Periwound Area Redness   Wound Edges Irregular   Wound Length (cm) 3 cm   Wound Width (cm) 5 cm   Wound Depth (cm) 0.2 cm   Wound Volume (cm^3) 3 cm^3   Wound Surface Area (cm^2) 15 cm^2   Tunneling (depth (cm)/location) 0   Undermining (depth (cm)/location) 0   Care Cleansed with:;Sterile normal saline;Applied:;Skin Barrier   Dressing Hydrofiber;Foam   Periwound Care Absorptive dressing applied;Cleansed with pH balanced cleanser;Dry periwound area maintained;Skin barrier film applied   Dressing Change Due 02/08/19       Pressure Injury 02/05/19 Right medial Malleolus DTPI - Present on Admission   Date First Assessed: 02/05/19   Pressure Injury Present on Admission: yes  Side: Right  Orientation: medial  Location: Malleolus  Is this injury device related?: No  Staging: DTPI - Present on Admission   Staging DPA   Dressing Appearance Open to air   Drainage Amount None   Appearance Maroon;Purple   Periwound Area Scar tissue   Wound Length (cm) 1 cm   Wound Width (cm) 2 cm   Wound Surface Area (cm^2) 2 cm^2   Care Applied:;Skin Barrier       Pressure Injury 02/06/19 Left lateral Leg Stage 2   Date First Assessed: 02/06/19   Pressure Injury Present on Admission: yes  Side: Left  Orientation: lateral  Location: Leg  Is this injury device related?: No  Staging: Stage 2   Staging 2   Dressing Appearance Open to air   Drainage Amount None   Appearance Blistered;Red   Periwound Area Redness   Wound Length (cm) 1 cm   Wound Width (cm) 1 cm   Wound Surface Area (cm^2) 1 cm^2   Care Cleansed with:;Sterile normal saline;Applied:;Skin Barrier   Dressing Foam;Applied   Dressing Change Due 02/08/19       Pressure Injury 02/06/19 Left Thigh DTPI - Present on Admission   Date First Assessed: 02/06/19    Pressure Injury Present on Admission: yes  Side: Left  Location: Thigh  Staging: DTPI - Present on Admission   Wound Image    Staging DPA   Dressing Appearance Open to air   Drainage Amount None   Appearance Purple;Maroon   Periwound Area Dry;Intact   Wound Length (cm) 0.5 cm   Wound Width (cm) 6 cm   Wound Surface Area (cm^2) 3 cm^2   Care Cleansed with:;Sterile normal saline;Applied:;Skin Barrier   Dressing Transparent film   Dressing Change Due 02/13/19       Pressure Injury 02/06/19 Left Thigh Unstageable - Present on Admission   Date First Assessed: 02/06/19   Pressure Injury Present on Admission: yes  Side: Left  Location: Thigh  Staging: Unstageable - Present on Admission   Wound Image (see LDA for Left thigh DTPI - POA)   Staging UPA   Dressing Appearance Open to air   Drainage Amount None   Appearance Yellow;Eschar;Dry   Tissue loss description Full thickness   Periwound Area Redness   Wound Length (cm) 1 cm   Wound Width (cm) 3 cm   Wound Depth (cm) 0.1 cm   Wound Volume (cm^3) 0.3 cm^3   Wound Surface Area (cm^2) 3 cm^2   Care Cleansed with:;Sterile normal saline;Applied:;Skin Barrier   Dressing Transparent film;Applied   Dressing Change Due 02/13/19       Pressure Injury 02/06/19 Left upper Arm DTPI - Present on Admission   Date First Assessed: 02/06/19   Pressure Injury Present on Admission: yes  Side: Left  Orientation: upper  Location: Arm  Staging: DTPI - Present on Admission   Staging DPA   Dressing Appearance Open to air   Drainage Amount None   Appearance Maroon;Purple   Wound Length (cm) 2 cm   Wound Width (cm) 1 cm   Wound Surface Area (cm^2) 2 cm^2   Care Applied:;Skin Barrier   Skin Interventions   Device Skin Pressure Protection absorbent pad utilized/changed;adhesive use limited;positioning supports utilized;pressure points protected;skin-to-device areas padded   Pressure Reduction Devices foam padding utilized;heel offloading device utilized;positioning supports utilized;specialty bed  utilized   Pressure Reduction Techniques frequent weight shift encouraged;heels elevated off bed;positioned off wounds;pressure points protected   Skin Protection adhesive use limited;incontinence pads utilized;skin sealant/moisture barrier applied;silicone foam dressing in place;tubing/devices free from skin contact     Consulted on this 72 y/o F patient due to multiple present on admission pressure injuries. She has PMH significant for ICD, PEG, TACH, asthma, AFib, CHF, DM, HTN, contractures noted x4 ext.   During patient's last hospitalization, she had multiple pressure injuries including evolving DTI to coccygeal region. She was discharged to [facility] at that time.  She has been readmitted to ICU due to abnormal lab work, and she is septic.  Full skin assessment completed. Trach site intact with no milagros stomal breakdown noted.  PEG tube site with no milagros tube breakdown noted.  Bilateral heel offloading boots in place currently and patient is on a ANGIE pulsate bed.  Bilateral heels assessed with intact light pink scar tissue noted, no open wounds.  Right medial malleolus scar tissue noted, and DTI noted to site as well measuring 1x2cm with maroon and purple discoloration. painted with cavilon and heel boots replaced. Left lateral lower let stage 2 pressure injury noted as intact serous fluid filled blister with underlying abnd surrounding nonblanchable redness, measures 1x1cm. Painted with cavilon and covered with foam dressing.  Left thigh pressure injuries x2 noted, appear to potentially be related to tubing due to linear shape. Distal is Unstageable, measures 1x3x0.1cm and wound bed is 100% dry yellow eschar with maroon discoloration surrounding, suspicious for evolving DTI.  Proximal is DTI, measures 0.5x6cm with maroon and purple discoloration.  Both cleansed with saline and patted dry.  Painted with cavilon and tegaderm applied to cover, reduce friction, and promote autolytic debridement.  Left  upper arm DTI noted measures 2x1cm with maroon and purple discoloration. Painted with cavilon and left SILVANO at this time.  Left lateral hip/trochanter has an evolving DTI that is currently open to stage 3 depth but may continue to evolve and worsen before it heals due to nature of wound.  Currently, measures 3x5cm with moist pink and yellow wound bed, scant slough, and moist purple wound bed, as well as surrounding purple discoloration and epidermal sloughing noted at edges. Cleansed with saline and patted dry. Small amount serosanguinous drainage.  Painted milagros wound with cavilon. aquacel extra cut to size and applied to cover wound bed and secured with foam dressing.  Patient then turned to right side with assistance.  Left Ischium Unstageable pressure injury noted that measures 3x4.5x0.3cm with moist pink and white wound bed, and yellow slough, pink scar tissue noted to edges. Small amount serosanguinous drainage. Noted.  Cleansed with saline and patted dry. Painted milagros wound with cavilon. Covered with aquacel extra and secured with foam. This appears to be a healed PI that has re-opened within the scar tissue to stage 3 depth.  Dressing then removed from sacral wound, noted to be saturated with malodorous tan drainage.  Unstageable pressure injury to sacral/coccygeal region noted, which is likely source of sepsis. This wound began as noted on previous admissions, as DTI to coccygeal region that evolved and worsened over time. Currently measures [id number] with undermining noted from 9-3 o'clock extending 5cm, and probes to bone.  Wound bed is 50% tan/black necrotic malodorous slough, 25% moist red nongranulating tissue, 25% moist yellow subcutaneous tissue and slough. Surrounding skin is denuded. Large amount malodorous tan drainage.  Cleansed all with saline and patted dry.  Milagros wound painted with cavilon.  Wound bed filled with saline moistened kerlix, covered with ABD pad and secured with medipore tape at  this time. Patient then positioned with foam wedge on left side. Primary nurse Nereyda updated on findings and care provided.  Recommend General Surgery consult for sacral pressure injury debridement, and this is discussed with PASHA Quezada NP.  Please see below for wound care recommendations:    Unstageable pressure injury Sacral region:  1. Cleanse with saline  2. Pat dry  3. Paint milagros wound with cavilon  4. Fill wound with saline moistened kerlix  5. Cover with ABD pad  6. Secure with medipore tape  7. Change twice per day and prn excess drainage    Unstageable pressure injury Left Ischium:  1. Cleanse with saline  2. Pat dry  3. Paint milagros wound with cavilon  4. Cover with aquacel extra  5. Secure with foam  6. Change every 2 days and prn excess drainage    Evolving DTI / Stage 3 PI to left lateral hip:  1. Cleanse with saline  2. Pat dry  3. Paint milagros wound with cavilon  4. Cover with aquacel extra  5. Secure with foam  6. Change every 2 days and prn excess drainage    DTIs to SULY, Right medial malleolus, Stage 2 PI Left lateral lower le. Cleanse with saline  2. Pat dry  3. Paint with cavilon  4. Apply foam dressing  5. Change every 7 days and prn    DTI and Unstageable pressure injury to Left thigh:  1. Cleanse with saline  2. Pat dry  3. Paint with cavilon  4. Apply tegaderm  5. Change every 7 days

## 2019-02-06 NOTE — H&P
[facility] Hospital Medicine  History & Physical    Patient Name: Carlie Valdez  MRN: [id number]  Admission Date: 2/5/2019  Attending Physician: Jon Stout MD   Primary Care Provider: Johanna Guzman MD         Patient information was obtained from past medical records and ER records.     Subjective:     Principal Problem: Septic Shock     Chief Complaint:   Chief Complaint   Patient presents with    Abnormal Lab     sent from [facility]        HPI: Carlie Valdez is a 73 y.o. female patient  with PMHX of Atrial fibrillation, HTN, CKD, CVA (embolic), Anemia , Heart failure, Chronic respiratory failure with trach, functional quadriplegia, CAD, and Neuromyelitis Optica who presented to the ER today for abnormal lab results.  Pt is a resident at [facility] for skilled nursing care. Pt had outpatient labs done today.  [facility] states pt has a sacral wound that was cultured and showed Gram negative bacteria.  Pt did not respond when questioned however has hx of responding appropriately to simple questions. HPI limited due to patient being trached and unable to mouth words.  ER workup showed: WBC 16.18, H/H 5.6/19.9, Na 152, BUN/Creatinine 115/2.4, Lactic acid 4.2, and Procal 1.20.  Chest xray showed interval worsening of the appearance of the lungs with marked amount of alveolar consolidation scattered throughout left lung and the lower half of the right lung characteristic of pneumonia.  UA + for infectious process.  Hospital Medicine contacted for admission to ICU.        Past Medical History:   Diagnosis Date    Anticoagulant long-term use     Arthritis     Asthma     Atrial fibrillation     Blood clot of vein in shoulder area     Cardiac defibrillator in place     CHF (congestive heart failure)     Chronic knee pain     Diabetes mellitus type 2 without retinopathy 12/19/2016    Diaphragmatic hernia without obstruction and without gangrene 9/14/2015     GERD (gastroesophageal reflux disease)     Hypertension     Immune deficiency disorder     Primary open angle glaucoma (POAG) of both eyes, severe stage 2018       Past Surgical History:   Procedure Laterality Date    CARDIAC DEFIBRILLATOR PLACEMENT      , .    CATARACT EXTRACTION       SECTION      COLONOSCOPY      EGD, WITH PEG TUBE INSERTION N/A 2018    Performed by [facility] O[facility], [facility] at [facility]    ashley Macdonald    ESOPHAGOGASTRODUODENOSCOPY (EGD) N/A 2015    [facility] Colbert [facility]    KNEE ARTHROSCOPY      TRACHEOSTOMY N/A 2018    Performed by [facility] IV, [facility] at [facility]    UPPER GASTROINTESTINAL ENDOSCOPY         Review of patient's allergies indicates:   Allergen Reactions    Morphine Hives    Atorvastatin      Other reaction(s): Muscle pain    Clonidine     Codeine      Other reaction(s): Unknown    Digoxin      Other reaction(s): Unknown    Diovan [valsartan] Other (See Comments)     Upset stomach, weakness    Eggs [egg derived]     Metoprolol Diarrhea    Naproxen      Other reaction(s): Nausea    Peanut     Propofol      Other reaction(s): delirious    Sulfa (sulfonamide antibiotics)        No current facility-administered medications on file prior to encounter.      Current Outpatient Medications on File Prior to Encounter   Medication Sig    albuterol 90 mcg/actuation inhaler Inhale 2 puffs into the lungs every 4 (four) hours as needed.     albuterol-ipratropium (DUO-NEB) 2.5 mg-0.5 mg/3 mL nebulizer solution Take 3 mLs by nebulization every 4 (four) hours as needed for Wheezing. Rescue    amiodarone (PACERONE) 200 MG Tab Take 1 tablet (200 mg total) by mouth 2 (two) times daily. (Patient taking differently: 200 mg by Per G Tube route 2 (two) times daily. )    bacitracin 500 unit/gram ointment Apply topically once daily. Apply to R heel daily.    baclofen (LIORESAL) 10 MG tablet 10 mg by Per G Tube  route 3 (three) times daily as needed.     brimonidine-timolol (COMBIGAN) 0.2-0.5 % Drop Place 1 drop into both eyes every 12 (twelve) hours.    budesonide (PULMICORT) 0.5 mg/2 mL nebulizer solution Take 2 mLs (0.5 mg total) by nebulization every 12 (twelve) hours. Controller    bumetanide (BUMEX) 2 MG tablet Take 1 tablet (2 mg total) by mouth 2 (two) times daily. 1 Tablet Oral Every day (Patient taking differently: Take 2 mg by mouth once daily. 1 Tablet Oral Every day)    ergocalciferol (ERGOCALCIFEROL) 50,000 unit Cap Take 1 capsule (50,000 Units total) by mouth every 7 days. (Patient taking differently: 50,000 Units by Per G Tube route every 7 days. )    fluticasone (FLONASE) 50 mcg/actuation nasal spray 2 sprays by Each Nare route once daily.    furosemide (LASIX) 20 MG tablet 20 mg by Per G Tube route 2 (two) times daily.     insulin detemir U-100 (LEVEMIR FLEXTOUCH) 100 unit/mL (3 mL) SubQ InPn pen Inject 10 Units into the skin every evening.    levothyroxine (SYNTHROID) 100 MCG tablet 100 mcg by Per G Tube route before breakfast.     linezolid (ZYVOX) 600 mg Tab 1 tablet (600 mg total) by Per G Tube route every 12 (twelve) hours.    magnesium oxide (MAG-OX) 400 mg tablet 400 mg by Per G Tube route 2 (two) times daily.     midodrine (PROAMATINE) 5 MG Tab 1 tablet (5 mg total) by Per G Tube route 3 (three) times daily.    montelukast (SINGULAIR) 10 mg tablet 10 mg by Per G Tube route once daily.     multivitamin (THERAGRAN) per tablet Take 1 tablet by mouth once daily.     mycophenolate (CELLCEPT) 250 mg Cap Take 2 capsules (500 mg total) by mouth 2 (two) times daily. (Patient taking differently: 500 mg 2 (two) times daily. )    ondansetron (ZOFRAN) 4 MG tablet 4 mg by Per G Tube route every 6 (six) hours as needed for Nausea.     pantoprazole (PROTONIX) 40 MG tablet Take 40 mg by mouth once daily.    polyethylene glycol (GLYCOLAX) 17 gram PwPk Take 17 g by mouth once daily. (Patient taking  differently: 17 g by Per G Tube route once daily. )    rivaroxaban (XARELTO) 15 mg Tab 15 mg by Per G Tube route every evening.     traMADol (ULTRAM) 50 mg tablet 50 mg by Per G Tube route every 6 (six) hours as needed for Pain.     zinc sulfate (ZINCATE) 220 (50) mg capsule 220 mg by Per G Tube route once daily.      Family History     Problem Relation (Age of Onset)    Hypertension Mother, Father        Tobacco Use    Smoking status: Never Smoker    Smokeless tobacco: Never Used   Substance and Sexual Activity    Alcohol use: No     Alcohol/week: 0.0 oz    Drug use: No    Sexual activity: No     Review of Systems   Unable to perform ROS: Patient nonverbal (pt lethargic due to Sepsis and trach dependent respiratory failure )     Objective:     Vital Signs (Most Recent):  Temp: (!) 94.6 °F (34.8 °C) (02/05/19 1630)  Pulse: 70 (02/05/19 1630)  Resp: 18 (02/05/19 1630)  BP: (!) 100/47 (02/05/19 1630)  SpO2: 100 % (02/05/19 1630) Vital Signs (24h Range):  Temp:  [94 °F (34.4 °C)-94.6 °F (34.8 °C)] 94.6 °F (34.8 °C)  Pulse:  [67-97] 70  Resp:  [16-24] 18  SpO2:  [96 %-100 %] 100 %  BP: ()/(47-81) 100/47     Weight: 69.8 kg (153 lb 14.1 oz)  Body mass index is 22.03 kg/m².    Physical Exam   Constitutional: She appears well-developed and well-nourished. She appears lethargic.   HENT:   Head: Normocephalic.   Nose: Nose normal.   Mouth/Throat: Mucous membranes are dry.   Eyes: Conjunctivae are normal.   Cardiovascular: Normal rate, regular rhythm, normal heart sounds and intact distal pulses.   Defibrillator present    Pulmonary/Chest: Effort normal. No respiratory distress. She has decreased breath sounds in the right lower field and the left lower field. She has no wheezes. She has no rhonchi.   #8 Shiley present    Abdominal: Soft. Bowel sounds are normal. She exhibits no distension. There is no tenderness.   G tube in place   Genitourinary:   Genitourinary Comments: Flores catheter present     Musculoskeletal:   Pt unable to follow commands.  Gross minimal movement noted to left hand.  RUE flaccid and no movement to BLE which appear partially contracted.    Neurological: She appears lethargic. GCS eye subscore is 3. GCS verbal subscore is 1. GCS motor subscore is 2.   Skin: Skin is warm. Capillary refill takes 2 to 3 seconds.   Large unstageable wound to sacrum with brown drainage and foul odor   Psychiatric:   Difficult to assess            Significant Labs:   Blood Culture: No results for input(s): LABBLOO in the last 48 hours.  CBC:   Recent Labs   Lab 02/05/19  1150   WBC 16.18*   HGB 5.6*   HCT 19.9*   *     CMP:   Recent Labs   Lab 02/05/19  1150   *   K 4.9      CO2 34*   *   *   CREATININE 2.4*   CALCIUM 10.9*   PROT 7.8   ALBUMIN 1.8*   BILITOT 0.2   ALKPHOS 139*   AST 23   ALT 14   ANIONGAP 16   EGFRNONAA 19*     Lactic Acid:   Recent Labs   Lab 02/05/19  1235 02/05/19  1621   LACTATE 4.2* 4.1*     Urine Studies:   Recent Labs   Lab 02/05/19  1211   COLORU Yellow   APPEARANCEUA Clear   PHUR >8.0*   SPECGRAV 1.010   PROTEINUA 1+*   GLUCUA Negative   KETONESU Negative   BILIRUBINUA Negative   OCCULTUA 3+*   NITRITE Negative   UROBILINOGEN Negative   LEUKOCYTESUR 3+*   RBCUA 3   WBCUA 10*   BACTERIA Moderate*   HYALINECASTS 0       Significant Imaging:   Imaging Results          X-Ray Chest AP Portable (Final result)  Result time 02/05/19 13:22:51    Final result by JOSEFINA Garcia Sr., MD (02/05/19 13:22:51)                 Impression:      1. The current examination is limited secondary to patient rotation to the left.  2. The borders of the heart are not well seen. There has been interval worsening of the appearance of the lungs. There is a marked amount of alveolar consolidation scattered throughout the left lung. There is a moderate amount of alveolar consolidation scattered throughout the lower half of the right lung.  This is characteristic of  pneumonia.  3. There is opacification of the base of the left hemithorax. There is blunting of the right costophrenic angle.  This is characteristic of pleural effusions.  .      Electronically signed by: Homar Garcia MD  Date:    02/05/2019  Time:    13:22             Narrative:    EXAMINATION:  XR CHEST AP PORTABLE    CLINICAL HISTORY:  Dependence on respirator (ventilator) status    COMPARISON:  11/02/2018    FINDINGS:  The current examination is limited secondary to patient rotation to the left.  A tracheostomy tube remains in place.  The cardiac pacemaker remains in place.  The borders of the heart are not well seen.  There has been interval worsening of the appearance of the lungs.  There is a marked amount of alveolar consolidation scattered throughout the left lung.  There is a moderate amount of alveolar consolidation scattered throughout the lower half of the right lung.  There is opacification of the base of the left hemithorax.  There is blunting of the right costophrenic angle.  There is no pneumothorax.                              Assessment/Plan:     Pneumonia    -IV antibiotics    Imaging results showed interval worsening with marked amount of alveolar consolidation scattered throughout the left lung and lower half of the right lung characteristic of pneumonia.  -vent dependent with hx of MSSA  -sputum and blood culture results pending  -Duonebs prn   -Nebulizer treatments       Anemia    -H/H 5.6/19.9  -multifactorial-last 8.8/28.5  -BUN of 115 noted   -FOBT results pending   -2 units of PRBCs transfused   -will repeat H/H post transfusion        Functional quadriplegia    -hx of CVA (embolic)  -pt currently a resident at [facility]  -PT/OT       Hypertension    -medications held due to hypotension        Acute on chronic renal failure    -BUN/Creatinine 115/2.5  -in the setting of Severe Sepsis   -IV hydration   -nephrotoxic medications held- will resume medications as appropriate   -CMP  in am       Septic shock    -Pt admitted to ICU  -in the setting of UTI, Pneumonia, and wound infection   -IV antibiotics   -IV hydration with bolus given in ED  -Lactic acid 4.2 initial with serial results pending   -blood, urine, and wound culture results pending  -Procalcitonin 1.2  -Chest xray showed pneumonia and pleural effusion   -UA + for infectious process          Acute on chronic respiratory failure with hypoxia and hypercapnia, Vent Dependent w/ Trach    -pt admitted to ICU  -Pulmonology consulted   -#8 Shiley present   -pt stable on ventilator  -trach care per protocol  -Duonebs prn   -sputum culture results pending        Pressure injury of coccygeal region, unstageable    -wound care consulted   -infection suspected   -wound culture results pending   -pt to be positioned per protocol to avoid further breakdown   -will consult General Surgery as needed pending evaluation        Leukocytosis    -in the setting of Severe Sepsis due to PNA, UTI, and wound infection    -IV antibiotics   -IV hydration   -blood, urine, sputum, and wound culture results pending        Hypernatremia    -free water flush via G tube   -repeat CMP in am        Neuromyelitis optica    -hx of plasma exchange X6  -followed outpatient by Dr. Daniels (Neuro)  -Cellcept held in the setting of infection         UTI (urinary tract infection)    IV antibiotics   Urine culture results pending        Acute on chronic systolic heart failure    -BNP and troponin pending   -per records last Echo showed EF 35%  -per record ACE/ARB withheld due to renal fxn  -per record BB held due to adverse effects and hypotension   -will hold medications for hypotension        Chronic combined systolic and diastolic congestive heart failure    -appears compensated   -BNP and Troponin pending   -will obtain Echo if decompensation noted        Chronic atrial fibrillation    -pt on amiodarone and Xarelto  -Xarelto held at this time to rule out active bleeding    -Amiodarone held due to hypotension-will monitor and resume when appropriate        Diabetes Mellitus  -Accuchecks and SSI           VTE Risk Mitigation (From admission, onward)        Ordered     IP VTE HIGH RISK PATIENT  Once      02/05/19 1430     Place sequential compression device  Until discontinued      02/05/19 1430        Critical care time spent on the evaluation and treatment of severe organ dysfunction, review of pertinent labs and imaging studies, discussions with consulting providers and discussions with patient/family: 65 minutes.     Ene Quezada NP  Department of Hospital Medicine   [facility]

## 2019-02-06 NOTE — PROGRESS NOTES
Tissue Perfusion Assessment        Vital signs reviewed. A focused perfusion assessment was completed.      Ene Quezada

## 2019-02-06 NOTE — ASSESSMENT & PLAN NOTE
Source likely multifactorial with wound, pulmonary, urinary source  Broad spectrum abx; monitor culture growth  ICU hemodynamic monitoring  Maintain MAP > 65

## 2019-02-06 NOTE — ASSESSMENT & PLAN NOTE
-in the setting of Severe Sepsis due to PNA, UTI, and wound infection    -IV antibiotics   -IV hydration   -blood, urine, sputum, and wound culture results pending

## 2019-02-06 NOTE — ASSESSMENT & PLAN NOTE
With multiple small stage 2 wounds  Consult general surgery in am, may need debridement  Meticulous pressure reduction

## 2019-02-06 NOTE — HOSPITAL COURSE
Admitted to ICU on mechanical ventilation, awake, non verbal and paraplegic at baseline; hypothermic with external warming in progress  2/6 - chronic vent via trach, hypoxemia reported with turning overnight, continued moderate trach secretions; marginal BP overnight and marginal urine output; to OR today for I/D of sacral wound  2/7 - supine on vent in no distress and VSS.  Awake and nods head to questions  2/8 - Supine on chronic vent dependent w/ VSS and tolerating TF.  Awake and mouthing words.  No distress.    2/9 - Seen and examined at bedside. Hospital chart reviewed. No acute interval detrimental events noted. S/P debridement of sacral wound. Seen and examined at bedside. Hospital chart reviewed. No acute interval detrimental events noted. CT chest with LLL atelectasis => Chest PT. S/P debridement of sacral wound. The wound cx is (+) for e.coli and proteus sensitive to meropenem .    2/10 - CXR some improvement in left infiltrate.  [facility]  I discussed her current vent dependent bed bound state with multiple non healing wounds and she feels she does have a quality of life and wants to continue with aggressive long term care.   2/11 Stable overnight, needs PICC line for antibiotics, off pressors  2/12 No adverse events overnight  2/13 stable overnight, plans for discharge  2/14 WBC trending down, no adverse events overnight

## 2019-02-06 NOTE — PROGRESS NOTES
[facility] -   Critical Care Medicine  Progress Note    Patient Name: Carlie Valdez  MRN: [id number]  Admission Date: 2/5/2019  Hospital Length of Stay: 1 days  Code Status: Full Code  Attending Provider: Jon Stout MD  Primary Care Provider: Johanna Guzman MD   Principal Problem: Severe sepsis with acute organ dysfunction    Subjective:     HPI:  73 year old female well known to our service s/t prior admissions; complicated PMH includes quadraplegia and chronic vent dependent respiratory failure with trach s/t NMO; DM; CHF; HTN; GERD; atrial fib; MRSA sputum    Presented to ED from NH for eval of abnl labs; EMS also reported gm neg bacteria growth from sacral wound    ED evaluation revealed hypothermia 94F; normotension; leukocytosis 16k; anemia 5.6/19.9; sodium 152; creatinine 2.4 with ; lactic acid 4.2; procalcitonin 1.2    Hospital/ICU Course:  Admitted to ICU on mechanical ventilation, awake, non verbal and paraplegic at baseline; hypothermic with external warming in progress  2/6 - chronic vent via trach, hypoxemia reported with turning overnight, continued moderate trach secretions; marginal BP overnight and marginal urine output; to OR today for I/D of sacral wound    Review of Systems   Reason unable to perform ROS: non verbal.       Objective:     Vital Signs (Most Recent):  Temp: 99.1 °F (37.3 °C) (02/06/19 0708)  Pulse: 72 (02/06/19 1124)  Resp: 19 (02/06/19 1124)  BP: (!) 96/40 (02/06/19 1124)  SpO2: 100 % (02/06/19 1124) Vital Signs (24h Range):  Temp:  [94.5 °F (34.7 °C)-99.6 °F (37.6 °C)] 99.1 °F (37.3 °C)  Pulse:  [67-75] 72  Resp:  [3-29] 19  SpO2:  [91 %-100 %] 100 %  BP: ()/(32-88) 96/40     Weight: 69.8 kg (153 lb 14.1 oz)  Body mass index is 22.08 kg/m².      Intake/Output Summary (Last 24 hours) at 2/6/2019 1322  Last data filed at 2/6/2019 1100  Gross per 24 hour   Intake 695.83 ml   Output 1915 ml   Net -1219.17 ml       Physical Exam    Constitutional: She has a sickly appearance. She appears ill.   HENT:   Head: Atraumatic.   Eyes: Conjunctivae are normal. Pupils are equal, round, and reactive to light.   Neck: No JVD present. No tracheal deviation present.   Cardiovascular: Normal rate and regular rhythm.   Pulses:       Radial pulses are 2+ on the right side, and 2+ on the left side.        Dorsalis pedis pulses are 1+ on the right side, and 1+ on the left side.   Pulmonary/Chest: She has decreased breath sounds in the right upper field, the right middle field and the right lower field. She has no wheezes. She has rhonchi in the left upper field.   Abdominal: Soft. Bowel sounds are normal. She exhibits no distension.       Musculoskeletal: She exhibits no edema.   Neurological: She is alert.   Skin: Skin is warm and dry. Capillary refill takes 2 to 3 seconds. She is not diaphoretic. No cyanosis.            Vents:  Vent Mode: A/C (02/06/19 1302)  Ventilator Initiated: Yes (02/05/19 1127)  Set Rate: 18 bmp (02/06/19 1302)  Vt Set: 400 mL (02/06/19 1302)  Pressure Support: 0 cmH20 (02/06/19 1302)  PEEP/CPAP: 8 cmH20 (02/06/19 1302)  Oxygen Concentration (%): 40 (02/06/19 1302)  Peak Airway Pressure: 35 cmH2O (02/06/19 1302)  Plateau Pressure: 0 cmH20 (02/06/19 1302)  Total Ve: 8.23 mL (02/06/19 1302)  F/VT Ratio<105 (RSBI): (!) 44.5 (02/06/19 1124)    Lines/Drains/Airways     Drain                 Urethral Catheter -- days         Gastrostomy/Enterostomy 10/19/18 Percutaneous endoscopic gastrostomy (PEG)  days          Airway                 Surgical Airway Nikita -- days         Surgical Airway 07/18/18 1620 Nikita 202 days          Pressure Ulcer                 Pressure Injury 02/05/19 Left Ischial tuberosity Unstageable - Present on Admission 1 day         Pressure Injury 02/05/19 Right medial Malleolus DTPI - Present on Admission 1 day         Pressure Injury 02/05/19 Sacral spine Unstageable - Present on Admission 1 day          Pressure Injury 02/05/19 1620 Left lateral Hip Stage 3 less than 1 day         Pressure Injury 02/06/19 Left Thigh DTPI - Present on Admission less than 1 day         Pressure Injury 02/06/19 Left Thigh Unstageable - Present on Admission less than 1 day         Pressure Injury 02/06/19 Left lateral Leg Stage 2 less than 1 day         Pressure Injury 02/06/19 Left upper Arm DTPI - Present on Admission less than 1 day          Peripheral Intravenous Line                 Peripheral IV - Single Lumen 02/05/19 1150 Right Hand 1 day         Peripheral IV - Single Lumen 02/05/19 1243 Right Antecubital 1 day         Midline Catheter Insertion/Assessment  - Double Lumen 02/05/19 1717 Left basilic vein (medial side of arm) 18g x 10cm less than 1 day         Peripheral IV - Double Lumen 02/05/19 1545 Left Forearm less than 1 day                Significant Labs:    CBC/Anemia Profile:  Recent Labs   Lab 02/05/19  1150 02/05/19  1221 02/06/19  0343   WBC 16.18*  --  14.24*   HGB 5.6*  --  7.1*   HCT 19.9*  --  23.5*   *  --  602*   MCV 90  --  87   RDW 20.0*  --  19.1*   OCCULTBLOOD  --  Negative  --         Chemistries:  Recent Labs   Lab 02/05/19  1150 02/06/19  0343   * 151*   K 4.9 5.3*    102   CO2 34* 33*   * 107*   CREATININE 2.4* 2.2*   CALCIUM 10.9* 10.2   ALBUMIN 1.8* 1.7*   PROT 7.8 7.3   BILITOT 0.2 0.3   ALKPHOS 139* 136*   ALT 14 13   AST 23 21   MG 2.9*  --        All pertinent labs within the past 24 hours have been reviewed.    Significant Imaging:  I have reviewed all pertinent imaging results/findings within the past 24 hours.        ABG  No results for input(s): PH, PO2, PCO2, HCO3, BE in the last 168 hours.  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 dependence   Holding cellcept in setting of sepsis     Pulmonary   Pneumonia    Near total lt lung infiltrate  Continue mucomyst and aggressive pulmonary toilet  Consider  therapeutic bronch if unable to clear  Sputum culture pending; history of MRSA and high suspicion for pseudomonas in immunosuppressed NH vent resident continue Broad spectrum abx      Acute on chronic respiratory failure with hypoxia and hypercapnia, Vent Dependent w/ Trach    Full vent support  Settings reviewed and appropriate for optimal gas exchange  VAP prophylaxis  bronchodilator     Cardiac/Vascular   Hypotension    Small IVF bolus given this am  Continue midodrine  ICU hemodynamic monitoring     Renal/   Acute on chronic renal failure    Strict I/O  Monitor creatinine trend     UTI (urinary tract infection)    Culture pending  munir Staples per cultures when available       ID   * Severe sepsis with acute organ dysfunction    Source likely multifactorial with wound, pulmonary, urinary source  Broad spectrum abx; monitor culture growth  ICU hemodynamic monitoring     Oncology   Anemia    Appropriate response to transfusion  BUN down 107 and no overt bleeding  Continue therapeutic PPI  Conservative transfusion plan  Monitor h/h for response     Endocrine   Type 2 diabetes mellitus with kidney complication, without long-term current use of insulin    Controlled, monitor     Other   Pressure injury of coccygeal region, stage 4    + multiple small stage 2 wounds  To OR today, sacral wound debrided   Continue wound care per WOC recs        Critical Care Daily Checklist:    A: Awake: RASS Goal/Actual Goal:    Actual:     B: Spontaneous Breathing Trial Performed?     C: SAT & SBT Coordinated?  n/a                      D: Delirium: CAM-ICU     E: Early Mobility Performed? ROM   F: Feeding Goal: Goals: Patient to receive nutrition within 48-72hours    Status: Nutrition Goal Status: new   Current Diet Order   No orders of the defined types were placed in this encounter.      AS: Analgesia/Sedation prn   T: Thromboembolic Prophylaxis SCDs   H: HOB > 300 Yes   U: Stress Ulcer Prophylaxis (if needed) PPI    G: Glucose Control monitor   B: Bowel Function     I: Indwelling Catheter (Lines & Flores) Necessity reviewed   D: De-escalation of Antimicrobials/Pharmacotherapies reviwed    Plan for the day/ETD As above    Code Status:  Family/Goals of Care: Full Code  Return to NH on discharge   I have discussed case and plan of care in detail with Dr Amaral and Dr Stout; Status and plan of care were discussed with team on multidisciplinary rounds.    Critical Care Time: 45 minutes  Critical secondary to sepsis, pneumonia, stage 4 decub in vent dependent immunosuppressed host; Critical care was time spent personally by me on the following activities: development of treatment plan with patient or surrogate and bedside caregivers, discussions with consultants, evaluation of patient's response to treatment, examination of patient, ordering and performing treatments and interventions, ordering and review of laboratory studies, ordering and review of radiographic studies, pulse oximetry, re-evaluation of patient's condition. This critical care time did not overlap with that of any other provider or involve time for any procedures.     Vanesa Daniels NP  Critical Care Medicine  [facility]

## 2019-02-06 NOTE — PROGRESS NOTES
[facility]  Critical Care Medicine  Progress Note    Patient Name: Carlie Valdez  MRN: [id number]  Admission Date: 2019  Hospital Length of Stay: 0 days  Code Status: Full Code  Attending Provider: Jon Stout MD  Primary Care Provider: Johanna Guzman MD   Principal Problem: <principal problem not specified>    Subjective:     HPI:  73 year old female well known to our service s/t prior admissions; complicated PMH includes quadraplegia and chronic vent dependent respiratory failure with trach s/t NMO; DM; CHF; HTN; GERD; atrial fib; MRSA sputum    Presented to ED from NH for eval of abnl labs; EMS also reported gm neg bacteria growth from sacral wound    ED evaluation revealed hypothermia 94F; normotension; leukocytosis 16k; anemia 5.6/19.9; sodium 152; creatinine 2.4 with ; lactic acid 4.2; procalcitonin 1.2    Hospital/ICU Course:  Admitted to ICU on mechanical ventilation, awake, non verbal and paraplegic at baseline; hypothermic with external warming in progress    Past Medical History:   Diagnosis Date    Anticoagulant long-term use     Arthritis     Asthma     Atrial fibrillation     Blood clot of vein in shoulder area     Cardiac defibrillator in place     CHF (congestive heart failure)     Chronic knee pain     Diabetes mellitus type 2 without retinopathy 2016    Diaphragmatic hernia without obstruction and without gangrene 2015    GERD (gastroesophageal reflux disease)     Hypertension     Immune deficiency disorder     Primary open angle glaucoma (POAG) of both eyes, severe stage 2018       Past Surgical History:   Procedure Laterality Date    CARDIAC DEFIBRILLATOR PLACEMENT      , .    CATARACT EXTRACTION       SECTION      COLONOSCOPY      EGD, WITH PEG TUBE INSERTION N/A 2018    Performed by Louis O. [facility]ansonne IV, [facility] at [facility] OR    ashley Macdonald    ESOPHAGOGASTRODUODENOSCOPY (EGD) N/A  9/14/2015    Performed by Hieu Colbert MD at [facility] ENDO    KNEE ARTHROSCOPY      TRACHEOSTOMY N/A 7/18/2018    Performed by Louis O. Jeansonne IV, MD at [facility] OR    UPPER GASTROINTESTINAL ENDOSCOPY         Review of patient's allergies indicates:   Allergen Reactions    Morphine Hives    Atorvastatin      Other reaction(s): Muscle pain    Clonidine     Codeine      Other reaction(s): Unknown    Digoxin      Other reaction(s): Unknown    Diovan [valsartan] Other (See Comments)     Upset stomach, weakness    Eggs [egg derived]     Metoprolol Diarrhea    Naproxen      Other reaction(s): Nausea    Peanut     Propofol      Other reaction(s): delirious    Sulfa (sulfonamide antibiotics)        Family History     Problem Relation (Age of Onset)    Hypertension Mother, Father        Tobacco Use    Smoking status: Never Smoker    Smokeless tobacco: Never Used   Substance and Sexual Activity    Alcohol use: No     Alcohol/week: 0.0 oz    Drug use: No    Sexual activity: No         Review of Systems   Unable to perform ROS: Patient nonverbal     Objective:     Vital Signs (Most Recent):  Temp: (!) 94.6 °F (34.8 °C) (02/05/19 1630)  Pulse: 70 (02/05/19 1804)  Resp: (!) 21 (02/05/19 1804)  BP: (!) 159/79 (02/05/19 1804)  SpO2: 97 % (02/05/19 1804) Vital Signs (24h Range):  Temp:  [94 °F (34.4 °C)-94.6 °F (34.8 °C)] 94.6 °F (34.8 °C)  Pulse:  [67-97] 70  Resp:  [16-24] 21  SpO2:  [96 %-100 %] 97 %  BP: ()/(47-81) 159/79     Weight: 69.8 kg (153 lb 14.1 oz)  Body mass index is 22.03 kg/m².      Intake/Output Summary (Last 24 hours) at 2/5/2019 1832  Last data filed at 2/5/2019 1615  Gross per 24 hour   Intake --   Output 500 ml   Net -500 ml       Physical Exam   Constitutional: She has a sickly appearance. She appears ill.   HENT:   Head: Atraumatic.   Eyes: Conjunctivae are normal. Pupils are equal, round, and reactive to light.   Neck: No JVD present. No tracheal deviation present.   Cardiovascular:  Normal rate and regular rhythm.   Pulses:       Radial pulses are 2+ on the right side, and 2+ on the left side.        Dorsalis pedis pulses are 1+ on the right side, and 1+ on the left side.   Pulmonary/Chest: She has decreased breath sounds in the right upper field, the right middle field and the right lower field. She has no wheezes. She has rhonchi in the left upper field.   Abdominal: Soft. Bowel sounds are normal. She exhibits no distension.       Musculoskeletal: She exhibits no edema.   Neurological: She is alert.   Skin: Skin is warm and dry. Capillary refill takes 2 to 3 seconds. She is not diaphoretic. No cyanosis.                            Vents:  Vent Mode: A/C (02/05/19 1804)  Ventilator Initiated: Yes (02/05/19 1127)  Set Rate: 18 bmp (02/05/19 1804)  Vt Set: 400 mL (02/05/19 1804)  Pressure Support: 0 cmH20 (02/05/19 1804)  PEEP/CPAP: 8 cmH20 (02/05/19 1804)  Oxygen Concentration (%): 40 (02/05/19 1804)  Peak Airway Pressure: 36 cmH2O (02/05/19 1804)  Plateau Pressure: 0 cmH20 (02/05/19 1804)  Total Ve: 8.4 mL (02/05/19 1804)  F/VT Ratio<105 (RSBI): (!) 50.48 (02/05/19 1804)    Lines/Drains/Airways     Drain                 Urethral Catheter -- days         Gastrostomy/Enterostomy 10/19/18 Percutaneous endoscopic gastrostomy (PEG)  days          Airway                 Surgical Airway Shiley -- days         Surgical Airway 07/18/18 1620 Shiley 202 days          Pressure Ulcer                 Pressure Injury 10/24/18 1100 Right medial Malleolus Stage 3 104 days         Pressure Injury 10/24/18 1105 Left Ischial tuberosity Stage 3 104 days         Pressure Injury 10/24/18 Coccyx Unstageable 104 days         Pressure Injury 02/05/19 1620 Left lateral Hip Stage 2 less than 1 day          Peripheral Intravenous Line                 Midline Catheter Insertion/Assessment  - Double Lumen 10/19/18 0130 Left brachial vein 109 days         Midline Catheter Insertion/Assessment  - Double Lumen 02/05/19  1717 Left basilic vein (medial side of arm) 18g x 10cm less than 1 day         Peripheral IV - Single Lumen 02/05/19 1150 Right Hand less than 1 day         Peripheral IV - Single Lumen 02/05/19 1243 Right Antecubital less than 1 day                Significant Labs:    CBC/Anemia Profile:  Recent Labs   Lab 02/05/19  1150 02/05/19  1221   WBC 16.18*  --    HGB 5.6*  --    HCT 19.9*  --    *  --    MCV 90  --    RDW 20.0*  --    OCCULTBLOOD  --  Negative        Chemistries:  Recent Labs   Lab 02/05/19  1150   *   K 4.9      CO2 34*   *   CREATININE 2.4*   CALCIUM 10.9*   ALBUMIN 1.8*   PROT 7.8   BILITOT 0.2   ALKPHOS 139*   ALT 14   AST 23   MG 2.9*       All pertinent labs within the past 24 hours have been reviewed.    Significant Imaging:   I have reviewed all pertinent imaging results/findings within the past 24 hours.      ABG  No results for input(s): PH, PO2, PCO2, HCO3, BE in the last 168 hours.  Assessment/Plan:     Pulmonary   Pneumonia    Near total lt lung infiltrate  Add mucomyst  Aggressive pulmonary toilet  May need therapeutic bronch if unable to clear  Sputum sent for culture  Broad spectrum abx for HCAP in vent dependent NH resident     Acute on chronic respiratory failure with hypoxia and hypercapnia, Vent Dependent w/ Trach    Full vent support  Settings reviewed and appropriate for optimal gas exchange  VAP prophylaxis  bronchodilator     Cardiac/Vascular   Hypotension    Continue midodrine  ICU hemodynamic monitoring     Renal/   Acute on chronic renal failure    IVF resuscitation in ED  Strict I/O  Monitor creatinine trend     UTI (urinary tract infection)    Culture pending  munir Staples per cultures when available       ID   Severe sepsis with acute organ dysfunction    Source likely multifactorial with wound, pulmonary, urinary source  Broad spectrum abx; monitor culture growth  ICU hemodynamic monitoring  Maintain MAP > 65     Oncology   Anemia     No reported hematochezia or melena but with , suspicious  Add therapeutic PPI  Transfuse   Monitor h/h for response     Other   Pressure injury of coccygeal region, unstageable    With multiple small stage 2 wounds  Consult general surgery in am, may need debridement  Meticulous pressure reduction        Critical Care Daily Checklist:    A: Awake: RASS Goal/Actual Goal:    Actual:     B: Spontaneous Breathing Trial Performed?     C: SAT & SBT Coordinated?  n/a                      D: Delirium: CAM-ICU     E: Early Mobility Performed? ROM   F: Feeding Goal:    Status:     Current Diet Order   NPO      AS: Analgesia/Sedation prn   T: Thromboembolic Prophylaxis SCD   H: HOB > 300 Yes   U: Stress Ulcer Prophylaxis (if needed) protonix   G: Glucose Control monitor   B: Bowel Function     I: Indwelling Catheter (Lines & Flores) Necessity reviewed   D: De-escalation of Antimicrobials/Pharmacotherapies reviewed    Plan for the day/ETD As above    Code Status:  Family/Goals of Care: Full Code  Return to NH on discharge   I have discussed case and plan of care in detail with Dr Amaral and Dr Stout; Status and plan of care were discussed with team on multidisciplinary rounds.    Critical Care Time: 45 minutes  Critical secondary to severe sepsis; Critical care was time spent personally by me on the following activities: development of treatment plan with patient or surrogate and bedside caregivers, discussions with consultants, evaluation of patient's response to treatment, examination of patient, ordering and performing treatments and interventions, ordering and review of laboratory studies, ordering and review of radiographic studies, pulse oximetry, re-evaluation of patient's condition. This critical care time did not overlap with that of any other provider or involve time for any procedures.     Vanesa Daniels NP  Critical Care Medicine  [facility]

## 2019-02-06 NOTE — PROGRESS NOTES
Trach care done. Site cleaned. Inner cannula changed. Drain sponge place. Trach ties replaced. Patient has a cuffed #8 Shiley in place. Ambubag & mask at the bedside. Also, extra trach at the bedside for safety precautions.

## 2019-02-06 NOTE — ASSESSMENT & PLAN NOTE
+ multiple small stage 2 wounds  To OR today, sacral wound debrided   Continue wound care per [facility]OC recs

## 2019-02-06 NOTE — SUBJECTIVE & OBJECTIVE
Interval History:  Hemodynamically stable overnight without acute events.    Review of Systems   Unable to perform ROS: Acuity of condition     Objective:     Vital Signs (Most Recent):  Temp: 99.1 °F (37.3 °C) (02/06/19 0708)  Pulse: 72 (02/06/19 1124)  Resp: 19 (02/06/19 1124)  BP: (!) 96/40 (02/06/19 1124)  SpO2: 100 % (02/06/19 1124) Vital Signs (24h Range):  Temp:  [94 °F (34.4 °C)-99.6 °F (37.6 °C)] 99.1 °F (37.3 °C)  Pulse:  [67-75] 72  Resp:  [3-29] 19  SpO2:  [91 %-100 %] 100 %  BP: ()/(32-88) 96/40     Weight: 69.8 kg (153 lb 14.1 oz)  Body mass index is 22.08 kg/m².    Intake/Output Summary (Last 24 hours) at 2/6/2019 1216  Last data filed at 2/6/2019 1100  Gross per 24 hour   Intake 695.83 ml   Output 1915 ml   Net -1219.17 ml      Physical Exam   Constitutional: She appears well-developed and well-nourished. No distress.   Lethargic   HENT:   Head: Normocephalic and atraumatic.   Mouth/Throat: Oropharynx is clear and moist.   Eyes: Conjunctivae and EOM are normal. Pupils are equal, round, and reactive to light.   Neck: Neck supple. No JVD present. No thyromegaly present.   Cardiovascular: Normal rate and regular rhythm. Exam reveals no gallop and no friction rub.   No murmur heard.  Defibrillator present.   Pulmonary/Chest: Effort normal and breath sounds normal. She has no wheezes. She has no rales.   #8 Shiley tracheostomy present.  RLL decreased breath sounds.  Left side decreased throughout with fine crackles.   Abdominal: Soft. Bowel sounds are normal. She exhibits no distension. There is no tenderness. There is no rebound and no guarding.   Feeding tube in place.   Genitourinary:   Genitourinary Comments: Flores catheter in place.   Musculoskeletal: Normal range of motion. She exhibits no edema or deformity.   Lymphadenopathy:     She has no cervical adenopathy.   Neurological: She is alert. She has normal reflexes.   Partially opens eyes to command.  Generalized weakness and not following  other commands.  Upper extremities flexor contracted.  Upper and lower extremity muscle wasting.   Skin: Skin is warm and dry. No rash noted.   Unstagable foul smelling sacral wound.   Psychiatric: She has a normal mood and affect. Her behavior is normal. Judgment and thought content normal.   Nursing note and vitals reviewed.      Significant Labs: All pertinent labs within the past 24 hours have been reviewed.    Significant Imaging: I have reviewed all pertinent imaging results/findings within the past 24 hours.

## 2019-02-06 NOTE — PROCEDURES
"Carlie Valdez is a 73 y.o. female patient.    Temp: (!) 94.6 °F (34.8 °C) (02/05/19 1630)  Pulse: 70 (02/05/19 1712)  Resp: 20 (02/05/19 1712)  BP: (!) 100/47 (02/05/19 1630)  SpO2: 100 % (02/05/19 1712)  Weight: 69.8 kg (153 lb 14.1 oz) (02/05/19 1229)  Height: 5' 10.08" (178 cm) (02/05/19 1301)    PICC  Date/Time: 2/5/2019 6:01 PM  Performed by: Thor Li RN  Supervising provider: Adeline Angelo RN  Consent Done: Yes  Time out: Immediately prior to procedure a time out was called to verify the correct patient, procedure, equipment, support staff and site/side marked as required  Indications: med administration and vascular access  Local anesthetic: lidocaine 1% without epinephrine  Anesthetic Total (mL): 3  Skin prep agent dried: skin prep agent completely dried prior to procedure  Sterile barriers: all five maximum sterile barriers used - cap, mask, sterile gown, sterile gloves, and large sterile sheet  Hand hygiene: hand hygiene performed prior to central venous catheter insertion  Location details: left basilic  Catheter type: double lumen  Catheter size: 5 Fr  Catheter Length: 10cm    Ultrasound guidance: yes  Vessel Caliber: medium and patent, compressibility normal  Vascular Doppler: not done  Needle advanced into vessel with real time Ultrasound guidance.  Image recorded and saved.  Sterile sheath used.  no esophageal manometryNumber of attempts: 1  Post-procedure: blood return through all ports, chlorhexidine patch and sterile dressing applied  Estimated blood loss (mL): 0    Comments:     review made of pt history of picc line placement. Instructed on difficulties in past with advancement to SVC and low probability of picc line placement.  Order recieved to modify Picc to Midline length if unable to advance to SVC. Durring procedure unable to advance picc to SVC, Midline placed. Yellow midline label placed to each lumen. Primary nurse aware of result of procedure.           Thor " Jen  2/5/2019

## 2019-02-06 NOTE — CONSULTS
"  [facility]  Adult Nutrition  Consult Note    SUMMARY     Recommendations    Recommendation/Intervention: 1) Recommend Peptamen Intense VHP formula with final goal rate of 75 ml/hr = 1800kcals, 167gms Protein, 1512 ml Free water.  2) Water flushes per MD/NP    3) Monitor tolerance   4) RD to follow up   Goals: Patient to receive nutrition within 48-72hours    Nutrition Goal Status: new  Communication of RD Recs: reviewed with RN    Reason for Assessment    Reason For Assessment: consult  Diagnosis: (Severe Sepsis w/ Acute Organ Dysfunction )  Relevant Medical History: DM2, HTN, Immune Deficiency d/o; AFib, CHF  Interdisciplinary Rounds: did not attend  General Information Comments: Patient admitted from [facility] and is known to nutrition services with numerous admits over past year.  Currently NPO, on vent and no formula provided.  Patient with AMS and poor historian and information obtained via medical record & RN.  NFPE not performed secondary patient unable to give consent and no family member present at this time.   Nutrition Discharge Planning: Return to SNF     Nutrition Risk Screen    Nutrition Risk Screen: tube feeding or parenteral nutrition    Nutrition/Diet History    Typical Food/Fluid Intake: NPO,    Spiritual, Cultural Beliefs, Orthodox Practices, Values that Affect Care: no  Food Allergies: NKFA  Factors Affecting Nutritional Intake: NPO, on mechanical ventilation  Nutrtion Support Frequency Prior to Admit: Unknown what type of formula patient was on at [facility]     Anthropometrics    Temp: 99.1 °F (37.3 °C)  Height Method: Estimated  Height: 5' 10" (177.8 cm)  Height (inches): 70 in  Weight Method: Bed Scale  Weight: 69.8 kg (153 lb 14.1 oz)  Weight (lb): 153.88 lb  Ideal Body Weight (IBW), Female: 150 lb  % Ideal Body Weight, Female (lb): 102.59 lb  BMI (Calculated): 22.1  BMI Grade: 18.5-24.9 - normal       Lab/Procedures/Meds    Pertinent Labs Reviewed: reviewed  Pertinent Medications " Reviewed: reviewed    Physical Findings/Assessment         Estimated/Assessed Needs    Weight Used For Calorie Calculations: 69.8 kg (153 lb 14.1 oz)  Energy Calorie Requirements (kcal): 5765-8499(25-30kcals/kg/BW )  Energy Need Method: Kcal/kg  Protein Requirements: 75-90(1.1-1.3gmsProtein/kg/BW)  Weight Used For Protein Calculations: 69.8 kg (153 lb 14.1 oz)  Fluid Requirements (mL): 1ml/1kcal or MD Rx   Estimated Fluid Requirement Method: RDA Method  RDA Method (mL): 1750         Nutrition Prescription Ordered    Current Diet Order: NPO   Nutrition Order Comments: Recommend Peptamen Intense VHP formula with final goal rate of 75 ml/hr = 1800kcals, 167gms Protein, 1512 ml Free water.  Current Nutrition Support Formula Ordered: Peptamen Intense VHP    Evaluation of Received Nutrient/Fluid Intake    Energy Calories Required: not meeting needs  Protein Required: not meeting needs  Fluid Required: not meeting needs  Tolerance: other (see comments)(No feedings at this time )  % Intake of Estimated Energy Needs: Other: NPO  % Meal Intake: NPO    Nutrition Risk    Level of Risk/Frequency of Follow-up: moderate - high     Assessment and Plan    P:  Inadequate nutrient intake  E:  Related to NPO; reliance on enteral feeds as primary source of nutrition  S:  As evidenced by RD assessment     Monitor and Evaluation    Food and Nutrient Intake: energy intake  Food and Nutrient Adminstration: enteral and parenteral nutrition administration  Physical Activity and Function: nutrition-related ADLs and IADLs  Anthropometric Measurements: weight, weight change  Biochemical Data, Medical Tests and Procedures: electrolyte and renal panel, gastrointestinal profile, glucose/endocrine profile, inflammatory profile  Nutrition-Focused Physical Findings: overall appearance     Malnutrition Assessment    NFPE not performed secondary to AMS and patient unable to give consent at this time    Nutrition Follow-Up    RD Follow-up?: Yes

## 2019-02-06 NOTE — ASSESSMENT & PLAN NOTE
Near total lt lung infiltrate  Add mucomyst  Aggressive pulmonary toilet  May need therapeutic bronch if unable to clear  Sputum sent for culture  Broad spectrum abx for HCAP in vent dependent NH resident

## 2019-02-06 NOTE — NURSING
Pt with low BP this AM, last BP 82/46. Per NP SLIME Daniels with lower BP for this pt. If MAP drops <50, No urine output, or change in mental status/pt not waking up, notify NP.

## 2019-02-06 NOTE — PROGRESS NOTES
[facility] Hospital Medicine  Progress Note    Patient Name: Carlie Valdez  MRN: [id number]  Patient Class: IP- Inpatient   Admission Date: 2/5/2019  Length of Stay: 1 days  Attending Physician: Jon Stout MD  Primary Care Provider: Johanna Guzman MD        Subjective:     Principal Problem:Severe sepsis with acute organ dysfunction    HPI:  Carlie Valdez is a 73 y.o. female patient  with PMHX of Atrial fibrillation, HTN, CKD, CVA (embolic), Anemia , Heart failure, Chronic respiratory failure with trach, functional quadriplegia, CAD, and Neuromyelitis Optica who presented to the ER today for abnormal lab results.  Pt is a resident at [facility] for skilled nursing care. Pt had outpatient labs done today.  [facility] states pt has a sacral wound that was cultured and showed Gram negative bacteria.  Pt did not respond when questioned however has hx of responding appropriately to simple questions. HPI limited due to patient being trached and unable to mouth words.  ER workup showed: WBC 16.18, H/H 5.6/19.9, Na 152, BUN/Creatinine 115/2.4, Lactic acid 4.2, and Procal 1.20.  Chest xray showed interval worsening of the appearance of the lungs with marked amount of alveolar consolidation scattered throughout left lung and the lower half of the right lung characteristic of pneumonia.  UA + for infectious process.  Hospital Medicine contacted for admission to ICU.        Hospital Course:  Admitted to ICU for treatment of septic shock.  Abnormal urinalysis and multiple decubitus wounds as possible sources of infection.  IV fluid resuscitation.  Empirically started Vancomycin and Zosyn.  Wound care and General Surgery to evaluate for source control.    Interval History:  Hemodynamically stable overnight without acute events.    Review of Systems   Unable to perform ROS: Acuity of condition     Objective:     Vital Signs (Most Recent):  Temp: 99.1 °F (37.3 °C) (02/06/19  0708)  Pulse: 72 (02/06/19 1124)  Resp: 19 (02/06/19 1124)  BP: (!) 96/40 (02/06/19 1124)  SpO2: 100 % (02/06/19 1124) Vital Signs (24h Range):  Temp:  [94 °F (34.4 °C)-99.6 °F (37.6 °C)] 99.1 °F (37.3 °C)  Pulse:  [67-75] 72  Resp:  [3-29] 19  SpO2:  [91 %-100 %] 100 %  BP: ()/(32-88) 96/40     Weight: 69.8 kg (153 lb 14.1 oz)  Body mass index is 22.08 kg/m².    Intake/Output Summary (Last 24 hours) at 2/6/2019 1216  Last data filed at 2/6/2019 1100  Gross per 24 hour   Intake 695.83 ml   Output 1915 ml   Net -1219.17 ml      Physical Exam   Constitutional: She appears well-developed and well-nourished. No distress.   Lethargic   HENT:   Head: Normocephalic and atraumatic.   Mouth/Throat: Oropharynx is clear and moist.   Eyes: Conjunctivae and EOM are normal. Pupils are equal, round, and reactive to light.   Neck: Neck supple. No JVD present. No thyromegaly present.   Cardiovascular: Normal rate and regular rhythm. Exam reveals no gallop and no friction rub.   No murmur heard.  Defibrillator present.   Pulmonary/Chest: Effort normal and breath sounds normal. She has no wheezes. She has no rales.   #8 Shiley tracheostomy present.  RLL decreased breath sounds.  Left side decreased throughout with fine crackles.   Abdominal: Soft. Bowel sounds are normal. She exhibits no distension. There is no tenderness. There is no rebound and no guarding.   Feeding tube in place.   Genitourinary:   Genitourinary Comments: Flores catheter in place.   Musculoskeletal: Normal range of motion. She exhibits no edema or deformity.   Lymphadenopathy:     She has no cervical adenopathy.   Neurological: She is alert. She has normal reflexes.   Partially opens eyes to command.  Generalized weakness and not following other commands.  Upper extremities flexor contracted.  Upper and lower extremity muscle wasting.   Skin: Skin is warm and dry. No rash noted.   Unstagable foul smelling sacral wound.   Psychiatric: She has a normal mood and  affect. Her behavior is normal. Judgment and thought content normal.   Nursing note and vitals reviewed.      Significant Labs: All pertinent labs within the past 24 hours have been reviewed.    Significant Imaging: I have reviewed all pertinent imaging results/findings within the past 24 hours.    Assessment/Plan:      * Severe sepsis with acute organ dysfunction    -Pt admitted to ICU  -in the setting of UTI, Pneumonia, and wound infection   -IV antibiotics   -IV hydration with bolus given in ED  -Lactic acid 4.2 initial with serial results pending   -blood, urine, and wound culture results pending  -Procalcitonin 1.2  -Chest xray showed pneumonia and pleural effusion   -UA + for infectious process     UTI (urinary tract infection)    IV antibiotics   Urine culture results pending      Pressure injury of coccygeal region, unstageable    -wound care consulted   -infection suspected   -wound culture results pending   -pt to be positioned per protocol to avoid further breakdown   -will consult General Surgery       Pneumonia    -IV antibiotics    Imaging results showed interval worsening with marked amount of alveolar consolidation scattered throughout the left lung and lower half of the right lung characteristic of pneumonia.  -vent dependent   -sputum and blood culture results pending  -Alfonzo prn   -Nebulizer treatments       Anemia    -H/H 5.6/19.9  -unknown source of bleeding   -BUN of 115 noted   -FOBT results pending   -2 units of PRBCs transfused   -will repeat H/H post transfusion        Functional quadriplegia    -hx of CVA (embolic)  -pt currently a resident at [facility]  -PT/OT       Hypertension    -medications held due to hypotension        Acute on chronic renal failure    -BUN/Creatinine 115/2.5  -in the setting of Severe Sepsis   -IV hydration   -nephrotoxic medications held- will resume medications as appropriate   -CMP in am       Acute on chronic respiratory failure with hypoxia and  hypercapnia, Vent Dependent w/ Trach    -pt admitted to ICU  -Pulmonology consulted   -#8 Nikita present   -pt stable on ventilator  -Duonebs prn   -sputum culture results pending        Leukocytosis    -in the setting of Severe Sepsis due to PNA, UTI, and wound infection    -IV antibiotics   -IV hydration   -blood, urine, sputum, and wound culture results pending        Hypernatremia    -free water flush via G tube   -repeat CMP in am        Neuromyelitis optica    -hx of  -Cellcept held in the setting of infection         Acute on chronic systolic heart failure    -BNP and troponin pending   -home medications resumed   -will hold medications for hypotension        Chronic combined systolic and diastolic congestive heart failure    -appears compensated   -BNP and Troponin pending   -will obtain Echo if decompensation noted        Chronic atrial fibrillation    -pt on amiodarone and Xarelto  -Xarelto held at this time to rule out active bleeding   -Amiodarone held due to hypotension-will monitor and resume when appropriate          VTE Risk Mitigation (From admission, onward)        Ordered     IP VTE HIGH RISK PATIENT  Once      02/05/19 1430     Place sequential compression device  Until discontinued      02/05/19 1430          Critical care time spent on the evaluation and treatment of severe organ dysfunction, review of pertinent labs and imaging studies, discussions with consulting providers and discussions with patient/family: 30 minutes.    Jon Stout MD  Department of Hospital Medicine   [facility]

## 2019-02-06 NOTE — PLAN OF CARE
Problem: Adult Inpatient Plan of Care  Goal: Plan of Care Review  Outcome: Ongoing (interventions implemented as appropriate)  Assessment and Plan     P:  Inadequate nutrient intake  E:  Related to NPO; reliance on enteral feeds as primary source of nutrition  S:  As evidenced by RD assessment

## 2019-02-07 PROBLEM — I50.23 ACUTE ON CHRONIC SYSTOLIC HEART FAILURE: Status: RESOLVED | Noted: 2018-01-01 | Resolved: 2019-01-01

## 2019-02-07 PROBLEM — I95.9 HYPOTENSION: Chronic | Status: ACTIVE | Noted: 2018-01-01

## 2019-02-07 PROBLEM — T14.8XXA DEEP TISSUE INJURY: Chronic | Status: ACTIVE | Noted: 2018-01-01

## 2019-02-07 PROBLEM — L89.154 DECUBITUS ULCER OF SACRAL REGION, STAGE 4: Status: ACTIVE | Noted: 2019-01-01

## 2019-02-07 PROBLEM — J95.851 VAP (VENTILATOR-ASSOCIATED PNEUMONIA): Status: ACTIVE | Noted: 2019-01-01

## 2019-02-07 PROBLEM — G36.0 NEUROMYELITIS OPTICA: Chronic | Status: ACTIVE | Noted: 2018-01-01

## 2019-02-07 PROBLEM — I50.42 CHRONIC COMBINED SYSTOLIC AND DIASTOLIC CONGESTIVE HEART FAILURE: Chronic | Status: ACTIVE | Noted: 2017-07-15

## 2019-02-07 PROBLEM — I50.23 ACUTE ON CHRONIC SYSTOLIC CONGESTIVE HEART FAILURE: Status: RESOLVED | Noted: 2017-09-23 | Resolved: 2019-01-01

## 2019-02-07 NOTE — SUBJECTIVE & OBJECTIVE
Review of Systems   Unable to perform ROS: Patient nonverbal   trached on vent    Objective:     Vital Signs (Most Recent):  Temp: 98.2 °F (36.8 °C) (02/07/19 0900)  Pulse: 70 (02/07/19 1015)  Resp: 18 (02/07/19 1015)  BP: 115/66 (02/07/19 1000)  SpO2: 97 % (02/07/19 1015) Vital Signs (24h Range):  Temp:  [97.4 °F (36.3 °C)-99.4 °F (37.4 °C)] 98.2 °F (36.8 °C)  Pulse:  [68-72] 70  Resp:  [9-24] 18  SpO2:  [92 %-100 %] 97 %  BP: ()/() 115/66     Weight: 69.8 kg (153 lb 14.1 oz)  Body mass index is 22.08 kg/m².      Intake/Output Summary (Last 24 hours) at 2/7/2019 1102  Last data filed at 2/7/2019 1000  Gross per 24 hour   Intake 1050 ml   Output 1295 ml   Net -245 ml       Physical Exam   Constitutional: Vital signs are normal. She appears well-developed. She has a sickly appearance. She appears ill.   HENT:   Head: Normocephalic and atraumatic.   Mouth/Throat: Oropharynx is clear and moist.   Eyes: Conjunctivae are normal. Pupils are equal, round, and reactive to light.   Neck: Neck supple. No JVD present. No tracheal deviation present.       Cardiovascular: Normal rate and regular rhythm.   Pulses:       Radial pulses are 2+ on the right side, and 2+ on the left side.        Dorsalis pedis pulses are 1+ on the right side, and 1+ on the left side.   Paced rhythm   Pulmonary/Chest: No accessory muscle usage. No tachypnea. No respiratory distress. She has decreased breath sounds in the left middle field and the left lower field. She has no wheezes. She has rhonchi in the right upper field.   Abdominal: Soft. Bowel sounds are normal. She exhibits no distension.       Genitourinary:   Genitourinary Comments: Flores in place   Musculoskeletal: She exhibits no edema.   No edema.  Diffuse atrophy   Lymphadenopathy:     She has no cervical adenopathy.   Neurological: She is alert.   Nods head appropriately to questions   Skin: Skin is warm and dry. Capillary refill takes 2 to 3 seconds. She is not diaphoretic.  No cyanosis.            Vents:  Vent Mode: A/C (02/07/19 0859)  Ventilator Initiated: Yes (02/05/19 1127)  Set Rate: 18 bmp (02/07/19 0859)  Vt Set: 400 mL (02/07/19 0859)  Pressure Support: 0 cmH20 (02/07/19 0859)  PEEP/CPAP: 8 cmH20 (02/07/19 0859)  Oxygen Concentration (%): 40 (02/07/19 1015)  Peak Airway Pressure: 31 cmH2O (02/07/19 0859)  Plateau Pressure: 0 cmH20 (02/07/19 0859)  Total Ve: 7.82 mL (02/07/19 0859)  F/VT Ratio<105 (RSBI): (!) 45.35 (02/07/19 0859)    Lines/Drains/Airways     Drain                 Urethral Catheter -- days         Gastrostomy/Enterostomy 10/19/18 Percutaneous endoscopic gastrostomy (PEG)  days          Airway                 Surgical Airway 02/06/19 1905 Nikita less than 1 day          Pressure Ulcer                 Pressure Injury 02/05/19 Left Ischial tuberosity Unstageable - Present on Admission 2 days         Pressure Injury 02/05/19 Right medial Malleolus DTPI - Present on Admission 2 days         Pressure Injury 02/05/19 Sacral spine Unstageable - Present on Admission 2 days         Pressure Injury 02/05/19 1620 Left lateral Hip Stage 3 1 day         Pressure Injury 02/06/19 Left Thigh DTPI - Present on Admission 1 day         Pressure Injury 02/06/19 Left Thigh Unstageable - Present on Admission 1 day         Pressure Injury 02/06/19 Left lateral Leg Stage 2 1 day         Pressure Injury 02/06/19 Left upper Arm DTPI - Present on Admission 1 day          Peripheral Intravenous Line                 Midline Catheter Insertion/Assessment  - Double Lumen 02/05/19 1717 Left basilic vein (medial side of arm) 18g x 10cm 1 day         Peripheral IV - Double Lumen 02/05/19 1545 Left Forearm 1 day         Peripheral IV - Single Lumen 02/05/19 1150 Right Hand 1 day         Peripheral IV - Single Lumen 02/05/19 1243 Right Antecubital 1 day                Significant Labs:    CBC/Anemia Profile:  Recent Labs   Lab 02/05/19  1150 02/05/19  1221 02/06/19  0343 02/06/19  2311  02/07/19  0357   WBC 16.18*  --  14.24*  --  21.15*   HGB 5.6*  --  7.1*  --  6.1*   HCT 19.9*  --  23.5*  --  20.5*   *  --  602*  --  507*   MCV 90  --  87  --  87   RDW 20.0*  --  19.1*  --  19.2*   OCCULTBLOOD  --  Negative  --  Negative  --         Chemistries:  Recent Labs   Lab 02/05/19  1150 02/06/19  0343 02/07/19  0357   * 151* 150*   K 4.9 5.3* 4.5    102 104   CO2 34* 33* 34*   * 107* 112*   CREATININE 2.4* 2.2* 2.5*   CALCIUM 10.9* 10.2 10.1   ALBUMIN 1.8* 1.7* 1.6*   PROT 7.8 7.3 6.8   BILITOT 0.2 0.3 0.4   ALKPHOS 139* 136* 123   ALT 14 13 15   AST 23 21 24   MG 2.9*  --   --        ABGs:   Recent Labs   Lab 02/07/19  0524   PH 7.529*   PCO2 41.7   HCO3 34.7*   POCSATURATED 100   BE 12     Respiratory Culture:   Recent Labs   Lab 02/05/19  1645   GSRESP Few WBC's  Many Gram positive cocci  Moderate Gram negative rods  Few Gram positive rods   RESPIRATORYC GRAM NEGATIVE JM  Moderate  Identification and susceptibility pending       All pertinent labs within the past 24 hours have been reviewed.    Significant Imaging:  CXR: I have reviewed all pertinent results/findings within the past 24 hours and my personal findings are:  no change with near total left opacification

## 2019-02-07 NOTE — ASSESSMENT & PLAN NOTE
Start IVFs and free water to PEG  Suspect some IVVD with sepsis  Continue midodrine  ICU hemodynamic monitoring

## 2019-02-07 NOTE — ASSESSMENT & PLAN NOTE
Source likely multifactorial with wound, pulmonary, urinary source  Broad spectrum abx; monitor culture growth  IVFs  ICU hemodynamic monitoring

## 2019-02-07 NOTE — ASSESSMENT & PLAN NOTE
-Pt admitted to ICU  -in the setting of UTI, Pneumonia, and wound infection   -IV Vancomycin and Zosyn  -IV hydration with bolus given in ED  -Lactic acid 4.2 initial with serial results pending   -blood, urine, and wound culture results pending  -Procalcitonin 1.2  -Chest xray showed pneumonia and pleural effusion   -UA + for infectious process

## 2019-02-07 NOTE — ASSESSMENT & PLAN NOTE
Full vent support - Vent dependent secondary to NMO  Settings reviewed and adjusted  VAP prophylaxis

## 2019-02-07 NOTE — PLAN OF CARE
Problem: Adult Inpatient Plan of Care  Goal: Plan of Care Review  Outcome: Ongoing (interventions implemented as appropriate)  Patient currently resting quietly in bed. VS currently stable. Patient paced rhythm on monitor at this time. Patient currently receiving oxygen from ventilator via tracheostomy. Patient tolerating ventilator well at this time. Patient turned in speciality bed every 2 hours to avoid further skin breakdown to back side and prevent new wound development. Patient turns with 2 assist. Patient positioned with wedge and has heel offloading devices in place. No sign of new wound development or further skin breakdown noted. Plan of care updated with patient and family. There are no further questions after updated on plan of care at this time. Will continue to monitor.

## 2019-02-07 NOTE — EICU
eICU Note :    Called by the [facility]:    Problem: Hgb 6/20 decreased from  7.1/23.5     Pertinent History and labs reviewed :Problem List:  2019-02: Pressure injury of sacral region, unstageable  2019-02: Pressure injury of left thigh, unstageable  2019-02: Pressure injury of trochanteric region of hip, stage 3  : Pressure injury of left ischium, unstageable  : Severe sepsis with acute organ dysfunction  : Acute on chronic renal failure  : Hypertension  : Functional quadriplegia  : Anemia  : Pneumonia        Treatment /Intervention given: Transfuse 1 unit PRBC         Melanie Christine M.D  eICU Physician

## 2019-02-07 NOTE — SUBJECTIVE & OBJECTIVE
Interval History:  Hemodynamically stable overnight without acute events.  Surgical debridement last night.    Review of Systems   Unable to perform ROS: Acuity of condition     Objective:     Vital Signs (Most Recent):  Temp: 98.1 °F (36.7 °C) (02/07/19 1504)  Pulse: 70 (02/07/19 1504)  Resp: 18 (02/07/19 1504)  BP: 121/81 (02/07/19 1500)  SpO2: 97 % (02/07/19 1504) Vital Signs (24h Range):  Temp:  [97.9 °F (36.6 °C)-98.6 °F (37 °C)] 98.1 °F (36.7 °C)  Pulse:  [68-74] 70  Resp:  [9-24] 18  SpO2:  [93 %-100 %] 97 %  BP: ()/() 121/81     Weight: 69.8 kg (153 lb 14.1 oz)  Body mass index is 22.08 kg/m².    Intake/Output Summary (Last 24 hours) at 2/7/2019 1558  Last data filed at 2/7/2019 1500  Gross per 24 hour   Intake 1448.75 ml   Output 1665 ml   Net -216.25 ml      Physical Exam   Constitutional: She appears well-developed and well-nourished. No distress.   Lethargic   HENT:   Head: Normocephalic and atraumatic.   Mouth/Throat: Oropharynx is clear and moist.   Eyes: Conjunctivae and EOM are normal. Pupils are equal, round, and reactive to light.   Neck: Neck supple. No JVD present. No thyromegaly present.   Cardiovascular: Normal rate and regular rhythm. Exam reveals no gallop and no friction rub.   No murmur heard.  Defibrillator present.   Pulmonary/Chest: Effort normal and breath sounds normal. She has no wheezes. She has no rales.   #8 Shiley tracheostomy present.  RLL decreased breath sounds.  Left side decreased throughout with fine crackles.   Abdominal: Soft. Bowel sounds are normal. She exhibits no distension. There is no tenderness. There is no rebound and no guarding.   Feeding tube in place.   Genitourinary:   Genitourinary Comments: Flores catheter in place.   Musculoskeletal: Normal range of motion. She exhibits no edema or deformity.   Lymphadenopathy:     She has no cervical adenopathy.   Neurological: She is alert. She has normal reflexes.   Partially opens eyes to command.   Generalized weakness and not following other commands.  Upper extremities flexor contracted.  Upper and lower extremity muscle wasting.   Skin: Skin is warm and dry. No rash noted.   Unstagable foul smelling sacral wound.   Psychiatric: She has a normal mood and affect. Her behavior is normal. Judgment and thought content normal.   Nursing note and vitals reviewed.      Significant Labs: All pertinent labs within the past 24 hours have been reviewed.    Significant Imaging: I have reviewed all pertinent imaging results/findings within the past 24 hours.

## 2019-02-07 NOTE — PROGRESS NOTES
[facility] Hospital Medicine  Progress Note    Patient Name: Carlie Valdez  MRN: [id number]  Patient Class: IP- Inpatient   Admission Date: 2/5/2019  Length of Stay: 2 days  Attending Physician: Jon Stout MD  Primary Care Provider: Johanna Guzman MD        Subjective:     Principal Problem:Acute on chronic respiratory failure with hypoxia and hypercapnia    HPI:  Carlie Valdez is a 73 y.o. female patient  with PMHX of Atrial fibrillation, HTN, CKD, CVA (embolic), Anemia , Heart failure, Chronic respiratory failure with trach, functional quadriplegia, CAD, and Neuromyelitis Optica who presented to the ER today for abnormal lab results.  Pt is a resident at [facility] for skilled nursing care. Pt had outpatient labs done today.  [facility] states pt has a sacral wound that was cultured and showed Gram negative bacteria.  Pt did not respond when questioned however has hx of responding appropriately to simple questions. HPI limited due to patient being trached and unable to mouth words.  ER workup showed: WBC 16.18, H/H 5.6/19.9, Na 152, BUN/Creatinine 115/2.4, Lactic acid 4.2, and Procal 1.20.  Chest xray showed interval worsening of the appearance of the lungs with marked amount of alveolar consolidation scattered throughout left lung and the lower half of the right lung characteristic of pneumonia.  UA + for infectious process.  Hospital Medicine contacted for admission to ICU.        Hospital Course:  Admitted to ICU for treatment of septic shock.  Abnormal urinalysis and multiple decubitus wounds as possible sources of infection.  IV fluid resuscitation.  Empirically started Vancomycin and Zosyn.  Wound care and General Surgery to evaluate for source control.  Surgical debridement of the wound with exposed bone at the base.    Interval History:  Hemodynamically stable overnight without acute events.  Surgical debridement last night.    Review of Systems    Unable to perform ROS: Acuity of condition     Objective:     Vital Signs (Most Recent):  Temp: 98.1 °F (36.7 °C) (02/07/19 1504)  Pulse: 70 (02/07/19 1504)  Resp: 18 (02/07/19 1504)  BP: 121/81 (02/07/19 1500)  SpO2: 97 % (02/07/19 1504) Vital Signs (24h Range):  Temp:  [97.9 °F (36.6 °C)-98.6 °F (37 °C)] 98.1 °F (36.7 °C)  Pulse:  [68-74] 70  Resp:  [9-24] 18  SpO2:  [93 %-100 %] 97 %  BP: ()/() 121/81     Weight: 69.8 kg (153 lb 14.1 oz)  Body mass index is 22.08 kg/m².    Intake/Output Summary (Last 24 hours) at 2/7/2019 1558  Last data filed at 2/7/2019 1500  Gross per 24 hour   Intake 1448.75 ml   Output 1665 ml   Net -216.25 ml      Physical Exam   Constitutional: She appears well-developed and well-nourished. No distress.   Lethargic   HENT:   Head: Normocephalic and atraumatic.   Mouth/Throat: Oropharynx is clear and moist.   Eyes: Conjunctivae and EOM are normal. Pupils are equal, round, and reactive to light.   Neck: Neck supple. No JVD present. No thyromegaly present.   Cardiovascular: Normal rate and regular rhythm. Exam reveals no gallop and no friction rub.   No murmur heard.  Defibrillator present.   Pulmonary/Chest: Effort normal and breath sounds normal. She has no wheezes. She has no rales.   #8 Shiley tracheostomy present.  RLL decreased breath sounds.  Left side decreased throughout with fine crackles.   Abdominal: Soft. Bowel sounds are normal. She exhibits no distension. There is no tenderness. There is no rebound and no guarding.   Feeding tube in place.   Genitourinary:   Genitourinary Comments: Flores catheter in place.   Musculoskeletal: Normal range of motion. She exhibits no edema or deformity.   Lymphadenopathy:     She has no cervical adenopathy.   Neurological: She is alert. She has normal reflexes.   Partially opens eyes to command.  Generalized weakness and not following other commands.  Upper extremities flexor contracted.  Upper and lower extremity muscle wasting.    Skin: Skin is warm and dry. No rash noted.   Unstagable foul smelling sacral wound.   Psychiatric: She has a normal mood and affect. Her behavior is normal. Judgment and thought content normal.   Nursing note and vitals reviewed.      Significant Labs: All pertinent labs within the past 24 hours have been reviewed.    Significant Imaging: I have reviewed all pertinent imaging results/findings within the past 24 hours.    Assessment/Plan:      * Acute on chronic respiratory failure with hypoxia and hypercapnia, Vent Dependent w/ Trach    -pt admitted to ICU  -Pulmonology consulted   -#8 Nikita present   -pt stable on ventilator  -Duonebs prn   -sputum culture results pending        Severe sepsis with acute organ dysfunction    -Pt admitted to ICU  -in the setting of UTI, Pneumonia, and wound infection   -IV Vancomycin and Zosyn  -IV hydration with bolus given in ED  -Lactic acid 4.2 initial with serial results pending   -blood, urine, and wound culture results pending  -Procalcitonin 1.2  -Chest xray showed pneumonia and pleural effusion   -UA + for infectious process     UTI (urinary tract infection)    IV antibiotics   Urine culture results pending      Pressure injury of coccygeal region, stage 4    -wound care consulted   -infection suspected   -wound culture results pending   -pt to be positioned per protocol to avoid further breakdown   -General Surgery evaluated and took to the OR for surgical debridement 06 Feb.     VAP (ventilator-associated pneumonia)    -IV antibiotics    Imaging results showed interval worsening with marked amount of alveolar consolidation scattered throughout the left lung and lower half of the right lung characteristic of pneumonia.  -vent dependent   -sputum and blood culture results pending  -Duonebs prn   -Nebulizer treatments       Anemia    -H/H 5.6/19.9  -unknown source of bleeding   -BUN of 115 noted   -FOBT results pending   -2 units of PRBCs transfused   -will repeat H/H post  transfusion        Functional quadriplegia    -hx of CVA (embolic)  -pt currently a resident at [facility]  -PT/OT       Hypertension    -medications held due to hypotension        Acute on chronic renal failure    -BUN/Creatinine 115/2.5  -in the setting of Severe Sepsis   -IV hydration   -nephrotoxic medications held- will resume medications as appropriate   -CMP in am       Leukocytosis    -in the setting of Severe Sepsis due to PNA, UTI, and wound infection    -IV antibiotics   -IV hydration   -blood, urine, sputum, and wound culture results pending        Hypernatremia    -free water flush via G tube   -repeat CMP in am        Neuromyelitis optica    -hx of  -Cellcept held in the setting of infection         Chronic combined systolic and diastolic congestive heart failure    -appears compensated   -BNP and Troponin pending   -will obtain Echo if decompensation noted        Chronic atrial fibrillation    -pt on amiodarone and Xarelto  -Xarelto held at this time to rule out active bleeding   -Amiodarone held due to hypotension-will monitor and resume when appropriate          VTE Risk Mitigation (From admission, onward)        Ordered     IP VTE HIGH RISK PATIENT  Once      02/05/19 1430     Place sequential compression device  Until discontinued      02/05/19 1430          Critical care time spent on the evaluation and treatment of severe organ dysfunction, review of pertinent labs and imaging studies, discussions with consulting providers and discussions with patient/family: 30 minutes.    Jon Stout MD  Department of Hospital Medicine   [facility]

## 2019-02-07 NOTE — ASSESSMENT & PLAN NOTE
+ multiple small stage 2 wounds  S/P I&D of sacrum 2/6  Surgery following   Continue wound care per WOC recs

## 2019-02-07 NOTE — PROGRESS NOTES
[facility]   Critical Care Medicine  Progress Note    Patient Name: Carlie Valdez  MRN: [id number]  Admission Date: 2/5/2019  Hospital Length of Stay: 2 days  Code Status: Full Code  Attending Provider: Jon Stout MD  Primary Care Provider: Johanna Guzman MD   Principal Problem: Acute on chronic respiratory failure with hypoxia and hypercapnia    Subjective:     HPI:  73 year old female well known to our service s/t prior admissions; complicated PMH includes quadraplegia and chronic vent dependent respiratory failure with trach s/t NMO; DM; CHF; HTN; GERD; atrial fib; MRSA sputum    Presented to ED from NH for eval of abnl labs; EMS also reported gm neg bacteria growth from sacral wound    ED evaluation revealed hypothermia 94F; normotension; leukocytosis 16k; anemia 5.6/19.9; sodium 152; creatinine 2.4 with ; lactic acid 4.2; procalcitonin 1.2    Hospital/ICU Course:  Admitted to ICU on mechanical ventilation, awake, non verbal and paraplegic at baseline; hypothermic with external warming in progress  2/6 - chronic vent via trach, hypoxemia reported with turning overnight, continued moderate trach secretions; marginal BP overnight and marginal urine output; to OR today for I/D of sacral wound  2/7 - supine on vent in no distress and VSS.  Awake and nods head to questions    Review of Systems   Unable to perform ROS: Patient nonverbal   trached on vent    Objective:     Vital Signs (Most Recent):  Temp: 98.2 °F (36.8 °C) (02/07/19 0900)  Pulse: 70 (02/07/19 1015)  Resp: 18 (02/07/19 1015)  BP: 115/66 (02/07/19 1000)  SpO2: 97 % (02/07/19 1015) Vital Signs (24h Range):  Temp:  [97.4 °F (36.3 °C)-99.4 °F (37.4 °C)] 98.2 °F (36.8 °C)  Pulse:  [68-72] 70  Resp:  [9-24] 18  SpO2:  [92 %-100 %] 97 %  BP: ()/() 115/66     Weight: 69.8 kg (153 lb 14.1 oz)  Body mass index is 22.08 kg/m².      Intake/Output Summary (Last 24 hours) at 2/7/2019 3666  Last data filed  at 2/7/2019 1000  Gross per 24 hour   Intake 1050 ml   Output 1295 ml   Net -245 ml       Physical Exam   Constitutional: Vital signs are normal. She appears well-developed. She has a sickly appearance. She appears ill.   HENT:   Head: Normocephalic and atraumatic.   Mouth/Throat: Oropharynx is clear and moist.   Eyes: Conjunctivae are normal. Pupils are equal, round, and reactive to light.   Neck: Neck supple. No JVD present. No tracheal deviation present.       Cardiovascular: Normal rate and regular rhythm.   Pulses:       Radial pulses are 2+ on the right side, and 2+ on the left side.        Dorsalis pedis pulses are 1+ on the right side, and 1+ on the left side.   Paced rhythm   Pulmonary/Chest: No accessory muscle usage. No tachypnea. No respiratory distress. She has decreased breath sounds in the left middle field and the left lower field. She has no wheezes. She has rhonchi in the right upper field.   Abdominal: Soft. Bowel sounds are normal. She exhibits no distension.       Genitourinary:   Genitourinary Comments: Flores in place   Musculoskeletal: She exhibits no edema.   No edema.  Diffuse atrophy   Lymphadenopathy:     She has no cervical adenopathy.   Neurological: She is alert.   Nods head appropriately to questions   Skin: Skin is warm and dry. Capillary refill takes 2 to 3 seconds. She is not diaphoretic. No cyanosis.            Vents:  Vent Mode: A/C (02/07/19 0859)  Ventilator Initiated: Yes (02/05/19 1127)  Set Rate: 18 bmp (02/07/19 0859)  Vt Set: 400 mL (02/07/19 0859)  Pressure Support: 0 cmH20 (02/07/19 0859)  PEEP/CPAP: 8 cmH20 (02/07/19 0859)  Oxygen Concentration (%): 40 (02/07/19 1015)  Peak Airway Pressure: 31 cmH2O (02/07/19 0859)  Plateau Pressure: 0 cmH20 (02/07/19 0859)  Total Ve: 7.82 mL (02/07/19 0859)  F/VT Ratio<105 (RSBI): (!) 45.35 (02/07/19 0859)    Lines/Drains/Airways     Drain                 Urethral Catheter -- days         Gastrostomy/Enterostomy 10/19/18 Percutaneous  endoscopic gastrostomy (PEG)  days          Airway                 Surgical Airway 02/06/19 1905 Shiley less than 1 day          Pressure Ulcer                 Pressure Injury 02/05/19 Left Ischial tuberosity Unstageable - Present on Admission 2 days         Pressure Injury 02/05/19 Right medial Malleolus DTPI - Present on Admission 2 days         Pressure Injury 02/05/19 Sacral spine Unstageable - Present on Admission 2 days         Pressure Injury 02/05/19 1620 Left lateral Hip Stage 3 1 day         Pressure Injury 02/06/19 Left Thigh DTPI - Present on Admission 1 day         Pressure Injury 02/06/19 Left Thigh Unstageable - Present on Admission 1 day         Pressure Injury 02/06/19 Left lateral Leg Stage 2 1 day         Pressure Injury 02/06/19 Left upper Arm DTPI - Present on Admission 1 day          Peripheral Intravenous Line                 Midline Catheter Insertion/Assessment  - Double Lumen 02/05/19 1717 Left basilic vein (medial side of arm) 18g x 10cm 1 day         Peripheral IV - Double Lumen 02/05/19 1545 Left Forearm 1 day         Peripheral IV - Single Lumen 02/05/19 1150 Right Hand 1 day         Peripheral IV - Single Lumen 02/05/19 1243 Right Antecubital 1 day                Significant Labs:    CBC/Anemia Profile:  Recent Labs   Lab 02/05/19  1150 02/05/19  1221 02/06/19  0343 02/06/19  2311 02/07/19  0357   WBC 16.18*  --  14.24*  --  21.15*   HGB 5.6*  --  7.1*  --  6.1*   HCT 19.9*  --  23.5*  --  20.5*   *  --  602*  --  507*   MCV 90  --  87  --  87   RDW 20.0*  --  19.1*  --  19.2*   OCCULTBLOOD  --  Negative  --  Negative  --         Chemistries:  Recent Labs   Lab 02/05/19  1150 02/06/19  0343 02/07/19  0357   * 151* 150*   K 4.9 5.3* 4.5    102 104   CO2 34* 33* 34*   * 107* 112*   CREATININE 2.4* 2.2* 2.5*   CALCIUM 10.9* 10.2 10.1   ALBUMIN 1.8* 1.7* 1.6*   PROT 7.8 7.3 6.8   BILITOT 0.2 0.3 0.4   ALKPHOS 139* 136* 123   ALT 14 13 15   AST 23 21 24   MG  2.9*  --   --        ABGs:   Recent Labs   Lab 02/07/19  0524   PH 7.529*   PCO2 41.7   HCO3 34.7*   POCSATURATED 100   BE 12     Respiratory Culture:   Recent Labs   Lab 02/05/19  1645   GSRESP Few WBC's  Many Gram positive cocci  Moderate Gram negative rods  Few Gram positive rods   RESPIRATORYC GRAM NEGATIVE JM  Moderate  Identification and susceptibility pending       All pertinent labs within the past 24 hours have been reviewed.    Significant Imaging:  CXR: I have reviewed all pertinent results/findings within the past 24 hours and my personal findings are:  no change with near total left opacification        ABG  Recent Labs   Lab 02/07/19  0524   PH 7.529*   PO2 171*   PCO2 41.7   HCO3 34.7*   BE 12     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 dependence   Holding cellcept in setting of sepsis     Pulmonary   * Acute on chronic respiratory failure with hypoxia and hypercapnia, Vent Dependent w/ Trach    Full vent support - Vent dependent secondary to NMO  Settings reviewed and adjusted  VAP prophylaxis     VAP (ventilator-associated pneumonia)    Near total lt lung infiltrate  Continue mucomyst, Duonebs, IVAB and aggressive pulmonary toilet  Consider therapeutic bronch if unable to clear  Sputum w/ GPC and GNRs with final ID&S pending  history of MRSA and high suspicion for pseudomonas in immunosuppressed NH vent resident continue Broad spectrum abx w/ Zosyn and Vanc  Afeb with WBC 21     Cardiac/Vascular   Chronic Hypotension    Start IVFs and free water to PEG  Suspect some IVVD with sepsis  Continue midodrine  ICU hemodynamic monitoring     Chronic combined systolic and diastolic congestive heart failure    Accurate I/Os and daily weights  Na and creatine up - suspect some IVVD with sepsis     Chronic atrial fibrillation    Currently in paced regular rhythm     Renal/   Acute on chronic renal failure    Strict I/O  Monitor creatinine  trend     Hypernatremia    Hypotonic IVFs and free water to PEG  Daily BMP     UTI (urinary tract infection)    Culture pending  Vanco, zosyn - deescalate per cultures when available       ID   Severe sepsis with acute organ dysfunction    Source likely multifactorial with wound, pulmonary, urinary source  Broad spectrum abx; monitor culture growth  IVFs  ICU hemodynamic monitoring     Oncology   Anemia    Baseline creatine 27  Transfusing 1 unit PRBC today  Daily CBC  No active bleeding noted     Endocrine   Type 2 diabetes mellitus with kidney complication, without long-term current use of insulin    Controlled, monitor     Hypothyroidism    Cont Levothyroxine     Orthopedic   Deep tissue injury multiple unstageable    Cont wound care  Surgery following  Cont IVAB  Low pressure bed and turn Q 2 hours     Other   Pressure injury of coccygeal region, stage 4    + multiple small stage 2 wounds  S/P I&D of sacrum 2/6  Surgery following   Continue wound care per WOC recs       Preventive Measures and Monitoring:   Stress Ulcer: Pepcid  Nutrition: start TF today  Glucose control: stable  Bowel prophylaxis: having regular BMs  DVT prophylaxis: LMWH/SCDs  Hx CAD on B-Blocker: no hx CAD  Head of Bed/Reposition: Elevate HOB and turn Q1-2 hours   Early Mobility: bed bound  Vent Day: chronic  PEG Day: chronic  LUE PICC Line Day: #3  Flores Day: #4  IVAB Day: #3  Code Status: Full  Pneumonia Vaccine: assume UTD at NH  Flu Vaccine: assume UTD at NH    Counseling/Consultation:I have discussed the care of this patient in detail with the bedside nursing staff and Dr. Amaral and Dr. Stout    Critical Care Time: 58 minutes  Critical secondary to Patient has a condition that poses threat to life and bodily function: Acute Renal Failure and Acute on Chronic Hypoxic Resp Failure and Severe Sepsis      Critical care was time spent personally by me on the following activities: development of treatment plan with patient or surrogate and  bedside caregivers, discussions with consultants, evaluation of patient's response to treatment, examination of patient, ordering and performing treatments and interventions, ordering and review of laboratory studies, ordering and review of radiographic studies, pulse oximetry, re-evaluation of patient's condition. This critical care time did not overlap with that of any other provider or involve time for any procedures.     Philip Myers, NP  Critical Care Medicine  [facility]

## 2019-02-07 NOTE — PROGRESS NOTES
Pharmacy Vancomycin Consult Note    WBC=21.15 (up) Tmax-99.4  CrCl=21.7ml/min Scr=2.5 (increasing)  Patient is currently being pulse dosed.    Dx. Sepsis, skin/soft tissue   Cultures: decubitus-gram (-) reuben, respiratory-gram (-) reuben, gram (+) cocci    Vancomycin random level=22.9mcg/ml  Patient does not need a vancomycin dose today. Vancomycin can be re-dosed once vancomycin random level <18mcg/ml  Vancomycin 1 gram Q72 is a placeholder dose only and should not be given.  Next vancomycin random level due 2/8 with AM labs    Thank you for allowing us to participate in this patient's care.   Brianda Live, Pharm D 2/7/2019 10:40 AM

## 2019-02-07 NOTE — ASSESSMENT & PLAN NOTE
Continue supportive care per primary team  Wound care as directed in wound care consult  Please call with questions

## 2019-02-07 NOTE — SUBJECTIVE & OBJECTIVE
Interval History: Doing well postop. HD stable. No dressing saturation.     Medications:  Continuous Infusions:  Scheduled Meds:   acetylcysteine 200 mg/ml (20%)  4 mL Nebulization TID    budesonide  0.5 mg Nebulization Q12H    levothyroxine  100 mcg Per G Tube Before breakfast    midodrine  5 mg Per G Tube TID    pantoprazole  40 mg Intravenous BID    piperacillin-tazobactam (ZOSYN) IVPB  4.5 g Intravenous Q8H    [START ON 2/11/2019] vancomycin (VANCOCIN) IVPB  1,000 mg Intravenous Q72H     PRN Meds:sodium chloride, sodium chloride, albuterol-ipratropium, dextrose 50%, dextrose 50%, glucagon (human recombinant), glucose, glucose, insulin aspart U-100, sodium chloride 0.9%     Review of patient's allergies indicates:   Allergen Reactions    Morphine Hives    Atorvastatin      Other reaction(s): Muscle pain    Clonidine     Codeine      Other reaction(s): Unknown    Digoxin      Other reaction(s): Unknown    Diovan [valsartan] Other (See Comments)     Upset stomach, weakness    Eggs [egg derived]     Metoprolol Diarrhea    Naproxen      Other reaction(s): Nausea    Peanut     Propofol      Other reaction(s): delirious    Sulfa (sulfonamide antibiotics)      Objective:     Vital Signs (Most Recent):  Temp: 97.9 °F (36.6 °C) (02/07/19 0305)  Pulse: 70 (02/07/19 0723)  Resp: 18 (02/07/19 0723)  BP: (!) 103/57 (02/07/19 0600)  SpO2: 100 % (02/07/19 0723) Vital Signs (24h Range):  Temp:  [97.4 °F (36.3 °C)-99.4 °F (37.4 °C)] 97.9 °F (36.6 °C)  Pulse:  [70-75] 70  Resp:  [11-24] 18  SpO2:  [91 %-100 %] 100 %  BP: ()/(35-69) 103/57     Weight: 69.8 kg (153 lb 14.1 oz)  Body mass index is 22.08 kg/m².    Intake/Output - Last 3 Shifts       02/05 0700 - 02/06 0659 02/06 0700 - 02/07 0659 02/07 0700 - 02/08 0659    I.V. (mL/kg)  500 (7.2)     Blood 345.8      NG/GT 50 50     IV Piggyback 300 550     Total Intake(mL/kg) 695.8 (10) 1100 (15.8)     Urine (mL/kg/hr) 1735 1125 (0.7)     Stool  0     Total  Output 1735 1125     Net -1039.2 -25            Stool Occurrence  1 x           Physical Exam   Constitutional: She is oriented to person, place, and time. No distress.   Vent dependent   HENT:   Head: Normocephalic and atraumatic.   Eyes: EOM are normal.   Neck: Neck supple.   Cardiovascular: Normal rate and regular rhythm.   Pulmonary/Chest: Effort normal.   Abdominal: Soft. She exhibits no distension. There is no tenderness.   Musculoskeletal: Normal range of motion.   Neurological: She is alert and oriented to person, place, and time.   Skin: Skin is warm.   Sacral wound packed, kerlix with scan serosanguinous drainage, surrounding tissues soft  Ischial wounds dressed, dry   Vitals reviewed.      Significant Labs:  CBC:   Recent Labs   Lab 02/07/19  0357   WBC 21.15*   RBC 2.36*   HGB 6.1*   HCT 20.5*   *   MCV 87   MCH 25.8*   MCHC 29.8*     BMP:   Recent Labs   Lab 02/05/19  1150  02/07/19  0357   *   < > 79   *   < > 150*   K 4.9   < > 4.5      < > 104   CO2 34*   < > 34*   *   < > 112*   CREATININE 2.4*   < > 2.5*   CALCIUM 10.9*   < > 10.1   MG 2.9*  --   --     < > = values in this interval not displayed.       Significant Diagnostics:  I have reviewed all pertinent imaging results/findings within the past 24 hours.

## 2019-02-07 NOTE — ASSESSMENT & PLAN NOTE
Near total lt lung infiltrate  Continue mucomyst, Duonebs, IVAB and aggressive pulmonary toilet  Consider therapeutic bronch if unable to clear  Sputum w/ GPC and GNRs with final ID&S pending  history of MRSA and high suspicion for pseudomonas in immunosuppressed NH vent resident continue Broad spectrum abx w/ Zosyn and Vanc  Afeb with WBC 21

## 2019-02-07 NOTE — PROGRESS NOTES
[facility]  General Surgery  Progress Note    Subjective:     History of Present Illness:  No notes on file    Post-Op Info:  Procedure(s) (LRB):  DEBRIDEMENT, WOUND, SACRUM (N/A)   1 Day Post-Op     Interval History: Doing well postop. HD stable. No dressing saturation.     Medications:  Continuous Infusions:  Scheduled Meds:   acetylcysteine 200 mg/ml (20%)  4 mL Nebulization TID    budesonide  0.5 mg Nebulization Q12H    levothyroxine  100 mcg Per G Tube Before breakfast    midodrine  5 mg Per G Tube TID    pantoprazole  40 mg Intravenous BID    piperacillin-tazobactam (ZOSYN) IVPB  4.5 g Intravenous Q8H    [START ON 2/11/2019] vancomycin (VANCOCIN) IVPB  1,000 mg Intravenous Q72H     PRN Meds:sodium chloride, sodium chloride, albuterol-ipratropium, dextrose 50%, dextrose 50%, glucagon (human recombinant), glucose, glucose, insulin aspart U-100, sodium chloride 0.9%     Review of patient's allergies indicates:   Allergen Reactions    Morphine Hives    Atorvastatin      Other reaction(s): Muscle pain    Clonidine     Codeine      Other reaction(s): Unknown    Digoxin      Other reaction(s): Unknown    Diovan [valsartan] Other (See Comments)     Upset stomach, weakness    Eggs [egg derived]     Metoprolol Diarrhea    Naproxen      Other reaction(s): Nausea    Peanut     Propofol      Other reaction(s): delirious    Sulfa (sulfonamide antibiotics)      Objective:     Vital Signs (Most Recent):  Temp: 97.9 °F (36.6 °C) (02/07/19 0305)  Pulse: 70 (02/07/19 0723)  Resp: 18 (02/07/19 0723)  BP: (!) 103/57 (02/07/19 0600)  SpO2: 100 % (02/07/19 0723) Vital Signs (24h Range):  Temp:  [97.4 °F (36.3 °C)-99.4 °F (37.4 °C)] 97.9 °F (36.6 °C)  Pulse:  [70-75] 70  Resp:  [11-24] 18  SpO2:  [91 %-100 %] 100 %  BP: ()/(35-69) 103/57     Weight: 69.8 kg (153 lb 14.1 oz)  Body mass index is 22.08 kg/m².    Intake/Output - Last 3 Shifts       02/05 0700 - 02/06 0659 02/06 0700 - 02/07 0659  02/07 0700 - 02/08 0659    I.V. (mL/kg)  500 (7.2)     Blood 345.8      NG/GT 50 50     IV Piggyback 300 550     Total Intake(mL/kg) 695.8 (10) 1100 (15.8)     Urine (mL/kg/hr) 1735 1125 (0.7)     Stool  0     Total Output 1735 1125     Net -1039.2 -25            Stool Occurrence  1 x           Physical Exam   Constitutional: She is oriented to person, place, and time. No distress.   Vent dependent   HENT:   Head: Normocephalic and atraumatic.   Eyes: EOM are normal.   Neck: Neck supple.   Cardiovascular: Normal rate and regular rhythm.   Pulmonary/Chest: Effort normal.   Abdominal: Soft. She exhibits no distension. There is no tenderness.   Musculoskeletal: Normal range of motion.   Neurological: She is alert and oriented to person, place, and time.   Skin: Skin is warm.   Sacral wound packed, kerlix with scan serosanguinous drainage, surrounding tissues soft  Ischial wounds dressed, dry   Vitals reviewed.      Significant Labs:  CBC:   Recent Labs   Lab 02/07/19  0357   WBC 21.15*   RBC 2.36*   HGB 6.1*   HCT 20.5*   *   MCV 87   MCH 25.8*   MCHC 29.8*     BMP:   Recent Labs   Lab 02/05/19  1150  02/07/19  0357   *   < > 79   *   < > 150*   K 4.9   < > 4.5      < > 104   CO2 34*   < > 34*   *   < > 112*   CREATININE 2.4*   < > 2.5*   CALCIUM 10.9*   < > 10.1   MG 2.9*  --   --     < > = values in this interval not displayed.       Significant Diagnostics:  I have reviewed all pertinent imaging results/findings within the past 24 hours.    Assessment/Plan:     Pressure injury of coccygeal region, stage 4    Continue supportive care per primary team  Transfuse as necessary, no active bleeding in surgical bed  Wound care as directed in wound care consult  Please call with questions         Akila Laguna MD  General Surgery  [facility]

## 2019-02-08 PROBLEM — E44.0 MODERATE MALNUTRITION: Status: ACTIVE | Noted: 2019-01-01

## 2019-02-08 PROBLEM — L89.154 PRESSURE INJURY OF SACRAL REGION, STAGE 4: Status: ACTIVE | Noted: 2019-01-01

## 2019-02-08 NOTE — PROGRESS NOTES
"  [facility]   Adult Nutrition  Progress Note    SUMMARY     Recommendations    Recommendation: 1.Consider decreasing TF rate to 60 ml/hr. 2. Check residuals Q4 hours. Hold if > 300 cc. 3. Will continue to monitor.   Intervention: Coordination of care   Goals: Meet > 85 % EEN/EPN while admitted  Nutrition Goal Status: goal met   Communication of RD Recs: (Reviewed w/ NP)    Reason for Assessment    Reason For Assessment: RD follow-up  Dx: septic shock, ventilator dependent, Decubitus ulcer of sacral region stage 4, PHYLLIS, CHF    Past Medical History:   Diagnosis Date    Anticoagulant long-term use     Arthritis     Asthma     Atrial fibrillation     Blood clot of vein in shoulder area     Cardiac defibrillator in place     CHF (congestive heart failure)     Chronic knee pain     Diabetes mellitus type 2 without retinopathy 12/19/2016    Diaphragmatic hernia without obstruction and without gangrene 9/14/2015    GERD (gastroesophageal reflux disease)     Hypertension     Immune deficiency disorder     Primary open angle glaucoma (POAG) of both eyes, severe stage 6/1/2018     Interdisciplinary Rounds: Attend  General Information Comments: Pt currently in ICU, on the vent.  Pt has a PEG for nutrition support.  Currently on TF, tolerating.  Per epic records, pt weighed 167 lbs on 11/02/18, current weight = 151 lbs ( wt loss of 16 lbs within the last 4 months). Per NFPE 2.8.19: mild subcutaneous fat and muscle loss noted. Reviewed TF recs w/ NP this am.   Nutrition Discharge Planning: Patient to receive adequate intake via PEG with tolerance.     Nutrition Risk Screen    Nutrition Risk Screen: tube feeding or parenteral nutrition    Nutrition/Diet History    Spiritual, Cultural Beliefs, Sikh Practices, Values that Affect Care: no    Anthropometrics    Temp: 98.9 °F (37.2 °C)  Height Method: Estimated  Height: 5' 10" (177.8 cm)  Height (inches): 70 in  Weight Method: Bed Scale  Weight: 68.8 " kg (151 lb 10.8 oz)  Weight (lb): 151.68 lb  Ideal Body Weight (IBW), Female: 150 lb  % Ideal Body Weight, Female (lb): 101.12 lb  BMI (Calculated): 21.8  BMI Grade: 18.5-24.9 - normal       Lab/Procedures/Meds    Pertinent Labs Reviewed: reviewed  BMP  Lab Results   Component Value Date     (H) 02/08/2019    K 3.5 02/08/2019     02/08/2019    CO2 29 02/08/2019    BUN 97 (H) 02/08/2019    CREATININE 2.3 (H) 02/08/2019    CALCIUM 9.8 02/08/2019    ANIONGAP 15 02/08/2019    ESTGFRAFRICA 24 (A) 02/08/2019    EGFRNONAA 20 (A) 02/08/2019     Lab Results   Component Value Date    CALCIUM 9.8 02/08/2019    PHOS 2.2 (L) 11/02/2018     Lab Results   Component Value Date    ALBUMIN 1.7 (L) 02/08/2019     Recent Labs   Lab 02/08/19  0532   POCTGLUCOSE 128*     Lab Results   Component Value Date    HGBA1C 5.2 02/05/2019       Pertinent Medications Reviewed: reviewed    Physical Findings/Assessment     skin: pressure injuries stage 4     Estimated/Assessed Needs    Weight Used For Calorie Calculations: 68.8 kg (151 lb 10.8 oz)  Energy Calorie Requirements (kcal): 1416  Energy Need Method: [facility]  Protein Requirements: 82 - 137 g  Weight Used For Protein Calculations: 68.8 kg (151 lb 10.8 oz)   Estimated Fluid Requirement Method: RDA Method(or per MD)  RDA Method (mL): 1416   Cho requirement: 50 % EEN         Nutrition Prescription Ordered    Current Diet Order: NPO   Current Nutrition Support Formula Ordered: Peptamen Intense VHP @ 75 ml/hr     Evaluation of Received Nutrient/Fluid Intake    Enteral calories (kcal): 1800   Enteral protein (gm) 167    % Kcal needs: 127  % protein needs: 121    Intake/Output Summary (Last 24 hours) at 2/8/2019 1154  Last data filed at 2/8/2019 1100  Gross per 24 hour   Intake 3588.75 ml   Output 2130 ml   Net 1458.75 ml       % Meal Intake: NPO    Nutrition Risk    Level of Risk/Frequency of Follow-up: 2xweekly     Assessment and Plan    Moderate malnutrition    Malnutrition in the  context of Chronic Illness/Injury    Related to (etiology):  Physiological causes increasing nutrient needs     Signs and Symptoms (as evidenced by):  Body Fat Depletion: mild depletion of orbitals and triceps   Muscle Mass Depletion: mild depletion of temples, clavicle region and scapular region   Weight Loss: 9.58 % within the last 4 months     Interventions/Recommendations (treatment strategy):  See above     Nutrition Diagnosis Status:  New          Monitor and Evaluation    Food and Nutrient Intake: energy intake, enteral nutrition intake  Food and Nutrient Adminstration: enteral and parenteral nutrition administration  Physical Activity and Function: nutrition-related ADLs and IADLs  Anthropometric Measurements: weight  Biochemical Data, Medical Tests and Procedures: electrolyte and renal panel, glucose/endocrine profile  Nutrition-Focused Physical Findings: overall appearance, skin     Malnutrition Assessment                 Orbital Region (Subcutaneous Fat Loss): mild depletion   Yazidi Region (Muscle Loss): mild depletion  Clavicle Bone Region (Muscle Loss): mild depletion                 Nutrition Follow-Up    RD Follow-up?: Yes

## 2019-02-08 NOTE — ASSESSMENT & PLAN NOTE
Malnutrition in the context of Chronic Illness/Injury    Related to (etiology):  Physiological causes increasing nutrient needs     Signs and Symptoms (as evidenced by):  Body Fat Depletion: mild depletion of orbitals and triceps   Muscle Mass Depletion: mild depletion of temples, clavicle region and scapular region   Weight Loss: 9.58 % within the last 4 months     Interventions/Recommendations (treatment strategy):  See above     Nutrition Diagnosis Status:  New

## 2019-02-08 NOTE — PLAN OF CARE
Problem: Adult Inpatient Plan of Care  Goal: Plan of Care Review  Pt VSS.  No acute changes today.  Pt taken to CT.  MDs to review.  Pt turned in bed every 2 hours at least.  Wound vac applied to sacral wound and hip by wound care nurse.

## 2019-02-08 NOTE — PROGRESS NOTES
[facility] Hospital Medicine  Progress Note    Patient Name: Carlie Valdez  MRN: [id number]  Patient Class: IP- Inpatient   Admission Date: 2/5/2019  Length of Stay: 3 days  Attending Physician: Jon Stout MD  Primary Care Provider: Johanna Guzman MD        Subjective:     Principal Problem:Acute on chronic respiratory failure with hypoxia and hypercapnia    HPI:  Carlie Valdez is a 73 y.o. female patient  with PMHX of Atrial fibrillation, HTN, CKD, CVA (embolic), Anemia , Heart failure, Chronic respiratory failure with trach, functional quadriplegia, CAD, and Neuromyelitis Optica who presented to the ER today for abnormal lab results.  Pt is a resident at [facility] for skilled nursing care. Pt had outpatient labs done today.  [facility] states pt has a sacral wound that was cultured and showed Gram negative bacteria.  Pt did not respond when questioned however has hx of responding appropriately to simple questions. HPI limited due to patient being trached and unable to mouth words.  ER workup showed: WBC 16.18, H/H 5.6/19.9, Na 152, BUN/Creatinine 115/2.4, Lactic acid 4.2, and Procal 1.20.  Chest xray showed interval worsening of the appearance of the lungs with marked amount of alveolar consolidation scattered throughout left lung and the lower half of the right lung characteristic of pneumonia.  UA + for infectious process.  Hospital Medicine contacted for admission to ICU.        Hospital Course:  Admitted to ICU for treatment of septic shock.  Abnormal urinalysis and multiple decubitus wounds as possible sources of infection.  IV fluid resuscitation.  Empirically started Vancomycin and Zosyn.  Wound care and General Surgery to evaluate for source control.  Surgical debridement of the wound with exposed bone at the base.  Wound cultures growing multiple organisms with multiple patterns of drug resistance.  Infectious Disease consult.  Minor clinical  improvement with broad spectrum antibiotic treatment.    Interval History:  Hemodynamically stable overnight without acute events.  Marginal interval improvement.    Review of Systems   Unable to perform ROS: Acuity of condition     Objective:     Vital Signs (Most Recent):  Temp: 98.1 °F (36.7 °C) (02/08/19 1501)  Pulse: 70 (02/08/19 1542)  Resp: 20 (02/08/19 1542)  BP: (!) 102/55 (02/08/19 1500)  SpO2: 99 % (02/08/19 1542) Vital Signs (24h Range):  Temp:  [97.8 °F (36.6 °C)-98.9 °F (37.2 °C)] 98.1 °F (36.7 °C)  Pulse:  [69-72] 70  Resp:  [10-26] 20  SpO2:  [92 %-100 %] 99 %  BP: ()/() 102/55     Weight: 68.8 kg (151 lb 10.8 oz)  Body mass index is 21.76 kg/m².    Intake/Output Summary (Last 24 hours) at 2/8/2019 1658  Last data filed at 2/8/2019 1501  Gross per 24 hour   Intake 3826.25 ml   Output 1950 ml   Net 1876.25 ml      Physical Exam   Constitutional: She appears well-developed and well-nourished. No distress.   Lethargic   HENT:   Head: Normocephalic and atraumatic.   Mouth/Throat: Oropharynx is clear and moist.   Eyes: Conjunctivae and EOM are normal. Pupils are equal, round, and reactive to light.   Neck: Neck supple. No JVD present. No thyromegaly present.   Cardiovascular: Normal rate and regular rhythm. Exam reveals no gallop and no friction rub.   No murmur heard.  Defibrillator present.   Pulmonary/Chest: Effort normal and breath sounds normal. She has no wheezes. She has no rales.   #8 Shiley tracheostomy present.  RLL decreased breath sounds.  Left side decreased throughout with fine crackles.   Abdominal: Soft. Bowel sounds are normal. She exhibits no distension. There is no tenderness. There is no rebound and no guarding.   Feeding tube in place.   Genitourinary:   Genitourinary Comments: Flores catheter in place.   Musculoskeletal: Normal range of motion. She exhibits no edema or deformity.   Lymphadenopathy:     She has no cervical adenopathy.   Neurological: She is alert. She has  normal reflexes.   Partially opens eyes to command.  Generalized weakness and not following other commands.  Upper extremities flexor contracted.  Upper and lower extremity muscle wasting.   Skin: Skin is warm and dry. No rash noted.   Unstagable foul smelling sacral wound.   Psychiatric: She has a normal mood and affect. Her behavior is normal. Judgment and thought content normal.   Nursing note and vitals reviewed.      Significant Labs: All pertinent labs within the past 24 hours have been reviewed.    Significant Imaging: I have reviewed all pertinent imaging results/findings within the past 24 hours.    Assessment/Plan:      * Acute on chronic respiratory failure with hypoxia and hypercapnia, Vent Dependent w/ Trach    -pt admitted to ICU  -Pulmonology consulted   -#8 Shiley present   -pt stable on ventilator  -Duonebs prn   -sputum culture results pending        Severe sepsis with acute organ dysfunction    -Pt admitted to ICU  -in the setting of UTI, Pneumonia, and wound infection   -IV Vancomycin and Zosyn  -IV hydration with bolus given in ED  -Lactic acid 4.2 initial with serial results pending   -blood, urine, and wound culture results pending  -Procalcitonin 1.2  -Chest xray showed pneumonia and pleural effusion   -UA + for infectious process  Blood cultures negative to date but wound cultures grew multiple organisms with multiple drrug resistance patterns.  Consult Infectious Disease.     UTI (urinary tract infection)    IV antibiotics   Urine culture results pending      Pressure injury of coccygeal region, stage 4    -wound care consulted   -infection suspected   -wound culture results pending   -pt to be positioned per protocol to avoid further breakdown   -General Surgery evaluated and took to the OR for surgical debridement 06 Feb.     VAP (ventilator-associated pneumonia)    -IV antibiotics    Imaging results showed interval worsening with marked amount of alveolar consolidation scattered  throughout the left lung and lower half of the right lung characteristic of pneumonia.  -vent dependent   -sputum and blood culture results pending  -Duonebs prn   -Nebulizer treatments       Anemia    -H/H 5.6/19.9  -unknown source of bleeding   -BUN of 115 noted   -FOBT results pending   -2 units of PRBCs transfused   -will repeat H/H post transfusion        Functional quadriplegia    -hx of CVA (embolic)  -pt currently a resident at [facility]  -PT/OT       Hypertension    -medications held due to hypotension        Acute on chronic renal failure    -BUN/Creatinine 115/2.5  -in the setting of Severe Sepsis   -IV hydration   -nephrotoxic medications held- will resume medications as appropriate   -CMP in am       Leukocytosis    -in the setting of Severe Sepsis due to PNA, UTI, and wound infection    -IV antibiotics   -IV hydration   -blood, urine, sputum, and wound culture results pending        Hypernatremia    -free water flush via G tube   -repeat CMP in am        Neuromyelitis optica    -hx of  -Cellcept held in the setting of infection         Chronic combined systolic and diastolic congestive heart failure    -appears compensated   -BNP and Troponin pending   -will obtain Echo if decompensation noted        Chronic atrial fibrillation    -pt on amiodarone and Xarelto  -Xarelto held at this time to rule out active bleeding   -Amiodarone held due to hypotension-will monitor and resume when appropriate          VTE Risk Mitigation (From admission, onward)        Ordered     IP VTE HIGH RISK PATIENT  Once      02/05/19 1430     Place sequential compression device  Until discontinued      02/05/19 1430          Critical care time spent on the evaluation and treatment of severe organ dysfunction, review of pertinent labs and imaging studies, discussions with consulting providers and discussions with patient/family: 30 minutes.    Jon Stout MD  Department of Hospital Medicine   [facility]

## 2019-02-08 NOTE — PLAN OF CARE
Trach care done, site cleansed and dried. 8.0 shiley inner cannula and trach ties changed. HME replaced. Sterile gauze placed under site on base of neck.

## 2019-02-08 NOTE — PROGRESS NOTES
02/08/19 1120   Handoff Report   Given To GIULIANO Cullen   Skin   Skin WDL ex   Skin Temperature warm   Skin Moisture dry   Skin Elasticity slow return to original state   Beka Risk Assessment   Sensory Perception 2-->very limited   Moisture 3-->occasionally moist   Activity 1-->bedfast   Mobility 2-->very limited   Nutrition 2-->probably inadequate   Friction and Shear 1-->problem   Beka Score 11       Negative Pressure Wound Therapy    Placement Date/Time: 02/08/19 1130   Location: Sacral Spine   NPWT Type Instillation Therapy   Therapy Setting NPWT Continuous therapy   Pressure Setting NPWT 125 mmHg   Instillation NPWT Normal saline   Instillation Amount (mL) 40 mL   Soak Time (min) 10 minutes   Instillation Frequency (hrs) 3 hours   Therapy Interventions NPWT Seal intact   Sponges Inserted NPWT Black;2       Pressure Injury 02/05/19 Left Ischial tuberosity Unstageable - Present on Admission   Date First Assessed: 02/05/19   Pressure Injury Present on Admission: yes  Side: Left  Location: Ischial tuberosity  Is this injury device related?: No  Staging: Unstageable - Present on Admission   Staging UPA   Dressing Appearance Open to air   Drainage Amount Scant   Drainage Characteristics/Odor Serous   Appearance Pink;Yellow;White   Tissue loss description Full thickness   Periwound Area Dry;Intact   Wound Edges Open   Care Cleansed with:;Sterile normal saline;Applied:;Skin Barrier   Dressing Hydrocolloid   Dressing Change Due 02/12/19       Pressure Injury 02/05/19 Sacral spine Stage 4   Date First Assessed: 02/05/19   Pressure Injury Present on Admission: yes  Location: (c) Sacral spine  Is this injury device related?: No  Staging: (c) Stage 4   Staging 4   Dressing Appearance Moist drainage;Intact   Drainage Amount Moderate   Drainage Characteristics/Odor Serosanguineous   Appearance Red;Tan;Yellow;Slough;Moist;Bone;Muscle   Tissue loss description Full thickness   Black (%), Wound Tissue Color 0 %   Red (%), Wound  Tissue Color 75 %   Yellow (%), Wound Tissue Color 25 %   Periwound Area Denuded   Wound Length (cm) 13 cm   Wound Width (cm) 12 cm   Wound Depth (cm) 6 cm   Wound Volume (cm^3) 936 cm^3   Wound Surface Area (cm^2) 156 cm^2   Undermining (depth (cm)/location) 5cm @ 9-3 o'clock   Care Cleansed with:;Sterile normal saline;Applied:;Skin Barrier   Dressing Changed  (wound vac veraflo applied)   Packing Incision packing removed   Packing Removed 1   Periwound Care Absorptive dressing applied;Cleansed with pH balanced cleanser;Dry periwound area maintained;Hydrocolloid barrier applied;Skin barrier film applied   Dressing Change Due 02/12/19       Pressure Injury 02/05/19 1620 Left lateral Hip Stage 3   Date First Assessed/Time First Assessed: 02/05/19 1620   Pressure Injury Present on Admission: yes  Side: Left  Orientation: lateral  Location: Hip  Is this injury device related?: No  Staging: (c) Stage 3   Staging 3  (evolving DTI - currently stage 3 depth but may worsen)   Dressing Appearance Intact;Moist drainage   Drainage Amount Small   Drainage Characteristics/Odor Serous   Appearance Maroon;Purple;Tan;Slough;Red   Tissue loss description Full thickness   Care Cleansed with:;Sterile normal saline;Applied:;Skin Barrier   Dressing Hydrofiber;Hydrocolloid   Dressing Change Due 02/12/19   Skin Interventions   Device Skin Pressure Protection absorbent pad utilized/changed;adhesive use limited;positioning supports utilized;pressure points protected   Pressure Reduction Devices specialty bed utilized;positioning supports utilized;heel offloading device utilized;foam padding utilized   Pressure Reduction Techniques frequent weight shift encouraged;heels elevated off bed;positioned off wounds;rest period provided between sit times;weight shift assistance provided   Skin Protection adhesive use limited;hydrocolloids used;incontinence pads utilized;skin sealant/moisture barrier applied;tubing/devices free from skin contact      Follow up visit with Ms. Valdez for continued care. She is s/p OR debridement of chronic unstageable sacral pressure injury by Dr. Mehta, and has been getting BID wet to moist kerlix dressing changes to site since surgery.  Discussed patient the JORGE Son PA-C this morning with plans to apply wound vac veraflo if enough milagros wound skin intact to get a seal.  Dressing removed. Wound measures [id number] with  undermining noted from 9-3 o'clock extending up to 5cm. Wound bed is mixed moist red non granulating tissue, muscle, sacral bone, and yellow/tan slough noted (<20% slough).  serosanguious drainage noted. Cleansed well with saline and patted dry.  Milagros wound cleansed with bath wipes and painted with cavilon. Edges of wound bordered with stoma rings and window paned with strips of veraflo drape. Wound bed filled with wound vac veraflo foam and secured with drape.  Bridge then created with additional foam and drape to left flank, and trac pad and tubing applied to this site.  [employer] wound vac veraflo therapy initiated at following settings: Instillation fluid: sterile normal saline for irrigation, volume: 40mL, Dwell time: 10 minutes, Cycle: 3 hours @ continuous -125 mmHg low intensity suction. Good seal noted, no leaks.  Plan for wound vac veraflo dressing change every Tu/Fri.  Can transition to tranditional vac when patient is ready for discharge back to [facility].  Left ischium and Left hip/trochanter wounds reassessed.  Left ischium is SILVANO, remains unstageable, now with yellow and white and pink wound bed, scant serous drainage.  Painted with cavilon and duoderm applied to cover.  Left trochanter/hip wound is evolving DTI currently open to stage 3 depth. Small amount serous drainage, moist maroon and purple wound bed, small amount slough.  Covered with aquacel extra and secured with duoderm. Plan to change these two dressings every Tu/Fri with wound vac dressing changes.

## 2019-02-08 NOTE — SUBJECTIVE & OBJECTIVE
Interval History:  Hemodynamically stable overnight without acute events.  Marginal interval improvement.    Review of Systems   Unable to perform ROS: Acuity of condition     Objective:     Vital Signs (Most Recent):  Temp: 98.1 °F (36.7 °C) (02/08/19 1501)  Pulse: 70 (02/08/19 1542)  Resp: 20 (02/08/19 1542)  BP: (!) 102/55 (02/08/19 1500)  SpO2: 99 % (02/08/19 1542) Vital Signs (24h Range):  Temp:  [97.8 °F (36.6 °C)-98.9 °F (37.2 °C)] 98.1 °F (36.7 °C)  Pulse:  [69-72] 70  Resp:  [10-26] 20  SpO2:  [92 %-100 %] 99 %  BP: ()/() 102/55     Weight: 68.8 kg (151 lb 10.8 oz)  Body mass index is 21.76 kg/m².    Intake/Output Summary (Last 24 hours) at 2/8/2019 1658  Last data filed at 2/8/2019 1501  Gross per 24 hour   Intake 3826.25 ml   Output 1950 ml   Net 1876.25 ml      Physical Exam   Constitutional: She appears well-developed and well-nourished. No distress.   Lethargic   HENT:   Head: Normocephalic and atraumatic.   Mouth/Throat: Oropharynx is clear and moist.   Eyes: Conjunctivae and EOM are normal. Pupils are equal, round, and reactive to light.   Neck: Neck supple. No JVD present. No thyromegaly present.   Cardiovascular: Normal rate and regular rhythm. Exam reveals no gallop and no friction rub.   No murmur heard.  Defibrillator present.   Pulmonary/Chest: Effort normal and breath sounds normal. She has no wheezes. She has no rales.   #8 Shiley tracheostomy present.  RLL decreased breath sounds.  Left side decreased throughout with fine crackles.   Abdominal: Soft. Bowel sounds are normal. She exhibits no distension. There is no tenderness. There is no rebound and no guarding.   Feeding tube in place.   Genitourinary:   Genitourinary Comments: Flores catheter in place.   Musculoskeletal: Normal range of motion. She exhibits no edema or deformity.   Lymphadenopathy:     She has no cervical adenopathy.   Neurological: She is alert. She has normal reflexes.   Partially opens eyes to command.   Generalized weakness and not following other commands.  Upper extremities flexor contracted.  Upper and lower extremity muscle wasting.   Skin: Skin is warm and dry. No rash noted.   Unstagable foul smelling sacral wound.   Psychiatric: She has a normal mood and affect. Her behavior is normal. Judgment and thought content normal.   Nursing note and vitals reviewed.      Significant Labs: All pertinent labs within the past 24 hours have been reviewed.    Significant Imaging: I have reviewed all pertinent imaging results/findings within the past 24 hours.

## 2019-02-08 NOTE — ASSESSMENT & PLAN NOTE
Full vent support - Vent dependent secondary to NMO  Settings reviewed and at baseline settings  VAP prophylaxis

## 2019-02-08 NOTE — PLAN OF CARE
Problem: Adult Inpatient Plan of Care  Goal: Plan of Care Review  Vitals signs closely monitored. Fall precautions in place. Patient turned every two hours. Pain assessed every two hours. Safety promoted. Infection risks reduced.   Wound care done to sacral wound. Tube feed started. Peptamen intensive VHP @ 45. Goal of 75. H2O bolus 150 q4h. 1/2 NS started at 75. Uneventful shift

## 2019-02-08 NOTE — PLAN OF CARE
Problem: Adult Inpatient Plan of Care  Goal: Plan of Care Review  Outcome: Ongoing (interventions implemented as appropriate)  Recommendations     Recommendation: 1.Consider decreasing TF rate to 60 ml/hr. 2. Check residuals Q4 hours. Hold if > 300 cc. 3. Will continue to monitor.   Intervention: Coordination of care   Goals: Meet > 85 % EEN/EPN while admitted  Nutrition Goal Status: goal met   Communication of RD Recs: (Reviewed w/ NP)

## 2019-02-08 NOTE — ASSESSMENT & PLAN NOTE
-Pt admitted to ICU  -in the setting of UTI, Pneumonia, and wound infection   -IV Vancomycin and Zosyn  -IV hydration with bolus given in ED  -Lactic acid 4.2 initial with serial results pending   -blood, urine, and wound culture results pending  -Procalcitonin 1.2  -Chest xray showed pneumonia and pleural effusion   -UA + for infectious process  Blood cultures negative to date but wound cultures grew multiple organisms with multiple drrug resistance patterns.  Consult Infectious Disease.

## 2019-02-08 NOTE — ASSESSMENT & PLAN NOTE
With resultant functional quadraplegia, blindness and vent dependence   Holding cellcept in setting of sepsis

## 2019-02-08 NOTE — ASSESSMENT & PLAN NOTE
Baseline Hgb 27  Transfused 1 unit PRBC 1/7  Daily CBC  No active bleeding noted  Conservative transfusion protocol

## 2019-02-08 NOTE — PROGRESS NOTES
[facility] -   Critical Care Medicine  Progress Note    Patient Name: Carlie Valdez  MRN: [id number]  Admission Date: 2/5/2019  Hospital Length of Stay: 3 days  Code Status: Full Code  Attending Provider: Jon Stout MD  Primary Care Provider: Johanna Guzman MD   Principal Problem: Acute on chronic respiratory failure with hypoxia and hypercapnia    Subjective:     HPI:  73 year old female well known to our service s/t prior admissions; complicated PMH includes quadraplegia and chronic vent dependent respiratory failure with trach s/t NMO; DM; CHF; HTN; GERD; atrial fib; MRSA sputum    Presented to ED from NH for eval of abnl labs; EMS also reported gm neg bacteria growth from sacral wound    ED evaluation revealed hypothermia 94F; normotension; leukocytosis 16k; anemia 5.6/19.9; sodium 152; creatinine 2.4 with ; lactic acid 4.2; procalcitonin 1.2    Hospital/ICU Course:  Admitted to ICU on mechanical ventilation, awake, non verbal and paraplegic at baseline; hypothermic with external warming in progress  2/6 - chronic vent via trach, hypoxemia reported with turning overnight, continued moderate trach secretions; marginal BP overnight and marginal urine output; to OR today for I/D of sacral wound  2/7 - supine on vent in no distress and VSS.  Awake and nods head to questions  2/8 - Supine on chronic vent dependent w/ VSS and tolerating TF.  Awake and mouthing words.  No distress.      Review of Systems   Unable to perform ROS: Other   trached and non verbal    Objective:     Vital Signs (Most Recent):  Temp: 98 °F (36.7 °C) (02/08/19 0305)  Pulse: 70 (02/08/19 0726)  Resp: (!) 21 (02/08/19 0726)  BP: 116/67 (02/08/19 0605)  SpO2: 97 % (02/08/19 0726) Vital Signs (24h Range):  Temp:  [97.8 °F (36.6 °C)-98.2 °F (36.8 °C)] 98 °F (36.7 °C)  Pulse:  [69-74] 70  Resp:  [12-26] 21  SpO2:  [96 %-100 %] 97 %  BP: ()/() 116/67     Weight: 68.8 kg (151 lb 10.8 oz)  Body  mass index is 21.76 kg/m².      Intake/Output Summary (Last 24 hours) at 2/8/2019 0819  Last data filed at 2/8/2019 0600  Gross per 24 hour   Intake 3433.75 ml   Output 2050 ml   Net 1383.75 ml       Physical Exam   Constitutional: Vital signs are normal. She appears well-developed. She has a sickly appearance.   HENT:   Head: Normocephalic and atraumatic.   Mouth/Throat: Oropharynx is clear and moist.   Neck: Neck supple. No JVD present. No tracheal deviation present.       Cardiovascular: Normal rate and regular rhythm.   Pulses:       Radial pulses are 2+ on the right side, and 2+ on the left side.        Dorsalis pedis pulses are 1+ on the right side, and 1+ on the left side.   Paced rhythm   Pulmonary/Chest: No accessory muscle usage. No tachypnea. No respiratory distress. She has decreased breath sounds in the left middle field and the left lower field. She has no wheezes. She has rales (coarse bilat).   Abdominal: Soft. Bowel sounds are normal. She exhibits no distension. There is no tenderness.       Genitourinary:   Genitourinary Comments: Flores in place   Musculoskeletal: She exhibits no edema.   No edema.  Diffuse atrophy.  LUE contracture   Lymphadenopathy:     She has no cervical adenopathy.   Neurological: She is alert.   Nods head and mouths words appropriately to questions   Skin: Skin is warm and dry. Capillary refill takes 2 to 3 seconds. She is not diaphoretic. No cyanosis.            Vents:  Vent Mode: A/C (02/08/19 0726)  Ventilator Initiated: Yes (02/05/19 1127)  Set Rate: 20 bmp (02/08/19 0726)  Vt Set: 375 mL (02/08/19 0726)  Pressure Support: 0 cmH20 (02/08/19 0726)  PEEP/CPAP: 10 cmH20 (02/08/19 0726)  Oxygen Concentration (%): 40 (02/08/19 0726)  Peak Airway Pressure: 29 cmH2O (02/08/19 0726)  Plateau Pressure: 0 cmH20 (02/08/19 0726)  Total Ve: 7.21 mL (02/08/19 0726)  F/VT Ratio<105 (RSBI): (!) 61.22 (02/08/19 0726)    Lines/Drains/Airways     Drain                 Urethral Catheter --  days         Gastrostomy/Enterostomy 10/19/18 Percutaneous endoscopic gastrostomy (PEG)  days          Airway                 Surgical Airway 02/06/19 1905 Nikita 1 day          Pressure Ulcer                 Pressure Injury 02/05/19 Left Ischial tuberosity Unstageable - Present on Admission 3 days         Pressure Injury 02/05/19 Right medial Malleolus DTPI - Present on Admission 3 days         Pressure Injury 02/05/19 Sacral spine Unstageable - Present on Admission 3 days         Pressure Injury 02/05/19 1620 Left lateral Hip Stage 3 2 days         Pressure Injury 02/06/19 Left Thigh DTPI - Present on Admission 2 days         Pressure Injury 02/06/19 Left Thigh Unstageable - Present on Admission 2 days         Pressure Injury 02/06/19 Left lateral Leg Stage 2 2 days         Pressure Injury 02/06/19 Left upper Arm DTPI - Present on Admission 2 days          Peripheral Intravenous Line                 Midline Catheter Insertion/Assessment  - Double Lumen 02/05/19 1717 Left basilic vein (medial side of arm) 18g x 10cm 2 days         Peripheral IV - Double Lumen 02/05/19 1545 Left Forearm 2 days                Significant Labs:    CBC/Anemia Profile:  Recent Labs   Lab 02/06/19  2311 02/07/19  0357 02/08/19  0350   WBC  --  21.15* 19.29*   HGB  --  6.1* 7.6*   HCT  --  20.5* 24.0*   PLT  --  507* 502*   MCV  --  87 89   RDW  --  19.2* 19.0*   OCCULTBLOOD Negative  --   --         Chemistries:  Recent Labs   Lab 02/07/19  0357 02/08/19  0350   * 148*   K 4.5 3.5    104   CO2 34* 29   * 97*   CREATININE 2.5* 2.3*   CALCIUM 10.1 9.8   ALBUMIN 1.6* 1.7*   PROT 6.8 7.2   BILITOT 0.4 0.3   ALKPHOS 123 135   ALT 15 15   AST 24 35   MG  --  2.5       All pertinent labs within the past 24 hours have been reviewed.    Significant Imaging:  CXR: I have reviewed all pertinent results/findings within the past 24 hours and my personal findings are:  persistent bilat infilrates L>R        ABG  Recent Labs    Lab 02/07/19  0524   PH 7.529*   PO2 171*   PCO2 41.7   HCO3 34.7*   BE 12     Assessment/Plan:     Neuro   Neuromyelitis optica    With resultant functional quadraplegia, blindness and vent dependence   Holding cellcept in setting of sepsis     Pulmonary   * Acute on chronic respiratory failure with hypoxia and hypercapnia, Vent Dependent w/ Trach    Full vent support - Vent dependent secondary to NMO  Settings reviewed and at baseline settings  VAP prophylaxis     VAP (ventilator-associated pneumonia)    Continue mucomyst, Duonebs, IVAB and aggressive pulmonary toilet  CXR not improving  Consider therapeutic bronch and/or CT chest  Sputum w/ GNRs with final ID&S pending  history of MRSA and high suspicion for pseudomonas in immunosuppressed NH vent resident continue Broad spectrum abx w/ Zosyn and Vanc  Afeb with WBC 19     Cardiac/Vascular   Chronic Hypotension    Cont IVFs and free water to PEG  Continue midodrine  ICU hemodynamic monitoring     Chronic combined systolic and diastolic congestive heart failure    Accurate I/Os and daily weights       Chronic atrial fibrillation    Currently in paced regular rhythm  Cont cardiac monitoring     Renal/   Acute on chronic renal failure    Strict I/O  Monitor creatinine trend     Hypernatremia    Hypotonic IVFs and free water to PEG  Daily BMP     UTI (urinary tract infection)    Culture NGTD  Cont IVFs  Vanco, zosyn - deescalate per cultures when available       ID   Severe sepsis with acute organ dysfunction    Source likely multifactorial with wound, pulmonary, urinary source  Broad spectrum abx; monitor culture growth  IVFs  ICU hemodynamic monitoring     Oncology   Anemia    Baseline Hgb 27  Transfused 1 unit PRBC 1/7  Daily CBC  No active bleeding noted  Conservative transfusion protocol     Endocrine   Type 2 diabetes mellitus with kidney complication, without long-term current use of insulin    Controlled, monitor     Hypothyroidism    Cont Levothyroxine      Orthopedic   Deep tissue injury multiple unstageable    Cont wound care  Surgery following  Cont IVAB  Low pressure bed and turn Q 2 hours     Other   Pressure injury of coccygeal region, stage 4    + multiple small stage 2 wounds  S/P I&D of sacrum 2/6  Surgery following   Continue wound care per WOC recs and IVAB       Preventive Measures and Monitoring:   Stress Ulcer: Pepcid  Nutrition: TF  Glucose control: stable  Bowel prophylaxis: having regular BMs  DVT prophylaxis: SQ Hep/SCDs  Hx CAD on B-Blocker: no hx CAD  Head of Bed/Reposition: Elevate HOB and turn Q1-2 hours   Early Mobility: bed bound  Vent Day: chronic  PEG Day: chronic  LUE PICC Line Day: #4  Flores Day: #5  IVAB Day: #4  Code Status: Full  Pneumonia Vaccine: assume UTD at NH  Flu Vaccine: assume UTD at NH     Counseling/Consultation:I have discussed the care of this patient in detail with the bedside nursing staff and Dr. Amaral and Dr. Stout    Critical Care Time: 48 minutes  Critical secondary to Patient has a condition that poses threat to life and bodily function: Acute Renal Failure and Acute on chronic Resp Failure on mech ventilation w/ chronic Trach      Critical care was time spent personally by me on the following activities: development of treatment plan with patient or surrogate and bedside caregivers, discussions with consultants, evaluation of patient's response to treatment, examination of patient, ordering and performing treatments and interventions, ordering and review of laboratory studies, ordering and review of radiographic studies, pulse oximetry, re-evaluation of patient's condition. This critical care time did not overlap with that of any other provider or involve time for any procedures.     Philip Myers NP  Critical Care Medicine  [facility] -

## 2019-02-08 NOTE — ASSESSMENT & PLAN NOTE
+ multiple small stage 2 wounds  S/P I&D of sacrum 2/6  Surgery following   Continue wound care per WOC recs and IVAB

## 2019-02-08 NOTE — PLAN OF CARE
Problem: Adult Inpatient Plan of Care  Goal: Plan of Care Review  Outcome: Ongoing (interventions implemented as appropriate)  No acute events overnight. Pt AAO to person. BLE withdraw to pain. Pt following commands from neck up. VSS. 100% A-paced.  Afebrile. UO adequate. TF increased to 60 cc/hr. No BM. CBGs WNL. Sacral wound dressing changed. Pt turned Q2H with pressure points protected. POC reviewed with pt and family. All questions and concerns addressed.

## 2019-02-08 NOTE — ANESTHESIA POSTPROCEDURE EVALUATION
"Anesthesia Post Evaluation    Patient: Carlie Valdez    Procedure(s) Performed: Procedure(s) (LRB):  DEBRIDEMENT, WOUND, SACRUM (N/A)    Final Anesthesia Type: general  Patient location during evaluation: PACU  Patient participation: Yes- Able to Participate  Level of consciousness: awake and alert  Post-procedure vital signs: reviewed and stable  Pain management: adequate  Airway patency: patent  PONV status at discharge: No PONV  Anesthetic complications: no      Cardiovascular status: blood pressure returned to baseline  Respiratory status: unassisted  Hydration status: euvolemic  Follow-up not needed.        Visit Vitals  BP (!) 157/76   Pulse 70   Temp 37.2 °C (98.9 °F) (Oral)   Resp (!) 24   Ht 5' 10" (1.778 m)   Wt 68.8 kg (151 lb 10.8 oz)   SpO2 100%   Breastfeeding? No   BMI 21.76 kg/m²       Pain/Emiliana Score: No Data Recorded      "

## 2019-02-08 NOTE — PLAN OF CARE
Plan per MDR, CT to be done and possible bronch post CT     02/08/19 0469   Discharge Reassessment   Assessment Type Discharge Planning Reassessment   Provided patient/caregiver education on the expected discharge date and the discharge plan No   Do you have any problems affording any of your prescribed medications? No   Discharge Plan A Return to nursing home   Discharge Plan B Return to Nursing Home   DME Needed Upon Discharge  none   Anticipated Discharge Disposition (Returning to [facility])   Can the patient answer the patient profile reliably? Yes, cognitively intact   How does the patient rate their overall health at the present time? Fair   Describe the patient's ability to walk at the present time. Major restrictions/daily assistance from another person   How often would a person be available to care for the patient? Whenever needed   Number of comorbid conditions (as recorded on the chart) Five or more   During the past month, has the patient often been bothered by feeling down, depressed or hopeless? No   During the past month, has the patient often been bothered by little interest or pleasure in doing things? No

## 2019-02-08 NOTE — ASSESSMENT & PLAN NOTE
Continue mucomyst, Duonebs, IVAB and aggressive pulmonary toilet  CXR not improving  Consider therapeutic bronch and/or CT chest  Sputum w/ GNRs with final ID&S pending  history of MRSA and high suspicion for pseudomonas in immunosuppressed NH vent resident continue Broad spectrum abx w/ Zosyn and Vanc  Afeb with WBC 19

## 2019-02-09 NOTE — PROGRESS NOTES
[facility] -   Critical Care Medicine  Progress Note    Patient Name: Carlie Valdez  MRN: [id number]  Admission Date: 2/5/2019  Hospital Length of Stay: 4 days  Code Status: Full Code  Attending Provider: Pipe Lomeli, *  Primary Care Provider: Johanna Guzman MD   Principal Problem: Acute on chronic respiratory failure with hypoxia and hypercapnia    Subjective:     HPI:  73 year old female well known to our service s/t prior admissions; complicated PMH includes quadraplegia and chronic vent dependent respiratory failure with trach s/t NMO; DM; CHF; HTN; GERD; atrial fib; MRSA sputum    Presented to ED from NH for eval of abnl labs; EMS also reported gm neg bacteria growth from sacral wound    ED evaluation revealed hypothermia 94F; normotension; leukocytosis 16k; anemia 5.6/19.9; sodium 152; creatinine 2.4 with ; lactic acid 4.2; procalcitonin 1.2       Hospital/ICU Course:  Admitted to ICU on mechanical ventilation, awake, non verbal and paraplegic at baseline; hypothermic with external warming in progress  2/6 - chronic vent via trach, hypoxemia reported with turning overnight, continued moderate trach secretions; marginal BP overnight and marginal urine output; to OR today for I/D of sacral wound  2/7 - supine on vent in no distress and VSS.  Awake and nods head to questions  2/8 - Supine on chronic vent dependent w/ VSS and tolerating TF.  Awake and mouthing words.  No distress.    2/9 - Seen and examined at bedside. Hospital chart reviewed. No acute interval detrimental events noted. S/P debridement of sacral wound. Seen and examined at bedside. Hospital chart reviewed. No acute interval detrimental events noted. CT chest with LLL atelectasis => Chest PT. S/P debridement of sacral wound. The wound cx is (+) for e.coli and proteus sensitive to meropenem .          Interval History: Seen and examined at bedside. Hospital chart reviewed. No acute interval detrimental  events noted. S/P debridement of sacral wound. LLL atelectasis on CT chest => chest PT. The wound cx is (+) for e.coli and proteus sensitive to meropenem .        Review of Systems   Unable to perform ROS: Other       Scheduled Meds:   acetylcysteine 200 mg/ml (20%)  4 mL Nebulization BID    albuterol-ipratropium  3 mL Nebulization Q6H    famotidine (PF)  20 mg Intravenous Daily    levothyroxine  100 mcg Per G Tube Before breakfast    meropenem (MERREM) IVPB  500 mg Intravenous Q12H    midodrine  5 mg Per G Tube TID    polyethylene glycol  17 g Per G Tube Daily     Continuous Infusions:   sodium chloride 0.45% 75 mL/hr at 02/09/19 0903     PRN Meds:.dextrose 50%, dextrose 50%, glucagon (human recombinant), glucose, glucose, insulin aspart U-100, sodium chloride 0.9%     Objective:     Vital Signs (Most Recent):  Temp: 97.7 °F (36.5 °C) (02/09/19 0300)  Pulse: 70 (02/09/19 0908)  Resp: (!) 24 (02/09/19 0908)  BP: 129/63 (02/09/19 0600)  SpO2: 98 % (02/09/19 0908) Vital Signs (24h Range):  Temp:  [97.7 °F (36.5 °C)-98.5 °F (36.9 °C)] 97.7 °F (36.5 °C)  Pulse:  [69-72] 70  Resp:  [10-26] 24  SpO2:  [92 %-100 %] 98 %  BP: ()/(40-78) 129/63     Weight: 68.6 kg (151 lb 3.8 oz)  Body mass index is 21.7 kg/m².      Intake/Output Summary (Last 24 hours) at 2/9/2019 0957  Last data filed at 2/9/2019 0900  Gross per 24 hour   Intake 3745 ml   Output 3255 ml   Net 490 ml       Physical Exam   Constitutional: Vital signs are normal. She appears well-developed. She has a sickly appearance.   HENT:   Head: Normocephalic and atraumatic.   Mouth/Throat: Oropharynx is clear and moist.   Neck: Neck supple. No JVD present. No tracheal deviation present.       Cardiovascular: Normal rate and regular rhythm.   Pulses:       Radial pulses are 2+ on the right side, and 2+ on the left side.        Dorsalis pedis pulses are 1+ on the right side, and 1+ on the left side.   Paced rhythm   Pulmonary/Chest: No accessory muscle  usage. No tachypnea. No respiratory distress. She has decreased breath sounds in the left middle field and the left lower field. She has no wheezes. She has rales (coarse bilat).   Abdominal: Soft. Bowel sounds are normal. She exhibits no distension. There is no tenderness.       Genitourinary:   Genitourinary Comments: Flores in place   Musculoskeletal: She exhibits no edema.   No edema.  Diffuse atrophy.  LUE contracture   Lymphadenopathy:     She has no cervical adenopathy.   Neurological: She is alert.   Nods head and mouths words appropriately to questions   Skin: Skin is warm and dry. Capillary refill takes 2 to 3 seconds. She is not diaphoretic. No cyanosis.            Vents:  Vent Mode: A/C (02/09/19 0908)  Ventilator Initiated: Yes (02/05/19 1127)  Set Rate: 20 bmp (02/09/19 0908)  Vt Set: 375 mL (02/09/19 0908)  Pressure Support: 0 cmH20 (02/09/19 0908)  PEEP/CPAP: 10 cmH20 (02/09/19 0908)  Oxygen Concentration (%): 40 (02/09/19 0908)  Peak Airway Pressure: 28 cmH2O (02/09/19 0908)  Plateau Pressure: 0 cmH20 (02/09/19 0908)  Total Ve: 9.87 mL (02/09/19 0908)  F/VT Ratio<105 (RSBI): (!) 60.91 (02/09/19 0908)    Lines/Drains/Airways     Drain                 Urethral Catheter -- days         Gastrostomy/Enterostomy 10/19/18 Percutaneous endoscopic gastrostomy (PEG)  days          Airway                 Surgical Airway 02/06/19 1905 Nikita 2 days          Pressure Ulcer                 Pressure Injury 02/05/19 Left Ischial tuberosity Unstageable - Present on Admission 4 days         Pressure Injury 02/05/19 Right medial Malleolus DTPI - Present on Admission 4 days         Pressure Injury 02/05/19 Sacral spine Stage 4 4 days         Pressure Injury 02/05/19 1620 Left lateral Hip Stage 3 3 days         Pressure Injury 02/06/19 Left Thigh DTPI - Present on Admission 3 days         Pressure Injury 02/06/19 Left Thigh Unstageable - Present on Admission 3 days         Pressure Injury 02/06/19 Left lateral Leg  Stage 2 3 days         Pressure Injury 02/06/19 Left upper Arm DTPI - Present on Admission 3 days         Negative Pressure Wound Therapy  less than 1 day          Peripheral Intravenous Line                 Midline Catheter Insertion/Assessment  - Double Lumen 02/05/19 1717 Left basilic vein (medial side of arm) 18g x 10cm 3 days         Peripheral IV - Double Lumen 02/05/19 1545 Left Forearm 3 days                Significant Labs:    CBC/Anemia Profile:  Recent Labs   Lab 02/08/19  0350 02/09/19  0139 02/09/19  0900   WBC 19.29* 10.68  --    HGB 7.6* 6.2* 6.9*   HCT 24.0* 20.7* 23.1*   * 442*  --    MCV 89 90  --    RDW 19.0* 19.3*  --         Chemistries:  Recent Labs   Lab 02/08/19  0350 02/09/19  0139   * 140   K 3.5 3.1*    104   CO2 29 26   BUN 97* 60*   CREATININE 2.3* 1.3   CALCIUM 9.8 8.7   ALBUMIN 1.7* 1.5*   PROT 7.2 6.3   BILITOT 0.3 0.2   ALKPHOS 135 136*   ALT 15 11   AST 35 24   MG 2.5  --          Significant Imaging:    CT CHEST WITHOUT CONTRAST: 02/08/19:   Tracheostomy tube in appropriate position however there are significant secretions within the tip of the tube and around the tip of the tube.  Please correlate.    There are mucoid secretions within the left mainstem bronchus, with associated postobstructive atelectasis and consolidation of the left lower lobe. There is minimal aeration of the left upper lobe and lingula.    On the right, there is interlobular septal thickening with scattered ground-glass, and more dense areas of consolidation at the posterior aspect right upper lobe, and dense consolidation at the right lower lobe with air bronchograms. There are tiny bilateral pleural effusions. Component of pulmonary edema is suggested.          ABG  Recent Labs   Lab 02/07/19  0524   PH 7.529*   PO2 171*   PCO2 41.7   HCO3 34.7*   BE 12     Assessment/Plan:       I have reviewed all labs and imaging studies and compared to previous results. I have also discussed labs with  all the teams in the medical care of the patient and my plan is outlined below     Neuro   Neuromyelitis optica    With resultant functional quadraplegia, blindness and vent dependence. Cellcept on hold.        Derm   Decubitus ulcer of sacral region, stage 4    S/P debridement. The wound cx is (+) for e.coli and proteus sensitive to meropenem. MEROPENEM per ID. Appreciate ID input.      Pulmonary   * Acute on chronic respiratory failure with hypoxia and hypercapnia, Vent Dependent w/ Trach    Full vent support - Vent dependent secondary to NMO  Settings reviewed and at baseline settings  VAP prophylaxis     VAP (ventilator-associated pneumonia)    MORGANELLA MORGANII in sputum sensitive to MEROPENEM.      Continue  IV MEROPENEM        Atelectasis of left lung    Mucomyst q 12 / Chest PT.      Cardiac/Vascular   Chronic Hypotension    Cont IVFs and free water to PEG  Continue midodrine  ICU hemodynamic monitoring     Chronic combined systolic and diastolic congestive heart failure    CHF optimized. Daily weighing. Dietary salt restriction. Accurate I/Os.       Chronic atrial fibrillation    Currently paced.  regular rhythm.  Monitor hemodynamics.     Renal/    Monitor BUN / Cr. Montor urine output.     Acute on chronic renal failure    Strict I/O  Monitor creatinine trend     Hypernatremia    Resolved with free water replacement.      UTI (urinary tract infection)    Polymicrobial Colonization. Urine Culture => multiple organisms, none predominant. On IV MEROPENEM .         ID   Severe sepsis with acute organ dysfunction    Resolving.    [x] Respiratory rate >20 breaths/min or PaCO2 <32 mmHg   [x]  WBC >12,000 cells/mm3, <4000 cells/mm3, or >10 percent immature (band) forms  [] Heart rate >90 beats/min   [] Temperature >38ºC or <36ºC    Microbiology: Panculture.   Oxygenation: Ventilator dependent. Keep SAO2 > = 92%  Hemodynamics: Stable.  Keep MAP > = 65mmHg  Source control: IV Meropenem.        Oncology   Anemia       Monitor hemogram. Transfuse as needed. Conservative transfusion regimen     Endocrine   Moderate malnutrition    Tube deeding per RD recommendations.      Hypothyroidism    Continue Levothyroxine     Type 2 diabetes mellitus with kidney complication, without long-term current use of insulin    Stable EUGLYCEMIC}  low dose SSI for hyperglycemia with monitoring for glucose control and prevention of insulin toxicity. Blood glucose target 100 - 180 mg/dl         Orthopedic   Deep tissue injury multiple unstageable    Continue wound care. Low pressure bed and turn Q 2 hours       Other   Pressure injury of coccygeal region, stage 4    Multiple small stage 2 wounds. S/P I&D of sacrum 2/6. Continue wound care per [facility] recs         Critical Care Daily Checklist:    A: Awake: RASS Goal/Actual Goal:    Actual: Guerrero Agitation Sedation Scale (RASS): Alert and calm   B: Spontaneous Breathing Trial Performed?  N/A   C: SAT & SBT Coordinated?  N/A                     D: Delirium: CAM-ICU Overall CAM-ICU: Negative   E: Early Mobility Performed? Yes   F: Feeding Goal: Goals: Meet > 85 % EEN/EPN while admitted  Status: Nutrition Goal Status: new   Current Diet Order   No orders of the defined types were placed in this encounter.      AS: Analgesia/Sedation PRN   T: Thromboembolic Prophylaxis SCD   H: HOB > 300 Yes   U: Stress Ulcer Prophylaxis (if needed) FAMOTIDINE   G: Glucose Control INSULIN ASPART   B: Bowel Function Stool Occurrence: 1   I: Indwelling Catheter (Lines & Flores) Necessity YES   D: De-escalation of Antimicrobials/Pharmacotherapies YES    Plan for the day/ETD Contiue current    Code Status:  Family/Goals of Care: Full Code  snf     Critical Care Time: 60  minutes  Critical secondary to VENTILATOR DEPENDENCE, SEVER SEPSIS, ACUTE ON CHRONINC RESPIRATORY FAILURE, NMO,       Critical care was time spent personally by me on the following activities: development of treatment plan with patient or surrogate and  bedside caregivers, discussions with consultants, evaluation of patient's response to treatment, examination of patient, ordering and performing treatments and interventions, ordering and review of laboratory studies, ordering and review of radiographic studies, pulse oximetry, re-evaluation of patient's condition. This critical care time did not overlap with that of any other provider or involve time for any procedures.     Bradley Amaral MD  Critical Care Medicine  [facility] [place]

## 2019-02-09 NOTE — PROGRESS NOTES
Trach care done. Site cleaned and dried. Inner cannula changed. HME replaced. Drain sponge placed. Ambubag & mask and extra trach at the bedside for safety precautions. Patient has a #8 Shiley in place.

## 2019-02-09 NOTE — SUBJECTIVE & OBJECTIVE
Interval History:     Review of Systems   Unable to perform ROS: Intubated     Objective:     Vital Signs (Most Recent):  Temp: 97.7 °F (36.5 °C) (02/09/19 0300)  Pulse: 70 (02/09/19 0908)  Resp: (!) 24 (02/09/19 0908)  BP: 129/63 (02/09/19 0600)  SpO2: 98 % (02/09/19 0908) Vital Signs (24h Range):  Temp:  [97.7 °F (36.5 °C)-98.5 °F (36.9 °C)] 97.7 °F (36.5 °C)  Pulse:  [69-72] 70  Resp:  [10-26] 24  SpO2:  [92 %-100 %] 98 %  BP: ()/(40-78) 129/63     Weight: 68.6 kg (151 lb 3.8 oz)  Body mass index is 21.7 kg/m².    Intake/Output Summary (Last 24 hours) at 2/9/2019 0923  Last data filed at 2/9/2019 0900  Gross per 24 hour   Intake 3745 ml   Output 3255 ml   Net 490 ml      Physical Exam   Constitutional: She appears well-developed and well-nourished. No distress.   Lethargic   HENT:   Head: Normocephalic and atraumatic.   Mouth/Throat: Oropharynx is clear and moist.   Eyes: Conjunctivae and EOM are normal. Pupils are equal, round, and reactive to light.   Neck: Neck supple. No JVD present. No thyromegaly present.   Cardiovascular: Normal rate and regular rhythm. Exam reveals no gallop and no friction rub.   No murmur heard.  Defibrillator present.   Pulmonary/Chest: Effort normal and breath sounds normal. She has no wheezes. She has no rales.   #8 Shiley tracheostomy present.  RLL decreased breath sounds.  Left side decreased throughout with fine crackles.   Abdominal: Soft. Bowel sounds are normal. She exhibits no distension. There is no tenderness. There is no rebound and no guarding.   Feeding tube in place.   Genitourinary:   Genitourinary Comments: Flores catheter in place.   Musculoskeletal: Normal range of motion. She exhibits no edema or deformity.   Lymphadenopathy:     She has no cervical adenopathy.   Neurological: She is alert. She has normal reflexes.   Partially opens eyes to command.  Generalized weakness and not following other commands.  Upper extremities flexor contracted.  Upper and lower  extremity muscle wasting.   Skin: Skin is warm and dry. No rash noted.   -has sacral wound   Psychiatric: She has a normal mood and affect. Her behavior is normal. Judgment and thought content normal.   Nursing note and vitals reviewed.      Significant Labs: All pertinent labs within the past 24 hours have been reviewed.    Significant Imaging: I have reviewed all pertinent imaging results/findings within the past 24 hours.

## 2019-02-09 NOTE — ASSESSMENT & PLAN NOTE
Polymicrobial Colonization. Urine Culture => multiple organisms, none predominant. On IV MEROPENEM .

## 2019-02-09 NOTE — HPI
73 year old woman with history of  Atrial fibrillation, HTN, CKD, CVA (embolic), Anemia , Heart failure, Chronic respiratory failure with trach, functional quadriplegia, CAD, and Neuromyelitis Optica who presented to the ER  for abnormal lab results.  Pt is a resident at [facility] for skilled nursing care. .  [facility] states pt has a sacral wound that was cultured and showed Gram negative bacteria.   Chest xray showed interval worsening of the appearance of the lungs with marked amount of alveolar consolidation scattered throughout left lung and the lower half of the right lung characteristic of pneumonia.  Since admission, all cultures reviewed -   Specimen: Tissue from Buttocks, Right Updated: 02/08/19 1413    Aerobic Bacterial Culture --    ESCHERICHIA COLI ESBL   Many     Aerobic Bacterial Culture --    PROTEUS MIRABILIS ESBL   Moderate      Specimen: Decubitus from Coccyx Updated: 02/08/19 0935      Aerobic Bacterial Culture --    ACINETOBACTER BAUMANNII COMPLEX   Many   Susceptibility pending     Aerobic Bacterial Culture --    ESCHERICHIA COLI ESBL   Moderate        Tracheal aspirate -MORGANELLA MORGANII .  Lab data showed wbc -19  She had interval I and D on 02/06

## 2019-02-09 NOTE — ASSESSMENT & PLAN NOTE
Stable EUGLYCEMIC}  low dose SSI for hyperglycemia with monitoring for glucose control and prevention of insulin toxicity. Blood glucose target 100 - 180 mg/dl

## 2019-02-09 NOTE — CONSULTS
[facility]  Infectious Disease  Consult Note    Patient Name: Carlie Valdez  MRN: [id number]  Admission Date: 2/5/2019  Hospital Length of Stay: 4 days  Attending Physician: Pipe Lomeli, *  Primary Care Provider: Johanna Guzman MD     Isolation Status: No active isolations    Patient information was obtained from past medical records and ER records.      Consults  Assessment/Plan:     * Acute on chronic respiratory failure with hypoxia and hypercapnia, Vent Dependent w/ Trach    Critical care follow up, ventilatory support      Deep tissue injury multiple unstageable    S/p wound debridement on 02/06- all cultures reviewed .  Cultures -Acinetobacter , ESBL E coli , proteus   Tracheal aspirate - morganella .  Carbapenems (including meropenem) are the preferred antibiotics for treatment of serious infections with bacteria that produce an extended-spectrum beta-lactamase (ESBL). This also applies even with invitro susceptibility to zosyn     Effect of Piperacillin-Tazobactam vs Meropenem on 30-Day Mortality for Patients With E coli or Klebsiella pneumoniae Bloodstream Infection and Ceftriaxone Resistance: A Randomized Clinical Trial.   Meza PNA -their findings support other studeis that do not support use of zosyn in these settings.     Will switch to meropenem , follow final drug susceptibility of acinetobacter.         Chronic Hypotension    On midodrine, continue closely monitoring      Type 2 diabetes mellitus with kidney complication, without long-term current use of insulin    Insulin sliding scale, closely monitor glucose     Chronic atrial fibrillation    Rate control as per primary team         Thank you for your consult. I will follow-up with patient. Please contact us if you have any additional questions.  (late entry note)  Philip Ceja MD  Infectious Disease  [facility]    Subjective:     Principal Problem: Acute on chronic respiratory failure  with hypoxia and hypercapnia    HPI:   73 year old woman with history of  Atrial fibrillation, HTN, CKD, CVA (embolic), Anemia , Heart failure, Chronic respiratory failure with trach, functional quadriplegia, CAD, and Neuromyelitis Optica who presented to the ER  for abnormal lab results.  Pt is a resident at [facility] for skilled nursing care. .  [facility] states pt has a sacral wound that was cultured and showed Gram negative bacteria.   Chest xray showed interval worsening of the appearance of the lungs with marked amount of alveolar consolidation scattered throughout left lung and the lower half of the right lung characteristic of pneumonia.  Since admission, all cultures reviewed -   Specimen: Tissue from Buttocks, Right Updated: 19 1413    Aerobic Bacterial Culture --    ESCHERICHIA COLI ESBL   Many     Aerobic Bacterial Culture --    PROTEUS MIRABILIS ESBL   Moderate      Specimen: Decubitus from Coccyx Updated: 19 0935      Aerobic Bacterial Culture --    ACINETOBACTER BAUMANNII COMPLEX   Many   Susceptibility pending     Aerobic Bacterial Culture --    ESCHERICHIA COLI ESBL   Moderate        Tracheal aspirate -MORGANELLA MORGANII .  Lab data showed wbc -19  She had interval I and D on     Past Medical History:   Diagnosis Date    Anticoagulant long-term use     Arthritis     Asthma     Atrial fibrillation     Blood clot of vein in shoulder area     Cardiac defibrillator in place     CHF (congestive heart failure)     Chronic knee pain     Diabetes mellitus type 2 without retinopathy 2016    Diaphragmatic hernia without obstruction and without gangrene 2015    GERD (gastroesophageal reflux disease)     Hypertension     Immune deficiency disorder     Primary open angle glaucoma (POAG) of both eyes, severe stage 2018       Past Surgical History:   Procedure Laterality Date    CARDIAC DEFIBRILLATOR PLACEMENT      , .    CATARACT EXTRACTION        SECTION      COLONOSCOPY      DEBRIDEMENT, WOUND, SACRUM N/A 2/6/2019    Performed by [facility]ila Laguna MD at [facility] OR    EGD, WITH PEG TUBE INSERTION N/A 7/18/2018    Performed by Louis O. Jeansonne IV, MD at [facility] OR    ashley Macdonald    ESOPHAGOGASTRODUODENOSCOPY (EGD) N/A 9/14/2015    Performed by Hieu Colbert MD at [facility] ENDO    KNEE ARTHROSCOPY      TRACHEOSTOMY N/A 7/18/2018    Performed by Louis O. Jeansonne IV, MD at [facility] OR    UPPER GASTROINTESTINAL ENDOSCOPY         Review of patient's allergies indicates:   Allergen Reactions    Morphine Hives    Atorvastatin      Other reaction(s): Muscle pain    Clonidine     Codeine      Other reaction(s): Unknown    Digoxin      Other reaction(s): Unknown    Diovan [valsartan] Other (See Comments)     Upset stomach, weakness    Eggs [egg derived]     Metoprolol Diarrhea    Naproxen      Other reaction(s): Nausea    Peanut     Propofol      Other reaction(s): delirious    Sulfa (sulfonamide antibiotics)        Medications:  Medications Prior to Admission   Medication Sig    albuterol 90 mcg/actuation inhaler Inhale 2 puffs into the lungs every 4 (four) hours as needed.     albuterol-ipratropium (DUO-NEB) 2.5 mg-0.5 mg/3 mL nebulizer solution Take 3 mLs by nebulization every 4 (four) hours as needed for Wheezing. Rescue    amiodarone (PACERONE) 200 MG Tab Take 1 tablet (200 mg total) by mouth 2 (two) times daily. (Patient taking differently: 200 mg by Per G Tube route 2 (two) times daily. )    bacitracin 500 unit/gram ointment Apply topically once daily. Apply to R heel daily.    baclofen (LIORESAL) 10 MG tablet 10 mg by Per G Tube route 3 (three) times daily as needed.     brimonidine-timolol (COMBIGAN) 0.2-0.5 % Drop Place 1 drop into both eyes every 12 (twelve) hours.    budesonide (PULMICORT) 0.5 mg/2 mL nebulizer solution Take 2 mLs (0.5 mg total) by nebulization every 12 (twelve) hours. Controller    bumetanide (BUMEX) 2 MG tablet  Take 1 tablet (2 mg total) by mouth 2 (two) times daily. 1 Tablet Oral Every day (Patient taking differently: Take 2 mg by mouth once daily. 1 Tablet Oral Every day)    ergocalciferol (ERGOCALCIFEROL) 50,000 unit Cap Take 1 capsule (50,000 Units total) by mouth every 7 days. (Patient taking differently: 50,000 Units by Per G Tube route every 7 days. )    fluticasone (FLONASE) 50 mcg/actuation nasal spray 2 sprays by Each Nare route once daily.    furosemide (LASIX) 20 MG tablet 20 mg by Per G Tube route 2 (two) times daily.     insulin detemir U-100 (LEVEMIR FLEXTOUCH) 100 unit/mL (3 mL) SubQ InPn pen Inject 10 Units into the skin every evening.    levothyroxine (SYNTHROID) 100 MCG tablet 100 mcg by Per G Tube route before breakfast.     linezolid (ZYVOX) 600 mg Tab 1 tablet (600 mg total) by Per G Tube route every 12 (twelve) hours.    magnesium oxide (MAG-OX) 400 mg tablet 400 mg by Per G Tube route 2 (two) times daily.     midodrine (PROAMATINE) 5 MG Tab 1 tablet (5 mg total) by Per G Tube route 3 (three) times daily.    montelukast (SINGULAIR) 10 mg tablet 10 mg by Per G Tube route once daily.     multivitamin (THERAGRAN) per tablet Take 1 tablet by mouth once daily.     mycophenolate (CELLCEPT) 250 mg Cap Take 2 capsules (500 mg total) by mouth 2 (two) times daily. (Patient taking differently: 500 mg 2 (two) times daily. )    ondansetron (ZOFRAN) 4 MG tablet 4 mg by Per G Tube route every 6 (six) hours as needed for Nausea.     pantoprazole (PROTONIX) 40 MG tablet Take 40 mg by mouth once daily.    polyethylene glycol (GLYCOLAX) 17 gram PwPk Take 17 g by mouth once daily. (Patient taking differently: 17 g by Per G Tube route once daily. )    rivaroxaban (XARELTO) 15 mg Tab 15 mg by Per G Tube route every evening.     traMADol (ULTRAM) 50 mg tablet 50 mg by Per G Tube route every 6 (six) hours as needed for Pain.     zinc sulfate (ZINCATE) 220 (50) mg capsule 220 mg by Per G Tube route once  daily.      Antibiotics (From admission, onward)    Start     Stop Route Frequency Ordered    02/08/19 2145  meropenem-0.9% sodium chloride 500 mg/50 mL IVPB      -- IV Every 12 hours (non-standard times) 02/08/19 1905        Antifungals (From admission, onward)    None        Antivirals (From admission, onward)    None           Immunization History   Administered Date(s) Administered    Tdap 07/07/2014       Family History     Problem Relation (Age of Onset)    Hypertension Mother, Father        Social History     Socioeconomic History    Marital status:      Spouse name: None    Number of children: None    Years of education: None    Highest education level: None   Social Needs    Financial resource strain: None    Food insecurity - worry: None    Food insecurity - inability: None    Transportation needs - medical: None    Transportation needs - non-medical: None   Occupational History    None   Tobacco Use    Smoking status: Never Smoker    Smokeless tobacco: Never Used   Substance and Sexual Activity    Alcohol use: No     Alcohol/week: 0.0 oz    Drug use: No    Sexual activity: No   Other Topics Concern    None   Social History Narrative    None     Review of Systems   Unable to perform ROS: Acuity of condition     Objective:     Vital Signs (Most Recent):  Temp: 97.7 °F (36.5 °C) (02/09/19 0300)  Pulse: 70 (02/09/19 0807)  Resp: (!) 24 (02/09/19 0807)  BP: 129/63 (02/09/19 0600)  SpO2: 98 % (02/09/19 0807) Vital Signs (24h Range):  Temp:  [97.7 °F (36.5 °C)-98.5 °F (36.9 °C)] 97.7 °F (36.5 °C)  Pulse:  [69-72] 70  Resp:  [10-26] 24  SpO2:  [92 %-100 %] 98 %  BP: ()/(40-78) 129/63     Weight: 68.6 kg (151 lb 3.8 oz)  Body mass index is 21.7 kg/m².    Estimated Creatinine Clearance: 41.7 mL/min (based on SCr of 1.3 mg/dL).    Physical Exam   Constitutional: She appears well-developed and well-nourished. No distress.   Lethargic   HENT:   Head: Normocephalic and atraumatic.    Mouth/Throat: Oropharynx is clear and moist.   Eyes: Conjunctivae and EOM are normal. Pupils are equal, round, and reactive to light.   Neck: Neck supple. No JVD present. No thyromegaly present.   Cardiovascular: Normal rate and regular rhythm. Exam reveals no gallop and no friction rub.   No murmur heard.  Defibrillator present.   Pulmonary/Chest: Effort normal and breath sounds normal. She has no wheezes. She has no rales.   #8 Shiley tracheostomy present.  RLL decreased breath sounds.  Left side decreased throughout with fine crackles.   Abdominal: Soft. Bowel sounds are normal. She exhibits no distension. There is no tenderness. There is no rebound and no guarding.   Feeding tube in place.   Genitourinary:   Genitourinary Comments: Flores catheter in place.   Musculoskeletal: Normal range of motion. She exhibits no edema or deformity.   Lymphadenopathy:     She has no cervical adenopathy.   Neurological: She is alert. She has normal reflexes.   Partially opens eyes to command.  Generalized weakness and not following other commands.  Upper extremities flexor contracted.  Upper and lower extremity muscle wasting.   Skin: Skin is warm and dry. No rash noted.   -has sacral wound   Psychiatric: She has a normal mood and affect. Her behavior is normal. Judgment and thought content normal.   Nursing note and vitals reviewed.      Significant Labs:   Blood Culture:   Recent Labs   Lab 10/22/18  1320 10/24/18  0628 10/24/18  0635 02/05/19  1235 02/05/19  1240   LABBLOO No growth after 5 days. No growth after 5 days. No growth after 5 days. No Growth to date  No Growth to date  No Growth to date  No Growth to date No Growth to date  No Growth to date  No Growth to date  No Growth to date     BMP:   Recent Labs   Lab 02/08/19  0350 02/09/19  0139   * 86   * 140   K 3.5 3.1*    104   CO2 29 26   BUN 97* 60*   CREATININE 2.3* 1.3   CALCIUM 9.8 8.7   MG 2.5  --      CBC:   Recent Labs   Lab  02/08/19  0350 02/09/19  0139   WBC 19.29* 10.68   HGB 7.6* 6.2*   HCT 24.0* 20.7*   * 442*     Wound Culture:   Recent Labs   Lab 02/06/19  0722 02/06/19  1410   LABAERO ACINETOBACTER BAUMANNII COMPLEX  Many  Susceptibility pending    ESCHERICHIA COLI ESBL  Moderate   ESCHERICHIA COLI ESBL  Many    PROTEUS MIRABILIS ESBL  Moderate       All pertinent labs within the past 24 hours have been reviewed.    Significant Imaging: I have reviewed all pertinent imaging results/findings within the past 24 hours.

## 2019-02-09 NOTE — ASSESSMENT & PLAN NOTE
-H/H cont < than 7 despite multiple  PRBC transfusion   - No obvious sign of GIB  - Could be related to sepsis   -Monitor H/H

## 2019-02-09 NOTE — ASSESSMENT & PLAN NOTE
S/P debridement. The wound cx is (+) for e.coli and proteus sensitive to meropenem. MEROPENEM per ID. Appreciate ID input.

## 2019-02-09 NOTE — ASSESSMENT & PLAN NOTE
-pt on amiodarone and Xarelto  -Xarelto held at this time to rule out active bleeding   -Amiodarone on hold  Due to low bp . Resume accordantly   -Tx reviewed

## 2019-02-09 NOTE — ASSESSMENT & PLAN NOTE
S/p wound debridement on 02/06- all cultures reviewed .  Cultures -Acinetobacter , ESBL E coli , proteus   Tracheal aspirate - morganella .  Carbapenems (including meropenem) are the preferred antibiotics for treatment of serious infections with bacteria that produce an extended-spectrum beta-lactamase (ESBL). This also applies even with invitro susceptibility to zosyn     Effect of Piperacillin-Tazobactam vs Meropenem on 30-Day Mortality for Patients With E coli or Klebsiella pneumoniae Bloodstream Infection and Ceftriaxone Resistance: A Randomized Clinical Trial.   Derrick VELASQUEZ -their findings support other studeis that do not support use of zosyn in these settings.     Will switch to meropenem , follow final drug susceptibility of acinetobacter.

## 2019-02-09 NOTE — PROGRESS NOTES
[facility] Hospital Medicine  Progress Note    Patient Name: Carlie Valdez  MRN: [id number]  Patient Class: IP- Inpatient   Admission Date: 2/5/2019  Length of Stay: 4 days  Attending Physician: Pipe Lomeli, *  Primary Care Provider: Johanna Guzman MD        Subjective:     Principal Problem:Acute on chronic respiratory failure with hypoxia and hypercapnia    HPI:  Carlie Valdez is a 73 y.o. female patient  with PMHX of Atrial fibrillation, HTN, CKD, CVA (embolic), Anemia , Heart failure, Chronic respiratory failure with trach, functional quadriplegia, CAD, and Neuromyelitis Optica who presented to the ER today for abnormal lab results.  Pt is a resident at [facility] for skilled nursing care. Pt had outpatient labs done today.  [facility] states pt has a sacral wound that was cultured and showed Gram negative bacteria.  Pt did not respond when questioned however has hx of responding appropriately to simple questions. HPI limited due to patient being trached and unable to mouth words.  ER workup showed: WBC 16.18, H/H 5.6/19.9, Na 152, BUN/Creatinine 115/2.4, Lactic acid 4.2, and Procal 1.20.  Chest xray showed interval worsening of the appearance of the lungs with marked amount of alveolar consolidation scattered throughout left lung and the lower half of the right lung characteristic of pneumonia.  UA + for infectious process.  Hospital Medicine contacted for admission to ICU.        Hospital Course:  Admitted to ICU for treatment of septic shock.  Abnormal urinalysis and multiple decubitus wounds as possible sources of infection.  IV fluid resuscitation.  Empirically started Vancomycin and Zosyn.  Wound care and General Surgery to evaluate for source control.  Surgical debridement of the wound with exposed bone at the base.  Wound cultures growing multiple organisms with multiple patterns of drug resistance.  Infectious Disease consult.  Minor clinical  improvement with broad spectrum antibiotic treatment.  2/9/19 Pt was seen and examined at bedside .She is s/p 1 PRBC . The H/H remain < 6 w/o any sign of acute bleeding . She is on mechanical  Ventilation trough  A tracheotomy. There was  No acute event overnight . ID was consulted yesterday  . The wound cx is (+) for e.coli and proteus sensitive to meropenem .      Interval History:     Review of Systems   Unable to perform ROS: Intubated     Objective:     Vital Signs (Most Recent):  Temp: 97.7 °F (36.5 °C) (02/09/19 0300)  Pulse: 70 (02/09/19 0908)  Resp: (!) 24 (02/09/19 0908)  BP: 129/63 (02/09/19 0600)  SpO2: 98 % (02/09/19 0908) Vital Signs (24h Range):  Temp:  [97.7 °F (36.5 °C)-98.5 °F (36.9 °C)] 97.7 °F (36.5 °C)  Pulse:  [69-72] 70  Resp:  [10-26] 24  SpO2:  [92 %-100 %] 98 %  BP: ()/(40-78) 129/63     Weight: 68.6 kg (151 lb 3.8 oz)  Body mass index is 21.7 kg/m².    Intake/Output Summary (Last 24 hours) at 2/9/2019 0923  Last data filed at 2/9/2019 0900  Gross per 24 hour   Intake 3745 ml   Output 3255 ml   Net 490 ml      Physical Exam   Constitutional: She appears well-developed and well-nourished. No distress.   Lethargic   HENT:   Head: Normocephalic and atraumatic.   Mouth/Throat: Oropharynx is clear and moist.   Eyes: Conjunctivae and EOM are normal. Pupils are equal, round, and reactive to light.   Neck: Neck supple. No JVD present. No thyromegaly present.   Cardiovascular: Normal rate and regular rhythm. Exam reveals no gallop and no friction rub.   No murmur heard.  Defibrillator present.   Pulmonary/Chest: Effort normal and breath sounds normal. She has no wheezes. She has no rales.   #8 Shiley tracheostomy present.  RLL decreased breath sounds.  Left side decreased throughout with fine crackles.   Abdominal: Soft. Bowel sounds are normal. She exhibits no distension. There is no tenderness. There is no rebound and no guarding.   Feeding tube in place.   Genitourinary:   Genitourinary  Comments: Flores catheter in place.   Musculoskeletal: Normal range of motion. She exhibits no edema or deformity.   Lymphadenopathy:     She has no cervical adenopathy.   Neurological: She is alert. She has normal reflexes.   Partially opens eyes to command.  Generalized weakness and not following other commands.  Upper extremities flexor contracted.  Upper and lower extremity muscle wasting.   Skin: Skin is warm and dry. No rash noted.   -has sacral wound   Psychiatric: She has a normal mood and affect. Her behavior is normal. Judgment and thought content normal.   Nursing note and vitals reviewed.      Significant Labs: All pertinent labs within the past 24 hours have been reviewed.    Significant Imaging: I have reviewed all pertinent imaging results/findings within the past 24 hours.    Assessment/Plan:      * Acute on chronic respiratory failure with hypoxia and hypercapnia, Vent Dependent w/ Trach    - Cont vent support pert ICU team  -Tx reviewed        Pressure injury of sacral region, stage 4    -cont wound care        Moderate malnutrition    -cont TF as tolerate        Pressure injury of left ischium, unstageable    -cont wound care        Pressure injury of trochanteric region of hip, stage 3    -cont wound care        Pressure injury of left thigh, unstageable    -cont wound care        Decubitus ulcer of sacral region, stage 4    -cont wound care        VAP (ventilator-associated pneumonia)    -IV antibiotics    Imaging results showed interval worsening with marked amount of alveolar consolidation scattered throughout the left lung and lower half of the right lung characteristic of pneumonia.  -vent dependent   -sputum and blood culture results pending  -Alfonzo prn   -Nebulizer treatments       Anemia    -H/H cont < than 7 despite multiple  PRBC transfusion   - No obvious sign of GIB  - Could be related to sepsis   -Monitor H/H          Functional quadriplegia    -hx of CVA (embolic)  -pt currently a  resident at [facility]  -PT/OT       Hypertension    -medications held due to hypotension        Acute on chronic renal failure    -Improving  -cont monitor kidney function and UOP        Severe sepsis with acute organ dysfunction    2/2 to infected wound due to e/coli esbl and proteus   ID on board   S/P  I&D per Surgery      Pressure injury of coccygeal region, stage 4    -wound care on board   -infection  Please see wound cx report   Susceptibility      Escherichia coli esbl Proteus mirabilis esbl     CULTURE, AEROBIC  (SPECIFY SOURCE) CULTURE, AEROBIC  (SPECIFY SOURCE)     Amikacin <=16  Sensitive <=16  Sensitive     Amox/K Clav'ate 16/8  Intermediate <=8/4  Sensitive     Amp/Sulbactam >16/8  Resistant >16/8  Resistant     Ampicillin >16  Resistant >16  Resistant     Cefazolin >16  Resistant >16  Resistant     Cefepime >16  Resistant 16  Resistant     Ceftriaxone >32  Resistant >32  Resistant     Ciprofloxacin >2  Resistant >2  Resistant     Ertapenem <=2  Sensitive <=2  Sensitive     Gentamicin >8  Resistant >8  Resistant     Meropenem <=4  Sensitive <=4  Sensitive     Piperacillin/Tazo <=16  Sensitive <=16  Sensitive     Tetracycline <=4  Sensitive >8  Resistant     Tobramycin >8  Resistant >8  Resistant     Trimeth/Sulfa <=2/38  Sensitive >2/38  Resistant      -Cont IVAB per ID recommendation   -S/O I&D per surgery      Leukocytosis    -in the setting of Severe Sepsis due to PNA, UTI, and wound infection    -IV antibiotics   -IV hydration   -blood, urine, sputum, and wound culture results pending        Hypernatremia    -free water flush via G tube   -repeat CMP in am        Neuromyelitis optica    -hx of  -Cellcept held in the setting of infection         UTI (urinary tract infection)    IV antibiotics   Urine culture results   Susceptibility      Escherichia coli esbl Proteus mirabilis esbl     CULTURE, AEROBIC  (SPECIFY SOURCE) CULTURE, AEROBIC  (SPECIFY SOURCE)     Amikacin <=16  Sensitive <=16   Sensitive     Amox/K Clav'ate 16/8  Intermediate <=8/4  Sensitive     Amp/Sulbactam >16/8  Resistant >16/8  Resistant     Ampicillin >16  Resistant >16  Resistant     Cefazolin >16  Resistant >16  Resistant     Cefepime >16  Resistant 16  Resistant     Ceftriaxone >32  Resistant >32  Resistant     Ciprofloxacin >2  Resistant >2  Resistant     Ertapenem <=2  Sensitive <=2  Sensitive     Gentamicin >8  Resistant >8  Resistant     Meropenem <=4  Sensitive <=4  Sensitive     Piperacillin/Tazo <=16  Sensitive <=16  Sensitive     Tetracycline <=4  Sensitive >8  Resistant     Tobramycin >8  Resistant >8  Resistant     Trimeth/Sulfa <=2/38  Sensitive >2/38  Resistant           Deep tissue injury multiple unstageable    -cont wound care        Hypothyroidism    - cont levothyroxine        Chronic Hypotension    -cont current tx      Type 2 diabetes mellitus with kidney complication, without long-term current use of insulin    -cont ISS  -Keep CBG < 180       Chronic combined systolic and diastolic congestive heart failure    -appears compensated  At this time   - cont current tx        Chronic atrial fibrillation    -pt on amiodarone and Xarelto  -Xarelto held at this time to rule out active bleeding   -Amiodarone on hold  Due to low bp . Resume accordantly   -Tx reviewed          VTE Risk Mitigation (From admission, onward)        Ordered     IP VTE HIGH RISK PATIENT  Once      02/05/19 1430     Place sequential compression device  Until discontinued      02/05/19 1430          Critical care time spent on the evaluation and treatment of severe organ dysfunction, review of pertinent labs and imaging studies, discussions with consulting providers and discussions with patient/family: 35 minutes.    Pipe Lomeli MD  Department of Hospital Medicine   [facility]

## 2019-02-09 NOTE — ASSESSMENT & PLAN NOTE
-wound care on board   -infection  Please see wound cx report   Susceptibility      Escherichia coli esbl Proteus mirabilis esbl     CULTURE, AEROBIC  (SPECIFY SOURCE) CULTURE, AEROBIC  (SPECIFY SOURCE)     Amikacin <=16  Sensitive <=16  Sensitive     Amox/K Clav'ate 16/8  Intermediate <=8/4  Sensitive     Amp/Sulbactam >16/8  Resistant >16/8  Resistant     Ampicillin >16  Resistant >16  Resistant     Cefazolin >16  Resistant >16  Resistant     Cefepime >16  Resistant 16  Resistant     Ceftriaxone >32  Resistant >32  Resistant     Ciprofloxacin >2  Resistant >2  Resistant     Ertapenem <=2  Sensitive <=2  Sensitive     Gentamicin >8  Resistant >8  Resistant     Meropenem <=4  Sensitive <=4  Sensitive     Piperacillin/Tazo <=16  Sensitive <=16  Sensitive     Tetracycline <=4  Sensitive >8  Resistant     Tobramycin >8  Resistant >8  Resistant     Trimeth/Sulfa <=2/38  Sensitive >2/38  Resistant      -Cont IVAB per ID recommendation   -S/O I&D per surgery

## 2019-02-09 NOTE — SUBJECTIVE & OBJECTIVE
Past Medical History:   Diagnosis Date    Anticoagulant long-term use     Arthritis     Asthma     Atrial fibrillation     Blood clot of vein in shoulder area     Cardiac defibrillator in place     CHF (congestive heart failure)     Chronic knee pain     Diabetes mellitus type 2 without retinopathy 2016    Diaphragmatic hernia without obstruction and without gangrene 2015    GERD (gastroesophageal reflux disease)     Hypertension     Immune deficiency disorder     Primary open angle glaucoma (POAG) of both eyes, severe stage 2018       Past Surgical History:   Procedure Laterality Date    CARDIAC DEFIBRILLATOR PLACEMENT      , .    CATARACT EXTRACTION       SECTION      COLONOSCOPY      DEBRIDEMENT, WOUND, SACRUM N/A 2019    Performed by Akila Laguna MD at [facility] OR    EGD, WITH PEG TUBE INSERTION N/A 2018    Performed by Louis O. Jeansonne IV, MD at [facility] OR    ashley Macdonald    ESOPHAGOGASTRODUODENOSCOPY (EGD) N/A 2015    Performed by Hieu Colbert MD at [facility] ENDO    KNEE ARTHROSCOPY      TRACHEOSTOMY N/A 2018    Performed by Louis O. Jeansonne IV, MD at [facility] OR    UPPER GASTROINTESTINAL ENDOSCOPY         Review of patient's allergies indicates:   Allergen Reactions    Morphine Hives    Atorvastatin      Other reaction(s): Muscle pain    Clonidine     Codeine      Other reaction(s): Unknown    Digoxin      Other reaction(s): Unknown    Diovan [valsartan] Other (See Comments)     Upset stomach, weakness    Eggs [egg derived]     Metoprolol Diarrhea    Naproxen      Other reaction(s): Nausea    Peanut     Propofol      Other reaction(s): delirious    Sulfa (sulfonamide antibiotics)        Medications:  Medications Prior to Admission   Medication Sig    albuterol 90 mcg/actuation inhaler Inhale 2 puffs into the lungs every 4 (four) hours as needed.     albuterol-ipratropium (DUO-NEB) 2.5 mg-0.5 mg/3 mL nebulizer solution  Take 3 mLs by nebulization every 4 (four) hours as needed for Wheezing. Rescue    amiodarone (PACERONE) 200 MG Tab Take 1 tablet (200 mg total) by mouth 2 (two) times daily. (Patient taking differently: 200 mg by Per G Tube route 2 (two) times daily. )    bacitracin 500 unit/gram ointment Apply topically once daily. Apply to R heel daily.    baclofen (LIORESAL) 10 MG tablet 10 mg by Per G Tube route 3 (three) times daily as needed.     brimonidine-timolol (COMBIGAN) 0.2-0.5 % Drop Place 1 drop into both eyes every 12 (twelve) hours.    budesonide (PULMICORT) 0.5 mg/2 mL nebulizer solution Take 2 mLs (0.5 mg total) by nebulization every 12 (twelve) hours. Controller    bumetanide (BUMEX) 2 MG tablet Take 1 tablet (2 mg total) by mouth 2 (two) times daily. 1 Tablet Oral Every day (Patient taking differently: Take 2 mg by mouth once daily. 1 Tablet Oral Every day)    ergocalciferol (ERGOCALCIFEROL) 50,000 unit Cap Take 1 capsule (50,000 Units total) by mouth every 7 days. (Patient taking differently: 50,000 Units by Per G Tube route every 7 days. )    fluticasone (FLONASE) 50 mcg/actuation nasal spray 2 sprays by Each Nare route once daily.    furosemide (LASIX) 20 MG tablet 20 mg by Per G Tube route 2 (two) times daily.     insulin detemir U-100 (LEVEMIR FLEXTOUCH) 100 unit/mL (3 mL) SubQ InPn pen Inject 10 Units into the skin every evening.    levothyroxine (SYNTHROID) 100 MCG tablet 100 mcg by Per G Tube route before breakfast.     linezolid (ZYVOX) 600 mg Tab 1 tablet (600 mg total) by Per G Tube route every 12 (twelve) hours.    magnesium oxide (MAG-OX) 400 mg tablet 400 mg by Per G Tube route 2 (two) times daily.     midodrine (PROAMATINE) 5 MG Tab 1 tablet (5 mg total) by Per G Tube route 3 (three) times daily.    montelukast (SINGULAIR) 10 mg tablet 10 mg by Per G Tube route once daily.     multivitamin (THERAGRAN) per tablet Take 1 tablet by mouth once daily.     mycophenolate (CELLCEPT) 250  mg Cap Take 2 capsules (500 mg total) by mouth 2 (two) times daily. (Patient taking differently: 500 mg 2 (two) times daily. )    ondansetron (ZOFRAN) 4 MG tablet 4 mg by Per G Tube route every 6 (six) hours as needed for Nausea.     pantoprazole (PROTONIX) 40 MG tablet Take 40 mg by mouth once daily.    polyethylene glycol (GLYCOLAX) 17 gram PwPk Take 17 g by mouth once daily. (Patient taking differently: 17 g by Per G Tube route once daily. )    rivaroxaban (XARELTO) 15 mg Tab 15 mg by Per G Tube route every evening.     traMADol (ULTRAM) 50 mg tablet 50 mg by Per G Tube route every 6 (six) hours as needed for Pain.     zinc sulfate (ZINCATE) 220 (50) mg capsule 220 mg by Per G Tube route once daily.      Antibiotics (From admission, onward)    Start     Stop Route Frequency Ordered    02/08/19 2145  meropenem-0.9% sodium chloride 500 mg/50 mL IVPB      -- IV Every 12 hours (non-standard times) 02/08/19 1905        Antifungals (From admission, onward)    None        Antivirals (From admission, onward)    None           Immunization History   Administered Date(s) Administered    Tdap 07/07/2014       Family History     Problem Relation (Age of Onset)    Hypertension Mother, Father        Social History     Socioeconomic History    Marital status:      Spouse name: None    Number of children: None    Years of education: None    Highest education level: None   Social Needs    Financial resource strain: None    Food insecurity - worry: None    Food insecurity - inability: None    Transportation needs - medical: None    Transportation needs - non-medical: None   Occupational History    None   Tobacco Use    Smoking status: Never Smoker    Smokeless tobacco: Never Used   Substance and Sexual Activity    Alcohol use: No     Alcohol/week: 0.0 oz    Drug use: No    Sexual activity: No   Other Topics Concern    None   Social History Narrative    None     Review of Systems   Unable to perform  ROS: Acuity of condition     Objective:     Vital Signs (Most Recent):  Temp: 97.7 °F (36.5 °C) (02/09/19 0300)  Pulse: 70 (02/09/19 0807)  Resp: (!) 24 (02/09/19 0807)  BP: 129/63 (02/09/19 0600)  SpO2: 98 % (02/09/19 0807) Vital Signs (24h Range):  Temp:  [97.7 °F (36.5 °C)-98.5 °F (36.9 °C)] 97.7 °F (36.5 °C)  Pulse:  [69-72] 70  Resp:  [10-26] 24  SpO2:  [92 %-100 %] 98 %  BP: ()/(40-78) 129/63     Weight: 68.6 kg (151 lb 3.8 oz)  Body mass index is 21.7 kg/m².    Estimated Creatinine Clearance: 41.7 mL/min (based on SCr of 1.3 mg/dL).    Physical Exam   Constitutional: She appears well-developed and well-nourished. No distress.   Lethargic   HENT:   Head: Normocephalic and atraumatic.   Mouth/Throat: Oropharynx is clear and moist.   Eyes: Conjunctivae and EOM are normal. Pupils are equal, round, and reactive to light.   Neck: Neck supple. No JVD present. No thyromegaly present.   Cardiovascular: Normal rate and regular rhythm. Exam reveals no gallop and no friction rub.   No murmur heard.  Defibrillator present.   Pulmonary/Chest: Effort normal and breath sounds normal. She has no wheezes. She has no rales.   #8 Shiley tracheostomy present.  RLL decreased breath sounds.  Left side decreased throughout with fine crackles.   Abdominal: Soft. Bowel sounds are normal. She exhibits no distension. There is no tenderness. There is no rebound and no guarding.   Feeding tube in place.   Genitourinary:   Genitourinary Comments: Flores catheter in place.   Musculoskeletal: Normal range of motion. She exhibits no edema or deformity.   Lymphadenopathy:     She has no cervical adenopathy.   Neurological: She is alert. She has normal reflexes.   Partially opens eyes to command.  Generalized weakness and not following other commands.  Upper extremities flexor contracted.  Upper and lower extremity muscle wasting.   Skin: Skin is warm and dry. No rash noted.   -has sacral wound   Psychiatric: She has a normal mood and  affect. Her behavior is normal. Judgment and thought content normal.   Nursing note and vitals reviewed.      Significant Labs:   Blood Culture:   Recent Labs   Lab 10/22/18  1320 10/24/18  0628 10/24/18  0635 02/05/19  1235 02/05/19  1240   LABBLOO No growth after 5 days. No growth after 5 days. No growth after 5 days. No Growth to date  No Growth to date  No Growth to date  No Growth to date No Growth to date  No Growth to date  No Growth to date  No Growth to date     BMP:   Recent Labs   Lab 02/08/19  0350 02/09/19  0139   * 86   * 140   K 3.5 3.1*    104   CO2 29 26   BUN 97* 60*   CREATININE 2.3* 1.3   CALCIUM 9.8 8.7   MG 2.5  --      CBC:   Recent Labs   Lab 02/08/19  0350 02/09/19  0139   WBC 19.29* 10.68   HGB 7.6* 6.2*   HCT 24.0* 20.7*   * 442*     Wound Culture:   Recent Labs   Lab 02/06/19  0722 02/06/19  1410   LABAERO ACINETOBACTER BAUMANNII COMPLEX  Many  Susceptibility pending    ESCHERICHIA COLI ESBL  Moderate   ESCHERICHIA COLI ESBL  Many    PROTEUS MIRABILIS ESBL  Moderate       All pertinent labs within the past 24 hours have been reviewed.    Significant Imaging: I have reviewed all pertinent imaging results/findings within the past 24 hours.

## 2019-02-09 NOTE — SUBJECTIVE & OBJECTIVE
Interval History: Seen and examined at bedside. Hospital chart reviewed. No acute interval detrimental events noted. S/P debridement of sacral wound. LLL atelectasis on CT chest => chest PT. The wound cx is (+) for e.coli and proteus sensitive to meropenem .        Review of Systems   Unable to perform ROS: Other       Scheduled Meds:   acetylcysteine 200 mg/ml (20%)  4 mL Nebulization BID    albuterol-ipratropium  3 mL Nebulization Q6H    famotidine (PF)  20 mg Intravenous Daily    levothyroxine  100 mcg Per G Tube Before breakfast    meropenem (MERREM) IVPB  500 mg Intravenous Q12H    midodrine  5 mg Per G Tube TID    polyethylene glycol  17 g Per G Tube Daily     Continuous Infusions:   sodium chloride 0.45% 75 mL/hr at 02/09/19 0903     PRN Meds:.dextrose 50%, dextrose 50%, glucagon (human recombinant), glucose, glucose, insulin aspart U-100, sodium chloride 0.9%     Objective:     Vital Signs (Most Recent):  Temp: 97.7 °F (36.5 °C) (02/09/19 0300)  Pulse: 70 (02/09/19 0908)  Resp: (!) 24 (02/09/19 0908)  BP: 129/63 (02/09/19 0600)  SpO2: 98 % (02/09/19 0908) Vital Signs (24h Range):  Temp:  [97.7 °F (36.5 °C)-98.5 °F (36.9 °C)] 97.7 °F (36.5 °C)  Pulse:  [69-72] 70  Resp:  [10-26] 24  SpO2:  [92 %-100 %] 98 %  BP: ()/(40-78) 129/63     Weight: 68.6 kg (151 lb 3.8 oz)  Body mass index is 21.7 kg/m².      Intake/Output Summary (Last 24 hours) at 2/9/2019 0957  Last data filed at 2/9/2019 0900  Gross per 24 hour   Intake 3745 ml   Output 3255 ml   Net 490 ml       Physical Exam   Constitutional: Vital signs are normal. She appears well-developed. She has a sickly appearance.   HENT:   Head: Normocephalic and atraumatic.   Mouth/Throat: Oropharynx is clear and moist.   Neck: Neck supple. No JVD present. No tracheal deviation present.       Cardiovascular: Normal rate and regular rhythm.   Pulses:       Radial pulses are 2+ on the right side, and 2+ on the left side.        Dorsalis pedis pulses are 1+  on the right side, and 1+ on the left side.   Paced rhythm   Pulmonary/Chest: No accessory muscle usage. No tachypnea. No respiratory distress. She has decreased breath sounds in the left middle field and the left lower field. She has no wheezes. She has rales (coarse bilat).   Abdominal: Soft. Bowel sounds are normal. She exhibits no distension. There is no tenderness.       Genitourinary:   Genitourinary Comments: Flores in place   Musculoskeletal: She exhibits no edema.   No edema.  Diffuse atrophy.  LUE contracture   Lymphadenopathy:     She has no cervical adenopathy.   Neurological: She is alert.   Nods head and mouths words appropriately to questions   Skin: Skin is warm and dry. Capillary refill takes 2 to 3 seconds. She is not diaphoretic. No cyanosis.            Vents:  Vent Mode: A/C (02/09/19 0908)  Ventilator Initiated: Yes (02/05/19 1127)  Set Rate: 20 bmp (02/09/19 0908)  Vt Set: 375 mL (02/09/19 0908)  Pressure Support: 0 cmH20 (02/09/19 0908)  PEEP/CPAP: 10 cmH20 (02/09/19 0908)  Oxygen Concentration (%): 40 (02/09/19 0908)  Peak Airway Pressure: 28 cmH2O (02/09/19 0908)  Plateau Pressure: 0 cmH20 (02/09/19 0908)  Total Ve: 9.87 mL (02/09/19 0908)  F/VT Ratio<105 (RSBI): (!) 60.91 (02/09/19 0908)    Lines/Drains/Airways     Drain                 Urethral Catheter -- days         Gastrostomy/Enterostomy 10/19/18 Percutaneous endoscopic gastrostomy (PEG)  days          Airway                 Surgical Airway 02/06/19 1905 Nikita 2 days          Pressure Ulcer                 Pressure Injury 02/05/19 Left Ischial tuberosity Unstageable - Present on Admission 4 days         Pressure Injury 02/05/19 Right medial Malleolus DTPI - Present on Admission 4 days         Pressure Injury 02/05/19 Sacral spine Stage 4 4 days         Pressure Injury 02/05/19 1620 Left lateral Hip Stage 3 3 days         Pressure Injury 02/06/19 Left Thigh DTPI - Present on Admission 3 days         Pressure Injury 02/06/19 Left  Thigh Unstageable - Present on Admission 3 days         Pressure Injury 02/06/19 Left lateral Leg Stage 2 3 days         Pressure Injury 02/06/19 Left upper Arm DTPI - Present on Admission 3 days         Negative Pressure Wound Therapy  less than 1 day          Peripheral Intravenous Line                 Midline Catheter Insertion/Assessment  - Double Lumen 02/05/19 1717 Left basilic vein (medial side of arm) 18g x 10cm 3 days         Peripheral IV - Double Lumen 02/05/19 1545 Left Forearm 3 days                Significant Labs:    CBC/Anemia Profile:  Recent Labs   Lab 02/08/19  0350 02/09/19  0139 02/09/19  0900   WBC 19.29* 10.68  --    HGB 7.6* 6.2* 6.9*   HCT 24.0* 20.7* 23.1*   * 442*  --    MCV 89 90  --    RDW 19.0* 19.3*  --         Chemistries:  Recent Labs   Lab 02/08/19  0350 02/09/19  0139   * 140   K 3.5 3.1*    104   CO2 29 26   BUN 97* 60*   CREATININE 2.3* 1.3   CALCIUM 9.8 8.7   ALBUMIN 1.7* 1.5*   PROT 7.2 6.3   BILITOT 0.3 0.2   ALKPHOS 135 136*   ALT 15 11   AST 35 24   MG 2.5  --          Significant Imaging:    CT CHEST WITHOUT CONTRAST: 02/08/19:   Tracheostomy tube in appropriate position however there are significant secretions within the tip of the tube and around the tip of the tube.  Please correlate.    There are mucoid secretions within the left mainstem bronchus, with associated postobstructive atelectasis and consolidation of the left lower lobe. There is minimal aeration of the left upper lobe and lingula.    On the right, there is interlobular septal thickening with scattered ground-glass, and more dense areas of consolidation at the posterior aspect right upper lobe, and dense consolidation at the right lower lobe with air bronchograms. There are tiny bilateral pleural effusions. Component of pulmonary edema is suggested.

## 2019-02-09 NOTE — ASSESSMENT & PLAN NOTE
IV antibiotics   Urine culture results   Susceptibility      Escherichia coli esbl Proteus mirabilis esbl     CULTURE, AEROBIC  (SPECIFY SOURCE) CULTURE, AEROBIC  (SPECIFY SOURCE)     Amikacin <=16  Sensitive <=16  Sensitive     Amox/K Clav'ate 16/8  Intermediate <=8/4  Sensitive     Amp/Sulbactam >16/8  Resistant >16/8  Resistant     Ampicillin >16  Resistant >16  Resistant     Cefazolin >16  Resistant >16  Resistant     Cefepime >16  Resistant 16  Resistant     Ceftriaxone >32  Resistant >32  Resistant     Ciprofloxacin >2  Resistant >2  Resistant     Ertapenem <=2  Sensitive <=2  Sensitive     Gentamicin >8  Resistant >8  Resistant     Meropenem <=4  Sensitive <=4  Sensitive     Piperacillin/Tazo <=16  Sensitive <=16  Sensitive     Tetracycline <=4  Sensitive >8  Resistant     Tobramycin >8  Resistant >8  Resistant     Trimeth/Sulfa <=2/38  Sensitive >2/38  Resistant

## 2019-02-09 NOTE — PROGRESS NOTES
Pharmacist Renal Dose Adjustment Note    Carlie Valdez is a 73 y.o. female being treated with the medication Meropenem    Patient Data:    Vital Signs (Most Recent):  Temp: 98.4 °F (36.9 °C) (02/08/19 1700)  Pulse: 70 (02/08/19 1800)  Resp: (!) 22 (02/08/19 1800)  BP: (!) 87/40 (02/08/19 1800)  SpO2: 97 % (02/08/19 1800)   Vital Signs (72h Range):  Temp:  [97.4 °F (36.3 °C)-99.6 °F (37.6 °C)]   Pulse:  [68-75]   Resp:  [9-29]   BP: ()/()   SpO2:  [91 %-100 %]      Recent Labs   Lab 02/06/19  0343 02/07/19  0357 02/08/19  0350   CREATININE 2.2* 2.5* 2.3*     Serum creatinine: 2.3 mg/dL (H) 02/08/19 0350  Estimated creatinine clearance: 23.6 mL/min (A)    Medication: Meropenem 500 mg IV every 6 hours will be changed to Meropenem 500 mg IV every 12 hours due to CrCl within the range of 10-24 mL/min.     Pharmacist's Name: Carlie Carlin PharmD  Pharmacist's Extension: [phone]    Thank you for allowing us to participate in this patient's care.     Carlie Carlin PharmD 02/08/2019 7:24 PM

## 2019-02-09 NOTE — ASSESSMENT & PLAN NOTE
Resolving.    [x] Respiratory rate >20 breaths/min or PaCO2 <32 mmHg   [x]  WBC >12,000 cells/mm3, <4000 cells/mm3, or >10 percent immature (band) forms  [] Heart rate >90 beats/min   [] Temperature >38ºC or <36ºC    Microbiology: Panculture.   Oxygenation: Ventilator dependent. Keep SAO2 > = 92%  Hemodynamics: Stable.  Keep MAP > = 65mmHg  Source control: IV Meropenem.

## 2019-02-10 NOTE — SUBJECTIVE & OBJECTIVE
Interval History: remains on ventilatory support .  All cultures reviewed-  From wound cultures -  Decubitus -ESBL E coli-  Acinetobacter baumanni  Right buttocks-  ESBL E coli  Proteus ESBL  Review of Systems   Unable to perform ROS: Acuity of condition     Objective:     Vital Signs (Most Recent):  Temp: 98.1 °F (36.7 °C) (02/09/19 2300)  Pulse: 70 (02/10/19 0546)  Resp: (!) 28 (02/10/19 0546)  BP: 136/75 (02/10/19 0500)  SpO2: 98 % (02/10/19 0546) Vital Signs (24h Range):  Temp:  [97.7 °F (36.5 °C)-98.3 °F (36.8 °C)] 98.1 °F (36.7 °C)  Pulse:  [67-72] 70  Resp:  [19-31] 28  SpO2:  [96 %-100 %] 98 %  BP: (107-155)/(49-80) 136/75     Weight: 72 kg (158 lb 11.7 oz)  Body mass index is 22.78 kg/m².    Estimated Creatinine Clearance: 41.7 mL/min (based on SCr of 1.3 mg/dL).    Physical Exam   Constitutional: She appears well-developed and well-nourished. No distress.   Lethargic   HENT:   Head: Normocephalic and atraumatic.   Mouth/Throat: Oropharynx is clear and moist.   Eyes: Conjunctivae and EOM are normal. Pupils are equal, round, and reactive to light.   Neck: Neck supple. No JVD present. No thyromegaly present.   Cardiovascular: Normal rate and regular rhythm. Exam reveals no gallop and no friction rub.   No murmur heard.  Defibrillator present.   Pulmonary/Chest: Effort normal and breath sounds normal. She has no wheezes. She has no rales.   #8 Shiley tracheostomy present.  RLL decreased breath sounds.  Left side decreased throughout with fine crackles.   Abdominal: Soft. Bowel sounds are normal. She exhibits no distension. There is no tenderness. There is no rebound and no guarding.   Feeding tube in place.   Genitourinary:   Genitourinary Comments: Flores catheter in place.   Musculoskeletal: Normal range of motion. She exhibits no edema or deformity.   Lymphadenopathy:     She has no cervical adenopathy.   Neurological: She is alert. She has normal reflexes.   Partially opens eyes to command.  Generalized  weakness and not following other commands.  Upper extremities flexor contracted.  Upper and lower extremity muscle wasting.   Skin: Skin is warm and dry. No rash noted.   -has sacral wound   Psychiatric: She has a normal mood and affect. Her behavior is normal. Judgment and thought content normal.   Nursing note and vitals reviewed.      Significant Labs:   Blood Culture:   Recent Labs   Lab 10/22/18  1320 10/24/18  0628 10/24/18  0635 02/05/19  1235 02/05/19  1240   LABBLOO No growth after 5 days. No growth after 5 days. No growth after 5 days. No Growth to date  No Growth to date  No Growth to date  No Growth to date  No Growth to date No Growth to date  No Growth to date  No Growth to date  No Growth to date  No Growth to date     CBC:   Recent Labs   Lab 02/09/19  0139 02/09/19  0900   WBC 10.68  --    HGB 6.2* 6.9*   HCT 20.7* 23.1*   *  --      Wound Culture:   Recent Labs   Lab 02/06/19  0722 02/06/19  1410   LABAERO ACINETOBACTER BAUMANNII/HAEMOLYTICUS  Many  Susceptibility pending    ESCHERICHIA COLI ESBL  Moderate   ESCHERICHIA COLI ESBL  Many    PROTEUS MIRABILIS ESBL  Moderate       All pertinent labs within the past 24 hours have been reviewed.    Significant Imaging: I have reviewed all pertinent imaging results/findings within the past 24 hours.

## 2019-02-10 NOTE — SUBJECTIVE & OBJECTIVE
Review of Systems   Unable to perform ROS: Other   trached on vent    Objective:     Vital Signs (Most Recent):  Temp: 98.8 °F (37.1 °C) (02/10/19 1035)  Pulse: 70 (02/10/19 1104)  Resp: (!) 24 (02/10/19 1104)  BP: 132/71 (02/10/19 1035)  SpO2: 99 % (02/10/19 1104) Vital Signs (24h Range):  Temp:  [97.8 °F (36.6 °C)-98.9 °F (37.2 °C)] 98.8 °F (37.1 °C)  Pulse:  [67-72] 70  Resp:  [17-31] 24  SpO2:  [96 %-100 %] 99 %  BP: (108-165)/(49-91) 132/71     Weight: 72 kg (158 lb 11.7 oz)  Body mass index is 22.78 kg/m².      Intake/Output Summary (Last 24 hours) at 2/10/2019 1105  Last data filed at 2/10/2019 1000  Gross per 24 hour   Intake 3425 ml   Output 1720 ml   Net 1705 ml       Physical Exam   Constitutional: Vital signs are normal. She appears well-developed. She has a sickly appearance.   HENT:   Head: Normocephalic and atraumatic.   Mouth/Throat: Oropharynx is clear and moist.   Eyes: EOM are normal.   Neck: Neck supple. No JVD present. No tracheal deviation present.       Cardiovascular: Normal rate and regular rhythm.   Pulses:       Radial pulses are 2+ on the right side, and 2+ on the left side.        Dorsalis pedis pulses are 1+ on the right side, and 1+ on the left side.   Paced rhythm   Pulmonary/Chest: No accessory muscle usage. No tachypnea. No respiratory distress. She has decreased breath sounds in the left middle field and the left lower field. She has no wheezes. She has rales (coarse bilat improved with tracheal suctioning).   Abdominal: Soft. Bowel sounds are normal. She exhibits no distension. There is no tenderness.       Genitourinary:   Genitourinary Comments: Flores in place   Musculoskeletal: She exhibits no edema.   No edema.  Diffuse atrophy.  LUE contracture   Lymphadenopathy:     She has no cervical adenopathy.   Neurological: She is alert.   Nods head and mouths words appropriately to questions   Skin: Skin is warm and dry. Capillary refill takes less than 2 seconds. She is not  diaphoretic. No cyanosis.            Vents:  Vent Mode: A/C (02/10/19 1104)  Ventilator Initiated: Yes (02/05/19 1127)  Set Rate: 20 bmp (02/10/19 1104)  Vt Set: 375 mL (02/10/19 1104)  Pressure Support: 0 cmH20 (02/10/19 1104)  PEEP/CPAP: 10 cmH20 (02/10/19 1104)  Oxygen Concentration (%): 40 (02/10/19 1104)  Peak Airway Pressure: 31 cmH2O (02/10/19 1104)  Plateau Pressure: 0 cmH20 (02/10/19 1104)  Total Ve: 10.9 mL (02/10/19 1104)  F/VT Ratio<105 (RSBI): (!) (P) 56.21 (02/10/19 1104)    Lines/Drains/Airways     Drain                 Urethral Catheter -- days         Gastrostomy/Enterostomy 10/19/18 Percutaneous endoscopic gastrostomy (PEG)  days          Airway                 Surgical Airway 02/06/19 1905 Shiley 3 days          Pressure Ulcer                 Pressure Injury 02/05/19 Left Ischial tuberosity Unstageable - Present on Admission 5 days         Pressure Injury 02/05/19 Right medial Malleolus DTPI - Present on Admission 5 days         Pressure Injury 02/05/19 Sacral spine Stage 4 5 days         Pressure Injury 02/05/19 1620 Left lateral Hip Stage 3 4 days         Pressure Injury 02/06/19 Left Thigh DTPI - Present on Admission 4 days         Pressure Injury 02/06/19 Left Thigh Unstageable - Present on Admission 4 days         Pressure Injury 02/06/19 Left lateral Leg Stage 2 4 days         Pressure Injury 02/06/19 Left upper Arm DTPI - Present on Admission 4 days         Negative Pressure Wound Therapy  1 day          Peripheral Intravenous Line                 Midline Catheter Insertion/Assessment  - Double Lumen 02/05/19 1717 Left basilic vein (medial side of arm) 18g x 10cm 4 days         Peripheral IV - Double Lumen 02/05/19 1545 Left Forearm 4 days                Significant Labs:    CBC/Anemia Profile:  Recent Labs   Lab 02/09/19  0139 02/09/19  0900   WBC 10.68  --    HGB 6.2* 6.9*   HCT 20.7* 23.1*   *  --    MCV 90  --    RDW 19.3*  --         Chemistries:  Recent Labs   Lab  02/09/19  0139      K 3.1*      CO2 26   BUN 60*   CREATININE 1.3   CALCIUM 8.7   ALBUMIN 1.5*   PROT 6.3   BILITOT 0.2   ALKPHOS 136*   ALT 11   AST 24       All pertinent labs within the past 24 hours have been reviewed.    Significant Imaging:  CXR: I have reviewed all pertinent results/findings within the past 24 hours and my personal findings are:  some improved aeration of left lung

## 2019-02-10 NOTE — ASSESSMENT & PLAN NOTE
With resultant functional quadraplegia, blindness and vent dependence. Cellcept on hold.      Statement Selected

## 2019-02-10 NOTE — PLAN OF CARE
Problem: Adult Inpatient Plan of Care  Goal: Plan of Care Review  Outcome: Ongoing (interventions implemented as appropriate)  Pt continues to be on mechanical vent with no significant change.

## 2019-02-10 NOTE — NURSING
Labs for today 2/10/19 placed in paper chart- labs not crossing over into Epic at this time. RIGOBERTO Myers and MD Magallon reviewed labs.

## 2019-02-10 NOTE — PROGRESS NOTES
[facility] -   Infectious Disease  Progress Note    Patient Name: Carlie Valdez  MRN: [id number]  Admission Date: 2/5/2019  Length of Stay: 5 days  Attending Physician: Pipe Lomeli, *  Primary Care Provider: Johanna Guzman MD    Isolation Status: No active isolations  Assessment/Plan:      * Acute on chronic respiratory failure with hypoxia and hypercapnia, Vent Dependent w/ Trach    Critical care follow up, ventilatory support      Pressure injury of trochanteric region of hip, stage 3    Will continue wound care      Pressure injury of left thigh, unstageable    Wound care follow up     Decubitus ulcer of sacral region, stage 4    Will treat as presumed osetomeylitis -will continue meropenem and will add tigecycline     VAP (ventilator-associated pneumonia)    2/9- will continue meropenem   Ventilatory support      Acute on chronic renal failure    Will closely monitor serum creatinine, avoid nephrotoxic meds     Deep tissue injury multiple unstageable    S/p wound debridement on 02/06- all cultures reviewed .  Cultures -Acinetobacter , ESBL E coli , proteus   Tracheal aspirate - morganella .  Carbapenems (including meropenem) are the preferred antibiotics for treatment of serious infections with bacteria that produce an extended-spectrum beta-lactamase (ESBL). This also applies even with invitro susceptibility to zosyn     Effect of Piperacillin-Tazobactam vs Meropenem on 30-Day Mortality for Patients With E coli or Klebsiella pneumoniae Bloodstream Infection and Ceftriaxone Resistance: A Randomized Clinical Trial.   Derrick VELASQUEZ -their findings support other studeis that do not support use of zosyn in these settings.     2/9- will add tigecycline for acinetobacter as isolate is meropenem resistant , minocycline can be an option for discharge      Will switch to meropenem , follow final drug susceptibility of acinetobacter.         Type 2 diabetes mellitus with kidney  complication, without long-term current use of insulin    Insulin sliding scale, closely monitor glucose         Anticipated Disposition:     Thank you for your consult. I will follow-up with patient. Please contact us if you have any additional questions.    Philip Ceja MD  Infectious Disease  [facility]    Subjective:     Principal Problem:Acute on chronic respiratory failure with hypoxia and hypercapnia    HPI:   73 year old woman with history of  Atrial fibrillation, HTN, CKD, CVA (embolic), Anemia , Heart failure, Chronic respiratory failure with trach, functional quadriplegia, CAD, and Neuromyelitis Optica who presented to the ER  for abnormal lab results.  Pt is a resident at [facility] nursing care. .  [facility] states pt has a sacral wound that was cultured and showed Gram negative bacteria.   Chest xray showed interval worsening of the appearance of the lungs with marked amount of alveolar consolidation scattered throughout left lung and the lower half of the right lung characteristic of pneumonia.  Since admission, all cultures reviewed -   Specimen: Tissue from Buttocks, Right Updated: 02/08/19 1413    Aerobic Bacterial Culture --    ESCHERICHIA COLI ESBL   Many     Aerobic Bacterial Culture --    PROTEUS MIRABILIS ESBL   Moderate      Specimen: Decubitus from Coccyx Updated: 02/08/19 0935      Aerobic Bacterial Culture --    ACINETOBACTER BAUMANNII COMPLEX   Many   Susceptibility pending     Aerobic Bacterial Culture --    ESCHERICHIA COLI ESBL   Moderate        Tracheal aspirate -MORGANELLA MORGANII .  Lab data showed wbc -19  She had interval I and D on 02/06  Interval History: remains on ventilatory support .  All cultures reviewed-  From wound cultures -  Decubitus -ESBL E coli-  Acinetobacter baumanni  Right buttocks-  ESBL E coli  Proteus ESBL  Review of Systems   Unable to perform ROS: Acuity of condition     Objective:     Vital Signs (Most Recent):  Temp: 98.1 °F  (36.7 °C) (02/09/19 2300)  Pulse: 70 (02/10/19 0546)  Resp: (!) 28 (02/10/19 0546)  BP: 136/75 (02/10/19 0500)  SpO2: 98 % (02/10/19 0546) Vital Signs (24h Range):  Temp:  [97.7 °F (36.5 °C)-98.3 °F (36.8 °C)] 98.1 °F (36.7 °C)  Pulse:  [67-72] 70  Resp:  [19-31] 28  SpO2:  [96 %-100 %] 98 %  BP: (107-155)/(49-80) 136/75     Weight: 72 kg (158 lb 11.7 oz)  Body mass index is 22.78 kg/m².    Estimated Creatinine Clearance: 41.7 mL/min (based on SCr of 1.3 mg/dL).    Physical Exam   Constitutional: She appears well-developed and well-nourished. No distress.   Lethargic   HENT:   Head: Normocephalic and atraumatic.   Mouth/Throat: Oropharynx is clear and moist.   Eyes: Conjunctivae and EOM are normal. Pupils are equal, round, and reactive to light.   Neck: Neck supple. No JVD present. No thyromegaly present.   Cardiovascular: Normal rate and regular rhythm. Exam reveals no gallop and no friction rub.   No murmur heard.  Defibrillator present.   Pulmonary/Chest: Effort normal and breath sounds normal. She has no wheezes. She has no rales.   #8 Shiley tracheostomy present.  RLL decreased breath sounds.  Left side decreased throughout with fine crackles.   Abdominal: Soft. Bowel sounds are normal. She exhibits no distension. There is no tenderness. There is no rebound and no guarding.   Feeding tube in place.   Genitourinary:   Genitourinary Comments: Flores catheter in place.   Musculoskeletal: Normal range of motion. She exhibits no edema or deformity.   Lymphadenopathy:     She has no cervical adenopathy.   Neurological: She is alert. She has normal reflexes.   Partially opens eyes to command.  Generalized weakness and not following other commands.  Upper extremities flexor contracted.  Upper and lower extremity muscle wasting.   Skin: Skin is warm and dry. No rash noted.   -has sacral wound   Psychiatric: She has a normal mood and affect. Her behavior is normal. Judgment and thought content normal.   Nursing note and  vitals reviewed.      Significant Labs:   Blood Culture:   Recent Labs   Lab 10/22/18  1320 10/24/18  0628 10/24/18  0635 02/05/19  1235 02/05/19  1240   LABBLOO No growth after 5 days. No growth after 5 days. No growth after 5 days. No Growth to date  No Growth to date  No Growth to date  No Growth to date  No Growth to date No Growth to date  No Growth to date  No Growth to date  No Growth to date  No Growth to date     CBC:   Recent Labs   Lab 02/09/19  0139 02/09/19  0900   WBC 10.68  --    HGB 6.2* 6.9*   HCT 20.7* 23.1*   *  --      Wound Culture:   Recent Labs   Lab 02/06/19  0722 02/06/19  1410   LABAERO ACINETOBACTER BAUMANNII/HAEMOLYTICUS  Many  Susceptibility pending    ESCHERICHIA COLI ESBL  Moderate   ESCHERICHIA COLI ESBL  Many    PROTEUS MIRABILIS ESBL  Moderate       All pertinent labs within the past 24 hours have been reviewed.    Significant Imaging: I have reviewed all pertinent imaging results/findings within the past 24 hours.

## 2019-02-10 NOTE — PROGRESS NOTES
[facility] Hospital Medicine  Progress Note    Patient Name: Carlie Valdez  MRN: [id number]  Patient Class: IP- Inpatient   Admission Date: 2/5/2019  Length of Stay: 5 days  Attending Physician: Pipe Lomeli, *  Primary Care Provider: Johanna Guzman MD        Subjective:     Principal Problem:Acute on chronic respiratory failure with hypoxia and hypercapnia    HPI:  Carlie Valdez is a 73 y.o. female patient  with PMHX of Atrial fibrillation, HTN, CKD, CVA (embolic), Anemia , Heart failure, Chronic respiratory failure with trach, functional quadriplegia, CAD, and Neuromyelitis Optica who presented to the ER today for abnormal lab results.  Pt is a resident at [facility] for skilled nursing care. Pt had outpatient labs done today.  [facility] states pt has a sacral wound that was cultured and showed Gram negative bacteria.  Pt did not respond when questioned however has hx of responding appropriately to simple questions. HPI limited due to patient being trached and unable to mouth words.  ER workup showed: WBC 16.18, H/H 5.6/19.9, Na 152, BUN/Creatinine 115/2.4, Lactic acid 4.2, and Procal 1.20.  Chest xray showed interval worsening of the appearance of the lungs with marked amount of alveolar consolidation scattered throughout left lung and the lower half of the right lung characteristic of pneumonia.  UA + for infectious process.  Hospital Medicine contacted for admission to ICU.        Hospital Course:  Admitted to ICU for treatment of septic shock.  Abnormal urinalysis and multiple decubitus wounds as possible sources of infection.  IV fluid resuscitation.  Empirically started Vancomycin and Zosyn.  Wound care and General Surgery to evaluate for source control.  Surgical debridement of the wound with exposed bone at the base.  Wound cultures growing multiple organisms with multiple patterns of drug resistance.  Infectious Disease consult.  Minor clinical  improvement with broad spectrum antibiotic treatment.  2/9/19 Pt was seen and examined at bedside .She is s/p 1 PRBC . The H/H remain < 6 w/o any sign of acute bleeding . She is on mechanical  Ventilation trough  A tracheotomy. There was  No acute event overnight . ID was consulted yesterday  . The wound cx is (+) for e.coli and proteus sensitive to meropenem .    2/10/19 Pt was seen and examined . The H/H remain lower than < 7 .  There was no acute event overnight . She is on mechanical ventilation  Trough a tracheotomy .      Interval History:     Review of Systems   Unable to perform ROS: Intubated     Objective:     Vital Signs (Most Recent):  Temp: 98.8 °F (37.1 °C) (02/10/19 1320)  Pulse: 70 (02/10/19 1343)  Resp: (!) 25 (02/10/19 1343)  BP: (!) 154/77 (02/10/19 1320)  SpO2: 100 % (02/10/19 1343) Vital Signs (24h Range):  Temp:  [97.8 °F (36.6 °C)-98.9 °F (37.2 °C)] 98.8 °F (37.1 °C)  Pulse:  [67-71] 70  Resp:  [17-31] 25  SpO2:  [96 %-100 %] 100 %  BP: (119-165)/(54-91) 154/77     Weight: 72 kg (158 lb 11.7 oz)  Body mass index is 22.78 kg/m².    Intake/Output Summary (Last 24 hours) at 2/10/2019 1413  Last data filed at 2/10/2019 1200  Gross per 24 hour   Intake 3170 ml   Output 1480 ml   Net 1690 ml      Physical Exam   Constitutional: Vital signs are normal. She appears well-developed. She has a sickly appearance.   HENT:   Head: Normocephalic and atraumatic.   Mouth/Throat: Oropharynx is clear and moist.   Eyes: EOM are normal.   Neck: Neck supple. No JVD present. No tracheal deviation present.       Cardiovascular: Normal rate and regular rhythm.   Pulses:       Radial pulses are 2+ on the right side, and 2+ on the left side.        Dorsalis pedis pulses are 1+ on the right side, and 1+ on the left side.   Paced rhythm   Pulmonary/Chest: No accessory muscle usage. No tachypnea. No respiratory distress. She has decreased breath sounds in the left middle field and the left lower field. She has no wheezes.  She has rales (coarse bilat improved with tracheal suctioning).   Abdominal: Soft. Bowel sounds are normal. She exhibits no distension. There is no tenderness.       Genitourinary:   Genitourinary Comments: Flores in place   Musculoskeletal: She exhibits no edema.   No edema.  Diffuse atrophy.  LUE contracture   Lymphadenopathy:     She has no cervical adenopathy.   Neurological: She is alert.   Nods head and mouths words appropriately to questions   Skin: Skin is warm and dry. Capillary refill takes less than 2 seconds. She is not diaphoretic. No cyanosis.            Significant Labs: All pertinent labs within the past 24 hours have been reviewed.    Significant Imaging: I have reviewed all pertinent imaging results/findings within the past 24 hours.    Assessment/Plan:      * Acute on chronic respiratory failure with hypoxia and hypercapnia, Vent Dependent w/ Trach    - Cont vent support pert ICU team  -Tx reviewed        Pressure injury of sacral region, stage 4    -cont wound care        Moderate malnutrition    -cont TF as tolerate        Acute kidney injury    -improving       Pressure injury of left ischium, unstageable    -cont wound care        Pressure injury of trochanteric region of hip, stage 3    -cont wound care        Pressure injury of left thigh, unstageable    -cont wound care        Decubitus ulcer of sacral region, stage 4    -cont wound care        VAP (ventilator-associated pneumonia)    -IV antibiotics    Imaging results showed interval worsening with marked amount of alveolar consolidation scattered throughout the left lung and lower half of the right lung characteristic of pneumonia.  -vent dependent   -sputum and blood culture results pending  -Duonebs prn   -Nebulizer treatments       Anemia    -H/H cont < than 7 despite multiple  PRBC transfusion   - No obvious sign of GIB  - Could be related to sepsis   -Monitor H/H   -transfuse 1 PRBC          Functional quadriplegia    -hx of CVA  (embolic)  -pt currently a resident at [facility]  -PT/OT       Hypertension    -medications held due to hypotension        Acute on chronic renal failure    -Improving  -cont monitor kidney function and UOP        Severe sepsis with acute organ dysfunction    2/2 to infected wound due to e/coli esbl and proteus   ID on board   S/P  I&D per Surgery      Pressure injury of coccygeal region, stage 4    -wound care on board   -infection  Please see wound cx report   Susceptibility      Escherichia coli esbl Proteus mirabilis esbl    CULTURE, AEROBIC  (SPECIFY SOURCE) CULTURE, AEROBIC  (SPECIFY SOURCE)   Amikacin <=16  Sensitive <=16  Sensitive   Amox/K Clav'ate 16/8  Intermediate <=8/4  Sensitive   Amp/Sulbactam >16/8  Resistant >16/8  Resistant   Ampicillin >16  Resistant >16  Resistant   Cefazolin >16  Resistant >16  Resistant   Cefepime >16  Resistant 16  Resistant   Ceftriaxone >32  Resistant >32  Resistant   Ciprofloxacin >2  Resistant >2  Resistant   Ertapenem <=2  Sensitive <=2  Sensitive   Gentamicin >8  Resistant >8  Resistant   Meropenem <=4  Sensitive <=4  Sensitive   Piperacillin/Tazo <=16  Sensitive <=16  Sensitive   Tetracycline <=4  Sensitive >8  Resistant   Tobramycin >8  Resistant >8  Resistant   Trimeth/Sulfa <=2/38  Sensitive >2/38  Resistant     -Cont IVAB per ID recommendation   -S/O I&D per surgery      Atelectasis of left lung    -Cont  Ventilation  Support      Leukocytosis    -resolved        Hypernatremia    -free water flush via G tube   -repeat CMP in am        Neuromyelitis optica    -hx of  -Cellcept held in the setting of infection         UTI (urinary tract infection)    IV antibiotics   Urine culture results   Susceptibility      Escherichia coli esbl Proteus mirabilis esbl    CULTURE, AEROBIC  (SPECIFY SOURCE) CULTURE, AEROBIC  (SPECIFY SOURCE)   Amikacin <=16  Sensitive <=16  Sensitive   Amox/K Clav'ate 16/8  Intermediate <=8/4  Sensitive   Amp/Sulbactam >16/8  Resistant >16/8   Resistant   Ampicillin >16  Resistant >16  Resistant   Cefazolin >16  Resistant >16  Resistant   Cefepime >16  Resistant 16  Resistant   Ceftriaxone >32  Resistant >32  Resistant   Ciprofloxacin >2  Resistant >2  Resistant   Ertapenem <=2  Sensitive <=2  Sensitive   Gentamicin >8  Resistant >8  Resistant   Meropenem <=4  Sensitive <=4  Sensitive   Piperacillin/Tazo <=16  Sensitive <=16  Sensitive   Tetracycline <=4  Sensitive >8  Resistant   Tobramycin >8  Resistant >8  Resistant   Trimeth/Sulfa <=2/38  Sensitive >2/38  Resistant          Deep tissue injury multiple unstageable    -cont wound care        Hypothyroidism    - cont levothyroxine        Chronic Hypotension    -cont current tx      Type 2 diabetes mellitus with kidney complication, without long-term current use of insulin    -cont ISS  -Keep CBG < 180       Chronic combined systolic and diastolic congestive heart failure    -appears compensated  At this time   - cont current tx        Chronic atrial fibrillation    -pt on amiodarone and Xarelto  -Xarelto held at this time to rule out active bleeding   -Amiodarone on hold  Due to low bp . Resume accordantly   -Tx reviewed          VTE Risk Mitigation (From admission, onward)        Ordered     IP VTE HIGH RISK PATIENT  Once      02/05/19 1430     Place sequential compression device  Until discontinued      02/05/19 1430          Critical care time spent on the evaluation and treatment of severe organ dysfunction, review of pertinent labs and imaging studies, discussions with consulting providers and discussions with patient/family: 35  minutes.    Pipe Lomeli MD  Department of Hospital Medicine   [facility]

## 2019-02-10 NOTE — ASSESSMENT & PLAN NOTE
-H/H cont < than 7 despite multiple  PRBC transfusion   - No obvious sign of GIB  - Could be related to sepsis   -Monitor H/H   -transfuse 1 PRBC

## 2019-02-10 NOTE — SUBJECTIVE & OBJECTIVE
Interval History:     Review of Systems   Unable to perform ROS: Intubated     Objective:     Vital Signs (Most Recent):  Temp: 98.8 °F (37.1 °C) (02/10/19 1320)  Pulse: 70 (02/10/19 1343)  Resp: (!) 25 (02/10/19 1343)  BP: (!) 154/77 (02/10/19 1320)  SpO2: 100 % (02/10/19 1343) Vital Signs (24h Range):  Temp:  [97.8 °F (36.6 °C)-98.9 °F (37.2 °C)] 98.8 °F (37.1 °C)  Pulse:  [67-71] 70  Resp:  [17-31] 25  SpO2:  [96 %-100 %] 100 %  BP: (119-165)/(54-91) 154/77     Weight: 72 kg (158 lb 11.7 oz)  Body mass index is 22.78 kg/m².    Intake/Output Summary (Last 24 hours) at 2/10/2019 1413  Last data filed at 2/10/2019 1200  Gross per 24 hour   Intake 3170 ml   Output 1480 ml   Net 1690 ml      Physical Exam   Constitutional: Vital signs are normal. She appears well-developed. She has a sickly appearance.   HENT:   Head: Normocephalic and atraumatic.   Mouth/Throat: Oropharynx is clear and moist.   Eyes: EOM are normal.   Neck: Neck supple. No JVD present. No tracheal deviation present.       Cardiovascular: Normal rate and regular rhythm.   Pulses:       Radial pulses are 2+ on the right side, and 2+ on the left side.        Dorsalis pedis pulses are 1+ on the right side, and 1+ on the left side.   Paced rhythm   Pulmonary/Chest: No accessory muscle usage. No tachypnea. No respiratory distress. She has decreased breath sounds in the left middle field and the left lower field. She has no wheezes. She has rales (coarse bilat improved with tracheal suctioning).   Abdominal: Soft. Bowel sounds are normal. She exhibits no distension. There is no tenderness.       Genitourinary:   Genitourinary Comments: Flores in place   Musculoskeletal: She exhibits no edema.   No edema.  Diffuse atrophy.  LUE contracture   Lymphadenopathy:     She has no cervical adenopathy.   Neurological: She is alert.   Nods head and mouths words appropriately to questions   Skin: Skin is warm and dry. Capillary refill takes less than 2 seconds. She is  not diaphoretic. No cyanosis.            Significant Labs: All pertinent labs within the past 24 hours have been reviewed.    Significant Imaging: I have reviewed all pertinent imaging results/findings within the past 24 hours.

## 2019-02-10 NOTE — ASSESSMENT & PLAN NOTE
MORGANELLA MORGANII in sputum sensitive to MEROPENEM.    At baseline oxygenation on vent  Continue  IV MEROPENEM   CXR improved - has chronically abnormal lungs on old CXR films  Cont tracheal suctioning PRN and Alfonzo

## 2019-02-10 NOTE — PROGRESS NOTES
[facility] -   Critical Care Medicine  Progress Note    Patient Name: Carlie Valdez  MRN: [id number]  Admission Date: 2/5/2019  Hospital Length of Stay: 5 days  Code Status: Full Code  Attending Provider: Pipe Lomeli, *  Primary Care Provider: Johanna Guzman MD   Principal Problem: Acute on chronic respiratory failure with hypoxia and hypercapnia    Subjective:     HPI:  73 year old female well known to our service s/t prior admissions; complicated PMH includes quadraplegia and chronic vent dependent respiratory failure with trach s/t NMO; DM; CHF; HTN; GERD; atrial fib; MRSA sputum    Presented to ED from NH for eval of abnl labs; EMS also reported gm neg bacteria growth from sacral wound    ED evaluation revealed hypothermia 94F; normotension; leukocytosis 16k; anemia 5.6/19.9; sodium 152; creatinine 2.4 with ; lactic acid 4.2; procalcitonin 1.2       Hospital/ICU Course:  Admitted to ICU on mechanical ventilation, awake, non verbal and paraplegic at baseline; hypothermic with external warming in progress  2/6 - chronic vent via trach, hypoxemia reported with turning overnight, continued moderate trach secretions; marginal BP overnight and marginal urine output; to OR today for I/D of sacral wound  2/7 - supine on vent in no distress and VSS.  Awake and nods head to questions  2/8 - Supine on chronic vent dependent w/ VSS and tolerating TF.  Awake and mouthing words.  No distress.    2/9 - Seen and examined at bedside. Hospital chart reviewed. No acute interval detrimental events noted. S/P debridement of sacral wound. Seen and examined at bedside. Hospital chart reviewed. No acute interval detrimental events noted. CT chest with LLL atelectasis => Chest PT. S/P debridement of sacral wound. The wound cx is (+) for e.coli and proteus sensitive to meropenem .    2/10 - CXR some improvement in left infiltrate.  Afeb resting on vent dependent mech ventilation.  I  discussed her current vent dependent bed bound state with multiple non healing wounds and she feels she does have a quality of life and wants to continue with aggressive long term care.     Review of Systems   Unable to perform ROS: Other   trached on vent    Objective:     Vital Signs (Most Recent):  Temp: 98.8 °F (37.1 °C) (02/10/19 1035)  Pulse: 70 (02/10/19 1104)  Resp: (!) 24 (02/10/19 1104)  BP: 132/71 (02/10/19 1035)  SpO2: 99 % (02/10/19 1104) Vital Signs (24h Range):  Temp:  [97.8 °F (36.6 °C)-98.9 °F (37.2 °C)] 98.8 °F (37.1 °C)  Pulse:  [67-72] 70  Resp:  [17-31] 24  SpO2:  [96 %-100 %] 99 %  BP: (108-165)/(49-91) 132/71     Weight: 72 kg (158 lb 11.7 oz)  Body mass index is 22.78 kg/m².      Intake/Output Summary (Last 24 hours) at 2/10/2019 1105  Last data filed at 2/10/2019 1000  Gross per 24 hour   Intake 3425 ml   Output 1720 ml   Net 1705 ml       Physical Exam   Constitutional: Vital signs are normal. She appears well-developed. She has a sickly appearance.   HENT:   Head: Normocephalic and atraumatic.   Mouth/Throat: Oropharynx is clear and moist.   Eyes: EOM are normal.   Neck: Neck supple. No JVD present. No tracheal deviation present.       Cardiovascular: Normal rate and regular rhythm.   Pulses:       Radial pulses are 2+ on the right side, and 2+ on the left side.        Dorsalis pedis pulses are 1+ on the right side, and 1+ on the left side.   Paced rhythm   Pulmonary/Chest: No accessory muscle usage. No tachypnea. No respiratory distress. She has decreased breath sounds in the left middle field and the left lower field. She has no wheezes. She has rales (coarse bilat improved with tracheal suctioning).   Abdominal: Soft. Bowel sounds are normal. She exhibits no distension. There is no tenderness.       Genitourinary:   Genitourinary Comments: Flores in place   Musculoskeletal: She exhibits no edema.   No edema.  Diffuse atrophy.  LUE contracture   Lymphadenopathy:     She has no cervical  adenopathy.   Neurological: She is alert.   Nods head and mouths words appropriately to questions   Skin: Skin is warm and dry. Capillary refill takes less than 2 seconds. She is not diaphoretic. No cyanosis.            Vents:  Vent Mode: A/C (02/10/19 1104)  Ventilator Initiated: Yes (02/05/19 1127)  Set Rate: 20 bmp (02/10/19 1104)  Vt Set: 375 mL (02/10/19 1104)  Pressure Support: 0 cmH20 (02/10/19 1104)  PEEP/CPAP: 10 cmH20 (02/10/19 1104)  Oxygen Concentration (%): 40 (02/10/19 1104)  Peak Airway Pressure: 31 cmH2O (02/10/19 1104)  Plateau Pressure: 0 cmH20 (02/10/19 1104)  Total Ve: 10.9 mL (02/10/19 1104)  F/VT Ratio<105 (RSBI): (!) (P) 56.21 (02/10/19 1104)    Lines/Drains/Airways     Drain                 Urethral Catheter -- days         Gastrostomy/Enterostomy 10/19/18 Percutaneous endoscopic gastrostomy (PEG)  days          Airway                 Surgical Airway 02/06/19 1905 Nikita 3 days          Pressure Ulcer                 Pressure Injury 02/05/19 Left Ischial tuberosity Unstageable - Present on Admission 5 days         Pressure Injury 02/05/19 Right medial Malleolus DTPI - Present on Admission 5 days         Pressure Injury 02/05/19 Sacral spine Stage 4 5 days         Pressure Injury 02/05/19 1620 Left lateral Hip Stage 3 4 days         Pressure Injury 02/06/19 Left Thigh DTPI - Present on Admission 4 days         Pressure Injury 02/06/19 Left Thigh Unstageable - Present on Admission 4 days         Pressure Injury 02/06/19 Left lateral Leg Stage 2 4 days         Pressure Injury 02/06/19 Left upper Arm DTPI - Present on Admission 4 days         Negative Pressure Wound Therapy  1 day          Peripheral Intravenous Line                 Midline Catheter Insertion/Assessment  - Double Lumen 02/05/19 1717 Left basilic vein (medial side of arm) 18g x 10cm 4 days         Peripheral IV - Double Lumen 02/05/19 1545 Left Forearm 4 days                Significant Labs:    CBC/Anemia Profile:  Recent  Labs   Lab 02/09/19  0139 02/09/19  0900   WBC 10.68  --    HGB 6.2* 6.9*   HCT 20.7* 23.1*   *  --    MCV 90  --    RDW 19.3*  --         Chemistries:  Recent Labs   Lab 02/09/19  0139      K 3.1*      CO2 26   BUN 60*   CREATININE 1.3   CALCIUM 8.7   ALBUMIN 1.5*   PROT 6.3   BILITOT 0.2   ALKPHOS 136*   ALT 11   AST 24       All pertinent labs within the past 24 hours have been reviewed.    Significant Imaging:  CXR: I have reviewed all pertinent results/findings within the past 24 hours and my personal findings are:  some improved aeration of left lung        ABG  Recent Labs   Lab 02/07/19  0524   PH 7.529*   PO2 171*   PCO2 41.7   HCO3 34.7*   BE 12     Assessment/Plan:     Neuro   Neuromyelitis optica    With resultant functional quadraplegia, blindness and vent dependence. Cellcept on hold.        Derm   Decubitus ulcer of sacral region, stage 4    S/P debridement. The wound cx is (+) for e.coli and proteus sensitive to meropenem. MEROPENEM per ID. Appreciate ID input.      Pulmonary   * Acute on chronic respiratory failure with hypoxia and hypercapnia, Vent Dependent w/ Trach    Full vent support - Vent dependent secondary to NMO  Settings reviewed and at baseline settings  VAP prophylaxis     Atelectasis of left lung    Mucomyst q 12 / Chest PT.      VAP (ventilator-associated pneumonia)    MORGANELLA MORGANII in sputum sensitive to MEROPENEM.    At baseline oxygenation on vent  Continue  IV MEROPENEM   CXR improved - has chronically abnormal lungs on old CXR films  Cont tracheal suctioning PRN and Duonebs       Cardiac/Vascular   Chronic Hypotension    Cont TF and free water to PEG  Continue midodrine  ICU hemodynamic monitoring     Chronic combined systolic and diastolic congestive heart failure    CHF optimized. Daily weighing. Dietary salt restriction. Accurate I/Os.  Stop IVFs       Chronic atrial fibrillation    Currently paced.  regular rhythm.  Monitor hemodynamics.     Renal/     Monitor BUN / Cr. Montor urine output.     Acute on chronic renal failure    Strict I/O  Monitor creatinine trend     Hypernatremia    Stable with free water replacement via PEG      UTI (urinary tract infection)    Polymicrobial Colonization. Urine Culture => multiple organisms, none predominant. On IV MEROPENEM .         ID   Severe sepsis with acute organ dysfunction    Resolving.    [x] Respiratory rate >20 breaths/min or PaCO2 <32 mmHg   [x]  WBC >12,000 cells/mm3, <4000 cells/mm3, or >10 percent immature (band) forms  [] Heart rate >90 beats/min   [] Temperature >38ºC or <36ºC    Microbiology: Panculture.   Oxygenation: Ventilator dependent. Keep SAO2 > = 92%  Hemodynamics: Stable.  Keep MAP > = 65mmHg  Source control: IV Meropenem.        Oncology   Anemia    Transfuse 2 units PRBCs  Monitor hemogram.      Endocrine   Moderate malnutrition    Tube deeding per RD recommendations.      Type 2 diabetes mellitus with kidney complication, without long-term current use of insulin    Stable EUGLYCEMIC}  low dose SSI for hyperglycemia with monitoring for glucose control and prevention of insulin toxicity. Blood glucose target 100 - 180 mg/dl         Hypothyroidism    Continue Levothyroxine     Orthopedic   Deep tissue injury multiple unstageable    Continue wound care. Low pressure bed and turn Q 2 hours       Other   Pressure injury of coccygeal region, stage 4    Multiple small stage 2 wounds.   S/P I&D of sacrum 2/6.   Continue wound care per WOC recs        Preventive Measures and Monitoring:   Stress Ulcer: Pepcid  Nutrition: TF  Glucose control: stable  Bowel prophylaxis: having regular BMs  DVT prophylaxis: SCDs  Hx CAD on B-Blocker: no hx CAD  Head of Bed/Reposition: Elevate HOB and turn Q1-2 hours   Early Mobility: bed bound  Vent Day: chronic  PEG Day: chronic  LUE PICC Line Day: #6  Flores Day: #7  IVAB Day: #6  Code Status: Full  Pneumonia Vaccine: assume UTD at NH  Flu Vaccine: assume UTD at  NH     Counseling/Consultation:I have discussed the care of this patient in detail with the bedside nursing staff and Dr. Amaral and Dr. Magallon    Critical Care Time: 48 minutes  Critical secondary to Patient has a condition that poses threat to life and bodily function: acute on chronic vent dependent resp failure      Critical care was time spent personally by me on the following activities: development of treatment plan with patient or surrogate and bedside caregivers, discussions with consultants, evaluation of patient's response to treatment, examination of patient, ordering and performing treatments and interventions, ordering and review of laboratory studies, ordering and review of radiographic studies, pulse oximetry, re-evaluation of patient's condition. This critical care time did not overlap with that of any other provider or involve time for any procedures.     Philip Myers NP  Critical Care Medicine  [facility] [place]

## 2019-02-10 NOTE — ASSESSMENT & PLAN NOTE
S/p wound debridement on 02/06- all cultures reviewed .  Cultures -Acinetobacter , ESBL E coli , proteus   Tracheal aspirate - morganella .  Carbapenems (including meropenem) are the preferred antibiotics for treatment of serious infections with bacteria that produce an extended-spectrum beta-lactamase (ESBL). This also applies even with invitro susceptibility to zosyn     Effect of Piperacillin-Tazobactam vs Meropenem on 30-Day Mortality for Patients With E coli or Klebsiella pneumoniae Bloodstream Infection and Ceftriaxone Resistance: A Randomized Clinical Trial.   Derrick VELASQUEZ -their findings support other studeis that do not support use of zosyn in these settings.     2/9- will add tigecycline for acinetobacter as isolate is meropenem resistant , minocycline can be an option for discharge      Will switch to meropenem , follow final drug susceptibility of acinetobacter.

## 2019-02-10 NOTE — PLAN OF CARE
Problem: Adult Inpatient Plan of Care  Goal: Plan of Care Review  Outcome: Ongoing (interventions implemented as appropriate)  Patient remains on vent. VSS. TF continued, patient tolerating well. Specialty bed and heel elevating boots maintained, turned every two hours. Wound vac in place, seal intact, drainage serous. Patient alert, able to mouth some words. Patient denies pain throughout shift. Afebrile throughout shift. Plan of care discussed with patient. Will continue to monitor.

## 2019-02-11 PROBLEM — G72.81 CRITICAL ILLNESS MYOPATHY: Status: ACTIVE | Noted: 2019-01-01

## 2019-02-11 PROBLEM — R13.10 DYSPHAGIA: Status: ACTIVE | Noted: 2019-01-01

## 2019-02-11 PROBLEM — J96.01 ACUTE RESPIRATORY FAILURE WITH HYPOXEMIA: Status: ACTIVE | Noted: 2018-01-01

## 2019-02-11 PROBLEM — J18.9 PNEUMONIA DUE TO INFECTIOUS ORGANISM: Status: ACTIVE | Noted: 2018-01-01

## 2019-02-11 PROBLEM — L97.409 HEEL ULCER: Status: ACTIVE | Noted: 2019-01-01

## 2019-02-11 PROBLEM — I48.91 A-FIB: Status: ACTIVE | Noted: 2017-12-12

## 2019-02-11 NOTE — PLAN OF CARE
Cm meet with Dr Ceja for sd/c planning due to D/C with wd vac and IVAB. Per  Dr. Ceja, will add tigecycline for acinetobacter as isolate is meropenem resistant , minocycline can be an option for discharge  .  CM called [facility] and she stated [facility] is very costly and they may not be able to cover it. No , they will not cover Tigecycline. CM notified Dr. Ceja and Dr Timmons. Cm asked if LTAC may be the more appropriate transition for the patient. CM will need to check LTAC days left .  1430- CM called [facility] and left a message.     02/11/19 1421   Discharge Reassessment   Assessment Type Discharge Planning Reassessment   Provided patient/caregiver education on the expected discharge date and the discharge plan No   Do you have any problems affording any of your prescribed medications? No   Discharge Plan A Return to nursing home   Discharge Plan B Return to Nursing Home   DME Needed Upon Discharge  none   Patient choice form signed by patient/caregiver N/A   [facility]ticipated Discharge Disposition [facility]   Can the patient answer the patient profile reliably? Yes, cognitively intact   How does the patient rate their overall health at the present time? Fair   Describe the patient's ability to walk at the present time. Major restrictions/daily assistance from another person   Number of comorbid conditions (as recorded on the chart) Five or more   During the past month, has the patient often been bothered by feeling down, depressed or hopeless? No   During the past month, has the patient often been bothered by little interest or pleasure in doing things? No

## 2019-02-11 NOTE — PROGRESS NOTES
[facility] -   Critical Care Medicine  Progress Note    Patient Name: Carlie Valdez  MRN: [id number]  Admission Date: 2/5/2019  Hospital Length of Stay: 6 days  Code Status: Full Code  Attending Provider: Pipe Lomeli, *  Primary Care Provider: Johanna Guzman MD   Principal Problem: Acute respiratory failure with hypoxemia    Subjective:     HPI:  73 year old female well known to our service s/t prior admissions; complicated PMH includes quadraplegia and chronic vent dependent respiratory failure with trach s/t NMO; DM; CHF; HTN; GERD; atrial fib; MRSA sputum    Presented to ED from NH for eval of abnl labs; EMS also reported gm neg bacteria growth from sacral wound    ED evaluation revealed hypothermia 94F; normotension; leukocytosis 16k; anemia 5.6/19.9; sodium 152; creatinine 2.4 with ; lactic acid 4.2; procalcitonin 1.2       Hospital/ICU Course:  Admitted to ICU on mechanical ventilation, awake, non verbal and paraplegic at baseline; hypothermic with external warming in progress  2/6 - chronic vent via trach, hypoxemia reported with turning overnight, continued moderate trach secretions; marginal BP overnight and marginal urine output; to OR today for I/D of sacral wound  2/7 - supine on vent in no distress and VSS.  Awake and nods head to questions  2/8 - Supine on chronic vent dependent w/ VSS and tolerating TF.  Awake and mouthing words.  No distress.    2/9 - Seen and examined at bedside. Hospital chart reviewed. No acute interval detrimental events noted. S/P debridement of sacral wound. Seen and examined at bedside. Hospital chart reviewed. No acute interval detrimental events noted. CT chest with LLL atelectasis => Chest PT. S/P debridement of sacral wound. The wound cx is (+) for e.coli and proteus sensitive to meropenem .    2/10 - CXR some improvement in left infiltrate.  Afeb resting on vent dependent mech ventilation.  I discussed her current vent  dependent bed bound state with multiple non healing wounds and she feels she does have a quality of life and wants to continue with aggressive long term care.   2/11 Stable overnight, needs PICC line for antibiotics, off pressors    Review of Systems   Unable to perform ROS: Other   trached on vent    Objective:     Vital Signs (Most Recent):  Temp: 99.1 °F (37.3 °C) (02/11/19 0705)  Pulse: 74 (02/11/19 0945)  Resp: (!) 28 (02/11/19 0945)  BP: (!) 108/53 (02/11/19 0900)  SpO2: 97 % (02/11/19 0945) Vital Signs (24h Range):  Temp:  [97.7 °F (36.5 °C)-99.1 °F (37.3 °C)] 99.1 °F (37.3 °C)  Pulse:  [69-79] 74  Resp:  [17-33] 28  SpO2:  [96 %-100 %] 97 %  BP: (108-178)/(53-92) 108/53     Weight: 71.6 kg (157 lb 13.6 oz)  Body mass index is 22.65 kg/m².      Intake/Output Summary (Last 24 hours) at 2/11/2019 1014  Last data filed at 2/11/2019 0910  Gross per 24 hour   Intake 2840 ml   Output 2053 ml   Net 787 ml       Physical Exam   Constitutional: Vital signs are normal. She appears well-developed. She has a sickly appearance.   HENT:   Head: Normocephalic and atraumatic.   Mouth/Throat: Oropharynx is clear and moist.   Eyes: EOM are normal.   Neck: Neck supple. No JVD present. No tracheal deviation present.       Cardiovascular: Normal rate. An irregularly irregular rhythm present.   Pulses:       Radial pulses are 2+ on the right side, and 2+ on the left side.        Dorsalis pedis pulses are 1+ on the right side, and 1+ on the left side.   Paced rhythm   Pulmonary/Chest: No accessory muscle usage. No tachypnea. No respiratory distress. She has decreased breath sounds in the left middle field and the left lower field. She has no wheezes. She has rales (coarse bilat improved with tracheal suctioning).   Abdominal: Soft. Bowel sounds are normal. She exhibits no distension. There is no tenderness.       Genitourinary:   Genitourinary Comments: Flores in place   Musculoskeletal: She exhibits edema.   No edema.  Diffuse  atrophy.  LUE contracture   Lymphadenopathy:     She has no cervical adenopathy.   Neurological: She is alert.   Nods head and mouths words appropriately to questions   Skin: Skin is warm and dry. Capillary refill takes less than 2 seconds. She is not diaphoretic. No cyanosis.        decubitus ulcer       Vents:  Vent Mode: A/C (02/11/19 0945)  Ventilator Initiated: Yes (02/05/19 1127)  Set Rate: 20 bmp (02/11/19 0945)  Vt Set: 375 mL (02/11/19 0945)  Pressure Support: 0 cmH20 (02/11/19 0945)  PEEP/CPAP: 10 cmH20 (02/11/19 0945)  Oxygen Concentration (%): 40 (02/11/19 0945)  Peak Airway Pressure: 32 cmH2O (02/11/19 0945)  Plateau Pressure: 0 cmH20 (02/11/19 0945)  Total Ve: 11.6 mL (02/11/19 0945)  F/VT Ratio<105 (RSBI): (!) 84.08 (02/11/19 0945)    Lines/Drains/Airways     Drain                 Urethral Catheter -- days         Gastrostomy/Enterostomy 10/19/18 Percutaneous endoscopic gastrostomy (PEG)  days          Airway                 Surgical Airway 02/06/19 1905 Nikita 4 days          Pressure Ulcer                 Pressure Injury 02/05/19 Left Ischial tuberosity Unstageable - Present on Admission 6 days         Pressure Injury 02/05/19 Right medial Malleolus DTPI - Present on Admission 6 days         Pressure Injury 02/05/19 Sacral spine Stage 4 6 days         Pressure Injury 02/05/19 1620 Left lateral Hip Stage 3 5 days         Pressure Injury 02/06/19 Left Thigh DTPI - Present on Admission 5 days         Pressure Injury 02/06/19 Left Thigh Unstageable - Present on Admission 5 days         Pressure Injury 02/06/19 Left lateral Leg Stage 2 5 days         Pressure Injury 02/06/19 Left upper Arm DTPI - Present on Admission 5 days         Negative Pressure Wound Therapy  2 days          Peripheral Intravenous Line                 Midline Catheter Insertion/Assessment  - Single Lumen 02/10/19 1410 Right basilic vein (medial side of arm)  less than 1 day                Significant Labs:    CBC/Anemia  Profile:  Recent Labs   Lab 02/11/19  0355   WBC 17.06*   HGB 9.7*   HCT 30.8*   *   MCV 89   RDW 18.7*        Chemistries:  Recent Labs   Lab 02/11/19  0355      K 4.3   *   CO2 21*   BUN 51*   CREATININE 0.8   CALCIUM 9.4   ALBUMIN 1.7*   PROT 7.3   BILITOT 0.2   ALKPHOS 174*   ALT 20   AST 34   MG 2.1       All pertinent labs within the past 24 hours have been reviewed.    Significant Imaging:  CXR: I have reviewed all pertinent results/findings within the past 24 hours and my personal findings are:  some improved aeration of left lung        ABG  Recent Labs   Lab 02/07/19  0524   PH 7.529*   PO2 171*   PCO2 41.7   HCO3 34.7*   BE 12     Assessment/Plan:     Neuro   Neuromyelitis optica    With resultant functional quadraplegia, blindness and vent dependence. Cellcept on hold.        Derm   Decubitus ulcer of sacral region, stage 4    S/P debridement. The wound cx is (+) for e.coli and proteus sensitive to meropenem. MEROPENEM per ID. Appreciate ID input.      Pulmonary   * Acute respiratory failure with hypoxemia    Full vent support - Vent dependent secondary to NMO  Settings reviewed and at baseline settings  VAP prophylaxis     VAP (ventilator-associated pneumonia)    MORGANELLA MORGANII in sputum sensitive to MEROPENEM.    At baseline oxygenation on vent  Continue  IV MEROPENEM   CXR improved - has chronically abnormal lungs on old CXR films  Cont tracheal suctioning PRN and Duonebs  2/11 Continue mechanical vent       Atelectasis of left lung    Mucomyst q 12 / Chest PT.      Cardiac/Vascular   Chronic Hypotension    Cont TF and free water to PEG  Continue midodrine  ICU hemodynamic monitoring     Chronic combined systolic and diastolic congestive heart failure    CHF optimized. Daily weighing. Dietary salt restriction. Accurate I/Os.  Stop IVFs       Chronic atrial fibrillation    Currently paced.  regular rhythm.  Monitor hemodynamics.     Renal/    Monitor BUN / Cr. Montor urine  output.     Acute on chronic renal failure    Strict I/O  Monitor creatinine trend     Hypernatremia    Stable with free water replacement via PEG      UTI (urinary tract infection)    Polymicrobial Colonization. Urine Culture => multiple organisms, none predominant. On IV MEROPENEM .         ID   Severe sepsis with acute organ dysfunction    Resolving.    [x] Respiratory rate >20 breaths/min or PaCO2 <32 mmHg   [x]  WBC >12,000 cells/mm3, <4000 cells/mm3, or >10 percent immature (band) forms  [] Heart rate >90 beats/min   [] Temperature >38ºC or <36ºC    Microbiology: Panculture.   Oxygenation: Ventilator dependent. Keep SAO2 > = 92%  Hemodynamics: Stable.  Keep MAP > = 65mmHg  Source control: IV Meropenem.     2/11/2019 Continue IV antibiotics per ID     Oncology   Anemia    Transfuse 2 units PRBCs  Monitor hemogram.      Endocrine   Moderate malnutrition    Tube deeding per RD recommendations.      Hypothyroidism    Continue Levothyroxine     Type 2 diabetes mellitus with kidney complication, without long-term current use of insulin    Stable EUGLYCEMIC}  low dose SSI for hyperglycemia with monitoring for glucose control and prevention of insulin toxicity. Blood glucose target 100 - 180 mg/dl         Orthopedic   Deep tissue injury multiple unstageable    Continue wound care. Low pressure bed and turn Q 2 hours       Other   Pressure injury of coccygeal region, stage 4    Multiple small stage 2 wounds.   S/P I&D of sacrum 2/6.   Continue wound care per WOC recs         Critical Care Daily Checklist:    A: Awake: RASS Goal/Actual Goal:    Actual: Guerrero Agitation Sedation Scale (RASS): Alert and calm   B: Spontaneous Breathing Trial Performed? Spon. Breathing Trial Initiated?: Not initiated (02/09/19 1115)   C: SAT & SBT Coordinated?  na                      D: Delirium: CAM-ICU Overall CAM-ICU: Negative   E: Early Mobility Performed? No   F: Feeding Goal: Goals: Meet > 85 % EEN/EPN while admitted  Status:  Nutrition Goal Status: new   Current Diet Order   No orders of the defined types were placed in this encounter.      AS: Analgesia/Sedation adeqaute   T: Thromboembolic Prophylaxis Held due to surgery/wound   H: HOB > 300 Yes   U: Stress Ulcer Prophylaxis (if needed) y   G: Glucose Control adequate   B: Bowel Function Stool Occurrence: 1   I: Indwelling Catheter (Lines & Flores) Necessity needed   D: De-escalation of Antimicrobials/Pharmacotherapies na    Plan for the day/ETD PICC    Code Status:  Family/Goals of Care: Full Code  discusssed     Critical Care Time: 35 minutes  Critical secondary to Patient has a condition that poses threat to life and bodily function: Severe Respiratory Distress and sepsis      Critical care was time spent personally by me on the following activities: development of treatment plan with patient or surrogate and bedside caregivers, discussions with consultants, evaluation of patient's response to treatment, examination of patient, ordering and performing treatments and interventions, ordering and review of laboratory studies, ordering and review of radiographic studies, pulse oximetry, re-evaluation of patient's condition. This critical care time did not overlap with that of any other provider or involve time for any procedures.     James Meier MD  Critical Care Medicine  [facility] -

## 2019-02-11 NOTE — PROGRESS NOTES
Assessed patient's wound vac Veraflo this morning. No leaks, drainage is serosanguinous. Current plan for vac Veraflo is twice per week changes Tu/Fri.  Per JEANNE Cardona RN case management, plan is to discharge back to [facility] possibly today. Discussed with primary nurse GIULIANO Arzate and Dr. Magallon. If [facility] unable to continue KCI wound vac veraflo therapy, then may clamp the instillation tubing at time of discharge and transition to traditional KCI wound VAC therapy with dressing change due tomorrow.

## 2019-02-11 NOTE — PROGRESS NOTES
[facility] Hospital Medicine  Progress Note    Patient Name: Carlie Valdez  MRN: [id number]  Patient Class: IP- Inpatient   Admission Date: 2/5/2019  Length of Stay: 6 days  Attending Physician: Pipe Lomeli, *  Primary Care Provider: Johanna Guzman MD        Subjective:     Principal Problem:Acute respiratory failure with hypoxemia    HPI:  Carlie Valdez is a 73 y.o. female patient  with PMHX of Atrial fibrillation, HTN, CKD, CVA (embolic), Anemia , Heart failure, Chronic respiratory failure with trach, functional quadriplegia, CAD, and Neuromyelitis Optica who presented to the ER today for abnormal lab results.  Pt is a resident at [facility] for skilled nursing care. Pt had outpatient labs done today.  [facility] states pt has a sacral wound that was cultured and showed Gram negative bacteria.  Pt did not respond when questioned however has hx of responding appropriately to simple questions. HPI limited due to patient being trached and unable to mouth words.  ER workup showed: WBC 16.18, H/H 5.6/19.9, Na 152, BUN/Creatinine 115/2.4, Lactic acid 4.2, and Procal 1.20.  Chest xray showed interval worsening of the appearance of the lungs with marked amount of alveolar consolidation scattered throughout left lung and the lower half of the right lung characteristic of pneumonia.  UA + for infectious process.  Hospital Medicine contacted for admission to ICU.        Hospital Course:  Admitted to ICU for treatment of septic shock.  Abnormal urinalysis and multiple decubitus wounds as possible sources of infection.  IV fluid resuscitation.  Empirically started Vancomycin and Zosyn.  Wound care and General Surgery to evaluate for source control.  Surgical debridement of the wound with exposed bone at the base.  Wound cultures growing multiple organisms with multiple patterns of drug resistance.  Infectious Disease consult.  Minor clinical improvement with broad  spectrum antibiotic treatment.  2/9/19 Pt was seen and examined at bedside .She is s/p 1 PRBC . The H/H remain < 6 w/o any sign of acute bleeding . She is on mechanical  Ventilation trough  A tracheotomy. There was  No acute event overnight . ID was consulted yesterday  . The wound cx is (+) for e.coli and proteus sensitive to meropenem .    2/10/19 Pt was seen and examined . The H/H remain lower than < 7 .  There was no acute event overnight . She is on mechanical ventilation  Trough a tracheotomy .    2/11/19 She is s/p 2 PRB   With a good H/H respond . There was no acute event overnight .     Interval History:     Review of Systems   Unable to perform ROS: Intubated     Objective:     Vital Signs (Most Recent):  Temp: 99.1 °F (37.3 °C) (02/11/19 0705)  Pulse: 84 (02/11/19 1359)  Resp: (!) 31 (02/11/19 1359)  BP: (!) 108/53 (02/11/19 0900)  SpO2: 99 % (02/11/19 1359) Vital Signs (24h Range):  Temp:  [97.7 °F (36.5 °C)-99.1 °F (37.3 °C)] 99.1 °F (37.3 °C)  Pulse:  [69-84] 84  Resp:  [18-33] 31  SpO2:  [95 %-100 %] 99 %  BP: (108-178)/(53-91) 108/53     Weight: 71.6 kg (157 lb 13.6 oz)  Body mass index is 22.65 kg/m².    Intake/Output Summary (Last 24 hours) at 2/11/2019 1518  Last data filed at 2/11/2019 1300  Gross per 24 hour   Intake 2230 ml   Output 2243 ml   Net -13 ml      Physical Exam   Constitutional: She appears well-developed and well-nourished. No distress.   Lethargic   HENT:   Head: Normocephalic and atraumatic.   Mouth/Throat: Oropharynx is clear and moist.   Eyes: Conjunctivae and EOM are normal. Pupils are equal, round, and reactive to light.   Neck: Neck supple. No JVD present. No thyromegaly present.   Cardiovascular: Normal rate and regular rhythm. Exam reveals no gallop and no friction rub.   No murmur heard.  Defibrillator present.   Pulmonary/Chest: Effort normal and breath sounds normal. She has no wheezes. She has no rales.   #8 Shiley tracheostomy present.  RLL decreased breath sounds.   Left side decreased throughout with fine crackles.   Abdominal: Soft. Bowel sounds are normal. She exhibits no distension. There is no tenderness. There is no rebound and no guarding.   Feeding tube in place.   Genitourinary:   Genitourinary Comments: Flores catheter in place.   Musculoskeletal: Normal range of motion. She exhibits no edema or deformity.   Lymphadenopathy:     She has no cervical adenopathy.   Neurological: She is alert. She has normal reflexes.   Partially opens eyes to command.  Generalized weakness and not following other commands.  Upper extremities flexor contracted.  Upper and lower extremity muscle wasting.   Skin: Skin is warm and dry. No rash noted.   -has sacral wound   Psychiatric: She has a normal mood and affect. Her behavior is normal. Judgment and thought content normal.   Nursing note and vitals reviewed.      Significant Labs: All pertinent labs within the past 24 hours have been reviewed.    Significant Imaging: I have reviewed all pertinent imaging results/findings within the past 24 hours.    Assessment/Plan:      * Acute respiratory failure with hypoxemia    - Cont vent support pert ICU team  -Tx reviewed        Pressure injury of sacral region, stage 4    -cont wound care        Moderate malnutrition    -cont TF as tolerate        Acute kidney injury    -improving       Pressure injury of left ischium, unstageable    -cont wound care        Pressure injury of trochanteric region of hip, stage 3    -cont wound care        Pressure injury of left thigh, unstageable    -cont wound care        Decubitus ulcer of sacral region, stage 4    -cont wound care        VAP (ventilator-associated pneumonia)    -IV antibiotics    Imaging results showed interval worsening with marked amount of alveolar consolidation scattered throughout the left lung and lower half of the right lung characteristic of pneumonia.  -vent dependent   -sputum and blood culture results pending  -Alfonzo prn    -Nebulizer treatments       Anemia    -H/H cont < than 7 despite multiple  PRBC transfusion   - No obvious sign of GIB  - Could be related to sepsis   -Monitor H/H   -transfuse 1 PRBC          Functional quadriplegia    -hx of CVA (embolic)  -pt currently a resident at [facility]  -PT/OT       Hypertension    -medications held due to hypotension        Acute on chronic renal failure    -Improving  -cont monitor kidney function and UOP        Severe sepsis with acute organ dysfunction    2/2 to infected wound due to e/coli esbl and proteus   ID on board   S/P  I&D per Surgery      Pressure injury of coccygeal region, stage 4    -wound care on board   -infection  Please see wound cx report   Susceptibility      Escherichia coli esbl Proteus mirabilis esbl    CULTURE, AEROBIC  (SPECIFY SOURCE) CULTURE, AEROBIC  (SPECIFY SOURCE)   Amikacin <=16  Sensitive <=16  Sensitive   Amox/K Clav'ate 16/8  Intermediate <=8/4  Sensitive   Amp/Sulbactam >16/8  Resistant >16/8  Resistant   Ampicillin >16  Resistant >16  Resistant   Cefazolin >16  Resistant >16  Resistant   Cefepime >16  Resistant 16  Resistant   Ceftriaxone >32  Resistant >32  Resistant   Ciprofloxacin >2  Resistant >2  Resistant   Ertapenem <=2  Sensitive <=2  Sensitive   Gentamicin >8  Resistant >8  Resistant   Meropenem <=4  Sensitive <=4  Sensitive   Piperacillin/Tazo <=16  Sensitive <=16  Sensitive   Tetracycline <=4  Sensitive >8  Resistant   Tobramycin >8  Resistant >8  Resistant   Trimeth/Sulfa <=2/38  Sensitive >2/38  Resistant     -Cont IVAB per ID recommendation   -S/O I&D per surgery      Atelectasis of left lung    -Cont  Ventilation  Support      Leukocytosis    - cont ivab       Hypernatremia    -free water flush via G tube   -resolved        Neuromyelitis optica    -hx of  -Cellcept held in the setting of infection         UTI (urinary tract infection)    IV antibiotics   Urine culture results   Susceptibility      Escherichia coli esbl  Proteus mirabilis esbl    CULTURE, AEROBIC  (SPECIFY SOURCE) CULTURE, AEROBIC  (SPECIFY SOURCE)   Amikacin <=16  Sensitive <=16  Sensitive   Amox/K Clav'ate 16/8  Intermediate <=8/4  Sensitive   Amp/Sulbactam >16/8  Resistant >16/8  Resistant   Ampicillin >16  Resistant >16  Resistant   Cefazolin >16  Resistant >16  Resistant   Cefepime >16  Resistant 16  Resistant   Ceftriaxone >32  Resistant >32  Resistant   Ciprofloxacin >2  Resistant >2  Resistant   Ertapenem <=2  Sensitive <=2  Sensitive   Gentamicin >8  Resistant >8  Resistant   Meropenem <=4  Sensitive <=4  Sensitive   Piperacillin/Tazo <=16  Sensitive <=16  Sensitive   Tetracycline <=4  Sensitive >8  Resistant   Tobramycin >8  Resistant >8  Resistant   Trimeth/Sulfa <=2/38  Sensitive >2/38  Resistant          Deep tissue injury multiple unstageable    -cont wound care        Hypothyroidism    - cont levothyroxine        Chronic Hypotension    -cont current tx      Type 2 diabetes mellitus with kidney complication, without long-term current use of insulin    -cont ISS  -Keep CBG < 180       Chronic combined systolic and diastolic congestive heart failure    -appears compensated  At this time   - cont current tx        Chronic atrial fibrillation    -pt on amiodarone and Xarelto  -Xarelto held at this time to rule out active bleeding   -Amiodarone on hold  Due to low bp . Resume accordantly   -Tx reviewed          VTE Risk Mitigation (From admission, onward)        Ordered     IP VTE HIGH RISK PATIENT  Once      02/05/19 1430     Place sequential compression device  Until discontinued      02/05/19 1430          Critical care time spent on the evaluation and treatment of severe organ dysfunction, review of pertinent labs and imaging studies, discussions with consulting providers and discussions with patient/family: 35 minutes.    Pipe Lomeli MD  Department of Hospital Medicine   [facility] -

## 2019-02-11 NOTE — SUBJECTIVE & OBJECTIVE
Interval History: remains on ventilatory support .  All cultures reviewed-  From wound cultures -  Decubitus -ESBL E coli-  Acinetobacter baumanni  Right buttocks-  ESBL E coli  Proteus ESBL  2/10 -she was seen with daughter and  by bedside-  All cultures reviewed -   Review of Systems   Unable to perform ROS: Acuity of condition     Objective:     Vital Signs (Most Recent):  Temp: 97.7 °F (36.5 °C) (02/11/19 0300)  Pulse: 70 (02/11/19 0729)  Resp: (!) 33 (02/11/19 0729)  BP: (!) 131/59 (02/11/19 0500)  SpO2: 99 % (02/11/19 0729) Vital Signs (24h Range):  Temp:  [97.7 °F (36.5 °C)-98.9 °F (37.2 °C)] 97.7 °F (36.5 °C)  Pulse:  [69-72] 70  Resp:  [17-33] 33  SpO2:  [98 %-100 %] 99 %  BP: (131-178)/(59-92) 131/59     Weight: 71.6 kg (157 lb 13.6 oz)  Body mass index is 22.65 kg/m².    Estimated Creatinine Clearance: 67.7 mL/min (based on SCr of 0.8 mg/dL).    Physical Exam   Constitutional: She appears well-developed and well-nourished. No distress.   Lethargic   HENT:   Head: Normocephalic and atraumatic.   Mouth/Throat: Oropharynx is clear and moist.   Eyes: Conjunctivae and EOM are normal. Pupils are equal, round, and reactive to light.   Neck: Neck supple. No JVD present. No thyromegaly present.   Cardiovascular: Normal rate and regular rhythm. Exam reveals no gallop and no friction rub.   No murmur heard.  Defibrillator present.   Pulmonary/Chest: Effort normal and breath sounds normal. She has no wheezes. She has no rales.   #8 Shiley tracheostomy present.  RLL decreased breath sounds.  Left side decreased throughout with fine crackles.   Abdominal: Soft. Bowel sounds are normal. She exhibits no distension. There is no tenderness. There is no rebound and no guarding.   Feeding tube in place.   Genitourinary:   Genitourinary Comments: Flores catheter in place.   Musculoskeletal: Normal range of motion. She exhibits no edema or deformity.   Lymphadenopathy:     She has no cervical adenopathy.   Neurological:  She is alert. She has normal reflexes.   Partially opens eyes to command.  Generalized weakness and not following other commands.  Upper extremities flexor contracted.  Upper and lower extremity muscle wasting.   Skin: Skin is warm and dry. No rash noted.   -has sacral wound   Psychiatric: She has a normal mood and affect. Her behavior is normal. Judgment and thought content normal.   Nursing note and vitals reviewed.      Significant Labs:   Blood Culture:   Recent Labs   Lab 10/22/18  1320 10/24/18  0628 10/24/18  0635 02/05/19  1235 02/05/19  1240   LABBLOO No growth after 5 days. No growth after 5 days. No growth after 5 days. No growth after 5 days. No growth after 5 days.     CBC:   Recent Labs   Lab 02/11/19  0355   WBC 17.06*   HGB 9.7*   HCT 30.8*   *     Wound Culture:   Recent Labs   Lab 02/06/19  0722 02/06/19  1410   LABAERO ACINETOBACTER BAUMANNII/HAEMOLYTICUS  Many   ([facility])     ESCHERICHIA COLI ESBL  Moderate   ESCHERICHIA COLI ESBL  Many    PROTEUS MIRABILIS ESBL  Moderate       All pertinent labs within the past 24 hours have been reviewed.    Significant Imaging: I have reviewed all pertinent imaging results/findings within the past 24 hours.

## 2019-02-11 NOTE — ASSESSMENT & PLAN NOTE
S/p wound debridement on 02/06- all cultures reviewed .  Cultures -Acinetobacter , ESBL E coli , proteus   Tracheal aspirate - morganella .  Carbapenems (including meropenem) are the preferred antibiotics for treatment of serious infections with bacteria that produce an extended-spectrum beta-lactamase (ESBL). This also applies even with invitro susceptibility to zosyn     Effect of Piperacillin-Tazobactam vs Meropenem on 30-Day Mortality for Patients With E coli or Klebsiella pneumoniae Bloodstream Infection and Ceftriaxone Resistance: A Randomized Clinical Trial.   Derrick VELASQUEZ -their findings support other studeis that do not support use of zosyn in these settings.     2/9- will add tigecycline for acinetobacter as isolate is meropenem resistant , minocycline can be an option for discharge      Will switch to meropenem , follow final drug susceptibility of acinetobacter.  2/10- discussed with microlab about minocycline sensitivity ,   Will continue meropenem and tigecycline for now , will continue close monitoring

## 2019-02-11 NOTE — ASSESSMENT & PLAN NOTE
Resolving.    [x] Respiratory rate >20 breaths/min or PaCO2 <32 mmHg   [x]  WBC >12,000 cells/mm3, <4000 cells/mm3, or >10 percent immature (band) forms  [] Heart rate >90 beats/min   [] Temperature >38ºC or <36ºC    Microbiology: Panculture.   Oxygenation: Ventilator dependent. Keep SAO2 > = 92%  Hemodynamics: Stable.  Keep MAP > = 65mmHg  Source control: IV Meropenem.     2/11/2019 Continue IV antibiotics per ID

## 2019-02-11 NOTE — PROCEDURES
"Carlie Valdez is a 73 y.o. female patient.    Temp: 99.1 °F (37.3 °C) (02/11/19 0705)  Pulse: 84 (02/11/19 1359)  Resp: (!) 31 (02/11/19 1359)  BP: (!) 108/53 (02/11/19 0900)  SpO2: 99 % (02/11/19 1359)  Weight: 71.6 kg (157 lb 13.6 oz) (02/11/19 0600)  Height: 5' 10" (177.8 cm) (02/08/19 0500)       Mid- Line  Date/Time: 2/11/2019 3:30 PM  Location procedure was performed: [facility] INTENSIVE CARE UNIT  Performed by: Philip Myers NP  Pre-operative Diagnosis: wound infection and PNA  Post-operative diagnosis: wound infection and PNA  Consent Done: Yes  Time out: Immediately prior to procedure a "time out" was called to verify the correct patient, procedure, equipment, support staff and site/side marked as required.  Indications: med administration and vascular access  Preparation: skin prepped with ChloraPrep  Skin prep agent dried: skin prep agent completely dried prior to procedure  Sterile barriers: all five maximum sterile barriers used - cap, mask, sterile gown, sterile gloves, and large sterile sheet  Hand hygiene: hand hygiene performed prior to central venous catheter insertion  Location details: left brachial  Catheter type: double lumen  Catheter size: 5 Fr  Catheter Length: 20cm    Ultrasound guidance: yes  Vessel Caliber: medium, patent, compressibility normal  Vascular Doppler: not done  Needle advanced into vessel with real time Ultrasound guidance.  Guidewire confirmed in vessel.  Sterile sheath used.  Manometry: No   Number of attempts: 2  Assessment: successful placement  Complications: none  Estimated blood loss (mL): 1  Specimens: No  Implants: No  Post-procedure: chlorhexidine patch,  sterile dressing applied and blood return through all ports  Complications: No  Comments: Patient lost Mid line yesterday per RN caring for patient.  Attempted PICC to LUE but guidewire met resistance had to convert to Mid Line.  Placement requested per IM and ID for long term IVAB.           Philip" Geraldo Myers  2/11/2019       I have seen the patient, reviewed the Nurse Practitioner's history and physical, assessment and plan. I have personally interviewed and examined the patient at bedside and: agree with the findings.  I have reviewed the chosen technique,  indications, medical necessity and agree with planned procedure . I was available at the time the procedure was performed.      James Meier MD  Pulmonary Medicine

## 2019-02-11 NOTE — SUBJECTIVE & OBJECTIVE
Interval History:     Review of Systems   Unable to perform ROS: Intubated     Objective:     Vital Signs (Most Recent):  Temp: 99.1 °F (37.3 °C) (02/11/19 0705)  Pulse: 84 (02/11/19 1359)  Resp: (!) 31 (02/11/19 1359)  BP: (!) 108/53 (02/11/19 0900)  SpO2: 99 % (02/11/19 1359) Vital Signs (24h Range):  Temp:  [97.7 °F (36.5 °C)-99.1 °F (37.3 °C)] 99.1 °F (37.3 °C)  Pulse:  [69-84] 84  Resp:  [18-33] 31  SpO2:  [95 %-100 %] 99 %  BP: (108-178)/(53-91) 108/53     Weight: 71.6 kg (157 lb 13.6 oz)  Body mass index is 22.65 kg/m².    Intake/Output Summary (Last 24 hours) at 2/11/2019 1518  Last data filed at 2/11/2019 1300  Gross per 24 hour   Intake 2230 ml   Output 2243 ml   Net -13 ml      Physical Exam   Constitutional: She appears well-developed and well-nourished. No distress.   Lethargic   HENT:   Head: Normocephalic and atraumatic.   Mouth/Throat: Oropharynx is clear and moist.   Eyes: Conjunctivae and EOM are normal. Pupils are equal, round, and reactive to light.   Neck: Neck supple. No JVD present. No thyromegaly present.   Cardiovascular: Normal rate and regular rhythm. Exam reveals no gallop and no friction rub.   No murmur heard.  Defibrillator present.   Pulmonary/Chest: Effort normal and breath sounds normal. She has no wheezes. She has no rales.   #8 Shiley tracheostomy present.  RLL decreased breath sounds.  Left side decreased throughout with fine crackles.   Abdominal: Soft. Bowel sounds are normal. She exhibits no distension. There is no tenderness. There is no rebound and no guarding.   Feeding tube in place.   Genitourinary:   Genitourinary Comments: Flores catheter in place.   Musculoskeletal: Normal range of motion. She exhibits no edema or deformity.   Lymphadenopathy:     She has no cervical adenopathy.   Neurological: She is alert. She has normal reflexes.   Partially opens eyes to command.  Generalized weakness and not following other commands.  Upper extremities flexor contracted.  Upper and  lower extremity muscle wasting.   Skin: Skin is warm and dry. No rash noted.   -has sacral wound   Psychiatric: She has a normal mood and affect. Her behavior is normal. Judgment and thought content normal.   Nursing note and vitals reviewed.      Significant Labs: All pertinent labs within the past 24 hours have been reviewed.    Significant Imaging: I have reviewed all pertinent imaging results/findings within the past 24 hours.

## 2019-02-11 NOTE — PLAN OF CARE
Problem: Adult Inpatient Plan of Care  Goal: Plan of Care Review  Outcome: Ongoing (interventions implemented as appropriate)  Patient remains on vent. VSS. Patient afebrile throughout shift. UOP adequate. TF continued, patient tolerating well. Specialty bed and heel elevating boots maintained. Patient alert, able to mouth some words, nods appropriately. Wound vac in place, seat intact. 200 mL output during shift, serous drainage. Plan of care discussed with patient. Will continue to monitor.

## 2019-02-11 NOTE — PROGRESS NOTES
Trach care done. Site cleaned. Inner cannula changed. HME replaced. Drain sponge placed.Ambubag & mask and extra trach at the bedside.

## 2019-02-11 NOTE — ASSESSMENT & PLAN NOTE
MORGANELLA MORGANII in sputum sensitive to MEROPENEM.    At baseline oxygenation on vent  Continue  IV MEROPENEM   CXR improved - has chronically abnormal lungs on old CXR films  Cont tracheal suctioning PRN and Alfonzo  2/11 Continue mechanical vent

## 2019-02-11 NOTE — PROGRESS NOTES
[facility]  Infectious Disease  Progress Note    Patient Name: Carlie Valdez  MRN: [id number]  Admission Date: 2/5/2019  Length of Stay: 6 days  Attending Physician: Pipe Lomeli, *  Primary Care Provider: Johanna Guzman MD    Isolation Status: No active isolations  Assessment/Plan:      * Acute on chronic respiratory failure with hypoxia and hypercapnia, Vent Dependent w/ Trach    Critical care follow up, ventilatory support      VAP (ventilator-associated pneumonia)    2/9- will continue meropenem   Ventilatory support      Deep tissue injury multiple unstageable    S/p wound debridement on 02/06- all cultures reviewed .  Cultures -Acinetobacter , ESBL E coli , proteus   Tracheal aspirate - morganella .  Carbapenems (including meropenem) are the preferred antibiotics for treatment of serious infections with bacteria that produce an extended-spectrum beta-lactamase (ESBL). This also applies even with invitro susceptibility to zosyn     Effect of Piperacillin-Tazobactam vs Meropenem on 30-Day Mortality for Patients With E coli or Klebsiella pneumoniae Bloodstream Infection and Ceftriaxone Resistance: A Randomized Clinical Trial.   Derrick PNA -their findings support other studeis that do not support use of zosyn in these settings.     2/9- will add tigecycline for acinetobacter as isolate is meropenem resistant , minocycline can be an option for discharge      Will switch to meropenem , follow final drug susceptibility of acinetobacter.  2/10- discussed with microlab about minocycline sensitivity ,   Will continue meropenem and tigecycline for now , will continue close monitoring        Type 2 diabetes mellitus with kidney complication, without long-term current use of insulin    Insulin sliding scale, closely monitor glucose         Anticipated Disposition:   (late entry note)  Thank you for your consult. I will follow-up with patient. Please contact us if you have any  additional questions.    Philip Ceja MD  Infectious Disease  [facility]    Subjective:     Principal Problem:Acute on chronic respiratory failure with hypoxia and hypercapnia    HPI:   73 year old woman with history of  Atrial fibrillation, HTN, CKD, CVA (embolic), Anemia , Heart failure, Chronic respiratory failure with trach, functional quadriplegia, CAD, and Neuromyelitis Optica who presented to the ER  for abnormal lab results.  Pt is a resident at [facility] for skilled nursing care. .  [facility] states pt has a sacral wound that was cultured and showed Gram negative bacteria.   Chest xray showed interval worsening of the appearance of the lungs with marked amount of alveolar consolidation scattered throughout left lung and the lower half of the right lung characteristic of pneumonia.  Since admission, all cultures reviewed -   Specimen: Tissue from Buttocks, Right Updated: 02/08/19 1413    Aerobic Bacterial Culture --    ESCHERICHIA COLI ESBL   Many     Aerobic Bacterial Culture --    PROTEUS MIRABILIS ESBL   Moderate      Specimen: Decubitus from Coccyx Updated: 02/08/19 0935      Aerobic Bacterial Culture --    ACINETOBACTER BAUMANNII COMPLEX   Many   Susceptibility pending     Aerobic Bacterial Culture --    ESCHERICHIA COLI ESBL   Moderate        Tracheal aspirate -MORGANELLA MORGANII .  Lab data showed wbc -19  She had interval I and D on 02/06  Interval History: remains on ventilatory support .  All cultures reviewed-  From wound cultures -  Decubitus -ESBL E coli-  Acinetobacter baumanni  Right buttocks-  ESBL E coli  Proteus ESBL  2/10 -she was seen with daughter and  by bedside-  All cultures reviewed -   Review of Systems   Unable to perform ROS: Acuity of condition     Objective:     Vital Signs (Most Recent):  Temp: 97.7 °F (36.5 °C) (02/11/19 0300)  Pulse: 70 (02/11/19 0729)  Resp: (!) 33 (02/11/19 0729)  BP: (!) 131/59 (02/11/19 0500)  SpO2: 99 % (02/11/19 0729) Vital  Signs (24h Range):  Temp:  [97.7 °F (36.5 °C)-98.9 °F (37.2 °C)] 97.7 °F (36.5 °C)  Pulse:  [69-72] 70  Resp:  [17-33] 33  SpO2:  [98 %-100 %] 99 %  BP: (131-178)/(59-92) 131/59     Weight: 71.6 kg (157 lb 13.6 oz)  Body mass index is 22.65 kg/m².    Estimated Creatinine Clearance: 67.7 mL/min (based on SCr of 0.8 mg/dL).    Physical Exam   Constitutional: She appears well-developed and well-nourished. No distress.   Lethargic   HENT:   Head: Normocephalic and atraumatic.   Mouth/Throat: Oropharynx is clear and moist.   Eyes: Conjunctivae and EOM are normal. Pupils are equal, round, and reactive to light.   Neck: Neck supple. No JVD present. No thyromegaly present.   Cardiovascular: Normal rate and regular rhythm. Exam reveals no gallop and no friction rub.   No murmur heard.  Defibrillator present.   Pulmonary/Chest: Effort normal and breath sounds normal. She has no wheezes. She has no rales.   #8 Shiley tracheostomy present.  RLL decreased breath sounds.  Left side decreased throughout with fine crackles.   Abdominal: Soft. Bowel sounds are normal. She exhibits no distension. There is no tenderness. There is no rebound and no guarding.   Feeding tube in place.   Genitourinary:   Genitourinary Comments: Flores catheter in place.   Musculoskeletal: Normal range of motion. She exhibits no edema or deformity.   Lymphadenopathy:     She has no cervical adenopathy.   Neurological: She is alert. She has normal reflexes.   Partially opens eyes to command.  Generalized weakness and not following other commands.  Upper extremities flexor contracted.  Upper and lower extremity muscle wasting.   Skin: Skin is warm and dry. No rash noted.   -has sacral wound   Psychiatric: She has a normal mood and affect. Her behavior is normal. Judgment and thought content normal.   Nursing note and vitals reviewed.      Significant Labs:   Blood Culture:   Recent Labs   Lab 10/22/18  1320 10/24/18  0628 10/24/18  0635 02/05/19  1235  02/05/19  1240   LABBLOO No growth after 5 days. No growth after 5 days. No growth after 5 days. No growth after 5 days. No growth after 5 days.     CBC:   Recent Labs   Lab 02/11/19  0355   WBC 17.06*   HGB 9.7*   HCT 30.8*   *     Wound Culture:   Recent Labs   Lab 02/06/19  0722 02/06/19  1410   LABAERO ACINETOBACTER BAUMANNII/HAEMOLYTICUS  Many   ([facility])     ESCHERICHIA COLI ESBL  Moderate   ESCHERICHIA COLI ESBL  Many    PROTEUS MIRABILIS ESBL  Moderate       All pertinent labs within the past 24 hours have been reviewed.    Significant Imaging: I have reviewed all pertinent imaging results/findings within the past 24 hours.

## 2019-02-11 NOTE — SUBJECTIVE & OBJECTIVE
Review of Systems   Unable to perform ROS: Other   trached on vent    Objective:     Vital Signs (Most Recent):  Temp: 99.1 °F (37.3 °C) (02/11/19 0705)  Pulse: 74 (02/11/19 0945)  Resp: (!) 28 (02/11/19 0945)  BP: (!) 108/53 (02/11/19 0900)  SpO2: 97 % (02/11/19 0945) Vital Signs (24h Range):  Temp:  [97.7 °F (36.5 °C)-99.1 °F (37.3 °C)] 99.1 °F (37.3 °C)  Pulse:  [69-79] 74  Resp:  [17-33] 28  SpO2:  [96 %-100 %] 97 %  BP: (108-178)/(53-92) 108/53     Weight: 71.6 kg (157 lb 13.6 oz)  Body mass index is 22.65 kg/m².      Intake/Output Summary (Last 24 hours) at 2/11/2019 1014  Last data filed at 2/11/2019 0910  Gross per 24 hour   Intake 2840 ml   Output 2053 ml   Net 787 ml       Physical Exam   Constitutional: Vital signs are normal. She appears well-developed. She has a sickly appearance.   HENT:   Head: Normocephalic and atraumatic.   Mouth/Throat: Oropharynx is clear and moist.   Eyes: EOM are normal.   Neck: Neck supple. No JVD present. No tracheal deviation present.       Cardiovascular: Normal rate. An irregularly irregular rhythm present.   Pulses:       Radial pulses are 2+ on the right side, and 2+ on the left side.        Dorsalis pedis pulses are 1+ on the right side, and 1+ on the left side.   Paced rhythm   Pulmonary/Chest: No accessory muscle usage. No tachypnea. No respiratory distress. She has decreased breath sounds in the left middle field and the left lower field. She has no wheezes. She has rales (coarse bilat improved with tracheal suctioning).   Abdominal: Soft. Bowel sounds are normal. She exhibits no distension. There is no tenderness.       Genitourinary:   Genitourinary Comments: Flores in place   Musculoskeletal: She exhibits edema.   No edema.  Diffuse atrophy.  LUE contracture   Lymphadenopathy:     She has no cervical adenopathy.   Neurological: She is alert.   Nods head and mouths words appropriately to questions   Skin: Skin is warm and dry. Capillary refill takes less than 2  seconds. She is not diaphoretic. No cyanosis.        decubitus ulcer       Vents:  Vent Mode: A/C (02/11/19 0945)  Ventilator Initiated: Yes (02/05/19 1127)  Set Rate: 20 bmp (02/11/19 0945)  Vt Set: 375 mL (02/11/19 0945)  Pressure Support: 0 cmH20 (02/11/19 0945)  PEEP/CPAP: 10 cmH20 (02/11/19 0945)  Oxygen Concentration (%): 40 (02/11/19 0945)  Peak Airway Pressure: 32 cmH2O (02/11/19 0945)  Plateau Pressure: 0 cmH20 (02/11/19 0945)  Total Ve: 11.6 mL (02/11/19 0945)  F/VT Ratio<105 (RSBI): (!) 84.08 (02/11/19 0945)    Lines/Drains/Airways     Drain                 Urethral Catheter -- days         Gastrostomy/Enterostomy 10/19/18 Percutaneous endoscopic gastrostomy (PEG)  days          Airway                 Surgical Airway 02/06/19 1905 Nikita 4 days          Pressure Ulcer                 Pressure Injury 02/05/19 Left Ischial tuberosity Unstageable - Present on Admission 6 days         Pressure Injury 02/05/19 Right medial Malleolus DTPI - Present on Admission 6 days         Pressure Injury 02/05/19 Sacral spine Stage 4 6 days         Pressure Injury 02/05/19 1620 Left lateral Hip Stage 3 5 days         Pressure Injury 02/06/19 Left Thigh DTPI - Present on Admission 5 days         Pressure Injury 02/06/19 Left Thigh Unstageable - Present on Admission 5 days         Pressure Injury 02/06/19 Left lateral Leg Stage 2 5 days         Pressure Injury 02/06/19 Left upper Arm DTPI - Present on Admission 5 days         Negative Pressure Wound Therapy  2 days          Peripheral Intravenous Line                 Midline Catheter Insertion/Assessment  - Single Lumen 02/10/19 1410 Right basilic vein (medial side of arm)  less than 1 day                Significant Labs:    CBC/Anemia Profile:  Recent Labs   Lab 02/11/19  0355   WBC 17.06*   HGB 9.7*   HCT 30.8*   *   MCV 89   RDW 18.7*        Chemistries:  Recent Labs   Lab 02/11/19  0355      K 4.3   *   CO2 21*   BUN 51*   CREATININE 0.8    CALCIUM 9.4   ALBUMIN 1.7*   PROT 7.3   BILITOT 0.2   ALKPHOS 174*   ALT 20   AST 34   MG 2.1       All pertinent labs within the past 24 hours have been reviewed.    Significant Imaging:  CXR: I have reviewed all pertinent results/findings within the past 24 hours and my personal findings are:  some improved aeration of left lung

## 2019-02-12 PROBLEM — J96.01 ACUTE RESPIRATORY FAILURE WITH HYPOXEMIA: Status: RESOLVED | Noted: 2018-01-01 | Resolved: 2019-01-01

## 2019-02-12 PROBLEM — R65.20 SEVERE SEPSIS WITH ACUTE ORGAN DYSFUNCTION: Status: RESOLVED | Noted: 2019-01-01 | Resolved: 2019-01-01

## 2019-02-12 PROBLEM — A41.9 SEVERE SEPSIS WITH ACUTE ORGAN DYSFUNCTION: Status: RESOLVED | Noted: 2019-01-01 | Resolved: 2019-01-01

## 2019-02-12 NOTE — ASSESSMENT & PLAN NOTE
MORGANELLA MORGANII in sputum sensitive to MEROPENEM.    At baseline oxygenation on vent  Continue  IV MEROPENEM   CXR improved - has chronically abnormal lungs on old CXR films  Cont tracheal suctioning PRN and Duonebs  2/11 Continue mechanical vent  2/12 continue vent support

## 2019-02-12 NOTE — PROGRESS NOTES
"  [facility] -   Adult Nutrition  Progress Note    SUMMARY     Recommendations    Recommendation: 1.Cotinue current TF regimen. 2. Check residuals Q4 hours. Hold if > 300 cc. 3. Will continue to monitor.   Intervention: Coordination of care w/ NP  Goals: Meet > 85 % EEN/EPN while admitted  Nutrition Goal Status: goal met   Communication of RD Recs: (Reviewed w/ NP)    Reason for Assessment    Reason For Assessment: RD follow-up  Dx: septic shock, ventilator dependent, Decubitus ulcer of sacral region stage 4, PHYLLIS, CHF    Past Medical History:   Diagnosis Date    Anticoagulant long-term use     Arthritis     Asthma     Atrial fibrillation     Blood clot of vein in shoulder area     Cardiac defibrillator in place     CHF (congestive heart failure)     Chronic knee pain     Diabetes mellitus type 2 without retinopathy 12/19/2016    Diaphragmatic hernia without obstruction and without gangrene 9/14/2015    GERD (gastroesophageal reflux disease)     Hypertension     Immune deficiency disorder     Primary open angle glaucoma (POAG) of both eyes, severe stage 6/1/2018     Interdisciplinary Rounds: Attend  General Information Comments:   2.8.19. Pt currently in ICU, on the vent.  Pt has a PEG for nutrition support.  Currently on TF, tolerating.  Per epic records, pt weighed 167 lbs on 11/02/18, current weight = 151 lbs ( wt loss of 16 lbs within the last 4 months). Per NFPE 2.8.19: mild subcutaneous fat and muscle loss noted. Reviewed TF recs w/ NP this am.   2.12.19. Pt remains in ICU, on the vent. Tolerating TF.   Nutrition Discharge Planning: Patient to receive adequate intake via PEG with tolerance.     Nutrition Risk Screen    Nutrition Risk Screen: tube feeding or parenteral nutrition    Nutrition/Diet History    Spiritual, Cultural Beliefs, Protestant Practices, Values that Affect Care: no    Anthropometrics    Temp: 99.5 °F (37.5 °C)  Height Method: Estimated  Height: 5' 10" (177.8 " cm)  Height (inches): 70 in  Weight Method: Bed Scale  Weight: 71 kg (156 lb 8.4 oz)  Weight (lb): 156.53 lb  Ideal Body Weight (IBW), Female: 150 lb  % Ideal Body Weight, Female (lb): 101.12 lb  BMI (Calculated): 21.8  BMI Grade: 18.5-24.9 - normal       Lab/Procedures/Meds    Pertinent Labs Reviewed: reviewed  BMP  Lab Results   Component Value Date     (H) 02/12/2019    K 4.0 02/12/2019     (H) 02/12/2019    CO2 24 02/12/2019    BUN 59 (H) 02/12/2019    CREATININE 0.9 02/12/2019    CALCIUM 9.4 02/12/2019    ANIONGAP 11 02/12/2019    ESTGFRAFRICA >60 02/12/2019    EGFRNONAA >60 02/12/2019     Lab Results   Component Value Date    CALCIUM 9.4 02/12/2019    PHOS 2.2 (L) 11/02/2018     Lab Results   Component Value Date    ALBUMIN 1.7 (L) 02/12/2019     Recent Labs   Lab 02/12/19  0549   POCTGLUCOSE 98     Lab Results   Component Value Date    HGBA1C 5.2 02/05/2019       Pertinent Medications Reviewed: reviewed    Physical Findings/Assessment     skin: pressure injuries stage 4     Estimated/Assessed Needs    Weight Used For Calorie Calculations: 68.8 kg (151 lb 10.8 oz)  Energy Calorie Requirements (kcal): 1416  Energy Need Method: [facility]  Protein Requirements: 82 - 137 g  Weight Used For Protein Calculations: 68.8 kg (151 lb 10.8 oz)   Estimated Fluid Requirement Method: RDA Method(or per MD)  RDA Method (mL): 1416   Cho requirement: 50 % EEN         Nutrition Prescription Ordered    Current Diet Order: NPO   Current Nutrition Support Formula Ordered: Peptamen Intense VHP @ 60 ml/hr     Evaluation of Received Nutrient/Fluid Intake    Enteral calories (kcal): 1440  Enteral protein (gm) 133    % Kcal needs: 101  % protein needs: 100    Intake/Output Summary (Last 24 hours) at 2/12/2019 0915  Last data filed at 2/12/2019 0600  Gross per 24 hour   Intake 2400 ml   Output 2070 ml   Net 330 ml       % Meal Intake: NPO    Nutrition Risk    Level of Risk/Frequency of Follow-up: 2xweekly     Assessment and  Plan    Moderate malnutrition    Malnutrition in the context of Chronic Illness/Injury    Related to (etiology):  Physiological causes increasing nutrient needs     Signs and Symptoms (as evidenced by):  Body Fat Depletion: mild depletion of orbitals and triceps   Muscle Mass Depletion: mild depletion of temples, clavicle region and scapular region   Weight Loss: 9.58 % within the last 4 months     Interventions/Recommendations (treatment strategy):  See above     Nutrition Diagnosis Status:  Improving          Monitor and Evaluation    Food and Nutrient Intake: energy intake, enteral nutrition intake  Food and Nutrient Adminstration: enteral and parenteral nutrition administration  Physical Activity and Function: nutrition-related ADLs and IADLs  Anthropometric Measurements: weight  Biochemical Data, Medical Tests and Procedures: electrolyte and renal panel, glucose/endocrine profile  Nutrition-Focused Physical Findings: overall appearance, skin     Malnutrition Assessment                 Orbital Region (Subcutaneous Fat Loss): mild depletion   Faith Region (Muscle Loss): mild depletion  Clavicle Bone Region (Muscle Loss): mild depletion                 Nutrition Follow-Up    RD Follow-up?: Yes

## 2019-02-12 NOTE — SUBJECTIVE & OBJECTIVE
Interval History: remains on ventilatory support .  All cultures reviewed-  From wound cultures -  Decubitus -ESBL E coli-  Acinetobacter baumanni  Right buttocks-  ESBL E coli  Proteus ESBL  2/10 -she was seen with daughter and  by bedside-  All cultures reviewed -   2/11- new cultures -Eggerthella lenta, Delroy velazco   Review of Systems   Unable to perform ROS: Acuity of condition     Objective:     Vital Signs (Most Recent):  Temp: 99.4 °F (37.4 °C) (02/12/19 0300)  Pulse: 68 (02/12/19 0300)  Resp: (!) 25 (02/12/19 0300)  BP: (!) 131/58 (02/12/19 0300)  SpO2: 99 % (02/12/19 0300) Vital Signs (24h Range):  Temp:  [98.5 °F (36.9 °C)-99.4 °F (37.4 °C)] 99.4 °F (37.4 °C)  Pulse:  [] 68  Resp:  [14-36] 25  SpO2:  [85 %-100 %] 99 %  BP: ()/(47-75) 131/58     Weight: 71.6 kg (157 lb 13.6 oz)  Body mass index is 22.65 kg/m².    Estimated Creatinine Clearance: 67.7 mL/min (based on SCr of 0.8 mg/dL).    Physical Exam   Constitutional: She appears well-developed and well-nourished. No distress.   Lethargic   HENT:   Head: Normocephalic and atraumatic.   Mouth/Throat: Oropharynx is clear and moist.   Eyes: Conjunctivae and EOM are normal. Pupils are equal, round, and reactive to light.   Neck: Neck supple. No JVD present. No thyromegaly present.   Cardiovascular: Normal rate and regular rhythm. Exam reveals no gallop and no friction rub.   No murmur heard.  Defibrillator present.   Pulmonary/Chest: Effort normal and breath sounds normal. She has no wheezes. She has no rales.   #8 Shiley tracheostomy present.  RLL decreased breath sounds.  Left side decreased throughout with fine crackles.   Abdominal: Soft. Bowel sounds are normal. She exhibits no distension. There is no tenderness. There is no rebound and no guarding.   Feeding tube in place.   Genitourinary:   Genitourinary Comments: Flores catheter in place.   Musculoskeletal: Normal range of motion. She exhibits no edema or deformity.    Lymphadenopathy:     She has no cervical adenopathy.   Neurological: She is alert. She has normal reflexes.   Partially opens eyes to command.  Generalized weakness and not following other commands.  Upper extremities flexor contracted.  Upper and lower extremity muscle wasting.   Skin: Skin is warm and dry. No rash noted.   -has sacral wound   Psychiatric: She has a normal mood and affect. Her behavior is normal. Judgment and thought content normal.   Nursing note and vitals reviewed.      Significant Labs:   Blood Culture:   Recent Labs   Lab 10/22/18  1320 10/24/18  0628 10/24/18  0635 02/05/19  1235 02/05/19  1240   LABBLOO No growth after 5 days. No growth after 5 days. No growth after 5 days. No growth after 5 days. No growth after 5 days.     CBC:   Recent Labs   Lab 02/10/19  0517 02/11/19  0355   WBC 13.18* 17.06*   HGB 6.8* 9.7*   HCT 22.7* 30.8*   * 424*     Wound Culture:   Recent Labs   Lab 02/06/19  0722 02/06/19  1410   LABAERO ACINETOBACTER BAUMANNII/HAEMOLYTICUS  Many  Susceptibility pending   ([facility])     ESCHERICHIA COLI ESBL  Moderate   ESCHERICHIA COLI ESBL  Many    PROTEUS MIRABILIS ESBL  Moderate       All pertinent labs within the past 24 hours have been reviewed.    Significant Imaging: I have reviewed all pertinent imaging results/findings within the past 24 hours.

## 2019-02-12 NOTE — PLAN OF CARE
Problem: Adult Inpatient Plan of Care  Goal: Plan of Care Review  Outcome: Ongoing (interventions implemented as appropriate)  POC and interventions discussed with patient (and daughter via telephone).  Tracheostomy and ventilated.  Oxygenating well.  Mouths words and nods appropriately to communicate.  LUE contracted, RUE mostly flaccid - capable of some gross motor movements with both.  BLE both capable of some efforts against gravity.  Bilat heel boots ON.  Afebrile.  Tolerating TF's with high residuals.  Bowel movments x 2 thus far this shift.  Sacral stage 4 with wound vac functioning properly.  Size wise bed with auto rotation ON.  Wedge also used for turning.  Has been off Levophed - hemodynamically stable.  Apaced 100%.  Accu checks wnl.

## 2019-02-12 NOTE — PLAN OF CARE
Problem: Adult Inpatient Plan of Care  Goal: Plan of Care Review  Outcome: Ongoing (interventions implemented as appropriate)  PT HAS BEEN STABLE THIS SHIFT.  ELEVATED RR RATE (30'S) WAS REPORTED TO RIGOBERTO BOWDEN.  NO NEW ORDERS NOTED.  PT O2 SATURATION, HR AND BP HAVE BEEN WNL.  URINE OUTPUT HAS  BEEN APPROXIMALLY 1200 FOR THIS SHIFT.  POC WAS REVIEWED WITH PT'S DAUGHTER VIA PHONE.  WOUND VAC PATENT AND INTACT.  PT DENIES PAIN.  BED LOCKED AND LOW WITH CALL BELL IN REACH.  PT IS TOLERATING TUBE FEEDING AND WHOLE BLOOD GLUCOSE WNL.  UNEVENTFUL SHIFT.

## 2019-02-12 NOTE — SUBJECTIVE & OBJECTIVE
Review of Systems   Unable to perform ROS: Other   trached on vent    Objective:     Vital Signs (Most Recent):  Temp: 99.5 °F (37.5 °C) (02/12/19 0715)  Pulse: 73 (02/12/19 1000)  Resp: 16 (02/12/19 1000)  BP: (!) 177/91 (02/12/19 1000)  SpO2: 99 % (02/12/19 1000) Vital Signs (24h Range):  Temp:  [98.5 °F (36.9 °C)-99.5 °F (37.5 °C)] 99.5 °F (37.5 °C)  Pulse:  [] 73  Resp:  [14-36] 16  SpO2:  [85 %-100 %] 99 %  BP: ()/(50-91) 177/91     Weight: 71 kg (156 lb 8.4 oz)  Body mass index is 22.46 kg/m².      Intake/Output Summary (Last 24 hours) at 2/12/2019 1116  Last data filed at 2/12/2019 0600  Gross per 24 hour   Intake 2080 ml   Output 1770 ml   Net 310 ml       Physical Exam   Constitutional: Vital signs are normal. She appears well-developed. She has a sickly appearance.   HENT:   Head: Normocephalic and atraumatic.   Mouth/Throat: Oropharynx is clear and moist.   Eyes: EOM are normal.   Neck: Neck supple. No JVD present. No tracheal deviation present.       Cardiovascular: Normal rate. An irregularly irregular rhythm present.   Pulses:       Radial pulses are 2+ on the right side, and 2+ on the left side.        Dorsalis pedis pulses are 1+ on the right side, and 1+ on the left side.   Paced rhythm   Pulmonary/Chest: No accessory muscle usage. No tachypnea. No respiratory distress. She has decreased breath sounds in the left middle field and the left lower field. She has no wheezes. She has rales (coarse bilat improved with tracheal suctioning).   Abdominal: Soft. Bowel sounds are normal. She exhibits no distension. There is no tenderness.       Genitourinary:   Genitourinary Comments: Flores in place   Musculoskeletal: She exhibits edema.   No edema.  Diffuse atrophy.  LUE contracture   Lymphadenopathy:     She has no cervical adenopathy.   Neurological: She is alert.   Nods head and mouths words appropriately to questions   Skin: Skin is warm and dry. Capillary refill takes less than 2 seconds. She  is not diaphoretic. No cyanosis.        decubitus ulcer       Vents:  Vent Mode: A/C (02/12/19 0832)  Ventilator Initiated: Yes (02/05/19 1127)  Set Rate: 20 bmp (02/12/19 0832)  Vt Set: 375 mL (02/12/19 0832)  Pressure Support: 0 cmH20 (02/12/19 0832)  PEEP/CPAP: 10 cmH20 (02/12/19 0832)  Oxygen Concentration (%): 40 (02/12/19 1000)  Peak Airway Pressure: 29 cmH2O (02/12/19 0832)  Plateau Pressure: 0 cmH20 (02/12/19 0832)  Total Ve: 11.4 mL (02/12/19 0832)  F/VT Ratio<105 (RSBI): (!) 51.02 (02/12/19 0832)    Lines/Drains/Airways     Drain                 Urethral Catheter -- days         Gastrostomy/Enterostomy 10/19/18 Percutaneous endoscopic gastrostomy (PEG)  days          Airway                 Surgical Airway 02/06/19 1905 [place] 5 days          Pressure Ulcer                 Pressure Injury 02/05/19 Left Ischial tuberosity Unstageable - Present on Admission 7 days         Pressure Injury 02/05/19 Right medial Malleolus DTPI - Present on Admission 7 days         Pressure Injury 02/05/19 Sacral spine Stage 4 7 days         Pressure Injury 02/05/19 1620 Left lateral Hip Stage 3 6 days         Pressure Injury 02/06/19 Left Thigh DTPI - Present on Admission 6 days         Pressure Injury 02/06/19 Left Thigh Unstageable - Present on Admission 6 days         Pressure Injury 02/06/19 Left lateral Leg Stage 2 6 days         Pressure Injury 02/06/19 Left upper Arm DTPI - Present on Admission 6 days         Negative Pressure Wound Therapy  3 days          Peripheral Intravenous Line                 Midline Catheter Insertion/Assessment  - Single Lumen 02/10/19 1410 Right basilic vein (medial side of arm)  1 day         Midline Catheter Insertion/Assessment  - Double Lumen 02/11/19 1545 Left basilic vein (medial side of arm) less than 1 day                Significant Labs:    CBC/Anemia Profile:  Recent Labs   Lab 02/11/19  0355 02/12/19  0343   WBC 17.06* 22.22*   HGB 9.7* 8.5*   HCT 30.8* 27.9*   * 416*    MCV 89 90   RDW 18.7* 18.9*        Chemistries:  Recent Labs   Lab 02/11/19  0355 02/12/19  0343    149*   K 4.3 4.0   * 114*   CO2 21* 24   BUN 51* 59*   CREATININE 0.8 0.9   CALCIUM 9.4 9.4   ALBUMIN 1.7* 1.7*   PROT 7.3 7.1   BILITOT 0.2 0.2   ALKPHOS 174* 165*   ALT 20 16   AST 34 21   MG 2.1  --        All pertinent labs within the past 24 hours have been reviewed.    Significant Imaging:  CXR: I have reviewed all pertinent results/findings within the past 24 hours and my personal findings are:  some improved aeration of left lung

## 2019-02-12 NOTE — ASSESSMENT & PLAN NOTE
S/p wound debridement on 02/06- all cultures reviewed .  Cultures -Acinetobacter , ESBL E coli , proteus   Tracheal aspirate - morganella .  Carbapenems (including meropenem) are the preferred antibiotics for treatment of serious infections with bacteria that produce an extended-spectrum beta-lactamase (ESBL). This also applies even with invitro susceptibility to zosyn     Effect of Piperacillin-Tazobactam vs Meropenem on 30-Day Mortality for Patients With E coli or Klebsiella pneumoniae Bloodstream Infection and Ceftriaxone Resistance: A Randomized Clinical Trial.   Derrick VELASQUEZ -their findings support other studeis that do not support use of zosyn in these settings.     2/9- will add tigecycline for acinetobacter as isolate is meropenem resistant , minocycline can be an option for discharge      Will switch to meropenem , follow final drug susceptibility of acinetobacter.  2/10- discussed with microlab about minocycline sensitivity ,   Will continue meropenem and tigecycline for now , will continue close monitoring     2/11- [facility] says the medications are too expensive to use in the out patient settings -  A less preferred option will be Zosyn -to cover EGGERTHELLA LENTA ,Blautia coccoides, proteus  /E coli and morganella.  This is not the ideal therapy but can work with possible risk of failure.  Bactrim or minocycline can be used for Acinetobacter -will follow minocycline sensitivity.  If these options are taken, she will need close monitoring to avoid deterioration.  Will not to prefer to use double beta lactam agents to reduce risk of interstitial nephritis

## 2019-02-12 NOTE — PLAN OF CARE
Problem: Adult Inpatient Plan of Care  Goal: Plan of Care Review  Outcome: Ongoing (interventions implemented as appropriate)  Recommendations     Recommendation: 1.Cotinue current TF regimen. 2. Check residuals Q4 hours. Hold if > 300 cc. 3. Will continue to monitor.   Intervention: Coordination of care w/ NP  Goals: Meet > 85 % EEN/EPN while admitted  Nutrition Goal Status: goal met   Communication of RD Recs: (Reviewed w/ NP)

## 2019-02-12 NOTE — PROGRESS NOTES
[facility] -   Critical Care Medicine  Progress Note    Patient Name: Carlie Valdez  MRN: [id number]  Admission Date: 2/5/2019  Hospital Length of Stay: 7 days  Code Status: Full Code  Attending Provider: Pipe Lomeli, *  Primary Care Provider: Johanna Guzman MD   Principal Problem: Acute respiratory failure with hypoxemia    Subjective: blood pressure stabilized off pressors     HPI:  73 year old female well known to our service s/t prior admissions; complicated PMH includes quadraplegia and chronic vent dependent respiratory failure with trach s/t NMO; DM; CHF; HTN; GERD; atrial fib; MRSA sputum    Presented to ED from NH for eval of abnl labs; EMS also reported gm neg bacteria growth from sacral wound    ED evaluation revealed hypothermia 94F; normotension; leukocytosis 16k; anemia 5.6/19.9; sodium 152; creatinine 2.4 with ; lactic acid 4.2; procalcitonin 1.2       Hospital/ICU Course:  Admitted to ICU on mechanical ventilation, awake, non verbal and paraplegic at baseline; hypothermic with external warming in progress  2/6 - chronic vent via trach, hypoxemia reported with turning overnight, continued moderate trach secretions; marginal BP overnight and marginal urine output; to OR today for I/D of sacral wound  2/7 - supine on vent in no distress and VSS.  Awake and nods head to questions  2/8 - Supine on chronic vent dependent w/ VSS and tolerating TF.  Awake and mouthing words.  No distress.    2/9 - Seen and examined at bedside. Hospital chart reviewed. No acute interval detrimental events noted. S/P debridement of sacral wound. Seen and examined at bedside. Hospital chart reviewed. No acute interval detrimental events noted. CT chest with LLL atelectasis => Chest PT. S/P debridement of sacral wound. The wound cx is (+) for e.coli and proteus sensitive to meropenem .    2/10 - CXR some improvement in left infiltrate.  Afeb resting on vent dependent mech  ventilation.  I discussed her current vent dependent bed bound state with multiple non healing wounds and she feels she does have a quality of life and wants to continue with aggressive long term care.   2/11 Stable overnight, needs PICC line for antibiotics, off pressors  2/12 No adverse events overnight    Review of Systems   Unable to perform ROS: Other   trached on vent    Objective:     Vital Signs (Most Recent):  Temp: 99.5 °F (37.5 °C) (02/12/19 0715)  Pulse: 73 (02/12/19 1000)  Resp: 16 (02/12/19 1000)  BP: (!) 177/91 (02/12/19 1000)  SpO2: 99 % (02/12/19 1000) Vital Signs (24h Range):  Temp:  [98.5 °F (36.9 °C)-99.5 °F (37.5 °C)] 99.5 °F (37.5 °C)  Pulse:  [] 73  Resp:  [14-36] 16  SpO2:  [85 %-100 %] 99 %  BP: ()/(50-91) 177/91     Weight: 71 kg (156 lb 8.4 oz)  Body mass index is 22.46 kg/m².      Intake/Output Summary (Last 24 hours) at 2/12/2019 1116  Last data filed at 2/12/2019 0600  Gross per 24 hour   Intake 2080 ml   Output 1770 ml   Net 310 ml       Physical Exam   Constitutional: Vital signs are normal. She appears well-developed. She has a sickly appearance.   HENT:   Head: Normocephalic and atraumatic.   Mouth/Throat: Oropharynx is clear and moist.   Eyes: EOM are normal.   Neck: Neck supple. No JVD present. No tracheal deviation present.       Cardiovascular: Normal rate. An irregularly irregular rhythm present.   Pulses:       Radial pulses are 2+ on the right side, and 2+ on the left side.        Dorsalis pedis pulses are 1+ on the right side, and 1+ on the left side.   Paced rhythm   Pulmonary/Chest: No accessory muscle usage. No tachypnea. No respiratory distress. She has decreased breath sounds in the left middle field and the left lower field. She has no wheezes. She has rales (coarse bilat improved with tracheal suctioning).   Abdominal: Soft. Bowel sounds are normal. She exhibits no distension. There is no tenderness.       Genitourinary:   Genitourinary Comments: Mark in  place   Musculoskeletal: She exhibits edema.   No edema.  Diffuse atrophy.  LUE contracture   Lymphadenopathy:     She has no cervical adenopathy.   Neurological: She is alert.   Nods head and mouths words appropriately to questions   Skin: Skin is warm and dry. Capillary refill takes less than 2 seconds. She is not diaphoretic. No cyanosis.        decubitus ulcer       Vents:  Vent Mode: A/C (02/12/19 0832)  Ventilator Initiated: Yes (02/05/19 1127)  Set Rate: 20 bmp (02/12/19 0832)  Vt Set: 375 mL (02/12/19 0832)  Pressure Support: 0 cmH20 (02/12/19 0832)  PEEP/CPAP: 10 cmH20 (02/12/19 0832)  Oxygen Concentration (%): 40 (02/12/19 1000)  Peak Airway Pressure: 29 cmH2O (02/12/19 0832)  Plateau Pressure: 0 cmH20 (02/12/19 0832)  Total Ve: 11.4 mL (02/12/19 0832)  F/VT Ratio<105 (RSBI): (!) 51.02 (02/12/19 0832)    Lines/Drains/Airways     Drain                 Urethral Catheter -- days         Gastrostomy/Enterostomy 10/19/18 Percutaneous endoscopic gastrostomy (PEG)  days          Airway                 Surgical Airway 02/06/19 1905 Nikita 5 days          Pressure Ulcer                 Pressure Injury 02/05/19 Left Ischial tuberosity Unstageable - Present on Admission 7 days         Pressure Injury 02/05/19 Right medial Malleolus DTPI - Present on Admission 7 days         Pressure Injury 02/05/19 Sacral spine Stage 4 7 days         Pressure Injury 02/05/19 1620 Left lateral Hip Stage 3 6 days         Pressure Injury 02/06/19 Left Thigh DTPI - Present on Admission 6 days         Pressure Injury 02/06/19 Left Thigh Unstageable - Present on Admission 6 days         Pressure Injury 02/06/19 Left lateral Leg Stage 2 6 days         Pressure Injury 02/06/19 Left upper Arm DTPI - Present on Admission 6 days         Negative Pressure Wound Therapy  3 days          Peripheral Intravenous Line                 Midline Catheter Insertion/Assessment  - Single Lumen 02/10/19 1410 Right basilic vein (medial side of arm)  1  day         Midline Catheter Insertion/Assessment  - Double Lumen 02/11/19 1545 Left basilic vein (medial side of arm) less than 1 day                Significant Labs:    CBC/Anemia Profile:  Recent Labs   Lab 02/11/19  0355 02/12/19  0343   WBC 17.06* 22.22*   HGB 9.7* 8.5*   HCT 30.8* 27.9*   * 416*   MCV 89 90   RDW 18.7* 18.9*        Chemistries:  Recent Labs   Lab 02/11/19  0355 02/12/19  0343    149*   K 4.3 4.0   * 114*   CO2 21* 24   BUN 51* 59*   CREATININE 0.8 0.9   CALCIUM 9.4 9.4   ALBUMIN 1.7* 1.7*   PROT 7.3 7.1   BILITOT 0.2 0.2   ALKPHOS 174* 165*   ALT 20 16   AST 34 21   MG 2.1  --        All pertinent labs within the past 24 hours have been reviewed.    Significant Imaging:  CXR: I have reviewed all pertinent results/findings within the past 24 hours and my personal findings are:  some improved aeration of left lung        ABG  Recent Labs   Lab 02/07/19  0524   PH 7.529*   PO2 171*   PCO2 41.7   HCO3 34.7*   BE 12     Assessment/Plan:     Neuro   Neuromyelitis optica    With resultant functional quadraplegia, blindness and vent dependence. Cellcept on hold.        Derm   Decubitus ulcer of sacral region, stage 4    S/P debridement. The wound cx is (+) for e.coli and proteus sensitive to meropenem. MEROPENEM per ID. Appreciate ID input.      Pulmonary   VAP (ventilator-associated pneumonia)    MORGANELLA MORGANII in sputum sensitive to MEROPENEM.    At baseline oxygenation on vent  Continue  IV MEROPENEM   CXR improved - has chronically abnormal lungs on old CXR films  Cont tracheal suctioning PRN and Duonebs  2/11 Continue mechanical vent  2/12 continue vent support       Atelectasis of left lung    Mucomyst q 12 / Chest PT.   2/12 Chest X Ray - unchanged; continue Duonebs, mucomyst , chest CPT     Cardiac/Vascular   Chronic Hypotension    Cont TF and free water to PEG  Continue midodrine  ICU hemodynamic monitoring     Chronic combined systolic and diastolic congestive heart  failure    CHF optimized. Daily weighing. Dietary salt restriction. Accurate I/Os.  Stop IVFs       Chronic atrial fibrillation    Currently paced.  regular rhythm.  Monitor hemodynamics.     Renal/    Monitor BUN / Cr. Montor urine output.     Acute on chronic renal failure    Strict I/O  Monitor creatinine trend     Hypernatremia    Stable with free water replacement via PEG      UTI (urinary tract infection)    Polymicrobial Colonization. Urine Culture => multiple organisms, none predominant. On IV MEROPENEM .         Oncology   Anemia    Transfuse 2 units PRBCs  Monitor hemogram.      Endocrine   Moderate malnutrition    Tube deeding per RD recommendations.   2/12 continue tube feeding per RD     Hypothyroidism    Continue Levothyroxine     Type 2 diabetes mellitus with kidney complication, without long-term current use of insulin    Stable EUGLYCEMIC}  low dose SSI for hyperglycemia with monitoring for glucose control and prevention of insulin toxicity. Blood glucose target 100 - 180 mg/dl         Orthopedic   Deep tissue injury multiple unstageable    Continue wound care. Low pressure bed and turn Q 2 hours       Other   Pressure injury of coccygeal region, stage 4    Multiple small stage 2 wounds.   S/P I&D of sacrum 2/6.   Continue wound care per WOC recs         Critical Care Daily Checklist:    A: Awake: RASS Goal/Actual Goal:    Actual: Guerrero Agitation Sedation Scale (RASS): Alert and calm   B: Spontaneous Breathing Trial Performed? Spon. Breathing Trial Initiated?: Not initiated (02/09/19 1115)   C: SAT & SBT Coordinated?  na                      D: Delirium: CAM-ICU Overall CAM-ICU: Negative   E: Early Mobility Performed? No   F: Feeding Goal: Goals: Meet > 85 % EEN/EPN while admitted  Status: Nutrition Goal Status: new   Current Diet Order   No orders of the defined types were placed in this encounter.      AS: Analgesia/Sedation adequate   T: Thromboembolic Prophylaxis yes   H: HOB > 300 Yes   U:  Stress Ulcer Prophylaxis (if needed) y   G: Glucose Control adequate   B: Bowel Function Stool Occurrence: 1   I: Indwelling Catheter (Lines & Flores) Necessity needed   D: De-escalation of Antimicrobials/Pharmacotherapies Na. Follow ID recommendations    Plan for the day/ETD Awaiting transfer plans     Code Status:  Family/Goals of Care: Full Code  No one available     Critical Care Time: 35 minutes  Critical secondary to Patient has a condition that poses threat to life and bodily function: Severe Respiratory Distress      Critical care was time spent personally by me on the following activities: development of treatment plan with patient or surrogate and bedside caregivers, discussions with consultants, evaluation of patient's response to treatment, examination of patient, ordering and performing treatments and interventions, ordering and review of laboratory studies, ordering and review of radiographic studies, pulse oximetry, re-evaluation of patient's condition. This critical care time did not overlap with that of any other provider or involve time for any procedures.     James Meier MD  Critical Care Medicine  [facility] -

## 2019-02-12 NOTE — PROGRESS NOTES
02/12/19 1105   Handoff Report   Given To GIULIANO Cullen   Skin   Skin WDL ex   Skin Temperature warm   Skin Moisture dry   Beka Risk Assessment   Sensory Perception 3-->slightly limited   Moisture 3-->occasionally moist   Activity 1-->bedfast   Mobility 2-->very limited   Nutrition 3-->adequate   Friction and Shear 2-->potential problem   Beka Score 14       Negative Pressure Wound Therapy    Placement Date/Time: 02/08/19 1130   Location: Sacral Spine   NPWT Type Instillation Therapy   Therapy Setting NPWT Continuous therapy   Pressure Setting NPWT 125 mmHg   Instillation NPWT Normal saline   Instillation Amount (mL) 50 mL   Soak Time (min) 10 minutes   Instillation Frequency (hrs) 3 hours   Therapy Interventions NPWT Dressing changed   Sponges Inserted NPWT Black;2   Sponges Removed NPWT Black;2       Pressure Injury 02/05/19 Left Ischial tuberosity Unstageable - Present on Admission   Date First Assessed: 02/05/19   Pressure Injury Present on Admission: yes  Side: Left  Location: Ischial tuberosity  Is this injury device related?: No  Staging: Unstageable - Present on Admission   Staging UPA   Dressing Appearance Intact;Moist drainage   Drainage Amount Scant   Drainage Characteristics/Odor Serous   Dressing Hydrocolloid   Dressing Change Due 02/16/19       Pressure Injury 02/05/19 Sacral spine Stage 4   Date First Assessed: 02/05/19   Pressure Injury Present on Admission: yes  Location: (c) Sacral spine  Is this injury device related?: No  Staging: (c) Stage 4   Staging 4   Dressing Appearance Intact   Drainage Amount Moderate   Drainage Characteristics/Odor Serosanguineous   Appearance Red;Granulating;Moist;Yellow;Slough;Bone   Tissue loss description Full thickness   Black (%), Wound Tissue Color 0 %   Red (%), Wound Tissue Color 75 %   Yellow (%), Wound Tissue Color 25 %   Periwound Area Dry;Intact   Wound Edges Irregular;Open   Wound Length (cm) 12 cm   Wound Width (cm) 12 cm   Wound Depth (cm) 5.5 cm   Wound  Volume (cm^3) 792 cm^3   Wound Surface Area (cm^2) 144 cm^2   Undermining (depth (cm)/location) 5cm @ 9-3 o'clock   Care Cleansed with:;Sterile normal saline;Applied:;Skin Barrier   Dressing Changed  (Wound Vac Veraflo)   Dressing Change Due 02/15/19       Pressure Injury 02/05/19 1620 Left lateral Hip Stage 3   Date First Assessed/Time First Assessed: 02/05/19 1620   Pressure Injury Present on Admission: yes  Side: Left  Orientation: lateral  Location: Hip  Is this injury device related?: No  Staging: (c) Stage 3   Staging 3   Dressing Appearance Intact   Dressing Change Due 02/16/19       Pressure Injury 02/05/19 Right medial Malleolus DTPI - Present on Admission   Date First Assessed: 02/05/19   Pressure Injury Present on Admission: yes  Side: Right  Orientation: medial  Location: Malleolus  Is this injury device related?: No  Staging: DTPI - Present on Admission   Staging DPA       Pressure Injury 02/06/19 Left lateral Leg Stage 2   Date First Assessed: 02/06/19   Pressure Injury Present on Admission: yes  Side: Left  Orientation: lateral  Location: Leg  Is this injury device related?: No  Staging: Stage 2   Staging 2   Dressing Appearance Intact;Dry   Drainage Amount None   Appearance Blistered  (resolving)   Dressing Foam   Dressing Change Due 02/14/19       Pressure Injury 02/06/19 Left Thigh DTPI - Present on Admission   Date First Assessed: 02/06/19   Pressure Injury Present on Admission: yes  Side: Left  Location: Thigh  Staging: DTPI - Present on Admission   Staging DPA   Dressing Appearance Intact;Dry   Drainage Amount None   Appearance Maroon   Periwound Area Dry;Intact   Wound Length (cm) 0.3 cm   Wound Width (cm) 5.5 cm   Wound Surface Area (cm^2) 1.65 cm^2   Care Cleansed with:;Sterile normal saline;Applied:;Skin Barrier   Dressing Transparent film   Dressing Change Due 02/15/19       Pressure Injury 02/06/19 Left Thigh Unstageable - Present on Admission   Date First Assessed: 02/06/19   Pressure  Injury Present on Admission: yes  Side: Left  Location: Thigh  Staging: Unstageable - Present on Admission   Staging UPA   Dressing Appearance Intact;Moist drainage   Drainage Amount Small   Drainage Characteristics/Odor Serous   Appearance Yellow;Slough;Moist   Tissue loss description Full thickness   Periwound Area Dry;Intact   Wound Length (cm) 1 cm   Wound Width (cm) 3 cm   Wound Depth (cm) 0.1 cm   Wound Volume (cm^3) 0.3 cm^3   Wound Surface Area (cm^2) 3 cm^2   Care Cleansed with:;Sterile normal saline;Applied:;Skin Barrier   Dressing Transparent film   Dressing Change Due 02/15/19       Pressure Injury 02/06/19 Left upper Arm DTPI - Present on Admission   Date First Assessed: 02/06/19   Pressure Injury Present on Admission: yes  Side: Left  Orientation: upper  Location: Arm  Staging: DTPI - Present on Admission   Staging DPA   Dressing Appearance Intact;Dry   Drainage Amount None   Appearance Red;Maroon   Dressing Foam   Skin Interventions   Device Skin Pressure Protection absorbent pad utilized/changed;adhesive use limited;positioning supports utilized;pressure points protected;skin-to-device areas padded   Pressure Reduction Devices foam padding utilized;heel offloading device utilized;positioning supports utilized;specialty bed utilized   Pressure Reduction Techniques frequent weight shift encouraged;heels elevated off bed;positioned off wounds   Skin Protection adhesive use limited;incontinence pads utilized;skin sealant/moisture barrier applied;tubing/devices free from skin contact     Follow up visit with Ms. Valdez for continued wound care and Wound VAC Veraflo dressing change.  Patient is awake and alert. Positioned her on right side with wedge and pillows for support.  Left Trochanter and left ischial duoderm dressings intact, changed yesterday so not disturbed at this time.  Left lateral thigh unstageable and DTI PIs noted, tegaderm removed. Cleansed with saline. DTI remains with maroon  discoloration. Unstageable now moist and yellow slough noted. Cleansed with saline. tegaderm reapplied.  Left lateral lower leg stage 2 blister again noted, fluid reabsorbing. Recovered with foam dressing.    Dressing soak function of wound vac veraflo utilized and clamped and paused vac. Vac dressing removed.  Wound measures [id number] with undermining again noted from 9-3 o'clock extending up to 5cm.  Wound bed is mixed moist red budding granultation tissue, and yellow/fibrinous slough, sacral bone remains palpable. Moderate amount serosanguious drainage noted. Cleansed well with saline and patted dry.  Liz wound cleansed with bath wipes and painted with cavilon. Edges of wound bordered with stoma rings and window paned with strips of veraflo drape. Wound bed filled with wound vac veraflo foam and secured with drape. Trac Pad and tubing applied and attached to KCI wound vac. Veraflo therapy initiated at following settings: Instillation fluid: sterile normal saline for irrigation, volume: 50mL, Dwell time: 10 minutes, Cycle: 3 hours @ continuous -125 mmHg low intensity suction. Good seal noted, no leaks.  Plan for wound vac veraflo dressing change every Tu/Fri. Chart reviewed. Possibility of LTAC discharge due to cost of IV antibiotics. Recommend continue VAC Veraflo therapy as long as possible. If discharge to LTAC, continue Veraflo therapy, otherwise, may convert to traditional KCI wound vac at discharge.  Will follow.

## 2019-02-12 NOTE — PROGRESS NOTES
[facility]  Infectious Disease  Progress Note    Patient Name: Carlie Valdez  MRN: [id number]  Admission Date: 2/5/2019  Length of Stay: 7 days  Attending Physician: Pipe Lomeli, *  Primary Care Provider: Johanna Guzman MD    Isolation Status: No active isolations  Assessment/Plan:      * Acute respiratory failure with hypoxemia    Critical care follow up, ventilatory support      Pressure injury of left thigh, unstageable    Wound care follow up     VAP (ventilator-associated pneumonia)    2/9- will continue meropenem   Ventilatory support     2/11- now on ertapenem      Deep tissue injury multiple unstageable    S/p wound debridement on 02/06- all cultures reviewed .  Cultures -Acinetobacter , ESBL E coli , proteus   Tracheal aspirate - morganella .  Carbapenems (including meropenem) are the preferred antibiotics for treatment of serious infections with bacteria that produce an extended-spectrum beta-lactamase (ESBL). This also applies even with invitro susceptibility to zosyn     Effect of Piperacillin-Tazobactam vs Meropenem on 30-Day Mortality for Patients With E coli or Klebsiella pneumoniae Bloodstream Infection and Ceftriaxone Resistance: A Randomized Clinical Trial.   Derrick PNA -their findings support other studeis that do not support use of zosyn in these settings.     2/9- will add tigecycline for acinetobacter as isolate is meropenem resistant , minocycline can be an option for discharge      Will switch to meropenem , follow final drug susceptibility of acinetobacter.  2/10- discussed with microlab about minocycline sensitivity ,   Will continue meropenem and tigecycline for now , will continue close monitoring     2/11- [facility] says the medications are too expensive to use in the out patient settings -  A less preferred option will be Zosyn -to cover EGGERTHELLA LENTA ,Blautia coccoides, proteus  /E coli and morganella.  This is not the ideal therapy  but can work with possible risk of failure.  Bactrim or minocycline can be used for Acinetobacter -will follow minocycline sensitivity.  If these options are taken, she will need close monitoring to avoid deterioration.  Will not to prefer to use double beta lactam agents to reduce risk of interstitial nephritis        Type 2 diabetes mellitus with kidney complication, without long-term current use of insulin    Insulin sliding scale, closely monitor glucose         Anticipated Disposition:     Thank you for your consult. I will follow-up with patient. Please contact us if you have any additional questions.    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  Pt is a resident at [facility] for skilled nursing care. .  [facility] states pt has a sacral wound that was cultured and showed Gram negative bacteria.   Chest xray showed interval worsening of the appearance of the lungs with marked amount of alveolar consolidation scattered throughout left lung and the lower half of the right lung characteristic of pneumonia.  Since admission, all cultures reviewed -   Specimen: Tissue from Buttocks, Right Updated: 02/08/19 1413    Aerobic Bacterial Culture --    ESCHERICHIA COLI ESBL   Many     Aerobic Bacterial Culture --    PROTEUS MIRABILIS ESBL   Moderate      Specimen: Decubitus from Coccyx Updated: 02/08/19 0935      Aerobic Bacterial Culture --    ACINETOBACTER BAUMANNII COMPLEX   Many   Susceptibility pending     Aerobic Bacterial Culture --    ESCHERICHIA COLI ESBL   Moderate        Tracheal aspirate -MORGANELLA MORGANII .  Lab data showed wbc -19  She had interval I  and D on 02/06  Interval History: remains on ventilatory support .  All cultures reviewed-  From wound cultures -  Decubitus -ESBL E coli-  Acinetobacter baumanni  Right buttocks-  ESBL E coli  Proteus ESBL  2/10 -she was seen with daughter and  by bedside-  All cultures reviewed -   2/11- new cultures -Eggerthella lenta, Delroy velazco   Review of Systems   Unable to perform ROS: Acuity of condition     Objective:     Vital Signs (Most Recent):  Temp: 99.4 °F (37.4 °C) (02/12/19 0300)  Pulse: 68 (02/12/19 0300)  Resp: (!) 25 (02/12/19 0300)  BP: (!) 131/58 (02/12/19 0300)  SpO2: 99 % (02/12/19 0300) Vital Signs (24h Range):  Temp:  [98.5 °F (36.9 °C)-99.4 °F (37.4 °C)] 99.4 °F (37.4 °C)  Pulse:  [] 68  Resp:  [14-36] 25  SpO2:  [85 %-100 %] 99 %  BP: ()/(47-75) 131/58     Weight: 71.6 kg (157 lb 13.6 oz)  Body mass index is 22.65 kg/m².    Estimated Creatinine Clearance: 67.7 mL/min (based on SCr of 0.8 mg/dL).    Physical Exam   Constitutional: She appears well-developed and well-nourished. No distress.   Lethargic   HENT:   Head: Normocephalic and atraumatic.   Mouth/Throat: Oropharynx is clear and moist.   Eyes: Conjunctivae and EOM are normal. Pupils are equal, round, and reactive to light.   Neck: Neck supple. No JVD present. No thyromegaly present.   Cardiovascular: Normal rate and regular rhythm. Exam reveals no gallop and no friction rub.   No murmur heard.  Defibrillator present.   Pulmonary/Chest: Effort normal and breath sounds normal. She has no wheezes. She has no rales.   #8 Shiley tracheostomy present.  RLL decreased breath sounds.  Left side decreased throughout with fine crackles.   Abdominal: Soft. Bowel sounds are normal. She exhibits no distension. There is no tenderness. There is no rebound and no guarding.   Feeding tube in place.   Genitourinary:   Genitourinary Comments: Flores catheter in place.   Musculoskeletal: Normal range of motion. She exhibits no edema or  deformity.   Lymphadenopathy:     She has no cervical adenopathy.   Neurological: She is alert. She has normal reflexes.   Partially opens eyes to command.  Generalized weakness and not following other commands.  Upper extremities flexor contracted.  Upper and lower extremity muscle wasting.   Skin: Skin is warm and dry. No rash noted.   -has sacral wound   Psychiatric: She has a normal mood and affect. Her behavior is normal. Judgment and thought content normal.   Nursing note and vitals reviewed.      Significant Labs:   Blood Culture:   Recent Labs   Lab 10/22/18  1320 10/24/18  0628 10/24/18  0635 02/05/19  1235 02/05/19  1240   LABBLOO No growth after 5 days. No growth after 5 days. No growth after 5 days. No growth after 5 days. No growth after 5 days.     CBC:   Recent Labs   Lab 02/10/19  0517 02/11/19  0355   WBC 13.18* 17.06*   HGB 6.8* 9.7*   HCT 22.7* 30.8*   * 424*     Wound Culture:   Recent Labs   Lab 02/06/19  0722 02/06/19  1410   LABAERO ACINETOBACTER BAUMANNII/HAEMOLYTICUS  Many  Susceptibility pending   ([facility])     ESCHERICHIA COLI ESBL  Moderate   ESCHERICHIA COLI ESBL  Many    PROTEUS MIRABILIS ESBL  Moderate       All pertinent labs within the past 24 hours have been reviewed.    Significant Imaging: I have reviewed all pertinent imaging results/findings within the past 24 hours.

## 2019-02-12 NOTE — PROGRESS NOTES
Pharmacist Intervention IV to PO Note    Carlie Valdez is a 73 y.o. female being treated with IV famotidine (Pepcid) for stress ulcer prophylaxis.     Patient Data:    Vital Signs (Most Recent):  Temp: 99.4 °F (37.4 °C) (02/12/19 0300)  Pulse: 70 (02/12/19 0600)  Resp: (!) 24 (02/12/19 0600)  BP: (!) 147/75 (02/12/19 0600)  SpO2: 99 % (02/12/19 0600)   Vital Signs (72h Range):  Temp:  [97.7 °F (36.5 °C)-99.4 °F (37.4 °C)]   Pulse:  []   Resp:  [14-36]   BP: ()/(47-92)   SpO2:  [85 %-100 %]      CBC:  Recent Labs   Lab 02/10/19  0517 02/11/19  0355 02/12/19  0343   WBC 13.18* 17.06* 22.22*   RBC 2.54* 3.47* 3.10*   HGB 6.8* 9.7* 8.5*   HCT 22.7* 30.8* 27.9*   * 424* 416*   MCV 89 89 90   MCH 26.8* 28.0 27.4   MCHC 30.0* 31.5* 30.5*     CMP:     Recent Labs   Lab 02/10/19  0517 02/11/19  0355 02/12/19  0343   GLU 92 79 91   CALCIUM 9.9 9.4 9.4   ALBUMIN 1.7* 1.7* 1.7*   PROT 7.2 7.3 7.1    144 149*   K 3.5 4.3 4.0   CO2 26 21* 24    112* 114*   BUN 48* 51* 59*   CREATININE 1.0 0.8 0.9   ALKPHOS 157* 174* 165*   ALT 15 20 16   AST 25 34 21   BILITOT 0.2 0.2 0.2       Dietary Orders:  Diet Orders            Tube Feedings/Formulas [facility]; Peptamen Intense VHP; 60; 1,440; Gastrostomy; Ready to Hang Liter: Tube Feeding (I) starting at 02/08 1208            Based on the following criteria, this patient qualifies for intravenous to oral conversion:  [x] The patients gastrointestinal tract is functioning (tolerating medications via oral or enteral route for 24 hours and tolerating food or enteral feeds for 24 hours).  [x] The patient is hemodynamically stable for 24 hours (heart rate <100 beats per minute, systolic blood pressure >99 mm Hg, and respiratory rate <20 breaths per minute).  [x] The patient shows clinical improvement (afebrile for at least 24 hours and white blood cell count downtrending or normalized). Additionally, the patient must be non-neutropenic  (absolute neutrophil count >500 cells/mm3).  [x] For antimicrobials, the patient has received IV therapy for at least 24 hours.    Pepcid 20 mg IV BID will be changed to Pepcid 20 mg per G tube BID.     Pharmacist's Name: Katherine E Mcardle  Pharmacist's Extension: [phone]

## 2019-02-13 NOTE — PROGRESS NOTES
[facility] -   Critical Care Medicine  Progress Note    Patient Name: Carlie Valdez  MRN: [id number]  Admission Date: 2/5/2019  Hospital Length of Stay: 8 days  Code Status: Full Code  Attending Provider: Pipe Lomeli, *  Primary Care Provider: Johanna Guzman MD   Principal Problem: Acute respiratory failure with hypoxemia    Subjective:     HPI:  73 year old female well known to our service s/t prior admissions; complicated PMH includes quadraplegia and chronic vent dependent respiratory failure with trach s/t NMO; DM; CHF; HTN; GERD; atrial fib; MRSA sputum    Presented to ED from NH for eval of abnl labs; EMS also reported gm neg bacteria growth from sacral wound    ED evaluation revealed hypothermia 94F; normotension; leukocytosis 16k; anemia 5.6/19.9; sodium 152; creatinine 2.4 with ; lactic acid 4.2; procalcitonin 1.2       Hospital/ICU Course:  Admitted to ICU on mechanical ventilation, awake, non verbal and paraplegic at baseline; hypothermic with external warming in progress  2/6 - chronic vent via trach, hypoxemia reported with turning overnight, continued moderate trach secretions; marginal BP overnight and marginal urine output; to OR today for I/D of sacral wound  2/7 - supine on vent in no distress and VSS.  Awake and nods head to questions  2/8 - Supine on chronic vent dependent w/ VSS and tolerating TF.  Awake and mouthing words.  No distress.    2/9 - Seen and examined at bedside. Hospital chart reviewed. No acute interval detrimental events noted. S/P debridement of sacral wound. Seen and examined at bedside. Hospital chart reviewed. No acute interval detrimental events noted. CT chest with LLL atelectasis => Chest PT. S/P debridement of sacral wound. The wound cx is (+) for e.coli and proteus sensitive to meropenem .    2/10 - CXR some improvement in left infiltrate.  Afeb resting on vent dependent mech ventilation.  I discussed her current vent  dependent bed bound state with multiple non healing wounds and she feels she does have a quality of life and wants to continue with aggressive long term care.   2/11 Stable overnight, needs PICC line for antibiotics, off pressors  2/12 No adverse events overnight  2/13 stable overnight, plans for discharge    Review of Systems   Unable to perform ROS: Other   trached on vent    Objective:     Vital Signs (Most Recent):  Temp: 98.1 °F (36.7 °C) (02/13/19 0705)  Pulse: 70 (02/13/19 1015)  Resp: (!) 23 (02/13/19 1015)  BP: (!) 147/73 (02/13/19 1000)  SpO2: 99 % (02/13/19 1015) Vital Signs (24h Range):  Temp:  [97.9 °F (36.6 °C)-99.1 °F (37.3 °C)] 98.1 °F (36.7 °C)  Pulse:  [] 70  Resp:  [18-28] 23  SpO2:  [97 %-100 %] 99 %  BP: (131-174)/(64-90) 147/73     Weight: 70 kg (154 lb 5.2 oz)  Body mass index is 22.14 kg/m².      Intake/Output Summary (Last 24 hours) at 2/13/2019 1104  Last data filed at 2/13/2019 1000  Gross per 24 hour   Intake 2100 ml   Output 1985 ml   Net 115 ml       Physical Exam   Constitutional: Vital signs are normal. She appears well-developed. She has a sickly appearance.   HENT:   Head: Normocephalic and atraumatic.   Mouth/Throat: Oropharynx is clear and moist.   Eyes: EOM are normal.   Neck: Neck supple. No JVD present. No tracheal deviation present.       Cardiovascular: Normal rate. An irregularly irregular rhythm present.   Pulses:       Radial pulses are 2+ on the right side, and 2+ on the left side.        Dorsalis pedis pulses are 1+ on the right side, and 1+ on the left side.   Paced rhythm   Pulmonary/Chest: No accessory muscle usage. No tachypnea. No respiratory distress. She has decreased breath sounds in the left middle field and the left lower field. She has no wheezes. She has rales (coarse bilat improved with tracheal suctioning).   Abdominal: Soft. Bowel sounds are normal. She exhibits no distension. There is no tenderness.       Genitourinary:   Genitourinary Comments:  Flores in place   Musculoskeletal: She exhibits edema.   No edema.  Diffuse atrophy.  LUE contracture   Lymphadenopathy:     She has no cervical adenopathy.   Neurological: She is alert.   Nods head and mouths words appropriately to questions   Skin: Skin is warm and dry. Capillary refill takes less than 2 seconds. She is not diaphoretic. No cyanosis.        decubitus ulcer       Vents:  Vent Mode: A/C (02/13/19 0857)  Ventilator Initiated: Yes (02/05/19 1127)  Set Rate: 20 bmp (02/13/19 0857)  Vt Set: 375 mL (02/13/19 0857)  Pressure Support: 0 cmH20 (02/13/19 0857)  PEEP/CPAP: 10 cmH20 (02/13/19 0857)  Oxygen Concentration (%): 40 (02/13/19 1015)  Peak Airway Pressure: 27 cmH2O (02/13/19 0857)  Plateau Pressure: 0 cmH20 (02/13/19 0857)  Total Ve: 9.52 mL (02/13/19 0857)  F/VT Ratio<105 (RSBI): (!) 73.24 (02/13/19 0857)    Lines/Drains/Airways     Drain                 Urethral Catheter -- days         Gastrostomy/Enterostomy 10/19/18 Percutaneous endoscopic gastrostomy (PEG)  days          Airway                 Surgical Airway 02/06/19 1905 Nikita 6 days          Pressure Ulcer                 Pressure Injury 02/05/19 Left Ischial tuberosity Unstageable - Present on Admission 8 days         Pressure Injury 02/05/19 Right medial Malleolus DTPI - Present on Admission 8 days         Pressure Injury 02/05/19 Sacral spine Stage 4 8 days         Pressure Injury 02/05/19 1620 Left lateral Hip Stage 3 7 days         Pressure Injury 02/06/19 Left Thigh DTPI - Present on Admission 7 days         Pressure Injury 02/06/19 Left Thigh Unstageable - Present on Admission 7 days         Pressure Injury 02/06/19 Left lateral Leg Stage 2 7 days         Pressure Injury 02/06/19 Left upper Arm DTPI - Present on Admission 7 days         Negative Pressure Wound Therapy  4 days          Peripheral Intravenous Line                 Midline Catheter Insertion/Assessment  - Double Lumen 02/11/19 1545 Left basilic vein (medial side of  arm) 1 day                Significant Labs:    CBC/Anemia Profile:  Recent Labs   Lab 02/12/19  0343 02/13/19  0334   WBC 22.22* 12.59   HGB 8.5* 8.4*   HCT 27.9* 27.5*   * 396*   MCV 90 91   RDW 18.9* 19.3*        Chemistries:  Recent Labs   Lab 02/12/19  0343 02/13/19  0334   * 151*   K 4.0 3.9   * 117*   CO2 24 25   BUN 59* 46*   CREATININE 0.9 0.7   CALCIUM 9.4 9.8   ALBUMIN 1.7* 1.7*   PROT 7.1 7.1   BILITOT 0.2 0.3   ALKPHOS 165* 144*   ALT 16 15   AST 21 20       All pertinent labs within the past 24 hours have been reviewed.    Significant Imaging:  CXR: I have reviewed all pertinent results/findings within the past 24 hours and my personal findings are:  some improved aeration of left lung        ABG  Recent Labs   Lab 02/07/19  0524   PH 7.529*   PO2 171*   PCO2 41.7   HCO3 34.7*   BE 12     Assessment/Plan:     Neuro   Neuromyelitis optica    With resultant functional quadraplegia, blindness and vent dependence. Cellcept on hold.        Derm   Decubitus ulcer of sacral region, stage 4    S/P debridement. The wound cx is (+) for e.coli and proteus sensitive to meropenem. MEROPENEM per ID. Appreciate ID input.      Pulmonary   VAP (ventilator-associated pneumonia)    MORGANELLA MORGANII in sputum sensitive to MEROPENEM.    At baseline oxygenation on vent  Continue  IV MEROPENEM   CXR improved - has chronically abnormal lungs on old CXR films  Cont tracheal suctioning PRN and Duonebs  2/11 Continue mechanical vent  2/12 continue vent support  2/13 continue support       Atelectasis of left lung    Mucomyst q 12 / Chest PT.   2/12 Chest X Ray - unchanged; continue Duonebs, mucomyst , chest CPT     Cardiac/Vascular   Chronic Hypotension    Cont TF and free water to PEG  Continue midodrine  ICU hemodynamic monitoring     Chronic combined systolic and diastolic congestive heart failure    CHF optimized. Daily weighing. Dietary salt restriction. Accurate I/Os.  Stop IVFs       Chronic atrial  fibrillation    Currently paced.  regular rhythm.  Monitor hemodynamics.     Renal/    Monitor BUN / Cr. Montor urine output.     Acute on chronic renal failure    Strict I/O  Monitor creatinine trend     Hypernatremia    Stable with free water replacement via PEG      UTI (urinary tract infection)    Polymicrobial Colonization. Urine Culture => multiple organisms, none predominant. On IV MEROPENEM .         Oncology   Anemia    Transfuse 2 units PRBCs  Monitor hemogram.      Endocrine   Moderate malnutrition    Tube deeding per RD recommendations.   2/12 continue tube feeding per RD     Hypothyroidism    Continue Levothyroxine     Type 2 diabetes mellitus with kidney complication, without long-term current use of insulin    Stable EUGLYCEMIC}  low dose SSI for hyperglycemia with monitoring for glucose control and prevention of insulin toxicity. Blood glucose target 100 - 180 mg/dl         Orthopedic   Deep tissue injury multiple unstageable    Continue wound care. Low pressure bed and turn Q 2 hours       Other   Pressure injury of coccygeal region, stage 4    Multiple small stage 2 wounds.   S/P I&D of sacrum 2/6.   Continue wound care per WOC recs         Critical Care Daily Checklist:    A: Awake: RASS Goal/Actual Goal:    Actual: Guerrero Agitation Sedation Scale (RASS): Alert and calm   B: Spontaneous Breathing Trial Performed? Spon. Breathing Trial Initiated?: Not initiated (02/09/19 1115)   C: SAT & SBT Coordinated?  na                      D: Delirium: CAM-ICU Overall CAM-ICU: Negative   E: Early Mobility Performed? No   F: Feeding Goal: Goals: Meet > 85 % EEN/EPN while admitted  Status: Nutrition Goal Status: new   Current Diet Order   No orders of the defined types were placed in this encounter.      AS: Analgesia/Sedation adequate   T: Thromboembolic Prophylaxis y   H: HOB > 300 Yes   U: Stress Ulcer Prophylaxis (if needed) y   G: Glucose Control adequate   B: Bowel Function Stool Occurrence: 1   I:  Indwelling Catheter (Lines & Flores) Necessity needed   D: De-escalation of Antimicrobials/Pharmacotherapies na    Plan for the day/ETD transfer    Code Status:  Family/Goals of Care: Full Code  No one marcy     Critical Care Time: 30 minutes  Critical secondary to Patient has a condition that poses threat to life and bodily function: Severe Respiratory Distress      Critical care was time spent personally by me on the following activities: development of treatment plan with patient or surrogate and bedside caregivers, discussions with consultants, evaluation of patient's response to treatment, examination of patient, ordering and performing treatments and interventions, ordering and review of laboratory studies, ordering and review of radiographic studies, pulse oximetry, re-evaluation of patient's condition. This critical care time did not overlap with that of any other provider or involve time for any procedures.     James Meier MD  Critical Care Medicine  [facility]

## 2019-02-13 NOTE — SUBJECTIVE & OBJECTIVE
Interval History:     Review of Systems   Unable to perform ROS: Acuity of condition     Objective:     Vital Signs (Most Recent):  Temp: 98.1 °F (36.7 °C) (02/13/19 1105)  Pulse: 70 (02/13/19 1432)  Resp: (!) 27 (02/13/19 1432)  BP: (!) 163/76 (02/13/19 1300)  SpO2: 99 % (02/13/19 1432) Vital Signs (24h Range):  Temp:  [97.9 °F (36.6 °C)-98.7 °F (37.1 °C)] 98.1 °F (36.7 °C)  Pulse:  [] 70  Resp:  [18-28] 27  SpO2:  [97 %-100 %] 99 %  BP: (125-174)/(64-90) 163/76     Weight: 70 kg (154 lb 5.2 oz)  Body mass index is 22.14 kg/m².    Intake/Output Summary (Last 24 hours) at 2/13/2019 1509  Last data filed at 2/13/2019 1300  Gross per 24 hour   Intake 2380 ml   Output 2145 ml   Net 235 ml      Physical Exam   Constitutional: Vital signs are normal. She appears well-developed. She has a sickly appearance.   HENT:   Head: Normocephalic and atraumatic.   Mouth/Throat: Oropharynx is clear and moist.   Eyes: EOM are normal.   Neck: Neck supple. No JVD present. No tracheal deviation present.       Cardiovascular: Normal rate. An irregularly irregular rhythm present.   Pulses:       Radial pulses are 2+ on the right side, and 2+ on the left side.        Dorsalis pedis pulses are 1+ on the right side, and 1+ on the left side.   Paced rhythm   Pulmonary/Chest: No accessory muscle usage. No tachypnea. No respiratory distress. She has decreased breath sounds in the left middle field and the left lower field. She has no wheezes. She has rales (coarse bilat improved with tracheal suctioning).   Abdominal: Soft. Bowel sounds are normal. She exhibits no distension. There is no tenderness.       Genitourinary:   Genitourinary Comments: Flores in place   Musculoskeletal: She exhibits edema.   No edema.  Diffuse atrophy.  LUE contracture   Lymphadenopathy:     She has no cervical adenopathy.   Neurological: She is alert.   Nods head and mouths words appropriately to questions   Skin: Skin is warm and dry. Capillary refill takes  less than 2 seconds. She is not diaphoretic. No cyanosis.        decubitus ulcer       Significant Labs: All pertinent labs within the past 24 hours have been reviewed.    Significant Imaging: I have reviewed all pertinent imaging results/findings within the past 24 hours.

## 2019-02-13 NOTE — ASSESSMENT & PLAN NOTE
-H/H cont < than 7 despite multiple  PRBC transfusion   - No obvious sign of GIB  - Could be related to sepsis   -Monitor H/H   -s/p 2 PRBC

## 2019-02-13 NOTE — PLAN OF CARE
Problem: Adult Inpatient Plan of Care  Goal: Plan of Care Review  Outcome: Ongoing (interventions implemented as appropriate)  No acute changes today.  VSS throughout shift.  pts midodrine held due to pt slightly hypertensive at times.  Pt tolerated tube feeds.  Pt afebrile today though WBC higher than yesterday.  Dr. Ceja to decide on best antibiotic therapy regimen for pt.

## 2019-02-13 NOTE — PLAN OF CARE
KARINE spoke with Joana at [facility] and to update her that patient will likely be discharging today. Joana stated that patient can return at dc and the INVAZ and Tigecycline would be covered. Lucy notified Dr. Magallon.

## 2019-02-13 NOTE — SUBJECTIVE & OBJECTIVE
Interval History: remains on ventilatory support .  All cultures reviewed-  From wound cultures -  Decubitus -ESBL E coli-  Acinetobacter baumanni  Right buttocks-  ESBL E coli  Proteus ESBL  2/10 -she was seen with daughter and  by bedside-  All cultures reviewed -   2/11- new cultures -Evaristola lenta, Beckiia anandes   2/12- remains on ventilatory support , awaiting placement   Review of Systems   Unable to perform ROS: Acuity of condition     Objective:     Vital Signs (Most Recent):  Temp: 98.2 °F (36.8 °C) (02/12/19 1900)  Pulse: 104 (02/12/19 2200)  Resp: (!) 24 (02/12/19 2200)  BP: 134/88 (02/12/19 2200)  SpO2: 99 % (02/12/19 2200) Vital Signs (24h Range):  Temp:  [97.9 °F (36.6 °C)-99.5 °F (37.5 °C)] 98.2 °F (36.8 °C)  Pulse:  [] 104  Resp:  [16-27] 24  SpO2:  [96 %-100 %] 99 %  BP: (131-177)/(58-91) 134/88     Weight: 71 kg (156 lb 8.4 oz)  Body mass index is 22.46 kg/m².    Estimated Creatinine Clearance: 60.2 mL/min (based on SCr of 0.9 mg/dL).    Physical Exam   Constitutional: She appears well-developed and well-nourished. No distress.   Lethargic   HENT:   Head: Normocephalic and atraumatic.   Mouth/Throat: Oropharynx is clear and moist.   Eyes: Conjunctivae and EOM are normal. Pupils are equal, round, and reactive to light.   Neck: Neck supple. No JVD present. No thyromegaly present.   Cardiovascular: Normal rate and regular rhythm. Exam reveals no gallop and no friction rub.   No murmur heard.  Defibrillator present.   Pulmonary/Chest: Effort normal and breath sounds normal. She has no wheezes. She has no rales.   #8 Shiley tracheostomy present.  RLL decreased breath sounds.  Left side decreased throughout with fine crackles.   Abdominal: Soft. Bowel sounds are normal. She exhibits no distension. There is no tenderness. There is no rebound and no guarding.   Feeding tube in place.   Genitourinary:   Genitourinary Comments: Flores catheter in place.   Musculoskeletal: Normal range of  motion. She exhibits no edema or deformity.   Lymphadenopathy:     She has no cervical adenopathy.   Neurological: She is alert. She has normal reflexes.   Partially opens eyes to command.  Generalized weakness and not following other commands.  Upper extremities flexor contracted.  Upper and lower extremity muscle wasting.   Skin: Skin is warm and dry. No rash noted.   -has sacral wound   Psychiatric: She has a normal mood and affect. Her behavior is normal. Judgment and thought content normal.   Nursing note and vitals reviewed.      Significant Labs:   Blood Culture:   Recent Labs   Lab 10/22/18  1320 10/24/18  0628 10/24/18  0635 02/05/19  1235 02/05/19  1240   LABBLOO No growth after 5 days. No growth after 5 days. No growth after 5 days. No growth after 5 days. No growth after 5 days.     CBC:   Recent Labs   Lab 02/11/19  0355 02/12/19  0343   WBC 17.06* 22.22*   HGB 9.7* 8.5*   HCT 30.8* 27.9*   * 416*     Wound Culture:   Recent Labs   Lab 02/06/19  0722 02/06/19  1410   LABAERO ACINETOBACTER BAUMANNII/HAEMOLYTICUS  Many   ([facility])     ESCHERICHIA COLI ESBL  Moderate   ESCHERICHIA COLI ESBL  Many    PROTEUS MIRABILIS ESBL  Moderate       All pertinent labs within the past 24 hours have been reviewed.    Significant Imaging: I have reviewed all pertinent imaging results/findings within the past 24 hours.

## 2019-02-13 NOTE — PROGRESS NOTES
[facility] Hospital Medicine  Progress Note    Patient Name: Carlie Valdez  MRN: [id number]  Patient Class: IP- Inpatient   Admission Date: 2/5/2019  Length of Stay: 8 days  Attending Physician: Pipe Lomeli, *  Primary Care Provider: Johanna Guzman MD        Subjective:     Principal Problem:Acute respiratory failure with hypoxemia    HPI:  Carlie Valdez is a 73 y.o. female patient  with PMHX of Atrial fibrillation, HTN, CKD, CVA (embolic), Anemia , Heart failure, Chronic respiratory failure with trach, functional quadriplegia, CAD, and Neuromyelitis Optica who presented to the ER today for abnormal lab results.  Pt is a resident at [facility] for skilled nursing care. Pt had outpatient labs done today.  [facility] states pt has a sacral wound that was cultured and showed Gram negative bacteria.  Pt did not respond when questioned however has hx of responding appropriately to simple questions. HPI limited due to patient being trached and unable to mouth words.  ER workup showed: WBC 16.18, H/H 5.6/19.9, Na 152, BUN/Creatinine 115/2.4, Lactic acid 4.2, and Procal 1.20.  Chest xray showed interval worsening of the appearance of the lungs with marked amount of alveolar consolidation scattered throughout left lung and the lower half of the right lung characteristic of pneumonia.  UA + for infectious process.  Hospital Medicine contacted for admission to ICU.        Hospital Course:  Admitted to ICU for treatment of septic shock.  Abnormal urinalysis and multiple decubitus wounds as possible sources of infection.  IV fluid resuscitation.  Empirically started Vancomycin and Zosyn.  Wound care and General Surgery to evaluate for source control.  Surgical debridement of the wound with exposed bone at the base.  Wound cultures growing multiple organisms with multiple patterns of drug resistance.  Infectious Disease consult.  Minor clinical improvement with broad  spectrum antibiotic treatment.  2/9/19 Pt was seen and examined at bedside .She is s/p 1 PRBC . The H/H remain < 6 w/o any sign of acute bleeding . She is on mechanical  Ventilation trough  A tracheotomy. There was  No acute event overnight . ID was consulted yesterday  . The wound cx is (+) for e.coli and proteus sensitive to meropenem .    2/10/19 Pt was seen and examined . The H/H remain lower than < 7 .  There was no acute event overnight . She is on mechanical ventilation  Trough a tracheotomy .    2/11/19 She is s/p 2 PRB   With a good H/H respond . There was no acute event overnight .   2/12/19 Pt was seen and examined at bedside . The H/H remian stable . The na level is trending up . The CM is working on  IVAB   2/13/19 Pt was seen and examined at  Bedside . The Na level is cont trending up . The free H20 was increased . There was no acute event overnight     Interval History:     Review of Systems   Unable to perform ROS: Acuity of condition     Objective:     Vital Signs (Most Recent):  Temp: 98.1 °F (36.7 °C) (02/13/19 1105)  Pulse: 70 (02/13/19 1432)  Resp: (!) 27 (02/13/19 1432)  BP: (!) 163/76 (02/13/19 1300)  SpO2: 99 % (02/13/19 1432) Vital Signs (24h Range):  Temp:  [97.9 °F (36.6 °C)-98.7 °F (37.1 °C)] 98.1 °F (36.7 °C)  Pulse:  [] 70  Resp:  [18-28] 27  SpO2:  [97 %-100 %] 99 %  BP: (125-174)/(64-90) 163/76     Weight: 70 kg (154 lb 5.2 oz)  Body mass index is 22.14 kg/m².    Intake/Output Summary (Last 24 hours) at 2/13/2019 1509  Last data filed at 2/13/2019 1300  Gross per 24 hour   Intake 2380 ml   Output 2145 ml   Net 235 ml      Physical Exam   Constitutional: Vital signs are normal. She appears well-developed. She has a sickly appearance.   HENT:   Head: Normocephalic and atraumatic.   Mouth/Throat: Oropharynx is clear and moist.   Eyes: EOM are normal.   Neck: Neck supple. No JVD present. No tracheal deviation present.       Cardiovascular: Normal rate. An irregularly irregular  rhythm present.   Pulses:       Radial pulses are 2+ on the right side, and 2+ on the left side.        Dorsalis pedis pulses are 1+ on the right side, and 1+ on the left side.   Paced rhythm   Pulmonary/Chest: No accessory muscle usage. No tachypnea. No respiratory distress. She has decreased breath sounds in the left middle field and the left lower field. She has no wheezes. She has rales (coarse bilat improved with tracheal suctioning).   Abdominal: Soft. Bowel sounds are normal. She exhibits no distension. There is no tenderness.       Genitourinary:   Genitourinary Comments: Flores in place   Musculoskeletal: She exhibits edema.   No edema.  Diffuse atrophy.  LUE contracture   Lymphadenopathy:     She has no cervical adenopathy.   Neurological: She is alert.   Nods head and mouths words appropriately to questions   Skin: Skin is warm and dry. Capillary refill takes less than 2 seconds. She is not diaphoretic. No cyanosis.        decubitus ulcer       Significant Labs: All pertinent labs within the past 24 hours have been reviewed.    Significant Imaging: I have reviewed all pertinent imaging results/findings within the past 24 hours.    Assessment/Plan:      Pressure injury of sacral region, stage 4    -cont wound care        Moderate malnutrition    -cont TF as tolerate        Acute kidney injury    -improving       Pressure injury of left ischium, unstageable    -cont wound care        Pressure injury of trochanteric region of hip, stage 3    -cont wound care        Pressure injury of left thigh, unstageable    -cont wound care        Decubitus ulcer of sacral region, stage 4    -cont wound care        VAP (ventilator-associated pneumonia)    -IV antibiotics    Imaging results showed interval worsening with marked amount of alveolar consolidation scattered throughout the left lung and lower half of the right lung characteristic of pneumonia.  -vent dependent   -sputum and blood culture results  pending  -Alfonzo prn   -Nebulizer treatments       Anemia    -H/H cont < than 7 despite multiple  PRBC transfusion   - No obvious sign of GIB  - Could be related to sepsis   -Monitor H/H   -s/p 2 PRBC          Functional quadriplegia    -hx of CVA (embolic)  -pt currently a resident at [facility]  -PT/OT       Hypertension    -medications held due to hypotension        Acute on chronic renal failure    -Improving  -cont monitor kidney function and UOP        Pressure injury of coccygeal region, stage 4    -wound care on board   -infection  Please see wound cx report   Susceptibility      Escherichia coli esbl Proteus mirabilis esbl    CULTURE, AEROBIC  (SPECIFY SOURCE) CULTURE, AEROBIC  (SPECIFY SOURCE)   Amikacin <=16  Sensitive <=16  Sensitive   Amox/K Clav'ate 16/8  Intermediate <=8/4  Sensitive   Amp/Sulbactam >16/8  Resistant >16/8  Resistant   Ampicillin >16  Resistant >16  Resistant   Cefazolin >16  Resistant >16  Resistant   Cefepime >16  Resistant 16  Resistant   Ceftriaxone >32  Resistant >32  Resistant   Ciprofloxacin >2  Resistant >2  Resistant   Ertapenem <=2  Sensitive <=2  Sensitive   Gentamicin >8  Resistant >8  Resistant   Meropenem <=4  Sensitive <=4  Sensitive   Piperacillin/Tazo <=16  Sensitive <=16  Sensitive   Tetracycline <=4  Sensitive >8  Resistant   Tobramycin >8  Resistant >8  Resistant   Trimeth/Sulfa <=2/38  Sensitive >2/38  Resistant     -Cont IVAB per ID recommendation   -S/O I&D per surgery      Atelectasis of left lung    -Cont  Ventilation  Support      Leukocytosis    - cont ivab       Hypernatremia    -free water flush via G tube   -resolved        Neuromyelitis optica    -hx of  -Cellcept held in the setting of infection         UTI (urinary tract infection)    IV antibiotics   Urine culture results   Susceptibility      Escherichia coli esbl Proteus mirabilis esbl    CULTURE, AEROBIC  (SPECIFY SOURCE) CULTURE, AEROBIC  (SPECIFY SOURCE)   Amikacin <=16  Sensitive <=16   Sensitive   Amox/K Clav'ate 16/8  Intermediate <=8/4  Sensitive   Amp/Sulbactam >16/8  Resistant >16/8  Resistant   Ampicillin >16  Resistant >16  Resistant   Cefazolin >16  Resistant >16  Resistant   Cefepime >16  Resistant 16  Resistant   Ceftriaxone >32  Resistant >32  Resistant   Ciprofloxacin >2  Resistant >2  Resistant   Ertapenem <=2  Sensitive <=2  Sensitive   Gentamicin >8  Resistant >8  Resistant   Meropenem <=4  Sensitive <=4  Sensitive   Piperacillin/Tazo <=16  Sensitive <=16  Sensitive   Tetracycline <=4  Sensitive >8  Resistant   Tobramycin >8  Resistant >8  Resistant   Trimeth/Sulfa <=2/38  Sensitive >2/38  Resistant          Deep tissue injury multiple unstageable    -cont wound care        Hypothyroidism    - cont levothyroxine        Chronic Hypotension    -cont current tx      Type 2 diabetes mellitus with kidney complication, without long-term current use of insulin    -cont ISS  -Keep CBG < 180       Chronic combined systolic and diastolic congestive heart failure    -appears compensated  At this time   - cont current tx        Chronic atrial fibrillation    -pt on amiodarone and Xarelto  -Xarelto held at this time to rule out active bleeding   -Amiodarone on hold  Due to low bp . Resume accordantly   -Tx reviewed          VTE Risk Mitigation (From admission, onward)        Ordered     IP VTE HIGH RISK PATIENT  Once      02/05/19 1430     Place sequential compression device  Until discontinued      02/05/19 1430          Critical care time spent on the evaluation and treatment of severe organ dysfunction, review of pertinent labs and imaging studies, discussions with consulting providers and discussions with patient/family: 35   minutes.    Pipe Lomeli MD  Department of Hospital Medicine   [facility]

## 2019-02-13 NOTE — ASSESSMENT & PLAN NOTE
MORGANELLA MORGANII in sputum sensitive to MEROPENEM.    At baseline oxygenation on vent  Continue  IV MEROPENEM   CXR improved - has chronically abnormal lungs on old CXR films  Cont tracheal suctioning PRN and Duonebs  2/11 Continue mechanical vent  2/12 continue vent support  2/13 continue support

## 2019-02-13 NOTE — PROGRESS NOTES
[facility] Hospital Medicine  Progress Note    Patient Name: Carlie Valdez  MRN: [id number]  Patient Class: IP- Inpatient   Admission Date: 2/5/2019  Length of Stay: 8 days  Attending Physician: Pipe Lomeli, *  Primary Care Provider: Johanna Guzman MD        Subjective:     Principal Problem:Acute respiratory failure with hypoxemia    HPI:  Carlie Valdez is a 73 y.o. female patient  with PMHX of Atrial fibrillation, HTN, CKD, CVA (embolic), Anemia , Heart failure, Chronic respiratory failure with trach, functional quadriplegia, CAD, and Neuromyelitis Optica who presented to the ER today for abnormal lab results.  Pt is a resident at [facility] for skilled nursing care. Pt had outpatient labs done today.  [facility] states pt has a sacral wound that was cultured and showed Gram negative bacteria.  Pt did not respond when questioned however has hx of responding appropriately to simple questions. HPI limited due to patient being trached and unable to mouth words.  ER workup showed: WBC 16.18, H/H 5.6/19.9, Na 152, BUN/Creatinine 115/2.4, Lactic acid 4.2, and Procal 1.20.  Chest xray showed interval worsening of the appearance of the lungs with marked amount of alveolar consolidation scattered throughout left lung and the lower half of the right lung characteristic of pneumonia.  UA + for infectious process.  Hospital Medicine contacted for admission to ICU.        Hospital Course:  Admitted to ICU for treatment of septic shock.  Abnormal urinalysis and multiple decubitus wounds as possible sources of infection.  IV fluid resuscitation.  Empirically started Vancomycin and Zosyn.  Wound care and General Surgery to evaluate for source control.  Surgical debridement of the wound with exposed bone at the base.  Wound cultures growing multiple organisms with multiple patterns of drug resistance.  Infectious Disease consult.  Minor clinical improvement with broad  spectrum antibiotic treatment.  2/9/19 Pt was seen and examined at bedside .She is s/p 1 PRBC . The H/H remain < 6 w/o any sign of acute bleeding . She is on mechanical  Ventilation trough  A tracheotomy. There was  No acute event overnight . ID was consulted yesterday  . The wound cx is (+) for e.coli and proteus sensitive to meropenem .    2/10/19 Pt was seen and examined . The H/H remain lower than < 7 .  There was no acute event overnight . She is on mechanical ventilation  Trough a tracheotomy .    2/11/19 She is s/p 2 PRB   With a good H/H respond . There was no acute event overnight .   2/12/19 Pt was seen and examined at bedside . The H/H remian stable . The na level is trending up . The CM is working on  IVAB     Interval History:     Review of Systems   Unable to perform ROS: Acuity of condition     Objective:     Vital Signs (Most Recent):  Temp: 98.1 °F (36.7 °C) (02/13/19 1105)  Pulse: 70 (02/13/19 1432)  Resp: (!) 27 (02/13/19 1432)  BP: (!) 163/76 (02/13/19 1300)  SpO2: 99 % (02/13/19 1432) Vital Signs (24h Range):  Temp:  [97.9 °F (36.6 °C)-98.7 °F (37.1 °C)] 98.1 °F (36.7 °C)  Pulse:  [] 70  Resp:  [18-28] 27  SpO2:  [97 %-100 %] 99 %  BP: (125-174)/(64-90) 163/76     Weight: 70 kg (154 lb 5.2 oz)  Body mass index is 22.14 kg/m².    Intake/Output Summary (Last 24 hours) at 2/13/2019 1500  Last data filed at 2/13/2019 1300  Gross per 24 hour   Intake 2380 ml   Output 2145 ml   Net 235 ml      Physical Exam   Constitutional: Vital signs are normal. She appears well-developed. She has a sickly appearance.   HENT:   Head: Normocephalic and atraumatic.   Mouth/Throat: Oropharynx is clear and moist.   Eyes: EOM are normal.   Neck: Neck supple. No JVD present. No tracheal deviation present.       Cardiovascular: Normal rate. An irregularly irregular rhythm present.   Pulses:       Radial pulses are 2+ on the right side, and 2+ on the left side.        Dorsalis pedis pulses are 1+ on the right side,  and 1+ on the left side.   Paced rhythm   Pulmonary/Chest: No accessory muscle usage. No tachypnea. No respiratory distress. She has decreased breath sounds in the left middle field and the left lower field. She has no wheezes. She has rales (coarse bilat improved with tracheal suctioning).   Abdominal: Soft. Bowel sounds are normal. She exhibits no distension. There is no tenderness.       Genitourinary:   Genitourinary Comments: Flores in place   Musculoskeletal: She exhibits edema.   No edema.  Diffuse atrophy.  LUE contracture   Lymphadenopathy:     She has no cervical adenopathy.   Neurological: She is alert.   Nods head and mouths words appropriately to questions   Skin: Skin is warm and dry. Capillary refill takes less than 2 seconds. She is not diaphoretic. No cyanosis.        decubitus ulcer       Significant Labs: All pertinent labs within the past 24 hours have been reviewed.    Significant Imaging: I have reviewed all pertinent imaging results/findings within the past 24 hours.    Assessment/Plan:      Pressure injury of sacral region, stage 4    -cont wound care        Moderate malnutrition    -cont TF as tolerate        Acute kidney injury    -improving       Pressure injury of left ischium, unstageable    -cont wound care        Pressure injury of trochanteric region of hip, stage 3    -cont wound care        Pressure injury of left thigh, unstageable    -cont wound care        Decubitus ulcer of sacral region, stage 4    -cont wound care        VAP (ventilator-associated pneumonia)    -IV antibiotics    Imaging results showed interval worsening with marked amount of alveolar consolidation scattered throughout the left lung and lower half of the right lung characteristic of pneumonia.  -vent dependent   -sputum and blood culture results pending  -Alfonzo prn   -Nebulizer treatments       Anemia    -H/H cont < than 7 despite multiple  PRBC transfusion   - No obvious sign of GIB  - Could be related to  sepsis   -Monitor H/H   -s/p 2 PRBC          Functional quadriplegia    -hx of CVA (embolic)  -pt currently a resident at [facility]  -PT/OT       Hypertension    -medications held due to hypotension        Acute on chronic renal failure    -Improving  -cont monitor kidney function and UOP        Pressure injury of coccygeal region, stage 4    -wound care on board   -infection  Please see wound cx report   Susceptibility      Escherichia coli esbl Proteus mirabilis esbl    CULTURE, AEROBIC  (SPECIFY SOURCE) CULTURE, AEROBIC  (SPECIFY SOURCE)   Amikacin <=16  Sensitive <=16  Sensitive   Amox/K Clav'ate 16/8  Intermediate <=8/4  Sensitive   Amp/Sulbactam >16/8  Resistant >16/8  Resistant   Ampicillin >16  Resistant >16  Resistant   Cefazolin >16  Resistant >16  Resistant   Cefepime >16  Resistant 16  Resistant   Ceftriaxone >32  Resistant >32  Resistant   Ciprofloxacin >2  Resistant >2  Resistant   Ertapenem <=2  Sensitive <=2  Sensitive   Gentamicin >8  Resistant >8  Resistant   Meropenem <=4  Sensitive <=4  Sensitive   Piperacillin/Tazo <=16  Sensitive <=16  Sensitive   Tetracycline <=4  Sensitive >8  Resistant   Tobramycin >8  Resistant >8  Resistant   Trimeth/Sulfa <=2/38  Sensitive >2/38  Resistant     -Cont IVAB per ID recommendation   -S/O I&D per surgery      Atelectasis of left lung    -Cont  Ventilation  Support      Leukocytosis    - cont ivab       Hypernatremia    -free water flush via G tube   -resolved        Neuromyelitis optica    -hx of  -Cellcept held in the setting of infection         UTI (urinary tract infection)    IV antibiotics   Urine culture results   Susceptibility      Escherichia coli esbl Proteus mirabilis esbl    CULTURE, AEROBIC  (SPECIFY SOURCE) CULTURE, AEROBIC  (SPECIFY SOURCE)   Amikacin <=16  Sensitive <=16  Sensitive   Amox/K Clav'ate 16/8  Intermediate <=8/4  Sensitive   Amp/Sulbactam >16/8  Resistant >16/8  Resistant   Ampicillin >16  Resistant >16  Resistant   Cefazolin  >16  Resistant >16  Resistant   Cefepime >16  Resistant 16  Resistant   Ceftriaxone >32  Resistant >32  Resistant   Ciprofloxacin >2  Resistant >2  Resistant   Ertapenem <=2  Sensitive <=2  Sensitive   Gentamicin >8  Resistant >8  Resistant   Meropenem <=4  Sensitive <=4  Sensitive   Piperacillin/Tazo <=16  Sensitive <=16  Sensitive   Tetracycline <=4  Sensitive >8  Resistant   Tobramycin >8  Resistant >8  Resistant   Trimeth/Sulfa <=2/38  Sensitive >2/38  Resistant          Deep tissue injury multiple unstageable    -cont wound care        Hypothyroidism    - cont levothyroxine        Chronic Hypotension    -cont current tx      Type 2 diabetes mellitus with kidney complication, without long-term current use of insulin    -cont ISS  -Keep CBG < 180       Chronic combined systolic and diastolic congestive heart failure    -appears compensated  At this time   - cont current tx        Chronic atrial fibrillation    -pt on amiodarone and Xarelto  -Xarelto held at this time to rule out active bleeding   -Amiodarone on hold  Due to low bp . Resume accordantly   -Tx reviewed          VTE Risk Mitigation (From admission, onward)        Ordered     IP VTE HIGH RISK PATIENT  Once      02/05/19 1430     Place sequential compression device  Until discontinued      02/05/19 1430          Critical care time spent on the evaluation and treatment of severe organ dysfunction, review of pertinent labs and imaging studies, discussions with consulting providers and discussions with patient/family: 35  minutes.    Pipe Lomeli MD  Department of Hospital Medicine   [facility]

## 2019-02-13 NOTE — PLAN OF CARE
Problem: Adult Inpatient Plan of Care  Goal: Plan of Care Review  Outcome: Ongoing (interventions implemented as appropriate)  POC reviewed w/ pt and daughter. VSS. Pt about to mouth words, alert at times, nods appropriately. Tracheostomy on ventilator. Oxygenation well. Hypertensive at times. Na elevated w/ AM labs. Free water flush increased to 200cc q4h. Plan to d/c tomorrow if Na WNL. SR. Permanent pacemaker (atrial paced). Tolerates suctioning well. 5fr midline pulled. SULY picc remains. PEG site c/d/i. Residuals adequate. Peptamen intense at goal (60cc/hr). UOP adequate via harris. WV remains in place, drainage serosangeous, w/ proper seal. Size wise auto turn. BM x1. Heel boots, SCDs. No s/s of pain or nausea.

## 2019-02-13 NOTE — SUBJECTIVE & OBJECTIVE
Review of Systems   Unable to perform ROS: Other   trached on vent    Objective:     Vital Signs (Most Recent):  Temp: 98.1 °F (36.7 °C) (02/13/19 0705)  Pulse: 70 (02/13/19 1015)  Resp: (!) 23 (02/13/19 1015)  BP: (!) 147/73 (02/13/19 1000)  SpO2: 99 % (02/13/19 1015) Vital Signs (24h Range):  Temp:  [97.9 °F (36.6 °C)-99.1 °F (37.3 °C)] 98.1 °F (36.7 °C)  Pulse:  [] 70  Resp:  [18-28] 23  SpO2:  [97 %-100 %] 99 %  BP: (131-174)/(64-90) 147/73     Weight: 70 kg (154 lb 5.2 oz)  Body mass index is 22.14 kg/m².      Intake/Output Summary (Last 24 hours) at 2/13/2019 1104  Last data filed at 2/13/2019 1000  Gross per 24 hour   Intake 2100 ml   Output 1985 ml   Net 115 ml       Physical Exam   Constitutional: Vital signs are normal. She appears well-developed. She has a sickly appearance.   HENT:   Head: Normocephalic and atraumatic.   Mouth/Throat: Oropharynx is clear and moist.   Eyes: EOM are normal.   Neck: Neck supple. No JVD present. No tracheal deviation present.       Cardiovascular: Normal rate. An irregularly irregular rhythm present.   Pulses:       Radial pulses are 2+ on the right side, and 2+ on the left side.        Dorsalis pedis pulses are 1+ on the right side, and 1+ on the left side.   Paced rhythm   Pulmonary/Chest: No accessory muscle usage. No tachypnea. No respiratory distress. She has decreased breath sounds in the left middle field and the left lower field. She has no wheezes. She has rales (coarse bilat improved with tracheal suctioning).   Abdominal: Soft. Bowel sounds are normal. She exhibits no distension. There is no tenderness.       Genitourinary:   Genitourinary Comments: Flores in place   Musculoskeletal: She exhibits edema.   No edema.  Diffuse atrophy.  LUE contracture   Lymphadenopathy:     She has no cervical adenopathy.   Neurological: She is alert.   Nods head and mouths words appropriately to questions   Skin: Skin is warm and dry. Capillary refill takes less than 2  seconds. She is not diaphoretic. No cyanosis.        decubitus ulcer       Vents:  Vent Mode: A/C (02/13/19 0857)  Ventilator Initiated: Yes (02/05/19 1127)  Set Rate: 20 bmp (02/13/19 0857)  Vt Set: 375 mL (02/13/19 0857)  Pressure Support: 0 cmH20 (02/13/19 0857)  PEEP/CPAP: 10 cmH20 (02/13/19 0857)  Oxygen Concentration (%): 40 (02/13/19 1015)  Peak Airway Pressure: 27 cmH2O (02/13/19 0857)  Plateau Pressure: 0 cmH20 (02/13/19 0857)  Total Ve: 9.52 mL (02/13/19 0857)  F/VT Ratio<105 (RSBI): (!) 73.24 (02/13/19 0857)    Lines/Drains/Airways     Drain                 Urethral Catheter -- days         Gastrostomy/Enterostomy 10/19/18 Percutaneous endoscopic gastrostomy (PEG)  days          Airway                 Surgical Airway 02/06/19 1905 Nikita 6 days          Pressure Ulcer                 Pressure Injury 02/05/19 Left Ischial tuberosity Unstageable - Present on Admission 8 days         Pressure Injury 02/05/19 Right medial Malleolus DTPI - Present on Admission 8 days         Pressure Injury 02/05/19 Sacral spine Stage 4 8 days         Pressure Injury 02/05/19 1620 Left lateral Hip Stage 3 7 days         Pressure Injury 02/06/19 Left Thigh DTPI - Present on Admission 7 days         Pressure Injury 02/06/19 Left Thigh Unstageable - Present on Admission 7 days         Pressure Injury 02/06/19 Left lateral Leg Stage 2 7 days         Pressure Injury 02/06/19 Left upper Arm DTPI - Present on Admission 7 days         Negative Pressure Wound Therapy  4 days          Peripheral Intravenous Line                 Midline Catheter Insertion/Assessment  - Double Lumen 02/11/19 1545 Left basilic vein (medial side of arm) 1 day                Significant Labs:    CBC/Anemia Profile:  Recent Labs   Lab 02/12/19  0343 02/13/19  0334   WBC 22.22* 12.59   HGB 8.5* 8.4*   HCT 27.9* 27.5*   * 396*   MCV 90 91   RDW 18.9* 19.3*        Chemistries:  Recent Labs   Lab 02/12/19  0343 02/13/19  0334   * 151*   K 4.0  3.9   * 117*   CO2 24 25   BUN 59* 46*   CREATININE 0.9 0.7   CALCIUM 9.4 9.8   ALBUMIN 1.7* 1.7*   PROT 7.1 7.1   BILITOT 0.2 0.3   ALKPHOS 165* 144*   ALT 16 15   AST 21 20       All pertinent labs within the past 24 hours have been reviewed.    Significant Imaging:  CXR: I have reviewed all pertinent results/findings within the past 24 hours and my personal findings are:  some improved aeration of left lung

## 2019-02-13 NOTE — SUBJECTIVE & OBJECTIVE
Interval History:     Review of Systems   Unable to perform ROS: Acuity of condition     Objective:     Vital Signs (Most Recent):  Temp: 98.1 °F (36.7 °C) (02/13/19 1105)  Pulse: 70 (02/13/19 1432)  Resp: (!) 27 (02/13/19 1432)  BP: (!) 163/76 (02/13/19 1300)  SpO2: 99 % (02/13/19 1432) Vital Signs (24h Range):  Temp:  [97.9 °F (36.6 °C)-98.7 °F (37.1 °C)] 98.1 °F (36.7 °C)  Pulse:  [] 70  Resp:  [18-28] 27  SpO2:  [97 %-100 %] 99 %  BP: (125-174)/(64-90) 163/76     Weight: 70 kg (154 lb 5.2 oz)  Body mass index is 22.14 kg/m².    Intake/Output Summary (Last 24 hours) at 2/13/2019 1500  Last data filed at 2/13/2019 1300  Gross per 24 hour   Intake 2380 ml   Output 2145 ml   Net 235 ml      Physical Exam   Constitutional: Vital signs are normal. She appears well-developed. She has a sickly appearance.   HENT:   Head: Normocephalic and atraumatic.   Mouth/Throat: Oropharynx is clear and moist.   Eyes: EOM are normal.   Neck: Neck supple. No JVD present. No tracheal deviation present.       Cardiovascular: Normal rate. An irregularly irregular rhythm present.   Pulses:       Radial pulses are 2+ on the right side, and 2+ on the left side.        Dorsalis pedis pulses are 1+ on the right side, and 1+ on the left side.   Paced rhythm   Pulmonary/Chest: No accessory muscle usage. No tachypnea. No respiratory distress. She has decreased breath sounds in the left middle field and the left lower field. She has no wheezes. She has rales (coarse bilat improved with tracheal suctioning).   Abdominal: Soft. Bowel sounds are normal. She exhibits no distension. There is no tenderness.       Genitourinary:   Genitourinary Comments: Flores in place   Musculoskeletal: She exhibits edema.   No edema.  Diffuse atrophy.  LUE contracture   Lymphadenopathy:     She has no cervical adenopathy.   Neurological: She is alert.   Nods head and mouths words appropriately to questions   Skin: Skin is warm and dry. Capillary refill takes  less than 2 seconds. She is not diaphoretic. No cyanosis.        decubitus ulcer       Significant Labs: All pertinent labs within the past 24 hours have been reviewed.    Significant Imaging: I have reviewed all pertinent imaging results/findings within the past 24 hours.

## 2019-02-13 NOTE — PLAN OF CARE
Problem: Adult Inpatient Plan of Care  Goal: Plan of Care Review  Outcome: Ongoing (interventions implemented as appropriate)  POC and interventions discussed with patient - nods and mouths words to communicate understanding.  More awake and alert this shift.  Mouthing words appropriately.  Follows simple commands.  Tracheostomy on ventilator.  Oxygenating well.  Moderate amount of secretions suctioned - cream colored.  Weak cough.  Tolerating TF's at goal rate.  Stage 4 decubitus ulcer to sacrum with wound vac - site wnl, wound vac with proper seal.  Size wise bed with auto turn ON.  Has heel boots bilaterally.  BM x 1 thus far.  Afebrile.  Heart paced.

## 2019-02-13 NOTE — ASSESSMENT & PLAN NOTE
S/p wound debridement on 02/06- all cultures reviewed .  Cultures -Acinetobacter , ESBL E coli , proteus   Tracheal aspirate - morganella .  Carbapenems (including meropenem) are the preferred antibiotics for treatment of serious infections with bacteria that produce an extended-spectrum beta-lactamase (ESBL). This also applies even with invitro susceptibility to zosyn     Effect of Piperacillin-Tazobactam vs Meropenem on 30-Day Mortality for Patients With E coli or Klebsiella pneumoniae Bloodstream Infection and Ceftriaxone Resistance: A Randomized Clinical Trial.   Derrick VELASQUEZ -their findings support other studeis that do not support use of zosyn in these settings.     2/9- will add tigecycline for acinetobacter as isolate is meropenem resistant , minocycline can be an option for discharge      Will switch to meropenem , follow final drug susceptibility of acinetobacter.  2/10- discussed with microlab about minocycline sensitivity ,   Will continue meropenem and tigecycline for now , will continue close monitoring     2/11- [facility] [place] [facility] says the medications are too expensive to use in the out patient settings -  A less preferred option will be Zosyn -to cover EGGERTHELLA LENTA ,Blautia coccoides, proteus  /E coli and morganella.  This is not the ideal therapy but can work with possible risk of failure.  Bactrim or minocycline can be used for Acinetobacter -will follow minocycline sensitivity.  If these options are taken, she will need close monitoring to avoid deterioration.  Will not to prefer to use double beta lactam agents to reduce risk of interstitial nephritis       2.12- will continue present antimicrobial therapy -ertapenem and unasyn, will follow up with case management for options for discharge.  Ertapenem and Unasyn can be used but needs close monitoring of renal function.  Ertapenem and Tigecycline will be preferred option.  Duration will be 6 weeks

## 2019-02-13 NOTE — PROGRESS NOTES
[facility]  Infectious Disease  Progress Note    Patient Name: Carlie Valdez  MRN: [id number]  Admission Date: 2/5/2019  Length of Stay: 7 days  Attending Physician: Pipe Lomeli, *  Primary Care Provider: Johanna Guzman MD    Isolation Status: No active isolations  Assessment/Plan:      VAP (ventilator-associated pneumonia)    2/9- will continue meropenem   Ventilatory support     2/11- now on ertapenem      Deep tissue injury multiple unstageable    S/p wound debridement on 02/06- all cultures reviewed .  Cultures -Acinetobacter , ESBL E coli , proteus   Tracheal aspirate - morganella .  Carbapenems (including meropenem) are the preferred antibiotics for treatment of serious infections with bacteria that produce an extended-spectrum beta-lactamase (ESBL). This also applies even with invitro susceptibility to zosyn     Effect of Piperacillin-Tazobactam vs Meropenem on 30-Day Mortality for Patients With E coli or Klebsiella pneumoniae Bloodstream Infection and Ceftriaxone Resistance: A Randomized Clinical Trial.   Derrick VELASQUEZ -their findings support other studeis that do not support use of zosyn in these settings.     2/9- will add tigecycline for acinetobacter as isolate is meropenem resistant , minocycline can be an option for discharge      Will switch to meropenem , follow final drug susceptibility of acinetobacter.  2/10- discussed with microlab about minocycline sensitivity ,   Will continue meropenem and tigecycline for now , will continue close monitoring     2/11- [facility] says the medications are too expensive to use in the out patient settings -  A less preferred option will be Zosyn -to cover EGGERTHELLA LENTA ,Blautia coccoides, proteus  /E coli and morganella.  This is not the ideal therapy but can work with possible risk of failure.  Bactrim or minocycline can be used for Acinetobacter -will follow minocycline sensitivity.  If these options are taken, she  will need close monitoring to avoid deterioration.  Will not to prefer to use double beta lactam agents to reduce risk of interstitial nephritis       2.12- will continue present antimicrobial therapy -ertapenem and unasyn, will follow up with case management for options for discharge.  Ertapenem and Unasyn can be used but needs close monitoring of renal function.  Ertapenem and Tigecycline will be preferred option.  Duration will be 6 weeks      Type 2 diabetes mellitus with kidney complication, without long-term current use of insulin    Insulin sliding scale, closely monitor glucose         Anticipated Disposition:     Thank you for your consult. I will follow-up with patient. Please contact us if you have any additional questions.    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  Pt is a resident at [facility] for skilled nursing care. .  [facility] states pt has a sacral wound that was cultured and showed Gram negative bacteria.   Chest xray showed interval worsening of the appearance of the lungs with marked amount of alveolar consolidation scattered throughout left lung and the lower half of the right lung characteristic of pneumonia.  Since admission, all cultures reviewed -   Specimen: Tissue from Buttocks, Right Updated: 02/08/19 1413    Aerobic Bacterial Culture --    ESCHERICHIA COLI ESBL   Many     Aerobic Bacterial Culture --    PROTEUS MIRABILIS ESBL   Moderate      Specimen: Decubitus from Coccyx Updated: 02/08/19 0935      Aerobic Bacterial Culture --    ACINETOBACTER BAUMANNII COMPLEX   Many   Susceptibility pending     Aerobic Bacterial Culture --     ESCHERICHIA COLI ESBL   Moderate        Tracheal aspirate -MORGANELLA MORGANII .  Lab data showed wbc -19  She had interval I and D on 02/06  Interval History: remains on ventilatory support .  All cultures reviewed-  From wound cultures -  Decubitus -ESBL E coli-  Acinetobacter baumanni  Right buttocks-  ESBL E coli  Proteus ESBL  2/10 -she was seen with daughter and  by bedside-  All cultures reviewed -   2/11- new cultures -Eggerthella lenta, Blautia coccidoes   2/12- remains on ventilatory support , awaiting placement   Review of Systems   Unable to perform ROS: Acuity of condition     Objective:     Vital Signs (Most Recent):  Temp: 98.2 °F (36.8 °C) (02/12/19 1900)  Pulse: 104 (02/12/19 2200)  Resp: (!) 24 (02/12/19 2200)  BP: 134/88 (02/12/19 2200)  SpO2: 99 % (02/12/19 2200) Vital Signs (24h Range):  Temp:  [97.9 °F (36.6 °C)-99.5 °F (37.5 °C)] 98.2 °F (36.8 °C)  Pulse:  [] 104  Resp:  [16-27] 24  SpO2:  [96 %-100 %] 99 %  BP: (131-177)/(58-91) 134/88     Weight: 71 kg (156 lb 8.4 oz)  Body mass index is 22.46 kg/m².    Estimated Creatinine Clearance: 60.2 mL/min (based on SCr of 0.9 mg/dL).    Physical Exam   Constitutional: She appears well-developed and well-nourished. No distress.   Lethargic   HENT:   Head: Normocephalic and atraumatic.   Mouth/Throat: Oropharynx is clear and moist.   Eyes: Conjunctivae and EOM are normal. Pupils are equal, round, and reactive to light.   Neck: Neck supple. No JVD present. No thyromegaly present.   Cardiovascular: Normal rate and regular rhythm. Exam reveals no gallop and no friction rub.   No murmur heard.  Defibrillator present.   Pulmonary/Chest: Effort normal and breath sounds normal. She has no wheezes. She has no rales.   #8 Shiley tracheostomy present.  RLL decreased breath sounds.  Left side decreased throughout with fine crackles.   Abdominal: Soft. Bowel sounds are normal. She exhibits no distension. There is no tenderness. There is no rebound  and no guarding.   Feeding tube in place.   Genitourinary:   Genitourinary Comments: Flores catheter in place.   Musculoskeletal: Normal range of motion. She exhibits no edema or deformity.   Lymphadenopathy:     She has no cervical adenopathy.   Neurological: She is alert. She has normal reflexes.   Partially opens eyes to command.  Generalized weakness and not following other commands.  Upper extremities flexor contracted.  Upper and lower extremity muscle wasting.   Skin: Skin is warm and dry. No rash noted.   -has sacral wound   Psychiatric: She has a normal mood and affect. Her behavior is normal. Judgment and thought content normal.   Nursing note and vitals reviewed.      Significant Labs:   Blood Culture:   Recent Labs   Lab 10/22/18  1320 10/24/18  0628 10/24/18  0635 02/05/19  1235 02/05/19  1240   LABBLOO No growth after 5 days. No growth after 5 days. No growth after 5 days. No growth after 5 days. No growth after 5 days.     CBC:   Recent Labs   Lab 02/11/19  0355 02/12/19  0343   WBC 17.06* 22.22*   HGB 9.7* 8.5*   HCT 30.8* 27.9*   * 416*     Wound Culture:   Recent Labs   Lab 02/06/19  0722 02/06/19  1410   LABAERO ACINETOBACTER BAUMANNII/HAEMOLYTICUS  Many   ([facility])     ESCHERICHIA COLI ESBL  Moderate   ESCHERICHIA COLI ESBL  Many    PROTEUS MIRABILIS ESBL  Moderate       All pertinent labs within the past 24 hours have been reviewed.    Significant Imaging: I have reviewed all pertinent imaging results/findings within the past 24 hours.

## 2019-02-14 PROBLEM — D72.829 LEUKOCYTOSIS: Status: RESOLVED | Noted: 2018-01-01 | Resolved: 2019-01-01

## 2019-02-14 PROBLEM — N17.9 ACUTE ON CHRONIC RENAL FAILURE: Status: RESOLVED | Noted: 2019-01-01 | Resolved: 2019-01-01

## 2019-02-14 PROBLEM — N39.0 UTI (URINARY TRACT INFECTION): Status: RESOLVED | Noted: 2018-01-01 | Resolved: 2019-01-01

## 2019-02-14 PROBLEM — N18.9 ACUTE ON CHRONIC RENAL FAILURE: Status: RESOLVED | Noted: 2019-01-01 | Resolved: 2019-01-01

## 2019-02-14 NOTE — PROGRESS NOTES
[facility] -   Infectious Disease  Progress Note    Patient Name: Carlie Valdez  MRN: [id number]  Admission Date: 2/5/2019  Length of Stay: 9 days  Attending Physician: Pipe Lomeli, *  Primary Care Provider: Johanna Guzman MD    Isolation Status: No active isolations  Assessment/Plan:      VAP (ventilator-associated pneumonia)    2/9- will continue meropenem   Ventilatory support     2/11- now on ertapenem     2/13- continue ertapenem      Deep tissue injury multiple unstageable    S/p wound debridement on 02/06- all cultures reviewed .  Cultures -Acinetobacter , ESBL E coli , proteus   Tracheal aspirate - morganella .  Carbapenems (including meropenem) are the preferred antibiotics for treatment of serious infections with bacteria that produce an extended-spectrum beta-lactamase (ESBL). This also applies even with invitro susceptibility to zosyn     Effect of Piperacillin-Tazobactam vs Meropenem on 30-Day Mortality for Patients With E coli or Klebsiella pneumoniae Bloodstream Infection and Ceftriaxone Resistance: A Randomized Clinical Trial.   Derrick VELASQUEZ -their findings support other studeis that do not support use of zosyn in these settings.     2/9- will add tigecycline for acinetobacter as isolate is meropenem resistant , minocycline can be an option for discharge      Will switch to meropenem , follow final drug susceptibility of acinetobacter.  2/10- discussed with microlab about minocycline sensitivity ,   Will continue meropenem and tigecycline for now , will continue close monitoring     2/11- [facility] says the medications are too expensive to use in the out patient settings -  A less preferred option will be Zosyn -to cover EGGERTHELLA LENTA ,Blautia coccoides, proteus  /E coli and morganella.  This is not the ideal therapy but can work with possible risk of failure.  Bactrim or minocycline can be used for Acinetobacter -will follow minocycline sensitivity.  If  these options are taken, she will need close monitoring to avoid deterioration.  Will not to prefer to use double beta lactam agents to reduce risk of interstitial nephritis       2.12- will continue present antimicrobial therapy -ertapenem and unasyn, will follow up with case management for options for discharge.  Ertapenem and Unasyn can be used but needs close monitoring of renal function.  Ertapenem and Tigecycline will be preferred option.  Duration will be 6 weeks   2/13- discussed with [place] microlab -isolate with acinetobacter cannot be found for minocycline testing -will continue Tigecycline, /Ertapenem -will follow weekly ESR and CRP and will treat for presumed osteomyelitis      Type 2 diabetes mellitus with kidney complication, without long-term current use of insulin    Insulin sliding scale, closely monitor glucose         Anticipated Disposition:     Thank you for your consult. I will follow-up with patient. Please contact us if you have any additional questions.  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  Pt is a resident at [facility] for skilled nursing care. .  [facility] states pt has a sacral wound that was cultured and showed Gram negative bacteria.   Chest xray showed interval worsening of the appearance of the lungs with marked amount of alveolar consolidation scattered throughout left lung and the lower half of the right lung characteristic of pneumonia.  Since admission, all cultures reviewed -   Specimen: Tissue from Buttocks, Right Updated: 02/08/19 1413    Aerobic Bacterial Culture --    ESCHERICHIA COLI ESBL    Many     Aerobic Bacterial Culture --    PROTEUS MIRABILIS ESBL   Moderate      Specimen: Decubitus from Coccyx Updated: 02/08/19 0935      Aerobic Bacterial Culture --    ACINETOBACTER BAUMANNII COMPLEX   Many   Susceptibility pending     Aerobic Bacterial Culture --    ESCHERICHIA COLI ESBL   Moderate        Tracheal aspirate -MORGANELLA MORGANII .  Lab data showed wbc -19  She had interval I and D on 02/06  Interval History: remains on ventilatory support .  All cultures reviewed-  From wound cultures -  Decubitus -ESBL E coli-  Acinetobacter baumanni  Right buttocks-  ESBL E coli  Proteus ESBL  2/10 -she was seen with daughter and  by bedside-  All cultures reviewed -   2/11- new cultures -Eggerthella lenta, Joshuautjj micheles   2/12- remains on ventilatory support , awaiting placement   2/13- remains afebrile, awaiting placement   Review of Systems   Unable to perform ROS: Acuity of condition     Objective:     Vital Signs (Most Recent):  Temp: 98.3 °F (36.8 °C) (02/14/19 0300)  Pulse: 70 (02/14/19 0509)  Resp: (!) 24 (02/14/19 0509)  BP: (!) 160/76 (02/14/19 0400)  SpO2: (!) 94 % (02/14/19 0509) Vital Signs (24h Range):  Temp:  [97.8 °F (36.6 °C)-98.9 °F (37.2 °C)] 98.3 °F (36.8 °C)  Pulse:  [69-73] 70  Resp:  [20-30] 24  SpO2:  [94 %-100 %] 94 %  BP: (125-177)/(65-91) 160/76     Weight: 70 kg (154 lb 5.2 oz)  Body mass index is 22.14 kg/m².    Estimated Creatinine Clearance: 77.4 mL/min (based on SCr of 0.7 mg/dL).    Physical Exam   Constitutional: She appears well-developed and well-nourished. No distress.   Lethargic   HENT:   Head: Normocephalic and atraumatic.   Mouth/Throat: Oropharynx is clear and moist.   Eyes: Conjunctivae and EOM are normal. Pupils are equal, round, and reactive to light.   Neck: Neck supple. No JVD present. No thyromegaly present.   Cardiovascular: Normal rate and regular rhythm. Exam reveals no gallop and no friction rub.   No murmur heard.  Defibrillator present.    Pulmonary/Chest: Effort normal and breath sounds normal. She has no wheezes. She has no rales.   #8 Shiley tracheostomy present.  RLL decreased breath sounds.  Left side decreased throughout with fine crackles.   Abdominal: Soft. Bowel sounds are normal. She exhibits no distension. There is no tenderness. There is no rebound and no guarding.   Feeding tube in place.   Genitourinary:   Genitourinary Comments: Flores catheter in place.   Musculoskeletal: Normal range of motion. She exhibits no edema or deformity.   Lymphadenopathy:     She has no cervical adenopathy.   Neurological: She is alert. She has normal reflexes.   Partially opens eyes to command.  Generalized weakness and not following other commands.  Upper extremities flexor contracted.  Upper and lower extremity muscle wasting.   Skin: Skin is warm and dry. No rash noted.   -has sacral wound   Psychiatric: She has a normal mood and affect. Her behavior is normal. Judgment and thought content normal.   Nursing note and vitals reviewed.      Significant Labs:   Blood Culture:   Recent Labs   Lab 10/22/18  1320 10/24/18  0628 10/24/18  0635 02/05/19  1235 02/05/19  1240   LABBLOO No growth after 5 days. No growth after 5 days. No growth after 5 days. No growth after 5 days. No growth after 5 days.     CBC:   Recent Labs   Lab 02/13/19  0334 02/14/19  0336   WBC 12.59 11.69   HGB 8.4* 8.7*   HCT 27.5* 29.8*   * 404*     Wound Culture:   Recent Labs   Lab 02/06/19  0722 02/06/19  1410   LABAERO ACINETOBACTER BAUMANNII/HAEMOLYTICUS  Many   ([facility])     ESCHERICHIA COLI ESBL  Moderate   ESCHERICHIA COLI ESBL  Many    PROTEUS MIRABILIS ESBL  Moderate       All pertinent labs within the past 24 hours have been reviewed.    Significant Imaging: I have reviewed all pertinent imaging results/findings within the past 24 hours.

## 2019-02-14 NOTE — ASSESSMENT & PLAN NOTE
-free water flush via G tube   -improving  -Order was given for the NH free water trough peg tube   -200 cc  Free h2o q 5 hrs

## 2019-02-14 NOTE — ASSESSMENT & PLAN NOTE
S/p wound debridement on 02/06- all cultures reviewed .  Cultures -Acinetobacter , ESBL E coli , proteus   Tracheal aspirate - morganella .  Carbapenems (including meropenem) are the preferred antibiotics for treatment of serious infections with bacteria that produce an extended-spectrum beta-lactamase (ESBL). This also applies even with invitro susceptibility to zosyn     Effect of Piperacillin-Tazobactam vs Meropenem on 30-Day Mortality for Patients With E coli or Klebsiella pneumoniae Bloodstream Infection and Ceftriaxone Resistance: A Randomized Clinical Trial.   Derrick VELASQUEZ -their findings support other studeis that do not support use of zosyn in these settings.     2/9- will add tigecycline for acinetobacter as isolate is meropenem resistant , minocycline can be an option for discharge      Will switch to meropenem , follow final drug susceptibility of acinetobacter.  2/10- discussed with [place] about minocycline sensitivity ,   Will continue meropenem and tigecycline for now , will continue close monitoring     2/11- [facility] says the medications are too expensive to use in the out patient settings -  A less preferred option will be Zosyn -to cover EGGERTHELLA LENTA ,Blautia coccoides, proteus  /E coli and morganella.  This is not the ideal therapy but can work with possible risk of failure.  Bactrim or minocycline can be used for Acinetobacter -will follow minocycline sensitivity.  If these options are taken, she will need close monitoring to avoid deterioration.  Will not to prefer to use double beta lactam agents to reduce risk of interstitial nephritis       2.12- will continue present antimicrobial therapy -ertapenem and unasyn, will follow up with case management for options for discharge.  Ertapenem and Unasyn can be used but needs close monitoring of renal function.  Ertapenem and Tigecycline will be preferred option.  Duration will be 6 weeks   2/13- discussed with [place] microlab -isolate  with acinetobacter cannot be found for minocycline testing -will continue Tigecycline, /Ertapenem -will follow weekly ESR and CRP and will treat for presumed osteomyelitis

## 2019-02-14 NOTE — ASSESSMENT & PLAN NOTE
2/9- will continue meropenem   Ventilatory support     2/11- now on ertapenem     2/13- continue ertapenem

## 2019-02-14 NOTE — ASSESSMENT & PLAN NOTE
MORGANELLA MORGANII in sputum sensitive to MEROPENEM.    At baseline oxygenation on vent  Continue  IV MEROPENEM   CXR improved - has chronically abnormal lungs on old CXR films  Cont tracheal suctioning PRN and Duonebs  2/11 Continue mechanical vent  2/12 continue vent support  2/13 continue support  \2/14 continue vent support

## 2019-02-14 NOTE — DISCHARGE SUMMARY
[facility] Hospital Medicine  Discharge Summary      Patient Name: Carlie Valdez  MRN: [id number]  Admission Date: 2/5/2019  Hospital Length of Stay: 9 days  Discharge Date and Time:  02/14/2019 2:44 PM  Attending Physician: Pipe Lomeli, *   Discharging Provider: Pipe Lomeli MD  Primary Care Provider: Johanna Guzman MD      HPI:   Carlie Valdez is a 73 y.o. female patient  with PMHX of Atrial fibrillation, HTN, CKD, CVA (embolic), Anemia , Heart failure, Chronic respiratory failure with trach, functional quadriplegia, CAD, and Neuromyelitis Optica who presented to the ER today for abnormal lab results.  Pt is a resident at [facility] for skilled nursing care. Pt had outpatient labs done today.  [facility] states pt has a sacral wound that was cultured and showed Gram negative bacteria.  Pt did not respond when questioned however has hx of responding appropriately to simple questions. HPI limited due to patient being trached and unable to mouth words.  ER workup showed: WBC 16.18, H/H 5.6/19.9, Na 152, BUN/Creatinine 115/2.4, Lactic acid 4.2, and Procal 1.20.  Chest xray showed interval worsening of the appearance of the lungs with marked amount of alveolar consolidation scattered throughout left lung and the lower half of the right lung characteristic of pneumonia.  UA + for infectious process.  Hospital Medicine contacted for admission to ICU.        Procedure(s) (LRB):  DEBRIDEMENT, WOUND, SACRUM (N/A)      Hospital Course:   Admitted to ICU for treatment of septic shock.  Abnormal urinalysis and multiple decubitus wounds as possible sources of infection.  IV fluid resuscitation.  Empirically started Vancomycin and Zosyn.  Wound care and General Surgery to evaluate for source control.  Surgical debridement of the wound with exposed bone at the base.  Wound cultures growing multiple organisms with multiple patterns of drug resistance.  Infectious  Disease consult.  Minor clinical improvement with broad spectrum antibiotic treatment.  2/9/19 Pt was seen and examined at bedside .She is s/p 1 PRBC . The H/H remain < 6 w/o any sign of acute bleeding . She is on mechanical  Ventilation trough  A tracheotomy. There was  No acute event overnight . ID was consulted yesterday  . The wound cx is (+) for e.coli and proteus sensitive to meropenem .    2/10/19 Pt was seen and examined . The H/H remain lower than < 7 .  There was no acute event overnight . She is on mechanical ventilation  Trough a tracheotomy .    2/11/19 She is s/p 2 PRB   With a good H/H respond . There was no acute event overnight .   2/12/19 Pt was seen and examined at bedside . The H/H remian stable . The na level is trending up . The CM is working on  IVAB   2/13/19 Pt was seen and examined at  Bedside . The Na level is cont trending up . The free H20 was increased . There was no acute event overnight   2/14/19 Pt was seen and examined at bedside . The H/H remain stable . The Na level is trending down . She will be d/c on IVAB Invanz and tigeceline . She was determined to be suitable  To be d/c to  [facility]      Consults:   Consults (From admission, onward)        Status Ordering Provider     Inpatient consult to General Surgery  Once     Provider:  Lang Mcgee MD    Completed FRANCISCO GAMEZ     Inpatient consult to Infectious Diseases  Once     Provider:  Philip Ceja MD    Acknowledged EPI WHITE     Inpatient consult to PICC team ([facility])  Once     Provider:  (Not yet assigned)    Acknowledged FRANCISCO GAMEZ     Inpatient consult to Pulmonology  Once     Provider:  (Not yet assigned)    Acknowledged ALIYAH FAJARDO     Inpatient consult to Registered Dietitian/Nutritionist  Once     Provider:  (Not yet assigned)    Completed EPI WHITE          VAP (ventilator-associated pneumonia)    -IV antibiotics    Imaging results showed interval worsening with marked amount of  alveolar consolidation scattered throughout the left lung and lower half of the right lung characteristic of pneumonia.  -vent dependent   -sputum and blood culture results pending  -Alfonzo prn   -Nebulizer treatments       Anemia    -H/H cont < than 7 despite multiple  PRBC transfusion   - No obvious sign of GIB  - Could be related to sepsis   -Monitor H/H   -s/p 2 PRBC          Pressure injury of coccygeal region, stage 4    -wound care on board   -infection  Please see wound cx report   Susceptibility      Escherichia coli esbl Proteus mirabilis esbl    CULTURE, AEROBIC  (SPECIFY SOURCE) CULTURE, AEROBIC  (SPECIFY SOURCE)   Amikacin <=16  Sensitive <=16  Sensitive   Amox/K Clav'ate 16/8  Intermediate <=8/4  Sensitive   Amp/Sulbactam >16/8  Resistant >16/8  Resistant   Ampicillin >16  Resistant >16  Resistant   Cefazolin >16  Resistant >16  Resistant   Cefepime >16  Resistant 16  Resistant   Ceftriaxone >32  Resistant >32  Resistant   Ciprofloxacin >2  Resistant >2  Resistant   Ertapenem <=2  Sensitive <=2  Sensitive   Gentamicin >8  Resistant >8  Resistant   Meropenem <=4  Sensitive <=4  Sensitive   Piperacillin/Tazo <=16  Sensitive <=16  Sensitive   Tetracycline <=4  Sensitive >8  Resistant   Tobramycin >8  Resistant >8  Resistant   Trimeth/Sulfa <=2/38  Sensitive >2/38  Resistant     -Cont IVAB per ID recommendation   -S/O I&D per surgery      Atelectasis of left lung    -Cont  Ventilation  Support      Hypernatremia    -free water flush via G tube   -improving  -Order was given for the NH free water trough peg tube   -200 cc  Free h2o q 5 hrs        Neuromyelitis optica    -hx of  -Cellcept held in the setting of infection         Deep tissue injury multiple unstageable    -cont wound care        Hypothyroidism    - cont levothyroxine        Chronic Hypotension    -cont current tx      Type 2 diabetes mellitus with kidney complication, without long-term current use of insulin    -cont ISS  -Keep CBG < 180        Chronic combined systolic and diastolic congestive heart failure    -appears compensated  At this time   - cont current tx        Chronic atrial fibrillation    -Resume  amiodarone and   -Hold Xarelto due to GIB . F/U cardiology outpt   -Tx reviewed          Final Active Diagnoses:    Diagnosis Date Noted POA    Moderate malnutrition [E44.0] 02/08/2019 Yes    Pressure injury of sacral region, stage 4 [L89.154] 02/08/2019 Yes    Decubitus ulcer of sacral region, stage 4 [L89.154] 02/06/2019 Yes    Pressure injury of left thigh, unstageable [L89.220] 02/06/2019 Yes    Pressure injury of trochanteric region of hip, stage 3 [L89.203] 02/06/2019 Yes    Pressure injury of left ischium, unstageable [L89.320] 02/06/2019 Yes    Hypertension [I10] 02/05/2019 Yes    Functional quadriplegia [R53.2] 02/05/2019 Yes    Anemia [D64.9] 02/05/2019 Yes    VAP (ventilator-associated pneumonia) [J95.851] 02/05/2019 Yes    Pressure injury of coccygeal region, stage 4 [L89.154] 10/22/2018 Yes    Atelectasis of left lung [J98.11] 10/19/2018 Yes    Hypernatremia [E87.0] 07/11/2018 Yes    Neuromyelitis optica [G36.0] 07/06/2018 Yes    Deep tissue injury multiple unstageable [T14.8XXA] 06/04/2018 Yes     Chronic    Hypothyroidism [E03.9] 06/03/2018 Yes     Chronic    Chronic Hypotension [I95.9] 06/01/2018 Yes     Chronic    Type 2 diabetes mellitus with kidney complication, without long-term current use of insulin [E11.29] 03/09/2018 Yes     Chronic    Chronic combined systolic and diastolic congestive heart failure [I50.42] 07/15/2017 Yes     Chronic    Chronic atrial fibrillation [I48.2] 08/14/2013 Yes     Chronic      Problems Resolved During this Admission:    Diagnosis Date Noted Date Resolved POA    PRINCIPAL PROBLEM:  Acute respiratory failure with hypoxemia [J96.01] 10/24/2018 02/12/2019 Yes    Acute on chronic respiratory failure with hypoxia and hypercapnia [J96.21, J96.22]  02/12/2019 Yes    Acute kidney  injury [N17.9]  02/14/2019 Yes    Severe sepsis with acute organ dysfunction [A41.9, R65.20] 02/05/2019 02/12/2019 Yes    Acute on chronic renal failure [N17.9, N18.9] 02/05/2019 02/14/2019 Yes    Leukocytosis [D72.829] 07/15/2018 02/14/2019 Yes    Acute on chronic systolic heart failure [I50.23] 06/30/2018 02/07/2019 Yes    UTI (urinary tract infection) [N39.0] 06/30/2018 02/14/2019 Yes       Discharged Condition: fair    Disposition: Nursing Facility with Pl*    Follow Up:  Follow-up Information     Johanna Guzman MD In 1 week.    Specialty:  Internal Medicine  Contact information:  [address]  [place] [address]  [phone]             Philip Ceja MD In 2 weeks.    Specialty:  Infectious Diseases  Contact information:  [address] [facility] [address]  [phone]                 Patient Instructions:      Notify your health care provider if you experience any of the following:  temperature >100.4     Notify your health care provider if you experience any of the following:  persistent nausea and vomiting or diarrhea     Notify your health care provider if you experience any of the following:  severe uncontrolled pain     Notify your health care provider if you experience any of the following:  redness, tenderness, or signs of infection (pain, swelling, redness, odor or green/yellow discharge around incision site)     Notify your health care provider if you experience any of the following:  difficulty breathing or increased cough     Notify your health care provider if you experience any of the following:  severe persistent headache     Notify your health care provider if you experience any of the following:  worsening rash     Notify your health care provider if you experience any of the following:  persistent dizziness, light-headedness, or visual disturbances     Notify your health care provider if you experience any of the following:  increased confusion or weakness      Notify your health care provider if you experience any of the following:     Activity as tolerated       Significant Diagnostic Studies: Labs: All labs within the past 24 hours have been reviewed  Microbiology:   Blood Culture   Lab Results   Component Value Date    LABBLOO No growth after 5 days. 02/05/2019   , Sputum Culture   Lab Results   Component Value Date    GSRESP Few WBC's 02/05/2019    GSRESP Many Gram positive cocci 02/05/2019    GSRESP Moderate Gram negative rods 02/05/2019    GSRESP Few Gram positive rods 02/05/2019    RESPIRATORYC MORGANELLA MORGANII  Moderate   02/05/2019   , Urine Culture    Lab Results   Component Value Date    LABURIN No significant growth 02/06/2019    and Wound Culture:     Pending Diagnostic Studies:     None         Medications:  Reconciled Home Medications:      Medication List      START taking these medications    dextrose 5 % SolP 100 mL with tigecycline 50 mg SolR 50 mg  Inject 50 mg into the vein every 12 (twelve) hours. 6 weeks     ERTAPENEM (INVANZ) 1 G/100 ML NS (READY TO MIX)  Inject 100 mLs (1 g total) into the vein once daily. 6 week        CHANGE how you take these medications    amiodarone 200 MG Tab  Commonly known as:  PACERONE  Take 1 tablet (200 mg total) by mouth 2 (two) times daily.  What changed:  how to take this     bumetanide 2 MG tablet  Commonly known as:  BUMEX  Take 1 tablet (2 mg total) by mouth 2 (two) times daily. 1 Tablet Oral Every day  What changed:    · when to take this  · additional instructions     ergocalciferol 50,000 unit Cap  Commonly known as:  ERGOCALCIFEROL  Take 1 capsule (50,000 Units total) by mouth every 7 days.  What changed:  how to take this     mycophenolate 250 mg Cap  Commonly known as:  CELLCEPT  Take 2 capsules (500 mg total) by mouth 2 (two) times daily.  What changed:  how to take this     polyethylene glycol 17 gram Pwpk  Commonly known as:  GLYCOLAX  Take 17 g by mouth every other day.  What changed:  when to  take this        CONTINUE taking these medications    albuterol 90 mcg/actuation inhaler  Commonly known as:  PROVENTIL/VENTOLIN HFA  Inhale 2 puffs into the lungs every 4 (four) hours as needed.     albuterol-ipratropium 2.5 mg-0.5 mg/3 mL nebulizer solution  Commonly known as:  DUO-NEB  Take 3 mLs by nebulization every 4 (four) hours as needed for Wheezing. Rescue     baclofen 10 MG tablet  Commonly known as:  LIORESAL  10 mg by Per G Tube route 3 (three) times daily as needed.     brimonidine-timolol 0.2-0.5 % Drop  Commonly known as:  COMBIGAN  Place 1 drop into both eyes every 12 (twelve) hours.     budesonide 0.5 mg/2 mL nebulizer solution  Commonly known as:  PULMICORT  Take 2 mLs (0.5 mg total) by nebulization every 12 (twelve) hours. Controller     fluticasone 50 mcg/actuation nasal spray  Commonly known as:  FLONASE  2 sprays by Each Nare route once daily.     insulin detemir U-100 100 unit/mL (3 mL) Inpn pen  Commonly known as:  LEVEMIR FLEXTOUCH  Inject 10 Units into the skin every evening.     levothyroxine 100 MCG tablet  Commonly known as:  SYNTHROID  100 mcg by Per G Tube route before breakfast.     magnesium oxide 400 mg (241.3 mg magnesium) tablet  Commonly known as:  MAG-OX  400 mg by Per G Tube route 2 (two) times daily.     midodrine 5 MG Tab  Commonly known as:  PROAMATINE  1 tablet (5 mg total) by Per G Tube route 3 (three) times daily.     montelukast 10 mg tablet  Commonly known as:  SINGULAIR  10 mg by Per G Tube route once daily.     multivitamin per tablet  Commonly known as:  THERAGRAN  Take 1 tablet by mouth once daily.     ondansetron 4 MG tablet  Commonly known as:  ZOFRAN  4 mg by Per G Tube route every 6 (six) hours as needed for Nausea.     pantoprazole 40 MG tablet  Commonly known as:  PROTONIX  Take 40 mg by mouth once daily.     traMADol 50 mg tablet  Commonly known as:  ULTRAM  50 mg by Per G Tube route every 6 (six) hours as needed for Pain.     zinc sulfate 220 (50) mg  capsule  Commonly known as:  ZINCATE  220 mg by Per G Tube route once daily.        STOP taking these medications    bacitracin 500 unit/gram ointment     furosemide 20 MG tablet  Commonly known as:  LASIX     linezolid 600 mg Tab  Commonly known as:  ZYVOX     rivaroxaban 15 mg Tab  Commonly known as:  XARELTO            Indwelling Lines/Drains at time of discharge:   Lines/Drains/Airways     Drain                 Urethral Catheter -- days         Gastrostomy/Enterostomy 10/19/18 Percutaneous endoscopic gastrostomy (PEG)  days          Airway                 Surgical Airway 02/06/19 1905 Nikita 7 days          Pressure Ulcer                 Pressure Injury 02/05/19 Left Ischial tuberosity Unstageable - Present on Admission 9 days         Pressure Injury 02/05/19 Right medial Malleolus DTPI - Present on Admission 9 days         Pressure Injury 02/05/19 Sacral spine Stage 4 9 days         Pressure Injury 02/05/19 1620 Left lateral Hip Stage 3 8 days         Pressure Injury 02/06/19 Left Thigh DTPI - Present on Admission 8 days         Pressure Injury 02/06/19 Left Thigh Unstageable - Present on Admission 8 days         Pressure Injury 02/06/19 Left lateral Leg Stage 2 8 days         Pressure Injury 02/06/19 Left upper Arm DTPI - Present on Admission 8 days         Negative Pressure Wound Therapy  6 days                Recommend Peptamen Intense VHP formula with final goal rate of 65 ml/hr = 1800kcals, 167gms Protein, 1512 ml Free water.  2) Water flushes  200 cc q hrs     3) Monitor tolerance    Wound vac   Reportable conditions for Patients utilizing Negative Pressure Wound Vac Therapy:  1. Loss of seal, raised foam, or wound drainage suddenly decreasing in amount or stopping.  2. Bright red blood or rapid filling of the cannister  3. Periwound erythema, heat, edema, pain, elevated temperature and white blood cell count, cloudy or foul-smelling wound drainage, excess bleeding, macerated periwound skin  4.  Wound drainage becoming foul smelling and cloudy  5. Inability to trouble shoot alarm for greater than two (2) hours    Primary Nurse Assessment should include the following - Document VAC in the NPWT LDA  1. Document system patency  2. Amount and description of wound fluid  3. Pain level  4. Tolerance of NPWT  5. Level of suction   6. Color of foam  7. Any air leak noted      Wound care recom:  Frequency: Every Tu/Fri  Location: Sacrum  Cleanse with: saline  Fill / Pack with: black foam  Cover dressing with:drape  Wound VAC: KCI Wound Vac Veraflo settings: Instillation fluid: Sterile Normal Saline for Irrigation; Volume 40mL; Dwell Time 10 minutes; Cycle: 3 hours @ continuous -125 mmHg low intensity suction    NOTE  Please informed  The NH the xarelto  Is  Going to be held due  To GB . She need to f/u PCP or cardiology   To see if they want to resume  It      Time spent on the discharge of patient: 35 minutes  Patient was seen and examined on the date of discharge and determined to be suitable for discharge.        Pipe Lomeli MD  Department of Hospital Medicine  [facility]

## 2019-02-14 NOTE — PLAN OF CARE
KARINE spoke with Deacon RN for update. Per conversation for d/c planning with Dr. Timmons,  Patient can d/c possibly tomorrow. In MDR, a midline is for 30 days. Thee patient needs 6 weeks IVAB. Patient will need a new line before IVAB are completed. KARINE was told per Gayathri they have a contract with a PICC team ( [facility]) who can insertion PICC lines and can certainly do a midline.  CM informed ICU team and Dr Timmons of the above.  KARINE called Haley ( dtr ) to report d/c plan and verbalized understanding.      02/14/19 6642   Discharge Reassessment   Assessment Type Discharge Planning Reassessment   Provided patient/caregiver education on the expected discharge date and the discharge plan No   Do you have any problems affording any of your prescribed medications? No   Discharge Plan A Return to nursing home   Discharge Plan B Return to Nursing Home   DME Needed Upon Discharge  none   Patient choice form signed by patient/caregiver N/A   Anticipated Discharge Disposition (Returning Home ( [facility]) )   Can the patient answer the patient profile reliably? Yes, cognitively intact   How does the patient rate their overall health at the present time? Fair   Describe the patient's ability to walk at the present time. Major restrictions/daily assistance from another person   How often would a person be available to care for the patient? Whenever needed   Number of comorbid conditions (as recorded on the chart) Five or more   During the past month, has the patient often been bothered by feeling down, depressed or hopeless? No   During the past month, has the patient often been bothered by little interest or pleasure in doing things? No

## 2019-02-14 NOTE — PHYSICIAN QUERY
PT Name: Carlie Valdez  MR #: [id number]  Physician Query Form - CKD Clarification     CDS/: Vanesa Johnston RN, CCDS             Contact information: [phone]  This form is a permanent document in the medical record.     Query Date: February 14, 2019    By submitting this query, we are merely seeking further clarification of documentation. Please utilize your independent clinical judgment when addressing the question(s) below.    The Medical record contains the following:     Indicators   Supporting Clinical Findings   Location in Medical Record   x CKD or Chronic Kidney (Renal) Failure / Disease CKD,    Acute on chronic renal failure H&P 2/5   x BUN/Creatinine                          GFR BUN/Creatinine 115/2.5    GFR 22 > 25  > 21 > 47  > 60 H&P 2/5    Labs 2/5- 2/12        Dehydration      Nausea / Vomiting      Dialysis / CRRT      Medication     x Treatment nephrotoxic medications held- will resume medications as appropriate  H&P 2/5   x Other Chronic Conditions HTN, Anemia, HF, CAD H&P 2/5    Other       Provider, please further specify the stage of CKD.    [   ] Chronic Kidney Disease (CKD) (please specify stage* below)      [employer] Definitions     Stage Description eGFR (mL/min)   [   ]   II Mildly reduced kidney function 60-89   [ x  ]    III Moderately reduced kidney function 30-59   [   ]    IV Severely reduced kidney function 15-29         [   ] Other (please specify): ____________    [   ]  Clinically Undetermined          Please document in your progress notes daily for the duration of treatment until resolved and include in your discharge summary.

## 2019-02-14 NOTE — SUBJECTIVE & OBJECTIVE
Review of Systems   Unable to perform ROS: Other   trached on vent    Objective:     Vital Signs (Most Recent):  Temp: 99.7 °F (37.6 °C) (02/14/19 0705)  Pulse: 69 (02/14/19 1000)  Resp: (!) 29 (02/14/19 1000)  BP: (!) 143/80 (02/14/19 1000)  SpO2: 97 % (02/14/19 1000) Vital Signs (24h Range):  Temp:  [97.8 °F (36.6 °C)-99.7 °F (37.6 °C)] 99.7 °F (37.6 °C)  Pulse:  [69-72] 69  Resp:  [21-38] 29  SpO2:  [94 %-100 %] 97 %  BP: (125-177)/(65-91) 143/80     Weight: 70 kg (154 lb 5.2 oz)  Body mass index is 22.14 kg/m².      Intake/Output Summary (Last 24 hours) at 2/14/2019 1101  Last data filed at 2/14/2019 1000  Gross per 24 hour   Intake 3240 ml   Output 2485 ml   Net 755 ml       Physical Exam   Constitutional: Vital signs are normal. She appears well-developed. She has a sickly appearance.   HENT:   Head: Normocephalic and atraumatic.   Mouth/Throat: Oropharynx is clear and moist.   Eyes: EOM are normal.   Neck: Neck supple. No JVD present. No tracheal deviation present.       Cardiovascular: Normal rate. An irregularly irregular rhythm present.   Pulses:       Radial pulses are 2+ on the right side, and 2+ on the left side.        Dorsalis pedis pulses are 1+ on the right side, and 1+ on the left side.   Paced rhythm   Pulmonary/Chest: No accessory muscle usage. No tachypnea. No respiratory distress. She has decreased breath sounds in the left middle field and the left lower field. She has no wheezes. She has rales (coarse bilat improved with tracheal suctioning).   Abdominal: Soft. Bowel sounds are normal. She exhibits no distension. There is no tenderness.       Genitourinary:   Genitourinary Comments: Flores in place   Musculoskeletal: She exhibits edema.   No edema.  Diffuse atrophy.  LUE contracture   Lymphadenopathy:     She has no cervical adenopathy.   Neurological: She is alert.   Nods head and mouths words appropriately to questions   Skin: Skin is warm and dry. Capillary refill takes less than 2 seconds.  She is not diaphoretic. No cyanosis.        decubitus ulcer       Vents:  Vent Mode: A/C (02/14/19 0951)  Ventilator Initiated: Yes (02/05/19 1127)  Set Rate: 20 bmp (02/14/19 0951)  Vt Set: 375 mL (02/14/19 0951)  Pressure Support: 0 cmH20 (02/14/19 0951)  PEEP/CPAP: 10 cmH20 (02/14/19 0951)  Oxygen Concentration (%): 40 (02/14/19 1000)  Peak Airway Pressure: 29 cmH2O (02/14/19 0951)  Plateau Pressure: 0 cmH20 (02/14/19 0951)  Total Ve: 9.69 mL (02/14/19 0951)  F/VT Ratio<105 (RSBI): (!) 77.96 (02/14/19 0951)    Lines/Drains/Airways     Drain                 Urethral Catheter -- days         Gastrostomy/Enterostomy 10/19/18 Percutaneous endoscopic gastrostomy (PEG)  days          Airway                 Surgical Airway 02/06/19 1905 [place] 7 days          Pressure Ulcer                 Pressure Injury 02/05/19 Left Ischial tuberosity Unstageable - Present on Admission 9 days         Pressure Injury 02/05/19 Right medial Malleolus DTPI - Present on Admission 9 days         Pressure Injury 02/05/19 Sacral spine Stage 4 9 days         Pressure Injury 02/05/19 1620 Left lateral Hip Stage 3 8 days         Pressure Injury 02/06/19 Left Thigh DTPI - Present on Admission 8 days         Pressure Injury 02/06/19 Left Thigh Unstageable - Present on Admission 8 days         Pressure Injury 02/06/19 Left lateral Leg Stage 2 8 days         Pressure Injury 02/06/19 Left upper Arm DTPI - Present on Admission 8 days         Negative Pressure Wound Therapy  5 days          Peripheral Intravenous Line                 Midline Catheter Insertion/Assessment  - Double Lumen 02/11/19 1545 Left basilic vein (medial side of arm) 2 days                Significant Labs:    CBC/Anemia Profile:  Recent Labs   Lab 02/13/19 0334 02/14/19 0336   WBC 12.59 11.69   HGB 8.4* 8.7*   HCT 27.5* 29.8*   * 404*   MCV 91 92   RDW 19.3* 19.3*        Chemistries:  Recent Labs   Lab 02/13/19 0334 02/14/19 0336 02/14/19  0859   * 152*  150*   K 3.9 3.8 3.8   * 118* 118*   CO2 25 24 22*   BUN 46* 34* 35*   CREATININE 0.7 0.7 0.7   CALCIUM 9.8 9.4 9.2   ALBUMIN 1.7* 1.7*  --    PROT 7.1 7.3  --    BILITOT 0.3 0.3  --    ALKPHOS 144* 144*  --    ALT 15 16  --    AST 20 21  --        All pertinent labs within the past 24 hours have been reviewed.    Significant Imaging:  CXR: I have reviewed all pertinent results/findings within the past 24 hours and my personal findings are:  some improved aeration of left lung

## 2019-02-14 NOTE — PHYSICIAN QUERY
PT Name: Carlie Valdez  MR #: [id number]     Physician Query Form - Documentation Clarification      CDS/: Vanesa Johnston RN, CCDS              Contact information: [phone]    This form is a permanent document in the medical record.     Query Date: February 14, 2019    By submitting this query, we are merely seeking further clarification of documentation. Please utilize your independent clinical judgment when addressing the question(s) below.    The Medical record reflects the following:    Supporting Clinical Findings Location in Medical Record   Septic shock    in the setting of UTI, Pneumonia, and wound infection    H&P 2/5     Morganella Morgani    ESCHERICHIA COLI ESBL   Moderate.........Coccyx; Decubitus    PROTEUS MIRABILIS ESBL   Moderate .....Buttocks, Right; Tissue    ACINETOBACTER BAUMANNII/HAEMOLYTICUS   Many ........coccyx; decubitus   Resp Cx 2/5    Wound cx 2/6      Wound cx 2/6      Wound cx 2/6                                                                              Doctor, Please specify diagnosis or diagnoses associated with above clinical findings.    Provider Use Only      [ x ] sepsis due to e coli, proteus, morganella and acinetobacter  [  ] sepsis due to (please specify organism(s))_____________________________  [  ] other (please clarify/specify))__________________________________                                                                                                                            [  ] Clinically Undetermined

## 2019-02-14 NOTE — SUBJECTIVE & OBJECTIVE
Interval History: remains on ventilatory support .  All cultures reviewed-  From wound cultures -  Decubitus -ESBL E coli-  Acinetobacter baumanni  Right buttocks-  ESBL E coli  Proteus ESBL  2/10 -she was seen with daughter and  by bedside-  All cultures reviewed -   2/11- new cultures -Evaristola lenta, Blautia coccidoes   2/12- remains on ventilatory support , awaiting placement   2/13- remains afebrile, awaiting placement   Review of Systems   Unable to perform ROS: Acuity of condition     Objective:     Vital Signs (Most Recent):  Temp: 98.3 °F (36.8 °C) (02/14/19 0300)  Pulse: 70 (02/14/19 0509)  Resp: (!) 24 (02/14/19 0509)  BP: (!) 160/76 (02/14/19 0400)  SpO2: (!) 94 % (02/14/19 0509) Vital Signs (24h Range):  Temp:  [97.8 °F (36.6 °C)-98.9 °F (37.2 °C)] 98.3 °F (36.8 °C)  Pulse:  [69-73] 70  Resp:  [20-30] 24  SpO2:  [94 %-100 %] 94 %  BP: (125-177)/(65-91) 160/76     Weight: 70 kg (154 lb 5.2 oz)  Body mass index is 22.14 kg/m².    Estimated Creatinine Clearance: 77.4 mL/min (based on SCr of 0.7 mg/dL).    Physical Exam   Constitutional: She appears well-developed and well-nourished. No distress.   Lethargic   HENT:   Head: Normocephalic and atraumatic.   Mouth/Throat: Oropharynx is clear and moist.   Eyes: Conjunctivae and EOM are normal. Pupils are equal, round, and reactive to light.   Neck: Neck supple. No JVD present. No thyromegaly present.   Cardiovascular: Normal rate and regular rhythm. Exam reveals no gallop and no friction rub.   No murmur heard.  Defibrillator present.   Pulmonary/Chest: Effort normal and breath sounds normal. She has no wheezes. She has no rales.   #8 Shiley tracheostomy present.  RLL decreased breath sounds.  Left side decreased throughout with fine crackles.   Abdominal: Soft. Bowel sounds are normal. She exhibits no distension. There is no tenderness. There is no rebound and no guarding.   Feeding tube in place.   Genitourinary:   Genitourinary Comments: Flores  catheter in place.   Musculoskeletal: Normal range of motion. She exhibits no edema or deformity.   Lymphadenopathy:     She has no cervical adenopathy.   Neurological: She is alert. She has normal reflexes.   Partially opens eyes to command.  Generalized weakness and not following other commands.  Upper extremities flexor contracted.  Upper and lower extremity muscle wasting.   Skin: Skin is warm and dry. No rash noted.   -has sacral wound   Psychiatric: She has a normal mood and affect. Her behavior is normal. Judgment and thought content normal.   Nursing note and vitals reviewed.      Significant Labs:   Blood Culture:   Recent Labs   Lab 10/22/18  1320 10/24/18  0628 10/24/18  0635 02/05/19  1235 02/05/19  1240   LABBLOO No growth after 5 days. No growth after 5 days. No growth after 5 days. No growth after 5 days. No growth after 5 days.     CBC:   Recent Labs   Lab 02/13/19  0334 02/14/19  0336   WBC 12.59 11.69   HGB 8.4* 8.7*   HCT 27.5* 29.8*   * 404*     Wound Culture:   Recent Labs   Lab 02/06/19  0722 02/06/19  1410   LABAERO ACINETOBACTER BAUMANNII/HAEMOLYTICUS  Many   ([facility])     ESCHERICHIA COLI ESBL  Moderate   ESCHERICHIA COLI ESBL  Many    PROTEUS MIRABILIS ESBL  Moderate       All pertinent labs within the past 24 hours have been reviewed.    Significant Imaging: I have reviewed all pertinent imaging results/findings within the past 24 hours.

## 2019-02-14 NOTE — PHYSICIAN QUERY
PT Name: Carlie Valdez  MR #: [id number]     Physician Query Form - Documentation Clarification      CDS/: Vanesa Johnston RN, CCDS              Contact information: [phone]  This form is a permanent document in the medical record.     Query Date: February 14, 2019    By submitting this query, we are merely seeking further clarification of documentation. Please utilize your independent clinical judgment when addressing the question(s) below.    The Medical record reflects the following:    Supporting Clinical Findings Location in Medical Record   Acute on chronic systolic heart failure    Chronic combined systolic and diastolic congestive heart failure    Pulmonary/Chest: Effort normal. No respiratory distress. She has decreased breath sounds in the right lower field and the left lower field. She has no wheezes. She has no rhonchi.            Chronic combined systolic and diastolic congestive heart failure    There is a moderate amount of interstitial and alveolar opacities seen in both lungs.  This is characteristic of pulmonary edema      furosemide injection 20 mg H&P 2/5, HM PN 2/6          H&P 2/5                    HM PN 2/7, 2/8, 2/9, 2/10      CXR 2/6            MAR 2/6 x 1   per records last Echo showed EF 35%  -appears compensated             Lasix 20mg per peg BID       Lab 2/6        MAR 2/5-                                                                             Doctor, Please clarify the conflicting heart failure diagnosis for this admission found in the medical record    Provider Use Only      [  ] acute on chronic systolic HF  [ x ] chronic combined systolic and diastolic heart failure  [  ] other heart failure (please specify type and acuity)______________________                                                                                                                           [  ] Clinically Undetermined

## 2019-02-14 NOTE — PHYSICIAN QUERY
"PT Name: Carlie Valdez  MR #: [id number]    Physician Query Form - Hematology Clarification      CDS/: Vanesa Johnston RN, CCDS             Contact information: [phone]    This form is a permanent document in the medical record.      Query Date: February 14, 2019    By submitting this query, we are merely seeking further clarification of documentation. Please utilize your independent clinical judgment when addressing the question(s) below.    The Medical record contains the following:   Indicators  Supporting Clinical Findings Location in Medical Record   x "Anemia" documented Anemia H&P 2/5   x H & H = H/H 5.6/19.9  -multifactorial-last 8.8/28.5 H&P 2/5    BP =                     HR=      "GI bleeding" documented      Acute bleeding (Non GI site)     x Transfusion(s) -H/H cont < than 7 despite multiple  PRBC transfusion   PN 2/11   x Treatment: 2 units of PRBCs transfused  H&P 2/5    Other:  No obvious sign of GIB  - Could be related to sepsis     CKD HM PN 2/11, 2/12      H&P 2/5     Provider, please specify the known or suspected etiology of anemia     [  ] Iron deficiency anemia   [  ] Precipitous drop in Hematocrit     [  ] Anemia of chronic disease ( Specify chronic disease)       [  ] CKD (specify stage):   [x  ] Other (Specify): Multifactorial due to sepsis , Chronic kidney ds , and H/O GIB , No sign of active bleeding . The H/H has been stable since transfusion    [  ] Clinically Undetermined         [  ] Clinically Undetermined       Please document in your progress notes daily for the duration of treatment, until resolved, and include in your discharge summary.                                                                                                      "

## 2019-02-14 NOTE — PLAN OF CARE
Problem: Adult Inpatient Plan of Care  Goal: Plan of Care Review  Outcome: Ongoing (interventions implemented as appropriate)  Plan of care reviewed. Pt alert and disoriented at times. She nods her head appropriately occasionally. On vent to trach tolerating well o2 sats 98%. Lung sounds course bilaterally. Tube feeding, peptamen intense, continues at goal rate of 60ml/hr per peg tube. Minimal residuals noted and tolerating well. She is also receiving free water flushes of 200ml every 4hrs. Multiple wounds dressed, see assessments. Vss throughout the night. Sodium level 152 this morning and did not improve from yesterday. No acute distress noted.

## 2019-02-14 NOTE — PROGRESS NOTES
[facility] -   Critical Care Medicine  Progress Note    Patient Name: Carlie Valdez  MRN: [id number]  Admission Date: 2/5/2019  Hospital Length of Stay: 9 days  Code Status: Full Code  Attending Provider: Pipe Lomeli, *  Primary Care Provider: Johanna Guzman MD   Principal Problem: Acute respiratory failure with hypoxemia    Subjective: llittle change overnight     HPI:  73 year old female well known to our service s/t prior admissions; complicated PMH includes quadraplegia and chronic vent dependent respiratory failure with trach s/t NMO; DM; CHF; HTN; GERD; atrial fib; MRSA sputum    Presented to ED from NH for eval of abnl labs; EMS also reported gm neg bacteria growth from sacral wound    ED evaluation revealed hypothermia 94F; normotension; leukocytosis 16k; anemia 5.6/19.9; sodium 152; creatinine 2.4 with ; lactic acid 4.2; procalcitonin 1.2       Hospital/ICU Course:  Admitted to ICU on mechanical ventilation, awake, non verbal and paraplegic at baseline; hypothermic with external warming in progress  2/6 - chronic vent via trach, hypoxemia reported with turning overnight, continued moderate trach secretions; marginal BP overnight and marginal urine output; to OR today for I/D of sacral wound  2/7 - supine on vent in no distress and VSS.  Awake and nods head to questions  2/8 - Supine on chronic vent dependent w/ VSS and tolerating TF.  Awake and mouthing words.  No distress.    2/9 - Seen and examined at bedside. Hospital chart reviewed. No acute interval detrimental events noted. S/P debridement of sacral wound. Seen and examined at bedside. Hospital chart reviewed. No acute interval detrimental events noted. CT chest with LLL atelectasis => Chest PT. S/P debridement of sacral wound. The wound cx is (+) for e.coli and proteus sensitive to meropenem .    2/10 - CXR some improvement in left infiltrate.  Afeb resting on vent dependent mech ventilation.  I  discussed her current vent dependent bed bound state with multiple non healing wounds and she feels she does have a quality of life and wants to continue with aggressive long term care.   2/11 Stable overnight, needs PICC line for antibiotics, off pressors  2/12 No adverse events overnight  2/13 stable overnight, plans for discharge  2/14 WBC trending down, no adverse events overnight    Review of Systems   Unable to perform ROS: Other   trached on vent    Objective:     Vital Signs (Most Recent):  Temp: 99.7 °F (37.6 °C) (02/14/19 0705)  Pulse: 69 (02/14/19 1000)  Resp: (!) 29 (02/14/19 1000)  BP: (!) 143/80 (02/14/19 1000)  SpO2: 97 % (02/14/19 1000) Vital Signs (24h Range):  Temp:  [97.8 °F (36.6 °C)-99.7 °F (37.6 °C)] 99.7 °F (37.6 °C)  Pulse:  [69-72] 69  Resp:  [21-38] 29  SpO2:  [94 %-100 %] 97 %  BP: (125-177)/(65-91) 143/80     Weight: 70 kg (154 lb 5.2 oz)  Body mass index is 22.14 kg/m².      Intake/Output Summary (Last 24 hours) at 2/14/2019 1101  Last data filed at 2/14/2019 1000  Gross per 24 hour   Intake 3240 ml   Output 2485 ml   Net 755 ml       Physical Exam   Constitutional: Vital signs are normal. She appears well-developed. She has a sickly appearance.   HENT:   Head: Normocephalic and atraumatic.   Mouth/Throat: Oropharynx is clear and moist.   Eyes: EOM are normal.   Neck: Neck supple. No JVD present. No tracheal deviation present.       Cardiovascular: Normal rate. An irregularly irregular rhythm present.   Pulses:       Radial pulses are 2+ on the right side, and 2+ on the left side.        Dorsalis pedis pulses are 1+ on the right side, and 1+ on the left side.   Paced rhythm   Pulmonary/Chest: No accessory muscle usage. No tachypnea. No respiratory distress. She has decreased breath sounds in the left middle field and the left lower field. She has no wheezes. She has rales (coarse bilat improved with tracheal suctioning).   Abdominal: Soft. Bowel sounds are normal. She exhibits no  distension. There is no tenderness.       Genitourinary:   Genitourinary Comments: Flores in place   Musculoskeletal: She exhibits edema.   No edema.  Diffuse atrophy.  LUE contracture   Lymphadenopathy:     She has no cervical adenopathy.   Neurological: She is alert.   Nods head and mouths words appropriately to questions   Skin: Skin is warm and dry. Capillary refill takes less than 2 seconds. She is not diaphoretic. No cyanosis.        decubitus ulcer       Vents:  Vent Mode: A/C (02/14/19 0951)  Ventilator Initiated: Yes (02/05/19 1127)  Set Rate: 20 bmp (02/14/19 0951)  Vt Set: 375 mL (02/14/19 0951)  Pressure Support: 0 cmH20 (02/14/19 0951)  PEEP/CPAP: 10 cmH20 (02/14/19 0951)  Oxygen Concentration (%): 40 (02/14/19 1000)  Peak Airway Pressure: 29 cmH2O (02/14/19 0951)  Plateau Pressure: 0 cmH20 (02/14/19 0951)  Total Ve: 9.69 mL (02/14/19 0951)  F/VT Ratio<105 (RSBI): (!) 77.96 (02/14/19 0951)    Lines/Drains/Airways     Drain                 Urethral Catheter -- days         Gastrostomy/Enterostomy 10/19/18 Percutaneous endoscopic gastrostomy (PEG)  days          Airway                 Surgical Airway 02/06/19 1905 Nikita 7 days          Pressure Ulcer                 Pressure Injury 02/05/19 Left Ischial tuberosity Unstageable - Present on Admission 9 days         Pressure Injury 02/05/19 Right medial Malleolus DTPI - Present on Admission 9 days         Pressure Injury 02/05/19 Sacral spine Stage 4 9 days         Pressure Injury 02/05/19 1620 Left lateral Hip Stage 3 8 days         Pressure Injury 02/06/19 Left Thigh DTPI - Present on Admission 8 days         Pressure Injury 02/06/19 Left Thigh Unstageable - Present on Admission 8 days         Pressure Injury 02/06/19 Left lateral Leg Stage 2 8 days         Pressure Injury 02/06/19 Left upper Arm DTPI - Present on Admission 8 days         Negative Pressure Wound Therapy  5 days          Peripheral Intravenous Line                 Midline Catheter  Insertion/Assessment  - Double Lumen 02/11/19 1545 Left basilic vein (medial side of arm) 2 days                Significant Labs:    CBC/Anemia Profile:  Recent Labs   Lab 02/13/19  0334 02/14/19  0336   WBC 12.59 11.69   HGB 8.4* 8.7*   HCT 27.5* 29.8*   * 404*   MCV 91 92   RDW 19.3* 19.3*        Chemistries:  Recent Labs   Lab 02/13/19  0334 02/14/19  0336 02/14/19  0859   * 152* 150*   K 3.9 3.8 3.8   * 118* 118*   CO2 25 24 22*   BUN 46* 34* 35*   CREATININE 0.7 0.7 0.7   CALCIUM 9.8 9.4 9.2   ALBUMIN 1.7* 1.7*  --    PROT 7.1 7.3  --    BILITOT 0.3 0.3  --    ALKPHOS 144* 144*  --    ALT 15 16  --    AST 20 21  --        All pertinent labs within the past 24 hours have been reviewed.    Significant Imaging:  CXR: I have reviewed all pertinent results/findings within the past 24 hours and my personal findings are:  some improved aeration of left lung        ABG  No results for input(s): PH, PO2, PCO2, HCO3, BE in the last 168 hours.  Assessment/Plan:     Neuro   Neuromyelitis optica    With resultant functional quadraplegia, blindness and vent dependence. Cellcept on hold.        Derm   Decubitus ulcer of sacral region, stage 4    S/P debridement. The wound cx is (+) for e.coli and proteus sensitive to meropenem. MEROPENEM per ID. Appreciate ID input.      Pulmonary   VAP (ventilator-associated pneumonia)    MORGANELLA MORGANII in sputum sensitive to MEROPENEM.    At baseline oxygenation on vent  Continue  IV MEROPENEM   CXR improved - has chronically abnormal lungs on old CXR films  Cont tracheal suctioning PRN and Duonebs  2/11 Continue mechanical vent  2/12 continue vent support  2/13 continue support  \2/14 continue vent support       Atelectasis of left lung    Mucomyst q 12 / Chest PT.   2/12 Chest X Ray - unchanged; continue Duonebs, mucomyst , chest CPT     Cardiac/Vascular   Chronic Hypotension    Cont TF and free water to PEG  Continue midodrine  ICU hemodynamic monitoring      Chronic combined systolic and diastolic congestive heart failure    CHF optimized. Daily weighing. Dietary salt restriction. Accurate I/Os.  Stop IVFs       Chronic atrial fibrillation    Currently paced.  regular rhythm.  Monitor hemodynamics.     Renal/    Monitor BUN / Cr. Montor urine output.     Acute on chronic renal failure    Strict I/O  Monitor creatinine trend     Hypernatremia    Stable with free water replacement via PEG      UTI (urinary tract infection)    Polymicrobial Colonization. Urine Culture => multiple organisms, none predominant. On IV MEROPENEM .         Oncology   Anemia    Transfuse 2 units PRBCs  Monitor hemogram.      Endocrine   Moderate malnutrition    Tube deeding per RD recommendations.   2/12 continue tube feeding per RD     Hypothyroidism    Continue Levothyroxine     Type 2 diabetes mellitus with kidney complication, without long-term current use of insulin    Stable EUGLYCEMIC}  low dose SSI for hyperglycemia with monitoring for glucose control and prevention of insulin toxicity. Blood glucose target 100 - 180 mg/dl         Orthopedic   Deep tissue injury multiple unstageable    Continue wound care. Low pressure bed and turn Q 2 hours       Other   Pressure injury of coccygeal region, stage 4    Multiple small stage 2 wounds.   S/P I&D of sacrum 2/6.   Continue wound care per WOC recs         Critical Care Daily Checklist:    A: Awake: RASS Goal/Actual Goal:    Actual: Guerrero Agitation Sedation Scale (RASS): Alert and calm   B: Spontaneous Breathing Trial Performed? Spon. Breathing Trial Initiated?: Not initiated (02/09/19 1115)   C: SAT & SBT Coordinated?  na                      D: Delirium: CAM-ICU Overall CAM-ICU: Negative   E: Early Mobility Performed? No   F: Feeding Goal: Goals: Meet > 85 % EEN/EPN while admitted  Status: Nutrition Goal Status: new   Current Diet Order   No orders of the defined types were placed in this encounter.      AS: Analgesia/Sedation adequate    T: Thromboembolic Prophylaxis y   H: HOB > 300 Yes   U: Stress Ulcer Prophylaxis (if needed) y   G: Glucose Control adequate   B: Bowel Function Stool Occurrence: 1   I: Indwelling Catheter (Lines & Flores) Necessity needed   D: De-escalation of Antimicrobials/Pharmacotherapies na    Plan for the day/ETD transfer    Code Status:  Family/Goals of Care: Full Code  No one avaialble     Critical Care Time: 35 minutes  Critical secondary to Patient has a condition that poses threat to life and bodily function: Severe Respiratory Distress      Critical care was time spent personally by me on the following activities: development of treatment plan with patient or surrogate and bedside caregivers, discussions with consultants, evaluation of patient's response to treatment, examination of patient, ordering and performing treatments and interventions, ordering and review of laboratory studies, ordering and review of radiographic studies, pulse oximetry, re-evaluation of patient's condition. This critical care time did not overlap with that of any other provider or involve time for any procedures.     James Meier MD  Critical Care Medicine  [facility] [place]

## 2019-02-15 NOTE — PLAN OF CARE
Problem: Adult Inpatient Plan of Care  Goal: Plan of Care Review  Outcome: Ongoing (interventions implemented as appropriate)  PT HAS BEEN DISCHARGED BACK TO [facility]. REPORT CALLED TO CARON BALL.  PETER ([facility]) NOTIFIED OF PT'S DISCHARGE.  PT MAINTAINS LEFT UPPER ARM MIDLINE AND  VOSS CATHETER.  PT'S WOUND VAC WAS DISCONTINUED PRIOR TO TRANSFER AND A WET TO DRY DRESSING WAS PLACED PER [facility] REQUEST.  WOUND VAC WILL BE REPLACED UPON ARRIVAL TO NURSING HOME.  PT TOLERATED TRANSFER TO STRETCHER AND PORTABLE [facility] WELL.

## 2019-02-18 NOTE — PHYSICIAN QUERY
PT Name: Carlie Valdez  MR #: [id number]     Physician Query Form - Diagnosis Clarification      CDS/: Vanesa Johnston RN, CCDS               Contact information: [phone]    This form is a permanent document in the medical record.     Query Date: February 18, 2019    By submitting this query, we are merely seeking further clarification of documentation.  Please utilize your independent clinical judgment when addressing the question(s) below.     The medical record contains the following:      Findings Supporting Clinical Information Location in Medical Record   presumed osteomyelitis         Tigecycline, /Ertapenem -will follow weekly ESR and CRP     Ertapenem and Tigecycline will be preferred option.  Duration will be 6 weeks  ID note 2/13     Please clarify if the ____osteomyelitis_______________________ diagnosis has been:    [ X ] Ruled In   [  ] Ruled In, Now Resolved   [  ] Ruled Out   [  ] Other/Clarification of findings (please specify):   [  ] Clinically insignificant     [  ] Clinically undetermined     Please document in your progress notes daily for the duration of treatment, until resolved, and include in your discharge summary.

## 2019-02-18 NOTE — PLAN OF CARE
02/18/19 1647   Final Note   Assessment Type Final Discharge Note   Anticipated Discharge Disposition (Returned back to [facility] care a/ permanent placement at [facility])   Discharge plans and expectations educations in teach back method with documentation complete? Yes   Right Care Referral Info   Post Acute Recommendation Other

## 2019-02-18 NOTE — PHYSICIAN QUERY
PT Name: Carlie Valdez  MR #: [id number]    Physician Query Form - Consultant Diagnosis Clarification     CDS/: Vanesa Johnston RN, CCDS              Contact information: [phone]  This form is a permanent document in the medical record.     Query Date: February 18, 2019      By submitting this query, we are merely seeking further clarification of documentation.  Please utilize your independent clinical judgment when addressing the question(s) below.      The Medical record contains the following:   Diagnosis Supporting Clinical Information Location in Medical Record    Pressure Injury 02/05/19 1620 Left lateral Hip Stage 3  Wound care note 2/12         Do you agree with the Consultants diagnosis of ___left lateral hip stage 3 pressure injury ___________?    [ X ] Yes   [  ] Yes, but it resolved prior to my assessment of the patient   [  ] No   [  ] Clinically insignificant   [  ] Other/Clarification of findings:   [  ] Clinically undetermined

## 2019-03-18 PROBLEM — E43 SEVERE MALNUTRITION: Status: ACTIVE | Noted: 2019-01-01

## 2019-03-18 PROBLEM — I50.43 ACUTE ON CHRONIC COMBINED SYSTOLIC AND DIASTOLIC CONGESTIVE HEART FAILURE: Status: ACTIVE | Noted: 2017-07-15

## 2019-03-18 PROBLEM — R65.21 SEPTIC SHOCK: Status: ACTIVE | Noted: 2019-01-01

## 2019-03-18 PROBLEM — J69.0 ASPIRATION PNEUMONIA OF BOTH LUNGS: Status: ACTIVE | Noted: 2018-01-01

## 2019-03-18 PROBLEM — A41.9 SEPTIC SHOCK: Status: ACTIVE | Noted: 2019-01-01

## 2019-03-18 PROBLEM — L89.220 PRESSURE INJURY OF LEFT HIP, UNSTAGEABLE: Status: ACTIVE | Noted: 2019-01-01

## 2019-03-18 PROBLEM — T68.XXXA HYPOTHERMIA: Status: ACTIVE | Noted: 2019-01-01

## 2019-03-18 NOTE — PROVIDER PROGRESS NOTES - EMERGENCY DEPT.
"  Central Line  Date/Time: 3/18/2019 9:26 AM  Performed by: Lindsay Cabral, DO  Consent Done: Yes  Time out: Immediately prior to procedure a "time out" was called to verify the correct patient, procedure, equipment, support staff and site/side marked as required.  Indications: med administration and vascular access  Anesthesia: see MAR for details  Preparation: skin prepped with ChloraPrep  Skin prep agent dried: skin prep agent completely dried prior to procedure  Sterile barriers: all five maximum sterile barriers used - cap, mask, sterile gown, sterile gloves, and large sterile sheet  Hand hygiene: hand hygiene performed prior to central venous catheter insertion  Location details: right femoral  Site selection rationale: N/A  Catheter type: triple lumen  Catheter size: 7.5 Fr  Catheter Length: 20cm    Ultrasound guidance: yes  Vessel Caliber: patent, compressibility normal  Needle advanced into vessel with real time Ultrasound guidance.  Guidewire confirmed in vessel.  Sterile sheath used.  Number of attempts: 1  Assessment: placement verified by x-ray and successful placement  Complications: none  Specimens: No  Implants: No  Post-procedure: line sutured,  chlorhexidine patch,  sterile dressing applied and blood return through all ports  Complications: No          "

## 2019-03-18 NOTE — ED NOTES
RN unable to IV access at this time, levophed not started due to lack of access. Dr. Cabral at  to start CL on pt for access.

## 2019-03-18 NOTE — H&P
[facility] Hospital Medicine  History & Physical    Patient Name: Carlie Valdez  MRN: [id number]  Admission Date: 3/18/2019  Attending Physician: Philip Ceja MD   Primary Care Provider: Johanna Guzman MD         Patient information was obtained from nursing home, past medical records and ER records.     Subjective:     Principal Problem:Septic shock    Chief Complaint:   Chief Complaint   Patient presents with    Cold Exposure     sent from [facility] for rectal temp of 90.6. patient is vent dependent        HPI: Carlie Valdez is a 73 year old female with ventilator dependent chronic respiratory failure, multiple decubitus ulcers, combined heart failure and immunosuppression with Cellcept for neuromyelitis optica who presented from [facility] with reports of hypothermia. Upon arrival, the patient's rectal temperature was 89.9. Other vital signs were stable. However, despite hydration, her blood pressure decreased and she required central line placement as well as vasopressor initiation. Of note, the patient has a history of chronic hypotension on Mididrine 5 mg TID. On 2/14/19, the patient was discharged on Ertapenem and Tygacil for presumed osteomyelitis of her sacrum with cultures positive for MDR Acinetobacter, ESBL E. coli and ESBL Proteus. Work up in the ED was significant for bibasilar interstitial opacities and areas of consolidation, left greater than right concerning for airspace disease or aspiration as well as centrilobular opacities in bilateral lower lobes and possible sacral osteomyelitis on CT scan of the abdomen and pelvis. Labs were unremarkable. The patient is a full code. Her surrogate medical decision maker is listed as her daughter, Haley Valdze, but no family is with the patient at this time.     Past Medical History:   Diagnosis Date    Anticoagulant long-term use     Arthritis     Asthma     Atrial fibrillation     Blood clot  of vein in shoulder area     Chronic knee pain     Combined systolic and diastolic heart failure     Diabetes mellitus type 2 without retinopathy 2016    Diaphragmatic hernia without obstruction and without gangrene 2015    Gastroparesis     GERD (gastroesophageal reflux disease)     Hypertension     Immune deficiency disorder     Neuromyelitis optica     Primary open angle glaucoma (POAG) of both eyes, severe stage 2018    Ventilator dependence        Past Surgical History:   Procedure Laterality Date    CARDIAC DEFIBRILLATOR PLACEMENT      , .    CATARACT EXTRACTION       SECTION      COLONOSCOPY      DEBRIDEMENT, WOUND, SACRUM N/A 2019    Performed by [facility]ila Laguna [facility] at [facility] OR    EGD, WITH PEG TUBE INSERTION N/A 2018    Performed by Louis O. Jeansonne IV, MD at [facility] OR    ashley      Dr. Macdonald    ESOPHAGOGASTRODUODENOSCOPY (EGD) N/A 2015    Performed by Hieu Colbert MD at [facility] ENDO    KNEE ARTHROSCOPY      TRACHEOSTOMY N/A 2018    Performed by Louis O. Jeansonne IV, MD at [facility] OR    UPPER GASTROINTESTINAL ENDOSCOPY         Review of patient's allergies indicates:   Allergen Reactions    Morphine Hives    Atorvastatin      Other reaction(s): Muscle pain    Clonidine     Codeine      Other reaction(s): Unknown    Digoxin      Other reaction(s): Unknown    Diovan [valsartan] Other (See Comments)     Upset stomach, weakness    Eggs [egg derived]     Metoprolol Diarrhea    Naproxen      Other reaction(s): Nausea    Peanut     Propofol      Other reaction(s): delirious    Sulfa (sulfonamide antibiotics)        No current facility-administered medications on file prior to encounter.      Current Outpatient Medications on File Prior to Encounter   Medication Sig    amiodarone (PACERONE) 200 MG Tab Take 1 tablet (200 mg total) by mouth 2 (two) times daily. (Patient taking differently: 200 mg by Per G Tube route 2 (two) times daily.  )    brimonidine-timolol (COMBIGAN) 0.2-0.5 % Drop Place 1 drop into both eyes every 12 (twelve) hours.    bumetanide (BUMEX) 2 MG tablet Take 1 tablet (2 mg total) by mouth 2 (two) times daily. 1 Tablet Oral Every day (Patient taking differently: Take 2 mg by mouth once daily. 1 Tablet Oral Every day)    dextrose 5 % SolP 100 mL with tigecycline 50 mg SolR 50 mg Inject 50 mg into the vein every 12 (twelve) hours. 6 weeks    ergocalciferol (ERGOCALCIFEROL) 50,000 unit Cap Take 1 capsule (50,000 Units total) by mouth every 7 days. (Patient taking differently: 50,000 Units by Per G Tube route every 7 days. )    ertapenem sodium (ERTAPENEM, INVANZ, 1 G/100 ML NS, READY TO MIX,) Inject 100 mLs (1 g total) into the vein once daily. 6 week    fluticasone (FLONASE) 50 mcg/actuation nasal spray 2 sprays by Each Nare route once daily.    insulin detemir U-100 (LEVEMIR FLEXTOUCH) 100 unit/mL (3 mL) SubQ InPn pen Inject 10 Units into the skin every evening.    levothyroxine (SYNTHROID) 100 MCG tablet 100 mcg by Per G Tube route before breakfast.     magnesium oxide (MAG-OX) 400 mg tablet 400 mg by Per G Tube route 2 (two) times daily.     midodrine (PROAMATINE) 5 MG Tab 1 tablet (5 mg total) by Per G Tube route 3 (three) times daily.    montelukast (SINGULAIR) 10 mg tablet 10 mg by Per G Tube route once daily.     multivitamin (THERAGRAN) per tablet Take 1 tablet by mouth once daily.     mycophenolate (CELLCEPT) 250 mg Cap Take 2 capsules (500 mg total) by mouth 2 (two) times daily. (Patient taking differently: 500 mg 2 (two) times daily. )    pantoprazole (PROTONIX) 40 MG tablet Take 40 mg by mouth once daily.    polyethylene glycol (GLYCOLAX) 17 gram PwPk Take 17 g by mouth every other day.    zinc sulfate (ZINCATE) 220 (50) mg capsule 220 mg by Per G Tube route once daily.     baclofen (LIORESAL) 10 MG tablet 10 mg by Per G Tube route 3 (three) times daily as needed.     ondansetron (ZOFRAN) 4 MG tablet 4  mg by Per G Tube route every 6 (six) hours as needed for Nausea.     traMADol (ULTRAM) 50 mg tablet 50 mg by Per G Tube route every 6 (six) hours as needed for Pain.     [DISCONTINUED] albuterol 90 mcg/actuation inhaler Inhale 2 puffs into the lungs every 4 (four) hours as needed.     [DISCONTINUED] albuterol-ipratropium (DUO-NEB) 2.5 mg-0.5 mg/3 mL nebulizer solution Take 3 mLs by nebulization every 4 (four) hours as needed for Wheezing. Rescue    [DISCONTINUED] budesonide (PULMICORT) 0.5 mg/2 mL nebulizer solution Take 2 mLs (0.5 mg total) by nebulization every 12 (twelve) hours. Controller     Family History     Problem Relation (Age of Onset)    Hypertension Mother, Father        Tobacco Use    Smoking status: Never Smoker    Smokeless tobacco: Never Used   Substance and Sexual Activity    Alcohol use: No     Alcohol/week: 0.0 oz    Drug use: No    Sexual activity: No     Review of Systems   Unable to perform ROS: Patient nonverbal     Objective:     Vital Signs (Most Recent):  Temp: 96.5 °F (35.8 °C) (03/18/19 0815)  Pulse: 69 (03/18/19 1102)  Resp: (!) 23 (03/18/19 1102)  BP: (!) 102/57 (03/18/19 1102)  SpO2: (!) 94 % (03/18/19 1102) Vital Signs (24h Range):  Temp:  [89.9 °F (32.2 °C)-96.5 °F (35.8 °C)] 96.5 °F (35.8 °C)  Pulse:  [69-71] 69  Resp:  [16-31] 23  SpO2:  [94 %-100 %] 94 %  BP: ()/(37-74) 102/57     Weight: 68.3 kg (150 lb 9.2 oz)  Body mass index is 21.61 kg/m².    Physical Exam   Constitutional: She appears well-developed and well-nourished. She appears ill. No distress.   HENT:   Head: Normocephalic and atraumatic.   Eyes: Conjunctivae are normal.   PERRL   Neck: Neck supple. No JVD present.   Tracheostomy present.   Cardiovascular: Normal rate, regular rhythm and normal heart sounds.   Pulmonary/Chest: Effort normal. She has decreased breath sounds in the right lower field and the left lower field.   Abdominal: Soft. Bowel sounds are normal. She exhibits no distension. There is  no tenderness.   PEG in place in left upper quadrant.    Musculoskeletal: Normal range of motion.   Neurological: She is alert.   Skin: Skin is warm and dry.   Stage IV sacral decubitus with granulation tissue and fecal contamination noted. Unstageable left hip decubitus. Stage III left lateral thigh decubitus.    Psychiatric: She is agitated.   Nursing note and vitals reviewed.          Significant Labs:   CBC:   Recent Labs   Lab 03/18/19  0240   WBC 10.50   HGB 9.8*   HCT 31.3*   *     CMP:   Recent Labs   Lab 03/18/19  0240      K 3.7   CL 99   CO2 28   GLU 85   *   CREATININE 1.1   CALCIUM 9.1   PROT 7.4   ALBUMIN 1.6*   BILITOT 0.2   ALKPHOS 294*   AST 95*   ALT 99*   ANIONGAP 13   EGFRNONAA 50*     Lactic Acid:   Recent Labs   Lab 03/18/19  0240 03/18/19  0615   LACTATE 1.1 0.6     All pertinent labs within the past 24 hours have been reviewed.    Significant Imaging: I have reviewed all pertinent imaging results/findings within the past 24 hours.   Imaging Results          CT Abdomen Pelvis With Contrast (Final result)  Result time 03/18/19 08:03:39    Final result by Atif Mcgee MD (03/18/19 08:03:39)                 Impression:      Bibasilar interstitial opacities and areas of consolidation, left greater than right concerning for airspace disease or aspiration.  Centrilobular opacities are seen throughout the lower lobes which could reflect pneumonitis and/or endobronchial spread of infection or less likely tumor.    Sacral decubitus is noted down to the sacral bone.  Osteomyelitis is not excluded.    All CT scans at this facility use dose modulation, iterative reconstruction, and/or weight based dosing when appropriate to reduce radiation dose to as low as reasonable achievable.      Electronically signed by: Atif Mcgee MD  Date:    03/18/2019  Time:    08:03             Narrative:    EXAMINATION:  CT ABDOMEN PELVIS WITH CONTRAST    CLINICAL  HISTORY:  Hypothermia;    TECHNIQUE:  Low dose axial images, sagittal and coronal reformations were obtained from the lung bases to the pubic symphysis following the IV administration of 75 mL of Omnipaque 350.  No oral contrast.    COMPARISON:  None    FINDINGS:  Heart: Cardiomegaly with coronary artery disease and mitral annular calcifications.  No significant effusion.  Pacing wires are partially visualized    Lung Bases: Bibasilar interstitial opacities and areas of consolidation, left greater than right concerning for airspace disease or aspiration.  Centrilobular opacities are seen throughout the right lower lobe which could reflect pneumonitis and/or endobronchial spread of infection or less likely tumor.    Liver: Hepatomegaly.  Hypodensity within the right hepatic lobe is too small to characterize but likely reflects a cyst measuring 1.8 cm.  No enhancing mass.    Gallbladder: No calcified gallstones.    Bile Ducts: No dilatation.    Pancreas: No mass. No peripancreatic fat stranding.    Spleen: Normal.    Adrenals: Normal.    Kidneys/Ureters: Normal enhancement.  Mild renal cortical thinning.  Tiny cysts are suspected within the left kidney.  Punctate nonobstructing stones are suspected within the upper pole right kidney.  No mass or  hydroureteronephrosis.    Bladder: Flores balloon is seen within the bladder which is collapsed.    Reproductive organs: Normal.    GI Tract/Mesentery: G-tube is seen within the stomach.  Moderate gastric distention and moderate amount of ingested material seen within the stomach.  No evidence of bowel obstruction or inflammation.  Moderate constipation.    Peritoneal Space: No ascites or free air.    Retroperitoneum: No significant adenopathy.    Abdominal wall: Diffuse anasarca.  Sacral decubitus is noted down to the sacral bone.  Osteomyelitis is not excluded.    Vasculature: No aneurysm.    Bones: No acute fracture. No suspicious lytic or sclerotic lesions.                                X-Ray Chest AP Portable (Final result)  Result time 03/18/19 08:05:17    Final result by Atif Mcgee MD (03/18/19 08:05:17)                 Impression:      Bilateral lower lobe pneumonia, left much greater than right.      Electronically signed by: Atif Mcgee MD  Date:    03/18/2019  Time:    08:05             Narrative:    EXAMINATION:  XR CHEST AP PORTABLE    CLINICAL HISTORY:  Sepsis;    TECHNIQUE:  Single frontal view of the chest was performed.    COMPARISON:  02/12/2019    FINDINGS:  Bilateral lower lobe pneumonia, left much greater than right.  Trace effusions not excluded.  No pneumothorax.  Cardiac pacing device overlies the right chest.  Tracheostomy tube noted.  Bones demonstrate advanced degenerative changes.    In comparison to the prior study, there is no adverse interval changes.                                  Assessment/Plan:     * Septic shock with underlying chronic hypotension    -Due to pneumonia and possible wound infection with osteomyelitis.   -Continue midodrine and vasopressors as needed.   -Continue broad spectrum antibiotics.   -Check sputum culture.   -Follow up blood cultures.   -Pulmonology and Wound Care Consults.      Aspiration pneumonia of both lungs    -IV antibiotics and inhaled medications.   -Check sputum culture.    -Pulmonology consult.        Hypothermia    -Most likely due to sepsis.   -Check TSH.   -Continue warming.        Pressure injury of left thigh, unstageable    -Wound care consult for local wound care.   -Monitor for signs and symptoms of infection.        Decubitus ulcer of sacral region, stage 4, with osteomyelitis    -Cultures from 2/6/19 positive for MDR Acinetobacter as well as ESBL E. coli and Proteus.   -Use Meropenem and Tygacil.   -Check ESR and CRP.   -Wound care consult for local wound care.        Left ischial pressure sore, stage III    -Wound care consult for local wound care.   -Monitor for signs and symptoms of infection.         Ventilator dependence    -Continue mechanical ventilation.   -Pulmonology consult.        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        Hypothyroidism    -Check TSH due to hypothermia.   -Continue levothyroxine.        Type 2 diabetes mellitus with kidney complication, without long-term current use of insulin    -NISS.   -ADA diet.   -Hemoglobin A1C was 5.2 on 2/5/19.        Chronic combined systolic and diastolic congestive heart failure    -Appears compensated.   -Hold diuretics, ARB and beta-blockers due to hypotension.   -Monitor volume status.        Chronic atrial fibrillation    -Currently in sinus rhythm.   -Continue amiodarone.   -No anticoagulation due to GI bleeding.      Severe malnutrition    -Check prealbumin.   -Resume tube feedings when appropriate.   -Nutrition consult to evaluate current tube feeding regimen.        Functional quadriplegia    Supportive care.          VTE Risk Mitigation (From admission, onward)        Ordered     Reason for no Mechanical VTE Prophylaxis  Once      03/18/19 1236     IP VTE HIGH RISK PATIENT  Once      03/18/19 0618     Place sequential compression device  Until discontinued      03/18/19 0618     Place TESSA hose  Until discontinued      03/18/19 0618        Critical Care Time: 90 minutes  Critical secondary to septic shock. Critical care was time spent personally by me on the following activities: development of treatment plan with patient or surrogate and bedside caregivers, discussions with consultants, evaluation of patient's response to treatment, examination of patient, ordering and performing treatments and interventions, ordering and review of laboratory studies, ordering and review of radiographic studies, pulse oximetry, re-evaluation of patient's condition. This critical care time did not overlap with that of any other provider or involve time for any procedures.      Elena  KADI Levi  Department of Hospital Medicine   [facility]

## 2019-03-18 NOTE — PROGRESS NOTES
Patient arrived from ED. RIGOBERTO Alexis at bedside. See flowsheet for VS and assessment details. Will continue to monitor and update as necessary.

## 2019-03-18 NOTE — ASSESSMENT & PLAN NOTE
-Appears compensated.   -Hold diuretics, ARB and beta-blockers due to hypotension.   -Monitor volume status.

## 2019-03-18 NOTE — SUBJECTIVE & OBJECTIVE
Past Medical History:   Diagnosis Date    Anticoagulant long-term use     Arthritis     Asthma     Atrial fibrillation     Blood clot of vein in shoulder area     Chronic knee pain     Combined systolic and diastolic heart failure     Diabetes mellitus type 2 without retinopathy 2016    Diaphragmatic hernia without obstruction and without gangrene 2015    Gastroparesis     GERD (gastroesophageal reflux disease)     Hypertension     Immune deficiency disorder     Neuromyelitis optica     Primary open angle glaucoma (POAG) of both eyes, severe stage 2018    Ventilator dependence        Past Surgical History:   Procedure Laterality Date    CARDIAC DEFIBRILLATOR PLACEMENT      , .    CATARACT EXTRACTION       SECTION      COLONOSCOPY      DEBRIDEMENT, WOUND, SACRUM N/A 2019    Performed by [facility]ila Laguna MD at [facility] OR    EGD, WITH PEG TUBE INSERTION N/A 2018    Performed by Louis O. Jeansonne IV, MD at [facility] OR    ashley Macdonald    ESOPHAGOGASTRODUODENOSCOPY (EGD) N/A 2015    Performed by Hieu Colbert MD at [facility] ENDO    KNEE ARTHROSCOPY      TRACHEOSTOMY N/A 2018    Performed by Louis O. Jeansonne IV, MD at [facility] OR    UPPER GASTROINTESTINAL ENDOSCOPY         Review of patient's allergies indicates:   Allergen Reactions    Morphine Hives    Atorvastatin      Other reaction(s): Muscle pain    Clonidine     Codeine      Other reaction(s): Unknown    Digoxin      Other reaction(s): Unknown    Diovan [valsartan] Other (See Comments)     Upset stomach, weakness    Eggs [egg derived]     Metoprolol Diarrhea    Naproxen      Other reaction(s): Nausea    Peanut     Propofol      Other reaction(s): delirious    Sulfa (sulfonamide antibiotics)        Family History     Problem Relation (Age of Onset)    Hypertension Mother, Father        Tobacco Use    Smoking status: Never Smoker    Smokeless tobacco: Never Used   Substance and  Sexual Activity    Alcohol use: No     Alcohol/week: 0.0 oz    Drug use: No    Sexual activity: No         Review of Systems   Unable to perform ROS: Patient unresponsive     Objective:     Vital Signs (Most Recent):  Temp: 99.1 °F (37.3 °C) (03/18/19 1324)  Pulse: 69 (03/18/19 1547)  Resp: (!) 24 (03/18/19 1547)  BP: (!) 91/51 (03/18/19 1547)  SpO2: (!) 90 % (03/18/19 1547) Vital Signs (24h Range):  Temp:  [89.9 °F (32.2 °C)-99.1 °F (37.3 °C)] 99.1 °F (37.3 °C)  Pulse:  [69-71] 69  Resp:  [16-31] 24  SpO2:  [90 %-100 %] 90 %  BP: ()/(36-74) 91/51     Weight: 68.3 kg (150 lb 9.2 oz)  Body mass index is 23.58 kg/m².      Intake/Output Summary (Last 24 hours) at 3/18/2019 1613  Last data filed at 3/18/2019 0718  Gross per 24 hour   Intake 2549 ml   Output --   Net 2549 ml      sodium chloride 0.9% 75 mL/hr at 03/18/19 1259    norepinephrine bitartrate-D5W 0.12 mcg/kg/min (03/18/19 1747)         Physical Exam   Constitutional: She appears lethargic. She has a sickly appearance. She appears ill.       HENT:   Head: Atraumatic.   Eyes: No scleral icterus.   Neck: No tracheal deviation present.   Cardiovascular: Normal rate and regular rhythm.   Pulses:       Radial pulses are 1+ on the right side, and 1+ on the left side.        Dorsalis pedis pulses are 1+ on the right side, and 1+ on the left side.   Pulmonary/Chest: She has decreased breath sounds. She has rhonchi. She has rales.   Vent controlled respirations   Abdominal: Soft. She exhibits no distension.   Lymphadenopathy:     She has no cervical adenopathy.   Neurological: She appears lethargic.   Skin: Capillary refill takes 2 to 3 seconds. She is not diaphoretic.       Vents:  Vent Mode: A/C (03/18/19 1317)  Ventilator Initiated: Yes (03/18/19 0205)  Set Rate: 20 bmp (03/18/19 1317)  Vt Set: 375 mL (03/18/19 1317)  Pressure Support: 0 cmH20 (03/18/19 1317)  PEEP/CPAP: 10 cmH20 (03/18/19 1317)  Oxygen Concentration (%): 40 (03/18/19 1324)  Peak Airway  Pressure: 34 cmH2O (03/18/19 1317)  Plateau Pressure: 0 cmH20 (03/18/19 1317)  Total Ve: 5.51 mL (03/18/19 1317)  F/VT Ratio<105 (RSBI): (P) 141.55 (03/18/19 1317)    Lines/Drains/Airways     Central Venous Catheter Line                 Percutaneous Central Line Insertion/Assessment - triple lumen  03/18/19 0929 right femoral vein less than 1 day          Drain                 Urethral Catheter -- days         Gastrostomy/Enterostomy 10/19/18 Percutaneous endoscopic gastrostomy (PEG)  days          Airway                 Surgical Airway 02/06/19 1905 Shiley 39 days                Significant Labs:    CBC/Anemia Profile:  Recent Labs   Lab 03/18/19  0240   WBC 10.50   HGB 9.8*   HCT 31.3*   *   MCV 85   RDW 18.6*        Chemistries:  Recent Labs   Lab 03/18/19  0240      K 3.7   CL 99   CO2 28   *   CREATININE 1.1   CALCIUM 9.1   ALBUMIN 1.6*   PROT 7.4   BILITOT 0.2   ALKPHOS 294*   ALT 99*   AST 95*   MG 2.3   PHOS 5.5*       Lactic Acid:   Recent Labs   Lab 03/18/19  0240 03/18/19  0615   LACTATE 1.1 0.6     All pertinent labs within the past 24 hours have been reviewed.  ABG  Recent Labs   Lab 03/18/19  0219   PH 7.423   PO2 82   PCO2 45.3*   HCO3 29.6*   BE 5         Significant Imaging:   I have reviewed all pertinent imaging results/findings within the past 24 hours.

## 2019-03-18 NOTE — ASSESSMENT & PLAN NOTE
Presented with hypothermia, HR 69, vent controlled RR 20, lactate 1.1, wbc 10.5k without bandemia, plt 134, creatinine 1.1, t bili 0.2,   UA unremarkable  Blood cultures obtained in ED; sputum sent on arrival to ICU  Agree with tygacil and merrem empirically and strict contact isolation given documented MDR infection history

## 2019-03-18 NOTE — ASSESSMENT & PLAN NOTE
H/o MDR acinetobacter along with ESBL e coli and preteus growth  Continue local wound vac with WOC management  Merrem, tygacil initiated  Monitor ESR, CRP, WBC, temp, and procalcitonin for indicators of response

## 2019-03-18 NOTE — HOSPITAL COURSE
Admitted to ICU on mechanical ventilation via trach at chronic settings with levophed infusion via Rt Femoral TL CL  3/19 - remains on pressor support overnight; hypoglycemia overnight required IV dextrose; awake this afternoon  3/20 - diuresed near one liter with outpatient dose bumex and pressor demand decreasing; remains awake on vent via trach, appears at mental baseline; no culture growth from blood or sputum; normothermic   Home

## 2019-03-18 NOTE — ASSESSMENT & PLAN NOTE
Vent settings reviewed and appropriate  Continue scopolamine for oral secretion reduction  Hypertonic saline nebs  Scheduled and prn trach care, suctioning, and turning for mobilization of pulmonary secretions

## 2019-03-18 NOTE — ED NOTES
Pt lying in bed on cardiac monitor with IV antibiotics infusing, RR equal and unlabored on vent with trach in place. Bed is locked and low with side rails up x 2. Pt has dai hugger on with warm blankets.

## 2019-03-18 NOTE — ED NOTES
Dr. Winters and ER staff at bedside. Patient in ED bed. Mid line IV intact to RUE. Verbal ok from Dr. Winters to use midline.

## 2019-03-18 NOTE — PROGRESS NOTES
Pharmacist Renal Dose Adjustment Note    Carlie Valdez is a 73 y.o. female being treated with the medication Pepcid    Patient Data:    Vital Signs (Most Recent):  Temp: 99.1 °F (37.3 °C) (03/18/19 1324)  Pulse: 69 (03/18/19 1402)  Resp: (!) 25 (03/18/19 1402)  BP: (!) 101/53 (03/18/19 1402)  SpO2: (!) 93 % (03/18/19 1402)   Vital Signs (72h Range):  Temp:  [89.9 °F (32.2 °C)-99.1 °F (37.3 °C)]   Pulse:  [69-71]   Resp:  [16-31]   BP: ()/(36-74)   SpO2:  [92 %-100 %]      Recent Labs   Lab 03/18/19  0240   CREATININE 1.1     Serum creatinine: 1.1 mg/dL 03/18/19 0240  Estimated creatinine clearance: 44.3 mL/min    Medication:Pepcid dose: 20mg frequency BID will be changed to medication:Pepcid dose:20mg frequency:QD    Pharmacist's Name: Brianda Live  Pharmacist's Extension: [phone]

## 2019-03-18 NOTE — ED NOTES
Patient back in room from CT scan with RN and RT. Patient tolerated scan well and without difficulty.

## 2019-03-18 NOTE — ASSESSMENT & PLAN NOTE
Immunosuppression and immobilization primary contributing factors to frequent multidrug resistant wound and pulmonary infections  Hold cellcept during active infection

## 2019-03-18 NOTE — ASSESSMENT & PLAN NOTE
Likely exacerbated by 2L IVF bolus  Stop IVF  Will hold diuresis tonight but low threshold to initiate

## 2019-03-18 NOTE — ED NOTES
Notified Dr Winters about patient rectal temp reading. Verbal orders to continue warming blanket. Notified of unable to obtain blood cultures. No further orders.

## 2019-03-18 NOTE — ED NOTES
Spoke with Elena from hospital medicine about pt blood pressure, stated that we will reevaluate BP after 500 bolus but the pt may need a Cl insertion for pressers.

## 2019-03-18 NOTE — ASSESSMENT & PLAN NOTE
-Due to pneumonia and possible wound infection with osteomyelitis.   -Continue midodrine and vasopressors as needed.   -Continue broad spectrum antibiotics.   -Check sputum culture.   -Follow up blood cultures.   -Pulmonology and Wound Care Consults.

## 2019-03-18 NOTE — SUBJECTIVE & OBJECTIVE
Past Medical History:   Diagnosis Date    Anticoagulant long-term use     Arthritis     Asthma     Atrial fibrillation     Blood clot of vein in shoulder area     Chronic knee pain     Combined systolic and diastolic heart failure     Diabetes mellitus type 2 without retinopathy 2016    Diaphragmatic hernia without obstruction and without gangrene 2015    Gastroparesis     GERD (gastroesophageal reflux disease)     Hypertension     Immune deficiency disorder     Neuromyelitis optica     Primary open angle glaucoma (POAG) of both eyes, severe stage 2018    Ventilator dependence        Past Surgical History:   Procedure Laterality Date    CARDIAC DEFIBRILLATOR PLACEMENT      , .    CATARACT EXTRACTION       SECTION      COLONOSCOPY      DEBRIDEMENT, WOUND, SACRUM N/A 2019    Performed by [facility] at [facility] OR    EGD, WITH PEG TUBE INSERTION N/A 2018    Performed by Louis O. Jeansonne IV, MD at [facility] OR    ashley Macdonald    ESOPHAGOGASTRODUODENOSCOPY (EGD) N/A 2015    Performed by Hieu Colbert MD at [facility] ENDO    KNEE ARTHROSCOPY      TRACHEOSTOMY N/A 2018    Performed by Louis O. Jeansonne IV, MD at [facility] OR    UPPER GASTROINTESTINAL ENDOSCOPY         Review of patient's allergies indicates:   Allergen Reactions    Morphine Hives    Atorvastatin      Other reaction(s): Muscle pain    Clonidine     Codeine      Other reaction(s): Unknown    Digoxin      Other reaction(s): Unknown    Diovan [valsartan] Other (See Comments)     Upset stomach, weakness    Eggs [egg derived]     Metoprolol Diarrhea    Naproxen      Other reaction(s): Nausea    Peanut     Propofol      Other reaction(s): delirious    Sulfa (sulfonamide antibiotics)        No current facility-administered medications on file prior to encounter.      Current Outpatient Medications on File Prior to Encounter   Medication Sig    amiodarone (PACERONE) 200 MG  Tab Take 1 tablet (200 mg total) by mouth 2 (two) times daily. (Patient taking differently: 200 mg by Per G Tube route 2 (two) times daily. )    brimonidine-timolol (COMBIGAN) 0.2-0.5 % Drop Place 1 drop into both eyes every 12 (twelve) hours.    bumetanide (BUMEX) 2 MG tablet Take 1 tablet (2 mg total) by mouth 2 (two) times daily. 1 Tablet Oral Every day (Patient taking differently: Take 2 mg by mouth once daily. 1 Tablet Oral Every day)    dextrose 5 % SolP 100 mL with tigecycline 50 mg SolR 50 mg Inject 50 mg into the vein every 12 (twelve) hours. 6 weeks    ergocalciferol (ERGOCALCIFEROL) 50,000 unit Cap Take 1 capsule (50,000 Units total) by mouth every 7 days. (Patient taking differently: 50,000 Units by Per G Tube route every 7 days. )    ertapenem sodium (ERTAPENEM, INVANZ, 1 G/100 ML NS, READY TO MIX,) Inject 100 mLs (1 g total) into the vein once daily. 6 week    fluticasone (FLONASE) 50 mcg/actuation nasal spray 2 sprays by Each Nare route once daily.    insulin detemir U-100 (LEVEMIR FLEXTOUCH) 100 unit/mL (3 mL) SubQ InPn pen Inject 10 Units into the skin every evening.    levothyroxine (SYNTHROID) 100 MCG tablet 100 mcg by Per G Tube route before breakfast.     magnesium oxide (MAG-OX) 400 mg tablet 400 mg by Per G Tube route 2 (two) times daily.     midodrine (PROAMATINE) 5 MG Tab 1 tablet (5 mg total) by Per G Tube route 3 (three) times daily.    montelukast (SINGULAIR) 10 mg tablet 10 mg by Per G Tube route once daily.     multivitamin (THERAGRAN) per tablet Take 1 tablet by mouth once daily.     mycophenolate (CELLCEPT) 250 mg Cap Take 2 capsules (500 mg total) by mouth 2 (two) times daily. (Patient taking differently: 500 mg 2 (two) times daily. )    pantoprazole (PROTONIX) 40 MG tablet Take 40 mg by mouth once daily.    polyethylene glycol (GLYCOLAX) 17 gram PwPk Take 17 g by mouth every other day.    zinc sulfate (ZINCATE) 220 (50) mg capsule 220 mg by Per G Tube route once  daily.     baclofen (LIORESAL) 10 MG tablet 10 mg by Per G Tube route 3 (three) times daily as needed.     ondansetron (ZOFRAN) 4 MG tablet 4 mg by Per G Tube route every 6 (six) hours as needed for Nausea.     traMADol (ULTRAM) 50 mg tablet 50 mg by Per G Tube route every 6 (six) hours as needed for Pain.     [DISCONTINUED] albuterol 90 mcg/actuation inhaler Inhale 2 puffs into the lungs every 4 (four) hours as needed.     [DISCONTINUED] albuterol-ipratropium (DUO-NEB) 2.5 mg-0.5 mg/3 mL nebulizer solution Take 3 mLs by nebulization every 4 (four) hours as needed for Wheezing. Rescue    [DISCONTINUED] budesonide (PULMICORT) 0.5 mg/2 mL nebulizer solution Take 2 mLs (0.5 mg total) by nebulization every 12 (twelve) hours. Controller     Family History     Problem Relation (Age of Onset)    Hypertension Mother, Father        Tobacco Use    Smoking status: Never Smoker    Smokeless tobacco: Never Used   Substance and Sexual Activity    Alcohol use: No     Alcohol/week: 0.0 oz    Drug use: No    Sexual activity: No     Review of Systems   Unable to perform ROS: Patient nonverbal     Objective:     Vital Signs (Most Recent):  Temp: 96.5 °F (35.8 °C) (03/18/19 0815)  Pulse: 69 (03/18/19 1102)  Resp: (!) 23 (03/18/19 1102)  BP: (!) 102/57 (03/18/19 1102)  SpO2: (!) 94 % (03/18/19 1102) Vital Signs (24h Range):  Temp:  [89.9 °F (32.2 °C)-96.5 °F (35.8 °C)] 96.5 °F (35.8 °C)  Pulse:  [69-71] 69  Resp:  [16-31] 23  SpO2:  [94 %-100 %] 94 %  BP: ()/(37-74) 102/57     Weight: 68.3 kg (150 lb 9.2 oz)  Body mass index is 21.61 kg/m².    Physical Exam   Constitutional: She appears well-developed and well-nourished. She appears ill. No distress.   HENT:   Head: Normocephalic and atraumatic.   Eyes: Conjunctivae are normal.   PERRL   Neck: Neck supple. No JVD present.   Tracheostomy present.   Cardiovascular: Normal rate, regular rhythm and normal heart sounds.   Pulmonary/Chest: Effort normal. She has decreased  breath sounds in the right lower field and the left lower field.   Abdominal: Soft. Bowel sounds are normal. She exhibits no distension. There is no tenderness.   PEG in place in left upper quadrant.    Musculoskeletal: Normal range of motion.   Neurological: She is alert.   Skin: Skin is warm and dry.   Stage IV sacral decubitus with granulation tissue and fecal contamination noted. Unstageable left hip decubitus. Stage III left lateral thigh decubitus.    Psychiatric: She is agitated.   Nursing note and vitals reviewed.          Significant Labs:   CBC:   Recent Labs   Lab 03/18/19  0240   WBC 10.50   HGB 9.8*   HCT 31.3*   *     CMP:   Recent Labs   Lab 03/18/19  0240      K 3.7   CL 99   CO2 28   GLU 85   *   CREATININE 1.1   CALCIUM 9.1   PROT 7.4   ALBUMIN 1.6*   BILITOT 0.2   ALKPHOS 294*   AST 95*   ALT 99*   ANIONGAP 13   EGFRNONAA 50*     Lactic Acid:   Recent Labs   Lab 03/18/19  0240 03/18/19  0615   LACTATE 1.1 0.6     All pertinent labs within the past 24 hours have been reviewed.    Significant Imaging: I have reviewed all pertinent imaging results/findings within the past 24 hours.   Imaging Results          CT Abdomen Pelvis With Contrast (Final result)  Result time 03/18/19 08:03:39    Final result by Atif Mcgee MD (03/18/19 08:03:39)                 Impression:      Bibasilar interstitial opacities and areas of consolidation, left greater than right concerning for airspace disease or aspiration.  Centrilobular opacities are seen throughout the lower lobes which could reflect pneumonitis and/or endobronchial spread of infection or less likely tumor.    Sacral decubitus is noted down to the sacral bone.  Osteomyelitis is not excluded.    All CT scans at this facility use dose modulation, iterative reconstruction, and/or weight based dosing when appropriate to reduce radiation dose to as low as reasonable achievable.      Electronically signed by: Atif Mcgee  MD  Date:    03/18/2019  Time:    08:03             Narrative:    EXAMINATION:  CT ABDOMEN PELVIS WITH CONTRAST    CLINICAL HISTORY:  Hypothermia;    TECHNIQUE:  Low dose axial images, sagittal and coronal reformations were obtained from the lung bases to the pubic symphysis following the IV administration of 75 mL of Omnipaque 350.  No oral contrast.    COMPARISON:  None    FINDINGS:  Heart: Cardiomegaly with coronary artery disease and mitral annular calcifications.  No significant effusion.  Pacing wires are partially visualized    Lung Bases: Bibasilar interstitial opacities and areas of consolidation, left greater than right concerning for airspace disease or aspiration.  Centrilobular opacities are seen throughout the right lower lobe which could reflect pneumonitis and/or endobronchial spread of infection or less likely tumor.    Liver: Hepatomegaly.  Hypodensity within the right hepatic lobe is too small to characterize but likely reflects a cyst measuring 1.8 cm.  No enhancing mass.    Gallbladder: No calcified gallstones.    Bile Ducts: No dilatation.    Pancreas: No mass. No peripancreatic fat stranding.    Spleen: Normal.    Adrenals: Normal.    Kidneys/Ureters: Normal enhancement.  Mild renal cortical thinning.  Tiny cysts are suspected within the left kidney.  Punctate nonobstructing stones are suspected within the upper pole right kidney.  No mass or  hydroureteronephrosis.    Bladder: Flores balloon is seen within the bladder which is collapsed.    Reproductive organs: Normal.    GI Tract/Mesentery: G-tube is seen within the stomach.  Moderate gastric distention and moderate amount of ingested material seen within the stomach.  No evidence of bowel obstruction or inflammation.  Moderate constipation.    Peritoneal Space: No ascites or free air.    Retroperitoneum: No significant adenopathy.    Abdominal wall: Diffuse anasarca.  Sacral decubitus is noted down to the sacral bone.  Osteomyelitis is not  excluded.    Vasculature: No aneurysm.    Bones: No acute fracture. No suspicious lytic or sclerotic lesions.                               X-Ray Chest AP Portable (Final result)  Result time 03/18/19 08:05:17    Final result by Atif Mcgee MD (03/18/19 08:05:17)                 Impression:      Bilateral lower lobe pneumonia, left much greater than right.      Electronically signed by: Atif Mcgee MD  Date:    03/18/2019  Time:    08:05             Narrative:    EXAMINATION:  XR CHEST AP PORTABLE    CLINICAL HISTORY:  Sepsis;    TECHNIQUE:  Single frontal view of the chest was performed.    COMPARISON:  02/12/2019    FINDINGS:  Bilateral lower lobe pneumonia, left much greater than right.  Trace effusions not excluded.  No pneumothorax.  Cardiac pacing device overlies the right chest.  Tracheostomy tube noted.  Bones demonstrate advanced degenerative changes.    In comparison to the prior study, there is no adverse interval changes.

## 2019-03-18 NOTE — HPI
Carlie Valdez is a 73 year old female with ventilator dependent chronic respiratory failure, multiple decubitus ulcers, combined heart failure and immunosuppression with Cellcept for neuromyelitis optica who presented from [facility] with reports of hypothermia. Upon arrival, the patient's rectal temperature was 89.9. Other vital signs were stable. However, despite hydration, her blood pressure decreased and she required central line placement as well as vasopressor initiation. Of note, the patient has a history of chronic hypotension on Mididrine 5 mg TID. On 2/14/19, the patient was discharged on Ertapenem and Tygacil for presumed osteomyelitis of her sacrum with cultures positive for MDR Acinetobacter, ESBL E. coli and ESBL Proteus. Work up in the ED was significant for bibasilar interstitial opacities and areas of consolidation, left greater than right concerning for airspace disease or aspiration as well as centrilobular opacities in bilateral lower lobes and possible sacral osteomyelitis on CT scan of the abdomen and pelvis. Labs were unremarkable. The patient is a full code. Her surrogate medical decision maker is listed as her daughter, Haley Valdez, but no family is with the patient at this time.

## 2019-03-18 NOTE — ED NOTES
Spoke with Vanesa from ICU, stated that the midline that medications are currently infusing through needs to be removed and another line needs to be placed with peripheral or IO. She stated that there is nobody at this time who can place a CL in the pt at this time. Angelica NOBLES at  attempting IV access.

## 2019-03-18 NOTE — CONSULTS
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, atrial fib on long term anticoagulation; DM2; CHF; chronic partially collapsed left lung with frequent MDR pneumonia; osteomyelitis of sacrum culture positive for MDR acinetobacter, ESBL e coli, & ESBL proteus    Presented to ED for hypothermia 90.6 rectally   Lab evaluation revealed normocytosis, mild anemia, , lactate 1.1, procalcitonin 3.81, negative flu screen, unremarkable abg, and chest xray & CT with chronic near complete opacification of left lung with RLL infiltrate  Sepsis hydration initiated with 2L IVF and post bolus she became hypotensive requiring CL placement and initiation of pressor support    Hospital/ICU Course:  Admitted to ICU on mechanical ventilation via trach at chronic settings with levophed infusion via Rt Femoral TL CL    Past Medical History:   Diagnosis Date    Anticoagulant long-term use     Arthritis     Asthma     Atrial fibrillation     Blood clot of vein in shoulder area     Chronic knee pain     Combined systolic and diastolic heart failure     Diabetes mellitus type 2 without retinopathy 12/19/2016    Diaphragmatic hernia without obstruction and without gangrene 9/14/2015    Gastroparesis     GERD (gastroesophageal reflux disease)     Hypertension     Immune deficiency disorder     Neuromyelitis optica     Primary open angle glaucoma (POAG) of both eyes, severe stage 6/1/2018    Ventilator dependence        Past  Surgical History:   Procedure Laterality Date    CARDIAC DEFIBRILLATOR PLACEMENT      , .    CATARACT EXTRACTION       SECTION      COLONOSCOPY      DEBRIDEMENT, WOUND, SACRUM N/A 2019    Performed by Akila Laguna MD at [facility] OR    EGD, WITH PEG TUBE INSERTION N/A 2018    Performed by Louis O. Jeansonne IV, MD at [facility] OR    ashley      Dr. Macdonald    ESOPHAGOGASTRODUODENOSCOPY (EGD) N/A 2015    Performed by Hieu Colbert MD at [facility] ENDO    KNEE ARTHROSCOPY      TRACHEOSTOMY N/A 2018    Performed by Louis O. Jeansonne IV, MD at [facility] OR    UPPER GASTROINTESTINAL ENDOSCOPY         Review of patient's allergies indicates:   Allergen Reactions    Morphine Hives    Atorvastatin      Other reaction(s): Muscle pain    Clonidine     Codeine      Other reaction(s): Unknown    Digoxin      Other reaction(s): Unknown    Diovan [valsartan] Other (See Comments)     Upset stomach, weakness    Eggs [egg derived]     Metoprolol Diarrhea    Naproxen      Other reaction(s): Nausea    Peanut     Propofol      Other reaction(s): delirious    Sulfa (sulfonamide antibiotics)        Family History     Problem Relation (Age of Onset)    Hypertension Mother, Father        Tobacco Use    Smoking status: Never Smoker    Smokeless tobacco: Never Used   Substance and Sexual Activity    Alcohol use: No     Alcohol/week: 0.0 oz    Drug use: No    Sexual activity: No         Review of Systems   Unable to perform ROS: Patient unresponsive     Objective:     Vital Signs (Most Recent):  Temp: 99.1 °F (37.3 °C) (19 1324)  Pulse: 69 (19 1547)  Resp: (!) 24 (19 1547)  BP: (!) 91/51 (19 1547)  SpO2: (!) 90 % (19 1547) Vital Signs (24h Range):  Temp:  [89.9 °F (32.2 °C)-99.1 °F (37.3 °C)] 99.1 °F (37.3 °C)  Pulse:  [69-71] 69  Resp:  [16-31] 24  SpO2:  [90 %-100 %] 90 %  BP: ()/(36-74) 91/51     Weight: 68.3 kg (150 lb 9.2 oz)  Body mass index is 23.58  kg/m².      Intake/Output Summary (Last 24 hours) at 3/18/2019 1613  Last data filed at 3/18/2019 0718  Gross per 24 hour   Intake 2549 ml   Output --   Net 2549 ml      sodium chloride 0.9% 75 mL/hr at 03/18/19 1259    norepinephrine bitartrate-D5W 0.12 mcg/kg/min (03/18/19 1747)         Physical Exam   Constitutional: She appears lethargic. She has a sickly appearance. She appears ill.       HENT:   Head: Atraumatic.   Eyes: No scleral icterus.   Neck: No tracheal deviation present.   Cardiovascular: Normal rate and regular rhythm.   Pulses:       Radial pulses are 1+ on the right side, and 1+ on the left side.        Dorsalis pedis pulses are 1+ on the right side, and 1+ on the left side.   Pulmonary/Chest: She has decreased breath sounds. She has rhonchi. She has rales.   Vent controlled respirations   Abdominal: Soft. She exhibits no distension.   Lymphadenopathy:     She has no cervical adenopathy.   Neurological: She appears lethargic.   Skin: Capillary refill takes 2 to 3 seconds. She is not diaphoretic.       Vents:  Vent Mode: A/C (03/18/19 1317)  Ventilator Initiated: Yes (03/18/19 0205)  Set Rate: 20 bmp (03/18/19 1317)  Vt Set: 375 mL (03/18/19 1317)  Pressure Support: 0 cmH20 (03/18/19 1317)  PEEP/CPAP: 10 cmH20 (03/18/19 1317)  Oxygen Concentration (%): 40 (03/18/19 1324)  Peak Airway Pressure: 34 cmH2O (03/18/19 1317)  Plateau Pressure: 0 cmH20 (03/18/19 1317)  Total Ve: 5.51 mL (03/18/19 1317)  F/VT Ratio<105 (RSBI): (P) 141.55 (03/18/19 1317)    Lines/Drains/Airways     Central Venous Catheter Line                 Percutaneous Central Line Insertion/Assessment - triple lumen  03/18/19 0946 right femoral vein less than 1 day          Drain                 Urethral Catheter -- days         Gastrostomy/Enterostomy 10/19/18 Percutaneous endoscopic gastrostomy (PEG)  days          Airway                 Surgical Airway 02/06/19 Celeste Shelton 39 days                Significant Labs:    CBC/Anemia  Profile:  Recent Labs   Lab 03/18/19  0240   WBC 10.50   HGB 9.8*   HCT 31.3*   *   MCV 85   RDW 18.6*        Chemistries:  Recent Labs   Lab 03/18/19  0240      K 3.7   CL 99   CO2 28   *   CREATININE 1.1   CALCIUM 9.1   ALBUMIN 1.6*   PROT 7.4   BILITOT 0.2   ALKPHOS 294*   ALT 99*   AST 95*   MG 2.3   PHOS 5.5*       Lactic Acid:   Recent Labs   Lab 03/18/19  0240 03/18/19  0615   LACTATE 1.1 0.6     All pertinent labs within the past 24 hours have been reviewed.  ABG  Recent Labs   Lab 03/18/19  0219   PH 7.423   PO2 82   PCO2 45.3*   HCO3 29.6*   BE 5         Significant Imaging:   I have reviewed all pertinent imaging results/findings within the past 24 hours.      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    Immunosuppression and immobilization primary contributing factors to frequent multidrug resistant wound and pulmonary infections  Hold cellcept during active infection     Derm   Decubitus ulcer of sacral region, stage 4, with osteomyelitis    H/o MDR acinetobacter along with ESBL e coli and preteus growth  Continue local wound vac with WOC management  Merrem, tygacil initiated  Monitor ESR, CRP, WBC, temp, and procalcitonin for indicators of response     Pulmonary   VAP (ventilator-associated pneumonia), chronic    Vent settings reviewed and appropriate  Continue scopolamine for oral secretion reduction  Hypertonic saline nebs  Scheduled and prn trach care, suctioning, and turning for mobilization of pulmonary secretions     Cardiac/Vascular   Chronic Hypotension    Exacerbated by CHF  Continue midodrine via PEG  Wean levophed as able for MAP 65 or greater     Acute on chronic combined systolic and diastolic congestive heart failure    Likely exacerbated by 2L IVF bolus  Stop IVF  Will hold diuresis tonight but  low threshold to initiate     ID   SIRS (systemic inflammatory response syndrome)    Presented with hypothermia, HR 69, vent controlled RR 20, lactate 1.1, wbc 10.5k without bandemia, plt 134, creatinine 1.1, t bili 0.2,   UA unremarkable  Blood cultures obtained in ED; sputum sent on arrival to ICU  Agree with tygacil and merrem empirically and strict contact isolation given documented MDR infection history     Endocrine   Type 2 diabetes mellitus with kidney complication, without long-term current use of insulin    Long acting plus SSI prn with monitoring for glucose control and prevention of insulin toxicity         Critical Care Daily Checklist:    A: Awake: RASS Goal/Actual Goal:    Actual:     B: Spontaneous Breathing Trial Performed?     C: SAT & SBT Coordinated?  n/a                      D: Delirium: CAM-ICU     E: Early Mobility Performed? ROM   F: Feeding Goal:    Status:     Current Diet Order   Procedures    Diet NPO      AS: Analgesia/Sedation No indication   T: Thromboembolic Prophylaxis SCD   H: HOB > 300 Yes   U: Stress Ulcer Prophylaxis (if needed) pepcid   G: Glucose Control Long acting plus SSI   B: Bowel Function     I: Indwelling Catheter (Lines & Flores) Necessity reviewed   D: De-escalation of Antimicrobials/Pharmacotherapies reviewed    Plan for the day/ETD As above    Code Status:  Family/Goals of Care: Full Code  Return to SNF/NH on discharge   I have discussed case and plan of care in detail with Dr White; Status and plan of care were discussed with team on multidisciplinary rounds.    Critical Care Time: 45 minutes  Critical secondary to hypotension requiring levophed infusion for MAP support; Critical care was time spent personally by me on the following activities: development of treatment plan with patient or surrogate and bedside caregivers, discussions with consultants, evaluation of patient's response to treatment, examination of patient, ordering and performing treatments  and interventions, ordering and review of laboratory studies, ordering and review of radiographic studies, pulse oximetry, re-evaluation of patient's condition. This critical care time did not overlap with that of any other provider or involve time for any procedures.    Thank you for your consult.      BETSY Ngo-BC  Critical Care Medicine  [facility]

## 2019-03-18 NOTE — ASSESSMENT & PLAN NOTE
-Check prealbumin.   -Resume tube feedings when appropriate.   -Nutrition consult to evaluate current tube feeding regimen.

## 2019-03-18 NOTE — ED NOTES
Notified Dr. Winters about patient temp and recent vitals. Verbal order to continue warming blanket and continue to monitor vitals.

## 2019-03-18 NOTE — ED NOTES
Dr. Cabral discussed central line insertion with pt, pt unable to sign CL insertion consent, two RNs witness the consent with Dr. Cabral

## 2019-03-18 NOTE — ASSESSMENT & PLAN NOTE
-Cultures from 2/6/19 positive for MDR Acinetobacter as well as ESBL E. coli and Proteus.   -Use Meropenem and Tygacil.   -Check ESR and CRP.   -Wound care consult for local wound care.

## 2019-03-18 NOTE — CONSULTS
03/18/19 1215   Handoff Report   Given To GIULIANO Riley   Pain/Comfort/Sleep   Preferred Pain Scale PAINAD (Pain Assessment in Advance Dementia Scale)   Skin   Skin WDL ex;all   Skin Temperature warm   Skin Moisture dry   Skin Elasticity slow return to original state   Skin Integrity pressure injury   Beka Risk Assessment   Sensory Perception 3-->slightly limited   Moisture 3-->occasionally moist   Activity 1-->bedfast   Mobility 2-->very limited   Nutrition 2-->probably inadequate   Friction and Shear 1-->problem   Beka Score 12       Negative Pressure Wound Therapy    Placement Date/Time: 03/18/19 1330   Location: Sacral Spine   NPWT Type Instillation Therapy   Therapy Setting NPWT Continuous therapy   Pressure Setting NPWT 125 mmHg   Instillation NPWT Normal saline   Instillation Amount (mL) 85 mL   Soak Time (min) 5 minutes   Instillation Frequency (hrs) 2 hours   Therapy Interventions NPWT other (see comments)  (applied)   Sponges Inserted NPWT Black;2       Pressure Injury 03/18/19 Sacral spine Stage 4   Date First Assessed: 03/18/19   Pressure Injury Present on Admission: yes  Location: (c) Sacral spine  Is this injury device related?: No  Staging: (c) Stage 4   Wound Image     Staging 4   Dressing Appearance Saturated;Moist drainage   Drainage Amount Large   Drainage Characteristics/Odor Serosanguineous   Appearance Red;Granulating;Moist;Yellow;Slough   Tissue loss description Full thickness   Red (%), Wound Tissue Color 90 %   Yellow (%), Wound Tissue Color 10 %   Periwound Area Dry;Intact   Wound Edges Irregular   Wound Length (cm) 10 cm   Wound Width (cm) 12 cm   Wound Depth (cm) 6 cm   Wound Volume (cm^3) 720 cm^3   Wound Surface Area (cm^2) 120 cm^2   Tunneling (depth (cm)/location) 2cm @ 5 o'clock   Undermining (depth (cm)/location) 5 cm from 9-3 o'clock   Care Cleansed with:;Sterile normal saline;Applied:;Skin Barrier   Dressing Applied  (Wound Vac Veraflo)   Dressing Change Due 03/21/19        Pressure Injury 03/18/19 Left Ischial tuberosity Unstageable - Present on Admission   Date First Assessed: 03/18/19   Pressure Injury Present on Admission: yes  Side: Left  Location: Ischial tuberosity  Is this injury device related?: No  Staging: Unstageable - Present on Admission   Staging UPA   Dressing Appearance Moist drainage;Intact   Drainage Amount Small   Drainage Characteristics/Odor Serosanguineous   Appearance Yellow;Slough;Red;Moist   Tissue loss description Full thickness   Red (%), Wound Tissue Color 50 %   Yellow (%), Wound Tissue Color 50 %   Periwound Area Intact   Wound Length (cm) 5 cm   Wound Width (cm) 6 cm   Wound Depth (cm) 1 cm   Wound Volume (cm^3) 30 cm^3   Wound Surface Area (cm^2) 30 cm^2   Care Cleansed with:;Sterile normal saline;Applied:;Skin Barrier   Dressing Hydrofiber;Silver;Hydrocolloid   Dressing Change Due 03/21/19       Pressure Injury 03/18/19 Left lateral Hip Unstageable - Present on Admission   Date First Assessed: 03/18/19   Pressure Injury Present on Admission: yes  Side: Left  Orientation: lateral  Location: Hip  Is this injury device related?: No  Staging: (c) Unstageable - Present on Admission   Wound Image    Staging UPA   Dressing Appearance Intact;Moist drainage   Drainage Amount Small   Drainage Characteristics/Odor Tan   Appearance Tan;Yellow;Slough;Eschar   Tissue loss description Full thickness   Red (%), Wound Tissue Color 10 %   Yellow (%), Wound Tissue Color 90 %   Wound Edges Open   Wound Length (cm) 5 cm   Wound Width (cm) 7.5 cm   Wound Depth (cm) 0.2 cm   Wound Volume (cm^3) 7.5 cm^3   Wound Surface Area (cm^2) 37.5 cm^2   Care Cleansed with:;Sterile normal saline;Applied:;Skin Barrier   Dressing Sodium chloride impregnated;Transparent film   Dressing Change Due 03/21/19       Pressure Injury 03/18/19 Right medial Heel DTPI - Present on Admission   Date First Assessed: 03/18/19   Pressure Injury Present on Admission: yes  Side: Right  Orientation: medial   Location: Heel  Is this injury device related?: No  Staging: DTPI - Present on Admission   Wound Image    Staging DPA   Drainage Amount None   Appearance Maroon   Wound Length (cm) 3 cm   Wound Width (cm) 3 cm   Wound Surface Area (cm^2) 9 cm^2   Care Applied:;Skin Barrier       Pressure Injury 03/18/19 Right medial Calf DTPI - Present on Admission   Date First Assessed: 03/18/19   Pressure Injury Present on Admission: yes  Side: Right  Orientation: medial  Location: Calf  Staging: DTPI - Present on Admission   Wound Image     Staging DPA   Dressing Appearance Open to air   Drainage Amount None   Appearance Maroon;Purple   Wound Length (cm) 2 cm   Wound Width (cm) 2 cm   Wound Surface Area (cm^2) 4 cm^2   Care Applied:;Skin Barrier       Pressure Injury 03/18/19 Left lateral Calf DTPI - Present on Admission   Date First Assessed: 03/18/19   Pressure Injury Present on Admission: yes  Side: Left  Orientation: lateral  Location: Calf  Staging: DTPI - Present on Admission   Wound Image    Staging DPA   Dressing Appearance Open to air   Drainage Amount None   Appearance Maroon;Purple   Wound Length (cm) 10 cm   Wound Width (cm) 3.5 cm   Wound Surface Area (cm^2) 35 cm^2   Care Applied:;Skin Barrier   Positioning   Body Position turned;side-lying, right   Head of Bed (HOB) HOB at 20 degrees   Positioning/Transfer Devices wedge;pillows  (heel offloading boots applied)     Consulted on this 74 y/o F patient due to multiple present on admission pressure injuries. Patient is well known to wound care service. She is a resident at [facility]. She has a chronic TRACH/PEG tube and is vent dependently. She was seen a little over a month ago with an unstageable sacral pressure injury with suspected sacral osteomyelitis, and underwent surgical debridement and wound vac placement. Full skin assessment completed at this time.    Foam heel protectors removed bilateral. Multiple areas of scar tissue noted to bilateral feet and  ankles.  Right Medial Heel DTI noted with maroon discoloration adjacent to scar tissue. Painted with cavilon. Right medial calf DTI noted with maroon discoloration. Painted with cavilon. Left lateral calf DTI noted with maroon and purple discoloration, painted with cavilon. Heel offloading boots (EHOB REY-PRERNA) applied at this time. Patient turned to right side with assistance of primary nurse.  Dressings removed.  Left posterior flank/back DTI noted that is evolving and currently open partial thickness with moist maroon wound bed, area of intact maroon discoloration. Painted with cavilon and foam dressing applied. Left lateral hip unstageable pressure injury noted, deteriorated from last admission. Measures 5x7.5x0.2cm with soft tan eschar surrounded by yellow slough and scant pink tissue, tan drainage. Cleansed with saline and patted dry. Milagros wound skin painted with cavilon.  mesalt gauze applied to wound bed and secured with tegaderm.  Left ischial Unstageable pressure injury again noted that measures [id number] with moist red and yellow wound bed, small amount slough, and deeper tunneled area (where 1cm of depth is located) at 5 o'clock.  Cleansed with saline and patted dry. Milagros wound skin painted with cavilon. Tunneled area filled with aquacel AG rope, and additional aquacel AG rope applied to cover remaining wound bed, and secured with duoderm.  IAD noted to perineum, labia, medial thighs, milagros rectal region with scattered areas of partial thickness tissue loss, moist red wound beds. Cleansed with bath wipes and thin layer critic aid paste applied.  Sacral Stage 4 wound assessed. Measures [id number] with 5cm undermining extending from 9-3cm and tunneled area noted at 5 oclock extending 2cm toward anus. Wound bed is moist, beefy red granulation tissue 90% with scattered darker red/purple areas noted, and 10% yellow tissue, previously exposed sacral bone is now covered with granulation tissue. Large amount  serosanguinous drainage noted. Cleansed well with saline and patted dry.  Liz wound skin cleansed with bath wipes, patted dry, and painted with cavilon, edges of wound covered with stoma rings.  Window paned wound edges with drape. Additional stoma ring applied around anus, and covered all with additional drape.  Wound filled with Wound vac veraflo foam and secured with drape.  Hole then cut in drape covering anus to expose, and ostomy pouch applied to anus to isolate stool from vac due to close proximity.  Trac pad and tubing then applied to wound and attached to I wound vac ULTA at Veraflo settings: instillation fluid: sterile normal saline; Volume determined using fill assist: 85mL; Dwell Time: 5 minues; Cycle: 2 hours @ continuous -125 mmHg medium intensity suction. Good seal noted and no leak. Patient tolerated well. At end of care, she was positioned with foam wedge on right side.  Will order Specialty ANGIE Immerse bed for patient.  Will plan for wound care every Monday/Thursday for veraflo vac dressing changes.  Could benefit from surgical consult to debride left hip unstageable pressure injury, however will promote autolytic debridement at this time with too and tegaderm, and reevaluate Thursday.  Will follow closely.

## 2019-03-18 NOTE — HPI
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, atrial fib on long term anticoagulation; DM2; CHF; chronic partially collapsed left lung with frequent MDR pneumonia; osteomyelitis of sacrum culture positive for MDR acinetobacter, ESBL e coli, & ESBL proteus    Presented to ED for hypothermia 90.6 rectally   Lab evaluation revealed normocytosis, mild anemia, , lactate 1.1, procalcitonin 3.81, negative flu screen, unremarkable abg, and chest xray & CT with chronic near complete opacification of left lung with RLL infiltrate  Sepsis hydration initiated with 2L IVF and post bolus she became hypotensive requiring CL placement and initiation of pressor support

## 2019-03-19 PROBLEM — E11.649 TYPE 2 DIABETES MELLITUS WITH HYPOGLYCEMIA, WITHOUT LONG-TERM CURRENT USE OF INSULIN: Status: ACTIVE | Noted: 2018-03-09

## 2019-03-19 NOTE — PLAN OF CARE
Problem: Adult Inpatient Plan of Care  Goal: Plan of Care Review  Outcome: Ongoing (interventions implemented as appropriate)  Recommendations     Recommendation: 1. Continue current TF regimen. 2. Will continue to monitor.   Intervention: coordination of care   Goals: Meet > 85 % EEN/EPN while admitted  Nutrition Goal Status: new  Communication of RD Recs: (POC, sticky note, reviewed with NP)

## 2019-03-19 NOTE — ASSESSMENT & PLAN NOTE
Malnutrition in the context of Chronic Illness/Injury    Related to (etiology):  Physiological causes increasing nutrient needs d/t illness    Signs and Symptoms (as evidenced by):  Body Fat Depletion: moderate depletion of orbitals and triceps   Muscle Mass Depletion: severe depletion of temples, clavicle region and scapular region   Weight Loss: 8 % within the last 4 months   Multiple pressure injuries unstageable& stage 4     Interventions/Recommendations (treatment strategy):  See above    Nutrition Diagnosis Status:  New

## 2019-03-19 NOTE — PLAN OF CARE
Assessment completed.  Met with the patient/ family. CM explained and left info in blue transition of care folder regarding Advance Directives, Living Will ( FULL CODE ) ,   Pamphlet on D/C planning on admission and Pharmacy bedside delivery.  The role of CM explained for ICU transitions of care/ discharge planning. Per EMR,  with ventilator dependent chronic respiratory failure, multiple decubitus ulcers, combined heart failure and immunosuppression with Cellcept for neuromyelitis optica who presented from [facility] with reports of hypothermia. Upon arrival, the patient's rectal temperature was 89.9.  Of note, the patient has a history of chronic hypotension on Mididrine 5 mg TID. On 2/14/19, the patient was discharged on Ertapenem and Tygacil for presumed osteomyelitis of her sacrum with cultures positive for MDR Acinetobacter, ESBL E. coli and ESBL Proteus. Work up in the ED was significant for bibasilar interstitial opacities and areas of consolidation, left greater than right concerning for airspace disease or aspiration as well as centrilobular opacities in bilateral lower lobes and possible sacral osteomyelitis on CT scan of the abdomen and pelvis.  The patient is a full code. Her surrogate medical decision maker is listed as her daughter, Haley Valdez.    Patient has family support Patient has [employer] insurance. Patient has no needs at this time. CM to f/u for safe transition           03/19/19 1029   Discharge Assessment   Assessment Type Discharge Planning Assessment   Confirmed/corrected address and phone number on facesheet? Yes   Assessment information obtained from? Medical Record   Expected Length of Stay (days) (TBD)   Communicated expected length of stay with patient/caregiver no   Prior to hospitilization cognitive status: Alert/Oriented   Prior to hospitalization functional status: Completely Dependent   Current cognitive status: Alert/Oriented   Current Functional Status: Completely  Dependent   Lives With facility resident   Able to Return to Prior Arrangements yes   Is patient able to care for self after discharge? Yes   Patient's perception of discharge disposition nursing home   Readmission Within the Last 30 Days no previous admission in last 30 days   Patient currently being followed by outpatient case management? No   Patient currently receives any other outside agency services? No   Equipment Currently Used at Home wound care supplies  (WD VAC, TRAH  W/ VENTILATOR DEP)   Do you have any problems affording any of your prescribed medications? No   Is the patient taking medications as prescribed? yes   Does the patient have transportation home? Yes   Transportation Anticipated family or friend will provide   Discharge Plan A Return to nursing home   Discharge Plan B Return to Nursing Home   DME Needed Upon Discharge  none   Patient/Family in Agreement with Plan yes

## 2019-03-19 NOTE — CONSULTS
"  [facility] -   Adult Nutrition  Consult Note    SUMMARY     Recommendations    Recommendation: 1. Continue current TF regimen. 2. Will continue to monitor.   Intervention: coordination of care   Goals: Meet > 85 % EEN/EPN while admitted  Nutrition Goal Status: new  Communication of RD Recs: (POC, sticky note, reviewed with NP)    Reason for Assessment    Reason For Assessment: consult   Dx:  1. Pneumonia of both lower lobes due to infectious organism    2. Hypothermia    3. Atrial fibrillation      Hx: AF, CHF, DM type 2, Gastroparesis, GERD, HTN, Ventilator dependence   Interdisciplinary rounds: attended  General info comments: Pt currently in ICU. Pt has a PEG x nutrition support. Pt on the vent via trach. Pt on continuous TF, tolerating.  No family members at time of visit. Per epic records, pt weighed 167 lbs on 11/02/18, current weight = 154 lbs (wt loss of 13 lbs within the last 4 months). Per NFPE 3.19.19. Moderate to severe subcutaneous fat and muscle loss. Reviewed TF recs w/ NP this am.   Discharge plan: Patient to receive adequate intake via PEG with tolerance.     Nutrition Risk Screen    Nutrition Risk Screen: tube feeding or parenteral nutrition    Nutrition/Diet History    Spiritual, Cultural Beliefs, Confucianism Practices, Values that Affect Care: no    Anthropometrics    Temp: 97.1 °F (36.2 °C)  Height Method: Estimated  Height: 5' 7" (170.2 cm)  Height (inches): 67 in  Weight Method: Bed Scale  Weight: 70 kg (154 lb 5.2 oz)  Weight (lb): 154.32 lb  Ideal Body Weight (IBW), Female: 135 lb  % Ideal Body Weight, Female (lb): 114.31 lb  BMI (Calculated): 24.2  BMI Grade: 18.5-24.9 - normal       Lab/Procedures/Meds    Pertinent Labs Reviewed: reviewed  BMP  Lab Results   Component Value Date     03/19/2019    K 3.7 03/19/2019     03/19/2019    CO2 26 03/19/2019    BUN 98 (H) 03/19/2019    CREATININE 1.1 03/19/2019    CALCIUM 9.0 03/19/2019    ANIONGAP 10 03/19/2019    " ESTGFRAFRICA 58 (A) 03/19/2019    EGFRNONAA 50 (A) 03/19/2019     Lab Results   Component Value Date    CALCIUM 9.0 03/19/2019    PHOS 6.1 (H) 03/19/2019     Lab Results   Component Value Date    ALBUMIN 1.5 (L) 03/19/2019     Recent Labs   Lab 03/19/19  1102   POCTGLUCOSE 96     Lab Results   Component Value Date    HGBA1C 5.2 02/05/2019       Pertinent Medications Reviewed: reviewed    Physical Findings/Assessment     skin: multiple pressure injuries unstageable and stage 4    Estimated/Assessed Needs    Weight Used For Calorie Calculations: 70 kg (154 lb 5.2 oz)  Energy Calorie Requirements (kcal): 1620  Energy Need Method: [facility]  Protein Requirements: 84- 141 g  Weight Used For Protein Calculations: 70 kg (154 lb 5.2 oz)     Estimated Fluid Requirement Method: RDA Method(or pe rmD)  RDA Method (mL): 1620  CHO Requirement: 50 % EEN      Nutrition Prescription Ordered    Nutrition Order Comments: NPO    Evaluation of Received Nutrient/Fluid Intake    Enteral Calories (kcal): 1560  Enteral Protein (gm): 144  % Kcal Needs: 96  % protein needs: 100     Intake/Output Summary (Last 24 hours) at 3/19/2019 1140  Last data filed at 3/19/2019 1100  Gross per 24 hour   Intake 1467.22 ml   Output 2565 ml   Net -1097.78 ml       % Intake of Estimated Energy/ protein Needs: 85 - 100 %  % Meal Intake: NPO    Nutrition Risk      2xweekly     Assessment and Plan    Severe malnutrition    Malnutrition in the context of Chronic Illness/Injury    Related to (etiology):  Physiological causes increasing nutrient needs d/t illness    Signs and Symptoms (as evidenced by):  Body Fat Depletion: moderate depletion of orbitals and triceps   Muscle Mass Depletion: severe depletion of temples, clavicle region and scapular region   Weight Loss: 8 % within the last 4 months   Multiple pressure injuries unstageable& stage 4     Interventions/Recommendations (treatment strategy):  See above    Nutrition Diagnosis Status:  New           Monitor and Evaluation    Food and Nutrient Intake: energy intake, enteral nutrition intake  Food and Nutrient Adminstration: enteral and parenteral nutrition administration  Anthropometric Measurements: weight  Biochemical Data, Medical Tests and Procedures: electrolyte and renal panel, glucose/endocrine profile  Nutrition-Focused Physical Findings: skin, overall appearance     Malnutrition Assessment                 Orbital Region (Subcutaneous Fat Loss): moderate depletion  Upper Arm Region (Subcutaneous Fat Loss): moderate depletion   [place] Region (Muscle Loss): severe depletion  Clavicle Bone Region (Muscle Loss): severe depletion  Clavicle and Acromion Bone Region (Muscle Loss): severe depletion  Scapular Bone Region (Muscle Loss): severe depletion                 Nutrition Follow-Up    RD Follow-up?: Yes

## 2019-03-19 NOTE — PROGRESS NOTES
Dr. Barron updated on patient blood glucose which is now 79. Patient was given dextrose drip at 0218 this morning. Blood sugar had came up to 130 but has now dropped down again. MD orders to recheck blood sugar at 0615 and notify if its critically low. Patient currently on tube feeding and tolerating well. No distress noted. Will continue to monitor.

## 2019-03-19 NOTE — EICU
eICU Note :    Called by the [facility] eRN:    Problem:first accucheck was 23-->he gave 1/2 of the D50W , repeat Accu-Chek 72 ,,Pt on bolus feeds every 6 hours    Pertinent History and labs reviewed :  has Chronic atrial fibrillation; GERD (gastroesophageal reflux disease); Labile blood pressure; Childhood asthma without complication; Cardiac defibrillator in place; Abnormal CT scan, gastrointestinal tract; Diaphragmatic hernia without obstruction and without gangrene; Chronic knee pain; Obesity, Class I, BMI 30-34.9; Vitamin D deficiency; PVD (peripheral vascular disease); LBBB (left bundle branch block); Bilateral leg edema; Pneumonia due to methicillin resistant Staphylococcus aureus; Cough; Chronic kidney disease (CKD), stage III (moderate); Nuclear sclerosis of right eye; Pseudophakia of left eye; Chronic atrial fibrillation with RVR; Acute on chronic combined systolic and diastolic congestive heart failure; Non compliance w medication regimen; SIRS (systemic inflammatory response syndrome); Cortical age-related cataract of both eyes; Type 2 diabetes mellitus with kidney complication, without long-term current use of insulin; Cerebrovascular accident (CVA) due to embolism; 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; Primary open angle glaucoma (POAG) of both eyes, severe stage; Chronic Hypotension; 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; Hypothyroidism; Deep tissue injury; Left ischial pressure sore, stage III; Pressure ulcer of thigh, stage 2; Atypical chest pain; NSVT (nonsustained ventricular tachycardia); Right sided weakness; Severe muscle deconditioning; Skin breakdown; Pressure ulcer of right heel, stage 3; Pressure ulcer of right buttock, stage 2; Debility; Neuromyelitis optica with functional quadreplegia and chronic immunosuppression on Cellcept; Hypernatremia; Blisters of multiple sites; Chest pain; Long term current use of antipsychotic medication; Ventilator dependence;  Atelectasis of left lung; Pressure ulcer of coccygeal region; Pressure injury of coccygeal region, stage 4; Incontinence associated dermatitis; Pressure injury of left ischium, stage 3; Pressure injury of right ankle, stage 3; Leakage of tracheostomy site; Hypertension; Functional quadriplegia; Anemia; VAP (ventilator-associated pneumonia), chronic; Decubitus ulcer of sacral region, stage 4, with osteomyelitis; Pressure injury of left thigh, unstageable; Pressure injury of trochanteric region of hip, stage 3; Pressure injury of left ischium, unstageable; Severe malnutrition; Pressure injury of sacral region, stage 4; Critical illness myopathy; Dysphagia; A-fib; Heel ulcer; Aspiration pneumonia of both lungs; Septic shock with underlying chronic hypotension; Hypothermia; and Pressure injury of left hip, unstageable on their problem list.    Treatment /Intervention given: Agree withholding Levemir 10 units due to night        Melanie Nanci QUARLES  eICU Physician  12:03 AM  Post prandial Hypoglycemia after bolus feels , changed to tube feeds order

## 2019-03-19 NOTE — PROGRESS NOTES
Patient blood sugar now 55. Dr. Barron notified of current blood sugar. MD orders to give 12.5g of dextrose IV push. Medication administered. Patient resting quietly in bed on vent. Tube feeding continued. Patient tolerating continuous tube feeding well. Will continue to monitor.

## 2019-03-19 NOTE — ASSESSMENT & PLAN NOTE
Presented with hypothermia, now leukocytosis  UA unremarkable  Blood cultures ngtd; sputum rare gm neg rods; await final data  tygacil and merrem empirically and strict contact isolation; await culture data

## 2019-03-19 NOTE — ASSESSMENT & PLAN NOTE
Long acting insulin held; resume once trend improved  Continue SSI prn with monitoring for glucose control and prevention of insulin toxicity

## 2019-03-19 NOTE — PROGRESS NOTES
Patient blood sugar checked before night time levemir was administered. Blood sugar currently 23. Prn dextrose pushed through central line per order. Levemir held. CALLIE LUKE notified. No new orders given. Will recheck blood sugar shortly. No distress noted. Will continue to monitor.

## 2019-03-19 NOTE — ASSESSMENT & PLAN NOTE
H/o MDR acinetobacter along with ESBL e coli and preteus growth  Continue local wound vac with WOC management  Merrem, tygacil per Dr Ceja  Monitor ESR, CRP, WBC, temp, and procalcitonin for indicators of response

## 2019-03-19 NOTE — SUBJECTIVE & OBJECTIVE
Review of Systems   Unable to perform ROS: Patient nonverbal       Objective:     Vital Signs (Most Recent):  Temp: 98 °F (36.7 °C) (03/19/19 1530)  Pulse: 69 (03/19/19 1530)  Resp: (!) 25 (03/19/19 1530)  BP: (!) 113/46 (03/19/19 1530)  SpO2: 97 % (03/19/19 1530) Vital Signs (24h Range):  Temp:  [97.1 °F (36.2 °C)-98 °F (36.7 °C)] 98 °F (36.7 °C)  Pulse:  [69-76] 69  Resp:  [18-34] 25  SpO2:  [93 %-98 %] 97 %  BP: ()/(31-72) 113/46     Weight: 70 kg (154 lb 5.2 oz)  Body mass index is 24.17 kg/m².      Intake/Output Summary (Last 24 hours) at 3/19/2019 1552  Last data filed at 3/19/2019 1505  Gross per 24 hour   Intake 1989.65 ml   Output 2920 ml   Net -930.35 ml       Physical Exam   Constitutional: She has a sickly appearance. She appears ill.       HENT:   Head: Atraumatic.   Eyes: No scleral icterus.   Neck: No tracheal deviation present.   Cardiovascular: Normal rate and regular rhythm.   Pulses:       Radial pulses are 1+ on the right side, and 1+ on the left side.        Dorsalis pedis pulses are 1+ on the right side, and 1+ on the left side.   Pulmonary/Chest: She has decreased breath sounds. She has rhonchi. She has rales.   Vent controlled respirations   Abdominal: Soft. She exhibits no distension.   Lymphadenopathy:     She has no cervical adenopathy.   Neurological: She is alert.   Skin: Capillary refill takes 2 to 3 seconds. She is not diaphoretic.       Vents:  Vent Mode: A/C (03/19/19 1155)  Ventilator Initiated: Yes (03/18/19 0205)  Set Rate: 20 bmp (03/19/19 1155)  Vt Set: 375 mL (03/19/19 1155)  Pressure Support: 0 cmH20 (03/19/19 1155)  PEEP/CPAP: 10 cmH20 (03/19/19 1155)  Oxygen Concentration (%): 40 (03/19/19 1530)  Peak Airway Pressure: 24 cmH2O (03/19/19 1155)  Plateau Pressure: 0 cmH20 (03/19/19 1155)  Total Ve: 10.4 mL (03/19/19 1155)  F/VT Ratio<105 (RSBI): (!) 66.5 (03/19/19 1155)    Lines/Drains/Airways     Central Venous Catheter Line                 Percutaneous Central Line  Insertion/Assessment - triple lumen  03/18/19 0929 right femoral vein 1 day          Drain                 Gastrostomy/Enterostomy 10/19/18 Percutaneous endoscopic gastrostomy (PEG)  days         Gastrostomy/Enterostomy 03/18/19 1905 Percutaneous endoscopic gastrostomy (PEG) less than 1 day         Urethral Catheter 03/18/19 1905 less than 1 day          Airway                 Surgical Airway 02/06/19 1905 Shiley 40 days                Significant Labs:    CBC/Anemia Profile:  Recent Labs   Lab 03/18/19 0240 03/19/19  0539   WBC 10.50 24.64*   HGB 9.8* 8.7*   HCT 31.3* 28.2*   * 165   MCV 85 86   RDW 18.6* 18.8*        Chemistries:  Recent Labs   Lab 03/18/19 0240 03/19/19  0539    142   K 3.7 3.7   CL 99 106   CO2 28 26   * 98*   CREATININE 1.1 1.1   CALCIUM 9.1 9.0   ALBUMIN 1.6* 1.5*   PROT 7.4 6.8   BILITOT 0.2 0.3   ALKPHOS 294* 280*   ALT 99* 70*   AST 95* 60*   MG 2.3 2.1   PHOS 5.5* 6.1*       All pertinent labs within the past 24 hours have been reviewed.    Significant Imaging:  I have reviewed all pertinent imaging results/findings within the past 24 hours.

## 2019-03-19 NOTE — PLAN OF CARE
Problem: Adult Inpatient Plan of Care  Goal: Plan of Care Review  Outcome: Ongoing (interventions implemented as appropriate)  Patient currently resting quietly in bed. Patient NSR on monitor at this time. Patient currently receiving oxygen via ventilator via tracheostomy. Patient currently on levophed for hypotension and tolerating well. Patient turned in bed every 2 hours to avoid further skin breakdown to back side and prevent new wound development. Patient positioned with pillows and has bilateral heel offloading devices in place. No sign of new wound development or further skin breakdown noted. Plan of care updated with family. There are no further questions after updated on plan of care at this time. Will continue to monitor.

## 2019-03-19 NOTE — PLAN OF CARE
Problem: Adult Inpatient Plan of Care  Goal: Plan of Care Review  PT remains on vent ney/ madi  #8 . Nebs q4 . Trach care done.

## 2019-03-19 NOTE — CONSULTS
New consults noted on this 72 y/o F patient due to Epic triggers for multiple Present On Admission Pressure injuries including multiple DTIs, Stage 4 to sacrum, and Unstageable to Left hip and Ischium.  Patient seen in ER yesterday for this. Please see full consult note dated 3/18/19 for full assessment, treatment, and recommendations. Will follow as scheduled.

## 2019-03-19 NOTE — PROGRESS NOTES
Dr. Barron updated on patient blood sugar. Blood sugar at 2352 was 56. MD orders to recheck blood sugar every hour for 3 hours starting at 0100. MD also orders to change tube feeding from boluses to continuous at 40mL/hr. Patient currently resting quietly on vent. No distress noted. Will continue to monitor.

## 2019-03-19 NOTE — PROGRESS NOTES
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, atrial fib on long term anticoagulation; DM2; CHF; chronic partially collapsed left lung with frequent MDR pneumonia; osteomyelitis of sacrum culture positive for MDR acinetobacter, ESBL e coli, & ESBL proteus    Presented to ED for hypothermia 90.6 rectally   Lab evaluation revealed normocytosis, mild anemia, , lactate 1.1, procalcitonin 3.81, negative flu screen, unremarkable abg, and chest xray & CT with chronic near complete opacification of left lung with RLL infiltrate  Sepsis hydration initiated with 2L IVF and post bolus she became hypotensive requiring CL placement and initiation of pressor support    Hospital/ICU Course:  Admitted to ICU on mechanical ventilation via trach at chronic settings with levophed infusion via Rt Femoral TL CL  3/19 - remains on pressor support overnight; hypoglycemia overnight required IV dextrose; awake this afternoon    Review of Systems   Unable to perform ROS: Patient nonverbal       Objective:     Vital Signs (Most Recent):  Temp: 98 °F (36.7 °C) (03/19/19 1530)  Pulse: 69 (03/19/19 1530)  Resp: (!) 25 (03/19/19 1530)  BP: (!) 113/46 (03/19/19 1530)  SpO2: 97 % (03/19/19 1530) Vital Signs (24h Range):  Temp:  [97.1 °F (36.2 °C)-98 °F (36.7 °C)] 98 °F (36.7 °C)  Pulse:  [69-76] 69  Resp:  [18-34] 25  SpO2:  [93 %-98 %] 97 %  BP: ()/(31-72) 113/46     Weight: 70 kg (154 lb 5.2 oz)  Body mass index is 24.17  kg/m².      Intake/Output Summary (Last 24 hours) at 3/19/2019 1552  Last data filed at 3/19/2019 1505  Gross per 24 hour   Intake 1989.65 ml   Output 2920 ml   Net -930.35 ml       Physical Exam   Constitutional: She has a sickly appearance. She appears ill.       HENT:   Head: Atraumatic.   Eyes: No scleral icterus.   Neck: No tracheal deviation present.   Cardiovascular: Normal rate and regular rhythm.   Pulses:       Radial pulses are 1+ on the right side, and 1+ on the left side.        Dorsalis pedis pulses are 1+ on the right side, and 1+ on the left side.   Pulmonary/Chest: She has decreased breath sounds. She has rhonchi. She has rales.   Vent controlled respirations   Abdominal: Soft. She exhibits no distension.   Lymphadenopathy:     She has no cervical adenopathy.   Neurological: She is alert.   Skin: Capillary refill takes 2 to 3 seconds. She is not diaphoretic.       Vents:  Vent Mode: A/C (03/19/19 1155)  Ventilator Initiated: Yes (03/18/19 0205)  Set Rate: 20 bmp (03/19/19 1155)  Vt Set: 375 mL (03/19/19 1155)  Pressure Support: 0 cmH20 (03/19/19 1155)  PEEP/CPAP: 10 cmH20 (03/19/19 1155)  Oxygen Concentration (%): 40 (03/19/19 1530)  Peak Airway Pressure: 24 cmH2O (03/19/19 1155)  Plateau Pressure: 0 cmH20 (03/19/19 1155)  Total Ve: 10.4 mL (03/19/19 1155)  F/VT Ratio<105 (RSBI): (!) 66.5 (03/19/19 1155)    Lines/Drains/Airways     Central Venous Catheter Line                 Percutaneous Central Line Insertion/Assessment - triple lumen  03/18/19 0929 right femoral vein 1 day          Drain                 Gastrostomy/Enterostomy 10/19/18 Percutaneous endoscopic gastrostomy (PEG)  days         Gastrostomy/Enterostomy 03/18/19 1905 Percutaneous endoscopic gastrostomy (PEG) less than 1 day         Urethral Catheter 03/18/19 1905 less than 1 day          Airway                 Surgical Airway 02/06/19 1905 Shiley 40 days                Significant Labs:    CBC/Anemia Profile:  Recent Labs   Lab  03/18/19  0240 03/19/19  0539   WBC 10.50 24.64*   HGB 9.8* 8.7*   HCT 31.3* 28.2*   * 165   MCV 85 86   RDW 18.6* 18.8*        Chemistries:  Recent Labs   Lab 03/18/19  0240 03/19/19  0539    142   K 3.7 3.7   CL 99 106   CO2 28 26   * 98*   CREATININE 1.1 1.1   CALCIUM 9.1 9.0   ALBUMIN 1.6* 1.5*   PROT 7.4 6.8   BILITOT 0.2 0.3   ALKPHOS 294* 280*   ALT 99* 70*   AST 95* 60*   MG 2.3 2.1   PHOS 5.5* 6.1*       All pertinent labs within the past 24 hours have been reviewed.    Significant Imaging:  I have reviewed all pertinent imaging results/findings within the past 24 hours.        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    Immunosuppression and immobilization primary contributing factors to frequent multidrug resistant wound and pulmonary infections  Hold cellcept during active infection     Derm   Decubitus ulcer of sacral region, stage 4, with osteomyelitis    H/o MDR acinetobacter along with ESBL e coli and preteus growth  Continue local wound vac with WOC management  Merrem, tygacil per Dr Ceja  Monitor ESR, CRP, WBC, temp, and procalcitonin for indicators of response     Pulmonary   * VAP (ventilator-associated pneumonia), chronic    Vent settings reviewed and appropriate  Continue scopolamine for oral secretion reduction  Hypertonic saline nebs  Scheduled and prn trach care, suctioning, and turning for mobilization of pulmonary secretions     Cardiac/Vascular   Chronic Hypotension    Exacerbated by CHF  Continue midodrine via PEG  Wean levophed as able for MAP 65 or greater     Acute on chronic combined systolic and diastolic congestive heart failure    Avoid fluid overload  Continue bumex  Strict I/O     ID   SIRS (systemic inflammatory response syndrome)    Presented with hypothermia, now leukocytosis  UA  unremarkable  Blood cultures ngtd; sputum rare gm neg rods; await final data  tygacil and merrem empirically and strict contact isolation; await culture data     Endocrine   Type 2 diabetes mellitus with hypoglycemia, without long-term current use of insulin    Long acting insulin held; resume once trend improved  Continue SSI prn with monitoring for glucose control and prevention of insulin toxicity        Critical Care Daily Checklist:    A: Awake: RASS Goal/Actual Goal:    Actual: Guerrero Agitation Sedation Scale (RASS): Alert and calm   B: Spontaneous Breathing Trial Performed?     C: SAT & SBT Coordinated?  n/a                      D: Delirium: CAM-ICU Overall CAM-ICU: Negative   E: Early Mobility Performed? ROM   F: Feeding Goal: Goals: Meet > 85 % EEN/EPN while admitted  Status: Nutrition Goal Status: new   Current Diet Order   Procedures    Diet NPO      AS: Analgesia/Sedation prn   T: Thromboembolic Prophylaxis SCD   H: HOB > 300 Yes   U: Stress Ulcer Prophylaxis (if needed) pepcid   G: Glucose Control As above   B: Bowel Function Stool Occurrence: 0   I: Indwelling Catheter (Lines & Flores) Necessity reviewed   D: De-escalation of Antimicrobials/Pharmacotherapies reviewed    Plan for the day/ETD Wean pressor, diurese    Code Status:  Family/Goals of Care: Full Code  Return to NH on discharge   I have discussed case and plan of care in detail with Dr White and Dr Ceja; Status and plan of care were discussed with team on multidisciplinary rounds.    Critical Care Time: 45 minutes  Critical secondary to vent dependence; pressor support; acute on chronic heart failure; Critical care was time spent personally by me on the following activities: development of treatment plan with patient or surrogate and bedside caregivers, discussions with consultants, evaluation of patient's response to treatment, examination of patient, ordering and performing treatments and interventions, ordering and review of  laboratory studies, ordering and review of radiographic studies, pulse oximetry, re-evaluation of patient's condition. This critical care time did not overlap with that of any other provider or involve time for any procedures.     BETSY Ngo-BC  Critical Care Medicine  [facility] [place]

## 2019-03-20 NOTE — PLAN OF CARE
Problem: Adult Inpatient Plan of Care  Goal: Plan of Care Review  Outcome: Ongoing (interventions implemented as appropriate)  Patient currently resting quietly in bed. Patient in paced rhythm on monitor at this time. Patient currently receiving oxygen via ventilator through tracheostomy. Patient on levophed drip for hypotension. Patient tolerating levophed drip well at this time. Patient turned in bed every 2 hours to avoid further skin breakdown to back side and prevent new wound development. Patient turned with 2 assist and positioned with wedge. Patient has heel offloading devices in place as well. No sign of new wound development or further skin breakdown noted. Plan of care updated with patient and family. There are no further questions after updated on plan of care at this time. Will continue to monitor.

## 2019-03-20 NOTE — ASSESSMENT & PLAN NOTE
-Due to pneumonia and possible wound infection with osteomyelitis.   -Continue midodrine and vasopressors as needed.   -Continue broad spectrum antibiotics.   -Check sputum culture.   -Follow up blood cultures.   -Pulmonology and Wound Care Consults.     3/19- she was seen by ID during her last admission,Cultures reviewed-  On 2/16- Wound cultures  EGGERTHELLA LENTA  Blautia coccoides  ESCHERICHIA COLI ESBL  PROTEUS MIRABILIS ESBL  2/5- respiratory cultures-MORGANCRISTIANE MORGANII    Will continue Meropenem and Tigecycline for now and will check cortisol, TSH levels in AM

## 2019-03-20 NOTE — PLAN OF CARE
Problem: Adult Inpatient Plan of Care  Goal: Plan of Care Review  Outcome: Ongoing (interventions implemented as appropriate)  Pt in bed resting, receive bedside report from GIULIANO Ariza. Pt able to follow simple commands, open and close mouth. Pt on levophed, titrate med throughout the day to maintain SBP >90. TF to peg tube, new order to change infusion to 65mL/hr. Pt became more alert throughout the day, mouthing for water, oral care provided. Pt glucose remain in therapeutic range throughout shift. Bedside report given to GIULIANO Ariza. Cecilia Mayberry RN

## 2019-03-20 NOTE — ASSESSMENT & PLAN NOTE
-NISS.   -ADA diet.   -Hemoglobin A1C was 5.2 on 2/5/19.   3/19 -will continue insulin sliding scale , closely monitor glucose

## 2019-03-20 NOTE — ASSESSMENT & PLAN NOTE
-Continue mechanical ventilation.   -Pulmonology consult.       3/19- will continue ventilatory support , critical care follow up

## 2019-03-20 NOTE — ASSESSMENT & PLAN NOTE
-Check prealbumin.   -Resume tube feedings when appropriate.   -Nutrition consult to evaluate current tube feeding regimen.       3/19- will encourage enteral feeds.Serum albumin is 1.6

## 2019-03-20 NOTE — ASSESSMENT & PLAN NOTE
-Most likely due to sepsis.   -Check TSH.   -Continue warming.     3/19- TSH is normal, will check cortisol , will follow sepsis protocol,follow repeat cultures

## 2019-03-20 NOTE — ASSESSMENT & PLAN NOTE
Exacerbated by CHF, continue bumex  Continue midodrine via PEG  Wean levophed as able for MAP 65 or greater

## 2019-03-20 NOTE — ASSESSMENT & PLAN NOTE
-Check TSH due to hypothermia.   -Continue levothyroxine.     3/19- TSH is normal , will continue levothyroxine

## 2019-03-20 NOTE — ASSESSMENT & PLAN NOTE
-NISS.   -ADA diet.   -Hemoglobin A1C was 5.2 on 2/5/19.   3/19 -will continue insulin sliding scale , closely monitor glucose    3/20- will continue close glucose monitoring ,controlled..

## 2019-03-20 NOTE — PROGRESS NOTES
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, atrial fib on long term anticoagulation; DM2; CHF; chronic partially collapsed left lung with frequent MDR pneumonia; osteomyelitis of sacrum culture positive for MDR acinetobacter, ESBL e coli, & ESBL proteus    Presented to ED for hypothermia 90.6 rectally   Lab evaluation revealed normocytosis, mild anemia, , lactate 1.1, procalcitonin 3.81, negative flu screen, unremarkable abg, and chest xray & CT with chronic near complete opacification of left lung with RLL infiltrate  Sepsis hydration initiated with 2L IVF and post bolus she became hypotensive requiring CL placement and initiation of pressor support    Hospital/ICU Course:  Admitted to ICU on mechanical ventilation via trach at chronic settings with levophed infusion via Rt Femoral TL CL  3/19 - remains on pressor support overnight; hypoglycemia overnight required IV dextrose; awake this afternoon  3/20 - diuresed near one liter with outpatient dose bumex and pressor demand decreasing; remains awake on vent via trach, appears at mental baseline; no culture growth from blood or sputum; normothermic    Interval History: levophed weaning, urine output remains good    Objective:     Vital Signs (Most Recent):  Temp: 98 °F (36.7 °C) (03/20/19 1115)  Pulse: 70 (03/20/19 1158)  Resp: (!) 29 (03/20/19 1158)  BP: (!) 104/54 (03/20/19 1150)  SpO2: 100 % (03/20/19 1158) Vital Signs (24h Range):  Temp:  [97.8 °F (36.6 °C)-98.4  °F (36.9 °C)] 98 °F (36.7 °C)  Pulse:  [] 70  Resp:  [18-29] 29  SpO2:  [96 %-100 %] 100 %  BP: ()/(14-86) 104/54     Weight: 70 kg (154 lb 5.2 oz)  Body mass index is 24.17 kg/m².      Intake/Output Summary (Last 24 hours) at 3/20/2019 1200  Last data filed at 3/20/2019 1000  Gross per 24 hour   Intake 2103.56 ml   Output 2815 ml   Net -711.44 ml       Physical Exam   Constitutional: She has a sickly appearance. She appears ill.       HENT:   Head: Atraumatic.   Eyes: No scleral icterus.   Neck: No tracheal deviation present.   Cardiovascular: Normal rate and regular rhythm.   Pulses:       Radial pulses are 1+ on the right side, and 1+ on the left side.        Dorsalis pedis pulses are 1+ on the right side, and 1+ on the left side.   Pulmonary/Chest: She has decreased breath sounds. She has rhonchi. She has rales.   Vent controlled respirations   Abdominal: Soft. She exhibits no distension.   Lymphadenopathy:     She has no cervical adenopathy.   Neurological: She is alert.   Skin: Capillary refill takes 2 to 3 seconds. She is not diaphoretic.       Vents:  Vent Mode: A/C (03/20/19 1150)  Ventilator Initiated: Yes (03/18/19 0205)  Set Rate: 20 bmp (03/20/19 1150)  Vt Set: 375 mL (03/20/19 1150)  Pressure Support: 0 cmH20 (03/20/19 1150)  PEEP/CPAP: 10 cmH20 (03/20/19 1150)  Oxygen Concentration (%): 40 (03/20/19 1158)  Peak Airway Pressure: 29 cmH2O (03/20/19 1150)  Plateau Pressure: 0 cmH20 (03/20/19 1150)  Total Ve: 10 mL (03/20/19 1150)  F/VT Ratio<105 (RSBI): (!) 67.71 (03/20/19 1150)    Lines/Drains/Airways     Central Venous Catheter Line                 Percutaneous Central Line Insertion/Assessment - triple lumen  03/18/19 0929 right femoral vein 2 days          Drain                 Gastrostomy/Enterostomy 10/19/18 Percutaneous endoscopic gastrostomy (PEG)  days         Gastrostomy/Enterostomy 03/18/19 1905 Percutaneous endoscopic gastrostomy (PEG) 1 day         Urethral Catheter  03/18/19 1905 1 day          Airway                 [facility]                Significant Labs:    CBC/Anemia Profile:  Recent Labs   Lab 03/19/19  0539 03/20/19  0410   WBC 24.64* 22.40*   HGB 8.7* 7.9*   HCT 28.2* 26.0*    169   MCV 86 86   RDW 18.8* 18.9*        Chemistries:  Recent Labs   Lab 03/19/19  0539 03/20/19  0410    145   K 3.7 4.6    107   CO2 26 26   BUN 98* 99*   CREATININE 1.1 1.2   CALCIUM 9.0 9.3   ALBUMIN 1.5* 1.4*   PROT 6.8 6.7   BILITOT 0.3 0.3   ALKPHOS 280* 253*   ALT 70* 51*   AST 60* 43*   MG 2.1 2.1   PHOS 6.1* 5.2*       All pertinent labs within the past 24 hours have been reviewed.    Significant Imaging:  I have reviewed all pertinent imaging results/findings within the past 24 hours.      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    Immunosuppression and immobilization primary contributing factors to frequent multidrug resistant wound and pulmonary infections  Hold cellcept during active infection     Derm   Decubitus ulcer of sacral region, stage 4, with osteomyelitis    H/o MDR acinetobacter along with ESBL e coli and preteus growth  Continue local wound vac with WOC management  Merrem, tygacil per Dr Ceja  Monitor ESR, CRP, WBC, temp, and procalcitonin for indicators of response     Pulmonary   * VAP (ventilator-associated pneumonia), chronic    Vent settings reviewed and appropriate  Continue scopolamine for oral secretion reduction  Hypertonic saline nebs  Scheduled and prn trach care, suctioning, and turning for mobilization of pulmonary secretions     Cardiac/Vascular   Chronic Hypotension    Exacerbated by CHF, continue bumex  Continue midodrine via PEG  Wean levophed as able for MAP 65 or greater     Acute on chronic combined systolic and diastolic  congestive heart failure    Avoid fluid overload  Continue bumex  Strict I/O     ID   SIRS (systemic inflammatory response syndrome)    Presented with hypothermia, resolved; leukocytosis trend down today  UA unremarkable  Blood cultures ngtd; sputum normal barbie; await final data  tygacil and merrem empirically and strict contact isolation; defer de-escalation to Dr Ceja     Endocrine   Type 2 diabetes mellitus with hypoglycemia, without long-term current use of insulin    Glucose trending wnl; holding long acting insulin, resume once trend improved  Continue SSI prn with monitoring for glucose control and prevention of insulin toxicity        Critical Care Daily Checklist:    A: Awake: RASS Goal/Actual Goal:    Actual: Guerrero Agitation Sedation Scale (RASS): Alert and calm   B: Spontaneous Breathing Trial Performed?     C: SAT & SBT Coordinated?  n/a                      D: Delirium: CAM-ICU Overall CAM-ICU: Negative   E: Early Mobility Performed? ROM   F: Feeding Goal: Goals: Meet > 85 % EEN/EPN while admitted  Status: Nutrition Goal Status: new   Current Diet Order   Procedures    Diet NPO      AS: Analgesia/Sedation n/a   T: Thromboembolic Prophylaxis SCD   H: HOB > 300 Yes   U: Stress Ulcer Prophylaxis (if needed) pepcid   G: Glucose Control Monitoring, SSI   B: Bowel Function Stool Occurrence: 0   I: Indwelling Catheter (Lines & Flores) Necessity reviewed   D: De-escalation of Antimicrobials/Pharmacotherapies reviwed    Plan for the day/ETD Wean levophed    Code Status:  Family/Goals of Care: Full Code  Return to NH on discharge   I have discussed case and plan of care in detail with Dr White; Status and plan of care were discussed with team on multidisciplinary rounds.    Critical Care Time: 45 minutes  Critical secondary to {acute on chronic hypotension requiring pressor support; Critical care was time spent personally by me on the following activities: development of treatment plan with patient or  surrogate and bedside caregivers, discussions with consultants, evaluation of patient's response to treatment, examination of patient, ordering and performing treatments and interventions, ordering and review of laboratory studies, ordering and review of radiographic studies, pulse oximetry, re-evaluation of patient's condition. This critical care time did not overlap with that of any other provider or involve time for any procedures.     BETSY Ngo-BC  Critical Care Medicine  [facility] [place]

## 2019-03-20 NOTE — ASSESSMENT & PLAN NOTE
Glucose trending wnl; holding long acting insulin, resume once trend improved  Continue SSI prn with monitoring for glucose control and prevention of insulin toxicity

## 2019-03-20 NOTE — ASSESSMENT & PLAN NOTE
3/19- will use new cultures to guide therapy , will continue ertapenem and Tigecycline based on previous cultures

## 2019-03-20 NOTE — HOSPITAL COURSE
3/19-  73 year old female with ventilator dependent chronic respiratory failure, multiple decubitus ulcers, combined heart failure and immunosuppression with Cellcept for neuromyelitis optica who presented from [facility] with reports of hypothermia. Upon arrival, the patient's rectal temperature was 89.9.On 2/14/19, the patient was discharged on Ertapenem and Tygacil for presumed osteomyelitis of her sacrum with cultures positive for MDR Acinetobacter, ESBL E. coli and ESBL Proteus.     Since admission,Ct scan of the chest showed-Centrilobular opacities are seen throughout the lower lobes which could reflect pneumonitis and/or endobronchial spread of infection or less likely tumor.  Lab data showed elevated serum procalcitonin   3/20- REMAINS ON VENTILATORY SUPPORT , all cultures reviewed -PSEUDOMONAS AERUGINOSA,cortisol level is 18- normal.  3/21- she remains on ventilatory support with new cultures of MDR pseudomonas noted in her sputum . The isolate is also meropenem resistant .  ACTH stimulation test was done but results are still pending at this time. Lab results will be followed and we will inform the NH to adjust therapy if needed. She has remained clinically stable and at her baseline state of health.  She will be discharged to complete 2 more weeks of IV Zosyn..  She is nonverbal . She was seen and examined on day of discharge and deemed suitable for discharge  . Her overall prognosis  remains guarded .She will need ventilatory support ,wound care an close monitoring .

## 2019-03-20 NOTE — ASSESSMENT & PLAN NOTE
-Due to pneumonia and possible wound infection with osteomyelitis.   -Continue midodrine and vasopressors as needed.   -Continue broad spectrum antibiotics.   -Check sputum culture.   -Follow up blood cultures.   -Pulmonology and Wound Care Consults.     3/19- she was seen by ID during her last admission,Cultures reviewed-  On 2/16- Wound cultures  EGGERTHELLA LENTA  Blautia coccoides  ESCHERICHIA COLI ESBL  PROTEUS MIRABILIS ESBL  2/5- respiratory cultures-MORGANELLA MORGANII    Will continue Meropenem and Tigecycline for now and will check cortisol, TSH levels in AM  3/20- will continue meropenem/tigecycline , follow drug susceptibility of pseudomonas in the sputum.  Serum cortisol is normal suggestive of adrenal insufficiency, might need stress dose steroids , will need endocrine review in AM

## 2019-03-20 NOTE — PROGRESS NOTES
[facility] Hospital Medicine  Progress Note    Patient Name: Carlie Valdez  MRN: [id number]  Patient Class: IP- Inpatient   Admission Date: 3/18/2019  Length of Stay: 2 days  Attending Physician: Philip Ceja MD  Primary Care Provider: Johanna Guzman MD        Subjective:     Principal Problem:VAP (ventilator-associated pneumonia)    HPI:  Carlie Valdez is a 73 year old female with ventilator dependent chronic respiratory failure, multiple decubitus ulcers, combined heart failure and immunosuppression with Cellcept for neuromyelitis optica who presented from [facility] with reports of hypothermia. Upon arrival, the patient's rectal temperature was 89.9. Other vital signs were stable. However, despite hydration, her blood pressure decreased and she required central line placement as well as vasopressor initiation. Of note, the patient has a history of chronic hypotension on Mididrine 5 mg TID. On 2/14/19, the patient was discharged on Ertapenem and Tygacil for presumed osteomyelitis of her sacrum with cultures positive for MDR Acinetobacter, ESBL E. coli and ESBL Proteus. Work up in the ED was significant for bibasilar interstitial opacities and areas of consolidation, left greater than right concerning for airspace disease or aspiration as well as centrilobular opacities in bilateral lower lobes and possible sacral osteomyelitis on CT scan of the abdomen and pelvis. Labs were unremarkable. The patient is a full code. Her surrogate medical decision maker is listed as her daughter, Haley Valdez, but no family is with the patient at this time.     Hospital Course:  3/19-  73 year old female with ventilator dependent chronic respiratory failure, multiple decubitus ulcers, combined heart failure and immunosuppression with Cellcept for neuromyelitis optica who presented from [facility] with reports of hypothermia. Upon arrival, the patient's rectal temperature was  89.9.On 2/14/19, the patient was discharged on Ertapenem and Tygacil for presumed osteomyelitis of her sacrum with cultures positive for MDR Acinetobacter, ESBL E. coli and ESBL Proteus.     Since admission,Ct scan of the chest showed-Centrilobular opacities are seen throughout the lower lobes which could reflect pneumonitis and/or endobronchial spread of infection or less likely tumor.  Lab data showed elevated serum procalcitonin     Interval History: Remains on ventilatory support .was started on therapy for presumed sepsis .  Chest x-ray showed- bilateral lower lobe pneumonia     Review of Systems   Unable to perform ROS: Acuity of condition     Objective:     Vital Signs (Most Recent):  Temp: 98.4 °F (36.9 °C) (03/19/19 2305)  Pulse: 69 (03/20/19 0130)  Resp: (!) 27 (03/20/19 0130)  BP: (!) 121/48 (03/20/19 0130)  SpO2: 100 % (03/20/19 0130) Vital Signs (24h Range):  Temp:  [97.1 °F (36.2 °C)-98.4 °F (36.9 °C)] 98.4 °F (36.9 °C)  Pulse:  [69-89] 69  Resp:  [18-31] 27  SpO2:  [93 %-100 %] 100 %  BP: ()/(14-72) 121/48     Weight: 70 kg (154 lb 5.2 oz)  Body mass index is 24.17 kg/m².    Intake/Output Summary (Last 24 hours) at 3/20/2019 0153  Last data filed at 3/20/2019 0100  Gross per 24 hour   Intake 2366.31 ml   Output 3225 ml   Net -858.69 ml      Physical Exam   Constitutional: She has a sickly appearance. She appears ill.       HENT:   Head: Atraumatic.   Eyes: No scleral icterus.   Neck: No tracheal deviation present.   Cardiovascular: Normal rate and regular rhythm.   Pulses:       Radial pulses are 1+ on the right side, and 1+ on the left side.        Dorsalis pedis pulses are 1+ on the right side, and 1+ on the left side.   Pulmonary/Chest: She has decreased breath sounds. She has rhonchi. She has rales.   Vent controlled respirations   Abdominal: Soft. She exhibits no distension.   Lymphadenopathy:     She has no cervical adenopathy.   Neurological: She is alert.   Skin: Capillary refill takes 2  to 3 seconds. She is not diaphoretic.       Significant Labs:   Blood Culture:   Recent Labs   Lab 03/18/19  0418   LABBLOO No Growth to date  No Growth to date  No Growth to date  No Growth to date     BMP:   Recent Labs   Lab 03/19/19  0539   GLU 70      K 3.7      CO2 26   BUN 98*   CREATININE 1.1   CALCIUM 9.0   MG 2.1     CBC:   Recent Labs   Lab 03/18/19  0240 03/19/19  0539   WBC 10.50 24.64*   HGB 9.8* 8.7*   HCT 31.3* 28.2*   * 165     All pertinent labs within the past 24 hours have been reviewed.    Significant Imaging: I have reviewed and interpreted all pertinent imaging results/findings within the past 24 hours.    Assessment/Plan:      * VAP (ventilator-associated pneumonia), chronic      3/19- will use new cultures to guide therapy , will continue ertapenem and Tigecycline based on previous cultures     Hypothermia    -Most likely due to sepsis.   -Check TSH.   -Continue warming.     3/19- TSH is normal, will check cortisol , will follow sepsis protocol,follow repeat cultures     Septic shock with underlying chronic hypotension    -Due to pneumonia and possible wound infection with osteomyelitis.   -Continue midodrine and vasopressors as needed.   -Continue broad spectrum antibiotics.   -Check sputum culture.   -Follow up blood cultures.   -Pulmonology and Wound Care Consults.     3/19- she was seen by ID during her last admission,Cultures reviewed-  On 2/16- Wound cultures  EGGERTHELLA LENTA  Blautia coccoides  ESCHERICHIA COLI ESBL  PROTEUS MIRABILIS ESBL  2/5- respiratory cultures-MORGANELLA MORGANII    Will continue Meropenem and Tigecycline for now and will check cortisol, TSH levels in AM     Aspiration pneumonia of both lungs    -IV antibiotics and inhaled medications.   -Check sputum culture.    -Pulmonology consult.       3/19- will use culture directed therapy , will continue ventilatory support      Critical illness myopathy      Pt/Ot as tolerated.     Pressure  injury of sacral region, stage 4      Will continue wound vac, wound care     Severe malnutrition    -Check prealbumin.   -Resume tube feedings when appropriate.   -Nutrition consult to evaluate current tube feeding regimen.       3/19- will encourage enteral feeds.Serum albumin is 1.6     Pressure injury of left thigh, unstageable    -Wound care consult for local wound care.   -Monitor for signs and symptoms of infection.        Decubitus ulcer of sacral region, stage 4, with osteomyelitis    -Cultures from 2/6/19 positive for MDR Acinetobacter as well as ESBL E. coli and Proteus.   -Use Meropenem and Tygacil.   -Check ESR and CRP.   -Wound care consult for local wound care.        Functional quadriplegia    Supportive care.        Ventilator dependence    -Continue mechanical ventilation.   -Pulmonology consult.       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        Left ischial pressure sore, stage III    -Wound care consult for local wound care.   -Monitor for signs and symptoms of infection.        Hypothyroidism    -Check TSH due to hypothermia.   -Continue levothyroxine.     3/19- TSH is normal , will continue levothyroxine     Chronic Hypotension      On midodrine, now started on levophed, will check cortisol level     Type 2 diabetes mellitus with hypoglycemia, without long-term current use of insulin    -NISS.   -ADA diet.   -Hemoglobin A1C was 5.2 on 2/5/19.   3/19 -will continue insulin sliding scale , closely monitor glucose       Acute on chronic combined systolic and diastolic congestive heart failure    -Appears compensated.   -Hold diuretics, ARB and beta-blockers due to hypotension.   -Monitor volume status.        Chronic atrial fibrillation    -Currently in sinus rhythm.   -Continue amiodarone.   -No anticoagulation due to GI bleeding.      3/19- will continue amiodarone       VTE Risk Mitigation (From admission, onward)        Ordered     Reason for no Mechanical VTE Prophylaxis  Once      03/18/19 1236     IP VTE HIGH RISK PATIENT  Once      03/18/19 0618     Place sequential compression device  Until discontinued      03/18/19 0618     Place TESSA hose  Until discontinued      03/18/19 0618          Critical care time spent on the evaluation and treatment of severe organ dysfunction, review of pertinent labs and imaging studies, discussions with consulting providers and discussions with patient/family:  45 minutes.    Philip Ceja MD  Department of Hospital Medicine   [facility]

## 2019-03-20 NOTE — SUBJECTIVE & OBJECTIVE
Interval History: Remains on ventilatory support .was started on therapy for presumed sepsis .  Chest x-ray showed- bilateral lower lobe pneumonia     Review of Systems   Unable to perform ROS: Acuity of condition     Objective:     Vital Signs (Most Recent):  Temp: 98.4 °F (36.9 °C) (03/19/19 2305)  Pulse: 69 (03/20/19 0130)  Resp: (!) 27 (03/20/19 0130)  BP: (!) 121/48 (03/20/19 0130)  SpO2: 100 % (03/20/19 0130) Vital Signs (24h Range):  Temp:  [97.1 °F (36.2 °C)-98.4 °F (36.9 °C)] 98.4 °F (36.9 °C)  Pulse:  [69-89] 69  Resp:  [18-31] 27  SpO2:  [93 %-100 %] 100 %  BP: ()/(14-72) 121/48     Weight: 70 kg (154 lb 5.2 oz)  Body mass index is 24.17 kg/m².    Intake/Output Summary (Last 24 hours) at 3/20/2019 0153  Last data filed at 3/20/2019 0100  Gross per 24 hour   Intake 2366.31 ml   Output 3225 ml   Net -858.69 ml      Physical Exam   Constitutional: She has a sickly appearance. She appears ill.       HENT:   Head: Atraumatic.   Eyes: No scleral icterus.   Neck: No tracheal deviation present.   Cardiovascular: Normal rate and regular rhythm.   Pulses:       Radial pulses are 1+ on the right side, and 1+ on the left side.        Dorsalis pedis pulses are 1+ on the right side, and 1+ on the left side.   Pulmonary/Chest: She has decreased breath sounds. She has rhonchi. She has rales.   Vent controlled respirations   Abdominal: Soft. She exhibits no distension.   Lymphadenopathy:     She has no cervical adenopathy.   Neurological: She is alert.   Skin: Capillary refill takes 2 to 3 seconds. She is not diaphoretic.       Significant Labs:   Blood Culture:   Recent Labs   Lab 03/18/19  0418   LABBLOO No Growth to date  No Growth to date  No Growth to date  No Growth to date     BMP:   Recent Labs   Lab 03/19/19  0539   GLU 70      K 3.7      CO2 26   BUN 98*   CREATININE 1.1   CALCIUM 9.0   MG 2.1     CBC:   Recent Labs   Lab 03/18/19  0240 03/19/19  0539   WBC 10.50 24.64*   HGB 9.8* 8.7*   HCT  31.3* 28.2*   * 165     All pertinent labs within the past 24 hours have been reviewed.    Significant Imaging: I have reviewed and interpreted all pertinent imaging results/findings within the past 24 hours.

## 2019-03-20 NOTE — PROGRESS NOTES
[facility] Hospital Medicine  Progress Note    Patient Name: Carlie Valdez  MRN: [id number]  Patient Class: IP- Inpatient   Admission Date: 3/18/2019  Length of Stay: 2 days  Attending Physician: Philip Ceja MD  Primary Care Provider: Johanna Guzman MD        Subjective:     Principal Problem:VAP (ventilator-associated pneumonia)    HPI:  Carlie Valdez is a 73 year old female with ventilator dependent chronic respiratory failure, multiple decubitus ulcers, combined heart failure and immunosuppression with Cellcept for neuromyelitis optica who presented from [facility] with reports of hypothermia. Upon arrival, the patient's rectal temperature was 89.9. Other vital signs were stable. However, despite hydration, her blood pressure decreased and she required central line placement as well as vasopressor initiation. Of note, the patient has a history of chronic hypotension on Mididrine 5 mg TID. On 2/14/19, the patient was discharged on Ertapenem and Tygacil for presumed osteomyelitis of her sacrum with cultures positive for MDR Acinetobacter, ESBL E. coli and ESBL Proteus. Work up in the ED was significant for bibasilar interstitial opacities and areas of consolidation, left greater than right concerning for airspace disease or aspiration as well as centrilobular opacities in bilateral lower lobes and possible sacral osteomyelitis on CT scan of the abdomen and pelvis. Labs were unremarkable. The patient is a full code. Her surrogate medical decision maker is listed as her daughter, Haley Valdez, but no family is with the patient at this time.     Hospital Course:  3/19-  73 year old female with ventilator dependent chronic respiratory failure, multiple decubitus ulcers, combined heart failure and immunosuppression with Cellcept for neuromyelitis optica who presented from [facility] with reports of hypothermia. Upon arrival, the patient's rectal temperature was  89.9.On 2/14/19, the patient was discharged on Ertapenem and Tygacil for presumed osteomyelitis of her sacrum with cultures positive for MDR Acinetobacter, ESBL E. coli and ESBL Proteus.     Since admission,Ct scan of the chest showed-Centrilobular opacities are seen throughout the lower lobes which could reflect pneumonitis and/or endobronchial spread of infection or less likely tumor.  Lab data showed elevated serum procalcitonin   3/20- REMAINS ON VENTILATORY SUPPORT , all cultures reviewed -PSEUDOMONAS AERUGINOSA,cortisol level is 18- normal.    Interval History: Remains on ventilatory support .was started on therapy for presumed sepsis .  Chest x-ray showed- bilateral lower lobe pneumonia      3/20- REMAINS ON VENTILATORY SUPPORT , all cultures reviewed -PSEUDOMONAS AERUGINOSA,cortisol level is 18- normal.  CT scan of the abdomen - GI Tract/Mesentery: G-tube is seen within the stomach.  Moderate gastric distention and moderate amount of ingested material seen within the stomach.  No evidence of bowel obstruction or inflammation.  Moderate constipation.  Review of Systems   Unable to perform ROS: Acuity of condition     Objective:     Vital Signs (Most Recent):  Temp: 98 °F (36.7 °C) (03/20/19 1115)  Pulse: 70 (03/20/19 1728)  Resp: (!) 23 (03/20/19 1728)  BP: (!) 105/45 (03/20/19 1505)  SpO2: 99 % (03/20/19 1728) Vital Signs (24h Range):  Temp:  [97.8 °F (36.6 °C)-98.4 °F (36.9 °C)] 98 °F (36.7 °C)  Pulse:  [] 70  Resp:  [18-29] 23  SpO2:  [96 %-100 %] 99 %  BP: ()/(14-86) 105/45     Weight: 70 kg (154 lb 5.2 oz)  Body mass index is 24.17 kg/m².    Intake/Output Summary (Last 24 hours) at 3/20/2019 1733  Last data filed at 3/20/2019 1600  Gross per 24 hour   Intake 2116.34 ml   Output 3235 ml   Net -1118.66 ml      Physical Exam   Constitutional: She has a sickly appearance. She appears ill.       HENT:   Head: Atraumatic.   Eyes: No scleral icterus.   Neck: No tracheal deviation present.    Cardiovascular: Normal rate and regular rhythm.   Pulses:       Radial pulses are 1+ on the right side, and 1+ on the left side.        Dorsalis pedis pulses are 1+ on the right side, and 1+ on the left side.   Pulmonary/Chest: She has decreased breath sounds. She has rhonchi. She has rales.   Vent controlled respirations   Abdominal: Soft. She exhibits no distension.   Lymphadenopathy:     She has no cervical adenopathy.   Neurological: She is alert.   Skin: Capillary refill takes 2 to 3 seconds. She is not diaphoretic.       Significant Labs:   Blood Culture:   No results for input(s): LABBLOO in the last 48 hours.  BMP:   Recent Labs   Lab 03/20/19  0410   GLU 71      K 4.6      CO2 26   BUN 99*   CREATININE 1.2   CALCIUM 9.3   MG 2.1     CBC:   Recent Labs   Lab 03/19/19  0539 03/20/19  0410   WBC 24.64* 22.40*   HGB 8.7* 7.9*   HCT 28.2* 26.0*    169     All pertinent labs within the past 24 hours have been reviewed.    Significant Imaging: I have reviewed and interpreted all pertinent imaging results/findings within the past 24 hours.    Assessment/Plan:      * VAP (ventilator-associated pneumonia), chronic      3/19- will use new cultures to guide therapy , will continue ertapenem and Tigecycline based on previous cultures     Hypothermia    -Most likely due to sepsis.   -Check TSH.   -Continue warming.     3/19- TSH is normal, will check cortisol , will follow sepsis protocol,follow repeat cultures     Septic shock with underlying chronic hypotension    -Due to pneumonia and possible wound infection with osteomyelitis.   -Continue midodrine and vasopressors as needed.   -Continue broad spectrum antibiotics.   -Check sputum culture.   -Follow up blood cultures.   -Pulmonology and Wound Care Consults.     3/19- she was seen by ID during her last admission,Cultures reviewed-  On 2/16- Wound cultures  EGGERTHELLA LENTA  Blautia coccoides  ESCHERICHIA COLI ESBL  PROTEUS MIRABILIS ESBL  2/5-  respiratory cultures-MORGANELLA MORGANII    Will continue Meropenem and Tigecycline for now and will check cortisol, TSH levels in AM  3/20- will continue meropenem/tigecycline , follow drug susceptibility of pseudomonas in the sputum.  Serum cortisol is normal suggestive of adrenal insufficiency, might need stress dose steroids , will need endocrine review in AM      Aspiration pneumonia of both lungs    -IV antibiotics and inhaled medications.   -Check sputum culture.    -Pulmonology consult.       3/19- will use culture directed therapy , will continue ventilatory support     3/20- will continue meropenem , will follow drug susceptibility of pseudomonas , continue close monitoring       Critical illness myopathy      Pt/Ot as tolerated.     Pressure injury of sacral region, stage 4      Will continue wound vac, wound care     Severe malnutrition    -Check prealbumin.   -Resume tube feedings when appropriate.   -Nutrition consult to evaluate current tube feeding regimen.       3/19- will encourage enteral feeds.Serum albumin is 1.6     Pressure injury of left ischium, unstageable      Will continue wound care      Pressure injury of left thigh, unstageable    -Wound care consult for local wound care.   -Monitor for signs and symptoms of infection.        Decubitus ulcer of sacral region, stage 4, with osteomyelitis    -Cultures from 2/6/19 positive for MDR Acinetobacter as well as ESBL E. coli and Proteus.   -Use Meropenem and Tygacil.   -Check ESR and CRP.   -Wound care consult for local wound care.        Functional quadriplegia    Supportive care.        Ventilator dependence    -Continue mechanical ventilation.   -Pulmonology consult.       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        Left ischial  pressure sore, stage III    -Wound care consult for local wound care.   -Monitor for signs and symptoms of infection.        Hypothyroidism    -Check TSH due to hypothermia.   -Continue levothyroxine.     3/19- TSH is normal , will continue levothyroxine     Chronic Hypotension      On midodrine, now started on levophed, will check cortisol level     Type 2 diabetes mellitus with hypoglycemia, without long-term current use of insulin    -NISS.   -ADA diet.   -Hemoglobin A1C was 5.2 on 2/5/19.   3/19 -will continue insulin sliding scale , closely monitor glucose    3/20- will continue close glucose monitoring ,controlled..       Acute on chronic combined systolic and diastolic congestive heart failure    -Appears compensated.   -Hold diuretics, ARB and beta-blockers due to hypotension.   -Monitor volume status.        Chronic atrial fibrillation    -Currently in sinus rhythm.   -Continue amiodarone.   -No anticoagulation due to GI bleeding.     3/19- will continue amiodarone       VTE Risk Mitigation (From admission, onward)        Ordered     Reason for no Mechanical VTE Prophylaxis  Once      03/18/19 1236     IP VTE HIGH RISK PATIENT  Once      03/18/19 0618     Place sequential compression device  Until discontinued      03/18/19 0618     Place TESSA hose  Until discontinued      03/18/19 0618          Critical care time spent on the evaluation and treatment of severe organ dysfunction, review of pertinent labs and imaging studies, discussions with consulting providers and discussions with patient/family:  45 minutes.    Philip Ceja MD  Department of Hospital Medicine   [facility]

## 2019-03-20 NOTE — ASSESSMENT & PLAN NOTE
Presented with hypothermia, resolved; leukocytosis trend down today  UA unremarkable  Blood cultures ngtd; sputum normal barbie; await final data  tygacil and merrem empirically and strict contact isolation; defer de-escalation to Dr Ceja

## 2019-03-20 NOTE — PLAN OF CARE
Problem: Adult Inpatient Plan of Care  Goal: Plan of Care Review  Outcome: Ongoing (interventions implemented as appropriate)  Pt in bed more alert today, appears and sounds to be attempting to talk. Pt able to follow commands. Levophed remain@0.02mcg/kg/min until 1020 when it was turned off, pt SBP has sustain >90. Pt with BM noted in colostomy bag to rectum, emptied. Wound vac remains in place to stage IV sacral PU. Right femoral central line CDI, infusing NS @KVO. Pt pressure appears low @times d/t pt having arm left upper extremity contracted. Pt has not appeared in any distress, oral care performed throughout the shift. Cecilia Mayberry RN  1550 Bedside report given to GIULIANO Ariza. Cecilia Mayberry RN

## 2019-03-20 NOTE — ASSESSMENT & PLAN NOTE
-Currently in sinus rhythm.   -Continue amiodarone.   -No anticoagulation due to GI bleeding.     3/19- will continue amiodarone

## 2019-03-20 NOTE — ASSESSMENT & PLAN NOTE
-IV antibiotics and inhaled medications.   -Check sputum culture.    -Pulmonology consult.       3/19- will use culture directed therapy , will continue ventilatory support

## 2019-03-20 NOTE — SUBJECTIVE & OBJECTIVE
Interval History: levophed weaning, urine output remains good    Objective:     Vital Signs (Most Recent):  Temp: 98 °F (36.7 °C) (03/20/19 1115)  Pulse: 70 (03/20/19 1158)  Resp: (!) 29 (03/20/19 1158)  BP: (!) 104/54 (03/20/19 1150)  SpO2: 100 % (03/20/19 1158) Vital Signs (24h Range):  Temp:  [97.8 °F (36.6 °C)-98.4 °F (36.9 °C)] 98 °F (36.7 °C)  Pulse:  [] 70  Resp:  [18-29] 29  SpO2:  [96 %-100 %] 100 %  BP: ()/(14-86) 104/54     Weight: 70 kg (154 lb 5.2 oz)  Body mass index is 24.17 kg/m².      Intake/Output Summary (Last 24 hours) at 3/20/2019 1200  Last data filed at 3/20/2019 1000  Gross per 24 hour   Intake 2103.56 ml   Output 2815 ml   Net -711.44 ml       Physical Exam   Constitutional: She has a sickly appearance. She appears ill.       HENT:   Head: Atraumatic.   Eyes: No scleral icterus.   Neck: No tracheal deviation present.   Cardiovascular: Normal rate and regular rhythm.   Pulses:       Radial pulses are 1+ on the right side, and 1+ on the left side.        Dorsalis pedis pulses are 1+ on the right side, and 1+ on the left side.   Pulmonary/Chest: She has decreased breath sounds. She has rhonchi. She has rales.   Vent controlled respirations   Abdominal: Soft. She exhibits no distension.   Lymphadenopathy:     She has no cervical adenopathy.   Neurological: She is alert.   Skin: Capillary refill takes 2 to 3 seconds. She is not diaphoretic.       Vents:  Vent Mode: A/C (03/20/19 1150)  Ventilator Initiated: Yes (03/18/19 0205)  Set Rate: 20 bmp (03/20/19 1150)  Vt Set: 375 mL (03/20/19 1150)  Pressure Support: 0 cmH20 (03/20/19 1150)  PEEP/CPAP: 10 cmH20 (03/20/19 1150)  Oxygen Concentration (%): 40 (03/20/19 1158)  Peak Airway Pressure: 29 cmH2O (03/20/19 1150)  Plateau Pressure: 0 cmH20 (03/20/19 1150)  Total Ve: 10 mL (03/20/19 1150)  F/VT Ratio<105 (RSBI): (!) 67.71 (03/20/19 1150)    Lines/Drains/Airways     Central Venous Catheter Line                 Percutaneous Central Line  Insertion/Assessment - triple lumen  03/18/19 0929 right femoral vein 2 days          Drain                 Gastrostomy/Enterostomy 10/19/18 Percutaneous endoscopic gastrostomy (PEG)  days         Gastrostomy/Enterostomy 03/18/19 1905 Percutaneous endoscopic gastrostomy (PEG) 1 day         Urethral Catheter 03/18/19 1905 1 day          Airway                 Surgical Airway 02/06/19 1905 Shiley 41 days                Significant Labs:    CBC/Anemia Profile:  Recent Labs   Lab 03/19/19  0539 03/20/19  0410   WBC 24.64* 22.40*   HGB 8.7* 7.9*   HCT 28.2* 26.0*    169   MCV 86 86   RDW 18.8* 18.9*        Chemistries:  Recent Labs   Lab 03/19/19  0539 03/20/19  0410    145   K 3.7 4.6    107   CO2 26 26   BUN 98* 99*   CREATININE 1.1 1.2   CALCIUM 9.0 9.3   ALBUMIN 1.5* 1.4*   PROT 6.8 6.7   BILITOT 0.3 0.3   ALKPHOS 280* 253*   ALT 70* 51*   AST 60* 43*   MG 2.1 2.1   PHOS 6.1* 5.2*       All pertinent labs within the past 24 hours have been reviewed.    Significant Imaging:  I have reviewed all pertinent imaging results/findings within the past 24 hours.

## 2019-03-20 NOTE — ASSESSMENT & PLAN NOTE
-IV antibiotics and inhaled medications.   -Check sputum culture.    -Pulmonology consult.       3/19- will use culture directed therapy , will continue ventilatory support     3/20- will continue meropenem , will follow drug susceptibility of pseudomonas , continue close monitoring

## 2019-03-20 NOTE — ASSESSMENT & PLAN NOTE
-Continue mechanical ventilation.   -Pulmonology consult.       3/19- will continue ventilatory support , critical care follow up   3/20- will continue close ventilatory support

## 2019-03-20 NOTE — SUBJECTIVE & OBJECTIVE
Interval History: Remains on ventilatory support .was started on therapy for presumed sepsis .  Chest x-ray showed- bilateral lower lobe pneumonia      3/20- REMAINS ON VENTILATORY SUPPORT , all cultures reviewed -PSEUDOMONAS AERUGINOSA,cortisol level is 18- normal.  CT scan of the abdomen - GI Tract/Mesentery: G-tube is seen within the stomach.  Moderate gastric distention and moderate amount of ingested material seen within the stomach.  No evidence of bowel obstruction or inflammation.  Moderate constipation.  Review of Systems   Unable to perform ROS: Acuity of condition     Objective:     Vital Signs (Most Recent):  Temp: 98 °F (36.7 °C) (03/20/19 1115)  Pulse: 70 (03/20/19 1728)  Resp: (!) 23 (03/20/19 1728)  BP: (!) 105/45 (03/20/19 1505)  SpO2: 99 % (03/20/19 1728) Vital Signs (24h Range):  Temp:  [97.8 °F (36.6 °C)-98.4 °F (36.9 °C)] 98 °F (36.7 °C)  Pulse:  [] 70  Resp:  [18-29] 23  SpO2:  [96 %-100 %] 99 %  BP: ()/(14-86) 105/45     Weight: 70 kg (154 lb 5.2 oz)  Body mass index is 24.17 kg/m².    Intake/Output Summary (Last 24 hours) at 3/20/2019 1733  Last data filed at 3/20/2019 1600  Gross per 24 hour   Intake 2116.34 ml   Output 3235 ml   Net -1118.66 ml      Physical Exam   Constitutional: She has a sickly appearance. She appears ill.       HENT:   Head: Atraumatic.   Eyes: No scleral icterus.   Neck: No tracheal deviation present.   Cardiovascular: Normal rate and regular rhythm.   Pulses:       Radial pulses are 1+ on the right side, and 1+ on the left side.        Dorsalis pedis pulses are 1+ on the right side, and 1+ on the left side.   Pulmonary/Chest: She has decreased breath sounds. She has rhonchi. She has rales.   Vent controlled respirations   Abdominal: Soft. She exhibits no distension.   Lymphadenopathy:     She has no cervical adenopathy.   Neurological: She is alert.   Skin: Capillary refill takes 2 to 3 seconds. She is not diaphoretic.       Significant Labs:   Blood  Culture:   No results for input(s): LABBLOO in the last 48 hours.  BMP:   Recent Labs   Lab 03/20/19  0410   GLU 71      K 4.6      CO2 26   BUN 99*   CREATININE 1.2   CALCIUM 9.3   MG 2.1     CBC:   Recent Labs   Lab 03/19/19  0539 03/20/19  0410   WBC 24.64* 22.40*   HGB 8.7* 7.9*   HCT 28.2* 26.0*    169     All pertinent labs within the past 24 hours have been reviewed.    Significant Imaging: I have reviewed and interpreted all pertinent imaging results/findings within the past 24 hours.

## 2019-03-20 NOTE — PLAN OF CARE
KARINE spoke with Byron to give her update on care and possible d/c tomorrow.   Haley Valdez Daughter   [phone]     Haley Valdez Daughter   [phone]     KARINE called dtr and given update care. CM to f/u for safe transition

## 2019-03-21 NOTE — DISCHARGE SUMMARY
[facility] Hospital Medicine  Discharge Summary      Patient Name: Carlie Valdez  MRN: [id number]  Admission Date: 3/18/2019  Hospital Length of Stay: 3 days  Discharge Date and Time: No discharge date for patient encounter.  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 Upon arrival, the patient's rectal temperature was 89.9. Other vital signs were stable. However, despite hydration, her blood pressure decreased and she required central line placement as well as vasopressor initiation. Of note, the patient has a history of chronic hypotension on Mididrine 5 mg TID. On 2/14/19, the patient was discharged on Ertapenem and Tygacil for presumed osteomyelitis of her sacrum with cultures positive for MDR Acinetobacter, ESBL E. coli and ESBL Proteus. Work up in the ED was significant for bibasilar interstitial opacities and areas of consolidation, left greater than right concerning for airspace disease or aspiration as well as centrilobular opacities in bilateral lower lobes and possible sacral osteomyelitis on CT scan of the abdomen and pelvis. Labs were unremarkable. The patient is a full code. Her surrogate medical decision maker is listed as her daughter, Haley Valdez, but no family is with the patient at this time.     * No surgery found *      Hospital Course:   3/19-  73 year old female with ventilator dependent chronic respiratory failure, multiple decubitus ulcers, combined heart failure and immunosuppression with Cellcept for neuromyelitis optica who presented from [facility] with reports of hypothermia. Upon  arrival, the patient's rectal temperature was 89.9.On 2/14/19, the patient was discharged on Ertapenem and Tygacil for presumed osteomyelitis of her sacrum with cultures positive for MDR Acinetobacter, ESBL E. coli and ESBL Proteus.     Since admission,Ct scan of the chest showed-Centrilobular opacities are seen throughout the lower lobes which could reflect pneumonitis and/or endobronchial spread of infection or less likely tumor.  Lab data showed elevated serum procalcitonin   3/20- REMAINS ON VENTILATORY SUPPORT , all cultures reviewed -PSEUDOMONAS AERUGINOSA,cortisol level is 18- normal.  3/21- she remains on ventilatory support with new cultures of MDR pseudomonas noted in her sputum . The isolate is also meropenem resistant .  ACTH stimulation test was done but results are still pending at this time. Lab results will be followed and we will inform the NH to adjust therapy if needed. She has remained clinically stable and at her baseline state of health.  She will be discharged to complete 2 more weeks of IV Zosyn..  She is nonverbal . She was seen and examined on day of discharge and deemed suitable for discharge  . Her overall prognosis  remains guarded .She will need ventilatory support ,wound care an close monitoring .      Consults:   Consults (From admission, onward)        Status Ordering Provider     Inpatient consult to Pulmonology  Once     Provider:  Vanesa Daniels NP    Completed ROSELIA HADLEY     Inpatient consult to Registered Dietitian/Nutritionist  Once     Provider:  (Not yet assigned)    Completed ROSELIA HADLEY          No new Assessment & Plan notes have been filed under this hospital service since the last note was generated.  Service: Hospital Medicine    Final Active Diagnoses:    Diagnosis Date Noted POA    PRINCIPAL PROBLEM:  VAP (ventilator-associated pneumonia), chronic [J95.851] 02/05/2019 Yes    Septic shock with underlying chronic hypotension [A41.9, R65.21] 03/18/2019 Yes     Hypothermia [T68.XXXA] 03/18/2019 Yes    Pressure injury of left hip, unstageable [L89.220] 03/18/2019 Yes    Severe malnutrition [E43] 02/08/2019 Yes    Pressure injury of sacral region, stage 4 [L89.154] 02/08/2019 Yes    Decubitus ulcer of sacral region, stage 4, with osteomyelitis [L89.154] 02/06/2019 Yes    Pressure injury of left thigh, unstageable [L89.220] 02/06/2019 Yes    Pressure injury of left ischium, unstageable [L89.320] 02/06/2019 Yes    Functional quadriplegia [R53.2] 02/05/2019 Yes    Critical illness myopathy [G72.81] 01/07/2019 Yes    Ventilator dependence [Z99.11] 10/19/2018 Not Applicable    Aspiration pneumonia of both lungs [J69.0] 09/12/2018 Yes    Neuromyelitis optica with functional quadreplegia and chronic immunosuppression on Cellcept [G36.0] 07/06/2018 Yes    Left ischial pressure sore, stage III [L89.323] 06/04/2018 Yes    Deep tissue injury [T14.8XXA] 06/04/2018 Yes     Chronic    Hypothyroidism [E03.9] 06/03/2018 Yes     Chronic    Chronic Hypotension [I95.9] 06/01/2018 Yes     Chronic    Type 2 diabetes mellitus with hypoglycemia, without long-term current use of insulin [E11.649] 03/09/2018 Yes    SIRS (systemic inflammatory response syndrome) [R65.10] 09/23/2017 Yes    Acute on chronic combined systolic and diastolic congestive heart failure [I50.43] 07/15/2017 Yes    Chronic atrial fibrillation [I48.2] 08/14/2013 Yes     Chronic      Problems Resolved During this Admission:       Discharged Condition: fair    Disposition: Another Health Care Inst*    Follow Up:    Patient Instructions:   No discharge procedures on file.    Significant Diagnostic Studies: Labs:   BMP:   Recent Labs   Lab 03/20/19  0410 03/21/19  0400   GLU 71 84    149*   K 4.6 4.1    110   CO2 26 26   BUN 99* 105*   CREATININE 1.2 1.1   CALCIUM 9.3 10.2   MG 2.1 1.8       Pending Diagnostic Studies:     Procedure Component Value Units Date/Time    Cortisol [[id number]] Collected:   03/21/19 0927    Order Status:  Sent Lab Status:  In process Updated:  03/21/19 0927    Specimen:  Blood     Cortisol [[id number]] Collected:  03/21/19 0904    Order Status:  Sent Lab Status:  In process Updated:  03/21/19 0904    Specimen:  Blood     Cortisol, AM [[id number]] Collected:  03/21/19 0741    Order Status:  Sent Lab Status:  In process Updated:  03/21/19 0742    Specimen:  Blood          Medications:  Reconciled Home Medications:      Medication List      START taking these medications    piperacillin-tazobactam 3.375 gram injection  Commonly known as:  ZOSYN  Inject 3,375 mg (3.375 g total) into the muscle every 8 (eight) hours. for 14 days        CHANGE how you take these medications    amiodarone 200 MG Tab  Commonly known as:  PACERONE  Take 1 tablet (200 mg total) by mouth 2 (two) times daily.  What changed:  how to take this     bumetanide 2 MG tablet  Commonly known as:  BUMEX  Take 1 tablet (2 mg total) by mouth 2 (two) times daily. 1 Tablet Oral Every day  What changed:    · when to take this  · additional instructions     ergocalciferol 50,000 unit Cap  Commonly known as:  ERGOCALCIFEROL  Take 1 capsule (50,000 Units total) by mouth every 7 days.  What changed:  how to take this     mycophenolate 250 mg Cap  Commonly known as:  CELLCEPT  Take 2 capsules (500 mg total) by mouth 2 (two) times daily.  What changed:  how to take this        CONTINUE taking these medications    baclofen 10 MG tablet  Commonly known as:  LIORESAL  10 mg by Per G Tube route 3 (three) times daily as needed.     brimonidine-timolol 0.2-0.5 % Drop  Commonly known as:  COMBIGAN  Place 1 drop into both eyes every 12 (twelve) hours.     dextrose 5 % SolP 100 mL with tigecycline 50 mg SolR 50 mg  Inject 50 mg into the vein every 12 (twelve) hours. 6 weeks     fluticasone 50 mcg/actuation nasal spray  Commonly known as:  FLONASE  2 sprays by Each Nare route once daily.     insulin detemir U-100 100 unit/mL (3 mL) Inpn  pen  Commonly known as:  LEVEMIR FLEXTOUCH  Inject 10 Units into the skin every evening.     levothyroxine 100 MCG tablet  Commonly known as:  SYNTHROID  100 mcg by Per G Tube route before breakfast.     magnesium oxide 400 mg (241.3 mg magnesium) tablet  Commonly known as:  MAG-OX  400 mg by Per G Tube route 2 (two) times daily.     midodrine 5 MG Tab  Commonly known as:  PROAMATINE  1 tablet (5 mg total) by Per G Tube route 3 (three) times daily.     montelukast 10 mg tablet  Commonly known as:  SINGULAIR  10 mg by Per G Tube route once daily.     multivitamin per tablet  Commonly known as:  THERAGRAN  Take 1 tablet by mouth once daily.     ondansetron 4 MG tablet  Commonly known as:  ZOFRAN  4 mg by Per G Tube route every 6 (six) hours as needed for Nausea.     pantoprazole 40 MG tablet  Commonly known as:  PROTONIX  Take 40 mg by mouth once daily.     polyethylene glycol 17 gram Pwpk  Commonly known as:  GLYCOLAX  Take 17 g by mouth every other day.     traMADol 50 mg tablet  Commonly known as:  ULTRAM  50 mg by Per G Tube route every 6 (six) hours as needed for Pain.     zinc sulfate 220 (50) mg capsule  Commonly known as:  ZINCATE  220 mg by Per G Tube route once daily.        STOP taking these medications    albuterol 90 mcg/actuation inhaler  Commonly known as:  PROVENTIL/VENTOLIN HFA     albuterol-ipratropium 2.5 mg-0.5 mg/3 mL nebulizer solution  Commonly known as:  DUO-NEB     budesonide 0.5 mg/2 mL nebulizer solution  Commonly known as:  PULMICORT     ERTAPENEM (INVANZ) 1 G/100 ML NS (READY TO MIX)            Indwelling Lines/Drains at time of discharge:   Lines/Drains/Airways     Central Venous Catheter Line                 Percutaneous Central Line Insertion/Assessment - triple lumen  03/18/19 0937 right femoral vein 3 days          Drain                 Gastrostomy/Enterostomy 10/19/18 Percutaneous endoscopic gastrostomy (PEG)  days         Gastrostomy/Enterostomy 03/18/19 1905 Percutaneous  endoscopic gastrostomy (PEG) 2 days         Urethral Catheter 03/18/19 1905 2 days          Airway                 Surgical Airway 02/06/19 1905 Nikita 42 days          Pressure Ulcer                 Negative Pressure Wound Therapy  3 days         Pressure Injury 03/18/19 Left Back DTPI - Present on Admission 3 days         Pressure Injury 03/18/19 Left Ischial tuberosity Unstageable - Present on Admission 3 days         Pressure Injury 03/18/19 Left lateral Calf DTPI - Present on Admission 3 days         Pressure Injury 03/18/19 Right medial Calf DTPI - Present on Admission 3 days         Pressure Injury 03/18/19 Sacral spine Stage 4 3 days                Time spent on the discharge of patient:  45 minutes  Patient was seen and examined on the date of discharge and determined to be suitable for discharge.      Philip Ceja MD  Department of Hospital Medicine  [facility]

## 2019-03-21 NOTE — PLAN OF CARE
03/21/19 1538   Final Note   Assessment Type Final Discharge Note   Anticipated Discharge Disposition Home  ([facility])   Discharge plans and expectations educations in teach back method with documentation complete? Yes   Right Care Referral Info   Post Acute Recommendation No Care

## 2019-03-21 NOTE — PLAN OF CARE
KARINE spoke with Byron and the patient was sent here without the wd vac. She can return with a moist drsg when D/C. RN to call report , when appropriate,  [phone].  Cm reported the above to dr Ceja. CM awaiting , per Dr. Ceja, results from adrenal gland studies. Cm asked upon pending results , please write orders for d/c to [facility] as well as meds to be reconciled upon d/c. CM to f/u for safe transition

## 2019-03-21 NOTE — NURSING
Kaleigh with wound care applied new dressing to sacral wound, wound vac removed earlier this AM due to being soiled. Plan to D/C patient back to NH today

## 2019-03-21 NOTE — PLAN OF CARE
Problem: Adult Inpatient Plan of Care  Goal: Plan of Care Review  Outcome: Ongoing (interventions implemented as appropriate)  Patient status unchanged, appears to be at baseline. In process to discharge back to nursing home, family informed. Awaiting ambulance for transfer

## 2019-03-21 NOTE — NURSING
Report called to Hannah nurse at [facility]. Berry at [facility] will call for ambulance transport

## 2019-03-21 NOTE — PROGRESS NOTES
Follow up visit with Ms. Valdez for continued wound care. Plan was to change Wound Vac Veraflo dressing today.  When I arrived on the unit, Wesley Srivastava notified me that the dressing was removed this morning due to leakage and contamination with stool at shift change, and he temporarily applied wet to moist gauze dressing.  Discussed with WESLEY Sanchez with case management and Dr. Ceja. Patient is potentially discharging back to NH today.  NH can not continue vac VERAFLO therapy.  Decision made to d/c wound vac and continue with moist dressings prior to discharge. I entered room to reassess patient.  BLE with multiple DTIs again noted, remain SILVANO, painted with cavilon.  Left upper flank/back DTI healing well, foam dressing continued.  Patient turned to right side with max assist.  She has stooled again and new dressing to sacrum is soiled with mushy light brown stool.  Removed dressing and incontinence care provided with bath wipes.  Sacral wound measures [id number] with undermining and tunneling again noted in same areas, however tunneling at 5 o'clock towards rectum now measures only 1.5cm.  Wound bed is moist and beefy red granulating tissue, scant yellow tissue noted, and overall improvement in condition of wound noted.  Wound filled with aquacel ag extra at this time including undermined and tunneled areas, and covered and secured with duoderm in attempt to seal out further fecal contamination. This dressing can be changed every 3 days and as needed for excess drainage.  Left hip unstageable pressure injury again noted, with softening of eschar.  Cleansed with saline. mesalt applied and secured with tegaderm.  Left ischial unstageable pressure again noted, unchanged from prior assessment.  Cleansed with saline. aquacel ag extra applied to fill deeper portion of wound and cover remaining wound bed, and secured with duoderm. Patient then positioned on left side with foam wedge for support. Updated primary nurse  GIULIANO Srivastava, as well as Dr. Ceja on care provided at this time.

## 2019-03-22 NOTE — PROGRESS NOTES
Report received from Atif Estrada RN.  Pt resting comfortably on the vent, neuro eval at baseline.  Lungs coarse and equal bilaterally.  Palpable pulses 1+ x 4 extremities.  HR paced 100% at 70 bpm, /59   Pt had small loose bm, incontinence care provided.  Wound dressings clean and intact.  Pt turned onto L side and pillows for positional support.  R fem TLC flushed and saline locked.  175mL of clear yellow urine in harris emptied.

## 2019-03-22 NOTE — PROGRESS NOTES
[facility] showed up for transport, however, critical care unit was not sent.  After speaking with their coordinator, they left noting that the CCT team would be here very soon following their shift change.

## 2019-04-18 NOTE — TELEPHONE ENCOUNTER
Spoke to pts daughter, Haley. Pt is in the hospital and they are discussing a rectal tube or colostomy bag.   Pt is bed bound and lives in a nursing home. Advised her that I spoke to KADI Arce and she thinks that bag would be better for longer term.   She verbalized understanding.

## 2019-04-18 NOTE — TELEPHONE ENCOUNTER
----- Message from Reyes Smyth sent at 4/17/2019  4:46 PM CDT -----  Contact: Haley (daughter)  Name of Who is Calling: Haley (daughter)      What is the request in detail: Would like to speak with staff in regards to patient is on bed bond. They are purposing a temp rectal tube or colostomy. would like to get physician point of view on longevity of either procedure.       Can the clinic reply by [facility]: no      What Number to Call Back if not in [facility]: [phone]

## 2019-05-07 NOTE — ED NOTES
Spoke with patients son in law at this time and they would like the patient sent to [facility] on [address].

## 2019-05-07 NOTE — CODE DOCUMENTATION
Pt moved to ED stretcher, respiratory and Dr. Claros at bedside. No palpable pulse. CPR initiated at this time.

## 2019-05-07 NOTE — ED NOTES
Pt was [facility] called 911 and stated that they found pt on the vent with blood coming out of the trach and her mouth. Pt was brought into the ED with the appearance of significant amount of blood coming from the trach and mouth, carotid pulse only noted. Pt had a harris cath with yellow urine noted upon arrival.  RN noted pacemaker to the left chest wall and a peg tube to the LUQ. RN noted several decubitus sores to the sacral and buttock region.

## 2019-05-07 NOTE — ED PROVIDER NOTES
SCRIBE #1 NOTE: I, Corinne Mack, am scribing for, and in the presence of, Gamaliel Claros MD. I have scribed the entire note.      History    No chief complaint on file.      Review of patient's allergies indicates:   Allergen Reactions    Morphine Hives    Atorvastatin      Other reaction(s): Muscle pain    Clonidine     Codeine      Other reaction(s): Unknown    Digoxin      Other reaction(s): Unknown    Diovan [valsartan] Other (See Comments)     Upset stomach, weakness    Eggs [egg derived]     Metoprolol Diarrhea    Naproxen      Other reaction(s): Nausea    Peanut     Propofol      Other reaction(s): delirious    Sulfa (sulfonamide antibiotics)         HPI   HPI    5/7/2019, 9:43 AM   History obtained from the patient      History of Present Illness: Carlie Valdez is a 73 y.o. female patient with PMHx of asthma, Afib, Dm, GERD, and HTN who presents to the Emergency Department for evaluation. Per [facility], pt was found by NH staff with blood coming from her trach tube and mouth. Pt's O2 sats were low and her BP was low. Symptoms are constant and moderate in severity. No prior Tx reported. No further complaints or concerns at this time. HPI and ROS limited secondary to pt unresponsiveness.        Arrival mode: [facility]    PCP: Johanna Guzman MD       Past Medical History:  Past Medical History:   Diagnosis Date    Anticoagulant long-term use     Arthritis     Asthma     Atrial fibrillation     Blood clot of vein in shoulder area     Chronic knee pain     Combined systolic and diastolic heart failure     Diabetes mellitus type 2 without retinopathy 12/19/2016    Diaphragmatic hernia without obstruction and without gangrene 9/14/2015    Gastroparesis     GERD (gastroesophageal reflux disease)     Hypertension     Immune deficiency disorder     Neuromyelitis optica     Primary open angle glaucoma (POAG) of both eyes, severe stage 6/1/2018    Ventilator dependence        Past  Surgical History:  Past Surgical History:   Procedure Laterality Date    CARDIAC DEFIBRILLATOR PLACEMENT      , .    CATARACT EXTRACTION       SECTION      COLONOSCOPY      DEBRIDEMENT, WOUND, SACRUM N/A 2019    Performed by Akila Laguna MD at [facility] OR    EGD, WITH PEG TUBE INSERTION N/A 2018    Performed by Louis O. Jeansonne IV, MD at [facility] OR    ashley      Dr. Macdonald    ESOPHAGOGASTRODUODENOSCOPY (EGD) N/A 2015    Performed by Hieu Colbert MD at [facility] ENDO    KNEE ARTHROSCOPY      TRACHEOSTOMY N/A 2018    Performed by Louis O. Jeansonne IV, MD at [facility] OR    UPPER GASTROINTESTINAL ENDOSCOPY           Family History:  Family History   Problem Relation Age of Onset    Hypertension Mother     Hypertension Father     Colon cancer Neg Hx     Stomach cancer Neg Hx        Social History:  Social History     Tobacco Use    Smoking status: Never Smoker    Smokeless tobacco: Never Used   Substance and Sexual Activity    Alcohol use: No     Alcohol/week: 0.0 oz    Drug use: No    Sexual activity: Never       ROS   Review of Systems   Unable to perform ROS: Patient unresponsive   Respiratory:        (+) bleeding from trach tube     Physical Exam      Initial Vitals   BP Pulse Resp Temp SpO2   -- -- -- -- --      MAP       --          Physical Exam  Nursing Notes and Vital Signs Reviewed.  Physical exam is limited due to the patient's condition.   General: Patient is unresponsive to all stimuli   Head: Atraumatic   Eyes: Pupils are fixed.   ENT: Tracheostomy in place with active bleeding around it. Large blood clots in the oropharynx with active diffuse bleeding.  Cardiovascular: Heart sounds are absent. Pulses are absent   Respiratory: No spontaneous respirations. See ENT Exam.  Abdominal: No distension   Musculoskeletal: Chronic contractures of all extremities.   Skin: Pale. Large sacral decubitus ulcer   Neurological: Full neuro exam limited due to patient's  condition    ED Course    Critical Care  Date/Time: 5/7/2019 10:26 AM  Performed by: Gamaliel Claros MD  Authorized by: Gamaliel Claros MD   Direct patient critical care time: 29 minutes  Additional history critical care time: 3 minutes  Ordering / reviewing critical care time: 7 minutes  Documentation critical care time: 21 minutes  Total critical care time (exclusive of procedural time) : 60 minutes  Critical care time was exclusive of teaching time and separately billable procedures and treating other patients.  Critical care was necessary to treat or prevent imminent or life-threatening deterioration of the following conditions: cardiac failure, respiratory failure and shock.  Critical care was time spent personally by me on the following activities: blood draw for specimens, development of treatment plan with patient or surrogate, interpretation of cardiac output measurements, evaluation of patient's response to treatment, examination of patient, obtaining history from patient or surrogate, ordering and performing treatments and interventions, ordering and review of laboratory studies, re-evaluation of patient's condition, pulse oximetry and review of old charts.        ED Vital Signs:  There were no vitals filed for this visit.    Abnormal Lab Results:  Labs Reviewed   CBC W/ AUTO DIFFERENTIAL   COMPREHENSIVE METABOLIC PANEL   APTT   PROTIME-INR   TYPE & SCREEN   PREPARE RBC SOFT        All Lab Results:  Labs did not result prior to pt expiration    Imaging Results:  Imaging Results    None                The Emergency Provider reviewed the vital signs and test results, which are outlined above.    ED Discussion     9:46 AM: Pt has no pulse. Pads attached. Chest compressions initiated.    9:47 AM: Chest compressions paused. Epi given. Pt is in VTach.    9:48 AM: Chest compressions stopped. Pt shocked. Compressions resumed.    9:50 AM: Pulse palpated. Pt;s HR is 102.    10:08 AM: Pt is coding. Chest  compressions started.    10:10 AM: Pt .    10:33 AM: Spoke with pt's daughter on the phone as the pt's family lives out of town. Informed pt's daughter of pt's passing.    ED Medication(s):  Medications   0.9%  NaCl infusion (for blood administration) (has no administration in time range)          Medication List      ASK your doctor about these medications    amiodarone 200 MG Tab  Commonly known as:  PACERONE  Take 1 tablet (200 mg total) by mouth 2 (two) times daily.     baclofen 10 MG tablet  Commonly known as:  LIORESAL     brimonidine-timolol 0.2-0.5 % Drop  Commonly known as:  COMBIGAN  Place 1 drop into both eyes every 12 (twelve) hours.     bumetanide 2 MG tablet  Commonly known as:  BUMEX  Take 1 tablet (2 mg total) by mouth 2 (two) times daily. 1 Tablet Oral Every day     dextrose 5 % SolP 100 mL with tigecycline 50 mg SolR 50 mg  Inject 50 mg into the vein every 12 (twelve) hours. 6 weeks     ergocalciferol 50,000 unit Cap  Commonly known as:  ERGOCALCIFEROL  Take 1 capsule (50,000 Units total) by mouth every 7 days.     fluticasone propionate 50 mcg/actuation nasal spray  Commonly known as:  FLONASE  2 sprays by Each Nare route once daily.     insulin detemir U-100 100 unit/mL (3 mL) Inpn pen  Commonly known as:  LEVEMIR FLEXTOUCH  Inject 10 Units into the skin every evening.     levothyroxine 100 MCG tablet  Commonly known as:  SYNTHROID     magnesium oxide 400 mg (241.3 mg magnesium) tablet  Commonly known as:  MAG-OX     midodrine 5 MG Tab  Commonly known as:  PROAMATINE  1 tablet (5 mg total) by Per G Tube route 3 (three) times daily.     montelukast 10 mg tablet  Commonly known as:  SINGULAIR     multivitamin per tablet  Commonly known as:  THERAGRAN     mycophenolate 250 mg Cap  Commonly known as:  CELLCEPT  Take 2 capsules (500 mg total) by mouth 2 (two) times daily.     ondansetron 4 MG tablet  Commonly known as:  ZOFRAN     pantoprazole 40 MG tablet  Commonly known as:  PROTONIX      polyethylene glycol 17 gram Pwpk  Commonly known as:  GLYCOLAX  Take 17 g by mouth every other day.     traMADol 50 mg tablet  Commonly known as:  ULTRAM     zinc sulfate 220 (50) mg capsule  Commonly known as:  ZINCATE                  Medical Decision Making    Medical Decision Making:   Clinical Tests:   Lab Tests: Ordered           Scribe Attestation:   Scribe #1: I performed the above scribed service and the documentation accurately describes the services I performed. I attest to the accuracy of the note 2019.    Attending:   Physician Attestation Statement for Scribe #1: I, Gamaliel Claros MD, personally performed the services described in this documentation, as scribed by Corinne Mack, in my presence, and it is both accurate and complete.          Clinical Impression       ICD-10-CM ICD-9-CM   1. Cardiac arrest I46.9 427.5   2. Blood loss R58 459.0       Disposition:   Disposition:            Gamaliel Claros MD  19 [id number]

## 2019-05-07 NOTE — PROGRESS NOTES
Patient came in per ambulance being oxygenated per ambu bag . Blood coming from mouth, nose and  Trach . Pulse was lost and compressions started . Code was called by

## 2019-05-31 NOTE — PLAN OF CARE
Problem: Patient Care Overview  Goal: Plan of Care Review  Patient currently requires total assist x 2 for bed mobility and total assist for sitting balance at EOB.   Outcome: Ongoing (interventions implemented as appropriate)  Pt remained on Levo gtt most of night, BP labile and when turned off SBP dropped down to 70s. Pt turned q 2 hours to prevent skin breakdown and to protect existing wounds.  Afebrile overnight. Neuro status unchanged.  Pt rested well most of night, daughter updated.       Yes - the patient is able to be screened

## 2019-06-03 NOTE — PROGRESS NOTES
Thank you for the referral.   For your information:    The following patient has been assigned to Sophia Real RN with [facility] for high risk screening.    Reason:   Chronic atrial fibrillation with RVR  Essential hypertension  Cardiac defibrillator in place  Severe persistent asthma with acute exacerbation  Acute combined systolic and diastolic congestive heart failure  Non compliance w medication regimen  Atrial fibrillation, unspecified type  Gastroesophageal reflux disease without esophagitis  Childhood asthma without complication  Abnormal CT scan, gastrointestinal tract  Diaphragmatic hernia without obstruction and without gangrene  Chronic knee pain, unspecified laterality  Obesity, Class I, BMI 30-34.9  Vitamin D deficiency  PVD (peripheral vascular disease)  LBBB (left bundle branch block)  Bilateral leg edema    Please contact [facility] at [phone] with any questions.    Thank you,  Rosemary Richard       no

## 2020-07-03 NOTE — ASSESSMENT & PLAN NOTE
Wound care following  Low pressure bed  Turn Q 2 hours  10/25/2018 Evaluated by surgery. CT of pelvis ordered for depth of decubitis     Dr. Josef crowley

## 2020-10-17 NOTE — ANESTHESIA PREPROCEDURE EVALUATION
07/18/2018  Carlie Dandre Dao José Miguel is a 72 y.o., female.    Pre-op Assessment         Review of Systems             Notified gen surg MD in AM that per previous RN, peg tube is not working because it was connected to drain bag to drain by gravity. Also, NG tube right nares connected to low intermittent suction, not putting out anything. COVID resulted negative. Called mother, provided updates, came to visit in early afternoon.      In late evening MD called and asked RN to remove NG tube, and clamp PEG tube, both tasks completed, patient to be started on tube feeds tomorrow. Patient suctioned Q2H, call light within reach, bed alarm on, bed in low position, report given to Cecille WASHINGTON, will continue to closely monitor patient.

## 2021-01-05 NOTE — ASSESSMENT & PLAN NOTE
7/5 start IV lasix  7/6 continue IV lasix  7/8 worsening Chest X Ray with pleural effusion on the right , increase lasix to BID  7/9 negative 2.9 liters . Cont IV Lasix 40 mg q 12 hrs .   7/10 -3 0.5 L since admission.  Improving oxygen requirements.  Creatinine in and serum bicarb increasing.  Will discontinue Lasix.  7/11 no evidence of fluid overload.  Keep off Lasix.  Hypotensive.  Started on Levophed drip keep map above 65 mm of mercury.  7/12 normotensive, acceptable O2 requirement, avoid fluid overload    COVID-19 Virtual Visit     Assessment/Plan:    Problem List Items Addressed This Visit        Other    COVID-19 virus infection - Primary     Day 32 or greater  Patient continues to have low-grade fevers  at night with fatigue weakness  He is status post doxycycline in December and has only a minimal cough  Over the weekend he developed swelling in his ankle and had a negative x-ray negative uric acid and a very elevated CRP at 160 and was given prednisone by his podiatrist   At this point time I am covering him for possible so secondary bacterial infection with Ceftin twice daily  He is going to try to go back to work  Further workup should be initiated if he finishes his prednisone taper and antibiotic and a symptoms do not resolve  Relevant Medications    cefuroxime (CEFTIN) 250 mg tablet         Disposition:     I have spent 15 minutes directly with the patient  Greater than 50% of this time was spent in counseling/coordination of care regarding: prognosis, instructions for management, patient and family education and impressions  Encounter provider Janeth Melton PA-C    Provider located at [address] [place] [id number]    Recent Visits  Date Type Provider Dept   12/30/20 Telemedicine [facility]   Showing recent visits within past 7 days and meeting all other requirements     Today's Visits  Date Type Provider Dept   01/05/21 113[facility], [address] [place] [address] Primary Care   Showing today's visits and meeting all other requirements     Future Appointments  No visits were found meeting these conditions  Showing future appointments within next 150 days and meeting all other requirements      This virtual check-in was done via [employer] and patient was informed that this is not a secure, HIPAA-compliant platform  He agrees to proceed      Patient agrees to participate in a virtual check in via telephone or video visit instead of presenting to the office to address urgent/immediate medical needs  Patient is aware this is a billable service  After connecting through [facility], the patient was identified by name and date of birth  Rafal Jacques was informed that this was a telemedicine visit and that the exam was being conducted confidentially over secure lines  My office door was closed  No one else was in the room  Rafal Jacques acknowledged consent and understanding of privacy and security of the telemedicine visit  I informed the patient that I have reviewed his record in Epic and presented the opportunity for him to ask any questions regarding the visit today  The patient agreed to participate  Subjective:   Rafal Jacques is a 48 y o  male who has been screened for COVID-19  Symptom change since last report: unchanged  Patient's symptoms include fever, chills, fatigue, nasal congestion, rhinorrhea, sore throat and cough  Patient denies anosmia, shortness of breath, chest tightness, abdominal pain, nausea, vomiting, diarrhea, myalgias and headaches  Staying home and isolating themselves?: has not      Taking care not to share personal items?: is not      Cleaning all surfaces that are touched often?: is not      Wearing a mask when leaving room?: is not      Technically patient is on day 26 or greater of his COVID infection as he actually had symptoms starting the 2 11th of December  He was released to work in his 1st day back was yesterday and had to leave because he was exhausted and feeling very weak and feeling like he is going to pass out  Saturday his ankle swelled up greatly and he went to the emergency room they said that he sprained it he went to his podiatrist and they checked him with blood work for gout uric acid was negative CRP was 160 and sed rate was elevated as well    Patient has been having temperatures ranging  2 in the evenings since his symptoms started overall in December he is status post doxycycline and states that his cough is only like up 0 5 out of a 10  He is wondering if he needs more blood work he is wondering if there is anything else he can do  Lab Results   Component Value Date    SARSCOV2 Detected (A) 12/15/2020     Past Medical History:   Diagnosis Date    COVID-19      Past Surgical History:   Procedure Laterality Date    DEEP NECK LYMPH NODE BIOPSY / EXCISION      cervical node biopsy with dissection of jugular nodes    ESOPHAGOGASTRIC FUNDOPLASTY      Nissen Fundoplication     Current Outpatient Medications   Medication Sig Dispense Refill    albuterol (Ventolin HFA) 90 mcg/act inhaler Inhale 2 puffs every 4 (four) hours as needed for wheezing or shortness of breath (cough) (Patient not taking: Reported on 1/2/2021) 1 Inhaler 0    atorvastatin (LIPITOR) 10 mg tablet Take 1 tablet (10 mg total) by mouth daily 30 tablet 11    azelastine (ASTELIN) 0 1 % nasal spray 2 sprays into each nostril 2 (two) times a day Use in each nostril as directed 30 mL 3    b complex vitamins tablet Take 1 tablet by mouth daily      cefuroxime (CEFTIN) 250 mg tablet Take 1 tablet (250 mg total) by mouth every 12 (twelve) hours for 10 days 20 tablet 0    famotidine (PEPCID) 20 mg tablet Take 1 tablet (20 mg total) by mouth 2 (two) times a day for 7 days 14 tablet 0    naproxen (NAPROSYN) 500 mg tablet Take 1 tablet (500 mg total) by mouth 2 (two) times a day with meals (Patient not taking: Reported on 1/2/2021) 14 tablet 0    Promethazine-DM (PHENERGAN-DM) 6 25-15 mg/5 mL oral syrup Take 5 mL by mouth 4 (four) times a day as needed for cough (Patient not taking: Reported on 1/2/2021) 240 mL 0     No current facility-administered medications for this visit  Allergies   Allergen Reactions    Azithromycin GI Intolerance     Category: Adverse Reaction;     Penicillins      Other reaction(s):  Other (See Comments) Review of Systems   Constitutional: Positive for chills, fatigue and fever  HENT: Positive for congestion, rhinorrhea and sore throat  Eyes: Negative  Respiratory: Positive for cough  Negative for chest tightness and shortness of breath  Cardiovascular: Negative  Gastrointestinal: Negative  Negative for abdominal pain, diarrhea, nausea and vomiting  Endocrine: Negative  Genitourinary: Negative  Musculoskeletal: Negative  Negative for myalgias  Skin: Negative  Allergic/Immunologic: Negative  Neurological: Negative  Negative for headaches  Hematological: Negative  Psychiatric/Behavioral: Negative  Objective:    Vitals:    01/05/21 1347   Temp: 99 °F (37 2 °C)       Physical Exam  Vitals signs reviewed  Constitutional:       General: He is not in acute distress  Appearance: Normal appearance  He is not ill-appearing, toxic-appearing or diaphoretic  HENT:      Head: Normocephalic and atraumatic  Nose: Nose normal    Eyes:      Conjunctiva/sclera: Conjunctivae normal    Pulmonary:      Effort: Pulmonary effort is normal    Skin:     General: Skin is warm and dry  Neurological:      General: No focal deficit present  Mental Status: He is alert  Psychiatric:         Mood and Affect: Mood normal          Thought Content: Thought content normal          Judgment: Judgment normal        VIRTUAL VISIT DISCLAIMER    Camden Gracie acknowledges that he has consented to an online visit or consultation  He understands that the online visit is based solely on information provided by him, and that, in the absence of a face-to-face physical evaluation by the physician, the diagnosis he receives is both limited and provisional in terms of accuracy and completeness  This is not intended to replace a full medical face-to-face evaluation by the physician  Karen Bowman understands and accepts these terms

## 2022-05-03 NOTE — ASSESSMENT & PLAN NOTE
Health Maintenance Due   Topic Date Due   â¢ Shingles Vaccine (1 of 2) Never done   â¢ Diabetes Eye Exam  10/03/2017   â¢ Colorectal Cancer Screen-  07/30/2021   â¢ COVID-19 Vaccine (2 - Booster for Dmitriy series) 09/24/2021   â¢ Medicare Advantage- Medicare Wellness Visit  01/01/2022       Patient is due for topics as listed above but is not proceeding with Immunization(s) COVID-19 and Shingles, Diabetes Eye Exam and [address] (Medicare Wellness Visit) at this time. Will continue wound care

## 2022-06-24 NOTE — PROGRESS NOTES
Progress Note  Neurological ICU    Admit Date: 6/30/2018   LOS: 12 days     SUBJECTIVE:     Patient is awake, alert and following commands, she remains intubated. She is making an effort to communicate  She has no motor or sensory improvement in al the extremities. She has no movement., she will only move LE as a sensory reflex  NO complications overnight.    Continuous Infusions:   fentanyl Stopped (07/11/18 0830)     Scheduled Meds:   [START ON 7/13/2018] albumin human 5%  175 g Intravenous Once    albuterol-ipratropium  3 mL Nebulization Q6H    amiodarone  200 mg Oral BID    brimonidine 0.2%  1 drop Both Eyes Q12H    And    timolol maleate 0.5%  1 drop Both Eyes BID    [START ON 7/13/2018] calcium gluconate IVPB  2,000 mg Intravenous Once    chlorhexidine  15 mL Mouth/Throat BID    ergocalciferol  50,000 Units Oral Q7 Days    heparin (porcine)  5,000 Units Subcutaneous Q8H    insulin detemir U-100  10 Units Subcutaneous Daily    levothyroxine  100 mcg Oral Before breakfast    pantoprazole 40 mg in dextrose 5 % 100 mL IVPB (over 15 minutes) (ready to mix system)  40 mg Intravenous Daily    sodium phosphate IVPB  15 mmol Intravenous Once     PRN Meds:[START ON 7/13/2018] sodium chloride, acetaminophen, albuterol-ipratropium, baclofen, dextrose 50%, dextrose 50%, glucagon (human recombinant), glucose, glucose, heparin (porcine), heparin, porcine (PF), hydrALAZINE, insulin aspart U-100, lorazepam    Review of patient's allergies indicates:   Allergen Reactions    Morphine Hives    Atorvastatin      Other reaction(s): Muscle pain    Clonidine     Codeine      Other reaction(s): Unknown    Digoxin      Other reaction(s): Unknown    Diovan [valsartan] Other (See Comments)     Upset stomach, weakness    Eggs [egg derived]     Metoprolol Diarrhea    Naproxen      Other reaction(s): Nausea    Peanut     Propofol      Other reaction(s): delirious    Sulfa (sulfonamide antibiotics)        OBJECTIVE:      Vital Signs (Most Recent)  Temp: 99.2 °F (37.3 °C) (07/12/18 0400)  Pulse: 70 (07/12/18 0922)  Resp: (!) 23 (07/12/18 0922)  BP: (!) 94/47 (07/12/18 0922)  SpO2: 100 % (07/12/18 0922)    Vital Signs Range (Last 24H):  Temp:  [98.2 °F (36.8 °C)-99.2 °F (37.3 °C)]   Pulse:  [68-70]   Resp:  [14-23]   BP: ()/(34-67)   SpO2:  [94 %-100 %]     I & O (Last 24H):  Intake/Output Summary (Last 24 hours) at 07/12/18 0934  Last data filed at 07/12/18 0805   Gross per 24 hour   Intake          4335.42 ml   Output             1630 ml   Net          2705.42 ml     Ventilator Data (Last 24H):     Vent Mode: Spont  Oxygen Concentration (%):  [30-45] 30  Resp Rate Total:  [14 br/min-23 br/min] 23 br/min  Vt Set:  [380 mL] 380 mL  PEEP/CPAP:  [5 cmH20] 5 cmH20  Pressure Support:  [0 fwH35-28 cmH20] 12 cmH20  Mean Airway Pressure:  [8.9 weG11-33 cmH20] 8.9 cmH20    Hemodynamic Parameters (Last 24H):       Physical Exam:  General: She is intubated, she is following verbal commands  HEENT:   Acephalic, Atraumatic,  EOMI, PERRLA, nystagmus, no ptosis, no palsy, she has no visual perception.   Neck: supple, no JVD, no Carotid bruit,   Lungs: CTA,  No rhonchi, no rales  Heart: Regular Rate and rhythm, no murmurs, rubs and or gallops  Abdomen, Soft, non tender, non distended,  bowel sounds present  Extremities: No edema,   Skin: No rash, no ecchymoses,         NEURO    Mental Status   Awake, alert   , following commands,     SPEECH:  She is making a great effort to communicate, she is intubated  CRANIAL NERVES:      II: visual acuity    Blind   II: visual fields absent   II: pupils Equal, round, reactive to light   III,VII: ptosis None   III,IV,VI: extraocular muscles  Full ROM   V: mastication Normal  ( She is biting on respiratory tube)   V: facial light touch sensation  Normal   V,VII: corneal reflex  Present   VII: facial muscle function - upper  Normal   VII: facial muscle function - lower Normal   VIII: hearing Turns head in  the direction of the sound   IX: soft palate elevation  Normal   IX,X: gag reflex Present   XI: trapezius strength  0/5   XI: sternocleidomastoid strength 0/5   XI: neck flexion strength  0/5   XII: tongue strength  deferred         MOTOR:Upper Extremities Flaccid  0/5 proximal and distal-      Lower Extremities: 0/5 proximal and distal  Withdraws to sensory stimulus  TONE:    REFLEXES: 0 throughout UE and LE      SENSORY: can only sense the facial area    GAIT:   Non ambulatory  EXTRAPYRAMIDALS: Poor        Laboratory (Last 24H):  CBC:    Recent Labs  Lab 07/12/18  0325   WBC 13.24*   HGB 10.1*   HCT 33.0*        CMP:    Recent Labs  Lab 07/12/18  0325   CALCIUM 8.5*      K 4.2   CO2 33*      BUN 58*   CREATININE 0.7       ASSESSMENT/PLAN:   Patient with quadriplegic, respiratory failure, areflexia, no sensory except for the facial area, No visual perception    Mental status changed after plasmapheresis    Patient has been treated for NMO, auto immune encephalopathy,  Neuro muscular?   Multiple differential.    Vitamin D deficiency has been addressed  MRI cannot be done because of the pacemaker/AICD      NMO antibody report pending  Myasthenia Gravis panel pending  Autoimmune Encephalopathy panel pending      Discussed with the patient and daughter: Patient agreed to Trach and PEG, I doubt she will improve with plasmapheresis.  She will need long term facility    Discussed with Daughter- Jared, she also text message her sister in [place], I did wait for her to respond .  No response from [place] yet.       Discussed all the Possibilities of the treatment  Discussed about the prognosis  Discussed about the side effects of Plasmapheresis, - hypotension, hypoperfusion etc  Discussed about other differential diagnosis        Haley is the POA        65 minutes of critical care provided      Duane Burton MD., Ph.D., MS   hard copy, drawn during this pregnancy

## 2023-08-23 NOTE — SUBJECTIVE & OBJECTIVE
Interval History:   Pt was seen and examined at bedside .  She cont on ventilation support . The ct chest show  Lung collapse , possible pn and moderate pleura effusion   10/27-  She remains weak,she had thoracentesis today .  CT scan of the chest/abdomen /pelvis- 10/25-    Moderate right and small left pleural effusion.  The right lung reveals dependent atelectasis of the right middle and lower lobes with near complete collapse of the right lower lobe.  The left lung reveals near-complete collapse of the left lower lobe with patchy infiltrate of the left upper lung zone, possibly representing a pneumonia.  Variable subcutaneous edema is present.  10/28- remains weak, on ventilatory support , repeat cultures remain negtaive     10/29-  She was seen at bedside, remains weak , ultrasound of the mediastinum showed small right pleural effusion with calculated volume of 518ml.  T max 101.  10/30- chest x-ray showed marked amount of haziness throughout the left hemithorax. There is a moderate amount of haziness throughout the lower half of the right hemithorax.  This represents an interval worsening of the appearance of the lungs and is worrisome for pneumonia.    Fever curve has improved.  .10/31-    She remains on ventilatory support.  ABG showed alkalosis .    Chest x-ray showed   bilateral infiltrates are seen throughout the left lung and right lower lobe.  Moderate bilateral pleural effusions, left greater than right-no adverse change from previous imaging     11/01- remains on ventilatory support , she has low grade fever , cbc showed wbc of 7.68  Review of Systems   Unable to perform ROS: Patient nonverbal     Objective:     Vital Signs (Most Recent):  Temp: 100.2 °F (37.9 °C) (11/01/18 1522)  Pulse: 100 (11/01/18 1600)  Resp: 20 (11/01/18 1600)  BP: (!) 140/95 (11/01/18 1600)  SpO2: 100 % (11/01/18 1600) Vital Signs (24h Range):  Temp:  [97.7 °F (36.5 °C)-100.2 °F (37.9 °C)] 100.2 °F (37.9 °C)  Pulse:  []  LM asking pt to call our office to discuss issues she was having with her pap unit. 100  Resp:  [7-27] 20  SpO2:  [60 %-100 %] 100 %  BP: ()/() 140/95     Weight: 82 kg (180 lb 12.4 oz)  Body mass index is 25.94 kg/m².    Intake/Output Summary (Last 24 hours) at 11/1/2018 1621  Last data filed at 11/1/2018 1600  Gross per 24 hour   Intake 1930 ml   Output 1333 ml   Net 597 ml      Physical Exam   Constitutional: Vital signs are normal. She appears well-developed. She has a sickly appearance. She appears ill. No distress.   HENT:   Head: Atraumatic.   Mouth/Throat: Oropharynx is clear and moist.   Eyes: EOM and lids are normal. Pupils are equal, round, and reactive to light.   Neck: Neck supple.       Cardiovascular: Normal rate and regular rhythm.   Pulses:       Radial pulses are 1+ on the right side, and 1+ on the left side.        Dorsalis pedis pulses are 1+ on the right side, and 1+ on the left side.   Pulmonary/Chest: No accessory muscle usage. No respiratory distress. She has decreased breath sounds in the right lower field. She has rhonchi. She has rales.   Abdominal: Soft. Bowel sounds are normal. She exhibits no distension. There is no tenderness.       Genitourinary:   Genitourinary Comments: Flores in place   Musculoskeletal:   Diffuse atrophy and +1 tibal edema   Lymphadenopathy:     She has no cervical adenopathy.   Neurological: She is alert.   Awake with eyes open and nods head to questions   Skin: Skin is dry. Capillary refill takes 2 to 3 seconds. No cyanosis.        Has sacral decub ulcer   Nursing note and vitals reviewed.      Significant Labs:   Blood Culture: No results for input(s): LABBLOO in the last 48 hours.  BMP:   Recent Labs   Lab 11/01/18 0407   GLU 93      K 3.7      CO2 25   BUN 37*   CREATININE 1.1   CALCIUM 9.1   MG 2.3     CBC:   Recent Labs   Lab 10/31/18  0335 11/01/18  0407   WBC 10.92 7.68   HGB 8.6* 8.6*   HCT 27.4* 28.5*    255     Magnesium:   Recent Labs   Lab 10/31/18  0335 11/01/18  0407   MG 2.1 2.3     Urine Studies: No  results for input(s): COLORU, APPEARANCEUA, PHUR, SPECGRAV, PROTEINUA, GLUCUA, KETONESU, BILIRUBINUA, OCCULTUA, NITRITE, UROBILINOGEN, LEUKOCYTESUR, RBCUA, WBCUA, BACTERIA, SQUAMEPITHEL, HYALINECASTS in the last 48 hours.    Invalid input(s): WRIGHTSUR  All pertinent labs within the past 24 hours have been reviewed.    Significant Imaging: I have reviewed and interpreted all pertinent imaging results/findings within the past 24 hours.

## 2023-11-10 NOTE — PLAN OF CARE
From: Fátima Sylvester  To: Luis Enrique Felix  Sent: 11/10/2023 11:56 AM CST  Subject: Referral Request    I called the lipid specialist, Dr. Moya, spoke to her nurse she stated there was not an urgent request put in, and that her next available is not until mid April. Can you please put in an urgent request, so I can see her sooner.     Thank You.   Problem: Occupational Therapy Goal  Goal: Occupational Therapy Goal  Goals to be met by: 7-7-18    Patient will increase functional independence with ADLs by performing:    UE Dressing with Mod Assistance.  LE Dressing with Moderate Assistance.  Supine to sit with mod Assistance.  Upper extremity aarom exercise program x10 reps       PT PROGRESS TX SESSION LIMITED. PT DISCHARGED FROM SKILLED O.T. AND PLACED ON PEOPLE 'S PROGRAM

## 2025-05-26 NOTE — PROGRESS NOTES
"Subjective:      Patient ID: Carlie Valdez is a 71 y.o. female.    Chief Complaint: Shortness of Breath ("sob, short winded,hard to talk, back and shoulder pain")    Mrs. Valdez presents to Urgent Care today with complaints of shortness of breath. She was released from the hospital about one week ago after being admitted for the same complaint. She feels like she should have been given an antibiotic. SOB has been progressively worsening. She denies chest pain, palpitations, diaphoresis, etc. She reports sinus congestion for a few days. States that she has been compliant with all of her routine medications, however, family member present with her reports that she has not been taking medication properly.      Shortness of Breath   This is a chronic problem. Episode onset: 4 months, progressively worsening. The problem occurs constantly. The problem has been gradually worsening. Associated symptoms include ear pain, leg swelling (chronic and not worse than baseline), rhinorrhea, a sore throat, sputum production and wheezing. Pertinent negatives include no abdominal pain, chest pain, fever, orthopnea or vomiting. Swollen glands: clear. Nothing aggravates the symptoms. She has tried beta agonist inhalers for the symptoms. The treatment provided no relief. Her past medical history is significant for asthma and a heart failure.     Review of Systems   Constitutional: Positive for fatigue. Negative for fever.   HENT: Positive for congestion, ear pain, rhinorrhea and sore throat.    Respiratory: Positive for sputum production, chest tightness, shortness of breath and wheezing.    Cardiovascular: Positive for leg swelling (chronic and not worse than baseline). Negative for chest pain, palpitations and orthopnea.   Gastrointestinal: Negative.  Negative for abdominal pain and vomiting.   Genitourinary: Negative.    Musculoskeletal: Negative.    Skin: Negative.    Neurological: Negative.        Objective:   BP " "128/80 (BP Location: Right arm, Patient Position: Sitting, BP Method: Manual)   Pulse (!) 119   Temp 98.6 °F (37 °C) (Tympanic)   Ht 5' 4" (1.626 m)   Wt 91.9 kg (202 lb 9.6 oz)   SpO2 98%   BMI 34.78 kg/m²   Physical Exam   Constitutional: She is oriented to person, place, and time. She appears well-developed and well-nourished. She is active and cooperative. No distress.   HENT:   Head: Normocephalic and atraumatic.   Right Ear: A middle ear effusion is present.   Left Ear: A middle ear effusion is present.   Nose: Mucosal edema and sinus tenderness present.   Mouth/Throat: Uvula is midline and mucous membranes are normal. Posterior oropharyngeal erythema present. No oropharyngeal exudate or posterior oropharyngeal edema.   Eyes: Right eye exhibits no discharge. Left eye exhibits no discharge.   Neck: Normal range of motion. Neck supple.   Cardiovascular: S1 normal, S2 normal and normal heart sounds.  An irregularly irregular rhythm present. Tachycardia present.    Pulses:       Carotid pulses are 2+ on the right side, and 2+ on the left side.       Radial pulses are 2+ on the right side, and 2+ on the left side.        Dorsalis pedis pulses are 2+ on the right side, and 2+ on the left side.   2+ pre-tibial pitting edema to both lower extremities extending up to the knees.   Pulmonary/Chest: Effort normal. No accessory muscle usage. No tachypnea. No respiratory distress. She has decreased breath sounds in the right lower field and the left lower field. She has wheezes (expiratory wheezing to bilateral upper lobes) in the right upper field and the left upper field. She has no rhonchi. She has no rales.   Musculoskeletal: Normal range of motion.   Lymphadenopathy:     She has no cervical adenopathy.   Neurological: She is alert and oriented to person, place, and time.   Skin: Skin is warm. No rash noted. She is not diaphoretic.   Nursing note and vitals reviewed.    Assessment:      1. Shortness of breath     "   Plan:   Shortness of breath  -     levalbuterol nebulizer solution 1.25 mg; Take 3 mLs (1.25 mg total) by nebulization one time.  -     prednisoLONE 15 mg/5 mL syrup 30 mg; Take 10 mLs (30 mg total) by mouth one time.  -     X-Ray Chest PA And Lateral; Future; Expected date: 07/27/2017  -     levalbuterol nebulizer solution 1.25 mg; Take 3 mLs (1.25 mg total) by nebulization one time.    Discussed x-ray results with Mrs. Valdez and family member. She was advised to go to the ER for heart failure exacerbation and SOB. She declines ER evaluation. She signed AMA. See form.   She does report improvement in SOB after the two neb treatments and steroid, although, she still appears visibly short of breath, especially with exertion.   Strongly encouraged ER and advised family to call 911 if she worsened at all. Follow up with PCP as soon as possible.     No

## (undated) DEVICE — APPLICATOR CHLORAPREP ORN 26ML

## (undated) DEVICE — SEE MEDLINE ITEM 152622

## (undated) DEVICE — SHEET THYROID W/ISO-BAC

## (undated) DEVICE — BLADE SURG CARBON STEEL SZ11

## (undated) DEVICE — SEE MEDLINE ITEM 157117

## (undated) DEVICE — SOL NS 1000CC

## (undated) DEVICE — SOL 9P NACL IRR PIC IL

## (undated) DEVICE — SEE MEDLINE ITEM 152739

## (undated) DEVICE — MANIFOLD 4 PORT

## (undated) DEVICE — GLOVE PROTEXIS HYDROGEL SZ7.5

## (undated) DEVICE — GLOVE 8 PROTEXIS PI BLUE

## (undated) DEVICE — ELECTRODE REM PLYHSV RETURN 9

## (undated) DEVICE — COVER OVERHEAD SURG LT BLUE

## (undated) DEVICE — GLOVE PROTEXIS HYDROGEL SZ6.5

## (undated) DEVICE — KIT ENDOVIVE SAF PEG PULL 20FR

## (undated) DEVICE — SET PERCU TRACH INTRO BLUE

## (undated) DEVICE — SEE MEDLINE ITEM 157027

## (undated) DEVICE — SYR 10CC LUER LOCK

## (undated) DEVICE — SUT 2/0 30IN SILK BLK BRAI

## (undated) DEVICE — TRAY TRACH BLUE RHINO 8